# Patient Record
Sex: MALE | Race: WHITE | NOT HISPANIC OR LATINO | Employment: OTHER | URBAN - METROPOLITAN AREA
[De-identification: names, ages, dates, MRNs, and addresses within clinical notes are randomized per-mention and may not be internally consistent; named-entity substitution may affect disease eponyms.]

---

## 2017-02-23 DIAGNOSIS — E03.9 HYPOTHYROIDISM, UNSPECIFIED TYPE: Primary | ICD-10-CM

## 2017-02-23 RX ORDER — LEVOTHYROXINE SODIUM 125 UG/1
125 TABLET ORAL EVERY MORNING
Qty: 90 TABLET | Refills: 3 | Status: SHIPPED | OUTPATIENT
Start: 2017-02-23 | End: 2017-07-05

## 2017-03-31 ENCOUNTER — OFFICE VISIT (OUTPATIENT)
Dept: INTERNAL MEDICINE | Facility: CLINIC | Age: 77
End: 2017-03-31
Attending: INTERNAL MEDICINE
Payer: MEDICARE

## 2017-03-31 VITALS
OXYGEN SATURATION: 97 % | DIASTOLIC BLOOD PRESSURE: 80 MMHG | WEIGHT: 174 LBS | HEART RATE: 64 BPM | BODY MASS INDEX: 24.36 KG/M2 | SYSTOLIC BLOOD PRESSURE: 148 MMHG | HEIGHT: 71 IN

## 2017-03-31 DIAGNOSIS — H53.8 BLURRED VISION, LEFT EYE: ICD-10-CM

## 2017-03-31 DIAGNOSIS — K21.9 GASTROESOPHAGEAL REFLUX DISEASE, ESOPHAGITIS PRESENCE NOT SPECIFIED: ICD-10-CM

## 2017-03-31 DIAGNOSIS — K51.919 ULCERATIVE COLITIS WITH COMPLICATION, UNSPECIFIED LOCATION: ICD-10-CM

## 2017-03-31 DIAGNOSIS — Z00.00 ROUTINE HISTORY AND PHYSICAL EXAMINATION OF ADULT: ICD-10-CM

## 2017-03-31 DIAGNOSIS — M77.11 RIGHT TENNIS ELBOW: ICD-10-CM

## 2017-03-31 DIAGNOSIS — I10 ESSENTIAL HYPERTENSION: Primary | ICD-10-CM

## 2017-03-31 DIAGNOSIS — N40.0 BENIGN PROSTATIC HYPERPLASIA, PRESENCE OF LOWER URINARY TRACT SYMPTOMS UNSPECIFIED, UNSPECIFIED MORPHOLOGY: ICD-10-CM

## 2017-03-31 DIAGNOSIS — E03.9 HYPOTHYROIDISM, UNSPECIFIED TYPE: ICD-10-CM

## 2017-03-31 PROCEDURE — 3077F SYST BP >= 140 MM HG: CPT | Mod: S$GLB,,, | Performed by: INTERNAL MEDICINE

## 2017-03-31 PROCEDURE — 3079F DIAST BP 80-89 MM HG: CPT | Mod: S$GLB,,, | Performed by: INTERNAL MEDICINE

## 2017-03-31 PROCEDURE — G0444 DEPRESSION SCREEN ANNUAL: HCPCS | Mod: 59,S$GLB,, | Performed by: INTERNAL MEDICINE

## 2017-03-31 PROCEDURE — 1160F RVW MEDS BY RX/DR IN RCRD: CPT | Mod: S$GLB,,, | Performed by: INTERNAL MEDICINE

## 2017-03-31 PROCEDURE — 99214 OFFICE O/P EST MOD 30 MIN: CPT | Mod: S$GLB,,, | Performed by: INTERNAL MEDICINE

## 2017-03-31 PROCEDURE — 1157F ADVNC CARE PLAN IN RCRD: CPT | Mod: S$GLB,,, | Performed by: INTERNAL MEDICINE

## 2017-03-31 PROCEDURE — 1159F MED LIST DOCD IN RCRD: CPT | Mod: S$GLB,,, | Performed by: INTERNAL MEDICINE

## 2017-03-31 PROCEDURE — G0442 ANNUAL ALCOHOL SCREEN 15 MIN: HCPCS | Mod: 59,S$GLB,, | Performed by: INTERNAL MEDICINE

## 2017-03-31 RX ORDER — IRBESARTAN 150 MG/1
75 TABLET ORAL EVERY MORNING
Qty: 90 TABLET | Refills: 3
Start: 2017-03-31 | End: 2017-07-03

## 2017-03-31 NOTE — MR AVS SNAPSHOT
[facility]  [address] [facility], [address] [address]  Phone: [phone]  Fax: [phone]                  Ramesh Ricci   3/31/2017 9:45 AM   Office Visit    Description:  Male : 1940   Provider:  ANA CRISTINA Muniz MD   Department:  [facility]           Reason for Visit     Follow-up     Elbow Injury     Eye Problem           Diagnoses this Visit        Comments    Essential hypertension    -  Primary     Hypothyroidism, unspecified type         Routine history and physical examination of adult         Blurred vision, left eye                To Do List           Future Appointments        Provider Department Dept Phone    2017 9:00 AM MD [facility] Thomas [phone]      Goals (5 Years of Data)     None      Follow-Up and Disposition     Return in about 6 months (around 2017).       These Medications        Disp Refills Start End    irbesartan (AVAPRO) 150 MG tablet 90 tablet 3 3/31/2017     Take 0.5 tablets (75 mg total) by mouth every morning. - Oral    Pharmacy: [facility] [address] [place] [address] #: [phone]         [facility] On Call     [facility] On Call Nurse Care Line -  Assistance  Unless otherwise directed by your provider, please contact [facility], our nurse care line that is available for  assistance.     Registered nurses in the [facility] provide: appointment scheduling, clinical advisement, health education, and other advisory services.  Call: [phone] (toll free)               Medications           Message regarding Medications     Verify the changes and/or additions to your medication regime listed below are the same as discussed with your clinician today.  If any of these changes or additions are incorrect, please notify your healthcare provider.        CHANGE how you are taking these medications     Start Taking Instead of    irbesartan (AVAPRO) 150 MG tablet irbesartan (AVAPRO) 150 MG tablet  "   Dosage:  Take 0.5 tablets (75 mg total) by mouth every morning. Dosage:  TAKE ONE TABLET BY MOUTH ONCE DAILY IN THE MORNING    Reason for Change:  Reorder            Verify that the below list of medications is an accurate representation of the medications you are currently taking.  If none reported, the list may be blank. If incorrect, please contact your healthcare provider. Carry this list with you in case of emergency.           Current Medications     ascorbic acid (VITAMIN C) 500 MG tablet Take 500 mg by mouth once daily.    aspirin (ECOTRIN) 81 MG EC tablet Take 81 mg by mouth once daily.      balsalazide (COLAZAL) 750 mg capsule     calcium-vitamin D 500 mg(1,250mg) -200 unit per tablet Take 1 tablet by mouth once daily.      celecoxib (CELEBREX) 200 MG capsule Take 1 capsule (200 mg total) by mouth daily as needed for Pain.    ferrous sulfate 325 mg (65 mg iron) Tab tablet Take 325 mg by mouth once daily.    fluticasone (FLONASE) 50 mcg/actuation nasal spray 2 sprays by Each Nare route once daily.    irbesartan (AVAPRO) 150 MG tablet Take 0.5 tablets (75 mg total) by mouth every morning.    levothyroxine (SYNTHROID) 125 MCG tablet Take 1 tablet (125 mcg total) by mouth every morning.    lorazepam (ATIVAN) 1 MG tablet TAKE ONE TABLET BY MOUTH EVERY DAY AS NEEDED    PNV w/o calcium-iron fum-FA (M-VIT) 27-1 mg Tab Take by mouth.      predniSONE (DELTASONE) 10 MG tablet Take 0.5 tablets (5 mg total) by mouth once daily.    tamsulosin (FLOMAX) 0.4 mg Cp24 Take 0.4 mg by mouth once daily.     venlafaxine (EFFEXOR) 37.5 MG Tab TAKE ONE TABLET BY MOUTH TWICE DAILY           Clinical Reference Information           Your Vitals Were     BP Pulse Height Weight SpO2 BMI    148/80 64 5' 11" (1.803 m) 78.9 kg (174 lb) 97% 24.27 kg/m2      Blood Pressure          Most Recent Value    BP  (!)  148/80      Allergies as of 3/31/2017     Benazepril      Immunizations Administered on Date of Encounter - 3/31/2017     None "      Orders Placed During Today's Visit      Normal Orders This Visit    Ambulatory referral to Ophthalmology     Future Labs/Procedures Expected by Expires    25-Hydroxyvitamin D (D2, D3) LC/MS/MS  3/31/2017 5/30/2018    CBC auto differential  3/31/2017 (Approximate) 5/30/2018    Comprehensive metabolic panel  3/31/2017 (Approximate) 5/30/2018    Hemoglobin A1c  3/31/2017 (Approximate) 5/30/2018    Iron and TIBC  3/31/2017 3/31/2018    Lipid panel  3/31/2017 (Approximate) 5/30/2018    PSA, Screening  3/31/2017 (Approximate) 5/30/2018    T4, free  3/31/2017 (Approximate) 3/31/2018    TSH  3/31/2017 (Approximate) 5/30/2018    Vitamin B12  3/31/2017 (Approximate) 5/30/2018      [facility] Sign-Up     Activating your [facility] account is as easy as 1-2-3!     1) Visit [email or web address], select Sign Up Now, enter this activation code and your date of birth, then select Next.  Activation code not generated  Current Patient Portal Status: Account disabled      2) Create a username and password to use when you visit [facility] in the future and select a security question in case you lose your password and select Next.    3) Enter your e-mail address and click Sign Up!    Additional Information  If you have questions, please e-mail [email or web address] or call [phone] to talk to our [facility] staff. Remember, [facility] is NOT to be used for urgent needs. For medical emergencies, dial 911.         Instructions    Wear a Tennis elbow strap below right elbow during the day.       Language Assistance Services     ATTENTION: Language assistance services are available, free of charge. Please call [phone].      ATENCIÓN: Si abila flori, tiene a gamez disposición servicios gratuitos de asistencia lingüística. Llame al [phone].     CHÚ Ý: N?u b?n nói Ti?ng Vi?t, có các d?ch v? h? tr? ngôn ng? mi?n phí dành cho b?n. G?i s? [phone].         [facility] complies with applicable Federal civil rights laws and does not  discriminate on the basis of race, color, national origin, age, disability, or sex.

## 2017-03-31 NOTE — PROGRESS NOTES
Subjective:       Patient ID: Ramesh Ricci is a 77 y.o. male.    Chief Complaint: Follow-up (wants blood work done); Elbow Injury (picked up something heavy / pain feels like pinching); and Eye Problem (left eye pizarro alot and feels weak)    HPI Comments: Dizzyness gone since stopping Avapro.  Lifted something heavy and now left elbow hurts.    Elbow Injury   This is a new problem. The current episode started 1 to 4 weeks ago. The problem occurs constantly. Associated symptoms include numbness.   Eye Problem    The left eye is affected. The current episode started more than 1 month ago. The problem has been unchanged. The patient is experiencing no pain. Associated symptoms include blurred vision.     Review of Systems   Constitutional: Negative.    Eyes: Positive for blurred vision and visual disturbance.   Respiratory: Negative.    Cardiovascular: Negative.    Neurological: Positive for numbness.   Psychiatric/Behavioral: Negative for dysphoric mood.       Objective:      Physical Exam   Constitutional: He appears well-developed and well-nourished.   HENT:   Head: Normocephalic and atraumatic.       About 5x5 mm skin lesion below right eye.   Eyes: Pupils are equal, round, and reactive to light.   Cardiovascular: Normal rate and regular rhythm.    Murmur heard.   Systolic murmur is present with a grade of 3/6   @RUSB   Pulmonary/Chest: Effort normal.   Musculoskeletal: He exhibits no edema.        Right elbow: Tenderness found. Lateral epicondyle tenderness noted.   Neurological: He is alert.       Assessment:       1. Essential hypertension    2. Hypothyroidism, unspecified type    3. Routine history and physical examination of adult    4. Blurred vision, left eye    5. Benign prostatic hyperplasia, presence of lower urinary tract symptoms unspecified, unspecified morphology    6. Gastroesophageal reflux disease, esophagitis presence not specified    7. Ulcerative colitis with complication, unspecified  location    8. Right tennis elbow        Plan:       Per orders and D/C instructions.  Continue meds/diet for hypothyroid, BPH, GERD, and UC, which are stable.  Optho for blurred vision.  Elbow brace for Tennis elbow.  Avapro 75 mg for HTN.    Screening: The patient was screened for depression with the PHQ2 questionnaire and possible health consequences were discussed with the patient, who understands (15 minutes spent). The patient was screened for the misuse of alcohol, by asking the number of drinks per average week, and if pt has had more than 4 drinks (more than 3 for women and elderly) in 1 day within the past year. The health and legal consequences of misuse were discussed (15 minutes spent). The patient was screened for obesity (BMI>30), If the current BMI > 30, then the possible consequences of obesity, as well as the benefits of diet, exercise, and weight loss were discussed (15 minutes spent).

## 2017-04-04 LAB
25(OH)D3+25(OH)D2 SERPL-MCNC: 36 NG/ML (ref 30–100)
ALBUMIN SERPL-MCNC: 3.9 G/DL (ref 3.6–5.1)
ALBUMIN/GLOB SERPL: 1.5 (CALC) (ref 1–2.5)
ALP SERPL-CCNC: 56 U/L (ref 40–115)
ALT SERPL-CCNC: 11 U/L (ref 9–46)
AST SERPL-CCNC: 21 U/L (ref 10–35)
BASOPHILS # BLD AUTO: 22 CELLS/UL (ref 0–200)
BASOPHILS NFR BLD AUTO: 0.4 %
BILIRUB SERPL-MCNC: 0.5 MG/DL (ref 0.2–1.2)
BUN SERPL-MCNC: 24 MG/DL (ref 7–25)
BUN/CREAT SERPL: 18 (CALC) (ref 6–22)
CALCIUM SERPL-MCNC: 9.1 MG/DL (ref 8.6–10.3)
CHLORIDE SERPL-SCNC: 104 MMOL/L (ref 98–110)
CHOLEST SERPL-MCNC: 241 MG/DL (ref 125–200)
CHOLEST/HDLC SERPL: 3.6 (CALC)
CO2 SERPL-SCNC: 31 MMOL/L (ref 20–31)
CREAT SERPL-MCNC: 1.33 MG/DL (ref 0.7–1.18)
EOSINOPHIL # BLD AUTO: 66 CELLS/UL (ref 15–500)
EOSINOPHIL NFR BLD AUTO: 1.2 %
ERYTHROCYTE [DISTWIDTH] IN BLOOD BY AUTOMATED COUNT: 15.3 % (ref 11–15)
GFR SERPL CREATININE-BSD FRML MDRD: 50 ML/MIN/1.73M2
GLOBULIN SER CALC-MCNC: 2.6 G/DL (CALC) (ref 1.9–3.7)
GLUCOSE SERPL-MCNC: 100 MG/DL (ref 65–99)
HBA1C MFR BLD: 5.4 % OF TOTAL HGB
HCT VFR BLD AUTO: 38.6 % (ref 38.5–50)
HDLC SERPL-MCNC: 67 MG/DL
HGB BLD-MCNC: 12.9 G/DL (ref 13.2–17.1)
IRON SATN MFR SERPL: 21 % (CALC) (ref 15–60)
IRON SERPL-MCNC: 58 MCG/DL (ref 50–180)
LDLC SERPL CALC-MCNC: 116 MG/DL (CALC)
LYMPHOCYTES # BLD AUTO: 776 CELLS/UL (ref 850–3900)
LYMPHOCYTES NFR BLD AUTO: 14.1 %
MCH RBC QN AUTO: 30.2 PG (ref 27–33)
MCHC RBC AUTO-ENTMCNC: 33.3 G/DL (ref 32–36)
MCV RBC AUTO: 90.7 FL (ref 80–100)
MONOCYTES # BLD AUTO: 479 CELLS/UL (ref 200–950)
MONOCYTES NFR BLD AUTO: 8.7 %
NEUTROPHILS # BLD AUTO: 4158 CELLS/UL (ref 1500–7800)
NEUTROPHILS NFR BLD AUTO: 75.6 %
NONHDLC SERPL-MCNC: 174 MG/DL (CALC)
PLATELET # BLD AUTO: 196 THOUSAND/UL (ref 140–400)
PMV BLD REES-ECKER: 8.6 FL (ref 7.5–12.5)
POTASSIUM SERPL-SCNC: 4.5 MMOL/L (ref 3.5–5.3)
PROT SERPL-MCNC: 6.5 G/DL (ref 6.1–8.1)
PSA SERPL-MCNC: 0.6 NG/ML
RBC # BLD AUTO: 4.26 MILLION/UL (ref 4.2–5.8)
SODIUM SERPL-SCNC: 141 MMOL/L (ref 135–146)
T4 FREE SERPL-MCNC: 1 NG/DL (ref 0.8–1.8)
TIBC SERPL-MCNC: 277 MCG/DL (CALC) (ref 250–425)
TRIGL SERPL-MCNC: 292 MG/DL
TSH SERPL-ACNC: 2.11 MIU/L (ref 0.4–4.5)
VIT B12 SERPL-MCNC: 596 PG/ML (ref 200–1100)
VITAMIN D2 SERPL-MCNC: <4 NG/ML
VITAMIN D3 SERPL-MCNC: 36 NG/ML
WBC # BLD AUTO: 5.5 THOUSAND/UL (ref 3.8–10.8)

## 2017-04-28 DIAGNOSIS — R52 PAIN: ICD-10-CM

## 2017-04-28 RX ORDER — CELECOXIB 200 MG/1
200 CAPSULE ORAL DAILY PRN
Qty: 90 CAPSULE | Refills: 1 | Status: SHIPPED | OUTPATIENT
Start: 2017-04-28 | End: 2018-11-01 | Stop reason: SDUPTHER

## 2017-05-26 DIAGNOSIS — J30.9 ALLERGIC RHINITIS, UNSPECIFIED ALLERGIC RHINITIS TRIGGER, UNSPECIFIED RHINITIS SEASONALITY: Primary | ICD-10-CM

## 2017-05-26 DIAGNOSIS — R42 VERTIGO: ICD-10-CM

## 2017-05-26 RX ORDER — FLUTICASONE PROPIONATE 50 MCG
2 SPRAY, SUSPENSION (ML) NASAL DAILY
Qty: 16 G | Refills: 5 | Status: SHIPPED | OUTPATIENT
Start: 2017-05-26 | End: 2018-09-19

## 2017-05-26 RX ORDER — MECLIZINE HYDROCHLORIDE 25 MG/1
TABLET ORAL
Qty: 30 TABLET | Refills: 0 | Status: SHIPPED | OUTPATIENT
Start: 2017-05-26 | End: 2017-11-09

## 2017-06-11 ENCOUNTER — HOSPITAL ENCOUNTER (EMERGENCY)
Facility: OTHER | Age: 77
Discharge: HOME | End: 2017-06-12
Attending: EMERGENCY MEDICINE
Payer: MEDICARE

## 2017-06-11 DIAGNOSIS — K59.00 CONSTIPATION, UNSPECIFIED CONSTIPATION TYPE: ICD-10-CM

## 2017-06-11 DIAGNOSIS — R10.9 RIGHT SIDED ABDOMINAL PAIN: Primary | ICD-10-CM

## 2017-06-11 DIAGNOSIS — Z87.19 HISTORY OF ULCERATIVE COLITIS: ICD-10-CM

## 2017-06-11 LAB
ALBUMIN SERPL BCP-MCNC: 3.4 G/DL
ALP SERPL-CCNC: 70 U/L
ALT SERPL W/O P-5'-P-CCNC: 9 U/L
ANION GAP SERPL CALC-SCNC: 12 MMOL/L
AST SERPL-CCNC: 22 U/L
BASOPHILS # BLD AUTO: 0.02 K/UL
BASOPHILS NFR BLD: 0.3 %
BILIRUB SERPL-MCNC: 0.3 MG/DL
BILIRUB UR QL STRIP: NEGATIVE
BUN SERPL-MCNC: 19 MG/DL
CALCIUM SERPL-MCNC: 8.8 MG/DL
CHLORIDE SERPL-SCNC: 101 MMOL/L
CLARITY UR: CLEAR
CO2 SERPL-SCNC: 27 MMOL/L
COLOR UR: YELLOW
CREAT SERPL-MCNC: 1.4 MG/DL
DIFFERENTIAL METHOD: ABNORMAL
EOSINOPHIL # BLD AUTO: 0.2 K/UL
EOSINOPHIL NFR BLD: 2.6 %
ERYTHROCYTE [DISTWIDTH] IN BLOOD BY AUTOMATED COUNT: 15 %
EST. GFR  (AFRICAN AMERICAN): 55 ML/MIN/1.73 M^2
EST. GFR  (NON AFRICAN AMERICAN): 47 ML/MIN/1.73 M^2
GLUCOSE SERPL-MCNC: 122 MG/DL
GLUCOSE UR QL STRIP: NEGATIVE
HCT VFR BLD AUTO: 39.9 %
HGB BLD-MCNC: 12.7 G/DL
HGB UR QL STRIP: NEGATIVE
KETONES UR QL STRIP: NEGATIVE
LEUKOCYTE ESTERASE UR QL STRIP: NEGATIVE
LIPASE SERPL-CCNC: 18 U/L
LYMPHOCYTES # BLD AUTO: 0.9 K/UL
LYMPHOCYTES NFR BLD: 11.5 %
MCH RBC QN AUTO: 30 PG
MCHC RBC AUTO-ENTMCNC: 31.8 %
MCV RBC AUTO: 94 FL
MONOCYTES # BLD AUTO: 1 K/UL
MONOCYTES NFR BLD: 13.8 %
NEUTROPHILS # BLD AUTO: 5.3 K/UL
NEUTROPHILS NFR BLD: 71.5 %
NITRITE UR QL STRIP: NEGATIVE
PH UR STRIP: 6 [PH] (ref 5–8)
PLATELET # BLD AUTO: 229 K/UL
PMV BLD AUTO: 10.2 FL
POTASSIUM SERPL-SCNC: 3.7 MMOL/L
PROT SERPL-MCNC: 7.1 G/DL
PROT UR QL STRIP: NEGATIVE
RBC # BLD AUTO: 4.23 M/UL
SODIUM SERPL-SCNC: 140 MMOL/L
SP GR UR STRIP: 1.01 (ref 1–1.03)
URN SPEC COLLECT METH UR: NORMAL
UROBILINOGEN UR STRIP-ACNC: NEGATIVE EU/DL
WBC # BLD AUTO: 7.38 K/UL

## 2017-06-11 PROCEDURE — 96361 HYDRATE IV INFUSION ADD-ON: CPT

## 2017-06-11 PROCEDURE — 80053 COMPREHEN METABOLIC PANEL: CPT

## 2017-06-11 PROCEDURE — 83690 ASSAY OF LIPASE: CPT

## 2017-06-11 PROCEDURE — 85025 COMPLETE CBC W/AUTO DIFF WBC: CPT

## 2017-06-11 PROCEDURE — 25000003 PHARM REV CODE 250: Performed by: EMERGENCY MEDICINE

## 2017-06-11 PROCEDURE — 96360 HYDRATION IV INFUSION INIT: CPT

## 2017-06-11 PROCEDURE — 99284 EMERGENCY DEPT VISIT MOD MDM: CPT | Mod: 25

## 2017-06-11 PROCEDURE — 81003 URINALYSIS AUTO W/O SCOPE: CPT

## 2017-06-11 RX ADMIN — SODIUM CHLORIDE 1000 ML: 0.9 INJECTION, SOLUTION INTRAVENOUS at 10:06

## 2017-06-12 VITALS
SYSTOLIC BLOOD PRESSURE: 160 MMHG | HEIGHT: 71 IN | BODY MASS INDEX: 24.5 KG/M2 | RESPIRATION RATE: 18 BRPM | WEIGHT: 175 LBS | DIASTOLIC BLOOD PRESSURE: 76 MMHG | TEMPERATURE: 98 F | OXYGEN SATURATION: 97 % | HEART RATE: 70 BPM

## 2017-06-12 RX ORDER — POLYETHYLENE GLYCOL 3350 17 G/17G
17 POWDER, FOR SOLUTION ORAL DAILY PRN
Qty: 119 G | Refills: 0 | Status: SHIPPED | OUTPATIENT
Start: 2017-06-12 | End: 2017-06-26

## 2017-06-12 RX ORDER — DOCUSATE SODIUM 100 MG/1
100 CAPSULE, LIQUID FILLED ORAL 2 TIMES DAILY
Qty: 60 CAPSULE | Refills: 0 | Status: SHIPPED | OUTPATIENT
Start: 2017-06-12 | End: 2017-07-03

## 2017-06-12 NOTE — ED TRIAGE NOTES
"C/o abdominal pain in the RLQ starting tonight, rated 8/10, described as "cramping". Pt has hx of UC. Reports the presence of diarrhea, states his bowel movents prior have been in "balls", soft. Pt reports chills associated to the pain. Stomach is round, soft and non tender on palpation  "

## 2017-06-12 NOTE — ED PROVIDER NOTES
"Encounter Date: 6/11/2017    SCRIBE #1 NOTE: I, Lizette Kinsey, am scribing for, and in the presence of,  Dr. Fajardo. I have scribed the entire note.       History     Chief Complaint   Patient presents with    Abdominal Pain     pt reports he thinks he is having a bowl obstruction, pt reports hx of colitis, reports he sharp pain the RLQ tonight with nausea     Review of patient's allergies indicates:   Allergen Reactions    Benazepril Other (See Comments)     Cough     Time seen by provider: 10:24 PM    This is a 77 y.o. male with HTN who presents with complaint of LLQ abdominal pain near the groin. Pt has had accompanying diarrhea for the last couple of months. He describes the stool as chunky and almost black. He denies any blood in the stool. Pt also complains of severe stomach cramps starting tonight, nausea, and stomach feeling "raw." He denies vomiting, fever, chills, difficulty urinating, and dysuria. He is on medications for his hx of ulcerative colitis, he is also on Flomax, and other medications. He is not currently on abx. He was previously on prednisone but is unsure when he stopped, possibly a year or two ago. Pt has no history of abdominal surgery. Pt has been waiting to see his GI doctor. His PCP is Dr. Aneudy Muniz.      The history is provided by the patient and the spouse.     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    BPH (benign prostatic hyperplasia) 11/16/2012    GERD (gastroesophageal reflux disease) 11/16/2012    Glaucoma 11/16/2012    HTN (hypertension) 11/16/2012    Hypothyroid 11/16/2012    Internal carotid artery stenosis 11/16/2012    Left ventricular diastolic dysfunction, NYHA class 1 4/16/2015    4/15    Low serum testosterone level 11/16/2012    Normal cardiac stress test 11/16/2012    Osteopenia 11/16/2012    Shingles 11/16/2012    TIA (transient ischemic attack) 11/16/2012    UC (ulcerative " "colitis) 11/16/2012     Past Surgical History:   Procedure Laterality Date    ADENOIDECTOMY      EYE SURGERY      HERNIA REPAIR      TONSILLECTOMY       Family History   Problem Relation Age of Onset    Hypertension Mother     Diabetes Brother     Hypertension Brother     Cancer Brother     Heart disease Brother 56     MI     Social History   Substance Use Topics    Smoking status: Former Smoker     Quit date: 1/1/1972    Smokeless tobacco: Not on file    Alcohol use 0.6 oz/week     1 Cans of beer per week      Comment: occac     Review of Systems   Constitutional: Negative for chills and fever.   HENT: Negative for ear discharge, ear pain and sore throat.    Eyes: Negative for pain and redness.   Respiratory: Negative for shortness of breath and wheezing.    Cardiovascular: Negative for chest pain.   Gastrointestinal: Positive for abdominal pain (LLQ, cramps, "raw"), diarrhea (chunky, dark) and nausea. Negative for blood in stool and vomiting.   Genitourinary: Negative for difficulty urinating, dysuria and urgency.   Musculoskeletal: Negative for back pain and neck pain.   Skin: Negative for color change, pallor, rash and wound.   Neurological: Negative for syncope, weakness and headaches.   Hematological: Does not bruise/bleed easily.   Psychiatric/Behavioral: Negative for behavioral problems and confusion.       Physical Exam     Initial Vitals [06/11/17 2141]   BP Pulse Resp Temp SpO2   (!) 186/84 63 18 98.2 °F (36.8 °C) 98 %     Physical Exam    Nursing note and vitals reviewed.  Constitutional: He appears well-developed and well-nourished. He is not diaphoretic. No distress.   HENT:   Head: Normocephalic and atraumatic.   Right Ear: External ear normal.   Left Ear: External ear normal.   Moist mucous membranes.   Eyes: Right eye exhibits no discharge. Left eye exhibits no discharge.   No pallor or icterus.   Neck: Normal range of motion. Neck supple.   Cardiovascular: Normal rate, regular rhythm " and normal heart sounds. Exam reveals no gallop and no friction rub.    No murmur heard.  Pulmonary/Chest: Breath sounds normal. No respiratory distress. He has no wheezes. He has no rhonchi. He has no rales. He exhibits no tenderness.   Abdominal: Soft. Bowel sounds are normal. He exhibits no distension. There is tenderness (mild tenderness to LLQ.). There is no rebound and no guarding.   Musculoskeletal: Normal range of motion. He exhibits no edema or tenderness.   Lymphadenopathy:     He has no cervical adenopathy.   Neurological: He is alert and oriented to person, place, and time. He has normal strength. No sensory deficit.   Skin: Skin is warm and dry. No rash noted. No erythema.   Psychiatric: He has a normal mood and affect. His behavior is normal. Judgment and thought content normal.         ED Course   Procedures  Labs Reviewed   CBC W/ AUTO DIFFERENTIAL - Abnormal; Notable for the following:        Result Value    RBC 4.23 (*)     Hemoglobin 12.7 (*)     Hematocrit 39.9 (*)     MCHC 31.8 (*)     RDW 15.0 (*)     Lymph # 0.9 (*)     Lymph% 11.5 (*)     All other components within normal limits   COMPREHENSIVE METABOLIC PANEL - Abnormal; Notable for the following:     Glucose 122 (*)     Albumin 3.4 (*)     ALT 9 (*)     eGFR if  55 (*)     eGFR if non  47 (*)     All other components within normal limits   URINALYSIS   LIPASE          X-Rays:   Independently Interpreted Readings:   Abdomen:   Flat and Erect of Abdomen - Copious stool in the right colon. Few air fluid levels on the right.       Imaging Results          X-Ray Abdomen Flat And Erect (Final result)  Result time 06/11/17 23:01:00    Final result by Phillip Johnson MD (06/11/17 23:01:00)                 Impression:       Moderate to large colonic stool burden, suggestive of constipation.  No air-fluid levels to suggest high-grade bowel obstruction.              Electronically signed by: PHILLIP JOHNSON MD  Date:      06/11/17  Time:    23:01              Narrative:    Exam: [id number]  06/11/17  22:24:24 [id number] ([facility]) : XR ABDOMEN FLAT AND ERECT    Technique:    Supine and upright radiographs of the abdomen.    Comparison:    03/19/2013    Findings:      There is no evidence of free air beneath the hemidiaphragms.  Chronic interstitial changes are noted in the visualized lung bases.    There is moderate to large amount of stool throughout the large bowel.  No differential air-fluid levels are noted.  There is no evidence of bowel distention.  Curvilinear lucencies are noted in the region of the right lower abdominal quadrant, presumably stool within the cecum.  No definite evidence of pneumatosis.  No portal venous air is identified.    There are degenerative changes in the osseous structures.  There are atherosclerotic calcifications of the abdominal aorta and its branch vessels.                              Medical Decision Making:   Clinical Tests:   Lab Tests: Ordered and Reviewed  Radiological Study: Ordered and Reviewed    Additional MDM:   X-Rays: I have independently interpreted X-Ray(s) - see notes.          Scribe Attestation:   Scribe #1: I performed the above scribed service and the documentation accurately describes the services I performed. I attest to the accuracy of the note.    Attending Attestation:           Physician Attestation for Scribe:  Physician Attestation Statement for Scribe #1: I, Dr. Fajardo, reviewed documentation, as scribed by Lizette Kinsey in my presence, and it is both accurate and complete.                 ED Course     Patient with a history of ulcerative colitis, recently well controlled.  He presents with pain on the right side of the abdomen.  No fever.  No nausea or vomiting.  No difficulty urinating.  No blood or melena.  On exam he has mild tenderness in the right but no rebound or guarding.  He is well-appearing.  Laboratory studies show normal white count, no significant  electrolyte abnormality.  Abdominal x-ray shows a large stool burden of the ascending colon, consistent with the location of his pain.  No air-fluid levels.  Suspect his symptoms are related to this constipation rather than ulcerative colitis.  I do not think he needs to be restarted on high-dose steroids.  Rather we will place him on stool softeners and laxatives.  Encouraged follow-up with primary care and/or GI, especially if symptoms persist.  reTurn the emergency department in the interim for new/worsening symptoms.    Clinical Impression:     1. Right sided abdominal pain    2. Constipation, unspecified constipation type    3. History of ulcerative colitis                Miguel Fajardo II, MD  06/12/17 8820

## 2017-06-12 NOTE — ED NOTES
Pt is resting in stretcher, lights dimmed for comfort, in no acute distress, respirations even and unlabored. Will continue to monitor.

## 2017-06-12 NOTE — ED NOTES
Patient informed of need for urine sample for analysis. Specimen cup provided, patient will notify me when sample is obtained.

## 2017-06-12 NOTE — ED NOTES
Pt updated on POC. Comfort, position and restroom needs addressed. Bed remains in lowest position, side rails up x2, call light in reach. Pt instructed to call for assistance. No complaints of pain at present, VSS. Will continue to monitor.

## 2017-07-03 ENCOUNTER — OFFICE VISIT (OUTPATIENT)
Dept: INTERNAL MEDICINE | Facility: CLINIC | Age: 77
End: 2017-07-03
Attending: INTERNAL MEDICINE
Payer: MEDICARE

## 2017-07-03 VITALS
HEART RATE: 68 BPM | BODY MASS INDEX: 22.68 KG/M2 | SYSTOLIC BLOOD PRESSURE: 110 MMHG | HEIGHT: 71 IN | WEIGHT: 162 LBS | DIASTOLIC BLOOD PRESSURE: 60 MMHG | OXYGEN SATURATION: 98 %

## 2017-07-03 DIAGNOSIS — I10 ESSENTIAL HYPERTENSION: Primary | ICD-10-CM

## 2017-07-03 DIAGNOSIS — R19.7 DIARRHEA, UNSPECIFIED TYPE: ICD-10-CM

## 2017-07-03 DIAGNOSIS — C61 PROSTATE CA: ICD-10-CM

## 2017-07-03 DIAGNOSIS — N18.30 CKD (CHRONIC KIDNEY DISEASE) STAGE 3, GFR 30-59 ML/MIN: ICD-10-CM

## 2017-07-03 DIAGNOSIS — M19.90 ARTHRITIS: ICD-10-CM

## 2017-07-03 DIAGNOSIS — K51.919 ULCERATIVE COLITIS WITH COMPLICATION, UNSPECIFIED LOCATION: ICD-10-CM

## 2017-07-03 DIAGNOSIS — M54.2 NECK PAIN: ICD-10-CM

## 2017-07-03 DIAGNOSIS — G45.9 TRANSIENT CEREBRAL ISCHEMIA, UNSPECIFIED TYPE: ICD-10-CM

## 2017-07-03 DIAGNOSIS — Z00.00 ROUTINE HISTORY AND PHYSICAL EXAMINATION OF ADULT: ICD-10-CM

## 2017-07-03 DIAGNOSIS — R63.4 WEIGHT LOSS: ICD-10-CM

## 2017-07-03 DIAGNOSIS — E03.9 HYPOTHYROIDISM, UNSPECIFIED TYPE: ICD-10-CM

## 2017-07-03 PROCEDURE — 1159F MED LIST DOCD IN RCRD: CPT | Mod: S$GLB,,, | Performed by: INTERNAL MEDICINE

## 2017-07-03 PROCEDURE — 99215 OFFICE O/P EST HI 40 MIN: CPT | Mod: S$GLB,,, | Performed by: INTERNAL MEDICINE

## 2017-07-03 RX ORDER — BALSALAZIDE DISODIUM 750 MG/1
1500 CAPSULE ORAL 3 TIMES DAILY
Qty: 180 CAPSULE | Refills: 5 | Status: SHIPPED | OUTPATIENT
Start: 2017-07-03 | End: 2017-07-03 | Stop reason: SDUPTHER

## 2017-07-03 RX ORDER — PREDNISONE 10 MG/1
TABLET ORAL DAILY
Qty: 30 TABLET | Refills: 0 | Status: SHIPPED | OUTPATIENT
Start: 2017-07-03 | End: 2017-08-08 | Stop reason: SDUPTHER

## 2017-07-03 RX ORDER — BALSALAZIDE DISODIUM 750 MG/1
2250 CAPSULE ORAL 2 TIMES DAILY
Qty: 180 CAPSULE | Refills: 5 | Status: SHIPPED | OUTPATIENT
Start: 2017-07-03 | End: 2017-10-16 | Stop reason: SDUPTHER

## 2017-07-03 NOTE — PATIENT INSTRUCTIONS
Please call [facility] at [phone] to schedule your radiology test(s). - CT abdomen and x-ray of neck and knees.  Increase Balsalazide to 3 caps two times each day.  Take Prednisone for 10 days.  See your eye doctor.

## 2017-07-03 NOTE — PROGRESS NOTES
Subjective:       Patient ID: Ramesh Ricci is a 77 y.o. male.    Chief Complaint: Follow-up (6 month / need colonoscopy and EGD); Constipation (but with some watery stools ); Urinary Frequency; Neck Pain (wants xray); and Knee Pain ( both / weakness/ wants xray)    Went to ED on 6/11/17 for abd pain. Noted increase stool on X-ray. Started on Miralax.  Developed diarrhea. Saw Dr. Fraga who rec stopping Miralax.  Now having 4-5 watery BM each night.  Lost 12 lb in 3 mo due to anorexia.  Neck and joints hurts.      Neck Pain    This is a chronic problem. The current episode started more than 1 month ago. The problem occurs constantly. Pertinent negatives include no fever.   Knee Pain    The incident occurred more than 1 week ago.   Hypertension   This is a chronic problem. The current episode started more than 1 year ago. The problem is controlled. Associated symptoms include neck pain.     Review of Systems   Constitutional: Positive for appetite change, fatigue and unexpected weight change. Negative for fever.   Respiratory: Negative.    Cardiovascular: Negative.    Gastrointestinal: Positive for abdominal pain and diarrhea.   Musculoskeletal: Positive for neck pain.       Objective:      Physical Exam   Constitutional: He appears well-developed and well-nourished.   HENT:   Head: Normocephalic and atraumatic.   Eyes: Pupils are equal, round, and reactive to light.   Cardiovascular: Normal rate and regular rhythm.    Murmur heard.   Crescendo decrescendo diastolic murmur is present with a grade of 3/6   @RUSB   Pulmonary/Chest: Effort normal.   Abdominal: Soft. Bowel sounds are normal. There is no hepatosplenomegaly. There is tenderness in the right lower quadrant and left lower quadrant.   Musculoskeletal: He exhibits no edema.   Neurological: He is alert.       Assessment:       1. Essential hypertension    2. Ulcerative colitis with complication, unspecified location    3. Prostate CA    4. Hypothyroidism,  unspecified type    5. Routine history and physical examination of adult    6. Arthritis    7. Transient cerebral ischemia, unspecified type    8. CKD (chronic kidney disease) stage 3, GFR 30-59 ml/min    9. Weight loss    10. Neck pain    11. Diarrhea, unspecified type        Plan:       Per orders and D/C instructions.  Continue meds/diet for S/p TIA, HTN, CKD, and hypothyroid, which are stable.  CT abd/pelvis to evaluate Abd pain and diarrhea.  Inc Balsalazide and Prednisone for 10 days for possible UC flair.  F/u with Dr. Fraga.  Consider PT for neck pain.

## 2017-07-05 ENCOUNTER — HOSPITAL ENCOUNTER (OUTPATIENT)
Dept: RADIOLOGY | Facility: OTHER | Age: 77
Discharge: HOME | End: 2017-07-05
Attending: INTERNAL MEDICINE
Payer: MEDICARE

## 2017-07-05 DIAGNOSIS — M19.90 ARTHRITIS: ICD-10-CM

## 2017-07-05 DIAGNOSIS — K51.919 ULCERATIVE COLITIS WITH COMPLICATION, UNSPECIFIED LOCATION: ICD-10-CM

## 2017-07-05 DIAGNOSIS — M50.30 DDD (DEGENERATIVE DISC DISEASE), CERVICAL: Primary | ICD-10-CM

## 2017-07-05 DIAGNOSIS — E03.9 HYPOTHYROIDISM, UNSPECIFIED TYPE: Primary | ICD-10-CM

## 2017-07-05 PROBLEM — M17.0 PRIMARY OSTEOARTHRITIS OF BOTH KNEES: Status: ACTIVE | Noted: 2017-07-05

## 2017-07-05 PROBLEM — R91.8 LUNG NODULES: Status: ACTIVE | Noted: 2017-07-05

## 2017-07-05 PROBLEM — Q60.0 CONGENITAL ABSENCE OF RIGHT KIDNEY: Status: ACTIVE | Noted: 2017-07-05

## 2017-07-05 LAB
ALBUMIN SERPL-MCNC: 3.3 G/DL (ref 3.6–5.1)
ALBUMIN/GLOB SERPL: 1.1 (CALC) (ref 1–2.5)
ALP SERPL-CCNC: 57 U/L (ref 40–115)
ALT SERPL-CCNC: 6 U/L (ref 9–46)
AMYLASE SERPL-CCNC: 40 U/L (ref 21–101)
AST SERPL-CCNC: 15 U/L (ref 10–35)
BASOPHILS # BLD AUTO: 41 CELLS/UL (ref 0–200)
BASOPHILS NFR BLD AUTO: 0.7 %
BILIRUB SERPL-MCNC: 0.4 MG/DL (ref 0.2–1.2)
BUN SERPL-MCNC: 18 MG/DL (ref 7–25)
BUN/CREAT SERPL: 13 (CALC) (ref 6–22)
CALCIUM SERPL-MCNC: 8.9 MG/DL (ref 8.6–10.3)
CHLORIDE SERPL-SCNC: 98 MMOL/L (ref 98–110)
CK SERPL-CCNC: 69 U/L (ref 44–196)
CO2 SERPL-SCNC: 31 MMOL/L (ref 20–31)
CREAT SERPL-MCNC: 1.39 MG/DL (ref 0.7–1.18)
CRP SERPL-MCNC: 2.35 MG/DL
EOSINOPHIL # BLD AUTO: 183 CELLS/UL (ref 15–500)
EOSINOPHIL NFR BLD AUTO: 3.1 %
ERYTHROCYTE [DISTWIDTH] IN BLOOD BY AUTOMATED COUNT: 16.1 % (ref 11–15)
ERYTHROCYTE [SEDIMENTATION RATE] IN BLOOD BY WESTERGREN METHOD: 58 MM/H
GFR SERPL CREATININE-BSD FRML MDRD: 48 ML/MIN/1.73M2
GLOBULIN SER CALC-MCNC: 3.1 G/DL (CALC) (ref 1.9–3.7)
GLUCOSE SERPL-MCNC: 88 MG/DL (ref 65–99)
HBA1C MFR BLD: 5.6 % OF TOTAL HGB
HCT VFR BLD AUTO: 37.4 % (ref 38.5–50)
HGB BLD-MCNC: 12 G/DL (ref 13.2–17.1)
IRON SATN MFR SERPL: 10 % (CALC) (ref 15–60)
IRON SERPL-MCNC: 22 MCG/DL (ref 50–180)
LIPASE SERPL-CCNC: 15 U/L (ref 7–60)
LYMPHOCYTES # BLD AUTO: 1328 CELLS/UL (ref 850–3900)
LYMPHOCYTES NFR BLD AUTO: 22.5 %
MCH RBC QN AUTO: 30.1 PG (ref 27–33)
MCHC RBC AUTO-ENTMCNC: 32.2 G/DL (ref 32–36)
MCV RBC AUTO: 93.4 FL (ref 80–100)
MONOCYTES # BLD AUTO: 1145 CELLS/UL (ref 200–950)
MONOCYTES NFR BLD AUTO: 19.4 %
NEUTROPHILS # BLD AUTO: 3204 CELLS/UL (ref 1500–7800)
NEUTROPHILS NFR BLD AUTO: 54.3 %
PLATELET # BLD AUTO: 308 THOUSAND/UL (ref 140–400)
PMV BLD REES-ECKER: 8.4 FL (ref 7.5–12.5)
POTASSIUM SERPL-SCNC: 4.1 MMOL/L (ref 3.5–5.3)
PROT SERPL-MCNC: 6.4 G/DL (ref 6.1–8.1)
RBC # BLD AUTO: 4.01 MILLION/UL (ref 4.2–5.8)
SODIUM SERPL-SCNC: 138 MMOL/L (ref 135–146)
T4 FREE SERPL-MCNC: 0.7 NG/DL (ref 0.8–1.8)
TIBC SERPL-MCNC: 225 MCG/DL (CALC) (ref 250–425)
TSH SERPL-ACNC: 18 MIU/L (ref 0.4–4.5)
WBC # BLD AUTO: 5.9 THOUSAND/UL (ref 3.8–10.8)

## 2017-07-05 PROCEDURE — 73560 X-RAY EXAM OF KNEE 1 OR 2: CPT | Mod: 50,TC

## 2017-07-05 PROCEDURE — 74177 CT ABD & PELVIS W/CONTRAST: CPT | Mod: TC

## 2017-07-05 PROCEDURE — 73560 X-RAY EXAM OF KNEE 1 OR 2: CPT | Mod: 26,LT,, | Performed by: RADIOLOGY

## 2017-07-05 PROCEDURE — 73560 X-RAY EXAM OF KNEE 1 OR 2: CPT | Mod: 26,RT,, | Performed by: RADIOLOGY

## 2017-07-05 PROCEDURE — 74177 CT ABD & PELVIS W/CONTRAST: CPT | Mod: 26,,, | Performed by: INTERNAL MEDICINE

## 2017-07-05 PROCEDURE — 72050 X-RAY EXAM NECK SPINE 4/5VWS: CPT | Mod: TC

## 2017-07-05 PROCEDURE — 72050 X-RAY EXAM NECK SPINE 4/5VWS: CPT | Mod: 26,,, | Performed by: RADIOLOGY

## 2017-07-05 PROCEDURE — 25500020 PHARM REV CODE 255: Performed by: INTERNAL MEDICINE

## 2017-07-05 RX ORDER — LEVOTHYROXINE SODIUM 150 UG/1
150 TABLET ORAL DAILY
Qty: 30 TABLET | Refills: 3 | Status: SHIPPED | OUTPATIENT
Start: 2017-07-05 | End: 2017-07-06 | Stop reason: DRUGHIGH

## 2017-07-05 RX ADMIN — IOHEXOL 15 ML: 350 INJECTION, SOLUTION INTRAVENOUS at 01:07

## 2017-07-05 RX ADMIN — IOHEXOL 25 ML: 350 INJECTION, SOLUTION INTRAVENOUS at 12:07

## 2017-07-05 RX ADMIN — IOHEXOL 75 ML: 350 INJECTION, SOLUTION INTRAVENOUS at 12:07

## 2017-07-06 ENCOUNTER — TELEPHONE (OUTPATIENT)
Dept: INTERNAL MEDICINE | Facility: CLINIC | Age: 77
End: 2017-07-06

## 2017-07-06 DIAGNOSIS — E03.9 HYPOTHYROIDISM, UNSPECIFIED TYPE: Primary | ICD-10-CM

## 2017-07-06 RX ORDER — LEVOTHYROXINE SODIUM 125 UG/1
125 TABLET ORAL DAILY
Qty: 90 TABLET | Refills: 3
Start: 2017-07-06 | End: 2018-06-18

## 2017-07-06 NOTE — TELEPHONE ENCOUNTER
Pt states he thought he was taking levothyroxine daily; however, he had a change in routine and realized was not taking. Informed per Dr. Muniz's orders if was not taking, then start. Pt can disregard higher dose of 150mcg that was sent to pharmacy and restart previous dose of 125mcg. Pt verbalized understanding.

## 2017-08-08 RX ORDER — PREDNISONE 10 MG/1
TABLET ORAL
Qty: 30 TABLET | Refills: 0 | Status: SHIPPED | OUTPATIENT
Start: 2017-08-08 | End: 2017-08-13 | Stop reason: SDUPTHER

## 2017-08-13 RX ORDER — PREDNISONE 10 MG/1
TABLET ORAL
Qty: 30 TABLET | Refills: 0 | Status: SHIPPED | OUTPATIENT
Start: 2017-08-13 | End: 2017-09-09 | Stop reason: SDUPTHER

## 2017-08-15 DIAGNOSIS — K21.9 GASTROESOPHAGEAL REFLUX DISEASE WITHOUT ESOPHAGITIS: ICD-10-CM

## 2017-08-15 RX ORDER — PANTOPRAZOLE SODIUM 20 MG/1
20 TABLET, DELAYED RELEASE ORAL DAILY
Qty: 90 TABLET | Refills: 1 | Status: SHIPPED | OUTPATIENT
Start: 2017-08-15 | End: 2017-11-09

## 2017-08-17 DIAGNOSIS — I10 ESSENTIAL HYPERTENSION: Primary | ICD-10-CM

## 2017-08-17 RX ORDER — IRBESARTAN 150 MG/1
150 TABLET ORAL EVERY MORNING
Qty: 90 TABLET | Refills: 3 | Status: SHIPPED | OUTPATIENT
Start: 2017-08-17 | End: 2017-11-09 | Stop reason: SDUPTHER

## 2017-09-11 RX ORDER — PREDNISONE 10 MG/1
TABLET ORAL
Qty: 30 TABLET | Refills: 0 | Status: SHIPPED | OUTPATIENT
Start: 2017-09-11 | End: 2017-11-09 | Stop reason: SDUPTHER

## 2017-10-12 ENCOUNTER — OFFICE VISIT (OUTPATIENT)
Dept: INTERNAL MEDICINE | Facility: CLINIC | Age: 77
End: 2017-10-12
Attending: INTERNAL MEDICINE
Payer: MEDICARE

## 2017-10-12 ENCOUNTER — HOSPITAL ENCOUNTER (OUTPATIENT)
Dept: RADIOLOGY | Facility: OTHER | Age: 77
Discharge: HOME | End: 2017-10-12
Attending: INTERNAL MEDICINE
Payer: MEDICARE

## 2017-10-12 VITALS
HEIGHT: 71 IN | HEART RATE: 66 BPM | BODY MASS INDEX: 23.8 KG/M2 | WEIGHT: 170 LBS | SYSTOLIC BLOOD PRESSURE: 130 MMHG | OXYGEN SATURATION: 98 % | DIASTOLIC BLOOD PRESSURE: 68 MMHG

## 2017-10-12 DIAGNOSIS — R50.9 FEVER, UNSPECIFIED FEVER CAUSE: ICD-10-CM

## 2017-10-12 DIAGNOSIS — R09.82 PND (POST-NASAL DRIP): ICD-10-CM

## 2017-10-12 DIAGNOSIS — R05.9 COUGH: ICD-10-CM

## 2017-10-12 DIAGNOSIS — E03.9 HYPOTHYROIDISM, UNSPECIFIED TYPE: ICD-10-CM

## 2017-10-12 DIAGNOSIS — K21.9 GASTROESOPHAGEAL REFLUX DISEASE, ESOPHAGITIS PRESENCE NOT SPECIFIED: ICD-10-CM

## 2017-10-12 DIAGNOSIS — N40.0 BENIGN PROSTATIC HYPERPLASIA, UNSPECIFIED WHETHER LOWER URINARY TRACT SYMPTOMS PRESENT: Primary | ICD-10-CM

## 2017-10-12 DIAGNOSIS — K51.919 ULCERATIVE COLITIS WITH COMPLICATION, UNSPECIFIED LOCATION: ICD-10-CM

## 2017-10-12 DIAGNOSIS — I10 ESSENTIAL HYPERTENSION: ICD-10-CM

## 2017-10-12 PROCEDURE — 99214 OFFICE O/P EST MOD 30 MIN: CPT | Mod: S$GLB,,, | Performed by: INTERNAL MEDICINE

## 2017-10-12 PROCEDURE — 71020 XR CHEST PA AND LATERAL: CPT | Mod: TC

## 2017-10-12 PROCEDURE — 71020 XR CHEST PA AND LATERAL: CPT | Mod: 26,,, | Performed by: RADIOLOGY

## 2017-10-12 RX ORDER — DOXYCYCLINE HYCLATE 100 MG
100 TABLET ORAL 2 TIMES DAILY
Qty: 14 TABLET | Refills: 0 | Status: SHIPPED | OUTPATIENT
Start: 2017-10-12 | End: 2017-10-18 | Stop reason: SDUPTHER

## 2017-10-12 RX ORDER — FLUTICASONE FUROATE AND VILANTEROL 100; 25 UG/1; UG/1
1 POWDER RESPIRATORY (INHALATION) DAILY
COMMUNITY
Start: 2017-10-12 | End: 2017-11-09

## 2017-10-12 NOTE — PROGRESS NOTES
Subjective:       Patient ID: Ramesh Ricci is a 77 y.o. male.    Chief Complaint: Cough    Cough   This is a new problem. The current episode started in the past 7 days. The problem has been unchanged. The cough is productive of sputum. Associated symptoms include a fever, postnasal drip and sweats.   Hypertension   This is a chronic problem. The current episode started more than 1 year ago. The problem is controlled. Associated symptoms include sweats.     Review of Systems   Constitutional: Positive for fever.   HENT: Positive for postnasal drip.    Respiratory: Positive for cough.    Cardiovascular: Negative.        Objective:      Physical Exam   Constitutional: He appears well-developed and well-nourished.   HENT:   Head: Normocephalic and atraumatic.   Eyes: Pupils are equal, round, and reactive to light.   Cardiovascular: Normal rate and regular rhythm.    Murmur heard.   Crescendo decrescendo diastolic murmur is present with a grade of 3/6   @RUSB   Pulmonary/Chest: Effort normal. He has wheezes in the left lower field. He has rhonchi in the left lower field.   No egophany   Abdominal: Soft. Bowel sounds are normal. There is no hepatosplenomegaly.   Musculoskeletal: He exhibits no edema.   Neurological: He is alert.       Assessment:       1. Benign prostatic hyperplasia, unspecified whether lower urinary tract symptoms present    2. Essential hypertension    3. Gastroesophageal reflux disease, esophagitis presence not specified    4. Hypothyroidism, unspecified type    5. Ulcerative colitis with complication, unspecified location    6. Cough    7. Fever, unspecified fever cause    8. PND (post-nasal drip)        Plan:       Per orders and D/C instructions.  Continue meds/diet for BPH, GERD, HTN, and hypothyroid, which are stable.     CXR to evaluate for pneumonia.  Breo and Doxy for cough. Flonase for PND.

## 2017-10-13 ENCOUNTER — CLINICAL SUPPORT (OUTPATIENT)
Dept: INTERNAL MEDICINE | Facility: CLINIC | Age: 77
End: 2017-10-13
Payer: MEDICARE

## 2017-10-13 DIAGNOSIS — J18.9 PNEUMONIA OF LOWER LOBE DUE TO INFECTIOUS ORGANISM, UNSPECIFIED LATERALITY: Primary | ICD-10-CM

## 2017-10-13 PROCEDURE — 96372 THER/PROPH/DIAG INJ SC/IM: CPT | Mod: S$GLB,,, | Performed by: INTERNAL MEDICINE

## 2017-10-13 RX ORDER — CEFTRIAXONE 1 G/1
1 INJECTION, POWDER, FOR SOLUTION INTRAMUSCULAR; INTRAVENOUS
Status: COMPLETED | OUTPATIENT
Start: 2017-10-13 | End: 2017-10-13

## 2017-10-13 RX ADMIN — CEFTRIAXONE 1 G: 1 INJECTION, POWDER, FOR SOLUTION INTRAMUSCULAR; INTRAVENOUS at 11:10

## 2017-10-16 DIAGNOSIS — K51.919 ULCERATIVE COLITIS WITH COMPLICATION, UNSPECIFIED LOCATION: Primary | ICD-10-CM

## 2017-10-16 RX ORDER — BALSALAZIDE DISODIUM 750 MG/1
2250 CAPSULE ORAL 2 TIMES DAILY
Qty: 540 CAPSULE | Refills: 0 | Status: SHIPPED | OUTPATIENT
Start: 2017-10-16 | End: 2018-04-09 | Stop reason: SDUPTHER

## 2017-10-18 ENCOUNTER — OFFICE VISIT (OUTPATIENT)
Dept: INTERNAL MEDICINE | Facility: CLINIC | Age: 77
End: 2017-10-18
Payer: MEDICARE

## 2017-10-18 VITALS
BODY MASS INDEX: 23.66 KG/M2 | WEIGHT: 169 LBS | HEART RATE: 74 BPM | SYSTOLIC BLOOD PRESSURE: 138 MMHG | HEIGHT: 71 IN | OXYGEN SATURATION: 99 % | DIASTOLIC BLOOD PRESSURE: 72 MMHG

## 2017-10-18 DIAGNOSIS — J18.9 PNEUMONIA OF LEFT LOWER LOBE DUE TO INFECTIOUS ORGANISM: Primary | ICD-10-CM

## 2017-10-18 PROCEDURE — 99213 OFFICE O/P EST LOW 20 MIN: CPT | Mod: S$GLB,,, | Performed by: NURSE PRACTITIONER

## 2017-10-18 RX ORDER — DOXYCYCLINE HYCLATE 100 MG
100 TABLET ORAL 2 TIMES DAILY
Qty: 14 TABLET | Refills: 0 | Status: SHIPPED | OUTPATIENT
Start: 2017-10-18 | End: 2017-11-09

## 2017-10-18 NOTE — PROGRESS NOTES
HPI Comments: Cough x 3-7 days   HPI   +Dr. Muniz Dx Pneumonia (seen in clinic 10.12. 17 & CXR ordered)  +Currently Nonproductive cough  +chest is less tight, but not back to normal   - No paroxysms of coughing  - Scant, if any sputum.   - No S/T;   - No sneezing  - No sick contacts   - No fever   - No out of country travel     Review of Systems   Heent: No head pain, + ear pain, but no exudate, No eye pain or exudate, + PND, No O/P pain or exudate; denies tooth pain  Respiratory: No wheezing, + cough, No SOB   Cardiovascular: Negative for chest pain, palpitations or syncope.   All other systems reviewed and are negative.     Objective:    Physical Exam   Constitutional: Appears well-developed and well-nourished; non-toxic appearance, no acute distress.   Pulmonary/Chest: Effort non-labored;  No wheezes in any lobe; no rales, no crackles, no silent or diminished areas;   Cardiovascular: Regular rate, regular rhythm, no S3 or S4 with no rubs, harsh systolic murmur 3/6 appreciated.. Pedal pulses equal and strong; no pedal edema   Neurological: Alert, oriented x 3       Assessment:       See Visit Diagnosis    .       Plan:    7 more days of doxycycline  Sample Breo (pt left before sample picked up -- called will leave at desk)  OK to continue daily activies, but no extremes  Eat healthy foods, Get plenty of sleep. Drink plenty of fluids

## 2017-11-09 ENCOUNTER — OFFICE VISIT (OUTPATIENT)
Dept: INTERNAL MEDICINE | Facility: CLINIC | Age: 77
End: 2017-11-09
Attending: INTERNAL MEDICINE
Payer: MEDICARE

## 2017-11-09 VITALS
SYSTOLIC BLOOD PRESSURE: 130 MMHG | BODY MASS INDEX: 24.22 KG/M2 | OXYGEN SATURATION: 99 % | HEART RATE: 72 BPM | HEIGHT: 71 IN | DIASTOLIC BLOOD PRESSURE: 68 MMHG | WEIGHT: 173 LBS

## 2017-11-09 DIAGNOSIS — E03.9 HYPOTHYROIDISM, UNSPECIFIED TYPE: ICD-10-CM

## 2017-11-09 DIAGNOSIS — R05.9 COUGH: Primary | ICD-10-CM

## 2017-11-09 DIAGNOSIS — K21.9 GASTROESOPHAGEAL REFLUX DISEASE, ESOPHAGITIS PRESENCE NOT SPECIFIED: ICD-10-CM

## 2017-11-09 DIAGNOSIS — K51.919 ULCERATIVE COLITIS WITH COMPLICATION, UNSPECIFIED LOCATION: ICD-10-CM

## 2017-11-09 DIAGNOSIS — I10 ESSENTIAL HYPERTENSION: ICD-10-CM

## 2017-11-09 PROCEDURE — 99214 OFFICE O/P EST MOD 30 MIN: CPT | Mod: S$GLB,,, | Performed by: INTERNAL MEDICINE

## 2017-11-09 PROCEDURE — G0008 ADMIN INFLUENZA VIRUS VAC: HCPCS | Mod: S$GLB,,, | Performed by: INTERNAL MEDICINE

## 2017-11-09 PROCEDURE — 90662 IIV NO PRSV INCREASED AG IM: CPT | Mod: S$GLB,,, | Performed by: INTERNAL MEDICINE

## 2017-11-09 RX ORDER — PREDNISONE 10 MG/1
TABLET ORAL
Qty: 30 TABLET | Refills: 0
Start: 2017-11-09 | End: 2017-11-19

## 2017-11-09 RX ORDER — IRBESARTAN 150 MG/1
75 TABLET ORAL EVERY MORNING
Qty: 90 TABLET | Refills: 3
Start: 2017-11-09 | End: 2018-05-10 | Stop reason: SDUPTHER

## 2017-11-09 NOTE — PATIENT INSTRUCTIONS
OK to exercise.  Take 10 deep breaths 4 times every day.    Get a Chest x-ray at [facility] in 2 weeks.

## 2017-11-09 NOTE — PROGRESS NOTES
Subjective:       Patient ID: Ramesh Ricci is a 77 y.o. male.    Chief Complaint: Follow-up and Cough    Took Doxy for 12 days for LLL pneumonia. Feels better. Still has a cough. Tickle in throat. No nasal drip or GERD.      Cough   This is a new problem. The current episode started more than 1 year ago. The problem has been rapidly improving. The cough is non-productive. Pertinent negatives include no fever, postnasal drip or rhinorrhea.   Hypertension   This is a chronic problem. The current episode started more than 1 year ago. The problem is controlled.     Review of Systems   Constitutional: Negative.  Negative for fatigue and fever.   HENT: Negative for congestion, postnasal drip and rhinorrhea.    Respiratory: Positive for cough.    Cardiovascular: Negative.        Objective:      Physical Exam   Cardiovascular:   Murmur heard.   Crescendo decrescendo systolic murmur is present with a grade of 3/6   @RUSB   Pulmonary/Chest: He has rales in the right lower field and the left lower field.       Assessment:       1. Cough    2. Essential hypertension    3. Hypothyroidism, unspecified type    4. Ulcerative colitis with complication, unspecified location    5. Gastroesophageal reflux disease, esophagitis presence not specified        Plan:       Per orders and D/C instructions.  Continue meds/diet for HTN, hypothyroid, UC, and GERD which are stable.    Deep breathing for cough. Repeat CXR in 2 weeks.

## 2017-11-19 RX ORDER — PREDNISONE 10 MG/1
TABLET ORAL
Qty: 30 TABLET | Refills: 2 | Status: SHIPPED | OUTPATIENT
Start: 2017-11-19 | End: 2018-05-26 | Stop reason: SDUPTHER

## 2018-03-25 DIAGNOSIS — R42 VERTIGO: ICD-10-CM

## 2018-03-26 RX ORDER — MECLIZINE HYDROCHLORIDE 25 MG/1
TABLET ORAL
Qty: 30 TABLET | Refills: 0 | Status: SHIPPED | OUTPATIENT
Start: 2018-03-26 | End: 2018-04-08 | Stop reason: SDUPTHER

## 2018-04-08 DIAGNOSIS — R42 VERTIGO: ICD-10-CM

## 2018-04-08 RX ORDER — MECLIZINE HYDROCHLORIDE 25 MG/1
TABLET ORAL
Qty: 30 TABLET | Refills: 0 | Status: SHIPPED | OUTPATIENT
Start: 2018-04-08 | End: 2018-04-22 | Stop reason: SDUPTHER

## 2018-04-09 DIAGNOSIS — K51.919 ULCERATIVE COLITIS WITH COMPLICATION, UNSPECIFIED LOCATION: ICD-10-CM

## 2018-04-09 RX ORDER — BALSALAZIDE DISODIUM 750 MG/1
2250 CAPSULE ORAL 2 TIMES DAILY
Qty: 540 CAPSULE | Refills: 0 | Status: SHIPPED | OUTPATIENT
Start: 2018-04-09 | End: 2018-08-13

## 2018-04-22 DIAGNOSIS — R42 VERTIGO: ICD-10-CM

## 2018-04-23 RX ORDER — MECLIZINE HYDROCHLORIDE 25 MG/1
TABLET ORAL
Qty: 30 TABLET | Refills: 3 | Status: SHIPPED | OUTPATIENT
Start: 2018-04-23 | End: 2018-09-04 | Stop reason: SDUPTHER

## 2018-05-10 ENCOUNTER — OFFICE VISIT (OUTPATIENT)
Dept: INTERNAL MEDICINE | Facility: CLINIC | Age: 78
End: 2018-05-10
Attending: INTERNAL MEDICINE
Payer: MEDICARE

## 2018-05-10 ENCOUNTER — HOSPITAL ENCOUNTER (OUTPATIENT)
Dept: RADIOLOGY | Facility: OTHER | Age: 78
Discharge: HOME | End: 2018-05-10
Attending: INTERNAL MEDICINE
Payer: MEDICARE

## 2018-05-10 VITALS
WEIGHT: 173 LBS | BODY MASS INDEX: 24.22 KG/M2 | SYSTOLIC BLOOD PRESSURE: 120 MMHG | HEART RATE: 73 BPM | OXYGEN SATURATION: 98 % | HEIGHT: 71 IN | DIASTOLIC BLOOD PRESSURE: 64 MMHG

## 2018-05-10 DIAGNOSIS — R53.83 FATIGUE, UNSPECIFIED TYPE: ICD-10-CM

## 2018-05-10 DIAGNOSIS — E03.9 HYPOTHYROIDISM, UNSPECIFIED TYPE: ICD-10-CM

## 2018-05-10 DIAGNOSIS — K21.9 GASTROESOPHAGEAL REFLUX DISEASE, ESOPHAGITIS PRESENCE NOT SPECIFIED: ICD-10-CM

## 2018-05-10 DIAGNOSIS — Z00.00 ROUTINE ADULT HEALTH MAINTENANCE: ICD-10-CM

## 2018-05-10 DIAGNOSIS — C61 PROSTATE CA: ICD-10-CM

## 2018-05-10 DIAGNOSIS — M17.0 PRIMARY OSTEOARTHRITIS OF BOTH KNEES: Primary | ICD-10-CM

## 2018-05-10 DIAGNOSIS — R42 DIZZY SPELLS: ICD-10-CM

## 2018-05-10 DIAGNOSIS — D50.8 OTHER IRON DEFICIENCY ANEMIA: ICD-10-CM

## 2018-05-10 DIAGNOSIS — E55.9 VITAMIN D DEFICIENCY: ICD-10-CM

## 2018-05-10 DIAGNOSIS — Z00.00 ROUTINE ADULT HEALTH MAINTENANCE: Primary | ICD-10-CM

## 2018-05-10 DIAGNOSIS — R51.9 NONINTRACTABLE HEADACHE, UNSPECIFIED CHRONICITY PATTERN, UNSPECIFIED HEADACHE TYPE: ICD-10-CM

## 2018-05-10 DIAGNOSIS — M85.80 OSTEOPENIA, UNSPECIFIED LOCATION: ICD-10-CM

## 2018-05-10 DIAGNOSIS — E78.9 DISORDER OF LIPID METABOLISM: ICD-10-CM

## 2018-05-10 DIAGNOSIS — Z13.39 SCREENING FOR ALCOHOLISM: ICD-10-CM

## 2018-05-10 DIAGNOSIS — K51.919 ULCERATIVE COLITIS WITH COMPLICATION, UNSPECIFIED LOCATION: ICD-10-CM

## 2018-05-10 DIAGNOSIS — Z13.31 SCREENING FOR DEPRESSION: ICD-10-CM

## 2018-05-10 DIAGNOSIS — M81.0 AGE-RELATED OSTEOPOROSIS WITHOUT CURRENT PATHOLOGICAL FRACTURE: ICD-10-CM

## 2018-05-10 DIAGNOSIS — R05.9 COUGH: ICD-10-CM

## 2018-05-10 DIAGNOSIS — I10 ESSENTIAL HYPERTENSION: ICD-10-CM

## 2018-05-10 DIAGNOSIS — R79.89 OTHER SPECIFIED ABNORMAL FINDINGS OF BLOOD CHEMISTRY: ICD-10-CM

## 2018-05-10 DIAGNOSIS — Z12.5 SCREENING FOR PROSTATE CANCER: ICD-10-CM

## 2018-05-10 DIAGNOSIS — N18.30 CKD (CHRONIC KIDNEY DISEASE) STAGE 3, GFR 30-59 ML/MIN: ICD-10-CM

## 2018-05-10 DIAGNOSIS — D51.0 PERNICIOUS ANEMIA: ICD-10-CM

## 2018-05-10 PROCEDURE — 77080 DXA BONE DENSITY AXIAL: CPT | Mod: TC

## 2018-05-10 PROCEDURE — 1160F RVW MEDS BY RX/DR IN RCRD: CPT | Mod: S$GLB,,, | Performed by: INTERNAL MEDICINE

## 2018-05-10 PROCEDURE — 77080 DXA BONE DENSITY AXIAL: CPT | Mod: 26,,, | Performed by: RADIOLOGY

## 2018-05-10 PROCEDURE — 1159F MED LIST DOCD IN RCRD: CPT | Mod: S$GLB,,, | Performed by: INTERNAL MEDICINE

## 2018-05-10 PROCEDURE — 3017F COLORECTAL CA SCREEN DOC REV: CPT | Mod: S$GLB,,, | Performed by: INTERNAL MEDICINE

## 2018-05-10 PROCEDURE — 3078F DIAST BP <80 MM HG: CPT | Mod: CPTII,S$GLB,, | Performed by: INTERNAL MEDICINE

## 2018-05-10 PROCEDURE — G0442 ANNUAL ALCOHOL SCREEN 15 MIN: HCPCS | Mod: 59,S$GLB,, | Performed by: INTERNAL MEDICINE

## 2018-05-10 PROCEDURE — 99215 OFFICE O/P EST HI 40 MIN: CPT | Mod: 25,S$GLB,, | Performed by: INTERNAL MEDICINE

## 2018-05-10 PROCEDURE — G0444 DEPRESSION SCREEN ANNUAL: HCPCS | Mod: 59,S$GLB,, | Performed by: INTERNAL MEDICINE

## 2018-05-10 PROCEDURE — 3074F SYST BP LT 130 MM HG: CPT | Mod: CPTII,S$GLB,, | Performed by: INTERNAL MEDICINE

## 2018-05-10 PROCEDURE — 3066F NEPHROPATHY DOC TX: CPT | Mod: S$GLB,,, | Performed by: INTERNAL MEDICINE

## 2018-05-10 RX ORDER — IRBESARTAN 150 MG/1
150 TABLET ORAL EVERY MORNING
Qty: 90 TABLET | Refills: 3
Start: 2018-05-10 | End: 2018-08-13

## 2018-05-10 NOTE — PROGRESS NOTES
Subjective:       Patient ID: Ramesh Ricci is a 78 y.o. male.    Chief Complaint: Follow-up (6 month / discuss bone density scan because of steroid use and PSA testing); Leg Pain (both legs); Dizziness; Headache (dull headache); Cough (chest congestion / non productive); and Fatigue    HA got better with going back to 1 Avapro/day, instead of 1/2.  Dizzyness when getting out of bed is unchanged.      Dizziness:   Chronicity:  Chronic  Onset:  More than 1 year ago  Progression since onset:  Unchanged   Associated symptoms: headaches.  Aggravated by:  Getting up  Headache    This is a new problem. The current episode started more than 1 month ago. The problem has been gradually improving. Associated symptoms include coughing and dizziness.   Cough   This is a new problem. The current episode started more than 1 month ago. The problem has been unchanged. The cough is non-productive. Associated symptoms include headaches and myalgias.   Fatigue   This is a chronic problem. The current episode started more than 1 month ago. The problem has been unchanged. Associated symptoms include arthralgias, coughing, fatigue, headaches and myalgias.   Hypertension   This is a chronic problem. The current episode started more than 1 year ago. The problem is controlled. Associated symptoms include headaches.     Review of Systems   Constitutional: Positive for fatigue.   Respiratory: Positive for cough.    Cardiovascular: Negative.    Musculoskeletal: Positive for arthralgias and myalgias.   Neurological: Positive for dizziness and headaches.   Psychiatric/Behavioral: Negative for dysphoric mood.       Objective:      Physical Exam   Constitutional: He appears well-developed and well-nourished.   HENT:   Head: Normocephalic and atraumatic.   Eyes: Pupils are equal, round, and reactive to light.   Cardiovascular: Normal rate and regular rhythm.    Murmur heard.   Crescendo decrescendo systolic murmur is present with a grade of  3/6   @RUSB   Pulmonary/Chest: Effort normal.   Musculoskeletal: He exhibits no edema.   Neurological: He is alert.       Assessment:       1. Primary osteoarthritis of both knees    2. Osteopenia, unspecified location    3. Essential hypertension    4. Ulcerative colitis with complication, unspecified location    5. Prostate CA    6. Hypothyroidism, unspecified type    7. CKD (chronic kidney disease) stage 3, GFR 30-59 ml/min    8. Cough    9. Age-related osteoporosis without current pathological fracture     10. Gastroesophageal reflux disease, esophagitis presence not specified    11. Routine adult health maintenance    12. Screening for depression    13. Screening for alcoholism    14. Nonintractable headache, unspecified chronicity pattern, unspecified headache type    15. Dizzy spells    16. Fatigue, unspecified type        Plan:       Per orders and D/C instructions.  Continue meds/diet for UC, HTN, OA, CKD, GERD, dizziness, and hypothyroid, which are stable.  DEXA to evaluate osteopenia. CXR to evaluate cough.  Labs to evaluate fatigue with PSA for Hx prostate Ca.    Screening: The patient was screened for depression with the PHQ2 questionnaire and possible health consequences were discussed with the patient, who understands (15 minutes spent). The patient was screened for the misuse of alcohol, by asking the number of drinks per average week, and if pt has had more than 4 drinks (more than 3 for women and elderly) in 1 day within the past year. The health and legal consequences of misuse were discussed (15 minutes spent). The patient was screened for obesity (BMI>30), If the current BMI > 30, then the possible consequences of obesity, as well as the benefits of diet, exercise, and weight loss were discussed (15 minutes spent).

## 2018-05-13 LAB
1,25(OH)2D SERPL-MCNC: 28 PG/ML (ref 18–72)
1,25(OH)2D2 SERPL-MCNC: <8 PG/ML
1,25(OH)2D3 SERPL-MCNC: 28 PG/ML
ALBUMIN SERPL-MCNC: 4.1 G/DL (ref 3.6–5.1)
ALBUMIN/GLOB SERPL: 1.4 (CALC) (ref 1–2.5)
ALP SERPL-CCNC: 54 U/L (ref 40–115)
ALT SERPL-CCNC: 7 U/L (ref 9–46)
AST SERPL-CCNC: 17 U/L (ref 10–35)
BASOPHILS # BLD AUTO: 40 CELLS/UL (ref 0–200)
BASOPHILS NFR BLD AUTO: 0.5 %
BILIRUB SERPL-MCNC: 0.5 MG/DL (ref 0.2–1.2)
BUN SERPL-MCNC: 25 MG/DL (ref 7–25)
BUN/CREAT SERPL: 16 (CALC) (ref 6–22)
CALCIUM SERPL-MCNC: 9.5 MG/DL (ref 8.6–10.3)
CHLORIDE SERPL-SCNC: 100 MMOL/L (ref 98–110)
CHOLEST SERPL-MCNC: 237 MG/DL
CHOLEST/HDLC SERPL: 4.5 (CALC)
CO2 SERPL-SCNC: 28 MMOL/L (ref 20–31)
CREAT SERPL-MCNC: 1.54 MG/DL (ref 0.7–1.11)
EOSINOPHIL # BLD AUTO: 72 CELLS/UL (ref 15–500)
EOSINOPHIL NFR BLD AUTO: 0.9 %
ERYTHROCYTE [DISTWIDTH] IN BLOOD BY AUTOMATED COUNT: 13.9 % (ref 11–15)
ERYTHROCYTE [SEDIMENTATION RATE] IN BLOOD BY WESTERGREN METHOD: 31 MM/H
GFR SERPL CREATININE-BSD FRML MDRD: 42 ML/MIN/1.73M2
GLOBULIN SER CALC-MCNC: 3 G/DL (CALC) (ref 1.9–3.7)
GLUCOSE SERPL-MCNC: 126 MG/DL (ref 65–139)
HBA1C MFR BLD: 5.8 % OF TOTAL HGB
HCT VFR BLD AUTO: 39 % (ref 38.5–50)
HDLC SERPL-MCNC: 53 MG/DL
HGB BLD-MCNC: 13.1 G/DL (ref 13.2–17.1)
IRON SATN MFR SERPL: 29 % (CALC) (ref 15–60)
IRON SERPL-MCNC: 80 MCG/DL (ref 50–180)
LDLC SERPL CALC-MCNC: 129 MG/DL (CALC)
LYMPHOCYTES # BLD AUTO: 1496 CELLS/UL (ref 850–3900)
LYMPHOCYTES NFR BLD AUTO: 18.7 %
MAGNESIUM SERPL-MCNC: 2.2 MG/DL (ref 1.5–2.5)
MCH RBC QN AUTO: 29.9 PG (ref 27–33)
MCHC RBC AUTO-ENTMCNC: 33.6 G/DL (ref 32–36)
MCV RBC AUTO: 89 FL (ref 80–100)
MONOCYTES # BLD AUTO: 656 CELLS/UL (ref 200–950)
MONOCYTES NFR BLD AUTO: 8.2 %
NEUTROPHILS # BLD AUTO: 5736 CELLS/UL (ref 1500–7800)
NEUTROPHILS NFR BLD AUTO: 71.7 %
NONHDLC SERPL-MCNC: 184 MG/DL (CALC)
PLATELET # BLD AUTO: 273 THOUSAND/UL (ref 140–400)
PMV BLD REES-ECKER: 10.5 FL (ref 7.5–12.5)
POTASSIUM SERPL-SCNC: 4.7 MMOL/L (ref 3.5–5.3)
PROT SERPL-MCNC: 7.1 G/DL (ref 6.1–8.1)
PSA SERPL-MCNC: 1 NG/ML
RBC # BLD AUTO: 4.38 MILLION/UL (ref 4.2–5.8)
SODIUM SERPL-SCNC: 139 MMOL/L (ref 135–146)
T4 FREE SERPL-MCNC: 1.5 NG/DL (ref 0.8–1.8)
TIBC SERPL-MCNC: 272 MCG/DL (CALC) (ref 250–425)
TRIGL SERPL-MCNC: 382 MG/DL
TSH SERPL-ACNC: 2.25 MIU/L (ref 0.4–4.5)
VIT B12 SERPL-MCNC: 707 PG/ML (ref 200–1100)
WBC # BLD AUTO: 8 THOUSAND/UL (ref 3.8–10.8)

## 2018-05-27 RX ORDER — PREDNISONE 10 MG/1
TABLET ORAL
Qty: 30 TABLET | Refills: 2 | Status: SHIPPED | OUTPATIENT
Start: 2018-05-27 | End: 2018-07-19 | Stop reason: SDUPTHER

## 2018-06-18 DIAGNOSIS — E03.9 HYPOTHYROIDISM, UNSPECIFIED TYPE: ICD-10-CM

## 2018-06-18 RX ORDER — LEVOTHYROXINE SODIUM 125 UG/1
TABLET ORAL
Qty: 90 TABLET | Refills: 3 | Status: SHIPPED | OUTPATIENT
Start: 2018-06-18 | End: 2019-04-11 | Stop reason: SDUPTHER

## 2018-07-19 ENCOUNTER — OFFICE VISIT (OUTPATIENT)
Dept: INTERNAL MEDICINE | Facility: CLINIC | Age: 78
End: 2018-07-19
Attending: INTERNAL MEDICINE
Payer: MEDICARE

## 2018-07-19 VITALS
WEIGHT: 167 LBS | OXYGEN SATURATION: 97 % | DIASTOLIC BLOOD PRESSURE: 60 MMHG | BODY MASS INDEX: 23.38 KG/M2 | SYSTOLIC BLOOD PRESSURE: 110 MMHG | HEIGHT: 71 IN | HEART RATE: 75 BPM

## 2018-07-19 DIAGNOSIS — G44.89 CHRONIC MIXED HEADACHE SYNDROME: ICD-10-CM

## 2018-07-19 DIAGNOSIS — I10 ESSENTIAL HYPERTENSION: Primary | ICD-10-CM

## 2018-07-19 DIAGNOSIS — K51.919 ULCERATIVE COLITIS WITH COMPLICATION, UNSPECIFIED LOCATION: ICD-10-CM

## 2018-07-19 DIAGNOSIS — E03.9 HYPOTHYROIDISM, UNSPECIFIED TYPE: ICD-10-CM

## 2018-07-19 DIAGNOSIS — R26.89 POOR BALANCE: ICD-10-CM

## 2018-07-19 DIAGNOSIS — R42 DIZZY SPELLS: ICD-10-CM

## 2018-07-19 DIAGNOSIS — R51.9 NONINTRACTABLE HEADACHE, UNSPECIFIED CHRONICITY PATTERN, UNSPECIFIED HEADACHE TYPE: ICD-10-CM

## 2018-07-19 DIAGNOSIS — K21.9 GASTROESOPHAGEAL REFLUX DISEASE, ESOPHAGITIS PRESENCE NOT SPECIFIED: ICD-10-CM

## 2018-07-19 PROCEDURE — 99215 OFFICE O/P EST HI 40 MIN: CPT | Mod: S$GLB,,, | Performed by: INTERNAL MEDICINE

## 2018-07-19 PROCEDURE — 3074F SYST BP LT 130 MM HG: CPT | Mod: CPTII,S$GLB,, | Performed by: INTERNAL MEDICINE

## 2018-07-19 PROCEDURE — 3078F DIAST BP <80 MM HG: CPT | Mod: CPTII,S$GLB,, | Performed by: INTERNAL MEDICINE

## 2018-07-19 RX ORDER — PREDNISONE 10 MG/1
TABLET ORAL
Qty: 60 TABLET | Refills: 2 | Status: SHIPPED | OUTPATIENT
Start: 2018-07-19 | End: 2018-08-13

## 2018-07-19 RX ORDER — MESALAMINE 400 MG/1
800 CAPSULE, DELAYED RELEASE ORAL 3 TIMES DAILY
COMMUNITY
End: 2018-08-13

## 2018-07-19 RX ORDER — DIAZEPAM 5 MG/1
TABLET ORAL
Qty: 30 TABLET | Refills: 5 | Status: SHIPPED | OUTPATIENT
Start: 2018-07-19 | End: 2018-08-13

## 2018-07-19 NOTE — PROGRESS NOTES
Subjective:       Patient ID: Ramesh Ricci is a 78 y.o. male.    Chief Complaint: Follow-up; Fatigue (started 3 weeks ago / almost fainted while outside); Memory Loss; Fall (several falls over the past few weeks); and Headache (dull/ for a few weeks)    Stopped Prtednisone 2 weeks ago. He has been on it for several years.  C-scope yeast showed redding colitis. Dr. Fraga thinks he should go on a Biologic agent, but their 33% of cost would be about $600/month.  He is anxious.      Fatigue   This is a new problem. The current episode started 1 to 4 weeks ago. Associated symptoms include fatigue and headaches.   Headache    This is a chronic problem. The current episode started more than 1 month ago. The problem occurs daily. Associated symptoms include dizziness.   Hypertension   This is a chronic problem. The current episode started more than 1 year ago. The problem is controlled. Associated symptoms include headaches.     Review of Systems   Constitutional: Positive for fatigue.   Neurological: Positive for dizziness and headaches.   Psychiatric/Behavioral: The patient is nervous/anxious.        Objective:      Physical Exam   Constitutional: He appears well-developed and well-nourished.   HENT:   Head: Normocephalic and atraumatic.   Eyes: Pupils are equal, round, and reactive to light.   Cardiovascular: Normal rate and regular rhythm.    Murmur heard.   Crescendo decrescendo systolic murmur is present with a grade of 3/6   @RUSB   Pulmonary/Chest: Effort normal.   Musculoskeletal: He exhibits no edema.   Neurological: He is alert.   Romberg pos.       Assessment:       1. Essential hypertension    2. Ulcerative colitis with complication, unspecified location    3. Hypothyroidism, unspecified type    4. Chronic mixed headache syndrome    5. Gastroesophageal reflux disease, esophagitis presence not specified    6. Dizzy spells    7. Nonintractable headache, unspecified chronicity pattern, unspecified headache  type    8. Poor balance        Plan:       Per orders and D/C instructions.  Continue meds/diet for Hypothyroid, HTN, and GERD, which are stable.  Check MRI for HA, dizziness, and poor balance.  Valium for dizziness and anxiety.  F/u with GI for UC.

## 2018-07-23 ENCOUNTER — HOSPITAL ENCOUNTER (OUTPATIENT)
Dept: RADIOLOGY | Facility: HOSPITAL | Age: 78
Discharge: HOME | End: 2018-07-23
Attending: INTERNAL MEDICINE
Payer: MEDICARE

## 2018-07-23 PROCEDURE — 70551 MRI BRAIN STEM W/O DYE: CPT | Mod: 26,,, | Performed by: RADIOLOGY

## 2018-07-23 PROCEDURE — 70551 MRI BRAIN STEM W/O DYE: CPT | Mod: TC

## 2018-07-24 RX ORDER — PRAVASTATIN SODIUM 20 MG/1
20 TABLET ORAL NIGHTLY
Qty: 90 TABLET | Refills: 3 | Status: SHIPPED | OUTPATIENT
Start: 2018-07-24 | End: 2019-08-05 | Stop reason: SDUPTHER

## 2018-08-13 ENCOUNTER — OFFICE VISIT (OUTPATIENT)
Dept: INTERNAL MEDICINE | Facility: CLINIC | Age: 78
End: 2018-08-13
Attending: INTERNAL MEDICINE
Payer: MEDICARE

## 2018-08-13 VITALS
OXYGEN SATURATION: 98 % | BODY MASS INDEX: 23.38 KG/M2 | HEIGHT: 71 IN | DIASTOLIC BLOOD PRESSURE: 52 MMHG | WEIGHT: 167 LBS | HEART RATE: 65 BPM | SYSTOLIC BLOOD PRESSURE: 110 MMHG

## 2018-08-13 DIAGNOSIS — E03.9 HYPOTHYROIDISM, UNSPECIFIED TYPE: ICD-10-CM

## 2018-08-13 DIAGNOSIS — R53.83 FATIGUE, UNSPECIFIED TYPE: ICD-10-CM

## 2018-08-13 DIAGNOSIS — K51.919 ULCERATIVE COLITIS WITH COMPLICATION, UNSPECIFIED LOCATION: ICD-10-CM

## 2018-08-13 DIAGNOSIS — D50.8 OTHER IRON DEFICIENCY ANEMIA: ICD-10-CM

## 2018-08-13 DIAGNOSIS — I10 ESSENTIAL HYPERTENSION: Primary | ICD-10-CM

## 2018-08-13 DIAGNOSIS — E78.9 DISORDER OF LIPID METABOLISM: ICD-10-CM

## 2018-08-13 DIAGNOSIS — K21.9 GASTROESOPHAGEAL REFLUX DISEASE WITHOUT ESOPHAGITIS: ICD-10-CM

## 2018-08-13 PROCEDURE — 3078F DIAST BP <80 MM HG: CPT | Mod: CPTII,S$GLB,, | Performed by: INTERNAL MEDICINE

## 2018-08-13 PROCEDURE — 99214 OFFICE O/P EST MOD 30 MIN: CPT | Mod: S$GLB,,, | Performed by: INTERNAL MEDICINE

## 2018-08-13 PROCEDURE — 3074F SYST BP LT 130 MM HG: CPT | Mod: CPTII,S$GLB,, | Performed by: INTERNAL MEDICINE

## 2018-08-13 RX ORDER — DIAZEPAM 5 MG/1
TABLET ORAL
Qty: 30 TABLET | Refills: 5
Start: 2018-08-13 | End: 2018-10-08

## 2018-08-13 RX ORDER — AZATHIOPRINE 50 MG/1
100 TABLET ORAL DAILY
COMMUNITY
End: 2018-10-08

## 2018-08-13 RX ORDER — PREDNISONE 10 MG/1
10 TABLET ORAL DAILY
Qty: 60 TABLET | Refills: 2
Start: 2018-08-13 | End: 2018-10-08

## 2018-08-13 NOTE — PROGRESS NOTES
Subjective:       Patient ID: Ramesh Ricci is a 78 y.o. male.    Chief Complaint: Follow-up (4 week  / loss of motor skills); Memory Loss; Headache (constant head pain); GI Problem (burning in the center of the chest); Constipation; and Fatigue (weakness, always tired)    Quit Valium because it made him tired.  On Azathioprine and off Delzacol. Down to Pred 10 mg daily.  Burning in chest at night. Releif with Gaviscon.      Headache    This is a new problem. The current episode started more than 1 month ago. The problem occurs daily.   Constipation   This is a new problem. The current episode started in the past 7 days.   Fatigue   This is a chronic problem. The current episode started more than 1 month ago. Associated symptoms include fatigue and headaches.     Review of Systems   Constitutional: Positive for fatigue.   Respiratory: Negative.    Cardiovascular: Negative.    Gastrointestinal: Positive for constipation.   Neurological: Positive for headaches.       Objective:      Physical Exam   Constitutional: He appears well-developed and well-nourished.   HENT:   Head: Normocephalic and atraumatic.   Eyes: Pupils are equal, round, and reactive to light.   Cardiovascular: Normal rate and regular rhythm.   Murmur heard.   Crescendo decrescendo systolic murmur is present with a grade of 3/6.  @RUSB   Pulmonary/Chest: Effort normal.   Musculoskeletal: He exhibits no edema.   Neurological: He is alert.   Romberg pos.       Assessment:       1. Essential hypertension    2. Hypothyroidism, unspecified type    3. Ulcerative colitis with complication, unspecified location    4. Disorder of lipid metabolism    5. Other iron deficiency anemia    6. Gastroesophageal reflux disease without esophagitis    7. Fatigue, unspecified type        Plan:       Per orders and D/C instructions.  Continue meds/diet for hypothyroid and high cholest. which are stable.     F/u with Dr. Fraga for UC.  Dexilant for 2 weeks for  GERD.  Stop Avapro for now and monitor BP.

## 2018-08-21 DIAGNOSIS — R30.0 DYSURIA: Primary | ICD-10-CM

## 2018-08-23 DIAGNOSIS — K21.9 GASTROESOPHAGEAL REFLUX DISEASE, ESOPHAGITIS PRESENCE NOT SPECIFIED: Primary | ICD-10-CM

## 2018-08-23 LAB
ALBUMIN SERPL-MCNC: 3.3 G/DL (ref 3.6–5.1)
ALBUMIN/GLOB SERPL: 1.1 (CALC) (ref 1–2.5)
ALP SERPL-CCNC: 332 U/L (ref 40–115)
ALT SERPL-CCNC: 40 U/L (ref 9–46)
AST SERPL-CCNC: 44 U/L (ref 10–35)
BASOPHILS # BLD AUTO: 29 CELLS/UL (ref 0–200)
BASOPHILS NFR BLD AUTO: 0.3 %
BILIRUB SERPL-MCNC: 0.9 MG/DL (ref 0.2–1.2)
BUN SERPL-MCNC: 22 MG/DL (ref 7–25)
BUN/CREAT SERPL: 16 (CALC) (ref 6–22)
CALCIUM SERPL-MCNC: 8.6 MG/DL (ref 8.6–10.3)
CHLORIDE SERPL-SCNC: 99 MMOL/L (ref 98–110)
CO2 SERPL-SCNC: 29 MMOL/L (ref 20–32)
CREAT SERPL-MCNC: 1.36 MG/DL (ref 0.7–1.11)
EOSINOPHIL # BLD AUTO: 190 CELLS/UL (ref 15–500)
EOSINOPHIL NFR BLD AUTO: 2 %
ERYTHROCYTE [DISTWIDTH] IN BLOOD BY AUTOMATED COUNT: 14.4 % (ref 11–15)
GFR SERPL CREATININE-BSD FRML MDRD: 49 ML/MIN/1.73M2
GLOBULIN SER CALC-MCNC: 2.9 G/DL (CALC) (ref 1.9–3.7)
GLUCOSE SERPL-MCNC: 101 MG/DL (ref 65–139)
HCT VFR BLD AUTO: 29 % (ref 38.5–50)
HGB BLD-MCNC: 9.6 G/DL (ref 13.2–17.1)
LYMPHOCYTES # BLD AUTO: 808 CELLS/UL (ref 850–3900)
LYMPHOCYTES NFR BLD AUTO: 8.5 %
MCH RBC QN AUTO: 29.8 PG (ref 27–33)
MCHC RBC AUTO-ENTMCNC: 33.1 G/DL (ref 32–36)
MCV RBC AUTO: 90.1 FL (ref 80–100)
MONOCYTES # BLD AUTO: 798 CELLS/UL (ref 200–950)
MONOCYTES NFR BLD AUTO: 8.4 %
NEUTROPHILS # BLD AUTO: 7676 CELLS/UL (ref 1500–7800)
NEUTROPHILS NFR BLD AUTO: 80.8 %
PLATELET # BLD AUTO: 406 THOUSAND/UL (ref 140–400)
PMV BLD REES-ECKER: 9.6 FL (ref 7.5–12.5)
POTASSIUM SERPL-SCNC: 4.5 MMOL/L (ref 3.5–5.3)
PROT SERPL-MCNC: 6.2 G/DL (ref 6.1–8.1)
RBC # BLD AUTO: 3.22 MILLION/UL (ref 4.2–5.8)
SODIUM SERPL-SCNC: 135 MMOL/L (ref 135–146)
T4 FREE SERPL-MCNC: 1.2 NG/DL (ref 0.8–1.8)
TSH SERPL-ACNC: 11.12 MIU/L (ref 0.4–4.5)
WBC # BLD AUTO: 9.5 THOUSAND/UL (ref 3.8–10.8)

## 2018-08-23 RX ORDER — DEXLANSOPRAZOLE 60 MG/1
60 CAPSULE, DELAYED RELEASE ORAL DAILY
Qty: 30 CAPSULE | Refills: 11 | Status: SHIPPED | OUTPATIENT
Start: 2018-08-23 | End: 2018-10-08

## 2018-08-24 LAB
APPEARANCE UR: CLEAR
BACTERIA #/AREA URNS HPF: ABNORMAL /HPF
BACTERIA UR CULT: NORMAL
BACTERIA UR CULT: NORMAL
BILIRUB UR QL STRIP: NEGATIVE
COLOR UR: YELLOW
GLUCOSE UR QL STRIP: NEGATIVE
HGB UR QL STRIP: NEGATIVE
HYALINE CASTS #/AREA URNS LPF: ABNORMAL /LPF
KETONES UR QL STRIP: NEGATIVE
LEUKOCYTE ESTERASE UR QL STRIP: ABNORMAL
NITRITE UR QL STRIP: NEGATIVE
PH UR STRIP: 5.5 [PH] (ref 5–8)
PROT UR QL STRIP: NEGATIVE
RBC #/AREA URNS HPF: ABNORMAL /HPF
SP GR UR STRIP: 1.02 (ref 1–1.03)
SQUAMOUS #/AREA URNS HPF: ABNORMAL /HPF
WBC #/AREA URNS HPF: ABNORMAL /HPF

## 2018-09-04 DIAGNOSIS — R42 VERTIGO: ICD-10-CM

## 2018-09-04 RX ORDER — MECLIZINE HYDROCHLORIDE 25 MG/1
TABLET ORAL
Qty: 30 TABLET | Refills: 3 | Status: SHIPPED | OUTPATIENT
Start: 2018-09-04 | End: 2019-06-15 | Stop reason: ALTCHOICE

## 2018-09-07 ENCOUNTER — HOSPITAL ENCOUNTER (OUTPATIENT)
Dept: RADIOLOGY | Facility: OTHER | Age: 78
Discharge: HOME | End: 2018-09-07
Attending: INTERNAL MEDICINE
Payer: MEDICARE

## 2018-09-07 DIAGNOSIS — R94.5 ABNORMAL RESULTS OF LIVER FUNCTION STUDIES: ICD-10-CM

## 2018-09-07 PROCEDURE — 74181 MRI ABDOMEN W/O CONTRAST: CPT | Mod: 26,ICN,, | Performed by: RADIOLOGY

## 2018-09-07 PROCEDURE — 74181 MRI ABDOMEN W/O CONTRAST: CPT | Mod: TC

## 2018-09-07 PROCEDURE — 76376 3D RENDER W/INTRP POSTPROCES: CPT | Mod: 26,ICN,, | Performed by: RADIOLOGY

## 2018-09-07 PROCEDURE — 76376 3D RENDER W/INTRP POSTPROCES: CPT | Mod: TC

## 2018-09-07 RX ORDER — GADOBUTROL 604.72 MG/ML
7 INJECTION INTRAVENOUS
Status: DISCONTINUED | OUTPATIENT
Start: 2018-09-07 | End: 2018-09-07

## 2018-10-08 ENCOUNTER — OFFICE VISIT (OUTPATIENT)
Dept: INTERNAL MEDICINE | Facility: CLINIC | Age: 78
End: 2018-10-08
Attending: INTERNAL MEDICINE
Payer: MEDICARE

## 2018-10-08 VITALS
HEIGHT: 71 IN | OXYGEN SATURATION: 98 % | BODY MASS INDEX: 23.1 KG/M2 | SYSTOLIC BLOOD PRESSURE: 112 MMHG | WEIGHT: 165 LBS | HEART RATE: 81 BPM | DIASTOLIC BLOOD PRESSURE: 62 MMHG

## 2018-10-08 DIAGNOSIS — I10 ESSENTIAL HYPERTENSION: Primary | ICD-10-CM

## 2018-10-08 DIAGNOSIS — K59.00 CONSTIPATION, UNSPECIFIED CONSTIPATION TYPE: ICD-10-CM

## 2018-10-08 DIAGNOSIS — R55 SYNCOPE AND COLLAPSE: ICD-10-CM

## 2018-10-08 DIAGNOSIS — K51.919 ULCERATIVE COLITIS WITH COMPLICATION, UNSPECIFIED LOCATION: ICD-10-CM

## 2018-10-08 DIAGNOSIS — R53.83 FATIGUE, UNSPECIFIED TYPE: ICD-10-CM

## 2018-10-08 DIAGNOSIS — E03.9 HYPOTHYROIDISM, UNSPECIFIED TYPE: ICD-10-CM

## 2018-10-08 DIAGNOSIS — K21.9 GASTROESOPHAGEAL REFLUX DISEASE, ESOPHAGITIS PRESENCE NOT SPECIFIED: ICD-10-CM

## 2018-10-08 DIAGNOSIS — R06.09 DYSPNEA ON EXERTION: ICD-10-CM

## 2018-10-08 DIAGNOSIS — R11.0 NAUSEA: ICD-10-CM

## 2018-10-08 PROCEDURE — 3078F DIAST BP <80 MM HG: CPT | Mod: CPTII,S$GLB,, | Performed by: INTERNAL MEDICINE

## 2018-10-08 PROCEDURE — 3074F SYST BP LT 130 MM HG: CPT | Mod: CPTII,S$GLB,, | Performed by: INTERNAL MEDICINE

## 2018-10-08 PROCEDURE — 99215 OFFICE O/P EST HI 40 MIN: CPT | Mod: 25,S$GLB,, | Performed by: INTERNAL MEDICINE

## 2018-10-08 PROCEDURE — G0008 ADMIN INFLUENZA VIRUS VAC: HCPCS | Mod: S$GLB,,, | Performed by: INTERNAL MEDICINE

## 2018-10-08 PROCEDURE — 90662 IIV NO PRSV INCREASED AG IM: CPT | Mod: S$GLB,,, | Performed by: INTERNAL MEDICINE

## 2018-10-08 RX ORDER — POLYETHYLENE GLYCOL 3350 17 G/17G
17 POWDER, FOR SOLUTION ORAL NIGHTLY PRN
COMMUNITY
End: 2021-07-26

## 2018-10-08 RX ORDER — OMEPRAZOLE 20 MG/1
40 TABLET, ORALLY DISINTEGRATING, DELAYED RELEASE ORAL DAILY
Qty: 180 TABLET | Refills: 1 | Status: SHIPPED | OUTPATIENT
Start: 2018-10-08 | End: 2019-09-17

## 2018-10-08 RX ORDER — PREDNISONE 10 MG/1
5 TABLET ORAL DAILY
Qty: 60 TABLET | Refills: 2
Start: 2018-10-08 | End: 2018-11-13

## 2018-10-08 RX ORDER — AZATHIOPRINE 50 MG/1
100 TABLET ORAL DAILY
COMMUNITY
End: 2021-06-22

## 2018-10-08 NOTE — PROGRESS NOTES
Subjective:       Patient ID: Ramesh Ricci is a 78 y.o. male.    Chief Complaint: Nausea; Gastroesophageal Reflux (burning / severe); and Loss of Consciousness (several episodes over the past few weeks)    Went to a football game last week. Walked up the bleachers, then got dizzy, SOB, fatigue, and severe nausea. Had to leave game and go home.  Taking Miralax for constipation.  Severe GERD with CP recently.  Was down to Pred 2.5 mg daily, but went back up to 10 mg today. His joints have been hurting.      Nausea   This is a new problem. The current episode started in the past 7 days. The problem occurs every several days. Associated symptoms include arthralgias, fatigue and nausea.   Gastroesophageal Reflux   He complains of heartburn and nausea. This is a recurrent problem. The current episode started more than 1 month ago. The heartburn is of moderate intensity. Associated symptoms include fatigue.     Review of Systems   Constitutional: Positive for fatigue.   Respiratory: Positive for shortness of breath.    Cardiovascular: Negative.    Gastrointestinal: Positive for constipation, heartburn and nausea.   Musculoskeletal: Positive for arthralgias.       Objective:      Physical Exam   Constitutional: He appears well-developed and well-nourished.   HENT:   Head: Normocephalic and atraumatic.   Eyes: Pupils are equal, round, and reactive to light.   Cardiovascular: Normal rate and regular rhythm.   Murmur heard.   Crescendo decrescendo systolic murmur is present with a grade of 3/6.  @RUSB   Pulmonary/Chest: Effort normal.   Musculoskeletal: He exhibits no edema.   Neurological: He is alert.   Romberg pos.       Assessment:       1. Essential hypertension    2. Gastroesophageal reflux disease, esophagitis presence not specified    3. Ulcerative colitis with complication, unspecified location    4. Hypothyroidism, unspecified type    5. Dyspnea on exertion    6. Syncope and collapse     7. Fatigue,  unspecified type    8. Nausea        Plan:       Per orders and D/C instructions.  F/u with Dr. Fraga for UC, GERD, and nausea.  Omeprazole for GERD.  Miralax prn constipation.  SE to evaluate CP, SOB, fatigue, and near syncope.

## 2018-11-01 ENCOUNTER — HOSPITAL ENCOUNTER (OUTPATIENT)
Dept: RADIOLOGY | Facility: OTHER | Age: 78
Discharge: HOME | End: 2018-11-01
Attending: INTERNAL MEDICINE
Payer: MEDICARE

## 2018-11-01 ENCOUNTER — HOSPITAL ENCOUNTER (OUTPATIENT)
Dept: CARDIOLOGY | Facility: OTHER | Age: 78
Discharge: HOME | End: 2018-11-01
Attending: INTERNAL MEDICINE
Payer: MEDICARE

## 2018-11-01 DIAGNOSIS — R52 PAIN: ICD-10-CM

## 2018-11-01 DIAGNOSIS — R93.1 ABNORMAL ECHOCARDIOGRAM: ICD-10-CM

## 2018-11-01 DIAGNOSIS — I10 ESSENTIAL HYPERTENSION: Primary | ICD-10-CM

## 2018-11-01 PROBLEM — Z92.89 HISTORY OF NUCLEAR STRESS TEST: Status: ACTIVE | Noted: 2018-11-01

## 2018-11-01 LAB — DIASTOLIC DYSFUNCTION: NO

## 2018-11-01 PROCEDURE — 78452 HT MUSCLE IMAGE SPECT MULT: CPT | Mod: TC

## 2018-11-01 PROCEDURE — 78452 HT MUSCLE IMAGE SPECT MULT: CPT | Mod: 26,,, | Performed by: RADIOLOGY

## 2018-11-01 PROCEDURE — 93017 CV STRESS TEST TRACING ONLY: CPT

## 2018-11-01 RX ORDER — CELECOXIB 200 MG/1
CAPSULE ORAL
Qty: 90 CAPSULE | Refills: 1 | Status: SHIPPED | OUTPATIENT
Start: 2018-11-01 | End: 2019-04-30 | Stop reason: SDUPTHER

## 2018-11-12 ENCOUNTER — OFFICE VISIT (OUTPATIENT)
Dept: INTERNAL MEDICINE | Facility: CLINIC | Age: 78
End: 2018-11-12
Attending: INTERNAL MEDICINE
Payer: MEDICARE

## 2018-11-12 VITALS
OXYGEN SATURATION: 99 % | SYSTOLIC BLOOD PRESSURE: 138 MMHG | HEART RATE: 63 BPM | WEIGHT: 163 LBS | HEIGHT: 71 IN | DIASTOLIC BLOOD PRESSURE: 70 MMHG | BODY MASS INDEX: 22.82 KG/M2

## 2018-11-12 DIAGNOSIS — E03.9 HYPOTHYROIDISM, UNSPECIFIED TYPE: ICD-10-CM

## 2018-11-12 DIAGNOSIS — D51.0 PERNICIOUS ANEMIA: ICD-10-CM

## 2018-11-12 DIAGNOSIS — R79.89 OTHER SPECIFIED ABNORMAL FINDINGS OF BLOOD CHEMISTRY: ICD-10-CM

## 2018-11-12 DIAGNOSIS — Z12.5 SCREENING FOR PROSTATE CANCER: ICD-10-CM

## 2018-11-12 DIAGNOSIS — R51.9 NONINTRACTABLE HEADACHE, UNSPECIFIED CHRONICITY PATTERN, UNSPECIFIED HEADACHE TYPE: ICD-10-CM

## 2018-11-12 DIAGNOSIS — M79.675 PAIN OF TOE OF LEFT FOOT: ICD-10-CM

## 2018-11-12 DIAGNOSIS — K51.919 ULCERATIVE COLITIS WITH COMPLICATION, UNSPECIFIED LOCATION: ICD-10-CM

## 2018-11-12 DIAGNOSIS — I10 ESSENTIAL HYPERTENSION: Primary | ICD-10-CM

## 2018-11-12 DIAGNOSIS — E78.9 DISORDER OF LIPID METABOLISM: ICD-10-CM

## 2018-11-12 DIAGNOSIS — M50.30 DDD (DEGENERATIVE DISC DISEASE), CERVICAL: ICD-10-CM

## 2018-11-12 DIAGNOSIS — E55.9 VITAMIN D DEFICIENCY: ICD-10-CM

## 2018-11-12 PROCEDURE — 3075F SYST BP GE 130 - 139MM HG: CPT | Mod: CPTII,S$GLB,, | Performed by: INTERNAL MEDICINE

## 2018-11-12 PROCEDURE — 3078F DIAST BP <80 MM HG: CPT | Mod: CPTII,S$GLB,, | Performed by: INTERNAL MEDICINE

## 2018-11-12 PROCEDURE — 99214 OFFICE O/P EST MOD 30 MIN: CPT | Mod: S$GLB,,, | Performed by: INTERNAL MEDICINE

## 2018-11-12 RX ORDER — TIZANIDINE 2 MG/1
TABLET ORAL
Qty: 30 TABLET | Refills: 1 | Status: SHIPPED | OUTPATIENT
Start: 2018-11-12 | End: 2019-06-15 | Stop reason: CLARIF

## 2018-11-12 NOTE — PROGRESS NOTES
Subjective:       Patient ID: Ramesh Ricci is a 78 y.o. male.    Chief Complaint: Follow-up (3 month / discuss stress test results); Headache (continue); and Toe Pain (big toe, right foot)    Getting PT for neck pain.  HA every day for months.  Hard to sleep at night.      Headache    This is a chronic problem. The current episode started more than 1 month ago. The problem occurs daily. The problem has been unchanged. The pain is located in the vertex region. The quality of the pain is described as band-like. Associated symptoms include neck pain. He has tried NSAIDs for the symptoms. The treatment provided moderate relief.   Toe Pain    The incident occurred more than 1 week ago.   Hypertension   This is a chronic problem. The current episode started more than 1 year ago. The problem is controlled. Associated symptoms include headaches and neck pain.     Review of Systems   Constitutional: Negative.    Respiratory: Negative.    Cardiovascular: Negative.    Musculoskeletal: Positive for neck pain.   Neurological: Positive for headaches.       Objective:      Physical Exam   Constitutional: He appears well-developed and well-nourished.   HENT:   Head: Normocephalic and atraumatic.   Eyes: Pupils are equal, round, and reactive to light.   Cardiovascular: Normal rate and regular rhythm.   Murmur heard.   Crescendo decrescendo systolic murmur is present with a grade of 3/6.  @RUSB   Pulmonary/Chest: Effort normal.   Musculoskeletal: He exhibits no edema.   Neurological: He is alert.       Assessment:       1. Essential hypertension    2. Ulcerative colitis with complication, unspecified location    3. Hypothyroidism, unspecified type    4. Pain of toe of left foot    5. Nonintractable headache, unspecified chronicity pattern, unspecified headache type    6. DDD (degenerative disc disease), cervical        Plan:       Per orders and D/C instructions.  Continue meds/diet for HTN, and UC, which are stable.     Check  labs for hypothyroid.  Cont PT for neck pain.  Podietry for foot pain.  Try Zanaflex for HA.

## 2018-11-13 DIAGNOSIS — I48.0 PAROXYSMAL ATRIAL FIBRILLATION: Primary | ICD-10-CM

## 2018-11-14 LAB
ALBUMIN SERPL-MCNC: 3.7 G/DL (ref 3.6–5.1)
ALBUMIN/GLOB SERPL: 1 (CALC) (ref 1–2.5)
ALP SERPL-CCNC: 135 U/L (ref 40–115)
ALT SERPL-CCNC: 7 U/L (ref 9–46)
AST SERPL-CCNC: 18 U/L (ref 10–35)
BILIRUB SERPL-MCNC: 0.3 MG/DL (ref 0.2–1.2)
BUN SERPL-MCNC: 20 MG/DL (ref 7–25)
BUN/CREAT SERPL: 16 (CALC) (ref 6–22)
CALCIUM SERPL-MCNC: 9.4 MG/DL (ref 8.6–10.3)
CHLORIDE SERPL-SCNC: 101 MMOL/L (ref 98–110)
CK SERPL-CCNC: 52 U/L (ref 44–196)
CO2 SERPL-SCNC: 30 MMOL/L (ref 20–32)
CREAT SERPL-MCNC: 1.29 MG/DL (ref 0.7–1.11)
GFR SERPL CREATININE-BSD FRML MDRD: 52 ML/MIN/1.73M2
GLOBULIN SER CALC-MCNC: 3.7 G/DL (CALC) (ref 1.9–3.7)
GLUCOSE SERPL-MCNC: 95 MG/DL (ref 65–139)
POTASSIUM SERPL-SCNC: 4.2 MMOL/L (ref 3.5–5.3)
PROT SERPL-MCNC: 7.4 G/DL (ref 6.1–8.1)
SODIUM SERPL-SCNC: 139 MMOL/L (ref 135–146)
T4 FREE SERPL-MCNC: 1.5 NG/DL (ref 0.8–1.8)
TSH SERPL-ACNC: 0.62 MIU/L (ref 0.4–4.5)
URATE SERPL-MCNC: 7.5 MG/DL (ref 4–8)
VIT B12 SERPL-MCNC: 887 PG/ML (ref 200–1100)

## 2018-11-20 DIAGNOSIS — M79.675 TOE PAIN, CHRONIC, LEFT: Primary | ICD-10-CM

## 2018-11-20 DIAGNOSIS — G89.29 TOE PAIN, CHRONIC, LEFT: Primary | ICD-10-CM

## 2018-12-20 ENCOUNTER — OFFICE VISIT (OUTPATIENT)
Dept: CARDIOLOGY | Facility: CLINIC | Age: 78
End: 2018-12-20
Attending: INTERNAL MEDICINE
Payer: MEDICARE

## 2018-12-20 VITALS
WEIGHT: 166 LBS | BODY MASS INDEX: 23.24 KG/M2 | DIASTOLIC BLOOD PRESSURE: 80 MMHG | SYSTOLIC BLOOD PRESSURE: 147 MMHG | HEART RATE: 65 BPM | HEIGHT: 71 IN

## 2018-12-20 DIAGNOSIS — E03.9 ACQUIRED HYPOTHYROIDISM: ICD-10-CM

## 2018-12-20 DIAGNOSIS — Z87.891 EX-SMOKER: ICD-10-CM

## 2018-12-20 DIAGNOSIS — K51.919 ULCERATIVE COLITIS WITH COMPLICATION, UNSPECIFIED LOCATION: ICD-10-CM

## 2018-12-20 DIAGNOSIS — I10 ESSENTIAL HYPERTENSION: ICD-10-CM

## 2018-12-20 DIAGNOSIS — I35.0 AORTIC STENOSIS, MILD: ICD-10-CM

## 2018-12-20 DIAGNOSIS — C61 PROSTATE CA: ICD-10-CM

## 2018-12-20 DIAGNOSIS — I48.0 PAF (PAROXYSMAL ATRIAL FIBRILLATION): ICD-10-CM

## 2018-12-20 DIAGNOSIS — I48.0 PAROXYSMAL ATRIAL FIBRILLATION: Primary | ICD-10-CM

## 2018-12-20 PROCEDURE — 99204 OFFICE O/P NEW MOD 45 MIN: CPT | Mod: S$GLB,,, | Performed by: INTERNAL MEDICINE

## 2018-12-20 PROCEDURE — 3077F SYST BP >= 140 MM HG: CPT | Mod: CPTII,S$GLB,, | Performed by: INTERNAL MEDICINE

## 2018-12-20 PROCEDURE — 3079F DIAST BP 80-89 MM HG: CPT | Mod: CPTII,S$GLB,, | Performed by: INTERNAL MEDICINE

## 2018-12-20 NOTE — PROGRESS NOTES
Subjective:    Patient ID:  Ramesh Ricci is a 78 y.o. male     HPI   Here for cardiac evaluation.    I was sent for a Cardiolite stress test.  While walking on the treadmill I had an asymptomatic episode of atrial fibrillation.  The stress test was negative for ischemia by the radionucleotide study.  Earlier this summer while working on a house, it was hot, I nearly passed out, and was told I had a TIA.  I now sleep a lot, my memory is a problem, I occasionally get headaches, and I get tired easily.  I have no exertional shortness of breath.    Past history   high blood pressure.  Ulcerative colitis  Kidney stones  Single kidney  Hypothyroidism  Tonsillectomy  Hernia surgery  Prostate cancer underwent radiation treatment  Quit smoking in 1972  No alcohol    Current Outpatient Medications   Medication Sig    ascorbic acid (VITAMIN C) 500 MG tablet Take 500 mg by mouth once daily.    aspirin (ECOTRIN) 81 MG EC tablet Take 81 mg by mouth once daily.      azaTHIOprine (IMURAN) 50 mg Tab Take 100 mg by mouth once daily.    calcium-vitamin D 500 mg(1,250mg) -200 unit per tablet Take 1 tablet by mouth once daily.      celecoxib (CELEBREX) 200 MG capsule TAKE ONE CAPSULE BY MOUTH ONCE DAILY AS NEEDED FOR PAIN    fluticasone (FLONASE) 50 mcg/actuation nasal spray USE TWO SPRAY(S) IN EACH NOSTRIL ONCE DAILY    levothyroxine (SYNTHROID) 125 MCG tablet TAKE ONE TABLET BY MOUTH ONCE DAILY IN THE MORNING    meclizine (ANTIVERT) 25 mg tablet TAKE 1/2 (ONE-HALF) TABLET BY MOUTH THREE TIMES DAILY AS NEEDED FOR DIZZINESS    omeprazole 20 mg TbLD Take 40 mg by mouth once daily.    PNV w/o calcium-iron fum-FA (M-VIT) 27-1 mg Tab Take by mouth.      polyethylene glycol (GLYCOLAX) 17 gram PwPk Take 17 g by mouth nightly as needed.    pravastatin (PRAVACHOL) 20 MG tablet Take 1 tablet (20 mg total) by mouth every evening.    tamsulosin (FLOMAX) 0.4 mg Cp24 Take 0.4 mg by mouth once daily.     tiZANidine (ZANAFLEX) 2  "MG tablet 1.2-1 tab nightly as needed.    venlafaxine (EFFEXOR) 37.5 MG Tab TAKE ONE TABLET BY MOUTH TWICE DAILY     No current facility-administered medications for this visit.          Review of Systems   Constitution: Positive for malaise/fatigue. Negative for chills, decreased appetite, fever, weight gain and weight loss.   HENT: Negative for congestion, hearing loss and sore throat.    Eyes: Negative for blurred vision, double vision and visual disturbance.   Cardiovascular: Negative for chest pain, claudication, dyspnea on exertion, leg swelling, palpitations and syncope.   Respiratory: Negative for cough, hemoptysis, shortness of breath, sputum production and wheezing.    Endocrine: Negative for cold intolerance and heat intolerance.   Hematologic/Lymphatic: Negative for bleeding problem. Does not bruise/bleed easily.   Skin: Negative for color change, dry skin, flushing and itching.   Musculoskeletal: Negative for back pain, joint pain and myalgias.   Gastrointestinal: Negative for abdominal pain, anorexia, constipation, diarrhea, dysphagia, nausea and vomiting.        No bleeding per rectum   Genitourinary: Negative for dysuria, flank pain, frequency, hematuria and nocturia.   Neurological: Negative for dizziness, headaches, light-headedness, loss of balance, seizures and tremors.   Psychiatric/Behavioral: Negative for altered mental status and depression.         Vitals:    12/20/18 1232   BP: (!) 147/80   Pulse: 65   Weight: 75.3 kg (166 lb)   Height: 5' 11" (1.803 m)     Objective:    Physical Exam   Constitutional: He is oriented to person, place, and time. He appears well-developed and well-nourished.   HENT:   Head: Normocephalic and atraumatic.   Right Ear: External ear normal.   Left Ear: External ear normal.   Nose: Nose normal.   Eyes: Conjunctivae and EOM are normal. Pupils are equal, round, and reactive to light. No scleral icterus.   Neck: Normal range of motion. Neck supple. No JVD present. " No tracheal deviation present. No thyromegaly present.   Cardiovascular: Normal rate and regular rhythm. Exam reveals no gallop and no friction rub.   Murmur heard.  Basal ejection systolic murmur conducted to the carotids.  Apical pansystolic murmur.   Pulmonary/Chest: Effort normal and breath sounds normal. No respiratory distress. He has no rales. He exhibits no tenderness.   Abdominal: Soft. Bowel sounds are normal. He exhibits no distension and no mass. There is no tenderness.   Musculoskeletal: Normal range of motion. He exhibits no edema or tenderness.   Lymphadenopathy:     He has no cervical adenopathy.   Neurological: He is alert and oriented to person, place, and time. He has normal reflexes. No cranial nerve deficit. Coordination normal.   Skin: Skin is warm and dry. No rash noted.   Psychiatric: He has a normal mood and affect. His behavior is normal.         Assessment:           2. PAF (paroxysmal atrial fibrillation)    3. Essential hypertension    4. Aortic stenosis, mild    5. Prostate CA    6. Ulcerative colitis with complication, unspecified location    7. Acquired hypothyroidism    8. Ex-smoker         Plan:       Will review echocardiogram  Will consider starting Eliquis

## 2018-12-20 NOTE — LETTER
December 20, 2018      EFREM Muniz MD  [address]  [address]  [facility] [address]           [facility]  [address] [facility], [address]  [facility] [address]  Phone: [phone]  Fax: [phone]          Patient: Ramesh Ricci   MR Number: [id number]   YOB: 1940   Date of Visit: 12/20/2018       Dear Dr. EFREM Muniz:    Thank you for referring Ramesh Ricci to me for evaluation. Attached you will find relevant portions of my assessment and plan of care.    If you have questions, please do not hesitate to call me. I look forward to following Ramesh Ricci along with you.    Sincerely,    Beau Conklin MD    Enclosure  CC:  No Recipients    If you would like to receive this communication electronically, please contact [email or web address] or [phone] to request more information on [email or web address] Link access.    For providers and/or their staff who would like to refer a patient to [facility], please contact us through our one-stop-shop provider referral line, [facility], at [phone].    If you feel you have received this communication in error or would no longer like to receive these types of communications, please e-mail [email or web address]

## 2019-01-02 ENCOUNTER — CLINICAL SUPPORT (OUTPATIENT)
Dept: CARDIOLOGY | Facility: CLINIC | Age: 79
End: 2019-01-02
Payer: MEDICARE

## 2019-01-02 DIAGNOSIS — I48.0 PAF (PAROXYSMAL ATRIAL FIBRILLATION): Primary | ICD-10-CM

## 2019-01-02 PROCEDURE — 93306 TTE W/DOPPLER COMPLETE: CPT | Mod: S$GLB,,, | Performed by: INTERNAL MEDICINE

## 2019-01-02 PROCEDURE — 93306 PR ECHO HEART XTHORACIC,COMPLETE W DOPPLER: ICD-10-PCS | Mod: S$GLB,,, | Performed by: INTERNAL MEDICINE

## 2019-01-03 ENCOUNTER — OFFICE VISIT (OUTPATIENT)
Dept: CARDIOLOGY | Facility: CLINIC | Age: 79
End: 2019-01-03
Attending: INTERNAL MEDICINE
Payer: MEDICARE

## 2019-01-03 VITALS
WEIGHT: 168 LBS | DIASTOLIC BLOOD PRESSURE: 72 MMHG | SYSTOLIC BLOOD PRESSURE: 129 MMHG | HEIGHT: 71 IN | BODY MASS INDEX: 23.52 KG/M2 | HEART RATE: 61 BPM

## 2019-01-03 DIAGNOSIS — I48.0 PAF (PAROXYSMAL ATRIAL FIBRILLATION): Primary | ICD-10-CM

## 2019-01-03 DIAGNOSIS — Z87.891 EX-SMOKER: ICD-10-CM

## 2019-01-03 DIAGNOSIS — I10 ESSENTIAL HYPERTENSION: ICD-10-CM

## 2019-01-03 DIAGNOSIS — I51.89 LEFT VENTRICULAR DIASTOLIC DYSFUNCTION, NYHA CLASS 1: ICD-10-CM

## 2019-01-03 DIAGNOSIS — E03.9 ACQUIRED HYPOTHYROIDISM: ICD-10-CM

## 2019-01-03 DIAGNOSIS — I65.29 INTERNAL CAROTID ARTERY STENOSIS, UNSPECIFIED LATERALITY: ICD-10-CM

## 2019-01-03 DIAGNOSIS — I35.0 AORTIC STENOSIS, MILD: ICD-10-CM

## 2019-01-03 PROCEDURE — 99214 OFFICE O/P EST MOD 30 MIN: CPT | Mod: S$GLB,,, | Performed by: INTERNAL MEDICINE

## 2019-01-03 PROCEDURE — 3078F PR MOST RECENT DIASTOLIC BLOOD PRESSURE < 80 MM HG: ICD-10-PCS | Mod: CPTII,S$GLB,, | Performed by: INTERNAL MEDICINE

## 2019-01-03 PROCEDURE — 3074F PR MOST RECENT SYSTOLIC BLOOD PRESSURE < 130 MM HG: ICD-10-PCS | Mod: CPTII,S$GLB,, | Performed by: INTERNAL MEDICINE

## 2019-01-03 PROCEDURE — 3078F DIAST BP <80 MM HG: CPT | Mod: CPTII,S$GLB,, | Performed by: INTERNAL MEDICINE

## 2019-01-03 PROCEDURE — 99214 PR OFFICE/OUTPT VISIT, EST, LEVL IV, 30-39 MIN: ICD-10-PCS | Mod: S$GLB,,, | Performed by: INTERNAL MEDICINE

## 2019-01-03 PROCEDURE — 3074F SYST BP LT 130 MM HG: CPT | Mod: CPTII,S$GLB,, | Performed by: INTERNAL MEDICINE

## 2019-01-03 NOTE — PROGRESS NOTES
Subjective:    Patient ID:  Ramesh Ricci is a 78 y.o. male     HPI   Here for follow-up of atrial fibrillation during a nuclear stress test, essential hypertension, diastolic left ventricular dysfunction, mild aortic stenosis, carotid artery stenosis, hypothyroidism, ex cigarette smoker.    I feel well, I have no complaints.    Current Outpatient Medications   Medication Sig    apixaban (ELIQUIS) 2.5 mg Tab Take 1 tablet (2.5 mg total) by mouth 2 (two) times daily.    ascorbic acid (VITAMIN C) 500 MG tablet Take 500 mg by mouth once daily.    aspirin (ECOTRIN) 81 MG EC tablet Take 81 mg by mouth once daily.      azaTHIOprine (IMURAN) 50 mg Tab Take 100 mg by mouth once daily.    calcium-vitamin D 500 mg(1,250mg) -200 unit per tablet Take 1 tablet by mouth once daily.      celecoxib (CELEBREX) 200 MG capsule TAKE ONE CAPSULE BY MOUTH ONCE DAILY AS NEEDED FOR PAIN    fluticasone (FLONASE) 50 mcg/actuation nasal spray USE TWO SPRAY(S) IN EACH NOSTRIL ONCE DAILY    levothyroxine (SYNTHROID) 125 MCG tablet TAKE ONE TABLET BY MOUTH ONCE DAILY IN THE MORNING    meclizine (ANTIVERT) 25 mg tablet TAKE 1/2 (ONE-HALF) TABLET BY MOUTH THREE TIMES DAILY AS NEEDED FOR DIZZINESS    omeprazole 20 mg TbLD Take 40 mg by mouth once daily.    PNV w/o calcium-iron fum-FA (M-VIT) 27-1 mg Tab Take by mouth.      polyethylene glycol (GLYCOLAX) 17 gram PwPk Take 17 g by mouth nightly as needed.    pravastatin (PRAVACHOL) 20 MG tablet Take 1 tablet (20 mg total) by mouth every evening.    tamsulosin (FLOMAX) 0.4 mg Cp24 Take 0.4 mg by mouth once daily.     tiZANidine (ZANAFLEX) 2 MG tablet 1.2-1 tab nightly as needed.    venlafaxine (EFFEXOR) 37.5 MG Tab TAKE ONE TABLET BY MOUTH TWICE DAILY     No current facility-administered medications for this visit.          Review of Systems   Constitution: Negative for chills, decreased appetite, fever, weight gain and weight loss.   HENT: Negative for congestion, hearing loss  "and sore throat.    Eyes: Negative for blurred vision, double vision and visual disturbance.   Cardiovascular: Negative for chest pain, claudication, dyspnea on exertion, leg swelling, palpitations and syncope.   Respiratory: Negative for cough, hemoptysis, shortness of breath, sputum production and wheezing.    Endocrine: Negative for cold intolerance and heat intolerance.   Hematologic/Lymphatic: Negative for bleeding problem. Does not bruise/bleed easily.   Skin: Negative for color change, dry skin, flushing and itching.   Musculoskeletal: Negative for back pain, joint pain and myalgias.   Gastrointestinal: Negative for abdominal pain, anorexia, constipation, diarrhea, dysphagia, nausea and vomiting.        No bleeding per rectum   Genitourinary: Negative for dysuria, flank pain, frequency, hematuria and nocturia.   Neurological: Negative for dizziness, headaches, light-headedness, loss of balance, seizures and tremors.   Psychiatric/Behavioral: Negative for altered mental status and depression.         Vitals:    01/03/19 1454   BP: 129/72   Pulse: 61   Weight: 76.2 kg (168 lb)   Height: 5' 11" (1.803 m)     Objective:    Physical Exam   Constitutional: He is oriented to person, place, and time. He appears well-developed and well-nourished.   HENT:   Head: Normocephalic and atraumatic.   Right Ear: External ear normal.   Left Ear: External ear normal.   Nose: Nose normal.   Eyes: Conjunctivae and EOM are normal. Pupils are equal, round, and reactive to light. No scleral icterus.   Neck: Normal range of motion. Neck supple. No JVD present. No tracheal deviation present. No thyromegaly present.   Cardiovascular: Normal rate, regular rhythm and normal heart sounds. Exam reveals no gallop and no friction rub.   No murmur heard.  Pulmonary/Chest: Effort normal and breath sounds normal. No respiratory distress. He has no rales. He exhibits no tenderness.   Abdominal: Soft. Bowel sounds are normal. He exhibits no " distension and no mass. There is no tenderness.   Musculoskeletal: Normal range of motion. He exhibits no edema or tenderness.   Lymphadenopathy:     He has no cervical adenopathy.   Neurological: He is alert and oriented to person, place, and time. He has normal reflexes. No cranial nerve deficit. Coordination normal.   Skin: Skin is warm and dry. No rash noted.   Psychiatric: He has a normal mood and affect. His behavior is normal.       reviewed the echocardiogram.    Assessment:       1. PAF (paroxysmal atrial fibrillation)    2. Essential hypertension    3. Left ventricular diastolic dysfunction, NYHA class 1    4. Aortic stenosis, mild    5. Internal carotid artery stenosis, unspecified laterality    6. Acquired hypothyroidism    7. Ex-smoker    8.      Grade 3 chronic renal failure.     Plan:       Prescribed Eliquis 2.5 mg p.o. b.i.d..

## 2019-02-18 ENCOUNTER — OFFICE VISIT (OUTPATIENT)
Dept: INTERNAL MEDICINE | Facility: CLINIC | Age: 79
End: 2019-02-18
Attending: INTERNAL MEDICINE
Payer: MEDICARE

## 2019-02-18 DIAGNOSIS — I10 ESSENTIAL HYPERTENSION: Primary | ICD-10-CM

## 2019-02-18 DIAGNOSIS — Z13.39 SCREENING FOR ALCOHOLISM: ICD-10-CM

## 2019-02-18 DIAGNOSIS — R79.89 OTHER SPECIFIED ABNORMAL FINDINGS OF BLOOD CHEMISTRY: ICD-10-CM

## 2019-02-18 DIAGNOSIS — E55.9 VITAMIN D DEFICIENCY: ICD-10-CM

## 2019-02-18 DIAGNOSIS — E78.9 DISORDER OF LIPID METABOLISM: ICD-10-CM

## 2019-02-18 DIAGNOSIS — I48.0 PAF (PAROXYSMAL ATRIAL FIBRILLATION): ICD-10-CM

## 2019-02-18 DIAGNOSIS — D51.0 PERNICIOUS ANEMIA: ICD-10-CM

## 2019-02-18 DIAGNOSIS — E03.9 HYPOTHYROIDISM, UNSPECIFIED TYPE: ICD-10-CM

## 2019-02-18 DIAGNOSIS — N40.0 BENIGN PROSTATIC HYPERPLASIA, UNSPECIFIED WHETHER LOWER URINARY TRACT SYMPTOMS PRESENT: ICD-10-CM

## 2019-02-18 DIAGNOSIS — D50.8 OTHER IRON DEFICIENCY ANEMIA: ICD-10-CM

## 2019-02-18 DIAGNOSIS — K51.919 ULCERATIVE COLITIS WITH COMPLICATION, UNSPECIFIED LOCATION: ICD-10-CM

## 2019-02-18 DIAGNOSIS — Z00.00 ROUTINE ADULT HEALTH MAINTENANCE: ICD-10-CM

## 2019-02-18 DIAGNOSIS — Z13.31 SCREENING FOR DEPRESSION: ICD-10-CM

## 2019-02-18 DIAGNOSIS — Z12.5 SCREENING FOR PROSTATE CANCER: ICD-10-CM

## 2019-02-18 DIAGNOSIS — E03.9 ACQUIRED HYPOTHYROIDISM: ICD-10-CM

## 2019-02-18 DIAGNOSIS — K21.9 GASTROESOPHAGEAL REFLUX DISEASE, ESOPHAGITIS PRESENCE NOT SPECIFIED: ICD-10-CM

## 2019-02-18 PROCEDURE — 3078F DIAST BP <80 MM HG: CPT | Mod: CPTII,S$GLB,, | Performed by: INTERNAL MEDICINE

## 2019-02-18 PROCEDURE — G0442 ANNUAL ALCOHOL SCREEN 15 MIN: HCPCS | Mod: 59,S$GLB,, | Performed by: INTERNAL MEDICINE

## 2019-02-18 PROCEDURE — 99499 UNLISTED E&M SERVICE: CPT | Mod: S$GLB,,, | Performed by: INTERNAL MEDICINE

## 2019-02-18 PROCEDURE — G0444 DEPRESSION SCREEN ANNUAL: HCPCS | Mod: 59,S$GLB,, | Performed by: INTERNAL MEDICINE

## 2019-02-18 PROCEDURE — G0442 PR  ALCOHOL SCREENING: ICD-10-PCS | Mod: 59,S$GLB,, | Performed by: INTERNAL MEDICINE

## 2019-02-18 PROCEDURE — 3077F SYST BP >= 140 MM HG: CPT | Mod: CPTII,S$GLB,, | Performed by: INTERNAL MEDICINE

## 2019-02-18 PROCEDURE — 3077F PR MOST RECENT SYSTOLIC BLOOD PRESSURE >= 140 MM HG: ICD-10-PCS | Mod: CPTII,S$GLB,, | Performed by: INTERNAL MEDICINE

## 2019-02-18 PROCEDURE — 99499 RISK ADDL DX/OHS AUDIT: ICD-10-PCS | Mod: S$GLB,,, | Performed by: INTERNAL MEDICINE

## 2019-02-18 PROCEDURE — G0444 PR DEPRESSION SCREENING: ICD-10-PCS | Mod: 59,S$GLB,, | Performed by: INTERNAL MEDICINE

## 2019-02-18 PROCEDURE — 99214 PR OFFICE/OUTPT VISIT, EST, LEVL IV, 30-39 MIN: ICD-10-PCS | Mod: 25,S$GLB,, | Performed by: INTERNAL MEDICINE

## 2019-02-18 PROCEDURE — 99214 OFFICE O/P EST MOD 30 MIN: CPT | Mod: 25,S$GLB,, | Performed by: INTERNAL MEDICINE

## 2019-02-18 PROCEDURE — 3078F PR MOST RECENT DIASTOLIC BLOOD PRESSURE < 80 MM HG: ICD-10-PCS | Mod: CPTII,S$GLB,, | Performed by: INTERNAL MEDICINE

## 2019-02-18 RX ORDER — IRBESARTAN 150 MG/1
150 TABLET ORAL NIGHTLY
Qty: 90 TABLET | Refills: 3 | Status: SHIPPED | OUTPATIENT
Start: 2019-02-18 | End: 2019-06-17

## 2019-02-19 VITALS
WEIGHT: 167 LBS | OXYGEN SATURATION: 98 % | SYSTOLIC BLOOD PRESSURE: 170 MMHG | HEART RATE: 63 BPM | BODY MASS INDEX: 23.38 KG/M2 | HEIGHT: 71 IN | DIASTOLIC BLOOD PRESSURE: 90 MMHG

## 2019-02-19 NOTE — PROGRESS NOTES
Subjective:       Patient ID: Ramesh Ricci is a 79 y.o. male.    Chief Complaint: Follow-up (3 month / discuss cardio testing done by [facility]); Fall (injured right side of the head and right hip); and Shoulder Pain (right from shingles)    Slipped and hit head 3 weeks ago.  Feels good. IBD is controlled.      Fall   The accident occurred more than 1 week ago.   Hypertension   This is a chronic problem. The current episode started more than 1 year ago. The problem has been waxing and waning since onset.     Review of Systems   Constitutional: Negative.    Respiratory: Negative.    Cardiovascular: Negative.    Psychiatric/Behavioral: Negative for dysphoric mood.       Objective:      Physical Exam   Constitutional: He appears well-developed and well-nourished.   HENT:   Head: Normocephalic and atraumatic.       1x1 cm mobile soft tissue mass   Eyes: Pupils are equal, round, and reactive to light.   Cardiovascular: Normal rate and regular rhythm.   Murmur heard.   Crescendo decrescendo systolic murmur is present with a grade of 3/6.  @RUSB   Pulmonary/Chest: Effort normal.   Musculoskeletal: He exhibits no edema.   Neurological: He is alert.       Assessment:       1. Essential hypertension    2. PAF (paroxysmal atrial fibrillation)    3. Ulcerative colitis with complication, unspecified location    4. Acquired hypothyroidism    5. Benign prostatic hyperplasia, unspecified whether lower urinary tract symptoms present    6. Gastroesophageal reflux disease, esophagitis presence not specified    7. Routine adult health maintenance    8. Screening for alcoholism    9. Screening for depression        Plan:       Per orders and D/C instructions.  Continue meds/diet for A fib, hypothyroid, BPH, UC, and GERD, which are stable.     Restarrt Irbe for HTN.  Check labs.    Screening: The patient was screened for depression with the PHQ2 questionnaire and possible health consequences were discussed with the patient, who  understands (15 minutes spent). The patient was screened for the misuse of alcohol, by asking the number of drinks per average week, and if pt has had more than 4 drinks (more than 3 for women and elderly) in 1 day within the past year. The health and legal consequences of misuse were discussed (15 minutes spent). The patient was screened for obesity (BMI>30), If the current BMI > 30, then the possible consequences of obesity, as well as the benefits of diet, exercise, and weight loss were discussed. Any behavioral risks were identified, and methods to achieve appropriate treatment goals were discussed (15 minutes spent).

## 2019-02-22 LAB
ALBUMIN SERPL-MCNC: 4.1 G/DL (ref 3.6–5.1)
ALBUMIN/GLOB SERPL: 1.3 (CALC) (ref 1–2.5)
ALP SERPL-CCNC: 64 U/L (ref 40–115)
ALT SERPL-CCNC: 7 U/L (ref 9–46)
AST SERPL-CCNC: 15 U/L (ref 10–35)
BASOPHILS # BLD AUTO: 52 CELLS/UL (ref 0–200)
BASOPHILS NFR BLD AUTO: 1.1 %
BILIRUB SERPL-MCNC: 0.5 MG/DL (ref 0.2–1.2)
BUN SERPL-MCNC: 21 MG/DL (ref 7–25)
BUN/CREAT SERPL: 15 (CALC) (ref 6–22)
CALCIUM SERPL-MCNC: 9.4 MG/DL (ref 8.6–10.3)
CHLORIDE SERPL-SCNC: 103 MMOL/L (ref 98–110)
CHOLEST SERPL-MCNC: 174 MG/DL
CHOLEST/HDLC SERPL: 2.6 (CALC)
CO2 SERPL-SCNC: 29 MMOL/L (ref 20–32)
CREAT SERPL-MCNC: 1.41 MG/DL (ref 0.7–1.11)
EOSINOPHIL # BLD AUTO: 99 CELLS/UL (ref 15–500)
EOSINOPHIL NFR BLD AUTO: 2.1 %
ERYTHROCYTE [DISTWIDTH] IN BLOOD BY AUTOMATED COUNT: 16.4 % (ref 11–15)
GFRSERPLBLD MDRD-ARVRAT: 46 ML/MIN/1.73M2
GLOBULIN SER CALC-MCNC: 3.1 G/DL (CALC) (ref 1.9–3.7)
GLUCOSE SERPL-MCNC: 90 MG/DL (ref 65–99)
HBA1C MFR BLD: 5.3 % OF TOTAL HGB
HCT VFR BLD AUTO: 38.4 % (ref 38.5–50)
HDLC SERPL-MCNC: 68 MG/DL
HGB BLD-MCNC: 12.7 G/DL (ref 13.2–17.1)
IRON SATN MFR SERPL: 15 % (CALC) (ref 15–60)
IRON SERPL-MCNC: 43 MCG/DL (ref 50–180)
LDLC SERPL CALC-MCNC: 83 MG/DL (CALC)
LYMPHOCYTES # BLD AUTO: 1189 CELLS/UL (ref 850–3900)
LYMPHOCYTES NFR BLD AUTO: 25.3 %
MCH RBC QN AUTO: 28.9 PG (ref 27–33)
MCHC RBC AUTO-ENTMCNC: 33.1 G/DL (ref 32–36)
MCV RBC AUTO: 87.3 FL (ref 80–100)
MONOCYTES # BLD AUTO: 597 CELLS/UL (ref 200–950)
MONOCYTES NFR BLD AUTO: 12.7 %
NEUTROPHILS # BLD AUTO: 2764 CELLS/UL (ref 1500–7800)
NEUTROPHILS NFR BLD AUTO: 58.8 %
NONHDLC SERPL-MCNC: 106 MG/DL (CALC)
PLATELET # BLD AUTO: 214 THOUSAND/UL (ref 140–400)
PMV BLD REES-ECKER: 10.3 FL (ref 7.5–12.5)
POTASSIUM SERPL-SCNC: 4.5 MMOL/L (ref 3.5–5.3)
PROT SERPL-MCNC: 7.2 G/DL (ref 6.1–8.1)
PSA SERPL-MCNC: 0.5 NG/ML
RBC # BLD AUTO: 4.4 MILLION/UL (ref 4.2–5.8)
SODIUM SERPL-SCNC: 140 MMOL/L (ref 135–146)
T4 FREE SERPL-MCNC: 1.3 NG/DL (ref 0.8–1.8)
TIBC SERPL-MCNC: 296 MCG/DL (CALC) (ref 250–425)
TRIGL SERPL-MCNC: 136 MG/DL
TSH SERPL-ACNC: 1.11 MIU/L (ref 0.4–4.5)
VIT B12 SERPL-MCNC: 836 PG/ML (ref 200–1100)
WBC # BLD AUTO: 4.7 THOUSAND/UL (ref 3.8–10.8)

## 2019-02-26 ENCOUNTER — DOCUMENTATION ONLY (OUTPATIENT)
Dept: INTERNAL MEDICINE | Facility: CLINIC | Age: 79
End: 2019-02-26

## 2019-03-07 ENCOUNTER — OFFICE VISIT (OUTPATIENT)
Dept: CARDIOLOGY | Facility: CLINIC | Age: 79
End: 2019-03-07
Attending: INTERNAL MEDICINE
Payer: MEDICARE

## 2019-03-07 VITALS
BODY MASS INDEX: 22.54 KG/M2 | SYSTOLIC BLOOD PRESSURE: 117 MMHG | HEART RATE: 66 BPM | DIASTOLIC BLOOD PRESSURE: 62 MMHG | WEIGHT: 161 LBS | HEIGHT: 71 IN

## 2019-03-07 DIAGNOSIS — E03.9 ACQUIRED HYPOTHYROIDISM: ICD-10-CM

## 2019-03-07 DIAGNOSIS — I35.0 AORTIC STENOSIS, MILD: ICD-10-CM

## 2019-03-07 DIAGNOSIS — C61 PROSTATE CA: ICD-10-CM

## 2019-03-07 DIAGNOSIS — Z87.891 EX-SMOKER: ICD-10-CM

## 2019-03-07 DIAGNOSIS — N18.30 CKD (CHRONIC KIDNEY DISEASE) STAGE 3, GFR 30-59 ML/MIN: ICD-10-CM

## 2019-03-07 DIAGNOSIS — I10 ESSENTIAL HYPERTENSION: ICD-10-CM

## 2019-03-07 DIAGNOSIS — I65.29 INTERNAL CAROTID ARTERY STENOSIS, UNSPECIFIED LATERALITY: ICD-10-CM

## 2019-03-07 DIAGNOSIS — I48.0 PAF (PAROXYSMAL ATRIAL FIBRILLATION): Primary | ICD-10-CM

## 2019-03-07 PROCEDURE — 99213 OFFICE O/P EST LOW 20 MIN: CPT | Mod: S$GLB,,, | Performed by: INTERNAL MEDICINE

## 2019-03-07 PROCEDURE — 3074F PR MOST RECENT SYSTOLIC BLOOD PRESSURE < 130 MM HG: ICD-10-PCS | Mod: CPTII,S$GLB,, | Performed by: INTERNAL MEDICINE

## 2019-03-07 PROCEDURE — 99213 PR OFFICE/OUTPT VISIT, EST, LEVL III, 20-29 MIN: ICD-10-PCS | Mod: S$GLB,,, | Performed by: INTERNAL MEDICINE

## 2019-03-07 PROCEDURE — 3074F SYST BP LT 130 MM HG: CPT | Mod: CPTII,S$GLB,, | Performed by: INTERNAL MEDICINE

## 2019-03-07 PROCEDURE — 3078F PR MOST RECENT DIASTOLIC BLOOD PRESSURE < 80 MM HG: ICD-10-PCS | Mod: CPTII,S$GLB,, | Performed by: INTERNAL MEDICINE

## 2019-03-07 PROCEDURE — 3078F DIAST BP <80 MM HG: CPT | Mod: CPTII,S$GLB,, | Performed by: INTERNAL MEDICINE

## 2019-03-11 NOTE — PROGRESS NOTES
Subjective:    Patient ID:  Ramesh Ricci is a 79 y.o. male     HPI   Here for follow-up of paroxysmal atrial fibrillation, hypertension, mild aortic stenosis, mild carotid occlusive disease, hypothyroidism, ex cigarette smoker, stage III chronic renal failure, prostate cancer.    I am doing well, I have no complaints.    Current Outpatient Medications   Medication Sig    apixaban (ELIQUIS) 2.5 mg Tab Take 1 tablet (2.5 mg total) by mouth 2 (two) times daily.    ascorbic acid (VITAMIN C) 500 MG tablet Take 500 mg by mouth once daily.    aspirin (ECOTRIN) 81 MG EC tablet Take 81 mg by mouth once daily.      azaTHIOprine (IMURAN) 50 mg Tab Take 100 mg by mouth once daily.    calcium-vitamin D 500 mg(1,250mg) -200 unit per tablet Take 1 tablet by mouth once daily.      celecoxib (CELEBREX) 200 MG capsule TAKE ONE CAPSULE BY MOUTH ONCE DAILY AS NEEDED FOR PAIN    fluticasone (FLONASE) 50 mcg/actuation nasal spray USE TWO SPRAY(S) IN EACH NOSTRIL ONCE DAILY    irbesartan (AVAPRO) 150 MG tablet Take 1 tablet (150 mg total) by mouth every evening.    levothyroxine (SYNTHROID) 125 MCG tablet TAKE ONE TABLET BY MOUTH ONCE DAILY IN THE MORNING    meclizine (ANTIVERT) 25 mg tablet TAKE 1/2 (ONE-HALF) TABLET BY MOUTH THREE TIMES DAILY AS NEEDED FOR DIZZINESS    omeprazole 20 mg TbLD Take 40 mg by mouth once daily.    PNV w/o calcium-iron fum-FA (M-VIT) 27-1 mg Tab Take by mouth.      polyethylene glycol (GLYCOLAX) 17 gram PwPk Take 17 g by mouth nightly as needed.    pravastatin (PRAVACHOL) 20 MG tablet Take 1 tablet (20 mg total) by mouth every evening.    tamsulosin (FLOMAX) 0.4 mg Cp24 Take 0.4 mg by mouth once daily.     tiZANidine (ZANAFLEX) 2 MG tablet 1.2-1 tab nightly as needed.    venlafaxine (EFFEXOR) 37.5 MG Tab TAKE ONE TABLET BY MOUTH TWICE DAILY     No current facility-administered medications for this visit.          Review of Systems   Constitution: Negative for chills, decreased appetite,  "fever, weight gain and weight loss.   HENT: Negative for congestion, hearing loss and sore throat.    Eyes: Negative for blurred vision, double vision and visual disturbance.   Cardiovascular: Negative for chest pain, claudication, dyspnea on exertion, leg swelling, palpitations and syncope.   Respiratory: Negative for cough, hemoptysis, shortness of breath, sputum production and wheezing.    Endocrine: Negative for cold intolerance and heat intolerance.   Hematologic/Lymphatic: Negative for bleeding problem. Does not bruise/bleed easily.   Skin: Negative for color change, dry skin, flushing and itching.   Musculoskeletal: Negative for back pain, joint pain and myalgias.   Gastrointestinal: Negative for abdominal pain, anorexia, constipation, diarrhea, dysphagia, nausea and vomiting.        No bleeding per rectum   Genitourinary: Negative for dysuria, flank pain, frequency, hematuria and nocturia.   Neurological: Negative for dizziness, headaches, light-headedness, loss of balance, seizures and tremors.   Psychiatric/Behavioral: Negative for altered mental status and depression.         Vitals:    03/07/19 1243   BP: 117/62   Pulse: 66   Weight: 73 kg (161 lb)   Height: 5' 11" (1.803 m)     Objective:    Physical Exam   Constitutional: He is oriented to person, place, and time. He appears well-developed and well-nourished.   HENT:   Head: Normocephalic and atraumatic.   Right Ear: External ear normal.   Left Ear: External ear normal.   Nose: Nose normal.   Eyes: Conjunctivae and EOM are normal. Pupils are equal, round, and reactive to light. No scleral icterus.   Neck: Normal range of motion. Neck supple. No JVD present. No tracheal deviation present. No thyromegaly present.   Cardiovascular: Normal rate and regular rhythm. Exam reveals no gallop and no friction rub.   Murmur heard.  Basal ejection systolic murmur conducted to the carotids.   Pulmonary/Chest: Effort normal and breath sounds normal. No respiratory " distress. He has no rales. He exhibits no tenderness.   Abdominal: Soft. Bowel sounds are normal. He exhibits no distension and no mass. There is no tenderness.   Musculoskeletal: Normal range of motion. He exhibits no edema or tenderness.   Lymphadenopathy:     He has no cervical adenopathy.   Neurological: He is alert and oriented to person, place, and time. He has normal reflexes. No cranial nerve deficit. Coordination normal.   Skin: Skin is warm and dry. No rash noted.   Psychiatric: He has a normal mood and affect. His behavior is normal.         Assessment:       1. PAF (paroxysmal atrial fibrillation)    2. Aortic stenosis, mild    3. Essential hypertension    4. Internal carotid artery stenosis, unspecified laterality    5. Acquired hypothyroidism    6. Ex-smoker    7. CKD (chronic kidney disease) stage 3, GFR 30-59 ml/min    8. Prostate CA         Plan:       Stable.  Continue the present pharmacological regimen.

## 2019-04-11 DIAGNOSIS — E03.9 HYPOTHYROIDISM, UNSPECIFIED TYPE: ICD-10-CM

## 2019-04-11 RX ORDER — LEVOTHYROXINE SODIUM 125 UG/1
TABLET ORAL
Qty: 90 TABLET | Refills: 3 | Status: SHIPPED | OUTPATIENT
Start: 2019-04-11 | End: 2020-07-10

## 2019-04-30 DIAGNOSIS — R52 PAIN: ICD-10-CM

## 2019-04-30 RX ORDER — CELECOXIB 200 MG/1
CAPSULE ORAL
Qty: 90 CAPSULE | Refills: 1 | Status: SHIPPED | OUTPATIENT
Start: 2019-04-30 | End: 2019-06-15 | Stop reason: ALTCHOICE

## 2019-04-30 RX ORDER — VENLAFAXINE 37.5 MG/1
TABLET ORAL
Qty: 180 TABLET | Refills: 3 | Status: SHIPPED | OUTPATIENT
Start: 2019-04-30 | End: 2019-09-17

## 2019-06-06 ENCOUNTER — OFFICE VISIT (OUTPATIENT)
Dept: CARDIOLOGY | Facility: CLINIC | Age: 79
End: 2019-06-06
Attending: INTERNAL MEDICINE
Payer: MEDICARE

## 2019-06-06 VITALS
HEIGHT: 71 IN | DIASTOLIC BLOOD PRESSURE: 77 MMHG | SYSTOLIC BLOOD PRESSURE: 138 MMHG | WEIGHT: 154 LBS | HEART RATE: 69 BPM | BODY MASS INDEX: 21.56 KG/M2

## 2019-06-06 DIAGNOSIS — I48.0 PAF (PAROXYSMAL ATRIAL FIBRILLATION): Primary | ICD-10-CM

## 2019-06-06 DIAGNOSIS — I10 ESSENTIAL HYPERTENSION: ICD-10-CM

## 2019-06-06 DIAGNOSIS — E03.9 ACQUIRED HYPOTHYROIDISM: ICD-10-CM

## 2019-06-06 DIAGNOSIS — G45.9 TIA (TRANSIENT ISCHEMIC ATTACK): ICD-10-CM

## 2019-06-06 DIAGNOSIS — Q60.0 CONGENITAL ABSENCE OF RIGHT KIDNEY: ICD-10-CM

## 2019-06-06 DIAGNOSIS — C61 PROSTATE CA: ICD-10-CM

## 2019-06-06 DIAGNOSIS — K51.919 ULCERATIVE COLITIS WITH COMPLICATION, UNSPECIFIED LOCATION: ICD-10-CM

## 2019-06-06 DIAGNOSIS — I35.0 AORTIC STENOSIS, MILD: ICD-10-CM

## 2019-06-06 DIAGNOSIS — Z87.891 EX-SMOKER: ICD-10-CM

## 2019-06-06 DIAGNOSIS — N18.30 CKD (CHRONIC KIDNEY DISEASE) STAGE 3, GFR 30-59 ML/MIN: ICD-10-CM

## 2019-06-06 DIAGNOSIS — I65.29 INTERNAL CAROTID ARTERY STENOSIS, UNSPECIFIED LATERALITY: ICD-10-CM

## 2019-06-06 DIAGNOSIS — I51.89 LEFT VENTRICULAR DIASTOLIC DYSFUNCTION, NYHA CLASS 1: ICD-10-CM

## 2019-06-06 DIAGNOSIS — K21.9 GASTROESOPHAGEAL REFLUX DISEASE, ESOPHAGITIS PRESENCE NOT SPECIFIED: ICD-10-CM

## 2019-06-06 PROCEDURE — 99213 OFFICE O/P EST LOW 20 MIN: CPT | Mod: S$GLB,,, | Performed by: INTERNAL MEDICINE

## 2019-06-06 PROCEDURE — 99213 PR OFFICE/OUTPT VISIT, EST, LEVL III, 20-29 MIN: ICD-10-PCS | Mod: S$GLB,,, | Performed by: INTERNAL MEDICINE

## 2019-06-06 PROCEDURE — 3078F PR MOST RECENT DIASTOLIC BLOOD PRESSURE < 80 MM HG: ICD-10-PCS | Mod: CPTII,S$GLB,, | Performed by: INTERNAL MEDICINE

## 2019-06-06 PROCEDURE — 93000 ELECTROCARDIOGRAM COMPLETE: CPT | Mod: S$GLB,,, | Performed by: INTERNAL MEDICINE

## 2019-06-06 PROCEDURE — 3078F DIAST BP <80 MM HG: CPT | Mod: CPTII,S$GLB,, | Performed by: INTERNAL MEDICINE

## 2019-06-06 PROCEDURE — 3075F SYST BP GE 130 - 139MM HG: CPT | Mod: CPTII,S$GLB,, | Performed by: INTERNAL MEDICINE

## 2019-06-06 PROCEDURE — 3075F PR MOST RECENT SYSTOLIC BLOOD PRESS GE 130-139MM HG: ICD-10-PCS | Mod: CPTII,S$GLB,, | Performed by: INTERNAL MEDICINE

## 2019-06-06 PROCEDURE — 93000 PR ELECTROCARDIOGRAM, COMPLETE: ICD-10-PCS | Mod: S$GLB,,, | Performed by: INTERNAL MEDICINE

## 2019-06-06 NOTE — PROGRESS NOTES
Subjective:    Patient ID:  Ramesh Ricci is a 79 y.o. male     HPI   Here for follow-up of paroxysmal atrial fibrillation, essential hypertension, mild aortic stenosis, diastolic left ventricular dysfunction, carotid occlusive disease, history of transient ischemic attack, congenital absence of the right kidney, chronic kidney disease stage 3, prostate cancer, hypothyroidism, GERD, ulcerative colitis, ex cigarette smoker.    My colitis is still active, I have disrupted sleep from diarrhea.  I have no palpitations or shortness of breath.  My  short-term memory is poor.    Current Outpatient Medications   Medication Sig    apixaban (ELIQUIS) 2.5 mg Tab Take 1 tablet (2.5 mg total) by mouth 2 (two) times daily.    ascorbic acid (VITAMIN C) 500 MG tablet Take 500 mg by mouth once daily.    aspirin (ECOTRIN) 81 MG EC tablet Take 81 mg by mouth once daily.      azaTHIOprine (IMURAN) 50 mg Tab Take 100 mg by mouth once daily.    calcium-vitamin D 500 mg(1,250mg) -200 unit per tablet Take 1 tablet by mouth once daily.      celecoxib (CELEBREX) 200 MG capsule TAKE 1 CAPSULE BY MOUTH ONCE DAILY AS NEEDED    fluticasone (FLONASE) 50 mcg/actuation nasal spray USE TWO SPRAY(S) IN EACH NOSTRIL ONCE DAILY    irbesartan (AVAPRO) 150 MG tablet Take 1 tablet (150 mg total) by mouth every evening.    levothyroxine (SYNTHROID) 125 MCG tablet TAKE 1 TABLET BY MOUTH ONCE DAILY IN THE MORNING    meclizine (ANTIVERT) 25 mg tablet TAKE 1/2 (ONE-HALF) TABLET BY MOUTH THREE TIMES DAILY AS NEEDED FOR DIZZINESS    omeprazole 20 mg TbLD Take 40 mg by mouth once daily.    PNV w/o calcium-iron fum-FA (M-VIT) 27-1 mg Tab Take by mouth.      polyethylene glycol (GLYCOLAX) 17 gram PwPk Take 17 g by mouth nightly as needed.    pravastatin (PRAVACHOL) 20 MG tablet Take 1 tablet (20 mg total) by mouth every evening.    tamsulosin (FLOMAX) 0.4 mg Cp24 Take 0.4 mg by mouth once daily.     tiZANidine (ZANAFLEX) 2 MG tablet 1.2-1 tab  "nightly as needed.    venlafaxine (EFFEXOR) 37.5 MG Tab TAKE 1 TABLET BY MOUTH TWICE DAILY     No current facility-administered medications for this visit.          Review of Systems   Constitution: Negative for chills, decreased appetite, fever, weight gain and weight loss.   HENT: Negative for congestion, hearing loss and sore throat.    Eyes: Negative for blurred vision, double vision and visual disturbance.   Cardiovascular: Negative for chest pain, claudication, dyspnea on exertion, leg swelling, palpitations and syncope.   Respiratory: Negative for cough, hemoptysis, shortness of breath, sputum production and wheezing.    Endocrine: Negative for cold intolerance and heat intolerance.   Hematologic/Lymphatic: Negative for bleeding problem. Does not bruise/bleed easily.   Skin: Negative for color change, dry skin, flushing and itching.   Musculoskeletal: Negative for back pain, joint pain and myalgias.   Gastrointestinal: Negative for abdominal pain, anorexia, constipation, diarrhea, dysphagia, nausea and vomiting.        No bleeding per rectum   Genitourinary: Negative for dysuria, flank pain, frequency, hematuria and nocturia.   Neurological: Negative for dizziness, headaches, light-headedness, loss of balance, seizures and tremors.   Psychiatric/Behavioral: Negative for altered mental status and depression.     Vitals:    06/06/19 1118   BP: 138/77   Pulse: 69   Weight: 69.9 kg (154 lb)   Height: 5' 11" (1.803 m)        Objective:    Physical Exam   Constitutional: He is oriented to person, place, and time. He appears well-developed and well-nourished.   HENT:   Head: Normocephalic and atraumatic.   Right Ear: External ear normal.   Left Ear: External ear normal.   Nose: Nose normal.   Eyes: Pupils are equal, round, and reactive to light. Conjunctivae and EOM are normal. No scleral icterus.   Neck: Normal range of motion. Neck supple. No JVD present. No tracheal deviation present. No thyromegaly present. "   Cardiovascular: Normal rate and regular rhythm. Exam reveals no gallop and no friction rub.   Murmur heard.  Basal 3 of a 6 ejection systolic murmur conducted to the carotids.   Pulmonary/Chest: Effort normal and breath sounds normal. No respiratory distress. He has no rales. He exhibits no tenderness.   Abdominal: Soft. Bowel sounds are normal. He exhibits no distension and no mass. There is no tenderness.   Musculoskeletal: Normal range of motion. He exhibits no edema or tenderness.   Lymphadenopathy:     He has no cervical adenopathy.   Neurological: He is alert and oriented to person, place, and time. He has normal reflexes. No cranial nerve deficit. Coordination normal.   Skin: Skin is warm and dry. No rash noted.   Psychiatric: He has a normal mood and affect. His behavior is normal.         Assessment:       1. PAF (paroxysmal atrial fibrillation)    2. Essential hypertension    3. Aortic stenosis, mild    4. Left ventricular diastolic dysfunction, NYHA class 1    5. Internal carotid artery stenosis, unspecified laterality    6. TIA (transient ischemic attack)    7. Congenital absence of right kidney    8. CKD (chronic kidney disease) stage 3, GFR 30-59 ml/min    9. Prostate CA    10. Acquired hypothyroidism    11. Gastroesophageal reflux disease, esophagitis presence not specified    12. Ulcerative colitis with complication, unspecified location    13. Ex-smoker         Plan:       Cardiac-wise stable.  Continue present pharmacological regimen.

## 2019-06-15 ENCOUNTER — HOSPITAL ENCOUNTER (OUTPATIENT)
Facility: OTHER | Age: 79
Discharge: HOME | End: 2019-06-16
Attending: EMERGENCY MEDICINE | Admitting: INTERNAL MEDICINE
Payer: MEDICARE

## 2019-06-15 DIAGNOSIS — I48.0 PAF (PAROXYSMAL ATRIAL FIBRILLATION): ICD-10-CM

## 2019-06-15 DIAGNOSIS — K40.30 INCARCERATED RIGHT INGUINAL HERNIA: Primary | ICD-10-CM

## 2019-06-15 LAB
ALBUMIN SERPL BCP-MCNC: 3.1 G/DL (ref 3.5–5.2)
ALP SERPL-CCNC: 54 U/L (ref 55–135)
ALT SERPL W/O P-5'-P-CCNC: 7 U/L (ref 10–44)
ANION GAP SERPL CALC-SCNC: 10 MMOL/L (ref 8–16)
AST SERPL-CCNC: 15 U/L (ref 10–40)
BASOPHILS # BLD AUTO: 0.01 K/UL (ref 0–0.2)
BASOPHILS NFR BLD: 0.1 % (ref 0–1.9)
BILIRUB SERPL-MCNC: 0.4 MG/DL (ref 0.1–1)
BILIRUB UR QL STRIP: NEGATIVE
BUN SERPL-MCNC: 15 MG/DL (ref 8–23)
CALCIUM SERPL-MCNC: 8.5 MG/DL (ref 8.7–10.5)
CHLORIDE SERPL-SCNC: 102 MMOL/L (ref 95–110)
CLARITY UR: CLEAR
CO2 SERPL-SCNC: 27 MMOL/L (ref 23–29)
COLOR UR: YELLOW
CREAT SERPL-MCNC: 1.2 MG/DL (ref 0.5–1.4)
DIFFERENTIAL METHOD: ABNORMAL
EOSINOPHIL # BLD AUTO: 0 K/UL (ref 0–0.5)
EOSINOPHIL NFR BLD: 0.1 % (ref 0–8)
ERYTHROCYTE [DISTWIDTH] IN BLOOD BY AUTOMATED COUNT: 16.1 % (ref 11.5–14.5)
EST. GFR  (AFRICAN AMERICAN): >60 ML/MIN/1.73 M^2
EST. GFR  (NON AFRICAN AMERICAN): 56 ML/MIN/1.73 M^2
GLUCOSE SERPL-MCNC: 98 MG/DL (ref 70–110)
GLUCOSE UR QL STRIP: NEGATIVE
HCT VFR BLD AUTO: 35.6 % (ref 40–54)
HGB BLD-MCNC: 11.3 G/DL (ref 14–18)
HGB UR QL STRIP: NEGATIVE
INR PPP: 1.3 (ref 0.8–1.2)
KETONES UR QL STRIP: NEGATIVE
LEUKOCYTE ESTERASE UR QL STRIP: NEGATIVE
LYMPHOCYTES # BLD AUTO: 0.9 K/UL (ref 1–4.8)
LYMPHOCYTES NFR BLD: 12.6 % (ref 18–48)
MAGNESIUM SERPL-MCNC: 1.9 MG/DL (ref 1.6–2.6)
MCH RBC QN AUTO: 29.4 PG (ref 27–31)
MCHC RBC AUTO-ENTMCNC: 31.7 G/DL (ref 32–36)
MCV RBC AUTO: 93 FL (ref 82–98)
MONOCYTES # BLD AUTO: 0.7 K/UL (ref 0.3–1)
MONOCYTES NFR BLD: 10 % (ref 4–15)
NEUTROPHILS # BLD AUTO: 5.2 K/UL (ref 1.8–7.7)
NEUTROPHILS NFR BLD: 76.6 % (ref 38–73)
NITRITE UR QL STRIP: NEGATIVE
PH UR STRIP: 6 [PH] (ref 5–8)
PLATELET # BLD AUTO: 216 K/UL (ref 150–350)
PMV BLD AUTO: 9.8 FL (ref 9.2–12.9)
POTASSIUM SERPL-SCNC: 4 MMOL/L (ref 3.5–5.1)
PROT SERPL-MCNC: 6.5 G/DL (ref 6–8.4)
PROT UR QL STRIP: NEGATIVE
PROTHROMBIN TIME: 14.6 SEC (ref 9–12.5)
RBC # BLD AUTO: 3.84 M/UL (ref 4.6–6.2)
SODIUM SERPL-SCNC: 139 MMOL/L (ref 136–145)
SP GR UR STRIP: 1.01 (ref 1–1.03)
URN SPEC COLLECT METH UR: NORMAL
UROBILINOGEN UR STRIP-ACNC: NEGATIVE EU/DL
WBC # BLD AUTO: 6.73 K/UL (ref 3.9–12.7)

## 2019-06-15 PROCEDURE — 96376 TX/PRO/DX INJ SAME DRUG ADON: CPT

## 2019-06-15 PROCEDURE — 85025 COMPLETE CBC W/AUTO DIFF WBC: CPT

## 2019-06-15 PROCEDURE — 81003 URINALYSIS AUTO W/O SCOPE: CPT

## 2019-06-15 PROCEDURE — 63600175 PHARM REV CODE 636 W HCPCS: Performed by: EMERGENCY MEDICINE

## 2019-06-15 PROCEDURE — 99220 PR INITIAL OBSERVATION CARE,LEVL III: ICD-10-PCS | Mod: ,,, | Performed by: PHYSICIAN ASSISTANT

## 2019-06-15 PROCEDURE — 25500020 PHARM REV CODE 255: Performed by: EMERGENCY MEDICINE

## 2019-06-15 PROCEDURE — 25000003 PHARM REV CODE 250: Performed by: PHYSICIAN ASSISTANT

## 2019-06-15 PROCEDURE — 80053 COMPREHEN METABOLIC PANEL: CPT

## 2019-06-15 PROCEDURE — 99220 PR INITIAL OBSERVATION CARE,LEVL III: CPT | Mod: ,,, | Performed by: PHYSICIAN ASSISTANT

## 2019-06-15 PROCEDURE — G0378 HOSPITAL OBSERVATION PER HR: HCPCS

## 2019-06-15 PROCEDURE — 99285 EMERGENCY DEPT VISIT HI MDM: CPT | Mod: 25

## 2019-06-15 PROCEDURE — 83735 ASSAY OF MAGNESIUM: CPT

## 2019-06-15 PROCEDURE — 96374 THER/PROPH/DIAG INJ IV PUSH: CPT | Mod: 59

## 2019-06-15 PROCEDURE — 85610 PROTHROMBIN TIME: CPT

## 2019-06-15 PROCEDURE — 63600175 PHARM REV CODE 636 W HCPCS: Performed by: PHYSICIAN ASSISTANT

## 2019-06-15 PROCEDURE — 96375 TX/PRO/DX INJ NEW DRUG ADDON: CPT

## 2019-06-15 RX ORDER — FENTANYL CITRATE 50 UG/ML
50 INJECTION, SOLUTION INTRAMUSCULAR; INTRAVENOUS
Status: COMPLETED | OUTPATIENT
Start: 2019-06-15 | End: 2019-06-15

## 2019-06-15 RX ORDER — PREDNISONE 10 MG/1
10 TABLET ORAL DAILY
Status: DISCONTINUED | OUTPATIENT
Start: 2019-06-15 | End: 2019-06-16 | Stop reason: HOSPADM

## 2019-06-15 RX ORDER — ONDANSETRON 2 MG/ML
4 INJECTION INTRAMUSCULAR; INTRAVENOUS EVERY 8 HOURS PRN
Status: DISCONTINUED | OUTPATIENT
Start: 2019-06-15 | End: 2019-06-16 | Stop reason: HOSPADM

## 2019-06-15 RX ORDER — HYDROMORPHONE HYDROCHLORIDE 1 MG/ML
0.5 INJECTION, SOLUTION INTRAMUSCULAR; INTRAVENOUS; SUBCUTANEOUS
Status: COMPLETED | OUTPATIENT
Start: 2019-06-15 | End: 2019-06-15

## 2019-06-15 RX ORDER — ONDANSETRON 2 MG/ML
4 INJECTION INTRAMUSCULAR; INTRAVENOUS
Status: COMPLETED | OUTPATIENT
Start: 2019-06-15 | End: 2019-06-15

## 2019-06-15 RX ORDER — ACETAMINOPHEN 325 MG/1
650 TABLET ORAL EVERY 4 HOURS PRN
Status: DISCONTINUED | OUTPATIENT
Start: 2019-06-15 | End: 2019-06-16 | Stop reason: HOSPADM

## 2019-06-15 RX ORDER — PREDNISONE 10 MG/1
10 TABLET ORAL DAILY
COMMUNITY
End: 2019-09-17

## 2019-06-15 RX ORDER — SODIUM CHLORIDE 0.9 % (FLUSH) 0.9 %
10 SYRINGE (ML) INJECTION
Status: DISCONTINUED | OUTPATIENT
Start: 2019-06-15 | End: 2019-06-16 | Stop reason: HOSPADM

## 2019-06-15 RX ORDER — VENLAFAXINE 37.5 MG/1
37.5 TABLET ORAL 2 TIMES DAILY
Status: DISCONTINUED | OUTPATIENT
Start: 2019-06-15 | End: 2019-06-16 | Stop reason: HOSPADM

## 2019-06-15 RX ORDER — TAMSULOSIN HYDROCHLORIDE 0.4 MG/1
0.4 CAPSULE ORAL DAILY
Status: DISCONTINUED | OUTPATIENT
Start: 2019-06-15 | End: 2019-06-16 | Stop reason: HOSPADM

## 2019-06-15 RX ORDER — PRAVASTATIN SODIUM 20 MG/1
20 TABLET ORAL NIGHTLY
Status: DISCONTINUED | OUTPATIENT
Start: 2019-06-15 | End: 2019-06-16 | Stop reason: HOSPADM

## 2019-06-15 RX ORDER — AZATHIOPRINE 50 MG/1
100 TABLET ORAL DAILY
Status: DISCONTINUED | OUTPATIENT
Start: 2019-06-15 | End: 2019-06-16 | Stop reason: HOSPADM

## 2019-06-15 RX ORDER — PANTOPRAZOLE SODIUM 40 MG/1
40 FOR SUSPENSION ORAL DAILY
Status: DISCONTINUED | OUTPATIENT
Start: 2019-06-16 | End: 2019-06-16 | Stop reason: HOSPADM

## 2019-06-15 RX ORDER — IRBESARTAN 150 MG/1
150 TABLET ORAL NIGHTLY
Status: DISCONTINUED | OUTPATIENT
Start: 2019-06-15 | End: 2019-06-16 | Stop reason: HOSPADM

## 2019-06-15 RX ADMIN — AZATHIOPRINE 100 MG: 50 TABLET ORAL at 06:06

## 2019-06-15 RX ADMIN — IOHEXOL 75 ML: 350 INJECTION, SOLUTION INTRAVENOUS at 01:06

## 2019-06-15 RX ADMIN — PREDNISONE 10 MG: 10 TABLET ORAL at 06:06

## 2019-06-15 RX ADMIN — FENTANYL CITRATE 50 MCG: 50 INJECTION INTRAMUSCULAR; INTRAVENOUS at 12:06

## 2019-06-15 RX ADMIN — TAMSULOSIN HYDROCHLORIDE 0.4 MG: 0.4 CAPSULE ORAL at 06:06

## 2019-06-15 RX ADMIN — PRAVASTATIN SODIUM 20 MG: 20 TABLET ORAL at 09:06

## 2019-06-15 RX ADMIN — FENTANYL CITRATE 50 MCG: 50 INJECTION INTRAMUSCULAR; INTRAVENOUS at 11:06

## 2019-06-15 RX ADMIN — ONDANSETRON 4 MG: 2 INJECTION INTRAMUSCULAR; INTRAVENOUS at 02:06

## 2019-06-15 RX ADMIN — IRBESARTAN 150 MG: 150 TABLET ORAL at 09:06

## 2019-06-15 RX ADMIN — HYDROMORPHONE HYDROCHLORIDE 0.5 MG: 1 INJECTION, SOLUTION INTRAMUSCULAR; INTRAVENOUS; SUBCUTANEOUS at 02:06

## 2019-06-15 RX ADMIN — VENLAFAXINE 37.5 MG: 37.5 TABLET ORAL at 09:06

## 2019-06-15 NOTE — ED NOTES
Pt updated on POC. Sitting in bed with eyes open, respirations even and unlabored, appears in no acute distress. Denies any needs at this time. Wife remains at bedside.

## 2019-06-15 NOTE — PLAN OF CARE
06/15/19 1642   MELENDEZ Message   Medicare Outpatient and Observation Notification regarding financial responsibility Given to patient/caregiver;Explained to patient/caregiver;Signed/date by patient/caregiver   Date MELENDEZ was signed 06/15/19   Time MELENDEZ was signed [id number]

## 2019-06-15 NOTE — ASSESSMENT & PLAN NOTE
- on Imuran and prednisone 10 mg daily  - followed by Dr. Hatchr  - consider stress dose steroid if patient to OR

## 2019-06-15 NOTE — CONSULTS
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  Abd soft nontender. Visible RIH with tender mass.  Hernia reduced with mod difficulty.  Will need admit and observation tonight. Will need repair done this week.

## 2019-06-15 NOTE — ED PROVIDER NOTES
"Encounter Date: 6/15/2019    SCRIBE #1 NOTE: I, Timi Couchu, am scribing for, and in the presence of, Dr. Arzate .       History     Chief Complaint   Patient presents with    Inguinal Hernia     pt states " my hernia comes and goes but its staying right now and its very painful. i need to get operated on"      Time seen by provider: 10:36 AM    This is a 79 y.o. male, with history of A Fib, CKD, HTN, and ulcerative colitis, who presents with complaint of painful hernia near his right groin. Patient states his inguinal hernia has been present for a while and reports one surgery on it a long while ago. He states the hernia has been "popping in and out" for a year. He states the hernia popped out yesterday and he has been unable to push it back in due to the pain. He denies pain relief with OTC Tylenol. He admits that he has been constipated and feels bloated, which he attributes to the hernia. He reports history of ulcerative colitis and states he usually has four to five non bloody bowel movements a day. Patient states "it feels like I have a blockage". He is on Eliquis. He has taken all morning medications today except for Eliquis. He denies recent fevers, chills, headaches, dizziness, congestion, rhinorrhea, sore throat, cough, SOB, chest pain, abdominal pain, N/V/D, blood in stools, dysuria, hematuria, urinary frequency, or urinary urgency.     The history is provided by the patient (wife at bedside).     Review of patient's allergies indicates:   Allergen Reactions    Benazepril Other (See Comments)     Cough     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    BPH (benign prostatic hyperplasia) 11/16/2012    DDD (degenerative disc disease), cervical 7/5/2017    x-ray 7/17 - Severe    GERD (gastroesophageal reflux disease) 11/16/2012    Glaucoma 11/16/2012    HTN (hypertension) 11/16/2012    Hypothyroid 11/16/2012    Internal carotid " artery stenosis 2012    Left ventricular diastolic dysfunction, NYHA class 1 2015    4/15    Low serum testosterone level 2012    Normal cardiac stress test 2012    Osteopenia 2012    Shingles 2012    TIA (transient ischemic attack) 2012    UC (ulcerative colitis) 2012     Past Surgical History:   Procedure Laterality Date    ADENOIDECTOMY      EYE SURGERY      right eye    HERNIA REPAIR      TONSILLECTOMY       Family History   Problem Relation Age of Onset    Hypertension Mother     Alzheimer's disease Mother     Diabetes Brother     Hypertension Brother     Cancer Brother     Heart disease Brother 56        MI    Heart attack Father      Social History     Tobacco Use    Smoking status: Former Smoker     Last attempt to quit: 1972     Years since quittin.4    Smokeless tobacco: Never Used   Substance Use Topics    Alcohol use: Yes     Alcohol/week: 0.6 oz     Types: 1 Cans of beer per week     Frequency: Monthly or less     Drinks per session: 1 or 2     Binge frequency: Never     Comment: occac    Drug use: No     Review of Systems   Constitutional: Negative for chills and fever.   HENT: Negative for congestion, rhinorrhea and sore throat.    Respiratory: Negative for cough and shortness of breath.    Cardiovascular: Negative for chest pain.   Gastrointestinal: Positive for abdominal distention (bloated) and constipation. Negative for abdominal pain, blood in stool, diarrhea, nausea and vomiting.   Genitourinary: Negative for dysuria, frequency, hematuria and urgency.        Positive for inguinal hernia.    Musculoskeletal: Negative for back pain.   Skin: Negative for rash.   Neurological: Negative for dizziness and headaches.   Psychiatric/Behavioral: Negative for confusion.       Physical Exam     Initial Vitals [06/15/19 1003]   BP Pulse Resp Temp SpO2   (!) 173/128 65 18 97.5 °F (36.4 °C) 99 %      MAP       --         Physical  Exam    Nursing note and vitals reviewed.  Constitutional: He appears well-developed and well-nourished. No distress.   HENT:   Head: Normocephalic and atraumatic.   Mouth/Throat: Oropharynx is clear and moist.   Eyes: Conjunctivae and EOM are normal.   Neck: Normal range of motion. Neck supple.   Cardiovascular: Normal rate, regular rhythm and intact distal pulses.   Murmur heard.   Systolic murmur is present with a grade of 4/6.  Pulmonary/Chest: Breath sounds normal. No respiratory distress. He has no wheezes. He has no rhonchi. He has no rales.   Abdominal: Soft. He exhibits no distension. There is no tenderness.   Musculoskeletal: Normal range of motion. He exhibits tenderness.   Right inguinal tender mass with no overlying erythema or fluctuance.    Neurological: He is alert and oriented to person, place, and time. He has normal strength.   Skin: Skin is warm and dry.   Psychiatric: He has a normal mood and affect. His behavior is normal. Judgment and thought content normal.         ED Course   Procedures  Labs Reviewed   CBC W/ AUTO DIFFERENTIAL - Abnormal; Notable for the following components:       Result Value    RBC 3.84 (*)     Hemoglobin 11.3 (*)     Hematocrit 35.6 (*)     Mean Corpuscular Hemoglobin Conc 31.7 (*)     RDW 16.1 (*)     Lymph # 0.9 (*)     Gran% 76.6 (*)     Lymph% 12.6 (*)     All other components within normal limits   COMPREHENSIVE METABOLIC PANEL - Abnormal; Notable for the following components:    Calcium 8.5 (*)     Albumin 3.1 (*)     Alkaline Phosphatase 54 (*)     ALT 7 (*)     eGFR if non  56 (*)     All other components within normal limits   PROTIME-INR - Abnormal; Notable for the following components:    Prothrombin Time 14.6 (*)     INR 1.3 (*)     All other components within normal limits   URINALYSIS, REFLEX TO URINE CULTURE    Narrative:     Preferred Collection Type->Urine, Clean Catch          Imaging Results           CT Abdomen Pelvis With Contrast  (Final result)  Result time 06/15/19 14:12:42    Final result by Murali Poon Jr., MD (06/15/19 14:12:42)                 Impression:      There is a right inguinal hernia containing small bowel.  There is obstruction of the more proximal small bowel with decompression of the small bowel distal to the hernia sac.  There is some fluid in the hernia sac.  Surgical consultation suggested.  No definite intramural air to confirm the presence of bowel infarct.    There is some fluid about the liver as well as some fluid in the scrotum.    Multiple hypodensities in the left kidney as discussed above.  Nonemergent ultrasound suggested    Incompletely visualized ground-glass opacity right middle lobe.  There are stable left lung nodules.    This report was flagged in Epic as abnormal.      Electronically signed by: Murali Poon MD  Date:    06/15/2019  Time:    14:12             Narrative:    EXAMINATION:  CT ABDOMEN PELVIS WITH CONTRAST    CLINICAL HISTORY:  Hernia, complicated;R inguinal;    TECHNIQUE:  Low dose axial images, sagittal and coronal reformations were obtained from the lung bases to the pubic symphysis following the IV administration of 75 mL of Omnipaque 350 oral contrast was not administered.    COMPARISON:  July 2017.    FINDINGS:  In the chest significant coronary artery calcification as well as calcification about the mitral annulus.  Aortic valvular calcifications present as well.  No pleural or pericardial fluid.  Incompletely visualized ground-glass opacity right middle lobe series 2, image 12.  Some atelectasis at the bases.  Left lingular nodule series 2, image 6 similar to prior of doubtful significance.  Stable 6 mm left lower lobe nodule series 2, image 11.    In the abdomen there is some fluid about the liver.  Liver similar in the size and contour.  No focal mass lesions seen.  Gallbladder is not distended.  Pancreas and spleen are normal.  No adrenal masses.  Atrophic right kidney.   Nonobstructing 4 mm left lower pole calculus similar.  There are multiple hypodensities in the kidneys the largest of which are consistent with cysts.  Some hypodensities measure higher than simple fluid and may be hemorrhagic cyst as they are similar in size and configuration compared to the prior.  Nonemergent ultrasound could be obtained.    Calcified plaque in the aorta.  Significant calcification at the origin of the left renal artery.  No aneurysm.  No significant para-aortic or pelvic adenopathy.  Calcifications in the prostate.  Bladder appears similar.    Evaluation of the bowel demonstrates air and fluid distention of the proximal small bowel loops.  These loops extending into the right groin where there is an incarcerated right inguinal hernia.  There is small amount of fluid in the right inguinal hernia sac.  The distal limb of the small bowel is decompressed.  No definite intramural air or portal venous gas.  Remainder of the more distal bowel is decompressed.  There is stool and feces particular in the right colon.  More distal transverse and descending colon are relatively decompressed.    There is some fluid in the scrotum.  Small fat containing left inguinal hernia.    Bone windows demonstrate degenerative change.  No new lytic or blastic lesion.  Few sclerotic foci seen in the right ilium similar.                                 Medical Decision Making:   Initial Assessment:       79-year-old male with history of AFib on Eliquis, ulcerative colitis, HTN, CKD presents with incarcerated right inguinal hernia since yesterday.  Patient has distant history of hernia repair in same location, but in the past year hernia has been recurrent and pops in and out, he is usually able to reduce it but was unable since yesterday.  He he has significant ulcerative colitis and 4-5 loose BMs daily, but since yesterday has been constipated and nauseous, which is concerning for partial SBO or slow transit due to  hernia.  Patient with no other new complaints and has been compliant with meds except as not taken Eliquis today yet.  On exam patient does have right inguinal hernia that is tender and difficult to reduce, even after 2 doses of IV fentanyl.       Basic labs checked with CKD at baseline with CR 1.2, and no other acute findings.  Given difficulty reducing hernia, CT done that confirms small bowel and right inguinal hernia with signs of obstruction proximally.  Dr. Ozuna, surgery consult, evaluated patient at bedside and was able to reduce hernia.  He recommends admission to hospitalist for monitoring to ensure SBO symptoms improve after reduction and to ensure no recurrent incarcerated hernia.  He will discuss operative management with patient tomorrow and determine when it will be done, will hold Eliquis until then, but patient can eat.  Discussed with patient and hospitalist who agrees with admission plan.      Clinical Tests:   Lab Tests: Ordered and Reviewed  Radiological Study: Ordered and Reviewed            Scribe Attestation:   Scribe #1: I performed the above scribed service and the documentation accurately describes the services I performed. I attest to the accuracy of the note.    Attending Attestation:           Physician Attestation for Scribe:  Physician Attestation Statement for Scribe #1: I, Dr. Arzate, reviewed documentation, as scribed by Timi Horan  in my presence, and it is both accurate and complete.                    Clinical Impression:     1. Incarcerated right inguinal hernia                                   Bryon Arzate MD  06/15/19 3798

## 2019-06-15 NOTE — PLAN OF CARE
PCP: EFREM Muniz  ( confirmed )    Pharmacy: [id number]  [place], [place] - [address] [place] [address]    Transportation: family to provide transportation home ( Mone Ricci -spouse- [phone] )    DME: denies    Coumadin: denies    Dialysis: N/A    Met with pt and spouse at bedside. No needs voiced per pt and spouse. No needs currently identified per CM. MELENDEZ signed per pt.     06/15/19 1235   Discharge Assessment   Assessment Type Discharge Planning Assessment   Confirmed/corrected address and phone number on facesheet? Yes   Assessment information obtained from? Patient;Caregiver;Medical Record  (spouse at bedside)   Expected Length of Stay (days) 1   Communicated expected length of stay with patient/caregiver yes   Prior to hospitilization cognitive status: Alert/Oriented   Prior to hospitalization functional status: Independent   Current cognitive status: Alert/Oriented   Current Functional Status: Independent   Lives With spouse   Is patient able to care for self after discharge? Yes   Who are your caregiver(s) and their phone number(s)? Mone Ricci ( spouse )  [phone]   Patient's perception of discharge disposition home or selfcare   Readmission Within the Last 30 Days no previous admission in last 30 days   Patient currently being followed by outpatient case management? No   Patient currently receives any other outside agency services? No   Equipment Currently Used at Home none   Do you have any problems affording any of your prescribed medications? No   Is the patient taking medications as prescribed? yes   Does the patient have transportation home? Yes   Transportation Anticipated family or friend will provide   Does the patient receive services at the [facility]? No   Discharge Plan A Home with family   Discharge Plan B Home with family   DME Needed Upon Discharge  none   Patient/Family in Agreement with Plan yes

## 2019-06-15 NOTE — H&P
"[facility] Medicine  History & Physical    Patient Name: Ramesh Ricci  MRN: [id number]  Admission Date: 6/15/2019  Attending Physician: Bryon Arzate MD   Primary Care Provider: EFREM Muniz MD         Patient information was obtained from patient, spouse/SO, past medical records and ER records.     Subjective:     Principal Problem:Incarcerated right inguinal hernia    Chief Complaint:   Chief Complaint   Patient presents with    Inguinal Hernia     pt states " my hernia comes and goes but its staying right now and its very painful. i need to get operated on"         HPI: 80 y/o male with PMHx pAF (on apixaban), HTN, AS, TIA, Congenital absence of right kidney, CKDIII, Prostate CA, UC (on Imuran and prednisone), GERD presented to ED with c/o right sided inguinal hernia that "popped out" two days ago and he has been unable to push it back in due to pain. Reported he had a repair many years ago and the hernia has been popping in and out for a while now. Stated he feels "constipated and bloated" with the hernia as he usually has four to five non bloody bowel movements a day with his UC. Wife reported patient has not taken many of his medicines including his Eliquis since yesterday morning secondary to patient feeling "bad". Patient denied fevers, chills, headaches, dizziness, congestion, rhinorrhea, sore throat, cough, SOB, chest pain, abdominal pain, N/V/D, blood in stools, dysuria, hematuria, urinary frequency, or urinary urgency. In ED patient was seen seen by General surgery Hernia was reduced with moderate difficulty. Recommended admit and observation tonight.         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    BPH (benign prostatic hyperplasia) 11/16/2012    DDD (degenerative disc disease), cervical 7/5/2017    x-ray 7/17 - Severe    GERD (gastroesophageal reflux disease) 11/16/2012    Glaucoma " 11/16/2012    HTN (hypertension) 11/16/2012    Hypothyroid 11/16/2012    Internal carotid artery stenosis 11/16/2012    Left ventricular diastolic dysfunction, NYHA class 1 4/16/2015    4/15    Low serum testosterone level 11/16/2012    Normal cardiac stress test 11/16/2012    Osteopenia 11/16/2012    Shingles 11/16/2012    TIA (transient ischemic attack) 11/16/2012    UC (ulcerative colitis) 11/16/2012       Past Surgical History:   Procedure Laterality Date    ADENOIDECTOMY      EYE SURGERY      HERNIA REPAIR      TONSILLECTOMY         Review of patient's allergies indicates:   Allergen Reactions    Benazepril Other (See Comments)     Cough       No current facility-administered medications on file prior to encounter.      Current Outpatient Medications on File Prior to Encounter   Medication Sig    apixaban (ELIQUIS) 2.5 mg Tab Take 1 tablet (2.5 mg total) by mouth 2 (two) times daily.    aspirin (ECOTRIN) 81 MG EC tablet Take 81 mg by mouth once daily.      azaTHIOprine (IMURAN) 50 mg Tab Take 100 mg by mouth once daily.    calcium-vitamin D 500 mg(1,250mg) -200 unit per tablet Take 1 tablet by mouth once daily.      irbesartan (AVAPRO) 150 MG tablet Take 1 tablet (150 mg total) by mouth every evening.    levothyroxine (SYNTHROID) 125 MCG tablet TAKE 1 TABLET BY MOUTH ONCE DAILY IN THE MORNING    omeprazole 20 mg TbLD Take 40 mg by mouth once daily.    PNV w/o calcium-iron fum-FA (M-VIT) 27-1 mg Tab Take by mouth.      pravastatin (PRAVACHOL) 20 MG tablet Take 1 tablet (20 mg total) by mouth every evening.    predniSONE (DELTASONE) 10 MG tablet Take 10 mg by mouth once daily.    venlafaxine (EFFEXOR) 37.5 MG Tab TAKE 1 TABLET BY MOUTH TWICE DAILY    [DISCONTINUED] celecoxib (CELEBREX) 200 MG capsule TAKE 1 CAPSULE BY MOUTH ONCE DAILY AS NEEDED    ascorbic acid (VITAMIN C) 500 MG tablet Take 500 mg by mouth once daily.    polyethylene glycol (GLYCOLAX) 17 gram PwPk Take 17 g by mouth  nightly as needed.    tamsulosin (FLOMAX) 0.4 mg Cp24 Take 0.4 mg by mouth once daily.     [DISCONTINUED] fluticasone (FLONASE) 50 mcg/actuation nasal spray USE TWO SPRAY(S) IN EACH NOSTRIL ONCE DAILY    [DISCONTINUED] meclizine (ANTIVERT) 25 mg tablet TAKE 1/2 (ONE-HALF) TABLET BY MOUTH THREE TIMES DAILY AS NEEDED FOR DIZZINESS    [DISCONTINUED] tiZANidine (ZANAFLEX) 2 MG tablet 1.2-1 tab nightly as needed.     Family History     Problem Relation (Age of Onset)    Alzheimer's disease Mother    Cancer Brother    Diabetes Brother    Heart attack Father    Heart disease Brother (56)    Hypertension Mother, Brother        Tobacco Use    Smoking status: Former Smoker     Last attempt to quit: 1972     Years since quittin.4    Smokeless tobacco: Never Used   Substance and Sexual Activity    Alcohol use: Yes     Alcohol/week: 0.6 oz     Types: 1 Cans of beer per week     Frequency: Monthly or less     Drinks per session: 1 or 2     Binge frequency: Never     Comment: occac    Drug use: No    Sexual activity: Not Currently     Review of Systems   Constitutional: Positive for activity change and appetite change. Negative for fever.   HENT: Negative for congestion and rhinorrhea.    Eyes: Negative.    Respiratory: Negative for cough and shortness of breath.    Cardiovascular: Negative for chest pain and palpitations.   Gastrointestinal: Positive for constipation. Negative for abdominal distention, abdominal pain, blood in stool, nausea and vomiting.   Endocrine: Negative.    Genitourinary: Negative for difficulty urinating and dysuria.        [place]   Musculoskeletal: Negative for back pain and neck pain.   Skin: Negative.    Neurological: Negative for dizziness, light-headedness, numbness and headaches.   Psychiatric/Behavioral: Negative for agitation.     Objective:     Vital Signs (Most Recent):  Temp: 97.5 °F (36.4 °C) (06/15/19 1003)  Pulse: 66 (06/15/19 1633)  Resp: 18 (06/15/19 1003)  BP: (!) 150/77  (06/15/19 1614)  SpO2: 98 % (06/15/19 1633) Vital Signs (24h Range):  Temp:  [97.5 °F (36.4 °C)] 97.5 °F (36.4 °C)  Pulse:  [60-72] 66  Resp:  [18] 18  SpO2:  [95 %-100 %] 98 %  BP: (150-199)/() 150/77     Weight: 72.6 kg (160 lb)  Body mass index is 22.32 kg/m².    Physical Exam   Constitutional: He is oriented to person, place, and time. He appears well-developed and well-nourished. No distress.   HENT:   Head: Normocephalic and atraumatic.   Eyes: Conjunctivae are normal. Right eye exhibits no discharge. Left eye exhibits no discharge. No scleral icterus.   Neck: Normal range of motion. Neck supple.   Cardiovascular: Normal rate, regular rhythm and intact distal pulses.   Murmur heard.  Pulmonary/Chest: Effort normal and breath sounds normal. No respiratory distress. He has no wheezes.   Abdominal: Soft. Bowel sounds are normal. He exhibits no distension. There is no tenderness.   Musculoskeletal: Normal range of motion. He exhibits no edema or tenderness.   Neurological: He is alert and oriented to person, place, and time.   Skin: Skin is warm and dry. Capillary refill takes less than 2 seconds. He is not diaphoretic.   Psychiatric: He has a normal mood and affect.   Nursing note and vitals reviewed.          Significant Labs:   CBC:   Recent Labs   Lab 06/15/19  1103   WBC 6.73   HGB 11.3*   HCT 35.6*        CMP:   Recent Labs   Lab 06/15/19  1103      K 4.0      CO2 27   GLU 98   BUN 15   CREATININE 1.2   CALCIUM 8.5*   PROT 6.5   ALBUMIN 3.1*   BILITOT 0.4   ALKPHOS 54*   AST 15   ALT 7*   ANIONGAP 10   EGFRNONAA 56*     All pertinent labs within the past 24 hours have been reviewed.    Significant Imaging: I have reviewed all pertinent imaging results/findings within the past 24 hours.   Imaging Results           CT Abdomen Pelvis With Contrast (Final result)  Result time 06/15/19 14:12:42    Final result by Murali Poon Jr., MD (06/15/19 14:12:42)                 Impression:       There is a right inguinal hernia containing small bowel.  There is obstruction of the more proximal small bowel with decompression of the small bowel distal to the hernia sac.  There is some fluid in the hernia sac.  Surgical consultation suggested.  No definite intramural air to confirm the presence of bowel infarct.    There is some fluid about the liver as well as some fluid in the scrotum.    Multiple hypodensities in the left kidney as discussed above.  Nonemergent ultrasound suggested    Incompletely visualized ground-glass opacity right middle lobe.  There are stable left lung nodules.    This report was flagged in Epic as abnormal.      Electronically signed by: Murali Poon MD  Date:    06/15/2019  Time:    14:12             Narrative:    EXAMINATION:  CT ABDOMEN PELVIS WITH CONTRAST    CLINICAL HISTORY:  Hernia, complicated;R inguinal;    TECHNIQUE:  Low dose axial images, sagittal and coronal reformations were obtained from the lung bases to the pubic symphysis following the IV administration of 75 mL of Omnipaque 350 oral contrast was not administered.    COMPARISON:  July 2017.    FINDINGS:  In the chest significant coronary artery calcification as well as calcification about the mitral annulus.  Aortic valvular calcifications present as well.  No pleural or pericardial fluid.  Incompletely visualized ground-glass opacity right middle lobe series 2, image 12.  Some atelectasis at the bases.  Left lingular nodule series 2, image 6 similar to prior of doubtful significance.  Stable 6 mm left lower lobe nodule series 2, image 11.    In the abdomen there is some fluid about the liver.  Liver similar in the size and contour.  No focal mass lesions seen.  Gallbladder is not distended.  Pancreas and spleen are normal.  No adrenal masses.  Atrophic right kidney.  Nonobstructing 4 mm left lower pole calculus similar.  There are multiple hypodensities in the kidneys the largest of which are consistent with  cysts.  Some hypodensities measure higher than simple fluid and may be hemorrhagic cyst as they are similar in size and configuration compared to the prior.  Nonemergent ultrasound could be obtained.    Calcified plaque in the aorta.  Significant calcification at the origin of the left renal artery.  No aneurysm.  No significant para-aortic or pelvic adenopathy.  Calcifications in the prostate.  Bladder appears similar.    Evaluation of the bowel demonstrates air and fluid distention of the proximal small bowel loops.  These loops extending into the right groin where there is an incarcerated right inguinal hernia.  There is small amount of fluid in the right inguinal hernia sac.  The distal limb of the small bowel is decompressed.  No definite intramural air or portal venous gas.  Remainder of the more distal bowel is decompressed.  There is stool and feces particular in the right colon.  More distal transverse and descending colon are relatively decompressed.    There is some fluid in the scrotum.  Small fat containing left inguinal hernia.    Bone windows demonstrate degenerative change.  No new lytic or blastic lesion.  Few sclerotic foci seen in the right ilium similar.                                  Assessment/Plan:     * Incarcerated right inguinal hernia  - reduced in ED  - hold anticoagulation, NPO after midnight  - surgery follow up      PAF (paroxysmal atrial fibrillation)  - on apixaban at home, on hold for possible OR  - rate controlled  - tele    Lung nodules  - present on CT 2017  - followed by PCP      CKD (chronic kidney disease) stage 3, GFR 30-59 ml/min  - stable  - avoid nephrotoxins, NSAIDs  - renally dose medications       GERD (gastroesophageal reflux disease)  - PPI daily, home med      Essential hypertension  - mild elevated, has not taken BP meds  - resume home irbesartan 150 mg daily  - monitor      UC (ulcerative colitis)  - on Imuran and prednisone 10 mg daily  - followed by   [facility]  - consider stress dose steroid if patient to OR      VTE Risk Mitigation (From admission, onward)    None             Trinidad Patino PA-C  Department of Hospital Medicine   [facility]

## 2019-06-15 NOTE — HPI
"80 y/o male with PMHx pAF (on apixaban), HTN, AS, TIA, Congenital absence of right kidney, CKDIII, Prostate CA, UC (on Imuran and prednisone), GERD presented to ED with c/o right sided inguinal hernia that "popped out" two days ago and he has been unable to push it back in due to pain. Reported he had a repair many years ago and the hernia has been popping in and out for a while now. Stated he feels "constipated and bloated" with the hernia as he usually has four to five non bloody bowel movements a day with his UC. Wife reported patient has not taken many of his medicines including his Eliquis since yesterday morning secondary to patient feeling "bad". Patient denied fevers, chills, headaches, dizziness, congestion, rhinorrhea, sore throat, cough, SOB, chest pain, abdominal pain, N/V/D, blood in stools, dysuria, hematuria, urinary frequency, or urinary urgency. In ED patient was seen seen by General surgery Hernia was reduced with moderate difficulty. Recommended admit and observation tonight.       "

## 2019-06-15 NOTE — ED TRIAGE NOTES
Reports R inguinal hernia that he has not been able to reduce on his own since yesterday. Reports it is smaller today than yesterday but remains painful. Denies any other complaints. AAO X 3, answers questions appropriately, appears in no acute distress. Placed on continuous cardiac monitor, pulse ox, BP cycling q 30.

## 2019-06-15 NOTE — SUBJECTIVE & OBJECTIVE
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    BPH (benign prostatic hyperplasia) 11/16/2012    DDD (degenerative disc disease), cervical 7/5/2017    x-ray 7/17 - Severe    GERD (gastroesophageal reflux disease) 11/16/2012    Glaucoma 11/16/2012    HTN (hypertension) 11/16/2012    Hypothyroid 11/16/2012    Internal carotid artery stenosis 11/16/2012    Left ventricular diastolic dysfunction, NYHA class 1 4/16/2015    4/15    Low serum testosterone level 11/16/2012    Normal cardiac stress test 11/16/2012    Osteopenia 11/16/2012    Shingles 11/16/2012    TIA (transient ischemic attack) 11/16/2012    UC (ulcerative colitis) 11/16/2012       Past Surgical History:   Procedure Laterality Date    ADENOIDECTOMY      EYE SURGERY      HERNIA REPAIR      TONSILLECTOMY         Review of patient's allergies indicates:   Allergen Reactions    Benazepril Other (See Comments)     Cough       No current facility-administered medications on file prior to encounter.      Current Outpatient Medications on File Prior to Encounter   Medication Sig    apixaban (ELIQUIS) 2.5 mg Tab Take 1 tablet (2.5 mg total) by mouth 2 (two) times daily.    aspirin (ECOTRIN) 81 MG EC tablet Take 81 mg by mouth once daily.      azaTHIOprine (IMURAN) 50 mg Tab Take 100 mg by mouth once daily.    calcium-vitamin D 500 mg(1,250mg) -200 unit per tablet Take 1 tablet by mouth once daily.      irbesartan (AVAPRO) 150 MG tablet Take 1 tablet (150 mg total) by mouth every evening.    levothyroxine (SYNTHROID) 125 MCG tablet TAKE 1 TABLET BY MOUTH ONCE DAILY IN THE MORNING    omeprazole 20 mg TbLD Take 40 mg by mouth once daily.    PNV w/o calcium-iron fum-FA (M-VIT) 27-1 mg Tab Take by mouth.      pravastatin (PRAVACHOL) 20 MG tablet Take 1 tablet (20 mg total) by mouth every evening.    predniSONE (DELTASONE) 10 MG tablet Take 10 mg by mouth once daily.     venlafaxine (EFFEXOR) 37.5 MG Tab TAKE 1 TABLET BY MOUTH TWICE DAILY    [DISCONTINUED] celecoxib (CELEBREX) 200 MG capsule TAKE 1 CAPSULE BY MOUTH ONCE DAILY AS NEEDED    ascorbic acid (VITAMIN C) 500 MG tablet Take 500 mg by mouth once daily.    polyethylene glycol (GLYCOLAX) 17 gram PwPk Take 17 g by mouth nightly as needed.    tamsulosin (FLOMAX) 0.4 mg Cp24 Take 0.4 mg by mouth once daily.     [DISCONTINUED] fluticasone (FLONASE) 50 mcg/actuation nasal spray USE TWO SPRAY(S) IN EACH NOSTRIL ONCE DAILY    [DISCONTINUED] meclizine (ANTIVERT) 25 mg tablet TAKE 1/2 (ONE-HALF) TABLET BY MOUTH THREE TIMES DAILY AS NEEDED FOR DIZZINESS    [DISCONTINUED] tiZANidine (ZANAFLEX) 2 MG tablet 1.2-1 tab nightly as needed.     Family History     Problem Relation (Age of Onset)    Alzheimer's disease Mother    Cancer Brother    Diabetes Brother    Heart attack Father    Heart disease Brother (56)    Hypertension Mother, Brother        Tobacco Use    Smoking status: Former Smoker     Last attempt to quit: 1972     Years since quittin.4    Smokeless tobacco: Never Used   Substance and Sexual Activity    Alcohol use: Yes     Alcohol/week: 0.6 oz     Types: 1 Cans of beer per week     Frequency: Monthly or less     Drinks per session: 1 or 2     Binge frequency: Never     Comment: occac    Drug use: No    Sexual activity: Not Currently     Review of Systems   Constitutional: Positive for activity change and appetite change. Negative for fever.   HENT: Negative for congestion and rhinorrhea.    Eyes: Negative.    Respiratory: Negative for cough and shortness of breath.    Cardiovascular: Negative for chest pain and palpitations.   Gastrointestinal: Positive for constipation. Negative for abdominal distention, abdominal pain, blood in stool, nausea and vomiting.   Endocrine: Negative.    Genitourinary: Negative for difficulty urinating and dysuria.        [facility]   Musculoskeletal: Negative for back pain and neck  pain.   Skin: Negative.    Neurological: Negative for dizziness, light-headedness, numbness and headaches.   Psychiatric/Behavioral: Negative for agitation.     Objective:     Vital Signs (Most Recent):  Temp: 97.5 °F (36.4 °C) (06/15/19 1003)  Pulse: 66 (06/15/19 1633)  Resp: 18 (06/15/19 1003)  BP: (!) 150/77 (06/15/19 1614)  SpO2: 98 % (06/15/19 1633) Vital Signs (24h Range):  Temp:  [97.5 °F (36.4 °C)] 97.5 °F (36.4 °C)  Pulse:  [60-72] 66  Resp:  [18] 18  SpO2:  [95 %-100 %] 98 %  BP: (150-199)/() 150/77     Weight: 72.6 kg (160 lb)  Body mass index is 22.32 kg/m².    Physical Exam   Constitutional: He is oriented to person, place, and time. He appears well-developed and well-nourished. No distress.   HENT:   Head: Normocephalic and atraumatic.   Eyes: Conjunctivae are normal. Right eye exhibits no discharge. Left eye exhibits no discharge. No scleral icterus.   Neck: Normal range of motion. Neck supple.   Cardiovascular: Normal rate, regular rhythm and intact distal pulses.   Murmur heard.  Pulmonary/Chest: Effort normal and breath sounds normal. No respiratory distress. He has no wheezes.   Abdominal: Soft. Bowel sounds are normal. He exhibits no distension. There is no tenderness.   Musculoskeletal: Normal range of motion. He exhibits no edema or tenderness.   Neurological: He is alert and oriented to person, place, and time.   Skin: Skin is warm and dry. Capillary refill takes less than 2 seconds. He is not diaphoretic.   Psychiatric: He has a normal mood and affect.   Nursing note and vitals reviewed.          Significant Labs:   CBC:   Recent Labs   Lab 06/15/19  1103   WBC 6.73   HGB 11.3*   HCT 35.6*        CMP:   Recent Labs   Lab 06/15/19  1103      K 4.0      CO2 27   GLU 98   BUN 15   CREATININE 1.2   CALCIUM 8.5*   PROT 6.5   ALBUMIN 3.1*   BILITOT 0.4   ALKPHOS 54*   AST 15   ALT 7*   ANIONGAP 10   EGFRNONAA 56*     All pertinent labs within the past 24 hours have been  reviewed.    Significant Imaging: I have reviewed all pertinent imaging results/findings within the past 24 hours.   Imaging Results           CT Abdomen Pelvis With Contrast (Final result)  Result time 06/15/19 14:12:42    Final result by Murali Poon Jr., MD (06/15/19 14:12:42)                 Impression:      There is a right inguinal hernia containing small bowel.  There is obstruction of the more proximal small bowel with decompression of the small bowel distal to the hernia sac.  There is some fluid in the hernia sac.  Surgical consultation suggested.  No definite intramural air to confirm the presence of bowel infarct.    There is some fluid about the liver as well as some fluid in the scrotum.    Multiple hypodensities in the left kidney as discussed above.  Nonemergent ultrasound suggested    Incompletely visualized ground-glass opacity right middle lobe.  There are stable left lung nodules.    This report was flagged in Epic as abnormal.      Electronically signed by: Murali Poon MD  Date:    06/15/2019  Time:    14:12             Narrative:    EXAMINATION:  CT ABDOMEN PELVIS WITH CONTRAST    CLINICAL HISTORY:  Hernia, complicated;R inguinal;    TECHNIQUE:  Low dose axial images, sagittal and coronal reformations were obtained from the lung bases to the pubic symphysis following the IV administration of 75 mL of Omnipaque 350 oral contrast was not administered.    COMPARISON:  July 2017.    FINDINGS:  In the chest significant coronary artery calcification as well as calcification about the mitral annulus.  Aortic valvular calcifications present as well.  No pleural or pericardial fluid.  Incompletely visualized ground-glass opacity right middle lobe series 2, image 12.  Some atelectasis at the bases.  Left lingular nodule series 2, image 6 similar to prior of doubtful significance.  Stable 6 mm left lower lobe nodule series 2, image 11.    In the abdomen there is some fluid about the liver.  Liver  similar in the size and contour.  No focal mass lesions seen.  Gallbladder is not distended.  Pancreas and spleen are normal.  No adrenal masses.  Atrophic right kidney.  Nonobstructing 4 mm left lower pole calculus similar.  There are multiple hypodensities in the kidneys the largest of which are consistent with cysts.  Some hypodensities measure higher than simple fluid and may be hemorrhagic cyst as they are similar in size and configuration compared to the prior.  Nonemergent ultrasound could be obtained.    Calcified plaque in the aorta.  Significant calcification at the origin of the left renal artery.  No aneurysm.  No significant para-aortic or pelvic adenopathy.  Calcifications in the prostate.  Bladder appears similar.    Evaluation of the bowel demonstrates air and fluid distention of the proximal small bowel loops.  These loops extending into the right groin where there is an incarcerated right inguinal hernia.  There is small amount of fluid in the right inguinal hernia sac.  The distal limb of the small bowel is decompressed.  No definite intramural air or portal venous gas.  Remainder of the more distal bowel is decompressed.  There is stool and feces particular in the right colon.  More distal transverse and descending colon are relatively decompressed.    There is some fluid in the scrotum.  Small fat containing left inguinal hernia.    Bone windows demonstrate degenerative change.  No new lytic or blastic lesion.  Few sclerotic foci seen in the right ilium similar.

## 2019-06-15 NOTE — ED NOTES
Resting in bed with eyes open, respirations even and unlabored, in no acute distress. Reports 10/10 pain. Wife remains at bedside. On continuous pulse ox, BP cycling q 30.

## 2019-06-15 NOTE — ED NOTES
Pt rounding complete.  Pain 7/10, not requesting medication at this time.  Restroom and comfort needs addressed.  Pt updated on plan of care.  Call light within reach.  Will continue to monitor.  Visitor at bedside.

## 2019-06-15 NOTE — PLAN OF CARE
Problem: Adult Inpatient Plan of Care  Goal: Plan of Care Review  Outcome: Ongoing (interventions implemented as appropriate)  Pt A&Ox4 - resting comfortably - vital signs stable - eating and drinking adequately - evening medications administered - pt states he has minimal pain - remains free from falls - pt states he understands he will need to be NPO after midnight - spouse at bedside - no other complaints at this time - will continue to monitor

## 2019-06-16 VITALS
TEMPERATURE: 98 F | HEIGHT: 71 IN | RESPIRATION RATE: 18 BRPM | WEIGHT: 150.38 LBS | BODY MASS INDEX: 21.05 KG/M2 | DIASTOLIC BLOOD PRESSURE: 56 MMHG | HEART RATE: 68 BPM | SYSTOLIC BLOOD PRESSURE: 118 MMHG | OXYGEN SATURATION: 96 %

## 2019-06-16 PROCEDURE — 99217 PR OBSERVATION CARE DISCHARGE: CPT | Mod: ,,, | Performed by: PHYSICIAN ASSISTANT

## 2019-06-16 PROCEDURE — 99217 PR OBSERVATION CARE DISCHARGE: ICD-10-PCS | Mod: ,,, | Performed by: PHYSICIAN ASSISTANT

## 2019-06-16 PROCEDURE — 63600175 PHARM REV CODE 636 W HCPCS: Performed by: PHYSICIAN ASSISTANT

## 2019-06-16 PROCEDURE — 94761 N-INVAS EAR/PLS OXIMETRY MLT: CPT

## 2019-06-16 PROCEDURE — G0378 HOSPITAL OBSERVATION PER HR: HCPCS

## 2019-06-16 PROCEDURE — 25000003 PHARM REV CODE 250: Performed by: PHYSICIAN ASSISTANT

## 2019-06-16 RX ADMIN — AZATHIOPRINE 100 MG: 50 TABLET ORAL at 08:06

## 2019-06-16 RX ADMIN — TAMSULOSIN HYDROCHLORIDE 0.4 MG: 0.4 CAPSULE ORAL at 08:06

## 2019-06-16 RX ADMIN — PANTOPRAZOLE SODIUM 40 MG: 40 GRANULE, DELAYED RELEASE ORAL at 08:06

## 2019-06-16 RX ADMIN — LEVOTHYROXINE SODIUM 125 MCG: 25 TABLET ORAL at 07:06

## 2019-06-16 RX ADMIN — PREDNISONE 10 MG: 10 TABLET ORAL at 08:06

## 2019-06-16 RX ADMIN — VENLAFAXINE 37.5 MG: 37.5 TABLET ORAL at 08:06

## 2019-06-16 NOTE — HOSPITAL COURSE
80 y/o male admitted with right inguinal hernia successfully reduced by General Surgery. Patient was placed in Observation overnight, no changes. Surg recs for repair as outpatient on Tuesday. All questions answered, vitals stable, discharged home.

## 2019-06-16 NOTE — NURSING
Pt AOX4, NAD, VSS. Pt refused to wear SCDs while sleeping. Ambulated to bathroom independently. Denies pain of any type. Pt went to sleep around 2000 and slept between care. Bed low and locked. Call bell within reach. Will continue to monitor.

## 2019-06-16 NOTE — DISCHARGE SUMMARY
"[facility] Hospital Medicine  Discharge Summary      Patient Name: Ramesh Ricci  MRN: [id number]  Admission Date: 6/15/2019  Hospital Length of Stay: 0 days  Discharge Date and Time:  06/16/2019 10:10 AM  Attending Physician: Dayan att. providers found   Discharging Provider: Trinidad Patino PA-C  Primary Care Provider: EFREM Muinz MD      HPI:   78 y/o male with PMHx pAF (on apixaban), HTN, AS, TIA, Congenital absence of right kidney, CKDIII, Prostate CA, UC (on Imuran and prednisone), GERD presented to ED with c/o right sided inguinal hernia that "popped out" two days ago and he has been unable to push it back in due to pain. Reported he had a repair many years ago and the hernia has been popping in and out for a while now. Stated he feels "constipated and bloated" with the hernia as he usually has four to five non bloody bowel movements a day with his UC. Wife reported patient has not taken many of his medicines including his Eliquis since yesterday morning secondary to patient feeling "bad". Patient denied fevers, chills, headaches, dizziness, congestion, rhinorrhea, sore throat, cough, SOB, chest pain, abdominal pain, N/V/D, blood in stools, dysuria, hematuria, urinary frequency, or urinary urgency. In ED patient was seen seen by General surgery Hernia was reduced with moderate difficulty. Recommended admit and observation tonight.         * No surgery found *      Hospital Course:   78 y/o male admitted with right inguinal hernia successfully reduced by General Surgery. Patient was placed in Observation overnight, no changes. Surg recs for repair as outpatient on Tuesday. All questions answered, vitals stable, discharged home.            No new Assessment & Plan notes have been filed under this hospital service since the last note was generated.  Service: Hospital Medicine    Final Active Diagnoses:    Diagnosis Date Noted POA    PRINCIPAL PROBLEM:  Incarcerated right inguinal hernia " [K40.30] 06/15/2019 Yes    PAF (paroxysmal atrial fibrillation) [I48.0] 12/20/2018 Yes    Lung nodules [R91.8] 07/05/2017 Yes    CKD (chronic kidney disease) stage 3, GFR 30-59 ml/min [N18.3] 10/06/2014 Yes    UC (ulcerative colitis) [K51.90] 11/16/2012 Yes    Essential hypertension [I10] 11/16/2012 Yes    GERD (gastroesophageal reflux disease) [K21.9] 11/16/2012 Yes      Problems Resolved During this Admission:       Discharged Condition: stable    Disposition: Home or Self Care    Follow Up:  Follow-up Information     Tomer Ozuna MD.    Specialty:  General Surgery  Why:  surgery on Tuesday  Contact information:  [address]  [facility] [address] [phone]                 Patient Instructions:      Diet Cardiac     Notify your health care provider if you experience any of the following:  temperature >100.4     Notify your health care provider if you experience any of the following:  persistent nausea and vomiting or diarrhea     Notify your health care provider if you experience any of the following:  severe uncontrolled pain     Lifting restrictions   Order Comments: No heavy lifting >15 pounds     Activity as tolerated       Significant Diagnostic Studies: Labs:   CMP   Recent Labs   Lab 06/15/19  1103      K 4.0      CO2 27   GLU 98   BUN 15   CREATININE 1.2   CALCIUM 8.5*   PROT 6.5   ALBUMIN 3.1*   BILITOT 0.4   ALKPHOS 54*   AST 15   ALT 7*   ANIONGAP 10   ESTGFRAFRICA >60   EGFRNONAA 56*   , CBC   Recent Labs   Lab 06/15/19  1103   WBC 6.73   HGB 11.3*   HCT 35.6*       and All labs within the past 24 hours have been reviewed  Radiology:   Imaging Results           CT Abdomen Pelvis With Contrast (Final result)  Result time 06/15/19 14:12:42    Final result by Murali Poon Jr., MD (06/15/19 14:12:42)                 Impression:      There is a right inguinal hernia containing small bowel.  There is obstruction of the more proximal small bowel with decompression  of the small bowel distal to the hernia sac.  There is some fluid in the hernia sac.  Surgical consultation suggested.  No definite intramural air to confirm the presence of bowel infarct.    There is some fluid about the liver as well as some fluid in the scrotum.    Multiple hypodensities in the left kidney as discussed above.  Nonemergent ultrasound suggested    Incompletely visualized ground-glass opacity right middle lobe.  There are stable left lung nodules.    This report was flagged in Epic as abnormal.      Electronically signed by: Murali Poon MD  Date:    06/15/2019  Time:    14:12             Narrative:    EXAMINATION:  CT ABDOMEN PELVIS WITH CONTRAST    CLINICAL HISTORY:  Hernia, complicated;R inguinal;    TECHNIQUE:  Low dose axial images, sagittal and coronal reformations were obtained from the lung bases to the pubic symphysis following the IV administration of 75 mL of Omnipaque 350 oral contrast was not administered.    COMPARISON:  July 2017.    FINDINGS:  In the chest significant coronary artery calcification as well as calcification about the mitral annulus.  Aortic valvular calcifications present as well.  No pleural or pericardial fluid.  Incompletely visualized ground-glass opacity right middle lobe series 2, image 12.  Some atelectasis at the bases.  Left lingular nodule series 2, image 6 similar to prior of doubtful significance.  Stable 6 mm left lower lobe nodule series 2, image 11.    In the abdomen there is some fluid about the liver.  Liver similar in the size and contour.  No focal mass lesions seen.  Gallbladder is not distended.  Pancreas and spleen are normal.  No adrenal masses.  Atrophic right kidney.  Nonobstructing 4 mm left lower pole calculus similar.  There are multiple hypodensities in the kidneys the largest of which are consistent with cysts.  Some hypodensities measure higher than simple fluid and may be hemorrhagic cyst as they are similar in size and configuration  compared to the prior.  Nonemergent ultrasound could be obtained.    Calcified plaque in the aorta.  Significant calcification at the origin of the left renal artery.  No aneurysm.  No significant para-aortic or pelvic adenopathy.  Calcifications in the prostate.  Bladder appears similar.    Evaluation of the bowel demonstrates air and fluid distention of the proximal small bowel loops.  These loops extending into the right groin where there is an incarcerated right inguinal hernia.  There is small amount of fluid in the right inguinal hernia sac.  The distal limb of the small bowel is decompressed.  No definite intramural air or portal venous gas.  Remainder of the more distal bowel is decompressed.  There is stool and feces particular in the right colon.  More distal transverse and descending colon are relatively decompressed.    There is some fluid in the scrotum.  Small fat containing left inguinal hernia.    Bone windows demonstrate degenerative change.  No new lytic or blastic lesion.  Few sclerotic foci seen in the right ilium similar.                                  Pending Diagnostic Studies:     None         Medications:  Reconciled Home Medications:      Medication List      CHANGE how you take these medications    apixaban 2.5 mg Tab  Commonly known as:  ELIQUIS  Take 1 tablet (2.5 mg total) by mouth 2 (two) times daily. HOLD ELIQUIS  UNTIL AFTER SURGERY  What changed:  additional instructions        CONTINUE taking these medications    aspirin 81 MG EC tablet  Commonly known as:  ECOTRIN  Take 81 mg by mouth once daily.     azaTHIOprine 50 mg Tab  Commonly known as:  IMURAN  Take 100 mg by mouth once daily.     calcium-vitamin D3 500 mg(1,250mg) -200 unit per tablet  Commonly known as:  OS-RIGOBERTO 500 + D3  Take 1 tablet by mouth once daily.     irbesartan 150 MG tablet  Commonly known as:  AVAPRO  Take 1 tablet (150 mg total) by mouth every evening.     levothyroxine 125 MCG tablet  Commonly known as:   SYNTHROID  TAKE 1 TABLET BY MOUTH ONCE DAILY IN THE MORNING     M-VIT 27-1 mg Tab  Generic drug:  prenatal vits-iron fum-folic  Take by mouth.     omeprazole 20 mg Tbld  Take 40 mg by mouth once daily.     polyethylene glycol 17 gram Pwpk  Commonly known as:  GLYCOLAX  Take 17 g by mouth nightly as needed.     pravastatin 20 MG tablet  Commonly known as:  PRAVACHOL  Take 1 tablet (20 mg total) by mouth every evening.     predniSONE 10 MG tablet  Commonly known as:  DELTASONE  Take 10 mg by mouth once daily.     tamsulosin 0.4 mg Cap  Commonly known as:  FLOMAX  Take 0.4 mg by mouth once daily.     venlafaxine 37.5 MG Tab  Commonly known as:  EFFEXOR  TAKE 1 TABLET BY MOUTH TWICE DAILY     VITAMIN C 500 MG tablet  Generic drug:  ascorbic acid (vitamin C)  Take 500 mg by mouth once daily.            Indwelling Lines/Drains at time of discharge:   Lines/Drains/Airways          None          Time spent on the discharge of patient: <30 minutes  Patient was seen and examined on the date of discharge and determined to be suitable for discharge.         Trinidad Patino PA-C  Department of Hospital Medicine  [facility]

## 2019-06-16 NOTE — NURSING
Pt AAOx4, NAD noted. Pt complained of mild abdominal discomfort. Pt remained NPO. Vital signs monitored and stable. Pt remained on cardiac monitor. Discharge instructions reviewed with pt and pt's wife, pt verbalized understanding. Iv removed per order, cath intact. Pt walked off of unit without difficulty.

## 2019-06-17 ENCOUNTER — OFFICE VISIT (OUTPATIENT)
Dept: INTERNAL MEDICINE | Facility: CLINIC | Age: 79
End: 2019-06-17
Attending: INTERNAL MEDICINE
Payer: MEDICARE

## 2019-06-17 ENCOUNTER — HOSPITAL ENCOUNTER (OUTPATIENT)
Dept: PREADMISSION TESTING | Facility: OTHER | Age: 79
Discharge: HOME | End: 2019-06-17
Attending: SPECIALIST
Payer: MEDICARE

## 2019-06-17 ENCOUNTER — ANESTHESIA EVENT (OUTPATIENT)
Dept: SURGERY | Facility: OTHER | Age: 79
End: 2019-06-17
Payer: MEDICARE

## 2019-06-17 VITALS
DIASTOLIC BLOOD PRESSURE: 60 MMHG | HEIGHT: 71 IN | WEIGHT: 152 LBS | HEART RATE: 70 BPM | SYSTOLIC BLOOD PRESSURE: 100 MMHG | OXYGEN SATURATION: 99 % | BODY MASS INDEX: 21.28 KG/M2

## 2019-06-17 VITALS
BODY MASS INDEX: 21.28 KG/M2 | HEART RATE: 68 BPM | HEIGHT: 71 IN | OXYGEN SATURATION: 98 % | SYSTOLIC BLOOD PRESSURE: 106 MMHG | WEIGHT: 152 LBS | DIASTOLIC BLOOD PRESSURE: 52 MMHG | TEMPERATURE: 97 F

## 2019-06-17 DIAGNOSIS — E03.9 ACQUIRED HYPOTHYROIDISM: ICD-10-CM

## 2019-06-17 DIAGNOSIS — K40.90 RIGHT INGUINAL HERNIA: ICD-10-CM

## 2019-06-17 DIAGNOSIS — K51.919 ULCERATIVE COLITIS WITH COMPLICATION, UNSPECIFIED LOCATION: ICD-10-CM

## 2019-06-17 DIAGNOSIS — I10 ESSENTIAL HYPERTENSION: Primary | ICD-10-CM

## 2019-06-17 DIAGNOSIS — I48.0 PAF (PAROXYSMAL ATRIAL FIBRILLATION): ICD-10-CM

## 2019-06-17 PROCEDURE — 99214 PR OFFICE/OUTPT VISIT, EST, LEVL IV, 30-39 MIN: ICD-10-PCS | Mod: S$GLB,,, | Performed by: INTERNAL MEDICINE

## 2019-06-17 PROCEDURE — 3074F SYST BP LT 130 MM HG: CPT | Mod: CPTII,S$GLB,, | Performed by: INTERNAL MEDICINE

## 2019-06-17 PROCEDURE — 99214 OFFICE O/P EST MOD 30 MIN: CPT | Mod: S$GLB,,, | Performed by: INTERNAL MEDICINE

## 2019-06-17 PROCEDURE — 3074F PR MOST RECENT SYSTOLIC BLOOD PRESSURE < 130 MM HG: ICD-10-PCS | Mod: CPTII,S$GLB,, | Performed by: INTERNAL MEDICINE

## 2019-06-17 PROCEDURE — 3078F PR MOST RECENT DIASTOLIC BLOOD PRESSURE < 80 MM HG: ICD-10-PCS | Mod: CPTII,S$GLB,, | Performed by: INTERNAL MEDICINE

## 2019-06-17 PROCEDURE — 3078F DIAST BP <80 MM HG: CPT | Mod: CPTII,S$GLB,, | Performed by: INTERNAL MEDICINE

## 2019-06-17 RX ORDER — LORAZEPAM 0.5 MG/1
TABLET ORAL
Qty: 30 TABLET | Refills: 0 | Status: SHIPPED | OUTPATIENT
Start: 2019-06-17 | End: 2019-10-08 | Stop reason: SDUPTHER

## 2019-06-17 RX ORDER — CALCIUM CARBONATE 200(500)MG
2 TABLET,CHEWABLE ORAL NIGHTLY
Status: ON HOLD | COMMUNITY
End: 2021-10-29 | Stop reason: HOSPADM

## 2019-06-17 RX ORDER — SODIUM CHLORIDE, SODIUM LACTATE, POTASSIUM CHLORIDE, CALCIUM CHLORIDE 600; 310; 30; 20 MG/100ML; MG/100ML; MG/100ML; MG/100ML
INJECTION, SOLUTION INTRAVENOUS CONTINUOUS
Status: CANCELLED | OUTPATIENT
Start: 2019-06-17

## 2019-06-17 RX ORDER — FERROUS SULFATE 324(65)MG
325 TABLET, DELAYED RELEASE (ENTERIC COATED) ORAL 2 TIMES DAILY
COMMUNITY
End: 2022-08-11

## 2019-06-17 RX ORDER — MESALAMINE 400 MG/1
2 CAPSULE, DELAYED RELEASE ORAL 2 TIMES DAILY
Refills: 11 | COMMUNITY
Start: 2019-05-01 | End: 2020-01-15

## 2019-06-17 NOTE — PROGRESS NOTES
Subjective:       Patient ID: Ramesh Ricci is a 79 y.o. male.    Chief Complaint: Follow-up (hernia surg. 6/18/19); Weight Loss (not trying); Headache; Insomnia (requesting Ativan ); Leg Problem (bilat. cramping in both legs and ankles); and Hypertension     He has had a right inguinal hernia repair years ago.  He has noticed over the past year patient only hernia will come out and he is able to push it back in.  However 2 days ago cardiac came out and he was not able to reduce it.  He went to the emergency room where it was reduced with significant effort by Dr. Ozuna.  He will have hernia repair tomorrow.    He has noticed an increase in bowel movements lately to 3-4 times per day which are loose but with no blood.  He has recently seen Dr. Fraga for this.        Review of Systems   Constitutional: Negative.    Respiratory: Negative.    Cardiovascular: Negative.    Gastrointestinal: Positive for diarrhea.       Objective:      Physical Exam   Constitutional: He appears well-developed and well-nourished.   HENT:   Head: Normocephalic and atraumatic.   Eyes: Pupils are equal, round, and reactive to light.   Cardiovascular: Normal rate and regular rhythm.  Occasional extrasystoles are present.   Murmur heard.   Systolic murmur is present with a grade of 3/6.  @RUSB   Pulmonary/Chest: Effort normal.   Musculoskeletal: He exhibits no edema.   Neurological: He is alert.       Assessment:       1. Essential hypertension    2. Ulcerative colitis with complication, unspecified location    3. Acquired hypothyroidism    4. PAF (paroxysmal atrial fibrillation)    5. Right inguinal hernia        Plan:       Per orders and D/C instructions.  Continue meds/diet for P A fib, HTN, hypothyroid, and UC, which are stable.     Surgery tomorrow for right IH.

## 2019-06-17 NOTE — DISCHARGE INSTRUCTIONS
PRE-ADMIT TESTING -  [phone]    [address]  [place] [facility]          Your surgery has been scheduled at [facility]. We are pleased to have the opportunity to serve you. For Further Information please call [phone].    On the day of surgery please report to the Information Desk on the 1st floor.    · CONTACT YOUR PHYSICIAN'S OFFICE THE DAY PRIOR TO YOUR SURGERY TO OBTAIN YOUR ARRIVAL TIME.     · The evening before surgery do not eat anything after 9 p.m. ( this includes hard candy, chewing gum and mints).  You may only have GATORADE, POWERADE AND WATER  from 9 p.m. until you leave your home.   DO NOT DRINK ANY LIQUIDS ON THE WAY TO THE HOSPITAL.      SPECIAL MEDICATION INSTRUCTIONS: TAKE medications checked off by the Anesthesiologist on your Medication List.    Angiogram Patients: Take medications as instructed by your physician, including aspirin.     Surgery Patients:    If you take ASPIRIN - Your PHYSICIAN/SURGEON will need to inform you IF/OR when you need to stop taking aspirin prior to your surgery.     Do Not take any medications containing IBUPROFEN.  Do Not Wear any make-up or dark nail polish   (especially eye make-up) to surgery. If you come to surgery with makeup on you will be required to remove the makeup or nail polish.    Do not shave your surgical area at least 5 days prior to your surgery. The surgical prep will be performed at the hospital according to Infection Control regulations.    Leave all valuables at home.   Do Not wear any jewelry or watches, including any metal in body piercings. Jewelry must be removed prior to coming to the hospital.  There is a possibility that rings that are unable to be removed may be cut off if they are on the surgical extremity.    Contact Lens must be removed before surgery. Either do not wear the contact lens or bring a case and solution for storage.  Please bring a container for eyeglasses or dentures as required.  Bring  any paperwork your physician has provided, such as consent forms,  history and physicals, doctor's orders, etc.   Bring comfortable clothes that are loose fitting to wear upon discharge. Take into consideration the type of surgery being performed.  Maintain your diet as advised per your physician the day prior to surgery.      Adequate rest the night before surgery is advised.   Park in the Parking lot behind the hospital or in the [place] [facility] across the street from the parking lot. Parking is complimentary.  If you will be discharged the same day as your procedure, please arrange for a responsible adult to drive you home or to accompany you if traveling by taxi.   YOU WILL NOT BE PERMITTED TO DRIVE OR TO LEAVE THE HOSPITAL ALONE AFTER SURGERY.   It is strongly recommended that you arrange for someone to remain with you for the first 24 hrs following your surgery.    The Surgeon will speak to your family/visitor after your surgery regarding the outcome of your surgery and post op care.  The Surgeon may speak to you after your surgery, but there is a possibility you may not remember the details.  Please check with your family members regarding the conversation with the Surgeon.      Thank you for your cooperation.  The Staff of [facility].                Bathing Instructions with Hibiclens     Shower the evening before and morning of your procedure with Hibiclens:   Wash your face with water and your regular face wash/soap   Apply Hibiclens directly on your skin or on a wet washcloth and wash gently. When showering: Move away from the shower stream when applying Hibiclens to avoid rinsing off too soon.   Rinse thoroughly with warm water   Do not dilute Hibiclens         Dry off as usual, do not use any deodorant, powder, body lotions, perfume, after shave or cologne.

## 2019-06-17 NOTE — ANESTHESIA PREPROCEDURE EVALUATION
06/17/2019  Ramesh Ricci is a 79 y.o., male.    Anesthesia Evaluation    I have reviewed the Patient Summary Reports.    I have reviewed the Nursing Notes.   I have reviewed the Medications.     Review of Systems  Anesthesia Hx:  No problems with previous Anesthesia  Denies Family Hx of Anesthesia complications.   Denies Personal Hx of Anesthesia complications.   Social:  Non-Smoker    Hematology/Oncology:  Hematology Normal   Oncology Normal     EENT/Dental:EENT/Dental Normal   Cardiovascular:   Exercise tolerance: good Hypertension CAD  Dysrhythmias atrial fibrillation    Pulmonary:  Pulmonary Normal    Renal/:   Chronic Renal Disease, CRI    Hepatic/GI:   PUD, GERD, well controlled Ulcerative colitis   Musculoskeletal:  Musculoskeletal Normal    Neurological:   TIA, Denies CVA. Headaches TIA 1.5 years ago   Endocrine:   Hypothyroidism    Dermatological:  Skin Normal    Psych:  Psychiatric Normal           Physical Exam  General:  Well nourished    Airway/Jaw/Neck:  Airway Findings: Mouth Opening: Normal Tongue: Normal  General Airway Assessment: Adult  Mallampati: I  TM Distance: Normal, at least 6 cm  Jaw/Neck Findings:  Neck ROM: Normal ROM      Dental:  Dental Findings: In tact             Anesthesia Plan  Type of Anesthesia, risks & benefits discussed:  Anesthesia Type:  general  Patient's Preference:   Intra-op Monitoring Plan: standard ASA monitors  Intra-op Monitoring Plan Comments:   Post Op Pain Control Plan: multimodal analgesia  Post Op Pain Control Plan Comments:   Induction:   IV  Beta Blocker:         Informed Consent: Patient understands risks and agrees with Anesthesia plan.  Questions answered. Anesthesia consent signed with patient.  ASA Score: 3     Day of Surgery Review of History & Physical:    H&P update referred to the surgeon.     Anesthesia Plan Notes: Labs in Epic, EKG  "under "elsewhere"        Ready For Surgery From Anesthesia Perspective.       "

## 2019-06-18 ENCOUNTER — HOSPITAL ENCOUNTER (OUTPATIENT)
Facility: OTHER | Age: 79
Discharge: HOME | End: 2019-06-18
Attending: SPECIALIST | Admitting: SPECIALIST
Payer: MEDICARE

## 2019-06-18 ENCOUNTER — ANESTHESIA (OUTPATIENT)
Dept: SURGERY | Facility: OTHER | Age: 79
End: 2019-06-18
Payer: MEDICARE

## 2019-06-18 VITALS
HEART RATE: 69 BPM | HEIGHT: 71 IN | DIASTOLIC BLOOD PRESSURE: 71 MMHG | RESPIRATION RATE: 16 BRPM | TEMPERATURE: 98 F | BODY MASS INDEX: 21.28 KG/M2 | OXYGEN SATURATION: 99 % | SYSTOLIC BLOOD PRESSURE: 135 MMHG | WEIGHT: 152 LBS

## 2019-06-18 DIAGNOSIS — K40.40: ICD-10-CM

## 2019-06-18 DIAGNOSIS — K40.40 INGUINAL HERNIA WITH GANGRENE, RECURRENCE NOT SPECIFIED, UNSPECIFIED LATERALITY: Primary | ICD-10-CM

## 2019-06-18 PROCEDURE — 71000016 HC POSTOP RECOV ADDL HR: Performed by: SPECIALIST

## 2019-06-18 PROCEDURE — 63600175 PHARM REV CODE 636 W HCPCS: Performed by: NURSE ANESTHETIST, CERTIFIED REGISTERED

## 2019-06-18 PROCEDURE — 36000707: Performed by: SPECIALIST

## 2019-06-18 PROCEDURE — 71000033 HC RECOVERY, INTIAL HOUR: Performed by: SPECIALIST

## 2019-06-18 PROCEDURE — 37000009 HC ANESTHESIA EA ADD 15 MINS: Performed by: SPECIALIST

## 2019-06-18 PROCEDURE — 25000003 PHARM REV CODE 250: Performed by: ANESTHESIOLOGY

## 2019-06-18 PROCEDURE — 88302 TISSUE SPECIMEN TO PATHOLOGY - SURGERY: ICD-10-PCS | Mod: 26,,, | Performed by: PATHOLOGY

## 2019-06-18 PROCEDURE — 37000008 HC ANESTHESIA 1ST 15 MINUTES: Performed by: SPECIALIST

## 2019-06-18 PROCEDURE — 63600175 PHARM REV CODE 636 W HCPCS: Performed by: SPECIALIST

## 2019-06-18 PROCEDURE — C1781 MESH (IMPLANTABLE): HCPCS | Performed by: SPECIALIST

## 2019-06-18 PROCEDURE — 25000003 PHARM REV CODE 250: Performed by: NURSE ANESTHETIST, CERTIFIED REGISTERED

## 2019-06-18 PROCEDURE — 36000706: Performed by: SPECIALIST

## 2019-06-18 PROCEDURE — 88302 TISSUE EXAM BY PATHOLOGIST: CPT | Performed by: PATHOLOGY

## 2019-06-18 PROCEDURE — 71000015 HC POSTOP RECOV 1ST HR: Performed by: SPECIALIST

## 2019-06-18 PROCEDURE — 88302 TISSUE EXAM BY PATHOLOGIST: CPT | Mod: 26,,, | Performed by: PATHOLOGY

## 2019-06-18 DEVICE — MESH POLY KNIT PERFIX PLG MED: Type: IMPLANTABLE DEVICE | Site: INGUINAL | Status: FUNCTIONAL

## 2019-06-18 RX ORDER — ONDANSETRON 2 MG/ML
4 INJECTION INTRAMUSCULAR; INTRAVENOUS DAILY PRN
Status: DISCONTINUED | OUTPATIENT
Start: 2019-06-18 | End: 2019-06-18 | Stop reason: HOSPADM

## 2019-06-18 RX ORDER — SODIUM CHLORIDE, SODIUM LACTATE, POTASSIUM CHLORIDE, CALCIUM CHLORIDE 600; 310; 30; 20 MG/100ML; MG/100ML; MG/100ML; MG/100ML
INJECTION, SOLUTION INTRAVENOUS CONTINUOUS
Status: DISCONTINUED | OUTPATIENT
Start: 2019-06-18 | End: 2019-06-18 | Stop reason: HOSPADM

## 2019-06-18 RX ORDER — FENTANYL CITRATE 50 UG/ML
INJECTION, SOLUTION INTRAMUSCULAR; INTRAVENOUS
Status: DISCONTINUED | OUTPATIENT
Start: 2019-06-18 | End: 2019-06-18

## 2019-06-18 RX ORDER — MEPERIDINE HYDROCHLORIDE 25 MG/ML
12.5 INJECTION INTRAMUSCULAR; INTRAVENOUS; SUBCUTANEOUS ONCE AS NEEDED
Status: DISCONTINUED | OUTPATIENT
Start: 2019-06-18 | End: 2019-06-18 | Stop reason: HOSPADM

## 2019-06-18 RX ORDER — PROPOFOL 10 MG/ML
VIAL (ML) INTRAVENOUS
Status: DISCONTINUED | OUTPATIENT
Start: 2019-06-18 | End: 2019-06-18

## 2019-06-18 RX ORDER — LIDOCAINE HCL/PF 100 MG/5ML
SYRINGE (ML) INTRAVENOUS
Status: DISCONTINUED | OUTPATIENT
Start: 2019-06-18 | End: 2019-06-18

## 2019-06-18 RX ORDER — ROCURONIUM BROMIDE 10 MG/ML
INJECTION, SOLUTION INTRAVENOUS
Status: DISCONTINUED | OUTPATIENT
Start: 2019-06-18 | End: 2019-06-18

## 2019-06-18 RX ORDER — CEFAZOLIN SODIUM 1 G/3ML
1 INJECTION, POWDER, FOR SOLUTION INTRAMUSCULAR; INTRAVENOUS
Status: COMPLETED | OUTPATIENT
Start: 2019-06-18 | End: 2019-06-18

## 2019-06-18 RX ORDER — OXYCODONE HYDROCHLORIDE 5 MG/1
5 TABLET ORAL
Status: DISCONTINUED | OUTPATIENT
Start: 2019-06-18 | End: 2019-06-18 | Stop reason: HOSPADM

## 2019-06-18 RX ORDER — EPHEDRINE SULFATE 50 MG/ML
INJECTION, SOLUTION INTRAVENOUS
Status: DISCONTINUED | OUTPATIENT
Start: 2019-06-18 | End: 2019-06-18

## 2019-06-18 RX ORDER — SODIUM CHLORIDE 0.9 % (FLUSH) 0.9 %
3 SYRINGE (ML) INJECTION
Status: DISCONTINUED | OUTPATIENT
Start: 2019-06-18 | End: 2019-06-18 | Stop reason: HOSPADM

## 2019-06-18 RX ORDER — ONDANSETRON 2 MG/ML
INJECTION INTRAMUSCULAR; INTRAVENOUS
Status: DISCONTINUED | OUTPATIENT
Start: 2019-06-18 | End: 2019-06-18

## 2019-06-18 RX ORDER — HYDROCODONE BITARTRATE AND ACETAMINOPHEN 5; 325 MG/1; MG/1
TABLET ORAL
Qty: 20 TABLET | Refills: 0 | Status: SHIPPED | OUTPATIENT
Start: 2019-06-18 | End: 2019-09-17

## 2019-06-18 RX ORDER — HYDROMORPHONE HYDROCHLORIDE 2 MG/ML
0.4 INJECTION, SOLUTION INTRAMUSCULAR; INTRAVENOUS; SUBCUTANEOUS EVERY 5 MIN PRN
Status: DISCONTINUED | OUTPATIENT
Start: 2019-06-18 | End: 2019-06-18 | Stop reason: HOSPADM

## 2019-06-18 RX ORDER — IRBESARTAN 75 MG/1
75 TABLET ORAL DAILY
COMMUNITY
End: 2019-09-17

## 2019-06-18 RX ORDER — SODIUM CHLORIDE 9 MG/ML
INJECTION, SOLUTION INTRAVENOUS CONTINUOUS
Status: DISCONTINUED | OUTPATIENT
Start: 2019-06-18 | End: 2019-06-18 | Stop reason: HOSPADM

## 2019-06-18 RX ADMIN — ONDANSETRON 4 MG: 2 INJECTION INTRAMUSCULAR; INTRAVENOUS at 10:06

## 2019-06-18 RX ADMIN — PROPOFOL 200 MG: 10 INJECTION, EMULSION INTRAVENOUS at 10:06

## 2019-06-18 RX ADMIN — SODIUM CHLORIDE, SODIUM LACTATE, POTASSIUM CHLORIDE, AND CALCIUM CHLORIDE: 600; 310; 30; 20 INJECTION, SOLUTION INTRAVENOUS at 09:06

## 2019-06-18 RX ADMIN — CALCIUM CHLORIDE 0.25 G: 100 INJECTION, SOLUTION INTRAVENOUS at 10:06

## 2019-06-18 RX ADMIN — EPHEDRINE SULFATE 10 MG: 50 INJECTION INTRAMUSCULAR; INTRAVENOUS; SUBCUTANEOUS at 10:06

## 2019-06-18 RX ADMIN — OXYCODONE HYDROCHLORIDE 5 MG: 5 TABLET ORAL at 11:06

## 2019-06-18 RX ADMIN — CALCIUM CHLORIDE 0.5 G: 100 INJECTION, SOLUTION INTRAVENOUS at 10:06

## 2019-06-18 RX ADMIN — CEFAZOLIN 1 G: 330 INJECTION, POWDER, FOR SOLUTION INTRAMUSCULAR; INTRAVENOUS at 10:06

## 2019-06-18 RX ADMIN — FENTANYL CITRATE 50 MCG: 50 INJECTION, SOLUTION INTRAMUSCULAR; INTRAVENOUS at 11:06

## 2019-06-18 RX ADMIN — LIDOCAINE HYDROCHLORIDE 50 MG: 20 INJECTION, SOLUTION INTRAVENOUS at 10:06

## 2019-06-18 RX ADMIN — FENTANYL CITRATE 50 MCG: 50 INJECTION, SOLUTION INTRAMUSCULAR; INTRAVENOUS at 10:06

## 2019-06-18 RX ADMIN — ROCURONIUM BROMIDE 40 MG: 10 INJECTION, SOLUTION INTRAVENOUS at 10:06

## 2019-06-18 NOTE — PLAN OF CARE
Ramesh Ricci has met all discharge criteria from Phase II. Vital Signs are stable, ambulating  without difficulty. Discharge instructions given, patient verbalized understanding. Discharged from facility via wheelchair in stable condition.

## 2019-06-18 NOTE — OP NOTE
[facility]  Surgery Department  Operative Note    SUMMARY     Patient: Ramesh Ricci    Medical Record: [id number]    Date of Procedure: 6/18/2019     Surgeon: Surgeon(s) and Role:     * Tomer Ozuna MD - Primary    Assisting Surgeon: None    Pre-Operative Diagnosis: Inguinal hernia with gangrene, recurrence not specified, unspecified laterality [K40.40]    Post-Operative Diagnosis: Post-Op Diagnosis Codes:     * Inguinal hernia with gangrene, recurrence not specified, unspecified laterality [K40.40]    Procedure: Procedure(s) (LRB):  REPAIR, HERNIA, INGUINAL, WITHOUT HISTORY OF PRIOR REPAIR, AGE 3 YEARS OR OLDER (Right)    Procedure in Detail:  Incarcerated recurrent right inguinal hernia    The patient was taken to the operating room and placed on table supine position.  After smooth induction with general anesthesia the right groin was prepped and draped sterile fashion. An incision was made above and parallel to the inguinal ligament carried down to the aponeurosis of the external oblique.  The cord structures were freed up from the direct hernia. The hernia sac was incarcerated at the inguinal floor.  The defect was enlarged slightly and this allowed reduction of the direct hernia sac. A medium Marlex plug was then placed and sutured using 3 0 Vicryl. A Marlex patch was then placed and sutured to the inguinal ligament inferiorly and the aponeurosis of the internal oblique superiorly.  The cord structures were brought through a slit in the lateral aspect of the mesh.  The aponeurosis of the external oblique was then reapproximated using 3 0 Vicryl. The wound was closed using 3 0 Vicryl followed by 4 0 Vicryl. Steri-Strips and a sterile dressing were applied.  Blood loss was minimal.  Patient tolerated the procedure and left the operating room in stable condition.    Discharge Note    SUMMARY     Admit Date: 6/18/2019    Discharge Date and Time:  06/18/2019 11:22 AM    [facility]  Course (synopsis of major diagnoses, care, treatment, and services provided during the course of the hospital stay): treated     Final Diagnosis: Post-Op Diagnosis Codes:     * Inguinal hernia with gangrene, recurrence not specified, unspecified laterality [K40.40]    Disposition: Home or Self Care    Follow Up/Patient Instructions:     Medications:  Reconciled Home Medications:      Medication List      START taking these medications    HYDROcodone-acetaminophen 5-325 mg per tablet  Commonly known as:  NORCO  Take 1 or 2 tabs every 4 hours as needed.        CHANGE how you take these medications    apixaban 2.5 mg Tab  Commonly known as:  ELIQUIS  Take 1 tablet (2.5 mg total) by mouth 2 (two) times daily. HOLD ELIQUIS  UNTIL AFTER SURGERY  What changed:  additional instructions     omeprazole 20 mg Tbld  Take 40 mg by mouth once daily.  What changed:  how much to take        CONTINUE taking these medications    aspirin 81 MG EC tablet  Commonly known as:  ECOTRIN  Take 81 mg by mouth once daily.     azaTHIOprine 50 mg Tab  Commonly known as:  IMURAN  Take 100 mg by mouth once daily.     calcium carbonate 200 mg calcium (500 mg) chewable tablet  Commonly known as:  TUMS  Take 2 tablets by mouth every evening.     DELZICOL 400 mg Cdti cdti capsule  Generic drug:  mesalamine  Take 2 tablets by mouth 2 (two) times daily.     ferrous sulfate 324 mg (65 mg iron) Tbec  Take 325 mg by mouth once daily.     irbesartan 75 MG tablet  Commonly known as:  AVAPRO  Take 75 mg by mouth once daily.     levothyroxine 125 MCG tablet  Commonly known as:  SYNTHROID  TAKE 1 TABLET BY MOUTH ONCE DAILY IN THE MORNING     LORazepam 0.5 MG tablet  Commonly known as:  ATIVAN  1/2-1 tab nightly as needed.     polyethylene glycol 17 gram Pwpk  Commonly known as:  GLYCOLAX  Take 17 g by mouth nightly as needed.     pravastatin 20 MG tablet  Commonly known as:  PRAVACHOL  Take 1 tablet (20 mg total) by mouth every evening.     predniSONE 10 MG  tablet  Commonly known as:  DELTASONE  Take 10 mg by mouth once daily.     tamsulosin 0.4 mg Cap  Commonly known as:  FLOMAX  Take 0.4 mg by mouth once daily.     venlafaxine 37.5 MG Tab  Commonly known as:  EFFEXOR  TAKE 1 TABLET BY MOUTH TWICE DAILY     VITAMIN C 500 MG tablet  Generic drug:  ascorbic acid (vitamin C)  Take 500 mg by mouth once daily.          Discharge Procedure Orders   Diet general     Call MD for:  redness, tenderness, or signs of infection (pain, swelling, redness, odor or green/yellow discharge around incision site)     Remove dressing in 48 hours     Ice to affected area     Lifting restrictions     Shower on day dressing removed (No bath)   Order Comments: You may shower on the 2nd day following your surgery.     Activity as tolerated     Follow-up Information     Tomer Ozuna MD In 2 weeks.    Specialty:  General Surgery  Contact information:  [address]  [facility] [id number] [phone]

## 2019-06-18 NOTE — INTERVAL H&P NOTE
The patient has been examined and the H&P has been reviewed:    I concur with the findings and no changes have occurred since H&P was written.    Anesthesia/Surgery risks, benefits and alternative options discussed and understood by patient/family.          Active Hospital Problems    Diagnosis  POA    Inguinal hernia with gangrene [K40.40]  Yes      Resolved Hospital Problems   No resolved problems to display.

## 2019-06-18 NOTE — ANESTHESIA POSTPROCEDURE EVALUATION
Anesthesia Post Evaluation    Patient: Ramesh Ricci    Procedure(s) Performed: Procedure(s) (LRB):  REPAIR, HERNIA, INGUINAL, WITHOUT HISTORY OF PRIOR REPAIR, AGE 3 YEARS OR OLDER (Right)    Final Anesthesia Type: general  Patient location during evaluation: PACU  Patient participation: Yes- Able to Participate  Level of consciousness: awake and alert  Post-procedure vital signs: reviewed and stable  Pain management: adequate  Airway patency: patent  PONV status at discharge: No PONV  Anesthetic complications: no      Cardiovascular status: blood pressure returned to baseline  Respiratory status: unassisted, spontaneous ventilation and room air  Hydration status: euvolemic  Follow-up not needed.          Vitals Value Taken Time   /66 6/18/2019 12:00 PM   Temp 36.5 °C (97.7 °F) 6/18/2019 12:00 PM   Pulse 70 6/18/2019 12:00 PM   Resp 16 6/18/2019 12:00 PM   SpO2 93 % 6/18/2019 12:00 PM         Event Time     Out of Recovery 11:53:32          Pain/Ynr Score: Pain Rating Prior to Med Admin: 4 (6/18/2019 11:30 AM)  Yrn Score: 10 (6/18/2019 11:45 AM)

## 2019-06-18 NOTE — TRANSFER OF CARE
"Anesthesia Transfer of Care Note    Patient: Ramesh Ricci    Procedure(s) Performed: Procedure(s) (LRB):  REPAIR, HERNIA, INGUINAL, WITHOUT HISTORY OF PRIOR REPAIR, AGE 5 YEARS OR OLDER (Right)    Patient location: PACU    Anesthesia Type: general    Transport from OR: Transported from OR on 2-3 L/min O2 by NC with adequate spontaneous ventilation    Post pain: adequate analgesia    Post assessment: no apparent anesthetic complications    Post vital signs: stable    Level of consciousness: awake, alert and oriented    Nausea/Vomiting: no nausea/vomiting    Complications: none          Last vitals:   Visit Vitals  BP (!) 147/71 (BP Location: Right arm, Patient Position: Lying)   Pulse 63   Temp 36.5 °C (97.7 °F) (Oral)   Resp 16   Ht 5' 11" (1.803 m)   Wt 68.9 kg (152 lb)   SpO2 99%   BMI 21.20 kg/m²     "

## 2019-06-18 NOTE — DISCHARGE INSTRUCTIONS
After Your Inguinal Hernia Repair    Home care  · Keep in mind that some swelling in the area of treatment is normal during the first few days after surgery.  · Take your pain medicines as needed. After 2 days, you should be in little or no pain.  · Eat or drink as usual.  · Wear loose, comfortable clothing instead of tight clothing.   · Dont pull off the strips of tape that are used to close your chils wound. These should come off on their own in a week or so. If the strips are still in place after 10 days, you may remove them. If surgical glue was used, it will peel off on its own in 5 to 10 days.  · For the first 3 days after surgery, take sponge baths only. After this, you can bathe or shower as usual.    When to call the healthcare provider   Call the healthcare provider right away if you have any of the following:  · Signs of infection in the incision such as redness, fluid, warmth, pain  · Trouble urinating  · Fever of 100.4°F (38°C) or higher, or as directed by your healthcare provider  · Vomiting or nausea that doesnt stop  · Swelling of the scrotum that gets worse  · No bowel movement in 3 days  · Abdominal pain that doesnt get better, or gets worse     Anesthesia: After Your Surgery  Youve just had surgery. During surgery, you received medication called anesthesia to keep you comfortable and pain-free. After surgery, you may experience some pain or nausea. This is common. Here are some tips for feeling better and recovering after surgery.    Going home  Your doctor or nurse will show you how to take care of yourself when you go home. He or she will also answer your questions. Have an adult family member or friend drive you home. For the first 24 hours after your surgery:    · Do not drive or use heavy equipment.  · Do not make important decisions or sign legal documents.  · Avoid alcohol.  · Have someone stay with you, if needed. He or she can watch for problems and help keep you safe.    Be sure  to keep all follow-up appointments with your doctor. And rest after your procedure for as long as your doctor tells you to.    Coping with pain  If you have pain after surgery, pain medication will help you feel better. Take it as directed, before pain becomes severe. Also, ask your doctor or pharmacist about other ways to control pain, such as with heat, ice, and relaxation. And follow any other instructions your surgeon or nurse gives you.    URINARY RETENTION  Should you experience a decrease in your urine output or are unable to urinate following surgery, this can be due to the medications given during surgery.  We recommend you going to the nearest Emergency Department.    Tips for taking pain medication  To get the best relief possible, remember these points:    · Pain medications can upset your stomach. Taking them with a little food may help.  · Most pain relievers taken by mouth need at least 20 to 30 minutes to take effect.  · Taking medication on a schedule can help you remember to take it. Try to time your medication so that you can take it before beginning an activity, such as dressing, walking, or sitting down for dinner.  · Constipation is a common side effect of pain medications. Contact your doctor before taking any medications like laxatives or stool softeners to help relieve constipation. Also ask about any dietary restrictions, because drinking lots of fluids and eating foods like fruits and vegetables that are high in fiber can also help. Remember, dont take laxatives unless your surgeon has prescribed them.  · Mixing alcohol and pain medication can cause dizziness and slow your breathing. It can even be fatal. Dont drink alcohol while taking pain medication.  · Pain medication can slow your reflexes. Dont drive or operate machinery while taking pain medication.    If your health care provider tells you to take acetaminophen to help relieve your pain, ask him or her how much you are supposed  to take each day. (Acetaminophen is the generic name for Tylenol and other brand-name pain relievers.) Acetaminophen or other pain relievers may interact with your prescription medicines or other over-the-counter (OTC) drugs. Some prescription medications contain acetaminophen along with other active ingredients. Using both prescription and OTC acetaminophen for pain can cause you to overdose. The FDA recommends that you read the labels on your OTC medications carefully. This will help you to clearly understand the list of active ingredients, dosing instructions, and any warnings. It may also help you avoid taking too much acetaminophen. If you have questions or don't understand the information, ask your pharmacist or health care provider to explain it to you before you take the OTC medication.    Managing nausea  Some people have an upset stomach after surgery. This is often due to anesthesia, pain, pain medications, or the stress of surgery. The following tips will help you manage nausea and get good nutrition as you recover. If you were on a special diet before surgery, ask your doctor if you should follow it during recovery. These tips may help:    · Dont push yourself to eat. Your body will tell you when to eat and how much.  · Start off with clear liquids and soup. They are easier to digest.  · Progress to semi-solid foods (mashed potatoes, applesauce, and gelatin) as you feel ready.  · Slowly move to solid foods. Dont eat fatty, rich, or spicy foods at first.  · Dont force yourself to have three large meals a day. Instead, eat smaller amounts more often.  · Take pain medications with a small amount of solid food, such as crackers or toast to avoid nausea.      Call your surgeon if    · You feel too sleepy, dizzy, or groggy (medication may be too strong).  · You have side effects like nausea, vomiting, or skin changes (rash, itching, or hives).     © 1271-6671 [employer]. [address] [place] [address]  Road, [place] [address]. All rights reserved. This information is not intended as a substitute for professional medical care. Always follow your healthcare professional's instructions.    PLEASE FOLLOW ANY OTHER INSTRUCTIONS PROVIDED TO YOU BY DR. WILEY!

## 2019-07-14 RX ORDER — OMEPRAZOLE 20 MG/1
40 CAPSULE, DELAYED RELEASE ORAL DAILY PRN
Qty: 180 CAPSULE | Refills: 1 | Status: SHIPPED | OUTPATIENT
Start: 2019-07-14 | End: 2020-06-20

## 2019-08-05 RX ORDER — PRAVASTATIN SODIUM 20 MG/1
TABLET ORAL
Qty: 90 TABLET | Refills: 3 | Status: SHIPPED | OUTPATIENT
Start: 2019-08-05 | End: 2020-07-31

## 2019-09-17 ENCOUNTER — OFFICE VISIT (OUTPATIENT)
Dept: INTERNAL MEDICINE | Facility: CLINIC | Age: 79
End: 2019-09-17
Attending: INTERNAL MEDICINE
Payer: MEDICARE

## 2019-09-17 VITALS
DIASTOLIC BLOOD PRESSURE: 50 MMHG | HEIGHT: 71 IN | OXYGEN SATURATION: 95 % | BODY MASS INDEX: 22.82 KG/M2 | HEART RATE: 58 BPM | SYSTOLIC BLOOD PRESSURE: 93 MMHG | WEIGHT: 163 LBS

## 2019-09-17 DIAGNOSIS — G44.209 TENSION-TYPE HEADACHE, NOT INTRACTABLE, UNSPECIFIED CHRONICITY PATTERN: ICD-10-CM

## 2019-09-17 DIAGNOSIS — K51.919 ULCERATIVE COLITIS WITH COMPLICATION, UNSPECIFIED LOCATION: ICD-10-CM

## 2019-09-17 DIAGNOSIS — R42 DIZZY SPELLS: ICD-10-CM

## 2019-09-17 DIAGNOSIS — I10 ESSENTIAL HYPERTENSION: Primary | ICD-10-CM

## 2019-09-17 DIAGNOSIS — I48.0 PAF (PAROXYSMAL ATRIAL FIBRILLATION): ICD-10-CM

## 2019-09-17 PROBLEM — K40.30 INCARCERATED RIGHT INGUINAL HERNIA: Status: RESOLVED | Noted: 2019-06-15 | Resolved: 2019-09-17

## 2019-09-17 PROBLEM — K40.40: Status: RESOLVED | Noted: 2019-06-18 | Resolved: 2019-09-17

## 2019-09-17 PROCEDURE — 3074F SYST BP LT 130 MM HG: CPT | Mod: CPTII,S$GLB,, | Performed by: INTERNAL MEDICINE

## 2019-09-17 PROCEDURE — 3078F DIAST BP <80 MM HG: CPT | Mod: CPTII,S$GLB,, | Performed by: INTERNAL MEDICINE

## 2019-09-17 PROCEDURE — 90662 FLU VACCINE - HIGH DOSE (65+) PRESERVATIVE FREE IM: ICD-10-PCS | Mod: S$GLB,,, | Performed by: INTERNAL MEDICINE

## 2019-09-17 PROCEDURE — 3074F PR MOST RECENT SYSTOLIC BLOOD PRESSURE < 130 MM HG: ICD-10-PCS | Mod: CPTII,S$GLB,, | Performed by: INTERNAL MEDICINE

## 2019-09-17 PROCEDURE — G0008 ADMIN INFLUENZA VIRUS VAC: HCPCS | Mod: S$GLB,,, | Performed by: INTERNAL MEDICINE

## 2019-09-17 PROCEDURE — G0008 FLU VACCINE - HIGH DOSE (65+) PRESERVATIVE FREE IM: ICD-10-PCS | Mod: S$GLB,,, | Performed by: INTERNAL MEDICINE

## 2019-09-17 PROCEDURE — 99214 OFFICE O/P EST MOD 30 MIN: CPT | Mod: 25,S$GLB,, | Performed by: INTERNAL MEDICINE

## 2019-09-17 PROCEDURE — 3078F PR MOST RECENT DIASTOLIC BLOOD PRESSURE < 80 MM HG: ICD-10-PCS | Mod: CPTII,S$GLB,, | Performed by: INTERNAL MEDICINE

## 2019-09-17 PROCEDURE — 90662 IIV NO PRSV INCREASED AG IM: CPT | Mod: S$GLB,,, | Performed by: INTERNAL MEDICINE

## 2019-09-17 PROCEDURE — 99214 PR OFFICE/OUTPT VISIT, EST, LEVL IV, 30-39 MIN: ICD-10-PCS | Mod: 25,S$GLB,, | Performed by: INTERNAL MEDICINE

## 2019-09-17 RX ORDER — ACETAMINOPHEN 500 MG
100 TABLET ORAL EVERY 6 HOURS PRN
COMMUNITY
End: 2019-09-17

## 2019-09-17 RX ORDER — ACETAMINOPHEN 500 MG
1000 TABLET ORAL EVERY 6 HOURS PRN
COMMUNITY
Start: 2019-09-17

## 2019-09-17 NOTE — PROGRESS NOTES
Subjective:       Patient ID: Ramesh Ricci is a 79 y.o. male.    Chief Complaint: Follow-up (3 month  / discuss reducing the amount of pills he takes); Headache (daily); and Hypertension (has BP log from home)    He wakes up with a mild headache most days for the past 1 year.  He had an MRI on his brain in September of 2018 which showed a small old stroke.  If he takes a 1000 mg of Tylenol it relieves the headache, but it comes back later.  BP equal to 100-134 over 50-72.    He started Humira about 4 weeks ago and is helping his inflammatory bowel disease. He has much less diarrhea now.    Headache    This is a chronic problem. The current episode started more than 1 year ago. The problem occurs daily. The problem has been unchanged. He has tried acetaminophen for the symptoms. The treatment provided moderate relief.   Hypertension   This is a chronic problem. The current episode started more than 1 year ago. The problem is controlled. Associated symptoms include headaches.     Review of Systems   Constitutional: Negative.    Respiratory: Negative.    Cardiovascular: Negative.    Neurological: Positive for headaches.       Objective:      Physical Exam   Constitutional: He appears well-developed and well-nourished.   HENT:   Head: Normocephalic and atraumatic.   Eyes: Pupils are equal, round, and reactive to light.   Cardiovascular: Normal rate and regular rhythm.  Extrasystoles are present.   Murmur heard.   Systolic murmur is present with a grade of 3/6.  @RUSB   Pulmonary/Chest: Effort normal.   Musculoskeletal: He exhibits no edema.   Neurological: He is alert.       Assessment:       1. Essential hypertension    2. PAF (paroxysmal atrial fibrillation)    3. Ulcerative colitis with complication, unspecified location    4. Tension-type headache, not intractable, unspecified chronicity pattern        Plan:       Per orders and D/C instructions.  Continue meds/diet for P A fib, which is stable.     we will  stop the year but starting for now, which he believes is making him dizzy.  We will try Tylenol 1000 mg 4 times a day for the headaches.  He has an appointment with his ophthalmologist in 3 weeks.  He will follow up with Dr. Fraga for his inflammatory bowel disease.

## 2019-10-03 ENCOUNTER — OFFICE VISIT (OUTPATIENT)
Dept: CARDIOLOGY | Facility: CLINIC | Age: 79
End: 2019-10-03
Attending: INTERNAL MEDICINE
Payer: MEDICARE

## 2019-10-03 VITALS
BODY MASS INDEX: 23.24 KG/M2 | HEIGHT: 71 IN | WEIGHT: 166 LBS | HEART RATE: 55 BPM | DIASTOLIC BLOOD PRESSURE: 59 MMHG | SYSTOLIC BLOOD PRESSURE: 120 MMHG

## 2019-10-03 DIAGNOSIS — Z87.891 EX-SMOKER: ICD-10-CM

## 2019-10-03 DIAGNOSIS — I65.23 STENOSIS OF BOTH INTERNAL CAROTID ARTERIES: ICD-10-CM

## 2019-10-03 DIAGNOSIS — I48.0 PAF (PAROXYSMAL ATRIAL FIBRILLATION): ICD-10-CM

## 2019-10-03 DIAGNOSIS — I51.89 LEFT VENTRICULAR DIASTOLIC DYSFUNCTION, NYHA CLASS 1: ICD-10-CM

## 2019-10-03 DIAGNOSIS — I35.0 AORTIC STENOSIS, MILD: ICD-10-CM

## 2019-10-03 DIAGNOSIS — C61 PROSTATE CA: ICD-10-CM

## 2019-10-03 DIAGNOSIS — I10 ESSENTIAL HYPERTENSION: Primary | ICD-10-CM

## 2019-10-03 DIAGNOSIS — I35.0 AORTIC STENOSIS, MILD: Primary | ICD-10-CM

## 2019-10-03 DIAGNOSIS — R79.89 LOW SERUM TESTOSTERONE LEVEL: ICD-10-CM

## 2019-10-03 DIAGNOSIS — E03.9 ACQUIRED HYPOTHYROIDISM: ICD-10-CM

## 2019-10-03 PROCEDURE — 3078F PR MOST RECENT DIASTOLIC BLOOD PRESSURE < 80 MM HG: ICD-10-PCS | Mod: CPTII,S$GLB,, | Performed by: INTERNAL MEDICINE

## 2019-10-03 PROCEDURE — 99214 OFFICE O/P EST MOD 30 MIN: CPT | Mod: S$GLB,,, | Performed by: INTERNAL MEDICINE

## 2019-10-03 PROCEDURE — 3078F DIAST BP <80 MM HG: CPT | Mod: CPTII,S$GLB,, | Performed by: INTERNAL MEDICINE

## 2019-10-03 PROCEDURE — 3074F PR MOST RECENT SYSTOLIC BLOOD PRESSURE < 130 MM HG: ICD-10-PCS | Mod: CPTII,S$GLB,, | Performed by: INTERNAL MEDICINE

## 2019-10-03 PROCEDURE — 3074F SYST BP LT 130 MM HG: CPT | Mod: CPTII,S$GLB,, | Performed by: INTERNAL MEDICINE

## 2019-10-03 PROCEDURE — 99214 PR OFFICE/OUTPT VISIT, EST, LEVL IV, 30-39 MIN: ICD-10-PCS | Mod: S$GLB,,, | Performed by: INTERNAL MEDICINE

## 2019-10-03 NOTE — PROGRESS NOTES
Subjective:    Patient ID:  Ramesh Ricci is a 79 y.o. male     HPI  Here for follow-up of paroxysmal atrial fibrillation, essential hypertension, mild aortic stenosis, diastolic left ventricular dysfunction, carotid occlusive disease, history of transient ischemic attack, congenital absence of the right kidney, chronic kidney disease stage 3, prostate cancer, hypothyroidism, GERD, ulcerative colitis, ex cigarette smoker.     I have interrupted sleep, at night.  I sleep during the daytime.  I have no trouble breathing, have no palpitations.    Current Outpatient Medications   Medication Sig    acetaminophen (TYLENOL) 500 MG tablet Take 2 tablets (1,000 mg total) by mouth every 6 (six) hours as needed.    apixaban (ELIQUIS) 2.5 mg Tab Take 1 tablet (2.5 mg total) by mouth 2 (two) times daily. HOLD ELIQUIS  UNTIL AFTER SURGERY (Patient taking differently: Take 2.5 mg by mouth 2 (two) times daily. HOLD ELIQUIS  UNTIL AFTER SURGERY    6/18/19 - wife reports patient last dose of Eliquis was Friday, 6/14/19 morning)    ascorbic acid (VITAMIN C) 500 MG tablet Take 500 mg by mouth once daily.    aspirin (ECOTRIN) 81 MG EC tablet Take 81 mg by mouth once daily.      azaTHIOprine (IMURAN) 50 mg Tab Take 100 mg by mouth once daily.    calcium carbonate (TUMS) 200 mg calcium (500 mg) chewable tablet Take 2 tablets by mouth every evening.    DELZICOL 400 mg cdti cdti capsule Take 2 tablets by mouth 2 (two) times daily.    ferrous sulfate 324 mg (65 mg iron) TbEC Take 325 mg by mouth once daily.    levothyroxine (SYNTHROID) 125 MCG tablet TAKE 1 TABLET BY MOUTH ONCE DAILY IN THE MORNING    LORazepam (ATIVAN) 0.5 MG tablet 1/2-1 tab nightly as needed.    omeprazole (PRILOSEC) 20 MG capsule Take 2 capsules (40 mg total) by mouth daily as needed.    polyethylene glycol (GLYCOLAX) 17 gram PwPk Take 17 g by mouth nightly as needed.    pravastatin (PRAVACHOL) 20 MG tablet TAKE 1 TABLET BY MOUTH ONCE DAILY EVERY   "EVENING    tamsulosin (FLOMAX) 0.4 mg Cp24 Take 0.4 mg by mouth once daily.      No current facility-administered medications for this visit.        Review of Systems   Constitution: Negative for chills, decreased appetite, fever, weight gain and weight loss.   HENT: Negative for congestion, hearing loss and sore throat.    Eyes: Negative for blurred vision, double vision and visual disturbance.   Cardiovascular: Negative for chest pain, claudication, dyspnea on exertion, leg swelling, palpitations and syncope.   Respiratory: Negative for cough, hemoptysis, shortness of breath, sputum production and wheezing.    Endocrine: Negative for cold intolerance and heat intolerance.   Hematologic/Lymphatic: Negative for bleeding problem. Does not bruise/bleed easily.   Skin: Negative for color change, dry skin, flushing and itching.   Musculoskeletal: Negative for back pain, joint pain and myalgias.   Gastrointestinal: Negative for abdominal pain, anorexia, constipation, diarrhea, dysphagia, nausea and vomiting.        No bleeding per rectum   Genitourinary: Negative for dysuria, flank pain, frequency, hematuria and nocturia.   Neurological: Negative for dizziness, headaches, light-headedness, loss of balance, seizures and tremors.   Psychiatric/Behavioral: Negative for altered mental status and depression.     Vitals:    10/03/19 1110   BP: (!) 120/59   Pulse: (!) 55   Weight: 75.3 kg (166 lb)   Height: 5' 11" (1.803 m)        Objective:    Physical Exam   Constitutional: He is oriented to person, place, and time. He appears well-developed and well-nourished.   HENT:   Head: Normocephalic and atraumatic.   Right Ear: External ear normal.   Left Ear: External ear normal.   Nose: Nose normal.   Eyes: Pupils are equal, round, and reactive to light. Conjunctivae and EOM are normal. No scleral icterus.   Neck: Normal range of motion. Neck supple. No JVD present. No tracheal deviation present. No thyromegaly present. "   Cardiovascular: Normal rate and regular rhythm. Exam reveals no gallop and no friction rub.   Murmur heard.  Basal ejection systolic murmur conducted to the carotids.   Pulmonary/Chest: Effort normal and breath sounds normal. No respiratory distress. He has no rales. He exhibits no tenderness.   Abdominal: Soft. Bowel sounds are normal. He exhibits no distension and no mass. There is no tenderness.   Musculoskeletal: Normal range of motion. He exhibits edema. He exhibits no tenderness.   Lymphadenopathy:     He has no cervical adenopathy.   Neurological: He is alert and oriented to person, place, and time. He has normal reflexes. No cranial nerve deficit. Coordination normal.   Skin: Skin is warm and dry. No rash noted.   Psychiatric: He has a normal mood and affect. His behavior is normal.         Assessment:       1. Essential hypertension    2. PAF (paroxysmal atrial fibrillation)    3. Aortic stenosis, mild    4. Left ventricular diastolic dysfunction, NYHA class 1    5. Stenosis of both internal carotid arteries    6. Prostate CA    7. Acquired hypothyroidism    8. Low serum testosterone level    9. Ex-smoker         Plan:       Echocardiogram at next visit.  Continue present pharmacological regimen.

## 2019-10-08 RX ORDER — LORAZEPAM 0.5 MG/1
TABLET ORAL
Qty: 30 TABLET | Refills: 0 | Status: SHIPPED | OUTPATIENT
Start: 2019-10-08 | End: 2020-08-17

## 2019-12-23 RX ORDER — TELMISARTAN 40 MG/1
TABLET ORAL
Qty: 90 TABLET | Refills: 3 | Status: SHIPPED | OUTPATIENT
Start: 2019-12-23 | End: 2021-01-14

## 2020-01-15 ENCOUNTER — OFFICE VISIT (OUTPATIENT)
Dept: INTERNAL MEDICINE | Facility: CLINIC | Age: 80
End: 2020-01-15
Attending: INTERNAL MEDICINE
Payer: MEDICARE

## 2020-01-15 ENCOUNTER — LAB VISIT (OUTPATIENT)
Dept: LAB | Facility: OTHER | Age: 80
End: 2020-01-15
Attending: INTERNAL MEDICINE
Payer: MEDICARE

## 2020-01-15 VITALS
HEIGHT: 71 IN | WEIGHT: 166 LBS | HEART RATE: 68 BPM | DIASTOLIC BLOOD PRESSURE: 64 MMHG | SYSTOLIC BLOOD PRESSURE: 110 MMHG | OXYGEN SATURATION: 97 % | BODY MASS INDEX: 23.24 KG/M2

## 2020-01-15 DIAGNOSIS — E55.9 VITAMIN D DEFICIENCY: ICD-10-CM

## 2020-01-15 DIAGNOSIS — D51.0 PERNICIOUS ANEMIA: ICD-10-CM

## 2020-01-15 DIAGNOSIS — G44.209 TENSION-TYPE HEADACHE, NOT INTRACTABLE, UNSPECIFIED CHRONICITY PATTERN: ICD-10-CM

## 2020-01-15 DIAGNOSIS — I10 ESSENTIAL HYPERTENSION: Primary | ICD-10-CM

## 2020-01-15 DIAGNOSIS — E03.9 HYPOTHYROIDISM, UNSPECIFIED TYPE: ICD-10-CM

## 2020-01-15 DIAGNOSIS — Z12.5 SCREENING FOR PROSTATE CANCER: ICD-10-CM

## 2020-01-15 DIAGNOSIS — Z13.31 SCREENING FOR DEPRESSION: ICD-10-CM

## 2020-01-15 DIAGNOSIS — E03.9 ACQUIRED HYPOTHYROIDISM: ICD-10-CM

## 2020-01-15 DIAGNOSIS — I48.0 PAF (PAROXYSMAL ATRIAL FIBRILLATION): ICD-10-CM

## 2020-01-15 DIAGNOSIS — R79.89 OTHER SPECIFIED ABNORMAL FINDINGS OF BLOOD CHEMISTRY: ICD-10-CM

## 2020-01-15 DIAGNOSIS — D50.8 OTHER IRON DEFICIENCY ANEMIA: ICD-10-CM

## 2020-01-15 DIAGNOSIS — Z13.39 SCREENING FOR ALCOHOLISM: ICD-10-CM

## 2020-01-15 DIAGNOSIS — K51.919 ULCERATIVE COLITIS WITH COMPLICATION, UNSPECIFIED LOCATION: ICD-10-CM

## 2020-01-15 DIAGNOSIS — H26.9 CATARACT OF LEFT EYE, UNSPECIFIED CATARACT TYPE: ICD-10-CM

## 2020-01-15 DIAGNOSIS — E78.9 DISORDER OF LIPID METABOLISM: ICD-10-CM

## 2020-01-15 DIAGNOSIS — Z00.00 ROUTINE ADULT HEALTH MAINTENANCE: ICD-10-CM

## 2020-01-15 LAB
ALBUMIN SERPL BCP-MCNC: 4.2 G/DL (ref 3.5–5.2)
ALP SERPL-CCNC: 68 U/L (ref 55–135)
ALT SERPL W/O P-5'-P-CCNC: 7 U/L (ref 10–44)
ANION GAP SERPL CALC-SCNC: 10 MMOL/L (ref 8–16)
AST SERPL-CCNC: 16 U/L (ref 10–40)
BASOPHILS # BLD AUTO: 0.04 K/UL (ref 0–0.2)
BASOPHILS NFR BLD: 0.8 % (ref 0–1.9)
BILIRUB SERPL-MCNC: 0.4 MG/DL (ref 0.1–1)
BUN SERPL-MCNC: 25 MG/DL (ref 8–23)
CALCIUM SERPL-MCNC: 9.4 MG/DL (ref 8.7–10.5)
CHLORIDE SERPL-SCNC: 101 MMOL/L (ref 95–110)
CO2 SERPL-SCNC: 27 MMOL/L (ref 23–29)
CREAT SERPL-MCNC: 1.3 MG/DL (ref 0.5–1.4)
DIFFERENTIAL METHOD: ABNORMAL
EOSINOPHIL # BLD AUTO: 0.2 K/UL (ref 0–0.5)
EOSINOPHIL NFR BLD: 3.7 % (ref 0–8)
ERYTHROCYTE [DISTWIDTH] IN BLOOD BY AUTOMATED COUNT: 14.7 % (ref 11.5–14.5)
EST. GFR  (AFRICAN AMERICAN): 59 ML/MIN/1.73 M^2
EST. GFR  (NON AFRICAN AMERICAN): 51 ML/MIN/1.73 M^2
GLUCOSE SERPL-MCNC: 92 MG/DL (ref 70–110)
HCT VFR BLD AUTO: 41.5 % (ref 40–54)
HGB BLD-MCNC: 12.9 G/DL (ref 14–18)
IMM GRANULOCYTES # BLD AUTO: 0.01 K/UL (ref 0–0.04)
IMM GRANULOCYTES NFR BLD AUTO: 0.2 % (ref 0–0.5)
LYMPHOCYTES # BLD AUTO: 1.2 K/UL (ref 1–4.8)
LYMPHOCYTES NFR BLD: 22.8 % (ref 18–48)
MCH RBC QN AUTO: 29.7 PG (ref 27–31)
MCHC RBC AUTO-ENTMCNC: 31.1 G/DL (ref 32–36)
MCV RBC AUTO: 95 FL (ref 82–98)
MONOCYTES # BLD AUTO: 0.8 K/UL (ref 0.3–1)
MONOCYTES NFR BLD: 14.9 % (ref 4–15)
NEUTROPHILS # BLD AUTO: 2.9 K/UL (ref 1.8–7.7)
NEUTROPHILS NFR BLD: 57.6 % (ref 38–73)
NRBC BLD-RTO: 0 /100 WBC
PLATELET # BLD AUTO: 200 K/UL (ref 150–350)
PMV BLD AUTO: 10.9 FL (ref 9.2–12.9)
POTASSIUM SERPL-SCNC: 5.2 MMOL/L (ref 3.5–5.1)
PROT SERPL-MCNC: 7.5 G/DL (ref 6–8.4)
RBC # BLD AUTO: 4.35 M/UL (ref 4.6–6.2)
SODIUM SERPL-SCNC: 138 MMOL/L (ref 136–145)
T4 FREE SERPL-MCNC: 1.04 NG/DL (ref 0.71–1.51)
TSH SERPL DL<=0.005 MIU/L-ACNC: 1.07 UIU/ML (ref 0.4–4)
WBC # BLD AUTO: 5.09 K/UL (ref 3.9–12.7)

## 2020-01-15 PROCEDURE — 99214 OFFICE O/P EST MOD 30 MIN: CPT | Mod: S$GLB,,, | Performed by: INTERNAL MEDICINE

## 2020-01-15 PROCEDURE — 84153 ASSAY OF PSA TOTAL: CPT

## 2020-01-15 PROCEDURE — 1160F RVW MEDS BY RX/DR IN RCRD: CPT | Mod: S$GLB,,, | Performed by: INTERNAL MEDICINE

## 2020-01-15 PROCEDURE — 85025 COMPLETE CBC W/AUTO DIFF WBC: CPT

## 2020-01-15 PROCEDURE — 36415 COLL VENOUS BLD VENIPUNCTURE: CPT

## 2020-01-15 PROCEDURE — 83036 HEMOGLOBIN GLYCOSYLATED A1C: CPT

## 2020-01-15 PROCEDURE — 82607 VITAMIN B-12: CPT

## 2020-01-15 PROCEDURE — 3074F PR MOST RECENT SYSTOLIC BLOOD PRESSURE < 130 MM HG: ICD-10-PCS | Mod: CPTII,S$GLB,, | Performed by: INTERNAL MEDICINE

## 2020-01-15 PROCEDURE — 3078F PR MOST RECENT DIASTOLIC BLOOD PRESSURE < 80 MM HG: ICD-10-PCS | Mod: CPTII,S$GLB,, | Performed by: INTERNAL MEDICINE

## 2020-01-15 PROCEDURE — 1159F PR MEDICATION LIST DOCUMENTED IN MEDICAL RECORD: ICD-10-PCS | Mod: S$GLB,,, | Performed by: INTERNAL MEDICINE

## 2020-01-15 PROCEDURE — G0444 PR DEPRESSION SCREENING: ICD-10-PCS | Mod: 59,S$GLB,, | Performed by: INTERNAL MEDICINE

## 2020-01-15 PROCEDURE — 3074F SYST BP LT 130 MM HG: CPT | Mod: CPTII,S$GLB,, | Performed by: INTERNAL MEDICINE

## 2020-01-15 PROCEDURE — 1159F MED LIST DOCD IN RCRD: CPT | Mod: S$GLB,,, | Performed by: INTERNAL MEDICINE

## 2020-01-15 PROCEDURE — 84443 ASSAY THYROID STIM HORMONE: CPT

## 2020-01-15 PROCEDURE — 82306 VITAMIN D 25 HYDROXY: CPT

## 2020-01-15 PROCEDURE — 1160F PR REVIEW ALL MEDS BY PRESCRIBER/CLIN PHARMACIST DOCUMENTED: ICD-10-PCS | Mod: S$GLB,,, | Performed by: INTERNAL MEDICINE

## 2020-01-15 PROCEDURE — 3066F PR DOCUMENTATION OF TREATMENT FOR NEPHROPATHY: ICD-10-PCS | Mod: S$GLB,,, | Performed by: INTERNAL MEDICINE

## 2020-01-15 PROCEDURE — 80053 COMPREHEN METABOLIC PANEL: CPT

## 2020-01-15 PROCEDURE — 84439 ASSAY OF FREE THYROXINE: CPT

## 2020-01-15 PROCEDURE — G0444 DEPRESSION SCREEN ANNUAL: HCPCS | Mod: 59,S$GLB,, | Performed by: INTERNAL MEDICINE

## 2020-01-15 PROCEDURE — 80061 LIPID PANEL: CPT

## 2020-01-15 PROCEDURE — 99214 PR OFFICE/OUTPT VISIT, EST, LEVL IV, 30-39 MIN: ICD-10-PCS | Mod: S$GLB,,, | Performed by: INTERNAL MEDICINE

## 2020-01-15 PROCEDURE — 3066F NEPHROPATHY DOC TX: CPT | Mod: S$GLB,,, | Performed by: INTERNAL MEDICINE

## 2020-01-15 PROCEDURE — 83540 ASSAY OF IRON: CPT

## 2020-01-15 PROCEDURE — 3078F DIAST BP <80 MM HG: CPT | Mod: CPTII,S$GLB,, | Performed by: INTERNAL MEDICINE

## 2020-01-15 RX ORDER — ADALIMUMAB 40MG/0.8ML
40 KIT SUBCUTANEOUS
COMMUNITY
End: 2021-06-22

## 2020-01-15 NOTE — PROGRESS NOTES
Subjective:       Patient ID: Ramesh Ricci is a 79 y.o. male.    Chief Complaint: Follow-up (4 month)    He has been on Humira for 3 months and feels much better.  He may get left cataract surgery soon.    Hypertension   This is a chronic problem. The current episode started more than 1 year ago. The problem is controlled.     Review of Systems   Constitutional: Negative.    Respiratory: Negative.    Cardiovascular: Negative.    Psychiatric/Behavioral: Negative for dysphoric mood.       Objective:      Physical Exam   Constitutional: He appears well-developed and well-nourished.   HENT:   Head: Normocephalic and atraumatic.   Eyes: Pupils are equal, round, and reactive to light.   Cardiovascular: Normal rate and regular rhythm.   Murmur heard.   Systolic murmur is present with a grade of 3/6.  @RUSB   Pulmonary/Chest: Effort normal.   Musculoskeletal: He exhibits no edema.   Neurological: He is alert.       Assessment:       1. Essential hypertension    2. PAF (paroxysmal atrial fibrillation)    3. Tension-type headache, not intractable, unspecified chronicity pattern    4. Ulcerative colitis with complication, unspecified location    5. Acquired hypothyroidism    6. Routine adult health maintenance    7. Screening for alcoholism    8. Screening for depression    9. Cataract of left eye, unspecified cataract type        Plan:       Per orders and D/C instructions.  Continue meds/diet for A fib, HTN, and hypothyroid, which are stable.     follow-up with GI for UC.  Check labs today.

## 2020-01-16 LAB
25(OH)D3+25(OH)D2 SERPL-MCNC: 28 NG/ML (ref 30–96)
CHOLEST SERPL-MCNC: 198 MG/DL (ref 120–199)
CHOLEST/HDLC SERPL: 3 {RATIO} (ref 2–5)
COMPLEXED PSA SERPL-MCNC: 0.5 NG/ML (ref 0–4)
ESTIMATED AVG GLUCOSE: 111 MG/DL (ref 68–131)
HBA1C MFR BLD HPLC: 5.5 % (ref 4–5.6)
HDLC SERPL-MCNC: 65 MG/DL (ref 40–75)
HDLC SERPL: 32.8 % (ref 20–50)
IRON SERPL-MCNC: 79 UG/DL (ref 45–160)
LDLC SERPL CALC-MCNC: 71.6 MG/DL (ref 63–159)
NONHDLC SERPL-MCNC: 133 MG/DL
SATURATED IRON: 23 % (ref 20–50)
TOTAL IRON BINDING CAPACITY: 351 UG/DL (ref 250–450)
TRANSFERRIN SERPL-MCNC: 237 MG/DL (ref 200–375)
TRIGL SERPL-MCNC: 307 MG/DL (ref 30–150)
VIT B12 SERPL-MCNC: 345 PG/ML (ref 210–950)

## 2020-02-19 ENCOUNTER — OFFICE VISIT (OUTPATIENT)
Dept: CARDIOLOGY | Facility: CLINIC | Age: 80
End: 2020-02-19
Attending: INTERNAL MEDICINE
Payer: MEDICARE

## 2020-02-19 VITALS
BODY MASS INDEX: 23.38 KG/M2 | SYSTOLIC BLOOD PRESSURE: 125 MMHG | WEIGHT: 167 LBS | DIASTOLIC BLOOD PRESSURE: 73 MMHG | HEART RATE: 62 BPM | HEIGHT: 71 IN

## 2020-02-19 DIAGNOSIS — Z87.891 EX-SMOKER: ICD-10-CM

## 2020-02-19 DIAGNOSIS — C61 PROSTATE CA: ICD-10-CM

## 2020-02-19 DIAGNOSIS — K51.919 ULCERATIVE COLITIS WITH COMPLICATION, UNSPECIFIED LOCATION: ICD-10-CM

## 2020-02-19 DIAGNOSIS — R79.89 LOW SERUM TESTOSTERONE LEVEL: ICD-10-CM

## 2020-02-19 DIAGNOSIS — E03.9 ACQUIRED HYPOTHYROIDISM: ICD-10-CM

## 2020-02-19 DIAGNOSIS — I48.0 PAF (PAROXYSMAL ATRIAL FIBRILLATION): Primary | ICD-10-CM

## 2020-02-19 DIAGNOSIS — I35.0 AORTIC STENOSIS, MILD: ICD-10-CM

## 2020-02-19 DIAGNOSIS — I10 ESSENTIAL HYPERTENSION: ICD-10-CM

## 2020-02-19 DIAGNOSIS — N18.30 CKD (CHRONIC KIDNEY DISEASE) STAGE 3, GFR 30-59 ML/MIN: ICD-10-CM

## 2020-02-19 LAB
AORTIC ROOT ANNULUS: 3 CM
AORTIC VALVE CUSP SEPERATION: 1.11 CM
AV INDEX (PROSTH): 0.3
AV MEAN GRADIENT: 28 MMHG
AV PEAK GRADIENT: 50 MMHG
AV VALVE AREA: 0.97 CM2
AV VELOCITY RATIO: 0.26
CV ECHO LV RWT: 0.47 CM
DOP CALC AO PEAK VEL: 3.52 M/S
DOP CALC AO VTI: 86.5 CM
DOP CALC LVOT AREA: 3.2 CM2
DOP CALC LVOT DIAMETER: 2.02 CM
DOP CALC LVOT PEAK VEL: 0.92 M/S
DOP CALC LVOT STROKE VOLUME: 83.57 CM3
DOP CALCLVOT PEAK VEL VTI: 26.09 CM
E WAVE DECELERATION TIME: 384.15 MSEC
E/A RATIO: 0.76
ECHO EF ESTIMATED: 66 %
ECHO LV POSTERIOR WALL: 1.08 CM (ref 0.6–1.1)
FRACTIONAL SHORTENING: 36 % (ref 28–44)
INTERVENTRICULAR SEPTUM: 1.08 CM (ref 0.6–1.1)
IVC PROX: 1.1 CM
IVRT: 103 MSEC
LEFT ATRIUM SIZE: 4.62 CM
LEFT INTERNAL DIMENSION IN SYSTOLE: 2.94 CM (ref 2.1–4)
LEFT VENTRICLE DIASTOLIC VOLUME: 98.53 ML
LEFT VENTRICLE SYSTOLIC VOLUME: 33.32 ML
LEFT VENTRICULAR INTERNAL DIMENSION IN DIASTOLE: 4.62 CM (ref 3.5–6)
LEFT VENTRICULAR MASS: 177.86 G
LV LATERAL E/E' RATIO: 0.22 M/S
MV A" WAVE DURATION": 167 MSEC
MV PEAK A VEL: 1.45 M/S
MV PEAK E VEL: 1.1 M/S
PISA TR MAX VEL: 2.54 M/S
PULM VEIN A" WAVE DURATION": 103 MSEC
PULM VEIN S/D RATIO: 2.03
PV PEAK D VEL: 0.4 M/S
PV PEAK S VEL: 0.81 M/S
PV PEAK VELOCITY: 1.24 CM/S
RA PRESSURE: 3 MMHG
RIGHT VENTRICULAR END-DIASTOLIC DIMENSION: 2.39 CM
TDI LATERAL: 5 M/S
TR MAX PG: 26 MMHG
TRICUSPID ANNULAR PLANE SYSTOLIC EXCURSION: 1.93 CM
TRICUSPID VALVE PEAK A WAVE VELOCITY: 0.7 M/S
TV PEAK E VEL: 0.52 M/S
TV REST PULMONARY ARTERY PRESSURE: 29 MMHG

## 2020-02-19 PROCEDURE — 99214 PR OFFICE/OUTPT VISIT, EST, LEVL IV, 30-39 MIN: ICD-10-PCS | Mod: S$GLB,,, | Performed by: INTERNAL MEDICINE

## 2020-02-19 PROCEDURE — 1159F MED LIST DOCD IN RCRD: CPT | Mod: S$GLB,,, | Performed by: INTERNAL MEDICINE

## 2020-02-19 PROCEDURE — 3074F PR MOST RECENT SYSTOLIC BLOOD PRESSURE < 130 MM HG: ICD-10-PCS | Mod: CPTII,S$GLB,, | Performed by: INTERNAL MEDICINE

## 2020-02-19 PROCEDURE — 99214 OFFICE O/P EST MOD 30 MIN: CPT | Mod: S$GLB,,, | Performed by: INTERNAL MEDICINE

## 2020-02-19 PROCEDURE — 3074F SYST BP LT 130 MM HG: CPT | Mod: CPTII,S$GLB,, | Performed by: INTERNAL MEDICINE

## 2020-02-19 PROCEDURE — 1159F PR MEDICATION LIST DOCUMENTED IN MEDICAL RECORD: ICD-10-PCS | Mod: S$GLB,,, | Performed by: INTERNAL MEDICINE

## 2020-02-19 PROCEDURE — 3078F PR MOST RECENT DIASTOLIC BLOOD PRESSURE < 80 MM HG: ICD-10-PCS | Mod: CPTII,S$GLB,, | Performed by: INTERNAL MEDICINE

## 2020-02-19 PROCEDURE — 3078F DIAST BP <80 MM HG: CPT | Mod: CPTII,S$GLB,, | Performed by: INTERNAL MEDICINE

## 2020-02-21 NOTE — PROGRESS NOTES
Subjective:    Patient ID:  Ramesh Ricci is a 80 y.o. male     HPI  Here for follow-up of paroxysmal atrial fibrillation, essential hypertension, mild aortic stenosis, diastolic left ventricular dysfunction, carotid occlusive disease, history of transient ischemic attack, congenital absence of the right kidney, chronic kidney disease stage 3, prostate cancer, hypothyroidism, GERD, ulcerative colitis, ex cigarette smoker.     I am doing well, I have no complaints.    Current Outpatient Medications   Medication Sig    acetaminophen (TYLENOL) 500 MG tablet Take 2 tablets (1,000 mg total) by mouth every 6 (six) hours as needed.    adalimumab (HUMIRA) 40 mg/0.8 mL SyKt Inject 40 mg into the skin every 14 (fourteen) days.    apixaban (ELIQUIS) 2.5 mg Tab Take 1 tablet (2.5 mg total) by mouth 2 (two) times daily. HOLD ELIQUIS  UNTIL AFTER SURGERY (Patient taking differently: Take 2.5 mg by mouth 2 (two) times daily. HOLD ELIQUIS  UNTIL AFTER SURGERY    6/18/19 - wife reports patient last dose of Eliquis was Friday, 6/14/19 morning)    ascorbic acid (VITAMIN C) 500 MG tablet Take 500 mg by mouth once daily.    aspirin (ECOTRIN) 81 MG EC tablet Take 81 mg by mouth once daily.      azaTHIOprine (IMURAN) 50 mg Tab Take 100 mg by mouth once daily.    calcium carbonate (TUMS) 200 mg calcium (500 mg) chewable tablet Take 2 tablets by mouth every evening.    ferrous sulfate 324 mg (65 mg iron) TbEC Take 325 mg by mouth once daily.    levothyroxine (SYNTHROID) 125 MCG tablet TAKE 1 TABLET BY MOUTH ONCE DAILY IN THE MORNING    LORazepam (ATIVAN) 0.5 MG tablet TAKE 1/2 TO 1 (ONE-HALF TO ONE) TABLET BY MOUTH NIGHTLY AS NEEDED    omeprazole (PRILOSEC) 20 MG capsule Take 2 capsules (40 mg total) by mouth daily as needed.    polyethylene glycol (GLYCOLAX) 17 gram PwPk Take 17 g by mouth nightly as needed.    pravastatin (PRAVACHOL) 20 MG tablet TAKE 1 TABLET BY MOUTH ONCE DAILY EVERY  EVENING    tamsulosin (FLOMAX)  "0.4 mg Cp24 Take 0.4 mg by mouth once daily.     telmisartan (MICARDIS) 40 MG Tab 1/2 to  1 tablet daily    vitC/E/Zn/copper/lutein/zeaxan (ICAPS AREDS2 ORAL) Take 1 capsule by mouth once daily.     No current facility-administered medications for this visit.        Review of Systems   Constitution: Negative for chills, decreased appetite, fever, weight gain and weight loss.   HENT: Negative for congestion, hearing loss and sore throat.    Eyes: Negative for blurred vision, double vision and visual disturbance.   Cardiovascular: Negative for chest pain, claudication, dyspnea on exertion, leg swelling, palpitations and syncope.   Respiratory: Negative for cough, hemoptysis, shortness of breath, sputum production and wheezing.    Endocrine: Negative for cold intolerance and heat intolerance.   Hematologic/Lymphatic: Negative for bleeding problem. Does not bruise/bleed easily.   Skin: Negative for color change, dry skin, flushing and itching.   Musculoskeletal: Negative for back pain, joint pain and myalgias.   Gastrointestinal: Negative for abdominal pain, anorexia, constipation, diarrhea, dysphagia, nausea and vomiting.        No bleeding per rectum   Genitourinary: Negative for dysuria, flank pain, frequency, hematuria and nocturia.   Neurological: Negative for dizziness, headaches, light-headedness, loss of balance, seizures and tremors.   Psychiatric/Behavioral: Negative for altered mental status and depression.         Vitals:    02/19/20 1334   BP: 125/73   Pulse: 62   Weight: 75.8 kg (167 lb)   Height: 5' 11" (1.803 m)     Objective:    Physical Exam   Constitutional: He is oriented to person, place, and time. He appears well-developed and well-nourished.   HENT:   Head: Normocephalic and atraumatic.   Right Ear: External ear normal.   Left Ear: External ear normal.   Nose: Nose normal.   Eyes: Pupils are equal, round, and reactive to light. Conjunctivae and EOM are normal. No scleral icterus.   Neck: Normal " range of motion. Neck supple. No JVD present. No tracheal deviation present. No thyromegaly present.   Cardiovascular: Normal rate and regular rhythm. Exam reveals no gallop and no friction rub.   Murmur heard.  Basal ejection systolic murmur conducted to the carotids.   Pulmonary/Chest: Effort normal and breath sounds normal. No respiratory distress. He has no rales. He exhibits no tenderness.   Abdominal: Soft. Bowel sounds are normal. He exhibits no distension and no mass. There is no tenderness.   Musculoskeletal: Normal range of motion. He exhibits no edema or tenderness.   Lymphadenopathy:     He has no cervical adenopathy.   Neurological: He is alert and oriented to person, place, and time. He has normal reflexes. No cranial nerve deficit. Coordination normal.   Skin: Skin is warm and dry. No rash noted.   Psychiatric: He has a normal mood and affect. His behavior is normal.       echocardiogram reviewed.  Results discussed with the patient.  Assessment:       1. PAF (paroxysmal atrial fibrillation)    2. Aortic stenosis, mild    3. Essential hypertension    4. CKD (chronic kidney disease) stage 3, GFR 30-59 ml/min    5. Prostate CA    6. Acquired hypothyroidism    7. Low serum testosterone level    8. Ulcerative colitis with complication, unspecified location    9. Ex-smoker         Plan:       Stable.  Continue present pharmacological regimen.

## 2020-04-25 DIAGNOSIS — R52 PAIN: ICD-10-CM

## 2020-04-25 RX ORDER — CELECOXIB 200 MG/1
CAPSULE ORAL
Qty: 90 CAPSULE | Refills: 0 | Status: SHIPPED | OUTPATIENT
Start: 2020-04-25 | End: 2021-07-26

## 2020-05-08 PROBLEM — H35.033 HYPERTENSIVE RETINOPATHY OF BOTH EYES: Status: ACTIVE | Noted: 2020-05-08

## 2020-05-08 PROBLEM — H35.3132 INTERMEDIATE STAGE NONEXUDATIVE AGE-RELATED MACULAR DEGENERATION OF BOTH EYES: Status: ACTIVE | Noted: 2020-05-08

## 2020-06-04 ENCOUNTER — LAB VISIT (OUTPATIENT)
Dept: PRIMARY CARE CLINIC | Facility: OTHER | Age: 80
End: 2020-06-04
Payer: MEDICARE

## 2020-06-04 DIAGNOSIS — Z11.59 ENCOUNTER FOR SCREENING FOR OTHER VIRAL DISEASES: ICD-10-CM

## 2020-06-04 LAB — SARS-COV-2 RNA RESP QL NAA+PROBE: NOT DETECTED

## 2020-06-04 PROCEDURE — U0003 INFECTIOUS AGENT DETECTION BY NUCLEIC ACID (DNA OR RNA); SEVERE ACUTE RESPIRATORY SYNDROME CORONAVIRUS 2 (SARS-COV-2) (CORONAVIRUS DISEASE [COVID-19]), AMPLIFIED PROBE TECHNIQUE, MAKING USE OF HIGH THROUGHPUT TECHNOLOGIES AS DESCRIBED BY CMS-2020-01-R: HCPCS

## 2020-07-23 ENCOUNTER — LAB VISIT (OUTPATIENT)
Dept: LAB | Facility: OTHER | Age: 80
End: 2020-07-23
Attending: INTERNAL MEDICINE
Payer: MEDICARE

## 2020-07-23 ENCOUNTER — OFFICE VISIT (OUTPATIENT)
Dept: INTERNAL MEDICINE | Facility: CLINIC | Age: 80
End: 2020-07-23
Attending: INTERNAL MEDICINE
Payer: MEDICARE

## 2020-07-23 VITALS
HEART RATE: 60 BPM | HEIGHT: 71 IN | BODY MASS INDEX: 23.57 KG/M2 | WEIGHT: 168.38 LBS | OXYGEN SATURATION: 99 % | SYSTOLIC BLOOD PRESSURE: 130 MMHG | DIASTOLIC BLOOD PRESSURE: 71 MMHG

## 2020-07-23 DIAGNOSIS — E78.9 DISORDER OF LIPID METABOLISM: ICD-10-CM

## 2020-07-23 DIAGNOSIS — R42 DIZZY SPELLS: ICD-10-CM

## 2020-07-23 DIAGNOSIS — E55.9 VITAMIN D DEFICIENCY: ICD-10-CM

## 2020-07-23 DIAGNOSIS — N18.30 CKD (CHRONIC KIDNEY DISEASE) STAGE 3, GFR 30-59 ML/MIN: ICD-10-CM

## 2020-07-23 DIAGNOSIS — R51.9 NONINTRACTABLE HEADACHE, UNSPECIFIED CHRONICITY PATTERN, UNSPECIFIED HEADACHE TYPE: ICD-10-CM

## 2020-07-23 DIAGNOSIS — D51.0 PERNICIOUS ANEMIA: ICD-10-CM

## 2020-07-23 DIAGNOSIS — D50.8 OTHER IRON DEFICIENCY ANEMIA: ICD-10-CM

## 2020-07-23 DIAGNOSIS — I10 ESSENTIAL HYPERTENSION: Primary | ICD-10-CM

## 2020-07-23 DIAGNOSIS — R97.20 ELEVATED PSA: Primary | ICD-10-CM

## 2020-07-23 DIAGNOSIS — Z12.5 SCREENING FOR PROSTATE CANCER: ICD-10-CM

## 2020-07-23 DIAGNOSIS — I48.0 PAF (PAROXYSMAL ATRIAL FIBRILLATION): ICD-10-CM

## 2020-07-23 DIAGNOSIS — R97.20 ELEVATED PSA: ICD-10-CM

## 2020-07-23 DIAGNOSIS — I51.89 LEFT VENTRICULAR DIASTOLIC DYSFUNCTION, NYHA CLASS 1: ICD-10-CM

## 2020-07-23 DIAGNOSIS — E03.9 ACQUIRED HYPOTHYROIDISM: ICD-10-CM

## 2020-07-23 DIAGNOSIS — R79.89 OTHER SPECIFIED ABNORMAL FINDINGS OF BLOOD CHEMISTRY: ICD-10-CM

## 2020-07-23 DIAGNOSIS — I35.0 NONRHEUMATIC AORTIC VALVE STENOSIS: ICD-10-CM

## 2020-07-23 DIAGNOSIS — E03.9 HYPOTHYROIDISM, UNSPECIFIED TYPE: ICD-10-CM

## 2020-07-23 DIAGNOSIS — K51.919 ULCERATIVE COLITIS WITH COMPLICATION, UNSPECIFIED LOCATION: ICD-10-CM

## 2020-07-23 LAB
ALBUMIN SERPL BCP-MCNC: 4.2 G/DL (ref 3.5–5.2)
ALP SERPL-CCNC: 65 U/L (ref 55–135)
ALT SERPL W/O P-5'-P-CCNC: 9 U/L (ref 10–44)
ANION GAP SERPL CALC-SCNC: 10 MMOL/L (ref 8–16)
AST SERPL-CCNC: 18 U/L (ref 10–40)
BASOPHILS # BLD AUTO: 0.04 K/UL (ref 0–0.2)
BASOPHILS NFR BLD: 0.6 % (ref 0–1.9)
BILIRUB SERPL-MCNC: 0.3 MG/DL (ref 0.1–1)
BUN SERPL-MCNC: 27 MG/DL (ref 8–23)
CALCIUM SERPL-MCNC: 9.3 MG/DL (ref 8.7–10.5)
CHLORIDE SERPL-SCNC: 104 MMOL/L (ref 95–110)
CK SERPL-CCNC: 105 U/L (ref 20–200)
CO2 SERPL-SCNC: 25 MMOL/L (ref 23–29)
CREAT SERPL-MCNC: 1.4 MG/DL (ref 0.5–1.4)
DIFFERENTIAL METHOD: ABNORMAL
EOSINOPHIL # BLD AUTO: 0.2 K/UL (ref 0–0.5)
EOSINOPHIL NFR BLD: 2.6 % (ref 0–8)
ERYTHROCYTE [DISTWIDTH] IN BLOOD BY AUTOMATED COUNT: 13.5 % (ref 11.5–14.5)
EST. GFR  (AFRICAN AMERICAN): 54 ML/MIN/1.73 M^2
EST. GFR  (NON AFRICAN AMERICAN): 46 ML/MIN/1.73 M^2
GLUCOSE SERPL-MCNC: 88 MG/DL (ref 70–110)
HCT VFR BLD AUTO: 41 % (ref 40–54)
HGB BLD-MCNC: 12.9 G/DL (ref 14–18)
IMM GRANULOCYTES # BLD AUTO: 0.02 K/UL (ref 0–0.04)
IMM GRANULOCYTES NFR BLD AUTO: 0.3 % (ref 0–0.5)
LYMPHOCYTES # BLD AUTO: 1.6 K/UL (ref 1–4.8)
LYMPHOCYTES NFR BLD: 26.3 % (ref 18–48)
MCH RBC QN AUTO: 30.9 PG (ref 27–31)
MCHC RBC AUTO-ENTMCNC: 31.5 G/DL (ref 32–36)
MCV RBC AUTO: 98 FL (ref 82–98)
MONOCYTES # BLD AUTO: 0.7 K/UL (ref 0.3–1)
MONOCYTES NFR BLD: 11.7 % (ref 4–15)
NEUTROPHILS # BLD AUTO: 3.6 K/UL (ref 1.8–7.7)
NEUTROPHILS NFR BLD: 58.5 % (ref 38–73)
NRBC BLD-RTO: 0 /100 WBC
PLATELET # BLD AUTO: 176 K/UL (ref 150–350)
PMV BLD AUTO: 10.6 FL (ref 9.2–12.9)
POTASSIUM SERPL-SCNC: 5.2 MMOL/L (ref 3.5–5.1)
PROSTATE SPECIFIC ANTIGEN, TOTAL: 0.53 NG/ML (ref 0–4)
PROT SERPL-MCNC: 7.6 G/DL (ref 6–8.4)
PSA FREE MFR SERPL: 28.3 %
PSA FREE SERPL-MCNC: 0.15 NG/ML (ref 0.01–1.5)
RBC # BLD AUTO: 4.18 M/UL (ref 4.6–6.2)
SODIUM SERPL-SCNC: 139 MMOL/L (ref 136–145)
T4 FREE SERPL-MCNC: 1.09 NG/DL (ref 0.71–1.51)
TSH SERPL DL<=0.005 MIU/L-ACNC: 1.03 UIU/ML (ref 0.4–4)
VIT B12 SERPL-MCNC: 513 PG/ML (ref 210–950)
WBC # BLD AUTO: 6.17 K/UL (ref 3.9–12.7)

## 2020-07-23 PROCEDURE — 3078F DIAST BP <80 MM HG: CPT | Mod: CPTII,S$GLB,, | Performed by: INTERNAL MEDICINE

## 2020-07-23 PROCEDURE — 84443 ASSAY THYROID STIM HORMONE: CPT

## 2020-07-23 PROCEDURE — 80061 LIPID PANEL: CPT

## 2020-07-23 PROCEDURE — 84154 ASSAY OF PSA FREE: CPT

## 2020-07-23 PROCEDURE — 82550 ASSAY OF CK (CPK): CPT

## 2020-07-23 PROCEDURE — 3075F SYST BP GE 130 - 139MM HG: CPT | Mod: CPTII,S$GLB,, | Performed by: INTERNAL MEDICINE

## 2020-07-23 PROCEDURE — 99214 OFFICE O/P EST MOD 30 MIN: CPT | Mod: S$GLB,,, | Performed by: INTERNAL MEDICINE

## 2020-07-23 PROCEDURE — 36415 COLL VENOUS BLD VENIPUNCTURE: CPT

## 2020-07-23 PROCEDURE — 83540 ASSAY OF IRON: CPT

## 2020-07-23 PROCEDURE — 3075F PR MOST RECENT SYSTOLIC BLOOD PRESS GE 130-139MM HG: ICD-10-PCS | Mod: CPTII,S$GLB,, | Performed by: INTERNAL MEDICINE

## 2020-07-23 PROCEDURE — 1159F MED LIST DOCD IN RCRD: CPT | Mod: S$GLB,,, | Performed by: INTERNAL MEDICINE

## 2020-07-23 PROCEDURE — 3078F PR MOST RECENT DIASTOLIC BLOOD PRESSURE < 80 MM HG: ICD-10-PCS | Mod: CPTII,S$GLB,, | Performed by: INTERNAL MEDICINE

## 2020-07-23 PROCEDURE — 80053 COMPREHEN METABOLIC PANEL: CPT

## 2020-07-23 PROCEDURE — 1159F PR MEDICATION LIST DOCUMENTED IN MEDICAL RECORD: ICD-10-PCS | Mod: S$GLB,,, | Performed by: INTERNAL MEDICINE

## 2020-07-23 PROCEDURE — 82607 VITAMIN B-12: CPT

## 2020-07-23 PROCEDURE — 84439 ASSAY OF FREE THYROXINE: CPT

## 2020-07-23 PROCEDURE — 85025 COMPLETE CBC W/AUTO DIFF WBC: CPT

## 2020-07-23 PROCEDURE — 99214 PR OFFICE/OUTPT VISIT, EST, LEVL IV, 30-39 MIN: ICD-10-PCS | Mod: S$GLB,,, | Performed by: INTERNAL MEDICINE

## 2020-07-23 NOTE — PROGRESS NOTES
Subjective:       Patient ID: Ramesh Ricci is a 80 y.o. male.    Chief Complaint: Follow-up (6 mth ), Dizziness (coming up from sitting/laying), Fall (loss of balance/ twice), and Migraine (taking tylenol)    He has not had any problems with his ulcerative colitis in over 6 months.  Recent blood pressures have been from 101-145/45-68.  When he 1st gets up in the morning he is dizzy for a few seconds and he occasionally gets dizzy when he stands.  He has mild headaches which are relieved with Tylenol.    Hypertension  This is a chronic problem. The current episode started more than 1 year ago. The problem is controlled. Associated symptoms include headaches.     Review of Systems   Constitutional: Negative.    Respiratory: Negative.    Cardiovascular: Negative.    Neurological: Positive for dizziness and headaches.         Objective:      Physical Exam  Constitutional:       Appearance: He is well-developed.   HENT:      Head: Normocephalic and atraumatic.   Eyes:      Pupils: Pupils are equal, round, and reactive to light.   Cardiovascular:      Rate and Rhythm: Normal rate and regular rhythm.      Heart sounds: Murmur present. Systolic murmur present with a grade of 3/6.      Comments: @RUSB  Pulmonary:      Effort: Pulmonary effort is normal.   Neurological:      Mental Status: He is alert.         Assessment:       1. Essential hypertension    2. PAF (paroxysmal atrial fibrillation)    3. Ulcerative colitis with complication, unspecified location    4. Acquired hypothyroidism    5. CKD (chronic kidney disease) stage 3, GFR 30-59 ml/min    6. Dizzy spells    7. Nonintractable headache, unspecified chronicity pattern, unspecified headache type        Plan:       Per orders and D/C instructions.  Continue meds/diet for CKD, HTN, A fib, and hypothyroid, which are stable.     follow-up with Dr. Fraga for ulcerative colitis.  He will continue to rise slowly and wait for the dizziness to go away before starting  to walk.  Check labs.  I offered physical therapy for his balance but he defers for now.

## 2020-07-24 LAB
CHOLEST SERPL-MCNC: 182 MG/DL (ref 120–199)
CHOLEST/HDLC SERPL: 3.7 {RATIO} (ref 2–5)
HDLC SERPL-MCNC: 49 MG/DL (ref 40–75)
HDLC SERPL: 26.9 % (ref 20–50)
IRON SERPL-MCNC: 66 UG/DL (ref 45–160)
LDLC SERPL CALC-MCNC: 67 MG/DL (ref 63–159)
NONHDLC SERPL-MCNC: 133 MG/DL
SATURATED IRON: 20 % (ref 20–50)
TOTAL IRON BINDING CAPACITY: 330 UG/DL (ref 250–450)
TRANSFERRIN SERPL-MCNC: 223 MG/DL (ref 200–375)
TRIGL SERPL-MCNC: 330 MG/DL (ref 30–150)

## 2020-08-18 ENCOUNTER — LAB VISIT (OUTPATIENT)
Dept: LAB | Facility: HOSPITAL | Age: 80
End: 2020-08-18
Attending: INTERNAL MEDICINE
Payer: MEDICARE

## 2020-08-18 DIAGNOSIS — K51.90 ULCERATIVE COLITIS: Primary | ICD-10-CM

## 2020-08-18 PROCEDURE — 83993 ASSAY FOR CALPROTECTIN FECAL: CPT

## 2020-08-26 LAB — CALPROTECTIN STL-MCNT: 263.2 MCG/G

## 2020-09-03 ENCOUNTER — LAB VISIT (OUTPATIENT)
Dept: LAB | Facility: HOSPITAL | Age: 80
End: 2020-09-03
Attending: INTERNAL MEDICINE
Payer: MEDICARE

## 2020-09-03 DIAGNOSIS — K51.00 ULCERATIVE COLITIS, UNIVERSAL: Primary | ICD-10-CM

## 2020-09-03 PROCEDURE — 36415 COLL VENOUS BLD VENIPUNCTURE: CPT

## 2020-09-03 PROCEDURE — 86480 TB TEST CELL IMMUN MEASURE: CPT

## 2020-09-08 LAB
GAMMA INTERFERON BACKGROUND BLD IA-ACNC: 0.25 IU/ML
M TB IFN-G CD4+ BCKGRND COR BLD-ACNC: -0.06 IU/ML
MITOGEN IGNF BCKGRD COR BLD-ACNC: >10 IU/ML
TB GOLD PLUS: NEGATIVE
TB2 - NIL: -0.01 IU/ML

## 2020-10-09 ENCOUNTER — OFFICE VISIT (OUTPATIENT)
Dept: INTERNAL MEDICINE | Facility: CLINIC | Age: 80
End: 2020-10-09
Attending: INTERNAL MEDICINE
Payer: MEDICARE

## 2020-10-09 VITALS
OXYGEN SATURATION: 97 % | WEIGHT: 169 LBS | BODY MASS INDEX: 23.66 KG/M2 | SYSTOLIC BLOOD PRESSURE: 130 MMHG | HEART RATE: 64 BPM | HEIGHT: 71 IN | DIASTOLIC BLOOD PRESSURE: 72 MMHG

## 2020-10-09 DIAGNOSIS — M54.2 NECK PAIN ON RIGHT SIDE: ICD-10-CM

## 2020-10-09 DIAGNOSIS — H25.011 CORTICAL AGE-RELATED CATARACT OF RIGHT EYE: ICD-10-CM

## 2020-10-09 DIAGNOSIS — E03.9 ACQUIRED HYPOTHYROIDISM: ICD-10-CM

## 2020-10-09 DIAGNOSIS — H26.9 CATARACT, UNSPECIFIED CATARACT TYPE, UNSPECIFIED LATERALITY: ICD-10-CM

## 2020-10-09 DIAGNOSIS — K51.919 ULCERATIVE COLITIS WITH COMPLICATION, UNSPECIFIED LOCATION: ICD-10-CM

## 2020-10-09 DIAGNOSIS — I48.0 PAF (PAROXYSMAL ATRIAL FIBRILLATION): ICD-10-CM

## 2020-10-09 DIAGNOSIS — N40.0 BENIGN PROSTATIC HYPERPLASIA, UNSPECIFIED WHETHER LOWER URINARY TRACT SYMPTOMS PRESENT: Primary | ICD-10-CM

## 2020-10-09 DIAGNOSIS — I10 ESSENTIAL HYPERTENSION: ICD-10-CM

## 2020-10-09 PROCEDURE — G0008 FLU VACCINE - QUADRIVALENT - ADJUVANTED: ICD-10-PCS | Mod: S$GLB,,, | Performed by: INTERNAL MEDICINE

## 2020-10-09 PROCEDURE — 3075F SYST BP GE 130 - 139MM HG: CPT | Mod: CPTII,S$GLB,, | Performed by: INTERNAL MEDICINE

## 2020-10-09 PROCEDURE — 3078F PR MOST RECENT DIASTOLIC BLOOD PRESSURE < 80 MM HG: ICD-10-PCS | Mod: CPTII,S$GLB,, | Performed by: INTERNAL MEDICINE

## 2020-10-09 PROCEDURE — 1159F MED LIST DOCD IN RCRD: CPT | Mod: S$GLB,,, | Performed by: INTERNAL MEDICINE

## 2020-10-09 PROCEDURE — 99215 PR OFFICE/OUTPT VISIT, EST, LEVL V, 40-54 MIN: ICD-10-PCS | Mod: 25,S$GLB,, | Performed by: INTERNAL MEDICINE

## 2020-10-09 PROCEDURE — 3075F PR MOST RECENT SYSTOLIC BLOOD PRESS GE 130-139MM HG: ICD-10-PCS | Mod: CPTII,S$GLB,, | Performed by: INTERNAL MEDICINE

## 2020-10-09 PROCEDURE — 99215 OFFICE O/P EST HI 40 MIN: CPT | Mod: 25,S$GLB,, | Performed by: INTERNAL MEDICINE

## 2020-10-09 PROCEDURE — 90694 VACC AIIV4 NO PRSRV 0.5ML IM: CPT | Mod: S$GLB,,, | Performed by: INTERNAL MEDICINE

## 2020-10-09 PROCEDURE — 90694 FLU VACCINE - QUADRIVALENT - ADJUVANTED: ICD-10-PCS | Mod: S$GLB,,, | Performed by: INTERNAL MEDICINE

## 2020-10-09 PROCEDURE — 3078F DIAST BP <80 MM HG: CPT | Mod: CPTII,S$GLB,, | Performed by: INTERNAL MEDICINE

## 2020-10-09 PROCEDURE — G0008 ADMIN INFLUENZA VIRUS VAC: HCPCS | Mod: S$GLB,,, | Performed by: INTERNAL MEDICINE

## 2020-10-09 PROCEDURE — 1159F PR MEDICATION LIST DOCUMENTED IN MEDICAL RECORD: ICD-10-PCS | Mod: S$GLB,,, | Performed by: INTERNAL MEDICINE

## 2020-10-09 RX ORDER — TIZANIDINE 4 MG/1
TABLET ORAL
Qty: 30 TABLET | Refills: 0 | Status: SHIPPED | OUTPATIENT
Start: 2020-10-09 | End: 2021-07-26

## 2020-10-09 RX ORDER — ZOSTER VACCINE RECOMBINANT, ADJUVANTED 50 MCG/0.5
0.5 KIT INTRAMUSCULAR ONCE
Qty: 1 EACH | Refills: 0 | Status: SHIPPED | OUTPATIENT
Start: 2020-10-09 | End: 2020-10-09

## 2020-10-09 NOTE — PROGRESS NOTES
Subjective:       Patient ID: Ramesh Ricci is a 80 y.o. male.    Chief Complaint: Shoulder Pain (right side, ongoinf for over 1 week) and Pre-op Exam (11/11/20 rt. cataract)    His scheduled for right cataract surgery by Dr. Zuñiga on November 11, 2020.  He has been using an electric maru to shave some overhead bushes for the last several days.  He now has pain in his right shoulder that radiates down to his mid forearm.  It bothers him more if he raises his arms.  It does give him difficulty sleeping at night.    Hypertension  This is a chronic problem. The current episode started more than 1 year ago. The problem is controlled. Associated symptoms include neck pain.     Review of Systems   Constitutional: Negative.    Respiratory: Negative.    Cardiovascular: Negative.    Musculoskeletal: Positive for arthralgias and neck pain.         Objective:      Physical Exam  Constitutional:       Appearance: He is well-developed.   HENT:      Head: Normocephalic and atraumatic.   Eyes:      Pupils: Pupils are equal, round, and reactive to light.   Cardiovascular:      Rate and Rhythm: Normal rate and regular rhythm.      Heart sounds: Murmur present. Systolic murmur present with a grade of 3/6.      Comments: @RUSB  Pulmonary:      Effort: Pulmonary effort is normal.   Neurological:      Mental Status: He is alert.         Assessment:       1. Benign prostatic hyperplasia, unspecified whether lower urinary tract symptoms present    2. Essential hypertension    3. PAF (paroxysmal atrial fibrillation)    4. Ulcerative colitis with complication, unspecified location    5. Acquired hypothyroidism    6. Neck pain on right side    7. Cortical age-related cataract of right eye        Plan:       Per orders and D/C instructions.  Continue meds/diet for P. A. fib, BPH, hypothyroid, HTN, and high cholesterol, which are stable.     follow-up with GI for ulcerative colitis.  Rest, Celebrex, and Zanaflex for right-sided neck  pain.  We will start physical therapy if not better in 1-2 weeks.  He is medically optimized for cataract surgery.

## 2021-01-12 ENCOUNTER — IMMUNIZATION (OUTPATIENT)
Dept: OBSTETRICS AND GYNECOLOGY | Facility: CLINIC | Age: 81
End: 2021-01-12
Payer: MEDICARE

## 2021-01-12 DIAGNOSIS — Z23 NEED FOR VACCINATION: ICD-10-CM

## 2021-01-12 PROCEDURE — 91300 COVID-19, MRNA, LNP-S, PF, 30 MCG/0.3 ML DOSE VACCINE: CPT | Mod: PBBFAC | Performed by: FAMILY MEDICINE

## 2021-01-25 ENCOUNTER — OFFICE VISIT (OUTPATIENT)
Dept: INTERNAL MEDICINE | Facility: CLINIC | Age: 81
End: 2021-01-25
Attending: INTERNAL MEDICINE
Payer: MEDICARE

## 2021-01-25 VITALS
DIASTOLIC BLOOD PRESSURE: 64 MMHG | SYSTOLIC BLOOD PRESSURE: 118 MMHG | WEIGHT: 170 LBS | HEIGHT: 71 IN | BODY MASS INDEX: 23.8 KG/M2 | HEART RATE: 63 BPM | OXYGEN SATURATION: 97 %

## 2021-01-25 DIAGNOSIS — N18.31 STAGE 3A CHRONIC KIDNEY DISEASE: ICD-10-CM

## 2021-01-25 DIAGNOSIS — K51.919 ULCERATIVE COLITIS WITH COMPLICATION, UNSPECIFIED LOCATION: ICD-10-CM

## 2021-01-25 DIAGNOSIS — R35.1 NOCTURIA: ICD-10-CM

## 2021-01-25 DIAGNOSIS — I10 ESSENTIAL HYPERTENSION: Primary | ICD-10-CM

## 2021-01-25 DIAGNOSIS — Z00.00 ROUTINE ADULT HEALTH MAINTENANCE: ICD-10-CM

## 2021-01-25 DIAGNOSIS — Z13.39 SCREENING FOR ALCOHOLISM: ICD-10-CM

## 2021-01-25 DIAGNOSIS — Z13.31 SCREENING FOR DEPRESSION: ICD-10-CM

## 2021-01-25 DIAGNOSIS — R51.9 NONINTRACTABLE HEADACHE, UNSPECIFIED CHRONICITY PATTERN, UNSPECIFIED HEADACHE TYPE: ICD-10-CM

## 2021-01-25 DIAGNOSIS — C61 PROSTATE CA: ICD-10-CM

## 2021-01-25 DIAGNOSIS — E03.9 ACQUIRED HYPOTHYROIDISM: ICD-10-CM

## 2021-01-25 PROCEDURE — 3078F DIAST BP <80 MM HG: CPT | Mod: CPTII,S$GLB,, | Performed by: INTERNAL MEDICINE

## 2021-01-25 PROCEDURE — 99214 OFFICE O/P EST MOD 30 MIN: CPT | Mod: 25,S$GLB,, | Performed by: INTERNAL MEDICINE

## 2021-01-25 PROCEDURE — 3074F PR MOST RECENT SYSTOLIC BLOOD PRESSURE < 130 MM HG: ICD-10-PCS | Mod: CPTII,S$GLB,, | Performed by: INTERNAL MEDICINE

## 2021-01-25 PROCEDURE — 99214 PR OFFICE/OUTPT VISIT, EST, LEVL IV, 30-39 MIN: ICD-10-PCS | Mod: 25,S$GLB,, | Performed by: INTERNAL MEDICINE

## 2021-01-25 PROCEDURE — 3078F PR MOST RECENT DIASTOLIC BLOOD PRESSURE < 80 MM HG: ICD-10-PCS | Mod: CPTII,S$GLB,, | Performed by: INTERNAL MEDICINE

## 2021-01-25 PROCEDURE — 3074F SYST BP LT 130 MM HG: CPT | Mod: CPTII,S$GLB,, | Performed by: INTERNAL MEDICINE

## 2021-01-25 PROCEDURE — G0442 PR  ALCOHOL SCREENING: ICD-10-PCS | Mod: S$GLB,59,, | Performed by: INTERNAL MEDICINE

## 2021-01-25 PROCEDURE — 3066F NEPHROPATHY DOC TX: CPT | Mod: S$GLB,,, | Performed by: INTERNAL MEDICINE

## 2021-01-25 PROCEDURE — 1159F PR MEDICATION LIST DOCUMENTED IN MEDICAL RECORD: ICD-10-PCS | Mod: S$GLB,,, | Performed by: INTERNAL MEDICINE

## 2021-01-25 PROCEDURE — G0444 PR DEPRESSION SCREENING: ICD-10-PCS | Mod: 59,S$GLB,, | Performed by: INTERNAL MEDICINE

## 2021-01-25 PROCEDURE — 3066F PR DOCUMENTATION OF TREATMENT FOR NEPHROPATHY: ICD-10-PCS | Mod: S$GLB,,, | Performed by: INTERNAL MEDICINE

## 2021-01-25 PROCEDURE — G0444 DEPRESSION SCREEN ANNUAL: HCPCS | Mod: 59,S$GLB,, | Performed by: INTERNAL MEDICINE

## 2021-01-25 PROCEDURE — G0442 ANNUAL ALCOHOL SCREEN 15 MIN: HCPCS | Mod: S$GLB,59,, | Performed by: INTERNAL MEDICINE

## 2021-01-25 PROCEDURE — 1160F PR REVIEW ALL MEDS BY PRESCRIBER/CLIN PHARMACIST DOCUMENTED: ICD-10-PCS | Mod: S$GLB,,, | Performed by: INTERNAL MEDICINE

## 2021-01-25 PROCEDURE — 1160F RVW MEDS BY RX/DR IN RCRD: CPT | Mod: S$GLB,,, | Performed by: INTERNAL MEDICINE

## 2021-01-25 PROCEDURE — 1159F MED LIST DOCD IN RCRD: CPT | Mod: S$GLB,,, | Performed by: INTERNAL MEDICINE

## 2021-02-02 ENCOUNTER — IMMUNIZATION (OUTPATIENT)
Dept: OBSTETRICS AND GYNECOLOGY | Facility: CLINIC | Age: 81
End: 2021-02-02
Payer: MEDICARE

## 2021-02-02 DIAGNOSIS — Z23 NEED FOR VACCINATION: Primary | ICD-10-CM

## 2021-02-02 PROCEDURE — 0002A COVID-19, MRNA, LNP-S, PF, 30 MCG/0.3 ML DOSE VACCINE: CPT | Mod: PBBFAC | Performed by: FAMILY MEDICINE

## 2021-02-02 PROCEDURE — 91300 COVID-19, MRNA, LNP-S, PF, 30 MCG/0.3 ML DOSE VACCINE: CPT | Mod: PBBFAC | Performed by: FAMILY MEDICINE

## 2021-02-13 ENCOUNTER — OFFICE VISIT (OUTPATIENT)
Dept: URGENT CARE | Facility: CLINIC | Age: 81
End: 2021-02-13
Payer: MEDICARE

## 2021-02-13 VITALS
TEMPERATURE: 98 F | OXYGEN SATURATION: 97 % | BODY MASS INDEX: 23.8 KG/M2 | HEIGHT: 71 IN | WEIGHT: 170 LBS | RESPIRATION RATE: 18 BRPM | DIASTOLIC BLOOD PRESSURE: 74 MMHG | SYSTOLIC BLOOD PRESSURE: 155 MMHG | HEART RATE: 60 BPM

## 2021-02-13 DIAGNOSIS — L30.9 DERMATITIS: Primary | ICD-10-CM

## 2021-02-13 PROCEDURE — 99499 RISK ADDL DX/OHS AUDIT: ICD-10-PCS | Mod: S$GLB,,, | Performed by: INTERNAL MEDICINE

## 2021-02-13 PROCEDURE — 99214 OFFICE O/P EST MOD 30 MIN: CPT | Mod: S$GLB,,, | Performed by: INTERNAL MEDICINE

## 2021-02-13 PROCEDURE — 99214 PR OFFICE/OUTPT VISIT, EST, LEVL IV, 30-39 MIN: ICD-10-PCS | Mod: S$GLB,,, | Performed by: INTERNAL MEDICINE

## 2021-02-13 PROCEDURE — 99499 UNLISTED E&M SERVICE: CPT | Mod: S$GLB,,, | Performed by: INTERNAL MEDICINE

## 2021-02-13 RX ORDER — TRIAMCINOLONE ACETONIDE 5 MG/G
CREAM TOPICAL 2 TIMES DAILY
Qty: 15 G | Refills: 1 | Status: SHIPPED | OUTPATIENT
Start: 2021-02-13 | End: 2021-02-15

## 2021-02-13 RX ORDER — TRIAMCINOLONE ACETONIDE 5 MG/G
CREAM TOPICAL 2 TIMES DAILY
Qty: 15 G | Refills: 0 | Status: SHIPPED | OUTPATIENT
Start: 2021-02-13 | End: 2021-02-13

## 2021-02-15 ENCOUNTER — OFFICE VISIT (OUTPATIENT)
Dept: DERMATOLOGY | Facility: CLINIC | Age: 81
End: 2021-02-15
Payer: MEDICARE

## 2021-02-15 DIAGNOSIS — L30.9 ECZEMA, UNSPECIFIED TYPE: Primary | ICD-10-CM

## 2021-02-15 DIAGNOSIS — L29.9 PRURITUS: ICD-10-CM

## 2021-02-15 PROCEDURE — 99204 OFFICE O/P NEW MOD 45 MIN: CPT | Mod: S$GLB,,, | Performed by: DERMATOLOGY

## 2021-02-15 PROCEDURE — 99204 PR OFFICE/OUTPT VISIT, NEW, LEVL IV, 45-59 MIN: ICD-10-PCS | Mod: S$GLB,,, | Performed by: DERMATOLOGY

## 2021-02-15 PROCEDURE — 1159F MED LIST DOCD IN RCRD: CPT | Mod: S$GLB,,, | Performed by: DERMATOLOGY

## 2021-02-15 PROCEDURE — 99999 PR PBB SHADOW E&M-EST. PATIENT-LVL I: CPT | Mod: PBBFAC,,,

## 2021-02-15 PROCEDURE — 99999 PR PBB SHADOW E&M-EST. PATIENT-LVL I: ICD-10-PCS | Mod: PBBFAC,,,

## 2021-02-15 PROCEDURE — 1159F PR MEDICATION LIST DOCUMENTED IN MEDICAL RECORD: ICD-10-PCS | Mod: S$GLB,,, | Performed by: DERMATOLOGY

## 2021-02-15 RX ORDER — TRIAMCINOLONE ACETONIDE 1 MG/G
CREAM TOPICAL
Qty: 454 G | Refills: 1 | Status: SHIPPED | OUTPATIENT
Start: 2021-02-15 | End: 2021-05-31 | Stop reason: SDUPTHER

## 2021-05-31 DIAGNOSIS — L30.9 ECZEMA, UNSPECIFIED TYPE: ICD-10-CM

## 2021-05-31 DIAGNOSIS — L29.9 PRURITUS: ICD-10-CM

## 2021-05-31 RX ORDER — TRIAMCINOLONE ACETONIDE 1 MG/G
CREAM TOPICAL
Qty: 454 G | Refills: 1 | Status: SHIPPED | OUTPATIENT
Start: 2021-05-31 | End: 2021-10-26 | Stop reason: CLARIF

## 2021-06-03 ENCOUNTER — OFFICE VISIT (OUTPATIENT)
Dept: DERMATOLOGY | Facility: CLINIC | Age: 81
End: 2021-06-03
Payer: MEDICARE

## 2021-06-03 DIAGNOSIS — R21 RASH: ICD-10-CM

## 2021-06-03 DIAGNOSIS — Z76.89 ENCOUNTER FOR SKIN CARE: ICD-10-CM

## 2021-06-03 DIAGNOSIS — L29.9 ITCH: Primary | ICD-10-CM

## 2021-06-03 PROCEDURE — 3288F PR FALLS RISK ASSESSMENT DOCUMENTED: ICD-10-PCS | Mod: CPTII,S$GLB,, | Performed by: DERMATOLOGY

## 2021-06-03 PROCEDURE — 99999 PR PBB SHADOW E&M-EST. PATIENT-LVL III: CPT | Mod: PBBFAC,,, | Performed by: DERMATOLOGY

## 2021-06-03 PROCEDURE — 88305 TISSUE EXAM BY PATHOLOGIST: ICD-10-PCS | Mod: 26,,, | Performed by: PATHOLOGY

## 2021-06-03 PROCEDURE — 11102 PR TANGENTIAL BIOPSY, SKIN, SINGLE LESION: ICD-10-PCS | Mod: S$GLB,,, | Performed by: DERMATOLOGY

## 2021-06-03 PROCEDURE — 1159F MED LIST DOCD IN RCRD: CPT | Mod: S$GLB,,, | Performed by: DERMATOLOGY

## 2021-06-03 PROCEDURE — 1125F PR PAIN SEVERITY QUANTIFIED, PAIN PRESENT: ICD-10-PCS | Mod: S$GLB,,, | Performed by: DERMATOLOGY

## 2021-06-03 PROCEDURE — 11102 TANGNTL BX SKIN SINGLE LES: CPT | Mod: S$GLB,,, | Performed by: DERMATOLOGY

## 2021-06-03 PROCEDURE — 99999 PR PBB SHADOW E&M-EST. PATIENT-LVL III: ICD-10-PCS | Mod: PBBFAC,,, | Performed by: DERMATOLOGY

## 2021-06-03 PROCEDURE — 99214 OFFICE O/P EST MOD 30 MIN: CPT | Mod: 25,S$GLB,, | Performed by: DERMATOLOGY

## 2021-06-03 PROCEDURE — 1125F AMNT PAIN NOTED PAIN PRSNT: CPT | Mod: S$GLB,,, | Performed by: DERMATOLOGY

## 2021-06-03 PROCEDURE — 88305 TISSUE EXAM BY PATHOLOGIST: CPT | Performed by: PATHOLOGY

## 2021-06-03 PROCEDURE — 1159F PR MEDICATION LIST DOCUMENTED IN MEDICAL RECORD: ICD-10-PCS | Mod: S$GLB,,, | Performed by: DERMATOLOGY

## 2021-06-03 PROCEDURE — 88305 TISSUE EXAM BY PATHOLOGIST: CPT | Mod: 26,,, | Performed by: PATHOLOGY

## 2021-06-03 PROCEDURE — 1101F PR PT FALLS ASSESS DOC 0-1 FALLS W/OUT INJ PAST YR: ICD-10-PCS | Mod: CPTII,S$GLB,, | Performed by: DERMATOLOGY

## 2021-06-03 PROCEDURE — 3288F FALL RISK ASSESSMENT DOCD: CPT | Mod: CPTII,S$GLB,, | Performed by: DERMATOLOGY

## 2021-06-03 PROCEDURE — 99214 PR OFFICE/OUTPT VISIT, EST, LEVL IV, 30-39 MIN: ICD-10-PCS | Mod: 25,S$GLB,, | Performed by: DERMATOLOGY

## 2021-06-03 PROCEDURE — 1101F PT FALLS ASSESS-DOCD LE1/YR: CPT | Mod: CPTII,S$GLB,, | Performed by: DERMATOLOGY

## 2021-06-03 RX ORDER — PERMETHRIN 50 MG/G
CREAM TOPICAL
Qty: 60 G | Refills: 1 | Status: SHIPPED | OUTPATIENT
Start: 2021-06-03 | End: 2021-07-26

## 2021-06-03 RX ORDER — FLUOCINONIDE 0.5 MG/G
CREAM TOPICAL 2 TIMES DAILY
Qty: 60 G | Refills: 0 | Status: SHIPPED | OUTPATIENT
Start: 2021-06-03 | End: 2021-07-26

## 2021-06-03 RX ORDER — PREDNISONE 10 MG/1
TABLET ORAL
Qty: 24 TABLET | Refills: 0 | Status: SHIPPED | OUTPATIENT
Start: 2021-06-03 | End: 2021-07-26

## 2021-06-06 ENCOUNTER — PATIENT MESSAGE (OUTPATIENT)
Dept: DERMATOLOGY | Facility: CLINIC | Age: 81
End: 2021-06-06

## 2021-06-11 ENCOUNTER — PATIENT MESSAGE (OUTPATIENT)
Dept: DERMATOLOGY | Facility: CLINIC | Age: 81
End: 2021-06-11

## 2021-06-11 LAB
FINAL PATHOLOGIC DIAGNOSIS: NORMAL
GROSS: NORMAL
Lab: NORMAL
MICROSCOPIC EXAM: NORMAL

## 2021-06-15 ENCOUNTER — TELEPHONE (OUTPATIENT)
Dept: DERMATOLOGY | Facility: CLINIC | Age: 81
End: 2021-06-15

## 2021-06-16 ENCOUNTER — OFFICE VISIT (OUTPATIENT)
Dept: DERMATOLOGY | Facility: CLINIC | Age: 81
End: 2021-06-16
Payer: MEDICARE

## 2021-06-16 DIAGNOSIS — L29.9 ITCH: Primary | ICD-10-CM

## 2021-06-16 DIAGNOSIS — Z76.89 ENCOUNTER FOR SKIN CARE: ICD-10-CM

## 2021-06-16 DIAGNOSIS — R21 RASH: ICD-10-CM

## 2021-06-16 PROCEDURE — 99999 PR PBB SHADOW E&M-EST. PATIENT-LVL IV: CPT | Mod: PBBFAC,,, | Performed by: DERMATOLOGY

## 2021-06-16 PROCEDURE — 99999 PR PBB SHADOW E&M-EST. PATIENT-LVL IV: ICD-10-PCS | Mod: PBBFAC,,, | Performed by: DERMATOLOGY

## 2021-06-16 PROCEDURE — 1101F PR PT FALLS ASSESS DOC 0-1 FALLS W/OUT INJ PAST YR: ICD-10-PCS | Mod: CPTII,S$GLB,, | Performed by: DERMATOLOGY

## 2021-06-16 PROCEDURE — 99214 OFFICE O/P EST MOD 30 MIN: CPT | Mod: S$GLB,,, | Performed by: DERMATOLOGY

## 2021-06-16 PROCEDURE — 3288F PR FALLS RISK ASSESSMENT DOCUMENTED: ICD-10-PCS | Mod: CPTII,S$GLB,, | Performed by: DERMATOLOGY

## 2021-06-16 PROCEDURE — 1159F MED LIST DOCD IN RCRD: CPT | Mod: S$GLB,,, | Performed by: DERMATOLOGY

## 2021-06-16 PROCEDURE — 99214 PR OFFICE/OUTPT VISIT, EST, LEVL IV, 30-39 MIN: ICD-10-PCS | Mod: S$GLB,,, | Performed by: DERMATOLOGY

## 2021-06-16 PROCEDURE — 1159F PR MEDICATION LIST DOCUMENTED IN MEDICAL RECORD: ICD-10-PCS | Mod: S$GLB,,, | Performed by: DERMATOLOGY

## 2021-06-16 PROCEDURE — 1126F PR PAIN SEVERITY QUANTIFIED, NO PAIN PRESENT: ICD-10-PCS | Mod: S$GLB,,, | Performed by: DERMATOLOGY

## 2021-06-16 PROCEDURE — 1101F PT FALLS ASSESS-DOCD LE1/YR: CPT | Mod: CPTII,S$GLB,, | Performed by: DERMATOLOGY

## 2021-06-16 PROCEDURE — 3288F FALL RISK ASSESSMENT DOCD: CPT | Mod: CPTII,S$GLB,, | Performed by: DERMATOLOGY

## 2021-06-16 PROCEDURE — 1126F AMNT PAIN NOTED NONE PRSNT: CPT | Mod: S$GLB,,, | Performed by: DERMATOLOGY

## 2021-06-16 RX ORDER — CLOBETASOL PROPIONATE 0.5 MG/G
CREAM TOPICAL 2 TIMES DAILY
Qty: 60 G | Refills: 0 | Status: SHIPPED | OUTPATIENT
Start: 2021-06-16 | End: 2021-07-26

## 2021-06-22 ENCOUNTER — TELEPHONE (OUTPATIENT)
Dept: DERMATOLOGY | Facility: CLINIC | Age: 81
End: 2021-06-22

## 2021-06-22 ENCOUNTER — OFFICE VISIT (OUTPATIENT)
Dept: DERMATOLOGY | Facility: CLINIC | Age: 81
End: 2021-06-22
Payer: MEDICARE

## 2021-06-22 DIAGNOSIS — L40.9 PSORIASIS: ICD-10-CM

## 2021-06-22 DIAGNOSIS — L29.9 ITCH: Primary | ICD-10-CM

## 2021-06-22 DIAGNOSIS — Z76.89 ENCOUNTER FOR SKIN CARE: ICD-10-CM

## 2021-06-22 PROCEDURE — 99214 PR OFFICE/OUTPT VISIT, EST, LEVL IV, 30-39 MIN: ICD-10-PCS | Mod: S$GLB,,, | Performed by: DERMATOLOGY

## 2021-06-22 PROCEDURE — 1159F PR MEDICATION LIST DOCUMENTED IN MEDICAL RECORD: ICD-10-PCS | Mod: S$GLB,,, | Performed by: DERMATOLOGY

## 2021-06-22 PROCEDURE — 99999 PR PBB SHADOW E&M-EST. PATIENT-LVL IV: CPT | Mod: PBBFAC,,, | Performed by: DERMATOLOGY

## 2021-06-22 PROCEDURE — 1101F PT FALLS ASSESS-DOCD LE1/YR: CPT | Mod: CPTII,S$GLB,, | Performed by: DERMATOLOGY

## 2021-06-22 PROCEDURE — 1101F PR PT FALLS ASSESS DOC 0-1 FALLS W/OUT INJ PAST YR: ICD-10-PCS | Mod: CPTII,S$GLB,, | Performed by: DERMATOLOGY

## 2021-06-22 PROCEDURE — 99999 PR PBB SHADOW E&M-EST. PATIENT-LVL IV: ICD-10-PCS | Mod: PBBFAC,,, | Performed by: DERMATOLOGY

## 2021-06-22 PROCEDURE — 3288F FALL RISK ASSESSMENT DOCD: CPT | Mod: CPTII,S$GLB,, | Performed by: DERMATOLOGY

## 2021-06-22 PROCEDURE — 1126F PR PAIN SEVERITY QUANTIFIED, NO PAIN PRESENT: ICD-10-PCS | Mod: S$GLB,,, | Performed by: DERMATOLOGY

## 2021-06-22 PROCEDURE — 1159F MED LIST DOCD IN RCRD: CPT | Mod: S$GLB,,, | Performed by: DERMATOLOGY

## 2021-06-22 PROCEDURE — 3288F PR FALLS RISK ASSESSMENT DOCUMENTED: ICD-10-PCS | Mod: CPTII,S$GLB,, | Performed by: DERMATOLOGY

## 2021-06-22 PROCEDURE — 1126F AMNT PAIN NOTED NONE PRSNT: CPT | Mod: S$GLB,,, | Performed by: DERMATOLOGY

## 2021-06-22 PROCEDURE — 99214 OFFICE O/P EST MOD 30 MIN: CPT | Mod: S$GLB,,, | Performed by: DERMATOLOGY

## 2021-06-22 RX ORDER — CLOBETASOL PROPIONATE 0.5 MG/G
CREAM TOPICAL 2 TIMES DAILY
Qty: 60 G | Refills: 0 | Status: SHIPPED | OUTPATIENT
Start: 2021-06-22 | End: 2021-07-13 | Stop reason: SDUPTHER

## 2021-06-22 RX ORDER — CALCIPOTRIENE 50 UG/G
CREAM TOPICAL 2 TIMES DAILY
Qty: 60 G | Refills: 0 | Status: SHIPPED | OUTPATIENT
Start: 2021-06-22 | End: 2021-07-13 | Stop reason: SDUPTHER

## 2021-06-29 ENCOUNTER — PATIENT MESSAGE (OUTPATIENT)
Dept: DERMATOLOGY | Facility: CLINIC | Age: 81
End: 2021-06-29

## 2021-07-13 ENCOUNTER — OFFICE VISIT (OUTPATIENT)
Dept: DERMATOLOGY | Facility: CLINIC | Age: 81
End: 2021-07-13
Payer: MEDICARE

## 2021-07-13 DIAGNOSIS — L29.9 ITCH: ICD-10-CM

## 2021-07-13 DIAGNOSIS — L21.9 SEBORRHEIC DERMATITIS OF SCALP: ICD-10-CM

## 2021-07-13 DIAGNOSIS — L40.9 PSORIASIS: Primary | ICD-10-CM

## 2021-07-13 DIAGNOSIS — Z76.89 ENCOUNTER FOR SKIN CARE: ICD-10-CM

## 2021-07-13 PROCEDURE — 3288F FALL RISK ASSESSMENT DOCD: CPT | Mod: CPTII,S$GLB,, | Performed by: DERMATOLOGY

## 2021-07-13 PROCEDURE — 99214 PR OFFICE/OUTPT VISIT, EST, LEVL IV, 30-39 MIN: ICD-10-PCS | Mod: S$GLB,,, | Performed by: DERMATOLOGY

## 2021-07-13 PROCEDURE — 99499 UNLISTED E&M SERVICE: CPT | Mod: S$GLB,,, | Performed by: DERMATOLOGY

## 2021-07-13 PROCEDURE — 1101F PT FALLS ASSESS-DOCD LE1/YR: CPT | Mod: CPTII,S$GLB,, | Performed by: DERMATOLOGY

## 2021-07-13 PROCEDURE — 1126F PR PAIN SEVERITY QUANTIFIED, NO PAIN PRESENT: ICD-10-PCS | Mod: S$GLB,,, | Performed by: DERMATOLOGY

## 2021-07-13 PROCEDURE — 1159F PR MEDICATION LIST DOCUMENTED IN MEDICAL RECORD: ICD-10-PCS | Mod: S$GLB,,, | Performed by: DERMATOLOGY

## 2021-07-13 PROCEDURE — 1101F PR PT FALLS ASSESS DOC 0-1 FALLS W/OUT INJ PAST YR: ICD-10-PCS | Mod: CPTII,S$GLB,, | Performed by: DERMATOLOGY

## 2021-07-13 PROCEDURE — 3288F PR FALLS RISK ASSESSMENT DOCUMENTED: ICD-10-PCS | Mod: CPTII,S$GLB,, | Performed by: DERMATOLOGY

## 2021-07-13 PROCEDURE — 1126F AMNT PAIN NOTED NONE PRSNT: CPT | Mod: S$GLB,,, | Performed by: DERMATOLOGY

## 2021-07-13 PROCEDURE — 99499 RISK ADDL DX/OHS AUDIT: ICD-10-PCS | Mod: S$GLB,,, | Performed by: DERMATOLOGY

## 2021-07-13 PROCEDURE — 99214 OFFICE O/P EST MOD 30 MIN: CPT | Mod: S$GLB,,, | Performed by: DERMATOLOGY

## 2021-07-13 PROCEDURE — 99999 PR PBB SHADOW E&M-EST. PATIENT-LVL II: CPT | Mod: PBBFAC,,, | Performed by: DERMATOLOGY

## 2021-07-13 PROCEDURE — 1159F MED LIST DOCD IN RCRD: CPT | Mod: S$GLB,,, | Performed by: DERMATOLOGY

## 2021-07-13 PROCEDURE — 99999 PR PBB SHADOW E&M-EST. PATIENT-LVL II: ICD-10-PCS | Mod: PBBFAC,,, | Performed by: DERMATOLOGY

## 2021-07-13 RX ORDER — CLOBETASOL PROPIONATE 0.5 MG/G
CREAM TOPICAL 2 TIMES DAILY
Qty: 60 G | Refills: 0 | Status: SHIPPED | OUTPATIENT
Start: 2021-07-13 | End: 2021-10-26 | Stop reason: CLARIF

## 2021-07-13 RX ORDER — CALCIPOTRIENE 50 UG/G
CREAM TOPICAL 2 TIMES DAILY
Qty: 60 G | Refills: 0 | Status: SHIPPED | OUTPATIENT
Start: 2021-07-13 | End: 2021-10-26 | Stop reason: CLARIF

## 2021-07-14 ENCOUNTER — PATIENT MESSAGE (OUTPATIENT)
Dept: DERMATOLOGY | Facility: CLINIC | Age: 81
End: 2021-07-14

## 2021-07-26 ENCOUNTER — OFFICE VISIT (OUTPATIENT)
Dept: INTERNAL MEDICINE | Facility: CLINIC | Age: 81
End: 2021-07-26
Attending: INTERNAL MEDICINE
Payer: MEDICARE

## 2021-07-26 ENCOUNTER — LAB VISIT (OUTPATIENT)
Dept: LAB | Facility: OTHER | Age: 81
End: 2021-07-26
Attending: INTERNAL MEDICINE
Payer: MEDICARE

## 2021-07-26 VITALS
WEIGHT: 167 LBS | BODY MASS INDEX: 23.38 KG/M2 | DIASTOLIC BLOOD PRESSURE: 50 MMHG | HEIGHT: 71 IN | SYSTOLIC BLOOD PRESSURE: 92 MMHG | HEART RATE: 62 BPM | OXYGEN SATURATION: 97 %

## 2021-07-26 DIAGNOSIS — K51.919 ULCERATIVE COLITIS WITH COMPLICATION, UNSPECIFIED LOCATION: ICD-10-CM

## 2021-07-26 DIAGNOSIS — Z12.5 SCREENING FOR PROSTATE CANCER: ICD-10-CM

## 2021-07-26 DIAGNOSIS — E78.9 DISORDER OF LIPID METABOLISM: ICD-10-CM

## 2021-07-26 DIAGNOSIS — R42 DIZZY SPELLS: ICD-10-CM

## 2021-07-26 DIAGNOSIS — R79.89 OTHER SPECIFIED ABNORMAL FINDINGS OF BLOOD CHEMISTRY: ICD-10-CM

## 2021-07-26 DIAGNOSIS — C61 PROSTATE CA: ICD-10-CM

## 2021-07-26 DIAGNOSIS — E55.9 VITAMIN D DEFICIENCY: ICD-10-CM

## 2021-07-26 DIAGNOSIS — D50.8 OTHER IRON DEFICIENCY ANEMIA: ICD-10-CM

## 2021-07-26 DIAGNOSIS — E03.9 ACQUIRED HYPOTHYROIDISM: ICD-10-CM

## 2021-07-26 DIAGNOSIS — E03.9 HYPOTHYROIDISM, UNSPECIFIED TYPE: ICD-10-CM

## 2021-07-26 DIAGNOSIS — D51.0 PERNICIOUS ANEMIA: ICD-10-CM

## 2021-07-26 DIAGNOSIS — I10 ESSENTIAL HYPERTENSION: Primary | ICD-10-CM

## 2021-07-26 LAB
25(OH)D3+25(OH)D2 SERPL-MCNC: 30 NG/ML (ref 30–96)
ALBUMIN SERPL BCP-MCNC: 3.9 G/DL (ref 3.5–5.2)
ALP SERPL-CCNC: 62 U/L (ref 55–135)
ALT SERPL W/O P-5'-P-CCNC: 14 U/L (ref 10–44)
ANION GAP SERPL CALC-SCNC: 8 MMOL/L (ref 8–16)
AST SERPL-CCNC: 22 U/L (ref 10–40)
BASOPHILS # BLD AUTO: 0.05 K/UL (ref 0–0.2)
BASOPHILS NFR BLD: 0.7 % (ref 0–1.9)
BILIRUB SERPL-MCNC: 0.5 MG/DL (ref 0.1–1)
BUN SERPL-MCNC: 25 MG/DL (ref 8–23)
CALCIUM SERPL-MCNC: 9.6 MG/DL (ref 8.7–10.5)
CHLORIDE SERPL-SCNC: 103 MMOL/L (ref 95–110)
CHOLEST SERPL-MCNC: 191 MG/DL (ref 120–199)
CHOLEST/HDLC SERPL: 3.5 {RATIO} (ref 2–5)
CK SERPL-CCNC: 162 U/L (ref 20–200)
CO2 SERPL-SCNC: 27 MMOL/L (ref 23–29)
COMPLEXED PSA SERPL-MCNC: 0.82 NG/ML (ref 0–4)
CREAT SERPL-MCNC: 1.4 MG/DL (ref 0.5–1.4)
DIFFERENTIAL METHOD: ABNORMAL
EOSINOPHIL # BLD AUTO: 0.2 K/UL (ref 0–0.5)
EOSINOPHIL NFR BLD: 2.4 % (ref 0–8)
ERYTHROCYTE [DISTWIDTH] IN BLOOD BY AUTOMATED COUNT: 13.3 % (ref 11.5–14.5)
EST. GFR  (AFRICAN AMERICAN): 53 ML/MIN/1.73 M^2
EST. GFR  (NON AFRICAN AMERICAN): 46 ML/MIN/1.73 M^2
ESTIMATED AVG GLUCOSE: 111 MG/DL (ref 68–131)
GLUCOSE SERPL-MCNC: 91 MG/DL (ref 70–110)
HBA1C MFR BLD: 5.5 % (ref 4–5.6)
HCT VFR BLD AUTO: 38.5 % (ref 40–54)
HDLC SERPL-MCNC: 54 MG/DL (ref 40–75)
HDLC SERPL: 28.3 % (ref 20–50)
HGB BLD-MCNC: 12.4 G/DL (ref 14–18)
IMM GRANULOCYTES # BLD AUTO: 0.02 K/UL (ref 0–0.04)
IMM GRANULOCYTES NFR BLD AUTO: 0.3 % (ref 0–0.5)
IRON SERPL-MCNC: 51 UG/DL (ref 45–160)
LDLC SERPL CALC-MCNC: 85.8 MG/DL (ref 63–159)
LYMPHOCYTES # BLD AUTO: 1.7 K/UL (ref 1–4.8)
LYMPHOCYTES NFR BLD: 25.6 % (ref 18–48)
MCH RBC QN AUTO: 31 PG (ref 27–31)
MCHC RBC AUTO-ENTMCNC: 32.2 G/DL (ref 32–36)
MCV RBC AUTO: 96 FL (ref 82–98)
MONOCYTES # BLD AUTO: 0.8 K/UL (ref 0.3–1)
MONOCYTES NFR BLD: 11.3 % (ref 4–15)
NEUTROPHILS # BLD AUTO: 4 K/UL (ref 1.8–7.7)
NEUTROPHILS NFR BLD: 59.7 % (ref 38–73)
NONHDLC SERPL-MCNC: 137 MG/DL
NRBC BLD-RTO: 0 /100 WBC
PLATELET # BLD AUTO: 171 K/UL (ref 150–450)
PMV BLD AUTO: 10.6 FL (ref 9.2–12.9)
POTASSIUM SERPL-SCNC: 4.7 MMOL/L (ref 3.5–5.1)
PROT SERPL-MCNC: 7.6 G/DL (ref 6–8.4)
RBC # BLD AUTO: 4 M/UL (ref 4.6–6.2)
SATURATED IRON: 15 % (ref 20–50)
SODIUM SERPL-SCNC: 138 MMOL/L (ref 136–145)
T4 FREE SERPL-MCNC: 1.11 NG/DL (ref 0.71–1.51)
TOTAL IRON BINDING CAPACITY: 337 UG/DL (ref 250–450)
TRANSFERRIN SERPL-MCNC: 228 MG/DL (ref 200–375)
TRIGL SERPL-MCNC: 256 MG/DL (ref 30–150)
TSH SERPL DL<=0.005 MIU/L-ACNC: 1.05 UIU/ML (ref 0.4–4)
VIT B12 SERPL-MCNC: 538 PG/ML (ref 210–950)
WBC # BLD AUTO: 6.75 K/UL (ref 3.9–12.7)

## 2021-07-26 PROCEDURE — 82607 VITAMIN B-12: CPT | Performed by: INTERNAL MEDICINE

## 2021-07-26 PROCEDURE — 3078F DIAST BP <80 MM HG: CPT | Mod: CPTII,S$GLB,, | Performed by: INTERNAL MEDICINE

## 2021-07-26 PROCEDURE — 99214 PR OFFICE/OUTPT VISIT, EST, LEVL IV, 30-39 MIN: ICD-10-PCS | Mod: S$GLB,,, | Performed by: INTERNAL MEDICINE

## 2021-07-26 PROCEDURE — 84443 ASSAY THYROID STIM HORMONE: CPT | Performed by: INTERNAL MEDICINE

## 2021-07-26 PROCEDURE — 83036 HEMOGLOBIN GLYCOSYLATED A1C: CPT | Performed by: INTERNAL MEDICINE

## 2021-07-26 PROCEDURE — 83540 ASSAY OF IRON: CPT | Performed by: INTERNAL MEDICINE

## 2021-07-26 PROCEDURE — 1160F RVW MEDS BY RX/DR IN RCRD: CPT | Mod: CPTII,S$GLB,, | Performed by: INTERNAL MEDICINE

## 2021-07-26 PROCEDURE — 84153 ASSAY OF PSA TOTAL: CPT | Performed by: INTERNAL MEDICINE

## 2021-07-26 PROCEDURE — 1160F PR REVIEW ALL MEDS BY PRESCRIBER/CLIN PHARMACIST DOCUMENTED: ICD-10-PCS | Mod: CPTII,S$GLB,, | Performed by: INTERNAL MEDICINE

## 2021-07-26 PROCEDURE — 80061 LIPID PANEL: CPT | Performed by: INTERNAL MEDICINE

## 2021-07-26 PROCEDURE — 82306 VITAMIN D 25 HYDROXY: CPT | Performed by: INTERNAL MEDICINE

## 2021-07-26 PROCEDURE — 36415 COLL VENOUS BLD VENIPUNCTURE: CPT | Performed by: INTERNAL MEDICINE

## 2021-07-26 PROCEDURE — 3074F SYST BP LT 130 MM HG: CPT | Mod: CPTII,S$GLB,, | Performed by: INTERNAL MEDICINE

## 2021-07-26 PROCEDURE — 1159F MED LIST DOCD IN RCRD: CPT | Mod: CPTII,S$GLB,, | Performed by: INTERNAL MEDICINE

## 2021-07-26 PROCEDURE — 85025 COMPLETE CBC W/AUTO DIFF WBC: CPT | Performed by: INTERNAL MEDICINE

## 2021-07-26 PROCEDURE — 3074F PR MOST RECENT SYSTOLIC BLOOD PRESSURE < 130 MM HG: ICD-10-PCS | Mod: CPTII,S$GLB,, | Performed by: INTERNAL MEDICINE

## 2021-07-26 PROCEDURE — 1159F PR MEDICATION LIST DOCUMENTED IN MEDICAL RECORD: ICD-10-PCS | Mod: CPTII,S$GLB,, | Performed by: INTERNAL MEDICINE

## 2021-07-26 PROCEDURE — 99214 OFFICE O/P EST MOD 30 MIN: CPT | Mod: S$GLB,,, | Performed by: INTERNAL MEDICINE

## 2021-07-26 PROCEDURE — 82550 ASSAY OF CK (CPK): CPT | Performed by: INTERNAL MEDICINE

## 2021-07-26 PROCEDURE — 80053 COMPREHEN METABOLIC PANEL: CPT | Performed by: INTERNAL MEDICINE

## 2021-07-26 PROCEDURE — 84439 ASSAY OF FREE THYROXINE: CPT | Performed by: INTERNAL MEDICINE

## 2021-07-26 PROCEDURE — 3078F PR MOST RECENT DIASTOLIC BLOOD PRESSURE < 80 MM HG: ICD-10-PCS | Mod: CPTII,S$GLB,, | Performed by: INTERNAL MEDICINE

## 2021-07-26 RX ORDER — MESALAMINE 500 MG/1
2000 CAPSULE, EXTENDED RELEASE ORAL DAILY
COMMUNITY
End: 2022-06-02 | Stop reason: SDUPTHER

## 2021-07-26 RX ORDER — TELMISARTAN 40 MG/1
TABLET ORAL
Qty: 90 TABLET | Refills: 1 | Status: ON HOLD
Start: 2021-07-26 | End: 2021-10-29

## 2021-07-26 RX ORDER — TELMISARTAN 40 MG/1
TABLET ORAL
Qty: 90 TABLET | Refills: 1 | Status: SHIPPED | OUTPATIENT
Start: 2021-07-26 | End: 2021-07-26

## 2021-07-27 ENCOUNTER — PATIENT MESSAGE (OUTPATIENT)
Dept: INTERNAL MEDICINE | Facility: CLINIC | Age: 81
End: 2021-07-27

## 2021-08-13 RX ORDER — PRAVASTATIN SODIUM 20 MG/1
20 TABLET ORAL NIGHTLY
Qty: 90 TABLET | Refills: 3 | Status: SHIPPED | OUTPATIENT
Start: 2021-08-13 | End: 2023-09-07 | Stop reason: SDUPTHER

## 2021-10-26 ENCOUNTER — HOSPITAL ENCOUNTER (OUTPATIENT)
Dept: PREADMISSION TESTING | Facility: OTHER | Age: 81
Discharge: HOME | End: 2021-10-26
Attending: INTERNAL MEDICINE
Payer: MEDICARE

## 2021-10-26 VITALS
BODY MASS INDEX: 23.29 KG/M2 | SYSTOLIC BLOOD PRESSURE: 98 MMHG | DIASTOLIC BLOOD PRESSURE: 47 MMHG | WEIGHT: 167 LBS | HEART RATE: 68 BPM | TEMPERATURE: 98 F

## 2021-10-26 DIAGNOSIS — Z01.818 PREOP TESTING: Primary | ICD-10-CM

## 2021-10-26 LAB
ANION GAP SERPL CALC-SCNC: 10 MMOL/L (ref 8–16)
BASOPHILS # BLD AUTO: 0.03 K/UL (ref 0–0.2)
BASOPHILS NFR BLD: 0.4 % (ref 0–1.9)
BUN SERPL-MCNC: 21 MG/DL (ref 8–23)
CALCIUM SERPL-MCNC: 8.9 MG/DL (ref 8.7–10.5)
CHLORIDE SERPL-SCNC: 105 MMOL/L (ref 95–110)
CO2 SERPL-SCNC: 23 MMOL/L (ref 23–29)
CREAT SERPL-MCNC: 1.3 MG/DL (ref 0.5–1.4)
DIFFERENTIAL METHOD: ABNORMAL
EOSINOPHIL # BLD AUTO: 0.2 K/UL (ref 0–0.5)
EOSINOPHIL NFR BLD: 2.9 % (ref 0–8)
ERYTHROCYTE [DISTWIDTH] IN BLOOD BY AUTOMATED COUNT: 14.9 % (ref 11.5–14.5)
EST. GFR  (AFRICAN AMERICAN): 58 ML/MIN/1.73 M^2
EST. GFR  (NON AFRICAN AMERICAN): 50 ML/MIN/1.73 M^2
GLUCOSE SERPL-MCNC: 103 MG/DL (ref 70–110)
HCT VFR BLD AUTO: 25.9 % (ref 40–54)
HGB BLD-MCNC: 8.3 G/DL (ref 14–18)
IMM GRANULOCYTES # BLD AUTO: 0.02 K/UL (ref 0–0.04)
IMM GRANULOCYTES NFR BLD AUTO: 0.3 % (ref 0–0.5)
LYMPHOCYTES # BLD AUTO: 1.2 K/UL (ref 1–4.8)
LYMPHOCYTES NFR BLD: 17.4 % (ref 18–48)
MCH RBC QN AUTO: 31.4 PG (ref 27–31)
MCHC RBC AUTO-ENTMCNC: 32 G/DL (ref 32–36)
MCV RBC AUTO: 98 FL (ref 82–98)
MONOCYTES # BLD AUTO: 0.8 K/UL (ref 0.3–1)
MONOCYTES NFR BLD: 11 % (ref 4–15)
NEUTROPHILS # BLD AUTO: 4.6 K/UL (ref 1.8–7.7)
NEUTROPHILS NFR BLD: 68 % (ref 38–73)
NRBC BLD-RTO: 0 /100 WBC
PLATELET # BLD AUTO: 187 K/UL (ref 150–450)
PMV BLD AUTO: 10.5 FL (ref 9.2–12.9)
POTASSIUM SERPL-SCNC: 4.3 MMOL/L (ref 3.5–5.1)
RBC # BLD AUTO: 2.64 M/UL (ref 4.6–6.2)
SODIUM SERPL-SCNC: 138 MMOL/L (ref 136–145)
WBC # BLD AUTO: 6.82 K/UL (ref 3.9–12.7)

## 2021-10-26 PROCEDURE — 85025 COMPLETE CBC W/AUTO DIFF WBC: CPT | Performed by: INTERNAL MEDICINE

## 2021-10-26 PROCEDURE — 36415 COLL VENOUS BLD VENIPUNCTURE: CPT | Performed by: INTERNAL MEDICINE

## 2021-10-26 PROCEDURE — 80048 BASIC METABOLIC PNL TOTAL CA: CPT | Performed by: INTERNAL MEDICINE

## 2021-10-29 ENCOUNTER — HOSPITAL ENCOUNTER (OUTPATIENT)
Facility: OTHER | Age: 81
Discharge: HOME | End: 2021-10-29
Attending: INTERNAL MEDICINE | Admitting: INTERNAL MEDICINE
Payer: MEDICARE

## 2021-10-29 VITALS
DIASTOLIC BLOOD PRESSURE: 63 MMHG | TEMPERATURE: 98 F | WEIGHT: 167 LBS | RESPIRATION RATE: 18 BRPM | OXYGEN SATURATION: 97 % | SYSTOLIC BLOOD PRESSURE: 155 MMHG | HEART RATE: 59 BPM | BODY MASS INDEX: 23.29 KG/M2

## 2021-10-29 DIAGNOSIS — I35.0 NONRHEUMATIC AORTIC VALVE STENOSIS: ICD-10-CM

## 2021-10-29 DIAGNOSIS — Z01.818 PRE-OP TESTING: ICD-10-CM

## 2021-10-29 PROBLEM — D50.0 IRON DEFICIENCY ANEMIA DUE TO CHRONIC BLOOD LOSS: Status: ACTIVE | Noted: 2021-10-29

## 2021-10-29 PROCEDURE — C1751 CATH, INF, PER/CENT/MIDLINE: HCPCS | Performed by: INTERNAL MEDICINE

## 2021-10-29 PROCEDURE — 25500020 PHARM REV CODE 255: Performed by: INTERNAL MEDICINE

## 2021-10-29 PROCEDURE — 25000003 PHARM REV CODE 250: Performed by: INTERNAL MEDICINE

## 2021-10-29 PROCEDURE — 63600175 PHARM REV CODE 636 W HCPCS: Performed by: INTERNAL MEDICINE

## 2021-10-29 PROCEDURE — 99152 MOD SED SAME PHYS/QHP 5/>YRS: CPT | Performed by: INTERNAL MEDICINE

## 2021-10-29 PROCEDURE — 93460 R&L HRT ART/VENTRICLE ANGIO: CPT | Performed by: INTERNAL MEDICINE

## 2021-10-29 PROCEDURE — C1894 INTRO/SHEATH, NON-LASER: HCPCS | Performed by: INTERNAL MEDICINE

## 2021-10-29 PROCEDURE — C1769 GUIDE WIRE: HCPCS | Performed by: INTERNAL MEDICINE

## 2021-10-29 PROCEDURE — 99153 MOD SED SAME PHYS/QHP EA: CPT | Performed by: INTERNAL MEDICINE

## 2021-10-29 RX ORDER — DIAZEPAM 5 MG/1
5 TABLET ORAL
Status: COMPLETED | OUTPATIENT
Start: 2021-10-29 | End: 2021-10-29

## 2021-10-29 RX ORDER — HYDROCODONE BITARTRATE AND ACETAMINOPHEN 10; 325 MG/1; MG/1
1 TABLET ORAL EVERY 4 HOURS PRN
Status: DISCONTINUED | OUTPATIENT
Start: 2021-10-29 | End: 2021-10-29 | Stop reason: HOSPADM

## 2021-10-29 RX ORDER — HEPARIN SOD,PORCINE/0.9 % NACL 1000/500ML
INTRAVENOUS SOLUTION INTRAVENOUS
Status: DISCONTINUED | OUTPATIENT
Start: 2021-10-29 | End: 2021-10-29 | Stop reason: HOSPADM

## 2021-10-29 RX ORDER — LIDOCAINE HYDROCHLORIDE 10 MG/ML
INJECTION, SOLUTION EPIDURAL; INFILTRATION; INTRACAUDAL; PERINEURAL
Status: DISCONTINUED | OUTPATIENT
Start: 2021-10-29 | End: 2021-10-29 | Stop reason: HOSPADM

## 2021-10-29 RX ORDER — DIPHENHYDRAMINE HCL 25 MG
25 CAPSULE ORAL
Status: COMPLETED | OUTPATIENT
Start: 2021-10-29 | End: 2021-10-29

## 2021-10-29 RX ORDER — HYDROCODONE BITARTRATE AND ACETAMINOPHEN 5; 325 MG/1; MG/1
1 TABLET ORAL EVERY 4 HOURS PRN
Status: DISCONTINUED | OUTPATIENT
Start: 2021-10-29 | End: 2021-10-29 | Stop reason: HOSPADM

## 2021-10-29 RX ORDER — NITROGLYCERIN 0.4 MG/1
0.4 TABLET SUBLINGUAL EVERY 5 MIN PRN
Status: DISCONTINUED | OUTPATIENT
Start: 2021-10-29 | End: 2021-10-29 | Stop reason: HOSPADM

## 2021-10-29 RX ORDER — NAPROXEN SODIUM 220 MG/1
81 TABLET, FILM COATED ORAL
Status: COMPLETED | OUTPATIENT
Start: 2021-10-29 | End: 2021-10-29

## 2021-10-29 RX ORDER — ONDANSETRON 8 MG/1
8 TABLET, ORALLY DISINTEGRATING ORAL EVERY 8 HOURS PRN
Status: DISCONTINUED | OUTPATIENT
Start: 2021-10-29 | End: 2021-10-29 | Stop reason: HOSPADM

## 2021-10-29 RX ORDER — MIDAZOLAM HYDROCHLORIDE 1 MG/ML
INJECTION INTRAMUSCULAR; INTRAVENOUS
Status: DISCONTINUED | OUTPATIENT
Start: 2021-10-29 | End: 2021-10-29 | Stop reason: HOSPADM

## 2021-10-29 RX ORDER — MAG HYDROX/ALUMINUM HYD/SIMETH 200-200-20
30 SUSPENSION, ORAL (FINAL DOSE FORM) ORAL
Status: DISCONTINUED | OUTPATIENT
Start: 2021-10-29 | End: 2021-10-29 | Stop reason: HOSPADM

## 2021-10-29 RX ORDER — FENTANYL CITRATE 50 UG/ML
INJECTION, SOLUTION INTRAMUSCULAR; INTRAVENOUS
Status: DISCONTINUED | OUTPATIENT
Start: 2021-10-29 | End: 2021-10-29 | Stop reason: HOSPADM

## 2021-10-29 RX ORDER — PANTOPRAZOLE SODIUM 40 MG/1
40 TABLET, DELAYED RELEASE ORAL DAILY
Qty: 30 TABLET | Refills: 11 | Status: SHIPPED | OUTPATIENT
Start: 2021-10-29 | End: 2022-03-31

## 2021-10-29 RX ORDER — ACETAMINOPHEN 325 MG/1
650 TABLET ORAL EVERY 4 HOURS PRN
Status: DISCONTINUED | OUTPATIENT
Start: 2021-10-29 | End: 2021-10-29 | Stop reason: HOSPADM

## 2021-10-29 RX ORDER — NAPROXEN SODIUM 220 MG/1
162 TABLET, FILM COATED ORAL
Status: DISCONTINUED | OUTPATIENT
Start: 2021-10-29 | End: 2021-10-29 | Stop reason: HOSPADM

## 2021-10-29 RX ORDER — DIPHENHYDRAMINE HYDROCHLORIDE 50 MG/ML
25 INJECTION INTRAMUSCULAR; INTRAVENOUS EVERY 6 HOURS PRN
Status: DISCONTINUED | OUTPATIENT
Start: 2021-10-29 | End: 2021-10-29 | Stop reason: HOSPADM

## 2021-10-29 RX ORDER — SODIUM CHLORIDE 9 MG/ML
INJECTION, SOLUTION INTRAVENOUS CONTINUOUS
Status: ACTIVE | OUTPATIENT
Start: 2021-10-29 | End: 2021-10-29

## 2021-10-29 RX ADMIN — DIAZEPAM 5 MG: 5 TABLET ORAL at 07:10

## 2021-10-29 RX ADMIN — DIPHENHYDRAMINE HYDROCHLORIDE 25 MG: 25 CAPSULE ORAL at 07:10

## 2021-10-29 RX ADMIN — ASPIRIN 81 MG CHEWABLE TABLET 81 MG: 81 TABLET CHEWABLE at 07:10

## 2022-01-03 DIAGNOSIS — E03.9 HYPOTHYROIDISM, UNSPECIFIED TYPE: ICD-10-CM

## 2022-01-03 RX ORDER — VENLAFAXINE 37.5 MG/1
TABLET ORAL
Qty: 180 TABLET | Refills: 1 | Status: SHIPPED | OUTPATIENT
Start: 2022-01-03 | End: 2022-04-01 | Stop reason: SDUPTHER

## 2022-01-03 RX ORDER — LEVOTHYROXINE SODIUM 125 UG/1
TABLET ORAL
Qty: 90 TABLET | Refills: 1 | Status: SHIPPED | OUTPATIENT
Start: 2022-01-03 | End: 2022-07-07

## 2022-01-05 ENCOUNTER — PATIENT MESSAGE (OUTPATIENT)
Dept: INTERNAL MEDICINE | Facility: CLINIC | Age: 82
End: 2022-01-05
Payer: MEDICARE

## 2022-01-31 ENCOUNTER — OFFICE VISIT (OUTPATIENT)
Dept: INTERNAL MEDICINE | Facility: CLINIC | Age: 82
End: 2022-01-31
Attending: INTERNAL MEDICINE
Payer: MEDICARE

## 2022-01-31 VITALS
DIASTOLIC BLOOD PRESSURE: 62 MMHG | OXYGEN SATURATION: 98 % | HEIGHT: 71 IN | SYSTOLIC BLOOD PRESSURE: 118 MMHG | BODY MASS INDEX: 23.1 KG/M2 | HEART RATE: 66 BPM | WEIGHT: 165 LBS

## 2022-01-31 DIAGNOSIS — N18.31 STAGE 3A CHRONIC KIDNEY DISEASE: ICD-10-CM

## 2022-01-31 DIAGNOSIS — E03.9 ACQUIRED HYPOTHYROIDISM: ICD-10-CM

## 2022-01-31 DIAGNOSIS — Z13.39 SCREENING FOR ALCOHOLISM: ICD-10-CM

## 2022-01-31 DIAGNOSIS — D50.0 IRON DEFICIENCY ANEMIA DUE TO CHRONIC BLOOD LOSS: ICD-10-CM

## 2022-01-31 DIAGNOSIS — I10 ESSENTIAL HYPERTENSION: Primary | ICD-10-CM

## 2022-01-31 DIAGNOSIS — I25.10 CORONARY ARTERY DISEASE INVOLVING NATIVE CORONARY ARTERY OF NATIVE HEART WITHOUT ANGINA PECTORIS: ICD-10-CM

## 2022-01-31 DIAGNOSIS — Z13.31 SCREENING FOR DEPRESSION: ICD-10-CM

## 2022-01-31 DIAGNOSIS — C61 PROSTATE CA: ICD-10-CM

## 2022-01-31 DIAGNOSIS — R21 RASH: ICD-10-CM

## 2022-01-31 DIAGNOSIS — I48.0 PAF (PAROXYSMAL ATRIAL FIBRILLATION): ICD-10-CM

## 2022-01-31 DIAGNOSIS — K51.919 ULCERATIVE COLITIS WITH COMPLICATION, UNSPECIFIED LOCATION: ICD-10-CM

## 2022-01-31 PROCEDURE — 3074F SYST BP LT 130 MM HG: CPT | Mod: CPTII,S$GLB,, | Performed by: INTERNAL MEDICINE

## 2022-01-31 PROCEDURE — 1160F RVW MEDS BY RX/DR IN RCRD: CPT | Mod: CPTII,S$GLB,, | Performed by: INTERNAL MEDICINE

## 2022-01-31 PROCEDURE — G0442 PR  ALCOHOL SCREENING: ICD-10-PCS | Mod: 59,S$GLB,, | Performed by: INTERNAL MEDICINE

## 2022-01-31 PROCEDURE — 3074F PR MOST RECENT SYSTOLIC BLOOD PRESSURE < 130 MM HG: ICD-10-PCS | Mod: CPTII,S$GLB,, | Performed by: INTERNAL MEDICINE

## 2022-01-31 PROCEDURE — 3078F DIAST BP <80 MM HG: CPT | Mod: CPTII,S$GLB,, | Performed by: INTERNAL MEDICINE

## 2022-01-31 PROCEDURE — 99214 PR OFFICE/OUTPT VISIT, EST, LEVL IV, 30-39 MIN: ICD-10-PCS | Mod: 25,S$GLB,, | Performed by: INTERNAL MEDICINE

## 2022-01-31 PROCEDURE — G0444 DEPRESSION SCREEN ANNUAL: HCPCS | Mod: 59,S$GLB,, | Performed by: INTERNAL MEDICINE

## 2022-01-31 PROCEDURE — 1160F PR REVIEW ALL MEDS BY PRESCRIBER/CLIN PHARMACIST DOCUMENTED: ICD-10-PCS | Mod: CPTII,S$GLB,, | Performed by: INTERNAL MEDICINE

## 2022-01-31 PROCEDURE — G0444 PR DEPRESSION SCREENING: ICD-10-PCS | Mod: 59,S$GLB,, | Performed by: INTERNAL MEDICINE

## 2022-01-31 PROCEDURE — 1159F PR MEDICATION LIST DOCUMENTED IN MEDICAL RECORD: ICD-10-PCS | Mod: CPTII,S$GLB,, | Performed by: INTERNAL MEDICINE

## 2022-01-31 PROCEDURE — G0442 ANNUAL ALCOHOL SCREEN 15 MIN: HCPCS | Mod: 59,S$GLB,, | Performed by: INTERNAL MEDICINE

## 2022-01-31 PROCEDURE — 3078F PR MOST RECENT DIASTOLIC BLOOD PRESSURE < 80 MM HG: ICD-10-PCS | Mod: CPTII,S$GLB,, | Performed by: INTERNAL MEDICINE

## 2022-01-31 PROCEDURE — 1159F MED LIST DOCD IN RCRD: CPT | Mod: CPTII,S$GLB,, | Performed by: INTERNAL MEDICINE

## 2022-01-31 PROCEDURE — 99214 OFFICE O/P EST MOD 30 MIN: CPT | Mod: 25,S$GLB,, | Performed by: INTERNAL MEDICINE

## 2022-01-31 RX ORDER — TELMISARTAN 40 MG/1
TABLET ORAL
Qty: 90 TABLET | Refills: 1
Start: 2022-01-31 | End: 2022-03-26

## 2022-01-31 NOTE — PROGRESS NOTES
Subjective:       Patient ID: Rmaesh Ricci is a 82 y.o. male.    Chief Complaint: Follow-up (Discuss skin problems, has had 2 falls since his last visit, taking 1/2 BP meds)    Was hospitalized for chest pain and October of 2021. He had an angiogram which revealed mild coronary artery disease.  His hemoglobin had dropped from 12.4-8.3, and anemia was believed to be the cause of his chest pain.  He has followed up with gastro since that time and had follow-up blood work.        Hypertension  This is a chronic problem. The current episode started more than 1 year ago. The problem is controlled.     Review of Systems   Constitutional: Negative.    Respiratory: Negative.    Cardiovascular: Negative.    Musculoskeletal: Positive for arthralgias.   Integumentary:  Positive for rash.         Objective:      Physical Exam  Constitutional:       Appearance: He is well-developed.   HENT:      Head: Normocephalic and atraumatic.   Eyes:      Pupils: Pupils are equal, round, and reactive to light.   Cardiovascular:      Rate and Rhythm: Normal rate and regular rhythm.      Heart sounds: Murmur heard.    Systolic murmur is present with a grade of 3/6.       Comments: @RUSB  Pulmonary:      Effort: Pulmonary effort is normal.   Skin:     Findings: Rash present. Rash is macular.          Neurological:      Mental Status: He is alert.         Assessment:       Problem List Items Addressed This Visit        10     UC (ulcerative colitis)    Prostate CA    PAF (paroxysmal atrial fibrillation)    Essential hypertension - Primary    Relevant Medications    telmisartan (MICARDIS) 40 MG Tab    CKD (chronic kidney disease) stage 3, GFR 30-59 ml/min    Acquired hypothyroidism       Unprioritized    Iron deficiency anemia due to chronic blood loss    Coronary artery disease involving native coronary artery of native heart without angina pectoris      Other Visit Diagnoses     Screening for depression        Screening for alcoholism               Plan:       Per orders and D/C instructions.  Continue diet and/or meds for Hypothyroid, CKD, HTN, and high cholesterol, which are stable.     follow-up with cardiology for CAD and paroxysmal atrial fibrillation.  Follow-up with GI for ulcerative colitis and history of anemia.  Will get a copy of recent labs from [email or web address].    Screening: The patient was screened for depression with the PHQ2 questionnaire and possible health consequences were discussed with the patient, who understands (15 minutes spent).       The patient was screened for the misuse of alcohol, by asking the number of drinks per average week, and if pt has had more than 4 drinks (more than 3 for women and elderly) in 1 day within the past year. The health and legal consequences of misuse were discussed (15 minutes spent).

## 2022-01-31 NOTE — PATIENT INSTRUCTIONS
Use moisturizer cream to hands several times each day.  Wear cotton gloves when working with hands.

## 2022-02-15 ENCOUNTER — PATIENT MESSAGE (OUTPATIENT)
Dept: INTERNAL MEDICINE | Facility: CLINIC | Age: 82
End: 2022-02-15
Payer: MEDICARE

## 2022-02-16 ENCOUNTER — DOCUMENTATION ONLY (OUTPATIENT)
Dept: INTERNAL MEDICINE | Facility: CLINIC | Age: 82
End: 2022-02-16
Payer: MEDICARE

## 2022-02-16 DIAGNOSIS — E78.9 DISORDER OF LIPID METABOLISM: ICD-10-CM

## 2022-02-16 DIAGNOSIS — D50.8 OTHER IRON DEFICIENCY ANEMIA: ICD-10-CM

## 2022-02-16 DIAGNOSIS — E55.9 VITAMIN D DEFICIENCY: ICD-10-CM

## 2022-02-16 DIAGNOSIS — R79.89 OTHER SPECIFIED ABNORMAL FINDINGS OF BLOOD CHEMISTRY: ICD-10-CM

## 2022-02-16 DIAGNOSIS — Z12.5 SCREENING FOR PROSTATE CANCER: ICD-10-CM

## 2022-02-16 DIAGNOSIS — E03.9 HYPOTHYROIDISM, UNSPECIFIED TYPE: ICD-10-CM

## 2022-02-16 DIAGNOSIS — D51.0 PERNICIOUS ANEMIA: ICD-10-CM

## 2022-02-17 ENCOUNTER — OFFICE VISIT (OUTPATIENT)
Dept: INTERNAL MEDICINE | Facility: CLINIC | Age: 82
End: 2022-02-17
Attending: INTERNAL MEDICINE
Payer: MEDICARE

## 2022-02-17 VITALS
HEART RATE: 74 BPM | BODY MASS INDEX: 23.1 KG/M2 | OXYGEN SATURATION: 95 % | SYSTOLIC BLOOD PRESSURE: 110 MMHG | HEIGHT: 71 IN | WEIGHT: 165 LBS | DIASTOLIC BLOOD PRESSURE: 52 MMHG

## 2022-02-17 DIAGNOSIS — I48.0 PAF (PAROXYSMAL ATRIAL FIBRILLATION): ICD-10-CM

## 2022-02-17 DIAGNOSIS — I10 ESSENTIAL HYPERTENSION: ICD-10-CM

## 2022-02-17 DIAGNOSIS — R21 RASH: ICD-10-CM

## 2022-02-17 DIAGNOSIS — K51.919 ULCERATIVE COLITIS WITH COMPLICATION, UNSPECIFIED LOCATION: Primary | ICD-10-CM

## 2022-02-17 DIAGNOSIS — E03.9 ACQUIRED HYPOTHYROIDISM: ICD-10-CM

## 2022-02-17 DIAGNOSIS — R21 PITYRIASIS ROSEA-LIKE SKIN ERUPTION: ICD-10-CM

## 2022-02-17 PROCEDURE — 1159F PR MEDICATION LIST DOCUMENTED IN MEDICAL RECORD: ICD-10-PCS | Mod: CPTII,S$GLB,, | Performed by: INTERNAL MEDICINE

## 2022-02-17 PROCEDURE — 96372 PR INJECTION,THERAP/PROPH/DIAG2ST, IM OR SUBCUT: ICD-10-PCS | Mod: S$GLB,,, | Performed by: INTERNAL MEDICINE

## 2022-02-17 PROCEDURE — 96372 THER/PROPH/DIAG INJ SC/IM: CPT | Mod: S$GLB,,, | Performed by: INTERNAL MEDICINE

## 2022-02-17 PROCEDURE — 99214 OFFICE O/P EST MOD 30 MIN: CPT | Mod: 25,S$GLB,, | Performed by: INTERNAL MEDICINE

## 2022-02-17 PROCEDURE — 3074F SYST BP LT 130 MM HG: CPT | Mod: CPTII,S$GLB,, | Performed by: INTERNAL MEDICINE

## 2022-02-17 PROCEDURE — 1160F RVW MEDS BY RX/DR IN RCRD: CPT | Mod: CPTII,S$GLB,, | Performed by: INTERNAL MEDICINE

## 2022-02-17 PROCEDURE — 99214 PR OFFICE/OUTPT VISIT, EST, LEVL IV, 30-39 MIN: ICD-10-PCS | Mod: 25,S$GLB,, | Performed by: INTERNAL MEDICINE

## 2022-02-17 PROCEDURE — 1159F MED LIST DOCD IN RCRD: CPT | Mod: CPTII,S$GLB,, | Performed by: INTERNAL MEDICINE

## 2022-02-17 PROCEDURE — 3078F PR MOST RECENT DIASTOLIC BLOOD PRESSURE < 80 MM HG: ICD-10-PCS | Mod: CPTII,S$GLB,, | Performed by: INTERNAL MEDICINE

## 2022-02-17 PROCEDURE — 3078F DIAST BP <80 MM HG: CPT | Mod: CPTII,S$GLB,, | Performed by: INTERNAL MEDICINE

## 2022-02-17 PROCEDURE — 3074F PR MOST RECENT SYSTOLIC BLOOD PRESSURE < 130 MM HG: ICD-10-PCS | Mod: CPTII,S$GLB,, | Performed by: INTERNAL MEDICINE

## 2022-02-17 PROCEDURE — 1160F PR REVIEW ALL MEDS BY PRESCRIBER/CLIN PHARMACIST DOCUMENTED: ICD-10-PCS | Mod: CPTII,S$GLB,, | Performed by: INTERNAL MEDICINE

## 2022-02-17 RX ORDER — TRIAMCINOLONE ACETONIDE 40 MG/ML
40 INJECTION, SUSPENSION INTRA-ARTICULAR; INTRAMUSCULAR ONCE
Status: COMPLETED | OUTPATIENT
Start: 2022-02-17 | End: 2022-02-17

## 2022-02-17 RX ORDER — METHYLPREDNISOLONE ACETATE 80 MG/ML
80 INJECTION, SUSPENSION INTRA-ARTICULAR; INTRALESIONAL; INTRAMUSCULAR; SOFT TISSUE ONCE
Status: COMPLETED | OUTPATIENT
Start: 2022-02-17 | End: 2022-02-17

## 2022-02-17 RX ORDER — PREDNISONE 20 MG/1
TABLET ORAL
Qty: 14 TABLET | Refills: 0 | Status: SHIPPED | OUTPATIENT
Start: 2022-02-17 | End: 2022-03-21

## 2022-02-17 RX ADMIN — TRIAMCINOLONE ACETONIDE 40 MG: 40 INJECTION, SUSPENSION INTRA-ARTICULAR; INTRAMUSCULAR at 03:02

## 2022-02-17 RX ADMIN — METHYLPREDNISOLONE ACETATE 80 MG: 80 INJECTION, SUSPENSION INTRA-ARTICULAR; INTRALESIONAL; INTRAMUSCULAR; SOFT TISSUE at 03:02

## 2022-02-17 NOTE — PATIENT INSTRUCTIONS
Take Zyrtec 10 mg each morning.  Use triamcinolone cream 2-3 times every day.  Take Benadryl 50 mg or Atarax 50 mg every night until rash improves.

## 2022-02-17 NOTE — PROGRESS NOTES
Subjective:       Patient ID: Ramesh Ricci is a 82 y.o. male.    Chief Complaint: Rash (Ongoing, getting worse, has seen 3 dermatologist)    About 2-3 weeks ago he developed a rash which started over his right upper back.  He has seen several dermatologists.  He recently saw Dr. Merlos who did a skin biopsy which was negative for scabies.  He has had steroid shots on at least 2 separate occasions and has been taking Zyrtec, Claritin, and Atarax, as well as using triamcinolone cream with minimal relief.  It is extremely itchy and painful to touch.  It has been spreading across his trunk to his arms.  He got a shot of Dupixent yesterday and it seems to have gotten much worse.  He did have a Pfizer COVID booster vaccine 6-8 weeks prior to the rash.  His wife remembers he also got a rash across his back a few weeks after the primary series of COVID vaccine.    Rash  This is a recurrent problem. The current episode started 1 to 4 weeks ago. The problem has been rapidly worsening since onset. The rash is characterized by itchiness and pain. Past treatments include antihistamine, anti-itch cream and topical steroids. The treatment provided no relief.   Hypertension  This is a chronic problem. The current episode started more than 1 year ago.     Review of Systems   Constitutional: Negative.    Respiratory: Negative.    Cardiovascular: Negative.    Integumentary:  Positive for rash.         Objective:      Physical Exam  Constitutional:       Appearance: He is well-developed.   HENT:      Head: Normocephalic and atraumatic.   Eyes:      Pupils: Pupils are equal, round, and reactive to light.   Cardiovascular:      Rate and Rhythm: Normal rate and regular rhythm.      Heart sounds: Murmur heard.    Systolic murmur is present with a grade of 3/6.       Comments: @RUSB  Pulmonary:      Effort: Pulmonary effort is normal.   Skin:     Findings: Rash present. Rash is urticarial.      Comments: He has a rash across his  chest, abdomen, and   back.  It does go down his upper arms.  It is not present on his legs.    Neurological:      Mental Status: He is alert.         Assessment:       Problem List Items Addressed This Visit        10     UC (ulcerative colitis) - Primary    PAF (paroxysmal atrial fibrillation)    Essential hypertension    Acquired hypothyroidism       Unprioritized    Rash      Other Visit Diagnoses     Pityriasis rosea-like skin eruption              Plan:       Per orders and D/C instructions.  Continue diet and/or meds for hypothyroid, HTN, P. A. fib, and UC, which are stable.     his rash is consistent with pityriasis rosea, possibly induced by the COVID vaccine.  Steroid shot now per patient request.  Prednisone 40 mg daily for 5 days and 20 mg daily for 4 days.  He will continue Zyrtec in the morning and either Benadryl or Atarax at night.  He will continue triamcinolone cream.  Follow-up with Dermatology.    Between 30 and 39 min of total time for evaluation and management services were spent on the patient today.  The medical problems and treatment options were discussed, and all questions were answered.

## 2022-02-18 LAB
ALBUMIN SERPL-MCNC: 3.6 G/DL (ref 3.6–5.1)
ALBUMIN/GLOB SERPL: 1.4 (CALC) (ref 1–2.5)
ALP SERPL-CCNC: 65 U/L (ref 35–144)
ALT SERPL-CCNC: 8 U/L (ref 9–46)
ANA SER QL IF: NEGATIVE
AST SERPL-CCNC: 14 U/L (ref 10–35)
BASOPHILS # BLD AUTO: 32 CELLS/UL (ref 0–200)
BASOPHILS NFR BLD AUTO: 0.6 %
BILIRUB SERPL-MCNC: 0.3 MG/DL (ref 0.2–1.2)
BUN SERPL-MCNC: 22 MG/DL (ref 7–25)
BUN/CREAT SERPL: 16 (CALC) (ref 6–22)
CALCIUM SERPL-MCNC: 8.5 MG/DL (ref 8.6–10.3)
CHLORIDE SERPL-SCNC: 103 MMOL/L (ref 98–110)
CHOLEST SERPL-MCNC: 163 MG/DL
CHOLEST/HDLC SERPL: 2.9 (CALC)
CK SERPL-CCNC: 72 U/L (ref 44–196)
CO2 SERPL-SCNC: 25 MMOL/L (ref 20–32)
CREAT SERPL-MCNC: 1.36 MG/DL (ref 0.7–1.11)
EOSINOPHIL # BLD AUTO: 191 CELLS/UL (ref 15–500)
EOSINOPHIL NFR BLD AUTO: 3.6 %
ERYTHROCYTE [DISTWIDTH] IN BLOOD BY AUTOMATED COUNT: 13.9 % (ref 11–15)
GLOBULIN SER CALC-MCNC: 2.6 G/DL (CALC) (ref 1.9–3.7)
GLUCOSE SERPL-MCNC: 115 MG/DL (ref 65–99)
HBA1C MFR BLD: 5.8 % OF TOTAL HGB
HCT VFR BLD AUTO: 35.1 % (ref 38.5–50)
HDLC SERPL-MCNC: 56 MG/DL
HGB BLD-MCNC: 11.7 G/DL (ref 13.2–17.1)
IRON SATN MFR SERPL: 17 % (CALC) (ref 20–48)
IRON SERPL-MCNC: 44 MCG/DL (ref 50–180)
LDLC SERPL CALC-MCNC: 69 MG/DL (CALC)
LYMPHOCYTES # BLD AUTO: 1198 CELLS/UL (ref 850–3900)
LYMPHOCYTES NFR BLD AUTO: 22.6 %
MCH RBC QN AUTO: 29.1 PG (ref 27–33)
MCHC RBC AUTO-ENTMCNC: 33.3 G/DL (ref 32–36)
MCV RBC AUTO: 87.3 FL (ref 80–100)
MONOCYTES # BLD AUTO: 859 CELLS/UL (ref 200–950)
MONOCYTES NFR BLD AUTO: 16.2 %
NEUTROPHILS # BLD AUTO: 3021 CELLS/UL (ref 1500–7800)
NEUTROPHILS NFR BLD AUTO: 57 %
NONHDLC SERPL-MCNC: 107 MG/DL (CALC)
PLATELET # BLD AUTO: 254 THOUSAND/UL (ref 140–400)
PMV BLD REES-ECKER: 10 FL (ref 7.5–12.5)
POTASSIUM SERPL-SCNC: 4.5 MMOL/L (ref 3.5–5.3)
PROT SERPL-MCNC: 6.2 G/DL (ref 6.1–8.1)
RBC # BLD AUTO: 4.02 MILLION/UL (ref 4.2–5.8)
RETICS #: NORMAL CELLS/UL (ref 25000–90000)
RETICS/RBC NFR AUTO: 1 %
SODIUM SERPL-SCNC: 139 MMOL/L (ref 135–146)
T4 FREE SERPL-MCNC: 1.3 NG/DL (ref 0.8–1.8)
TIBC SERPL-MCNC: 259 MCG/DL (CALC) (ref 250–425)
TRIGL SERPL-MCNC: 286 MG/DL
TSH SERPL-ACNC: 4.92 MIU/L (ref 0.4–4.5)
VIT B12 SERPL-MCNC: 688 PG/ML (ref 200–1100)
WBC # BLD AUTO: 5.3 THOUSAND/UL (ref 3.8–10.8)

## 2022-02-21 ENCOUNTER — PATIENT MESSAGE (OUTPATIENT)
Dept: INTERNAL MEDICINE | Facility: CLINIC | Age: 82
End: 2022-02-21
Payer: MEDICARE

## 2022-03-21 PROBLEM — I48.91 ATRIAL FIBRILLATION WITH RVR: Status: ACTIVE | Noted: 2022-03-21

## 2022-03-21 PROBLEM — I25.10 CORONARY ARTERY DISEASE INVOLVING NATIVE CORONARY ARTERY OF NATIVE HEART WITHOUT ANGINA PECTORIS: Chronic | Status: ACTIVE | Noted: 2022-01-31

## 2022-03-21 PROBLEM — I48.0 PAF (PAROXYSMAL ATRIAL FIBRILLATION): Chronic | Status: ACTIVE | Noted: 2018-12-20

## 2022-03-21 PROBLEM — Q60.0 CONGENITAL ABSENCE OF RIGHT KIDNEY: Chronic | Status: ACTIVE | Noted: 2017-07-05

## 2022-03-23 ENCOUNTER — HOSPITAL ENCOUNTER (INPATIENT)
Facility: HOSPITAL | Age: 82
LOS: 3 days | Discharge: HOME | DRG: 392 | End: 2022-03-26
Attending: EMERGENCY MEDICINE | Admitting: HOSPITALIST
Payer: MEDICARE

## 2022-03-23 ENCOUNTER — TELEPHONE (OUTPATIENT)
Dept: ORTHOPEDICS | Facility: CLINIC | Age: 82
End: 2022-03-23
Payer: MEDICARE

## 2022-03-23 DIAGNOSIS — K51.919 ULCERATIVE COLITIS WITH COMPLICATION, UNSPECIFIED LOCATION: Chronic | ICD-10-CM

## 2022-03-23 DIAGNOSIS — K52.9 COLITIS: Primary | ICD-10-CM

## 2022-03-23 DIAGNOSIS — R53.83 FATIGUE: ICD-10-CM

## 2022-03-23 DIAGNOSIS — R79.89 ELEVATED TROPONIN: ICD-10-CM

## 2022-03-23 DIAGNOSIS — K51.00 ULCERATIVE PANCOLITIS WITHOUT COMPLICATION: ICD-10-CM

## 2022-03-23 DIAGNOSIS — R93.89 ABNORMAL CT OF THE CHEST: ICD-10-CM

## 2022-03-23 DIAGNOSIS — R50.9 FEVER, UNSPECIFIED FEVER CAUSE: ICD-10-CM

## 2022-03-23 DIAGNOSIS — R07.9 CHEST PAIN: ICD-10-CM

## 2022-03-23 PROBLEM — E87.6 HYPOKALEMIA: Status: ACTIVE | Noted: 2022-03-23

## 2022-03-23 PROBLEM — I48.91 ATRIAL FIBRILLATION WITH RVR: Status: RESOLVED | Noted: 2022-03-21 | Resolved: 2022-03-23

## 2022-03-23 LAB
ALBUMIN SERPL BCP-MCNC: 2.8 G/DL (ref 3.5–5.2)
ALP SERPL-CCNC: 51 U/L (ref 55–135)
ALT SERPL W/O P-5'-P-CCNC: 12 U/L (ref 10–44)
ANION GAP SERPL CALC-SCNC: 11 MMOL/L (ref 8–16)
AST SERPL-CCNC: 23 U/L (ref 10–40)
BASOPHILS # BLD AUTO: 0.02 K/UL (ref 0–0.2)
BASOPHILS NFR BLD: 0.2 % (ref 0–1.9)
BILIRUB SERPL-MCNC: 0.4 MG/DL (ref 0.1–1)
BILIRUB UR QL STRIP: NEGATIVE
BNP SERPL-MCNC: 1180 PG/ML (ref 0–99)
BUN SERPL-MCNC: 11 MG/DL (ref 8–23)
CALCIUM SERPL-MCNC: 8.1 MG/DL (ref 8.7–10.5)
CHLORIDE SERPL-SCNC: 102 MMOL/L (ref 95–110)
CLARITY UR: CLEAR
CO2 SERPL-SCNC: 25 MMOL/L (ref 23–29)
COLOR UR: YELLOW
CREAT SERPL-MCNC: 1.2 MG/DL (ref 0.5–1.4)
CRP SERPL-MCNC: 29.5 MG/L (ref 0–8.2)
CTP QC/QA: YES
CTP QC/QA: YES
DIFFERENTIAL METHOD: ABNORMAL
EOSINOPHIL # BLD AUTO: 0 K/UL (ref 0–0.5)
EOSINOPHIL NFR BLD: 0.1 % (ref 0–8)
ERYTHROCYTE [DISTWIDTH] IN BLOOD BY AUTOMATED COUNT: 16.5 % (ref 11.5–14.5)
ERYTHROCYTE [SEDIMENTATION RATE] IN BLOOD BY WESTERGREN METHOD: 77 MM/HR (ref 0–10)
EST. GFR  (AFRICAN AMERICAN): >60 ML/MIN/1.73 M^2
EST. GFR  (NON AFRICAN AMERICAN): 55 ML/MIN/1.73 M^2
GLUCOSE SERPL-MCNC: 108 MG/DL (ref 70–110)
GLUCOSE UR QL STRIP: NEGATIVE
HCT VFR BLD AUTO: 36.1 % (ref 40–54)
HGB BLD-MCNC: 11.6 G/DL (ref 14–18)
HGB UR QL STRIP: NEGATIVE
IMM GRANULOCYTES # BLD AUTO: 0.04 K/UL (ref 0–0.04)
IMM GRANULOCYTES NFR BLD AUTO: 0.5 % (ref 0–0.5)
KETONES UR QL STRIP: ABNORMAL
LACTATE SERPL-SCNC: 1 MMOL/L (ref 0.5–2.2)
LACTATE SERPL-SCNC: 1.3 MMOL/L (ref 0.5–2.2)
LEUKOCYTE ESTERASE UR QL STRIP: NEGATIVE
LYMPHOCYTES # BLD AUTO: 0.8 K/UL (ref 1–4.8)
LYMPHOCYTES NFR BLD: 10.5 % (ref 18–48)
MCH RBC QN AUTO: 29.4 PG (ref 27–31)
MCHC RBC AUTO-ENTMCNC: 32.1 G/DL (ref 32–36)
MCV RBC AUTO: 92 FL (ref 82–98)
MONOCYTES # BLD AUTO: 1.2 K/UL (ref 0.3–1)
MONOCYTES NFR BLD: 15.1 % (ref 4–15)
NEUTROPHILS # BLD AUTO: 5.9 K/UL (ref 1.8–7.7)
NEUTROPHILS NFR BLD: 73.6 % (ref 38–73)
NITRITE UR QL STRIP: NEGATIVE
NRBC BLD-RTO: 0 /100 WBC
PH UR STRIP: 6 [PH] (ref 5–8)
PLATELET # BLD AUTO: 283 K/UL (ref 150–450)
PMV BLD AUTO: 10.3 FL (ref 9.2–12.9)
POC MOLECULAR INFLUENZA A AGN: NEGATIVE
POC MOLECULAR INFLUENZA B AGN: NEGATIVE
POTASSIUM SERPL-SCNC: 3.3 MMOL/L (ref 3.5–5.1)
PROCALCITONIN SERPL IA-MCNC: 0.1 NG/ML
PROT SERPL-MCNC: 6.3 G/DL (ref 6–8.4)
PROT UR QL STRIP: ABNORMAL
RBC # BLD AUTO: 3.94 M/UL (ref 4.6–6.2)
SARS-COV-2 RDRP RESP QL NAA+PROBE: NEGATIVE
SODIUM SERPL-SCNC: 138 MMOL/L (ref 136–145)
SP GR UR STRIP: 1.01 (ref 1–1.03)
TROPONIN I SERPL DL<=0.01 NG/ML-MCNC: 0.83 NG/ML (ref 0–0.03)
URN SPEC COLLECT METH UR: ABNORMAL
UROBILINOGEN UR STRIP-ACNC: NEGATIVE EU/DL
WBC # BLD AUTO: 8.01 K/UL (ref 3.9–12.7)

## 2022-03-23 PROCEDURE — 80053 COMPREHEN METABOLIC PANEL: CPT | Performed by: EMERGENCY MEDICINE

## 2022-03-23 PROCEDURE — 87040 BLOOD CULTURE FOR BACTERIA: CPT | Mod: 59 | Performed by: EMERGENCY MEDICINE

## 2022-03-23 PROCEDURE — 84145 PROCALCITONIN (PCT): CPT | Performed by: EMERGENCY MEDICINE

## 2022-03-23 PROCEDURE — 81003 URINALYSIS AUTO W/O SCOPE: CPT | Performed by: EMERGENCY MEDICINE

## 2022-03-23 PROCEDURE — 86140 C-REACTIVE PROTEIN: CPT | Performed by: HOSPITALIST

## 2022-03-23 PROCEDURE — 63600175 PHARM REV CODE 636 W HCPCS: Performed by: HOSPITALIST

## 2022-03-23 PROCEDURE — 93005 ELECTROCARDIOGRAM TRACING: CPT

## 2022-03-23 PROCEDURE — 87449 NOS EACH ORGANISM AG IA: CPT | Performed by: HOSPITALIST

## 2022-03-23 PROCEDURE — 87177 OVA AND PARASITES SMEARS: CPT | Performed by: HOSPITALIST

## 2022-03-23 PROCEDURE — 25000003 PHARM REV CODE 250: Performed by: EMERGENCY MEDICINE

## 2022-03-23 PROCEDURE — 93010 ELECTROCARDIOGRAM REPORT: CPT | Mod: ,,, | Performed by: INTERNAL MEDICINE

## 2022-03-23 PROCEDURE — 99223 PR INITIAL HOSPITAL CARE,LEVL III: ICD-10-PCS | Mod: ,,, | Performed by: INTERNAL MEDICINE

## 2022-03-23 PROCEDURE — 87046 STOOL CULTR AEROBIC BACT EA: CPT | Mod: 59 | Performed by: HOSPITALIST

## 2022-03-23 PROCEDURE — 87209 SMEAR COMPLEX STAIN: CPT | Performed by: HOSPITALIST

## 2022-03-23 PROCEDURE — 25000003 PHARM REV CODE 250: Performed by: HOSPITALIST

## 2022-03-23 PROCEDURE — U0002 COVID-19 LAB TEST NON-CDC: HCPCS | Performed by: EMERGENCY MEDICINE

## 2022-03-23 PROCEDURE — 83605 ASSAY OF LACTIC ACID: CPT | Mod: 91 | Performed by: EMERGENCY MEDICINE

## 2022-03-23 PROCEDURE — 63600175 PHARM REV CODE 636 W HCPCS: Performed by: EMERGENCY MEDICINE

## 2022-03-23 PROCEDURE — 27000207 HC ISOLATION

## 2022-03-23 PROCEDURE — 85652 RBC SED RATE AUTOMATED: CPT | Performed by: HOSPITALIST

## 2022-03-23 PROCEDURE — 84484 ASSAY OF TROPONIN QUANT: CPT | Performed by: EMERGENCY MEDICINE

## 2022-03-23 PROCEDURE — 21400001 HC TELEMETRY ROOM

## 2022-03-23 PROCEDURE — 87427 SHIGA-LIKE TOXIN AG IA: CPT | Mod: 59 | Performed by: HOSPITALIST

## 2022-03-23 PROCEDURE — 85025 COMPLETE CBC W/AUTO DIFF WBC: CPT | Performed by: EMERGENCY MEDICINE

## 2022-03-23 PROCEDURE — 83880 ASSAY OF NATRIURETIC PEPTIDE: CPT | Performed by: EMERGENCY MEDICINE

## 2022-03-23 PROCEDURE — 99223 1ST HOSP IP/OBS HIGH 75: CPT | Mod: ,,, | Performed by: INTERNAL MEDICINE

## 2022-03-23 PROCEDURE — 93010 EKG 12-LEAD: ICD-10-PCS | Mod: ,,, | Performed by: INTERNAL MEDICINE

## 2022-03-23 PROCEDURE — 25500020 PHARM REV CODE 255: Performed by: EMERGENCY MEDICINE

## 2022-03-23 PROCEDURE — A4216 STERILE WATER/SALINE, 10 ML: HCPCS | Performed by: HOSPITALIST

## 2022-03-23 PROCEDURE — 87045 FECES CULTURE AEROBIC BACT: CPT | Performed by: HOSPITALIST

## 2022-03-23 RX ORDER — PANTOPRAZOLE SODIUM 40 MG/1
40 TABLET, DELAYED RELEASE ORAL DAILY
Status: DISCONTINUED | OUTPATIENT
Start: 2022-03-24 | End: 2022-03-26 | Stop reason: HOSPADM

## 2022-03-23 RX ORDER — MESALAMINE 0.38 G/1
1.5 CAPSULE, EXTENDED RELEASE ORAL EVERY MORNING
Status: ON HOLD | COMMUNITY
Start: 2022-03-05 | End: 2022-03-26 | Stop reason: HOSPADM

## 2022-03-23 RX ORDER — SODIUM CHLORIDE 0.9 % (FLUSH) 0.9 %
10 SYRINGE (ML) INJECTION EVERY 8 HOURS
Status: DISCONTINUED | OUTPATIENT
Start: 2022-03-23 | End: 2022-03-26 | Stop reason: HOSPADM

## 2022-03-23 RX ORDER — LANOLIN ALCOHOL/MO/W.PET/CERES
800 CREAM (GRAM) TOPICAL
Status: DISCONTINUED | OUTPATIENT
Start: 2022-03-23 | End: 2022-03-26 | Stop reason: HOSPADM

## 2022-03-23 RX ORDER — ASPIRIN 325 MG
325 TABLET ORAL
Status: COMPLETED | OUTPATIENT
Start: 2022-03-23 | End: 2022-03-23

## 2022-03-23 RX ORDER — AMOXICILLIN 250 MG
1 CAPSULE ORAL 2 TIMES DAILY
Status: DISCONTINUED | OUTPATIENT
Start: 2022-03-23 | End: 2022-03-26 | Stop reason: HOSPADM

## 2022-03-23 RX ORDER — VENLAFAXINE 37.5 MG/1
37.5 TABLET ORAL 2 TIMES DAILY
Status: DISCONTINUED | OUTPATIENT
Start: 2022-03-24 | End: 2022-03-26 | Stop reason: HOSPADM

## 2022-03-23 RX ORDER — SODIUM,POTASSIUM PHOSPHATES 280-250MG
2 POWDER IN PACKET (EA) ORAL
Status: DISCONTINUED | OUTPATIENT
Start: 2022-03-23 | End: 2022-03-26 | Stop reason: HOSPADM

## 2022-03-23 RX ORDER — PRAVASTATIN SODIUM 10 MG/1
20 TABLET ORAL NIGHTLY
Status: DISCONTINUED | OUTPATIENT
Start: 2022-03-23 | End: 2022-03-26 | Stop reason: HOSPADM

## 2022-03-23 RX ORDER — GLUCAGON 1 MG
1 KIT INJECTION
Status: DISCONTINUED | OUTPATIENT
Start: 2022-03-23 | End: 2022-03-26 | Stop reason: HOSPADM

## 2022-03-23 RX ORDER — ONDANSETRON 2 MG/ML
4 INJECTION INTRAMUSCULAR; INTRAVENOUS EVERY 8 HOURS PRN
Status: DISCONTINUED | OUTPATIENT
Start: 2022-03-23 | End: 2022-03-26 | Stop reason: HOSPADM

## 2022-03-23 RX ORDER — ACETAMINOPHEN 500 MG
1000 TABLET ORAL
Status: COMPLETED | OUTPATIENT
Start: 2022-03-23 | End: 2022-03-23

## 2022-03-23 RX ORDER — TALC
6 POWDER (GRAM) TOPICAL NIGHTLY PRN
Status: DISCONTINUED | OUTPATIENT
Start: 2022-03-23 | End: 2022-03-26 | Stop reason: HOSPADM

## 2022-03-23 RX ORDER — ACETAMINOPHEN 325 MG/1
650 TABLET ORAL EVERY 4 HOURS PRN
Status: DISCONTINUED | OUTPATIENT
Start: 2022-03-23 | End: 2022-03-26 | Stop reason: HOSPADM

## 2022-03-23 RX ORDER — NALOXONE HCL 0.4 MG/ML
0.02 VIAL (ML) INJECTION
Status: DISCONTINUED | OUTPATIENT
Start: 2022-03-23 | End: 2022-03-26 | Stop reason: HOSPADM

## 2022-03-23 RX ORDER — LANOLIN ALCOHOL/MO/W.PET/CERES
1 CREAM (GRAM) TOPICAL DAILY
Status: DISCONTINUED | OUTPATIENT
Start: 2022-03-24 | End: 2022-03-26 | Stop reason: HOSPADM

## 2022-03-23 RX ORDER — TAMSULOSIN HYDROCHLORIDE 0.4 MG/1
0.4 CAPSULE ORAL DAILY
Status: DISCONTINUED | OUTPATIENT
Start: 2022-03-24 | End: 2022-03-26 | Stop reason: HOSPADM

## 2022-03-23 RX ORDER — IBUPROFEN 200 MG
24 TABLET ORAL
Status: DISCONTINUED | OUTPATIENT
Start: 2022-03-23 | End: 2022-03-26 | Stop reason: HOSPADM

## 2022-03-23 RX ORDER — LEVOTHYROXINE SODIUM 125 UG/1
125 TABLET ORAL
Status: DISCONTINUED | OUTPATIENT
Start: 2022-03-24 | End: 2022-03-26 | Stop reason: HOSPADM

## 2022-03-23 RX ORDER — IBUPROFEN 200 MG
16 TABLET ORAL
Status: DISCONTINUED | OUTPATIENT
Start: 2022-03-23 | End: 2022-03-26 | Stop reason: HOSPADM

## 2022-03-23 RX ORDER — ASPIRIN 81 MG/1
81 TABLET ORAL DAILY
Status: DISCONTINUED | OUTPATIENT
Start: 2022-03-24 | End: 2022-03-26 | Stop reason: HOSPADM

## 2022-03-23 RX ADMIN — Medication 10 ML: at 09:03

## 2022-03-23 RX ADMIN — ACETAMINOPHEN 1000 MG: 500 TABLET ORAL at 11:03

## 2022-03-23 RX ADMIN — APIXABAN 2.5 MG: 2.5 TABLET, FILM COATED ORAL at 09:03

## 2022-03-23 RX ADMIN — PRAVASTATIN SODIUM 20 MG: 10 TABLET ORAL at 09:03

## 2022-03-23 RX ADMIN — POTASSIUM BICARBONATE 20 MEQ: 391 TABLET, EFFERVESCENT ORAL at 02:03

## 2022-03-23 RX ADMIN — IOHEXOL 75 ML: 350 INJECTION, SOLUTION INTRAVENOUS at 12:03

## 2022-03-23 RX ADMIN — SENNOSIDES AND DOCUSATE SODIUM 1 TABLET: 50; 8.6 TABLET ORAL at 09:03

## 2022-03-23 RX ADMIN — ASPIRIN 325 MG ORAL TABLET 325 MG: 325 PILL ORAL at 02:03

## 2022-03-23 RX ADMIN — PIPERACILLIN AND TAZOBACTAM 4.5 G: 4; .5 INJECTION, POWDER, LYOPHILIZED, FOR SOLUTION INTRAVENOUS; PARENTERAL at 11:03

## 2022-03-23 RX ADMIN — SODIUM CHLORIDE, SODIUM LACTATE, POTASSIUM CHLORIDE, AND CALCIUM CHLORIDE 1000 ML: .6; .31; .03; .02 INJECTION, SOLUTION INTRAVENOUS at 11:03

## 2022-03-23 RX ADMIN — VANCOMYCIN HYDROCHLORIDE 1750 MG: 10 INJECTION, POWDER, LYOPHILIZED, FOR SOLUTION INTRAVENOUS at 12:03

## 2022-03-23 RX ADMIN — PIPERACILLIN AND TAZOBACTAM 4.5 G: 4; .5 INJECTION, POWDER, LYOPHILIZED, FOR SOLUTION INTRAVENOUS; PARENTERAL at 07:03

## 2022-03-23 NOTE — ED TRIAGE NOTES
"Pt arrived via EMS with c/o weakness and confusion.  Wife states that pt has dark, fouling smelling urine for the past couple days and "hasn't been eating and drinking much recently".  Pt also has back pain that has been ongoing for 1 month or so.  Wife reports pt is more confused than normal, and has generalized weakness.  Wife also states that pt has ulcerative collitis and has frequent bowel movements and accidents.  Pt was seen in this ED 2 days ago for a-fib rvr.  "

## 2022-03-23 NOTE — HPI
Mr Ramesh Ricci is a 82 y.o. man with ulcerative colitis who presents with weakness.     UC was diagnosed in his 20s. He was previously on humira which worked well but this was discontinued when there were concerns it was causing a rash. His wife suspects the COVID vaccine caused the rash instead. Since then he has been taking mesalamine daily. He follows with GI- wife says Eli, but unable to see any GI notes in [facility] system. No prior surgeries for UC. No EGD/cscope to review but he has had them in the past.  He was previously on prednisone for a rash, that stopped about 2 weeks ago. He was started on flagyl for suspected infection about a week ago but has not seen any improvement in symptoms. He has developed weakness, needing to use his walker more frequently and this morning unable to get out of bed. Fevers, worsening heartburn, diarrhea 4-5x/day watery dark no hematochezia increased from his usual 1-2 BM/day. No abdominal pain currently. No nausea, vomiting. Able to eat normally until about 2 days ago.     Of note, he was also placed in observation on 3/21 for Afib RVR. Added metoprolol for HR control. Weakness has worsened since then. He was a little short of breath this morning but that is resolved. No chest pain. Does have lightheadedness. Wife reports he is not as sharp as he usually is.     In ED, noted to have fever to 102, BP 90s/50s. No leukocytosis. Given vanc, zosyn, IVF. Procal negative. Admitted for UC flare +/- infectious colitis.

## 2022-03-23 NOTE — ED PROVIDER NOTES
"Encounter Date: 3/23/2022    SCRIBE #1 NOTE: I, Crystal Obrien, am scribing for, and in the presence of,  Kandis Ridley MD. I have scribed the following portions of the note - Other sections scribed: HPI, ROS, PE.       History     Chief Complaint   Patient presents with    Fatigue     Presents to the ED via EMS with c/o weakness x 2 days and lower L back pain from a fall that occurred x 2 days ago.EMS reports dark, foul smelling urine.      CC: Generalized weakness    HPI: This is an 82 y.o. M who has a Hx of an Abnormal echocardiogram, Anticoagulant long-term use, Hx of Atrophic kidney, BPH, Cervical DDD, GERD, Glaucoma, HTN, Hypothyroidism, Internal carotid artery stenosis, Hx of Left ventricular diastolic dysfunction, NYHA class 1, Low serum testosterone level, Osteopenia, Hx of Shingles, Hx of Stroke, Hx of TIA, Ulcerative colitis who presents to the ED for emergent evaluation of generalized weakness with associated confusion that began today. The weakness has progressively worsened since onset. Per wife, the pt was fine before going to bed last night. The pt has been ambulating with a walker for less than a week due to increased fatigue. He usually does not ambulate with walker, and only uses a cane when walking outside. Per wife, the pt is usually able to complete normal activities around the house without a walker, but lately he has been using a walker due to the new onset of generalized weakness. Pt currently reports nausea without vomiting. The pt has a fever in the ED. However, the pt, nor his wife were aware that he has a fever. Pt is currently cold, but states that he is always cold. Per wife, the pt is short of breath and "appears to be puffing when breathing." He denies any chest pain. The pt and wife reports dark colored urine for the past few days. Per wife, the pt refuses to drink anything. She gave the pt Pedialyte today. He ate a small amount yesterday. The pt has also been experiencing back " "pain since pulling a muscle in the back 1 month ago. Pt's back pain is exacerbated with certain movement. Per wife, the pt falls frequently, in which his last fall was in the garage a few days ago. He fell on his buttocks at that time. No head trauma during the fall at that time. The pt wife states that the pt usually lands on his buttocks during most of the falls, and there is usually no head trauma. He did hit his ear during a fall last week. Additionally, the pt was seen last week for ulcerative colitis, which he was prescribed a 10 day course of Flagyl and instructed to take TID. He did not take Flagyl yesterday or today. The pt has frequent BM's with occasional accidents due to ulcerative colitis. His last BM was last night, which consisted of watery stool. The pt took Imodium yesterday. He has not had a BM today. The pt's wife reports a previous ED visit for atrial fibrillation 2 days ago, which the pt was discharged home after the atrial fibrillation resolved and addition of metropolol.. He received 3 COVID-19 vaccines. Per wife, the pt had an issue with rashes since receiving the first dose of COVID vaccine. He was followed-up by a Dermatologist, and informed that everything was fine. The pt does not have CHF, according to his wife. Pt advanced directive care is to take no extreme measures. He is allergic to Benazepril. He has a surgical history of adenoidectomy, cataract surgery, hernia repair, left heart catheterization, right heart catheterization, and tonsillectomy. Pt denies abdominal pain, cough, tobacco use, alcohol use, or recreational drug use.    Advance Care Planning  We discussed his advanced care planning with patient and wife at bedside. She states they want "no heroic measures." When questioned on what specifics that is, they do not want CPR or mechanical ventilation at this time. They are okay with other interventions (antibiotics, IVF, cardioversion) on a case-by-case decision.       The " history is provided by the patient and the spouse. No  was used.     Review of patient's allergies indicates:   Allergen Reactions    Benazepril Other (See Comments)     Cough     Past Medical History:   Diagnosis Date    Abnormal echocardiogram 2012    Anticoagulant long-term use     Atrophic kidney 2012    BPH (benign prostatic hyperplasia) 2012    DDD (degenerative disc disease), cervical 2017    x-ray  - Severe    GERD (gastroesophageal reflux disease) 2012    Glaucoma 2012    HTN (hypertension) 2012    Hypothyroid 2012    Internal carotid artery stenosis 2012    Left ventricular diastolic dysfunction, NYHA class 1 2015    4/15    Low serum testosterone level 2012    Normal cardiac stress test 2012    Osteopenia 2012    Shingles 2012    Stroke     tia   no residual    TIA (transient ischemic attack) 2012    UC (ulcerative colitis) 2012     Past Surgical History:   Procedure Laterality Date    ADENOIDECTOMY      EYE SURGERY Right 2020    cataract    HERNIA REPAIR      LEFT HEART CATHETERIZATION Right 10/29/2021    Procedure: CATHETERIZATION, HEART, LEFT AND RIGHT  - LV DARNELL POSSIBLE;  Surgeon: Beau Conklin MD;  Location: [facility] CATH LAB;  Service: Cardiology;  Laterality: Right;    RIGHT HEART CATHETERIZATION Right 10/29/2021    Procedure: INSERTION, CATHETER, RIGHT HEART;  Surgeon: Beau Conklin MD;  Location: [facility] CATH LAB;  Service: Cardiology;  Laterality: Right;    TONSILLECTOMY       Family History   Problem Relation Age of Onset    Hypertension Mother     Alzheimer's disease Mother     Diabetes Brother     Hypertension Brother     Cancer Brother     Heart disease Brother 56        MI    Heart attack Father      Social History     Tobacco Use    Smoking status: Former Smoker     Quit date: 1972     Years since quittin.2    Smokeless  tobacco: Never Used   Substance Use Topics    Alcohol use: Yes     Alcohol/week: 1.0 standard drink     Types: 1 Cans of beer per week     Comment: occasional    Drug use: No     Review of Systems   Constitutional: Positive for appetite change, chills, fatigue and fever. Negative for diaphoresis.   HENT: Negative for drooling, sore throat and voice change.    Eyes: Negative for photophobia and visual disturbance.   Respiratory: Positive for shortness of breath. Negative for cough and wheezing.    Cardiovascular: Negative for chest pain and leg swelling.   Gastrointestinal: Positive for diarrhea and nausea. Negative for abdominal pain, blood in stool, constipation and vomiting.        (+) Bowel incontinence   Genitourinary: Negative for dysuria, frequency, hematuria and urgency.        (+) Dark colored urine   Musculoskeletal: Positive for back pain. Negative for myalgias, neck pain and neck stiffness.   Skin: Negative for rash and wound.   Neurological: Positive for weakness (generalized). Negative for syncope, light-headedness, numbness and headaches.   Hematological: Does not bruise/bleed easily.   Psychiatric/Behavioral: Positive for confusion. Negative for agitation and suicidal ideas.   All other systems reviewed and are negative.      Physical Exam     Initial Vitals [03/23/22 1019]   BP Pulse Resp Temp SpO2   129/68 76 17 (!) 102.6 °F (39.2 °C) 95 %      MAP       --         Physical Exam    Nursing note and vitals reviewed.  Constitutional: He appears well-developed and well-nourished. He is not diaphoretic. No distress.   Elderly, [place], chronically ill   HENT:   Head: Normocephalic and atraumatic.   Right Ear: External ear normal.   Left Ear: External ear normal.   Mouth/Throat: Oropharynx is clear and moist. Mucous membranes are dry. No oropharyngeal exudate.   Eyes: Conjunctivae and EOM are normal. Pupils are equal, round, and reactive to light. Right eye exhibits no discharge. Left eye exhibits no  discharge.   Neck: Neck supple. No JVD present.   Normal range of motion.  Cardiovascular: Normal rate, regular rhythm and intact distal pulses. Exam reveals no gallop and no friction rub.    Murmur heard.  Pulmonary/Chest: Breath sounds normal. No respiratory distress. He has no wheezes. He has no rhonchi. He has no rales.   Abdominal: Abdomen is soft. Bowel sounds are normal. He exhibits no distension. There is no abdominal tenderness.   No significant tenderness in the abdomen. No CVA tenderness. Negative Cardenas's sign  There is no rebound and no guarding.   Genitourinary: Uncircumcised.    Genitourinary Comments: No obvious rashes or pain in testicles. No testicular pain. Uncircumsized.      Musculoskeletal:         General: No tenderness or edema. Normal range of motion.      Cervical back: Normal range of motion and neck supple.      Comments: Right flank/paraspinal back pain. No midline back pain. FROM of back.      Lymphadenopathy:     He has no cervical adenopathy.   Neurological: He is alert and oriented to person, place, and time. No cranial nerve deficit.   Slight confused. Slightly slowed mentation. Oriented x's 3. Confused to year. Moves all extremities and carries on conversation. CN- II: PERRL; III/IV/VI: EOMI w/out evidence of nystagmus; V: no deficits appreciated to light touch bilateral face; VII: no facial weakness, no facial asymmetry. Eyebrow raise symmetric. Smile symmetric; IX/X: palate midline, and raises symmetrically; XI: shoulder shrug 5/5 bilaterally; XII: tongue is midline w/out asymmetry. Strength 5/5 to bilateral upper and lower extremities, sensation intact to light touch   Skin: Skin is warm and dry. Capillary refill takes less than 2 seconds.   Warm to touch.   Psychiatric: He has a normal mood and affect. Thought content normal.         ED Course   Procedures  Labs Reviewed   CBC W/ AUTO DIFFERENTIAL - Abnormal; Notable for the following components:       Result Value    RBC 3.94  (*)     Hemoglobin 11.6 (*)     Hematocrit 36.1 (*)     RDW 16.5 (*)     Lymph # 0.8 (*)     Mono # 1.2 (*)     Gran % 73.6 (*)     Lymph % 10.5 (*)     Mono % 15.1 (*)     All other components within normal limits   COMPREHENSIVE METABOLIC PANEL - Abnormal; Notable for the following components:    Potassium 3.3 (*)     Calcium 8.1 (*)     Albumin 2.8 (*)     Alkaline Phosphatase 51 (*)     eGFR if non  55 (*)     All other components within normal limits   URINALYSIS, REFLEX TO URINE CULTURE - Abnormal; Notable for the following components:    Protein, UA Trace (*)     Ketones, UA 1+ (*)     All other components within normal limits    Narrative:     Specimen Source->Urine   B-TYPE NATRIURETIC PEPTIDE - Abnormal; Notable for the following components:    BNP 1,180 (*)     All other components within normal limits   TROPONIN I - Abnormal; Notable for the following components:    Troponin I 0.834 (*)     All other components within normal limits   C-REACTIVE PROTEIN - Abnormal; Notable for the following components:    CRP 29.5 (*)     All other components within normal limits   LACTIC ACID, PLASMA   LACTIC ACID, PLASMA   PROCALCITONIN   POCT INFLUENZA A/B MOLECULAR   SARS-COV-2 RDRP GENE     EKG Readings: (Independently Interpreted)   Normal sinus rhythm at 78. ST elevation of 1 mm in V2.  ST depression inferiorly and mildly low laterally.  Normal axis.  Left atrial enlargement.  Incomplete right bundle-branch block.  QTC is 449. This EKG is similar to his previous from March 21st however the ST depression is new.       ECG Results          EKG 12-lead (Final result)  Result time 03/23/22 19:05:25    Final result by Interface, Lab In [facility] (03/23/22 19:05:25)                 Narrative:    Test Reason : R53.83,    Vent. Rate : 078 BPM     Atrial Rate : 078 BPM     P-R Int : 118 ms          QRS Dur : 104 ms      QT Int : 394 ms       P-R-T Axes : 072 036 092 degrees     QTc Int : 449 ms    Normal sinus  rhythm  Possible Left atrial enlargement  Incomplete right bundle branch block  Septal infarct (cited on or before 21-MAR-2022)  Abnormal ECG  When compared with ECG of 21-MAR-2022 11:17,  No significant change was found  Confirmed by Nikolai Oscar MD ([id number]) on 3/23/2022 7:05:20 PM    Referred By: TOM   SELF           Confirmed By:Nikolai Oscar MD                            Imaging Results          CT Head Without Contrast (Final result)  Result time 03/23/22 13:05:50    Final result by Damián Omer MD (03/23/22 13:05:50)                 Impression:      1. Completed whole left occipital stroke stable.  2. New areas of remote gliosis right anterior caudate and anterior frontal deep subcortical white matter.  3. No posttraumatic change.      Electronically signed by: Damián Omer MD  Date:    03/23/2022  Time:    13:05             Narrative:    EXAMINATION:  CT HEAD WITHOUT CONTRAST    CLINICAL HISTORY:  Head trauma, minor (Age >= 65y);Mental status change, unknown cause;    TECHNIQUE:  Low dose axial images were obtained through the head.  Coronal and sagittal reformations were also performed. Contrast was not administered.    COMPARISON:  MRI brain 2018 and CT brain 2008    FINDINGS:  Minimal mild subcutaneous edema is supraorbital anterior frontal.  Partial opacification mucosal thickening ethmoid, frontal recesses, and lateral anterior left sphenoid sinus mucous retention cyst 1.1 cm.  Mastoid bones normal.  Nasal septal deviation left.  Distal vertebral and distal internal carotid artery calcified plaquing.    Chronic stroke left posterior occipital with encephalomalacia and associated dilation left posterior occipital horn lateral ventricle stable.  Additional areas of remote gliosis right anterior caudate nucleus, anterior frontal lobe deep subcortical white matter particularly on left.  Central and cortical atrophy appropriate for chronological age with dilation of temporal horns of  lateral ventricles.  Obscuring of left middle cerebellar peduncle by artifacts from motion and skull base.                               CT Abdomen Pelvis With Contrast (Final result)  Result time 03/23/22 13:22:31    Final result by Raymond Trevizo MD (03/23/22 13:22:31)                 Impression:      1. Acute appearing inflammatory change essentially involving the entirety of the colon and distal ileum, concerning for acute colitis and backwash ileitis, in a patient with reported history of ulcerative colitis.  No definite evidence of drainable fluid collection/abscess or fistula formation at the present time.  2. Evaluation of the lower lungs limited due to respiratory motion.  Relative to remote CT imaging of 06/15/2019, there appears to be new ill-defined ground-glass type attenuation in both lungs, some which may at least be partially on the basis of subsegmental atelectasis, however acute infectious or noninfectious inflammatory process or possibly edema could be considered.  Numerous nodules seen previously are not well characterized due to extensive motion.  As such, follow-up dedicated chest CT would be recommended to ensure resolution of these findings and to assure stability of the previously seen pulmonary nodules.  3. Moderate left-sided hydroureter, most prominent in the mid to distal segment, similar configuration to remote CT imaging of 07/05/2017, and of uncertain etiology or significance.  4. Additional details as provided in the body of report.      Electronically signed by: Raymond Trevizo  Date:    03/23/2022  Time:    13:22             Narrative:    EXAMINATION:  CT ABDOMEN PELVIS WITH CONTRAST    CLINICAL HISTORY:  Abdominal abscess/infection suspected;fever, colitis, UC;    TECHNIQUE:  Low dose axial images, sagittal and coronal reformations were obtained from the lung bases to the pubic symphysis following the IV administration of 75 mL of Omnipaque 350    COMPARISON:  CT of the abdomen  pelvis performed 06/15/2019    FINDINGS:  Lower chest: Evaluation of the lower chest is limited due to respiratory motion.  Background of atelectasis and scar suggested.  Superimposed upon this are more ill-defined areas of ground-glass type attenuation.  A 10 mm solid nodule suggested in the right middle lobe (series 2, image 7), likely present dating back to at least 06/15/2019, though incomplete characterized on that examination.  Additional smaller solid nodules in the lungs on the 06/15/2019 CT are not well seen due to extensive motion.  Coronary artery and valvular calcifications are noted.    Liver: Unremarkable.    Gallbladder and bile ducts: Unremarkable. No biliary ductal dilatation.    Pancreas: Unremarkable.    Spleen: Unremarkable.    Adrenals: Unremarkable.    Kidneys: Right kidney appears to be surgically absent.  Numerous left renal cysts are noted with additional technically indeterminate subcentimeter hypoattenuating lesions.  A nonobstructing 3 mm calculus is noted at the left lower pole.  There is moderate left-sided hydroureter, most prominent in the mid to distal segment, similar configuration to remote CT imaging of 07/05/2017, and of uncertain etiology or significance.  A right posterolateral bladder wall diverticulum is suggested.    Lymph nodes: No abdominal or pelvic lymphadenopathy.    Bowel and mesentery: No definite evidence of acute bowel obstruction.  Normal caliber appendix.There appears to be diffuse wall thickening with mucosal hyperemia and adjoining inflammatory change involving essentially the entire colon.  Additionally, similar findings are noted involving the distal ileum.  Fluid is noted diffusely throughout the involved bowel loops.  No definite drainable fluid collection or abscess or evidence of fistula formation is identified at the present time.    Abdominal aorta: Normal caliber.  Atherosclerotic calcification.    Inferior vena cava: Unremarkable.    Free fluid or free  air: None.    Pelvis: Unremarkable.    Body wall: Unremarkable.    Bones: No definite acute change.  Degenerative findings involving the spine and hips again noted.                               X-Ray Chest AP Portable (Final result)  Result time 03/23/22 12:02:35    Final result by Manolo Soliz MD (03/23/22 12:02:35)                 Impression:      See above      Electronically signed by: Manolo Soliz MD  Date:    03/23/2022  Time:    12:02             Narrative:    EXAMINATION:  XR CHEST AP PORTABLE    CLINICAL HISTORY:  Sepsis;    TECHNIQUE:  Single frontal view of the chest was performed.    COMPARISON:  Non 03/21/2022 e    FINDINGS:  Heart size normal.  Diffuse accentuation interstitial markings similar to the previous study.  The right hemidiaphragm is slightly elevated.  No significant airspace consolidation or pleural effusion identified                               X-Ray Lumbar Spine Ap And Lateral (Final result)  Result time 03/23/22 12:03:58    Final result by Raymond Trevizo MD (03/23/22 12:03:58)                 Impression:      No convincing evidence of acute fracture or traumatic subluxation.      Electronically signed by: Raymond Trevizo  Date:    03/23/2022  Time:    12:03             Narrative:    EXAMINATION:  XR LUMBAR SPINE AP AND LATERAL    CLINICAL HISTORY:  fall, back pain;    TECHNIQUE:  AP, lateral and spot images were performed of the lumbar spine.    COMPARISON:  Lumbar spine radiograph performed 05/23/2012    FINDINGS:  Five non-rib-bearing lumbar-type vertebral bodies are identified.  No definite evidence of acute fracture or traumatic subluxation.  Mild convex levocurvature in the mid to lower lumbar region may be positional.    Multilevel degenerative endplate sclerosis and loss of intervertebral disc space height.  Degenerate facet sclerosis most pronounced in the mid lower lumbar region.  Vascular calcifications are noted.  No definite acute findings are noted in the  visualized abdomen or pelvis.                                 Medications   piperacillin-tazobactam 4.5 g in dextrose 5 % 100 mL IVPB (ready to mix system) (0 g Intravenous Stopped 3/23/22 1154)   aspirin EC tablet 81 mg (has no administration in time range)   apixaban tablet 2.5 mg (2.5 mg Oral Given 3/23/22 2149)   ferrous sulfate tablet 1 each (has no administration in time range)   levothyroxine tablet 125 mcg (has no administration in time range)   pantoprazole EC tablet 40 mg (has no administration in time range)   pravastatin tablet 20 mg (20 mg Oral Given 3/23/22 2149)   tamsulosin 24 hr capsule 0.4 mg (has no administration in time range)   venlafaxine tablet 37.5 mg (has no administration in time range)   sodium chloride 0.9% flush 10 mL (10 mLs Intravenous Given 3/23/22 2149)   melatonin tablet 6 mg (has no administration in time range)   senna-docusate 8.6-50 mg per tablet 1 tablet (1 tablet Oral Given 3/23/22 2149)   acetaminophen tablet 650 mg (has no administration in time range)   naloxone 0.4 mg/mL injection 0.02 mg (has no administration in time range)   potassium bicarbonate disintegrating tablet 50 mEq (has no administration in time range)   potassium bicarbonate disintegrating tablet 35 mEq (has no administration in time range)   potassium bicarbonate disintegrating tablet 60 mEq (has no administration in time range)   magnesium oxide tablet 800 mg (has no administration in time range)   magnesium oxide tablet 800 mg (has no administration in time range)   potassium, sodium phosphates 280-160-250 mg packet 2 packet (has no administration in time range)   potassium, sodium phosphates 280-160-250 mg packet 2 packet (has no administration in time range)   potassium, sodium phosphates 280-160-250 mg packet 2 packet (has no administration in time range)   glucose chewable tablet 16 g (has no administration in time range)   glucose chewable tablet 24 g (has no administration in time range)   glucagon  (human recombinant) injection 1 mg (has no administration in time range)   ondansetron injection 4 mg (has no administration in time range)   dextrose 10% bolus 125 mL (has no administration in time range)   dextrose 10% bolus 250 mL (has no administration in time range)   vancomycin (VANCOCIN) 1,750 mg in dextrose 5 % 500 mL IVPB (0 mg Intravenous Stopped 3/23/22 1414)   lactated ringers bolus 1,000 mL (0 mLs Intravenous Stopped 3/23/22 1309)   acetaminophen tablet 1,000 mg (1,000 mg Oral Given 3/23/22 1104)   iohexoL (OMNIPAQUE 350) injection 75 mL (75 mLs Intravenous Given 3/23/22 1243)   potassium bicarbonate disintegrating tablet 20 mEq (20 mEq Oral Given 3/23/22 1430)   aspirin tablet 325 mg (325 mg Oral Given 3/23/22 1430)     Medical Decision Making:   Independently Interpreted Test(s):   I have ordered and independently interpreted EKG Reading(s) - see summary below  MDM  81 yo male presents with fever, fatigue and mild confusion. Dark urine and diarrhea. History of UC, no longer on Humira. +colitis flare currently, on Flagyl as outpatient.     Noted to be febrile, slightly slowed mentation. No significant abdominal pain. +mild left paraspinal/flank pain (patient states ongoing x 1 month after fall).   XR lumbar back with out acute fracture.   CT with colitis, ileitis without abscess noted. Lactic WNL.   CXR normal, SPO2 91-95%, covid negative. Patient denies SOB, wife noted looked SOB this morning, placed on oxygen for comfort.     Patient denies chest pain. Mild depressions inferiorolaterally. No significant ST elevation. Troponin elevated to 0.8, this is increased from 0.07 at last admission for afib 2 days ago. Possible type 2 NSTEMI 2/2 fever/strain? Discussed with Hospital medicine, ASA given, will closely monitor EKG and troponins inpatinet. Advised patient to inform us immediately of any CP, SOB or any other changes. BNP 1180, no recent baseline. IVF initially given for infection, will hold  additional.     CT head with no acute ICH. +left occipital stoke, new areas of remote gliosis. Discussed with patient and wife and states they were familiar with these findings. Will need neurology follow up for new areas of gliosis.     CT abd also with edema vs nodules in lower lungs. Movement artifact. Discussed this with wife and need for dedicated CT chest as outpatient for further evaluation.     Case discussed with Dr. Farah who agrees to admit the patient for further care and evaluation. All questions answered.           Scribe Attestation:   Scribe #1: I performed the above scribed service and the documentation accurately describes the services I performed. I attest to the accuracy of the note.                 I, Kandis Ridley, personally performed the services described in this documentation. All medical record entries made by the scribe were at my direction and in my presence. I have reviewed the chart and agree that the record reflects my personal performance and is accurate and complete.  Clinical Impression:   Final diagnoses:  [R53.83] Fatigue  [K52.9] Colitis (Primary)  [R77.8] Elevated troponin  [R50.9] Fever, unspecified fever cause  [R93.89] Abnormal CT of the chest          ED Disposition Condition    Admit               Kandis Ridley MD  03/23/22 8316

## 2022-03-23 NOTE — H&P
[facility]  [place] [facility]  History & Physical    Patient Name: Ramesh Ricci  MRN: [id number]  Patient Class: IP- Inpatient  Admission Date: 3/23/2022  Attending Physician: Kristen Farah MD   Primary Care Provider: EFREM Muniz MD         Patient information was obtained from patient, spouse/SO, past medical records and ER records.     Subjective:     Principal Problem:Colitis    Chief Complaint:   Chief Complaint   Patient presents with    Fatigue     Presents to the ED via EMS with c/o weakness x 2 days and lower L back pain from a fall that occurred x 2 days ago.EMS reports dark, foul smelling urine.         HPI: Mr Ramesh Ricci is a 82 y.o. man with ulcerative colitis who presents with weakness.     UC was diagnosed in his 20s. He was previously on humira which worked well but this was discontinued when there were concerns it was causing a rash. His wife suspects the COVID vaccine caused the rash instead. Since then he has been taking mesalamine daily. He follows with GI- wife says [facility], but unable to see any GI notes in [facility] system. No prior surgeries for UC. No EGD/cscope to review but he has had them in the past.  He was previously on prednisone for a rash, that stopped about 2 weeks ago. He was started on flagyl for suspected infection about a week ago but has not seen any improvement in symptoms. He has developed weakness, needing to use his walker more frequently and this morning unable to get out of bed. Fevers, worsening heartburn, diarrhea 4-5x/day watery dark no hematochezia increased from his usual 1-2 BM/day. No abdominal pain currently. No nausea, vomiting. Able to eat normally until about 2 days ago.     Of note, he was also placed in observation on 3/21 for Afib RVR. Added metoprolol for HR control. Weakness has worsened since then. He was a little short of breath this morning but that is resolved. No chest pain. Does have lightheadedness. Wife reports he  is not as sharp as he usually is.     In ED, noted to have fever to 102, BP 90s/50s. No leukocytosis. Given vanc, zosyn, IVF. Procal negative. Admitted for UC flare +/- infectious colitis.       Past Medical History:   Diagnosis Date    Abnormal echocardiogram 11/16/2012    Anticoagulant long-term use     Atrophic kidney 11/16/2012    BPH (benign prostatic hyperplasia) 11/16/2012    DDD (degenerative disc disease), cervical 7/5/2017    x-ray 7/17 - Severe    GERD (gastroesophageal reflux disease) 11/16/2012    Glaucoma 11/16/2012    HTN (hypertension) 11/16/2012    Hypothyroid 11/16/2012    Internal carotid artery stenosis 11/16/2012    Left ventricular diastolic dysfunction, NYHA class 1 4/16/2015    4/15    Low serum testosterone level 11/16/2012    Normal cardiac stress test 11/16/2012    Osteopenia 11/16/2012    Shingles 11/16/2012    Stroke 2017    tia   no residual    TIA (transient ischemic attack) 11/16/2012    UC (ulcerative colitis) 11/16/2012       Past Surgical History:   Procedure Laterality Date    ADENOIDECTOMY      EYE SURGERY Right 11/2020    cataract    HERNIA REPAIR      LEFT HEART CATHETERIZATION Right 10/29/2021    Procedure: CATHETERIZATION, HEART, LEFT AND RIGHT  - LV DARNELL POSSIBLE;  Surgeon: Beau Conklin MD;  Location: [facility] CATH LAB;  Service: Cardiology;  Laterality: Right;    RIGHT HEART CATHETERIZATION Right 10/29/2021    Procedure: INSERTION, CATHETER, RIGHT HEART;  Surgeon: Beau Conklin MD;  Location: [facility] CATH LAB;  Service: Cardiology;  Laterality: Right;    TONSILLECTOMY         Review of patient's allergies indicates:   Allergen Reactions    Benazepril Other (See Comments)     Cough       No current facility-administered medications on file prior to encounter.     Current Outpatient Medications on File Prior to Encounter   Medication Sig    acetaminophen (TYLENOL) 500 MG tablet Take 2 tablets (1,000 mg total) by mouth every 6 (six) hours as  needed.    ascorbic acid, vitamin C, (VITAMIN C) 500 MG tablet Take 500 mg by mouth once daily.    aspirin (ECOTRIN) 81 MG EC tablet Take 81 mg by mouth once daily.    calcium carbonate (TUMS) 200 mg calcium (500 mg) chewable tablet Take 1 tablet by mouth once daily.    ELIQUIS 2.5 mg Tab Take 2.5 mg by mouth 2 (two) times daily.    ferrous sulfate 324 mg (65 mg iron) TbEC Take 325 mg by mouth 2 (two) times daily.    levothyroxine (SYNTHROID) 125 MCG tablet TAKE 1 TABLET BY MOUTH EVERY MORNING    mesalamine (APRISO) 0.375 gram Cp24 Take 1.5 g by mouth every morning.    mesalamine (PENTASA) 500 MG CR capsule Take 2,000 mg by mouth once daily.    metoprolol succinate (TOPROL-XL) 25 MG 24 hr tablet Take 1 tablet (25 mg total) by mouth once daily.    pantoprazole (PROTONIX) 40 MG tablet Take 1 tablet (40 mg total) by mouth once daily.    pravastatin (PRAVACHOL) 20 MG tablet Take 1 tablet (20 mg total) by mouth every evening.    tamsulosin (FLOMAX) 0.4 mg Cp24 Take 0.4 mg by mouth once daily.     telmisartan (MICARDIS) 40 MG Tab TAKE 1/2 TABLET BY MOUTH DAILY    UNABLE TO FIND Take 2 tablets by mouth daily as needed. Charcoal tabs    venlafaxine (EFFEXOR) 37.5 MG Tab TAKE 1 TABLET BY MOUTH TWICE DAILY    vitC/E/Zn/copper/lutein/zeaxan (ICAPS AREDS2 ORAL) Take 1 capsule by mouth once daily.    [DISCONTINUED] metroNIDAZOLE (FLAGYL) 500 MG tablet Take 500 mg by mouth 3 (three) times daily.     Family History       Problem Relation (Age of Onset)    Alzheimer's disease Mother    Cancer Brother    Diabetes Brother    Heart attack Father    Heart disease Brother (56)    Hypertension Mother, Brother          Tobacco Use    Smoking status: Former Smoker     Quit date: 1972     Years since quittin.2    Smokeless tobacco: Never Used   Substance and Sexual Activity    Alcohol use: Yes     Alcohol/week: 1.0 standard drink     Types: 1 Cans of beer per week     Comment: occasional    Drug use: No     Sexual activity: Not Currently     Review of Systems   Constitutional:  Positive for activity change, appetite change, fatigue and fever. Negative for chills.   HENT:  Negative for congestion, sinus pressure, sinus pain and trouble swallowing.    Respiratory:  Negative for cough, chest tightness, shortness of breath and wheezing.    Cardiovascular:  Negative for chest pain, palpitations and leg swelling.   Gastrointestinal:  Positive for diarrhea. Negative for abdominal distention, abdominal pain, anal bleeding, blood in stool, constipation, nausea and vomiting.   Genitourinary:  Negative for difficulty urinating.   Musculoskeletal:  Positive for back pain. Negative for arthralgias and myalgias.   Skin:  Negative for rash and wound.   Neurological:  Positive for dizziness, weakness and light-headedness. Negative for syncope, numbness and headaches.   Hematological:  Negative for adenopathy.   Psychiatric/Behavioral:  Positive for confusion.    Objective:     Vital Signs (Most Recent):  Temp: 99.4 °F (37.4 °C) (03/23/22 1214)  Pulse: 65 (03/23/22 1432)  Resp: (!) 29 (03/23/22 1307)  BP: (!) 92/55 (03/23/22 1333)  SpO2: 96 % (03/23/22 1359)   Vital Signs (24h Range):  Temp:  [99.4 °F (37.4 °C)-102.6 °F (39.2 °C)] 99.4 °F (37.4 °C)  Pulse:  [65-80] 65  Resp:  [17-30] 29  SpO2:  [85 %-96 %] 96 %  BP: ()/(55-70) 92/55     Weight: 70.3 kg (155 lb)  Body mass index is 21.62 kg/m².    Physical Exam  Vitals and nursing note reviewed.   Constitutional:       General: He is not in acute distress.     Appearance: He is normal weight. He is not ill-appearing or toxic-appearing.   HENT:      Head: Normocephalic and atraumatic.      Nose: Nose normal.      Mouth/Throat:      Mouth: Mucous membranes are moist.   Eyes:      General: No scleral icterus.     Conjunctiva/sclera: Conjunctivae normal.   Cardiovascular:      Rate and Rhythm: Normal rate and regular rhythm.      Pulses: Normal pulses.      Heart sounds: Murmur  (systolic) heard.     No gallop.   Pulmonary:      Effort: Pulmonary effort is normal. No respiratory distress.      Breath sounds: Normal breath sounds. No wheezing or rales.      Comments: Room air  Abdominal:      General: Bowel sounds are normal. There is no distension.      Palpations: Abdomen is soft. There is no mass.      Tenderness: There is no abdominal tenderness. There is no guarding.   Musculoskeletal:      Right lower leg: No edema.      Left lower leg: No edema.   Skin:     General: Skin is warm and dry.   Neurological:      Mental Status: He is alert and oriented to person, place, and time.           Significant Labs: All pertinent labs within the past 24 hours have been reviewed.    Significant Imaging: I have reviewed all pertinent imaging results/findings within the past 24 hours.    Assessment/Plan:     * Colitis  Admitted with weakness, lethargy with cause worsening colitis despite outpatient flagyl. This may be flare of UC vs infectious colitis.   - given IVF in ED. Appears volume replete. Encourage PO diet  - continue zosyn  - check CDiff, stool culture, O&P  - GI consult to see if need steroids  - check CRP, ESR  - continue home mesalamine      Hypokalemia  Replace and monitor        Coronary artery disease involving native coronary artery of native heart without angina pectoris  10/21 nonobstructive CAD. Troponin 0.8 on admit with no chest pain- suspect due to colitis. Continue asa, statin.       Iron deficiency anemia due to chronic blood loss  Continue home iron      PAF (paroxysmal atrial fibrillation)  Currently sinus rhythm  Holding home metoprolol due to low BP  Continue eliquis      Nonrheumatic aortic valve stenosis  Moderate AS in 10/2021  Follows with Cardiology as outpatient     CKD (chronic kidney disease) stage 3, GFR 30-59 ml/min  Cr at baseline 1.2 on admit      Essential hypertension  BP borderline on admit. Hold home BP Rx      BPH (benign prostatic hyperplasia)  Continue  tamsulosin      Acquired hypothyroidism  Continue home levothyroxine      UC (ulcerative colitis)  On mesalamine at home- continue  GI consult as above        VTE Risk Mitigation (From admission, onward)         Ordered     apixaban tablet 2.5 mg  2 times daily         03/23/22 1421     Place HARIKA hose  Until discontinued         03/23/22 1421     Place sequential compression device  Until discontinued         03/23/22 1421     Reason for No Pharmacological VTE Prophylaxis  Once        Question:  Reasons:  Answer:  Already adequately anticoagulated on oral Anticoagulants    03/23/22 1421     IP VTE HIGH RISK PATIENT  Once         03/23/22 1421                   Kristen Farah MD  Department of Hospital Medicine   [facility] [place]

## 2022-03-23 NOTE — NURSING
Report received from Mamie in ED. Pt to come to 303. Mamie states she has stool collected and will send studies to lab.

## 2022-03-23 NOTE — CONSULTS
[facility]    CC: diarrhea    HPI 82 y.o. male with past medical history of pan ulcerative colitis, afib on Eliquis who presents with several days of acute onset, painful diarrhea without hematochezia.  He does report recent dark stool but does take oral iron.  He also reports recent onset back pain which began about 3 weeks ago after lifting a heavy bag of grass.    He was noted to be febrile in the ED.    The patient has been previous treated with mesalamine until about 2 years ago.  He was then started on Humira which he responded well to.  He then developed a significant rash following his first Covid vaccine and there was concern that it could be due to his Humira and so it was stopped.  He also developed a rash after his second Covid vaccine but Humira was not resumed.  He was restarted on Mesalamine and was doing well clinically until a few days ago.    He previously followed with Dr. Fraga until he retired.    His last colonoscopy was in July 2018 showed pan colitis.    Chart reviewed and summarized here.    Outside records reviewed and summarized in this note.    Collateral information obtained from his wife, present at the bedside.    Past Medical History  Past Medical History:   Diagnosis Date    Abnormal echocardiogram 11/16/2012    Anticoagulant long-term use     Atrophic kidney 11/16/2012    BPH (benign prostatic hyperplasia) 11/16/2012    DDD (degenerative disc disease), cervical 7/5/2017    x-ray 7/17 - Severe    GERD (gastroesophageal reflux disease) 11/16/2012    Glaucoma 11/16/2012    HTN (hypertension) 11/16/2012    Hypothyroid 11/16/2012    Internal carotid artery stenosis 11/16/2012    Left ventricular diastolic dysfunction, NYHA class 1 4/16/2015    4/15    Low serum testosterone level 11/16/2012    Normal cardiac stress test 11/16/2012    Osteopenia 11/16/2012    Shingles 11/16/2012    Stroke 2017    tia   no residual    TIA (transient ischemic attack)  2012    UC (ulcerative colitis) 2012       Past Surgical History  Past Surgical History:   Procedure Laterality Date    ADENOIDECTOMY      EYE SURGERY Right 2020    cataract    HERNIA REPAIR      LEFT HEART CATHETERIZATION Right 10/29/2021    Procedure: CATHETERIZATION, HEART, LEFT AND RIGHT  - LV DARNELL POSSIBLE;  Surgeon: Beau Conklin MD;  Location: [facility] CATH LAB;  Service: Cardiology;  Laterality: Right;    RIGHT HEART CATHETERIZATION Right 10/29/2021    Procedure: INSERTION, CATHETER, RIGHT HEART;  Surgeon: Beau Conklin MD;  Location: [facility] CATH LAB;  Service: Cardiology;  Laterality: Right;    TONSILLECTOMY         Social History  Social History     Tobacco Use    Smoking status: Former Smoker     Quit date: 1972     Years since quittin.2    Smokeless tobacco: Never Used   Substance Use Topics    Alcohol use: Yes     Alcohol/week: 1.0 standard drink     Types: 1 Cans of beer per week     Comment: occasional    Drug use: No       Family History  Family History   Problem Relation Age of Onset    Hypertension Mother     Alzheimer's disease Mother     Diabetes Brother     Hypertension Brother     Cancer Brother     Heart disease Brother 56        MI    Heart attack Father        Review of Systems  General ROS: negative for chills, fever or weight loss  Psychological ROS: negative for hallucination, depression or suicidal ideation  Ophthalmic ROS: negative for blurry vision, photophobia or eye pain  ENT ROS: negative for epistaxis, sore throat or rhinorrhea  Respiratory ROS: no cough, shortness of breath, or wheezing  Cardiovascular ROS: no chest pain or dyspnea on exertion  Gastrointestinal ROS: positive for abdominal pain, diarrhea, no hematochezia  Genito-Urinary ROS: no dysuria, trouble voiding, or hematuria  Musculoskeletal ROS: negative for gait disturbance or muscular weakness, positive for back pain  Neurological ROS: no syncope or seizures; no  "ataxia  Dermatological ROS: negative for pruritis, rash and jaundice    Physical Examination  BP (!) 92/55   Pulse 65   Temp 99.4 °F (37.4 °C) (Oral)   Resp (!) 29   Ht 5' 11" (1.803 m)   Wt 70.3 kg (155 lb)   SpO2 96%   BMI 21.62 kg/m²   General appearance: alert, cooperative, no distress  HENT: Normocephalic, atraumatic, neck symmetrical, no nasal discharge   Eyes: conjunctivae/corneas clear, PERRL, EOM's intact  Lungs: clear to auscultation bilaterally, no dullness to percussion bilaterally  Heart: regular rate and rhythm without rub; no displacement of the PMI   Abdomen: soft, non-tender; bowel sounds normoactive; no organomegaly  Extremities: extremities symmetric; no clubbing, cyanosis, or edema  Integument: Skin color, texture, turgor normal; no rashes; hair distrubution normal  Neurologic: Alert and oriented X 3, moving all four extremities, intact sensation to light touch  Psychiatric: no pressured speech; normal affect; no evidence of impaired cognition     Labs:  Lab Results   Component Value Date    WBC 8.01 03/23/2022    HGB 11.6 (L) 03/23/2022    HCT 36.1 (L) 03/23/2022    MCV 92 03/23/2022     03/23/2022         CMP  Sodium   Date Value Ref Range Status   03/23/2022 138 136 - 145 mmol/L Final     Potassium   Date Value Ref Range Status   03/23/2022 3.3 (L) 3.5 - 5.1 mmol/L Final     Chloride   Date Value Ref Range Status   03/23/2022 102 95 - 110 mmol/L Final     CO2   Date Value Ref Range Status   03/23/2022 25 23 - 29 mmol/L Final     Glucose   Date Value Ref Range Status   03/23/2022 108 70 - 110 mg/dL Final     BUN   Date Value Ref Range Status   03/23/2022 11 8 - 23 mg/dL Final     Creatinine   Date Value Ref Range Status   03/23/2022 1.2 0.5 - 1.4 mg/dL Final     Calcium   Date Value Ref Range Status   03/23/2022 8.1 (L) 8.7 - 10.5 mg/dL Final     Total Protein   Date Value Ref Range Status   03/23/2022 6.3 6.0 - 8.4 g/dL Final     Albumin   Date Value Ref Range Status   03/23/2022 " 2.8 (L) 3.5 - 5.2 g/dL Final     Total Bilirubin   Date Value Ref Range Status   03/23/2022 0.4 0.1 - 1.0 mg/dL Final     Comment:     For infants and newborns, interpretation of results should be based  on gestational age, weight and in agreement with clinical  observations.    Premature Infant recommended reference ranges:  Up to 24 hours.............<8.0 mg/dL  Up to 48 hours............<12.0 mg/dL  3-5 days..................<15.0 mg/dL  6-29 days.................<15.0 mg/dL       Alkaline Phosphatase   Date Value Ref Range Status   03/23/2022 51 (L) 55 - 135 U/L Final     AST   Date Value Ref Range Status   03/23/2022 23 10 - 40 U/L Final     ALT   Date Value Ref Range Status   03/23/2022 12 10 - 44 U/L Final     Anion Gap   Date Value Ref Range Status   03/23/2022 11 8 - 16 mmol/L Final     eGFR if    Date Value Ref Range Status   03/23/2022 >60 >60 mL/min/1.73 m^2 Final     eGFR if non    Date Value Ref Range Status   03/23/2022 55 (A) >60 mL/min/1.73 m^2 Final     Comment:     Calculation used to obtain the estimated glomerular filtration  rate (eGFR) is the CKD-EPI equation.          Imaging: CT abdomen and pelvis 3/23/22-wall thickening of the entire colon visualized.    I have personally reviewed and interpreted these images.    Assessment:   The patient is an 81 yo man with afib on Eliquis and known history of ulcerative pan colitis currently treated with Mesalamine who presents with new onset diarrhea and abdominal pain.  He was febrile on admission.    Plan:   -We will plan for EGD (due to his dark stool) and colonoscopy on Friday for further evaluation.  -Ok to continue a regular diet today and start clear liquids in the AM.  -Follow up blood cultures and C diff in process.  Continue antibiotics.  -I will check a CRP, fecal calprotectin and order a pre biologic therapy work up.  -Follow up to be arranged with an IBD specialist at discharge.  -Hold Mesalamine.    Ashley  MD Wendy

## 2022-03-23 NOTE — ASSESSMENT & PLAN NOTE
10/21 nonobstructive CAD. Troponin 0.8 on admit with no chest pain- suspect due to colitis. Continue asa, statin.

## 2022-03-23 NOTE — PHARMACY MED REC
"  Admission Medication History     The home medication history was taken by Yin Hahn CPhT.      You may go to "Admission" then "Reconcile Home Medications" tabs to review and/or act upon these items.      The home medication list has been updated by the Pharmacy department.    Please read ALL comments highlighted in yellow.    Please address this information as you see fit.     Feel free to contact us if you have any questions or require assistance.      The medications listed below were removed from the home medication list. Please reorder if appropriate:  Patient reports no longer taking the following medication(s):   Metronidazole (Flagyl) 500 mg (d/c 3/23/222      Medications listed below were obtained from: Patient/family and Analytic software- [email or web address]  (Not in a hospital admission)      Kristen Farah MD discontinued metronidazole (Flagyl) 500 mg tablet.    Yin Hahn CPhT.  [phone]                  .        "

## 2022-03-23 NOTE — SUBJECTIVE & OBJECTIVE
Past Medical History:   Diagnosis Date    Abnormal echocardiogram 11/16/2012    Anticoagulant long-term use     Atrophic kidney 11/16/2012    BPH (benign prostatic hyperplasia) 11/16/2012    DDD (degenerative disc disease), cervical 7/5/2017    x-ray 7/17 - Severe    GERD (gastroesophageal reflux disease) 11/16/2012    Glaucoma 11/16/2012    HTN (hypertension) 11/16/2012    Hypothyroid 11/16/2012    Internal carotid artery stenosis 11/16/2012    Left ventricular diastolic dysfunction, NYHA class 1 4/16/2015    4/15    Low serum testosterone level 11/16/2012    Normal cardiac stress test 11/16/2012    Osteopenia 11/16/2012    Shingles 11/16/2012    Stroke 2017    tia   no residual    TIA (transient ischemic attack) 11/16/2012    UC (ulcerative colitis) 11/16/2012       Past Surgical History:   Procedure Laterality Date    ADENOIDECTOMY      EYE SURGERY Right 11/2020    cataract    HERNIA REPAIR      LEFT HEART CATHETERIZATION Right 10/29/2021    Procedure: CATHETERIZATION, HEART, LEFT AND RIGHT  - LV DARNELL POSSIBLE;  Surgeon: Beau Conklin MD;  Location: [facility] CATH LAB;  Service: Cardiology;  Laterality: Right;    RIGHT HEART CATHETERIZATION Right 10/29/2021    Procedure: INSERTION, CATHETER, RIGHT HEART;  Surgeon: Beau Conklin MD;  Location: [facility] CATH LAB;  Service: Cardiology;  Laterality: Right;    TONSILLECTOMY         Review of patient's allergies indicates:   Allergen Reactions    Benazepril Other (See Comments)     Cough       No current facility-administered medications on file prior to encounter.     Current Outpatient Medications on File Prior to Encounter   Medication Sig    acetaminophen (TYLENOL) 500 MG tablet Take 2 tablets (1,000 mg total) by mouth every 6 (six) hours as needed.    ascorbic acid, vitamin C, (VITAMIN C) 500 MG tablet Take 500 mg by mouth once daily.    aspirin (ECOTRIN) 81 MG EC tablet Take 81 mg by mouth once daily.    calcium carbonate (TUMS) 200 mg calcium (500 mg)  chewable tablet Take 1 tablet by mouth once daily.    ELIQUIS 2.5 mg Tab Take 2.5 mg by mouth 2 (two) times daily.    ferrous sulfate 324 mg (65 mg iron) TbEC Take 325 mg by mouth 2 (two) times daily.    levothyroxine (SYNTHROID) 125 MCG tablet TAKE 1 TABLET BY MOUTH EVERY MORNING    mesalamine (APRISO) 0.375 gram Cp24 Take 1.5 g by mouth every morning.    mesalamine (PENTASA) 500 MG CR capsule Take 2,000 mg by mouth once daily.    metoprolol succinate (TOPROL-XL) 25 MG 24 hr tablet Take 1 tablet (25 mg total) by mouth once daily.    pantoprazole (PROTONIX) 40 MG tablet Take 1 tablet (40 mg total) by mouth once daily.    pravastatin (PRAVACHOL) 20 MG tablet Take 1 tablet (20 mg total) by mouth every evening.    tamsulosin (FLOMAX) 0.4 mg Cp24 Take 0.4 mg by mouth once daily.     telmisartan (MICARDIS) 40 MG Tab TAKE 1/2 TABLET BY MOUTH DAILY    UNABLE TO FIND Take 2 tablets by mouth daily as needed. Charcoal tabs    venlafaxine (EFFEXOR) 37.5 MG Tab TAKE 1 TABLET BY MOUTH TWICE DAILY    vitC/E/Zn/copper/lutein/zeaxan (ICAPS AREDS2 ORAL) Take 1 capsule by mouth once daily.    [DISCONTINUED] metroNIDAZOLE (FLAGYL) 500 MG tablet Take 500 mg by mouth 3 (three) times daily.     Family History       Problem Relation (Age of Onset)    Alzheimer's disease Mother    Cancer Brother    Diabetes Brother    Heart attack Father    Heart disease Brother (56)    Hypertension Mother, Brother          Tobacco Use    Smoking status: Former Smoker     Quit date: 1972     Years since quittin.2    Smokeless tobacco: Never Used   Substance and Sexual Activity    Alcohol use: Yes     Alcohol/week: 1.0 standard drink     Types: 1 Cans of beer per week     Comment: occasional    Drug use: No    Sexual activity: Not Currently     Review of Systems   Constitutional:  Positive for activity change, appetite change, fatigue and fever. Negative for chills.   HENT:  Negative for congestion, sinus pressure, sinus pain and trouble  swallowing.    Respiratory:  Negative for cough, chest tightness, shortness of breath and wheezing.    Cardiovascular:  Negative for chest pain, palpitations and leg swelling.   Gastrointestinal:  Positive for diarrhea. Negative for abdominal distention, abdominal pain, anal bleeding, blood in stool, constipation, nausea and vomiting.   Genitourinary:  Negative for difficulty urinating.   Musculoskeletal:  Positive for back pain. Negative for arthralgias and myalgias.   Skin:  Negative for rash and wound.   Neurological:  Positive for dizziness, weakness and light-headedness. Negative for syncope, numbness and headaches.   Hematological:  Negative for adenopathy.   Psychiatric/Behavioral:  Positive for confusion.    Objective:     Vital Signs (Most Recent):  Temp: 99.4 °F (37.4 °C) (03/23/22 1214)  Pulse: 65 (03/23/22 1432)  Resp: (!) 29 (03/23/22 1307)  BP: (!) 92/55 (03/23/22 1333)  SpO2: 96 % (03/23/22 1359)   Vital Signs (24h Range):  Temp:  [99.4 °F (37.4 °C)-102.6 °F (39.2 °C)] 99.4 °F (37.4 °C)  Pulse:  [65-80] 65  Resp:  [17-30] 29  SpO2:  [85 %-96 %] 96 %  BP: ()/(55-70) 92/55     Weight: 70.3 kg (155 lb)  Body mass index is 21.62 kg/m².    Physical Exam  Vitals and nursing note reviewed.   Constitutional:       General: He is not in acute distress.     Appearance: He is normal weight. He is not ill-appearing or toxic-appearing.   HENT:      Head: Normocephalic and atraumatic.      Nose: Nose normal.      Mouth/Throat:      Mouth: Mucous membranes are moist.   Eyes:      General: No scleral icterus.     Conjunctiva/sclera: Conjunctivae normal.   Cardiovascular:      Rate and Rhythm: Normal rate and regular rhythm.      Pulses: Normal pulses.      Heart sounds: Murmur (systolic) heard.     No gallop.   Pulmonary:      Effort: Pulmonary effort is normal. No respiratory distress.      Breath sounds: Normal breath sounds. No wheezing or rales.      Comments: Room air  Abdominal:      General: Bowel sounds  are normal. There is no distension.      Palpations: Abdomen is soft. There is no mass.      Tenderness: There is no abdominal tenderness. There is no guarding.   Musculoskeletal:      Right lower leg: No edema.      Left lower leg: No edema.   Skin:     General: Skin is warm and dry.   Neurological:      Mental Status: He is alert and oriented to person, place, and time.           Significant Labs: All pertinent labs within the past 24 hours have been reviewed.    Significant Imaging: I have reviewed all pertinent imaging results/findings within the past 24 hours.

## 2022-03-23 NOTE — TELEPHONE ENCOUNTER
----- Message from Violette Pardo sent at 3/22/2022  4:05 PM CDT -----  Type:  Sooner Apoointment Request    Caller is requesting a sooner appointment.  Caller declined first available appointment listed below.  Caller will not accept being placed on the waitlist and is requesting a message be sent to doctor.  Name of Caller: wife   When is the first available appointment? Not listed   Symptoms:severe back pain   Would the patient rather a call back or a response via [facility]? Call   Best Call Back Number: [phone] (home)

## 2022-03-23 NOTE — ASSESSMENT & PLAN NOTE
Admitted with weakness, lethargy with cause worsening colitis despite outpatient flagyl. This may be flare of UC vs infectious colitis.   - given IVF in ED. Appears volume replete. Encourage PO diet  - continue zosyn  - check CDiff, stool culture, O&P  - GI consult to see if need steroids  - check CRP, ESR  - continue home mesalamine

## 2022-03-24 LAB
ANION GAP SERPL CALC-SCNC: 9 MMOL/L (ref 8–16)
BASOPHILS # BLD AUTO: 0.04 K/UL (ref 0–0.2)
BASOPHILS NFR BLD: 0.9 % (ref 0–1.9)
BUN SERPL-MCNC: 11 MG/DL (ref 8–23)
C DIFF GDH STL QL: NEGATIVE
C DIFF TOX A+B STL QL IA: NEGATIVE
CALCIUM SERPL-MCNC: 7.6 MG/DL (ref 8.7–10.5)
CHLORIDE SERPL-SCNC: 105 MMOL/L (ref 95–110)
CO2 SERPL-SCNC: 25 MMOL/L (ref 23–29)
CREAT SERPL-MCNC: 1.1 MG/DL (ref 0.5–1.4)
DIFFERENTIAL METHOD: ABNORMAL
E COLI SXT1 STL QL IA: NEGATIVE
E COLI SXT2 STL QL IA: NEGATIVE
EOSINOPHIL # BLD AUTO: 0.2 K/UL (ref 0–0.5)
EOSINOPHIL NFR BLD: 3.3 % (ref 0–8)
ERYTHROCYTE [DISTWIDTH] IN BLOOD BY AUTOMATED COUNT: 16.8 % (ref 11.5–14.5)
EST. GFR  (AFRICAN AMERICAN): >60 ML/MIN/1.73 M^2
EST. GFR  (NON AFRICAN AMERICAN): >60 ML/MIN/1.73 M^2
GLUCOSE SERPL-MCNC: 85 MG/DL (ref 70–110)
HBV CORE AB SERPL QL IA: NEGATIVE
HBV SURFACE AB SER-ACNC: NEGATIVE M[IU]/ML
HBV SURFACE AG SERPL QL IA: NEGATIVE
HCT VFR BLD AUTO: 32.3 % (ref 40–54)
HGB BLD-MCNC: 10.1 G/DL (ref 14–18)
IMM GRANULOCYTES # BLD AUTO: 0.01 K/UL (ref 0–0.04)
IMM GRANULOCYTES NFR BLD AUTO: 0.2 % (ref 0–0.5)
LYMPHOCYTES # BLD AUTO: 0.8 K/UL (ref 1–4.8)
LYMPHOCYTES NFR BLD: 16.6 % (ref 18–48)
MAGNESIUM SERPL-MCNC: 1.7 MG/DL (ref 1.6–2.6)
MCH RBC QN AUTO: 28.9 PG (ref 27–31)
MCHC RBC AUTO-ENTMCNC: 31.3 G/DL (ref 32–36)
MCV RBC AUTO: 93 FL (ref 82–98)
MONOCYTES # BLD AUTO: 0.9 K/UL (ref 0.3–1)
MONOCYTES NFR BLD: 19.2 % (ref 4–15)
NEUTROPHILS # BLD AUTO: 2.7 K/UL (ref 1.8–7.7)
NEUTROPHILS NFR BLD: 59.8 % (ref 38–73)
NRBC BLD-RTO: 0 /100 WBC
PHOSPHATE SERPL-MCNC: 2.4 MG/DL (ref 2.7–4.5)
PLATELET # BLD AUTO: 262 K/UL (ref 150–450)
PMV BLD AUTO: 10.3 FL (ref 9.2–12.9)
POTASSIUM SERPL-SCNC: 3.2 MMOL/L (ref 3.5–5.1)
RBC # BLD AUTO: 3.49 M/UL (ref 4.6–6.2)
SODIUM SERPL-SCNC: 139 MMOL/L (ref 136–145)
WBC # BLD AUTO: 4.52 K/UL (ref 3.9–12.7)

## 2022-03-24 PROCEDURE — 82657 ENZYME CELL ACTIVITY: CPT | Performed by: INTERNAL MEDICINE

## 2022-03-24 PROCEDURE — 83735 ASSAY OF MAGNESIUM: CPT | Performed by: HOSPITALIST

## 2022-03-24 PROCEDURE — 63600175 PHARM REV CODE 636 W HCPCS: Performed by: HOSPITALIST

## 2022-03-24 PROCEDURE — 85025 COMPLETE CBC W/AUTO DIFF WBC: CPT | Performed by: HOSPITALIST

## 2022-03-24 PROCEDURE — 99231 PR SUBSEQUENT HOSPITAL CARE,LEVL I: ICD-10-PCS | Mod: ,,, | Performed by: INTERNAL MEDICINE

## 2022-03-24 PROCEDURE — 97165 OT EVAL LOW COMPLEX 30 MIN: CPT

## 2022-03-24 PROCEDURE — 83993 ASSAY FOR CALPROTECTIN FECAL: CPT | Performed by: INTERNAL MEDICINE

## 2022-03-24 PROCEDURE — 80048 BASIC METABOLIC PNL TOTAL CA: CPT | Performed by: HOSPITALIST

## 2022-03-24 PROCEDURE — 86480 TB TEST CELL IMMUN MEASURE: CPT | Performed by: INTERNAL MEDICINE

## 2022-03-24 PROCEDURE — 87340 HEPATITIS B SURFACE AG IA: CPT | Performed by: INTERNAL MEDICINE

## 2022-03-24 PROCEDURE — 86706 HEP B SURFACE ANTIBODY: CPT | Performed by: INTERNAL MEDICINE

## 2022-03-24 PROCEDURE — 86704 HEP B CORE ANTIBODY TOTAL: CPT | Performed by: INTERNAL MEDICINE

## 2022-03-24 PROCEDURE — 97116 GAIT TRAINING THERAPY: CPT

## 2022-03-24 PROCEDURE — 84100 ASSAY OF PHOSPHORUS: CPT | Performed by: HOSPITALIST

## 2022-03-24 PROCEDURE — 99231 SBSQ HOSP IP/OBS SF/LOW 25: CPT | Mod: ,,, | Performed by: INTERNAL MEDICINE

## 2022-03-24 PROCEDURE — A4216 STERILE WATER/SALINE, 10 ML: HCPCS | Performed by: HOSPITALIST

## 2022-03-24 PROCEDURE — 97535 SELF CARE MNGMENT TRAINING: CPT

## 2022-03-24 PROCEDURE — 25000003 PHARM REV CODE 250: Performed by: INTERNAL MEDICINE

## 2022-03-24 PROCEDURE — 25000003 PHARM REV CODE 250: Performed by: HOSPITALIST

## 2022-03-24 PROCEDURE — 97161 PT EVAL LOW COMPLEX 20 MIN: CPT

## 2022-03-24 PROCEDURE — 21400001 HC TELEMETRY ROOM

## 2022-03-24 RX ORDER — MAGNESIUM SULFATE HEPTAHYDRATE 40 MG/ML
2 INJECTION, SOLUTION INTRAVENOUS ONCE
Status: COMPLETED | OUTPATIENT
Start: 2022-03-24 | End: 2022-03-24

## 2022-03-24 RX ORDER — POLYETHYLENE GLYCOL 3350, SODIUM SULFATE ANHYDROUS, SODIUM BICARBONATE, SODIUM CHLORIDE, POTASSIUM CHLORIDE 236; 22.74; 6.74; 5.86; 2.97 G/4L; G/4L; G/4L; G/4L; G/4L
2000 POWDER, FOR SOLUTION ORAL
Status: COMPLETED | OUTPATIENT
Start: 2022-03-24 | End: 2022-03-25

## 2022-03-24 RX ORDER — POTASSIUM CHLORIDE 20 MEQ/1
40 TABLET, EXTENDED RELEASE ORAL ONCE
Status: COMPLETED | OUTPATIENT
Start: 2022-03-24 | End: 2022-03-24

## 2022-03-24 RX ADMIN — VENLAFAXINE 37.5 MG: 37.5 TABLET ORAL at 09:03

## 2022-03-24 RX ADMIN — SENNOSIDES AND DOCUSATE SODIUM 1 TABLET: 50; 8.6 TABLET ORAL at 09:03

## 2022-03-24 RX ADMIN — Medication 10 ML: at 02:03

## 2022-03-24 RX ADMIN — PIPERACILLIN AND TAZOBACTAM 4.5 G: 4; .5 INJECTION, POWDER, LYOPHILIZED, FOR SOLUTION INTRAVENOUS; PARENTERAL at 07:03

## 2022-03-24 RX ADMIN — APIXABAN 2.5 MG: 2.5 TABLET, FILM COATED ORAL at 09:03

## 2022-03-24 RX ADMIN — ASPIRIN 81 MG: 81 TABLET, COATED ORAL at 09:03

## 2022-03-24 RX ADMIN — LEVOTHYROXINE SODIUM 125 MCG: 125 TABLET ORAL at 05:03

## 2022-03-24 RX ADMIN — PANTOPRAZOLE SODIUM 40 MG: 40 TABLET, DELAYED RELEASE ORAL at 09:03

## 2022-03-24 RX ADMIN — Medication 10 ML: at 05:03

## 2022-03-24 RX ADMIN — TAMSULOSIN HYDROCHLORIDE 0.4 MG: 0.4 CAPSULE ORAL at 09:03

## 2022-03-24 RX ADMIN — Medication 10 ML: at 09:03

## 2022-03-24 RX ADMIN — POLYETHYLENE GLYCOL 3350, SODIUM SULFATE ANHYDROUS, SODIUM BICARBONATE, SODIUM CHLORIDE, POTASSIUM CHLORIDE 2000 ML: 236; 22.74; 6.74; 5.86; 2.97 POWDER, FOR SOLUTION ORAL at 08:03

## 2022-03-24 RX ADMIN — PIPERACILLIN AND TAZOBACTAM 4.5 G: 4; .5 INJECTION, POWDER, LYOPHILIZED, FOR SOLUTION INTRAVENOUS; PARENTERAL at 02:03

## 2022-03-24 RX ADMIN — FERROUS SULFATE TAB 325 MG (65 MG ELEMENTAL FE) 1 EACH: 325 (65 FE) TAB at 09:03

## 2022-03-24 RX ADMIN — MAGNESIUM SULFATE 2 G: 2 INJECTION INTRAVENOUS at 01:03

## 2022-03-24 RX ADMIN — POTASSIUM CHLORIDE 40 MEQ: 20 TABLET, EXTENDED RELEASE ORAL at 01:03

## 2022-03-24 RX ADMIN — PRAVASTATIN SODIUM 20 MG: 10 TABLET ORAL at 09:03

## 2022-03-24 RX ADMIN — PIPERACILLIN AND TAZOBACTAM 4.5 G: 4; .5 INJECTION, POWDER, LYOPHILIZED, FOR SOLUTION INTRAVENOUS; PARENTERAL at 12:03

## 2022-03-24 NOTE — NURSING
Report given to Stefania who will assume his care. Pt resting comfortably. Chart check completed.

## 2022-03-24 NOTE — SUBJECTIVE & OBJECTIVE
Interval History: Feeling well today. Still dizzy/lightheaded with movement. No abdominal pain. Heartburn resolved. No nausea, vomiting. Still having loose stools.     Review of Systems   Constitutional:  Positive for fatigue. Negative for chills and fever.   Respiratory:  Negative for shortness of breath.    Cardiovascular:  Negative for chest pain.   Gastrointestinal:  Positive for diarrhea. Negative for abdominal pain, constipation, nausea and vomiting.   Genitourinary:  Negative for difficulty urinating.   Musculoskeletal:  Positive for back pain.   Neurological:  Positive for dizziness, weakness and light-headedness. Negative for numbness and headaches.   Psychiatric/Behavioral:  Negative for confusion.    Objective:     Vital Signs (Most Recent):  Temp: 98.2 °F (36.8 °C) (03/24/22 1105)  Pulse: 66 (03/24/22 1105)  Resp: (!) 22 (03/24/22 1105)  BP: 129/66 (03/24/22 1105)  SpO2: 96 % (03/24/22 1105)   Vital Signs (24h Range):  Temp:  [97.6 °F (36.4 °C)-98.6 °F (37 °C)] 98.2 °F (36.8 °C)  Pulse:  [64-75] 66  Resp:  [18-22] 22  SpO2:  [96 %-99 %] 96 %  BP: (111-140)/(55-70) 129/66     Weight: 72 kg (158 lb 11.7 oz)  Body mass index is 22.14 kg/m².  No intake or output data in the 24 hours ending 03/24/22 1356   Physical Exam  Vitals and nursing note reviewed.   Constitutional:       General: He is not in acute distress.     Appearance: He is not toxic-appearing.   HENT:      Head: Normocephalic and atraumatic.      Nose: Nose normal.      Mouth/Throat:      Mouth: Mucous membranes are moist.   Cardiovascular:      Rate and Rhythm: Normal rate and regular rhythm.      Pulses: Normal pulses.      Heart sounds: Murmur heard.     No gallop.   Pulmonary:      Effort: Pulmonary effort is normal. No respiratory distress.      Breath sounds: Normal breath sounds. No wheezing or rales.      Comments: Room air  Abdominal:      General: Bowel sounds are normal. There is no distension.      Palpations: Abdomen is soft.       Tenderness: There is no abdominal tenderness. There is no guarding.   Musculoskeletal:      Right lower leg: No edema.      Left lower leg: No edema.   Skin:     General: Skin is warm and dry.   Neurological:      Mental Status: He is alert. Mental status is at baseline.       Significant Labs: All pertinent labs within the past 24 hours have been reviewed.    Significant Imaging: I have reviewed all pertinent imaging results/findings within the past 24 hours.

## 2022-03-24 NOTE — HOSPITAL COURSE
Mr Ramesh Ricci is a 82 y.o. man with ulcerative colitis who was admitted with colitis flare +/- infectious colitis. Started zosyn. Cdiff negative, stool culture negative. GI consulted, mesalamine held. EGD/cscope on 3/25 showed normal EGD and inactive colitis. Biopsies taken. OK to resume mesalamine and use imodium PRN. DC antibiotics. BP seems to run low- DC telmisartan but continue metoprolol for PAFib. PT, OT evaluated him for weakness and recommended outpatient PT, OT. Referrals placed. Follow up in IBD clinic.

## 2022-03-24 NOTE — PROGRESS NOTES
[facility] Medicine  Progress Note    Patient Name: Ramesh Ricci  MRN: [id number]  Patient Class: IP- Inpatient   Admission Date: 3/23/2022  Length of Stay: 1 days  Attending Physician: Kristen Farah MD  Primary Care Provider: EFREM Muniz MD        Subjective:     Principal Problem:Colitis        HPI:  Mr Ramesh Ricci is a 82 y.o. man with ulcerative colitis who presents with weakness.     UC was diagnosed in his 20s. He was previously on humira which worked well but this was discontinued when there were concerns it was causing a rash. His wife suspects the COVID vaccine caused the rash instead. Since then he has been taking mesalamine daily. He follows with GI- wife says Mandaen, but unable to see any GI notes in [facility] system. No prior surgeries for UC. No EGD/cscope to review but he has had them in the past.  He was previously on prednisone for a rash, that stopped about 2 weeks ago. He was started on flagyl for suspected infection about a week ago but has not seen any improvement in symptoms. He has developed weakness, needing to use his walker more frequently and this morning unable to get out of bed. Fevers, worsening heartburn, diarrhea 4-5x/day watery dark no hematochezia increased from his usual 1-2 BM/day. No abdominal pain currently. No nausea, vomiting. Able to eat normally until about 2 days ago.     Of note, he was also placed in observation on 3/21 for Afib RVR. Added metoprolol for HR control. Weakness has worsened since then. He was a little short of breath this morning but that is resolved. No chest pain. Does have lightheadedness. Wife reports he is not as sharp as he usually is.     In ED, noted to have fever to 102, BP 90s/50s. No leukocytosis. Given vanc, zosyn, IVF. Procal negative. Admitted for UC flare +/- infectious colitis.       Overview/Hospital Course:  Mr Ramesh Ricci is a 82 y.o. man with ulcerative colitis who was admitted with colitis  flare +/- infectious colitis. Started zosyn. Cdiff negative. GI consulted, mesalamine held, planning EGD/cscope on 3/25.       Interval History: Feeling well today. Still dizzy/lightheaded with movement. No abdominal pain. Heartburn resolved. No nausea, vomiting. Still having loose stools.     Review of Systems   Constitutional:  Positive for fatigue. Negative for chills and fever.   Respiratory:  Negative for shortness of breath.    Cardiovascular:  Negative for chest pain.   Gastrointestinal:  Positive for diarrhea. Negative for abdominal pain, constipation, nausea and vomiting.   Genitourinary:  Negative for difficulty urinating.   Musculoskeletal:  Positive for back pain.   Neurological:  Positive for dizziness, weakness and light-headedness. Negative for numbness and headaches.   Psychiatric/Behavioral:  Negative for confusion.    Objective:     Vital Signs (Most Recent):  Temp: 98.2 °F (36.8 °C) (03/24/22 1105)  Pulse: 66 (03/24/22 1105)  Resp: (!) 22 (03/24/22 1105)  BP: 129/66 (03/24/22 1105)  SpO2: 96 % (03/24/22 1105)   Vital Signs (24h Range):  Temp:  [97.6 °F (36.4 °C)-98.6 °F (37 °C)] 98.2 °F (36.8 °C)  Pulse:  [64-75] 66  Resp:  [18-22] 22  SpO2:  [96 %-99 %] 96 %  BP: (111-140)/(55-70) 129/66     Weight: 72 kg (158 lb 11.7 oz)  Body mass index is 22.14 kg/m².  No intake or output data in the 24 hours ending 03/24/22 1356   Physical Exam  Vitals and nursing note reviewed.   Constitutional:       General: He is not in acute distress.     Appearance: He is not toxic-appearing.   HENT:      Head: Normocephalic and atraumatic.      Nose: Nose normal.      Mouth/Throat:      Mouth: Mucous membranes are moist.   Cardiovascular:      Rate and Rhythm: Normal rate and regular rhythm.      Pulses: Normal pulses.      Heart sounds: Murmur heard.     No gallop.   Pulmonary:      Effort: Pulmonary effort is normal. No respiratory distress.      Breath sounds: Normal breath sounds. No wheezing or rales.       Comments: Room air  Abdominal:      General: Bowel sounds are normal. There is no distension.      Palpations: Abdomen is soft.      Tenderness: There is no abdominal tenderness. There is no guarding.   Musculoskeletal:      Right lower leg: No edema.      Left lower leg: No edema.   Skin:     General: Skin is warm and dry.   Neurological:      Mental Status: He is alert. Mental status is at baseline.       Significant Labs: All pertinent labs within the past 24 hours have been reviewed.    Significant Imaging: I have reviewed all pertinent imaging results/findings within the past 24 hours.      Assessment/Plan:      * Colitis  Admitted with weakness, lethargy with cause worsening colitis despite outpatient flagyl. This may be flare of UC vs infectious colitis.   - given IVF in ED. Appears volume replete. Encourage PO diet  - continue zosyn  - CDiff negative  - stool culture, O&P pending  - GI consult- no steroids for now, hold mesalamine, EGD/cscope tomorrow    Hypokalemia  Replace and monitor        Coronary artery disease involving native coronary artery of native heart without angina pectoris  10/21 nonobstructive CAD. Troponin 0.8 on admit with no chest pain- suspect due to colitis. Continue asa, statin.       Iron deficiency anemia due to chronic blood loss  Continue home iron      PAF (paroxysmal atrial fibrillation)  Currently sinus rhythm  Holding home metoprolol due to low BP  Continue eliquis      Nonrheumatic aortic valve stenosis  Moderate AS in 10/2021  Follows with Cardiology as outpatient     CKD (chronic kidney disease) stage 3, GFR 30-59 ml/min  Cr at baseline 1.2 on admit      Essential hypertension  BP borderline on admit, now normalized. Hold home BP Rx      BPH (benign prostatic hyperplasia)  Continue tamsulosin      Acquired hypothyroidism  Continue home levothyroxine      UC (ulcerative colitis)  On mesalamine at home- hold per GI  GI consulted      VTE Risk Mitigation (From admission,  onward)         Ordered     apixaban tablet 2.5 mg  2 times daily         03/23/22 1421     Place HARIKA hose  Until discontinued         03/23/22 1421     Place sequential compression device  Until discontinued         03/23/22 1421     Reason for No Pharmacological VTE Prophylaxis  Once        Question:  Reasons:  Answer:  Already adequately anticoagulated on oral Anticoagulants    03/23/22 1421     IP VTE HIGH RISK PATIENT  Once         03/23/22 1421                Discharge Planning   LIDIA:      Code Status: DNR   Is the patient medically ready for discharge?:     Reason for patient still in hospital (select all that apply): Patient trending condition  Discharge Plan A: Home with family                  Kristen Farah MD  Department of Hospital Medicine   [facility]

## 2022-03-24 NOTE — ASSESSMENT & PLAN NOTE
Admitted with weakness, lethargy with cause worsening colitis despite outpatient flagyl. This may be flare of UC vs infectious colitis.   - given IVF in ED. Appears volume replete. Encourage PO diet  - continue zosyn  - CDiff negative  - stool culture, O&P pending  - GI consult- no steroids for now, hold mesalamine, EGD/cscope tomorrow

## 2022-03-24 NOTE — PLAN OF CARE
problem: Physical Therapy  Goal: Physical Therapy Goal  Description: Goals to be met by: 3/11/22     Patient will increase functional independence with mobility by performin. Supine to sit with Modified Traverse  2. Rolling to Left and Right with Modified Traverse.  3. Sit to stand transfer with Modified Traverse  4. Gait  x 150 feet with Modified Traverse using .Rollator   5. Ascend/descend 1 stair Modified Traverse using .   6. Lower extremity exercise program x10 reps per handout, with independence    3/24/2022 1047 by Leidy Vila, PT  Outcome: Ongoing, Progressing    Initial PT evaluation performed today.  Pt could benefit from skilled PT services 3-5x/wk in order to maximize function prior to D/C.  Outpatient PT recommended for Vestibular  evaluation, LBP, HA, and frequent falls.  A Rollator is also recommended as patient is experiencing SOB and dizziness with ambulation.

## 2022-03-24 NOTE — PROGRESS NOTES
"[facility]    CC: diarrhea    HPI 82 y.o. male with past medical history of pan ulcerative colitis, afib on Eliquis who presents with several days of acute onset, painful diarrhea without hematochezia.  He has had recent dark stools in the setting of oral iron use.    Today he reports he is feeling better.  He is tolerating a liquid diet and having liquid stools.  No hematochezia.    Past Medical History  Past Medical History:   Diagnosis Date    Abnormal echocardiogram 11/16/2012    Anticoagulant long-term use     Atrophic kidney 11/16/2012    BPH (benign prostatic hyperplasia) 11/16/2012    DDD (degenerative disc disease), cervical 7/5/2017    x-ray 7/17 - Severe    GERD (gastroesophageal reflux disease) 11/16/2012    Glaucoma 11/16/2012    HTN (hypertension) 11/16/2012    Hypothyroid 11/16/2012    Internal carotid artery stenosis 11/16/2012    Left ventricular diastolic dysfunction, NYHA class 1 4/16/2015    4/15    Low serum testosterone level 11/16/2012    Normal cardiac stress test 11/16/2012    Osteopenia 11/16/2012    Shingles 11/16/2012    Stroke 2017    tia   no residual    TIA (transient ischemic attack) 11/16/2012    UC (ulcerative colitis) 11/16/2012         Review of Systems  General ROS: negative for chills, fever or weight loss  Cardiovascular ROS: no chest pain or dyspnea on exertion  Gastrointestinal ROS: positive for recent abdominal discomfort and diarrhea, no nausea    Physical Examination  /66   Pulse 66   Temp 98.2 °F (36.8 °C)   Resp (!) 22   Ht 5' 11" (1.803 m)   Wt 72 kg (158 lb 11.7 oz)   SpO2 96%   BMI 22.14 kg/m²   General appearance: alert, cooperative, no distress  HENT: Normocephalic, atraumatic, neck symmetrical, no nasal discharge   Lungs: clear to auscultation bilaterally, no dullness to percussion bilaterally  Heart: regular rate and rhythm without rub; no displacement of the PMI   Abdomen: soft, non-tender; bowel sounds " normoactive; no organomegaly  Extremities: extremities symmetric; no clubbing, cyanosis, or edema  Neurologic: Alert and oriented X 3, moving all four extremities, intact sensation to light touch    Labs:  Lab Results   Component Value Date    WBC 4.52 03/24/2022    HGB 10.1 (L) 03/24/2022    HCT 32.3 (L) 03/24/2022    MCV 93 03/24/2022     03/24/2022         CMP  Sodium   Date Value Ref Range Status   03/24/2022 139 136 - 145 mmol/L Final     Potassium   Date Value Ref Range Status   03/24/2022 3.2 (L) 3.5 - 5.1 mmol/L Final     Chloride   Date Value Ref Range Status   03/24/2022 105 95 - 110 mmol/L Final     CO2   Date Value Ref Range Status   03/24/2022 25 23 - 29 mmol/L Final     Glucose   Date Value Ref Range Status   03/24/2022 85 70 - 110 mg/dL Final     BUN   Date Value Ref Range Status   03/24/2022 11 8 - 23 mg/dL Final     Creatinine   Date Value Ref Range Status   03/24/2022 1.1 0.5 - 1.4 mg/dL Final     Calcium   Date Value Ref Range Status   03/24/2022 7.6 (L) 8.7 - 10.5 mg/dL Final     Total Protein   Date Value Ref Range Status   03/23/2022 6.3 6.0 - 8.4 g/dL Final     Albumin   Date Value Ref Range Status   03/23/2022 2.8 (L) 3.5 - 5.2 g/dL Final     Total Bilirubin   Date Value Ref Range Status   03/23/2022 0.4 0.1 - 1.0 mg/dL Final     Comment:     For infants and newborns, interpretation of results should be based  on gestational age, weight and in agreement with clinical  observations.    Premature Infant recommended reference ranges:  Up to 24 hours.............<8.0 mg/dL  Up to 48 hours............<12.0 mg/dL  3-5 days..................<15.0 mg/dL  6-29 days.................<15.0 mg/dL       Alkaline Phosphatase   Date Value Ref Range Status   03/23/2022 51 (L) 55 - 135 U/L Final     AST   Date Value Ref Range Status   03/23/2022 23 10 - 40 U/L Final     ALT   Date Value Ref Range Status   03/23/2022 12 10 - 44 U/L Final     Anion Gap   Date Value Ref Range Status   03/24/2022 9 8 - 16  mmol/L Final     eGFR if    Date Value Ref Range Status   03/24/2022 >60 >60 mL/min/1.73 m^2 Final     eGFR if non    Date Value Ref Range Status   03/24/2022 >60 >60 mL/min/1.73 m^2 Final     Comment:     Calculation used to obtain the estimated glomerular filtration  rate (eGFR) is the CKD-EPI equation.          Imaging:  No new imaging to review    Assessment: The patient is an 81 yo man with afib on Eliquis and known history of ulcerative pan colitis currently treated with Mesalamine who presents with new onset diarrhea and abdominal pain.  He was febrile on admission.  He is now afebrile with stable vitals.  His abdominal pain has improved.  C diff testing was negative.    Plan:  -Continue a clear liquid diet today.  -Bowel prep planned for this evening.  -NPO at midnight except for bowel prep.  -Plan for EGD and colonoscopy tomorrow.    Ashley Nguyen MD

## 2022-03-24 NOTE — PLAN OF CARE
[facility] [place]  Initial Discharge Assessment       Primary Care Provider: EFREM Muniz MD    Admission Diagnosis: Colitis [K52.9]  Fatigue [R53.83]  Abnormal CT of the chest [R93.89]  Elevated troponin [R77.8]  Chest pain [R07.9]  Fever, unspecified fever cause [R50.9]    Admission Date: 3/23/2022  Expected Discharge Date:     Discharge Barriers Identified: None    Payor: [employer]ARE / Plan: [employer] 65 / Product Type: Medicare Advantage /     Extended Emergency Contact Information  Primary Emergency Contact: Mone Ricci  Address: [address] [place] [address] [facility]  Home Phone: [phone]  Mobile Phone: [phone]  Relation: Spouse   needed? No    Discharge Plan A: Home with family  Discharge Plan B: Home with family      [facility] [place] - [address]  [address]  [place] [address]  Phone: [phone] Fax: [phone]      Initial Assessment (most recent)       Adult Discharge Assessment - 03/24/22 1229          Discharge Assessment    Assessment Type Discharge Planning Assessment     Confirmed/corrected address, phone number and insurance Yes     Confirmed Demographics Correct on Facesheet     Source of Information patient     If unable to respond/provide information was family/caregiver contacted? No Contact Information Available     When was your last doctors appointment? --   Patient stated a month or two ago.    Communicated LIDIA with patient/caregiver Date not available/Unable to determine     Reason For Admission Colitis     Lives With spouse     Facility Arrived From: Home     Do you expect to return to your current living situation? Yes     Do you have help at home or someone to help you manage your care at home? Yes     Who are your caregiver(s) and their phone number(s)? Mone Ricci (Spouse)   [phone]     Prior to hospitilization cognitive status: Alert/Oriented      Current cognitive status: Alert/Oriented     Equipment Currently Used at Home cane, straight;walker, rolling     Readmission within 30 days? No     Patient currently being followed by outpatient case management? No     Do you currently have service(s) that help you manage your care at home? No     Do you take prescription medications? Yes     Do you have prescription coverage? Yes     Coverage PHN     Do you have any problems affording any of your prescribed medications? No     Is the patient taking medications as prescribed? yes     Who is going to help you get home at discharge? Mone Ricci (Spouse)   [phone]     How do you get to doctors appointments? car, drives self;family or friend will provide     Are you on dialysis? No     Do you take coumadin? No   Eliquis    Discharge Plan A Home with family     Discharge Plan B Home with family     DME Needed Upon Discharge  other (see comments)   TBD    Discharge Plan discussed with: Patient     Discharge Barriers Identified None        Relationship/Environment    Name(s) of Who Lives With Patient Mone Ricci (Spouse)   [phone]                   Patient does not have a preference for follow up appts times, except for Monday's.

## 2022-03-24 NOTE — NURSING
Pt still currently needs stool specimen for Calprotectin. Pt had bowel movement during shift. However, the CNA that cleaned patient up was not assigned to him and was not aware that the patient needed stool specimen.

## 2022-03-24 NOTE — PLAN OF CARE
Problem: Occupational Therapy  Goal: Occupational Therapy Goal  Description: Goals to be met by: 04/07/22     Patient will increase functional independence with ADLs by performing:    LE Dressing with Modified [place].  Grooming while standing at sink with Modified [place].  Toileting from toilet with Modified [place] for hygiene and clothing management.   Supine to sit with Modified [place].  Step transfer with Modified [place]  Toilet transfer to toilet with Modified [place].  Upper extremity exercise program x15 reps per handout, with independence.    Outcome: Ongoing, Progressing     OT rec OP OT with family support at d/c in order to address safety and independence with ADLs, iADLs, and all aspects of functional mobility. DME rec: rollator.

## 2022-03-24 NOTE — PT/OT/SLP EVAL
Occupational Therapy   Evaluation    Name: Ramesh Ricci  MRN: [id number]  Admitting Diagnosis:  Colitis  Recent Surgery: * No surgery found *      Recommendations:     Discharge Recommendations: outpatient OT (with family support)  Discharge Equipment Recommendations:  rollator  Barriers to discharge:  None    Assessment:     Ramesh Ricci is a 82 y.o. male with a medical diagnosis of Colitis. Performance deficits affecting function: weakness, impaired endurance, pain, impaired balance, impaired self care skills, impaired functional mobilty, gait instability.      Pt's c/o was dizziness that he has been experiencing for a long time per pt/spouse, but this did not affect his balance with OOB activity with use of AD.     Rehab Prognosis: Good; patient would benefit from acute skilled OT services to address these deficits and reach maximum level of function.       Plan:     Patient to be seen  (3-5x/week) to address the above listed problems via self-care/home management, therapeutic activities, therapeutic exercises  · Plan of Care Expires: 04/07/22  · Plan of Care Reviewed with: patient, spouse    Subjective     Chief Complaint: felt SOB with ambulation, headache, dizziness   Patient/Family Comments/goals: agreeable to try OP therapy d/t frequent falls at home; reports that he was just in the ED for a-fib, but he is asymptomatic     Occupational Profile:  Living Environment: pt lives with his wife and dog in a [facility] with 1 MISSY at entry. Bathroom set-up: walk-in shower with built- in shower chair and raised toilet.   Previous level of function: MOD I with ADLs and functional mobility with use of str c vs RW prn. Pt reports 4-5 falls in the last 3 months, primarily when not using AD.   Roles and Routines: does laundry, goes on short walks outside in the neighborhood   Equipment Used at Home:  raised toilet, walker, rolling, cane, straight (built-in shower chair)  Assistance upon Discharge: wife; 1 adult child      Pain/Comfort:  · Pain Rating 1:  (c/o headache (pointing to R side forehead, but complains HA all over top of head))  · Pain Addressed 1: Reposition, Cessation of Activity    Patients cultural, spiritual, Taoist conflicts given the current situation: no    Objective:     Communicated with: nurseLeonardo, prior to session.  Patient found HOB elevated with peripheral IV, telemetry upon OT entry to room.    General Precautions: Standard, fall, special contact   Orthopedic Precautions:N/A   Braces: N/A  Respiratory Status: Room air - 98%, 89 bpm with ambulation     Occupational Performance:    Bed Mobility:    · Patient completed log roll technique with contact guard assistance (with cueing for body mechanics)   · Patient completed Scooting with stand by assistance  · Patient completed Supine to Sit with contact guard assistance (for implementing correct body mechanics)     Functional Mobility/Transfers:  · Patient completed Sit <> Stand Transfer with stand by assistance  with  rolling walker   · Patient completed Toilet Transfer Step Transfer technique with stand by assistance with  rolling walker and bedside commode  · Functional Mobility: pt completed in-room functional mobility using RW with SBA; verbal cueing for upright head. pt completed further distance ambulation with PT -  Please refer to PT note for gait assessment.     Activities of Daily Living:  · Grooming: modified independence for cleaning hands seated on BSC after toileting (pt declined standing at the sink for task)   · Upper Body Dressing: modified independence donning back gown while seated unsupported at EOB  · Lower Body Dressing: supervision seated EOB to doff/don L sock   · Toileting: supervision for pericare seated on BSC after BM (liquid- left BM in BSC bucket and placed in the bathroom with nurse notified d/t need for stool specimin)    Cognitive/Visual Perceptual:  Cognitive/Psychosocial Skills:     -       Follows  Commands/attention:Follows multistep  commands  -       Communication: clear/fluent  -       Memory: No Deficits noted  -       Safety awareness/insight to disability: intact   -       Mood/Affect/Coping skills/emotional control: Cooperative and Pleasant  Visual/Perceptual:      -Intact  R/L discrimination      Physical Exam:  Balance:    -       seated: SUP; standing: SBA with RW  Postural examination/scapula alignment:    -       Rounded shoulders  -       Forward head  Skin integrity: Visible skin intact  Edema:  None noted  Sensation:    -       Intact  light/touch BUE  Dominant hand:    -       Right  Upper Extremity Range of Motion:     -       Right Upper Extremity: WFL  -       Left Upper Extremity: WFL  Upper Extremity Strength:    -       Right Upper Extremity: WFL  -       Left Upper Extremity: WFL   Strength:    -       Right Upper Extremity: WFL  -       Left Upper Extremity: WFL  Fine Motor Coordination:    -       Intact  Left hand, manipulation of objects and Right hand, manipulation of objects  Gross motor coordination:   WFL    AMPAC 6 Click ADL:  AMPAC Total Score: 24    Treatment & Education:  · Pt and spouse educated on OT role/POC.   · Importance of OOB activity with staff assistance.  · Safety during functional t/f and mobility    · Pt practiced log roll technique for bed mobility to minimize twisting d/t chronic back pain   · Multiple self-care tasks/functional mobility completed- assistance level noted above   · All questions/concerns answered within OT scope of practice     Education:    Patient left reclined in the chair with bedside table in front of pt  with all lines intact, call button in reach, nurseLeonardo, notified, wife present and all needs met/within reach    GOALS:   Multidisciplinary Problems     Occupational Therapy Goals        Problem: Occupational Therapy    Goal Priority Disciplines Outcome Interventions   Occupational Therapy Goal     OT, PT/OT Ongoing, Progressing     Description: Goals to be met by: 04/07/22     Patient will increase functional independence with ADLs by performing:    LE Dressing with Modified [place].  Grooming while standing at sink with Modified [place].  Toileting from toilet with Modified [place] for hygiene and clothing management.   Supine to sit with Modified [place].  Step transfer with Modified [place]  Toilet transfer to toilet with Modified [place].  Upper extremity exercise program x15 reps per handout, with independence.                     History:     Past Medical History:   Diagnosis Date    Abnormal echocardiogram 11/16/2012    Anticoagulant long-term use     Atrophic kidney 11/16/2012    BPH (benign prostatic hyperplasia) 11/16/2012    DDD (degenerative disc disease), cervical 7/5/2017    x-ray 7/17 - Severe    GERD (gastroesophageal reflux disease) 11/16/2012    Glaucoma 11/16/2012    HTN (hypertension) 11/16/2012    Hypothyroid 11/16/2012    Internal carotid artery stenosis 11/16/2012    Left ventricular diastolic dysfunction, NYHA class 1 4/16/2015    4/15    Low serum testosterone level 11/16/2012    Normal cardiac stress test 11/16/2012    Osteopenia 11/16/2012    Shingles 11/16/2012    Stroke 2017    tia   no residual    TIA (transient ischemic attack) 11/16/2012    UC (ulcerative colitis) 11/16/2012       Past Surgical History:   Procedure Laterality Date    ADENOIDECTOMY      EYE SURGERY Right 11/2020    cataract    HERNIA REPAIR      LEFT HEART CATHETERIZATION Right 10/29/2021    Procedure: CATHETERIZATION, HEART, LEFT AND RIGHT  - LV DARNELL POSSIBLE;  Surgeon: Beau Conklin MD;  Location: [facility] CATH LAB;  Service: Cardiology;  Laterality: Right;    RIGHT HEART CATHETERIZATION Right 10/29/2021    Procedure: INSERTION, CATHETER, RIGHT HEART;  Surgeon: Beau Conklin MD;  Location: [facility] CATH LAB;  Service: Cardiology;  Laterality: Right;    TONSILLECTOMY         Time  Tracking:     OT Date of Treatment: 03/24/22  OT Start Time: 1014  OT Stop Time: 1053  OT Total Time (min): 39 min     (OT only from 1801-6904)    Billable Minutes:Evaluation 15 min  Self Care/Home Management 12 min  Total Time 39 min total (co-eval with PT)    3/24/2022

## 2022-03-24 NOTE — NURSING
Report to nurse Hall who will assume the patient's care. Complete chart check . Pt resting comfortably

## 2022-03-25 ENCOUNTER — ANESTHESIA EVENT (OUTPATIENT)
Dept: ENDOSCOPY | Facility: HOSPITAL | Age: 82
DRG: 392 | End: 2022-03-25
Payer: MEDICARE

## 2022-03-25 ENCOUNTER — ANESTHESIA (OUTPATIENT)
Dept: ENDOSCOPY | Facility: HOSPITAL | Age: 82
DRG: 392 | End: 2022-03-25
Payer: MEDICARE

## 2022-03-25 PROBLEM — Z71.89 GOALS OF CARE, COUNSELING/DISCUSSION: Status: ACTIVE | Noted: 2022-03-25

## 2022-03-25 LAB
ANION GAP SERPL CALC-SCNC: 9 MMOL/L (ref 8–16)
BUN SERPL-MCNC: 8 MG/DL (ref 8–23)
CALCIUM SERPL-MCNC: 7.8 MG/DL (ref 8.7–10.5)
CHLORIDE SERPL-SCNC: 105 MMOL/L (ref 95–110)
CO2 SERPL-SCNC: 26 MMOL/L (ref 23–29)
CREAT SERPL-MCNC: 1.3 MG/DL (ref 0.5–1.4)
EST. GFR  (AFRICAN AMERICAN): 58 ML/MIN/1.73 M^2
EST. GFR  (NON AFRICAN AMERICAN): 50 ML/MIN/1.73 M^2
GAMMA INTERFERON BACKGROUND BLD IA-ACNC: 0.31 IU/ML
GLUCOSE SERPL-MCNC: 105 MG/DL (ref 70–110)
M TB IFN-G CD4+ BCKGRND COR BLD-ACNC: -0.01 IU/ML
MAGNESIUM SERPL-MCNC: 2 MG/DL (ref 1.6–2.6)
MITOGEN IGNF BCKGRD COR BLD-ACNC: 3.62 IU/ML
POTASSIUM SERPL-SCNC: 3.2 MMOL/L (ref 3.5–5.1)
SODIUM SERPL-SCNC: 140 MMOL/L (ref 136–145)
TB GOLD PLUS: NEGATIVE
TB2 - NIL: -0.04 IU/ML

## 2022-03-25 PROCEDURE — 36415 COLL VENOUS BLD VENIPUNCTURE: CPT | Performed by: HOSPITALIST

## 2022-03-25 PROCEDURE — 25000003 PHARM REV CODE 250: Performed by: HOSPITALIST

## 2022-03-25 PROCEDURE — 94760 N-INVAS EAR/PLS OXIMETRY 1: CPT

## 2022-03-25 PROCEDURE — 45380 COLONOSCOPY AND BIOPSY: CPT | Mod: ,,, | Performed by: INTERNAL MEDICINE

## 2022-03-25 PROCEDURE — 97116 GAIT TRAINING THERAPY: CPT | Mod: CQ

## 2022-03-25 PROCEDURE — D9220A PRA ANESTHESIA: Mod: CRNA,,, | Performed by: NURSE ANESTHETIST, CERTIFIED REGISTERED

## 2022-03-25 PROCEDURE — 25000003 PHARM REV CODE 250: Performed by: NURSE ANESTHETIST, CERTIFIED REGISTERED

## 2022-03-25 PROCEDURE — 43235 PR EGD, FLEX, DIAGNOSTIC: ICD-10-PCS | Mod: 51,,, | Performed by: INTERNAL MEDICINE

## 2022-03-25 PROCEDURE — A4216 STERILE WATER/SALINE, 10 ML: HCPCS | Performed by: HOSPITALIST

## 2022-03-25 PROCEDURE — 88305 TISSUE EXAM BY PATHOLOGIST: CPT | Mod: 26,,, | Performed by: PATHOLOGY

## 2022-03-25 PROCEDURE — 80048 BASIC METABOLIC PNL TOTAL CA: CPT | Performed by: HOSPITALIST

## 2022-03-25 PROCEDURE — 97110 THERAPEUTIC EXERCISES: CPT | Mod: CQ

## 2022-03-25 PROCEDURE — 45380 COLONOSCOPY AND BIOPSY: CPT | Performed by: INTERNAL MEDICINE

## 2022-03-25 PROCEDURE — 88305 TISSUE EXAM BY PATHOLOGIST: ICD-10-PCS | Mod: 26,,, | Performed by: PATHOLOGY

## 2022-03-25 PROCEDURE — 83735 ASSAY OF MAGNESIUM: CPT | Performed by: HOSPITALIST

## 2022-03-25 PROCEDURE — D9220A PRA ANESTHESIA: Mod: ANES,,, | Performed by: ANESTHESIOLOGY

## 2022-03-25 PROCEDURE — D9220A PRA ANESTHESIA: ICD-10-PCS | Mod: CRNA,,, | Performed by: NURSE ANESTHETIST, CERTIFIED REGISTERED

## 2022-03-25 PROCEDURE — 88305 TISSUE EXAM BY PATHOLOGIST: CPT | Mod: 59 | Performed by: PATHOLOGY

## 2022-03-25 PROCEDURE — 25000003 PHARM REV CODE 250: Performed by: INTERNAL MEDICINE

## 2022-03-25 PROCEDURE — 37000008 HC ANESTHESIA 1ST 15 MINUTES: Performed by: INTERNAL MEDICINE

## 2022-03-25 PROCEDURE — 21400001 HC TELEMETRY ROOM

## 2022-03-25 PROCEDURE — D9220A PRA ANESTHESIA: ICD-10-PCS | Mod: ANES,,, | Performed by: ANESTHESIOLOGY

## 2022-03-25 PROCEDURE — 45380 PR COLONOSCOPY,BIOPSY: ICD-10-PCS | Mod: ,,, | Performed by: INTERNAL MEDICINE

## 2022-03-25 PROCEDURE — 37000009 HC ANESTHESIA EA ADD 15 MINS: Performed by: INTERNAL MEDICINE

## 2022-03-25 PROCEDURE — 63600175 PHARM REV CODE 636 W HCPCS: Performed by: NURSE ANESTHETIST, CERTIFIED REGISTERED

## 2022-03-25 PROCEDURE — 63600175 PHARM REV CODE 636 W HCPCS: Performed by: HOSPITALIST

## 2022-03-25 PROCEDURE — 43235 EGD DIAGNOSTIC BRUSH WASH: CPT | Mod: 51,,, | Performed by: INTERNAL MEDICINE

## 2022-03-25 PROCEDURE — 43235 EGD DIAGNOSTIC BRUSH WASH: CPT | Performed by: INTERNAL MEDICINE

## 2022-03-25 PROCEDURE — 27201012 HC FORCEPS, HOT/COLD, DISP: Performed by: INTERNAL MEDICINE

## 2022-03-25 RX ORDER — PHENYLEPHRINE HYDROCHLORIDE 10 MG/ML
INJECTION INTRAVENOUS
Status: DISCONTINUED | OUTPATIENT
Start: 2022-03-25 | End: 2022-03-25

## 2022-03-25 RX ORDER — PHENYLEPHRINE HCL IN 0.9% NACL 1 MG/10 ML
SYRINGE (ML) INTRAVENOUS
Status: DISPENSED
Start: 2022-03-25 | End: 2022-03-26

## 2022-03-25 RX ORDER — LIDOCAINE HYDROCHLORIDE 20 MG/ML
INJECTION, SOLUTION EPIDURAL; INFILTRATION; INTRACAUDAL; PERINEURAL
Status: DISPENSED
Start: 2022-03-25 | End: 2022-03-26

## 2022-03-25 RX ORDER — PROPOFOL 10 MG/ML
INJECTION, EMULSION INTRAVENOUS
Status: DISPENSED
Start: 2022-03-25 | End: 2022-03-26

## 2022-03-25 RX ORDER — LIDOCAINE HYDROCHLORIDE 20 MG/ML
INJECTION INTRAVENOUS
Status: DISCONTINUED | OUTPATIENT
Start: 2022-03-25 | End: 2022-03-25

## 2022-03-25 RX ORDER — PROPOFOL 10 MG/ML
VIAL (ML) INTRAVENOUS
Status: DISCONTINUED | OUTPATIENT
Start: 2022-03-25 | End: 2022-03-25

## 2022-03-25 RX ADMIN — ASPIRIN 81 MG: 81 TABLET, COATED ORAL at 06:03

## 2022-03-25 RX ADMIN — PROPOFOL 20 MG: 10 INJECTION, EMULSION INTRAVENOUS at 04:03

## 2022-03-25 RX ADMIN — PROPOFOL 40 MG: 10 INJECTION, EMULSION INTRAVENOUS at 04:03

## 2022-03-25 RX ADMIN — Medication 10 ML: at 09:03

## 2022-03-25 RX ADMIN — PIPERACILLIN AND TAZOBACTAM 4.5 G: 4; .5 INJECTION, POWDER, LYOPHILIZED, FOR SOLUTION INTRAVENOUS; PARENTERAL at 04:03

## 2022-03-25 RX ADMIN — PROPOFOL 30 MG: 10 INJECTION, EMULSION INTRAVENOUS at 04:03

## 2022-03-25 RX ADMIN — PHENYLEPHRINE HYDROCHLORIDE 50 MCG: 10 INJECTION INTRAVENOUS at 04:03

## 2022-03-25 RX ADMIN — PIPERACILLIN AND TAZOBACTAM 4.5 G: 4; .5 INJECTION, POWDER, LYOPHILIZED, FOR SOLUTION INTRAVENOUS; PARENTERAL at 06:03

## 2022-03-25 RX ADMIN — APIXABAN 2.5 MG: 2.5 TABLET, FILM COATED ORAL at 06:03

## 2022-03-25 RX ADMIN — POLYETHYLENE GLYCOL 3350, SODIUM SULFATE ANHYDROUS, SODIUM BICARBONATE, SODIUM CHLORIDE, POTASSIUM CHLORIDE 2000 ML: 236; 22.74; 6.74; 5.86; 2.97 POWDER, FOR SOLUTION ORAL at 02:03

## 2022-03-25 RX ADMIN — SENNOSIDES AND DOCUSATE SODIUM 1 TABLET: 50; 8.6 TABLET ORAL at 09:03

## 2022-03-25 RX ADMIN — GLYCOPYRROLATE 0.1 MG: 0.2 INJECTION, SOLUTION INTRAMUSCULAR; INTRAVITREAL at 04:03

## 2022-03-25 RX ADMIN — PRAVASTATIN SODIUM 20 MG: 10 TABLET ORAL at 09:03

## 2022-03-25 RX ADMIN — TAMSULOSIN HYDROCHLORIDE 0.4 MG: 0.4 CAPSULE ORAL at 06:03

## 2022-03-25 RX ADMIN — PANTOPRAZOLE SODIUM 40 MG: 40 TABLET, DELAYED RELEASE ORAL at 06:03

## 2022-03-25 RX ADMIN — SODIUM CHLORIDE: 0.9 INJECTION, SOLUTION INTRAVENOUS at 03:03

## 2022-03-25 RX ADMIN — Medication 10 ML: at 05:03

## 2022-03-25 RX ADMIN — FERROUS SULFATE TAB 325 MG (65 MG ELEMENTAL FE) 1 EACH: 325 (65 FE) TAB at 06:03

## 2022-03-25 RX ADMIN — LIDOCAINE HYDROCHLORIDE 100 MG: 20 INJECTION, SOLUTION INTRAVENOUS at 04:03

## 2022-03-25 RX ADMIN — VENLAFAXINE 37.5 MG: 37.5 TABLET ORAL at 06:03

## 2022-03-25 RX ADMIN — MELATONIN TAB 3 MG 6 MG: 3 TAB at 09:03

## 2022-03-25 NOTE — ANESTHESIA PREPROCEDURE EVALUATION
03/25/2022  Ramesh Ricci is a 82 y.o., male.      Pre-op Assessment    I have reviewed the Patient Summary Reports.     I have reviewed the Nursing Notes.       Review of Systems  Anesthesia Hx:  No problems with previous Anesthesia  Denies Family Hx of Anesthesia complications.   Denies Personal Hx of Anesthesia complications.   Social:  Former Smoker    Cardiovascular:   Hypertension Valvular problems/Murmurs, AS CAD  Dysrhythmias atrial fibrillation 2020 echo  · Mild concentric left ventricular hypertrophy.  · The estimated PA systolic pressure is 29 mmHg.  · Normal left ventricular systolic function. The estimated ejection fraction is 66%.  · Grade I (mild) left ventricular diastolic dysfunction consistent with impaired relaxation.  · No wall motion abnormalities.  · Moderate left atrial enlargement.  · Moderate aortic valve stenosis.  · Aortic valve area is 0.97 cm2; peak velocity is 3.52 m/s; mean gradient is 28 mmHg.  · Mild tricuspid regurgitation.  · Severe Mitral annular calcification.     Pulmonary:  Pulmonary Normal    Renal/:   Chronic Renal Disease, CRI    Hepatic/GI:   PUD, GERD Ulcerative colitis; no current GERD symptoms or N/V   Musculoskeletal:   Arthritis     Neurological:   CVA Headaches    Endocrine:   Hypothyroidism        Physical Exam  General: Well nourished, Cooperative, Alert and Oriented    Airway:  Mallampati: III   Mouth Opening: Normal  TM Distance: Normal  Tongue: Normal  Neck ROM: Normal ROM    Dental:  Intact        Anesthesia Plan  Type of Anesthesia, risks & benefits discussed:    Anesthesia Type: Gen Natural Airway, MAC  Intra-op Monitoring Plan: Standard ASA Monitors  Post Op Pain Control Plan: multimodal analgesia and IV/PO Opioids PRN  Induction:  IV  Informed Consent: Informed consent signed with the Patient and all parties understand the risks and agree with  anesthesia plan.  All questions answered.   ASA Score: 3  Day of Surgery Review of History & Physical: H&P Update referred to the surgeon/provider.  Anesthesia Plan Notes: Pt had moderate aortic stenosis in 2020 echo, assume moderate to severe aortic stenosis and avoid/treat hypotension with phenylephrine. Avoid/treat tachycardia with phenylephrine.    D/w pt code status. He agreed to suspend DNR order during perioperative period.  -Soni      Ready For Surgery From Anesthesia Perspective.     .

## 2022-03-25 NOTE — PT/OT/SLP PROGRESS
Physical Therapy Treatment    Patient Name:  Ramesh Ricci   MRN:  [id number]    Recommendations:     Discharge Recommendations:  outpatient PT   Discharge Equipment Recommendations: rollator   Barriers to discharge: None    Assessment:     Ramesh Ricci is a 82 y.o. male admitted with a medical diagnosis of Colitis.  He presents with the following impairments/functional limitations:  weakness, impaired endurance, decreased lower extremity function .    Rehab Prognosis: Good; patient would benefit from acute skilled PT services to address these deficits and reach maximum level of function.    Recent Surgery: Procedure(s) (LRB):  EGD (ESOPHAGOGASTRODUODENOSCOPY) (N/A)  COLONOSCOPY (N/A) Day of Surgery    Plan:     During this hospitalization, patient to be seen  (3-5x/wk) to address the identified rehab impairments via gait training, therapeutic activities, therapeutic exercises and progress toward the following goals:    · Plan of Care Expires:  03/31/22    Subjective     Chief Complaint: ready to go to test  Patient/Family Comments/goals: pt agreed to therapy  Pain/Comfort:  · Pain Rating 1: 0/10  · Pain Rating Post-Intervention 1: 0/10      Objective:     Communicated with nurse prior to session.  Patient found up in chair with telemetry, peripheral IV upon PT entry to room.     General Precautions: Standard, fall, special contact   Orthopedic Precautions:N/A   Braces:    Respiratory Status: Room air     Functional Mobility:  · Transfers:     · Sit to Stand:  supervision with rolling walker  · Gait: 250 ft with rw with S with riciprocal gt  · Balance: G dynamic standing      AM-PAC 6 CLICK MOBILITY          Therapeutic Activities and Exercises:   BLE TE seated x 10; LAQ, marches, HS, HR, TT    Patient left up in chair with all lines intact, call button in reach and spouse present..    GOALS:   Multidisciplinary Problems     Physical Therapy Goals        Problem: Physical Therapy    Goal Priority  Disciplines Outcome Goal Variances Interventions   Physical Therapy Goal     PT, PT/OT Ongoing, Progressing     Description: Goals to be met by: 3/11/22     Patient will increase functional independence with mobility by performin. Supine to sit with Modified [place]  2. Rolling to Left and Right with Modified [place].  3. Sit to stand transfer with Modified [place]  4. Gait  x 150 feet with Modified [place] using .Rollator   5. Ascend/descend 1 stair Modified [place] using .   6. Lower extremity exercise program x10 reps per handout, with independence                     Time Tracking:     PT Received On: 22  PT Start Time: 1230     PT Stop Time: 1253  PT Total Time (min): 23 min     Billable Minutes: Gait Training 8 and Therapeutic Exercise 15    Treatment Type: Treatment  PT/PTA: PTA     PTA Visit Number: 0     2022

## 2022-03-25 NOTE — PLAN OF CARE
Patient alert, oriented, back to baseline; tolerated apple juice. Discharge criteria met, patient denies any complaints. Dr Nguyen talked to the patient's wife.

## 2022-03-25 NOTE — PROVATION PATIENT INSTRUCTIONS
Discharge Summary/Instructions after an Endoscopic Procedure  Patient Name: Ramesh Ricci  Patient MRN: [id number]  Patient YOB: 1940 Friday, March 25, 2022  Ashley Nguyen MD  Dear patient,  As a result of recent federal legislation (The Federal Cures Act), you may   receive lab or pathology results from your procedure in your [facility]   account before your physician is able to contact you. Your physician or   their representative will relay the results to you with their   recommendations at their soonest availability.  Thank you,  RESTRICTIONS:  During your procedure today, you received medications for sedation.  These   medications may affect your judgment, balance and coordination.  Therefore,   for 24 hours, you have the following restrictions:   - DO NOT drive a car, operate machinery, make legal/financial decisions,   sign important papers or drink alcohol.    ACTIVITY:  Today: no heavy lifting, straining or running due to procedural   sedation/anesthesia.  The following day: return to full activity including work.  DIET:  Eat and drink normally unless instructed otherwise.     TREATMENT FOR COMMON SIDE EFFECTS:  - Mild abdominal pain, nausea, belching, bloating or excessive gas:  rest,   eat lightly and use a heating pad.  - Sore Throat: treat with throat lozenges and/or gargle with warm salt   water.  - Because air was used during the procedure, expelling large amounts of air   from your rectum or belching is normal.  - If a bowel prep was taken, you may not have a bowel movement for 1-3 days.    This is normal.  SYMPTOMS TO WATCH FOR AND REPORT TO YOUR PHYSICIAN:  1. Abdominal pain or bloating, other than gas cramps.  2. Chest pain.  3. Back pain.  4. Signs of infection such as: chills or fever occurring within 24 hours   after the procedure.  5. Rectal bleeding, which would show as bright red, maroon, or black stools.   (A tablespoon of blood from the rectum is not serious, especially  if   hemorrhoids are present.)  6. Vomiting.  7. Weakness or dizziness.  GO DIRECTLY TO THE NEAREST EMERGENCY ROOM IF YOU HAVE ANY OF THE FOLLOWING:      Difficulty breathing              Chills and/or fever over 101 F   Persistent vomiting and/or vomiting blood   Severe abdominal pain   Severe chest pain   Black, tarry stools   Bleeding- more than one tablespoon   Any other symptom or condition that you feel may need urgent attention  Your doctor recommends these additional instructions:  If any biopsies were taken, your doctors clinic will contact you in 1 to 2   weeks with any results.  - Proceed to colonoscopy.  For questions, problems or results please call your physician - Ashley Nguyen MD at Work:  [phone].  [facility] can be reached at [phone]     IF A COMPLICATION OR EMERGENCY SITUATION ARISES AND YOU ARE UNABLE TO REACH   YOUR PHYSICIAN - GO DIRECTLY TO THE EMERGENCY ROOM.  Ashley Nguyen MD  3/25/2022 4:51:37 PM  This report has been verified and signed electronically.  Dear patient,  As a result of recent federal legislation (The Federal Cures Act), you may   receive lab or pathology results from your procedure in your [facility]   account before your physician is able to contact you. Your physician or   their representative will relay the results to you with their   recommendations at their soonest availability.  Thank you,  PROVATION

## 2022-03-25 NOTE — PT/OT/SLP PROGRESS
Occupational Therapy      Patient Name:  Ramesh Ricci   MRN:  [id number]    Patient not seen today secondary to  (pt DAMIAN). Will follow-up later as able.    3/25/2022

## 2022-03-25 NOTE — NURSING
Pt is scheduled for Colonoscopy today. Pt has been prepped. Charge nurse, Sheri, notified there is no consent in the chart. She stated one will be signed in the Endo area.

## 2022-03-25 NOTE — ASSESSMENT & PLAN NOTE
Admitted with weakness, lethargy with cause worsening colitis despite outpatient flagyl. This may be flare of UC vs infectious colitis.   - given IVF in ED. Appears volume replete. Encourage PO diet  - continue zosyn until after scope  - CDiff, stool culture, O&P negative  - GI consult- no steroids for now, hold mesalamine, EGD/cscope today

## 2022-03-25 NOTE — ASSESSMENT & PLAN NOTE
On mesalamine at home- hold per GI  GI consulted  Plan EGD/cscope on 3/25- will follow up recs after

## 2022-03-25 NOTE — NURSING
Report accepted .Patient resting quietly. Remains NPO  For procedures.Plan of care reviewed bed low call light in reach.

## 2022-03-25 NOTE — PLAN OF CARE
Recommendations  1) When medically able, advance to cardiac diet or diet per MD   - Add Boost Breeze TID   2) Replete electroytes   3) Consider addition of MVI  4) Obtain new wt  5) RD to complete NFPE at follow up    Goals: Pt to recieve nutrition by RD follow up  Nutrition Goal Status: new  Communication of RD Recs: other (comment) (POC, sticky note)    Assessment and Plan  Nutrition Problem  Inadequate oral intake    Related to (etiology):   Poor appetite, diarrhea, NPO    Signs and Symptoms (as evidenced by):   Pt with poor intake/appetite x 4 days  NPO/CL diet x 2 days    Interventions(treatment strategy):  Collaboration with other providers    Nutrition Diagnosis Status:   New

## 2022-03-25 NOTE — NURSING
Report received from Jackie CUELLAR. Patient returned to room awake alert and oriented no pain. Meal given.

## 2022-03-25 NOTE — ASSESSMENT & PLAN NOTE
Advance Care Planning     Code Status  Patient and wife told ED that he is DNR. Will clarify with him later today and fill out LAPOST if needed.

## 2022-03-25 NOTE — ANESTHESIA POSTPROCEDURE EVALUATION
Anesthesia Post Evaluation    Patient: Ramesh Ricci    Procedure(s) Performed: Procedure(s) (LRB):  EGD (ESOPHAGOGASTRODUODENOSCOPY) (N/A)  COLONOSCOPY (N/A)    Final Anesthesia Type: general      Patient location during evaluation: GI PACU  Patient participation: Yes- Able to Participate  Level of consciousness: awake and alert  Post-procedure vital signs: reviewed and stable  Pain management: adequate  Airway patency: patent      Anesthetic complications: no      Cardiovascular status: hemodynamically stable  Respiratory status: unassisted and spontaneous ventilation  Hydration status: euvolemic  Follow-up not needed.          Vitals Value Taken Time   /61 03/25/22 1654   Temp 36.6 °C (97.8 °F) 03/25/22 1654   Pulse 75 03/25/22 1654   Resp 16 03/25/22 1654   SpO2 100 % 03/25/22 1654         No case tracking events are documented in the log.      Pain/Yrn Score: Yrn Score: 9 (3/25/2022  4:54 PM)

## 2022-03-25 NOTE — SUBJECTIVE & OBJECTIVE
Interval History: Feeling well today. Says his insides are less sore. No headache. Wife at bedside.     Review of Systems   Constitutional:  Negative for chills and fever.   Respiratory:  Negative for shortness of breath.    Cardiovascular:  Negative for chest pain.   Gastrointestinal:  Positive for diarrhea. Negative for abdominal pain, constipation, nausea and vomiting.   Genitourinary:  Negative for difficulty urinating.   Neurological:  Negative for headaches.   Objective:     Vital Signs (Most Recent):  Temp: 98 °F (36.7 °C) (03/25/22 0743)  Pulse: 66 (03/25/22 0743)  Resp: 18 (03/25/22 0743)  BP: (!) 148/75 (03/25/22 0743)  SpO2: 96 % (03/25/22 0743)   Vital Signs (24h Range):  Temp:  [97 °F (36.1 °C)-99.3 °F (37.4 °C)] 98 °F (36.7 °C)  Pulse:  [66-80] 66  Resp:  [18-27] 18  SpO2:  [92 %-98 %] 96 %  BP: (121-150)/(56-75) 148/75     Weight: 62.5 kg (137 lb 12.6 oz)  Body mass index is 19.22 kg/m².    Intake/Output Summary (Last 24 hours) at 3/25/2022 1008  Last data filed at 3/24/2022 1800  Gross per 24 hour   Intake 440 ml   Output --   Net 440 ml        Physical Exam  Vitals and nursing note reviewed.   Constitutional:       General: He is not in acute distress.     Appearance: He is not toxic-appearing.   HENT:      Head: Normocephalic and atraumatic.      Nose: Nose normal.      Mouth/Throat:      Mouth: Mucous membranes are moist.   Cardiovascular:      Rate and Rhythm: Normal rate and regular rhythm.      Pulses: Normal pulses.      Heart sounds: Murmur heard.     No gallop.   Pulmonary:      Effort: Pulmonary effort is normal. No respiratory distress.      Breath sounds: Normal breath sounds. No wheezing or rales.      Comments: Room air  Abdominal:      General: Bowel sounds are normal. There is no distension.      Palpations: Abdomen is soft.      Tenderness: There is no abdominal tenderness. There is no guarding.   Musculoskeletal:      Right lower leg: No edema.      Left lower leg: No edema.    Skin:     General: Skin is warm and dry.   Neurological:      Mental Status: He is alert. Mental status is at baseline.       Significant Labs: All pertinent labs within the past 24 hours have been reviewed.    Significant Imaging: I have reviewed all pertinent imaging results/findings within the past 24 hours.

## 2022-03-25 NOTE — PROGRESS NOTES
[facility] [place] - Telemetry  Adult Nutrition  Progress Note    SUMMARY   Recommendations  1) When medically able, advance to cardiac diet or diet per MD   - Add Boost Breeze TID   2) Replete electroytes   3) Consider addition of MVI  4) Obtain new wt  5) RD to complete NFPE at follow up    Goals: Pt to recieve nutrition by RD follow up  Nutrition Goal Status: new  Communication of RD Recs: other (comment) (POC, sticky note)    Assessment and Plan  Nutrition Problem  Inadequate oral intake    Related to (etiology):   Poor appetite, diarrhea, NPO    Signs and Symptoms (as evidenced by):   Pt with poor intake/appetite x 4 days  NPO/CL diet x 2 days    Interventions(treatment strategy):  Collaboration with other providers    Nutrition Diagnosis Status:   New      Malnutrition Assessment   poor PO intake x 4 days  SHELLY NFPE due to remote assessment  14% wt loss x 4 days (unsure of accuracy, needs reweigh)    Reason for Assessment  Reason For Assessment: identified at risk by screening criteria (MST)  Diagnosis: other (see comments) (colitis)  Relevant Medical History: UC, DDD< GERD, HTN, hypothyroid, osteopenia, stroke, TIA  Interdisciplinary Rounds: did not attend  General Information Comments: Remote assessment for coverage, unable to reach pt on room phone. Pt NPO for bowel prep. Admitted 3/23 with c/o weakness, diarrhea (4-5 dark, watery BMs.day), pt with UC - usually 1-2 BMs daily. Was able to eat normally until 2 days PTA. (Poor PO x 4 days). -170 lbs per chart, current wt showing 14% wt loss in 4 days, unsure of accuracy. New wt and NFPE needed to determine malnutrition status.  Nutrition Discharge Planning: Discharge on heart healthy diet    Nutrition Risk Screen  Nutrition Risk Screen: no indicators present    Nutrition/Diet History  Spiritual, Cultural Beliefs, Christianity Practices, Values that Affect Care: no  Food Allergies: NKFA  Factors Affecting Nutritional Intake: decreased appetite, diarrhea,  "NPO    Anthropometrics  Temp: 97.7 °F (36.5 °C)  Height Method: Stated  Height: 5' 11" (180.3 cm)  Height (inches): 71 in  Weight Method: Bed Scale  Weight: 62.5 kg (137 lb 12.6 oz)  Weight (lb): 137.79 lb  Ideal Body Weight (IBW), Male: 172 lb  % Ideal Body Weight, Male (lb): 90.12 %  BMI (Calculated): 19.2  Usual Body Weight (UBW), k kg  % Usual Body Weight: 83.51     Lab/Procedures/Meds  Pertinent Labs Reviewed: reviewed  Pertinent Labs Comments: K 3.2, GFR 50, Ca 7.8, Phos 2.4, Alb 2.8, CRP 29.5, Hgb A1c 5.8 on 22  Pertinent Medications Reviewed: reviewed  Pertinent Medications Comments: apixaban, ferrous sulfate, levothyroxine, pantoprazole, K bicarb, senna-docusate, NaCl, pravastatin    Estimated/Assessed Needs  Weight Used For Calorie Calculations: 62.1 kg (137 lb)  Energy Calorie Requirements (kcal): 1733 kcal/day based on MSJ x 1.3 SF  Energy Need Method: [place]-St Jeor  Protein Requirements: 75-93 g/day based on 1.2-1.5 g/kg  Weight Used For Protein Calculations: 62.1 kg (137 lb)  Fluid Requirements (mL): 1 mL/kcal or per MD  Estimated Fluid Requirement Method: RDA Method  RDA Method (mL): 1733  CHO Requirement: 216 g cho/day based on 50% kcal from CHO    Nutrition Prescription Ordered  Current Diet Order: NPO    Evaluation of Received Nutrient/Fluid Intake  Energy Calories Required: not meeting needs  Protein Required: not meeting needs  Fluid Required: not meeting needs  Comments: LBM 3/25  % Intake of Estimated Energy Needs: 0 - 25 %  % Meal Intake: NPO    Nutrition Risk  Level of Risk/Frequency of Follow-up: high (2x weekly)     Monitor and Evaluation  Food and Nutrient Intake: energy intake, food and beverage intake  Food and Nutrient Adminstration: diet order  Knowledge/Beliefs/Attitudes: beliefs and attitudes  Physical Activity and Function: nutrition-related ADLs and IADLs  Anthropometric Measurements: weight change  Biochemical Data, Medical Tests and Procedures: electrolyte and renal " panel, lipid profile, gastrointestinal profile, glucose/endocrine profile, inflammatory profile  Nutrition-Focused Physical Findings: skin, extremities, muscles and bones, overall appearance     Nutrition Follow-Up  RD Follow-up?: Yes

## 2022-03-25 NOTE — TRANSFER OF CARE
"Anesthesia Transfer of Care Note    Patient: Ramesh Ricci    Procedure(s) Performed: Procedure(s) (LRB):  EGD (ESOPHAGOGASTRODUODENOSCOPY) (N/A)  COLONOSCOPY (N/A)    Patient location: GI    Anesthesia Type: general    Transport from OR: Transported from OR on room air with adequate spontaneous ventilation    Post pain: adequate analgesia    Post assessment: no apparent anesthetic complications and tolerated procedure well    Post vital signs: stable    Level of consciousness: awake, alert and oriented    Nausea/Vomiting: no nausea/vomiting    Complications: none    Transfer of care protocol was followed      Last vitals:   Visit Vitals  /61 (BP Location: Left arm, Patient Position: Lying)   Pulse 75   Temp 36.6 °C (97.8 °F) (Oral)   Resp 16   Ht 5' 11" (1.803 m)   Wt 62.5 kg (137 lb 12.6 oz)   SpO2 100%   BMI 19.22 kg/m²     "

## 2022-03-25 NOTE — OR NURSING
Dr Nguyen came in and talked to the patient re:procedure findings/plan; she also talked to his wife over the phone.

## 2022-03-25 NOTE — PLAN OF CARE
Problem: Physical Therapy  Goal: Physical Therapy Goal  Description: Goals to be met by: 3/11/22     Patient will increase functional independence with mobility by performin. Supine to sit with Modified [place]  2. Rolling to Left and Right with Modified [place].  3. Sit to stand transfer with Modified [place]  4. Gait  x 150 feet with Modified [place] using .Rollator   5. Ascend/descend 1 stair Modified [place] using .   6. Lower extremity exercise program x10 reps per handout, with independence    Pt progressing well, sit to stand with S with rw, amb with rw x 250 ft with S. Ble te x 10 with demo/vcs. Pt demo understanding.

## 2022-03-25 NOTE — PROGRESS NOTES
[facility] Medicine  Progress Note    Patient Name: Ramesh Ricci  MRN: [id number]  Patient Class: IP- Inpatient   Admission Date: 3/23/2022  Length of Stay: 2 days  Attending Physician: Kristen Farah MD  Primary Care Provider: EFREM Muniz MD        Subjective:     Principal Problem:Colitis        HPI:  Mr Ramesh Ricci is a 82 y.o. man with ulcerative colitis who presents with weakness.     UC was diagnosed in his 20s. He was previously on humira which worked well but this was discontinued when there were concerns it was causing a rash. His wife suspects the COVID vaccine caused the rash instead. Since then he has been taking mesalamine daily. He follows with GI- wife says [facility], but unable to see any GI notes in [facility] system. No prior surgeries for UC. No EGD/cscope to review but he has had them in the past.  He was previously on prednisone for a rash, that stopped about 2 weeks ago. He was started on flagyl for suspected infection about a week ago but has not seen any improvement in symptoms. He has developed weakness, needing to use his walker more frequently and this morning unable to get out of bed. Fevers, worsening heartburn, diarrhea 4-5x/day watery dark no hematochezia increased from his usual 1-2 BM/day. No abdominal pain currently. No nausea, vomiting. Able to eat normally until about 2 days ago.     Of note, he was also placed in observation on 3/21 for Afib RVR. Added metoprolol for HR control. Weakness has worsened since then. He was a little short of breath this morning but that is resolved. No chest pain. Does have lightheadedness. Wife reports he is not as sharp as he usually is.     In ED, noted to have fever to 102, BP 90s/50s. No leukocytosis. Given vanc, zosyn, IVF. Procal negative. Admitted for UC flare +/- infectious colitis.       Overview/Hospital Course:  Mr Ramesh Ricci is a 82 y.o. man with ulcerative colitis who was admitted with colitis  flare +/- infectious colitis. Started zosyn. Cdiff negative, stool culture negative. GI consulted, mesalamine held. EGD/cscope planned on 3/25.       Interval History: Feeling well today. Says his insides are less sore. No headache. Wife at bedside.     Review of Systems   Constitutional:  Negative for chills and fever.   Respiratory:  Negative for shortness of breath.    Cardiovascular:  Negative for chest pain.   Gastrointestinal:  Positive for diarrhea. Negative for abdominal pain, constipation, nausea and vomiting.   Genitourinary:  Negative for difficulty urinating.   Neurological:  Negative for headaches.   Objective:     Vital Signs (Most Recent):  Temp: 98 °F (36.7 °C) (03/25/22 0743)  Pulse: 66 (03/25/22 0743)  Resp: 18 (03/25/22 0743)  BP: (!) 148/75 (03/25/22 0743)  SpO2: 96 % (03/25/22 0743)   Vital Signs (24h Range):  Temp:  [97 °F (36.1 °C)-99.3 °F (37.4 °C)] 98 °F (36.7 °C)  Pulse:  [66-80] 66  Resp:  [18-27] 18  SpO2:  [92 %-98 %] 96 %  BP: (121-150)/(56-75) 148/75     Weight: 62.5 kg (137 lb 12.6 oz)  Body mass index is 19.22 kg/m².    Intake/Output Summary (Last 24 hours) at 3/25/2022 1008  Last data filed at 3/24/2022 1800  Gross per 24 hour   Intake 440 ml   Output --   Net 440 ml        Physical Exam  Vitals and nursing note reviewed.   Constitutional:       General: He is not in acute distress.     Appearance: He is not toxic-appearing.   HENT:      Head: Normocephalic and atraumatic.      Nose: Nose normal.      Mouth/Throat:      Mouth: Mucous membranes are moist.   Cardiovascular:      Rate and Rhythm: Normal rate and regular rhythm.      Pulses: Normal pulses.      Heart sounds: Murmur heard.     No gallop.   Pulmonary:      Effort: Pulmonary effort is normal. No respiratory distress.      Breath sounds: Normal breath sounds. No wheezing or rales.      Comments: Room air  Abdominal:      General: Bowel sounds are normal. There is no distension.      Palpations: Abdomen is soft.       Tenderness: There is no abdominal tenderness. There is no guarding.   Musculoskeletal:      Right lower leg: No edema.      Left lower leg: No edema.   Skin:     General: Skin is warm and dry.   Neurological:      Mental Status: He is alert. Mental status is at baseline.       Significant Labs: All pertinent labs within the past 24 hours have been reviewed.    Significant Imaging: I have reviewed all pertinent imaging results/findings within the past 24 hours.      Assessment/Plan:      * Colitis  Admitted with weakness, lethargy with cause worsening colitis despite outpatient flagyl. This may be flare of UC vs infectious colitis.   - given IVF in ED. Appears volume replete. Encourage PO diet  - continue zosyn until after scope  - CDiff, stool culture, O&P negative  - GI consult- no steroids for now, hold mesalamine, EGD/cscope today    Goals of care, counseling/discussion  Advance Care Planning     Code Status  Patient and wife told ED that he is DNR. Will clarify with him later today and fill out LAPOST if needed.     Hypokalemia  Replace and monitor        Coronary artery disease involving native coronary artery of native heart without angina pectoris  10/21 nonobstructive CAD. Troponin 0.8 on admit with no chest pain- suspect due to colitis. Continue asa, statin.       Iron deficiency anemia due to chronic blood loss  Continue home iron      PAF (paroxysmal atrial fibrillation)  Currently sinus rhythm  Resume home metoprolol now that BP is improved  Continue eliquis      Nonrheumatic aortic valve stenosis  Moderate AS in 10/2021  Follows with Cardiology as outpatient     CKD (chronic kidney disease) stage 3, GFR 30-59 ml/min  Cr at baseline 1.2 on admit      Essential hypertension  BP borderline on admit, now normalized. Should be able to resume on discharge    BPH (benign prostatic hyperplasia)  Continue tamsulosin      Acquired hypothyroidism  Continue home levothyroxine      UC (ulcerative colitis)  On  mesalamine at home- hold per GI  GI consulted  Plan EGD/cscope on 3/25- will follow up recs after      VTE Risk Mitigation (From admission, onward)         Ordered     apixaban tablet 2.5 mg  2 times daily         03/23/22 1421     Place HARIKA hose  Until discontinued         03/23/22 1421     Place sequential compression device  Until discontinued         03/23/22 1421     Reason for No Pharmacological VTE Prophylaxis  Once        Question:  Reasons:  Answer:  Already adequately anticoagulated on oral Anticoagulants    03/23/22 1421     IP VTE HIGH RISK PATIENT  Once         03/23/22 1421                Discharge Planning   LIDIA:      Code Status: DNR   Is the patient medically ready for discharge?:     Reason for patient still in hospital (select all that apply): Consult recommendations  Discharge Plan A: Home with family                  Kristen Farah MD  Department of [facility] Medicine   [facility]

## 2022-03-25 NOTE — PROVATION PATIENT INSTRUCTIONS
Discharge Summary/Instructions after an Endoscopic Procedure  Patient Name: Ramesh Ricci  Patient MRN: [id number]  Patient YOB: 1940 Friday, March 25, 2022  Ashley Nguyen MD  Dear patient,  As a result of recent federal legislation (The Federal Cures Act), you may   receive lab or pathology results from your procedure in your [facility]   account before your physician is able to contact you. Your physician or   their representative will relay the results to you with their   recommendations at their soonest availability.  Thank you,  RESTRICTIONS:  During your procedure today, you received medications for sedation.  These   medications may affect your judgment, balance and coordination.  Therefore,   for 24 hours, you have the following restrictions:   - DO NOT drive a car, operate machinery, make legal/financial decisions,   sign important papers or drink alcohol.    ACTIVITY:  Today: no heavy lifting, straining or running due to procedural   sedation/anesthesia.  The following day: return to full activity including work.  DIET:  Eat and drink normally unless instructed otherwise.     TREATMENT FOR COMMON SIDE EFFECTS:  - Mild abdominal pain, nausea, belching, bloating or excessive gas:  rest,   eat lightly and use a heating pad.  - Sore Throat: treat with throat lozenges and/or gargle with warm salt   water.  - Because air was used during the procedure, expelling large amounts of air   from your rectum or belching is normal.  - If a bowel prep was taken, you may not have a bowel movement for 1-3 days.    This is normal.  SYMPTOMS TO WATCH FOR AND REPORT TO YOUR PHYSICIAN:  1. Abdominal pain or bloating, other than gas cramps.  2. Chest pain.  3. Back pain.  4. Signs of infection such as: chills or fever occurring within 24 hours   after the procedure.  5. Rectal bleeding, which would show as bright red, maroon, or black stools.   (A tablespoon of blood from the rectum is not serious, especially  if   hemorrhoids are present.)  6. Vomiting.  7. Weakness or dizziness.  GO DIRECTLY TO THE NEAREST EMERGENCY ROOM IF YOU HAVE ANY OF THE FOLLOWING:      Difficulty breathing              Chills and/or fever over 101 F   Persistent vomiting and/or vomiting blood   Severe abdominal pain   Severe chest pain   Black, tarry stools   Bleeding- more than one tablespoon   Any other symptom or condition that you feel may need urgent attention  Your doctor recommends these additional instructions:  If any biopsies were taken, your doctors clinic will contact you in 1 to 2   weeks with any results.  - [facility] patient to [facility] for [facility] care.   - Resume regular diet.   - Continue present medications. Ok to resume mesalamine.  - Await pathology results.   - IBD clinic follow up to be arranged.  - Repeat colonoscopy is not recommended due to current age (66 years or   older) for surveillance.  For questions, problems or results please call your physician - Ashley Nguyen MD at Work:  [phone].  [facility] can be reached at [phone]     IF A COMPLICATION OR EMERGENCY SITUATION ARISES AND YOU ARE UNABLE TO REACH   YOUR PHYSICIAN - GO DIRECTLY TO THE EMERGENCY ROOM.  Ashley Nguyen MD  3/25/2022 4:56:31 PM  This report has been verified and signed electronically.  Dear patient,  As a result of recent federal legislation (The Federal Cures Act), you may   receive lab or pathology results from your procedure in your [facility]   account before your physician is able to contact you. Your physician or   their representative will relay the results to you with their   recommendations at their soonest availability.  Thank you,  PROVATION

## 2022-03-26 VITALS
DIASTOLIC BLOOD PRESSURE: 63 MMHG | SYSTOLIC BLOOD PRESSURE: 133 MMHG | HEART RATE: 70 BPM | WEIGHT: 137.81 LBS | RESPIRATION RATE: 19 BRPM | OXYGEN SATURATION: 98 % | TEMPERATURE: 99 F | HEIGHT: 71 IN | BODY MASS INDEX: 19.29 KG/M2

## 2022-03-26 LAB
ANION GAP SERPL CALC-SCNC: 10 MMOL/L (ref 8–16)
BACTERIA STL CULT: NORMAL
BUN SERPL-MCNC: 9 MG/DL (ref 8–23)
CALCIUM SERPL-MCNC: 7.7 MG/DL (ref 8.7–10.5)
CHLORIDE SERPL-SCNC: 104 MMOL/L (ref 95–110)
CO2 SERPL-SCNC: 25 MMOL/L (ref 23–29)
CREAT SERPL-MCNC: 1.2 MG/DL (ref 0.5–1.4)
EST. GFR  (AFRICAN AMERICAN): >60 ML/MIN/1.73 M^2
EST. GFR  (NON AFRICAN AMERICAN): 55 ML/MIN/1.73 M^2
GLUCOSE SERPL-MCNC: 106 MG/DL (ref 70–110)
MAGNESIUM SERPL-MCNC: 1.7 MG/DL (ref 1.6–2.6)
O+P STL MICRO: NORMAL
POTASSIUM SERPL-SCNC: 3.7 MMOL/L (ref 3.5–5.1)
SODIUM SERPL-SCNC: 139 MMOL/L (ref 136–145)

## 2022-03-26 PROCEDURE — 63600175 PHARM REV CODE 636 W HCPCS: Performed by: HOSPITALIST

## 2022-03-26 PROCEDURE — 83735 ASSAY OF MAGNESIUM: CPT | Performed by: HOSPITALIST

## 2022-03-26 PROCEDURE — 25000003 PHARM REV CODE 250: Performed by: HOSPITALIST

## 2022-03-26 PROCEDURE — A4216 STERILE WATER/SALINE, 10 ML: HCPCS | Performed by: HOSPITALIST

## 2022-03-26 PROCEDURE — 80048 BASIC METABOLIC PNL TOTAL CA: CPT | Performed by: HOSPITALIST

## 2022-03-26 PROCEDURE — 36415 COLL VENOUS BLD VENIPUNCTURE: CPT | Performed by: HOSPITALIST

## 2022-03-26 RX ORDER — LOPERAMIDE HCL 2 MG
2 TABLET ORAL 4 TIMES DAILY PRN
Qty: 30 TABLET | Refills: 0 | Status: SHIPPED | OUTPATIENT
Start: 2022-03-26 | End: 2022-04-05

## 2022-03-26 RX ADMIN — LEVOTHYROXINE SODIUM 125 MCG: 125 TABLET ORAL at 06:03

## 2022-03-26 RX ADMIN — TAMSULOSIN HYDROCHLORIDE 0.4 MG: 0.4 CAPSULE ORAL at 09:03

## 2022-03-26 RX ADMIN — APIXABAN 2.5 MG: 2.5 TABLET, FILM COATED ORAL at 09:03

## 2022-03-26 RX ADMIN — FERROUS SULFATE TAB 325 MG (65 MG ELEMENTAL FE) 1 EACH: 325 (65 FE) TAB at 09:03

## 2022-03-26 RX ADMIN — Medication 10 ML: at 06:03

## 2022-03-26 RX ADMIN — PIPERACILLIN AND TAZOBACTAM 4.5 G: 4; .5 INJECTION, POWDER, LYOPHILIZED, FOR SOLUTION INTRAVENOUS; PARENTERAL at 03:03

## 2022-03-26 RX ADMIN — SENNOSIDES AND DOCUSATE SODIUM 1 TABLET: 50; 8.6 TABLET ORAL at 09:03

## 2022-03-26 RX ADMIN — VENLAFAXINE 37.5 MG: 37.5 TABLET ORAL at 09:03

## 2022-03-26 RX ADMIN — ASPIRIN 81 MG: 81 TABLET, COATED ORAL at 09:03

## 2022-03-26 RX ADMIN — PANTOPRAZOLE SODIUM 40 MG: 40 TABLET, DELAYED RELEASE ORAL at 09:03

## 2022-03-26 NOTE — PLAN OF CARE
Problem: Adult Inpatient Plan of Care  Goal: Plan of Care Review  Outcome: Ongoing, Progressing  Goal: Patient-Specific Goal (Individualized)  Outcome: Ongoing, Progressing  Goal: Optimal Comfort and Wellbeing  Outcome: Ongoing, Progressing     Problem: Fall Injury Risk  Goal: Absence of Fall and Fall-Related Injury  Outcome: Ongoing, Progressing     Problem: Infection  Goal: Absence of Infection Signs and Symptoms  Outcome: Ongoing, Progressing

## 2022-03-26 NOTE — PLAN OF CARE
[facility] - Telemetry  Discharge Final Note    Primary Care Provider: EFREM Muniz MD    Expected Discharge Date: 3/26/2022    Final Discharge Note (most recent)     Final Note - 03/26/22 0944        Final Note    Anticipated Discharge Disposition Home or Self Care   Out-Patient PT/OT referal and resources; follow-ups    What phone number can be called within the next 1-3 days to see how you are doing after discharge? --   [phone]    Hospital Resources/Appts/Education Provided Provided education on problems/symptoms using teachback;Post-Acute resouces added to AVS;Provided patient/caregiver with written discharge plan information;Appointments scheduled by Navigator/Coordinator;Appointments scheduled and added to AVS        Post-Acute Status    Post-Acute Authorization Other   Home; out-patient PT/OT referral and resources    Coverage PHN     Other Status No Post-Acute Service Needs     Discharge Delays None known at this time                 Important Message from Medicare  Important Message from Medicare regarding Discharge Appeal Rights: Given to patient/caregiver, Explained to patient/caregiver, Signed/date by patient/caregiver, Other (comments)     Date IMM was signed: 03/26/22  Time IMM was signed: 0943    Contact Info     EFREM Muniz MD   Specialty: Internal Medicine   Relationship: PCP - General    [facility] [address] [address]   Phone: [phone]       Next Steps: Schedule an appointment as soon as possible for a visit in 1 week(s)    Instructions: Call and schedule an Out-Patient Primary Care [facility] Follow-Up in 1 week.    [facility]: Flaquita Gomes    [facility] [address] [place] [address]  [phone]       Next Steps: Call    Instructions: Call the Out-Patient Physcial Therapy Clinic to schedule an appointment and to establish out-patient rehab care.

## 2022-03-26 NOTE — PLAN OF CARE
Problem: Adult Inpatient Plan of Care  Goal: Plan of Care Review  Outcome: Met  Goal: Patient-Specific Goal (Individualized)  Outcome: Met  Goal: Absence of Hospital-Acquired Illness or Injury  Outcome: Met  Goal: Optimal Comfort and Wellbeing  Outcome: Met  Goal: Readiness for Transition of Care  Outcome: Met     Problem: Fall Injury Risk  Goal: Absence of Fall and Fall-Related Injury  Outcome: Met   Pt reports readiness for d/c. NADN or reported. AVS and dc instructions given to wife and pt. All questions answered.

## 2022-03-26 NOTE — PROGRESS NOTES
[facility]: [facility]  Case Management Services    WRITTEN HEALTHCARE DISCHARGE INFORMATION      Things that YOU are RESPONSIBLE for to Manage Your Care At Home:    WRITTEN DISCHARGE INSTURCTIONS  These are your WRITTEN DISCHARGE INSTRUCTIONS from your hospital stay. Please be sure to know and understand that you are responsible for the following, so that you will be able to better manage your care at home:   1.  all medications that have been called in for you or those that were written on prescription paper and provided to you to get filled.  2. BE SURE TO TAKE YOUR MEDICATION AS PRESCRIBED.  3. Be sure to attend your follow-up appointments that were scheduled for you or to schedule any appointments that you will be scheduling.     If you are unable to make your follow up appointments, please call the number listed and reschedule this appointment.      ________[HELP AT HOME]________    Be sure that you will have someone to help you at home during your recovery.     Experiencing any SIGNS or SYMPTOMS: YOU CAN     Schedule a same day appointment with your Primary Care Doctor or  you can call [facility] On Call Nurse Care Line for 24/7 assistance at [phone]     If you are experience any signs or symptoms that have become severe, Call 911 and come to your nearest Emergency Room.     Thank you for choosing [facility] and allowing us to care for you.     From your care management team:   You should receive a call from [facility] Discharge [facility] within 48-72 hours to help manage your care after discharge. Please try to make sure that you answer your phone for this important phone call.    Your follow-up appointments have been made according to your preferences. The following are your scheduled appointments or those that you will need to schedule for yourself.    FOLLOW-UPS   Follow-up Information     EFREM Muniz MD. Schedule an appointment as soon as possible for a visit in 1 week(s).     Specialty: Internal Medicine  Why: Call and schedule an Out-Patient Primary Care Hospital Follow-Up in 1 week.  Contact information:  [address]  [address]  [facility] [address]  [phone]             [facility]: Flaquita Gomes. Call.    Why: Call the Out-Patient Physcial Therapy Clinic to schedule an appointment and to establish out-patient rehab care.  Contact information:  [address] [place] [address]  [phone]

## 2022-03-26 NOTE — PLAN OF CARE
03/26/22 0937   Post-Acute Status   Post-Acute Authorization Other  (Home with Out-Patient PT/OT)   Coverage PHN   Other Status No Post-Acute Service Needs   Hospital Resources/Appts/Education Provided Provided patient/caregiver with written discharge plan information;Appointments scheduled and added to AVS;Appointments scheduled by Navigator/Coordinator;Post-Acute resouces added to AVS;Provided education on problems/symptoms using teachback   Discharge Delays None known at this time   Discharge Plan   Discharge Plan A Home with family  (Out-Patient PT/OT)   Discharge Plan B Home with family  (Out-Patient PT/OT)

## 2022-03-26 NOTE — PROGRESS NOTES
Patient is ready and clear for discharge from Case Management perspective; informed patients nurseReymundo  and discussed discharge. Reviewed with and provided patient with Written Discharge Instructions.

## 2022-03-26 NOTE — DISCHARGE SUMMARY
[facility]  Discharge Summary      Patient Name: Ramesh Ricci  MRN: [id number]  Patient Class: IP- Inpatient  Admission Date: 3/23/2022  Hospital Length of Stay: 3 days  Discharge Date and Time:  03/26/2022 9:27 AM  Attending Physician: Kristen Farah MD   Discharging Provider: Kristen Farah MD  Primary Care Provider: EFREM Muniz MD      HPI:   Mr Ramesh Ricci is a 82 y.o. man with ulcerative colitis who presents with weakness.     UC was diagnosed in his 20s. He was previously on humira which worked well but this was discontinued when there were concerns it was causing a rash. His wife suspects the COVID vaccine caused the rash instead. Since then he has been taking mesalamine daily. He follows with GI- wife says [facility], but unable to see any GI notes in [facility] system. No prior surgeries for UC. No EGD/cscope to review but he has had them in the past.  He was previously on prednisone for a rash, that stopped about 2 weeks ago. He was started on flagyl for suspected infection about a week ago but has not seen any improvement in symptoms. He has developed weakness, needing to use his walker more frequently and this morning unable to get out of bed. Fevers, worsening heartburn, diarrhea 4-5x/day watery dark no hematochezia increased from his usual 1-2 BM/day. No abdominal pain currently. No nausea, vomiting. Able to eat normally until about 2 days ago.     Of note, he was also placed in observation on 3/21 for Afib RVR. Added metoprolol for HR control. Weakness has worsened since then. He was a little short of breath this morning but that is resolved. No chest pain. Does have lightheadedness. Wife reports he is not as sharp as he usually is.     In ED, noted to have fever to 102, BP 90s/50s. No leukocytosis. Given vanc, zosyn, IVF. Procal negative. Admitted for UC flare +/- infectious colitis.       Procedure(s) (LRB):  EGD (ESOPHAGOGASTRODUODENOSCOPY)  (N/A)  COLONOSCOPY (N/A)      Hospital Course:   Mr Ramesh Ricci is a 82 y.o. man with ulcerative colitis who was admitted with colitis flare +/- infectious colitis. Started zosyn. Cdiff negative, stool culture negative. GI consulted, mesalamine held. EGD/cscope on 3/25 showed normal EGD and inactive colitis. Biopsies taken. OK to resume mesalamine and use imodium PRN. DC antibiotics. BP seems to run low- DC telmisartan but continue metoprolol for PAFib. PT, OT evaluated him for weakness and recommended outpatient PT, OT. Referrals placed. Follow up in IBD clinic.        Goals of Care Treatment Preferences:  Code Status: DNR      Consults:   Consults (From admission, onward)        Status Ordering Provider     Inpatient consult to Gastroenterology  Once        Provider:  Ashley Nguyen MD    Completed RASHEED PARKER          No new Assessment & Plan notes have been filed under this hospital service since the last note was generated.  Service: Hospital Medicine    Final Active Diagnoses:    Diagnosis Date Noted POA    PRINCIPAL PROBLEM:  Colitis [K52.9] 03/23/2022 Yes    Goals of care, counseling/discussion [Z71.89] 03/25/2022 Not Applicable    Hypokalemia [E87.6] 03/23/2022 Yes    Coronary artery disease involving native coronary artery of native heart without angina pectoris [I25.10] 01/31/2022 Yes     Chronic    Iron deficiency anemia due to chronic blood loss [D50.0] 10/29/2021 Yes    PAF (paroxysmal atrial fibrillation) [I48.0] 12/20/2018 Yes     Chronic    Nonrheumatic aortic valve stenosis [I35.0] 04/16/2015 Yes     Chronic    CKD (chronic kidney disease) stage 3, GFR 30-59 ml/min [N18.30] 10/06/2014 Yes     Chronic    UC (ulcerative colitis) [K51.90] 11/16/2012 Yes     Chronic    Acquired hypothyroidism [E03.9] 11/16/2012 Yes     Chronic    BPH (benign prostatic hyperplasia) [N40.0] 11/16/2012 Yes     Chronic    Essential hypertension [I10] 11/16/2012 Yes     Chronic      Problems  Resolved During this Admission:       Discharged Condition: good    Disposition: Home or Self Care    Follow Up: with IBD clinic     Patient Instructions:      Ambulatory referral/consult to Physical/Occupational Therapy   Standing Status: Future   Referral Priority: Routine Referral Type: Physical Medicine   Referral Reason: Specialty Services Required   Requested Specialty: Physical Therapy   Number of Visits Requested: 1     Ambulatory referral/consult to Physical/Occupational Therapy   Standing Status: Future   Referral Priority: Routine Referral Type: Physical Medicine   Referral Reason: Specialty Services Required   Requested Specialty: Occupational Therapy   Number of Visits Requested: 1     Diet Cardiac     Notify your health care provider if you experience any of the following:  temperature >100.4     Notify your health care provider if you experience any of the following:  persistent nausea and vomiting or diarrhea     Notify your health care provider if you experience any of the following:  severe uncontrolled pain     Notify your health care provider if you experience any of the following:  redness, tenderness, or signs of infection (pain, swelling, redness, odor or green/yellow discharge around incision site)     Notify your health care provider if you experience any of the following:  difficulty breathing or increased cough     Notify your health care provider if you experience any of the following:  severe persistent headache     Notify your health care provider if you experience any of the following:  worsening rash     Notify your health care provider if you experience any of the following:  persistent dizziness, light-headedness, or visual disturbances     Notify your health care provider if you experience any of the following:  increased confusion or weakness     Activity as tolerated       Significant Diagnostic Studies: Labs: All labs within the past 24 hours have been reviewed    Pending  Diagnostic Studies:     Procedure Component Value Units Date/Time    Calprotectin, Stool [[id number]] Collected: 03/24/22 1403    Order Status: Sent Lab Status: In process Updated: 03/24/22 1540    Specimen: Stool     Specimen to Pathology, Surgery Gastrointestinal tract [[id number]] Collected: 03/25/22 1643    Order Status: Sent Lab Status: In process Updated: 03/25/22 1654    Stool Exam-Ova,Cysts,Parasites [[id number]] Collected: 03/23/22 1455    Order Status: Sent Lab Status: In process Updated: 03/23/22 1526    Specimen: Stool     Thiopurine Methyltrans, RBC [[id number]] Collected: 03/24/22 0527    Order Status: Sent Lab Status: In process Updated: 03/24/22 0623    Specimen: Blood          Medications:  Reconciled Home Medications:      Medication List      START taking these medications    loperamide 2 mg Tab  Commonly known as: IMODIUM A-D  Take 1 tablet (2 mg total) by mouth 4 (four) times daily as needed (diarrhea).        CHANGE how you take these medications    mesalamine 500 MG CR capsule  Commonly known as: PENTASA  Take 2,000 mg by mouth once daily.  What changed: Another medication with the same name was removed. Continue taking this medication, and follow the directions you see here.        CONTINUE taking these medications    acetaminophen 500 MG tablet  Commonly known as: TYLENOL  Take 2 tablets (1,000 mg total) by mouth every 6 (six) hours as needed.     ascorbic acid (vitamin C) 500 MG tablet  Commonly known as: VITAMIN C  Take 500 mg by mouth once daily.     aspirin 81 MG EC tablet  Commonly known as: ECOTRIN  Take 81 mg by mouth once daily.     calcium carbonate 200 mg calcium (500 mg) chewable tablet  Commonly known as: TUMS  Take 1 tablet by mouth once daily.     ELIQUIS 2.5 mg Tab  Generic drug: apixaban  Take 2.5 mg by mouth 2 (two) times daily.     ferrous sulfate 324 mg (65 mg iron) Tbec  Take 325 mg by mouth 2 (two) times daily.     ICAPS AREDS2 ORAL  Take 1 capsule by mouth once daily.      levothyroxine 125 MCG tablet  Commonly known as: SYNTHROID  TAKE 1 TABLET BY MOUTH EVERY MORNING     metoprolol succinate 25 MG 24 hr tablet  Commonly known as: TOPROL-XL  Take 1 tablet (25 mg total) by mouth once daily.     pantoprazole 40 MG tablet  Commonly known as: PROTONIX  Take 1 tablet (40 mg total) by mouth once daily.     pravastatin 20 MG tablet  Commonly known as: PRAVACHOL  Take 1 tablet (20 mg total) by mouth every evening.     tamsulosin 0.4 mg Cap  Commonly known as: FLOMAX  Take 0.4 mg by mouth once daily.     UNABLE TO FIND  Take 2 tablets by mouth daily as needed. Charcoal tabs     venlafaxine 37.5 MG Tab  Commonly known as: EFFEXOR  TAKE 1 TABLET BY MOUTH TWICE DAILY        STOP taking these medications    metroNIDAZOLE 500 MG tablet  Commonly known as: FLAGYL     telmisartan 40 MG Tab  Commonly known as: MICARDIS            Indwelling Lines/Drains at time of discharge:   Lines/Drains/Airways     None                 Time spent on the discharge of patient: 35 minutes         Kristen Farah MD  Department of Hospital Medicine  [facility]

## 2022-03-26 NOTE — PLAN OF CARE
03/26/22 0942   Medicare Message   Important Message from Medicare regarding Discharge Appeal Rights Given to patient/caregiver;Explained to patient/caregiver;Signed/date by patient/caregiver;Other (comments)   Date IMM was signed 03/26/22   Time IMM was signed 0943   Patient and spouse verbally expressed understanding of IMM notice and Medicare rights; signed; dated.

## 2022-03-27 LAB
BACTERIA BLD CULT: NORMAL
BACTERIA BLD CULT: NORMAL

## 2022-03-28 ENCOUNTER — PATIENT OUTREACH (OUTPATIENT)
Dept: ADMINISTRATIVE | Facility: CLINIC | Age: 82
End: 2022-03-28
Payer: MEDICARE

## 2022-03-29 LAB
6-METHYLMERCAPTOPURINE RIBOSIDE: 6.96 NMOL/ML/H (ref 5.04–9.57)
6-METHYLMERCAPTOPURINE: 3.7 NMOL/ML/H (ref 3–6.66)
6-METHYLTHIOGUANINE RIBOSIDE: 4.51 NMOL/ML/H (ref 2.7–5.84)
CALPROTECTIN STL-MCNT: ABNORMAL MCG/G
FINAL PATHOLOGIC DIAGNOSIS: NORMAL
GROSS: NORMAL
Lab: NORMAL
TPMT INTERPRETATION: NORMAL
TPMT REVIEWED BY: NORMAL

## 2022-03-29 RX ORDER — PREDNISONE 20 MG/1
TABLET ORAL
Qty: 14 TABLET | Refills: 0 | Status: SHIPPED | OUTPATIENT
Start: 2022-03-29 | End: 2022-04-07 | Stop reason: SDUPTHER

## 2022-03-30 ENCOUNTER — TELEPHONE (OUTPATIENT)
Dept: GASTROENTEROLOGY | Facility: CLINIC | Age: 82
End: 2022-03-30
Payer: MEDICARE

## 2022-03-30 ENCOUNTER — OFFICE VISIT (OUTPATIENT)
Dept: HOME HEALTH SERVICES | Facility: CLINIC | Age: 82
End: 2022-03-30
Payer: MEDICARE

## 2022-03-30 DIAGNOSIS — K21.9 GASTROESOPHAGEAL REFLUX DISEASE, UNSPECIFIED WHETHER ESOPHAGITIS PRESENT: ICD-10-CM

## 2022-03-30 DIAGNOSIS — K51.919 ULCERATIVE COLITIS WITH COMPLICATION, UNSPECIFIED LOCATION: Primary | ICD-10-CM

## 2022-03-30 PROCEDURE — 99496 TRANSITIONAL CARE MANAGE SERVICE 7 DAY DISCHARGE: ICD-10-PCS | Mod: S$GLB,,, | Performed by: NURSE PRACTITIONER

## 2022-03-30 PROCEDURE — 99496 TRANSJ CARE MGMT HIGH F2F 7D: CPT | Mod: S$GLB,,, | Performed by: NURSE PRACTITIONER

## 2022-03-30 PROCEDURE — 1160F PR REVIEW ALL MEDS BY PRESCRIBER/CLIN PHARMACIST DOCUMENTED: ICD-10-PCS | Mod: CPTII,S$GLB,, | Performed by: NURSE PRACTITIONER

## 2022-03-30 PROCEDURE — 99499 UNLISTED E&M SERVICE: CPT | Mod: S$GLB,,, | Performed by: NURSE PRACTITIONER

## 2022-03-30 PROCEDURE — 3078F DIAST BP <80 MM HG: CPT | Mod: CPTII,S$GLB,, | Performed by: NURSE PRACTITIONER

## 2022-03-30 PROCEDURE — 3288F FALL RISK ASSESSMENT DOCD: CPT | Mod: CPTII,S$GLB,, | Performed by: NURSE PRACTITIONER

## 2022-03-30 PROCEDURE — 1100F PTFALLS ASSESS-DOCD GE2>/YR: CPT | Mod: CPTII,S$GLB,, | Performed by: NURSE PRACTITIONER

## 2022-03-30 PROCEDURE — 1126F PR PAIN SEVERITY QUANTIFIED, NO PAIN PRESENT: ICD-10-PCS | Mod: CPTII,S$GLB,, | Performed by: NURSE PRACTITIONER

## 2022-03-30 PROCEDURE — 1159F PR MEDICATION LIST DOCUMENTED IN MEDICAL RECORD: ICD-10-PCS | Mod: CPTII,S$GLB,, | Performed by: NURSE PRACTITIONER

## 2022-03-30 PROCEDURE — 3077F SYST BP >= 140 MM HG: CPT | Mod: CPTII,S$GLB,, | Performed by: NURSE PRACTITIONER

## 2022-03-30 PROCEDURE — 1126F AMNT PAIN NOTED NONE PRSNT: CPT | Mod: CPTII,S$GLB,, | Performed by: NURSE PRACTITIONER

## 2022-03-30 PROCEDURE — 3078F PR MOST RECENT DIASTOLIC BLOOD PRESSURE < 80 MM HG: ICD-10-PCS | Mod: CPTII,S$GLB,, | Performed by: NURSE PRACTITIONER

## 2022-03-30 PROCEDURE — 1160F RVW MEDS BY RX/DR IN RCRD: CPT | Mod: CPTII,S$GLB,, | Performed by: NURSE PRACTITIONER

## 2022-03-30 PROCEDURE — 1100F PR PT FALLS ASSESS DOC 2+ FALLS/FALL W/INJURY/YR: ICD-10-PCS | Mod: CPTII,S$GLB,, | Performed by: NURSE PRACTITIONER

## 2022-03-30 PROCEDURE — 3288F PR FALLS RISK ASSESSMENT DOCUMENTED: ICD-10-PCS | Mod: CPTII,S$GLB,, | Performed by: NURSE PRACTITIONER

## 2022-03-30 PROCEDURE — 1159F MED LIST DOCD IN RCRD: CPT | Mod: CPTII,S$GLB,, | Performed by: NURSE PRACTITIONER

## 2022-03-30 PROCEDURE — 3077F PR MOST RECENT SYSTOLIC BLOOD PRESSURE >= 140 MM HG: ICD-10-PCS | Mod: CPTII,S$GLB,, | Performed by: NURSE PRACTITIONER

## 2022-03-30 NOTE — TELEPHONE ENCOUNTER
Spoke with patient and his wife and completed NP Questionnaire.  Explained how new patient process and scheduling appointment will work.  Pt verbalized understanding to all and has no further questions at this time.

## 2022-03-30 NOTE — PHYSICIAN QUERY
PT Name: Ramesh Ricci  MR #: [id number]     Documentation Clarification      CDS/: Jodie Patino RN, CDS               Contact information: [email or web address]      This form is a permanent document in the medical record.     Query Date: March 30, 2022    By submitting this query, we are merely seeking further clarification of documentation. Please utilize your independent clinical judgment when addressing the question(s) below.    The Medical Record reflects the following:    Supporting Clinical Findings Location in Medical Record    82 y.o. man with ulcerative colitis who presents with weakness.      UC was diagnosed in his 20s. He was previously on humira which worked well but this was discontinued when there were concerns it was causing a rash.... No prior surgeries for UC. No EGD/cscope to review but he has had them in the past.  He was previously on prednisone for a rash, that stopped about 2 weeks ago. He was started on flagyl for suspected infection about a week ago but has not seen any improvement in symptoms. He has developed weakness, needing to use his walker more frequently and this morning unable to get out of bed. Fevers, worsening heartburn, diarrhea 4-5x/day watery dark no hematochezia increased from his usual 1-2 BM/day. No abdominal pain currently. No nausea, vomiting. Able to eat normally until about 2 days ago    In ED, noted to have fever to 102, BP 90s/50s. No leukocytosis. Given vanc, zosyn, IVF. Procal negative. Admitted for UC flare +/- infectious colitis.     Colitis  Admitted with weakness, lethargy with cause worsening colitis despite outpatient flagyl. This may be flare of UC vs infectious colitis.   - given IVF in ED. Appears volume replete. Encourage PO diet  - continue zosyn  - check CDiff, stool culture, O&P  - GI consult to see if need steroids  - check CRP, ESR  - continue home mesalamine    He is now afebrile with stable vitals.  His abdominal pain has improved.  C  diff testing was negative.    stool culture negative    O&P negative    82 y.o. man with ulcerative colitis who was admitted with colitis flare +/- infectious colitis. Started zosyn. Cdiff negative, stool culture negative. GI consulted, mesalamine held. EGD/cscope on 3/25 showed normal EGD and inactive colitis. Biopsies taken. OK to resume mesalamine and use imodium PRN. DC antibiotics    Calprotectin: 1,200.5 (H)     Shiga Toxin 1 E.coli: Negative  Shiga Toxin 2 E.coli: Negative    CRP: 29.5 (H)     Fragments of colonic mucosa (2, submitted as ascending colon biopsy):   -Prominent diffuse chronic active inflammation with mild cryptitis, focal   crypt abscess formation, and reactive crypt changes.   -No ulceration/erosion, dysplasia, or exudates are identified.  These   findings are morphologically compatible with the clinical diagnosis   ulcerative colitis.   -See comment at end of report   Part 2   Fragments of colonic mucosa 2, submitted as transverse colon biopsy):   -prominent diffuse chronic active inflammation with cryptitis, minimal crypt   abscess formation, and reactive changes.  No ulceration/erosion, dysplasia,   or exudates are identified.   -These findings are essentially identical to those identified in Part 1,   above, from the ascending colon, and are also compatible with the clinical   diagnosis of ulcerative colitis.   -See comment at end of report   Part 3   Fragments colonic mucosa (3, submitted as sigmoid colon biopsy):   -Mild-to-moderate chronic inflammation with acute activity, minimal   cryptitis, and reactive crypt changes without crypt abscesses.  No   ulceration/erosion, dysplasia, or exudates are identified.   -These findings are similar to those identified in parts 1 and 2 above but   exhibit less intense acute activity and overall less intensity.  Findings are   also compatible with the clinical diagnosis of ulcerative colitis.   -See comment at end of report   Part 4   Fragments of  colonic mucosa (2, submitted as rectum biopsy):   -Mild to moderate chronic inflammation with focal acute activity and minimal cryptitis without crypt abscesses, ulceration/erosion, dysplasia, or exudates.   -These findings show the least inflammatory activity among all biopsies   examined.  Findings are morphologically compatible with the clinical   diagnosis of ulcerative colitis.   -See comment at end of report   Comment, Part 1-Part 4:  Findings in all biopsies are morphologically   compatible with the clinical diagnosis of ulcerative colitis.  However,   findings by themselves are not diagnostic of ulcerative colitis.  They can   also be seen in infectious and medication-associated processes and can be seen in other forms of inflammatory bowel disease.  Clinical and endoscopic correlation is required for full interpretation. H&P 3/23    H&P 3/23                        H&P 3/23        H&P 3/23                     GI PN 3/24       HM PN 3/25     HM PN 3/25     DC Summary 3/26            Labs Collected 3/24     Labs Collected 3/23       Labs Collected 3/23     Path Report      Collected: 3/25       Resulted: 3/29                                                                             Provider, please clarify the type(s) of colitis present/necessitating admission. Please select all that apply if applicable.     [   ] Ulcerative colitis flare   [   x] Infectious colitis, unknown organism    [   ] Noninfective colitis, unspecified    [   ] Other type of colitis or diagnosis (please specify): ____________   [  ] Clinically undetermined type of colitis

## 2022-03-31 VITALS
SYSTOLIC BLOOD PRESSURE: 140 MMHG | HEART RATE: 80 BPM | TEMPERATURE: 98 F | OXYGEN SATURATION: 96 % | DIASTOLIC BLOOD PRESSURE: 78 MMHG | RESPIRATION RATE: 16 BRPM

## 2022-03-31 RX ORDER — PANTOPRAZOLE SODIUM 40 MG/1
40 TABLET, DELAYED RELEASE ORAL 2 TIMES DAILY
Qty: 60 TABLET | Refills: 0 | OUTPATIENT
Start: 2022-03-31 | End: 2022-10-13 | Stop reason: SDUPTHER

## 2022-03-31 NOTE — PROGRESS NOTES
[facility]    Path results discussed with the patient's wife.  He was started on steroids by his PCP but I have recommended that he stop these for now as his colon appeared normal.  He can continue his mesalamine for now, imodium as needed and he will follow up with the IBD clinic soon.    Ashley Nguyen MD

## 2022-03-31 NOTE — PROGRESS NOTES
[facility]  Transition of Care Home Visit    Visit Date: 3/30/2022  Encounter Provider: Howard Josue NP  PCP:  EFREM Muniz MD    PRESENTING HISTORY      Patient ID: Ramesh Ricci is a 82 y.o. male.    Consult Requested By:  No ref. provider found  Reason for Consult:  BRADEN Chandler Sr is being seen at home due to  physical debility that presents a taxing effort to leave the home, to mitigate high risk of hospital readmission or due to the limited availability of reliable or safe options for transportation to the point of access to the provider. Prior to treatment on this visit the chart was reviewed and patient consent was obtained.     Chief Complaint: Transitional Care      History of Present Illness: Mr. Ramesh Ricci is a 82 y.o. male who was recently admitted to the hospital.      Patient Name: Ramesh Ricci  MRN: [id number]  Patient Class: IP- Inpatient  Admission Date: 3/23/2022  Hospital Length of Stay: 3 days  Discharge Date and Time:  03/26/2022 9:27 AM  Attending Physician: Kristen Farah MD   Discharging Provider: Kristen Farah MD  Primary Care Provider: EFREM Muniz MD        HPI:   Mr Ramesh Ricci is a 82 y.o. man with ulcerative colitis who presents with weakness.      UC was diagnosed in his 20s. He was previously on humira which worked well but this was discontinued when there were concerns it was causing a rash. His wife suspects the COVID vaccine caused the rash instead. Since then he has been taking mesalamine daily. He follows with GI- wife says [facility], but unable to see any GI notes in [facility] system. No prior surgeries for UC. No EGD/cscope to review but he has had them in the past.  He was previously on prednisone for a rash, that stopped about 2 weeks ago. He was started on flagyl for suspected infection about a week ago but has not seen any improvement in symptoms. He has developed weakness, needing to use his walker more frequently and this  "morning unable to get out of bed. Fevers, worsening heartburn, diarrhea 4-5x/day watery dark no hematochezia increased from his usual 1-2 BM/day. No abdominal pain currently. No nausea, vomiting. Able to eat normally until about 2 days ago.      Of note, he was also placed in observation on 3/21 for Afib RVR. Added metoprolol for HR control. Weakness has worsened since then. He was a little short of breath this morning but that is resolved. No chest pain. Does have lightheadedness. Wife reports he is not as sharp as he usually is.      In ED, noted to have fever to 102, BP 90s/50s. No leukocytosis. Given vanc, zosyn, IVF. Procal negative. Admitted for UC flare +/- infectious colitis.         Procedure(s) (LRB):  EGD (ESOPHAGOGASTRODUODENOSCOPY) (N/A)  COLONOSCOPY (N/A)       Hospital Course:   Mr Ramesh Ricci is a 82 y.o. man with ulcerative colitis who was admitted with colitis flare +/- infectious colitis. Started zosyn. Cdiff negative, stool culture negative. GI consulted, mesalamine held. EGD/cscope on 3/25 showed normal EGD and inactive colitis. Biopsies taken. OK to resume mesalamine and use imodium PRN. DC antibiotics. BP seems to run low- DC telmisartan but continue metoprolol for PAFib. PT, OT evaluated him for weakness and recommended outpatient PT, OT. Referrals placed. Follow up in IBD clinic.         Goals of Care Treatment Preferences:  Code Status: DNR  ___________________________________________________________________    Today: With this visit today patient is found sitting up in his recliner, wife present for visit. He is AAOx3. C/o severe reflux but endorses "doing better". Pantoprazole increased from daily to BID; confirmed changed with PCP Dr. Muniz. Has had one fall and is "generally weak" since hospital d/c; orders placed for PT/OT HH through [facility]. He has completed abx course. Reports diarrhea is ongoing but is "better". Appetite decreased d/t "nothing tasting good"; suggested " suppelmental meal replacement drinks. Has adequate transportation to [facility] appts. No additional needs at this time. All of my information was given to the patient and family if any questions or concerns arise.     VSS. Denies fever, chest pain, shortness of breath, nausea, vomiting, diarrhea. Risks of environmental exposure to coronavirus discussed including: social distancing, hand hygiene, and limiting departures from the home for necessities only.  Reports understanding and willingness to comply.  All hospital discharge orders reviewed and being followed, all medications reconciled and reviewed, patient and family verbalized understanding. No other needs identified at this time.      Attestation: Screening criteria to assess the level of the patient's risk for infection with COVID-19 as recommended by the CDC at the time of the above documented home visit concluded appropriateness to proceed. Universal precautions were maintained at all times, including provider use of 60% alcohol gel hand  immediately prior to entry and upon departing the patient's home.    Review of Systems   Constitutional: Negative for activity change and appetite change.   HENT: Negative for congestion and dental problem.    Eyes: Negative for discharge and itching.   Respiratory: Negative for choking and chest tightness.    Cardiovascular: Negative for chest pain and palpitations.   Gastrointestinal: Positive for abdominal pain (intermittent with colitis) and diarrhea. Negative for rectal pain and vomiting.   Endocrine: Negative for cold intolerance and heat intolerance.   Genitourinary: Negative for enuresis and flank pain.   Musculoskeletal: Negative for myalgias and neck pain.   Skin: Negative for color change and wound.   Allergic/Immunologic: Negative for environmental allergies and food allergies.   Neurological: Negative for tremors and syncope.   Hematological: Does not bruise/bleed easily.   Psychiatric/Behavioral: Negative  for decreased concentration and dysphoric mood.       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/16/2012    BPH (benign prostatic hyperplasia) 11/16/2012    DDD (degenerative disc disease), cervical 7/5/2017    x-ray 7/17 - Severe    GERD (gastroesophageal reflux disease) 11/16/2012    Glaucoma 11/16/2012    HTN (hypertension) 11/16/2012    Hypothyroid 11/16/2012    Internal carotid artery stenosis 11/16/2012    Left ventricular diastolic dysfunction, NYHA class 1 4/16/2015    4/15    Low serum testosterone level 11/16/2012    Normal cardiac stress test 11/16/2012    Osteopenia 11/16/2012    Shingles 11/16/2012    Stroke 2017    tia   no residual    TIA (transient ischemic attack) 11/16/2012    UC (ulcerative colitis) 11/16/2012       Past Surgical History:   Procedure Laterality Date    ADENOIDECTOMY      COLONOSCOPY N/A 3/25/2022    Procedure: COLONOSCOPY;  Surgeon: Ashley Nguyen MD;  Location: [facility];  Service: Endoscopy;  Laterality: N/A;    ESOPHAGOGASTRODUODENOSCOPY N/A 3/25/2022    Procedure: EGD (ESOPHAGOGASTRODUODENOSCOPY);  Surgeon: Ashley Nguyen MD;  Location: [facility];  Service: Endoscopy;  Laterality: N/A;  added on per Dr. Nguyen    EYE SURGERY Right 11/2020    cataract    HERNIA REPAIR      LEFT HEART CATHETERIZATION Right 10/29/2021    Procedure: CATHETERIZATION, HEART, LEFT AND RIGHT  - LV DARNELL POSSIBLE;  Surgeon: Beau Conklin MD;  Location: [facility] CATH LAB;  Service: Cardiology;  Laterality: Right;    RIGHT HEART CATHETERIZATION Right 10/29/2021    Procedure: INSERTION, CATHETER, RIGHT HEART;  Surgeon: Beau Conklin MD;  Location: [facility] CATH LAB;  Service: Cardiology;  Laterality: Right;    TONSILLECTOMY         Family History   Problem Relation Age of Onset    Hypertension Mother     Alzheimer's disease Mother     Diabetes Brother      Hypertension Brother     Cancer Brother     Heart disease Brother 56        MI    Heart attack Father        Social History     Socioeconomic History    Marital status:      Spouse name: Mone    Number of children: 1+1   Tobacco Use    Smoking status: Former Smoker     Quit date: 1972     Years since quittin.2    Smokeless tobacco: Never Used   Substance and Sexual Activity    Alcohol use: Yes     Alcohol/week: 1.0 standard drink     Types: 1 Cans of beer per week     Comment: occasional    Drug use: No    Sexual activity: Not Currently   Social History Narrative    Rides bike - Quit due to poor balance     - Walks the dog.       MEDICATIONS & ALLERGIES:     Current Outpatient Medications on File Prior to Visit   Medication Sig Dispense Refill    acetaminophen (TYLENOL) 500 MG tablet Take 2 tablets (1,000 mg total) by mouth every 6 (six) hours as needed.      ascorbic acid, vitamin C, (VITAMIN C) 500 MG tablet Take 500 mg by mouth once daily.      aspirin (ECOTRIN) 81 MG EC tablet Take 81 mg by mouth once daily.      calcium carbonate (TUMS) 200 mg calcium (500 mg) chewable tablet Take 1 tablet by mouth once daily.      ELIQUIS 2.5 mg Tab Take 2.5 mg by mouth 2 (two) times daily.      ferrous sulfate 324 mg (65 mg iron) TbEC Take 325 mg by mouth 2 (two) times daily.      levothyroxine (SYNTHROID) 125 MCG tablet TAKE 1 TABLET BY MOUTH EVERY MORNING 90 tablet 1    loperamide (IMODIUM A-D) 2 mg Tab Take 1 tablet (2 mg total) by mouth 4 (four) times daily as needed (diarrhea). 30 tablet 0    mesalamine (PENTASA) 500 MG CR capsule Take 2,000 mg by mouth once daily.      metoprolol succinate (TOPROL-XL) 25 MG 24 hr tablet Take 1 tablet (25 mg total) by mouth once daily. 30 tablet 0    pravastatin (PRAVACHOL) 20 MG tablet Take 1 tablet (20 mg total) by mouth every evening. 90 tablet 3    predniSONE (DELTASONE) 20 MG tablet Take 2 tablets each morning for 5 days, then 1 tablet each  morning for 4 days. 14 tablet 0    tamsulosin (FLOMAX) 0.4 mg Cp24 Take 0.4 mg by mouth once daily.       UNABLE TO FIND Take 2 tablets by mouth daily as needed. Charcoal tabs      venlafaxine (EFFEXOR) 37.5 MG Tab TAKE 1 TABLET BY MOUTH TWICE DAILY 180 tablet 1    vitC/E/Zn/copper/lutein/zeaxan (ICAPS AREDS2 ORAL) Take 1 capsule by mouth once daily.      [DISCONTINUED] pantoprazole (PROTONIX) 40 MG tablet Take 1 tablet (40 mg total) by mouth once daily. (Patient taking differently: Take 40 mg by mouth 2 (two) times daily.) 30 tablet 11    [DISCONTINUED] telmisartan (MICARDIS) 40 MG Tab TAKE 1/2 TABLET BY MOUTH DAILY 90 tablet 1     No current facility-administered medications on file prior to visit.        Review of patient's allergies indicates:   Allergen Reactions    Benazepril Other (See Comments)     Cough       OBJECTIVE:     Vital Signs:  Vitals:    03/31/22 1109   BP: (!) 140/78   Pulse: 80   Resp: 16   Temp: 97.6 °F (36.4 °C)     There is no height or weight on file to calculate BMI.     Physical Exam:  Physical Exam  Vitals reviewed.   Constitutional:       Appearance: He is well-developed.   HENT:      Head: Normocephalic and atraumatic.   Eyes:      Pupils: Pupils are equal, round, and reactive to light.   Cardiovascular:      Rate and Rhythm: Normal rate.   Pulmonary:      Effort: Pulmonary effort is normal.      Breath sounds: Normal breath sounds.   Abdominal:      General: Bowel sounds are increased.      Palpations: Abdomen is soft.   Musculoskeletal:         General: Normal range of motion.      Cervical back: Normal range of motion and neck supple.   Skin:     General: Skin is warm and dry.   Neurological:      Mental Status: He is alert and oriented to person, place, and time. Mental status is at baseline.      GCS: GCS eye subscore is 4. GCS verbal subscore is 5. GCS motor subscore is 6.   Psychiatric:         Attention and Perception: Attention normal.         Mood and Affect: Mood  normal.         Speech: Speech normal.         Laboratory  Lab Results   Component Value Date    WBC 4.52 03/24/2022    HGB 10.1 (L) 03/24/2022    HCT 32.3 (L) 03/24/2022    MCV 93 03/24/2022     03/24/2022     Lab Results   Component Value Date    INR 1.3 (H) 06/15/2019     Lab Results   Component Value Date    HGBA1C 5.8 (H) 02/16/2022     No results for input(s): POCTGLUCOSE in the last 72 hours.      TRANSITION OF CARE:     [facility] Contact Note: 3/28/22    Family and/or Caretaker present at visit?  Yes.  Diagnostic tests reviewed/disposition: No diagnosic tests pending after this hospitalization.  Disease/illness education: Importance of compliance with all prescribed medication and treatments, COVID precautions/Social Distancing/Mask Use    Home health/community services discussion/referrals: Patient does not have home health established from hospital visit.  They do need home health.  If needed, we will set up home health for the patient.   Establishment or re-establishment of referral orders for community resources: No other necessary community resources.   Discussion with other health care providers: No discussion with other health care providers necessary.     Transition of Care Visit:     I have reviewed and updated the history and problem list.  I have reconciled the medication list.  I have discussed the hospitalization and current medical issues, prognosis and plans with the patient/family.  I  spent more than 50% of time discussing the care with the patient/family.  Total Face-to-Face Encounter: 60 minutes.    Medications Reconciliation:   I have reconciled the patient's home medications and discharge medications with the patient/family. I have updated all changes.  Refer to After-Visit Medication List.    I have discussed discharge plans, follow-up instructions, future appointments, provider contact information, indicators to seek medical emergency treatment, and advisement to call with  additional questions or concerns. Patient and caregiver verbalize understanding.      ASSESSMENT & PLAN:       Ramesh Yo was seen today for transitional care.    Diagnoses and all orders for this visit:    Ulcerative colitis with complication, unspecified location  -     Ambulatory referral/consult to Home Health; Future  --compliant with medications  --f/up with GI  --ongoing diarrhea    Gastroesophageal reflux disease, unspecified whether esophagitis present  --increased PPI from daily to BID d/t reported severe reflux    Other orders  -     pantoprazole (PROTONIX) 40 MG tablet; Take 1 tablet (40 mg total) by mouth 2 (two) times daily.        Were controlled substances prescribed?  No    Instructions for the patient:    Scheduled Follow-up :  Future Appointments   Date Time Provider Department Center   8/1/2022 10:15 AM MD LUCIE [facility]       After Visit Medication List :     Medication List          Accurate as of March 30, 2022 11:59 PM. If you have any questions, ask your nurse or doctor.            CHANGE how you take these medications    pantoprazole 40 MG tablet  Commonly known as: PROTONIX  Take 1 tablet (40 mg total) by mouth 2 (two) times daily.  What changed: when to take this  Changed by: Howard Josue NP        CONTINUE taking these medications    acetaminophen 500 MG tablet  Commonly known as: TYLENOL  Take 2 tablets (1,000 mg total) by mouth every 6 (six) hours as needed.     ascorbic acid (vitamin C) 500 MG tablet  Commonly known as: VITAMIN C     aspirin 81 MG EC tablet  Commonly known as: ECOTRIN     calcium carbonate 200 mg calcium (500 mg) chewable tablet  Commonly known as: TUMS     ELIQUIS 2.5 mg Tab  Generic drug: apixaban     ferrous sulfate 324 mg (65 mg iron) [facility]     ICAPS AREDS2 ORAL     levothyroxine 125 MCG tablet  Commonly known as: SYNTHROID  TAKE 1 TABLET BY MOUTH EVERY MORNING     loperamide 2 mg Tab  Commonly known as: IMODIUM A-D  Take 1 tablet (2 mg  total) by mouth 4 (four) times daily as needed (diarrhea).     mesalamine 500 MG CR capsule  Commonly known as: PENTASA     metoprolol succinate 25 MG 24 hr tablet  Commonly known as: TOPROL-XL  Take 1 tablet (25 mg total) by mouth once daily.     pravastatin 20 MG tablet  Commonly known as: PRAVACHOL  Take 1 tablet (20 mg total) by mouth every evening.     predniSONE 20 MG tablet  Commonly known as: DELTASONE  Take 2 tablets each morning for 5 days, then 1 tablet each morning for 4 days.     tamsulosin 0.4 mg Cap  Commonly known as: FLOMAX     UNABLE TO FIND     venlafaxine 37.5 MG Tab  Commonly known as: EFFEXOR  TAKE 1 TABLET BY MOUTH TWICE DAILY           Where to Get Your Medications      You can get these medications from any pharmacy    Bring a paper prescription for each of these medications  · pantoprazole 40 MG tablet         Signature:  Rikki Judice, AG-ACNP [facility]

## 2022-04-01 ENCOUNTER — PATIENT MESSAGE (OUTPATIENT)
Dept: INTERNAL MEDICINE | Facility: CLINIC | Age: 82
End: 2022-04-01
Payer: MEDICARE

## 2022-04-01 RX ORDER — METOPROLOL SUCCINATE 25 MG/1
25 TABLET, EXTENDED RELEASE ORAL DAILY
Qty: 90 TABLET | Refills: 1 | Status: SHIPPED | OUTPATIENT
Start: 2022-04-01 | End: 2022-04-14 | Stop reason: SDUPTHER

## 2022-04-01 RX ORDER — VENLAFAXINE 37.5 MG/1
37.5 TABLET ORAL 2 TIMES DAILY
Qty: 180 TABLET | Refills: 1 | Status: SHIPPED | OUTPATIENT
Start: 2022-04-01 | End: 2022-10-10

## 2022-04-06 ENCOUNTER — PATIENT MESSAGE (OUTPATIENT)
Dept: INTERNAL MEDICINE | Facility: CLINIC | Age: 82
End: 2022-04-06
Payer: MEDICARE

## 2022-04-07 ENCOUNTER — OFFICE VISIT (OUTPATIENT)
Dept: INTERNAL MEDICINE | Facility: CLINIC | Age: 82
End: 2022-04-07
Attending: INTERNAL MEDICINE
Payer: MEDICARE

## 2022-04-07 VITALS
BODY MASS INDEX: 20.92 KG/M2 | WEIGHT: 150 LBS | SYSTOLIC BLOOD PRESSURE: 154 MMHG | HEART RATE: 55 BPM | DIASTOLIC BLOOD PRESSURE: 81 MMHG

## 2022-04-07 DIAGNOSIS — R39.15 URINARY URGENCY: ICD-10-CM

## 2022-04-07 DIAGNOSIS — I48.0 PAF (PAROXYSMAL ATRIAL FIBRILLATION): Chronic | ICD-10-CM

## 2022-04-07 DIAGNOSIS — I10 ESSENTIAL HYPERTENSION: Chronic | ICD-10-CM

## 2022-04-07 DIAGNOSIS — R51.9 NONINTRACTABLE HEADACHE, UNSPECIFIED CHRONICITY PATTERN, UNSPECIFIED HEADACHE TYPE: ICD-10-CM

## 2022-04-07 DIAGNOSIS — K51.919 ULCERATIVE COLITIS WITH COMPLICATION, UNSPECIFIED LOCATION: Primary | Chronic | ICD-10-CM

## 2022-04-07 PROCEDURE — 1160F PR REVIEW ALL MEDS BY PRESCRIBER/CLIN PHARMACIST DOCUMENTED: ICD-10-PCS | Mod: CPTII,S$GLB,, | Performed by: INTERNAL MEDICINE

## 2022-04-07 PROCEDURE — 1160F RVW MEDS BY RX/DR IN RCRD: CPT | Mod: CPTII,S$GLB,, | Performed by: INTERNAL MEDICINE

## 2022-04-07 PROCEDURE — 1111F PR DISCHARGE MEDS RECONCILED W/ CURRENT OUTPATIENT MED LIST: ICD-10-PCS | Mod: CPTII,S$GLB,, | Performed by: INTERNAL MEDICINE

## 2022-04-07 PROCEDURE — 3077F PR MOST RECENT SYSTOLIC BLOOD PRESSURE >= 140 MM HG: ICD-10-PCS | Mod: CPTII,S$GLB,, | Performed by: INTERNAL MEDICINE

## 2022-04-07 PROCEDURE — 3077F SYST BP >= 140 MM HG: CPT | Mod: CPTII,S$GLB,, | Performed by: INTERNAL MEDICINE

## 2022-04-07 PROCEDURE — 3079F PR MOST RECENT DIASTOLIC BLOOD PRESSURE 80-89 MM HG: ICD-10-PCS | Mod: CPTII,S$GLB,, | Performed by: INTERNAL MEDICINE

## 2022-04-07 PROCEDURE — 99214 PR OFFICE/OUTPT VISIT, EST, LEVL IV, 30-39 MIN: ICD-10-PCS | Mod: 95,S$GLB,, | Performed by: INTERNAL MEDICINE

## 2022-04-07 PROCEDURE — 1159F PR MEDICATION LIST DOCUMENTED IN MEDICAL RECORD: ICD-10-PCS | Mod: CPTII,S$GLB,, | Performed by: INTERNAL MEDICINE

## 2022-04-07 PROCEDURE — 99214 OFFICE O/P EST MOD 30 MIN: CPT | Mod: 95,S$GLB,, | Performed by: INTERNAL MEDICINE

## 2022-04-07 PROCEDURE — 3079F DIAST BP 80-89 MM HG: CPT | Mod: CPTII,S$GLB,, | Performed by: INTERNAL MEDICINE

## 2022-04-07 PROCEDURE — 1111F DSCHRG MED/CURRENT MED MERGE: CPT | Mod: CPTII,S$GLB,, | Performed by: INTERNAL MEDICINE

## 2022-04-07 PROCEDURE — 1159F MED LIST DOCD IN RCRD: CPT | Mod: CPTII,S$GLB,, | Performed by: INTERNAL MEDICINE

## 2022-04-07 RX ORDER — PREDNISONE 20 MG/1
TABLET ORAL
Qty: 30 TABLET | Refills: 0 | Status: SHIPPED | OUTPATIENT
Start: 2022-04-07 | End: 2022-06-02

## 2022-04-07 NOTE — PROGRESS NOTES
Telemedicine Visit    The patient verbally consented to proceed with a telemedicine visit, following discussion of the options of face-to-face or telemedicine visit. The telemedicine visit was completed with real-time interactive audio and video using [email or web address] without complication.    The visit was conducted with a limited physical exam (visual exam), and was medically appropriate to meet the patient's needs.    The patient is at their home.  The patient appeared to be in the usual physical state, with normal responses and affect.  I did not notice any abnormal skin tone or abnormal breathing patterns.    I requested that the patient contact my office at [phone] to make an appointment for routine follow-up care, unless more specific instructions were given under A/P.    Chief Complaint:  Weakness, urinary urgency, and chronic headache.    HPI:  He was discharged from the hospital on March 26th 2022. Colonoscopy revealed in active colitis.  He was extremely weak when arriving home and getting worse.  He had a fever and diarrhea.  He restarted prednisone 40 mg daily and immediately felt much better.  His appetite has returned.  His bowel movements are normal.  He has had no fever.  Colon biopsies ultimately came back with very active ulcerative colitis.  Fecal calprotectin level was extremely elevated at over 1000.    He has urinary urgency, but no dysuria.  He has daily headaches which are Daul and bilateral frontal headaches.  They usually only occur with reading.          A/P:  Cut back prednisone to 30 mg daily for 4 days.  If he still feels well he will cut back to 20 mg daily.  He has an appointment with Gastroenterology on April 19, 2022.  If his systolic blood pressure remains above 140 he will restart telmisartan 20 mg daily.   Home health will collect a urinalysis and culture.    Between 30 and 39 min of total time for evaluation and management services were spent on the patient today.  The medical  problems and treatment options were discussed, and all questions were answered.

## 2022-04-09 ENCOUNTER — PATIENT MESSAGE (OUTPATIENT)
Dept: INTERNAL MEDICINE | Facility: CLINIC | Age: 82
End: 2022-04-09
Payer: MEDICARE

## 2022-04-13 ENCOUNTER — PATIENT MESSAGE (OUTPATIENT)
Dept: INTERNAL MEDICINE | Facility: CLINIC | Age: 82
End: 2022-04-13
Payer: MEDICARE

## 2022-04-28 ENCOUNTER — TELEPHONE (OUTPATIENT)
Dept: GASTROENTEROLOGY | Facility: CLINIC | Age: 82
End: 2022-04-28
Payer: MEDICARE

## 2022-05-22 ENCOUNTER — HOSPITAL ENCOUNTER (EMERGENCY)
Facility: OTHER | Age: 82
Discharge: HOME | End: 2022-05-22
Attending: EMERGENCY MEDICINE
Payer: MEDICARE

## 2022-05-22 VITALS
BODY MASS INDEX: 23.1 KG/M2 | TEMPERATURE: 98 F | RESPIRATION RATE: 17 BRPM | WEIGHT: 165 LBS | DIASTOLIC BLOOD PRESSURE: 78 MMHG | HEART RATE: 66 BPM | SYSTOLIC BLOOD PRESSURE: 143 MMHG | HEIGHT: 71 IN | OXYGEN SATURATION: 100 %

## 2022-05-22 DIAGNOSIS — R21 RASH AND NONSPECIFIC SKIN ERUPTION: Primary | ICD-10-CM

## 2022-05-22 PROCEDURE — 99284 EMERGENCY DEPT VISIT MOD MDM: CPT | Mod: 25

## 2022-05-22 PROCEDURE — 63600175 PHARM REV CODE 636 W HCPCS: Performed by: PHYSICIAN ASSISTANT

## 2022-05-22 PROCEDURE — 96372 THER/PROPH/DIAG INJ SC/IM: CPT | Performed by: PHYSICIAN ASSISTANT

## 2022-05-22 PROCEDURE — 25000003 PHARM REV CODE 250: Performed by: PHYSICIAN ASSISTANT

## 2022-05-22 RX ORDER — METHYLPREDNISOLONE SOD SUCC 125 MG
125 VIAL (EA) INJECTION
Status: COMPLETED | OUTPATIENT
Start: 2022-05-22 | End: 2022-05-22

## 2022-05-22 RX ORDER — FAMOTIDINE 20 MG/1
40 TABLET, FILM COATED ORAL
Status: COMPLETED | OUTPATIENT
Start: 2022-05-22 | End: 2022-05-22

## 2022-05-22 RX ORDER — TRIAMCINOLONE ACETONIDE 0.25 MG/G
CREAM TOPICAL 2 TIMES DAILY
Qty: 80 G | Refills: 0 | Status: SHIPPED | OUTPATIENT
Start: 2022-05-22 | End: 2022-06-02

## 2022-05-22 RX ADMIN — METHYLPREDNISOLONE SODIUM SUCCINATE 125 MG: 125 INJECTION, POWDER, FOR SOLUTION INTRAMUSCULAR; INTRAVENOUS at 04:05

## 2022-05-22 RX ADMIN — FAMOTIDINE 40 MG: 20 TABLET ORAL at 04:05

## 2022-05-22 NOTE — ED PROVIDER NOTES
Encounter Date: 5/22/2022       History     Chief Complaint   Patient presents with    Rash     Pt c.o rash on back for 1 year. Pt states it is worse today.  AAO x 3 nadn skin w.d pt has red rash on top of back and lower back  worse on top.  Rash present on both sides.       82-year-old male with a history of CVA, ulcerative colitis hypertension, GERD presents ER for evaluation of a rash.  Patient reports that patient has had an ongoing intermittent rash for the last year and a half.  He states that symptoms started after receiving the 1st COVID vaccination.  He had another reaction and rash with the booster injection as well.  Patient states that the rash is primarily located to the back, upper arm and sometimes involves lower extremities.  Patient states that he has gone to dermatologist who are unable to determine what the rash is.  He states he has been placed on multiple medications and thinks that triamcinolone and steroids worked the best for him.  He denies any cough, congestion, URI symptoms.  No difficulty breathing or swelling.  Denies any changes in diet or medications.  Patient reports that the rash is very itchy and he has taken multiple antihistamine that do not help.  He states he is having a difficult time sleeping due to the itching.    The history is provided by the patient.     Review of patient's allergies indicates:   Allergen Reactions    Benazepril Other (See Comments)     Cough     Past Medical History:   Diagnosis Date    Abnormal echocardiogram 11/16/2012    Anticoagulant long-term use     Atrophic kidney 11/16/2012    BPH (benign prostatic hyperplasia) 11/16/2012    DDD (degenerative disc disease), cervical 7/5/2017    x-ray 7/17 - Severe    GERD (gastroesophageal reflux disease) 11/16/2012    Glaucoma 11/16/2012    HTN (hypertension) 11/16/2012    Hypothyroid 11/16/2012    Internal carotid artery stenosis 11/16/2012    Left ventricular diastolic dysfunction, NYHA class 1  2015    4/15    Low serum testosterone level 2012    Normal cardiac stress test 2012    Osteopenia 2012    Shingles 2012    Stroke 2017    tia   no residual    TIA (transient ischemic attack) 2012    UC (ulcerative colitis) 2012     Past Surgical History:   Procedure Laterality Date    ADENOIDECTOMY      COLONOSCOPY N/A 3/25/2022    Procedure: COLONOSCOPY;  Surgeon: Ashley Nguyen MD;  Location: [facility] ENDO;  Service: Endoscopy;  Laterality: N/A;    ESOPHAGOGASTRODUODENOSCOPY N/A 3/25/2022    Procedure: EGD (ESOPHAGOGASTRODUODENOSCOPY);  Surgeon: Ashley Nguyen MD;  Location: [facility] ENDO;  Service: Endoscopy;  Laterality: N/A;  added on per Dr. Nguyen    EYE SURGERY Right 2020    cataract    HERNIA REPAIR      LEFT HEART CATHETERIZATION Right 10/29/2021    Procedure: CATHETERIZATION, HEART, LEFT AND RIGHT  - LV DARNELL POSSIBLE;  Surgeon: Beau Conklin MD;  Location: [facility] CATH LAB;  Service: Cardiology;  Laterality: Right;    RIGHT HEART CATHETERIZATION Right 10/29/2021    Procedure: INSERTION, CATHETER, RIGHT HEART;  Surgeon: Beau Conklin MD;  Location: [facility] CATH LAB;  Service: Cardiology;  Laterality: Right;    TONSILLECTOMY       Family History   Problem Relation Age of Onset    Hypertension Mother     Alzheimer's disease Mother     Diabetes Brother     Hypertension Brother     Cancer Brother     Heart disease Brother 56        MI    Heart attack Father      Social History     Tobacco Use    Smoking status: Former Smoker     Quit date: 1972     Years since quittin.4    Smokeless tobacco: Never Used   Substance Use Topics    Alcohol use: Yes     Alcohol/week: 1.0 standard drink     Types: 1 Cans of beer per week     Comment: occasional    Drug use: No     Review of Systems   Constitutional: Negative for chills and fever.   Eyes: Negative for visual disturbance.   Respiratory: Negative for shortness of breath.     Cardiovascular: Negative for chest pain.   Gastrointestinal: Negative for nausea and vomiting.   Genitourinary: Negative for dysuria and flank pain.   Musculoskeletal: Negative for myalgias.   Skin: Positive for rash.        Itching     Allergic/Immunologic: Negative for immunocompromised state.   Neurological: Negative for weakness and numbness.   Hematological: Does not bruise/bleed easily.   Psychiatric/Behavioral: Negative for confusion.       Physical Exam     Initial Vitals [05/22/22 1343]   BP Pulse Resp Temp SpO2   139/66 69 18 98.1 °F (36.7 °C) 98 %      MAP       --         Physical Exam    Vitals reviewed.  Constitutional: He appears well-developed and well-nourished. He is not diaphoretic. No distress.   HENT:   Head: Normocephalic and atraumatic.   Eyes: Conjunctivae and EOM are normal.   Neck: Neck supple.   Pulmonary/Chest: No respiratory distress.   Musculoskeletal:         General: Normal range of motion.      Cervical back: Neck supple.     Neurological: He is alert and oriented to person, place, and time.   Skin: Rash (erythematouts papular rash to the back b/l. blanchable) noted.              ED Course   Procedures  Labs Reviewed - No data to display       Imaging Results    None          Medications   methylPREDNISolone sodium succinate injection 125 mg (has no administration in time range)   famotidine tablet 40 mg (has no administration in time range)           APC / Resident Notes:   Patient seen in the ER promptly upon arrival.  He is afebrile, no acute distress.  Physical examination reveals blanchable, erythematous papular rash to the upper back.  Some noted to the and upper aspect the calves.  The rash is nontender but patient reports significant pruritus.  No secondary signs of infection.  No evidence of cellulitis.  No skin sloughing or necrosis.  No evidence of anaphylaxis.    Unknown etiology.  Patient had extensive workup with Dermatology but does not have known diagnosis.  Wife  states that triamcinolone worked the best for the itching and requested for refill of the prescription.  Requesting for steroid shot.  Will place order for Solu-Medrol.  Will prescribed home on triamcinolone to use until they were able to see the dermatologist on an outpatient basis.  Will place ambulatory referral to Dermatology and allergist.  Will advise patient to use Atarax that he has at home in addition to the medications prescribed.  He was given strict return precautions ED which was agreeable to.  Stable for discharge.    Disclaimer: This note has been generated using voice-recognition software. There may be typographical errors that have been missed during proof-reading.                   Clinical Impression:   Final diagnoses:  [R21] Rash and nonspecific skin eruption (Primary)          ED Disposition Condition    Discharge Stable        ED Prescriptions     Medication Sig Dispense Start Date End Date Auth. Provider    triamcinolone acetonide 0.025% (KENALOG) 0.025 % cream Apply topically 2 (two) times daily. 80 g 5/22/2022  Alesia Toussaint PA-C        Follow-up Information     Follow up With Specialties Details Why Contact Info Additional Information    [facility] All[facility]Immunology Allergy   [address] [facility] [id number] [phone] [address] [place] - 9th Floor    [facility] - Dermatology [address] [place] [address] Dermatology   [address] [facility] [id number] [phone] Dermatology - Main Building, Clinic 11th Floor Please park in [facility]. Use Clinic elevators 12 & 13 to get to the 11th floor           Alesia Toussaint PA-C  05/22/22 1328

## 2022-05-22 NOTE — DISCHARGE INSTRUCTIONS
Take medication as prescribed.  You will need to follow up with dermatologist and allergist    Try CeraVe cream, Dove unscented soap & All Free & Clear detergent.  Continue the atarax you were prescribed.

## 2022-05-24 ENCOUNTER — OFFICE VISIT (OUTPATIENT)
Dept: DERMATOLOGY | Facility: CLINIC | Age: 82
End: 2022-05-24
Payer: MEDICARE

## 2022-05-24 DIAGNOSIS — L30.0 NUMMULAR DERMATITIS: Primary | ICD-10-CM

## 2022-05-24 DIAGNOSIS — R21 RASH AND NONSPECIFIC SKIN ERUPTION: ICD-10-CM

## 2022-05-24 PROCEDURE — 1159F MED LIST DOCD IN RCRD: CPT | Mod: CPTII,S$GLB,, | Performed by: STUDENT IN AN ORGANIZED HEALTH CARE EDUCATION/TRAINING PROGRAM

## 2022-05-24 PROCEDURE — 1160F PR REVIEW ALL MEDS BY PRESCRIBER/CLIN PHARMACIST DOCUMENTED: ICD-10-PCS | Mod: CPTII,S$GLB,, | Performed by: STUDENT IN AN ORGANIZED HEALTH CARE EDUCATION/TRAINING PROGRAM

## 2022-05-24 PROCEDURE — 1126F PR PAIN SEVERITY QUANTIFIED, NO PAIN PRESENT: ICD-10-PCS | Mod: CPTII,S$GLB,, | Performed by: STUDENT IN AN ORGANIZED HEALTH CARE EDUCATION/TRAINING PROGRAM

## 2022-05-24 PROCEDURE — 1160F RVW MEDS BY RX/DR IN RCRD: CPT | Mod: CPTII,S$GLB,, | Performed by: STUDENT IN AN ORGANIZED HEALTH CARE EDUCATION/TRAINING PROGRAM

## 2022-05-24 PROCEDURE — 99499 UNLISTED E&M SERVICE: CPT | Mod: S$GLB,,, | Performed by: STUDENT IN AN ORGANIZED HEALTH CARE EDUCATION/TRAINING PROGRAM

## 2022-05-24 PROCEDURE — 99214 PR OFFICE/OUTPT VISIT, EST, LEVL IV, 30-39 MIN: ICD-10-PCS | Mod: S$GLB,,, | Performed by: STUDENT IN AN ORGANIZED HEALTH CARE EDUCATION/TRAINING PROGRAM

## 2022-05-24 PROCEDURE — 1126F AMNT PAIN NOTED NONE PRSNT: CPT | Mod: CPTII,S$GLB,, | Performed by: STUDENT IN AN ORGANIZED HEALTH CARE EDUCATION/TRAINING PROGRAM

## 2022-05-24 PROCEDURE — 99214 OFFICE O/P EST MOD 30 MIN: CPT | Mod: S$GLB,,, | Performed by: STUDENT IN AN ORGANIZED HEALTH CARE EDUCATION/TRAINING PROGRAM

## 2022-05-24 PROCEDURE — 1159F PR MEDICATION LIST DOCUMENTED IN MEDICAL RECORD: ICD-10-PCS | Mod: CPTII,S$GLB,, | Performed by: STUDENT IN AN ORGANIZED HEALTH CARE EDUCATION/TRAINING PROGRAM

## 2022-05-24 PROCEDURE — 99999 PR PBB SHADOW E&M-EST. PATIENT-LVL III: CPT | Mod: PBBFAC,,, | Performed by: STUDENT IN AN ORGANIZED HEALTH CARE EDUCATION/TRAINING PROGRAM

## 2022-05-24 PROCEDURE — 99499 RISK ADDL DX/OHS AUDIT: ICD-10-PCS | Mod: S$GLB,,, | Performed by: STUDENT IN AN ORGANIZED HEALTH CARE EDUCATION/TRAINING PROGRAM

## 2022-05-24 PROCEDURE — 99999 PR PBB SHADOW E&M-EST. PATIENT-LVL III: ICD-10-PCS | Mod: PBBFAC,,, | Performed by: STUDENT IN AN ORGANIZED HEALTH CARE EDUCATION/TRAINING PROGRAM

## 2022-05-24 RX ORDER — CLOBETASOL PROPIONATE 0.5 MG/G
CREAM TOPICAL 2 TIMES DAILY
Qty: 60 G | Refills: 3 | Status: SHIPPED | OUTPATIENT
Start: 2022-05-24 | End: 2022-09-12

## 2022-05-24 NOTE — PROGRESS NOTES
Subjective:       Patient ID:  Ramesh Ricci is a 82 y.o. male who presents for   Chief Complaint   Patient presents with    Rash     Back, lower legs     Presents for rash x4-5 days. Pt has h/o this rash, last seen 7/2021 when it was thought to be psoriasis. Pt has h/o UC was on Humira, and pso was thought to be 2/2 the Humira. Currently on mesalamine for UC, controlled, but previously has been on several courses of PDN for flares of colitis.    Today, rash is only on the back and the legs. In past, it has flared everywhere. Currently using TAC cream without any relief.     Takes 10min showers, not crazy hot water, not using any soap, but used to use Dove sens skin. Doesn't use moisturizers consistently.    Rash - Initial  Affected locations: back, right lower leg and left lower leg  Duration: 5 days  Signs / symptoms: burning, itching and redness  Aggravated by: scratching  Relieving factors/Treatments tried: Rx topical steroids and OTC hydrocortisone  Improvement on treatment: moderate        Review of Systems   Skin: Positive for itching, rash and dry skin.        Objective:    Physical Exam   Constitutional: He appears well-developed and well-nourished. No distress.   Neurological: He is alert and oriented to person, place, and time. He is not disoriented.   Psychiatric: He has a normal mood and affect.   Skin:   Areas Examined (abnormalities noted in diagram):   Back Inspection Performed  RLE Inspected  LLE Inspection Performed              Diagram Legend     Erythematous scaling macule/papule c/w actinic keratosis       Vascular papule c/w angioma      Pigmented verrucoid papule/plaque c/w seborrheic keratosis      Yellow umbilicated papule c/w sebaceous hyperplasia      Irregularly shaped tan macule c/w lentigo     1-2 mm smooth white papules consistent with Milia      Movable subcutaneous cyst with punctum c/w epidermal inclusion cyst      Subcutaneous movable cyst c/w pilar cyst      Firm pink to  brown papule c/w dermatofibroma      Pedunculated fleshy papule(s) c/w skin tag(s)      Evenly pigmented macule c/w junctional nevus     Mildly variegated pigmented, slightly irregular-bordered macule c/w mildly atypical nevus      Flesh colored to evenly pigmented papule c/w intradermal nevus       Pink pearly papule/plaque c/w basal cell carcinoma      Erythematous hyperkeratotic cursted plaque c/w SCC      Surgical scar with no sign of skin cancer recurrence      Open and closed comedones      Inflammatory papules and pustules      Verrucoid papule consistent consistent with wart     Erythematous eczematous patches and plaques     Dystrophic onycholytic nail with subungual debris c/w onychomycosis     Umbilicated papule    Erythematous-base heme-crusted tan verrucoid plaque consistent with inflamed seborrheic keratosis     Erythematous Silvery Scaling Plaque c/w Psoriasis     See annotation      Assessment / Plan:        Nummular dermatitis, flaring, not at goal  Do not suspect psoriasis at this time. Pathology c/w nummular, clinical picture c/w nummular  - Pt not moisturizing  - Start vaincream cream daily  - Start BID: clobetasoL (TEMOVATE) 0.05 % cream; Apply topically 2 (two) times daily.  Dispense: 60 g; Refill: 3  - Will CTM for changes, ddx also much less likely drug vs CTCL. Pt denies starting any medicines during initiation of eruption    Rash and nonspecific skin eruption  -     Ambulatory referral/consult to Dermatology             No follow-ups on file.

## 2022-05-31 PROCEDURE — 99358 PROLONG SERVICE W/O CONTACT: CPT | Mod: S$GLB,,, | Performed by: INTERNAL MEDICINE

## 2022-05-31 PROCEDURE — 99358 PR PROLONGED SERV,NO CONTACT,1ST HR: ICD-10-PCS | Mod: S$GLB,,, | Performed by: INTERNAL MEDICINE

## 2022-06-02 ENCOUNTER — CLINICAL SUPPORT (OUTPATIENT)
Dept: GASTROENTEROLOGY | Facility: CLINIC | Age: 82
End: 2022-06-02
Payer: MEDICARE

## 2022-06-02 ENCOUNTER — OFFICE VISIT (OUTPATIENT)
Dept: GASTROENTEROLOGY | Facility: CLINIC | Age: 82
End: 2022-06-02
Payer: MEDICARE

## 2022-06-02 VITALS
DIASTOLIC BLOOD PRESSURE: 64 MMHG | SYSTOLIC BLOOD PRESSURE: 114 MMHG | WEIGHT: 153 LBS | TEMPERATURE: 98 F | BODY MASS INDEX: 21.34 KG/M2 | HEART RATE: 67 BPM | OXYGEN SATURATION: 98 %

## 2022-06-02 DIAGNOSIS — K51.019 ULCERATIVE PANCOLITIS WITH COMPLICATION: Primary | ICD-10-CM

## 2022-06-02 DIAGNOSIS — D50.0 IRON DEFICIENCY ANEMIA DUE TO CHRONIC BLOOD LOSS: ICD-10-CM

## 2022-06-02 DIAGNOSIS — K51.019 ULCERATIVE PANCOLITIS WITH COMPLICATION: Primary | Chronic | ICD-10-CM

## 2022-06-02 PROCEDURE — 99214 PR OFFICE/OUTPT VISIT, EST, LEVL IV, 30-39 MIN: ICD-10-PCS | Mod: S$GLB,,, | Performed by: INTERNAL MEDICINE

## 2022-06-02 PROCEDURE — 1160F RVW MEDS BY RX/DR IN RCRD: CPT | Mod: CPTII,S$GLB,, | Performed by: INTERNAL MEDICINE

## 2022-06-02 PROCEDURE — 1159F PR MEDICATION LIST DOCUMENTED IN MEDICAL RECORD: ICD-10-PCS | Mod: CPTII,S$GLB,, | Performed by: INTERNAL MEDICINE

## 2022-06-02 PROCEDURE — 3288F PR FALLS RISK ASSESSMENT DOCUMENTED: ICD-10-PCS | Mod: CPTII,S$GLB,, | Performed by: INTERNAL MEDICINE

## 2022-06-02 PROCEDURE — 3078F PR MOST RECENT DIASTOLIC BLOOD PRESSURE < 80 MM HG: ICD-10-PCS | Mod: CPTII,S$GLB,, | Performed by: INTERNAL MEDICINE

## 2022-06-02 PROCEDURE — 3288F FALL RISK ASSESSMENT DOCD: CPT | Mod: CPTII,S$GLB,, | Performed by: INTERNAL MEDICINE

## 2022-06-02 PROCEDURE — 3078F DIAST BP <80 MM HG: CPT | Mod: CPTII,S$GLB,, | Performed by: INTERNAL MEDICINE

## 2022-06-02 PROCEDURE — 3074F PR MOST RECENT SYSTOLIC BLOOD PRESSURE < 130 MM HG: ICD-10-PCS | Mod: CPTII,S$GLB,, | Performed by: INTERNAL MEDICINE

## 2022-06-02 PROCEDURE — 99214 OFFICE O/P EST MOD 30 MIN: CPT | Mod: S$GLB,,, | Performed by: INTERNAL MEDICINE

## 2022-06-02 PROCEDURE — 1101F PT FALLS ASSESS-DOCD LE1/YR: CPT | Mod: CPTII,S$GLB,, | Performed by: INTERNAL MEDICINE

## 2022-06-02 PROCEDURE — 1101F PR PT FALLS ASSESS DOC 0-1 FALLS W/OUT INJ PAST YR: ICD-10-PCS | Mod: CPTII,S$GLB,, | Performed by: INTERNAL MEDICINE

## 2022-06-02 PROCEDURE — 1160F PR REVIEW ALL MEDS BY PRESCRIBER/CLIN PHARMACIST DOCUMENTED: ICD-10-PCS | Mod: CPTII,S$GLB,, | Performed by: INTERNAL MEDICINE

## 2022-06-02 PROCEDURE — 1126F PR PAIN SEVERITY QUANTIFIED, NO PAIN PRESENT: ICD-10-PCS | Mod: CPTII,S$GLB,, | Performed by: INTERNAL MEDICINE

## 2022-06-02 PROCEDURE — 3074F SYST BP LT 130 MM HG: CPT | Mod: CPTII,S$GLB,, | Performed by: INTERNAL MEDICINE

## 2022-06-02 PROCEDURE — 1126F AMNT PAIN NOTED NONE PRSNT: CPT | Mod: CPTII,S$GLB,, | Performed by: INTERNAL MEDICINE

## 2022-06-02 PROCEDURE — 1159F MED LIST DOCD IN RCRD: CPT | Mod: CPTII,S$GLB,, | Performed by: INTERNAL MEDICINE

## 2022-06-02 RX ORDER — HEPARIN 100 UNIT/ML
500 SYRINGE INTRAVENOUS
Status: CANCELLED | OUTPATIENT
Start: 2022-06-02

## 2022-06-02 RX ORDER — METHYLPREDNISOLONE SOD SUCC 125 MG
40 VIAL (EA) INJECTION
Status: CANCELLED | OUTPATIENT
Start: 2022-06-02

## 2022-06-02 RX ORDER — IPRATROPIUM BROMIDE AND ALBUTEROL SULFATE 2.5; .5 MG/3ML; MG/3ML
3 SOLUTION RESPIRATORY (INHALATION)
Status: CANCELLED | OUTPATIENT
Start: 2022-06-02

## 2022-06-02 RX ORDER — BUDESONIDE 9 MG/1
9 TABLET, FILM COATED, EXTENDED RELEASE ORAL DAILY
Qty: 30 EACH | Refills: 2 | Status: SHIPPED | OUTPATIENT
Start: 2022-06-02 | End: 2022-08-11

## 2022-06-02 RX ORDER — DIPHENHYDRAMINE HYDROCHLORIDE 50 MG/ML
25 INJECTION INTRAMUSCULAR; INTRAVENOUS
Status: CANCELLED | OUTPATIENT
Start: 2022-06-02

## 2022-06-02 RX ORDER — SODIUM CHLORIDE 0.9 % (FLUSH) 0.9 %
10 SYRINGE (ML) INJECTION
Status: CANCELLED | OUTPATIENT
Start: 2022-06-02

## 2022-06-02 RX ORDER — ACETAMINOPHEN 325 MG/1
650 TABLET ORAL
Status: CANCELLED | OUTPATIENT
Start: 2022-06-02

## 2022-06-02 RX ORDER — USTEKINUMAB 90 MG/ML
90 INJECTION, SOLUTION SUBCUTANEOUS
Qty: 1 ML | Refills: 2 | Status: SHIPPED | OUTPATIENT
Start: 2022-06-02 | End: 2023-02-01

## 2022-06-02 RX ORDER — MESALAMINE 500 MG/1
2000 CAPSULE, EXTENDED RELEASE ORAL 2 TIMES DAILY
Qty: 240 CAPSULE | Refills: 5 | Status: SHIPPED | OUTPATIENT
Start: 2022-06-02 | End: 2022-09-12

## 2022-06-02 RX ORDER — EPINEPHRINE 1 MG/ML
0.3 INJECTION, SOLUTION, CONCENTRATE INTRAVENOUS
Status: CANCELLED | OUTPATIENT
Start: 2022-06-02

## 2022-06-02 NOTE — PROGRESS NOTES
[facility]  Inflammatory Bowel Disease  Pharmacy Note    TODAY'S VISIT DATE:  6/2/2022    Reason for visit: New start biologic     IBD treatment to be initiated/optimized: Smitha    IBD MD: Channing      Pertinent History:  - IBD phenotype: Ulcerative pancolitis   - Dispensing pharmacy/infusion center:  [facility]/ [facility]  - Current therapy: Pentasa 2000 mg daily     Therapeutic Drug Monitoring Labs:  N/A    Prior IBD Therapies:  Humira 40mg Q2W - clinical remission, stopped due to skin lesions that do not appear to be related to Humira  Colazal - Incomplete response   Pentasa - inadequate response to low-dose    Azathioprine - unclear response    Vaccinations:  Lab Results   Component Value Date    HEPBSAB Negative 03/24/2022     No results found for: HEPBSURFABQU  No results found for: HEPAIGG  No results found for: VARICELLAZOS, VARICELLAINT  Immunization History   Administered Date(s) Administered    COVID-19, MRNA, LN-S, PF (Pfizer) (Purple Cap) 01/12/2021, 02/02/2021, 12/02/2021    Influenza (FLUAD) - Quadrivalent - Adjuvanted - PF *Preferred* (65+) 10/09/2020, 10/20/2021    Influenza - High Dose - PF (65 years and older) 10/06/2014, 09/29/2016, 11/09/2017, 10/08/2018, 09/17/2019    Pneumococcal Conjugate - 13 Valent 09/29/2016    Pneumococcal Polysaccharide - 23 Valent 01/05/2011    Zoster 02/02/2012     Influenza: discussed and recommended annual flu shot   PCV 13: completed  PPSV 23: completed  Tetanus (TdaP): discussed and recommended booster every 10 years   HPV: N/A     Meningococcal: N/A  Hepatitis B:  Will check immunity   Hepatitis A:   Will check immunity   MMR (live vaccine):    Will check immunity      Chickenpox status/Varicella (live vaccine):  Will check immunity   Shingrix: discuss and recommended repeating Shingrix. Pt received the live vaccine in 2012 and reports also having shingles in the past   Covid:  Discussed and recommended second booster. Pt deferred as per his PCP he  was told not to take it as it might have caused the rash on his back.    All Medical History/Surgical History/Family History/Social History/Allergies have been reviewed and updated in EMR    Review of patient's allergies indicates:   Allergen Reactions    Benazepril Other (See Comments)     Cough         Current Medications:   No outpatient medications have been marked as taking for the 6/2/22 encounter (Appointment) with IBD PHARMACIST.       Reviewed all current medications including OTC, herbals and supplements.    Labs:   Lab Results   Component Value Date    HEPBSAG Negative 03/24/2022    HEPBCAB Negative 03/24/2022     Lab Results   Component Value Date    TBGOLDPLUS Negative 03/24/2022     Lab Results   Component Value Date    SDMHYZPD75AV 30 07/26/2021    YQNHDGMM79 688 02/16/2022     Lab Results   Component Value Date    WBC 4.52 03/24/2022    HGB 10.1 (L) 03/24/2022    HCT 32.3 (L) 03/24/2022    MCV 93 03/24/2022     03/24/2022     Lab Results   Component Value Date    CREATININE 1.2 03/26/2022    ALBUMIN 2.8 (L) 03/23/2022    BILITOT 0.4 03/23/2022    ALKPHOS 51 (L) 03/23/2022    AST 23 03/23/2022    ALT 12 03/23/2022         Assessment/Plan:  Ramesh Ricci is a 82 y.o. male that  has a past medical history of Abnormal echocardiogram, Anemia, Anticoagulant long-term use, Anxiety, Atrophic kidney, BPH (benign prostatic hyperplasia), Cancer, DDD (degenerative disc disease), cervical, GERD (gastroesophageal reflux disease), Glaucoma, HTN (hypertension), Hypothyroid, Internal carotid artery stenosis, Joint pain, Left ventricular diastolic dysfunction, NYHA class 1, Low serum testosterone level, Normal cardiac stress test, Osteopenia, Shingles, Skin disease, Stroke, TIA (transient ischemic attack), and UC (ulcerative colitis). Patient presents to clinic for an initial visit with Dr. Snell. Has previously been well controlled with Humira until mid 2021 when he developed a rash on his back and  Humira was stopped. At that time he was switched to Pentasa, which he was doing okay on until March 2022 when he was hospitalized for a flare up. He is referred to see IBD pharmacist today to discuss initiating a biologic considering he is not well controlled on 5-ASA only. Patient is accompanied by his wife today.    # Recommendations:  - Educated pt on mechanism of action, frequency and route of administration, onset of action and safety profile of Stelara.  - Stelara IV infusion sent to [facility], Stelara SC script sent to [facility]  - Discussed patient assistance programs with pt and wife in case he has a high co pay  - Encouraged pt to practice proper hand hygiene considering increased risk of infection with biologics. Pt to make us aware if he ever experiences s/sx of an infection including fever, chills, URI symptoms or UTI symptoms.   - Discussed importance of immunizations considering the increased risk of infections. Recommended repeating shingrix, tdap and covid 2nd booster.   - Pt deferred covid booster as per PCP likely the rash might have been triggered by covid vaccine.   - Pt to avoid live vaccines and uncooked/ raw seafood such as raw oysters or sushi.   - Advised pt to use sun protection and get annual skin checks considering possible increased risk of skin cancer   - Recommended against use of NSAIDs including motrin,ibuprofen, advil, aleve etc   - Patient verbalized understanding instructions     Cat Norman, PharmD  IBD Clinical Pharmacist

## 2022-06-02 NOTE — PROGRESS NOTES
[facility]          Inflammatory Bowel Disease New Patient Consultation Note         TODAY'S VISIT DATE:  6/2/2022    Reason for Consult:    Chief Complaint   Patient presents with    Ulcerative Colitis       PCP: EFREM Muniz      Referring MD:   Dr. Ashley Nguyen    History of Present Illness:  Ramesh Ricci who is a 82 y.o. male is being seen today at the [facility] on 06/02/2022 for inflammatory bowel disease- ulcerative colitis.  He is here today for his 1st visit in the IBD Clinic.  His history is noted below.  He reports that he continues to have significant diarrhea issues.  He is having about 6 bowel movements, mostly at night.  Several of these wake him up from sleep.  He denies any blood in the stools.  He does frequently have abdominal cramping.  He has been having significant weight loss over last 3 months.  He is taking Pentasa 2000 mg daily.    IBD History:  He was diagnosed with ulcerative colitis in the 1960s.  Per notes he has a history of pan colitis.  Multiple colonoscopy reports are available and these demonstrate patchy colitis throughout the colon.  On one exam biopsy showed some acute ileitis but no chronic changes in the ileum.  Biopsies have been consistent with chronic ulcerative colitis.  It is difficult to determine what he has been treated with in the past but it appears he was at least on Colazal at 1 point (dose was 2.25 g once daily).  He was also on azathioprine at 1 point and treated with Humira.  In early 2021 he developed a rash that was concerning for possible psoriasis.  Humira was stopped at that point.  Pentasa was started after the Humira was stopped.  After stopping the Humira his skin lesions did not resolved and it is currently not thought that his skin issues were related to psoriasis.  In March 2022 he was admitted to the hospital with acute onset diarrhea and abdominal pain.  He  underwent an upper endoscopy and colonoscopy.  The upper endoscopy was only significant for a 2 cm hiatal hernia.  The colonoscopy revealed no evidence of active ulcerative colitis.  The preparation of the colon was poor.  Biopsies demonstrated evidence of active colitis throughout the entire colon.  He had a markedly elevated stool calprotectin level and negative stool studies for infection.      IBD Details:  Dx Date:  1960s  Disease type/distribution:  Ulcerative colitis/pancolitis  Current Treatment:  Pentasa 2000 mg daily  Start Date:  Early 2021 Response:  Incomplete  Optimized:  No  Adverse reactions:  None  Prior surgeries:  None  CRP Elevation: Y  calprotectin:  Markedly elevated with active disease  Disease Complications:  None  Extraintestinal manifestations:  None  Prior treatments:   Steroids:  Good response in the past  5ASA:  Incomplete response to Colazal, inadequate response to low-dose Pentasa  IMM:  Previously on azathioprine, unclear response  TNF Inh:  Humira-clinical remission, stopped due to skin lesions that do not appear to be related to Humira   Anti-Integrin:  None   IL 12/23:  None  KARLO Inh:  None  S1P Modulator: None    Previous Clinical Trials:  None    Last Colonoscopy:  March 2022-poor prep, no significant endoscopic disease activity but significant active colitis on biopsies    Other Endoscopies:  March 2022-unrevealing    Imaging:   MRE:  None   CT:  March 2022-inflammatory changes around the colon, otherwise unrevealing   Other:  None    Pertinent Labs:  Lab Results   Component Value Date    SEDRATE 77 (H) 03/23/2022    CRP 29.5 (H) 03/23/2022     No results found for: TTGIGA, IGA  Lab Results   Component Value Date    TSH 4.418 (H) 03/21/2022    FREET4 1.15 03/21/2022     Lab Results   Component Value Date    DUJECLJW37EU 30 07/26/2021    MXNPSMVH08 688 02/16/2022     Lab Results   Component Value Date    HEPBSAG Negative 03/24/2022    HEPBCAB Negative 03/24/2022     No results  found for: WND58FORA  Lab Results   Component Value Date    NIL 0.25030 03/24/2022    MITOGENNIL 3.623 03/24/2022     Lab Results   Component Value Date    TPTMINTERP SEE BELOW 03/24/2022     Lab Results   Component Value Date    STOOLCULTURE  03/23/2022     No Salmonella,Shigella,Vibrio,Campylobacter,Yersinia isolated.    RRAKXAEDRY2D Negative 03/23/2022    XQTEEHZPVY9O Negative 03/23/2022    CDIFFICILEAN Negative 03/23/2022    CDIFFTOX Negative 03/23/2022     Lab Results   Component Value Date    CALPROTECTIN 1,200.5 (H) 03/24/2022       Therapeutic Drug Monitoring Labs:  No results found for: PROMETH  No results found for: ANSADAINIT, INFLIXIMAB, INFLIXINTERP    Vaccinations:  Lab Results   Component Value Date    HEPBSAB Negative 03/24/2022     No results found for: HEPAIGG  No results found for: VARICELLAZOS, VARICELLAINT  Immunization History   Administered Date(s) Administered    COVID-19, MRNA, LN-S, PF (Pfizer) (Purple Cap) 01/12/2021, 02/02/2021, 12/02/2021    Influenza (FLUAD) - Quadrivalent - Adjuvanted - PF *Preferred* (65+) 10/09/2020, 10/20/2021    Influenza - High Dose - PF (65 years and older) 10/06/2014, 09/29/2016, 11/09/2017, 10/08/2018, 09/17/2019    Pneumococcal Conjugate - 13 Valent 09/29/2016    Pneumococcal Polysaccharide - 23 Valent 01/05/2011    Zoster 02/02/2012         Review of Systems  Review of Systems   Constitutional: Negative for chills, fever and weight loss.   HENT: Negative for sore throat.    Eyes: Negative for pain, discharge and redness.   Respiratory: Positive for shortness of breath. Negative for cough and wheezing.    Cardiovascular: Negative for chest pain and leg swelling.   Gastrointestinal: Positive for abdominal pain and heartburn. Negative for blood in stool, constipation, diarrhea, melena, nausea and vomiting.   Genitourinary: Negative for dysuria and frequency.   Musculoskeletal: Positive for back pain. Negative for joint pain and myalgias.   Skin: Positive  for rash.   Neurological: Negative for focal weakness and seizures.   Endo/Heme/Allergies: Does not bruise/bleed easily.   Psychiatric/Behavioral: Negative for depression. The patient is nervous/anxious.        Medical History:   Past Medical History:   Diagnosis Date    Abnormal echocardiogram 11/16/2012    Anemia     Anticoagulant long-term use     Anxiety     Atrophic kidney 11/16/2012    BPH (benign prostatic hyperplasia) 11/16/2012    Cancer 2000 something    Had radiation    DDD (degenerative disc disease), cervical 07/05/2017    x-ray 7/17 - Severe    GERD (gastroesophageal reflux disease) 11/16/2012    Glaucoma 11/16/2012    HTN (hypertension) 11/16/2012    Hypothyroid 11/16/2012    Internal carotid artery stenosis 11/16/2012    Joint pain     Left ventricular diastolic dysfunction, NYHA class 1 04/16/2015    4/15    Low serum testosterone level 11/16/2012    Normal cardiac stress test 11/16/2012    Osteopenia 11/16/2012    Shingles 11/16/2012    Skin disease 2020    Seems to have started after Covid vaccine    Stroke 2017    tia   no residual    TIA (transient ischemic attack) 11/16/2012    UC (ulcerative colitis) 11/16/2012       Surgical History:  Past Surgical History:   Procedure Laterality Date    ADENOIDECTOMY      COLONOSCOPY N/A 03/25/2022    Procedure: COLONOSCOPY;  Surgeon: Ashley Nguyen MD;  Location: [facility];  Service: Endoscopy;  Laterality: N/A;    ESOPHAGOGASTRODUODENOSCOPY N/A 03/25/2022    Procedure: EGD (ESOPHAGOGASTRODUODENOSCOPY);  Surgeon: Ashley Nguyen MD;  Location: [facility];  Service: Endoscopy;  Laterality: N/A;  added on per Dr. Nguyen    EYE SURGERY Right 11/2020    cataract    HERNIA REPAIR      LEFT HEART CATHETERIZATION Right 10/29/2021    Procedure: CATHETERIZATION, HEART, LEFT AND RIGHT  - LV DARNELL POSSIBLE;  Surgeon: Beau Conklin MD;  Location: [facility] CATH LAB;  Service: Cardiology;  Laterality: Right;    RIGHT HEART  CATHETERIZATION Right 10/29/2021    Procedure: INSERTION, CATHETER, RIGHT HEART;  Surgeon: Beau Conklin MD;  Location: [facility] CATH LAB;  Service: Cardiology;  Laterality: Right;    SKIN BIOPSY  2020    TONSILLECTOMY         Family History:   Family History   Problem Relation Age of Onset    Hypertension Mother     Alzheimer's disease Mother     Diabetes Brother     Hypertension Brother     Cancer Brother     Heart disease Brother 56        MI    Heart attack Father        Social History:   Social History     Tobacco Use    Smoking status: Former Smoker     Packs/day: 1.00     Years: 25.00     Pack years: 25.00     Types: Cigarettes     Quit date: 1972     Years since quittin.4    Smokeless tobacco: Never Used   Substance Use Topics    Alcohol use: Yes     Alcohol/week: 3.0 standard drinks     Types: 2 Glasses of wine, 1 Cans of beer per week     Comment: Occasional    Drug use: Never       Allergies: Reviewed    Home Medications:   Medication List with Changes/Refills   New Medications    BUDESONIDE (UCERIS) 9 MG TADE    Take 9 mg by mouth once daily.   Current Medications    ACETAMINOPHEN (TYLENOL) 500 MG TABLET    Take 2 tablets (1,000 mg total) by mouth every 6 (six) hours as needed.    ASCORBIC ACID, VITAMIN C, (VITAMIN C) 500 MG TABLET    Take 500 mg by mouth once daily.    ASPIRIN (ECOTRIN) 81 MG EC TABLET    Take 81 mg by mouth once daily.    CALCIUM CARBONATE (TUMS) 200 MG CALCIUM (500 MG) CHEWABLE TABLET    Take 1 tablet by mouth once daily.    CLOBETASOL (TEMOVATE) 0.05 % CREAM    Apply topically 2 (two) times daily.    ELIQUIS 2.5 MG TAB    Take 2.5 mg by mouth 2 (two) times daily.    FERROUS SULFATE 324 MG (65 MG IRON) TBEC    Take 325 mg by mouth 2 (two) times daily.    LEVOTHYROXINE (SYNTHROID) 125 MCG TABLET    TAKE 1 TABLET BY MOUTH EVERY MORNING    METOPROLOL SUCCINATE (TOPROL-XL) 25 MG 24 HR TABLET    Take 1 tablet (25 mg total) by mouth once daily.    PANTOPRAZOLE  (PROTONIX) 40 MG TABLET    Take 1 tablet (40 mg total) by mouth 2 (two) times daily.    PRAVASTATIN (PRAVACHOL) 20 MG TABLET    Take 1 tablet (20 mg total) by mouth every evening.    TAMSULOSIN (FLOMAX) 0.4 MG CP24    Take 0.4 mg by mouth once daily.     UNABLE TO FIND    Take 2 tablets by mouth daily as needed. Charcoal tabs    VENLAFAXINE (EFFEXOR) 37.5 MG TAB    Take 1 tablet (37.5 mg total) by mouth 2 (two) times daily.    VITC/E/ZN/COPPER/LUTEIN/ZEAXAN (ICAPS AREDS2 ORAL)    Take 1 capsule by mouth once daily.   Changed and/or Refilled Medications    Modified Medication Previous Medication    MESALAMINE (PENTASA) 500 MG CR CAPSULE mesalamine (PENTASA) 500 MG CR capsule       Take 4 capsules (2,000 mg total) by mouth 2 (two) times daily.    Take 2,000 mg by mouth once daily.   Discontinued Medications    PREDNISONE (DELTASONE) 20 MG TABLET    Take 1.5 tablets each morning for 4 days, then 1 tablet each morning until seen by Gastroenterology    TRIAMCINOLONE ACETONIDE 0.025% (KENALOG) 0.025 % CREAM    Apply topically 2 (two) times daily.       Physical Exam:  Vital Signs:  /64   Pulse 67   Temp 98.2 °F (36.8 °C)   Wt 69.4 kg (153 lb)   SpO2 98%   BMI 21.34 kg/m²   Body mass index is 21.34 kg/m².    Physical Exam  Vitals and nursing note reviewed.   Constitutional:       General: He is not in acute distress.     Appearance: Normal appearance. He is well-developed. He is not ill-appearing or toxic-appearing.   HENT:      Head: Normocephalic and atraumatic.   Eyes:      General: No scleral icterus.     Pupils: Pupils are equal, round, and reactive to light.   Neck:      Thyroid: No thyromegaly.   Cardiovascular:      Rate and Rhythm: Normal rate and regular rhythm.      Heart sounds: No murmur heard.    No friction rub.   Pulmonary:      Effort: Pulmonary effort is normal.      Breath sounds: Normal breath sounds. No wheezing or rales.   Abdominal:      General: Bowel sounds are normal. There is no  distension.      Palpations: Abdomen is soft. There is no mass.      Tenderness: There is no abdominal tenderness. There is no guarding or rebound.   Lymphadenopathy:      Cervical: No cervical adenopathy.   Skin:     Findings: No rash.   Neurological:      General: No focal deficit present.      Mental Status: He is alert and oriented to person, place, and time.   Psychiatric:         Mood and Affect: Mood normal.         Behavior: Behavior normal.         Thought Content: Thought content normal.         Judgment: Judgment normal.         Labs: reviewed and pertinent noted above    Assessment/Plan:  Ramesh Ricci is a 82 y.o. male with pan ulcerative colitis. The following issues were addresssed:    1. Ulcerative pancolitis with complication    2. Iron deficiency anemia due to chronic blood loss        1. Ulcerative colitis:  He clearly still has evidence of active disease on biopsies and has significant symptoms.  Stool studies for infection were negative in the was no evidence of CMV on his biopsies.  Today we discussed a few strategies on how to move forward.  We discussed the fact that given his inadequate response to Colazal in the past it is highly likely he is going to require a more advanced therapy than Pentasa for his ulcerative colitis.  We discussed possibly starting Uceris and increasing the dose of Pentasa versus starting another biologic such as Entyvio or Stelara.  These would be preferred over a TNF inhibitor in his age group due to the lower risk of potential adverse effects such as infection and lymphoma.  Given his age and history of cardiac issues I do not think Zeposia is a great option for him right now, either.  After discussion of the options they would like to proceed with a trial of Stelara if covered by insurance.  Entyvio would be acceptable if this is preferred.  In the meantime we will start Uceris 9 mg daily for a month and then go to 1 every other day for 2 weeks and then stop  it.  We will also increase the dose of Pentasa to 2 g twice daily to see if this helps while we await insurance approval.  He will meet with our clinical pharmacist today to discuss details of Stelara.    2. Iron deficiency anemia:  He had minimal iron deficiency on his recent iron studies.  Given the fact that in the setting of active inflammation oral iron supplements are likely to not be adequate to replenish his iron stores and may actually exacerbate some of the GI symptoms he has been having I recommend that he stop his iron supplements at this time.  If necessary we will consider IV iron in the future.    Ex smoker:  - recommended to continue smoking cessation  - discussed in detail that Crohn's disease can worsen with smoking and may make patient more resistant to treatment    # IBD specific health maintenance:  Colon cancer surveillance:  Up-to-date    Annual:  - Eye exam:  Not applicable  - Skin exam (if on IMM/TNF):  Up-to-date  - reminded pt to use sunblock/hats/sunprotective clothing  - PAP (if immunosuppressed):  Not applicable    DEXA:  Osteopenia noted in 2018    Vitamin D:  Normal in 2021    Vaccines:    Influenza:  Up-to-date   Pneumovax:     PCV13:  Up-to-date    PSV23:  Review today   HAV:  Check serology with next labs   HBV:  Review in the future   Tdap:  Review in the future   MMR:  Check serologies with next labs   VZV:  Check serology with next labs   HZV:  Recommend shingles vaccinations   HPV:  Not applicable   Meningococcus:  Not applicable   COVID:  Completed 3 doses    Follow up: Follow up in about 3 months (around 9/2/2022).    Thank you again for sending Ramesh Ricci to see Dr. Marcial Snell today at the [facility]. Please don't hesitate to contact Dr. Snell if there are any questions regarding this evaluation, or if you have any other patients with inflammatory bowel disease for whom you would like a consultation. You can reach Dr. Snell at  [phone] or by email at [email or web address]    Audie Snell MD  Department of Gastroenterology  Inflammatory Bowel Disease

## 2022-06-02 NOTE — PROGRESS NOTES
I spent 30 minutes on 5/31/22 reviewing Ramesh Ricci medical records prior to clinic visit on 6/2/22.

## 2022-06-02 NOTE — PATIENT INSTRUCTIONS
Increase mesalamine to 2000 mg twice a day  Start Uceris 9mg daily for a month then one every other day for 2 weeks  Stop iron supplements  Speak with Cat today regarding Stelara  Follow up in 3 months

## 2022-06-02 NOTE — PATIENT INSTRUCTIONS
Vaccinations:  - Shingles vaccine 2 doses 2-6 months apart  - Eligible for 2nd covid booster  - Tetanus booster recommended every 10 years       Ustekinumab (Stelara)    Class: Interleukin 12 and Interleukin 23 Blocker - Biologic product    Mechanism of Action: blocks the p40 protein subunit used by both the interleukin (IL)-12 and IL-23 cytokines. IL-12 and IL-23 are naturally occurring cytokines that are involved in inflammatory and immune responses and play a role in the inflammatory process for Inflammatory Bowel Disease (IBD).    Dosage/ Route/ Frequency: One intravenous (IV) infusion and then inject under the skin in the subcutaneous tissue every 8 weeks.  - Loading Dose: Initial 1 hour IV infusion based on your weight at an infusion clinic   - Maintenance Dose: 90 mg SC every 8 weeks that can be done at home (first injection dose is 8 weeks after initial infusion)    Side effects  Please notify us if you are treated with antibiotics or experience signs of infection (ie. fevers, chills, sores, etc) given we may need to hold your medication during this time.     Common side effects (?3% or 3 in 100 people): vomiting (with the first infusion), nasopharyngitis (sore throat, runny nose), injection site redness, vaginal infection, urinary tract infection     Please call us immediately if you develop any of the below problems:    Serious side effects:     Serious infections: This medication may increase your risk of developing viral, bacterial and/or fungal infections.     Allergic or Hypersensitivity reaction- hives (rash), difficulty breathing, chest pain/tightness, high or low blood pressure, swelling of the face and hands, fever or chills    Malignancies: This medication may increase your risk of skin cancer, yearly skin checks are recommended. Make sure you are using sun block and protective wear.    Posterior reversible encephalopathy syndrome (PRES) has been reported. Symptoms of PRES include confusion,  headache, imaging changes, seizure, and visual disturbances; most cases occur within a few days to several months after initiation of therapy but may occur ?1 year. Monitor patients closely; discontinue therapy immediately if symptoms occur and administer appropriate therapy.    Lung inflammation: post marketing rare side effect reported. Happens within the first three doses of the medication and it can improve once the medication is stopped. Symptoms include shortness of breath or cough that do not go away.     Labs/Monitoring:  Prior to starting this new agent, we will be checking labs to determine the safety of taking this medication including baseline blood counts, liver and kidney function tests, TB test and viral hepatitis testing.   Once started on the therapy we will monitor your blood count and your liver and kidney function tests as needed following evidence-based guidelines. TB test and viral hepatitis tests will be repeated every year. If you have any risk of exposure or travel to high-risk areas please notify us so we can do this testing sooner. If indicated your provider may also choose to check drug levels to monitor or optimize your response to the medication.   We recommend you do not start the home injection on a Sunday for lab purposes.     Storage recommendations:  Keep refrigerated in temperature between 36°F to 46°F (2°C to 8°C).  If necessary, can be in room temperature (86°F or 30°C) for max of 30 days in the original carton protected from light  If taken out of the fridge and left in room temperature do not put back in the fridge   Do NOT shake and do NOT freeze     Vaccine counseling:   Avoid live vaccines which include:  Intranasal Influenza LAIV4 (FluMist Quadrivalent)  Measles, Mumps, Rubella (MMR)  Rotavirus (RotaTeq, Rotarix)  Typhoid oral capsule (Vivotif)  Varicella (Varivax)  Smallpox (Vaccinia)  Yellow Fever (YF-Vax)  Adenovirus  Cholera (Vaxchora)    Avoid consumption of raw  seafood such as oysters/sushi.

## 2022-06-07 ENCOUNTER — PATIENT MESSAGE (OUTPATIENT)
Dept: GASTROENTEROLOGY | Facility: CLINIC | Age: 82
End: 2022-06-07
Payer: MEDICARE

## 2022-06-08 ENCOUNTER — PATIENT MESSAGE (OUTPATIENT)
Dept: PHARMACY | Facility: CLINIC | Age: 82
End: 2022-06-08
Payer: MEDICARE

## 2022-06-08 ENCOUNTER — SPECIALTY PHARMACY (OUTPATIENT)
Dept: PHARMACY | Facility: CLINIC | Age: 82
End: 2022-06-08
Payer: MEDICARE

## 2022-06-09 ENCOUNTER — PATIENT MESSAGE (OUTPATIENT)
Dept: GASTROENTEROLOGY | Facility: CLINIC | Age: 82
End: 2022-06-09
Payer: MEDICARE

## 2022-06-09 NOTE — TELEPHONE ENCOUNTER
Outgoing call to patient, spoke to patient's wife Mone, offered to start PAP process for patient's Smitha. Ms. Shore was concerned that they do no meet income requirements for PAP since they have been previously denied for Humira.     Mailing PAP to address on patient's file, I will continue to follow up.

## 2022-06-10 ENCOUNTER — TELEPHONE (OUTPATIENT)
Dept: GASTROENTEROLOGY | Facility: CLINIC | Age: 82
End: 2022-06-10
Payer: MEDICARE

## 2022-06-10 NOTE — TELEPHONE ENCOUNTER
----- Message from Opal Love PharmD sent at 6/9/2022  4:24 PM CDT -----  Hi Dr. Snell and Staff,    Smitha is approved by the patient's insurance with a high copay. We will be assisting the patient in applying to the  patient assistance program. I am faxing the application prescription to [phone] for your review and signature. Please fax signed paperwork back to my attention @ [phone]. If you would like the paperwork faxed to an alternate number please let me know.      Thank you,  Opal Love  [facility]  Ph: [phone]

## 2022-06-13 ENCOUNTER — PATIENT MESSAGE (OUTPATIENT)
Dept: GASTROENTEROLOGY | Facility: CLINIC | Age: 82
End: 2022-06-13
Payer: MEDICARE

## 2022-06-13 NOTE — TELEPHONE ENCOUNTER
Spoke to Allison ([employer]). Budesonide 9mg PA submitted  [id number]. Pt informed and verbalizes understanding.

## 2022-06-15 ENCOUNTER — PATIENT MESSAGE (OUTPATIENT)
Dept: GASTROENTEROLOGY | Facility: CLINIC | Age: 82
End: 2022-06-15
Payer: MEDICARE

## 2022-06-16 ENCOUNTER — TELEPHONE (OUTPATIENT)
Dept: GASTROENTEROLOGY | Facility: CLINIC | Age: 82
End: 2022-06-16
Payer: MEDICARE

## 2022-06-16 DIAGNOSIS — K51.019 ULCERATIVE PANCOLITIS WITH COMPLICATION: Primary | ICD-10-CM

## 2022-06-16 RX ORDER — PREDNISONE 10 MG/1
TABLET ORAL
Qty: 120 TABLET | Refills: 0 | Status: SHIPPED | OUTPATIENT
Start: 2022-06-16 | End: 2022-08-11

## 2022-06-16 NOTE — TELEPHONE ENCOUNTER
Notified patient's wife, Mone, that we are starting the prednisone again at 40mg.  Reviewed taper instructions.  She verbalized understanding to all.

## 2022-06-16 NOTE — TELEPHONE ENCOUNTER
Spoke w/ patient's wife re: PAP program and [facility] as central dispatch for the process. She reports that she has not received the PAP paperwork in the mail. verified patient's address and it's the same that we have on file.  patient's wife has prednisone on hand but I advised her that we need Dr. Snell to make the call re: steroid usage right now.     [facility] FA paperwork can take up to a full week to arrive. Will likely arrive Friday given usual turnaround time.

## 2022-06-16 NOTE — TELEPHONE ENCOUNTER
Mrs. Ricci called back to report that she has received the PAP paperwork. Advised her to please include copies of her 's MedD and Med A and B coverage cards. She will fill out highlighted info and include copies and mail back to us. She will call us back with any further questions or concerns.

## 2022-06-16 NOTE — TELEPHONE ENCOUNTER
"PRIMARY COMPLAINT: Constant diarrhea  SYMPTOM ONSET:  2 weeks ago  MEDS: Prescribed but waiting on approvals for Stelara, Pentasa, and Uceris  BMs:  10-12, liquid; pink tinged on tissue  PAIN:  Some cramping, relieved some with BM  N/V:  None  FEVER:  denies  RECENT ABX:  denies  SICK CONTACTS OR TRAVEL:  denies  LAST APPT/NEXT APPT:  06/02/2022  LAST DOSE/NEXT DOSE:  N/A  OTHER INFORMATION:  Wants to start prednisone again until everything else can be worked out; has appetite, but everything "runs through him.    Discussed with Dr. Snell and we will put him on 40mg prednisone with 10mg q 7day taper until 20mg and will hold there.    "

## 2022-06-17 NOTE — TELEPHONE ENCOUNTER
Outgoing call to patient, spoke to Mrs. Valenciajuliette, she stated she mailed off the application back to us today. I will continue to follow up.

## 2022-06-22 NOTE — TELEPHONE ENCOUNTER
Outgoing call to [facility], spoke to Noelle MOON Missing [id number] code to provide benefits verification, I provided code, 24-48 hours before determination is made. I will continue to follow up.

## 2022-06-24 NOTE — TELEPHONE ENCOUNTER
Outgoing call to [facility], malick DEL VALLE  Application is still pending, but based on income patient provided he may not qualify. It will still be on a case by case basis, I will continue to follow up.

## 2022-06-27 NOTE — TELEPHONE ENCOUNTER
[facility] by June [facility]  I will continue to follow up.

## 2022-06-29 ENCOUNTER — PATIENT MESSAGE (OUTPATIENT)
Dept: GASTROENTEROLOGY | Facility: CLINIC | Age: 82
End: 2022-06-29
Payer: MEDICARE

## 2022-06-29 NOTE — TELEPHONE ENCOUNTER
Outgoing call to [facility] , per Wendie FLAHERTY Still pending determination.

## 2022-06-30 NOTE — TELEPHONE ENCOUNTER
Staff messaging Cat at Dr. Snell's office to provide patient appeal form, so that the patient may sign it and they can fax back to [facility]. Awaiting Cat's response.

## 2022-06-30 NOTE — TELEPHONE ENCOUNTER
Denied with an appeal per Isa MOON, patient must sign medicare attestation and return to [facility], I will continue to follow up. Emailed attestation to [email or web address]. Mrs. Keiry marcus understanding.

## 2022-07-01 ENCOUNTER — INFUSION (OUTPATIENT)
Dept: INFECTIOUS DISEASES | Facility: HOSPITAL | Age: 82
End: 2022-07-01
Attending: INTERNAL MEDICINE
Payer: MEDICARE

## 2022-07-01 VITALS — BODY MASS INDEX: 21.71 KG/M2 | WEIGHT: 155.63 LBS

## 2022-07-01 DIAGNOSIS — K51.019 ULCERATIVE PANCOLITIS WITH COMPLICATION: Primary | ICD-10-CM

## 2022-07-01 PROCEDURE — 96365 THER/PROPH/DIAG IV INF INIT: CPT

## 2022-07-01 PROCEDURE — 25000003 PHARM REV CODE 250: Performed by: INTERNAL MEDICINE

## 2022-07-01 PROCEDURE — 63600175 PHARM REV CODE 636 W HCPCS: Mod: JG | Performed by: INTERNAL MEDICINE

## 2022-07-01 RX ORDER — METHYLPREDNISOLONE SOD SUCC 125 MG
40 VIAL (EA) INJECTION
OUTPATIENT
Start: 2022-07-01

## 2022-07-01 RX ORDER — DIPHENHYDRAMINE HYDROCHLORIDE 50 MG/ML
25 INJECTION INTRAMUSCULAR; INTRAVENOUS
OUTPATIENT
Start: 2022-07-01

## 2022-07-01 RX ORDER — SODIUM CHLORIDE 0.9 % (FLUSH) 0.9 %
10 SYRINGE (ML) INJECTION
Status: DISCONTINUED | OUTPATIENT
Start: 2022-07-01 | End: 2022-07-01 | Stop reason: HOSPADM

## 2022-07-01 RX ORDER — ACETAMINOPHEN 325 MG/1
650 TABLET ORAL
OUTPATIENT
Start: 2022-07-01

## 2022-07-01 RX ORDER — DIPHENHYDRAMINE HYDROCHLORIDE 50 MG/ML
25 INJECTION INTRAMUSCULAR; INTRAVENOUS
Status: ACTIVE | OUTPATIENT
Start: 2022-07-01 | End: 2022-07-01

## 2022-07-01 RX ORDER — EPINEPHRINE 1 MG/ML
0.3 INJECTION, SOLUTION, CONCENTRATE INTRAVENOUS
OUTPATIENT
Start: 2022-07-01

## 2022-07-01 RX ORDER — ACETAMINOPHEN 325 MG/1
650 TABLET ORAL
Status: ACTIVE | OUTPATIENT
Start: 2022-07-01 | End: 2022-07-01

## 2022-07-01 RX ORDER — HEPARIN 100 UNIT/ML
500 SYRINGE INTRAVENOUS
OUTPATIENT
Start: 2022-07-01

## 2022-07-01 RX ORDER — IPRATROPIUM BROMIDE AND ALBUTEROL SULFATE 2.5; .5 MG/3ML; MG/3ML
3 SOLUTION RESPIRATORY (INHALATION)
Status: ACTIVE | OUTPATIENT
Start: 2022-07-01 | End: 2022-07-01

## 2022-07-01 RX ORDER — EPINEPHRINE 0.3 MG/.3ML
0.3 INJECTION SUBCUTANEOUS
Status: ACTIVE | OUTPATIENT
Start: 2022-07-01 | End: 2022-07-01

## 2022-07-01 RX ORDER — SODIUM CHLORIDE 0.9 % (FLUSH) 0.9 %
10 SYRINGE (ML) INJECTION
Status: CANCELLED | OUTPATIENT
Start: 2022-07-01

## 2022-07-01 RX ORDER — IPRATROPIUM BROMIDE AND ALBUTEROL SULFATE 2.5; .5 MG/3ML; MG/3ML
3 SOLUTION RESPIRATORY (INHALATION)
OUTPATIENT
Start: 2022-07-01

## 2022-07-01 RX ADMIN — USTEKINUMAB 390 MG: 130 SOLUTION INTRAVENOUS at 11:07

## 2022-07-01 RX ADMIN — SODIUM CHLORIDE: 0.9 INJECTION, SOLUTION INTRAVENOUS at 11:07

## 2022-07-01 NOTE — TELEPHONE ENCOUNTER
Outgoing call to [facility], appeal has been received, per Noelle ALCOCER I will continue to follow up.

## 2022-07-01 NOTE — PROGRESS NOTES
"  Infusion medication (name/dose/frequency):  Stelara 390mg given;no pre-meds;   Today's weight:    Wt Readings from Last 1 Encounters:   07/01/22 70.6 kg (155 lb 10.3 oz)       Checklist prior to infusion:    Recent labs within the last 3 - 6 months ? Yes    Appointment with MD in past 3-6 mos? Yes    Documentation of safety questions prior to infusion:   RN TO NOTIFY MD IF "YES" ANSWERED TO ANY OF BELOW QUESTIONS PRIOR TO GIVING INFUSION:    Symptoms of infection (current or past 1 week)? (fever >100.4 F, URI, flu-like symptoms, cough, painful urination, warm/red/painful skin or skin ulcers/wounds, tooth pain): No    Antibiotics in past 2 weeks? No    Recent surgery with any complications?  No   If yes then has surgeon cleared patient prior to getting this infusion? N/A    Pregnant? N/A  If yes, is prescribing provider aware of this pregnancy?  N/A    NEW or WORSENING abdominal pain, diarrhea, nausea/vomiting? No    SOB, ankle/feet swelling, or sudden weight gain? No    Special Notes to provider:  None  Patient tolerated infusion well today, vital signs stable:  Yes              "

## 2022-07-01 NOTE — TELEPHONE ENCOUNTER
Received appeal signed, faxed to [email or web address] via main fax @11:54 am. I will continue to follow up until final determination is made.
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
## 2022-07-06 ENCOUNTER — PATIENT MESSAGE (OUTPATIENT)
Dept: GASTROENTEROLOGY | Facility: CLINIC | Age: 82
End: 2022-07-06
Payer: MEDICARE

## 2022-07-06 ENCOUNTER — TELEPHONE (OUTPATIENT)
Dept: GASTROENTEROLOGY | Facility: CLINIC | Age: 82
End: 2022-07-06
Payer: MEDICARE

## 2022-07-06 ENCOUNTER — HOSPITAL ENCOUNTER (OUTPATIENT)
Dept: RADIOLOGY | Facility: HOSPITAL | Age: 82
Discharge: HOME | End: 2022-07-06
Attending: INTERNAL MEDICINE
Payer: MEDICARE
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
## 2022-07-06 PROCEDURE — 73502 XR HIP WITH PELVIS WHEN PERFORMED, 2 OR 3 VIEWS LEFT: ICD-10-PCS | Mod: 26,LT,, | Performed by: RADIOLOGY

## 2022-07-06 PROCEDURE — 73502 X-RAY EXAM HIP UNI 2-3 VIEWS: CPT | Mod: 26,LT,, | Performed by: RADIOLOGY

## 2022-07-06 PROCEDURE — 73502 X-RAY EXAM HIP UNI 2-3 VIEWS: CPT | Mod: TC,FY,LT

## 2022-07-06 NOTE — TELEPHONE ENCOUNTER
Patient is approved for Stelara Patient Assistance Program as of 07/06/2022 through 12/31/2022 and will receive stelara  free of charge by processing pharmacy card provided below.  Bin:[id number]  Id:[id number]  Grp: [id number]    Infusion date:07/01/2022    Outgoing call to inform patient of PAP approval. LVM.      FOR DOCUMENTATION ONLY:    Source: [facility] Patient Assistance Program  Pap id-[id number]  Phone: [phone]  Fax: [phone]

## 2022-07-07 ENCOUNTER — TELEPHONE (OUTPATIENT)
Dept: PHARMACY | Facility: CLINIC | Age: 82
End: 2022-07-07
Payer: MEDICARE

## 2022-07-07 NOTE — TELEPHONE ENCOUNTER
Outgoing phone call to patient and spoke with wife, Ms. Shore to confirm that IV dose was received on 7/1 with SQ Stelara dose due on 8/26. Informed that [facility] will be dispensing medication and a pharmacist will be calling to do initial consult a little over a week before dose is due. No questions or concerns at this time.

## 2022-07-20 ENCOUNTER — PATIENT MESSAGE (OUTPATIENT)
Dept: INTERNAL MEDICINE | Facility: CLINIC | Age: 82
End: 2022-07-20
Payer: MEDICARE

## 2022-08-04 ENCOUNTER — TELEPHONE (OUTPATIENT)
Dept: GASTROENTEROLOGY | Facility: CLINIC | Age: 82
End: 2022-08-04
Payer: MEDICARE

## 2022-08-04 NOTE — TELEPHONE ENCOUNTER
"Spoke to pt wife (Mone). Confirmed does not need Stelara injection training. 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 Contact number given. Wife verbalizes understanding. No other concerns at this time.  "

## 2022-08-04 NOTE — TELEPHONE ENCOUNTER
----- Message from Cat Norman PharmD sent at 8/1/2022  3:05 PM CDT -----  Smitha REILLY is approved.   Pt got IV infusion on 7/1/22. Due for first SC injection on 8/26/22. Please call pt and schedule injection training appointment on that day and for pt to bring prefilled syringe with him.    Thank you,  Cat

## 2022-08-11 ENCOUNTER — OFFICE VISIT (OUTPATIENT)
Dept: INTERNAL MEDICINE | Facility: CLINIC | Age: 82
End: 2022-08-11
Attending: INTERNAL MEDICINE
Payer: MEDICARE

## 2022-08-11 ENCOUNTER — LAB VISIT (OUTPATIENT)
Dept: LAB | Facility: OTHER | Age: 82
End: 2022-08-11
Attending: INTERNAL MEDICINE
Payer: MEDICARE

## 2022-08-11 VITALS
DIASTOLIC BLOOD PRESSURE: 54 MMHG | HEART RATE: 59 BPM | BODY MASS INDEX: 21.56 KG/M2 | WEIGHT: 154 LBS | SYSTOLIC BLOOD PRESSURE: 112 MMHG | OXYGEN SATURATION: 98 % | HEIGHT: 71 IN

## 2022-08-11 DIAGNOSIS — I48.0 PAF (PAROXYSMAL ATRIAL FIBRILLATION): Chronic | ICD-10-CM

## 2022-08-11 DIAGNOSIS — Z12.5 SCREENING FOR PROSTATE CANCER: ICD-10-CM

## 2022-08-11 DIAGNOSIS — R51.9 NONINTRACTABLE HEADACHE, UNSPECIFIED CHRONICITY PATTERN, UNSPECIFIED HEADACHE TYPE: ICD-10-CM

## 2022-08-11 DIAGNOSIS — E03.9 HYPOTHYROIDISM, UNSPECIFIED TYPE: ICD-10-CM

## 2022-08-11 DIAGNOSIS — E78.9 DISORDER OF LIPID METABOLISM: ICD-10-CM

## 2022-08-11 DIAGNOSIS — D51.0 PERNICIOUS ANEMIA: ICD-10-CM

## 2022-08-11 DIAGNOSIS — R21 RASH: Primary | ICD-10-CM

## 2022-08-11 DIAGNOSIS — R51.9 INTRACTABLE HEADACHE, UNSPECIFIED CHRONICITY PATTERN, UNSPECIFIED HEADACHE TYPE: ICD-10-CM

## 2022-08-11 DIAGNOSIS — E55.9 VITAMIN D DEFICIENCY: ICD-10-CM

## 2022-08-11 DIAGNOSIS — U07.1 COVID-19: ICD-10-CM

## 2022-08-11 DIAGNOSIS — Z78.9 KNOWN MEDICAL PROBLEMS: ICD-10-CM

## 2022-08-11 DIAGNOSIS — R79.89 OTHER SPECIFIED ABNORMAL FINDINGS OF BLOOD CHEMISTRY: ICD-10-CM

## 2022-08-11 DIAGNOSIS — R53.83 FATIGUE, UNSPECIFIED TYPE: ICD-10-CM

## 2022-08-11 DIAGNOSIS — D50.8 OTHER IRON DEFICIENCY ANEMIA: ICD-10-CM

## 2022-08-11 DIAGNOSIS — K51.019 ULCERATIVE PANCOLITIS WITH COMPLICATION: Chronic | ICD-10-CM

## 2022-08-11 DIAGNOSIS — C61 PROSTATE CA: ICD-10-CM

## 2022-08-11 DIAGNOSIS — E03.9 ACQUIRED HYPOTHYROIDISM: Chronic | ICD-10-CM

## 2022-08-11 DIAGNOSIS — I10 ESSENTIAL HYPERTENSION: Chronic | ICD-10-CM

## 2022-08-11 LAB
25(OH)D3+25(OH)D2 SERPL-MCNC: 36 NG/ML (ref 30–96)
ALBUMIN SERPL BCP-MCNC: 3.5 G/DL (ref 3.5–5.2)
ALP SERPL-CCNC: 63 U/L (ref 55–135)
ALT SERPL W/O P-5'-P-CCNC: 7 U/L (ref 10–44)
ANION GAP SERPL CALC-SCNC: 11 MMOL/L (ref 8–16)
AST SERPL-CCNC: 12 U/L (ref 10–40)
BASOPHILS # BLD AUTO: 0.04 K/UL (ref 0–0.2)
BASOPHILS NFR BLD: 0.6 % (ref 0–1.9)
BILIRUB SERPL-MCNC: 0.3 MG/DL (ref 0.1–1)
BUN SERPL-MCNC: 18 MG/DL (ref 8–23)
CALCIUM SERPL-MCNC: 9.1 MG/DL (ref 8.7–10.5)
CHLORIDE SERPL-SCNC: 104 MMOL/L (ref 95–110)
CHOLEST SERPL-MCNC: 158 MG/DL (ref 120–199)
CHOLEST/HDLC SERPL: 2.8 {RATIO} (ref 2–5)
CK SERPL-CCNC: 50 U/L (ref 20–200)
CO2 SERPL-SCNC: 27 MMOL/L (ref 23–29)
COMPLEXED PSA SERPL-MCNC: 0.95 NG/ML (ref 0–4)
CREAT SERPL-MCNC: 1.3 MG/DL (ref 0.5–1.4)
CRP SERPL-MCNC: 1.7 MG/L (ref 0–8.2)
DIFFERENTIAL METHOD: ABNORMAL
EOSINOPHIL # BLD AUTO: 0.1 K/UL (ref 0–0.5)
EOSINOPHIL NFR BLD: 0.9 % (ref 0–8)
ERYTHROCYTE [DISTWIDTH] IN BLOOD BY AUTOMATED COUNT: 15.5 % (ref 11.5–14.5)
EST. GFR  (NO RACE VARIABLE): 54 ML/MIN/1.73 M^2
ESTIMATED AVG GLUCOSE: 111 MG/DL (ref 68–131)
GLUCOSE SERPL-MCNC: 87 MG/DL (ref 70–110)
HBA1C MFR BLD: 5.5 % (ref 4–5.6)
HCT VFR BLD AUTO: 37.7 % (ref 40–54)
HDLC SERPL-MCNC: 57 MG/DL (ref 40–75)
HDLC SERPL: 36.1 % (ref 20–50)
HGB BLD-MCNC: 11.2 G/DL (ref 14–18)
IMM GRANULOCYTES # BLD AUTO: 0.02 K/UL (ref 0–0.04)
IMM GRANULOCYTES NFR BLD AUTO: 0.3 % (ref 0–0.5)
IRON SERPL-MCNC: 29 UG/DL (ref 45–160)
LDLC SERPL CALC-MCNC: 54.6 MG/DL (ref 63–159)
LYMPHOCYTES # BLD AUTO: 1.4 K/UL (ref 1–4.8)
LYMPHOCYTES NFR BLD: 20.6 % (ref 18–48)
MCH RBC QN AUTO: 26.9 PG (ref 27–31)
MCHC RBC AUTO-ENTMCNC: 29.7 G/DL (ref 32–36)
MCV RBC AUTO: 90 FL (ref 82–98)
MONOCYTES # BLD AUTO: 0.9 K/UL (ref 0.3–1)
MONOCYTES NFR BLD: 14 % (ref 4–15)
NEUTROPHILS # BLD AUTO: 4.2 K/UL (ref 1.8–7.7)
NEUTROPHILS NFR BLD: 63.6 % (ref 38–73)
NONHDLC SERPL-MCNC: 101 MG/DL
NRBC BLD-RTO: 0 /100 WBC
PLATELET # BLD AUTO: 246 K/UL (ref 150–450)
PMV BLD AUTO: 10.2 FL (ref 9.2–12.9)
POTASSIUM SERPL-SCNC: 4.7 MMOL/L (ref 3.5–5.1)
PROT SERPL-MCNC: 6.7 G/DL (ref 6–8.4)
RBC # BLD AUTO: 4.17 M/UL (ref 4.6–6.2)
SATURATED IRON: 8 % (ref 20–50)
SODIUM SERPL-SCNC: 142 MMOL/L (ref 136–145)
T4 FREE SERPL-MCNC: 1.25 NG/DL (ref 0.71–1.51)
TOTAL IRON BINDING CAPACITY: 369 UG/DL (ref 250–450)
TRANSFERRIN SERPL-MCNC: 249 MG/DL (ref 200–375)
TRIGL SERPL-MCNC: 232 MG/DL (ref 30–150)
TSH SERPL DL<=0.005 MIU/L-ACNC: 1.45 UIU/ML (ref 0.4–4)
VIT B12 SERPL-MCNC: 582 PG/ML (ref 210–950)
WBC # BLD AUTO: 6.59 K/UL (ref 3.9–12.7)

## 2022-08-11 PROCEDURE — 82607 VITAMIN B-12: CPT | Performed by: INTERNAL MEDICINE

## 2022-08-11 PROCEDURE — 83036 HEMOGLOBIN GLYCOSYLATED A1C: CPT | Performed by: INTERNAL MEDICINE

## 2022-08-11 PROCEDURE — 3078F PR MOST RECENT DIASTOLIC BLOOD PRESSURE < 80 MM HG: ICD-10-PCS | Mod: CPTII,S$GLB,, | Performed by: INTERNAL MEDICINE

## 2022-08-11 PROCEDURE — 82306 VITAMIN D 25 HYDROXY: CPT | Performed by: INTERNAL MEDICINE

## 2022-08-11 PROCEDURE — 1159F PR MEDICATION LIST DOCUMENTED IN MEDICAL RECORD: ICD-10-PCS | Mod: CPTII,S$GLB,, | Performed by: INTERNAL MEDICINE

## 2022-08-11 PROCEDURE — 86140 C-REACTIVE PROTEIN: CPT | Performed by: INTERNAL MEDICINE

## 2022-08-11 PROCEDURE — 82550 ASSAY OF CK (CPK): CPT | Performed by: INTERNAL MEDICINE

## 2022-08-11 PROCEDURE — 1160F PR REVIEW ALL MEDS BY PRESCRIBER/CLIN PHARMACIST DOCUMENTED: ICD-10-PCS | Mod: CPTII,S$GLB,, | Performed by: INTERNAL MEDICINE

## 2022-08-11 PROCEDURE — 84466 ASSAY OF TRANSFERRIN: CPT | Performed by: INTERNAL MEDICINE

## 2022-08-11 PROCEDURE — 99215 OFFICE O/P EST HI 40 MIN: CPT | Mod: S$GLB,,, | Performed by: INTERNAL MEDICINE

## 2022-08-11 PROCEDURE — 84443 ASSAY THYROID STIM HORMONE: CPT | Performed by: INTERNAL MEDICINE

## 2022-08-11 PROCEDURE — 3074F PR MOST RECENT SYSTOLIC BLOOD PRESSURE < 130 MM HG: ICD-10-PCS | Mod: CPTII,S$GLB,, | Performed by: INTERNAL MEDICINE

## 2022-08-11 PROCEDURE — 99215 PR OFFICE/OUTPT VISIT, EST, LEVL V, 40-54 MIN: ICD-10-PCS | Mod: S$GLB,,, | Performed by: INTERNAL MEDICINE

## 2022-08-11 PROCEDURE — 3078F DIAST BP <80 MM HG: CPT | Mod: CPTII,S$GLB,, | Performed by: INTERNAL MEDICINE

## 2022-08-11 PROCEDURE — 84439 ASSAY OF FREE THYROXINE: CPT | Performed by: INTERNAL MEDICINE

## 2022-08-11 PROCEDURE — 36415 COLL VENOUS BLD VENIPUNCTURE: CPT | Performed by: INTERNAL MEDICINE

## 2022-08-11 PROCEDURE — 1159F MED LIST DOCD IN RCRD: CPT | Mod: CPTII,S$GLB,, | Performed by: INTERNAL MEDICINE

## 2022-08-11 PROCEDURE — 80061 LIPID PANEL: CPT | Performed by: INTERNAL MEDICINE

## 2022-08-11 PROCEDURE — 80053 COMPREHEN METABOLIC PANEL: CPT | Performed by: INTERNAL MEDICINE

## 2022-08-11 PROCEDURE — 3074F SYST BP LT 130 MM HG: CPT | Mod: CPTII,S$GLB,, | Performed by: INTERNAL MEDICINE

## 2022-08-11 PROCEDURE — 1160F RVW MEDS BY RX/DR IN RCRD: CPT | Mod: CPTII,S$GLB,, | Performed by: INTERNAL MEDICINE

## 2022-08-11 PROCEDURE — 84153 ASSAY OF PSA TOTAL: CPT | Performed by: INTERNAL MEDICINE

## 2022-08-11 PROCEDURE — 85025 COMPLETE CBC W/AUTO DIFF WBC: CPT | Performed by: INTERNAL MEDICINE

## 2022-08-11 RX ORDER — TRIAMCINOLONE ACETONIDE 1 MG/G
CREAM TOPICAL 2 TIMES DAILY
Qty: 453.6 G | Refills: 1 | Status: SHIPPED | OUTPATIENT
Start: 2022-08-11 | End: 2023-01-03

## 2022-08-11 RX ORDER — TRIAMCINOLONE ACETONIDE 1 MG/G
CREAM TOPICAL 2 TIMES DAILY
COMMUNITY
Start: 2022-02-17 | End: 2022-08-11 | Stop reason: SDUPTHER

## 2022-08-11 NOTE — PROGRESS NOTES
Subjective:       Patient ID: Ramesh Ricci is a 82 y.o. male.    Chief Complaint: Follow-up, Headache, and Fatigue (Feels drowsy all the time)      He tested positive for COVID on July 20, 2022.  He took packs lobe id and feels much better now.    He does have significant fatigue.  He goes to bed at 5:00 p.m. and gets out of bed at 7:30 a.m..  He is tired during the day and will fall asleep if he sits down.      He has had chronic bilateral frontal headaches.  He takes Tylenol several times during the day without much relief.  He denies any nausea.      He is going to start Stelara for his Ulcerative colitis at the end of this month    Follow-up  Associated symptoms include fatigue and headaches.   Headache   This is a chronic problem. The current episode started more than 1 year ago. The problem occurs daily. The problem has been unchanged.   Fatigue  This is a new problem. The current episode started 1 to 4 weeks ago. Associated symptoms include fatigue and headaches.   Hypertension  This is a chronic problem. The current episode started more than 1 year ago. The problem is controlled. Associated symptoms include headaches.     Review of Systems   Constitutional: Positive for fatigue.   Respiratory: Negative.    Cardiovascular: Negative.    Neurological: Positive for headaches.         Objective:      Physical Exam  Constitutional:       Appearance: He is well-developed.   HENT:      Head: Normocephalic and atraumatic.   Eyes:      Pupils: Pupils are equal, round, and reactive to light.   Cardiovascular:      Rate and Rhythm: Normal rate and regular rhythm.      Heart sounds: Murmur heard.    Systolic murmur is present with a grade of 3/6.     Comments: @RUSB  Pulmonary:      Effort: Pulmonary effort is normal.   Neurological:      Mental Status: He is alert.         Assessment:       Problem List Items Addressed This Visit        10     UC (ulcerative colitis) (Chronic)    Acquired hypothyroidism (Chronic)     Essential hypertension (Chronic)    PAF (paroxysmal atrial fibrillation) (Chronic)    Prostate CA       Unprioritized    Rash - Primary    Relevant Medications    triamcinolone acetonide 0.1% (KENALOG) 0.1 % cream    Known medical problems      Other Visit Diagnoses     Intractable headache, unspecified chronicity pattern, unspecified headache type        Relevant Orders    MRI Brain Without Contrast    Fatigue, unspecified type              Plan:       Per orders and D/C instructions.  Continue diet and/or meds for  paroxysmal atrial fibrillation, history of prostate cancer, hypothyroidism, and HTN, which are stable.      follow-up with UC clinic for UC.    Check routine labs to evaluate fatigue.    MRI of the brain to evaluate chronic headaches.  He will try taking a half of a tramadol in the morning with breakfast.    Between 40-54 minutes of total time for evaluation and management services were spent on the patient today.  The medical problems and treatment options were discussed, and all questions were answered.

## 2022-08-11 NOTE — PATIENT INSTRUCTIONS
Try taking a half tramadol with breakfast to see if your headache goes away.    If they do not call you and schedule within 48 hours, then please call [facility] radiology at [phone] to schedule your radiology test(s).  MRI of brain

## 2022-08-18 ENCOUNTER — SPECIALTY PHARMACY (OUTPATIENT)
Dept: PHARMACY | Facility: CLINIC | Age: 82
End: 2022-08-18
Payer: MEDICARE

## 2022-08-18 DIAGNOSIS — K51.019 ULCERATIVE PANCOLITIS WITH COMPLICATION: Primary | ICD-10-CM

## 2022-08-18 NOTE — TELEPHONE ENCOUNTER
Specialty Pharmacy - Initial Clinical Assessment    Specialty Medication Orders Linked to Encounter    Flowsheet Row Most Recent Value   Medication #1 ustekinumab (STELARA) 90 mg/mL Syrg syringe (Order#[id number], Rx#[id number])        Patient Diagnosis   K51.90 - UC (ulcerative colitis)    Lb Ricci is a 82 y.o. male, who is followed by the specialty pharmacy service for management and education.    Recent Encounters     Date Type Provider Description    08/18/2022 Specialty Pharmacy Ant Angela, Fredo Initial Clinical Assessment    06/08/2022 Specialty Pharmacy Radha Cota, Fredo Referral Authorization        Clinical call attempts since last clinical assessment   No call attempts found.     Current Outpatient Medications   Medication Sig    acetaminophen (TYLENOL) 500 MG tablet Take 2 tablets (1,000 mg total) by mouth every 6 (six) hours as needed.    ascorbic acid, vitamin C, (VITAMIN C) 500 MG tablet Take 500 mg by mouth once daily.    aspirin (ECOTRIN) 81 MG EC tablet Take 81 mg by mouth once daily.    calcium carbonate (TUMS) 200 mg calcium (500 mg) chewable tablet Take 1 tablet by mouth once daily.    clobetasoL (TEMOVATE) 0.05 % cream Apply topically 2 (two) times daily.    ELIQUIS 2.5 mg Tab Take 2.5 mg by mouth 2 (two) times daily.    EUTHYROX 125 mcg tablet TAKE 1 TABLET BY MOUTH ONCE DAILY IN THE MORNING    mesalamine (PENTASA) 500 MG CR capsule Take 4 capsules (2,000 mg total) by mouth 2 (two) times daily.    metoprolol succinate (TOPROL-XL) 25 MG 24 hr tablet Take 1 tablet (25 mg total) by mouth once daily.    pantoprazole (PROTONIX) 40 MG tablet Take 1 tablet (40 mg total) by mouth 2 (two) times daily.    pravastatin (PRAVACHOL) 20 MG tablet Take 1 tablet (20 mg total) by mouth every evening.    tamsulosin (FLOMAX) 0.4 mg Cp24 Take 0.4 mg by mouth once daily.     triamcinolone acetonide 0.1% (KENALOG) 0.1 % cream Apply topically 2 (two) times a day.     UNABLE TO FIND Take 2 tablets by mouth daily as needed. Charcoal tabs    ustekinumab (STELARA) 90 mg/mL Syrg syringe Inject 1 mL (90 mg total) into the skin every 8 weeks.    venlafaxine (EFFEXOR) 37.5 MG Tab Take 1 tablet (37.5 mg total) by mouth 2 (two) times daily.    vitC/E/Zn/copper/lutein/zeaxan (ICAPS AREDS2 ORAL) Take 1 capsule by mouth once daily.   Last reviewed on 8/18/2022 11:28 AM by Ant Angela, PharmD    Review of patient's allergies indicates:   Allergen Reactions    Benazepril Other (See Comments)     Cough   Last reviewed on  8/18/2022 11:27 AM by Ant Angela    Drug Interactions    Drug interactions evaluated: yes  Clinically relevant drug interactions identified: no  Provided the patient with educational material regarding drug interactions: not applicable         Adverse Effects    *All other systems reviewed and are negative       Assessment Questions - Documented Responses    Flowsheet Row Most Recent Value   Assessment    Medication Reconciliation completed for patient Yes   During the past 4 weeks, has patient missed any activities due to condition or medication? No   During the past 4 weeks, did patient have any of the following urgent care visits? None   Goals of Therapy Status Discussed (new start)   Status of the patients ability to self-administer: Caregiver to administer   All education points have been covered with patient? Yes, supplemental printed education provided   Welcome packet contents reviewed and discussed with patient? Yes   Assesment completed? Yes   Plan Therapy being initiated   Do you need to open a clinical intervention (i-vent)? No   Do you want to schedule first shipment? Yes   Medication #1 Assessment Info    Patient status New medication, New to [facility]   Is this medication appropriate for the patient? Yes   Is this medication effective? Not yet started        Refill Questions - Documented Responses    Flowsheet Row Most Recent Value   Patient Availability  "and HIPAA Verification    Does patient want to proceed with activity? Yes   HIPAA/medical authority confirmed? Yes   Relationship to patient of person spoken to? Spouse/Significant Other   Refill Screening Questions    When does the patient need to receive the medication? 08/26/22   Refill Delivery Questions    How will the patient receive the medication? Delivery Priscilla   When does the patient need to receive the medication? 08/26/22   Shipping Address Home   Address in [facility] confirmed and updated if neccessary? Yes   Expected Copay ($) 0   Is the patient able to afford the medication copay? Yes   Payment Method zero copay   Days supply of Refill 56   Supplies needed? Alcohol Swabs   Refill activity completed? 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), BPH (benign prostatic hyperplasia) (11/16/2012), Cancer (2000 something), DDD (degenerative disc disease), cervical (07/05/2017), GERD (gastroesophageal reflux disease) (11/16/2012), Glaucoma (11/16/2012), HTN (hypertension) (11/16/2012), Hypothyroid (11/16/2012), Internal carotid artery stenosis (11/16/2012), Joint pain, Left ventricular diastolic dysfunction, NYHA class 1 (04/16/2015), Low serum testosterone level (11/16/2012), Normal cardiac stress test (11/16/2012), Osteopenia (11/16/2012), Shingles (11/16/2012), Skin disease (2020), Stroke (2017), TIA (transient ischemic attack) (11/16/2012), and UC (ulcerative colitis) (11/16/2012).    Tried/failed medications: Humira, Colazal, Pentasa, azathioprine    BP Readings from Last 4 Encounters:   08/11/22 (!) 112/54   06/02/22 114/64   05/22/22 (!) 143/78   04/07/22 (!) 154/81     Ht Readings from Last 4 Encounters:   08/11/22 5' 11" (1.803 m)   05/22/22 5' 11" (1.803 m)   03/23/22 5' 11" (1.803 m)   03/21/22 " "5' 11" (1.803 m)     Wt Readings from Last 4 Encounters:   08/11/22 69.9 kg (154 lb)   07/01/22 70.6 kg (155 lb 10.3 oz)   06/02/22 69.4 kg (153 lb)   05/22/22 74.8 kg (165 lb)     Recent Labs   Lab Result Units 08/11/22  1142   Creatinine mg/dL 1.3   ALT U/L 7 L   AST U/L 12     The goals of prescribed drug therapy management include:  · Supporting patient to meet the prescriber's medical treatment objectives  · Improving or maintaining quality of life  · Maintaining optimal therapy adherence  · Minimizing and managing side effects      Goals of Therapy Status: Discussed (new start)    Assessment/Plan  Patient plans to start therapy on 08/26/22      Indication, dosage, appropriateness, effectiveness, safety and convenience of his specialty medication(s) were reviewed today.     Patient Education   Patient received education on the following:    Expectations and possible outcomes of therapy   Proper use, timely administration, and missed dose management   Duration of therapy   Side effects, including prevention, minimization, and management   Contraindications and safety precautions   New or changed medications, including prescribe and over the counter medications and supplements   Reviews recommended vaccinations, as appropriate   Storage, safe handling, and disposal    Initial consult for Stelara was completed on the phone with patient's spouse. Reviewed proper injection technique, storage and disposal. Reviewed common and severe side effects.  Emphasized upon infection precautions and counseled to hold Stelara in the setting of an active infection. Also advised to avoid live vaccines. Reviewed patient's allergies and medications. No clinically significant DDIs were identified. Reviewed patient's labs : TB and Hep B negative. Therapy appropriate to initiate. Reviewed [facility]'s services and refill process. Patient's wife verbalized understanding, had no further questions, and was encouraged to reach out to [facility] " should any questions arise.       Tasks added this encounter   10/14/2022 - Refill Call (Auto Added)  5/18/2023 - Clinical - Follow Up Assesement (Annual)   Tasks due within next 3 months   No tasks due.     Ant Angela, PharmD  [facility]  [address]  [facility] [address]  Phone: [phone]  Fax: [phone]

## 2022-08-23 ENCOUNTER — PATIENT MESSAGE (OUTPATIENT)
Dept: INTERNAL MEDICINE | Facility: CLINIC | Age: 82
End: 2022-08-23
Payer: MEDICARE

## 2022-08-26 ENCOUNTER — HOSPITAL ENCOUNTER (OUTPATIENT)
Dept: RADIOLOGY | Facility: HOSPITAL | Age: 82
Discharge: HOME | End: 2022-08-26
Attending: INTERNAL MEDICINE
Payer: MEDICARE

## 2022-08-26 PROCEDURE — 70551 MRI BRAIN WITHOUT CONTRAST: ICD-10-PCS | Mod: 26,,, | Performed by: RADIOLOGY

## 2022-08-26 PROCEDURE — 70551 MRI BRAIN STEM W/O DYE: CPT | Mod: TC,PO

## 2022-08-26 PROCEDURE — 70551 MRI BRAIN STEM W/O DYE: CPT | Mod: 26,,, | Performed by: RADIOLOGY

## 2022-09-12 ENCOUNTER — OFFICE VISIT (OUTPATIENT)
Dept: GASTROENTEROLOGY | Facility: CLINIC | Age: 82
End: 2022-09-12
Payer: MEDICARE

## 2022-09-12 VITALS
TEMPERATURE: 98 F | BODY MASS INDEX: 21.83 KG/M2 | WEIGHT: 156.5 LBS | OXYGEN SATURATION: 99 % | DIASTOLIC BLOOD PRESSURE: 64 MMHG | SYSTOLIC BLOOD PRESSURE: 123 MMHG | HEART RATE: 62 BPM

## 2022-09-12 DIAGNOSIS — K51.019 ULCERATIVE PANCOLITIS WITH COMPLICATION: Primary | ICD-10-CM

## 2022-09-12 DIAGNOSIS — D50.0 IRON DEFICIENCY ANEMIA DUE TO CHRONIC BLOOD LOSS: ICD-10-CM

## 2022-09-12 DIAGNOSIS — R21 RASH: ICD-10-CM

## 2022-09-12 PROCEDURE — 3074F SYST BP LT 130 MM HG: CPT | Mod: CPTII,S$GLB,, | Performed by: INTERNAL MEDICINE

## 2022-09-12 PROCEDURE — 1159F PR MEDICATION LIST DOCUMENTED IN MEDICAL RECORD: ICD-10-PCS | Mod: CPTII,S$GLB,, | Performed by: INTERNAL MEDICINE

## 2022-09-12 PROCEDURE — 99214 OFFICE O/P EST MOD 30 MIN: CPT | Mod: S$GLB,,, | Performed by: INTERNAL MEDICINE

## 2022-09-12 PROCEDURE — 3078F PR MOST RECENT DIASTOLIC BLOOD PRESSURE < 80 MM HG: ICD-10-PCS | Mod: CPTII,S$GLB,, | Performed by: INTERNAL MEDICINE

## 2022-09-12 PROCEDURE — 1126F AMNT PAIN NOTED NONE PRSNT: CPT | Mod: CPTII,S$GLB,, | Performed by: INTERNAL MEDICINE

## 2022-09-12 PROCEDURE — 1160F PR REVIEW ALL MEDS BY PRESCRIBER/CLIN PHARMACIST DOCUMENTED: ICD-10-PCS | Mod: CPTII,S$GLB,, | Performed by: INTERNAL MEDICINE

## 2022-09-12 PROCEDURE — 1159F MED LIST DOCD IN RCRD: CPT | Mod: CPTII,S$GLB,, | Performed by: INTERNAL MEDICINE

## 2022-09-12 PROCEDURE — 3074F PR MOST RECENT SYSTOLIC BLOOD PRESSURE < 130 MM HG: ICD-10-PCS | Mod: CPTII,S$GLB,, | Performed by: INTERNAL MEDICINE

## 2022-09-12 PROCEDURE — 1101F PT FALLS ASSESS-DOCD LE1/YR: CPT | Mod: CPTII,S$GLB,, | Performed by: INTERNAL MEDICINE

## 2022-09-12 PROCEDURE — 3078F DIAST BP <80 MM HG: CPT | Mod: CPTII,S$GLB,, | Performed by: INTERNAL MEDICINE

## 2022-09-12 PROCEDURE — 3288F PR FALLS RISK ASSESSMENT DOCUMENTED: ICD-10-PCS | Mod: CPTII,S$GLB,, | Performed by: INTERNAL MEDICINE

## 2022-09-12 PROCEDURE — 3288F FALL RISK ASSESSMENT DOCD: CPT | Mod: CPTII,S$GLB,, | Performed by: INTERNAL MEDICINE

## 2022-09-12 PROCEDURE — 1101F PR PT FALLS ASSESS DOC 0-1 FALLS W/OUT INJ PAST YR: ICD-10-PCS | Mod: CPTII,S$GLB,, | Performed by: INTERNAL MEDICINE

## 2022-09-12 PROCEDURE — 99214 PR OFFICE/OUTPT VISIT, EST, LEVL IV, 30-39 MIN: ICD-10-PCS | Mod: S$GLB,,, | Performed by: INTERNAL MEDICINE

## 2022-09-12 PROCEDURE — 1126F PR PAIN SEVERITY QUANTIFIED, NO PAIN PRESENT: ICD-10-PCS | Mod: CPTII,S$GLB,, | Performed by: INTERNAL MEDICINE

## 2022-09-12 PROCEDURE — 1160F RVW MEDS BY RX/DR IN RCRD: CPT | Mod: CPTII,S$GLB,, | Performed by: INTERNAL MEDICINE

## 2022-09-12 RX ORDER — HYDROXYZINE HYDROCHLORIDE 25 MG/1
TABLET, FILM COATED ORAL
COMMUNITY
Start: 2022-08-24 | End: 2023-08-15

## 2022-09-12 NOTE — PROGRESS NOTES
[facility]          Inflammatory Bowel Disease Follow Up Note         TODAY'S VISIT DATE:  9/12/2022    Reason for Consult:    Chief Complaint   Patient presents with    Ulcerative Colitis       PCP: EFREM Muniz      Referring MD:   No ref. provider found    History of Present Illness:  Ramesh Ricci who is a 82 y.o. male is being seen today at the [facility] on 09/12/2022 for inflammatory bowel disease- ulcerative colitis.  From a gastrointestinal standpoint he is doing quite well.  He is having 1 formed bowel movement daily with no blood in the stools and no abdominal cramping.  He started Stelara on July 1 and received his 1st injection on August 26.  He was actually doing better even before starting the Stelara with a short course of prednisone and with increasing the dose of Pentasa.  His primary complaint today is that a rash that he has been having for several years has been getting worse over the last few weeks.  This was slightly better with the prednisone.  He reports that this rash started when he was on Humira.  The Humira was stopped because of the rash and the rash still never resolved.  It had fluctuates in intensity over the years and has not really changed with other medications.    IBD History:  He was diagnosed with ulcerative colitis in the 1960s.  Per notes he has a history of pan colitis.  Multiple colonoscopy reports are available and these demonstrate patchy colitis throughout the colon.  On one exam biopsy showed some acute ileitis but no chronic changes in the ileum.  Biopsies have been consistent with chronic ulcerative colitis.  It is difficult to determine what he has been treated with in the past but it appears he was at least on Colazal at 1 point (dose was 2.25 g once daily).  He was also on azathioprine at 1 point and treated with Humira.  In early 2021 he developed a rash that was concerning for possible  psoriasis.  Humira was stopped at that point.  Pentasa was started after the Humira was stopped.  After stopping the Humira his skin lesions did not resolved and it is currently not thought that his skin issues were related to psoriasis.  In March 2022 he was admitted to the hospital with acute onset diarrhea and abdominal pain.  He underwent an upper endoscopy and colonoscopy.  The upper endoscopy was only significant for a 2 cm hiatal hernia.  The colonoscopy revealed no evidence of active ulcerative colitis.  The preparation of the colon was poor.  Biopsies demonstrated evidence of active colitis throughout the entire colon.  He had a markedly elevated stool calprotectin level and negative stool studies for infection.      IBD Details:  Dx Date:  1960s  Disease type/distribution:  Ulcerative colitis/pancolitis  Current Treatment:  Stelara 90 mg every 8 weeks/ Pentasa 2000 mg b.i.d.  Start Date:  July 1, 2022/Early 2021 Response:  Symptom remission  Optimized:  No  Adverse reactions:  None  Prior surgeries:  None  CRP Elevation: Y  calprotectin:  Markedly elevated with active disease  Disease Complications:  None  Extraintestinal manifestations:  None  Prior treatments:   Steroids:  Good response in the past  5ASA:  Incomplete response to Colazal, inadequate response to low-dose Pentasa  IMM:  Previously on azathioprine, unclear response  TNF Inh:  Humira-clinical remission, stopped due to skin lesions that do not appear to be related to Humira   Anti-Integrin:  None   IL 12/23:  Currently on Stelara  KARLO Inh:  None  S1P Modulator: None    Previous Clinical Trials:  None    Last Colonoscopy:  March 2022-poor prep, no significant endoscopic disease activity but significant active colitis on biopsies    Other Endoscopies:  March 2022-unrevealing    Imaging:   MRE:  None   CT:  March 2022-inflammatory changes around the colon, otherwise unrevealing   Other:  None    Pertinent Labs:  Lab Results   Component Value Date     SEDRATE 77 (H) 03/23/2022    CRP 1.7 08/11/2022     No results found for: TTGIGA, IGA  Lab Results   Component Value Date    TSH 1.447 08/11/2022    FREET4 1.25 08/11/2022     Lab Results   Component Value Date    YUYAASRE52IU 36 08/11/2022    YTUNZXRV89 582 08/11/2022     Lab Results   Component Value Date    HEPBSAG Negative 03/24/2022    HEPBCAB Negative 03/24/2022     No results found for: VAP23XZGY  Lab Results   Component Value Date    NIL 0.55944 03/24/2022    MITOGENNIL 3.623 03/24/2022     Lab Results   Component Value Date    TPTMINTERP SEE BELOW 03/24/2022     Lab Results   Component Value Date    STOOLCULTURE  03/23/2022     No Salmonella,Shigella,Vibrio,Campylobacter,Yersinia isolated.    QCXHFPIJKY4F Negative 03/23/2022    OZHEHIILYX8J Negative 03/23/2022    CDIFFICILEAN Negative 03/23/2022    CDIFFTOX Negative 03/23/2022     Lab Results   Component Value Date    CALPROTECTIN 1,200.5 (H) 03/24/2022       Therapeutic Drug Monitoring Labs:  No results found for: PROMETH  No results found for: ANSADAINIT, INFLIXIMAB, INFLIXINTERP    Vaccinations:  Lab Results   Component Value Date    HEPBSAB Negative 03/24/2022     No results found for: HEPAIGG  No results found for: VARICELLAZOS, VARICELLAINT  Immunization History   Administered Date(s) Administered    COVID-19, MRNA, LN-S, PF (Pfizer) (Purple Cap) 01/12/2021, 02/02/2021, 12/02/2021    Influenza (FLUAD) - Quadrivalent - Adjuvanted - PF *Preferred* (65+) 10/09/2020, 10/20/2021    Influenza - High Dose - PF (65 years and older) 10/06/2014, 09/29/2016, 11/09/2017, 10/08/2018, 09/17/2019    Pneumococcal Conjugate - 13 Valent 09/29/2016    Pneumococcal Polysaccharide - 23 Valent 01/05/2011    Zoster 02/02/2012         Review of Systems  Review of Systems   Constitutional:  Negative for chills, fever and weight loss.   HENT:  Negative for sore throat.    Eyes:  Negative for pain, discharge and redness.   Respiratory:  Negative for cough, shortness of  breath and wheezing.    Cardiovascular:  Negative for chest pain and leg swelling.   Gastrointestinal:  Negative for abdominal pain, blood in stool, constipation, diarrhea, heartburn, melena, nausea and vomiting.   Genitourinary:  Negative for dysuria and frequency.   Musculoskeletal:  Negative for back pain, joint pain and myalgias.   Skin:  Positive for rash.   Neurological:  Negative for focal weakness and seizures.   Endo/Heme/Allergies:  Does not bruise/bleed easily.   Psychiatric/Behavioral:  Negative for depression. The patient is nervous/anxious.      Medical History:   Past Medical History:   Diagnosis Date    Abnormal echocardiogram 11/16/2012    Anemia     Anticoagulant long-term use     Anxiety     Atrophic kidney 11/16/2012    BPH (benign prostatic hyperplasia) 11/16/2012    Cancer 2000 something    Had radiation    DDD (degenerative disc disease), cervical 07/05/2017    x-ray 7/17 - Severe    GERD (gastroesophageal reflux disease) 11/16/2012    Glaucoma 11/16/2012    HTN (hypertension) 11/16/2012    Hypothyroid 11/16/2012    Internal carotid artery stenosis 11/16/2012    Joint pain     Left ventricular diastolic dysfunction, NYHA class 1 04/16/2015    4/15    Low serum testosterone level 11/16/2012    Normal cardiac stress test 11/16/2012    Osteopenia 11/16/2012    Shingles 11/16/2012    Skin disease 2020    Seems to have started after Covid vaccine    Stroke 2017    tia   no residual    TIA (transient ischemic attack) 11/16/2012    UC (ulcerative colitis) 11/16/2012       Surgical History:  Past Surgical History:   Procedure Laterality Date    ADENOIDECTOMY      COLONOSCOPY N/A 03/25/2022    Procedure: COLONOSCOPY;  Surgeon: Ashley Nguyen MD;  Location: [facility];  Service: Endoscopy;  Laterality: N/A;    ESOPHAGOGASTRODUODENOSCOPY N/A 03/25/2022    Procedure: EGD (ESOPHAGOGASTRODUODENOSCOPY);  Surgeon: Ashley Nguyen MD;  Location: [facility];  Service: Endoscopy;  Laterality: N/A;  added on  per Dr. Nguyen    EYE SURGERY Right 2020    cataract    HERNIA REPAIR      LEFT HEART CATHETERIZATION Right 10/29/2021    Procedure: CATHETERIZATION, HEART, LEFT AND RIGHT  - LV DARNELL POSSIBLE;  Surgeon: Beau Conklin MD;  Location: [facility] CATH LAB;  Service: Cardiology;  Laterality: Right;    RIGHT HEART CATHETERIZATION Right 10/29/2021    Procedure: INSERTION, CATHETER, RIGHT HEART;  Surgeon: Beau Conklin MD;  Location: [facility] CATH LAB;  Service: Cardiology;  Laterality: Right;    SKIN BIOPSY  2020    TONSILLECTOMY         Family History:   Family History   Problem Relation Age of Onset    Hypertension Mother     Alzheimer's disease Mother     Diabetes Brother     Hypertension Brother     Cancer Brother     Heart disease Brother 56        MI    Heart attack Father        Social History:   Social History     Tobacco Use    Smoking status: Former     Packs/day: 1.00     Years: 25.00     Pack years: 25.00     Types: Cigarettes     Quit date: 1972     Years since quittin.7    Smokeless tobacco: Never   Substance Use Topics    Alcohol use: Yes     Alcohol/week: 3.0 standard drinks     Types: 2 Glasses of wine, 1 Cans of beer per week     Comment: Occasional    Drug use: Never       Allergies: Reviewed    Home Medications:   Medication List with Changes/Refills   Current Medications    ACETAMINOPHEN (TYLENOL) 500 MG TABLET    Take 2 tablets (1,000 mg total) by mouth every 6 (six) hours as needed.    ASCORBIC ACID, VITAMIN C, (VITAMIN C) 500 MG TABLET    Take 500 mg by mouth once daily.    ASPIRIN (ECOTRIN) 81 MG EC TABLET    Take 81 mg by mouth once daily.    CALCIUM CARBONATE (TUMS) 200 MG CALCIUM (500 MG) CHEWABLE TABLET    Take 1 tablet by mouth once daily.    ELIQUIS 2.5 MG TAB    Take 2.5 mg by mouth 2 (two) times daily.    EUTHYROX 125 MCG TABLET    TAKE 1 TABLET BY MOUTH ONCE DAILY IN THE MORNING    HYDROXYZINE HCL (ATARAX) 25 MG TABLET    TAKE 1 TABLET BY MOUTH EVERY 6 HOURS AS NEEDED FOR  ITCHING    METOPROLOL SUCCINATE (TOPROL-XL) 25 MG 24 HR TABLET    Take 1 tablet (25 mg total) by mouth once daily.    PANTOPRAZOLE (PROTONIX) 40 MG TABLET    Take 1 tablet (40 mg total) by mouth 2 (two) times daily.    PRAVASTATIN (PRAVACHOL) 20 MG TABLET    Take 1 tablet (20 mg total) by mouth every evening.    TAMSULOSIN (FLOMAX) 0.4 MG CP24    Take 0.4 mg by mouth once daily.     TRIAMCINOLONE ACETONIDE 0.1% (KENALOG) 0.1 % CREAM    Apply topically 2 (two) times a day.    UNABLE TO FIND    Take 2 tablets by mouth daily as needed. Charcoal tabs    USTEKINUMAB (STELARA) 90 MG/ML SYRG SYRINGE    Inject 1 mL (90 mg total) into the skin every 8 weeks.    VENLAFAXINE (EFFEXOR) 37.5 MG TAB    Take 1 tablet (37.5 mg total) by mouth 2 (two) times daily.    VITC/E/ZN/COPPER/LUTEIN/ZEAXAN (ICAPS AREDS2 ORAL)    Take 1 capsule by mouth once daily.   Discontinued Medications    CLOBETASOL (TEMOVATE) 0.05 % CREAM    Apply topically 2 (two) times daily.    MESALAMINE (PENTASA) 500 MG CR CAPSULE    Take 4 capsules (2,000 mg total) by mouth 2 (two) times daily.       Physical Exam:  Vital Signs:  /64 (BP Location: Right arm, Patient Position: Sitting)   Pulse 62   Temp 98.2 °F (36.8 °C)   Wt 71 kg (156 lb 8.4 oz)   SpO2 99%   BMI 21.83 kg/m²   Body mass index is 21.83 kg/m².    Physical Exam  Vitals and nursing note reviewed.   Constitutional:       General: He is not in acute distress.     Appearance: Normal appearance. He is well-developed. He is not ill-appearing or toxic-appearing.   HENT:      Head: Normocephalic and atraumatic.   Eyes:      General: No scleral icterus.     Pupils: Pupils are equal, round, and reactive to light.   Neck:      Thyroid: No thyromegaly.   Cardiovascular:      Rate and Rhythm: Normal rate and regular rhythm.      Heart sounds: No murmur heard.    No friction rub.   Pulmonary:      Effort: Pulmonary effort is normal.      Breath sounds: Normal breath sounds. No wheezing or rales.    Abdominal:      General: Bowel sounds are normal. There is no distension.      Palpations: Abdomen is soft. There is no mass.      Tenderness: There is no abdominal tenderness. There is no guarding or rebound.   Lymphadenopathy:      Cervical: No cervical adenopathy.   Skin:     Findings: Rash present.      Comments: Diffuse slightly raised erythematous rash across the patient's entire back   Neurological:      General: No focal deficit present.      Mental Status: He is alert and oriented to person, place, and time.   Psychiatric:         Mood and Affect: Mood normal.         Behavior: Behavior normal.         Thought Content: Thought content normal.         Judgment: Judgment normal.       Labs: reviewed and pertinent noted above    Assessment/Plan:  Ramesh Ricci is a 82 y.o. male with pan ulcerative colitis. The following issues were addresssed:    1. Ulcerative pancolitis with complication    2. Iron deficiency anemia due to chronic blood loss    3. Rash        1. Ulcerative colitis:  He is doing very well at this time.  He has been off of prednisone for over 2 months.  Today I recommend that he continue Stelara and that we stop the Pentasa.  We will plan to check labs and a stool calprotectin level in October to reassess his inflammation.  If there has been a significant improvement we will continue his Stelara and plan for colonoscopy after he has been on therapy for at least 6 months.    2. Iron deficiency anemia:  History of mild iron deficiency.  Now that his symptoms are under control I recommend starting ferrous sulfate 325 mg every other day.      3. Rash:  He has had a persistent rash for several years.  This does not seem to correlate with any of his medications.  I recommend that he follow-up with his dermatologist for further advice regarding this issue.    Ex smoker:  - recommended to continue smoking cessation  - discussed in detail that Crohn's disease can worsen with smoking and may make  patient more resistant to treatment    # IBD specific health maintenance:  Colon cancer surveillance:  Up-to-date    Annual:  - Eye exam:  Not applicable  - Skin exam (if on IMM/TNF):  Up-to-date  - reminded pt to use sunblock/hats/sunprotective clothing  - PAP (if immunosuppressed):  Not applicable    DEXA:  Osteopenia noted in 2018    Vitamin D:  Normal in 2021    Vaccines:    Influenza:  Up-to-date   Pneumovax:     PCV13:  Up-to-date    PSV23:  Review today   HAV:  Check serology with next labs   HBV:  Review in the future   Tdap:  Review in the future   MMR:  Check serologies with next labs   VZV:  Check serology with next labs   HZV:  Recommend shingles vaccinations   HPV:  Not applicable   Meningococcus:  Not applicable   COVID:  Completed 3 doses    Follow up: Follow up in about 4 months (around 1/12/2023).    Thank you again for sending Ramesh Ricci to see Dr. Marcial Snell today at the [facility]. Please don't hesitate to contact Dr. Snell if there are any questions regarding this evaluation, or if you have any other patients with inflammatory bowel disease for whom you would like a consultation. You can reach Dr. Snell at [phone] or by email at [email or web address]    Audie Snell MD  Department of Gastroenterology  Inflammatory Bowel Disease

## 2022-09-12 NOTE — PROGRESS NOTES
IBD PATIENT INTAKE:    COVID symptoms in the last 7 days (runny nose, sore throat, congestion, cough, fever): No  PCP: EFREM Muniz  If not PCP-  number given to establish [phone]: Yes    ALLERGIES REVIEWED:  Yes    CHIEF COMPLAINT:    Chief Complaint   Patient presents with    Ulcerative Colitis       VITAL SIGNS:  /64 (BP Location: Right arm, Patient Position: Sitting)   Pulse 62   Temp 98.2 °F (36.8 °C)   Wt 71 kg (156 lb 8.4 oz)   SpO2 99%   BMI 21.83 kg/m²      Change in medical, surgical, family or social history: No    IBD THERAPY (name, dose/frequency):  Stelara q8w Pentasa 2000mg x2d  Last dose:  8/26    Next dose:  10/21  Infusion/Pharmacy: [facility] ([facility])    NSAIDs (aspirin, ibuprofen-advil or motrin, naproxen-aleve, diclofenac-voltaren, BC powder, excedrin, goodies): Yes ASA 81    Alternative/Complementary Medications (i.e. probiotics, turmeric, fish oil, aloe vera):      no  Name/dose:  n/a    Vitamins:   Vit D:  no     Vit B-12:  no   Folic Acid: no       Calcium: no     Iron:  no      MVI: no    Antibiotics (past 30 Days):  no  If yes   Indication:  Name of antibiotic:  Completion date:     REVIEWED MEDICATION LIST RECONCILED INCLUDING ABOVE MEDS:  yes                    Answers submitted by the patient for this visit:  Established Patient Questionnaire  (Submitted on 9/7/2022)  rash: Yes  nervous/ anxious: Yes  Joint pain? : Yes

## 2022-09-12 NOTE — PATIENT INSTRUCTIONS
Continue Stelara  Stop Pentasa  See dermatologist for rash  Check labs/stool calprotectin in early October  Follow up in 4 months

## 2022-10-13 RX ORDER — PANTOPRAZOLE SODIUM 40 MG/1
40 TABLET, DELAYED RELEASE ORAL 2 TIMES DAILY
Qty: 60 TABLET | Refills: 0 | Status: SHIPPED | OUTPATIENT
Start: 2022-10-13 | End: 2022-11-21

## 2022-10-14 ENCOUNTER — SPECIALTY PHARMACY (OUTPATIENT)
Dept: PHARMACY | Facility: CLINIC | Age: 82
End: 2022-10-14
Payer: MEDICARE

## 2022-10-14 NOTE — TELEPHONE ENCOUNTER
Specialty Pharmacy - Refill Coordination    Specialty Medication Orders Linked to Encounter      Flowsheet Row Most Recent Value   Medication #1 ustekinumab (STELARA) 90 mg/mL Syrg syringe (Order#[id number], Rx#[id number])            Refill Questions - Documented Responses      Flowsheet Row Most Recent Value   Patient Availability and HIPAA Verification    Does patient want to proceed with activity? Yes   HIPAA/medical authority confirmed? Yes   Relationship to patient of person spoken to? Spouse/Significant Other   Refill Screening Questions    Changes to allergies? No   Changes to medications? No   New conditions since last clinic visit? No   Unplanned office visit, urgent care, ED, or hospital admission in the last 4 weeks? No   How does patient/caregiver feel medication is working? Good   Financial problems or insurance changes? No   How many doses of your specialty medications were missed in the last 4 weeks? 0   Would patient like to speak to a pharmacist? No   When does the patient need to receive the medication? 10/21/22   Refill Delivery Questions    How will the patient receive the medication? [facility]   When does the patient need to receive the medication? 10/21/22   Shipping Address Home   Address in [facility] confirmed and updated if neccessary? Yes   Expected Copay ($) 0   Is the patient able to afford the medication copay? Yes   Payment Method zero copay   Days supply of Refill 56   Supplies needed? No supplies needed   Refill activity completed? Yes   Refill activity plan Refill scheduled   Shipment/Pickup Date: 10/18/22            Current Outpatient Medications   Medication Sig    acetaminophen (TYLENOL) 500 MG tablet Take 2 tablets (1,000 mg total) by mouth every 6 (six) hours as needed.    ascorbic acid, vitamin C, (VITAMIN C) 500 MG tablet Take 500 mg by mouth once daily.    aspirin (ECOTRIN) 81 MG EC tablet Take 81 mg by mouth once daily.    calcium carbonate (TUMS) 200 mg calcium (500 mg)  chewable tablet Take 1 tablet by mouth once daily.    ELIQUIS 2.5 mg Tab Take 2.5 mg by mouth 2 (two) times daily.    hydrOXYzine HCL (ATARAX) 25 MG tablet TAKE 1 TABLET BY MOUTH EVERY 6 HOURS AS NEEDED FOR ITCHING    levothyroxine (EUTHYROX) 125 MCG tablet TAKE 1 TABLET BY MOUTH ONCE DAILY IN THE MORNING    metoprolol succinate (TOPROL-XL) 25 MG 24 hr tablet Take 1 tablet (25 mg total) by mouth once daily.    pantoprazole (PROTONIX) 40 MG tablet Take 1 tablet (40 mg total) by mouth 2 (two) times daily.    pravastatin (PRAVACHOL) 20 MG tablet Take 1 tablet (20 mg total) by mouth every evening.    tamsulosin (FLOMAX) 0.4 mg Cp24 Take 0.4 mg by mouth once daily.     triamcinolone acetonide 0.1% (KENALOG) 0.1 % cream Apply topically 2 (two) times a day. (Patient not taking: Reported on 9/12/2022)    UNABLE TO FIND Take 2 tablets by mouth daily as needed. Charcoal tabs    ustekinumab (STELARA) 90 mg/mL Syrg syringe Inject 1 mL (90 mg total) into the skin every 8 weeks.    venlafaxine (EFFEXOR) 37.5 MG Tab Take 1 tablet by mouth twice daily    vitC/E/Zn/copper/lutein/zeaxan (ICAPS AREDS2 ORAL) Take 1 capsule by mouth once daily.   Last reviewed on 10/13/2022  4:27 PM by Kwame Holbrook MA    Review of patient's allergies indicates:   Allergen Reactions    Benazepril Other (See Comments)     Cough    Last reviewed on  10/13/2022 4:27 PM by Kwame Holbrook      Tasks added this encounter   12/6/2022 - Refill Call (Auto Added)   Tasks due within next 3 months   No tasks due.     [facility]  [address] [place] [id number]  Phone: [phone]  Fax: [phone]

## 2022-10-24 ENCOUNTER — TELEPHONE (OUTPATIENT)
Dept: GASTROENTEROLOGY | Facility: CLINIC | Age: 82
End: 2022-10-24
Payer: MEDICARE

## 2022-10-28 ENCOUNTER — LAB VISIT (OUTPATIENT)
Dept: LAB | Facility: HOSPITAL | Age: 82
End: 2022-10-28
Attending: INTERNAL MEDICINE
Payer: MEDICARE

## 2022-10-28 DIAGNOSIS — K51.019 ULCERATIVE PANCOLITIS WITH COMPLICATION: ICD-10-CM

## 2022-10-28 PROCEDURE — 86735 MUMPS ANTIBODY: CPT | Performed by: INTERNAL MEDICINE

## 2022-10-28 PROCEDURE — 80053 COMPREHEN METABOLIC PANEL: CPT | Performed by: INTERNAL MEDICINE

## 2022-10-28 PROCEDURE — 86765 RUBEOLA ANTIBODY: CPT | Performed by: INTERNAL MEDICINE

## 2022-10-28 PROCEDURE — 86762 RUBELLA ANTIBODY: CPT | Performed by: INTERNAL MEDICINE

## 2022-10-28 PROCEDURE — 36415 COLL VENOUS BLD VENIPUNCTURE: CPT | Mod: PO | Performed by: INTERNAL MEDICINE

## 2022-10-28 PROCEDURE — 86787 VARICELLA-ZOSTER ANTIBODY: CPT | Performed by: INTERNAL MEDICINE

## 2022-10-28 PROCEDURE — 86790 VIRUS ANTIBODY NOS: CPT | Performed by: INTERNAL MEDICINE

## 2022-10-28 PROCEDURE — 85025 COMPLETE CBC W/AUTO DIFF WBC: CPT | Performed by: INTERNAL MEDICINE

## 2022-10-28 PROCEDURE — 86140 C-REACTIVE PROTEIN: CPT | Performed by: INTERNAL MEDICINE

## 2022-10-29 ENCOUNTER — LAB VISIT (OUTPATIENT)
Dept: LAB | Facility: HOSPITAL | Age: 82
End: 2022-10-29
Attending: INTERNAL MEDICINE
Payer: MEDICARE

## 2022-10-29 DIAGNOSIS — K51.019 ULCERATIVE PANCOLITIS WITH COMPLICATION: ICD-10-CM

## 2022-10-29 LAB
ALBUMIN SERPL BCP-MCNC: 3.9 G/DL (ref 3.5–5.2)
ALP SERPL-CCNC: 61 U/L (ref 55–135)
ALT SERPL W/O P-5'-P-CCNC: 8 U/L (ref 10–44)
ANION GAP SERPL CALC-SCNC: 14 MMOL/L (ref 8–16)
AST SERPL-CCNC: 15 U/L (ref 10–40)
BASOPHILS # BLD AUTO: 0.06 K/UL (ref 0–0.2)
BASOPHILS NFR BLD: 0.9 % (ref 0–1.9)
BILIRUB SERPL-MCNC: 0.3 MG/DL (ref 0.1–1)
BUN SERPL-MCNC: 23 MG/DL (ref 8–23)
CALCIUM SERPL-MCNC: 9.2 MG/DL (ref 8.7–10.5)
CHLORIDE SERPL-SCNC: 101 MMOL/L (ref 95–110)
CO2 SERPL-SCNC: 23 MMOL/L (ref 23–29)
CREAT SERPL-MCNC: 1.3 MG/DL (ref 0.5–1.4)
CRP SERPL-MCNC: 0.7 MG/L (ref 0–8.2)
DIFFERENTIAL METHOD: ABNORMAL
EOSINOPHIL # BLD AUTO: 0.2 K/UL (ref 0–0.5)
EOSINOPHIL NFR BLD: 2.3 % (ref 0–8)
ERYTHROCYTE [DISTWIDTH] IN BLOOD BY AUTOMATED COUNT: 17.4 % (ref 11.5–14.5)
EST. GFR  (NO RACE VARIABLE): 54.2 ML/MIN/1.73 M^2
GLUCOSE SERPL-MCNC: 81 MG/DL (ref 70–110)
HAV IGG SER QL IA: NORMAL
HCT VFR BLD AUTO: 40.7 % (ref 40–54)
HGB BLD-MCNC: 11.7 G/DL (ref 14–18)
IMM GRANULOCYTES # BLD AUTO: 0.01 K/UL (ref 0–0.04)
IMM GRANULOCYTES NFR BLD AUTO: 0.2 % (ref 0–0.5)
LYMPHOCYTES # BLD AUTO: 1.4 K/UL (ref 1–4.8)
LYMPHOCYTES NFR BLD: 21.7 % (ref 18–48)
MCH RBC QN AUTO: 26.6 PG (ref 27–31)
MCHC RBC AUTO-ENTMCNC: 28.7 G/DL (ref 32–36)
MCV RBC AUTO: 93 FL (ref 82–98)
MONOCYTES # BLD AUTO: 0.7 K/UL (ref 0.3–1)
MONOCYTES NFR BLD: 10.9 % (ref 4–15)
NEUTROPHILS # BLD AUTO: 4.1 K/UL (ref 1.8–7.7)
NEUTROPHILS NFR BLD: 64 % (ref 38–73)
NRBC BLD-RTO: 0 /100 WBC
PLATELET # BLD AUTO: 200 K/UL (ref 150–450)
PMV BLD AUTO: 12 FL (ref 9.2–12.9)
POTASSIUM SERPL-SCNC: 4.4 MMOL/L (ref 3.5–5.1)
PROT SERPL-MCNC: 7.1 G/DL (ref 6–8.4)
RBC # BLD AUTO: 4.4 M/UL (ref 4.6–6.2)
SODIUM SERPL-SCNC: 138 MMOL/L (ref 136–145)
WBC # BLD AUTO: 6.44 K/UL (ref 3.9–12.7)

## 2022-10-29 PROCEDURE — 83993 ASSAY FOR CALPROTECTIN FECAL: CPT | Performed by: INTERNAL MEDICINE

## 2022-10-31 LAB
MUMPS IGG INTERPRETATION: POSITIVE
MUMPS IGG SCREEN: 1.12 ISR (ref 0–0.9)
RUBV IGG SER-ACNC: >400 IU/ML
RUBV IGG SER-IMP: REACTIVE
VARICELLA INTERPRETATION: POSITIVE
VARICELLA ZOSTER IGG: 3.65 ISR (ref 0–0.9)

## 2022-11-02 LAB — MAYO MISCELLANEOUS RESULT (REF): NORMAL

## 2022-11-04 ENCOUNTER — OFFICE VISIT (OUTPATIENT)
Dept: URGENT CARE | Facility: CLINIC | Age: 82
End: 2022-11-04
Payer: MEDICARE

## 2022-11-04 VITALS
TEMPERATURE: 98 F | HEART RATE: 56 BPM | BODY MASS INDEX: 21.84 KG/M2 | DIASTOLIC BLOOD PRESSURE: 83 MMHG | OXYGEN SATURATION: 98 % | SYSTOLIC BLOOD PRESSURE: 168 MMHG | RESPIRATION RATE: 16 BRPM | HEIGHT: 71 IN | WEIGHT: 156 LBS

## 2022-11-04 DIAGNOSIS — L03.113 RIGHT ARM CELLULITIS: Primary | ICD-10-CM

## 2022-11-04 PROCEDURE — 1126F AMNT PAIN NOTED NONE PRSNT: CPT | Mod: CPTII,S$GLB,, | Performed by: FAMILY MEDICINE

## 2022-11-04 PROCEDURE — 3077F SYST BP >= 140 MM HG: CPT | Mod: CPTII,S$GLB,, | Performed by: FAMILY MEDICINE

## 2022-11-04 PROCEDURE — 1126F PR PAIN SEVERITY QUANTIFIED, NO PAIN PRESENT: ICD-10-PCS | Mod: CPTII,S$GLB,, | Performed by: FAMILY MEDICINE

## 2022-11-04 PROCEDURE — 1159F PR MEDICATION LIST DOCUMENTED IN MEDICAL RECORD: ICD-10-PCS | Mod: CPTII,S$GLB,, | Performed by: FAMILY MEDICINE

## 2022-11-04 PROCEDURE — 3077F PR MOST RECENT SYSTOLIC BLOOD PRESSURE >= 140 MM HG: ICD-10-PCS | Mod: CPTII,S$GLB,, | Performed by: FAMILY MEDICINE

## 2022-11-04 PROCEDURE — 1159F MED LIST DOCD IN RCRD: CPT | Mod: CPTII,S$GLB,, | Performed by: FAMILY MEDICINE

## 2022-11-04 PROCEDURE — 3079F PR MOST RECENT DIASTOLIC BLOOD PRESSURE 80-89 MM HG: ICD-10-PCS | Mod: CPTII,S$GLB,, | Performed by: FAMILY MEDICINE

## 2022-11-04 PROCEDURE — 99213 OFFICE O/P EST LOW 20 MIN: CPT | Mod: S$GLB,,, | Performed by: FAMILY MEDICINE

## 2022-11-04 PROCEDURE — 99213 PR OFFICE/OUTPT VISIT, EST, LEVL III, 20-29 MIN: ICD-10-PCS | Mod: S$GLB,,, | Performed by: FAMILY MEDICINE

## 2022-11-04 PROCEDURE — 1160F RVW MEDS BY RX/DR IN RCRD: CPT | Mod: CPTII,S$GLB,, | Performed by: FAMILY MEDICINE

## 2022-11-04 PROCEDURE — 1160F PR REVIEW ALL MEDS BY PRESCRIBER/CLIN PHARMACIST DOCUMENTED: ICD-10-PCS | Mod: CPTII,S$GLB,, | Performed by: FAMILY MEDICINE

## 2022-11-04 PROCEDURE — 3079F DIAST BP 80-89 MM HG: CPT | Mod: CPTII,S$GLB,, | Performed by: FAMILY MEDICINE

## 2022-11-04 RX ORDER — MUPIROCIN 20 MG/G
OINTMENT TOPICAL 3 TIMES DAILY
Qty: 2 G | Refills: 0 | Status: SHIPPED | OUTPATIENT
Start: 2022-11-04 | End: 2022-11-11

## 2022-11-04 RX ORDER — LEVOCETIRIZINE DIHYDROCHLORIDE 5 MG/1
5 TABLET, FILM COATED ORAL EVERY MORNING
COMMUNITY
Start: 2022-10-13

## 2022-11-04 RX ORDER — CEPHALEXIN 500 MG/1
500 CAPSULE ORAL 4 TIMES DAILY
Qty: 20 CAPSULE | Refills: 0 | Status: SHIPPED | OUTPATIENT
Start: 2022-11-04 | End: 2022-11-09

## 2022-11-04 NOTE — PATIENT INSTRUCTIONS
General Discharge Instructions   PLEASE READ YOUR DISCHARGE INSTRUCTIONS ENTIRELY AS IT CONTAINS IMPORTANT INFORMATION.  If you were prescribed a narcotic or controlled medication, do not drive or operate heavy equipment or machinery while taking these medications.  If you were prescribed antibiotics, please take them to completion.  You must understand that you've received an Urgent Care treatment only and that you may be released before all your medical problems are known or treated. You, the patient, will arrange for follow up care as instructed.    OVER THE COUNTER RECOMMENDATIONS/SUGGESTIONS.    Make sure to stay well hydrated.    Use Nasal Saline to mechanically move any post nasal drip from your eustachian tube or from the back of your throat.    Use warm salt water gargles to ease your throat pain. Warm salt water gargles as needed for sore throat- 1/2 tsp salt to 1 cup warm water, gargle as desired.    Use an antihistamine such as Claritin, Zyrtec or Allegra to dry you out.    Use pseudoephedrine (behind the counter) to decongest. Pseudoephedrine 30 mg up to 240 mg /day. It can raise your blood pressure and give you palpitations.    Use mucinex (guaifenesin) to break up mucous up to 2400mg/day to loosen any mucous.    The mucinex DM pill has a cough suppressant that can be sedating. It can be used at night to stop the tickle at the back of your throat.    You can use Mucinex D (it has guaifenesin and a high dose of pseudoephedrine) in the mornings to help decongest.    Use Afrin in each nare for no longer than 3 days, as it is addictive. It can also dry out your mucous membranes and cause elevated blood pressure. This is especially useful if you are flying.    Use Flonase 1-2 sprays/nostril per day. It is a local acting steroid nasal spray, if you develop a bloody nose, stop using the medication immediately.    Sometimes Nyquil at night is beneficial to help you get some rest, however it is sedating and it  does have an antihistamine, and tylenol.    Honey is a natural cough suppressant that can be used.    Tylenol up to 4,000 mg a day is safe for short periods and can be used for body aches, pain, and fever. However in high doses and prolonged use it can cause liver irritation.    Ibuprofen is a non-steroidal anti-inflammatory that can be used for body aches, pain, and fever.However it can also cause stomach irritation if over used.     Follow up with your PCP or specialty clinic as instructed in the next 2-3 days if not improved or as needed. You can call [phone] to schedule an appointment with appropriate provider.      If you condition worsens, we recommend that you receive another evaluation at the emergency room immediately or contact your primary medical clinic's after hours call service to discuss your concerns.      Please return here or go to the Emergency Department for any concerns or worsening condition.   You can also call [phone] to schedule an appointment with the appropriate provider.    Please return here or go to the Emergency Department for any concerns or worsening of condition.    Thank you for choosing [facility]!    Our goal in the Urgent Care is to always provide outstanding medical care. You may receive a survey by mail or e-mail in the next week regarding your experience today. We would greatly appreciate you completing and returning the survey. Your feedback provides us with a way to recognize our staff who provide very good care, and it helps us learn how to improve when your experience was below our aspiration of excellence.      We appreciate you trusting us with your medical care. We hope you feel better soon. We will be happy to take care of you for all of your future medical needs.    Sincerely,    SHERI Vincent

## 2022-11-04 NOTE — PROGRESS NOTES
"Subjective:       Patient ID: Ramesh Ricci is a 82 y.o. male.    Vitals:  height is 5' 11" (1.803 m) and weight is 70.8 kg (156 lb). His tympanic temperature is 98.4 °F (36.9 °C). His blood pressure is 168/83 (abnormal) and his pulse is 56 (abnormal). His respiration is 16 and oxygen saturation is 98%.     Chief Complaint: Other    Patient stated he had a blood draw last week and he noticed a infection on his right lower arm.  He denies any pain.  He stated he has itching and redness to the area where his blood was drawn.   Provider note begins below:  Past Medical History:  11/16/2012: Abnormal echocardiogram  No date: Anemia  No date: Anticoagulant long-term use  No date: Anxiety  11/16/2012: Atrophic kidney  11/16/2012: BPH (benign prostatic hyperplasia)  2000 something: Cancer      Comment:  Had radiation  07/05/2017: DDD (degenerative disc disease), cervical      Comment:  x-ray 7/17 - Severe  11/16/2012: GERD (gastroesophageal reflux disease)  11/16/2012: Glaucoma  11/16/2012: HTN (hypertension)  11/16/2012: Hypothyroid  11/16/2012: Internal carotid artery stenosis  No date: Joint pain  04/16/2015: Left ventricular diastolic dysfunction, NYHA class 1      Comment:  4/15  11/16/2012: Low serum testosterone level  11/16/2012: Normal cardiac stress test  11/16/2012: Osteopenia  11/16/2012: Shingles  2020: Skin disease      Comment:  Seems to have started after Covid vaccine  2017: Stroke      Comment:  tia   no residual  11/16/2012: TIA (transient ischemic attack)  11/16/2012: UC (ulcerative colitis)   Patient presents with family member with complaint of possible cellulitis to the right forearm.  He had blood work earlier this week to his right forearm and since then has had a red streak.  Denies any fever or chills or history of skin infections in the past.  Denies any pain to the site.  Family member looked up pictures of lymphangitis and was concerned for this.      Constitution: Negative for activity " change, appetite change, chills and sweating.   HENT:  Negative for ear pain, congestion, sinus pressure and sore throat.    Neck: Negative for neck pain.   Respiratory:  Negative for cough.    Musculoskeletal:  Negative for pain and trauma.   Skin:  Positive for erythema.   Allergic/Immunologic: Negative for sneezing.   Neurological:  Negative for headaches.     Objective:      Physical Exam   Constitutional: He is oriented to person, place, and time. He appears well-developed.  Non-toxic appearance. He does not appear ill. No distress.      Comments:Elderly, family present, uses a cane for gait assist.      HENT:   Head: Normocephalic and atraumatic.   Ears:   Right Ear: External ear normal.   Left Ear: External ear normal.   Nose: Nose normal.   Mouth/Throat: Oropharynx is clear and moist.   Eyes: Conjunctivae, EOM and lids are normal. Pupils are equal, round, and reactive to light.   Neck: Trachea normal and phonation normal. Neck supple.   Cardiovascular:   Pulses:       Radial pulses are 2+ on the right side and 2+ on the left side.   Musculoskeletal: Normal range of motion.         General: Normal range of motion.   Neurological: He is alert and oriented to person, place, and time.   Skin: Skin is warm, dry, intact and not diaphoretic. erythema        Psychiatric: His speech is normal and behavior is normal. Judgment and thought content normal.   Nursing note and vitals reviewed.          Assessment:       1. Right arm cellulitis          Plan:       Fu for wound check in 3-4 days  Wound care discussed in detail, ctm    There is no pain, fever, swelling, lymphadenopathy, tenderness to suggest lymphadenitis.  But have stressed to continue to monitor.    Discussed results/diagnosis/plan with patient in clinic. Strict precautions given to patient to monitor for worsening signs and symptoms. Advised to follow up with PCP or specialist.    Explained side effects of medications prescribed with patient and informed  him/her to discontinue use if he/she has any side effects and to inform UC or PCP if this occurs. All questions answered. Strict ED verses clinic return precautions stressed and given in depth. Advised if symptoms worsens of fail to improve he/she should go to the Emergency Room. Discharge and follow-up instructions given verbally/printed with the patient who expressed understanding and willingness to comply with my recommendations. Patient voiced understanding and in agreement with current treatment plan. Patient exits the exam room in no acute distress. Conversant and engaged during discharge discussion, verbalized understanding.      Right arm cellulitis  -     cephALEXin (KEFLEX) 500 MG capsule; Take 1 capsule (500 mg total) by mouth 4 (four) times daily. for 5 days  Dispense: 20 capsule; Refill: 0  -     mupirocin (BACTROBAN) 2 % ointment; Apply topically 3 (three) times daily. for 7 days  Dispense: 2 g; Refill: 0               Patient Instructions   General Discharge Instructions   PLEASE READ YOUR DISCHARGE INSTRUCTIONS ENTIRELY AS IT CONTAINS IMPORTANT INFORMATION.  If you were prescribed a narcotic or controlled medication, do not drive or operate heavy equipment or machinery while taking these medications.  If you were prescribed antibiotics, please take them to completion.  You must understand that you've received an Urgent Care treatment only and that you may be released before all your medical problems are known or treated. You, the patient, will arrange for follow up care as instructed.    OVER THE COUNTER RECOMMENDATIONS/SUGGESTIONS.    Make sure to stay well hydrated.    Use Nasal Saline to mechanically move any post nasal drip from your eustachian tube or from the back of your throat.    Use warm salt water gargles to ease your throat pain. Warm salt water gargles as needed for sore throat- 1/2 tsp salt to 1 cup warm water, gargle as desired.    Use an antihistamine such as Claritin, Zyrtec or  Allegra to dry you out.    Use pseudoephedrine (behind the counter) to decongest. Pseudoephedrine 30 mg up to 240 mg /day. It can raise your blood pressure and give you palpitations.    Use mucinex (guaifenesin) to break up mucous up to 2400mg/day to loosen any mucous.    The mucinex DM pill has a cough suppressant that can be sedating. It can be used at night to stop the tickle at the back of your throat.    You can use Mucinex D (it has guaifenesin and a high dose of pseudoephedrine) in the mornings to help decongest.    Use Afrin in each nare for no longer than 3 days, as it is addictive. It can also dry out your mucous membranes and cause elevated blood pressure. This is especially useful if you are flying.    Use Flonase 1-2 sprays/nostril per day. It is a local acting steroid nasal spray, if you develop a bloody nose, stop using the medication immediately.    Sometimes Nyquil at night is beneficial to help you get some rest, however it is sedating and it does have an antihistamine, and tylenol.    Honey is a natural cough suppressant that can be used.    Tylenol up to 4,000 mg a day is safe for short periods and can be used for body aches, pain, and fever. However in high doses and prolonged use it can cause liver irritation.    Ibuprofen is a non-steroidal anti-inflammatory that can be used for body aches, pain, and fever.However it can also cause stomach irritation if over used.     Follow up with your PCP or specialty clinic as instructed in the next 2-3 days if not improved or as needed. You can call [phone] to schedule an appointment with appropriate provider.      If you condition worsens, we recommend that you receive another evaluation at the emergency room immediately or contact your primary medical clinic's after hours call service to discuss your concerns.      Please return here or go to the Emergency Department for any concerns or worsening condition.   You can also call [phone] to  schedule an appointment with the appropriate provider.    Please return here or go to the Emergency Department for any concerns or worsening of condition.    Thank you for choosing [facility]!    Our goal in the Urgent Care is to always provide outstanding medical care. You may receive a survey by mail or e-mail in the next week regarding your experience today. We would greatly appreciate you completing and returning the survey. Your feedback provides us with a way to recognize our staff who provide very good care, and it helps us learn how to improve when your experience was below our aspiration of excellence.      We appreciate you trusting us with your medical care. We hope you feel better soon. We will be happy to take care of you for all of your future medical needs.    Sincerely,    SHERI Vincent

## 2022-11-07 LAB — CALPROTECTIN STL-MCNT: 137 MCG/G

## 2022-12-06 ENCOUNTER — SPECIALTY PHARMACY (OUTPATIENT)
Dept: PHARMACY | Facility: CLINIC | Age: 82
End: 2022-12-06
Payer: MEDICARE

## 2022-12-06 NOTE — TELEPHONE ENCOUNTER
Outgoing call to pt's wife regarding Stelara refill. Pt due to inject 12/16; wife agreed to call back 12/9 to schedule delivery. Pending accordingly.

## 2022-12-09 ENCOUNTER — PATIENT MESSAGE (OUTPATIENT)
Dept: PHARMACY | Facility: CLINIC | Age: 82
End: 2022-12-09
Payer: MEDICARE

## 2022-12-09 NOTE — TELEPHONE ENCOUNTER
Specialty Pharmacy - Refill Coordination    Specialty Medication Orders Linked to Encounter      Flowsheet Row Most Recent Value   Medication #1 ustekinumab (STELARA) 90 mg/mL Syrg syringe (Order#[id number], Rx#[id number])            Refill Questions - Documented Responses      Flowsheet Row Most Recent Value   Patient Availability and HIPAA Verification    Does patient want to proceed with activity? Yes   HIPAA/medical authority confirmed? Yes   Relationship to patient of person spoken to? Spouse/Significant Other   Refill Screening Questions    Changes to allergies? No   Changes to medications? No   New conditions since last clinic visit? No   Unplanned office visit, urgent care, ED, or hospital admission in the last 4 weeks? No   How does patient/caregiver feel medication is working? Excellent   Financial problems or insurance changes? No   How many doses of your specialty medications were missed in the last 4 weeks? 0   Would patient like to speak to a pharmacist? No   When does the patient need to receive the medication? 12/16/22   Refill Delivery Questions    How will the patient receive the medication? [facility]   When does the patient need to receive the medication? 12/16/22   Shipping Address Home   Address in [facility] confirmed and updated if neccessary? Yes   Expected Copay ($) 0   Is the patient able to afford the medication copay? Yes   Payment Method zero copay   Days supply of Refill 56   Supplies needed? No supplies needed   Refill activity completed? Yes   Refill activity plan Refill scheduled   Shipment/Pickup Date: 12/13/22            Current Outpatient Medications   Medication Sig    acetaminophen (TYLENOL) 500 MG tablet Take 2 tablets (1,000 mg total) by mouth every 6 (six) hours as needed.    ascorbic acid, vitamin C, (VITAMIN C) 500 MG tablet Take 500 mg by mouth once daily.    aspirin (ECOTRIN) 81 MG EC tablet Take 81 mg by mouth once daily.    calcium carbonate (TUMS) 200 mg calcium  (500 mg) chewable tablet Take 1 tablet by mouth once daily.    ELIQUIS 2.5 mg Tab Take 2.5 mg by mouth 2 (two) times daily.    hydrOXYzine HCL (ATARAX) 25 MG tablet TAKE 1 TABLET BY MOUTH EVERY 6 HOURS AS NEEDED FOR ITCHING    levocetirizine (XYZAL) 5 MG tablet Take 5 mg by mouth every morning.    levothyroxine (EUTHYROX) 125 MCG tablet TAKE 1 TABLET BY MOUTH ONCE DAILY IN THE MORNING    metoprolol succinate (TOPROL-XL) 25 MG 24 hr tablet Take 1 tablet (25 mg total) by mouth once daily.    pantoprazole (PROTONIX) 40 MG tablet Take 1 tablet by mouth twice daily    pravastatin (PRAVACHOL) 20 MG tablet Take 1 tablet (20 mg total) by mouth every evening.    tamsulosin (FLOMAX) 0.4 mg Cp24 Take 0.4 mg by mouth once daily.     triamcinolone acetonide 0.1% (KENALOG) 0.1 % cream Apply topically 2 (two) times a day.    UNABLE TO FIND Take 2 tablets by mouth daily as needed. Charcoal tabs    ustekinumab (STELARA) 90 mg/mL Syrg syringe Inject 1 mL (90 mg total) into the skin every 8 weeks.    venlafaxine (EFFEXOR) 37.5 MG Tab Take 1 tablet by mouth twice daily    vitC/E/Zn/copper/lutein/zeaxan (ICAPS AREDS2 ORAL) Take 1 capsule by mouth once daily.   Last reviewed on 11/4/2022  5:52 PM by Pema Browning NP    Review of patient's allergies indicates:   Allergen Reactions    Benazepril Other (See Comments)     Cough    Last reviewed on  11/4/2022 5:52 PM by Pema Browning      Tasks added this encounter   1/31/2023 - Refill Call (Auto Added)   Tasks due within next 3 months   No tasks due.     Darion Hutson, PharmD  [facility]  [address] [place] [id number]  Phone: [phone]  Fax: [phone]

## 2022-12-14 NOTE — TELEPHONE ENCOUNTER
Incoming call from wife, asking when Mr. Chandler is due, based on last refill call , patient should inject 12/16/22. Wife expressed understanding.

## 2022-12-16 ENCOUNTER — SPECIALTY PHARMACY (OUTPATIENT)
Dept: PHARMACY | Facility: CLINIC | Age: 82
End: 2022-12-16

## 2022-12-16 NOTE — TELEPHONE ENCOUNTER
Outgoing call to patient, spoke to Mone, patient would like to start PAP enrollment for 2023. Mailing to address on file and will follow up with patient.

## 2022-12-22 NOTE — TELEPHONE ENCOUNTER
Outgoing call to patient to see if they have received [facility] enrollment for Stelara, will continue to follow up, unable to reach patient LVM.

## 2022-12-22 NOTE — TELEPHONE ENCOUNTER
Patient's wife returned call and confirmed they did receive the form. She is working on completing and will send back to [facility]. Routing [facility] team,

## 2022-12-27 NOTE — TELEPHONE ENCOUNTER
Stelara PA submitted via [facility] (Key: [id number]) in case it is needed for PAP.    Sepsis Criteria were met:

## 2022-12-28 NOTE — TELEPHONE ENCOUNTER
Stelara PA approved through 12/31/2023. Ref #: [id number].    Co-pay $1,886.99 with insurance. Forwarding to FA for [employer].

## 2023-01-05 NOTE — TELEPHONE ENCOUNTER
Outgoing call to patient, ELAINE, received patient portion of [employer] application for [employer], need patient's medicare [employer] ID number prior to submitting application.     Patient may be asked to provide an EOB from insurance for 2022, or pharmacy expense report. If patient could also collect the information and have on hand in case program request that information. Will continue to follow up.

## 2023-01-06 NOTE — TELEPHONE ENCOUNTER
Faxed patient application to [facility], informed Mrs. Ricci that an EOB of expenses for 2022 may be requested from the patient, will continue to follow up until determination is made.

## 2023-01-09 NOTE — TELEPHONE ENCOUNTER
Outgoing call to patient [facility], requesting copies of Insurance cards and EOB. Will continue to follow up.

## 2023-01-10 NOTE — TELEPHONE ENCOUNTER
Incoming call from patient's wife verifying [facility]'s address. She stated she will mail [facility] information regarding Stelara. Routing assigned pharmacist.

## 2023-01-10 NOTE — TELEPHONE ENCOUNTER
Going call to patient, to inform of information below. Per wife Mone Marroquin called three times and informed patient they will follow up with her with a final determination. Will follow up with patient again on 1/12.

## 2023-01-13 NOTE — TELEPHONE ENCOUNTER
Received patient's expenses from pharmacy and [employer], refaxed info to [employer] will continue to follow up.

## 2023-01-23 ENCOUNTER — HOSPITAL ENCOUNTER (OUTPATIENT)
Facility: HOSPITAL | Age: 83
Discharge: HOME | End: 2023-01-24
Attending: EMERGENCY MEDICINE | Admitting: HOSPITALIST
Payer: MEDICARE

## 2023-01-23 DIAGNOSIS — I48.0 PAF (PAROXYSMAL ATRIAL FIBRILLATION): ICD-10-CM

## 2023-01-23 DIAGNOSIS — R00.2 PALPITATIONS: ICD-10-CM

## 2023-01-23 DIAGNOSIS — R06.02 SOB (SHORTNESS OF BREATH): Primary | ICD-10-CM

## 2023-01-23 DIAGNOSIS — I35.0 AORTIC STENOSIS: ICD-10-CM

## 2023-01-23 DIAGNOSIS — R07.9 CHEST PAIN: ICD-10-CM

## 2023-01-23 DIAGNOSIS — I35.0 NONRHEUMATIC AORTIC VALVE STENOSIS: Chronic | ICD-10-CM

## 2023-01-23 LAB
ALBUMIN SERPL BCP-MCNC: 4.1 G/DL (ref 3.5–5.2)
ALP SERPL-CCNC: 64 U/L (ref 55–135)
ALT SERPL W/O P-5'-P-CCNC: 6 U/L (ref 10–44)
ANION GAP SERPL CALC-SCNC: 10 MMOL/L (ref 8–16)
APTT BLDCRRT: 31.6 SEC (ref 21–32)
AST SERPL-CCNC: 15 U/L (ref 10–40)
BASOPHILS # BLD AUTO: 0.04 K/UL (ref 0–0.2)
BASOPHILS NFR BLD: 0.5 % (ref 0–1.9)
BILIRUB SERPL-MCNC: 0.3 MG/DL (ref 0.1–1)
BILIRUB UR QL STRIP: NEGATIVE
BNP SERPL-MCNC: 1012 PG/ML (ref 0–99)
BUN SERPL-MCNC: 26 MG/DL (ref 8–23)
CALCIUM SERPL-MCNC: 9.3 MG/DL (ref 8.7–10.5)
CHLORIDE SERPL-SCNC: 104 MMOL/L (ref 95–110)
CLARITY UR: CLEAR
CO2 SERPL-SCNC: 26 MMOL/L (ref 23–29)
COLOR UR: YELLOW
CREAT SERPL-MCNC: 1.5 MG/DL (ref 0.5–1.4)
CTP QC/QA: YES
D DIMER PPP IA.FEU-MCNC: 0.48 MG/L FEU
DIFFERENTIAL METHOD: ABNORMAL
EOSINOPHIL # BLD AUTO: 0.2 K/UL (ref 0–0.5)
EOSINOPHIL NFR BLD: 2.4 % (ref 0–8)
ERYTHROCYTE [DISTWIDTH] IN BLOOD BY AUTOMATED COUNT: 15.2 % (ref 11.5–14.5)
EST. GFR  (NO RACE VARIABLE): 45 ML/MIN/1.73 M^2
GLUCOSE SERPL-MCNC: 95 MG/DL (ref 70–110)
GLUCOSE UR QL STRIP: NEGATIVE
HCT VFR BLD AUTO: 41.8 % (ref 40–54)
HGB BLD-MCNC: 13.4 G/DL (ref 14–18)
HGB UR QL STRIP: NEGATIVE
IMM GRANULOCYTES # BLD AUTO: 0.02 K/UL (ref 0–0.04)
IMM GRANULOCYTES NFR BLD AUTO: 0.3 % (ref 0–0.5)
INR PPP: 1 (ref 0.8–1.2)
KETONES UR QL STRIP: NEGATIVE
LACTATE SERPL-SCNC: 1.6 MMOL/L (ref 0.5–2.2)
LEUKOCYTE ESTERASE UR QL STRIP: NEGATIVE
LYMPHOCYTES # BLD AUTO: 1.5 K/UL (ref 1–4.8)
LYMPHOCYTES NFR BLD: 18.5 % (ref 18–48)
MAGNESIUM SERPL-MCNC: 2.1 MG/DL (ref 1.6–2.6)
MCH RBC QN AUTO: 29.1 PG (ref 27–31)
MCHC RBC AUTO-ENTMCNC: 32.1 G/DL (ref 32–36)
MCV RBC AUTO: 91 FL (ref 82–98)
MONOCYTES # BLD AUTO: 0.7 K/UL (ref 0.3–1)
MONOCYTES NFR BLD: 9.4 % (ref 4–15)
NEUTROPHILS # BLD AUTO: 5.4 K/UL (ref 1.8–7.7)
NEUTROPHILS NFR BLD: 68.9 % (ref 38–73)
NITRITE UR QL STRIP: NEGATIVE
NRBC BLD-RTO: 0 /100 WBC
PH UR STRIP: 7 [PH] (ref 5–8)
PLATELET # BLD AUTO: 203 K/UL (ref 150–450)
PMV BLD AUTO: 10.9 FL (ref 9.2–12.9)
POTASSIUM SERPL-SCNC: 4.6 MMOL/L (ref 3.5–5.1)
PROCALCITONIN SERPL IA-MCNC: 0.03 NG/ML
PROT SERPL-MCNC: 7.9 G/DL (ref 6–8.4)
PROT UR QL STRIP: ABNORMAL
PROTHROMBIN TIME: 11.1 SEC (ref 9–12.5)
RBC # BLD AUTO: 4.61 M/UL (ref 4.6–6.2)
SARS-COV-2 RDRP RESP QL NAA+PROBE: NEGATIVE
SODIUM SERPL-SCNC: 140 MMOL/L (ref 136–145)
SP GR UR STRIP: 1.01 (ref 1–1.03)
TROPONIN I SERPL DL<=0.01 NG/ML-MCNC: 0.01 NG/ML (ref 0–0.03)
TROPONIN I SERPL DL<=0.01 NG/ML-MCNC: 0.01 NG/ML (ref 0–0.03)
TROPONIN I SERPL DL<=0.01 NG/ML-MCNC: 0.02 NG/ML (ref 0–0.03)
TSH SERPL DL<=0.005 MIU/L-ACNC: 2.07 UIU/ML (ref 0.4–4)
URN SPEC COLLECT METH UR: ABNORMAL
UROBILINOGEN UR STRIP-ACNC: NEGATIVE EU/DL
WBC # BLD AUTO: 7.84 K/UL (ref 3.9–12.7)

## 2023-01-23 PROCEDURE — 85379 FIBRIN DEGRADATION QUANT: CPT | Performed by: EMERGENCY MEDICINE

## 2023-01-23 PROCEDURE — 81003 URINALYSIS AUTO W/O SCOPE: CPT | Performed by: EMERGENCY MEDICINE

## 2023-01-23 PROCEDURE — 84484 ASSAY OF TROPONIN QUANT: CPT | Mod: 91 | Performed by: PHYSICIAN ASSISTANT

## 2023-01-23 PROCEDURE — 63600175 PHARM REV CODE 636 W HCPCS: Performed by: EMERGENCY MEDICINE

## 2023-01-23 PROCEDURE — 83605 ASSAY OF LACTIC ACID: CPT | Performed by: EMERGENCY MEDICINE

## 2023-01-23 PROCEDURE — 85730 THROMBOPLASTIN TIME PARTIAL: CPT | Performed by: EMERGENCY MEDICINE

## 2023-01-23 PROCEDURE — 80053 COMPREHEN METABOLIC PANEL: CPT | Performed by: EMERGENCY MEDICINE

## 2023-01-23 PROCEDURE — 99285 EMERGENCY DEPT VISIT HI MDM: CPT | Mod: 25

## 2023-01-23 PROCEDURE — 87635 SARS-COV-2 COVID-19 AMP PRB: CPT | Performed by: EMERGENCY MEDICINE

## 2023-01-23 PROCEDURE — 93010 EKG 12-LEAD: ICD-10-PCS | Mod: ,,, | Performed by: INTERNAL MEDICINE

## 2023-01-23 PROCEDURE — 93005 ELECTROCARDIOGRAM TRACING: CPT

## 2023-01-23 PROCEDURE — 83735 ASSAY OF MAGNESIUM: CPT | Performed by: EMERGENCY MEDICINE

## 2023-01-23 PROCEDURE — 83880 ASSAY OF NATRIURETIC PEPTIDE: CPT | Performed by: EMERGENCY MEDICINE

## 2023-01-23 PROCEDURE — 96374 THER/PROPH/DIAG INJ IV PUSH: CPT

## 2023-01-23 PROCEDURE — 85610 PROTHROMBIN TIME: CPT | Performed by: EMERGENCY MEDICINE

## 2023-01-23 PROCEDURE — 84145 PROCALCITONIN (PCT): CPT | Performed by: EMERGENCY MEDICINE

## 2023-01-23 PROCEDURE — G0378 HOSPITAL OBSERVATION PER HR: HCPCS

## 2023-01-23 PROCEDURE — 84484 ASSAY OF TROPONIN QUANT: CPT | Performed by: EMERGENCY MEDICINE

## 2023-01-23 PROCEDURE — 85025 COMPLETE CBC W/AUTO DIFF WBC: CPT | Performed by: EMERGENCY MEDICINE

## 2023-01-23 PROCEDURE — 93010 ELECTROCARDIOGRAM REPORT: CPT | Mod: ,,, | Performed by: INTERNAL MEDICINE

## 2023-01-23 PROCEDURE — 25000003 PHARM REV CODE 250: Performed by: PHYSICIAN ASSISTANT

## 2023-01-23 PROCEDURE — 84443 ASSAY THYROID STIM HORMONE: CPT | Performed by: EMERGENCY MEDICINE

## 2023-01-23 RX ORDER — FUROSEMIDE 10 MG/ML
20 INJECTION INTRAMUSCULAR; INTRAVENOUS
Status: COMPLETED | OUTPATIENT
Start: 2023-01-23 | End: 2023-01-23

## 2023-01-23 RX ORDER — ASPIRIN 81 MG/1
81 TABLET ORAL DAILY
Status: DISCONTINUED | OUTPATIENT
Start: 2023-01-24 | End: 2023-01-24 | Stop reason: HOSPADM

## 2023-01-23 RX ORDER — LANOLIN ALCOHOL/MO/W.PET/CERES
800 CREAM (GRAM) TOPICAL
Status: DISCONTINUED | OUTPATIENT
Start: 2023-01-23 | End: 2023-01-24 | Stop reason: HOSPADM

## 2023-01-23 RX ORDER — IBUPROFEN 200 MG
24 TABLET ORAL
Status: DISCONTINUED | OUTPATIENT
Start: 2023-01-23 | End: 2023-01-24 | Stop reason: HOSPADM

## 2023-01-23 RX ORDER — GLUCAGON 1 MG
1 KIT INJECTION
Status: DISCONTINUED | OUTPATIENT
Start: 2023-01-23 | End: 2023-01-24 | Stop reason: HOSPADM

## 2023-01-23 RX ORDER — METOPROLOL SUCCINATE 25 MG/1
25 TABLET, EXTENDED RELEASE ORAL DAILY
Status: DISCONTINUED | OUTPATIENT
Start: 2023-01-24 | End: 2023-01-23

## 2023-01-23 RX ORDER — IBUPROFEN 200 MG
16 TABLET ORAL
Status: DISCONTINUED | OUTPATIENT
Start: 2023-01-23 | End: 2023-01-24 | Stop reason: HOSPADM

## 2023-01-23 RX ORDER — TALC
6 POWDER (GRAM) TOPICAL NIGHTLY PRN
Status: DISCONTINUED | OUTPATIENT
Start: 2023-01-23 | End: 2023-01-24 | Stop reason: HOSPADM

## 2023-01-23 RX ORDER — TAMSULOSIN HYDROCHLORIDE 0.4 MG/1
0.4 CAPSULE ORAL DAILY
Status: DISCONTINUED | OUTPATIENT
Start: 2023-01-24 | End: 2023-01-24 | Stop reason: HOSPADM

## 2023-01-23 RX ORDER — PRAVASTATIN SODIUM 10 MG/1
20 TABLET ORAL NIGHTLY
Status: DISCONTINUED | OUTPATIENT
Start: 2023-01-23 | End: 2023-01-24 | Stop reason: HOSPADM

## 2023-01-23 RX ORDER — NALOXONE HCL 0.4 MG/ML
0.02 VIAL (ML) INJECTION
Status: DISCONTINUED | OUTPATIENT
Start: 2023-01-23 | End: 2023-01-24 | Stop reason: HOSPADM

## 2023-01-23 RX ORDER — AMOXICILLIN 250 MG
1 CAPSULE ORAL DAILY PRN
Status: DISCONTINUED | OUTPATIENT
Start: 2023-01-23 | End: 2023-01-24 | Stop reason: HOSPADM

## 2023-01-23 RX ORDER — ACETAMINOPHEN 325 MG/1
650 TABLET ORAL EVERY 4 HOURS PRN
Status: DISCONTINUED | OUTPATIENT
Start: 2023-01-23 | End: 2023-01-24 | Stop reason: HOSPADM

## 2023-01-23 RX ORDER — SODIUM CHLORIDE 0.9 % (FLUSH) 0.9 %
10 SYRINGE (ML) INJECTION
Status: DISCONTINUED | OUTPATIENT
Start: 2023-01-23 | End: 2023-01-24 | Stop reason: HOSPADM

## 2023-01-23 RX ORDER — LEVOTHYROXINE SODIUM 125 UG/1
125 TABLET ORAL EVERY MORNING
Status: DISCONTINUED | OUTPATIENT
Start: 2023-01-24 | End: 2023-01-24 | Stop reason: HOSPADM

## 2023-01-23 RX ORDER — SODIUM CHLORIDE 0.9 % (FLUSH) 0.9 %
10 SYRINGE (ML) INJECTION EVERY 8 HOURS
Status: DISCONTINUED | OUTPATIENT
Start: 2023-01-23 | End: 2023-01-23

## 2023-01-23 RX ADMIN — FUROSEMIDE 20 MG: 10 INJECTION, SOLUTION INTRAMUSCULAR; INTRAVENOUS at 04:01

## 2023-01-23 RX ADMIN — PRAVASTATIN SODIUM 20 MG: 10 TABLET ORAL at 10:01

## 2023-01-23 RX ADMIN — APIXABAN 2.5 MG: 2.5 TABLET, FILM COATED ORAL at 10:01

## 2023-01-23 NOTE — H&P
[facility]  [facility]  History & Physical    Patient Name: Ramesh Ricci  MRN: [id number]  Patient Class: OP- Observation  Admission Date: 1/23/2023  Attending Physician: Kristen Farah MD   Primary Care Provider: EFREM Muniz MD         Patient information was obtained from patient, past medical records and ER records.     Subjective:     Principal Problem:SOB (shortness of breath)    Chief Complaint:   Chief Complaint   Patient presents with    Shortness of Breath     Pt c/o sob and generalized weakness. Hr at home was 39.        HPI: Ramesh Ricci 83 y.o. male with Aortic stenosis, afib on anticoagulation, HTN, BPH, presents to the hospital with a chief complaint of lightheadedness.  He reports today when he awoke he found he was experiencing intermittent lightheadedness that worsened with exertion and improved with rest.  This was associated with shortness of breath is only present on exertion.  He denies any attempted treatment home.  He reports his wife was a nurse checked his pulse at home and found it to be low causing presentation in the emergency room.  He is compliant with his home anticoagulation.  He denies fever nausea vomiting abdominal pain leg swelling melena hematuria hematemesis numbness weakness of extremity and slurred speech and syncope.    In the ED, EKG with PVCs, did D-dimer negative INR 1.0 creatinine 1.5  2 troponin negative lactic acid negative TSH within normal limits BRCA negative COVID negative chest x-ray with coarsened bibasilar interstitial lung markings similar to worse compared to March of 2022.      Past Medical History:   Diagnosis Date    Abnormal echocardiogram 11/16/2012    Anemia     Anticoagulant long-term use     Anxiety     Atrophic kidney 11/16/2012    BPH (benign prostatic hyperplasia) 11/16/2012    Cancer 2000 something    Had radiation    DDD (degenerative disc disease), cervical 07/05/2017    x-ray 7/17 - Severe    GERD  (gastroesophageal reflux disease) 11/16/2012    Glaucoma 11/16/2012    HTN (hypertension) 11/16/2012    Hypothyroid 11/16/2012    Internal carotid artery stenosis 11/16/2012    Joint pain     Left ventricular diastolic dysfunction, NYHA class 1 04/16/2015    4/15    Low serum testosterone level 11/16/2012    Normal cardiac stress test 11/16/2012    Osteopenia 11/16/2012    Shingles 11/16/2012    Skin disease 2020    Seems to have started after Covid vaccine    Stroke 2017    tia   no residual    TIA (transient ischemic attack) 11/16/2012    UC (ulcerative colitis) 11/16/2012       Past Surgical History:   Procedure Laterality Date    ADENOIDECTOMY      COLONOSCOPY N/A 03/25/2022    Procedure: COLONOSCOPY;  Surgeon: Ashley Nguyen MD;  Location: [facility];  Service: Endoscopy;  Laterality: N/A;    ESOPHAGOGASTRODUODENOSCOPY N/A 03/25/2022    Procedure: EGD (ESOPHAGOGASTRODUODENOSCOPY);  Surgeon: Ashley Nguyen MD;  Location: [facility];  Service: Endoscopy;  Laterality: N/A;  added on per Dr. Nguyen    EYE SURGERY Right 11/2020    cataract    HERNIA REPAIR      LEFT HEART CATHETERIZATION Right 10/29/2021    Procedure: CATHETERIZATION, HEART, LEFT AND RIGHT  - LV DARNELL POSSIBLE;  Surgeon: Beau Conklin MD;  Location: [facility] CATH LAB;  Service: Cardiology;  Laterality: Right;    RIGHT HEART CATHETERIZATION Right 10/29/2021    Procedure: INSERTION, CATHETER, RIGHT HEART;  Surgeon: Beau Conklin MD;  Location: [facility] CATH LAB;  Service: Cardiology;  Laterality: Right;    SKIN BIOPSY  2020    TONSILLECTOMY         Review of patient's allergies indicates:   Allergen Reactions    Benazepril Other (See Comments)     Cough       No current facility-administered medications on file prior to encounter.     Current Outpatient Medications on File Prior to Encounter   Medication Sig    ascorbic acid, vitamin C, (VITAMIN C) 500 MG tablet Take 500 mg by mouth once daily.    aspirin (ECOTRIN) 81 MG EC tablet Take 81 mg by  mouth once daily.    ELIQUIS 2.5 mg Tab Take 2.5 mg by mouth 2 (two) times daily.    levothyroxine (EUTHYROX) 125 MCG tablet TAKE 1 TABLET BY MOUTH ONCE DAILY IN THE MORNING    pantoprazole (PROTONIX) 40 MG tablet Take 1 tablet by mouth twice daily    pravastatin (PRAVACHOL) 20 MG tablet Take 1 tablet (20 mg total) by mouth every evening.    ustekinumab (STELARA) 90 mg/mL Syrg syringe Inject 1 mL (90 mg total) into the skin every 8 weeks.    venlafaxine (EFFEXOR) 37.5 MG Tab Take 1 tablet by mouth twice daily    vitC/E/Zn/copper/lutein/zeaxan (ICAPS AREDS2 ORAL) Take 1 capsule by mouth once daily.    acetaminophen (TYLENOL) 500 MG tablet Take 2 tablets (1,000 mg total) by mouth every 6 (six) hours as needed.    calcium carbonate (TUMS) 200 mg calcium (500 mg) chewable tablet Take 1 tablet by mouth once daily.    hydrOXYzine HCL (ATARAX) 25 MG tablet TAKE 1 TABLET BY MOUTH EVERY 6 HOURS AS NEEDED FOR ITCHING    levocetirizine (XYZAL) 5 MG tablet Take 5 mg by mouth every morning.    metoprolol succinate (TOPROL-XL) 25 MG 24 hr tablet Take 1 tablet (25 mg total) by mouth once daily.    tamsulosin (FLOMAX) 0.4 mg Cp24 Take 0.4 mg by mouth once daily.     triamcinolone acetonide 0.1% (KENALOG) 0.1 % cream APPLY  CREAM EXTERNALLY TWICE DAILY    UNABLE TO FIND Take 2 tablets by mouth daily as needed. Charcoal tabs    [DISCONTINUED] telmisartan (MICARDIS) 40 MG Tab TAKE 1/2 TABLET BY MOUTH DAILY     Family History       Problem Relation (Age of Onset)    Alzheimer's disease Mother    Cancer Brother    Diabetes Brother    Heart attack Father    Heart disease Brother (56)    Hypertension Mother, Brother          Tobacco Use    Smoking status: Former     Packs/day: 1.00     Years: 25.00     Pack years: 25.00     Types: Cigarettes     Quit date: 1972     Years since quittin.0    Smokeless tobacco: Never   Substance and Sexual Activity    Alcohol use: Yes     Alcohol/week: 3.0 standard drinks     Types: 2 Glasses of  wine, 1 Cans of beer per week     Comment: Occasional    Drug use: Never    Sexual activity: Not Currently     Partners: Female     Birth control/protection: None     Review of Systems   Constitutional:  Negative for chills and fever.   HENT:  Negative for nosebleeds and tinnitus.    Eyes:  Negative for photophobia and visual disturbance.   Respiratory:  Positive for shortness of breath. Negative for wheezing.    Cardiovascular:  Negative for chest pain, palpitations and leg swelling.   Gastrointestinal:  Negative for abdominal distention, nausea and vomiting.   Genitourinary:  Negative for dysuria, flank pain and hematuria.   Musculoskeletal:  Negative for gait problem and joint swelling.   Skin:  Negative for rash and wound.   Neurological:  Positive for light-headedness. Negative for seizures and syncope.   Objective:     Vital Signs (Most Recent):  Temp: 97.7 °F (36.5 °C) (01/23/23 1228)  Pulse: (!) 51 (01/23/23 1506)  Resp: 20 (01/23/23 1506)  BP: (!) 144/69 (01/23/23 1502)  SpO2: 97 % (01/23/23 1506)   Vital Signs (24h Range):  Temp:  [97.7 °F (36.5 °C)] 97.7 °F (36.5 °C)  Pulse:  [47-51] 51  Resp:  [18-28] 20  SpO2:  [97 %-99 %] 97 %  BP: (113-149)/(59-70) 144/69     Weight: 72.6 kg (160 lb)  Body mass index is 22.32 kg/m².    Physical Exam  Vitals and nursing note reviewed.   Constitutional:       General: He is not in acute distress.     Appearance: He is well-developed. He is not diaphoretic.   HENT:      Head: Normocephalic and atraumatic.      Right Ear: External ear normal.      Left Ear: External ear normal.   Eyes:      General:         Right eye: No discharge.         Left eye: No discharge.      Conjunctiva/sclera: Conjunctivae normal.   Neck:      Thyroid: No thyromegaly.   Cardiovascular:      Rate and Rhythm: Normal rate and regular rhythm.      Heart sounds: Murmur heard.   Pulmonary:      Effort: Pulmonary effort is normal. No respiratory distress.      Breath sounds: Normal breath sounds.    Abdominal:      General: Bowel sounds are normal. There is no distension.      Palpations: Abdomen is soft. There is no mass.      Tenderness: There is no abdominal tenderness.   Musculoskeletal:         General: No deformity.      Cervical back: Normal range of motion and neck supple.      Right lower leg: No edema.      Left lower leg: No edema.   Skin:     General: Skin is warm and dry.   Neurological:      Mental Status: He is alert and oriented to person, place, and time.      Sensory: No sensory deficit.   Psychiatric:         Mood and Affect: Mood normal.         Behavior: Behavior normal.           Significant Labs: CBC:   Recent Labs   Lab 01/23/23  1309   WBC 7.84   HGB 13.4*   HCT 41.8        CMP:   Recent Labs   Lab 01/23/23  1309      K 4.6      CO2 26   GLU 95   BUN 26*   CREATININE 1.5*   CALCIUM 9.3   PROT 7.9   ALBUMIN 4.1   BILITOT 0.3   ALKPHOS 64   AST 15   ALT 6*   ANIONGAP 10     Cardiac Markers:   Recent Labs   Lab 01/23/23  1309   BNP 1,012*     Coagulation:   Recent Labs   Lab 01/23/23  1309   INR 1.0   APTT 31.6     Lactic Acid:   Recent Labs   Lab 01/23/23  1309   LACTATE 1.6     Troponin:   Recent Labs   Lab 01/23/23  1309   TROPONINI 0.010     TSH:   Recent Labs   Lab 01/23/23  1309   TSH 2.071     Urine Studies:   Recent Labs   Lab 01/23/23  1604   COLORU Yellow   APPEARANCEUA Clear   PHUR 7.0   SPECGRAV 1.015   PROTEINUA Trace*   GLUCUA Negative   KETONESU Negative   BILIRUBINUA Negative   OCCULTUA Negative   NITRITE Negative   UROBILINOGEN Negative   LEUKOCYTESUR Negative       Significant Imaging:   Imaging Results              X-Ray Chest 1 View (Final result)  Result time 01/23/23 14:43:54   Procedure changed from X-Ray Chest PA And Lateral     Final result by Jonah Villeda MD (01/23/23 14:43:54)                   Impression:      Coarsened bibasilar interstitial lung markings with bibasilar ground-glass densities, similar to mildly worse aeration when  "compared with 03/23/2022.  Suggest correlation for any clinical signs of superimposed infectious/inflammatory process such as pneumonia or sequela of aspiration.      Electronically signed by: Jonah Villeda MD  Date:    01/23/2023  Time:    14:43               Narrative:    EXAMINATION:  XR CHEST 1 VIEW    CLINICAL HISTORY:  Provided history is "SOB;  Shortness of breath".    TECHNIQUE:  One view of the chest.    COMPARISON:  03/23/2022 and 03/21/2022.    FINDINGS:  Cardiac wires overlie the chest.  Cardiac silhouette is stable.  Atherosclerotic calcifications overlie the aortic arch.  Lung volumes are relatively low.  There are coarsened bibasilar interstitial markings with left greater than right ground-glass opacities.  Aeration is similar to slightly worse when compared with prior studies.  No confluent area of consolidation.  No large pleural effusion.  No pneumothorax.                                        Assessment/Plan:     * SOB (shortness of breath)  Presents with SOB on exertion, lightheaded, and fatigue. Possibly multifactorial from history of Aortic stenosis and PVCs seen on initial EKG. Chest x-ray with bibasilar interstitial markings similar to mildly worse than 3/2022. Suspect this is related to pulmonary edema. He denies infectious symptoms.   Multiple PVCs seen in ED, but otherwise sinus bradycardia. Will hold home metoprolol  Given lasix, will consider further given suspected Aortic stenosis  Repeat echo for AS  Cardiology consulted  Telemetry  Troponin trend    Advanced care planning/counseling discussion  Advance Care Planning     Date: 01/23/2023    Code Status  In light of the patients advanced and life limiting illness,I engaged the the patient in a conversation about the patient's preferences for care  at the very end of life. The patient wishes to have a natural, peaceful death.  Along those lines, the patient does not wish to have CPR or other invasive treatments performed when his " heart and/or breathing stops. I communicated to the patient that a DNR order would be placed in his medical record to reflect this preference.  I spent a total of 16 minutes engaging the patient in this advance care planning discussion.             Coronary artery disease involving native coronary artery of native heart without angina pectoris  Denies chest pain. Angio 10/21 40% LAD stenosis and aortic stenosis. Denies chest pain  Troponin trend  Telemetry  Echo  continue home aspirin/statin    PAF (paroxysmal atrial fibrillation)  Patient with Paroxysmal (<7 days) atrial fibrillation which is controlled currently with Beta Blocker. Patient is currently in sinus rhythm.[id number] Score: 3. Anticoagulation indicated. Anticoagulation done with eliquis.    Nonrheumatic aortic valve stenosis  Moderate by previous echo. Will repeat    CKD (chronic kidney disease) stage 3, GFR 30-59 ml/min  Cr today of 1.5. baseline of 1.3-1.5.  Renal dose medications, avoid nephrotoxic drugs, monitor intake and output    Internal carotid artery stenosis  Continue home aspirin/statin    Essential hypertension  Fair control. Home metoprolol held for bradycardia    BPH (benign prostatic hyperplasia)  Continue home flomax    Acquired hypothyroidism  Lab Results   Component Value Date    TSH 2.071 01/23/2023     Continue home synthroid    UC (ulcerative colitis)  Denies abdominal pain. Previously on humira then entyvio.   Continue outpatient follow up      VTE Risk Mitigation (From admission, onward)           Ordered     apixaban tablet 2.5 mg  2 times daily         01/23/23 1710     Place HARIKA hose  Until discontinued         01/23/23 1613     IP VTE HIGH RISK PATIENT  Once         01/23/23 1613     Place sequential compression device  Until discontinued         01/23/23 1613                  Discussed with ED physician.    VTE: eliquis  Code: full  Diet: cardiac  Dispo: pending repeat echo, and cardiology recommendations  As clarification,  on 1/23/2023, patient should be admitted for hospital observation services under my care in collaboration with Kristen Farah MD. Murali Vega PA-C  Department of Hospital Medicine   [facility] [place] [facility] Emergency Dept

## 2023-01-23 NOTE — SUBJECTIVE & OBJECTIVE
Past Medical History:   Diagnosis Date    Abnormal echocardiogram 11/16/2012    Anemia     Anticoagulant long-term use     Anxiety     Atrophic kidney 11/16/2012    BPH (benign prostatic hyperplasia) 11/16/2012    Cancer 2000 something    Had radiation    DDD (degenerative disc disease), cervical 07/05/2017    x-ray 7/17 - Severe    GERD (gastroesophageal reflux disease) 11/16/2012    Glaucoma 11/16/2012    HTN (hypertension) 11/16/2012    Hypothyroid 11/16/2012    Internal carotid artery stenosis 11/16/2012    Joint pain     Left ventricular diastolic dysfunction, NYHA class 1 04/16/2015    4/15    Low serum testosterone level 11/16/2012    Normal cardiac stress test 11/16/2012    Osteopenia 11/16/2012    Shingles 11/16/2012    Skin disease 2020    Seems to have started after Covid vaccine    Stroke 2017    tia   no residual    TIA (transient ischemic attack) 11/16/2012    UC (ulcerative colitis) 11/16/2012       Past Surgical History:   Procedure Laterality Date    ADENOIDECTOMY      COLONOSCOPY N/A 03/25/2022    Procedure: COLONOSCOPY;  Surgeon: Ashley Nguyen MD;  Location: [facility];  Service: Endoscopy;  Laterality: N/A;    ESOPHAGOGASTRODUODENOSCOPY N/A 03/25/2022    Procedure: EGD (ESOPHAGOGASTRODUODENOSCOPY);  Surgeon: Ashley Nguyen MD;  Location: [facility];  Service: Endoscopy;  Laterality: N/A;  added on per Dr. Nguyen    EYE SURGERY Right 11/2020    cataract    HERNIA REPAIR      LEFT HEART CATHETERIZATION Right 10/29/2021    Procedure: CATHETERIZATION, HEART, LEFT AND RIGHT  - LV DARNELL POSSIBLE;  Surgeon: Beau Conklin MD;  Location: [facility] CATH LAB;  Service: Cardiology;  Laterality: Right;    RIGHT HEART CATHETERIZATION Right 10/29/2021    Procedure: INSERTION, CATHETER, RIGHT HEART;  Surgeon: Beau Conklin MD;  Location: [facility] CATH LAB;  Service: Cardiology;  Laterality: Right;    SKIN BIOPSY  2020    TONSILLECTOMY         Review of patient's allergies indicates:   Allergen Reactions     Benazepril Other (See Comments)     Cough       No current facility-administered medications on file prior to encounter.     Current Outpatient Medications on File Prior to Encounter   Medication Sig    ascorbic acid, vitamin C, (VITAMIN C) 500 MG tablet Take 500 mg by mouth once daily.    aspirin (ECOTRIN) 81 MG EC tablet Take 81 mg by mouth once daily.    ELIQUIS 2.5 mg Tab Take 2.5 mg by mouth 2 (two) times daily.    levothyroxine (EUTHYROX) 125 MCG tablet TAKE 1 TABLET BY MOUTH ONCE DAILY IN THE MORNING    pantoprazole (PROTONIX) 40 MG tablet Take 1 tablet by mouth twice daily    pravastatin (PRAVACHOL) 20 MG tablet Take 1 tablet (20 mg total) by mouth every evening.    ustekinumab (STELARA) 90 mg/mL Syrg syringe Inject 1 mL (90 mg total) into the skin every 8 weeks.    venlafaxine (EFFEXOR) 37.5 MG Tab Take 1 tablet by mouth twice daily    vitC/E/Zn/copper/lutein/zeaxan (ICAPS AREDS2 ORAL) Take 1 capsule by mouth once daily.    acetaminophen (TYLENOL) 500 MG tablet Take 2 tablets (1,000 mg total) by mouth every 6 (six) hours as needed.    calcium carbonate (TUMS) 200 mg calcium (500 mg) chewable tablet Take 1 tablet by mouth once daily.    hydrOXYzine HCL (ATARAX) 25 MG tablet TAKE 1 TABLET BY MOUTH EVERY 6 HOURS AS NEEDED FOR ITCHING    levocetirizine (XYZAL) 5 MG tablet Take 5 mg by mouth every morning.    metoprolol succinate (TOPROL-XL) 25 MG 24 hr tablet Take 1 tablet (25 mg total) by mouth once daily.    tamsulosin (FLOMAX) 0.4 mg Cp24 Take 0.4 mg by mouth once daily.     triamcinolone acetonide 0.1% (KENALOG) 0.1 % cream APPLY  CREAM EXTERNALLY TWICE DAILY    UNABLE TO FIND Take 2 tablets by mouth daily as needed. Charcoal tabs    [DISCONTINUED] telmisartan (MICARDIS) 40 MG Tab TAKE 1/2 TABLET BY MOUTH DAILY     Family History       Problem Relation (Age of Onset)    Alzheimer's disease Mother    Cancer Brother    Diabetes Brother    Heart attack Father    Heart disease Brother (56)    Hypertension  Mother, Brother          Tobacco Use    Smoking status: Former     Packs/day: 1.00     Years: 25.00     Pack years: 25.00     Types: Cigarettes     Quit date: 1972     Years since quittin.0    Smokeless tobacco: Never   Substance and Sexual Activity    Alcohol use: Yes     Alcohol/week: 3.0 standard drinks     Types: 2 Glasses of wine, 1 Cans of beer per week     Comment: Occasional    Drug use: Never    Sexual activity: Not Currently     Partners: Female     Birth control/protection: None     Review of Systems   Constitutional:  Negative for chills and fever.   HENT:  Negative for nosebleeds and tinnitus.    Eyes:  Negative for photophobia and visual disturbance.   Respiratory:  Positive for shortness of breath. Negative for wheezing.    Cardiovascular:  Negative for chest pain, palpitations and leg swelling.   Gastrointestinal:  Negative for abdominal distention, nausea and vomiting.   Genitourinary:  Negative for dysuria, flank pain and hematuria.   Musculoskeletal:  Negative for gait problem and joint swelling.   Skin:  Negative for rash and wound.   Neurological:  Positive for light-headedness. Negative for seizures and syncope.   Objective:     Vital Signs (Most Recent):  Temp: 97.7 °F (36.5 °C) (23 1228)  Pulse: (!) 51 (23 1506)  Resp: 20 (23 1506)  BP: (!) 144/69 (23 1502)  SpO2: 97 % (23 1506)   Vital Signs (24h Range):  Temp:  [97.7 °F (36.5 °C)] 97.7 °F (36.5 °C)  Pulse:  [47-51] 51  Resp:  [18-28] 20  SpO2:  [97 %-99 %] 97 %  BP: (113-149)/(59-70) 144/69     Weight: 72.6 kg (160 lb)  Body mass index is 22.32 kg/m².    Physical Exam  Vitals and nursing note reviewed.   Constitutional:       General: He is not in acute distress.     Appearance: He is well-developed. He is not diaphoretic.   HENT:      Head: Normocephalic and atraumatic.      Right Ear: External ear normal.      Left Ear: External ear normal.   Eyes:      General:         Right eye: No discharge.          Left eye: No discharge.      Conjunctiva/sclera: Conjunctivae normal.   Neck:      Thyroid: No thyromegaly.   Cardiovascular:      Rate and Rhythm: Normal rate and regular rhythm.      Heart sounds: Murmur heard.   Pulmonary:      Effort: Pulmonary effort is normal. No respiratory distress.      Breath sounds: Normal breath sounds.   Abdominal:      General: Bowel sounds are normal. There is no distension.      Palpations: Abdomen is soft. There is no mass.      Tenderness: There is no abdominal tenderness.   Musculoskeletal:         General: No deformity.      Cervical back: Normal range of motion and neck supple.      Right lower leg: No edema.      Left lower leg: No edema.   Skin:     General: Skin is warm and dry.   Neurological:      Mental Status: He is alert and oriented to person, place, and time.      Sensory: No sensory deficit.   Psychiatric:         Mood and Affect: Mood normal.         Behavior: Behavior normal.           Significant Labs: CBC:   Recent Labs   Lab 01/23/23  1309   WBC 7.84   HGB 13.4*   HCT 41.8        CMP:   Recent Labs   Lab 01/23/23  1309      K 4.6      CO2 26   GLU 95   BUN 26*   CREATININE 1.5*   CALCIUM 9.3   PROT 7.9   ALBUMIN 4.1   BILITOT 0.3   ALKPHOS 64   AST 15   ALT 6*   ANIONGAP 10     Cardiac Markers:   Recent Labs   Lab 01/23/23  1309   BNP 1,012*     Coagulation:   Recent Labs   Lab 01/23/23  1309   INR 1.0   APTT 31.6     Lactic Acid:   Recent Labs   Lab 01/23/23  1309   LACTATE 1.6     Troponin:   Recent Labs   Lab 01/23/23  1309   TROPONINI 0.010     TSH:   Recent Labs   Lab 01/23/23  1309   TSH 2.071     Urine Studies:   Recent Labs   Lab 01/23/23  1604   COLORU Yellow   APPEARANCEUA Clear   PHUR 7.0   SPECGRAV 1.015   PROTEINUA Trace*   GLUCUA Negative   KETONESU Negative   BILIRUBINUA Negative   OCCULTUA Negative   NITRITE Negative   UROBILINOGEN Negative   LEUKOCYTESUR Negative       Significant Imaging:   Imaging Results              X-Ray  "Chest 1 View (Final result)  Result time 01/23/23 14:43:54   Procedure changed from X-Ray Chest PA And Lateral     Final result by Jonah Villeda MD (01/23/23 14:43:54)                   Impression:      Coarsened bibasilar interstitial lung markings with bibasilar ground-glass densities, similar to mildly worse aeration when compared with 03/23/2022.  Suggest correlation for any clinical signs of superimposed infectious/inflammatory process such as pneumonia or sequela of aspiration.      Electronically signed by: Jonah Villeda MD  Date:    01/23/2023  Time:    14:43               Narrative:    EXAMINATION:  XR CHEST 1 VIEW    CLINICAL HISTORY:  Provided history is "SOB;  Shortness of breath".    TECHNIQUE:  One view of the chest.    COMPARISON:  03/23/2022 and 03/21/2022.    FINDINGS:  Cardiac wires overlie the chest.  Cardiac silhouette is stable.  Atherosclerotic calcifications overlie the aortic arch.  Lung volumes are relatively low.  There are coarsened bibasilar interstitial markings with left greater than right ground-glass opacities.  Aeration is similar to slightly worse when compared with prior studies.  No confluent area of consolidation.  No large pleural effusion.  No pneumothorax.                                      "

## 2023-01-23 NOTE — ASSESSMENT & PLAN NOTE
Cr today of 1.5. baseline of 1.3-1.5.  Renal dose medications, avoid nephrotoxic drugs, monitor intake and output

## 2023-01-23 NOTE — ED PROVIDER NOTES
"Encounter Date: 1/23/2023    SCRIBE #1 NOTE: I, Janine Jewell, am scribing for, and in the presence of,  Kandis Ridley MD. I have scribed the following portions of the note - Other sections scribed: HPI, ROS, PE.     History     Chief Complaint   Patient presents with    Shortness of Breath     Pt c/o sob and generalized weakness. Hr at home was 39.     Ramesh Ricci is a 83 y.o. male, with a PMHx of TIA, ICA stenosis, HTN, Aortic stenosis, afib on eliquis, and Hypothyroidism, who presents to the ED with weakness and a low pulse rate of 39 today. Additionally history obtained from independent historian-patient's wife- who states her and the patient were going shopping today and when they reached the door to the shopping center the patient reported he felt weak, like couldn't walk. The wife reports they returned home and found the patient's pulse was 39 and his blood pressure was . The wife notes she called the patient's cardiologist office and his cardiologist is unfortunately in [place] currently and the office advised them to come to the ED. The wife reports ever since the patient was diagnosed with aortic stenosis, he's increasingly gotten worse. The patient reports this morning he woke up feeling poorly, even though he felt fine the night before. Patient is sob (worse with walking, progressive), and feels his heart is beating "funny". Patient notes he was feeling lightheaded, but that symptom has since subsided. Patient's wife reports when the patient walks he has to stop and catch his breath, and this has been off and on for a while.The wife notes the patient has rash that appears on and off on the right arm for the past 2 years. The wife reports the patient's cardiologist is currently out of the country, but he's been monitoring the patient's stenosis as the patient is scheduled to see him in March. No pacemaker or AICD. No other exacerbating or alleviating factors. Patient denies nausea, " "vomiting, chest pain, or other associated symptoms. Eliquis, Metoprolol, Aspirin, Stelara, Pantoprazole, Pravastatin : daily medications. Patient denies tobacco illicit drug use. Patient drinks wine occasionally. Patient reports a PSHx of Esophagogastroduodenoscopy (N/A), Colonoscopy (N/A), Left heart catheterization (Right), Right heart catheterization (Right), Eye surgery (Right), Adenoidectomy, Hernia repair, Skin biopsy, Tonsillectomy. Benazepril: drug allergies. Patient has advanced directives per him and wife as he previously wishes to not to have CPR or put on a breathing machine, if the situation arises. However the wife at bedside states she would like them to make the decision "should the situation arise."     The history is provided by the patient and the spouse. No  was used.   Review of patient's allergies indicates:   Allergen Reactions    Benazepril Other (See Comments)     Cough     Past Medical History:   Diagnosis Date    Abnormal echocardiogram 11/16/2012    Anemia     Anticoagulant long-term use     Anxiety     Atrophic kidney 11/16/2012    BPH (benign prostatic hyperplasia) 11/16/2012    Cancer 2000 something    Had radiation    DDD (degenerative disc disease), cervical 07/05/2017    x-ray 7/17 - Severe    GERD (gastroesophageal reflux disease) 11/16/2012    Glaucoma 11/16/2012    HTN (hypertension) 11/16/2012    Hypothyroid 11/16/2012    Internal carotid artery stenosis 11/16/2012    Joint pain     Left ventricular diastolic dysfunction, NYHA class 1 04/16/2015    4/15    Low serum testosterone level 11/16/2012    Normal cardiac stress test 11/16/2012    Osteopenia 11/16/2012    Shingles 11/16/2012    Skin disease 2020    Seems to have started after Covid vaccine    Stroke 2017    tia   no residual    TIA (transient ischemic attack) 11/16/2012    UC (ulcerative colitis) 11/16/2012     Past Surgical History:   Procedure Laterality Date    ADENOIDECTOMY      COLONOSCOPY N/A " "2022    Procedure: COLONOSCOPY;  Surgeon: Ashley Nguyen MD;  Location: [facility] ENDO;  Service: Endoscopy;  Laterality: N/A;    ESOPHAGOGASTRODUODENOSCOPY N/A 2022    Procedure: EGD (ESOPHAGOGASTRODUODENOSCOPY);  Surgeon: Ashley Nguyen MD;  Location: [facility] ENDO;  Service: Endoscopy;  Laterality: N/A;  added on per Dr. Nguyen    EYE SURGERY Right 2020    cataract    HERNIA REPAIR      LEFT HEART CATHETERIZATION Right 10/29/2021    Procedure: CATHETERIZATION, HEART, LEFT AND RIGHT  - LV DARNELL POSSIBLE;  Surgeon: Beau Conklin MD;  Location: [facility] CATH LAB;  Service: Cardiology;  Laterality: Right;    RIGHT HEART CATHETERIZATION Right 10/29/2021    Procedure: INSERTION, CATHETER, RIGHT HEART;  Surgeon: Beau Coknlin MD;  Location: [facility] CATH LAB;  Service: Cardiology;  Laterality: Right;    SKIN BIOPSY      TONSILLECTOMY       Family History   Problem Relation Age of Onset    Hypertension Mother     Alzheimer's disease Mother     Diabetes Brother     Hypertension Brother     Cancer Brother     Heart disease Brother 56        MI    Heart attack Father      Social History     Tobacco Use    Smoking status: Former     Packs/day: 1.00     Years: 25.00     Pack years: 25.00     Types: Cigarettes     Quit date: 1972     Years since quittin.0    Smokeless tobacco: Never   Substance Use Topics    Alcohol use: Yes     Alcohol/week: 3.0 standard drinks     Types: 2 Glasses of wine, 1 Cans of beer per week     Comment: Occasional    Drug use: Never     Review of Systems   Constitutional:  Negative for chills and fever.   HENT:  Negative for drooling and voice change.    Eyes:  Negative for photophobia and visual disturbance.   Respiratory:  Positive for shortness of breath. Negative for wheezing.    Cardiovascular:  Positive for palpitations. Negative for chest pain and leg swelling.        (+) heart is beating "funny"  (+) low pulse rate   Gastrointestinal:  Negative for abdominal pain, " diarrhea, nausea and vomiting.   Genitourinary:  Negative for dysuria, frequency, hematuria and urgency.   Musculoskeletal:  Negative for myalgias and neck pain.   Skin:  Positive for rash (right arm, chronic). Negative for wound.   Neurological:  Positive for weakness (in the legs like he couldn't walk). Negative for syncope, light-headedness and numbness.   Psychiatric/Behavioral:  Negative for agitation and confusion.    All other systems reviewed and are negative.    Physical Exam     Initial Vitals [01/23/23 1228]   BP Pulse Resp Temp SpO2   117/69 (!) 47 18 97.7 °F (36.5 °C) 99 %      MAP       --         Physical Exam    Nursing note and vitals reviewed.  Constitutional: He appears well-developed and well-nourished. He is not diaphoretic. No distress.   Patient is elderly.Able to lie flat in bed.    HENT:   Head: Normocephalic and atraumatic.   Right Ear: External ear normal.   Left Ear: External ear normal.   Nose: Nose normal.   Eyes: Conjunctivae and EOM are normal. Pupils are equal, round, and reactive to light. Right eye exhibits no discharge. Left eye exhibits no discharge. No scleral icterus.   Neck: Neck supple. JVD present.   Normal range of motion.  Cardiovascular:  Normal rate and intact distal pulses. An irregular rhythm present.     Exam reveals no gallop and no friction rub.       Murmur (Loud, consistent with aortic stenosis) heard.  Frequent PVCs noted on monitor    Pulmonary/Chest: No respiratory distress. He has no wheezes. He has rhonchi (mild, scattered). He has no rales. He exhibits no tenderness.   Abdominal: Abdomen is soft. Bowel sounds are normal. He exhibits no distension and no mass. There is no abdominal tenderness. There is no rebound and no guarding.   Musculoskeletal:         General: No tenderness or edema. Normal range of motion.      Cervical back: Normal range of motion and neck supple.     Lymphadenopathy:     He has no cervical adenopathy.   Neurological: He is alert and  oriented to person, place, and time. He has normal strength and normal reflexes. No cranial nerve deficit or sensory deficit. GCS score is 15. GCS eye subscore is 4. GCS verbal subscore is 5. GCS motor subscore is 6.   Moves all extremities and carries on conversation. CN- II: PERRL; III/IV/VI: EOMI w/out evidence of nystagmus; V: no deficits appreciated to light touch bilateral face; VII: no facial weakness, no facial asymmetry. Eyebrow raise symmetric. Smile symmetric; IX/X: palate midline, and raises symmetrically; XI: shoulder shrug 5/5 bilaterally; XII: tongue is midline w/out asymmetry. Strength 5/5 to bilateral upper and lower extremities, sensation intact to light touch,    Skin: Skin is warm and dry. Capillary refill takes less than 2 seconds. Rash (to right back and arm, which is chronic per wife) noted. No abscess noted. No erythema. No pallor.   Psychiatric: He has a normal mood and affect. His behavior is normal. Thought content normal.       ED Course   Procedures  Labs Reviewed   CBC W/ AUTO DIFFERENTIAL - Abnormal; Notable for the following components:       Result Value    Hemoglobin 13.4 (*)     RDW 15.2 (*)     All other components within normal limits   COMPREHENSIVE METABOLIC PANEL - Abnormal; Notable for the following components:    BUN 26 (*)     Creatinine 1.5 (*)     ALT 6 (*)     eGFR 45 (*)     All other components within normal limits   B-TYPE NATRIURETIC PEPTIDE - Abnormal; Notable for the following components:    BNP 1,012 (*)     All other components within normal limits   URINALYSIS, REFLEX TO URINE CULTURE - Abnormal; Notable for the following components:    Protein, UA Trace (*)     All other components within normal limits    Narrative:     Specimen Source->Urine   TROPONIN I   TSH   MAGNESIUM   LACTIC ACID, PLASMA   D DIMER, QUANTITATIVE   APTT   PROTIME-INR   PROCALCITONIN   PROCALCITONIN   TROPONIN I   SARS-COV-2 RDRP GENE        ECG Results              EKG 12-lead (Final  "result)  Result time 01/23/23 18:18:20      Final result by Interface, Lab In [facility] (01/23/23 18:18:20)                   Narrative:    Test Reason : R00.2,    Vent. Rate : 064 BPM     Atrial Rate : 064 BPM     P-R Int : 130 ms          QRS Dur : 096 ms      QT Int : 468 ms       P-R-T Axes : 036 034 078 degrees     QTc Int : 482 ms    Sinus rhythm with frequent Premature ventricular complexes  Septal infarct (cited on or before 21-MAR-2022)  Abnormal ECG  When compared with ECG of 23-MAR-2022 10:30,  Significant changes have occurred  Confirmed by Nikolai Oscar MD ([id number]) on 1/23/2023 6:18:12 PM    Referred By: [email or web address]   SELF           Confirmed By:Nikolai Oscar MD                                  Imaging Results              X-Ray Chest 1 View (Final result)  Result time 01/23/23 14:43:54   Procedure changed from X-Ray Chest PA And Lateral     Final result by Jonah Villeda MD (01/23/23 14:43:54)                   Impression:      Coarsened bibasilar interstitial lung markings with bibasilar ground-glass densities, similar to mildly worse aeration when compared with 03/23/2022.  Suggest correlation for any clinical signs of superimposed infectious/inflammatory process such as pneumonia or sequela of aspiration.      Electronically signed by: Jonah Villeda MD  Date:    01/23/2023  Time:    14:43               Narrative:    EXAMINATION:  XR CHEST 1 VIEW    CLINICAL HISTORY:  Provided history is "SOB;  Shortness of breath".    TECHNIQUE:  One view of the chest.    COMPARISON:  03/23/2022 and 03/21/2022.    FINDINGS:  Cardiac wires overlie the chest.  Cardiac silhouette is stable.  Atherosclerotic calcifications overlie the aortic arch.  Lung volumes are relatively low.  There are coarsened bibasilar interstitial markings with left greater than right ground-glass opacities.  Aeration is similar to slightly worse when compared with prior studies.  No confluent area of consolidation.  No large pleural effusion.  " No pneumothorax.                                       Medications   melatonin tablet 6 mg (has no administration in time range)   senna-docusate 8.6-50 mg per tablet 1 tablet (has no administration in time range)   acetaminophen tablet 650 mg (has no administration in time range)   naloxone 0.4 mg/mL injection 0.02 mg (has no administration in time range)   magnesium oxide tablet 800 mg (has no administration in time range)   magnesium oxide tablet 800 mg (has no administration in time range)   glucose chewable tablet 16 g (has no administration in time range)   glucose chewable tablet 24 g (has no administration in time range)   glucagon (human recombinant) injection 1 mg (has no administration in time range)   sodium chloride 0.9% flush 10 mL (has no administration in time range)   aspirin EC tablet 81 mg (has no administration in time range)   apixaban tablet 2.5 mg (has no administration in time range)   levothyroxine tablet 125 mcg (has no administration in time range)   pravastatin tablet 20 mg (has no administration in time range)   tamsulosin 24 hr capsule 0.4 mg (has no administration in time range)   metoprolol succinate (TOPROL-XL) 24 hr tablet 25 mg (has no administration in time range)   furosemide injection 20 mg (20 mg Intravenous Given 1/23/23 1606)     Medical Decision Making:   History:   Old Medical Records: I decided to obtain old medical records.  Old Records Summarized: records from previous admission(s) and other records.       <> Summary of Records: Patient Name: Ramesh Ricci  MRN: [id number]  Patient Class: IP- Inpatient  Admission Date: 3/23/2022  Hospital Length of Stay: 3 days  Discharge Date and Time:  03/26/2022 9:27 AM  Attending Physician: Kristen Farah MD   Discharging Provider: Kristen Farah MD  Primary Care Provider: EFREM Muniz MD        HPI:   Mr Ramesh Ricci is a 82 y.o. man with ulcerative colitis who presents with weakness.      UC was diagnosed in his  20s. He was previously on humira which worked well but this was discontinued when there were concerns it was causing a rash. His wife suspects the COVID vaccine caused the rash instead. Since then he has been taking mesalamine daily. He follows with GI- wife says Eli, but unable to see any GI notes in [facility] system. No prior surgeries for UC. No EGD/cscope to review but he has had them in the past.  He was previously on prednisone for a rash, that stopped about 2 weeks ago. He was started on flagyl for suspected infection about a week ago but has not seen any improvement in symptoms. He has developed weakness, needing to use his walker more frequently and this morning unable to get out of bed. Fevers, worsening heartburn, diarrhea 4-5x/day watery dark no hematochezia increased from his usual 1-2 BM/day. No abdominal pain currently. No nausea, vomiting. Able to eat normally until about 2 days ago.      Of note, he was also placed in observation on 3/21 for Afib RVR. Added metoprolol for HR control. Weakness has worsened since then. He was a little short of breath this morning but that is resolved. No chest pain. Does have lightheadedness. Wife reports he is not as sharp as he usually is.      In ED, noted to have fever to 102, BP 90s/50s. No leukocytosis. Given vanc, zosyn, IVF. Procal negative. Admitted for UC flare +/- infectious colitis.         Procedure(s) (LRB):  EGD (ESOPHAGOGASTRODUODENOSCOPY) (N/A)  COLONOSCOPY (N/A)       Hospital Course:   Mr Ramesh Ricci is a 82 y.o. man with ulcerative colitis who was admitted with colitis flare +/- infectious colitis. Started zosyn. Cdiff negative, stool culture negative. GI consulted, mesalamine held. EGD/cscope on 3/25 showed normal EGD and inactive colitis. Biopsies taken. OK to resume mesalamine and use imodium PRN. DC antibiotics. BP seems to run low- DC telmisartan but continue metoprolol for PAFib. PT, OT evaluated him for weakness and recommended  outpatient PT, OT. Referrals placed. Follow up in IBD clinic.        Independently Interpreted Test(s):   I have ordered and independently interpreted EKG Reading(s) - see summary below     MDM  83-year-old male with past medical history of ulcerative colitis, hypothyroidism, BPH, hypertension, ICA stenosis, TIA, AFib on Eliquis, severe aortic stenosis presents with lightheadedness, weakness, shortness of breath and palpitations that started this morning.  Patient was unable to get the cars he felt too weak.  They drove home and obtained blood pressure which was systolic of 157.  His heart rate was 39 at home.  This is not normal for patient.  On tele monitoring he is having frequent PVCs.  He states he feels improved with sitting still but still feels slightly short of breath and generalized weakness.  He denies any chest pain, nausea or vomiting.  Denies any lightheadedness currently.  He follows with a cardiologist SHIV Seth however he is currently in [facility] and is unable to be reached.  He was advised by their office to present to the ED. they report that his aortic stenosis has been worsening and they have been monitoring it but are concerned by its progression.  He does not have a pacemaker or AICD.  Patient's exam with irregular heart rhythm, bradycardia although rate on monitor with PVCs in the 50s to 60s, loud murmur consistent with aortic stenosis, lungs clear to auscultation she would bilaterally, normal neuro exam. DDX includes but is not limited to Arrhythmia, ACS, metabolic derangement, thyroid dysfunction, electrolyte abnormality, less likely infection, PE.  Will obtain labs, x-ray and anticipate admission for tele monitoring, echo, Cardiology evaluation.  Patient wife updated in agreement with plan.  Will hold off on any atropine at this time as patient states his symptoms have improved.    Troponin WNL. PVCs improved during patient ED stay as well as patient's symptoms. Still not at  baseline per patient and wife. CXR with interstitial marking per my interpretation, Doubt PNa given no cough, fever, WBC, and procal WNL. Doubt aspiration as patient without event or history of this. BNP elevated to 1000, last echo in our system 2019. Patient not on diuretic at home. Question worsening aortic stenosis causing fluid back up and irritable heart conduction system and increased PVC. Will give small dose of lasix 20 mg to see if improvement in symptoms with this. Discussed patient with jillian and will place in observation for arrhythmia evaluation, echo and possible new onset CHF. Patient and wife in agreement with plan and all questions answered.      Scribe Attestation:   Scribe #1: I performed the above scribed service and the documentation accurately describes the services I performed. I attest to the accuracy of the note.      ED Course as of 01/23/23 1855   Mon Jan 23, 2023   1328 EKG 12-lead  Sinus rhythm with frequent PVCs at 64.  No significant ST elevation given limitations of EKG.  T-wave inversions in aVL.  Normal axis.  QTC is 482.  PVCs were not present on his previous EKGs. [JT]      ED Course User Index  [JT] Kandis Ridley MD                 Clinical Impression:   Final diagnoses:  [R00.2] Palpitations  [R06.02] SOB (shortness of breath)  [I35.0] Aortic stenosis  [R07.9] Chest pain        ED Disposition Condition    Observation         I, Kandis Ridley, personally performed the services described in this documentation. All medical record entries made by the scribe were at my direction and in my presence. I have reviewed the chart and agree that the record reflects my personal performance and is accurate and complete.         Kandis Ridley MD  01/24/23 0020       Kandis Rdiley MD  01/24/23 0021

## 2023-01-23 NOTE — ED TRIAGE NOTES
Patient complaining of weakness and a low pulse rate of 39 that started this morning when they went to shop. Also has SOB. Patient has a hx of afib and aortic stenosis. Denies LOC and chest pain. Denies HA/N/V/D. Placed on cardiac monitor.

## 2023-01-23 NOTE — ASSESSMENT & PLAN NOTE
Denies chest pain. Angio 10/21 40% LAD stenosis and aortic stenosis. Denies chest pain  Troponin trend  Telemetry  Echo  continue home aspirin/statin

## 2023-01-23 NOTE — ASSESSMENT & PLAN NOTE
Patient with Paroxysmal (<7 days) atrial fibrillation which is controlled currently with Beta Blocker. Patient is currently in sinus rhythm.[id number] Score: 3. Anticoagulation indicated. Anticoagulation done with eliquis.

## 2023-01-23 NOTE — ASSESSMENT & PLAN NOTE
Presents with SOB on exertion, lightheaded, and fatigue. Possibly multifactorial from history of Aortic stenosis and PVCs seen on initial EKG. Chest x-ray with bibasilar interstitial markings similar to mildly worse than 3/2022. Suspect this is related to pulmonary edema. He denies infectious symptoms.   Continue home metoprolol for PVCs  Given lasix  Repeat echo for AS  Cardiology consulted  Telemetry  Troponin trend

## 2023-01-23 NOTE — HPI
Ramesh Ricci 83 y.o. male with Aortic stenosis, afib on anticoagulation, HTN, BPH, presents to the hospital with a chief complaint of lightheadedness.  He reports today when he awoke he found he was experiencing intermittent lightheadedness that worsened with exertion and improved with rest.  This was associated with shortness of breath is only present on exertion.  He denies any attempted treatment home.  He reports his wife was a nurse checked his pulse at home and found it to be low causing presentation in the emergency room.  He is compliant with his home anticoagulation.  He denies fever nausea vomiting abdominal pain leg swelling melena hematuria hematemesis numbness weakness of extremity and slurred speech and syncope.    In the ED, EKG with PVCs, did D-dimer negative INR 1.0 creatinine 1.5  2 troponin negative lactic acid negative TSH within normal limits BRCA negative COVID negative chest x-ray with coarsened bibasilar interstitial lung markings similar to worse compared to March of 2022.

## 2023-01-24 VITALS
WEIGHT: 154.5 LBS | OXYGEN SATURATION: 100 % | TEMPERATURE: 98 F | RESPIRATION RATE: 17 BRPM | HEIGHT: 71 IN | SYSTOLIC BLOOD PRESSURE: 151 MMHG | BODY MASS INDEX: 21.63 KG/M2 | HEART RATE: 57 BPM | DIASTOLIC BLOOD PRESSURE: 68 MMHG

## 2023-01-24 PROBLEM — R00.2 PALPITATIONS: Status: ACTIVE | Noted: 2023-01-24

## 2023-01-24 LAB
ALBUMIN SERPL BCP-MCNC: 3.9 G/DL (ref 3.5–5.2)
ALP SERPL-CCNC: 60 U/L (ref 55–135)
ALT SERPL W/O P-5'-P-CCNC: 7 U/L (ref 10–44)
ANION GAP SERPL CALC-SCNC: 11 MMOL/L (ref 8–16)
AST SERPL-CCNC: 15 U/L (ref 10–40)
AV INDEX (PROSTH): 0.27
AV MEAN GRADIENT: 34 MMHG
AV PEAK GRADIENT: 62 MMHG
AV VALVE AREA: 0.94 CM2
AV VELOCITY RATIO: 0.24
BASOPHILS # BLD AUTO: 0.05 K/UL (ref 0–0.2)
BASOPHILS NFR BLD: 0.6 % (ref 0–1.9)
BILIRUB SERPL-MCNC: 0.5 MG/DL (ref 0.1–1)
BSA FOR ECHO PROCEDURE: 1.87 M2
BUN SERPL-MCNC: 22 MG/DL (ref 8–23)
CALCIUM SERPL-MCNC: 9.3 MG/DL (ref 8.7–10.5)
CHLORIDE SERPL-SCNC: 101 MMOL/L (ref 95–110)
CO2 SERPL-SCNC: 27 MMOL/L (ref 23–29)
CREAT SERPL-MCNC: 1.3 MG/DL (ref 0.5–1.4)
CV ECHO LV RWT: 0.48 CM
DIFFERENTIAL METHOD: ABNORMAL
DOP CALC AO PEAK VEL: 3.94 M/S
DOP CALC AO VTI: 73.2 CM
DOP CALC LVOT AREA: 3.4 CM2
DOP CALC LVOT DIAMETER: 2.09 CM
DOP CALC LVOT PEAK VEL: 0.96 M/S
DOP CALC LVOT STROKE VOLUME: 68.92 CM3
DOP CALCLVOT PEAK VEL VTI: 20.1 CM
E WAVE DECELERATION TIME: 415.94 MSEC
E/A RATIO: 0.89
E/E' RATIO: 28 M/S
ECHO LV POSTERIOR WALL: 1.24 CM (ref 0.6–1.1)
EJECTION FRACTION: 70 %
EOSINOPHIL # BLD AUTO: 0.2 K/UL (ref 0–0.5)
EOSINOPHIL NFR BLD: 2.8 % (ref 0–8)
ERYTHROCYTE [DISTWIDTH] IN BLOOD BY AUTOMATED COUNT: 14.9 % (ref 11.5–14.5)
EST. GFR  (NO RACE VARIABLE): 54 ML/MIN/1.73 M^2
FRACTIONAL SHORTENING: 24 % (ref 28–44)
GLUCOSE SERPL-MCNC: 89 MG/DL (ref 70–110)
HCT VFR BLD AUTO: 40.2 % (ref 40–54)
HGB BLD-MCNC: 12.8 G/DL (ref 14–18)
IMM GRANULOCYTES # BLD AUTO: 0.02 K/UL (ref 0–0.04)
IMM GRANULOCYTES NFR BLD AUTO: 0.3 % (ref 0–0.5)
INTERVENTRICULAR SEPTUM: 1.21 CM (ref 0.6–1.1)
IVC DIAMETER: 1.53 CM
IVRT: 114.18 MSEC
LA MAJOR: 7.39 CM
LA MINOR: 7.5 CM
LA WIDTH: 6.6 CM
LEFT ATRIUM SIZE: 4.33 CM
LEFT ATRIUM VOLUME INDEX: 95.7 ML/M2
LEFT ATRIUM VOLUME: 180.84 CM3
LEFT INTERNAL DIMENSION IN SYSTOLE: 3.93 CM (ref 2.1–4)
LEFT VENTRICLE DIASTOLIC VOLUME INDEX: 68.23 ML/M2
LEFT VENTRICLE DIASTOLIC VOLUME: 128.95 ML
LEFT VENTRICLE MASS INDEX: 135 G/M2
LEFT VENTRICLE SYSTOLIC VOLUME INDEX: 35.6 ML/M2
LEFT VENTRICLE SYSTOLIC VOLUME: 67.33 ML
LEFT VENTRICULAR INTERNAL DIMENSION IN DIASTOLE: 5.19 CM (ref 3.5–6)
LEFT VENTRICULAR MASS: 255.31 G
LV LATERAL E/E' RATIO: 25.2 M/S
LV SEPTAL E/E' RATIO: 31.5 M/S
LVOT MG: 2.05 MMHG
LVOT MV: 0.67 CM/S
LYMPHOCYTES # BLD AUTO: 1.4 K/UL (ref 1–4.8)
LYMPHOCYTES NFR BLD: 17.4 % (ref 18–48)
MCH RBC QN AUTO: 29 PG (ref 27–31)
MCHC RBC AUTO-ENTMCNC: 31.8 G/DL (ref 32–36)
MCV RBC AUTO: 91 FL (ref 82–98)
MONOCYTES # BLD AUTO: 1 K/UL (ref 0.3–1)
MONOCYTES NFR BLD: 12.2 % (ref 4–15)
MV PEAK A VEL: 1.42 M/S
MV PEAK E VEL: 1.26 M/S
MV STENOSIS PRESSURE HALF TIME: 120.62 MS
MV VALVE AREA P 1/2 METHOD: 1.82 CM2
NEUTROPHILS # BLD AUTO: 5.2 K/UL (ref 1.8–7.7)
NEUTROPHILS NFR BLD: 66.7 % (ref 38–73)
NRBC BLD-RTO: 0 /100 WBC
PISA TR MAX VEL: 2.55 M/S
PLATELET # BLD AUTO: 208 K/UL (ref 150–450)
PMV BLD AUTO: 10.8 FL (ref 9.2–12.9)
POTASSIUM SERPL-SCNC: 3.8 MMOL/L (ref 3.5–5.1)
PROT SERPL-MCNC: 7.4 G/DL (ref 6–8.4)
PV PEAK VELOCITY: 1.16 CM/S
RA MAJOR: 4.81 CM
RA PRESSURE: 3 MMHG
RA WIDTH: 4.1 CM
RBC # BLD AUTO: 4.41 M/UL (ref 4.6–6.2)
RIGHT VENTRICULAR END-DIASTOLIC DIMENSION: 3.9 CM
RV TISSUE DOPPLER FREE WALL SYSTOLIC VELOCITY 1 (APICAL 4 CHAMBER VIEW): 0.01 CM/S
SINUS: 3.62 CM
SODIUM SERPL-SCNC: 139 MMOL/L (ref 136–145)
STJ: 2.55 CM
TDI LATERAL: 0.05 M/S
TDI SEPTAL: 0.04 M/S
TDI: 0.05 M/S
TR MAX PG: 26 MMHG
TRICUSPID ANNULAR PLANE SYSTOLIC EXCURSION: 2.09 CM
TV REST PULMONARY ARTERY PRESSURE: 29 MMHG
WBC # BLD AUTO: 7.77 K/UL (ref 3.9–12.7)

## 2023-01-24 PROCEDURE — 99223 PR INITIAL HOSPITAL CARE,LEVL III: ICD-10-PCS | Mod: 25,,, | Performed by: INTERNAL MEDICINE

## 2023-01-24 PROCEDURE — 85025 COMPLETE CBC W/AUTO DIFF WBC: CPT | Performed by: PHYSICIAN ASSISTANT

## 2023-01-24 PROCEDURE — 99223 1ST HOSP IP/OBS HIGH 75: CPT | Mod: 25,,, | Performed by: INTERNAL MEDICINE

## 2023-01-24 PROCEDURE — 25000003 PHARM REV CODE 250: Performed by: PHYSICIAN ASSISTANT

## 2023-01-24 PROCEDURE — 80053 COMPREHEN METABOLIC PANEL: CPT | Performed by: PHYSICIAN ASSISTANT

## 2023-01-24 PROCEDURE — G0378 HOSPITAL OBSERVATION PER HR: HCPCS

## 2023-01-24 RX ADMIN — ASPIRIN 81 MG: 81 TABLET, COATED ORAL at 09:01

## 2023-01-24 RX ADMIN — APIXABAN 2.5 MG: 2.5 TABLET, FILM COATED ORAL at 09:01

## 2023-01-24 RX ADMIN — TAMSULOSIN HYDROCHLORIDE 0.4 MG: 0.4 CAPSULE ORAL at 09:01

## 2023-01-24 RX ADMIN — LEVOTHYROXINE SODIUM 125 MCG: 0.12 TABLET ORAL at 08:01

## 2023-01-24 NOTE — ASSESSMENT & PLAN NOTE
Moderate in the past - suspect this has progressed. Repeat echo. Ok to follow up with regular cardiologist

## 2023-01-24 NOTE — NURSING
[facility]  Nurses Note -- 4 Eyes      1/24/2023       Skin assessed on: Admit      [x] No Pressure Injuries Present    []Prevention Measures Documented    [] Yes LDA  for Pressure Injury Previously documented     [] Yes New Pressure Injury Discovered   [] LDA for New Pressure Injury Added      Attending RN:  Dedra Gordon, POLO     Second: PETE Edge

## 2023-01-24 NOTE — NURSING
Upon arrival to floor from ED: patient oriented to room, call bell in reach and bed in lowest position. Denies pain or discomfort at this time. No apparent distress noted.

## 2023-01-24 NOTE — ED NOTES
Pt presented today w/ c/o generalized weakness, palpitations, and sob w/ exertion.  Pt found to have a low HR in the 30s.  ED work up done, Pt admitted, diagnose Palpitations.  Pt presently is AAOx3, resp even and unlabored, skin warm and dry. CM shows sinus bradycardia in the 50's.  Pt denies chest pain or sob.  HOB elevated, SR up x 2, call bell within reach. NAD noted.

## 2023-01-24 NOTE — TELEPHONE ENCOUNTER
Incoming call from wife, Mone, patient has not heard back from [facility], based on last refill , injection next due on 2/10/23. Will continue to follow up.

## 2023-01-24 NOTE — PLAN OF CARE
01/24/23 1433   Final Note   Assessment Type Final Discharge Note   Anticipated Discharge Disposition Home   Hospital Resources/Appts/Education Provided Appointments scheduled and added to AVS   Post-Acute Status   Post-Acute Authorization Other   Other Status No Post-Acute Service Needs     Pts nurse Dedra notified that the pt can d/c from CM standpoint

## 2023-01-24 NOTE — ASSESSMENT & PLAN NOTE
Presents with SOB on exertion, lightheaded, and fatigue. Possibly multifactorial from history of Aortic stenosis and PVCs seen on initial EKG. Chest x-ray with bibasilar interstitial markings similar to mildly worse than 3/2022. Suspect this is related to pulmonary edema. He denies infectious symptoms.   Multiple PVCs seen in ED, but otherwise sinus bradycardia. Will hold home metoprolol  Given lasix, will consider further given suspected Aortic stenosis  Repeat echo for AS  Cardiology consulted  Telemetry  Troponin trend

## 2023-01-24 NOTE — CONSULTS
[facility] [place] [facility]  Cardiology  Consult Note    Patient Name: Ramesh Ricci  MRN: [id number]  Admission Date: 1/23/2023  Hospital Length of Stay: 0 days  Code Status: DNR   Attending Provider: Chris Smith MD   Consulting Provider: Nikolai Oscar MD  Primary Care Physician: EFREM Muniz MD  Principal Problem:SOB (shortness of breath)    Patient information was obtained from patient and ER records.     Consults  Subjective:     Chief Complaint:  AS, PVCs       HPI: Ramesh Ricci 83 y.o. male with Aortic stenosis, afib on anticoagulation, HTN, BPH, presents to the hospital with a chief complaint of lightheadedness.  He reports today when he awoke he found he was experiencing intermittent lightheadedness that worsened with exertion and improved with rest.  This was associated with shortness of breath is only present on exertion.  He denies any attempted treatment home.  He reports his wife was a nurse checked his pulse at home and found it to be low causing presentation in the emergency room.  He is compliant with his home anticoagulation.  He denies fever nausea vomiting abdominal pain leg swelling melena hematuria hematemesis numbness weakness of extremity and slurred speech and syncope.     In the ED, EKG with PVCs, did D-dimer negative INR 1.0 creatinine 1.5  2 troponin negative lactic acid negative TSH within normal limits BRCA negative COVID negative chest x-ray with coarsened bibasilar interstitial lung markings similar to worse compared to March of 2022.     Denies CP or SOB - eager to go home  EKG NSR frequent PVCs  HR 50-60s on telemetry  Troponin negative x 3  BNP 1012    Echo 2/19/20   Mild concentric left ventricular hypertrophy.   The estimated PA systolic pressure is 29 mmHg.   Normal left ventricular systolic function. The estimated ejection fraction is 66%.   Grade I (mild) left ventricular diastolic dysfunction consistent with impaired relaxation.   No wall motion  abnormalities.   Moderate left atrial enlargement.   Moderate aortic valve stenosis.   Aortic valve area is 0.97 cm2; peak velocity is 3.52 m/s; mean gradient is 28 mmHg.   Mild tricuspid regurgitation.   Severe Mitral annular calcification.    [facility] 10/29/21   PA pressure 47/20 mm hg   Good LV systolic function   Moderate aortic stenosis with mean gradient of 19 mm hg and area = 1.18 sq cm.   No aortic insuffiency   Mild non obstructive calcific coronary artery disease    Followed by Dr Conklin  1. PAF (paroxysmal atrial fibrillation)    2. Aortic stenosis, mild    3. Essential hypertension    4. CKD (chronic kidney disease) stage 3, GFR 30-59 ml/min    5. Prostate CA    6. Acquired hypothyroidism    7. Low serum testosterone level    8. Ulcerative colitis with complication, unspecified location    9. Ex-smoker        Past Medical History:   Diagnosis Date    Abnormal echocardiogram 11/16/2012    Anemia     Anticoagulant long-term use     Anxiety     Atrophic kidney 11/16/2012    BPH (benign prostatic hyperplasia) 11/16/2012    Cancer 2000 something    Had radiation    DDD (degenerative disc disease), cervical 07/05/2017    x-ray 7/17 - Severe    GERD (gastroesophageal reflux disease) 11/16/2012    Glaucoma 11/16/2012    HTN (hypertension) 11/16/2012    Hypothyroid 11/16/2012    Internal carotid artery stenosis 11/16/2012    Joint pain     Left ventricular diastolic dysfunction, NYHA class 1 04/16/2015    4/15    Low serum testosterone level 11/16/2012    Normal cardiac stress test 11/16/2012    Osteopenia 11/16/2012    Shingles 11/16/2012    Skin disease 2020    Seems to have started after Covid vaccine    Stroke 2017    tia   no residual    TIA (transient ischemic attack) 11/16/2012    UC (ulcerative colitis) 11/16/2012       Past Surgical History:   Procedure Laterality Date    ADENOIDECTOMY      COLONOSCOPY N/A 03/25/2022    Procedure: COLONOSCOPY;  Surgeon: Ashley Nguyen,  MD;  Location: [facility] ENDO;  Service: Endoscopy;  Laterality: N/A;    ESOPHAGOGASTRODUODENOSCOPY N/A 03/25/2022    Procedure: EGD (ESOPHAGOGASTRODUODENOSCOPY);  Surgeon: Ashley Nguyen MD;  Location: [facility];  Service: Endoscopy;  Laterality: N/A;  added on per Dr. Nguyen    EYE SURGERY Right 11/2020    cataract    HERNIA REPAIR      LEFT HEART CATHETERIZATION Right 10/29/2021    Procedure: CATHETERIZATION, HEART, LEFT AND RIGHT  - LV DARNELL POSSIBLE;  Surgeon: Beau Conklin MD;  Location: [facility] CATH LAB;  Service: Cardiology;  Laterality: Right;    RIGHT HEART CATHETERIZATION Right 10/29/2021    Procedure: INSERTION, CATHETER, RIGHT HEART;  Surgeon: Beau Conklin MD;  Location: [facility] CATH LAB;  Service: Cardiology;  Laterality: Right;    SKIN BIOPSY  2020    TONSILLECTOMY         Review of patient's allergies indicates:   Allergen Reactions    Benazepril Other (See Comments)     Cough       No current facility-administered medications on file prior to encounter.     Current Outpatient Medications on File Prior to Encounter   Medication Sig    ascorbic acid, vitamin C, (VITAMIN C) 500 MG tablet Take 500 mg by mouth once daily.    aspirin (ECOTRIN) 81 MG EC tablet Take 81 mg by mouth once daily.    ELIQUIS 2.5 mg Tab Take 2.5 mg by mouth 2 (two) times daily.    levothyroxine (EUTHYROX) 125 MCG tablet TAKE 1 TABLET BY MOUTH ONCE DAILY IN THE MORNING    pantoprazole (PROTONIX) 40 MG tablet Take 1 tablet by mouth twice daily    pravastatin (PRAVACHOL) 20 MG tablet Take 1 tablet (20 mg total) by mouth every evening.    ustekinumab (STELARA) 90 mg/mL Syrg syringe Inject 1 mL (90 mg total) into the skin every 8 weeks.    venlafaxine (EFFEXOR) 37.5 MG Tab Take 1 tablet by mouth twice daily    vitC/E/Zn/copper/lutein/zeaxan (ICAPS AREDS2 ORAL) Take 1 capsule by mouth once daily.    acetaminophen (TYLENOL) 500 MG tablet Take 2 tablets (1,000 mg total) by mouth every 6 (six) hours as needed.     calcium carbonate (TUMS) 200 mg calcium (500 mg) chewable tablet Take 1 tablet by mouth once daily.    hydrOXYzine HCL (ATARAX) 25 MG tablet TAKE 1 TABLET BY MOUTH EVERY 6 HOURS AS NEEDED FOR ITCHING    levocetirizine (XYZAL) 5 MG tablet Take 5 mg by mouth every morning.    metoprolol succinate (TOPROL-XL) 25 MG 24 hr tablet Take 1 tablet (25 mg total) by mouth once daily.    tamsulosin (FLOMAX) 0.4 mg Cp24 Take 0.4 mg by mouth once daily.     triamcinolone acetonide 0.1% (KENALOG) 0.1 % cream APPLY  CREAM EXTERNALLY TWICE DAILY    UNABLE TO FIND Take 2 tablets by mouth daily as needed. Charcoal tabs    [DISCONTINUED] telmisartan (MICARDIS) 40 MG Tab TAKE 1/2 TABLET BY MOUTH DAILY     Family History       Problem Relation (Age of Onset)    Alzheimer's disease Mother    Cancer Brother    Diabetes Brother    Heart attack Father    Heart disease Brother (56)    Hypertension Mother, Brother          Tobacco Use    Smoking status: Former     Packs/day: 1.00     Years: 25.00     Pack years: 25.00     Types: Cigarettes     Quit date: 1972     Years since quittin.0    Smokeless tobacco: Never   Substance and Sexual Activity    Alcohol use: Yes     Alcohol/week: 3.0 standard drinks     Types: 2 Glasses of wine, 1 Cans of beer per week     Comment: Occasional    Drug use: Never    Sexual activity: Not Currently     Partners: Female     Birth control/protection: None     Review of Systems   Constitutional: Negative for decreased appetite.   HENT:  Negative for ear discharge.    Eyes:  Negative for blurred vision.   Endocrine: Negative for polyphagia.   Skin:  Negative for nail changes.   Genitourinary:  Negative for bladder incontinence.   Neurological:  Negative for aphonia.   Psychiatric/Behavioral:  Negative for hallucinations.    Allergic/Immunologic: Negative for hives.   Objective:     Vital Signs (Most Recent):  Temp: 97.6 °F (36.4 °C) (23 1116)  Pulse: (!) 57 (23 111)  Resp: 17  (01/24/23 1116)  BP: (!) 151/68 (01/24/23 1116)  SpO2: 100 % (01/24/23 1116)   Vital Signs (24h Range):  Temp:  [97.4 °F (36.3 °C)-98.4 °F (36.9 °C)] 97.6 °F (36.4 °C)  Pulse:  [47-68] 57  Resp:  [16-28] 17  SpO2:  [94 %-100 %] 100 %  BP: (113-165)/(59-78) 151/68     Weight: 70.1 kg (154 lb 8 oz)  Body mass index is 21.55 kg/m².    SpO2: 100 %       No intake or output data in the 24 hours ending 01/24/23 1118    Lines/Drains/Airways       Peripheral Intravenous Line  Duration                  Peripheral IV - Single Lumen 01/23/23 2000 20 G Right Antecubital <1 day                    Physical Exam  Constitutional:       Appearance: He is well-developed.   HENT:      Head: Normocephalic and atraumatic.   Eyes:      Conjunctiva/sclera: Conjunctivae normal.      Pupils: Pupils are equal, round, and reactive to light.   Cardiovascular:      Rate and Rhythm: Normal rate.      Pulses: Intact distal pulses.      Heart sounds: Murmur heard.   Harsh midsystolic murmur is present with a grade of 3/6 at the upper right sternal border radiating to the neck.   Pulmonary:      Effort: Pulmonary effort is normal.      Breath sounds: Normal breath sounds.   Abdominal:      General: Bowel sounds are normal.      Palpations: Abdomen is soft.   Musculoskeletal:         General: Normal range of motion.      Cervical back: Normal range of motion and neck supple.   Skin:     General: Skin is warm and dry.   Neurological:      Mental Status: He is alert and oriented to person, place, and time.       Significant Labs: All pertinent lab results from the last 24 hours have been reviewed.    Significant Imaging: Echocardiogram: 2D echo with color flow doppler:   Results for orders placed or performed in visit on 03/10/16   2D echo with color flow doppler   Result Value Ref Range    Right Vent (Diastole)  0.8 - 2.6    Septum (Diastole)  0.6 - 1.2    Left Ventricle (Diastole)  3.5 - 5.5    Posterior Wall (Diastole)  0.6 - 1.2    Aortic Valve  (Diastole)  1.5 - 2.6    Aortic Root (Diastole)  1.3 - 3.7    Left Atrium (Diastole)  2.5 - 4    Septum (Systole)  0.5 - 1.1    Left Ventricle (Systole)  2.2 - 4    Posteriol Wall (Systole)  0.5 - 1.2    EF + QEF  %    Aortic Valve Area  2.5 - 3.5 cm2    Aortic Peak Gradient  0 - 9.9 mm Hg    Aortic Mean Gradient  0 - 9.9 mm Hg    Aortic Peak Velocity  0 - 1.9 m/s    Aortic Pres. Half Time  599 - 999 m/sec    Mitral Valve Area  4 - 6 cm2    Mitral Pres. Half Time  30 - 60 m/sec    Mitral Valve E-A Ratio  0.75 - 999    Mitral Valve E-E prime  0 - 7    Pulmonary Artery Pressure  0 - 29 mm Hg     Assessment and Plan:     Palpitations  Frequent PVC's. Difficult to manage with low resting HR. Suspect home HR 30s was from uncounted PVCs    PAF (paroxysmal atrial fibrillation)  Currently NSR - on eliquis    Nonrheumatic aortic valve stenosis  Moderate in the past - suspect this has progressed. Repeat echo. Ok to follow up with regular cardiologist        VTE Risk Mitigation (From admission, onward)         Ordered     apixaban tablet 2.5 mg  2 times daily         01/23/23 1710     Place HARIKA hose  Until discontinued         01/23/23 1613     IP VTE HIGH RISK PATIENT  Once         01/23/23 1613     Place sequential compression device  Until discontinued         01/23/23 1613                Thank you for your consult. I will sign off. Please contact us if you have any additional questions.    Nikolai Oscar MD  Cardiology   [facility]

## 2023-01-24 NOTE — ASSESSMENT & PLAN NOTE
Frequent PVC's. Difficult to manage with low resting HR. Suspect home HR 30s was from uncounted PVCs

## 2023-01-24 NOTE — HPI
HPI: Ramesh Ricci 83 y.o. male with Aortic stenosis, afib on anticoagulation, HTN, BPH, presents to the hospital with a chief complaint of lightheadedness.  He reports today when he awoke he found he was experiencing intermittent lightheadedness that worsened with exertion and improved with rest.  This was associated with shortness of breath is only present on exertion.  He denies any attempted treatment home.  He reports his wife was a nurse checked his pulse at home and found it to be low causing presentation in the emergency room.  He is compliant with his home anticoagulation.  He denies fever nausea vomiting abdominal pain leg swelling melena hematuria hematemesis numbness weakness of extremity and slurred speech and syncope.     In the ED, EKG with PVCs, did D-dimer negative INR 1.0 creatinine 1.5  2 troponin negative lactic acid negative TSH within normal limits BRCA negative COVID negative chest x-ray with coarsened bibasilar interstitial lung markings similar to worse compared to March of 2022.     Denies CP or SOB - eager to go home  EKG NSR frequent PVCs  HR 50-60s on telemetry  Troponin negative x 3  BNP 1012    Echo 2/19/20  Mild concentric left ventricular hypertrophy.  The estimated PA systolic pressure is 29 mmHg.  Normal left ventricular systolic function. The estimated ejection fraction is 66%.  Grade I (mild) left ventricular diastolic dysfunction consistent with impaired relaxation.  No wall motion abnormalities.  Moderate left atrial enlargement.  Moderate aortic valve stenosis.  Aortic valve area is 0.97 cm2; peak velocity is 3.52 m/s; mean gradient is 28 mmHg.  Mild tricuspid regurgitation.  Severe Mitral annular calcification.    [facility] 10/29/21  PA pressure 47/20 mm hg  Good LV systolic function  Moderate aortic stenosis with mean gradient of 19 mm hg and area = 1.18 sq cm.  No aortic insuffiency  Mild non obstructive calcific coronary artery disease    Followed by Dr Conklin  1.  PAF (paroxysmal atrial fibrillation)    2. Aortic stenosis, mild    3. Essential hypertension    4. CKD (chronic kidney disease) stage 3, GFR 30-59 ml/min    5. Prostate CA    6. Acquired hypothyroidism    7. Low serum testosterone level    8. Ulcerative colitis with complication, unspecified location    9. Ex-smoker

## 2023-01-24 NOTE — PLAN OF CARE
01/24/23 0941   Discharge Planning   Assessment Type Discharge Planning Brief Assessment   Resource/Environmental Concerns none   Support Systems Spouse/significant other;Family members   Equipment Currently Used at Home walker, rolling;cane, straight;other (see comments)  (transport chair)   Current Living Arrangements home   Patient/Family Anticipates Transition to home with family   Patient/Family Anticipated Services at Transition none   DME Needed Upon Discharge  none   Discharge Plan A Home with family  (with instructions to followup)       [id number] - [place] [address] [place] [address]  Phone: [phone] Fax: [phone]    [facility]/pharmacy #[id number] - [place] - [address]  [address] [place] [address]  Phone: [phone] Fax: [phone]

## 2023-01-24 NOTE — ASSESSMENT & PLAN NOTE
Advance Care Planning     Date: 01/23/2023    Code Status  In light of the patients advanced and life limiting illness,I engaged the the patient in a conversation about the patient's preferences for care  at the very end of life. The patient wishes to have a natural, peaceful death.  Along those lines, the patient does not wish to have CPR or other invasive treatments performed when his heart and/or breathing stops. I communicated to the patient that a DNR order would be placed in his medical record to reflect this preference.  I spent a total of 16 minutes engaging the patient in this advance care planning discussion.

## 2023-01-24 NOTE — SUBJECTIVE & OBJECTIVE
Past Medical History:   Diagnosis Date    Abnormal echocardiogram 11/16/2012    Anemia     Anticoagulant long-term use     Anxiety     Atrophic kidney 11/16/2012    BPH (benign prostatic hyperplasia) 11/16/2012    Cancer 2000 something    Had radiation    DDD (degenerative disc disease), cervical 07/05/2017    x-ray 7/17 - Severe    GERD (gastroesophageal reflux disease) 11/16/2012    Glaucoma 11/16/2012    HTN (hypertension) 11/16/2012    Hypothyroid 11/16/2012    Internal carotid artery stenosis 11/16/2012    Joint pain     Left ventricular diastolic dysfunction, NYHA class 1 04/16/2015    4/15    Low serum testosterone level 11/16/2012    Normal cardiac stress test 11/16/2012    Osteopenia 11/16/2012    Shingles 11/16/2012    Skin disease 2020    Seems to have started after Covid vaccine    Stroke 2017    tia   no residual    TIA (transient ischemic attack) 11/16/2012    UC (ulcerative colitis) 11/16/2012       Past Surgical History:   Procedure Laterality Date    ADENOIDECTOMY      COLONOSCOPY N/A 03/25/2022    Procedure: COLONOSCOPY;  Surgeon: Ashley Nguyen MD;  Location: [facility];  Service: Endoscopy;  Laterality: N/A;    ESOPHAGOGASTRODUODENOSCOPY N/A 03/25/2022    Procedure: EGD (ESOPHAGOGASTRODUODENOSCOPY);  Surgeon: Ashley Nguyen MD;  Location: [facility];  Service: Endoscopy;  Laterality: N/A;  added on per Dr. Nguyen    EYE SURGERY Right 11/2020    cataract    HERNIA REPAIR      LEFT HEART CATHETERIZATION Right 10/29/2021    Procedure: CATHETERIZATION, HEART, LEFT AND RIGHT  - LV DARNELL POSSIBLE;  Surgeon: Beau Conklin MD;  Location: [facility] CATH LAB;  Service: Cardiology;  Laterality: Right;    RIGHT HEART CATHETERIZATION Right 10/29/2021    Procedure: INSERTION, CATHETER, RIGHT HEART;  Surgeon: Beau Conklin MD;  Location: [facility] CATH LAB;  Service: Cardiology;  Laterality: Right;    SKIN BIOPSY  2020    TONSILLECTOMY         Review of patient's allergies indicates:   Allergen Reactions     Benazepril Other (See Comments)     Cough       No current facility-administered medications on file prior to encounter.     Current Outpatient Medications on File Prior to Encounter   Medication Sig    ascorbic acid, vitamin C, (VITAMIN C) 500 MG tablet Take 500 mg by mouth once daily.    aspirin (ECOTRIN) 81 MG EC tablet Take 81 mg by mouth once daily.    ELIQUIS 2.5 mg Tab Take 2.5 mg by mouth 2 (two) times daily.    levothyroxine (EUTHYROX) 125 MCG tablet TAKE 1 TABLET BY MOUTH ONCE DAILY IN THE MORNING    pantoprazole (PROTONIX) 40 MG tablet Take 1 tablet by mouth twice daily    pravastatin (PRAVACHOL) 20 MG tablet Take 1 tablet (20 mg total) by mouth every evening.    ustekinumab (STELARA) 90 mg/mL Syrg syringe Inject 1 mL (90 mg total) into the skin every 8 weeks.    venlafaxine (EFFEXOR) 37.5 MG Tab Take 1 tablet by mouth twice daily    vitC/E/Zn/copper/lutein/zeaxan (ICAPS AREDS2 ORAL) Take 1 capsule by mouth once daily.    acetaminophen (TYLENOL) 500 MG tablet Take 2 tablets (1,000 mg total) by mouth every 6 (six) hours as needed.    calcium carbonate (TUMS) 200 mg calcium (500 mg) chewable tablet Take 1 tablet by mouth once daily.    hydrOXYzine HCL (ATARAX) 25 MG tablet TAKE 1 TABLET BY MOUTH EVERY 6 HOURS AS NEEDED FOR ITCHING    levocetirizine (XYZAL) 5 MG tablet Take 5 mg by mouth every morning.    metoprolol succinate (TOPROL-XL) 25 MG 24 hr tablet Take 1 tablet (25 mg total) by mouth once daily.    tamsulosin (FLOMAX) 0.4 mg Cp24 Take 0.4 mg by mouth once daily.     triamcinolone acetonide 0.1% (KENALOG) 0.1 % cream APPLY  CREAM EXTERNALLY TWICE DAILY    UNABLE TO FIND Take 2 tablets by mouth daily as needed. Charcoal tabs    [DISCONTINUED] telmisartan (MICARDIS) 40 MG Tab TAKE 1/2 TABLET BY MOUTH DAILY     Family History       Problem Relation (Age of Onset)    Alzheimer's disease Mother    Cancer Brother    Diabetes Brother    Heart attack Father    Heart disease Brother (56)    Hypertension  Mother, Brother          Tobacco Use    Smoking status: Former     Packs/day: 1.00     Years: 25.00     Pack years: 25.00     Types: Cigarettes     Quit date: 1972     Years since quittin.0    Smokeless tobacco: Never   Substance and Sexual Activity    Alcohol use: Yes     Alcohol/week: 3.0 standard drinks     Types: 2 Glasses of wine, 1 Cans of beer per week     Comment: Occasional    Drug use: Never    Sexual activity: Not Currently     Partners: Female     Birth control/protection: None     Review of Systems   Constitutional: Negative for decreased appetite.   HENT:  Negative for ear discharge.    Eyes:  Negative for blurred vision.   Endocrine: Negative for polyphagia.   Skin:  Negative for nail changes.   Genitourinary:  Negative for bladder incontinence.   Neurological:  Negative for aphonia.   Psychiatric/Behavioral:  Negative for hallucinations.    Allergic/Immunologic: Negative for hives.   Objective:     Vital Signs (Most Recent):  Temp: 97.6 °F (36.4 °C) (23 1116)  Pulse: (!) 57 (23 1116)  Resp: 17 (23 1116)  BP: (!) 151/68 (23 1116)  SpO2: 100 % (23 1116)   Vital Signs (24h Range):  Temp:  [97.4 °F (36.3 °C)-98.4 °F (36.9 °C)] 97.6 °F (36.4 °C)  Pulse:  [47-68] 57  Resp:  [16-28] 17  SpO2:  [94 %-100 %] 100 %  BP: (113-165)/(59-78) 151/68     Weight: 70.1 kg (154 lb 8 oz)  Body mass index is 21.55 kg/m².    SpO2: 100 %       No intake or output data in the 24 hours ending 23 1118    Lines/Drains/Airways       Peripheral Intravenous Line  Duration                  Peripheral IV - Single Lumen 23 2000 20 G Right Antecubital <1 day                    Physical Exam  Constitutional:       Appearance: He is well-developed.   HENT:      Head: Normocephalic and atraumatic.   Eyes:      Conjunctiva/sclera: Conjunctivae normal.      Pupils: Pupils are equal, round, and reactive to light.   Cardiovascular:      Rate and Rhythm: Normal rate.      Pulses: Intact  distal pulses.      Heart sounds: Murmur heard.   Harsh midsystolic murmur is present with a grade of 3/6 at the upper right sternal border radiating to the neck.   Pulmonary:      Effort: Pulmonary effort is normal.      Breath sounds: Normal breath sounds.   Abdominal:      General: Bowel sounds are normal.      Palpations: Abdomen is soft.   Musculoskeletal:         General: Normal range of motion.      Cervical back: Normal range of motion and neck supple.   Skin:     General: Skin is warm and dry.   Neurological:      Mental Status: He is alert and oriented to person, place, and time.       Significant Labs: All pertinent lab results from the last 24 hours have been reviewed.    Significant Imaging: Echocardiogram: 2D echo with color flow doppler:   Results for orders placed or performed in visit on 03/10/16   2D echo with color flow doppler   Result Value Ref Range    Right Vent (Diastole)  0.8 - 2.6    Septum (Diastole)  0.6 - 1.2    Left Ventricle (Diastole)  3.5 - 5.5    Posterior Wall (Diastole)  0.6 - 1.2    Aortic Valve (Diastole)  1.5 - 2.6    Aortic Root (Diastole)  1.3 - 3.7    Left Atrium (Diastole)  2.5 - 4    Septum (Systole)  0.5 - 1.1    Left Ventricle (Systole)  2.2 - 4    Posteriol Wall (Systole)  0.5 - 1.2    EF + QEF  %    Aortic Valve Area  2.5 - 3.5 cm2    Aortic Peak Gradient  0 - 9.9 mm Hg    Aortic Mean Gradient  0 - 9.9 mm Hg    Aortic Peak Velocity  0 - 1.9 m/s    Aortic Pres. Half Time  599 - 999 m/sec    Mitral Valve Area  4 - 6 cm2    Mitral Pres. Half Time  30 - 60 m/sec    Mitral Valve E-A Ratio  0.75 - 999    Mitral Valve E-E prime  0 - 7    Pulmonary Artery Pressure  0 - 29 mm Hg

## 2023-01-24 NOTE — PLAN OF CARE
Problem: Adult Inpatient Plan of Care  Goal: Plan of Care Review  Outcome: Ongoing, Progressing  Flowsheets (Taken 1/24/2023 0850)  Plan of Care Reviewed With: patient  Goal: Patient-Specific Goal (Individualized)  Outcome: Ongoing, Progressing     Problem: Fall Injury Risk  Goal: Absence of Fall and Fall-Related Injury  Outcome: Ongoing, Progressing  Intervention: Identify and Manage Contributors  Flowsheets (Taken 1/24/2023 0850)  Self-Care Promotion:   independence encouraged   BADL personal objects within reach   BADL personal routines maintained  Medication Review/Management: medications reviewed  Intervention: Promote Injury-Free Environment  Flowsheets (Taken 1/24/2023 0850)  Safety Promotion/Fall Prevention:   assistive device/personal item within reach   Fall Risk reviewed with patient/family   lighting adjusted   medications reviewed   side rails raised x 2     Problem: Adult Inpatient Plan of Care  Goal: Plan of Care Review  Outcome: Ongoing, Progressing  Flowsheets (Taken 1/24/2023 0850)  Plan of Care Reviewed With: patient     Problem: Adult Inpatient Plan of Care  Goal: Plan of Care Review  Outcome: Ongoing, Progressing  Flowsheets (Taken 1/24/2023 0850)  Plan of Care Reviewed With: patient     Problem: Adult Inpatient Plan of Care  Goal: Patient-Specific Goal (Individualized)  Outcome: Ongoing, Progressing     Problem: Adult Inpatient Plan of Care  Goal: Patient-Specific Goal (Individualized)  Outcome: Ongoing, Progressing     Problem: Fall Injury Risk  Goal: Absence of Fall and Fall-Related Injury  Outcome: Ongoing, Progressing  Intervention: Identify and Manage Contributors  Flowsheets (Taken 1/24/2023 0850)  Self-Care Promotion:   independence encouraged   BADL personal objects within reach   BADL personal routines maintained  Medication Review/Management: medications reviewed  Intervention: Promote Injury-Free Environment  Flowsheets (Taken 1/24/2023 0850)  Safety Promotion/Fall Prevention:    assistive device/personal item within reach   Fall Risk reviewed with patient/family   lighting adjusted   medications reviewed   side rails raised x 2     Problem: Fall Injury Risk  Goal: Absence of Fall and Fall-Related Injury  Outcome: Ongoing, Progressing     Problem: Fall Injury Risk  Goal: Absence of Fall and Fall-Related Injury  Intervention: Identify and Manage Contributors  Flowsheets (Taken 1/24/2023 0850)  Self-Care Promotion:   independence encouraged   BADL personal objects within reach   BADL personal routines maintained  Medication Review/Management: medications reviewed     Problem: Fall Injury Risk  Goal: Absence of Fall and Fall-Related Injury  Intervention: Identify and Manage Contributors  Flowsheets (Taken 1/24/2023 0850)  Self-Care Promotion:   independence encouraged   BADL personal objects within reach   BADL personal routines maintained  Medication Review/Management: medications reviewed     Problem: Fall Injury Risk  Goal: Absence of Fall and Fall-Related Injury  Intervention: Promote Injury-Free Environment  Flowsheets (Taken 1/24/2023 0850)  Safety Promotion/Fall Prevention:   assistive device/personal item within reach   Fall Risk reviewed with patient/family   lighting adjusted   medications reviewed   side rails raised x 2     Problem: Fall Injury Risk  Goal: Absence of Fall and Fall-Related Injury  Intervention: Promote Injury-Free Environment  Flowsheets (Taken 1/24/2023 0850)  Safety Promotion/Fall Prevention:   assistive device/personal item within reach   Fall Risk reviewed with patient/family   lighting adjusted   medications reviewed   side rails raised x 2

## 2023-01-25 NOTE — DISCHARGE SUMMARY
[facility] Medicine  Discharge Summary      Patient Name: Ramesh Ricci  MRN: [id number]  ROMELIA: [id number]  Patient Class: OP- Observation  Admission Date: 1/23/2023  Hospital Length of Stay: 0 days  Discharge Date and Time:  01/24/2023 6:04 PM  Attending Physician: No att. providers found   Discharging Provider: Josiah Isbell PA-C  Primary Care Provider: EFREM Muniz MD    Primary Care Team: Networked reference to record PCT     HPI:   Ramesh Ricci 83 y.o. male with Aortic stenosis, afib on anticoagulation, HTN, BPH, presents to the hospital with a chief complaint of lightheadedness.  He reports today when he awoke he found he was experiencing intermittent lightheadedness that worsened with exertion and improved with rest.  This was associated with shortness of breath is only present on exertion.  He denies any attempted treatment home.  He reports his wife was a nurse checked his pulse at home and found it to be low causing presentation in the emergency room.  He is compliant with his home anticoagulation.  He denies fever nausea vomiting abdominal pain leg swelling melena hematuria hematemesis numbness weakness of extremity and slurred speech and syncope.    In the ED, EKG with PVCs, did D-dimer negative INR 1.0 creatinine 1.5  2 troponin negative lactic acid negative TSH within normal limits BRCA negative COVID negative chest x-ray with coarsened bibasilar interstitial lung markings similar to worse compared to March of 2022.      * No surgery found *      Hospital Course:   Ramesh Ricci was placed under observation for further management of his shortness of breath.    Throughout his observation stay, Mr. Ricci's shortness of breath continued to resolve, and did not require oxygen supplementation. Troponin trended negative as follows: 0.010, 0.019 and 0.009. He was administered Lasix for diuresis. Cardiology was consulted, and recommended repeating an  echocardiogram, as previously noted aortic stenosis is likely to have progressed and presenting with the shortness of breath, and recommended that the patient follow up with his regular cardiologist. Echocardiogram shows:   The left ventricle is normal in size with concentric hypertrophy and normal systolic function.   The estimated ejection fraction is 70%.   Grade II left ventricular diastolic dysfunction.   Normal right ventricular size with normal right ventricular systolic function.   Severe left atrial enlargement.   There is moderate-to-severe aortic valve stenosis.   Aortic valve area is 0.94 cm2; peak velocity is 3.94 m/s; mean gradient is 34 mmHg.   There is mild mitral stenosis.   Mild mitral regurgitation.   Mild tricuspid regurgitation.   Normal central venous pressure (3 mmHg).   The estimated PA systolic pressure is 29 mmHg.     All findings and plan were explained to the patient and wife Mone who was at bedside. All questions and concerns were answered. Patient and wife verbalized understanding. Patient is in stable condition to d/c home and has been informed to follow up with his PCP within the next 7-10 days to discuss his observation stay and to follow-up with his Cardiologist. Patient has been educated to return to the ED if he experiences any further chest pain, shortness of breath, syncopal episodes, lightheadness, weakness, or discomfort.       Goals of Care Treatment Preferences:  Code Status: DNR      Consults:   Consults (From admission, onward)        Status Ordering Provider     Case Management/  Once        Provider:  (Not yet assigned)    NELDA Nix          * SOB (shortness of breath)  Presents with SOB on exertion, lightheaded, and fatigue. Possibly multifactorial from history of Aortic stenosis and PVCs seen on initial EKG. Chest x-ray with bibasilar interstitial markings similar to mildly worse than 3/2022. Suspect this is related to  pulmonary edema. He denies infectious symptoms.   Multiple PVCs seen in ED, but otherwise sinus bradycardia. Will hold home metoprolol  Given lasix, will consider further given suspected Aortic stenosis  Repeat echo for AS  Cardiology consulted  Telemetry  Troponin trend    Advanced care planning/counseling discussion  Advance Care Planning     Date: 01/23/2023    Code Status  In light of the patients advanced and life limiting illness,I engaged the the patient in a conversation about the patient's preferences for care  at the very end of life. The patient wishes to have a natural, peaceful death.  Along those lines, the patient does not wish to have CPR or other invasive treatments performed when his heart and/or breathing stops. I communicated to the patient that a DNR order would be placed in his medical record to reflect this preference.  I spent a total of 16 minutes engaging the patient in this advance care planning discussion.             Essential hypertension  Fair control. Home metoprolol held for bradycardia      Final Active Diagnoses:    Diagnosis Date Noted POA    PRINCIPAL PROBLEM:  SOB (shortness of breath) [R06.02] 01/23/2023 Unknown    Palpitations [R00.2] 01/24/2023 Yes    Advanced care planning/counseling discussion [Z71.89] 03/25/2022 Not Applicable    Coronary artery disease involving native coronary artery of native heart without angina pectoris [I25.10] 01/31/2022 Yes     Chronic    PAF (paroxysmal atrial fibrillation) [I48.0] 12/20/2018 Yes     Chronic    Nonrheumatic aortic valve stenosis [I35.0] 04/16/2015 Yes     Chronic    CKD (chronic kidney disease) stage 3, GFR 30-59 ml/min [N18.30] 10/06/2014 Yes     Chronic    UC (ulcerative colitis) [K51.90] 11/16/2012 Yes     Chronic    Internal carotid artery stenosis [I65.29] 11/16/2012 Yes    Essential hypertension [I10] 11/16/2012 Yes     Chronic    Acquired hypothyroidism [E03.9] 11/16/2012 Yes     Chronic    BPH (benign  prostatic hyperplasia) [N40.0] 11/16/2012 Yes     Chronic      Problems Resolved During this Admission:       Discharged Condition: stable    Disposition: Home or Self Care    Follow Up:    Patient Instructions:      Diet Cardiac     Notify your health care provider if you experience any of the following:  severe uncontrolled pain     Notify your health care provider if you experience any of the following:  persistent dizziness, light-headedness, or visual disturbances     Notify your health care provider if you experience any of the following:  increased confusion or weakness     Activity as tolerated       Significant Diagnostic Studies: Labs:   BMP:   Recent Labs   Lab 01/23/23  1309 01/24/23  0433   GLU 95 89    139   K 4.6 3.8    101   CO2 26 27   BUN 26* 22   CREATININE 1.5* 1.3   CALCIUM 9.3 9.3   MG 2.1  --      CMP   Recent Labs   Lab 01/23/23  1309 01/24/23  0433    139   K 4.6 3.8    101   CO2 26 27   GLU 95 89   BUN 26* 22   CREATININE 1.5* 1.3   CALCIUM 9.3 9.3   PROT 7.9 7.4   ALBUMIN 4.1 3.9   BILITOT 0.3 0.5   ALKPHOS 64 60   AST 15 15   ALT 6* 7*   ANIONGAP 10 11     CBC   Recent Labs   Lab 01/23/23  1309 01/24/23  0433   WBC 7.84 7.77   HGB 13.4* 12.8*   HCT 41.8 40.2    208     INR   Lab Results   Component Value Date    INR 1.0 01/23/2023    INR 1.3 (H) 06/15/2019     Lipid Panel   Lab Results   Component Value Date    CHOL 158 08/11/2022    HDL 57 08/11/2022    LDLCALC 54.6 (L) 08/11/2022    TRIG 232 (H) 08/11/2022    CHOLHDL 36.1 08/11/2022     Troponin   Recent Labs   Lab 01/23/23  1309 01/23/23  1808 01/23/23  2218   TROPONINI 0.010 0.019 0.009     A1C:   Recent Labs   Lab 08/11/22  1142   HGBA1C 5.5     Cardiac Graphics: Echocardiogram:   2D echo with color flow doppler:   Transthoracic echo (TTE) complete (Cupid Only):   Results for orders placed or performed during the hospital encounter of 01/23/23   Echo   Result Value Ref Range    BSA 1.87 m2    TDI SEPTAL  0.04 m/s    LV LATERAL E/E' RATIO 25.20 m/s    LV SEPTAL E/E' RATIO 31.50 m/s    IVC diameter 1.53 cm    Left Ventricular Outflow Tract Mean Velocity 0.67 cm/s    Left Ventricular Outflow Tract Mean Gradient 2.05 mmHg    TDI LATERAL 0.05 m/s    PV PEAK VELOCITY 1.16 cm/s    LVIDd 5.19 3.5 - 6.0 cm    IVS 1.21 (A) 0.6 - 1.1 cm    Posterior Wall 1.24 (A) 0.6 - 1.1 cm    LVIDs 3.93 2.1 - 4.0 cm    FS 24 28 - 44 %    Sinus 3.62 cm    STJ 2.55 cm    LV mass 255.31 g    LA size 4.33 cm    RVDD 3.90 cm    TAPSE 2.09 cm    RV S' 0.01 cm/s    Left Ventricle Relative Wall Thickness 0.48 cm    AV mean gradient 34 mmHg    AV valve area 0.94 cm2    AV Velocity Ratio 0.24     AV index (prosthetic) 0.27     MV valve area p 1/2 method 1.82 cm2    E/A ratio 0.89     Mean e' 0.05 m/s    E wave deceleration time 415.94 msec    IVRT 114.18 msec    LVOT diameter 2.09 cm    LVOT area 3.4 cm2    LVOT peak angel luis 0.96 m/s    LVOT peak VTI 20.10 cm    Ao peak angel luis 3.94 m/s    Ao VTI 73.2 cm    LVOT stroke volume 68.92 cm3    AV peak gradient 62 mmHg    E/E' ratio 28.00 m/s    MV Peak E Angel Luis 1.26 m/s    TR Max Angel Luis 2.55 m/s    MV stenosis pressure 1/2 time 120.62 ms    MV Peak A Angel Luis 1.42 m/s    LV Systolic Volume 67.33 mL    LV Systolic Volume Index 35.6 mL/m2    LV Diastolic Volume 128.95 mL    LV Diastolic Volume Index 68.23 mL/m2    LV Mass Index 135 g/m2    RA Major Axis 4.81 cm    Left Atrium Minor Axis 7.50 cm    Left Atrium Major Axis 7.39 cm    Triscuspid Valve Regurgitation Peak Gradient 26 mmHg    LA WIDTH 6.60 cm    LA volume 180.84 cm3    LA Volume Index 95.7 mL/m2    RA Width 4.10 cm    Right Atrial Pressure (from IVC) 3 mmHg    EF 70 %    TV rest pulmonary artery pressure 29 mmHg    Narrative    · The left ventricle is normal in size with concentric hypertrophy and   normal systolic function.  · The estimated ejection fraction is 70%.  · Grade II left ventricular diastolic dysfunction.  · Normal right ventricular size with normal  right ventricular systolic   function.  · Severe left atrial enlargement.  · There is moderate-to-severe aortic valve stenosis.  · Aortic valve area is 0.94 cm2; peak velocity is 3.94 m/s; mean gradient   is 34 mmHg.  · There is mild mitral stenosis.  · Mild mitral regurgitation.  · Mild tricuspid regurgitation.  · Normal central venous pressure (3 mmHg).  · The estimated PA systolic pressure is 29 mmHg.          Pending Diagnostic Studies:     None         Medications:  Reconciled Home Medications:      Medication List      CONTINUE taking these medications    acetaminophen 500 MG tablet  Commonly known as: TYLENOL  Take 2 tablets (1,000 mg total) by mouth every 6 (six) hours as needed.     ascorbic acid (vitamin C) 500 MG tablet  Commonly known as: VITAMIN C  Take 500 mg by mouth once daily.     aspirin 81 MG EC tablet  Commonly known as: ECOTRIN  Take 81 mg by mouth once daily.     calcium carbonate 200 mg calcium (500 mg) chewable tablet  Commonly known as: TUMS  Take 1 tablet by mouth once daily.     ELIQUIS 2.5 mg Tab  Generic drug: apixaban  Take 2.5 mg by mouth 2 (two) times daily.     EUTHYROX 125 MCG tablet  Generic drug: levothyroxine  TAKE 1 TABLET BY MOUTH ONCE DAILY IN THE MORNING     hydrOXYzine HCL 25 MG tablet  Commonly known as: ATARAX  TAKE 1 TABLET BY MOUTH EVERY 6 HOURS AS NEEDED FOR ITCHING     ICAPS AREDS2 ORAL  Take 1 capsule by mouth once daily.     levocetirizine 5 MG tablet  Commonly known as: XYZAL  Take 5 mg by mouth every morning.     metoprolol succinate 25 MG 24 hr tablet  Commonly known as: TOPROL-XL  Take 1 tablet (25 mg total) by mouth once daily.     pantoprazole 40 MG tablet  Commonly known as: PROTONIX  Take 1 tablet by mouth twice daily     pravastatin 20 MG tablet  Commonly known as: PRAVACHOL  Take 1 tablet (20 mg total) by mouth every evening.     STELARA 90 mg/mL Syrg syringe  Generic drug: ustekinumab  Inject 1 mL (90 mg total) into the skin every 8 weeks.     tamsulosin  0.4 mg Cap  Commonly known as: FLOMAX  Take 0.4 mg by mouth once daily.     triamcinolone acetonide 0.1% 0.1 % cream  Commonly known as: KENALOG  APPLY  CREAM EXTERNALLY TWICE DAILY     venlafaxine 37.5 MG Tab  Commonly known as: EFFEXOR  Take 1 tablet by mouth twice daily        STOP taking these medications    UNABLE TO FIND            Indwelling Lines/Drains at time of discharge:   Lines/Drains/Airways     None                 Time spent on the discharge of patient: 52 minutes    Josiah Isbell PA-C  Department of Hospital Medicine  [facility]

## 2023-01-25 NOTE — HOSPITAL COURSE
Ramesh Ricci was placed under observation for further management of his shortness of breath.    Throughout his observation stay, Mr. Ricci's shortness of breath continued to resolve, and did not require oxygen supplementation. Troponin trended negative as follows: 0.010, 0.019 and 0.009. He was administered Lasix for diuresis. Cardiology was consulted, and recommended repeating an echocardiogram, as previously noted aortic stenosis is likely to have progressed and presenting with the shortness of breath, and recommended that the patient follow up with his regular cardiologist. Echocardiogram shows:  The left ventricle is normal in size with concentric hypertrophy and normal systolic function.  The estimated ejection fraction is 70%.  Grade II left ventricular diastolic dysfunction.  Normal right ventricular size with normal right ventricular systolic function.  Severe left atrial enlargement.  There is moderate-to-severe aortic valve stenosis.  Aortic valve area is 0.94 cm2; peak velocity is 3.94 m/s; mean gradient is 34 mmHg.  There is mild mitral stenosis.  Mild mitral regurgitation.  Mild tricuspid regurgitation.  Normal central venous pressure (3 mmHg).  The estimated PA systolic pressure is 29 mmHg.     All findings and plan were explained to the patient and wife Mone who was at bedside. All questions and concerns were answered. Patient and wife verbalized understanding. Patient is in stable condition to d/c home and has been informed to follow up with his PCP within the next 7-10 days to discuss his observation stay and to follow-up with his Cardiologist. Patient has been educated to return to the ED if he experiences any further chest pain, shortness of breath, syncopal episodes, lightheadness, weakness, or discomfort.

## 2023-01-30 NOTE — TELEPHONE ENCOUNTER
Outgoing call to patient, to see if patient has heard back from [place] program, if not patient will need to be escalated , dose should be due 2/10/23.

## 2023-01-31 DIAGNOSIS — K51.019 ULCERATIVE PANCOLITIS WITH COMPLICATION: ICD-10-CM

## 2023-01-31 RX ORDER — USTEKINUMAB 90 MG/ML
90 INJECTION, SOLUTION SUBCUTANEOUS
Qty: 1 ML | Refills: 2 | Status: CANCELLED | OUTPATIENT
Start: 2023-01-31 | End: 2023-03-28

## 2023-01-31 NOTE — TELEPHONE ENCOUNTER
Outgoing call regarding Stelera refill. Pts wife asked if we heard back from [facility] about status of application, messaged [facility] working on case, she will escalate his case. Pt was in refill que to follow up with refill call.

## 2023-02-07 NOTE — TELEPHONE ENCOUNTER
Per [facility] , Eduarda, patient is currently not eligible for PAP. However, status can change pending submission of additional information.    Alley opened, and communicated to clinic staff,

## 2023-02-08 NOTE — TELEPHONE ENCOUNTER
Outgoing call to patient, informed wife Ms. Shore, informed wife of [facility] denial for [facility] due to not meeting 4% spending. Patient must submit 4% to program which Mrs. Ricci states they do not meet.      MDO was notified, wife states patient has f/u in March and will address then. Closing referral at [facility].

## 2023-02-09 NOTE — PT/OT/SLP EVAL
Physical Therapy Evaluation    Patient Name:  Ramesh Ricci   MRN:  [id number]    Recommendations:     Discharge Recommendations:  outpatient PT   Discharge Equipment Recommendations: rollator   Barriers to discharge: Pt is at increased risk for falls and readmission at present time.    Assessment:     Ramesh Ricci is a 82 y.o. male admitted with a medical diagnosis of Colitis.  He presents with the following impairments/functional limitations:  impaired balance, decreased safety awareness, impaired endurance, pain, impaired functional mobilty .    Rehab Prognosis: Good; patient would benefit from acute skilled PT services to address these deficits and reach maximum level of function.    Recent Surgery: * No surgery found *      Plan:     During this hospitalization, patient to be seen  (3-5x/wk) to address the identified rehab impairments via gait training, therapeutic activities, therapeutic exercises and progress toward the following goals:    · Plan of Care Expires:  03/31/22    Subjective     Chief Complaint: HA, dizziness, SOB  Patient/Family Comments/goals: Pt states that he likes to walk and that normally he can walk on the sidewalk with his SPC.  Pt/CG state 3-5 falls within 3 mos    Pain/Comfort:  · Pain Rating 1:  (Not rated, HA)  · Pain Addressed 1: Reposition, Distraction, Cessation of Activity, Nurse notified    Patients cultural, spiritual, Zoroastrian conflicts given the current situation: no    Living Environment:  Pt lives with spouse in a [facility] with TH entry  Prior to admission, patients level of function was Mod I with SPC or RW for ambulation an ADL's.  Equipment used at home: cane, straight, walker, rolling.  DME owned (not currently used): none.  Upon discharge, patient will have assistance from Spouse.    Objective:     Communicated with nsg prior to session.  Patient found HOB elevated with telemetry, peripheral IV  upon PT entry to room.    General Precautions: Standard, fall,  special contact   Orthopedic Precautions:N/A   Braces: N/A  Respiratory Status: Room air    Exams:  · Cognitive Exam:  Patient is oriented to Person, Place, Time and Situation  · Gross Motor Coordination:  Mildly impaired 2/2 gen weakness and deconditioning  · Postural Exam:  Patient presented with the following abnormalities:    · -       Rounded shoulders  · -       Forward head  · Sensation:    · -       Intact  light/touch B LE's  · Skin Integrity/Edema:      · -       Skin integrity: Visible skin intact  · RLE ROM: WFL  · RLE Strength: WFL  · LLE ROM: WFL  · LLE Strength: WFL   · No nystagmus    Functional Mobility:  · Bed Mobility:     · Rolling Left:  contact guard assistance and with VC/TC for logrolling and reaching for BR  · Scooting: stand by assistance with VC/TC for hand placement  · Supine to Sit: minimum assistance and with VC/TC for body mechanics  · Transfers:     · Sit to Stand:  stand by assistance with rolling walker  · Gait: Gait training with RW and CGA/SBA and VC/TC for increased trunk/cervical extension and scanning environment approx 2x 100' with standing rest break in between 2/2 SOB   · Balance: FAir+ sit, Fair+/Fair stand    Therapeutic Activities and Exercises:   Educated pt to perform Pursed lip breathing during standing rest break for SOB.  Pt able to return demonstration.  SPO2 WFL's.    AM-PAC 6 CLICK MOBILITY  Total Score:18     Patient left up on BSC with all lines intact and Spouse and OT present.    GOALS:   Multidisciplinary Problems     Physical Therapy Goals        Problem: Physical Therapy    Goal Priority Disciplines Outcome Goal Variances Interventions   Physical Therapy Goal     PT, PT/OT Ongoing, Progressing     Description: Goals to be met by: 3/11/22     Patient will increase functional independence with mobility by performin. Supine to sit with Modified [place]  2. Rolling to Left and Right with Modified [place].  3. Sit to stand transfer with Modified  [place]  4. Gait  x 150 feet with Modified [place] using .Rollator   5. Ascend/descend 1 stair Modified [place] using .   6. Lower extremity exercise program x10 reps per handout, with independence                     History:     Past Medical History:   Diagnosis Date    Abnormal echocardiogram 11/16/2012    Anticoagulant long-term use     Atrophic kidney 11/16/2012    BPH (benign prostatic hyperplasia) 11/16/2012    DDD (degenerative disc disease), cervical 7/5/2017    x-ray 7/17 - Severe    GERD (gastroesophageal reflux disease) 11/16/2012    Glaucoma 11/16/2012    HTN (hypertension) 11/16/2012    Hypothyroid 11/16/2012    Internal carotid artery stenosis 11/16/2012    Left ventricular diastolic dysfunction, NYHA class 1 4/16/2015    4/15    Low serum testosterone level 11/16/2012    Normal cardiac stress test 11/16/2012    Osteopenia 11/16/2012    Shingles 11/16/2012    Stroke 2017    tia   no residual    TIA (transient ischemic attack) 11/16/2012    UC (ulcerative colitis) 11/16/2012       Past Surgical History:   Procedure Laterality Date    ADENOIDECTOMY      EYE SURGERY Right 11/2020    cataract    HERNIA REPAIR      LEFT HEART CATHETERIZATION Right 10/29/2021    Procedure: CATHETERIZATION, HEART, LEFT AND RIGHT  - LV DARNELL POSSIBLE;  Surgeon: Beau Conklin MD;  Location: [facility] CATH LAB;  Service: Cardiology;  Laterality: Right;    RIGHT HEART CATHETERIZATION Right 10/29/2021    Procedure: INSERTION, CATHETER, RIGHT HEART;  Surgeon: Beau Conklin MD;  Location: [facility] CATH LAB;  Service: Cardiology;  Laterality: Right;    TONSILLECTOMY         Time Tracking:     PT Received On: 03/24/22  PT Start Time: 1013     PT Stop Time: 1038  PT Total Time (min): 25 min     Billable Minutes: Evaluation 15 , Gt 8, co-evaluation/treat with OT      03/24/2022   Prenatal Vitamins BPP 8/8

## 2023-02-10 ENCOUNTER — PATIENT MESSAGE (OUTPATIENT)
Dept: GASTROENTEROLOGY | Facility: CLINIC | Age: 83
End: 2023-02-10
Payer: MEDICARE

## 2023-02-10 NOTE — TELEPHONE ENCOUNTER
Spoke to pt and wife. Per Cat, will order Stelara sample. Will contact pt once rec'd. Pt verbalizes understanding.

## 2023-02-13 ENCOUNTER — OFFICE VISIT (OUTPATIENT)
Dept: INTERNAL MEDICINE | Facility: CLINIC | Age: 83
End: 2023-02-13
Attending: INTERNAL MEDICINE
Payer: MEDICARE

## 2023-02-13 VITALS
DIASTOLIC BLOOD PRESSURE: 68 MMHG | OXYGEN SATURATION: 98 % | HEART RATE: 57 BPM | HEIGHT: 71 IN | WEIGHT: 166 LBS | SYSTOLIC BLOOD PRESSURE: 122 MMHG | BODY MASS INDEX: 23.24 KG/M2

## 2023-02-13 DIAGNOSIS — I48.0 PAF (PAROXYSMAL ATRIAL FIBRILLATION): Chronic | ICD-10-CM

## 2023-02-13 DIAGNOSIS — I10 ESSENTIAL HYPERTENSION: Chronic | ICD-10-CM

## 2023-02-13 DIAGNOSIS — E03.9 ACQUIRED HYPOTHYROIDISM: Chronic | ICD-10-CM

## 2023-02-13 DIAGNOSIS — I35.0 NONRHEUMATIC AORTIC VALVE STENOSIS: Chronic | ICD-10-CM

## 2023-02-13 DIAGNOSIS — Z13.39 SCREENING FOR ALCOHOLISM: ICD-10-CM

## 2023-02-13 DIAGNOSIS — N18.31 STAGE 3A CHRONIC KIDNEY DISEASE: Chronic | ICD-10-CM

## 2023-02-13 DIAGNOSIS — Z13.31 SCREENING FOR DEPRESSION: ICD-10-CM

## 2023-02-13 DIAGNOSIS — K51.019 ULCERATIVE PANCOLITIS WITH COMPLICATION: Primary | Chronic | ICD-10-CM

## 2023-02-13 PROCEDURE — G0442 ANNUAL ALCOHOL SCREEN 15 MIN: HCPCS | Mod: 59,S$GLB,, | Performed by: INTERNAL MEDICINE

## 2023-02-13 PROCEDURE — 1159F PR MEDICATION LIST DOCUMENTED IN MEDICAL RECORD: ICD-10-PCS | Mod: CPTII,S$GLB,, | Performed by: INTERNAL MEDICINE

## 2023-02-13 PROCEDURE — 1159F MED LIST DOCD IN RCRD: CPT | Mod: CPTII,S$GLB,, | Performed by: INTERNAL MEDICINE

## 2023-02-13 PROCEDURE — 3078F PR MOST RECENT DIASTOLIC BLOOD PRESSURE < 80 MM HG: ICD-10-PCS | Mod: CPTII,S$GLB,, | Performed by: INTERNAL MEDICINE

## 2023-02-13 PROCEDURE — G0444 DEPRESSION SCREEN ANNUAL: HCPCS | Mod: 59,S$GLB,, | Performed by: INTERNAL MEDICINE

## 2023-02-13 PROCEDURE — 3074F PR MOST RECENT SYSTOLIC BLOOD PRESSURE < 130 MM HG: ICD-10-PCS | Mod: CPTII,S$GLB,, | Performed by: INTERNAL MEDICINE

## 2023-02-13 PROCEDURE — 3074F SYST BP LT 130 MM HG: CPT | Mod: CPTII,S$GLB,, | Performed by: INTERNAL MEDICINE

## 2023-02-13 PROCEDURE — 1160F PR REVIEW ALL MEDS BY PRESCRIBER/CLIN PHARMACIST DOCUMENTED: ICD-10-PCS | Mod: CPTII,S$GLB,, | Performed by: INTERNAL MEDICINE

## 2023-02-13 PROCEDURE — 1160F RVW MEDS BY RX/DR IN RCRD: CPT | Mod: CPTII,S$GLB,, | Performed by: INTERNAL MEDICINE

## 2023-02-13 PROCEDURE — 3078F DIAST BP <80 MM HG: CPT | Mod: CPTII,S$GLB,, | Performed by: INTERNAL MEDICINE

## 2023-02-13 PROCEDURE — 99214 OFFICE O/P EST MOD 30 MIN: CPT | Mod: 25,S$GLB,, | Performed by: INTERNAL MEDICINE

## 2023-02-13 PROCEDURE — G0442 PR  ALCOHOL SCREENING: ICD-10-PCS | Mod: 59,S$GLB,, | Performed by: INTERNAL MEDICINE

## 2023-02-13 PROCEDURE — G0444 PR DEPRESSION SCREENING: ICD-10-PCS | Mod: 59,S$GLB,, | Performed by: INTERNAL MEDICINE

## 2023-02-13 PROCEDURE — 99214 PR OFFICE/OUTPT VISIT, EST, LEVL IV, 30-39 MIN: ICD-10-PCS | Mod: 25,S$GLB,, | Performed by: INTERNAL MEDICINE

## 2023-02-13 NOTE — PROGRESS NOTES
Subjective:       Patient ID: Ramesh Ricci is a 83 y.o. male.    Chief Complaint: Follow-up (Hosp f/u (weak/SOB))    He was admitted to the hospital on 2023 with CHF.  His BNP was about 1000.  He improved with IV Lasix.  Echocardiogram revealed moderate to severe aortic stenosis.  He currently denies any shortness of breath.    Follow-up    Hypertension  This is a chronic problem. The current episode started more than 1 year ago. The problem is controlled.   Review of Systems   Constitutional: Negative.    Respiratory: Negative.     Cardiovascular: Negative.        Objective:      Physical Exam  Constitutional:       Appearance: He is well-developed.   HENT:      Head: Normocephalic and atraumatic.   Eyes:      Pupils: Pupils are equal, round, and reactive to light.   Neck:      Vascular: Hepatojugular reflux present.   Cardiovascular:      Rate and Rhythm: Normal rate and regular rhythm.      Heart sounds: Murmur heard.   Systolic murmur is present with a grade of 3/6.      Comments: @RUSB  Pulmonary:      Effort: Pulmonary effort is normal.   Musculoskeletal:      Right lower le+ Pitting Edema present.      Left lower le+ Pitting Edema present.   Neurological:      Mental Status: He is alert.       Assessment:       Problem List Items Addressed This Visit          10     UC (ulcerative colitis) - Primary (Chronic)    Acquired hypothyroidism (Chronic)    Essential hypertension (Chronic)    CKD (chronic kidney disease) stage 3, GFR 30-59 ml/min (Chronic)    Nonrheumatic aortic valve stenosis (Chronic)    PAF (paroxysmal atrial fibrillation) (Chronic)     Other Visit Diagnoses       Screening for depression        Screening for alcoholism                  Plan:       Per orders and D/C instructions.  Continue diet and/or meds for CKD, HTN, and hypothyroidism, which are stable.    Follow-up with GI for your ulcerative colitis.  Follow-up with cardiology for paroxysmal atrial fibrillation and  moderate to severe aortic valve stenosis.    Screening: The patient was screened for depression with the PHQ2 questionnaire and possible health consequences were discussed with the patient, who understands (15 minutes spent).       The patient was screened for the misuse of alcohol, by asking the number of drinks per average week, and if pt has had more than 4 drinks (more than 3 for women and elderly) in 1 day within the past year. The health and legal consequences of misuse were discussed (15 minutes spent).

## 2023-02-14 ENCOUNTER — PATIENT MESSAGE (OUTPATIENT)
Dept: INTERNAL MEDICINE | Facility: CLINIC | Age: 83
End: 2023-02-14
Payer: MEDICARE

## 2023-02-22 ENCOUNTER — TELEPHONE (OUTPATIENT)
Dept: GASTROENTEROLOGY | Facility: CLINIC | Age: 83
End: 2023-02-22
Payer: MEDICARE

## 2023-02-22 NOTE — TELEPHONE ENCOUNTER
Spoke to pt wife (Mone). Will  Stelara tomorrow 2/23/2023. Pt informed must be picked up tomorrow before 430p. Wife verbalizes understanding and no other concerns at this time.

## 2023-02-24 NOTE — NURSING
Dr. Brand approved Wound Care as well as Bactrim DS for patient and to be seen in office on 02/28/2023.    Pt arrived to the floor from the ED. Placed in contact isolation to r/o C-Diff. Pt in no apparent acute distress.

## 2023-03-06 ENCOUNTER — OFFICE VISIT (OUTPATIENT)
Dept: GASTROENTEROLOGY | Facility: CLINIC | Age: 83
End: 2023-03-06
Payer: MEDICARE

## 2023-03-06 ENCOUNTER — CLINICAL SUPPORT (OUTPATIENT)
Dept: GASTROENTEROLOGY | Facility: CLINIC | Age: 83
End: 2023-03-06
Payer: MEDICARE

## 2023-03-06 ENCOUNTER — CLINICAL SUPPORT (OUTPATIENT)
Dept: INFECTIOUS DISEASES | Facility: CLINIC | Age: 83
End: 2023-03-06
Payer: MEDICARE

## 2023-03-06 VITALS
HEART RATE: 53 BPM | BODY MASS INDEX: 22.51 KG/M2 | SYSTOLIC BLOOD PRESSURE: 106 MMHG | TEMPERATURE: 98 F | OXYGEN SATURATION: 99 % | WEIGHT: 161.38 LBS | DIASTOLIC BLOOD PRESSURE: 55 MMHG

## 2023-03-06 DIAGNOSIS — D50.0 IRON DEFICIENCY ANEMIA DUE TO CHRONIC BLOOD LOSS: ICD-10-CM

## 2023-03-06 DIAGNOSIS — K51.019 ULCERATIVE PANCOLITIS WITH COMPLICATION: Primary | ICD-10-CM

## 2023-03-06 DIAGNOSIS — I35.0 AORTIC VALVE STENOSIS, ETIOLOGY OF CARDIAC VALVE DISEASE UNSPECIFIED: ICD-10-CM

## 2023-03-06 DIAGNOSIS — K51.019 ULCERATIVE PANCOLITIS WITH COMPLICATION: ICD-10-CM

## 2023-03-06 DIAGNOSIS — R21 RASH: ICD-10-CM

## 2023-03-06 PROCEDURE — 3288F PR FALLS RISK ASSESSMENT DOCUMENTED: ICD-10-PCS | Mod: CPTII,S$GLB,, | Performed by: INTERNAL MEDICINE

## 2023-03-06 PROCEDURE — 1126F AMNT PAIN NOTED NONE PRSNT: CPT | Mod: CPTII,S$GLB,, | Performed by: INTERNAL MEDICINE

## 2023-03-06 PROCEDURE — 90632 HEPATITIS A VACCINE ADULT IM: ICD-10-PCS | Mod: S$GLB,,, | Performed by: INTERNAL MEDICINE

## 2023-03-06 PROCEDURE — G0010 ADMIN HEPATITIS B VACCINE: HCPCS | Mod: 59,S$GLB,, | Performed by: INTERNAL MEDICINE

## 2023-03-06 PROCEDURE — G0010 HEPATITIS B (RECOMBINANT) ADJUVANTED, 2 DOSE: ICD-10-PCS | Mod: 59,S$GLB,, | Performed by: INTERNAL MEDICINE

## 2023-03-06 PROCEDURE — 3074F SYST BP LT 130 MM HG: CPT | Mod: CPTII,S$GLB,, | Performed by: INTERNAL MEDICINE

## 2023-03-06 PROCEDURE — 1101F PT FALLS ASSESS-DOCD LE1/YR: CPT | Mod: CPTII,S$GLB,, | Performed by: INTERNAL MEDICINE

## 2023-03-06 PROCEDURE — 1159F PR MEDICATION LIST DOCUMENTED IN MEDICAL RECORD: ICD-10-PCS | Mod: CPTII,S$GLB,, | Performed by: INTERNAL MEDICINE

## 2023-03-06 PROCEDURE — 1126F PR PAIN SEVERITY QUANTIFIED, NO PAIN PRESENT: ICD-10-PCS | Mod: CPTII,S$GLB,, | Performed by: INTERNAL MEDICINE

## 2023-03-06 PROCEDURE — 90632 HEPA VACCINE ADULT IM: CPT | Mod: S$GLB,,, | Performed by: INTERNAL MEDICINE

## 2023-03-06 PROCEDURE — 3078F DIAST BP <80 MM HG: CPT | Mod: CPTII,S$GLB,, | Performed by: INTERNAL MEDICINE

## 2023-03-06 PROCEDURE — 1101F PR PT FALLS ASSESS DOC 0-1 FALLS W/OUT INJ PAST YR: ICD-10-PCS | Mod: CPTII,S$GLB,, | Performed by: INTERNAL MEDICINE

## 2023-03-06 PROCEDURE — 1159F MED LIST DOCD IN RCRD: CPT | Mod: CPTII,S$GLB,, | Performed by: INTERNAL MEDICINE

## 2023-03-06 PROCEDURE — 99214 OFFICE O/P EST MOD 30 MIN: CPT | Mod: S$GLB,,, | Performed by: INTERNAL MEDICINE

## 2023-03-06 PROCEDURE — 90471 HEPATITIS A VACCINE ADULT IM: ICD-10-PCS | Mod: S$GLB,,, | Performed by: INTERNAL MEDICINE

## 2023-03-06 PROCEDURE — 3078F PR MOST RECENT DIASTOLIC BLOOD PRESSURE < 80 MM HG: ICD-10-PCS | Mod: CPTII,S$GLB,, | Performed by: INTERNAL MEDICINE

## 2023-03-06 PROCEDURE — 1160F RVW MEDS BY RX/DR IN RCRD: CPT | Mod: CPTII,S$GLB,, | Performed by: INTERNAL MEDICINE

## 2023-03-06 PROCEDURE — 90739 HEPATITIS B (RECOMBINANT) ADJUVANTED, 2 DOSE: ICD-10-PCS | Mod: S$GLB,,, | Performed by: INTERNAL MEDICINE

## 2023-03-06 PROCEDURE — 3074F PR MOST RECENT SYSTOLIC BLOOD PRESSURE < 130 MM HG: ICD-10-PCS | Mod: CPTII,S$GLB,, | Performed by: INTERNAL MEDICINE

## 2023-03-06 PROCEDURE — 1160F PR REVIEW ALL MEDS BY PRESCRIBER/CLIN PHARMACIST DOCUMENTED: ICD-10-PCS | Mod: CPTII,S$GLB,, | Performed by: INTERNAL MEDICINE

## 2023-03-06 PROCEDURE — 3288F FALL RISK ASSESSMENT DOCD: CPT | Mod: CPTII,S$GLB,, | Performed by: INTERNAL MEDICINE

## 2023-03-06 PROCEDURE — 90739 HEPB VACC 2/4 DOSE ADULT IM: CPT | Mod: S$GLB,,, | Performed by: INTERNAL MEDICINE

## 2023-03-06 PROCEDURE — 99214 PR OFFICE/OUTPT VISIT, EST, LEVL IV, 30-39 MIN: ICD-10-PCS | Mod: S$GLB,,, | Performed by: INTERNAL MEDICINE

## 2023-03-06 PROCEDURE — 90471 IMMUNIZATION ADMIN: CPT | Mod: S$GLB,,, | Performed by: INTERNAL MEDICINE

## 2023-03-06 RX ORDER — USTEKINUMAB 90 MG/ML
INJECTION, SOLUTION SUBCUTANEOUS
COMMUNITY
End: 2023-03-06 | Stop reason: SDUPTHER

## 2023-03-06 NOTE — PROGRESS NOTES
IBD PATIENT INTAKE:    COVID symptoms in the last 7 days (runny nose, sore throat, congestion, cough, fever): No  PCP: EFREM Muniz  If not PCP-  number given to establish [phone]: N/A    ALLERGIES REVIEWED:  Yes    CHIEF COMPLAINT:    Chief Complaint   Patient presents with    Ulcerative Colitis       VITAL SIGNS:  BP (!) 106/55 (BP Location: Left arm, Patient Position: Sitting)   Pulse (!) 53   Temp 97.9 °F (36.6 °C)   Wt 73.2 kg (161 lb 6 oz)   SpO2 99%   BMI 22.51 kg/m²      Change in medical, surgical, family or social history: No    IBD THERAPY (name, dose/frequency):  Stelara q8w   Last dose:  2/16    Next dose:  4/13  Infusion/Pharmacy: [facility] ([facility])    NSAIDs (aspirin, ibuprofen-advil or motrin, naproxen-aleve, diclofenac-voltaren, BC powder, excedrin, goodies): Yes ASA 81    Alternative/Complementary Medications (i.e. probiotics, turmeric, fish oil, aloe vera):      no  Name/dose:  n/a    Vitamins:   Vit D:  no     Vit B-12:  no   Folic Acid: no       Calcium: 500mg     Iron:  no      MVI: yes    Antibiotics (past 30 Days):  no  If yes   Indication:  Name of antibiotic:  Completion date:     REVIEWED MEDICATION LIST RECONCILED INCLUDING ABOVE MEDS:  yes                  Answers submitted by the patient for this visit:  Established Patient Questionnaire  (Submitted on 3/2/2023)  shortness of breath: Yes  rash: Yes  abdominal pain: Yes  nervous/ anxious: Yes  heartburn: Yes  Joint pain? : Yes  back pain: Yes

## 2023-03-06 NOTE — PROGRESS NOTES
[facility]  Inflammatory Bowel Disease  Pharmacy Note    TODAY'S VISIT DATE:  3/6/2023    Reason for visit: F/U    IBD treatment to be initiated/optimized: Stelara     Consent form: No    IBD MD: Channing       Pertinent History:  IBD phenotype: Ulcerative pan colitis   Dispensing pharmacy/infusion center:  [facility], will switch to [facility]   Current therapy: Stelara 90mg every 8 weeks (started 7/1/2022, dose 12/16, LD 2/10 but delayed to 2/23, ND 4/7)   Patient no longer qualifies for the [employer] PAP due to income  Okay switching to [facility]      Therapeutic Drug Monitoring Labs:  N/A     Prior IBD Therapies:  Humira 40mg Q2W - clinical remission, stopped due to skin lesions that do not appear to be related to Humira  Colazal - Incomplete response   Pentasa - inadequate response to low-dose    Azathioprine - unclear response      Vaccinations:  Lab Results   Component Value Date    HEPBSAB Negative 03/24/2022     No results found for: HEPBSURFABQU  Lab Results   Component Value Date    HEPAIGG Non-reactive 10/28/2022     Lab Results   Component Value Date    VARICELLAZOS 3.65 (H) 10/28/2022    VARICELLAINT Positive (A) 10/28/2022     Immunization History   Administered Date(s) Administered    COVID-19, MRNA, LN-S, PF (Pfizer) (Purple Cap) 01/12/2021, 02/02/2021, 12/02/2021    Influenza (FLUAD) - Quadrivalent - Adjuvanted - PF *Preferred* (65+) 10/09/2020, 10/20/2021    Influenza - High Dose - PF (65 years and older) 10/06/2014, 09/29/2016, 11/09/2017, 10/08/2018, 09/17/2019    Influenza - Quadrivalent - High Dose - PF (65 years and older) 09/10/2022    Pneumococcal Conjugate - 13 Valent 09/29/2016    Pneumococcal Polysaccharide - 23 Valent 01/05/2011    Zoster 02/02/2012     Influenza: UTD   PCV 13: UTD  PPSV 23: UTD  Tetanus (TdaP): discuss and recommend booster every 10 years.   Hepatitis B:  not immune. Recommended Heplisav B 2 doses 1 month apart   Hepatitis A:  not immune. Recommended  Havrix 2 doses 6-12 months apart.   MMR (live vaccine): immune       Chickenpox status/Varicella (live vaccine): immune  Shingrix: UTD  Covid:  eligible for bivalent booster       All Medical History/Surgical History/Family History/Social History/Allergies have been reviewed and updated in EMR    Review of patient's allergies indicates:   Allergen Reactions    Benazepril Other (See Comments)     Cough         Current Medications:   No outpatient medications have been marked as taking for the 3/6/23 encounter (Appointment) with IBD PHARMACIST.       Reviewed all current medications including OTC, herbals and supplements.     Labs:   Lab Results   Component Value Date    HEPBSAG Negative 03/24/2022    HEPBCAB Negative 03/24/2022     Lab Results   Component Value Date    TBGOLDPLUS Negative 03/24/2022     Lab Results   Component Value Date    TZPUKUTX69PR 36 08/11/2022    UIFVMQHY70 582 08/11/2022     Lab Results   Component Value Date    WBC 7.77 01/24/2023    HGB 12.8 (L) 01/24/2023    HCT 40.2 01/24/2023    MCV 91 01/24/2023     01/24/2023     Lab Results   Component Value Date    CREATININE 1.3 01/24/2023    ALBUMIN 3.9 01/24/2023    BILITOT 0.5 01/24/2023    ALKPHOS 60 01/24/2023    AST 15 01/24/2023    ALT 7 (L) 01/24/2023         Assessment/Plan:  Ramesh Ricci is a 83 y.o. male that  has a past medical history of Abnormal echocardiogram, Anemia, Anticoagulant long-term use, Anxiety, Atrophic kidney, BPH (benign prostatic hyperplasia), Cancer, DDD (degenerative disc disease), cervical, GERD (gastroesophageal reflux disease), Glaucoma, HTN (hypertension), Hypothyroid, Internal carotid artery stenosis, Joint pain, Left ventricular diastolic dysfunction, NYHA class 1, Low serum testosterone level, Normal cardiac stress test, Osteopenia, Shingles, Skin disease, Stroke, TIA (transient ischemic attack), and UC (ulcerative colitis). Patient presents to clinic for a f/u visit with Dr. Snell. He has been  on Stelara 90mg every 8 weeks since 7/1/2022 and doing well. Due to changes of the [employer] patient assistance program, patient no longer qualifies and his current co pay is $3600 per shipment. Referred to see IBD pharmacist to discuss next steps. Patient is accompanied by his wife today.    # Recommendations:  - Switch Stelara 90mg SC every 8 weeks to [facility].   - New prescriber order form signed and sent to [facility] on [address]  - Patient and wife agree with plan  - Discussed vaccinations and recommended pt get Heplisav and Havrix today. To complete series by the time patient comes to see Dr. Snell again   - Updating labs today.  - Patient verbalized understanding instructions.        Cat Norman, PharmD  IBD Clinical Pharmacist

## 2023-03-06 NOTE — PROGRESS NOTES
[facility]          Inflammatory Bowel Disease Follow Up Note         TODAY'S VISIT DATE:  3/6/2023    Reason for Consult:    Chief Complaint   Patient presents with    Ulcerative Colitis       PCP: EFREM Muniz      Referring MD:   No ref. provider found    History of Present Illness:  Ramesh Ricci who is a 83 y.o. male is being seen today at the [facility] on 03/06/2023 for inflammatory bowel disease- ulcerative colitis.  He continues to do quite well from a gastrointestinal standpoint.  He is having 1 formed bowel movement daily with no blood in the stools and no abdominal pain.  He denies any GI symptoms currently.  He was hospitalized in late January with heart failure symptoms.  He was found to have a normal ejection fraction but moderate to severe aortic stenosis.  He is seeing a cardiologist at [facility] tomorrow for further evaluation of these issues.  He does report some mild shortness of breath but no orthopnea or PND.  He denies any chest pain.  Of note he had a slight delay in his last dose of Stelara because he was waiting on approval through the [employer] patient assistance program.  Unfortunately he did not qualify for assistance due to changes in the program.  We are in the process of making arrangements to continue his Stelara elsewhere.    IBD History:  He was diagnosed with ulcerative colitis in the 1960s.  Per notes he has a history of pan colitis.  Multiple colonoscopy reports are available and these demonstrate patchy colitis throughout the colon.  On one exam biopsy showed some acute ileitis but no chronic changes in the ileum.  Biopsies have been consistent with chronic ulcerative colitis.  It is difficult to determine what he has been treated with in the past but it appears he was at least on Colazal at 1 point (dose was 2.25 g once daily).  He was also on azathioprine at 1 point and treated with Humira.   In early 2021 he developed a rash that was concerning for possible psoriasis.  Humira was stopped at that point.  Pentasa was started after the Humira was stopped.  After stopping the Humira his skin lesions did not resolved and it is currently not thought that his skin issues were related to psoriasis.  In March 2022 he was admitted to the hospital with acute onset diarrhea and abdominal pain.  He underwent an upper endoscopy and colonoscopy.  The upper endoscopy was only significant for a 2 cm hiatal hernia.  The colonoscopy revealed no evidence of active ulcerative colitis.  The preparation of the colon was poor.  Biopsies demonstrated evidence of active colitis throughout the entire colon.  He had a markedly elevated stool calprotectin level and negative stool studies for infection.  After starting Stelara in July of 2022 he did quite well and had a significant decline in his stool calprotectin level.  We were able to stop the Pentasa without any issues.      IBD Details:  Dx Date:  1960s  Disease type/distribution:  Ulcerative colitis/pancolitis  Current Treatment:  Stelara 90 mg every 8 weeks Start Date:  July 1, 2022 Response:  Symptom remission  Optimized:  No  Adverse reactions:  None  Prior surgeries:  None  CRP Elevation: Y  calprotectin:  Markedly elevated with active disease  Disease Complications:  None  Extraintestinal manifestations:  None  Prior treatments:   Steroids:  Good response in the past  5ASA:  Incomplete response to Colazal, inadequate response to low-dose Pentasa  IMM:  Previously on azathioprine, unclear response  TNF Inh:  Humira-clinical remission, stopped due to skin lesions that do not appear to be related to Humira   Anti-Integrin:  None   IL 12/23:  Currently on Stelara  KARLO Inh:  None  S1P Modulator: None    Previous Clinical Trials:  None    Last Colonoscopy:  March 2022-poor prep, no significant endoscopic disease activity but significant active colitis on biopsies    Other  Endoscopies:  March 2022-unrevealing    Imaging:   MRE:  None   CT:  March 2022-inflammatory changes around the colon, otherwise unrevealing   Other:  None    Pertinent Labs:  Lab Results   Component Value Date    SEDRATE 77 (H) 03/23/2022    CRP 0.7 10/28/2022     No results found for: TTGIGA, IGA  Lab Results   Component Value Date    TSH 2.071 01/23/2023    FREET4 1.25 08/11/2022     Lab Results   Component Value Date    BYYDUAXN53YW 36 08/11/2022    FKFUBHJP61 582 08/11/2022     Lab Results   Component Value Date    HEPBSAG Negative 03/24/2022    HEPBCAB Negative 03/24/2022     No results found for: XMD07OBHH  Lab Results   Component Value Date    NIL 0.11177 03/24/2022    MITOGENNIL 3.623 03/24/2022     Lab Results   Component Value Date    TPTMINTERP SEE BELOW 03/24/2022     Lab Results   Component Value Date    STOOLCULTURE  03/23/2022     No Salmonella,Shigella,Vibrio,Campylobacter,Yersinia isolated.    BUHALFGECF5Q Negative 03/23/2022    JTOLMSLMRY8D Negative 03/23/2022    CDIFFICILEAN Negative 03/23/2022    CDIFFTOX Negative 03/23/2022     Lab Results   Component Value Date    CALPROTECTIN 137.0 (H) 10/29/2022       Therapeutic Drug Monitoring Labs:  No results found for: PROMETH  No results found for: ANSADAINIT, INFLIXIMAB, INFLIXINTERP    Vaccinations:  Lab Results   Component Value Date    HEPBSAB Negative 03/24/2022     Lab Results   Component Value Date    HEPAIGG Non-reactive 10/28/2022     Lab Results   Component Value Date    VARICELLAZOS 3.65 (H) 10/28/2022    VARICELLAINT Positive (A) 10/28/2022     Immunization History   Administered Date(s) Administered    COVID-19, MRNA, LN-S, PF (Pfizer) (Purple Cap) 01/12/2021, 02/02/2021, 12/02/2021    Influenza (FLUAD) - Quadrivalent - Adjuvanted - PF *Preferred* (65+) 10/09/2020, 10/20/2021    Influenza - High Dose - PF (65 years and older) 10/06/2014, 09/29/2016, 11/09/2017, 10/08/2018, 09/17/2019    Influenza - Quadrivalent - High Dose - PF (65 years  and older) 09/10/2022    Pneumococcal Conjugate - 13 Valent 09/29/2016    Pneumococcal Polysaccharide - 23 Valent 01/05/2011    Zoster 02/02/2012         Review of Systems  Review of Systems   Constitutional:  Negative for chills, fever and weight loss.   HENT:  Negative for sore throat.    Eyes:  Negative for pain, discharge and redness.   Respiratory:  Positive for shortness of breath. Negative for cough and wheezing.    Cardiovascular:  Negative for chest pain and leg swelling.   Gastrointestinal:  Negative for abdominal pain, blood in stool, constipation, diarrhea, heartburn, melena, nausea and vomiting.   Genitourinary:  Negative for dysuria and frequency.   Musculoskeletal:  Positive for back pain. Negative for joint pain and myalgias.   Skin:  Positive for rash.   Neurological:  Negative for focal weakness and seizures.   Endo/Heme/Allergies:  Does not bruise/bleed easily.   Psychiatric/Behavioral:  Negative for depression. The patient is nervous/anxious.      Medical History:   Past Medical History:   Diagnosis Date    Abnormal echocardiogram 11/16/2012    Anemia     Anticoagulant long-term use     Anxiety     Atrophic kidney 11/16/2012    BPH (benign prostatic hyperplasia) 11/16/2012    Cancer 2000 something    Had radiation    DDD (degenerative disc disease), cervical 07/05/2017    x-ray 7/17 - Severe    GERD (gastroesophageal reflux disease) 11/16/2012    Glaucoma 11/16/2012    HTN (hypertension) 11/16/2012    Hypothyroid 11/16/2012    Internal carotid artery stenosis 11/16/2012    Joint pain     Left ventricular diastolic dysfunction, NYHA class 1 04/16/2015    4/15    Low serum testosterone level 11/16/2012    Normal cardiac stress test 11/16/2012    Osteopenia 11/16/2012    Shingles 11/16/2012    Skin disease 2020    Seems to have started after Covid vaccine    Stroke 2017    tia   no residual    TIA (transient ischemic attack) 11/16/2012    UC (ulcerative colitis) 11/16/2012       Surgical  History:  Past Surgical History:   Procedure Laterality Date    ADENOIDECTOMY      COLONOSCOPY N/A 2022    Procedure: COLONOSCOPY;  Surgeon: Ashley Nguyen MD;  Location: [facility] ENDO;  Service: Endoscopy;  Laterality: N/A;    ESOPHAGOGASTRODUODENOSCOPY N/A 2022    Procedure: EGD (ESOPHAGOGASTRODUODENOSCOPY);  Surgeon: Ashley Nguyen MD;  Location: [facility] ENDO;  Service: Endoscopy;  Laterality: N/A;  added on per Dr. Nguyen    EYE SURGERY Right 2020    cataract    HERNIA REPAIR      LEFT HEART CATHETERIZATION Right 10/29/2021    Procedure: CATHETERIZATION, HEART, LEFT AND RIGHT  - LV DARNELL POSSIBLE;  Surgeon: Beau Conklin MD;  Location: [facility] CATH LAB;  Service: Cardiology;  Laterality: Right;    RIGHT HEART CATHETERIZATION Right 10/29/2021    Procedure: INSERTION, CATHETER, RIGHT HEART;  Surgeon: Beau Conklin MD;  Location: [facility] CATH LAB;  Service: Cardiology;  Laterality: Right;    SKIN BIOPSY      TONSILLECTOMY         Family History:   Family History   Problem Relation Age of Onset    Hypertension Mother     Alzheimer's disease Mother     Diabetes Brother     Hypertension Brother     Cancer Brother     Heart disease Brother 56        MI    Heart attack Father        Social History:   Social History     Tobacco Use    Smoking status: Former     Packs/day: 1.00     Years: 25.00     Pack years: 25.00     Types: Cigarettes     Quit date: 1972     Years since quittin.2    Smokeless tobacco: Never   Substance Use Topics    Alcohol use: Yes     Alcohol/week: 3.0 standard drinks     Types: 2 Glasses of wine, 1 Cans of beer per week     Comment: Occasional    Drug use: Never       Allergies: Reviewed    Home Medications:   Medication List with Changes/Refills   Current Medications    ACETAMINOPHEN (TYLENOL) 500 MG TABLET    Take 2 tablets (1,000 mg total) by mouth every 6 (six) hours as needed.    ASCORBIC ACID, VITAMIN C, (VITAMIN C) 500 MG TABLET    Take 500 mg by mouth once  daily.    ASPIRIN (ECOTRIN) 81 MG EC TABLET    Take 81 mg by mouth once daily.    CALCIUM CARBONATE (TUMS) 200 MG CALCIUM (500 MG) CHEWABLE TABLET    Take 1 tablet by mouth once daily.    ELIQUIS 2.5 MG TAB    Take 2.5 mg by mouth 2 (two) times daily.    HYDROXYZINE HCL (ATARAX) 25 MG TABLET    TAKE 1 TABLET BY MOUTH EVERY 6 HOURS AS NEEDED FOR ITCHING    LEVOCETIRIZINE (XYZAL) 5 MG TABLET    Take 5 mg by mouth every morning.    LEVOTHYROXINE (EUTHYROX) 125 MCG TABLET    TAKE 1 TABLET BY MOUTH ONCE DAILY IN THE MORNING    METOPROLOL SUCCINATE (TOPROL-XL) 25 MG 24 HR TABLET    Take 1 tablet (25 mg total) by mouth once daily.    PANTOPRAZOLE (PROTONIX) 40 MG TABLET    Take 1 tablet by mouth twice daily    PRAVASTATIN (PRAVACHOL) 20 MG TABLET    Take 1 tablet (20 mg total) by mouth every evening.    TAMSULOSIN (FLOMAX) 0.4 MG CP24    Take 0.4 mg by mouth once daily.     TRIAMCINOLONE ACETONIDE 0.1% (KENALOG) 0.1 % CREAM    APPLY  CREAM EXTERNALLY TWICE DAILY    USTEKINUMAB (STELARA) 90 MG/ML SYRG SYRINGE    Inject 90 mg into the skin. q8w    VENLAFAXINE (EFFEXOR) 37.5 MG TAB    Take 1 tablet by mouth twice daily    VITC/E/ZN/COPPER/LUTEIN/ZEAXAN (ICAPS AREDS2 ORAL)    Take 1 capsule by mouth once daily.   Discontinued Medications    USTEKINUMAB (STELARA) 90 MG/ML SYRG SYRINGE           Physical Exam:  Vital Signs:  BP (!) 106/55 (BP Location: Left arm, Patient Position: Sitting)   Pulse (!) 53   Temp 97.9 °F (36.6 °C)   Wt 73.2 kg (161 lb 6 oz)   SpO2 99%   BMI 22.51 kg/m²   Body mass index is 22.51 kg/m².    Physical Exam  Vitals and nursing note reviewed.   Constitutional:       General: He is not in acute distress.     Appearance: Normal appearance. He is well-developed. He is not ill-appearing or toxic-appearing.   HENT:      Head: Normocephalic and atraumatic.   Eyes:      General: No scleral icterus.     Pupils: Pupils are equal, round, and reactive to light.   Neck:      Thyroid: No thyromegaly.    Cardiovascular:      Rate and Rhythm: Normal rate and regular rhythm.      Heart sounds: No murmur heard.    No friction rub.   Pulmonary:      Effort: Pulmonary effort is normal.      Breath sounds: Normal breath sounds. No wheezing or rales.   Abdominal:      General: Bowel sounds are normal. There is no distension.      Palpations: Abdomen is soft. There is no mass.      Tenderness: There is no abdominal tenderness. There is no guarding or rebound.   Lymphadenopathy:      Cervical: No cervical adenopathy.   Skin:     Findings: Rash present.   Neurological:      General: No focal deficit present.      Mental Status: He is alert and oriented to person, place, and time.   Psychiatric:         Mood and Affect: Mood normal.         Behavior: Behavior normal.         Thought Content: Thought content normal.         Judgment: Judgment normal.       Labs: reviewed and pertinent noted above    Assessment/Plan:  Ramesh Ricci is a 83 y.o. male with pan ulcerative colitis. The following issues were addresssed:    1. Ulcerative pancolitis with complication    2. Iron deficiency anemia due to chronic blood loss    3. Rash    4. Aortic valve stenosis, etiology of cardiac valve disease unspecified        1. Ulcerative colitis:  He continues to do well.  I recommend that we continue Stelara.  We are making plans to get his injections done at [facility] as Stelara not covered by his insurance and he no longer qualifies for the patient assistance program.  He has had a significant improvement with Stelara.  Today we discussed whether or not to proceed with colonoscopies.  We discussed the fact that he is 83 years old and has other significant comorbidities.  At this time I would not recommend a colonoscopy until his cardiac issues have been evaluated completely.  We discussed possibly stopping colonoscopies altogether given his age and comorbidities.  He is okay with that at the current time.  We discussed the potential  risk of developing colon cancer which he understands.    2. Iron deficiency anemia:  Recheck iron studies with next labs.    3. Rash:  He has had a persistent rash for several years.  This does not seem to correlate with any of his medications.  I recommend that he follow-up with his dermatologist for further advice regarding this issue.    4. Aortic stenosis:  Patient has appointment with Cardiology tomorrow.  I asked his wife to let us know what the plans are after that visit.       Ex smoker:  - recommended to continue smoking cessation  - discussed in detail that Crohn's disease can worsen with smoking and may make patient more resistant to treatment    # IBD specific health maintenance:  Colon cancer surveillance:  Up-to-date    Annual:  - Eye exam:  Not applicable  - Skin exam (if on IMM/TNF):  Up-to-date  - reminded pt to use sunblock/hats/sunprotective clothing  - PAP (if immunosuppressed):  Not applicable    DEXA:  Osteopenia noted in 2018    Vitamin D:  Normal in 2021    Vaccines:    Influenza:  Up-to-date   Pneumovax:     PCV13:  Up-to-date    PSV23:  Review today   HAV:  Check serology with next labs   HBV:  Review in the future   Tdap:  Review in the future   MMR:  Check serologies with next labs   VZV:  Check serology with next labs   HZV:  Recommend shingles vaccinations   HPV:  Not applicable   Meningococcus:  Not applicable   COVID:  Completed 3 doses    Follow up: Follow up in about 6 months (around 9/6/2023).    Thank you again for sending Ramesh Ricci to see Dr. Marcial Snell today at the [facility]. Please don't hesitate to contact Dr. Snlel if there are any questions regarding this evaluation, or if you have any other patients with inflammatory bowel disease for whom you would like a consultation. You can reach Dr. Snell at [phone] or by email at [email or web address]    Audie Snell MD  Department of Gastroenterology  Inflammatory Bowel  Disease

## 2023-03-06 NOTE — PATIENT INSTRUCTIONS
Continue Stelara  Meet with Cat this morning  Let me know what the cardiologist says  Get labs today  Follow up in 6 months

## 2023-03-06 NOTE — PROGRESS NOTES
Patient received Hep A #1, and Hep B#1 vaccines in the left deltoid. Pt tolerated well. Pt asked to wait in the clinic 15 minutes after injection in the event of an allergic reaction. Pt verbalized understanding. Pt left in NAD.

## 2023-03-07 ENCOUNTER — TELEPHONE (OUTPATIENT)
Dept: GASTROENTEROLOGY | Facility: CLINIC | Age: 83
End: 2023-03-07
Payer: MEDICARE

## 2023-03-07 NOTE — TELEPHONE ENCOUNTER
----- Message from Yoselin Lubin sent at 3/7/2023  8:28 AM CST -----  Sara Zavala  calling regarding the diagnoses codes for the (STELARA) 90 mg/mL Syrg syringedoes not match, stating you sent K57.9 but it needed to be changed to K51.9, call back [phone] , fax no. [phone]

## 2023-03-07 NOTE — TELEPHONE ENCOUNTER
Spoke to Pema ([facility]). Will fax Stelara order form w/ correct dx code for signature. No other concerns at this time.

## 2023-03-08 NOTE — TELEPHONE ENCOUNTER
Faxed Stelara prescriber order form to [facility] w/ update ICD10 code. Confirmation rec'd sent to be scan

## 2023-03-14 ENCOUNTER — PATIENT MESSAGE (OUTPATIENT)
Dept: INTERNAL MEDICINE | Facility: CLINIC | Age: 83
End: 2023-03-14
Payer: MEDICARE

## 2023-03-17 ENCOUNTER — TELEPHONE (OUTPATIENT)
Dept: GASTROENTEROLOGY | Facility: CLINIC | Age: 83
End: 2023-03-17
Payer: MEDICARE

## 2023-03-17 NOTE — TELEPHONE ENCOUNTER
"- Status update on Smitha Musa with [facility] " Pending authorization and financial assistance. Spoke with patient on 3/15"  "

## 2023-03-24 NOTE — TELEPHONE ENCOUNTER
Status update from [facility]:   - Patient is approved and scheduled for 4/7 at 9:30am.  - Current therapy: Stelara 90mg every 8 weeks (started 7/1/2022, dose 12/16, LD 2/10 but delayed to 2/23, ND 4/7)

## 2023-03-31 ENCOUNTER — PATIENT MESSAGE (OUTPATIENT)
Dept: INTERNAL MEDICINE | Facility: CLINIC | Age: 83
End: 2023-03-31

## 2023-03-31 ENCOUNTER — DOCUMENTATION ONLY (OUTPATIENT)
Dept: INTERNAL MEDICINE | Facility: CLINIC | Age: 83
End: 2023-03-31
Payer: MEDICARE

## 2023-03-31 DIAGNOSIS — K51.019 ULCERATIVE PANCOLITIS WITH COMPLICATION: Primary | Chronic | ICD-10-CM

## 2023-03-31 DIAGNOSIS — I48.0 PAF (PAROXYSMAL ATRIAL FIBRILLATION): Chronic | ICD-10-CM

## 2023-03-31 DIAGNOSIS — Z78.9 KNOWN MEDICAL PROBLEMS: ICD-10-CM

## 2023-03-31 PROCEDURE — 99491 PR CHRONIC CARE MGMT SVCS, 1ST 30 MIN, PER MONTH: ICD-10-PCS | Mod: S$GLB,,, | Performed by: INTERNAL MEDICINE

## 2023-03-31 PROCEDURE — 99491 CHRNC CARE MGMT PHYS 1ST 30: CPT | Mod: S$GLB,,, | Performed by: INTERNAL MEDICINE

## 2023-03-31 RX ORDER — GABAPENTIN 100 MG/1
CAPSULE ORAL
Qty: 90 CAPSULE | Refills: 3 | Status: SHIPPED | OUTPATIENT
Start: 2023-03-31 | End: 2023-08-15

## 2023-03-31 NOTE — PROGRESS NOTES
The patient's electronic chart was reviewed during this month. The patient's medical, functional, and psychosocial needs were assessed. Need for Home health care, PT, OT, , psychiatric care, and hospice was assessed. All preventative care measures were reviewed and updated. All medications were reviewed and reconciled. Potential drug interactions and medication adherence was reviewed. Prescriptions were renewed as appropriate. Education was provided to the patient and/or caregiver as needed, and all questions were answered. Over 30 minutes were spent providing these non-face-to-face services during this calendar month.    These services were provided directly by myself, the physician.    Patient sent a message through the portal stating that Dr. Chadwick had suggested he try gabapentin for a rash to help with the pruritus.    Gabapentin was sent to the pharmacy and the patient was notified via the patient portal.

## 2023-04-06 ENCOUNTER — CLINICAL SUPPORT (OUTPATIENT)
Dept: INFECTIOUS DISEASES | Facility: CLINIC | Age: 83
End: 2023-04-06
Payer: MEDICARE

## 2023-04-06 DIAGNOSIS — K51.019 ULCERATIVE PANCOLITIS WITH COMPLICATION: ICD-10-CM

## 2023-04-06 PROCEDURE — 90739 HEPB VACC 2/4 DOSE ADULT IM: CPT | Mod: S$GLB,,, | Performed by: INTERNAL MEDICINE

## 2023-04-06 PROCEDURE — 99999 PR PBB SHADOW E&M-EST. PATIENT-LVL II: CPT | Mod: PBBFAC,,,

## 2023-04-06 PROCEDURE — G0010 HEPATITIS B (RECOMBINANT) ADJUVANTED, 2 DOSE: ICD-10-PCS | Mod: S$GLB,,, | Performed by: INTERNAL MEDICINE

## 2023-04-06 PROCEDURE — 99999 PR PBB SHADOW E&M-EST. PATIENT-LVL II: ICD-10-PCS | Mod: PBBFAC,,,

## 2023-04-06 PROCEDURE — 90739 HEPATITIS B (RECOMBINANT) ADJUVANTED, 2 DOSE: ICD-10-PCS | Mod: S$GLB,,, | Performed by: INTERNAL MEDICINE

## 2023-04-06 PROCEDURE — G0010 ADMIN HEPATITIS B VACCINE: HCPCS | Mod: S$GLB,,, | Performed by: INTERNAL MEDICINE

## 2023-04-06 NOTE — PROGRESS NOTES
Patient received 2nd Heplisav B vaccine IM in left arm.  Patient tolerated well and left clinic NAD after observation.

## 2023-04-30 DIAGNOSIS — R21 RASH: ICD-10-CM

## 2023-04-30 RX ORDER — TRIAMCINOLONE ACETONIDE 1 MG/G
CREAM TOPICAL
Qty: 454 G | Refills: 0 | Status: SHIPPED | OUTPATIENT
Start: 2023-04-30 | End: 2023-07-21

## 2023-06-15 DIAGNOSIS — R42 DIZZINESS AND GIDDINESS: Primary | ICD-10-CM

## 2023-07-05 ENCOUNTER — HOSPITAL ENCOUNTER (OUTPATIENT)
Dept: RADIOLOGY | Facility: HOSPITAL | Age: 83
Discharge: HOME | End: 2023-07-05
Attending: OTOLARYNGOLOGY
Payer: MEDICARE

## 2023-07-05 DIAGNOSIS — R42 DIZZINESS AND GIDDINESS: ICD-10-CM

## 2023-07-05 PROCEDURE — A9585 GADOBUTROL INJECTION: HCPCS | Performed by: OTOLARYNGOLOGY

## 2023-07-05 PROCEDURE — 70553 MRI BRAIN W WO CONTRAST: ICD-10-PCS | Mod: 26,,, | Performed by: RADIOLOGY

## 2023-07-05 PROCEDURE — 70553 MRI BRAIN STEM W/O & W/DYE: CPT | Mod: 26,,, | Performed by: RADIOLOGY

## 2023-07-05 PROCEDURE — 25500020 PHARM REV CODE 255: Performed by: OTOLARYNGOLOGY

## 2023-07-05 PROCEDURE — 70544 MR ANGIOGRAPHY HEAD W/O DYE: CPT | Mod: 26,59,, | Performed by: RADIOLOGY

## 2023-07-05 PROCEDURE — 70553 MRI BRAIN STEM W/O & W/DYE: CPT | Mod: TC

## 2023-07-05 PROCEDURE — 70544 MR ANGIOGRAPHY HEAD W/O DYE: CPT | Mod: 59,TC

## 2023-07-05 PROCEDURE — 70544 MRA BRAIN WITHOUT CONTRAST: ICD-10-PCS | Mod: 26,59,, | Performed by: RADIOLOGY

## 2023-07-05 RX ORDER — GADOBUTROL 604.72 MG/ML
7 INJECTION INTRAVENOUS
Status: COMPLETED | OUTPATIENT
Start: 2023-07-05 | End: 2023-07-05

## 2023-07-05 RX ADMIN — GADOBUTROL 7 ML: 604.72 INJECTION INTRAVENOUS at 11:07

## 2023-07-21 ENCOUNTER — DOCUMENTATION ONLY (OUTPATIENT)
Dept: INTERNAL MEDICINE | Facility: CLINIC | Age: 83
End: 2023-07-21
Payer: MEDICARE

## 2023-07-21 DIAGNOSIS — C61 PROSTATE CA: ICD-10-CM

## 2023-07-21 DIAGNOSIS — N18.31 STAGE 3A CHRONIC KIDNEY DISEASE: ICD-10-CM

## 2023-07-21 DIAGNOSIS — R21 RASH: ICD-10-CM

## 2023-07-21 DIAGNOSIS — I10 ESSENTIAL HYPERTENSION: Primary | ICD-10-CM

## 2023-07-21 DIAGNOSIS — Z78.9 KNOWN MEDICAL PROBLEMS: ICD-10-CM

## 2023-07-21 PROCEDURE — 99490 CHRNC CARE MGMT STAFF 1ST 20: CPT | Mod: S$GLB,,, | Performed by: INTERNAL MEDICINE

## 2023-07-21 PROCEDURE — 99490 PR CHRONIC CARE MGMT, 1ST 20 MIN: ICD-10-PCS | Mod: S$GLB,,, | Performed by: INTERNAL MEDICINE

## 2023-07-21 RX ORDER — TRIAMCINOLONE ACETONIDE 1 MG/G
CREAM TOPICAL
Qty: 454 G | Refills: 0 | Status: SHIPPED | OUTPATIENT
Start: 2023-07-21 | End: 2024-01-02

## 2023-07-28 NOTE — PROGRESS NOTES
The patient's electronic chart was reviewed during this month. The patient's medical, functional, and psychosocial needs were assessed. Need for Home health care, PT, OT, , psychiatric care, and hospice was assessed. All preventative care measures were reviewed and updated. All medications were reviewed and reconciled. Potential drug interactions and medication adherence was reviewed. Prescriptions were renewed as appropriate. Education was provided to the patient and/or caregiver as needed, and all questions were answered. Over 20 minutes were spent providing these non-face-to-face services during this calendar month.     Refilled    triamcinolone acetonide 0.1% (KENALOG) 0.1 % cream 454 g 0 7/21/2023  No   Sig: APPLY  CREAM EXTERNALLY TO AFFECTED AREA TWICE DAILY   Sent to pharmacy as: triamcinolone acetonide 0.1% (KENALOG) 0.1 % cream   Class: Normal   Order: [id number]   Date/Time Signed: 7/21/2023 09:35       E-Prescribing Status: Receipt confirmed by pharmacy (7/21/2023  9:35 AM CDT)

## 2023-08-09 NOTE — ASSESSMENT & PLAN NOTE
- present on CT 2017  - followed by PCP     [FreeTextEntry3] : subcutaneous nodule with central pore on left upper lip firm brown papule on left arm

## 2023-08-10 ENCOUNTER — PATIENT MESSAGE (OUTPATIENT)
Dept: INTERNAL MEDICINE | Facility: CLINIC | Age: 83
End: 2023-08-10
Payer: MEDICARE

## 2023-08-10 ENCOUNTER — HOSPITAL ENCOUNTER (OUTPATIENT)
Dept: RADIOLOGY | Facility: HOSPITAL | Age: 83
Discharge: HOME | End: 2023-08-10
Attending: INTERNAL MEDICINE
Payer: MEDICARE

## 2023-08-10 DIAGNOSIS — R07.81 RIB PAIN ON LEFT SIDE: Primary | ICD-10-CM

## 2023-08-10 DIAGNOSIS — R07.81 RIB PAIN ON LEFT SIDE: ICD-10-CM

## 2023-08-10 PROCEDURE — 71100 X-RAY EXAM RIBS UNI 2 VIEWS: CPT | Mod: TC,FY,LT

## 2023-08-10 PROCEDURE — 71100 XR RIBS 2 VIEW LEFT: ICD-10-PCS | Mod: 26,LT,, | Performed by: INTERNAL MEDICINE

## 2023-08-10 PROCEDURE — 71046 XR CHEST PA AND LATERAL: ICD-10-PCS | Mod: 26,,, | Performed by: INTERNAL MEDICINE

## 2023-08-10 PROCEDURE — 71100 X-RAY EXAM RIBS UNI 2 VIEWS: CPT | Mod: 26,LT,, | Performed by: INTERNAL MEDICINE

## 2023-08-10 PROCEDURE — 71046 X-RAY EXAM CHEST 2 VIEWS: CPT | Mod: TC,FY

## 2023-08-10 PROCEDURE — 71046 X-RAY EXAM CHEST 2 VIEWS: CPT | Mod: 26,,, | Performed by: INTERNAL MEDICINE

## 2023-08-15 ENCOUNTER — LAB VISIT (OUTPATIENT)
Dept: LAB | Facility: OTHER | Age: 83
End: 2023-08-15
Attending: INTERNAL MEDICINE
Payer: MEDICARE

## 2023-08-15 ENCOUNTER — OFFICE VISIT (OUTPATIENT)
Dept: INTERNAL MEDICINE | Facility: CLINIC | Age: 83
End: 2023-08-15
Attending: INTERNAL MEDICINE
Payer: MEDICARE

## 2023-08-15 VITALS
HEIGHT: 71 IN | DIASTOLIC BLOOD PRESSURE: 62 MMHG | WEIGHT: 161 LBS | HEART RATE: 58 BPM | BODY MASS INDEX: 22.54 KG/M2 | OXYGEN SATURATION: 96 % | SYSTOLIC BLOOD PRESSURE: 122 MMHG

## 2023-08-15 DIAGNOSIS — S22.42XA CLOSED FRACTURE OF MULTIPLE RIBS OF LEFT SIDE, INITIAL ENCOUNTER: ICD-10-CM

## 2023-08-15 DIAGNOSIS — G44.209 TENSION-TYPE HEADACHE, NOT INTRACTABLE, UNSPECIFIED CHRONICITY PATTERN: ICD-10-CM

## 2023-08-15 DIAGNOSIS — D50.8 OTHER IRON DEFICIENCY ANEMIA: ICD-10-CM

## 2023-08-15 DIAGNOSIS — Z12.5 SCREENING FOR PROSTATE CANCER: ICD-10-CM

## 2023-08-15 DIAGNOSIS — I48.0 PAF (PAROXYSMAL ATRIAL FIBRILLATION): Chronic | ICD-10-CM

## 2023-08-15 DIAGNOSIS — D51.0 PERNICIOUS ANEMIA: ICD-10-CM

## 2023-08-15 DIAGNOSIS — R79.89 OTHER SPECIFIED ABNORMAL FINDINGS OF BLOOD CHEMISTRY: ICD-10-CM

## 2023-08-15 DIAGNOSIS — C61 PROSTATE CA: ICD-10-CM

## 2023-08-15 DIAGNOSIS — E03.9 HYPOTHYROIDISM, UNSPECIFIED TYPE: ICD-10-CM

## 2023-08-15 DIAGNOSIS — E55.9 VITAMIN D DEFICIENCY: ICD-10-CM

## 2023-08-15 DIAGNOSIS — E03.9 ACQUIRED HYPOTHYROIDISM: Chronic | ICD-10-CM

## 2023-08-15 DIAGNOSIS — I10 ESSENTIAL HYPERTENSION: Chronic | ICD-10-CM

## 2023-08-15 DIAGNOSIS — E78.9 DISORDER OF LIPID METABOLISM: Primary | ICD-10-CM

## 2023-08-15 DIAGNOSIS — E78.9 DISORDER OF LIPID METABOLISM: ICD-10-CM

## 2023-08-15 DIAGNOSIS — K51.019 ULCERATIVE PANCOLITIS WITH COMPLICATION: Primary | Chronic | ICD-10-CM

## 2023-08-15 LAB
ALBUMIN SERPL BCP-MCNC: 3.7 G/DL (ref 3.5–5.2)
ALP SERPL-CCNC: 68 U/L (ref 55–135)
ALT SERPL W/O P-5'-P-CCNC: 10 U/L (ref 10–44)
ANION GAP SERPL CALC-SCNC: 14 MMOL/L (ref 8–16)
AST SERPL-CCNC: 20 U/L (ref 10–40)
BASOPHILS # BLD AUTO: 0.04 K/UL (ref 0–0.2)
BASOPHILS NFR BLD: 0.5 % (ref 0–1.9)
BILIRUB SERPL-MCNC: 0.3 MG/DL (ref 0.1–1)
BUN SERPL-MCNC: 21 MG/DL (ref 8–23)
CALCIUM SERPL-MCNC: 8.8 MG/DL (ref 8.7–10.5)
CHLORIDE SERPL-SCNC: 101 MMOL/L (ref 95–110)
CHOLEST SERPL-MCNC: 173 MG/DL (ref 120–199)
CHOLEST/HDLC SERPL: 3.4 {RATIO} (ref 2–5)
CK SERPL-CCNC: 119 U/L (ref 20–200)
CO2 SERPL-SCNC: 25 MMOL/L (ref 23–29)
COMPLEXED PSA SERPL-MCNC: 0.92 NG/ML (ref 0–4)
CREAT SERPL-MCNC: 1.4 MG/DL (ref 0.5–1.4)
DIFFERENTIAL METHOD: ABNORMAL
EOSINOPHIL # BLD AUTO: 0.2 K/UL (ref 0–0.5)
EOSINOPHIL NFR BLD: 2.2 % (ref 0–8)
ERYTHROCYTE [DISTWIDTH] IN BLOOD BY AUTOMATED COUNT: 14.4 % (ref 11.5–14.5)
EST. GFR  (NO RACE VARIABLE): 49 ML/MIN/1.73 M^2
ESTIMATED AVG GLUCOSE: 114 MG/DL (ref 68–131)
GLUCOSE SERPL-MCNC: 85 MG/DL (ref 70–110)
HBA1C MFR BLD: 5.6 % (ref 4–5.6)
HCT VFR BLD AUTO: 40.1 % (ref 40–54)
HDLC SERPL-MCNC: 51 MG/DL (ref 40–75)
HDLC SERPL: 29.5 % (ref 20–50)
HGB BLD-MCNC: 12.4 G/DL (ref 14–18)
IMM GRANULOCYTES # BLD AUTO: 0.02 K/UL (ref 0–0.04)
IMM GRANULOCYTES NFR BLD AUTO: 0.3 % (ref 0–0.5)
LDLC SERPL CALC-MCNC: 79.4 MG/DL (ref 63–159)
LYMPHOCYTES # BLD AUTO: 1.7 K/UL (ref 1–4.8)
LYMPHOCYTES NFR BLD: 23.3 % (ref 18–48)
MCH RBC QN AUTO: 28.6 PG (ref 27–31)
MCHC RBC AUTO-ENTMCNC: 30.9 G/DL (ref 32–36)
MCV RBC AUTO: 92 FL (ref 82–98)
MONOCYTES # BLD AUTO: 0.7 K/UL (ref 0.3–1)
MONOCYTES NFR BLD: 10.1 % (ref 4–15)
NEUTROPHILS # BLD AUTO: 4.7 K/UL (ref 1.8–7.7)
NEUTROPHILS NFR BLD: 63.6 % (ref 38–73)
NONHDLC SERPL-MCNC: 122 MG/DL
NRBC BLD-RTO: 0 /100 WBC
PLATELET # BLD AUTO: 185 K/UL (ref 150–450)
PMV BLD AUTO: 10.9 FL (ref 9.2–12.9)
POTASSIUM SERPL-SCNC: 4.2 MMOL/L (ref 3.5–5.1)
PROT SERPL-MCNC: 7.1 G/DL (ref 6–8.4)
RBC # BLD AUTO: 4.34 M/UL (ref 4.6–6.2)
SODIUM SERPL-SCNC: 140 MMOL/L (ref 136–145)
T4 FREE SERPL-MCNC: 1.16 NG/DL (ref 0.71–1.51)
TRIGL SERPL-MCNC: 213 MG/DL (ref 30–150)
TSH SERPL DL<=0.005 MIU/L-ACNC: 3.21 UIU/ML (ref 0.4–4)
VIT B12 SERPL-MCNC: 484 PG/ML (ref 210–950)
WBC # BLD AUTO: 7.31 K/UL (ref 3.9–12.7)

## 2023-08-15 PROCEDURE — 1159F PR MEDICATION LIST DOCUMENTED IN MEDICAL RECORD: ICD-10-PCS | Mod: CPTII,S$GLB,, | Performed by: INTERNAL MEDICINE

## 2023-08-15 PROCEDURE — 3078F DIAST BP <80 MM HG: CPT | Mod: CPTII,S$GLB,, | Performed by: INTERNAL MEDICINE

## 2023-08-15 PROCEDURE — 1160F PR REVIEW ALL MEDS BY PRESCRIBER/CLIN PHARMACIST DOCUMENTED: ICD-10-PCS | Mod: CPTII,S$GLB,, | Performed by: INTERNAL MEDICINE

## 2023-08-15 PROCEDURE — 1159F MED LIST DOCD IN RCRD: CPT | Mod: CPTII,S$GLB,, | Performed by: INTERNAL MEDICINE

## 2023-08-15 PROCEDURE — 84153 ASSAY OF PSA TOTAL: CPT | Performed by: INTERNAL MEDICINE

## 2023-08-15 PROCEDURE — 80061 LIPID PANEL: CPT | Performed by: INTERNAL MEDICINE

## 2023-08-15 PROCEDURE — 83036 HEMOGLOBIN GLYCOSYLATED A1C: CPT | Performed by: INTERNAL MEDICINE

## 2023-08-15 PROCEDURE — 85025 COMPLETE CBC W/AUTO DIFF WBC: CPT | Performed by: INTERNAL MEDICINE

## 2023-08-15 PROCEDURE — 99214 OFFICE O/P EST MOD 30 MIN: CPT | Mod: S$GLB,,, | Performed by: INTERNAL MEDICINE

## 2023-08-15 PROCEDURE — 84439 ASSAY OF FREE THYROXINE: CPT | Performed by: INTERNAL MEDICINE

## 2023-08-15 PROCEDURE — 80053 COMPREHEN METABOLIC PANEL: CPT | Performed by: INTERNAL MEDICINE

## 2023-08-15 PROCEDURE — 84443 ASSAY THYROID STIM HORMONE: CPT | Performed by: INTERNAL MEDICINE

## 2023-08-15 PROCEDURE — 82607 VITAMIN B-12: CPT | Performed by: INTERNAL MEDICINE

## 2023-08-15 PROCEDURE — 3074F SYST BP LT 130 MM HG: CPT | Mod: CPTII,S$GLB,, | Performed by: INTERNAL MEDICINE

## 2023-08-15 PROCEDURE — 3074F PR MOST RECENT SYSTOLIC BLOOD PRESSURE < 130 MM HG: ICD-10-PCS | Mod: CPTII,S$GLB,, | Performed by: INTERNAL MEDICINE

## 2023-08-15 PROCEDURE — 36415 COLL VENOUS BLD VENIPUNCTURE: CPT | Performed by: INTERNAL MEDICINE

## 2023-08-15 PROCEDURE — 82550 ASSAY OF CK (CPK): CPT | Performed by: INTERNAL MEDICINE

## 2023-08-15 PROCEDURE — 1160F RVW MEDS BY RX/DR IN RCRD: CPT | Mod: CPTII,S$GLB,, | Performed by: INTERNAL MEDICINE

## 2023-08-15 PROCEDURE — 99214 PR OFFICE/OUTPT VISIT, EST, LEVL IV, 30-39 MIN: ICD-10-PCS | Mod: S$GLB,,, | Performed by: INTERNAL MEDICINE

## 2023-08-15 PROCEDURE — 3078F PR MOST RECENT DIASTOLIC BLOOD PRESSURE < 80 MM HG: ICD-10-PCS | Mod: CPTII,S$GLB,, | Performed by: INTERNAL MEDICINE

## 2023-08-15 NOTE — PROGRESS NOTES
Subjective     Patient ID: Ramesh Ricci is a 83 y.o. male.    Chief Complaint: Annual Exam (Fell last week hitting arm of recliner breaking ribs, still in pain especially at night, Still having persistent headaches going to see Neuro on Friday, Taking (spouses) Tramadol and an occasional Oxycodone for the pain from the broken ribs )    He fell and hit his left side hit on the arm of his chair causing rib pain.  He had an x-ray on August 10, 2023 which revealed fractures of the left 5th 6th and 7th ribs.  He is currently taking Tylenol and tramadol with moderate pain relief.  Occasionally he takes and oxycodone with good relief.      Review of Systems   Constitutional: Negative.    Respiratory: Negative.     Cardiovascular: Negative.    Musculoskeletal:  Positive for arthralgias.   Neurological:  Positive for headaches.          Objective     Physical Exam  Vitals and nursing note reviewed.   Constitutional:       Appearance: He is well-developed.   HENT:      Head: Normocephalic and atraumatic.   Eyes:      Pupils: Pupils are equal, round, and reactive to light.   Cardiovascular:      Rate and Rhythm: Normal rate and regular rhythm.      Heart sounds: Normal heart sounds.   Pulmonary:      Effort: Pulmonary effort is normal.   Chest:      Comments: Minimal tenderness over the left posterior 5th through 7th ribs  Neurological:      Mental Status: He is alert.            Assessment and Plan     1. Ulcerative pancolitis with complication  Overview:  1961  Dr. Marina  C-scope 7/15  started Lialda 2.4 g - 6/18  10/19 - Start Humira, D/c Delzicol  4/22 - Started Entyvio      2. Acquired hypothyroidism    3. Essential hypertension  Overview:  X 5 years      4. PAF (paroxysmal atrial fibrillation)    5. Prostate CA  Overview:  XRT 12/12  Dr. Bailey      6. Tension-type headache, not intractable, unspecified chronicity pattern  Overview:  MRI - 9/18      7. Closed fracture of multiple ribs of left side, initial  encounter        Per orders and D/C instructions.  Continue diet and/or meds for hypothyroidism, HTN, and UC, which are stable.  Follow-up with cardiology for paroxysmal atrial fibrillation  He has an appointment with Dr. Narvaez (Neuro) this week for his chronic headaches.  Check routine labs to include PSA for history of prostate cancer.  He will continue Tylenol and add Aspercreme with lidocaine patch for his rib fractures.  He can take low-dose Oxycodone at night as needed for pain.    Between 30 and 39 min of total time for evaluation and management services were spent on the patient today.  The medical problems and treatment options were discussed, and all questions were answered.             Follow up in about 6 months (around 2/15/2024).

## 2023-08-15 NOTE — PATIENT INSTRUCTIONS
Apply in Aspercreme patch with lidocaine to your left ribs daily for 1-2 weeks.  Take Tylenol 1000 mg every 8 hours during the day as needed for pain.  You can take oxycodone 2.5-5 mg nightly as needed for pain.

## 2023-08-16 ENCOUNTER — PATIENT MESSAGE (OUTPATIENT)
Dept: INTERNAL MEDICINE | Facility: CLINIC | Age: 83
End: 2023-08-16
Payer: MEDICARE

## 2023-08-23 RX ORDER — PANTOPRAZOLE SODIUM 40 MG/1
40 TABLET, DELAYED RELEASE ORAL DAILY PRN
Qty: 90 TABLET | Refills: 1 | Status: SHIPPED | OUTPATIENT
Start: 2023-08-23 | End: 2024-02-19 | Stop reason: SDUPTHER

## 2023-09-06 ENCOUNTER — CLINICAL SUPPORT (OUTPATIENT)
Dept: INFECTIOUS DISEASES | Facility: CLINIC | Age: 83
End: 2023-09-06
Payer: MEDICARE

## 2023-09-06 DIAGNOSIS — Z00.00 PREVENTATIVE HEALTH CARE: Primary | ICD-10-CM

## 2023-09-06 PROCEDURE — 90471 IMMUNIZATION ADMIN: CPT | Mod: S$GLB,,, | Performed by: INTERNAL MEDICINE

## 2023-09-06 PROCEDURE — 90471 HEPATITIS A VACCINE ADULT IM: ICD-10-PCS | Mod: S$GLB,,, | Performed by: INTERNAL MEDICINE

## 2023-09-06 PROCEDURE — 90632 HEPA VACCINE ADULT IM: CPT | Mod: S$GLB,,, | Performed by: INTERNAL MEDICINE

## 2023-09-06 PROCEDURE — 90632 HEPATITIS A VACCINE ADULT IM: ICD-10-PCS | Mod: S$GLB,,, | Performed by: INTERNAL MEDICINE

## 2023-09-06 NOTE — PROGRESS NOTES
Patient received zoster #1, Hep A #2 vaccine in the left deltoid. Pt tolerated well. Pt asked to wait in the clinic 15 minutes after injection in the event of an allergic reaction. Pt verbalized understanding. Pt left in NAD.

## 2023-09-07 ENCOUNTER — DOCUMENTATION ONLY (OUTPATIENT)
Dept: INTERNAL MEDICINE | Facility: CLINIC | Age: 83
End: 2023-09-07
Payer: MEDICARE

## 2023-09-07 DIAGNOSIS — I10 ESSENTIAL HYPERTENSION: Primary | ICD-10-CM

## 2023-09-07 DIAGNOSIS — I25.10 CORONARY ARTERY DISEASE INVOLVING NATIVE CORONARY ARTERY OF NATIVE HEART WITHOUT ANGINA PECTORIS: Primary | Chronic | ICD-10-CM

## 2023-09-07 DIAGNOSIS — N18.31 STAGE 3A CHRONIC KIDNEY DISEASE: ICD-10-CM

## 2023-09-07 DIAGNOSIS — Z78.9 KNOWN MEDICAL PROBLEMS: ICD-10-CM

## 2023-09-07 DIAGNOSIS — C61 PROSTATE CA: ICD-10-CM

## 2023-09-07 DIAGNOSIS — E03.9 HYPOTHYROIDISM, UNSPECIFIED TYPE: ICD-10-CM

## 2023-09-07 PROCEDURE — 99490 PR CHRONIC CARE MGMT, 1ST 20 MIN: ICD-10-PCS | Mod: S$GLB,,, | Performed by: INTERNAL MEDICINE

## 2023-09-07 PROCEDURE — 99490 CHRNC CARE MGMT STAFF 1ST 20: CPT | Mod: S$GLB,,, | Performed by: INTERNAL MEDICINE

## 2023-09-07 RX ORDER — PRAVASTATIN SODIUM 20 MG/1
20 TABLET ORAL NIGHTLY
Qty: 90 TABLET | Refills: 3 | Status: SHIPPED | OUTPATIENT
Start: 2023-09-07 | End: 2024-02-19 | Stop reason: SDUPTHER

## 2023-09-07 RX ORDER — LEVOTHYROXINE SODIUM 125 UG/1
125 TABLET ORAL EVERY MORNING
Qty: 90 TABLET | Refills: 3 | Status: SHIPPED | OUTPATIENT
Start: 2023-09-07 | End: 2024-02-19 | Stop reason: SDUPTHER

## 2023-09-11 ENCOUNTER — OFFICE VISIT (OUTPATIENT)
Dept: GASTROENTEROLOGY | Facility: CLINIC | Age: 83
End: 2023-09-11
Payer: MEDICARE

## 2023-09-11 VITALS
SYSTOLIC BLOOD PRESSURE: 127 MMHG | OXYGEN SATURATION: 98 % | HEART RATE: 57 BPM | BODY MASS INDEX: 22.2 KG/M2 | TEMPERATURE: 98 F | WEIGHT: 159.19 LBS | DIASTOLIC BLOOD PRESSURE: 61 MMHG

## 2023-09-11 DIAGNOSIS — K51.019 ULCERATIVE PANCOLITIS WITH COMPLICATION: Primary | ICD-10-CM

## 2023-09-11 DIAGNOSIS — D50.0 IRON DEFICIENCY ANEMIA DUE TO CHRONIC BLOOD LOSS: ICD-10-CM

## 2023-09-11 PROCEDURE — 1159F MED LIST DOCD IN RCRD: CPT | Mod: CPTII,S$GLB,, | Performed by: INTERNAL MEDICINE

## 2023-09-11 PROCEDURE — 1160F PR REVIEW ALL MEDS BY PRESCRIBER/CLIN PHARMACIST DOCUMENTED: ICD-10-PCS | Mod: CPTII,S$GLB,, | Performed by: INTERNAL MEDICINE

## 2023-09-11 PROCEDURE — 99214 OFFICE O/P EST MOD 30 MIN: CPT | Mod: S$GLB,,, | Performed by: INTERNAL MEDICINE

## 2023-09-11 PROCEDURE — 1125F PR PAIN SEVERITY QUANTIFIED, PAIN PRESENT: ICD-10-PCS | Mod: CPTII,S$GLB,, | Performed by: INTERNAL MEDICINE

## 2023-09-11 PROCEDURE — 3074F PR MOST RECENT SYSTOLIC BLOOD PRESSURE < 130 MM HG: ICD-10-PCS | Mod: CPTII,S$GLB,, | Performed by: INTERNAL MEDICINE

## 2023-09-11 PROCEDURE — 1101F PR PT FALLS ASSESS DOC 0-1 FALLS W/OUT INJ PAST YR: ICD-10-PCS | Mod: CPTII,S$GLB,, | Performed by: INTERNAL MEDICINE

## 2023-09-11 PROCEDURE — 1160F RVW MEDS BY RX/DR IN RCRD: CPT | Mod: CPTII,S$GLB,, | Performed by: INTERNAL MEDICINE

## 2023-09-11 PROCEDURE — 3288F FALL RISK ASSESSMENT DOCD: CPT | Mod: CPTII,S$GLB,, | Performed by: INTERNAL MEDICINE

## 2023-09-11 PROCEDURE — 99214 PR OFFICE/OUTPT VISIT, EST, LEVL IV, 30-39 MIN: ICD-10-PCS | Mod: S$GLB,,, | Performed by: INTERNAL MEDICINE

## 2023-09-11 PROCEDURE — 3078F DIAST BP <80 MM HG: CPT | Mod: CPTII,S$GLB,, | Performed by: INTERNAL MEDICINE

## 2023-09-11 PROCEDURE — 1159F PR MEDICATION LIST DOCUMENTED IN MEDICAL RECORD: ICD-10-PCS | Mod: CPTII,S$GLB,, | Performed by: INTERNAL MEDICINE

## 2023-09-11 PROCEDURE — 1101F PT FALLS ASSESS-DOCD LE1/YR: CPT | Mod: CPTII,S$GLB,, | Performed by: INTERNAL MEDICINE

## 2023-09-11 PROCEDURE — 1125F AMNT PAIN NOTED PAIN PRSNT: CPT | Mod: CPTII,S$GLB,, | Performed by: INTERNAL MEDICINE

## 2023-09-11 PROCEDURE — 3288F PR FALLS RISK ASSESSMENT DOCUMENTED: ICD-10-PCS | Mod: CPTII,S$GLB,, | Performed by: INTERNAL MEDICINE

## 2023-09-11 PROCEDURE — 3074F SYST BP LT 130 MM HG: CPT | Mod: CPTII,S$GLB,, | Performed by: INTERNAL MEDICINE

## 2023-09-11 PROCEDURE — 3078F PR MOST RECENT DIASTOLIC BLOOD PRESSURE < 80 MM HG: ICD-10-PCS | Mod: CPTII,S$GLB,, | Performed by: INTERNAL MEDICINE

## 2023-09-11 RX ORDER — SERTRALINE HYDROCHLORIDE 50 MG/1
50 TABLET, FILM COATED ORAL
COMMUNITY
Start: 2023-08-18 | End: 2024-02-15

## 2023-09-11 RX ORDER — FLUOCINOLONE ACETONIDE 0.11 MG/ML
OIL AURICULAR (OTIC)
COMMUNITY
Start: 2023-09-08

## 2023-09-11 NOTE — PROGRESS NOTES
IBD PATIENT INTAKE:    COVID symptoms in the last 7 days (runny nose, sore throat, congestion, cough, fever): No  PCP: EFREM Muniz  If not PCP-  number given to establish [phone]: N/A    ALLERGIES REVIEWED:  Yes    CHIEF COMPLAINT:    Chief Complaint   Patient presents with    Ulcerative Colitis       VITAL SIGNS:  /61 (BP Location: Left arm, Patient Position: Sitting)   Pulse (!) 57   Temp 98.1 °F (36.7 °C)   Wt 72.2 kg (159 lb 2.8 oz)   SpO2 98%   BMI 22.20 kg/m²      Change in medical, surgical, family or social history: No    IBD THERAPY (name, dose/frequency):  Stelara q8w   Last dose:  8/3    Next dose:  9/28  Infusion/Pharmacy: [facility]    Vitamins (be sure this has been put in medication list):   Vit D:  no     Vit B-12:  no   Folic Acid: no        Calcium:   500mg  Iron:  no     MVI:  yes     Antibiotics (past 30 Days):  No  If yes   Indication:  Name of antibiotic:  Completion date:     REVIEWED MEDICATION LIST RECONCILED INCLUDING ABOVE MEDS:  Yes                  Answers submitted by the patient for this visit:  Established Patient Questionnaire  (Submitted on 9/4/2023)  eye pain: Yes  shortness of breath: Yes  heartburn: Yes  Joint pain? : Yes

## 2023-09-11 NOTE — PROGRESS NOTES
[facility]          Inflammatory Bowel Disease Follow Up Note         TODAY'S VISIT DATE:  9/11/2023    Reason for Consult:    Chief Complaint   Patient presents with    Ulcerative Colitis       PCP: EFREM Muniz      Referring MD:   No ref. provider found    History of Present Illness:  Ramesh Ricci who is a 83 y.o. male is being seen today at the [facility] on 09/11/2023 for inflammatory bowel disease- ulcerative colitis.  He is doing very well with regards to his ulcerative colitis.  He denies any significant issues currently.  His only complaint is the cost of the Stelara injections.  He is on the patient assistance program.  His wife has been giving him the injections at home.  He asked about possibly going back to using mesalamine.  Since our last visit he underwent aortic valve replacement in April.  He is currently taking Eliquis.  He reports he has 1-2 formed bowel movements daily with no blood in the stools.  He also has been having issues with headaches and is seeing a neurologist in the near future.    IBD History:  He was diagnosed with ulcerative colitis in the 1960s.  Per notes he has a history of pan colitis.  Multiple colonoscopy reports are available and these demonstrate patchy colitis throughout the colon.  On one exam biopsy showed some acute ileitis but no chronic changes in the ileum.  Biopsies have been consistent with chronic ulcerative colitis.  It is difficult to determine what he has been treated with in the past but it appears he was at least on Colazal at 1 point (dose was 2.25 g once daily).  He was also on azathioprine at 1 point and treated with Humira.  In early 2021 he developed a rash that was concerning for possible psoriasis.  Humira was stopped at that point.  Pentasa was started after the Humira was stopped.  After stopping the Humira his skin lesions did not resolved and it is currently not  thought that his skin issues were related to psoriasis.  In March 2022 he was admitted to the hospital with acute onset diarrhea and abdominal pain.  He underwent an upper endoscopy and colonoscopy.  The upper endoscopy was only significant for a 2 cm hiatal hernia.  The colonoscopy revealed no evidence of active ulcerative colitis.  The preparation of the colon was poor.  Biopsies demonstrated evidence of active colitis throughout the entire colon.  He had a markedly elevated stool calprotectin level and negative stool studies for infection.  After starting Stelara in July of 2022 he did quite well and had a significant decline in his stool calprotectin level.  We were able to stop the Pentasa without any issues.  In April of 2023 he underwent aortic valve replacement.  In March of 2023 we had discussed possible colonoscopy to reassess his disease activity and for cancer surveillance but he declines further colonoscopy at this time.      IBD Details:  Dx Date:  1960s  Disease type/distribution:  Ulcerative colitis/pancolitis  Current Treatment:  Stelara 90 mg every 8 weeks Start Date:  July 1, 2022 Response:  Symptom remission  Optimized:  No  Adverse reactions:  None  Prior surgeries:  None  CRP Elevation: Y  calprotectin:  Markedly elevated with active disease  Disease Complications:  None  Extraintestinal manifestations:  None  Prior treatments:   Steroids:  Good response in the past  5ASA:  Incomplete response to Colazal, inadequate response to low-dose Pentasa  IMM:  Previously on azathioprine, unclear response  TNF Inh:  Humira-clinical remission, stopped due to skin lesions that do not appear to be related to Humira   Anti-Integrin:  None   IL 12/23:  Currently on Stelara  KARLO Inh:  None  S1P Modulator: None    Previous Clinical Trials:  None    Last Colonoscopy:  March 2022-poor prep, no significant endoscopic disease activity but significant active colitis on biopsies    Other Endoscopies:  March  "2022-unrevealing    Imaging:   MRE:  None   CT:  March 2022-inflammatory changes around the colon, otherwise unrevealing   Other:  None    Pertinent Labs:  Lab Results   Component Value Date    SEDRATE 77 (H) 03/23/2022    CRP 0.6 03/06/2023     No results found for: "TTGIGA", "IGA"  Lab Results   Component Value Date    TSH 3.209 08/15/2023    FREET4 1.16 08/15/2023     Lab Results   Component Value Date    BPWRTLPI44IA 32 03/06/2023    IGYMXONF39 484 08/15/2023     Lab Results   Component Value Date    HEPBSAG Non-reactive 03/06/2023    HEPBCAB Negative 03/24/2022     No results found for: "ZUX64DXTA"  Lab Results   Component Value Date    NIL 0.51242 03/06/2023    MITOGENNIL 9.948 03/06/2023     Lab Results   Component Value Date    TPTMINTERP SEE BELOW 03/24/2022     Lab Results   Component Value Date    STOOLCULTURE  03/23/2022     No Salmonella,Shigella,Vibrio,Campylobacter,Yersinia isolated.    YLVWPGFLLI7B Negative 03/23/2022    GQTFPOWWXZ7W Negative 03/23/2022    CDIFFICILEAN Negative 03/23/2022    CDIFFTOX Negative 03/23/2022     Lab Results   Component Value Date    CALPROTECTIN 137.0 (H) 10/29/2022       Therapeutic Drug Monitoring Labs:  No results found for: "PROMETH"  No results found for: "ANSADAINIT", "INFLIXIMAB", "INFLIXINTERP"    Vaccinations:  Lab Results   Component Value Date    HEPBSAB Negative 03/24/2022     Lab Results   Component Value Date    HEPAIGG Non-reactive 10/28/2022     Lab Results   Component Value Date    VARICELLAZOS 3.65 (H) 10/28/2022    VARICELLAINT Positive (A) 10/28/2022     Immunization History   Administered Date(s) Administered    COVID-19, MRNA, LN-S, PF (Pfizer) (Purple Cap) 01/12/2021, 02/02/2021, 12/02/2021    Hepatitis A, Adult 03/06/2023, 09/06/2023    Hepatitis B (recombinant) Adjuvanted, 2 dose 03/06/2023, 04/06/2023    Influenza (FLUAD) - Quadrivalent - Adjuvanted - PF *Preferred* (65+) 10/09/2020, 10/20/2021    Influenza - High Dose - PF (65 years and older) " 10/06/2014, 09/29/2016, 11/09/2017, 10/08/2018, 09/17/2019    Influenza - Quadrivalent - High Dose - PF (65 years and older) 09/10/2022    Pneumococcal Conjugate - 13 Valent 09/29/2016    Pneumococcal Polysaccharide - 23 Valent 01/05/2011    Zoster 02/02/2012         Review of Systems  Review of Systems   Constitutional:  Negative for chills, fever and weight loss.   HENT:  Negative for sore throat.    Eyes:  Positive for pain. Negative for discharge and redness.   Respiratory:  Positive for shortness of breath. Negative for cough and wheezing.    Cardiovascular:  Negative for chest pain and leg swelling.   Gastrointestinal:  Positive for heartburn. Negative for abdominal pain, blood in stool, constipation, diarrhea, melena, nausea and vomiting.   Genitourinary:  Negative for dysuria and frequency.   Musculoskeletal:  Negative for back pain, joint pain and myalgias.   Skin:  Negative for rash.   Neurological:  Negative for focal weakness and seizures.   Endo/Heme/Allergies:  Does not bruise/bleed easily.   Psychiatric/Behavioral:  Negative for depression. The patient is not nervous/anxious.        Medical History:   Past Medical History:   Diagnosis Date    Abnormal echocardiogram 11/16/2012    Anemia     Anticoagulant long-term use     Anxiety     Atrophic kidney 11/16/2012    BPH (benign prostatic hyperplasia) 11/16/2012    Cancer 2000 something    Had radiation    DDD (degenerative disc disease), cervical 07/05/2017    x-ray 7/17 - Severe    GERD (gastroesophageal reflux disease) 11/16/2012    Glaucoma 11/16/2012    HTN (hypertension) 11/16/2012    Hypothyroid 11/16/2012    Internal carotid artery stenosis 11/16/2012    Joint pain     Left ventricular diastolic dysfunction, NYHA class 1 04/16/2015    4/15    Low serum testosterone level 11/16/2012    Normal cardiac stress test 11/16/2012    Osteopenia 11/16/2012    Shingles 11/16/2012    Skin disease 2020    Seems to have started after Covid vaccine    Stroke      tia   no residual    TIA (transient ischemic attack) 2012    UC (ulcerative colitis) 2012       Surgical History:  Past Surgical History:   Procedure Laterality Date    ADENOIDECTOMY      COLONOSCOPY N/A 2022    Procedure: COLONOSCOPY;  Surgeon: Ashley Nguyen MD;  Location: [facility] END[facility];  Service: Endoscopy;  Laterality: N/A;    ESOPHAGOGASTRODUODENOSCOPY N/A 2022    Procedure: EGD (ESOPHAGOGASTRODUODENOSCOPY);  Surgeon: Ashley Nguyen MD;  Location: [facility] ENDO;  Service: Endoscopy;  Laterality: N/A;  added on per Dr. Nguyen    EYE SURGERY Right 2020    cataract    HERNIA REPAIR      LEFT HEART CATHETERIZATION Right 10/29/2021    Procedure: CATHETERIZATION, HEART, LEFT AND RIGHT  - LV DARNELL POSSIBLE;  Surgeon: Beau Conklin MD;  Location: [facility] CATH LAB;  Service: Cardiology;  Laterality: Right;    RIGHT HEART CATHETERIZATION Right 10/29/2021    Procedure: INSERTION, CATHETER, RIGHT HEART;  Surgeon: Beau Conklin MD;  Location: [facility] CATH LAB;  Service: Cardiology;  Laterality: Right;    SKIN BIOPSY      TONSILLECTOMY         Family History:   Family History   Problem Relation Age of Onset    Hypertension Mother     Alzheimer's disease Mother     Diabetes Brother     Hypertension Brother     Cancer Brother     Heart disease Brother 56        MI    Heart attack Father        Social History:   Social History     Tobacco Use    Smoking status: Former     Current packs/day: 0.00     Average packs/day: 1 pack/day for 25.0 years (25.0 ttl pk-yrs)     Types: Cigarettes     Start date: 1947     Quit date: 1972     Years since quittin.7    Smokeless tobacco: Never   Substance Use Topics    Alcohol use: Yes     Alcohol/week: 3.0 standard drinks of alcohol     Types: 2 Glasses of wine, 1 Cans of beer per week     Comment: Occasional    Drug use: Never       Allergies: Reviewed    Home Medications:   Medication List with Changes/Refills   Current Medications     ACETAMINOPHEN (TYLENOL) 500 MG TABLET    Take 2 tablets (1,000 mg total) by mouth every 6 (six) hours as needed.    ASCORBIC ACID, VITAMIN C, (VITAMIN C) 500 MG TABLET    Take 500 mg by mouth once daily.    ASPIRIN (ECOTRIN) 81 MG EC TABLET    Take 81 mg by mouth once daily.    CALCIUM CARBONATE (TUMS) 200 MG CALCIUM (500 MG) CHEWABLE TABLET    Take 1 tablet by mouth once daily.    DOCUSATE SODIUM (STOOL SOFTENER ORAL)    Take by mouth.    ELIQUIS 2.5 MG TAB    Take 2.5 mg by mouth 2 (two) times daily.    FLUOCINOLONE ACETONIDE OIL 0.01 % DROP    Place into both ears.    LEVOCETIRIZINE (XYZAL) 5 MG TABLET    Take 5 mg by mouth every morning.    LEVOTHYROXINE (SYNTHROID) 125 MCG TABLET    Take 1 tablet (125 mcg total) by mouth every morning.    METOPROLOL SUCCINATE (TOPROL-XL) 25 MG 24 HR TABLET    Take 1 tablet by mouth once daily    PANTOPRAZOLE (PROTONIX) 40 MG TABLET    Take 1 tablet (40 mg total) by mouth daily as needed.    PRAVASTATIN (PRAVACHOL) 20 MG TABLET    Take 1 tablet (20 mg total) by mouth every evening.    SERTRALINE (ZOLOFT) 50 MG TABLET    Take 50 mg by mouth.    TAMSULOSIN (FLOMAX) 0.4 MG CP24    Take 0.4 mg by mouth once daily.     TRIAMCINOLONE ACETONIDE 0.1% (KENALOG) 0.1 % CREAM    APPLY  CREAM EXTERNALLY TO AFFECTED AREA TWICE DAILY    USTEKINUMAB (STELARA) 90 MG/ML SYRG SYRINGE    Inject 90 mg into the skin. q8w    VENLAFAXINE (EFFEXOR) 37.5 MG TAB    Take 1 tablet by mouth twice daily    VITC/E/ZN/COPPER/LUTEIN/ZEAXAN (ICAPS AREDS2 ORAL)    Take 1 capsule by mouth once daily.       Physical Exam:  Vital Signs:  /61 (BP Location: Left arm, Patient Position: Sitting)   Pulse (!) 57   Temp 98.1 °F (36.7 °C)   Wt 72.2 kg (159 lb 2.8 oz)   SpO2 98%   BMI 22.20 kg/m²   Body mass index is 22.2 kg/m².    Physical Exam  Vitals and nursing note reviewed.   Constitutional:       General: He is not in acute distress.     Appearance: Normal appearance. He is well-developed. He is not  ill-appearing or toxic-appearing.   HENT:      Head: Normocephalic and atraumatic.   Eyes:      General: No scleral icterus.     Pupils: Pupils are equal, round, and reactive to light.   Neck:      Thyroid: No thyromegaly.   Cardiovascular:      Rate and Rhythm: Normal rate and regular rhythm.      Heart sounds: No murmur heard.     No friction rub.   Pulmonary:      Effort: Pulmonary effort is normal.      Breath sounds: Normal breath sounds. No wheezing or rales.   Abdominal:      General: Bowel sounds are normal. There is no distension.      Palpations: Abdomen is soft. There is no mass.      Tenderness: There is no abdominal tenderness. There is no guarding or rebound.   Lymphadenopathy:      Cervical: No cervical adenopathy.   Skin:     Findings: Rash present.   Neurological:      General: No focal deficit present.      Mental Status: He is alert and oriented to person, place, and time.   Psychiatric:         Mood and Affect: Mood normal.         Behavior: Behavior normal.         Thought Content: Thought content normal.         Judgment: Judgment normal.         Labs: reviewed and pertinent noted above    Assessment/Plan:  Ramesh Ricci is a 83 y.o. male with pan ulcerative colitis. The following issues were addresssed:    1. Ulcerative pancolitis with complication    2. Iron deficiency anemia due to chronic blood loss        1. Ulcerative colitis:  He continues to do well.  I recommend that we continue Stelara.  We had a discussion today about different treatment options.  I do not think stopping the Stelara and trying to go back to mesalamine is a good idea because he was taking mesalamine when he was hospitalized because of active colitis last year.  That was the reason we started Stelara.  We could consider other treatment options such as Entyvio but I suspect we would run into similar issues from an insurance standpoint.  We will look into the patient assistance program to see if it is being  maximized.  We also discussed the use of TNF inhibitors but there would be an increased risk of infections as well as an increased risk of lymphoma.  We discussed immunomodulators and increased risk of lymphoma with these.  We discussed side effects associated with Zeposia and other treatment options such as KARLO inhibitors.  I still think Stelara is the best option for him and he agrees after our discussion.  We will will try to make sure that he is paying as little as possible for the medication.  He continues to prefer to avoid colonoscopies in the future.    2. Iron deficiency anemia:  Recent iron studies were normal.  Hemoglobin has improved.  Recheck iron studies in the future.    3. Rash:  He has had a persistent rash for several years.  This does not seem to correlate with any of his medications.  I recommend that he follow-up with his dermatologist for further advice regarding this issue.    4. Aortic stenosis:  Continue to follow up with Cardiology.    Ex smoker:  - recommended to continue smoking cessation  - discussed in detail that Crohn's disease can worsen with smoking and may make patient more resistant to treatment    # IBD specific health maintenance:  Colon cancer surveillance:  Up-to-date    Annual:  - Eye exam:  Not applicable  - Skin exam (if on IMM/TNF):  Up-to-date  - reminded pt to use sunblock/hats/sunprotective clothing  - PAP (if immunosuppressed):  Not applicable    DEXA:  Osteopenia noted in 2018    Vitamin D:  Normal in 2021    Vaccines:    Influenza:  Up-to-date   Pneumovax:     PCV13:  Up-to-date    PSV23:  Review today   HAV:  Vaccinated   HBV:  Vaccinated   Tdap:  Review in the future   MMR:  Immune   VZV:  Immune   HZV:  Started series   HPV:  Not applicable   Meningococcus:  Not applicable   COVID:  Completed 3 doses    Follow up: Follow up in about 6 months (around 3/11/2024).    Thank you again for sending Ramesh Ricci to see Dr. Marcial Snell today at the [facility]. Please don't hesitate to contact Dr. Snell if there are any questions regarding this evaluation, or if you have any other patients with inflammatory bowel disease for whom you would like a consultation. You can reach Dr. Snell at [phone] or by email at [email or web address]    Audie Snell MD  Department of Gastroenterology  Inflammatory Bowel Disease

## 2023-09-22 NOTE — PROGRESS NOTES
The patient's electronic chart was reviewed during this month. The patient's medical, functional, and psychosocial needs were assessed. Need for Home health care, PT, OT, , psychiatric care, and hospice was assessed. All preventative care measures were reviewed and updated. All medications were reviewed and reconciled. Potential drug interactions and medication adherence was reviewed. Prescriptions were renewed as appropriate. Education was provided to the patient and/or caregiver as needed, and all questions were answered. Over 20 minutes were spent providing these non-face-to-face services during this calendar month.     Refilled     Disp Refills Start End MICHELLE   levothyroxine (SYNTHROID) 125 MCG tablet 90 tablet 3 9/7/2023 -- No   Sig - Route: Take 1 tablet (125 mcg total) by mouth every morning. - Oral   Sent to pharmacy as: levothyroxine (SYNTHROID) 125 MCG tablet   Class: Normal   Order: [id number]   Date/Time Signed: 9/7/2023 08:35       E-Prescribing Status: Receipt confirmed by pharmacy (9/7/2023  8:35 AM CDT)     pravastatin (PRAVACHOL) 20 MG tablet 90 tablet 3 9/7/2023 -- No   Sig - Route: Take 1 tablet (20 mg total) by mouth every evening. - Oral   Sent to pharmacy as: pravastatin (PRAVACHOL) 20 MG tablet   Class: Normal   Order: [id number]   Date/Time Signed: 9/7/2023 08:35       E-Prescribing Status: Receipt confirmed by pharmacy (9/7/2023  8:35 AM CDT)

## 2023-12-01 ENCOUNTER — PATIENT MESSAGE (OUTPATIENT)
Dept: GASTROENTEROLOGY | Facility: CLINIC | Age: 83
End: 2023-12-01
Payer: MEDICARE

## 2023-12-14 ENCOUNTER — PATIENT OUTREACH (OUTPATIENT)
Dept: ADMINISTRATIVE | Facility: HOSPITAL | Age: 83
End: 2023-12-14
Payer: MEDICARE

## 2024-01-02 ENCOUNTER — DOCUMENTATION ONLY (OUTPATIENT)
Dept: INTERNAL MEDICINE | Facility: CLINIC | Age: 84
End: 2024-01-02
Payer: MEDICARE

## 2024-01-02 DIAGNOSIS — I10 ESSENTIAL HYPERTENSION: Primary | ICD-10-CM

## 2024-01-02 DIAGNOSIS — C61 PROSTATE CA: ICD-10-CM

## 2024-01-02 DIAGNOSIS — Z78.9 KNOWN MEDICAL PROBLEMS: ICD-10-CM

## 2024-01-02 DIAGNOSIS — R21 RASH: ICD-10-CM

## 2024-01-02 DIAGNOSIS — N18.31 STAGE 3A CHRONIC KIDNEY DISEASE: ICD-10-CM

## 2024-01-02 PROCEDURE — 99490 CHRNC CARE MGMT STAFF 1ST 20: CPT | Mod: S$GLB,,, | Performed by: INTERNAL MEDICINE

## 2024-01-02 RX ORDER — TRIAMCINOLONE ACETONIDE 1 MG/G
CREAM TOPICAL 2 TIMES DAILY
Qty: 454 G | Refills: 0 | Status: SHIPPED | OUTPATIENT
Start: 2024-01-02

## 2024-01-31 NOTE — PROGRESS NOTES
The patient's electronic chart was reviewed during this month. The patient's medical, functional, and psychosocial needs were assessed. Need for Home health care, PT, OT, , psychiatric care, and hospice was assessed. All preventative care measures were reviewed and updated. All medications were reviewed and reconciled. Potential drug interactions and medication adherence was reviewed. Prescriptions were renewed as appropriate. Education was provided to the patient and/or caregiver as needed, and all questions were answered. Over 20 minutes were spent providing these non-face-to-face services during this calendar month.     Refilled     Disp Refills Start End MICHELLE   triamcinolone acetonide 0.1% (KENALOG) 0.1 % cream 454 g 0 1/2/2024 -- No   Sig: APPLY  CREAM TOPICALLY TO AFFECTED AREA TWICE DAILY   Sent to pharmacy as: triamcinolone acetonide 0.1% (KENALOG) 0.1 % cream   Class: Normal   Order: [id number]   Date/Time Signed: 1/2/2024 09:11       E-Prescribing Status: Receipt confirmed by pharmacy (1/2/2024  9:11 AM CST)

## 2024-02-15 ENCOUNTER — PATIENT MESSAGE (OUTPATIENT)
Dept: INTERNAL MEDICINE | Facility: CLINIC | Age: 84
End: 2024-02-15

## 2024-02-15 ENCOUNTER — OFFICE VISIT (OUTPATIENT)
Dept: INTERNAL MEDICINE | Facility: CLINIC | Age: 84
End: 2024-02-15
Attending: INTERNAL MEDICINE
Payer: MEDICARE

## 2024-02-15 VITALS
BODY MASS INDEX: 22.26 KG/M2 | HEIGHT: 71 IN | SYSTOLIC BLOOD PRESSURE: 112 MMHG | DIASTOLIC BLOOD PRESSURE: 48 MMHG | HEART RATE: 58 BPM | OXYGEN SATURATION: 97 % | WEIGHT: 159 LBS

## 2024-02-15 DIAGNOSIS — G44.209 TENSION-TYPE HEADACHE, NOT INTRACTABLE, UNSPECIFIED CHRONICITY PATTERN: ICD-10-CM

## 2024-02-15 DIAGNOSIS — N40.0 BENIGN PROSTATIC HYPERPLASIA, UNSPECIFIED WHETHER LOWER URINARY TRACT SYMPTOMS PRESENT: Chronic | ICD-10-CM

## 2024-02-15 DIAGNOSIS — D84.821 DRUG-INDUCED IMMUNODEFICIENCY: ICD-10-CM

## 2024-02-15 DIAGNOSIS — I25.10 CORONARY ARTERY DISEASE INVOLVING NATIVE CORONARY ARTERY OF NATIVE HEART WITHOUT ANGINA PECTORIS: Chronic | ICD-10-CM

## 2024-02-15 DIAGNOSIS — I48.0 PAF (PAROXYSMAL ATRIAL FIBRILLATION): Chronic | ICD-10-CM

## 2024-02-15 DIAGNOSIS — I35.0 NONRHEUMATIC AORTIC VALVE STENOSIS: Chronic | ICD-10-CM

## 2024-02-15 DIAGNOSIS — C61 PROSTATE CA: ICD-10-CM

## 2024-02-15 DIAGNOSIS — I70.0 ATHEROSCLEROSIS OF AORTA: ICD-10-CM

## 2024-02-15 DIAGNOSIS — K21.9 GASTROESOPHAGEAL REFLUX DISEASE, UNSPECIFIED WHETHER ESOPHAGITIS PRESENT: ICD-10-CM

## 2024-02-15 DIAGNOSIS — R42 DIZZY SPELLS: ICD-10-CM

## 2024-02-15 DIAGNOSIS — N18.31 STAGE 3A CHRONIC KIDNEY DISEASE: ICD-10-CM

## 2024-02-15 DIAGNOSIS — I10 ESSENTIAL HYPERTENSION: Chronic | ICD-10-CM

## 2024-02-15 DIAGNOSIS — H35.3211 EXUDATIVE AGE-RELATED MACULAR DEGENERATION, RIGHT EYE, WITH ACTIVE CHOROIDAL NEOVASCULARIZATION: ICD-10-CM

## 2024-02-15 DIAGNOSIS — Z13.39 SCREENING FOR ALCOHOLISM: ICD-10-CM

## 2024-02-15 DIAGNOSIS — E03.9 ACQUIRED HYPOTHYROIDISM: Chronic | ICD-10-CM

## 2024-02-15 DIAGNOSIS — M85.80 OSTEOPENIA, UNSPECIFIED LOCATION: ICD-10-CM

## 2024-02-15 DIAGNOSIS — I10 ESSENTIAL HYPERTENSION: ICD-10-CM

## 2024-02-15 DIAGNOSIS — Z13.31 SCREENING FOR DEPRESSION: ICD-10-CM

## 2024-02-15 DIAGNOSIS — K21.9 GASTROESOPHAGEAL REFLUX DISEASE, UNSPECIFIED WHETHER ESOPHAGITIS PRESENT: Primary | ICD-10-CM

## 2024-02-15 DIAGNOSIS — Z79.899 DRUG-INDUCED IMMUNODEFICIENCY: ICD-10-CM

## 2024-02-15 DIAGNOSIS — K51.019 ULCERATIVE PANCOLITIS WITH COMPLICATION: Primary | Chronic | ICD-10-CM

## 2024-02-15 DIAGNOSIS — E03.9 HYPOTHYROIDISM, UNSPECIFIED TYPE: ICD-10-CM

## 2024-02-15 PROBLEM — E87.6 HYPOKALEMIA: Status: RESOLVED | Noted: 2022-03-23 | Resolved: 2024-02-15

## 2024-02-15 PROCEDURE — G0442 ANNUAL ALCOHOL SCREEN 15 MIN: HCPCS | Mod: 59,S$GLB,, | Performed by: INTERNAL MEDICINE

## 2024-02-15 PROCEDURE — 99215 OFFICE O/P EST HI 40 MIN: CPT | Mod: 25,S$GLB,, | Performed by: INTERNAL MEDICINE

## 2024-02-15 PROCEDURE — G0444 DEPRESSION SCREEN ANNUAL: HCPCS | Mod: 59,S$GLB,, | Performed by: INTERNAL MEDICINE

## 2024-02-15 RX ORDER — METOPROLOL SUCCINATE 25 MG/1
25 TABLET, EXTENDED RELEASE ORAL DAILY
Qty: 90 TABLET | Refills: 3 | OUTPATIENT
Start: 2024-02-15

## 2024-02-15 RX ORDER — TIZANIDINE 2 MG/1
TABLET ORAL
Qty: 90 TABLET | Refills: 0 | Status: SHIPPED | OUTPATIENT
Start: 2024-02-15

## 2024-02-15 RX ORDER — PANTOPRAZOLE SODIUM 40 MG/1
40 TABLET, DELAYED RELEASE ORAL DAILY PRN
Qty: 90 TABLET | Refills: 1 | OUTPATIENT
Start: 2024-02-15

## 2024-02-15 RX ORDER — SERTRALINE HYDROCHLORIDE 100 MG/1
100 TABLET, FILM COATED ORAL DAILY
COMMUNITY

## 2024-02-15 RX ORDER — PRAVASTATIN SODIUM 20 MG/1
20 TABLET ORAL NIGHTLY
Qty: 90 TABLET | Refills: 3 | OUTPATIENT
Start: 2024-02-15

## 2024-02-15 RX ORDER — LEVOTHYROXINE SODIUM 125 UG/1
125 TABLET ORAL EVERY MORNING
Qty: 90 TABLET | Refills: 3 | OUTPATIENT
Start: 2024-02-15

## 2024-02-15 NOTE — PROGRESS NOTES
Subjective     Patient ID: Ramesh Ricci is a 84 y.o. male.    Chief Complaint: Follow-up (Refill Tizanidine, calf pain, dizzy spells yesterday when brushing teeth, memory is not good, always cold, R thumb nail turned black all of a sudden (was not hit or smashed) went to [facility] this week lost his fingernail )    Yesterday when he was brushing his teeth he had an episode of dizziness.  This does this does not happen daily, but has happened several times.  Several days a week he has a dull frontal headache relieved with Tylenol.  This has been persistent and unchanged over the past 5 years.  Does see a neurologist Dr. Narvaez, who believes they are tension headaches.  He gets heartburn about 3 times per week in the evenings.  Is relieved with Maalox.      Hypertension  This is a chronic problem. The current episode started more than 1 year ago. The problem is controlled.           Follow-up  This is a chronic problem. The current episode started more than 1 year ago. The problem has been unchanged. Associated symptoms include headaches.     Review of Systems   Constitutional: Negative.    Respiratory: Negative.     Cardiovascular: Negative.    Neurological:  Positive for dizziness and headaches.          Objective     Physical Exam  Vitals and nursing note reviewed.   Constitutional:       Appearance: He is well-developed.   HENT:      Head: Normocephalic and atraumatic.   Eyes:      Pupils: Pupils are equal, round, and reactive to light.   Cardiovascular:      Rate and Rhythm: Normal rate and regular rhythm.      Heart sounds: Normal heart sounds.   Pulmonary:      Effort: Pulmonary effort is normal.   Neurological:      Mental Status: He is alert.            Assessment and Plan     1. Ulcerative pancolitis with complication  Overview:  1963  Dr. Marina  C-scope 7/15  started Lialda 2.4 g - 6/18  10/19 - Start Humira, D/c Delzicol  4/22 - Started Entyvio      2. Acquired hypothyroidism    3. Essential  hypertension  Overview:  X 5 years      4. Prostate CA  Overview:  XRT 12/12  Dr. Bailey      5. PAF (paroxysmal atrial fibrillation)    6. Tension-type headache, not intractable, unspecified chronicity pattern  Overview:  MRI - 9/18, 6/23 (and MRA)  Dr. ELIGIO Narvaez      7. Exudative age-related macular degeneration, right eye, with active choroidal neovascularization    8. Drug-induced immunodeficiency    9. Atherosclerosis of aorta    10. Stage 3a chronic kidney disease    11. Screening for depression    12. Screening for alcoholism    13. Gastroesophageal reflux disease, unspecified whether esophagitis present  Overview:  EGD 7/05, 7/15 - normal      14. Nonrheumatic aortic valve stenosis  Overview:  4/15 - Mild  2/20 - Moderate  10/21 - Moderate by Right heart cath  1/23 - Mod/Severe - referred to Dr. Nayak ([facility])      15. Benign prostatic hyperplasia, unspecified whether lower urinary tract symptoms present  Overview:  S/P Biopsy 8/08, 2/09  Dr. Nassar      16. Osteopenia, unspecified location  Overview:  DEXA 8/09, 5/12, 10/14, 3/16, 5/18  Stop Fosamax on 10/14      17. Dizzy spells    Other orders  -     tiZANidine (ZANAFLEX) 2 MG tablet; Take 1/2-1 tablet nightly as needed for muscle spasms  Dispense: 90 tablet; Refill: 0        PLAN:  Per orders and D/C instructions.  Continue diet and/or meds for BPH, hypothyroidism, HTN, osteopenia, arthrosclerosis of aorta, CKD, GERD, and high cholesterol, which are stable.  Follow-up with cardiology for aortic valve stenosis and paroxysmal atrial fibrillation.  Follow-up with GI for ulcerative colitis and drug induced immunodeficiency.  Follow-up with neurology for chronic headaches.  Follow-up with ophthalmology for macular degeneration.  We discussed doing physical therapy to help improve his balance and possibly his dizziness.   He will continue to walk daily.  His symptoms have not worsened significantly recently.  They will let me know if they want to start  physical therapy.    Screening: The patient was screened for depression with the PHQ2 questionnaire and possible health consequences were discussed with the patient, who understands (15 minutes spent).       The patient was screened for the misuse of alcohol, by asking the number of drinks per average week, and if pt has had more than 4 drinks (more than 3 for women and elderly) in 1 day within the past year. The health and legal consequences of misuse were discussed (15 minutes spent).        Follow up in about 6 months (around 8/15/2024).

## 2024-02-19 DIAGNOSIS — I25.10 CORONARY ARTERY DISEASE INVOLVING NATIVE CORONARY ARTERY OF NATIVE HEART WITHOUT ANGINA PECTORIS: Chronic | ICD-10-CM

## 2024-02-19 DIAGNOSIS — E03.9 HYPOTHYROIDISM, UNSPECIFIED TYPE: ICD-10-CM

## 2024-02-19 DIAGNOSIS — I10 ESSENTIAL HYPERTENSION: ICD-10-CM

## 2024-02-19 DIAGNOSIS — K21.9 GASTROESOPHAGEAL REFLUX DISEASE, UNSPECIFIED WHETHER ESOPHAGITIS PRESENT: Primary | ICD-10-CM

## 2024-02-19 RX ORDER — LEVOTHYROXINE SODIUM 125 UG/1
125 TABLET ORAL EVERY MORNING
Qty: 90 TABLET | Refills: 3 | Status: SHIPPED | OUTPATIENT
Start: 2024-02-19

## 2024-02-19 RX ORDER — PRAVASTATIN SODIUM 20 MG/1
20 TABLET ORAL NIGHTLY
Qty: 90 TABLET | Refills: 3 | Status: SHIPPED | OUTPATIENT
Start: 2024-02-19

## 2024-02-19 RX ORDER — PANTOPRAZOLE SODIUM 40 MG/1
40 TABLET, DELAYED RELEASE ORAL DAILY PRN
Qty: 90 TABLET | Refills: 1 | Status: SHIPPED | OUTPATIENT
Start: 2024-02-19 | End: 2024-05-30

## 2024-02-19 RX ORDER — METOPROLOL SUCCINATE 25 MG/1
25 TABLET, EXTENDED RELEASE ORAL DAILY
Qty: 90 TABLET | Refills: 3 | Status: SHIPPED | OUTPATIENT
Start: 2024-02-19

## 2024-03-06 ENCOUNTER — TELEPHONE (OUTPATIENT)
Dept: GASTROENTEROLOGY | Facility: CLINIC | Age: 84
End: 2024-03-06
Payer: MEDICARE

## 2024-03-06 NOTE — TELEPHONE ENCOUNTER
Refill request for Stelara 90mg every 8 weeks from [facility]   CBC CMP 8/2023 - repeat now  TB and hep B 3/2023 - repeat now  Next OV: 3/19/24 - labs to be updated at that visit  Refill form faxed to [facility]. Successful fax transmittal e-mail received.

## 2024-03-19 ENCOUNTER — OFFICE VISIT (OUTPATIENT)
Dept: GASTROENTEROLOGY | Facility: CLINIC | Age: 84
End: 2024-03-19
Payer: MEDICARE

## 2024-03-19 ENCOUNTER — LAB VISIT (OUTPATIENT)
Dept: LAB | Facility: HOSPITAL | Age: 84
End: 2024-03-19
Attending: INTERNAL MEDICINE
Payer: MEDICARE

## 2024-03-19 VITALS
SYSTOLIC BLOOD PRESSURE: 121 MMHG | HEART RATE: 51 BPM | DIASTOLIC BLOOD PRESSURE: 65 MMHG | BODY MASS INDEX: 22.04 KG/M2 | TEMPERATURE: 98 F | HEIGHT: 71 IN | OXYGEN SATURATION: 98 % | WEIGHT: 157.44 LBS

## 2024-03-19 DIAGNOSIS — K51.019 ULCERATIVE PANCOLITIS WITH COMPLICATION: Primary | ICD-10-CM

## 2024-03-19 DIAGNOSIS — Z79.899 IMMUNOSUPPRESSION DUE TO DRUG THERAPY: ICD-10-CM

## 2024-03-19 DIAGNOSIS — K51.019 ULCERATIVE PANCOLITIS WITH COMPLICATION: ICD-10-CM

## 2024-03-19 DIAGNOSIS — D84.821 IMMUNOSUPPRESSION DUE TO DRUG THERAPY: ICD-10-CM

## 2024-03-19 LAB
25(OH)D3+25(OH)D2 SERPL-MCNC: 33 NG/ML (ref 30–96)
ALBUMIN SERPL BCP-MCNC: 4 G/DL (ref 3.5–5.2)
ALP SERPL-CCNC: 68 U/L (ref 55–135)
ALT SERPL W/O P-5'-P-CCNC: 12 U/L (ref 10–44)
ANION GAP SERPL CALC-SCNC: 9 MMOL/L (ref 8–16)
AST SERPL-CCNC: 19 U/L (ref 10–40)
BASOPHILS # BLD AUTO: 0.03 K/UL (ref 0–0.2)
BASOPHILS NFR BLD: 0.4 % (ref 0–1.9)
BILIRUB SERPL-MCNC: 0.4 MG/DL (ref 0.1–1)
BUN SERPL-MCNC: 23 MG/DL (ref 8–23)
CALCIUM SERPL-MCNC: 9.7 MG/DL (ref 8.7–10.5)
CHLORIDE SERPL-SCNC: 103 MMOL/L (ref 95–110)
CO2 SERPL-SCNC: 27 MMOL/L (ref 23–29)
CREAT SERPL-MCNC: 1.4 MG/DL (ref 0.5–1.4)
CRP SERPL-MCNC: 0.9 MG/L (ref 0–8.2)
DIFFERENTIAL METHOD BLD: ABNORMAL
EOSINOPHIL # BLD AUTO: 0.1 K/UL (ref 0–0.5)
EOSINOPHIL NFR BLD: 1.2 % (ref 0–8)
ERYTHROCYTE [DISTWIDTH] IN BLOOD BY AUTOMATED COUNT: 14.2 % (ref 11.5–14.5)
EST. GFR  (NO RACE VARIABLE): 48.9 ML/MIN/1.73 M^2
GLUCOSE SERPL-MCNC: 93 MG/DL (ref 70–110)
HBV CORE AB SERPL QL IA: NORMAL
HBV SURFACE AG SERPL QL IA: NORMAL
HCT VFR BLD AUTO: 40.9 % (ref 40–54)
HGB BLD-MCNC: 12.7 G/DL (ref 14–18)
IMM GRANULOCYTES # BLD AUTO: 0.03 K/UL (ref 0–0.04)
IMM GRANULOCYTES NFR BLD AUTO: 0.4 % (ref 0–0.5)
LYMPHOCYTES # BLD AUTO: 1.4 K/UL (ref 1–4.8)
LYMPHOCYTES NFR BLD: 19.4 % (ref 18–48)
MCH RBC QN AUTO: 29.4 PG (ref 27–31)
MCHC RBC AUTO-ENTMCNC: 31.1 G/DL (ref 32–36)
MCV RBC AUTO: 95 FL (ref 82–98)
MONOCYTES # BLD AUTO: 0.7 K/UL (ref 0.3–1)
MONOCYTES NFR BLD: 9.8 % (ref 4–15)
NEUTROPHILS # BLD AUTO: 5 K/UL (ref 1.8–7.7)
NEUTROPHILS NFR BLD: 68.8 % (ref 38–73)
NRBC BLD-RTO: 0 /100 WBC
PLATELET # BLD AUTO: 153 K/UL (ref 150–450)
PMV BLD AUTO: 11.1 FL (ref 9.2–12.9)
POTASSIUM SERPL-SCNC: 4.7 MMOL/L (ref 3.5–5.1)
PROT SERPL-MCNC: 7.6 G/DL (ref 6–8.4)
RBC # BLD AUTO: 4.32 M/UL (ref 4.6–6.2)
SODIUM SERPL-SCNC: 139 MMOL/L (ref 136–145)
WBC # BLD AUTO: 7.25 K/UL (ref 3.9–12.7)

## 2024-03-19 PROCEDURE — 80053 COMPREHEN METABOLIC PANEL: CPT | Performed by: INTERNAL MEDICINE

## 2024-03-19 PROCEDURE — 87340 HEPATITIS B SURFACE AG IA: CPT | Performed by: INTERNAL MEDICINE

## 2024-03-19 PROCEDURE — 86140 C-REACTIVE PROTEIN: CPT | Performed by: INTERNAL MEDICINE

## 2024-03-19 PROCEDURE — 86706 HEP B SURFACE ANTIBODY: CPT | Performed by: INTERNAL MEDICINE

## 2024-03-19 PROCEDURE — 85025 COMPLETE CBC W/AUTO DIFF WBC: CPT | Performed by: INTERNAL MEDICINE

## 2024-03-19 PROCEDURE — 99214 OFFICE O/P EST MOD 30 MIN: CPT | Mod: GC,S$GLB,, | Performed by: INTERNAL MEDICINE

## 2024-03-19 PROCEDURE — 86704 HEP B CORE ANTIBODY TOTAL: CPT | Performed by: INTERNAL MEDICINE

## 2024-03-19 PROCEDURE — 82306 VITAMIN D 25 HYDROXY: CPT | Performed by: INTERNAL MEDICINE

## 2024-03-19 PROCEDURE — G2211 COMPLEX E/M VISIT ADD ON: HCPCS | Mod: S$GLB,,, | Performed by: INTERNAL MEDICINE

## 2024-03-19 PROCEDURE — 86480 TB TEST CELL IMMUN MEASURE: CPT | Performed by: INTERNAL MEDICINE

## 2024-03-19 NOTE — PATIENT INSTRUCTIONS
Continue Stelara  Start taking omeprazole before breakfast  Get shingle vaccines and pneumonia vaccine at your pharmacy  Labs today  Follow up in 6 months

## 2024-03-19 NOTE — PROGRESS NOTES
[facility]          Inflammatory Bowel Disease Follow Up Note         TODAY'S VISIT DATE:  3/19/2024    Reason for Consult:    No chief complaint on file.      PCP: EFREM Muniz      Referring MD:   No ref. provider found    History of Present Illness:  Ramesh Ricci who is a 84 y.o. male is being seen today at the [facility] on 03/19/2024 for inflammatory bowel disease- ulcerative colitis.      Last seen in clinic 09/2023 at which time he was doing well with stelara injections. Wife is providing injections at home as she is an LPN. Reports paying $1800 for whole year of injections. Last dose was 03/01. Reports reflux and heartburn since his aortic valve replacement 04/2023. Denies dysphagia. Prior EGD 03/2022 just notable for 2cm HH. Taking omeprazole 40mg daily, however, taking it after breakfast. Reports some issues with straining which have improved with docusate. Lost about 4lbs since 08/2023. No diarrhea, rectal bleeding, melena, abdominal pain.     IBD History:  He was diagnosed with ulcerative colitis in the 1960s.  Per notes he has a history of pan colitis.  Multiple colonoscopy reports are available and these demonstrate patchy colitis throughout the colon.  On one exam biopsy showed some acute ileitis but no chronic changes in the ileum.  Biopsies have been consistent with chronic ulcerative colitis.  It is difficult to determine what he has been treated with in the past but it appears he was at least on Colazal at 1 point (dose was 2.25 g once daily).  He was also on azathioprine at 1 point and treated with Humira.  In early 2021 he developed a rash that was concerning for possible psoriasis.  Humira was stopped at that point.  Pentasa was started after the Humira was stopped.  After stopping the Humira his skin lesions did not resolved and it is currently not thought that his skin issues were related to psoriasis.  In March  2022 he was admitted to the hospital with acute onset diarrhea and abdominal pain.  He underwent an upper endoscopy and colonoscopy.  The upper endoscopy was only significant for a 2 cm hiatal hernia.  The colonoscopy revealed no evidence of active ulcerative colitis.  The preparation of the colon was poor.  Biopsies demonstrated evidence of active colitis throughout the entire colon.  He had a markedly elevated stool calprotectin level and negative stool studies for infection.  After starting Stelara in July of 2022 he did quite well and had a significant decline in his stool calprotectin level.  We were able to stop the Pentasa without any issues.  In April of 2023 he underwent aortic valve replacement.  In March of 2023 we had discussed possible colonoscopy to reassess his disease activity and for cancer surveillance but he declines further colonoscopy at this time.      IBD Details:  Dx Date:  1960s  Disease type/distribution:  Ulcerative colitis/pancolitis  Current Treatment:  Stelara 90 mg every 8 weeks Start Date:  July 1, 2022 Response:  Symptom remission  Optimized:  No  Adverse reactions:  None  Prior surgeries:  None  CRP Elevation: Y  calprotectin:  Markedly elevated with active disease  Disease Complications:  None  Extraintestinal manifestations:  None  Prior treatments:   Steroids:  Good response in the past  5ASA:  Incomplete response to Colazal, inadequate response to low-dose Pentasa  IMM:  Previously on azathioprine, unclear response  TNF Inh:  Humira-clinical remission, stopped due to skin lesions that do not appear to be related to Humira   Anti-Integrin:  None   IL 12/23:  Currently on Stelara  KARLO Inh:  None  S1P Modulator: None    Previous Clinical Trials:  None    Last Colonoscopy:  March 2022-poor prep, no significant endoscopic disease activity but significant active colitis on biopsies    Other Endoscopies:  March 2022-unrevealing    Imaging:   MRE:  None   CT:  March 2022-inflammatory  "changes around the colon, otherwise unrevealing   Other:  None    Pertinent Labs:  Lab Results   Component Value Date    SEDRATE 77 (H) 03/23/2022    CRP 0.6 03/06/2023     No results found for: "TTGIGA", "IGA"  Lab Results   Component Value Date    TSH 3.209 08/15/2023    FREET4 1.16 08/15/2023     Lab Results   Component Value Date    QNVLIFAX97TM 32 03/06/2023    DRRSEKFK85 484 08/15/2023     Lab Results   Component Value Date    HEPBSAG Non-reactive 03/06/2023    HEPBCAB Negative 03/24/2022     No results found for: "OPV54CESM"  Lab Results   Component Value Date    NIL 0.37676 03/06/2023    MITOGENNIL 9.948 03/06/2023     Lab Results   Component Value Date    TPTMINTERP SEE BELOW 03/24/2022     Lab Results   Component Value Date    STOOLCULTURE  03/23/2022     No Salmonella,Shigella,Vibrio,Campylobacter,Yersinia isolated.    LDHDAAPOBW6Q Negative 03/23/2022    HIRVXTOAFO2X Negative 03/23/2022    CDIFFICILEAN Negative 03/23/2022    CDIFFTOX Negative 03/23/2022     Lab Results   Component Value Date    CALPROTECTIN 137.0 (H) 10/29/2022       Therapeutic Drug Monitoring Labs:  No results found for: "PROMETH"  No results found for: "ANSADAINIT", "INFLIXIMAB", "INFLIXINTERP"    Vaccinations:  Lab Results   Component Value Date    HEPBSAB Negative 03/24/2022     Lab Results   Component Value Date    HEPAIGG Non-reactive 10/28/2022     Lab Results   Component Value Date    VARICELLAZOS 3.65 (H) 10/28/2022    VARICELLAINT Positive (A) 10/28/2022     Immunization History   Administered Date(s) Administered    COVID-19, MRNA, LN-S, PF (Pfizer) (Purple Cap) 01/12/2021, 02/02/2021, 12/02/2021    Hepatitis A, Adult 03/06/2023, 09/06/2023    Hepatitis B (recombinant) Adjuvanted, 2 dose 03/06/2023, 04/06/2023    Influenza (FLUAD) - Quadrivalent - Adjuvanted - PF *Preferred* (65+) 10/09/2020, 10/20/2021, 09/13/2023    Influenza - High Dose - PF (65 years and older) 10/06/2014, 09/29/2016, 11/09/2017, 10/08/2018, 09/17/2019    " Influenza - Quadrivalent - High Dose - PF (65 years and older) 09/10/2022    Pneumococcal Conjugate - 13 Valent 09/29/2016    Pneumococcal Polysaccharide - 23 Valent 01/05/2011    Zoster 02/02/2012         Review of Systems  Review of Systems   Constitutional:  Negative for chills, fever and weight loss.   HENT:  Negative for sore throat.    Eyes:  Negative for pain, discharge and redness.   Respiratory:  Negative for cough, shortness of breath and wheezing.    Cardiovascular:  Negative for chest pain and leg swelling.   Gastrointestinal:  Positive for constipation and heartburn. Negative for abdominal pain, blood in stool, diarrhea, melena, nausea and vomiting.   Genitourinary:  Negative for dysuria and frequency.   Musculoskeletal:  Negative for back pain, joint pain and myalgias.   Skin:  Negative for rash.   Neurological:  Negative for focal weakness and seizures.   Endo/Heme/Allergies:  Does not bruise/bleed easily.   Psychiatric/Behavioral:  Negative for depression. The patient is not nervous/anxious.        Medical History:   Past Medical History:   Diagnosis Date    Abnormal echocardiogram 11/16/2012    Anemia     Anticoagulant long-term use     Anxiety     Atrophic kidney 11/16/2012    BPH (benign prostatic hyperplasia) 11/16/2012    Cancer 2000 something    Had radiation    DDD (degenerative disc disease), cervical 07/05/2017    x-ray 7/17 - Severe    GERD (gastroesophageal reflux disease) 11/16/2012    Glaucoma 11/16/2012    HTN (hypertension) 11/16/2012    Hypothyroid 11/16/2012    Internal carotid artery stenosis 11/16/2012    Joint pain     Left ventricular diastolic dysfunction, NYHA class 1 04/16/2015    4/15    Low serum testosterone level 11/16/2012    Normal cardiac stress test 11/16/2012    Osteopenia 11/16/2012    Shingles 11/16/2012    Skin disease 2020    Seems to have started after Covid vaccine    Stroke 2017    tia   no residual    TIA (transient ischemic attack) 11/16/2012    UC  (ulcerative colitis) 2012       Surgical History:  Past Surgical History:   Procedure Laterality Date    ADENOIDECTOMY      COLONOSCOPY N/A 2022    Procedure: COLONOSCOPY;  Surgeon: Ashley Nguyen MD;  Location: [facility] ENDO;  Service: Endoscopy;  Laterality: N/A;    ESOPHAGOGASTRODUODENOSCOPY N/A 2022    Procedure: EGD (ESOPHAGOGASTRODUODENOSCOPY);  Surgeon: Ashley Nguyen MD;  Location: [facility] ENDO;  Service: Endoscopy;  Laterality: N/A;  added on per Dr. Nguyen    EYE SURGERY Right 2020    cataract    HERNIA REPAIR      LEFT HEART CATHETERIZATION Right 10/29/2021    Procedure: CATHETERIZATION, HEART, LEFT AND RIGHT  - LV DARNELL POSSIBLE;  Surgeon: Beau Conklin MD;  Location: [facility] CATH LAB;  Service: Cardiology;  Laterality: Right;    RIGHT HEART CATHETERIZATION Right 10/29/2021    Procedure: INSERTION, CATHETER, RIGHT HEART;  Surgeon: Beau Conklin MD;  Location: [facility] CATH LAB;  Service: Cardiology;  Laterality: Right;    SKIN BIOPSY      TONSILLECTOMY         Family History:   Family History   Problem Relation Age of Onset    Hypertension Mother     Alzheimer's disease Mother     Diabetes Brother     Hypertension Brother     Cancer Brother     Heart disease Brother 56        MI    Heart attack Father        Social History:   Social History     Tobacco Use    Smoking status: Former     Current packs/day: 0.00     Average packs/day: 1 pack/day for 25.0 years (25.0 ttl pk-yrs)     Types: Cigarettes     Start date: 1947     Quit date: 1972     Years since quittin.2    Smokeless tobacco: Never   Substance Use Topics    Alcohol use: Yes     Alcohol/week: 3.0 standard drinks of alcohol     Types: 2 Glasses of wine, 1 Cans of beer per week     Comment: Occasional    Drug use: Never       Allergies: Reviewed    Home Medications:   Medication List with Changes/Refills   Current Medications    ACETAMINOPHEN (TYLENOL) 500 MG TABLET    Take 2 tablets (1,000 mg total) by mouth  "every 6 (six) hours as needed.    ASCORBIC ACID, VITAMIN C, (VITAMIN C) 500 MG TABLET    Take 500 mg by mouth once daily.    ASPIRIN (ECOTRIN) 81 MG EC TABLET    Take 81 mg by mouth once daily.    CALCIUM CARBONATE (TUMS) 200 MG CALCIUM (500 MG) CHEWABLE TABLET    Take 1 tablet by mouth once daily.    DOCUSATE SODIUM (STOOL SOFTENER ORAL)    Take by mouth.    ELIQUIS 2.5 MG TAB    Take 2.5 mg by mouth 2 (two) times daily.    FLUOCINOLONE ACETONIDE OIL 0.01 % DROP    Place into both ears.    LEVOCETIRIZINE (XYZAL) 5 MG TABLET    Take 5 mg by mouth every morning.    LEVOTHYROXINE (SYNTHROID) 125 MCG TABLET    Take 1 tablet (125 mcg total) by mouth every morning.    METOPROLOL SUCCINATE (TOPROL-XL) 25 MG 24 HR TABLET    Take 1 tablet (25 mg total) by mouth once daily.    PANTOPRAZOLE (PROTONIX) 40 MG TABLET    Take 1 tablet (40 mg total) by mouth daily as needed.    PRAVASTATIN (PRAVACHOL) 20 MG TABLET    Take 1 tablet (20 mg total) by mouth every evening.    SERTRALINE (ZOLOFT) 100 MG TABLET    Take 100 mg by mouth once daily.    TAMSULOSIN (FLOMAX) 0.4 MG CP24    Take 0.4 mg by mouth once daily.     TIZANIDINE (ZANAFLEX) 2 MG TABLET    Take 1/2-1 tablet nightly as needed for muscle spasms    TRIAMCINOLONE ACETONIDE 0.1% (KENALOG) 0.1 % CREAM    APPLY  CREAM TOPICALLY TO AFFECTED AREA TWICE DAILY    USTEKINUMAB (STELARA) 90 MG/ML SYRG SYRINGE    Inject 90 mg into the skin. q8w    VITC/E/ZN/COPPER/LUTEIN/ZEAXAN (ICAPS AREDS2 ORAL)    Take 1 capsule by mouth once daily.       Physical Exam:  Vital Signs:  /65   Pulse (!) 51   Temp 98.1 °F (36.7 °C) (Skin)   Ht 5' 11" (1.803 m)   Wt 71.4 kg (157 lb 6.5 oz)   SpO2 98%   BMI 21.95 kg/m²   There is no height or weight on file to calculate BMI.    Physical Exam  Vitals and nursing note reviewed.   Constitutional:       General: He is not in acute distress.     Appearance: Normal appearance. He is well-developed. He is not ill-appearing or toxic-appearing.   HENT: "      Head: Normocephalic and atraumatic.   Eyes:      General: No scleral icterus.     Pupils: Pupils are equal, round, and reactive to light.   Neck:      Thyroid: No thyromegaly.   Cardiovascular:      Rate and Rhythm: Normal rate and regular rhythm.      Heart sounds: No murmur heard.     No friction rub.   Pulmonary:      Effort: Pulmonary effort is normal.      Breath sounds: Normal breath sounds. No wheezing or rales.   Abdominal:      General: Bowel sounds are normal. There is no distension.      Palpations: Abdomen is soft. There is no mass.      Tenderness: There is no abdominal tenderness. There is no guarding or rebound.   Lymphadenopathy:      Cervical: No cervical adenopathy.   Skin:     Findings: Rash present.   Neurological:      General: No focal deficit present.      Mental Status: He is alert and oriented to person, place, and time.   Psychiatric:         Mood and Affect: Mood normal.         Behavior: Behavior normal.         Thought Content: Thought content normal.         Judgment: Judgment normal.         Labs: reviewed and pertinent noted above    Assessment/Plan:  Ramesh Ricci is a 84 y.o. male with pan ulcerative colitis. The following issues were addresssed:    1. 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 Ulcerative colitis:  Doing well on stelara. Discussed possibility of transitioning to entyvio as this would be done at an infusion center and would likely be cheaper than stelara for this reason. However, they have already paid for this year and would like to continue stelara. Recommended obtaining shingles and pneumonia vaccine as well. He continues to prefer to avoid colonoscopies in the future.    2. Iron deficiency anemia:  Recent iron studies were normal.  Hemoglobin has improved.  Recheck iron studies in the future.    3. Rash:  He has had a persistent rash for several years.  This does not seem to correlate with any of his  medications.  I recommend that he follow-up with his dermatologist for further advice regarding this issue.    4. Aortic stenosis:  Continue to follow up with Cardiology.    5. GERD: Take omeprazole 45min before first protein meal of day    Ex smoker:  - recommended to continue smoking cessation  - discussed in detail that Crohn's disease can worsen with smoking and may make patient more resistant to treatment    # IBD specific health maintenance:  Colon cancer surveillance:  Up-to-date    Annual:  - Eye exam:  Not applicable  - Skin exam (if on IMM/TNF):  Up-to-date  - reminded pt to use sunblock/hats/sunprotective clothing  - PAP (if immunosuppressed):  Not applicable    DEXA:  Osteopenia noted in 2018    Vitamin D:  Normal in 2021, repeat today    Vaccines:    Influenza:  Up-to-date   Pneumovax:     PCV13:  Up-to-date    PSV23:  Review today   HAV:  Vaccinated   HBV:  Vaccinated   Tdap:  Review in the future   MMR:  Immune   VZV:  Immune   HZV:  Started series   HPV:  Not applicable   Meningococcus:  Not applicable   COVID:  Completed 3 doses    Follow up: Follow up in about 6 months (around 9/19/2024).    Thank you again for sending Ramesh Ricci to see Dr. Marcial Snell today at the [facility]. Please don't hesitate to contact Dr. Snell if there are any questions regarding this evaluation, or if you have any other patients with inflammatory bowel disease for whom you would like a consultation. You can reach Dr. Snell at [phone] or by email at [email or web address]    Lazaro Donnelly MD  Gastroenterology Fellow PGY VI      Answers submitted by the patient for this visit:  Established Patient Questionnaire  (Submitted on 3/12/2024)

## 2024-03-20 LAB
GAMMA INTERFERON BACKGROUND BLD IA-ACNC: 0.04 IU/ML
M TB IFN-G CD4+ BCKGRND COR BLD-ACNC: 0.01 IU/ML
M TB IFN-G CD4+ BCKGRND COR BLD-ACNC: 0.02 IU/ML
MITOGEN IGNF BCKGRD COR BLD-ACNC: 9.96 IU/ML
TB GOLD PLUS: NEGATIVE

## 2024-03-22 LAB
HBV SURFACE AB SER QL IA: POSITIVE
HBV SURFACE AB SERPL IA-ACNC: NORMAL MIU/ML

## 2024-05-01 ENCOUNTER — PATIENT MESSAGE (OUTPATIENT)
Dept: GASTROENTEROLOGY | Facility: CLINIC | Age: 84
End: 2024-05-01
Payer: MEDICARE

## 2024-05-02 ENCOUNTER — TELEPHONE (OUTPATIENT)
Dept: ENDOSCOPY | Facility: HOSPITAL | Age: 84
End: 2024-05-02
Payer: MEDICARE

## 2024-05-02 DIAGNOSIS — K21.9 GASTROESOPHAGEAL REFLUX DISEASE, UNSPECIFIED WHETHER ESOPHAGITIS PRESENT: Primary | ICD-10-CM

## 2024-05-02 NOTE — TELEPHONE ENCOUNTER
Spoke to pt to schedule procedure(s) Upper Endoscopy (EGD)       Physician to perform procedure(s) Dr. LEODAN Snell  Date of Procedure (s) 5/23/24  Arrival Time 9:50 AM  Time of Procedure(s) 10:50 AM   Location of Procedure(s) [address]  Type of Rx Prep sent to patient: N/A  Instructions provided to patient via [facility]    Patient was informed on the following information and verbalized understanding. Screening questionnaire reviewed with patient and complete. If procedure requires anesthesia, a responsible adult needs to be present to accompany the patient home, patient cannot drive after receiving anesthesia. Appointment details are tentative, especially check-in time. Patient will receive a prep-op call 7 days prior to confirm check-in time for procedure. If applicable the patient should contact their pharmacy to verify Rx for procedure prep is ready for pick-up. Patient was advised to call the scheduling department at [phone] if pharmacy states no Rx is available. Patient was advised to call the endoscopy scheduling department if any questions or concerns arise.      [facility] Scheduling Department

## 2024-05-07 ENCOUNTER — TELEPHONE (OUTPATIENT)
Dept: ENDOSCOPY | Facility: HOSPITAL | Age: 84
End: 2024-05-07
Payer: MEDICARE

## 2024-05-07 NOTE — TELEPHONE ENCOUNTER
Department of Endoscopy      Dear Dr. Conklin,    Your patient, Ramesh Ricci 1940 is being scheduled for (EGD) Endoscopy and our records indicate they are taking Eliquis (apixaban).    Please indicate if and when the patient can safely stop their medication prior to the procedure by completing one of the following:    Do not discontinue medication: _____    Medication can be discontinued _____ days prior to the procedure.    Please take into consideration that therapeutic maneuvers may be performed during the procedure that requires delay in restarting anticoagulation therapy.      Signature: ______________________________________________ Date:__________________    Please sign and date this letter and return fax to : [phone] If you have any questions or concerns, please call [phone] Monday-Friday, 8:00 am-3:00 pm.     Thank you for your prompt response,    [facility]       Medication                                                                Hold Time                                                                                                                                                        ANTIPLATELETS   Persantine (dipyridamole) 2 days   Aggrenox (ASA/dipyridamole) 2 days     Pletal (cilostazol) 2 days     Plavix (clopidogrel) 5 days     Effient (prasugrel) 5 days     Ticlid (ticlidopine) 10 days     Brilinta (ticagrelor) 3 days     Zontivity (vorapaxar) 5 days   ANTICOAGULANTS   Coumadin (warfarin) 5 days   Lovenox (enoxaparin)  -last dose administered to be 50% of total daily dose 24 hours   Fragmin (dalteparin)   -last dose administered to be 50% of total daily dose 24 hours   Arixtra (fondaparinux) 2 days     Xarelto (rivaroxaban)   2 days     Eliquis (apixaban)   2 days     Savaysa (edoxaban)   2 days     Pradaxa (dabigatran)   2 days     Iprivask (desirudin)   10 hrs

## 2024-05-07 NOTE — TELEPHONE ENCOUNTER
----- Message from Shari Reyes MA sent at 2024  3:49 PM CDT -----  Regardin/23-BT  The patient is currently under an external cardiologist Dr. Beau Conklin MD care and requires a blood thinner Eliquis (apixaban) for their upcoming scheduled Upper Endoscopy (EGD) on 24.           External provider information:    Physician name: Dr. Beau Conklin MD  Facility/Location: [address] [place] [address]  Phone number: [phone]

## 2024-05-09 NOTE — TELEPHONE ENCOUNTER
Patient has been approved to hold Eliquis (apixaban) for 2 days per Dr. Conklin-approval and cardiology visit scanned into chart.

## 2024-05-21 ENCOUNTER — TELEPHONE (OUTPATIENT)
Dept: ENDOSCOPY | Facility: HOSPITAL | Age: 84
End: 2024-05-21
Payer: MEDICARE

## 2024-05-21 NOTE — TELEPHONE ENCOUNTER
Dr. Snell,     The patient is scheduled for Thursday for an EGD. Patient was due to stop Eliquis on yesterday. He took the dose this morning. Spouse is requesting to reach out to see if you would be okay to proceed with the procedure.   Please advise.     Thanks,  Adriana

## 2024-05-21 NOTE — TELEPHONE ENCOUNTER
Spoke to pt to reschedule procedure(s) Upper Endoscopy (EGD)       Physician to perform procedure(s) Dr. LEODAN Snell  Date of Procedure (s) 6/7/24  Arrival Time 9:45 AM  Time of Procedure(s) 10:45 AM   Location of Procedure(s) [place] [address] Floor  Type of Rx Prep sent to patient: Other  Instructions provided to patient via [facility]    Patient was informed on the following information and verbalized understanding. Screening questionnaire reviewed with patient and complete. If procedure requires anesthesia, a responsible adult needs to be present to accompany the patient home, patient cannot drive after receiving anesthesia. Appointment details are tentative, especially check-in time. Patient will receive a prep-op call 7 days prior to confirm check-in time for procedure. If applicable the patient should contact their pharmacy to verify Rx for procedure prep is ready for pick-up. Patient was advised to call the scheduling department at [phone] if pharmacy states no Rx is available. Patient was advised to call the endoscopy scheduling department if any questions or concerns arise.      [facility] Scheduling Department

## 2024-05-27 ENCOUNTER — HOSPITAL ENCOUNTER (EMERGENCY)
Facility: HOSPITAL | Age: 84
Discharge: LEFT BEFORE DISCHARGE | End: 2024-05-27
Payer: MEDICARE

## 2024-05-27 VITALS
HEART RATE: 55 BPM | BODY MASS INDEX: 22.26 KG/M2 | WEIGHT: 159 LBS | DIASTOLIC BLOOD PRESSURE: 57 MMHG | OXYGEN SATURATION: 96 % | SYSTOLIC BLOOD PRESSURE: 126 MMHG | HEIGHT: 71 IN | TEMPERATURE: 98 F | RESPIRATION RATE: 18 BRPM

## 2024-05-27 DIAGNOSIS — R07.9 CHEST PAIN: ICD-10-CM

## 2024-05-27 LAB
OHS QRS DURATION: 88 MS
OHS QTC CALCULATION: 465 MS

## 2024-05-27 PROCEDURE — 99900041 HC LEFT WITHOUT BEING SEEN- EMERGENCY

## 2024-05-27 PROCEDURE — 93005 ELECTROCARDIOGRAM TRACING: CPT

## 2024-05-27 PROCEDURE — 93010 ELECTROCARDIOGRAM REPORT: CPT | Mod: ,,, | Performed by: INTERNAL MEDICINE

## 2024-05-27 NOTE — ED NOTES
Pt eloped. Pt reports not wanting to wait any longer for a room. Pt educated on process and importance of staying. Pt alert and oriented upon elopement.

## 2024-05-30 DIAGNOSIS — K21.9 GASTROESOPHAGEAL REFLUX DISEASE, UNSPECIFIED WHETHER ESOPHAGITIS PRESENT: ICD-10-CM

## 2024-05-30 RX ORDER — PANTOPRAZOLE SODIUM 40 MG/1
40 TABLET, DELAYED RELEASE ORAL DAILY PRN
Qty: 90 TABLET | Refills: 3 | Status: SHIPPED | OUTPATIENT
Start: 2024-05-30

## 2024-05-31 ENCOUNTER — TELEPHONE (OUTPATIENT)
Dept: ENDOSCOPY | Facility: HOSPITAL | Age: 84
End: 2024-05-31
Payer: MEDICARE

## 2024-05-31 NOTE — TELEPHONE ENCOUNTER
Confirmed egd appt for 6/7/24 with pt. wife Confirmed receipt of prep  instructions. Reviewed instructions pt wife denies taking glp1 medications.confirmed will stop eliquis on 6/5/24 Confirmed ride home after procedure.Pt verbalized understanding

## 2024-06-07 ENCOUNTER — HOSPITAL ENCOUNTER (OUTPATIENT)
Facility: HOSPITAL | Age: 84
Discharge: HOME | End: 2024-06-07
Attending: INTERNAL MEDICINE | Admitting: INTERNAL MEDICINE
Payer: MEDICARE

## 2024-06-07 ENCOUNTER — ANESTHESIA EVENT (OUTPATIENT)
Dept: ENDOSCOPY | Facility: HOSPITAL | Age: 84
End: 2024-06-07
Payer: MEDICARE

## 2024-06-07 ENCOUNTER — ANESTHESIA (OUTPATIENT)
Dept: ENDOSCOPY | Facility: HOSPITAL | Age: 84
End: 2024-06-07
Payer: MEDICARE

## 2024-06-07 VITALS
SYSTOLIC BLOOD PRESSURE: 123 MMHG | OXYGEN SATURATION: 94 % | HEIGHT: 71 IN | DIASTOLIC BLOOD PRESSURE: 61 MMHG | TEMPERATURE: 98 F | WEIGHT: 159 LBS | BODY MASS INDEX: 22.26 KG/M2 | RESPIRATION RATE: 18 BRPM | HEART RATE: 43 BPM

## 2024-06-07 DIAGNOSIS — R13.10 DYSPHAGIA: ICD-10-CM

## 2024-06-07 PROCEDURE — 37000009 HC ANESTHESIA EA ADD 15 MINS: Performed by: INTERNAL MEDICINE

## 2024-06-07 PROCEDURE — 43239 EGD BIOPSY SINGLE/MULTIPLE: CPT | Performed by: INTERNAL MEDICINE

## 2024-06-07 PROCEDURE — 88305 TISSUE EXAM BY PATHOLOGIST: CPT | Mod: 26,,, | Performed by: PATHOLOGY

## 2024-06-07 PROCEDURE — 25000003 PHARM REV CODE 250: Performed by: NURSE ANESTHETIST, CERTIFIED REGISTERED

## 2024-06-07 PROCEDURE — 43239 EGD BIOPSY SINGLE/MULTIPLE: CPT | Mod: ,,, | Performed by: INTERNAL MEDICINE

## 2024-06-07 PROCEDURE — 37000008 HC ANESTHESIA 1ST 15 MINUTES: Performed by: INTERNAL MEDICINE

## 2024-06-07 PROCEDURE — 88305 TISSUE EXAM BY PATHOLOGIST: CPT | Mod: 59 | Performed by: PATHOLOGY

## 2024-06-07 PROCEDURE — 63600175 PHARM REV CODE 636 W HCPCS: Performed by: NURSE ANESTHETIST, CERTIFIED REGISTERED

## 2024-06-07 PROCEDURE — 27201012 HC FORCEPS, HOT/COLD, DISP: Performed by: INTERNAL MEDICINE

## 2024-06-07 PROCEDURE — E9220 PRA ENDO ANESTHESIA: HCPCS | Mod: ,,, | Performed by: NURSE ANESTHETIST, CERTIFIED REGISTERED

## 2024-06-07 RX ORDER — GLYCOPYRROLATE 0.2 MG/ML
INJECTION INTRAMUSCULAR; INTRAVENOUS
Status: DISCONTINUED | OUTPATIENT
Start: 2024-06-07 | End: 2024-06-07

## 2024-06-07 RX ORDER — SODIUM CHLORIDE 9 MG/ML
INJECTION, SOLUTION INTRAVENOUS CONTINUOUS
Status: DISCONTINUED | OUTPATIENT
Start: 2024-06-07 | End: 2024-06-07 | Stop reason: HOSPADM

## 2024-06-07 RX ORDER — LIDOCAINE HYDROCHLORIDE 20 MG/ML
INJECTION INTRAVENOUS
Status: DISCONTINUED | OUTPATIENT
Start: 2024-06-07 | End: 2024-06-07

## 2024-06-07 RX ORDER — PHENYLEPHRINE HYDROCHLORIDE 10 MG/ML
INJECTION INTRAVENOUS
Status: DISCONTINUED | OUTPATIENT
Start: 2024-06-07 | End: 2024-06-07

## 2024-06-07 RX ORDER — PROPOFOL 10 MG/ML
VIAL (ML) INTRAVENOUS CONTINUOUS PRN
Status: DISCONTINUED | OUTPATIENT
Start: 2024-06-07 | End: 2024-06-07

## 2024-06-07 RX ADMIN — GLYCOPYRROLATE 0.2 MG: 0.2 INJECTION INTRAMUSCULAR; INTRAVENOUS at 11:06

## 2024-06-07 RX ADMIN — SODIUM CHLORIDE: 0.9 INJECTION, SOLUTION INTRAVENOUS at 11:06

## 2024-06-07 RX ADMIN — PHENYLEPHRINE HYDROCHLORIDE 100 MCG: 10 INJECTION INTRAVENOUS at 11:06

## 2024-06-07 RX ADMIN — PROPOFOL 125 MCG/KG/MIN: 10 INJECTION, EMULSION INTRAVENOUS at 11:06

## 2024-06-07 RX ADMIN — LIDOCAINE HYDROCHLORIDE 100 MG: 20 INJECTION INTRAVENOUS at 11:06

## 2024-06-07 NOTE — ANESTHESIA POSTPROCEDURE EVALUATION
Anesthesia Post Evaluation    Patient: Ramesh Ricci    Procedure(s) Performed: Procedure(s) (LRB):  EGD (ESOPHAGOGASTRODUODENOSCOPY) (N/A)    Final Anesthesia Type: general      Patient location during evaluation: PACU  Patient participation: Yes- Able to Participate  Level of consciousness: awake and alert  Post-procedure vital signs: reviewed and stable  Pain management: adequate  Airway patency: patent    PONV status at discharge: No PONV  Anesthetic complications: no      Cardiovascular status: hemodynamically stable  Respiratory status: spontaneous ventilation  Follow-up not needed.              Vitals Value Taken Time   /61 06/07/24 1152   Temp 36.4 °C (97.6 °F) 06/07/24 1123   Pulse 43 06/07/24 1152   Resp 18 06/07/24 1152   SpO2 94 % 06/07/24 1152         Event Time   Out of Recovery 12:04:16         Pain/Yrn Score: Yrn Score: 10 (6/7/2024 11:37 AM)

## 2024-06-07 NOTE — PROVATION PATIENT INSTRUCTIONS
Discharge Summary/Instructions after an Endoscopic Procedure  Patient Name: Ramesh Ricci  Patient MRN: [id number]  Patient YOB: 1940 Friday, June 7, 2024  Audie Snell MD  Dear patient,  As a result of recent federal legislation (The Federal Cures Act), you may   receive lab or pathology results from your procedure in your [facility]   account before your physician is able to contact you. Your physician or   their representative will relay the results to you with their   recommendations at their soonest availability.  Thank you,  RESTRICTIONS:  During your procedure today, you received medications for sedation.  These   medications may affect your judgment, balance and coordination.  Therefore,   for 24 hours, you have the following restrictions:   - DO NOT drive a car, operate machinery, make legal/financial decisions,   sign important papers or drink alcohol.    ACTIVITY:  Today: no heavy lifting, straining or running due to procedural   sedation/anesthesia.  The following day: return to full activity including work.  DIET:  Eat and drink normally unless instructed otherwise.     TREATMENT FOR COMMON SIDE EFFECTS:  - Mild abdominal pain, nausea, belching, bloating or excessive gas:  rest,   eat lightly and use a heating pad.  - Sore Throat: treat with throat lozenges and/or gargle with warm salt   water.  - Because air was used during the procedure, expelling large amounts of air   from your rectum or belching is normal.  - If a bowel prep was taken, you may not have a bowel movement for 1-3 days.    This is normal.  SYMPTOMS TO WATCH FOR AND REPORT TO YOUR PHYSICIAN:  1. Abdominal pain or bloating, other than gas cramps.  2. Chest pain.  3. Back pain.  4. Signs of infection such as: chills or fever occurring within 24 hours   after the procedure.  5. Rectal bleeding, which would show as bright red, maroon, or black stools.   (A tablespoon of blood from the rectum is not serious, especially  if   hemorrhoids are present.)  6. Vomiting.  7. Weakness or dizziness.  GO DIRECTLY TO THE NEAREST EMERGENCY ROOM IF YOU HAVE ANY OF THE FOLLOWING:      Difficulty breathing              Chills and/or fever over 101 F   Persistent vomiting and/or vomiting blood   Severe abdominal pain   Severe chest pain   Black, tarry stools   Bleeding- more than one tablespoon   Any other symptom or condition that you feel may need urgent attention  Your doctor recommends these additional instructions:  If any biopsies were taken, your doctors clinic will contact you in 1 to 2   weeks with any results.  - Discharge patient to home.   - Resume previous diet today.   - Continue present medications.   - Resume Eliquis (apixaban) at prior dose tomorrow.   - Await pathology results.   - Return to my office as previously scheduled.   - No cause of dysphagia was identified on today's exam. If biopsies are   unrevealing will arrange for barium esophagram to further assess symptoms.     For questions, problems or results please call your physician - Audie Snell MD at Work:  [phone].  [facility], EMERGENCY ROOM PHONE NUMBER: [phone]  IF A COMPLICATION OR EMERGENCY SITUATION ARISES AND YOU ARE UNABLE TO REACH   YOUR PHYSICIAN - GO DIRECTLY TO THE EMERGENCY ROOM.  Audie Snell MD  6/7/2024 11:18:41 AM  This report has been verified and signed electronically.  Dear patient,  As a result of recent federal legislation (The Federal Cures Act), you may   receive lab or pathology results from your procedure in your [facility]   account before your physician is able to contact you. Your physician or   their representative will relay the results to you with their   recommendations at their soonest availability.  Thank you,  PROVATION

## 2024-06-07 NOTE — H&P
Short Stay Endoscopy History and Physical    PCP - EFREM Muniz MD    Procedure - EGD  ASA - 3  Mallampati - per anesthesia  History of Anesthesia problems - no  Family history Anesthesia problems -  no     HPI:  This is a 84 y.o. male here for evaluation of :     Reflux - no  Dysphagia - yes  Abdominal pain - no  Diarrhea - no  Anemia - no  GI bleeding - no    ROS:  CONSTITUTIONAL: Denies weight change,  fatigue, fevers, chills, night sweats.  CARDIOVASCULAR: Denies chest pain, shortness of breath, orthopnea and edema.  RESPIRATORY: Denies cough, hemoptysis, dyspnea, and wheezing.  GI: See HPI.    Medical History:   Past Medical History:   Diagnosis Date    Abnormal echocardiogram 11/16/2012    Anemia     Anticoagulant long-term use     Anxiety     Atrophic kidney 11/16/2012    BPH (benign prostatic hyperplasia) 11/16/2012    Cancer 2000 something    Had radiation    DDD (degenerative disc disease), cervical 07/05/2017    x-ray 7/17 - Severe    GERD (gastroesophageal reflux disease) 11/16/2012    Glaucoma 11/16/2012    HTN (hypertension) 11/16/2012    Hypothyroid 11/16/2012    Internal carotid artery stenosis 11/16/2012    Joint pain     Left ventricular diastolic dysfunction, NYHA class 1 04/16/2015    4/15    Low serum testosterone level 11/16/2012    Normal cardiac stress test 11/16/2012    Osteopenia 11/16/2012    Shingles 11/16/2012    Skin disease 2020    Seems to have started after Covid vaccine    Stroke 2017    tia   no residual    TIA (transient ischemic attack) 11/16/2012    UC (ulcerative colitis) 11/16/2012       Surgical History:   Past Surgical History:   Procedure Laterality Date    ADENOIDECTOMY      COLONOSCOPY N/A 03/25/2022    Procedure: COLONOSCOPY;  Surgeon: Ashley Nguyen MD;  Location: [facility];  Service: Endoscopy;  Laterality: N/A;    ESOPHAGOGASTRODUODENOSCOPY N/A 03/25/2022    Procedure: EGD (ESOPHAGOGASTRODUODENOSCOPY);  Surgeon: Ashley Nguyen MD;  Location: [facility];   Service: Endoscopy;  Laterality: N/A;  added on per Dr. Nguyen    EYE SURGERY Right 2020    cataract    HERNIA REPAIR      LEFT HEART CATHETERIZATION Right 10/29/2021    Procedure: CATHETERIZATION, HEART, LEFT AND RIGHT  - LV DARNELL POSSIBLE;  Surgeon: Beau Conklin MD;  Location: [facility], [place] CATH LAB;  Service: Cardiology;  Laterality: Right;    RIGHT HEART CATHETERIZATION Right 10/29/2021    Procedure: INSERTION, CATHETER, RIGHT HEART;  Surgeon: Beau Conklin MD;  Location: [facility] [place] CATH LAB;  Service: Cardiology;  Laterality: Right;    SKIN BIOPSY  2020    TONSILLECTOMY         Family History:  Family History   Problem Relation Name Age of Onset    Hypertension Mother      Alzheimer's disease Mother      Heart attack Father      Diabetes Brother      Hypertension Brother      Cancer Brother      Heart disease Brother  56        MI    Colon cancer Neg Hx      Esophageal cancer Neg Hx         Social History:   Social History     Tobacco Use    Smoking status: Former     Current packs/day: 0.00     Average packs/day: 1 pack/day for 25.0 years (25.0 ttl pk-yrs)     Types: Cigarettes     Start date: 1947     Quit date: 1972     Years since quittin.4    Smokeless tobacco: Never   Substance Use Topics    Alcohol use: Yes     Alcohol/week: 3.0 standard drinks of alcohol     Types: 2 Glasses of wine, 1 Cans of beer per week     Comment: Occasional    Drug use: Never       Allergies: Reviewed    Medications:   No current facility-administered medications on file prior to encounter.     Current Outpatient Medications on File Prior to Encounter   Medication Sig Dispense Refill    aspirin (ECOTRIN) 81 MG EC tablet Take 81 mg by mouth once daily.      levothyroxine (SYNTHROID) 125 MCG tablet Take 1 tablet (125 mcg total) by mouth every morning. 90 tablet 3    metoprolol succinate (TOPROL-XL) 25 MG 24 hr tablet Take 1 tablet (25 mg total) by mouth once daily. 90 tablet 3    pravastatin (PRAVACHOL) 20 MG tablet  Take 1 tablet (20 mg total) by mouth every evening. 90 tablet 3    sertraline (ZOLOFT) 100 MG tablet Take 100 mg by mouth once daily.      tamsulosin (FLOMAX) 0.4 mg Cp24 Take 0.4 mg by mouth once daily.       acetaminophen (TYLENOL) 500 MG tablet Take 2 tablets (1,000 mg total) by mouth every 6 (six) hours as needed.      ascorbic acid, vitamin C, (VITAMIN C) 500 MG tablet Take 500 mg by mouth once daily.      calcium carbonate (TUMS) 200 mg calcium (500 mg) chewable tablet Take 1 tablet by mouth once daily.      docusate sodium (STOOL SOFTENER ORAL) Take by mouth.      ELIQUIS 2.5 mg Tab Take 2.5 mg by mouth 2 (two) times daily.      fluocinolone acetonide oiL 0.01 % Drop Place into both ears.      levocetirizine (XYZAL) 5 MG tablet Take 5 mg by mouth every morning.      tiZANidine (ZANAFLEX) 2 MG tablet Take 1/2-1 tablet nightly as needed for muscle spasms 90 tablet 0    triamcinolone acetonide 0.1% (KENALOG) 0.1 % cream APPLY  CREAM TOPICALLY TO AFFECTED AREA TWICE DAILY 454 g 0    ustekinumab (STELARA) 90 mg/mL Syrg syringe Inject 90 mg into the skin. q8w      vitC/E/Zn/copper/lutein/zeaxan (ICAPS AREDS2 ORAL) Take 1 capsule by mouth once daily.      [DISCONTINUED] telmisartan (MICARDIS) 40 MG Tab TAKE 1/2 TABLET BY MOUTH DAILY 90 tablet 1       Physical Exam:  Vital Signs:   Vitals:    06/07/24 1039   BP: 125/67   Pulse: (!) 48   Resp: 18   Temp: 97.7 °F (36.5 °C)     General Appearance: Well appearing in no acute distress  ENT: OP clear  Chest: CTA B  CV: RRR, no m/r/g  Abd: s/nt/nd/nabs  Ext: no edema    Labs:  Reviewed    Plan:  I have explained the risks and benefits of endoscopy procedures to the patient including but not limited to bleeding, perforation, infection, and death. The patient wishes to proceed.

## 2024-06-07 NOTE — TRANSFER OF CARE
"Anesthesia Transfer of Care Note    Patient: Ramesh Ricci    Procedure(s) Performed: Procedure(s) (LRB):  EGD (ESOPHAGOGASTRODUODENOSCOPY) (N/A)    Patient location: PACU    Anesthesia Type: general    Transport from OR: Transported from OR on 2-3 L/min O2 by NC with adequate spontaneous ventilation    Post pain: adequate analgesia    Post assessment: no apparent anesthetic complications    Post vital signs: stable    Level of consciousness: sedated    Nausea/Vomiting: no nausea/vomiting    Complications: none    Transfer of care protocol was followed      Last vitals: Visit Vitals  /67 (BP Location: Left arm, Patient Position: Sitting)   Pulse (!) 48   Temp 36.5 °C (97.7 °F) (Temporal)   Resp 18   Ht 5' 11" (1.803 m)   Wt 72.1 kg (159 lb)   SpO2 98%   BMI 22.18 kg/m²     "

## 2024-06-07 NOTE — ANESTHESIA PREPROCEDURE EVALUATION
06/07/2024  Ramesh Ricci is a 84 y.o., male.  Pre-operative evaluation for Procedure(s) (LRB):  EGD (ESOPHAGOGASTRODUODENOSCOPY) (N/A)    Ramesh Ricci is a 84 y.o. male       Patient Active Problem List   Diagnosis    UC (ulcerative colitis)    Acquired hypothyroidism    BPH (benign prostatic hyperplasia)    Essential hypertension    Glaucoma    Atrophic kidney    Osteopenia    GERD (gastroesophageal reflux disease)    Shingles    TIA (transient ischemic attack)    Internal carotid artery stenosis    Normal cardiac stress test    Abnormal echocardiogram    Prostate CA    Echocardiogram abnormal    Stage 3a chronic kidney disease    Left ventricular diastolic dysfunction, NYHA class 1    Nonrheumatic aortic valve stenosis    Mitral valve stenosis, mild    Primary osteoarthritis of both knees    DDD (degenerative disc disease), cervical    Lung nodules    Congenital absence of right kidney    History of nuclear stress test    PAF (paroxysmal atrial fibrillation)    Ex-smoker    Tension type headache    Intermediate stage nonexudative age-related macular degeneration of both eyes    Hypertensive retinopathy of both eyes    Iron deficiency anemia due to chronic blood loss    Coronary artery disease involving native coronary artery of native heart without angina pectoris    Rash    Colitis    Advanced care planning/counseling discussion    Known medical problems    SOB (shortness of breath)    Palpitations    Exudative age-related macular degeneration, right eye, with active choroidal neovascularization    Drug-induced immunodeficiency    Atherosclerosis of aorta       Past Surgical History:   Procedure Laterality Date    ADENOIDECTOMY      COLONOSCOPY N/A 03/25/2022    Procedure: COLONOSCOPY;  Surgeon: Ashley Nguyen MD;  Location: [facility];  Service: Endoscopy;  Laterality: N/A;     ESOPHAGOGASTRODUODENOSCOPY N/A 2022    Procedure: EGD (ESOPHAGOGASTRODUODENOSCOPY);  Surgeon: Ashley Nguyen MD;  Location: [facility];  Service: Endoscopy;  Laterality: N/A;  added on per Dr. Nguyen    EYE SURGERY Right 2020    cataract    HERNIA REPAIR      LEFT HEART CATHETERIZATION Right 10/29/2021    Procedure: CATHETERIZATION, HEART, LEFT AND RIGHT  - LV DARNELL POSSIBLE;  Surgeon: Beau Conklin MD;  Location: [facility] CATH LAB;  Service: Cardiology;  Laterality: Right;    RIGHT HEART CATHETERIZATION Right 10/29/2021    Procedure: INSERTION, CATHETER, RIGHT HEART;  Surgeon: Beau Conklin MD;  Location: [facility] CATH LAB;  Service: Cardiology;  Laterality: Right;    SKIN BIOPSY      TONSILLECTOMY         Social History     Socioeconomic History    Marital status:      Spouse name: Mone    Number of children: 1+1   Tobacco Use    Smoking status: Former     Current packs/day: 0.00     Average packs/day: 1 pack/day for 25.0 years (25.0 ttl pk-yrs)     Types: Cigarettes     Start date: 1947     Quit date: 1972     Years since quittin.4    Smokeless tobacco: Never   Substance and Sexual Activity    Alcohol use: Yes     Alcohol/week: 3.0 standard drinks of alcohol     Types: 2 Glasses of wine, 1 Cans of beer per week     Comment: Occasional    Drug use: Never    Sexual activity: Not Currently     Partners: Female     Birth control/protection: None   Social History Narrative    Rides bike - Quit due to poor balance     - Walks the dog.     - he uses a cane or a walker.  He walks between 1.5 and 2 miles daily.       No current facility-administered medications on file prior to encounter.     Current Outpatient Medications on File Prior to Encounter   Medication Sig Dispense Refill    acetaminophen (TYLENOL) 500 MG tablet Take 2 tablets (1,000 mg total) by mouth every 6 (six) hours as needed.      ascorbic acid, vitamin C, (VITAMIN C) 500 MG tablet Take 500 mg by mouth once  "daily.      aspirin (ECOTRIN) 81 MG EC tablet Take 81 mg by mouth once daily.      calcium carbonate (TUMS) 200 mg calcium (500 mg) chewable tablet Take 1 tablet by mouth once daily.      docusate sodium (STOOL SOFTENER ORAL) Take by mouth.      ELIQUIS 2.5 mg Tab Take 2.5 mg by mouth 2 (two) times daily.      fluocinolone acetonide oiL 0.01 % Drop Place into both ears.      levocetirizine (XYZAL) 5 MG tablet Take 5 mg by mouth every morning.      levothyroxine (SYNTHROID) 125 MCG tablet Take 1 tablet (125 mcg total) by mouth every morning. 90 tablet 3    metoprolol succinate (TOPROL-XL) 25 MG 24 hr tablet Take 1 tablet (25 mg total) by mouth once daily. 90 tablet 3    pravastatin (PRAVACHOL) 20 MG tablet Take 1 tablet (20 mg total) by mouth every evening. 90 tablet 3    sertraline (ZOLOFT) 100 MG tablet Take 100 mg by mouth once daily.      tamsulosin (FLOMAX) 0.4 mg Cp24 Take 0.4 mg by mouth once daily.       tiZANidine (ZANAFLEX) 2 MG tablet Take 1/2-1 tablet nightly as needed for muscle spasms 90 tablet 0    triamcinolone acetonide 0.1% (KENALOG) 0.1 % cream APPLY  CREAM TOPICALLY TO AFFECTED AREA TWICE DAILY 454 g 0    ustekinumab (STELARA) 90 mg/mL Syrg syringe Inject 90 mg into the skin. q8w      vitC/E/Zn/copper/lutein/zeaxan (ICAPS AREDS2 ORAL) Take 1 capsule by mouth once daily.      [DISCONTINUED] telmisartan (MICARDIS) 40 MG Tab TAKE 1/2 TABLET BY MOUTH DAILY 90 tablet 1       Review of patient's allergies indicates:   Allergen Reactions    Benazepril Other (See Comments)     Cough         CBC: No results for input(s): "WBC", "RBC", "HGB", "HCT", "PLT", "MCV", "MCH", "MCHC" in the last 72 hours.    CMP: No results for input(s): "NA", "K", "CL", "CO2", "BUN", "CREATININE", "GLU", "MG", "PHOS", "CALCIUM", "ALBUMIN", "PROT", "ALKPHOS", "ALT", "AST", "BILITOT" in the last 72 hours.    INR  No results for input(s): "PT", "INR", "PROTIME", "APTT" in the last 72 hours.      Diagnostic Studies:    EKG:   Results " for orders placed or performed during the hospital encounter of 01/23/23   EKG 12-lead    Collection Time: 01/23/23 12:23 PM    Narrative    Test Reason : R00.2,    Vent. Rate : 064 BPM     Atrial Rate : 064 BPM     P-R Int : 130 ms          QRS Dur : 096 ms      QT Int : 468 ms       P-R-T Axes : 036 034 078 degrees     QTc Int : 482 ms    Sinus rhythm with frequent Premature ventricular complexes  Septal infarct (cited on or before 21-MAR-2022)  Abnormal ECG  When compared with ECG of 23-MAR-2022 10:30,  Significant changes have occurred  Confirmed by Nikolai Oscar MD ([id number]) on 1/23/2023 6:18:12 PM    Referred By: [email or web address]   SELF           Confirmed By:Nikolai Oscar MD       TTE:  Results for orders placed or performed during the hospital encounter of 01/23/23   Echo   Result Value Ref Range    BSA 1.87 m2    TDI SEPTAL 0.04 m/s    LV LATERAL E/E' RATIO 25.20 m/s    LV SEPTAL E/E' RATIO 31.50 m/s    IVC diameter 1.53 cm    Left Ventricular Outflow Tract Mean Velocity 0.67 cm/s    Left Ventricular Outflow Tract Mean Gradient 2.05 mmHg    TDI LATERAL 0.05 m/s    PV PEAK VELOCITY 1.16 cm/s    LVIDd 5.19 3.5 - 6.0 cm    IVS 1.21 (A) 0.6 - 1.1 cm    Posterior Wall 1.24 (A) 0.6 - 1.1 cm    LVIDs 3.93 2.1 - 4.0 cm    FS 24 28 - 44 %    Sinus 3.62 cm    STJ 2.55 cm    LV mass 255.31 g    LA size 4.33 cm    RVDD 3.90 cm    TAPSE 2.09 cm    RV S' 0.01 cm/s    Left Ventricle Relative Wall Thickness 0.48 cm    AV mean gradient 34 mmHg    AV valve area 0.94 cm2    AV Velocity Ratio 0.24     AV index (prosthetic) 0.27     MV valve area p 1/2 method 1.82 cm2    E/A ratio 0.89     Mean e' 0.05 m/s    E wave deceleration time 415.94 msec    IVRT 114.18 msec    LVOT diameter 2.09 cm    LVOT area 3.4 cm2    LVOT peak angel luis 0.96 m/s    LVOT peak VTI 20.10 cm    Ao peak angel luis 3.94 m/s    Ao VTI 73.2 cm    LVOT stroke volume 68.92 cm3    AV peak gradient 62 mmHg    E/E' ratio 28.00 m/s    MV Peak E Angel Luis 1.26 m/s    TR Max Angel Luis 2.55 m/s    MV  stenosis pressure 1/2 time 120.62 ms    MV Peak A Angel Luis 1.42 m/s    LV Systolic Volume 67.33 mL    LV Systolic Volume Index 35.6 mL/m2    LV Diastolic Volume 128.95 mL    LV Diastolic Volume Index 68.23 mL/m2    LV Mass Index 135 g/m2    RA Major Axis 4.81 cm    Left Atrium Minor Axis 7.50 cm    Left Atrium Major Axis 7.39 cm    Triscuspid Valve Regurgitation Peak Gradient 26 mmHg    LA WIDTH 6.60 cm    LA volume 180.84 cm3    LA Volume Index 95.7 mL/m2    RA Width 4.10 cm    Right Atrial Pressure (from IVC) 3 mmHg    EF 70 %    TV resting pulmonary artery pressure 29 mmHg    Narrative    · The left ventricle is normal in size with concentric hypertrophy and   normal systolic function.  · The estimated ejection fraction is 70%.  · Grade II left ventricular diastolic dysfunction.  · Normal right ventricular size with normal right ventricular systolic   function.  · Severe left atrial enlargement.  · There is moderate-to-severe aortic valve stenosis.  · Aortic valve area is 0.94 cm2; peak velocity is 3.94 m/s; mean gradient   is 34 mmHg.  · There is mild mitral stenosis.  · Mild mitral regurgitation.  · Mild tricuspid regurgitation.  · Normal central venous pressure (3 mmHg).  · The estimated PA systolic pressure is 29 mmHg.        EF   Date Value Ref Range Status   01/24/2023 70 % Final      Results for orders placed or performed in visit on 03/10/16   2D echo with color flow doppler   Result Value Ref Range    Right Vent (Diastole)  0.8 - 2.6    Septum (Diastole)  0.6 - 1.2    Left Ventricle (Diastole)  3.5 - 5.5    Posterior Wall (Diastole)  0.6 - 1.2    Aortic Valve (Diastole)  1.5 - 2.6    Aortic Root (Diastole)  1.3 - 3.7    Left Atrium (Diastole)  2.5 - 4    Septum (Systole)  0.5 - 1.1    Left Ventricle (Systole)  2.2 - 4    Posteriol Wall (Systole)  0.5 - 1.2    EF + QEF  %    Aortic Valve Area  2.5 - 3.5 cm2    Aortic Peak Gradient  0 - 9.9 mm Hg    Aortic Mean Gradient  0 - 9.9 mm Hg    Aortic Peak Velocity  0  "- 1.9 m/s    Aortic Pres. Half Time  599 - 999 m/sec    Mitral Valve Area  4 - 6 cm2    Mitral Pres. Half Time  30 - 60 m/sec    Mitral Valve E-A Ratio  0.75 - 999    Mitral Valve E-E prime  0 - 7    Pulmonary Artery Pressure  0 - 29 mm Hg       CHRIST:  No results found for this or any previous visit.    Stress Test:  No results found for this or any previous visit.       LHC:  Results for orders placed during the hospital encounter of 10/29/21    Cardiac catheterization    Conclusion  · PA pressure 47/20 mm hg  · Good LV systolic function  · Moderate aortic stenosis with mean gradient of 19 mm hg and area = 1.18 sq cm.  · No aortic insuffiency  · Mild non obstructive calcific coronary artery disease    The procedure log was documented by No documenter listed and verified by Beau Conklin MD.    Date: 10/29/2021  Time: 9:36 AM       PFT:  No results found for: "FEV1", "FVC", "LMO8RYO", "TLC", "DLCO"     ALLERGIES:     Review of patient's allergies indicates:   Allergen Reactions    Benazepril Other (See Comments)     Cough     LDA:          Lines/Drains/Airways       None                  Anesthesia Evaluation      Airway   Mallampati: II  TM distance: > 6 cm  Neck ROM: Normal ROM  Dental    (+) Intact    Pulmonary    Cardiovascular   (+) hypertension, valvular problems/murmurs AS    Rate: Normal    Neuro/Psych    (+) CVA    GI/Hepatic/Renal    (+) GERD, PUD, chronic renal disease CKD    Endo/Other    Abdominal                           Pre-op Assessment    I have reviewed the Patient Summary Reports.     I have reviewed the Nursing Notes. I have reviewed the NPO Status.   I have reviewed the Medications.     Review of Systems  Anesthesia Hx:  No problems with previous Anesthesia             Denies Family Hx of Anesthesia complications.    Denies Personal Hx of Anesthesia complications.                    Cardiovascular:     Hypertension Valvular problems/Murmurs, AS                                     "   Renal/:  Chronic Renal Disease, CKD                Hepatic/GI:   PUD,  GERD             Neurological:   CVA                                    Endocrine:  Endocrine Normal                Physical Exam  General: Well nourished    Airway:  Mallampati: II   Mouth Opening: Normal  TM Distance: > 6 cm  Tongue: Normal  Neck ROM: Normal ROM    Dental:  Intact    Chest/Lungs:  Normal Respiratory Rate    Heart:  Rate: Normal        Anesthesia Plan  Type of Anesthesia, risks & benefits discussed:    Anesthesia Type: Gen Natural Airway, MAC  Intra-op Monitoring Plan: Standard ASA Monitors  Post Op Pain Control Plan: multimodal analgesia and IV/PO Opioids PRN  Induction:  IV  Airway Plan: Direct, Post-Induction  Informed Consent: Informed consent signed with the Patient and all parties understand the risks and agree with anesthesia plan.  All questions answered. Patient consented to blood products? Yes  ASA Score: 4  Day of Surgery Review of History & Physical: H&P Update referred to the surgeon/provider.    Ready For Surgery From Anesthesia Perspective.     .

## 2024-06-11 LAB
FINAL PATHOLOGIC DIAGNOSIS: NORMAL
GROSS: NORMAL
Lab: NORMAL

## 2024-06-12 ENCOUNTER — PATIENT MESSAGE (OUTPATIENT)
Dept: GASTROENTEROLOGY | Facility: CLINIC | Age: 84
End: 2024-06-12
Payer: MEDICARE

## 2024-06-12 DIAGNOSIS — R13.10 DYSPHAGIA, UNSPECIFIED TYPE: Primary | ICD-10-CM

## 2024-06-12 NOTE — TELEPHONE ENCOUNTER
Called fluoroscopy to get pt scheduled, they stated that they will reach out to the patient to schedule. The order was sent to them via inbasket.   Called patient to let him know that their department will be calling them from a similar number, pt understood.

## 2024-06-19 ENCOUNTER — PATIENT MESSAGE (OUTPATIENT)
Dept: INTERNAL MEDICINE | Facility: CLINIC | Age: 84
End: 2024-06-19
Payer: MEDICARE

## 2024-06-19 ENCOUNTER — DOCUMENTATION ONLY (OUTPATIENT)
Dept: INTERNAL MEDICINE | Facility: CLINIC | Age: 84
End: 2024-06-19
Payer: MEDICARE

## 2024-06-19 DIAGNOSIS — I10 ESSENTIAL HYPERTENSION: Primary | ICD-10-CM

## 2024-06-19 DIAGNOSIS — R21 RASH: ICD-10-CM

## 2024-06-19 DIAGNOSIS — N40.0 BENIGN PROSTATIC HYPERPLASIA, UNSPECIFIED WHETHER LOWER URINARY TRACT SYMPTOMS PRESENT: ICD-10-CM

## 2024-06-19 DIAGNOSIS — N18.31 STAGE 3A CHRONIC KIDNEY DISEASE: ICD-10-CM

## 2024-06-19 DIAGNOSIS — Z78.9 KNOWN MEDICAL PROBLEMS: ICD-10-CM

## 2024-06-19 PROCEDURE — 99490 CHRNC CARE MGMT STAFF 1ST 20: CPT | Mod: S$GLB,,, | Performed by: INTERNAL MEDICINE

## 2024-06-19 RX ORDER — TRIAMCINOLONE ACETONIDE 1 MG/G
CREAM TOPICAL 2 TIMES DAILY
Qty: 454 G | Refills: 0 | Status: SHIPPED | OUTPATIENT
Start: 2024-06-19

## 2024-06-26 NOTE — PROGRESS NOTES
The patient's electronic chart was reviewed during this month. The patient's medical, functional, and psychosocial needs were assessed. Need for Home health care, PT, OT, , psychiatric care, and hospice was assessed. All preventative care measures were reviewed and updated. All medications were reviewed and reconciled. Potential drug interactions and medication adherence was reviewed. Prescriptions were renewed as appropriate. Education was provided to the patient and/or caregiver as needed, and all questions were answered. Over 20 minutes were spent providing these non-face-to-face services during this calendar month.       Refilled    triamcinolone acetonide 0.1% (KENALOG) 0.1 % cream 454 g 0 6/19/2024 -- No   Sig - Route: Apply topically 2 (two) times daily. APPLY  CREAM TOPICALLY TO AFFECTED AREA TWICE DAILY as needed - Topical (Top)   Sent to pharmacy as: triamcinolone acetonide 0.1% (KENALOG) 0.1 % cream   Class: Normal   Order: [id number]   Date/Time Signed: 6/19/2024 09:17       E-Prescribing Status: Receipt confirmed by pharmacy (6/19/2024  9:17 AM CDT)

## 2024-07-03 ENCOUNTER — HOSPITAL ENCOUNTER (OUTPATIENT)
Dept: RADIOLOGY | Facility: HOSPITAL | Age: 84
Discharge: HOME | End: 2024-07-03
Attending: INTERNAL MEDICINE
Payer: MEDICARE

## 2024-07-03 DIAGNOSIS — R13.10 DYSPHAGIA, UNSPECIFIED TYPE: ICD-10-CM

## 2024-07-03 PROCEDURE — 74220 X-RAY XM ESOPHAGUS 1CNTRST: CPT | Mod: 26,,, | Performed by: RADIOLOGY

## 2024-07-03 PROCEDURE — A9698 NON-RAD CONTRAST MATERIALNOC: HCPCS | Performed by: INTERNAL MEDICINE

## 2024-07-03 PROCEDURE — 25500020 PHARM REV CODE 255: Performed by: INTERNAL MEDICINE

## 2024-07-03 PROCEDURE — 74220 X-RAY XM ESOPHAGUS 1CNTRST: CPT | Mod: TC

## 2024-07-03 RX ADMIN — BARIUM SULFATE 295 ML: 0.6 SUSPENSION ORAL at 10:07

## 2024-08-13 ENCOUNTER — OFFICE VISIT (OUTPATIENT)
Dept: INTERNAL MEDICINE | Facility: CLINIC | Age: 84
End: 2024-08-13
Attending: INTERNAL MEDICINE
Payer: MEDICARE

## 2024-08-13 ENCOUNTER — PATIENT MESSAGE (OUTPATIENT)
Dept: INTERNAL MEDICINE | Facility: CLINIC | Age: 84
End: 2024-08-13

## 2024-08-13 ENCOUNTER — LAB VISIT (OUTPATIENT)
Dept: LAB | Facility: OTHER | Age: 84
End: 2024-08-13
Attending: INTERNAL MEDICINE
Payer: MEDICARE

## 2024-08-13 ENCOUNTER — PATIENT MESSAGE (OUTPATIENT)
Dept: GASTROENTEROLOGY | Facility: CLINIC | Age: 84
End: 2024-08-13
Payer: MEDICARE

## 2024-08-13 VITALS
HEART RATE: 61 BPM | HEIGHT: 71 IN | DIASTOLIC BLOOD PRESSURE: 66 MMHG | SYSTOLIC BLOOD PRESSURE: 104 MMHG | OXYGEN SATURATION: 99 % | WEIGHT: 148 LBS | BODY MASS INDEX: 20.72 KG/M2

## 2024-08-13 DIAGNOSIS — I10 ESSENTIAL HYPERTENSION: Chronic | ICD-10-CM

## 2024-08-13 DIAGNOSIS — G89.29 CHRONIC NONINTRACTABLE HEADACHE, UNSPECIFIED HEADACHE TYPE: Primary | ICD-10-CM

## 2024-08-13 DIAGNOSIS — E03.9 ACQUIRED HYPOTHYROIDISM: Chronic | ICD-10-CM

## 2024-08-13 DIAGNOSIS — E55.9 VITAMIN D DEFICIENCY: ICD-10-CM

## 2024-08-13 DIAGNOSIS — I48.0 PAF (PAROXYSMAL ATRIAL FIBRILLATION): Chronic | ICD-10-CM

## 2024-08-13 DIAGNOSIS — R51.9 CHRONIC NONINTRACTABLE HEADACHE, UNSPECIFIED HEADACHE TYPE: ICD-10-CM

## 2024-08-13 DIAGNOSIS — Z12.5 SCREENING FOR PROSTATE CANCER: ICD-10-CM

## 2024-08-13 DIAGNOSIS — R11.0 NAUSEA: ICD-10-CM

## 2024-08-13 DIAGNOSIS — C61 PROSTATE CA: ICD-10-CM

## 2024-08-13 DIAGNOSIS — E03.9 HYPOTHYROIDISM, UNSPECIFIED TYPE: ICD-10-CM

## 2024-08-13 DIAGNOSIS — Z95.2 S/P TAVR (TRANSCATHETER AORTIC VALVE REPLACEMENT): ICD-10-CM

## 2024-08-13 DIAGNOSIS — R51.9 CHRONIC NONINTRACTABLE HEADACHE, UNSPECIFIED HEADACHE TYPE: Primary | ICD-10-CM

## 2024-08-13 DIAGNOSIS — G44.209 TENSION-TYPE HEADACHE, NOT INTRACTABLE, UNSPECIFIED CHRONICITY PATTERN: ICD-10-CM

## 2024-08-13 DIAGNOSIS — Z78.9 COMPLEX MEDICAL CONDITION: ICD-10-CM

## 2024-08-13 DIAGNOSIS — G89.29 CHRONIC NONINTRACTABLE HEADACHE, UNSPECIFIED HEADACHE TYPE: ICD-10-CM

## 2024-08-13 DIAGNOSIS — R79.89 OTHER SPECIFIED ABNORMAL FINDINGS OF BLOOD CHEMISTRY: ICD-10-CM

## 2024-08-13 DIAGNOSIS — I35.0 NONRHEUMATIC AORTIC VALVE STENOSIS: Chronic | ICD-10-CM

## 2024-08-13 DIAGNOSIS — E78.9 DISORDER OF LIPID METABOLISM: ICD-10-CM

## 2024-08-13 DIAGNOSIS — K21.9 GASTROESOPHAGEAL REFLUX DISEASE WITHOUT ESOPHAGITIS: ICD-10-CM

## 2024-08-13 DIAGNOSIS — N18.31 STAGE 3A CHRONIC KIDNEY DISEASE: ICD-10-CM

## 2024-08-13 DIAGNOSIS — K51.019 ULCERATIVE PANCOLITIS WITH COMPLICATION: Primary | Chronic | ICD-10-CM

## 2024-08-13 DIAGNOSIS — D50.8 OTHER IRON DEFICIENCY ANEMIA: ICD-10-CM

## 2024-08-13 DIAGNOSIS — R63.4 WEIGHT LOSS, NON-INTENTIONAL: ICD-10-CM

## 2024-08-13 DIAGNOSIS — D51.0 PERNICIOUS ANEMIA: ICD-10-CM

## 2024-08-13 PROBLEM — Z95.3 S/P TAVR (TRANSCATHETER AORTIC VALVE REPLACEMENT): Status: ACTIVE | Noted: 2024-08-13

## 2024-08-13 LAB
ALBUMIN SERPL BCP-MCNC: 4.3 G/DL (ref 3.5–5.2)
ALP SERPL-CCNC: 56 U/L (ref 55–135)
ALT SERPL W/O P-5'-P-CCNC: 10 U/L (ref 10–44)
ANION GAP SERPL CALC-SCNC: 13 MMOL/L (ref 8–16)
AST SERPL-CCNC: 18 U/L (ref 10–40)
BASOPHILS # BLD AUTO: 0.03 K/UL (ref 0–0.2)
BASOPHILS NFR BLD: 0.4 % (ref 0–1.9)
BILIRUB SERPL-MCNC: 0.4 MG/DL (ref 0.1–1)
BUN SERPL-MCNC: 23 MG/DL (ref 8–23)
CALCIUM SERPL-MCNC: 9.6 MG/DL (ref 8.7–10.5)
CHLORIDE SERPL-SCNC: 101 MMOL/L (ref 95–110)
CHOLEST SERPL-MCNC: 200 MG/DL (ref 120–199)
CHOLEST/HDLC SERPL: 3.2 {RATIO} (ref 2–5)
CO2 SERPL-SCNC: 25 MMOL/L (ref 23–29)
COMPLEXED PSA SERPL-MCNC: 1.1 NG/ML (ref 0–4)
CREAT SERPL-MCNC: 1.5 MG/DL (ref 0.5–1.4)
DIFFERENTIAL METHOD BLD: ABNORMAL
EOSINOPHIL # BLD AUTO: 0.1 K/UL (ref 0–0.5)
EOSINOPHIL NFR BLD: 0.8 % (ref 0–8)
ERYTHROCYTE [DISTWIDTH] IN BLOOD BY AUTOMATED COUNT: 13.5 % (ref 11.5–14.5)
ERYTHROCYTE [SEDIMENTATION RATE] IN BLOOD BY PHOTOMETRIC METHOD: 25 MM/HR (ref 0–23)
EST. GFR  (NO RACE VARIABLE): 45 ML/MIN/1.73 M^2
ESTIMATED AVG GLUCOSE: 108 MG/DL (ref 68–131)
GLUCOSE SERPL-MCNC: 109 MG/DL (ref 70–110)
HBA1C MFR BLD: 5.4 % (ref 4–5.6)
HCT VFR BLD AUTO: 40.3 % (ref 40–54)
HDLC SERPL-MCNC: 62 MG/DL (ref 40–75)
HDLC SERPL: 31 % (ref 20–50)
HGB BLD-MCNC: 13 G/DL (ref 14–18)
IMM GRANULOCYTES # BLD AUTO: 0.02 K/UL (ref 0–0.04)
IMM GRANULOCYTES NFR BLD AUTO: 0.3 % (ref 0–0.5)
LDLC SERPL CALC-MCNC: 109.6 MG/DL (ref 63–159)
LYMPHOCYTES # BLD AUTO: 1.4 K/UL (ref 1–4.8)
LYMPHOCYTES NFR BLD: 19.6 % (ref 18–48)
MCH RBC QN AUTO: 30 PG (ref 27–31)
MCHC RBC AUTO-ENTMCNC: 32.3 G/DL (ref 32–36)
MCV RBC AUTO: 93 FL (ref 82–98)
MONOCYTES # BLD AUTO: 0.7 K/UL (ref 0.3–1)
MONOCYTES NFR BLD: 9.9 % (ref 4–15)
NEUTROPHILS # BLD AUTO: 5 K/UL (ref 1.8–7.7)
NEUTROPHILS NFR BLD: 69 % (ref 38–73)
NONHDLC SERPL-MCNC: 138 MG/DL
NRBC BLD-RTO: 0 /100 WBC
PLATELET # BLD AUTO: 168 K/UL (ref 150–450)
PMV BLD AUTO: 10 FL (ref 9.2–12.9)
POTASSIUM SERPL-SCNC: 4.6 MMOL/L (ref 3.5–5.1)
PROT SERPL-MCNC: 7.7 G/DL (ref 6–8.4)
RBC # BLD AUTO: 4.34 M/UL (ref 4.6–6.2)
SODIUM SERPL-SCNC: 139 MMOL/L (ref 136–145)
TRIGL SERPL-MCNC: 142 MG/DL (ref 30–150)
TSH SERPL DL<=0.005 MIU/L-ACNC: 3.95 UIU/ML (ref 0.4–4)
VIT B12 SERPL-MCNC: 732 PG/ML (ref 210–950)
WBC # BLD AUTO: 7.2 K/UL (ref 3.9–12.7)

## 2024-08-13 PROCEDURE — G2211 COMPLEX E/M VISIT ADD ON: HCPCS | Mod: S$GLB,,, | Performed by: INTERNAL MEDICINE

## 2024-08-13 PROCEDURE — 84443 ASSAY THYROID STIM HORMONE: CPT | Performed by: INTERNAL MEDICINE

## 2024-08-13 PROCEDURE — 82607 VITAMIN B-12: CPT | Performed by: INTERNAL MEDICINE

## 2024-08-13 PROCEDURE — 80061 LIPID PANEL: CPT | Performed by: INTERNAL MEDICINE

## 2024-08-13 PROCEDURE — 99215 OFFICE O/P EST HI 40 MIN: CPT | Mod: S$GLB,,, | Performed by: INTERNAL MEDICINE

## 2024-08-13 PROCEDURE — 80053 COMPREHEN METABOLIC PANEL: CPT | Performed by: INTERNAL MEDICINE

## 2024-08-13 PROCEDURE — 83036 HEMOGLOBIN GLYCOSYLATED A1C: CPT | Performed by: INTERNAL MEDICINE

## 2024-08-13 PROCEDURE — 85652 RBC SED RATE AUTOMATED: CPT | Performed by: INTERNAL MEDICINE

## 2024-08-13 PROCEDURE — 36415 COLL VENOUS BLD VENIPUNCTURE: CPT | Performed by: INTERNAL MEDICINE

## 2024-08-13 PROCEDURE — 1159F MED LIST DOCD IN RCRD: CPT | Mod: CPTII,S$GLB,, | Performed by: INTERNAL MEDICINE

## 2024-08-13 PROCEDURE — 85025 COMPLETE CBC W/AUTO DIFF WBC: CPT | Performed by: INTERNAL MEDICINE

## 2024-08-13 PROCEDURE — 84153 ASSAY OF PSA TOTAL: CPT | Performed by: INTERNAL MEDICINE

## 2024-08-13 RX ORDER — LANOLIN ALCOHOL/MO/W.PET/CERES
400 CREAM (GRAM) TOPICAL NIGHTLY
COMMUNITY

## 2024-08-13 RX ORDER — ONDANSETRON 4 MG/1
4 TABLET, ORALLY DISINTEGRATING ORAL EVERY 12 HOURS PRN
COMMUNITY

## 2024-08-13 NOTE — PROGRESS NOTES
Subjective     Patient ID: Ramesh Ricci is a 84 y.o. male.    Chief Complaint: Headache (Has been going on for years and are getting worse ) and Follow-up (Acid reflux, has had test run at [facility], concerned with weight loss )    He has significant GERD and nausea despite taking pantoprazole twice each day.  He has lost 11 pounds over the past 6 months.  He has had daily headaches for over 5 years, but they have gotten worse recently.  He does not get relief with Tylenol.  He wakes up in the morning with a headache and it lasts all day.  It goes away in the evening after he takes a warm shower.  They have elected to stop the Stelara to see if it is causing his headaches.    Headache   This is a chronic problem. The current episode started more than 1 year ago. The problem has been gradually worsening. Associated symptoms include nausea.   Follow-up  Associated symptoms include headaches and nausea.     Review of Systems   Constitutional: Negative.    Respiratory: Negative.     Cardiovascular: Negative.    Gastrointestinal:  Positive for nausea and reflux.   Neurological:  Positive for headaches.          Objective     Physical Exam  Vitals and nursing note reviewed.   Constitutional:       Appearance: He is well-developed.   HENT:      Head: Normocephalic and atraumatic.   Eyes:      Pupils: Pupils are equal, round, and reactive to light.   Cardiovascular:      Rate and Rhythm: Normal rate and regular rhythm.      Heart sounds: Murmur heard.      Crescendo decrescendo systolic murmur is present with a grade of 2/6.      Comments: @RUSB  Pulmonary:      Effort: Pulmonary effort is normal.   Neurological:      Mental Status: He is alert.          Assessment and Plan     1. Ulcerative pancolitis with complication  Overview:  1961  Dr. Marina  C-scope 7/15  started Lialda 2.4 g - 6/18  10/19 - Start Humira, D/c Delzicol  4/22 - Started Entyvio      2. Acquired hypothyroidism    3. Essential  hypertension  Overview:  X 5 years      4. Stage 3a chronic kidney disease    5. PAF (paroxysmal atrial fibrillation)    6. Tension-type headache, not intractable, unspecified chronicity pattern  Overview:  MRI - 9/18, 6/23 (and MRA)  Dr. ELIGIO Narvaez      7. Complex medical condition    8. Weight loss, non-intentional    9. Gastroesophageal reflux disease without esophagitis  Overview:  EGD 7/05, 7/15 - normal      10. Nausea    11. Prostate CA  Overview:  XRT 12/12  Dr. Bailey      12. Nonrheumatic aortic valve stenosis  Overview:  4/15 - Mild  2/20 - Moderate  10/21 - Moderate by Right heart cath  1/23 - Mod/Severe - referred to Dr. Nayak ([facility])  4/24 - TAVR      13. S/P TAVR (transcatheter aortic valve replacement)  Overview:  4/23          PLAN:  Per orders and D/C instructions.  Continue diet and/or meds for hypothyroidism, HTN, and CKD, which are stable.  Follow-up with cardiology for status post TAVR and paroxysmal atrial fibrillation.  Follow-up with GI for ulcerative colitis, GERD, nausea, and unintentional weight loss.  He wants to try taking prednisone 40 mg daily for 5 days to see if it breaks the headache cycle.  If there is no improvement we will consider ordering another MRI of the brain.  Zofran as needed for nausea.   Check routine labs include PSA for history of prostate cancer.    This visit is complex due to the longitudinal nature of the patient doctor relationship related to the patient's series or complex medical condition.  Extra time was required and spent on this patient's visit due to this high level of complexity.     Over 54 minutes of total time for evaluation and management services were spent on the patient today.  The medical problems and treatment options were discussed, and all questions were answered.        Follow up in about 6 months (around 2/13/2025).

## 2024-08-13 NOTE — PATIENT INSTRUCTIONS
You can try taking prednisone 40 mg daily for 5 days to see if it breaks the headache cycle.    If there is no improvement notify my office and I will schedule an MRI of the brain.

## 2024-08-15 ENCOUNTER — TELEPHONE (OUTPATIENT)
Dept: GASTROENTEROLOGY | Facility: CLINIC | Age: 84
End: 2024-08-15
Payer: MEDICARE

## 2024-08-19 DIAGNOSIS — R41.3 OTHER AMNESIA: Primary | ICD-10-CM

## 2024-08-30 ENCOUNTER — HOSPITAL ENCOUNTER (OUTPATIENT)
Dept: RADIOLOGY | Facility: HOSPITAL | Age: 84
Discharge: HOME | End: 2024-08-30
Attending: INTERNAL MEDICINE
Payer: MEDICARE

## 2024-08-30 ENCOUNTER — PATIENT MESSAGE (OUTPATIENT)
Dept: INTERNAL MEDICINE | Facility: CLINIC | Age: 84
End: 2024-08-30
Payer: MEDICARE

## 2024-08-30 PROCEDURE — 70551 MRI BRAIN STEM W/O DYE: CPT | Mod: 26,,, | Performed by: RADIOLOGY

## 2024-08-30 PROCEDURE — 70551 MRI BRAIN STEM W/O DYE: CPT | Mod: TC

## 2024-08-30 RX ORDER — TRAMADOL HYDROCHLORIDE 50 MG/1
TABLET ORAL
Qty: 30 TABLET | Refills: 0 | Status: SHIPPED | OUTPATIENT
Start: 2024-08-30

## 2024-09-02 ENCOUNTER — PATIENT MESSAGE (OUTPATIENT)
Dept: GASTROENTEROLOGY | Facility: CLINIC | Age: 84
End: 2024-09-02
Payer: MEDICARE

## 2024-09-09 ENCOUNTER — DOCUMENTATION ONLY (OUTPATIENT)
Dept: INTERNAL MEDICINE | Facility: CLINIC | Age: 84
End: 2024-09-09
Payer: MEDICARE

## 2024-09-09 ENCOUNTER — PATIENT MESSAGE (OUTPATIENT)
Dept: INTERNAL MEDICINE | Facility: CLINIC | Age: 84
End: 2024-09-09
Payer: MEDICARE

## 2024-09-09 DIAGNOSIS — M17.0 PRIMARY OSTEOARTHRITIS OF BOTH KNEES: ICD-10-CM

## 2024-09-09 DIAGNOSIS — I10 ESSENTIAL HYPERTENSION: Primary | ICD-10-CM

## 2024-09-09 DIAGNOSIS — I48.0 PAF (PAROXYSMAL ATRIAL FIBRILLATION): ICD-10-CM

## 2024-09-09 DIAGNOSIS — Z78.9 KNOWN MEDICAL PROBLEMS: ICD-10-CM

## 2024-09-09 DIAGNOSIS — N18.31 STAGE 3A CHRONIC KIDNEY DISEASE: ICD-10-CM

## 2024-09-09 PROCEDURE — 99490 CHRNC CARE MGMT STAFF 1ST 20: CPT | Mod: S$GLB,,, | Performed by: INTERNAL MEDICINE

## 2024-09-09 RX ORDER — TIZANIDINE 2 MG/1
TABLET ORAL
Qty: 90 TABLET | Refills: 0 | Status: SHIPPED | OUTPATIENT
Start: 2024-09-09

## 2024-09-19 ENCOUNTER — OFFICE VISIT (OUTPATIENT)
Dept: GASTROENTEROLOGY | Facility: CLINIC | Age: 84
End: 2024-09-19
Payer: MEDICARE

## 2024-09-19 VITALS
HEIGHT: 71 IN | OXYGEN SATURATION: 99 % | SYSTOLIC BLOOD PRESSURE: 112 MMHG | WEIGHT: 149.25 LBS | DIASTOLIC BLOOD PRESSURE: 63 MMHG | HEART RATE: 48 BPM | TEMPERATURE: 97 F | BODY MASS INDEX: 20.9 KG/M2

## 2024-09-19 DIAGNOSIS — Z79.899 IMMUNOSUPPRESSION DUE TO DRUG THERAPY: ICD-10-CM

## 2024-09-19 DIAGNOSIS — R12 HEARTBURN: ICD-10-CM

## 2024-09-19 DIAGNOSIS — D84.821 IMMUNOSUPPRESSION DUE TO DRUG THERAPY: ICD-10-CM

## 2024-09-19 DIAGNOSIS — R07.9 CHEST PAIN, UNSPECIFIED TYPE: ICD-10-CM

## 2024-09-19 DIAGNOSIS — K51.019 ULCERATIVE PANCOLITIS WITH COMPLICATION: Primary | ICD-10-CM

## 2024-09-19 RX ORDER — MESALAMINE 500 MG/1
2000 CAPSULE, EXTENDED RELEASE ORAL 2 TIMES DAILY
Qty: 240 CAPSULE | Refills: 11 | Status: SHIPPED | OUTPATIENT
Start: 2024-09-19

## 2024-09-19 RX ORDER — AMITRIPTYLINE HYDROCHLORIDE 25 MG/1
25 TABLET, FILM COATED ORAL NIGHTLY
Qty: 30 TABLET | Refills: 11 | Status: SHIPPED | OUTPATIENT
Start: 2024-09-19 | End: 2025-09-19

## 2024-09-19 RX ORDER — FAMOTIDINE 20 MG/1
20 TABLET, FILM COATED ORAL 2 TIMES DAILY
COMMUNITY

## 2024-09-19 NOTE — PROGRESS NOTES
[facility]          Inflammatory Bowel Disease Follow Up Note         TODAY'S VISIT DATE:  9/19/2024    Reason for Consult:    Chief Complaint   Patient presents with    ulcerative pancolitis       PCP: EFREM Muniz      Referring MD:   No ref. provider found    History of Present Illness:  Ramesh Ricci who is a 84 y.o. male is being seen today at the [facility] on 09/19/2024 for inflammatory bowel disease- ulcerative colitis.  Following our last visit he underwent an upper endoscopy in June of 2024 for ongoing heartburn/chest pain symptoms that he thought might be related to reflux.  His symptoms have not responded to pantoprazole 40 mg twice daily before meals.  His upper endoscopy was essentially unrevealing.  He also was reporting some dysphagia symptoms at that time as well.  A barium esophagram showed some nonspecific motility abnormalities but no strictures or other significant issues.  In spite of adding famotidine 20 mg twice daily to the pantoprazole and using Gaviscon he has had almost no improvement.  He does feel like the Gaviscon helps a little bit.  He has an uncomfortable sensation in his upper chest and his lower chest.  He has had these symptoms since the time of his TAVR in April of 2023.  He also reports some nausea issues.  From a colitis standpoint he is doing quite well.  He has 1-2 formed bowel movements daily with no blood in his stools.  His last dose of Stelara was in June of 2024.  He decided to stop taking the medication after that because he was concerned about frequent headaches he has been having for several years.  Unfortunately his headaches have not improve with holding the Stelara but thankfully his symptoms have remained under good control.      IBD History:  He was diagnosed with ulcerative colitis in the 1960s.  Per notes he has a history of pan colitis.  Multiple colonoscopy reports are available  and these demonstrate patchy colitis throughout the colon.  On one exam biopsy showed some acute ileitis but no chronic changes in the ileum.  Biopsies have been consistent with chronic ulcerative colitis.  It is difficult to determine what he has been treated with in the past but it appears he was at least on Colazal at 1 point (dose was 2.25 g once daily).  He was also on azathioprine at 1 point and treated with Humira.  In early 2021 he developed a rash that was concerning for possible psoriasis.  Humira was stopped at that point.  Pentasa was started after the Humira was stopped.  After stopping the Humira his skin lesions did not resolved and it is currently not thought that his skin issues were related to psoriasis.  In March 2022 he was admitted to the hospital with acute onset diarrhea and abdominal pain.  He underwent an upper endoscopy and colonoscopy.  The upper endoscopy was only significant for a 2 cm hiatal hernia.  The colonoscopy revealed no evidence of active ulcerative colitis.  The preparation of the colon was poor.  Biopsies demonstrated evidence of active colitis throughout the entire colon.  He had a markedly elevated stool calprotectin level and negative stool studies for infection.  After starting Stelara in July of 2022 he did quite well and had a significant decline in his stool calprotectin level.  We were able to stop the Pentasa without any issues.  In April of 2023 he underwent aortic valve replacement.  In March of 2023 we had discussed possible colonoscopy to reassess his disease activity and for cancer surveillance but he declines further colonoscopy at this time. His last dose of Stelara was in June of 2024.  He decided to stop taking the medication after that because he was concerned about frequent headaches he has been having for several years.  Unfortunately his headaches have not improve with holding the Stelara but thankfully his symptoms have remained under good  "control.      IBD Details:  Dx Date:  1960s  Disease type/distribution:  Ulcerative colitis/pancolitis  Current Treatment:  None Start Date:   Response:    Optimized:    Adverse reactions:    Prior surgeries:  None  CRP Elevation: Y  calprotectin:  Markedly elevated with active disease  Disease Complications:  None  Extraintestinal manifestations:  None  Prior treatments:   Steroids:  Good response in the past  5ASA:  Incomplete response to Colazal, inadequate response to low-dose Pentasa  IMM:  Previously on azathioprine, unclear response  TNF Inh:  Humira-clinical remission, stopped due to skin lesions that do not appear to be related to Humira   Anti-Integrin:  None   IL 12/23:  Stelara-biochemical remission, declined colonoscopy-discontinued because of headaches (likely not related to Stelara)  KARLO Inh:  None  S1P Modulator: None    Previous Clinical Trials:  None    Last Colonoscopy:  March 2022-poor prep, no significant endoscopic disease activity but significant active colitis on biopsies    Other Endoscopies:   June 2024-EGD-normal  March 2022-unrevealing    Imaging:   MRE:  None   CT:  March 2022-inflammatory changes around the colon, otherwise unrevealing   Other:  July 2024-barium esophagram with nonspecific motility abnormality    Pertinent Labs:  Lab Results   Component Value Date    SEDRATE 25 (H) 08/13/2024    CRP 0.9 03/19/2024     No results found for: "TTGIGA", "IGA"  Lab Results   Component Value Date    TSH 3.950 08/13/2024    FREET4 1.16 08/15/2023     Lab Results   Component Value Date    OIXCFSTA34XS 33 03/19/2024    FCRTLBBF90 732 08/13/2024     Lab Results   Component Value Date    HEPBSAG Non-reactive 03/19/2024    HEPBCAB Non-reactive 03/19/2024     No results found for: "SRT31CAOE"  Lab Results   Component Value Date    NIL 0.29783 03/19/2024    MITOGENNIL 9.964 03/19/2024     Lab Results   Component Value Date    TPTMINTERP SEE BELOW 03/24/2022     Lab Results   Component Value Date    " "STOOLCULTURE  03/23/2022     No Salmonella,Shigella,Vibrio,Campylobacter,Yersinia isolated.    WDCCYRMKSS3Z Negative 03/23/2022    XNBAVTMLXC6I Negative 03/23/2022    CDIFFICILEAN Negative 03/23/2022    CDIFFTOX Negative 03/23/2022     Lab Results   Component Value Date    CALPROTECTIN 137.0 (H) 10/29/2022       Therapeutic Drug Monitoring Labs:  No results found for: "PROMETH"  No results found for: "ANSADAINIT", "INFLIXIMAB", "INFLIXINTERP"    Vaccinations:  Lab Results   Component Value Date    HEPBSAB Negative 03/24/2022     Lab Results   Component Value Date    HEPAIGG Non-reactive 10/28/2022     Lab Results   Component Value Date    VARICELLAZOS 3.65 (H) 10/28/2022    VARICELLAINT Positive (A) 10/28/2022     Immunization History   Administered Date(s) Administered    COVID-19, MRNA, LN-S, PF (Pfizer) (Purple Cap) 01/12/2021, 02/02/2021, 12/02/2021    Hepatitis A, Adult 03/06/2023, 09/06/2023    Hepatitis B (recombinant) Adjuvanted, 2 dose 03/06/2023, 04/06/2023    Influenza (FLUAD) - Quadrivalent - Adjuvanted - PF *Preferred* (65+) 10/09/2020, 10/20/2021, 09/13/2023    Influenza (FLUAD) - Trivalent - Adjuvanted - PF (65+) 09/19/2024    Influenza - High Dose - PF (65 years and older) 10/06/2014, 09/29/2016, 11/09/2017, 10/08/2018, 09/17/2019    Influenza - Quadrivalent - High Dose - PF (65 years and older) 09/10/2022    Pneumococcal Conjugate - 13 Valent 09/29/2016    Pneumococcal Conjugate - 20 Valent 03/20/2024    Pneumococcal Polysaccharide - 23 Valent 01/05/2011    RSVpreF (Arexvy) 05/24/2024    Zoster 02/02/2012    Zoster Recombinant 03/20/2024, 05/24/2024         Review of Systems  Review of Systems   Constitutional:  Negative for chills, fever and weight loss.   HENT:  Negative for sore throat.    Eyes:  Negative for pain, discharge and redness.   Respiratory:  Negative for cough, shortness of breath and wheezing.    Cardiovascular:  Negative for chest pain and leg swelling.   Gastrointestinal:  Positive " for heartburn. Negative for abdominal pain, blood in stool, constipation, diarrhea, melena, nausea and vomiting.   Genitourinary:  Negative for dysuria and frequency.   Musculoskeletal:  Negative for back pain, joint pain and myalgias.   Skin:  Negative for rash.   Neurological:  Negative for focal weakness and seizures.   Endo/Heme/Allergies:  Does not bruise/bleed easily.   Psychiatric/Behavioral:  Negative for depression. The patient is not nervous/anxious.        Medical History:   Past Medical History:   Diagnosis Date    Abnormal echocardiogram 11/16/2012    Anemia     Anticoagulant long-term use     Anxiety     Atrophic kidney 11/16/2012    BPH (benign prostatic hyperplasia) 11/16/2012    Cancer 2000 something    Had radiation    DDD (degenerative disc disease), cervical 07/05/2017    x-ray 7/17 - Severe    GERD (gastroesophageal reflux disease) 11/16/2012    Glaucoma 11/16/2012    HTN (hypertension) 11/16/2012    Hypothyroid 11/16/2012    Internal carotid artery stenosis 11/16/2012    Joint pain     Left ventricular diastolic dysfunction, NYHA class 1 04/16/2015    4/15    Low serum testosterone level 11/16/2012    Normal cardiac stress test 11/16/2012    Osteopenia 11/16/2012    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    Shingles 11/16/2012    Skin disease 2020    Seems to have started after Covid vaccine    Stroke 2017    tia   no residual    TIA (transient ischemic attack) 11/16/2012    UC (ulcerative colitis) 11/16/2012       Surgical History:  Past Surgical History:   Procedure Laterality Date    ADENOIDECTOMY      COLONOSCOPY N/A 03/25/2022    Procedure: COLONOSCOPY;  Surgeon: Ashley Nguyen MD;  Location: [facility];  Service: Endoscopy;  Laterality: N/A;    ESOPHAGOGASTRODUODENOSCOPY N/A 03/25/2022    Procedure: EGD (ESOPHAGOGASTRODUODENOSCOPY);  Surgeon: Ashley Nguyen MD;  Location: [facility];  Service: Endoscopy;  Laterality: N/A;  added on per Dr. Nguyen     ESOPHAGOGASTRODUODENOSCOPY N/A 2024    Procedure: EGD (ESOPHAGOGASTRODUODENOSCOPY);  Surgeon: Audie Snell MD;  Location: [address]);  Service: Endoscopy;  Laterality: N/A;   R/s,sent updated instr via portal.pt informed to hold eliquis for 2 days.AC  Ref by: ,  approved to hold Eliquis (apixaban) for 2 days per Dr. Conklin-see media file  24-GT  -pre call complete-tb    EYE SURGERY Right 2020    cataract    HERNIA REPAIR      LEFT HEART CATHETERIZATION Right 10/29/2021    Procedure: CATHETERIZATION, HEART, LEFT AND RIGHT  - LV DARNELL POSSIBLE;  Surgeon: Beau Conklin MD;  Location: [facility] CATH LAB;  Service: Cardiology;  Laterality: Right;    RIGHT HEART CATHETERIZATION Right 10/29/2021    Procedure: INSERTION, CATHETER, RIGHT HEART;  Surgeon: Beau Conklin MD;  Location: [facility] CATH LAB;  Service: Cardiology;  Laterality: Right;    SKIN BIOPSY      TONSILLECTOMY         Family History:   Family History   Problem Relation Name Age of Onset    Hypertension Mother      Alzheimer's disease Mother      Heart attack Father      Diabetes Brother      Hypertension Brother      Cancer Brother      Heart disease Brother  56        MI    Colon cancer Neg Hx      Esophageal cancer Neg Hx         Social History:   Social History     Tobacco Use    Smoking status: Former     Current packs/day: 0.00     Average packs/day: 1 pack/day for 25.0 years (25.0 ttl pk-yrs)     Types: Cigarettes     Start date: 1947     Quit date: 1972     Years since quittin.7    Smokeless tobacco: Never   Substance Use Topics    Alcohol use: Not Currently     Alcohol/week: 3.0 standard drinks of alcohol     Types: 2 Glasses of wine, 1 Cans of beer per week     Comment: Occasional    Drug use: Never       Allergies: Reviewed    Home Medications:   Medication List with Changes/Refills   New Medications    AMITRIPTYLINE (ELAVIL) 25 MG TABLET    Take 1 tablet (25 mg total) by mouth every  evening.    MESALAMINE (PENTASA) 500 MG CR CAPSULE    Take 4 capsules (2,000 mg total) by mouth 2 (two) times a day.   Current Medications    ACETAMINOPHEN (TYLENOL) 500 MG TABLET    Take 2 tablets (1,000 mg total) by mouth every 6 (six) hours as needed.    ASCORBIC ACID, VITAMIN C, (VITAMIN C) 500 MG TABLET    Take 500 mg by mouth once daily.    ASPIRIN (ECOTRIN) 81 MG EC TABLET    Take 81 mg by mouth once daily.    CALCIUM CARBONATE (TUMS) 200 MG CALCIUM (500 MG) CHEWABLE TABLET    Take 1 tablet by mouth once daily.    DOCUSATE SODIUM (STOOL SOFTENER ORAL)    Take by mouth.    ELIQUIS 2.5 MG TAB    Take 2.5 mg by mouth 2 (two) times daily.    FAMOTIDINE (PEPCID) 20 MG TABLET    Take 20 mg by mouth 2 (two) times daily.    FLUOCINOLONE ACETONIDE OIL 0.01 % DROP    Place into both ears.    LEVOCETIRIZINE (XYZAL) 5 MG TABLET    Take 5 mg by mouth every morning.    LEVOTHYROXINE (SYNTHROID) 125 MCG TABLET    Take 1 tablet (125 mcg total) by mouth every morning.    MAGNESIUM OXIDE (MAG-OX) 400 MG (241.3 MG MAGNESIUM) TABLET    Take 400 mg by mouth nightly.    METOPROLOL SUCCINATE (TOPROL-XL) 25 MG 24 HR TABLET    Take 1 tablet (25 mg total) by mouth once daily.    ONDANSETRON (ZOFRAN-ODT) 4 MG TBDL    Take 4 mg by mouth every 12 (twelve) hours as needed (nausea).    PANTOPRAZOLE (PROTONIX) 40 MG TABLET    TAKE 1 TABLET BY MOUTH DAILY AS  NEEDED    PRAVASTATIN (PRAVACHOL) 20 MG TABLET    Take 1 tablet (20 mg total) by mouth every evening.    SERTRALINE (ZOLOFT) 100 MG TABLET    Take 100 mg by mouth once daily.    TAMSULOSIN (FLOMAX) 0.4 MG CP24    Take 0.4 mg by mouth once daily.     TIZANIDINE (ZANAFLEX) 2 MG TABLET    Take 1/2-1 tablet nightly as needed for muscle spasms    TRAMADOL (ULTRAM) 50 MG TABLET    Take 1/2-1 tab each morning with Tylenol and breakfast as needed for headache.    TRIAMCINOLONE ACETONIDE 0.1% (KENALOG) 0.1 % CREAM    Apply topically 2 (two) times daily. APPLY  CREAM TOPICALLY TO AFFECTED AREA  "TWICE DAILY as needed    USTEKINUMAB (STELARA) 90 MG/ML SYRG SYRINGE    Inject 90 mg into the skin. q8w    VITC/E/ZN/COPPER/LUTEIN/ZEAXAN (ICAPS AREDS2 ORAL)    Take 1 capsule by mouth once daily.       Physical Exam:  Vital Signs:  /63 (BP Location: Left arm, Patient Position: Sitting, BP Method: Small (Automatic))   Pulse (!) 48   Temp 97 °F (36.1 °C) (Temporal)   Ht 5' 11" (1.803 m)   Wt 67.7 kg (149 lb 4 oz)   SpO2 99%   BMI 20.82 kg/m²   Body mass index is 20.82 kg/m².    Physical Exam  Vitals and nursing note reviewed.   Constitutional:       General: He is not in acute distress.     Appearance: Normal appearance. He is well-developed. He is not ill-appearing or toxic-appearing.   HENT:      Head: Normocephalic and atraumatic.   Eyes:      General: No scleral icterus.     Pupils: Pupils are equal, round, and reactive to light.   Neck:      Thyroid: No thyromegaly.   Cardiovascular:      Rate and Rhythm: Normal rate and regular rhythm.      Heart sounds: Murmur heard.      No friction rub.   Pulmonary:      Effort: Pulmonary effort is normal.      Breath sounds: Normal breath sounds. No wheezing or rales.   Abdominal:      General: Bowel sounds are normal. There is no distension.      Palpations: Abdomen is soft. There is no mass.      Tenderness: There is no abdominal tenderness. There is no guarding or rebound.   Musculoskeletal:      Right lower leg: No edema.      Left lower leg: No edema.   Lymphadenopathy:      Cervical: No cervical adenopathy.   Neurological:      General: No focal deficit present.      Mental Status: He is alert and oriented to person, place, and time.   Psychiatric:         Mood and Affect: Mood normal.         Behavior: Behavior normal.         Thought Content: Thought content normal.         Judgment: Judgment normal.         Labs: reviewed and pertinent noted above    Assessment/Plan:  Ramesh Ricci is a 84 y.o. male with pan ulcerative colitis. The following " issues were addresssed:    1. Ulcerative pancolitis with complication    2. Immunosuppression due to drug therapy    3. Chest pain, unspecified type    4. Heartburn          1. Ulcerative colitis:  He continues to do very well.  Stelara is currently on hold.  If needed we can always restart this in the future.  Today I recommend that he restart Pentasa 2000 mg twice daily.  If this is not covered by his insurance we can restart balsalazide instead.  We will plan to check a stool calprotectin level once he has been on consistent therapy for about 6 months.    2. Chest pain:  He has had a cardiac evaluation that was unrevealing.  It was interesting that these symptoms started after his TAVR.  I wonder if some of these symptoms may be neuropathic in nature.  He is EGD was unremarkable and his lack of response to aggressive therapy of his reflux has not provided any significant improvement.  Today we discussed possibly going through further testing including Bravo pH study with 48 hours off meds and 48 hours on meds versus considering a trial of medications tarry that on functional/neuropathic issues.  They would like to try something for more functional/neuropathic issues.  We discussed possibly using gabapentin but he has been on this previously for his headache issues and did not have any good response.  We will plan to try amitriptyline 25 mg at night.  We did discuss risk of side effects including somnolence and dry mouth.  These may be exacerbated because of his age and he also uses tizanidine and Zoloft.  We will need to be careful about side effects.  Patient and his wife were warned about this.  If there is no improvement with a trial of this we can consider a gastric emptying study given the nausea to make sure there isn't an issue possibly related to vagal nerve issues.    3. Reflux:  Unlikely that reflux is actually the cause of his current symptoms.  We will plan to stop the famotidine and continue  pantoprazole twice daily for a few weeks.  After a few weeks if there is no worsening of his symptoms can go to once daily on pantoprazole and stay there.    4. Aortic stenosis:  Continue to follow up with Cardiology.    5. Immunosuppression:  High-dose flu shot today.    Ex smoker:  - recommended to continue smoking cessation  - discussed in detail that Crohn's disease can worsen with smoking and may make patient more resistant to treatment    # IBD specific health maintenance:  Colon cancer surveillance:  Up-to-date    Annual:  - Eye exam:  Not applicable  - Skin exam (if on IMM/TNF):  Up-to-date  - reminded pt to use sunblock/hats/sunprotective clothing  - PAP (if immunosuppressed):  Not applicable    DEXA:  Osteopenia noted in 2018    Vitamin D:  Normal    Vaccines:    Influenza:  Receiving today   Pneumovax:  Up-to-date   HAV:  Vaccinated   HBV:  Vaccinated   Tdap:  Review in the future   MMR:  Immune   VZV:  Immune   HZV:  Completed series   HPV:  Not applicable   Meningococcus:  Not applicable   COVID:  Completed 3 doses    Follow up: Follow up in about 4 months (around 1/19/2025).    Visit today is associated with current or anticipated ongoing medical care related to this patient's single serious condition/complex condition (ulcerative colitis).      Thank you again for sending Ramesh Ricci to see Dr. Marcial Snell today at the [facility]. Please don't hesitate to contact Dr. Snell if there are any questions regarding this evaluation, or if you have any other patients with inflammatory bowel disease for whom you would like a consultation. You can reach Dr. Snell at [phone] or by email at [email or web address]    Audie Snell MD  Gastroenterology

## 2024-09-29 ENCOUNTER — PATIENT MESSAGE (OUTPATIENT)
Dept: GASTROENTEROLOGY | Facility: CLINIC | Age: 84
End: 2024-09-29
Payer: MEDICARE

## 2024-09-30 RX ORDER — BALSALAZIDE DISODIUM 750 MG/1
CAPSULE ORAL
Qty: 270 CAPSULE | Refills: 11 | Status: SHIPPED | OUTPATIENT
Start: 2024-09-30

## 2024-10-02 NOTE — PROGRESS NOTES
The patient's electronic chart was reviewed during this month. The patient's medical, functional, and psychosocial needs were assessed. Need for Home health care, PT, OT, , psychiatric care, and hospice was assessed. All preventative care measures were reviewed and updated. All medications were reviewed and reconciled. Potential drug interactions and medication adherence was reviewed. Prescriptions were renewed as appropriate. Education was provided to the patient and/or caregiver as needed, and all questions were answered. Over 20 minutes were spent providing these non-face-to-face services during this calendar month.     Refilled     Disp Refills Start End MICHELLE   tiZANidine (ZANAFLEX) 2 MG tablet 90 tablet 0 9/9/2024 -- No   Sig: Take 1/2-1 tablet nightly as needed for muscle spasms   Sent to pharmacy as: tiZANidine (ZANAFLEX) 2 MG tablet   Class: Normal   Order: [id number]   Date/Time Signed: 9/9/2024 11:04       E-Prescribing Status: Receipt confirmed by pharmacy (9/9/2024 11:05 AM CDT)

## 2024-10-08 DIAGNOSIS — G44.89 OTHER HEADACHE SYNDROME: Primary | ICD-10-CM

## 2024-10-18 ENCOUNTER — HOSPITAL ENCOUNTER (EMERGENCY)
Facility: HOSPITAL | Age: 84
Discharge: HOME | End: 2024-10-18
Attending: STUDENT IN AN ORGANIZED HEALTH CARE EDUCATION/TRAINING PROGRAM
Payer: MEDICARE

## 2024-10-18 VITALS
RESPIRATION RATE: 18 BRPM | HEART RATE: 51 BPM | TEMPERATURE: 98 F | WEIGHT: 159 LBS | OXYGEN SATURATION: 100 % | DIASTOLIC BLOOD PRESSURE: 74 MMHG | SYSTOLIC BLOOD PRESSURE: 159 MMHG | BODY MASS INDEX: 22.18 KG/M2

## 2024-10-18 DIAGNOSIS — R07.9 CHEST PAIN: ICD-10-CM

## 2024-10-18 DIAGNOSIS — G89.29 CHRONIC NONINTRACTABLE HEADACHE, UNSPECIFIED HEADACHE TYPE: Primary | ICD-10-CM

## 2024-10-18 DIAGNOSIS — R51.9 CHRONIC NONINTRACTABLE HEADACHE, UNSPECIFIED HEADACHE TYPE: Primary | ICD-10-CM

## 2024-10-18 DIAGNOSIS — R53.83 FATIGUE: ICD-10-CM

## 2024-10-18 LAB
ALBUMIN SERPL BCP-MCNC: 3.7 G/DL (ref 3.5–5.2)
ALP SERPL-CCNC: 74 U/L (ref 40–150)
ALT SERPL W/O P-5'-P-CCNC: 6 U/L (ref 10–44)
ANION GAP SERPL CALC-SCNC: 12 MMOL/L (ref 8–16)
AST SERPL-CCNC: 19 U/L (ref 10–40)
BASOPHILS # BLD AUTO: 0.02 K/UL (ref 0–0.2)
BASOPHILS NFR BLD: 0.3 % (ref 0–1.9)
BILIRUB SERPL-MCNC: 0.3 MG/DL (ref 0.1–1)
BILIRUB UR QL STRIP: NEGATIVE
BNP SERPL-MCNC: 391 PG/ML (ref 0–99)
BUN SERPL-MCNC: 24 MG/DL (ref 8–23)
CALCIUM SERPL-MCNC: 8.9 MG/DL (ref 8.7–10.5)
CHLORIDE SERPL-SCNC: 102 MMOL/L (ref 95–110)
CLARITY UR: CLEAR
CO2 SERPL-SCNC: 25 MMOL/L (ref 23–29)
COLOR UR: YELLOW
CREAT SERPL-MCNC: 1.5 MG/DL (ref 0.5–1.4)
DIFFERENTIAL METHOD BLD: ABNORMAL
EOSINOPHIL # BLD AUTO: 0.2 K/UL (ref 0–0.5)
EOSINOPHIL NFR BLD: 2.8 % (ref 0–8)
ERYTHROCYTE [DISTWIDTH] IN BLOOD BY AUTOMATED COUNT: 13.2 % (ref 11.5–14.5)
EST. GFR  (NO RACE VARIABLE): 45 ML/MIN/1.73 M^2
GLUCOSE SERPL-MCNC: 134 MG/DL (ref 70–110)
GLUCOSE UR QL STRIP: NEGATIVE
HCT VFR BLD AUTO: 40.8 % (ref 40–54)
HGB BLD-MCNC: 12.8 G/DL (ref 14–18)
HGB UR QL STRIP: NEGATIVE
IMM GRANULOCYTES # BLD AUTO: 0.02 K/UL (ref 0–0.04)
IMM GRANULOCYTES NFR BLD AUTO: 0.3 % (ref 0–0.5)
KETONES UR QL STRIP: NEGATIVE
LEUKOCYTE ESTERASE UR QL STRIP: NEGATIVE
LIPASE SERPL-CCNC: 28 U/L (ref 4–60)
LYMPHOCYTES # BLD AUTO: 1.1 K/UL (ref 1–4.8)
LYMPHOCYTES NFR BLD: 16.3 % (ref 18–48)
MAGNESIUM SERPL-MCNC: 2.1 MG/DL (ref 1.6–2.6)
MCH RBC QN AUTO: 29.7 PG (ref 27–31)
MCHC RBC AUTO-ENTMCNC: 31.4 G/DL (ref 32–36)
MCV RBC AUTO: 95 FL (ref 82–98)
MONOCYTES # BLD AUTO: 0.7 K/UL (ref 0.3–1)
MONOCYTES NFR BLD: 10.5 % (ref 4–15)
NEUTROPHILS # BLD AUTO: 4.5 K/UL (ref 1.8–7.7)
NEUTROPHILS NFR BLD: 69.8 % (ref 38–73)
NITRITE UR QL STRIP: NEGATIVE
NRBC BLD-RTO: 0 /100 WBC
PH UR STRIP: 7 [PH] (ref 5–8)
PLATELET # BLD AUTO: 158 K/UL (ref 150–450)
PMV BLD AUTO: 10.3 FL (ref 9.2–12.9)
POTASSIUM SERPL-SCNC: 4.7 MMOL/L (ref 3.5–5.1)
PROT SERPL-MCNC: 7.1 G/DL (ref 6–8.4)
PROT UR QL STRIP: NEGATIVE
RBC # BLD AUTO: 4.31 M/UL (ref 4.6–6.2)
SODIUM SERPL-SCNC: 139 MMOL/L (ref 136–145)
SP GR UR STRIP: 1.01 (ref 1–1.03)
TROPONIN I SERPL DL<=0.01 NG/ML-MCNC: 0.01 NG/ML (ref 0–0.03)
TROPONIN I SERPL DL<=0.01 NG/ML-MCNC: <0.006 NG/ML (ref 0–0.03)
URN SPEC COLLECT METH UR: NORMAL
UROBILINOGEN UR STRIP-ACNC: NEGATIVE EU/DL
WBC # BLD AUTO: 6.46 K/UL (ref 3.9–12.7)

## 2024-10-18 PROCEDURE — 99285 EMERGENCY DEPT VISIT HI MDM: CPT | Mod: 25

## 2024-10-18 PROCEDURE — 81003 URINALYSIS AUTO W/O SCOPE: CPT | Performed by: STUDENT IN AN ORGANIZED HEALTH CARE EDUCATION/TRAINING PROGRAM

## 2024-10-18 PROCEDURE — 93005 ELECTROCARDIOGRAM TRACING: CPT

## 2024-10-18 PROCEDURE — 96375 TX/PRO/DX INJ NEW DRUG ADDON: CPT

## 2024-10-18 PROCEDURE — 85025 COMPLETE CBC W/AUTO DIFF WBC: CPT | Performed by: PHYSICIAN ASSISTANT

## 2024-10-18 PROCEDURE — 84484 ASSAY OF TROPONIN QUANT: CPT | Mod: 91 | Performed by: PHYSICIAN ASSISTANT

## 2024-10-18 PROCEDURE — 93010 ELECTROCARDIOGRAM REPORT: CPT | Mod: ,,, | Performed by: INTERNAL MEDICINE

## 2024-10-18 PROCEDURE — 80053 COMPREHEN METABOLIC PANEL: CPT | Performed by: PHYSICIAN ASSISTANT

## 2024-10-18 PROCEDURE — 96374 THER/PROPH/DIAG INJ IV PUSH: CPT

## 2024-10-18 PROCEDURE — 84484 ASSAY OF TROPONIN QUANT: CPT | Performed by: STUDENT IN AN ORGANIZED HEALTH CARE EDUCATION/TRAINING PROGRAM

## 2024-10-18 PROCEDURE — 83880 ASSAY OF NATRIURETIC PEPTIDE: CPT | Performed by: PHYSICIAN ASSISTANT

## 2024-10-18 PROCEDURE — 63600175 PHARM REV CODE 636 W HCPCS: Performed by: PHYSICIAN ASSISTANT

## 2024-10-18 PROCEDURE — 83735 ASSAY OF MAGNESIUM: CPT | Performed by: PHYSICIAN ASSISTANT

## 2024-10-18 PROCEDURE — 83690 ASSAY OF LIPASE: CPT | Performed by: PHYSICIAN ASSISTANT

## 2024-10-18 RX ORDER — PANTOPRAZOLE SODIUM 40 MG/10ML
40 INJECTION, POWDER, LYOPHILIZED, FOR SOLUTION INTRAVENOUS
Status: COMPLETED | OUTPATIENT
Start: 2024-10-18 | End: 2024-10-18

## 2024-10-18 RX ORDER — ONDANSETRON HYDROCHLORIDE 2 MG/ML
4 INJECTION, SOLUTION INTRAVENOUS
Status: COMPLETED | OUTPATIENT
Start: 2024-10-18 | End: 2024-10-18

## 2024-10-18 RX ADMIN — ONDANSETRON 4 MG: 2 INJECTION INTRAMUSCULAR; INTRAVENOUS at 07:10

## 2024-10-18 RX ADMIN — PANTOPRAZOLE SODIUM 40 MG: 40 INJECTION, POWDER, LYOPHILIZED, FOR SOLUTION INTRAVENOUS at 07:10

## 2024-10-19 NOTE — ED NOTES
Pt arrive to the ED c/o intermitternt non radiating mid sternal chest pain, nausea, and 7/10 frontal headache. Reports fall on last week, hitting head but denies LOC. Pt denies sob, vomiting, and dirrhea. Pts wife reports he has been having these headaches for years and have tried numerous medications with no relief.

## 2024-10-19 NOTE — ED PROVIDER NOTES
Encounter Date: 10/18/2024       History     Chief Complaint   Patient presents with    Chest Pain     With nausea and epigastric pain     HPI     This is an 84-year-old male with a history of chronic headaches, AFib as well as an aortic valve replacement on Eliquis,  stroke, ulcerative colitis presents to the ED for evaluation after significant other became concerned because the patient was weaker than usual.  Patient reports he feels well at this time but notes that he did briefly have an episode of chest pain and nausea.  Denies any shortness of breath, vomiting, dizziness or lightheadedness.  He has been eating and drinking okay.   He notes that he has a 6/10 headache that is consistent with his chronic headaches.  He is currently on Depakote and amitriptyline and follows closely with Neurology.  He is being worked up outpatient with the upcoming MRIs.  He denies any change in the character of these headaches.   He notes they have been very difficult to control for years. They do report of fall a week ago when patient hit his head.    Review of patient's allergies indicates:   Allergen Reactions    Benazepril Other (See Comments)     Cough     Past Medical History:   Diagnosis Date    Abnormal echocardiogram 11/16/2012    Anemia     Anticoagulant long-term use     Anxiety     Atrophic kidney 11/16/2012    BPH (benign prostatic hyperplasia) 11/16/2012    Cancer 2000 something    Had radiation    DDD (degenerative disc disease), cervical 07/05/2017    x-ray 7/17 - Severe    GERD (gastroesophageal reflux disease) 11/16/2012    Glaucoma 11/16/2012    HTN (hypertension) 11/16/2012    Hypothyroid 11/16/2012    Internal carotid artery stenosis 11/16/2012    Joint pain     Left ventricular diastolic dysfunction, NYHA class 1 04/16/2015    4/15    Low serum testosterone level 11/16/2012    Normal cardiac stress test 11/16/2012    Osteopenia 11/16/2012    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     Shingles 11/16/2012    Skin disease 2020    Seems to have started after Covid vaccine    Stroke 2017    tia   no residual    TIA (transient ischemic attack) 11/16/2012    UC (ulcerative colitis) 11/16/2012     Past Surgical History:   Procedure Laterality Date    ADENOIDECTOMY      COLONOSCOPY N/A 03/25/2022    Procedure: COLONOSCOPY;  Surgeon: Ashley Nguyen MD;  Location: [facility];  Service: Endoscopy;  Laterality: N/A;    ESOPHAGOGASTRODUODENOSCOPY N/A 03/25/2022    Procedure: EGD (ESOPHAGOGASTRODUODENOSCOPY);  Surgeon: Ashley Nguyen MD;  Location: [facility];  Service: Endoscopy;  Laterality: N/A;  added on per Dr. Nguyen    ESOPHAGOGASTRODUODENOSCOPY N/A 6/7/2024    Procedure: EGD (ESOPHAGOGASTRODUODENOSCOPY);  Surgeon: Audie Snell MD;  Location: [facility] ([address] [place] [address]);  Service: Endoscopy;  Laterality: N/A;  5/21 R/s,sent updated instr via portal.pt informed to hold eliquis for 2 days.AC  Ref by: ,  approved to hold Eliquis (apixaban) for 2 days per Dr. Conklin-see media file  5/9/24-GT  5/31-pre call complete-tb    EYE SURGERY Right 11/2020    cataract    HERNIA REPAIR      LEFT HEART CATHETERIZATION Right 10/29/2021    Procedure: CATHETERIZATION, HEART, LEFT AND RIGHT  - LV DARNELL POSSIBLE;  Surgeon: Beau Conklin MD;  Location: [facility] CATH LAB;  Service: Cardiology;  Laterality: Right;    RIGHT HEART CATHETERIZATION Right 10/29/2021    Procedure: INSERTION, CATHETER, RIGHT HEART;  Surgeon: Beau Conklin MD;  Location: [facility] CATH LAB;  Service: Cardiology;  Laterality: Right;    SKIN BIOPSY  2020    TONSILLECTOMY       Family History   Problem Relation Name Age of Onset    Hypertension Mother      Alzheimer's disease Mother      Heart attack Father      Diabetes Brother      Hypertension Brother      Cancer Brother      Heart disease Brother  56        MI    Colon cancer Neg Hx      Esophageal cancer Neg Hx       Social History     Tobacco Use    Smoking status: Former      Current packs/day: 0.00     Average packs/day: 1 pack/day for 25.0 years (25.0 ttl pk-yrs)     Types: Cigarettes     Start date: 1947     Quit date: 1972     Years since quittin.8    Smokeless tobacco: Never   Substance Use Topics    Alcohol use: Not Currently     Alcohol/week: 3.0 standard drinks of alcohol     Types: 2 Glasses of wine, 1 Cans of beer per week     Comment: Occasional    Drug use: Never     Review of Systems   Constitutional:  Positive for fatigue. Negative for fever.   HENT:  Negative for sore throat.    Respiratory:  Negative for shortness of breath.    Cardiovascular:  Positive for chest pain.   Gastrointestinal:  Positive for nausea.   Genitourinary:  Negative for dysuria.   Musculoskeletal:  Negative for back pain.   Skin:  Negative for rash.   Neurological:  Positive for headaches. Negative for weakness.   Hematological:  Does not bruise/bleed easily.       Physical Exam     Initial Vitals [10/18/24 1914]   BP Pulse Resp Temp SpO2   (!) 181/94 (!) 58 18 97.8 °F (36.6 °C) 100 %      MAP       --         Physical Exam    Constitutional: Vital signs are normal. He appears well-developed and well-nourished.  Non-toxic appearance. He does not have a sickly appearance. He does not appear ill.   HENT:   Head: Normocephalic and atraumatic. Mouth/Throat: Mucous membranes are normal.   Eyes: EOM are normal.   Neck: Neck supple.   Cardiovascular:  Normal rate and regular rhythm.           Pulmonary/Chest: He has no wheezes. He has no rhonchi. He has no rales.   Abdominal: Abdomen is soft. Bowel sounds are normal. There is no abdominal tenderness. There is no rebound and no guarding.   Musculoskeletal:      Cervical back: Neck supple.     Neurological: He is alert.   Skin: Skin is warm and dry. No rash noted.   Psychiatric: He has a normal mood and affect.         ED Course   Procedures  Labs Reviewed   CBC W/ AUTO DIFFERENTIAL - Abnormal       Result Value    WBC 6.46      RBC 4.31 (*)      Hemoglobin 12.8 (*)     Hematocrit 40.8      MCV 95      MCH 29.7      MCHC 31.4 (*)     RDW 13.2      Platelets 158      MPV 10.3      Immature Granulocytes 0.3      Gran # (ANC) 4.5      Immature Grans (Abs) 0.02      Lymph # 1.1      Mono # 0.7      Eos # 0.2      Baso # 0.02      nRBC 0      Gran % 69.8      Lymph % 16.3 (*)     Mono % 10.5      Eosinophil % 2.8      Basophil % 0.3      Differential Method Automated     COMPREHENSIVE METABOLIC PANEL - Abnormal    Sodium 139      Potassium 4.7      Chloride 102      CO2 25      Glucose 134 (*)     BUN 24 (*)     Creatinine 1.5 (*)     Calcium 8.9      Total Protein 7.1      Albumin 3.7      Total Bilirubin 0.3      Alkaline Phosphatase 74      AST 19      ALT 6 (*)     eGFR 45 (*)     Anion Gap 12     B-TYPE NATRIURETIC PEPTIDE - Abnormal     (*)    MAGNESIUM    Magnesium 2.1     LIPASE    Lipase 28     TROPONIN I    Troponin I 0.007     URINALYSIS, REFLEX TO URINE CULTURE    Specimen UA Urine, Clean Catch      Color, UA Yellow      Appearance, UA Clear      pH, UA 7.0      Specific Gravity, UA 1.015      Protein, UA Negative      Glucose, UA Negative      Ketones, UA Negative      Bilirubin (UA) Negative      Occult Blood UA Negative      Nitrite, UA Negative      Urobilinogen, UA Negative      Leukocytes, UA Negative      Narrative:     Specimen Source->Urine   TROPONIN I    Troponin I <0.006            Imaging Results              CT Head Without Contrast (Final result)  Result time 10/18/24 20:35:45      Final result by Taj Davisno MD (10/18/24 20:35:45)                   Impression:      No acute intracranial abnormalities identified.  No significant detrimental change compared to previous MRI brain from 08/30/2024.      Electronically signed by: Taj Davison MD  Date:    10/18/2024  Time:    20:35               Narrative:    EXAMINATION:  CT HEAD WITHOUT CONTRAST    CLINICAL HISTORY:  fall on eliquis;    TECHNIQUE:  Low dose axial images  were obtained through the head.  Coronal and sagittal reformations were also performed. Contrast was not administered.    COMPARISON:  MRI brain from 08/30/2024.    FINDINGS:  There is generalized cerebral volume loss and chronic microvascular ischemic disease.  Small region of encephalomalacia is seen in the left occipital lobe.  No evidence of acute/recent major vascular distribution cerebral infarction, intraparenchymal hemorrhage, or intra-axial space occupying lesion.  There is stable ventricular enlargement.  No effacement of the skull-base cisterns. No abnormal extra-axial fluid collections or blood products. Visualized paranasal sinuses and mastoid air cells are clear. The calvarium shows no significant abnormality.                                       X-Ray Chest 1 View (Final result)  Result time 10/18/24 19:58:13      Final result by Taj Davison MD (10/18/24 19:58:13)                   Impression:      No acute cardiopulmonary process identified.      Electronically signed by: Taj Davison MD  Date:    10/18/2024  Time:    19:58               Narrative:    EXAMINATION:  XR CHEST 1 VIEW    CLINICAL HISTORY:  Chest pain, unspecified    TECHNIQUE:  Single frontal view of the chest was performed.    COMPARISON:  August 2023.    FINDINGS:  Cardiac silhouette is normal in size.  Cardiac valve stent is seen.  Lungs are hypoinflated which accentuates pulmonary vascular markings.  No evidence of focal consolidative process, pneumothorax, or significant pleural effusion.  No acute osseous abnormality identified.                                       Medications   pantoprazole injection 40 mg (40 mg Intravenous Given 10/18/24 1944)   ondansetron injection 4 mg (4 mg Intravenous Given 10/18/24 1944)     Medical Decision Making                   Pulse of 49, /63;  vitals unremarkable otherwise  Patient is well-appearing and in no acute distress   Daughter notes the patient he is at baseline   Patient  was able to ambulate to the bathroom without difficulty  CT head unremarkable   EKG appears consistent with the prior EKGs;  nonspecific T-wave inversions but no significant ST segment abnormalities;  QRSd 108,    Creatinine mildly elevated at 1.5;  recommended drinking fluids   Electrolytes within normal limits  UA unremarkable   The patient continues to feel well and at baseline  Troponin negative   Delta troponin negative   He took 81 mg aspirin today   I have a low suspicion for ACS;  patient was follow up with his cardiologist in 5 days  Patient only had  and brief episode of pain that resolved on its own he has not had reoccurrence; I have a low suspicion for ACS;  he has follow up with his cardiologist in 5 days and they indicated they would be keeping that appointment   He is amenable to discharge to follow up with his regular physician and neurologist as scheduled   Advised to return for any concerning symptoms   Patient and family member verbalized understanding and agreement with the plan  Patient is stable for discharge    I discussed with the patient/family the diagnosis, treatment plan, indications for return to the emergency department, and for expected follow-up. The patient/family verbalized an understanding. The patient/family is asked if there are any questions or concerns. We discuss the case, until all issues are addressed to the patient/familys satisfaction. Patient/family understands and is agreeable to the plan.   Johnnie Jean-Baptiste    DISCLAIMER: This note was prepared with [employer]igg[employer] voice recognition transcription software. Garbled syntax, mangled pronouns, and other bizarre constructions may be attributed to that software system.                     Clinical Impression:  Final diagnoses:  [R07.9] Chest pain  [R53.83] Fatigue  [R51.9, G89.29] Chronic nonintractable headache, unspecified headache type (Primary)          ED Disposition Condition    Discharge Stable          ED  Prescriptions    None       Follow-up Information    None          Johnnie Jean-Baptiste MD  10/18/24 2241       Johnnie Jean-Baptiste MD  10/18/24 2248

## 2024-10-19 NOTE — DISCHARGE INSTRUCTIONS
Keep your scheduled follow up with Cardiology.  Keep your outpatient follow up with regular doctor and neurologist.  You can return for any new or worsening chest pain or other concerning symptoms.  Thank you.

## 2024-10-20 LAB
OHS QRS DURATION: 108 MS
OHS QTC CALCULATION: 474 MS

## 2024-10-21 ENCOUNTER — DOCUMENTATION ONLY (OUTPATIENT)
Dept: INTERNAL MEDICINE | Facility: CLINIC | Age: 84
End: 2024-10-21
Payer: MEDICARE

## 2024-10-21 DIAGNOSIS — I25.10 CORONARY ARTERY DISEASE INVOLVING NATIVE CORONARY ARTERY OF NATIVE HEART WITHOUT ANGINA PECTORIS: ICD-10-CM

## 2024-10-21 DIAGNOSIS — Z78.9 KNOWN MEDICAL PROBLEMS: ICD-10-CM

## 2024-10-21 DIAGNOSIS — I25.10 CORONARY ARTERY DISEASE INVOLVING NATIVE CORONARY ARTERY OF NATIVE HEART WITHOUT ANGINA PECTORIS: Chronic | ICD-10-CM

## 2024-10-21 DIAGNOSIS — N18.31 STAGE 3A CHRONIC KIDNEY DISEASE: ICD-10-CM

## 2024-10-21 DIAGNOSIS — I10 ESSENTIAL HYPERTENSION: Primary | ICD-10-CM

## 2024-10-21 PROCEDURE — 99490 CHRNC CARE MGMT STAFF 1ST 20: CPT | Mod: S$GLB,,, | Performed by: INTERNAL MEDICINE

## 2024-10-21 RX ORDER — PRAVASTATIN SODIUM 20 MG/1
20 TABLET ORAL NIGHTLY
Qty: 90 TABLET | Refills: 3 | Status: SHIPPED | OUTPATIENT
Start: 2024-10-21

## 2024-10-22 ENCOUNTER — PATIENT OUTREACH (OUTPATIENT)
Dept: EMERGENCY MEDICINE | Facility: HOSPITAL | Age: 84
End: 2024-10-22
Payer: MEDICARE

## 2024-10-22 NOTE — PROGRESS NOTES
Patient was seen in the ED on 10/18/24. Phoned patient to assist with Post ED Discharge Navigation.  Patient declined assistance scheduling an ED follow-up appointment.  Rudy Shepard

## 2024-10-31 ENCOUNTER — HOSPITAL ENCOUNTER (OUTPATIENT)
Dept: RADIOLOGY | Facility: HOSPITAL | Age: 84
Discharge: HOME | End: 2024-10-31
Attending: PSYCHIATRY & NEUROLOGY
Payer: MEDICARE

## 2024-10-31 DIAGNOSIS — R90.89 ABNORMAL COMPUTERIZED TOMOGRAPHY OF BRAIN: ICD-10-CM

## 2024-10-31 DIAGNOSIS — R27.8 MUSCULAR INCOORDINATION: ICD-10-CM

## 2024-10-31 DIAGNOSIS — R27.0 ATAXIA, UNSPECIFIED: ICD-10-CM

## 2024-10-31 DIAGNOSIS — R29.6 FALLS FREQUENTLY: ICD-10-CM

## 2024-10-31 DIAGNOSIS — Z91.81 PERSONAL HISTORY OF FALL: ICD-10-CM

## 2024-10-31 DIAGNOSIS — G44.52 NEW DAILY PERSISTENT HEADACHE (NDPH): ICD-10-CM

## 2024-10-31 DIAGNOSIS — R42 DIZZINESS AND GIDDINESS: ICD-10-CM

## 2024-10-31 DIAGNOSIS — R26.9 ABNORMALITY OF GAIT: ICD-10-CM

## 2024-10-31 DIAGNOSIS — R41.3 MEMORY LOSS: ICD-10-CM

## 2024-10-31 PROCEDURE — A9585 GADOBUTROL INJECTION: HCPCS | Performed by: PSYCHIATRY & NEUROLOGY

## 2024-10-31 PROCEDURE — 25500020 PHARM REV CODE 255: Performed by: PSYCHIATRY & NEUROLOGY

## 2024-10-31 PROCEDURE — 70544 MR ANGIOGRAPHY HEAD W/O DYE: CPT | Mod: TC

## 2024-10-31 PROCEDURE — 70553 MRI BRAIN STEM W/O & W/DYE: CPT | Mod: TC

## 2024-10-31 PROCEDURE — 72141 MRI NECK SPINE W/O DYE: CPT | Mod: TC

## 2024-10-31 RX ORDER — GADOBUTROL 604.72 MG/ML
7 INJECTION INTRAVENOUS
Status: COMPLETED | OUTPATIENT
Start: 2024-10-31 | End: 2024-10-31

## 2024-10-31 RX ADMIN — GADOBUTROL 7 ML: 604.72 INJECTION INTRAVENOUS at 11:10

## 2024-11-07 ENCOUNTER — LAB VISIT (OUTPATIENT)
Dept: LAB | Facility: HOSPITAL | Age: 84
End: 2024-11-07
Attending: PSYCHIATRY & NEUROLOGY
Payer: MEDICARE

## 2024-11-07 ENCOUNTER — TELEPHONE (OUTPATIENT)
Dept: NEUROSURGERY | Facility: CLINIC | Age: 84
End: 2024-11-07
Payer: MEDICARE

## 2024-11-07 DIAGNOSIS — R41.3 MEMORY LOSS: Primary | ICD-10-CM

## 2024-11-07 DIAGNOSIS — R27.0 ATAXIA: ICD-10-CM

## 2024-11-07 DIAGNOSIS — G93.89 PNEUMOCEPHALUS: ICD-10-CM

## 2024-11-07 DIAGNOSIS — R26.9 ABNORMALITY OF GAIT: ICD-10-CM

## 2024-11-07 DIAGNOSIS — G44.52 NEW DAILY PERSISTENT HEADACHE: ICD-10-CM

## 2024-11-07 DIAGNOSIS — R42 DIZZINESS AND GIDDINESS: ICD-10-CM

## 2024-11-07 DIAGNOSIS — R53.83 FATIGUE: ICD-10-CM

## 2024-11-07 DIAGNOSIS — R90.89 ABNORMAL COMPUTERIZED TOMOGRAPHY OF BRAIN: ICD-10-CM

## 2024-11-07 DIAGNOSIS — R27.8 MUSCULAR INCOORDINATION: ICD-10-CM

## 2024-11-07 LAB
T4 FREE SERPL-MCNC: 1.4 NG/DL (ref 0.71–1.51)
TSH SERPL DL<=0.005 MIU/L-ACNC: 1.54 UIU/ML (ref 0.4–4)

## 2024-11-07 PROCEDURE — 36415 COLL VENOUS BLD VENIPUNCTURE: CPT | Performed by: PSYCHIATRY & NEUROLOGY

## 2024-11-07 PROCEDURE — 84439 ASSAY OF FREE THYROXINE: CPT | Performed by: PSYCHIATRY & NEUROLOGY

## 2024-11-07 PROCEDURE — 84443 ASSAY THYROID STIM HORMONE: CPT | Performed by: PSYCHIATRY & NEUROLOGY

## 2024-11-07 PROCEDURE — 83520 IMMUNOASSAY QUANT NOS NONAB: CPT | Performed by: PSYCHIATRY & NEUROLOGY

## 2024-11-08 ENCOUNTER — TELEPHONE (OUTPATIENT)
Dept: NEUROSURGERY | Facility: CLINIC | Age: 84
End: 2024-11-08
Payer: MEDICARE

## 2024-11-11 LAB
IMMUNOLOGIST REVIEW: ABNORMAL
M PHOSPHO-TAU 217: 0.2 PG/ML

## 2024-11-13 ENCOUNTER — OFFICE VISIT (OUTPATIENT)
Dept: NEUROSURGERY | Facility: CLINIC | Age: 84
End: 2024-11-13
Payer: MEDICARE

## 2024-11-13 DIAGNOSIS — G91.2 NORMAL PRESSURE HYDROCEPHALUS: Primary | ICD-10-CM

## 2024-11-13 PROCEDURE — 99204 OFFICE O/P NEW MOD 45 MIN: CPT | Mod: S$GLB,,, | Performed by: NEUROLOGICAL SURGERY

## 2024-11-13 NOTE — PATIENT INSTRUCTIONS
I have personally reviewed the following images with the pt which shows:    MRI brain: Generalized cerebral volume loss with patchy and confluent T2 FLAIR signal abnormality supratentorial white matter while nonspecific concerning for underlying chronic ischemic change similar to prior. Stable prominence of the lateral and 3rd ventricles which may be compensatory to volume loss, normal pressure hydrocephalus to be considered in the appropriate clinical setting. Superimposed stable left occipital encephalomalacia which may be sequela of prior infarct unchanged. Punctate probable remote right thalamic and basal gangliar lacunar type infarcts. No evidence for acute infarction or intracranial enhancing lesion     MRA head: Unremarkable MRA head without evidence for proximal large vessel occlusion or aneurysm.     MRV head: Unremarkable MRV head specifically out evidence for dural venous sinus thrombosis.     I will refer the patient to physical therapy for a pre and post therapeutic lumbar puncture to evaluate the efficacy of the treatment.     I recommend performing a tap test (lumbar puncture) at this time. I have discussed the risks/benefits, indications, and alternatives for the proposed procedure in detail. I have answered all of their questions and patient wishes to proceed with surgery. We will schedule patient.

## 2024-11-13 NOTE — PROGRESS NOTES
Subjective:   I, Rogerio Coreas, attest that this documentation has been prepared under the direction and in the presence of Jairo Blankenship MD.     Patient ID: Ramesh Ricci is a 84 y.o. male     Chief Complaint: No chief complaint on file.      HPI  Mr. Ramesh Ricci is a 84 y.o. gentleman with A-Fib, bilateral hearing loss, HLD, CKD, essential HTN and NPH who presents today to establish care. The patient states his most bothersome symptom was his chronic headaches for which he is treated with Depakote. In addition to his headaches he notes problems with his ambulation for which he uses a cane. He endorses falls in which he loses his balance. He is accompanied with his wife who notes that his legs give out while walking. The wife also endorses worsening short term memory loss in the patient. The patient states he frequently forgets the year, daily tasks, as well as significant people in his life. The wife notes that the patient's memory loss became noticeable starting approximately 1 year ago. In addition to short term memory loss, the patient's wife also endorses loss of long term memory loss but he is eventually able to recall events with some prompting. The patient also reports issues with urinary urgency where he is unable to control it. An MRI Brain W WO contrast obtained on 10/31/24 demonstrated a stable prominence of the lateral and 3rd ventricles which may be compensatory to volume loss, normal pressure hydrocephalus to be considered in the appropriate clinical setting. He is here to discuss possible neurosurgical intervention to treat his symptoms.      Review of Systems   Constitutional:  Negative for activity change, appetite change, fatigue, fever and unexpected weight change.   HENT:  Positive for hearing loss. Negative for facial swelling.    Eyes: Negative.    Respiratory: Negative.     Cardiovascular: Negative.    Gastrointestinal:  Negative for diarrhea, nausea and vomiting.    Endocrine: Negative.    Genitourinary:  Positive for frequency and urgency.   Musculoskeletal:  Positive for gait problem. Negative for back pain, joint swelling, myalgias and neck pain.   Neurological:  Negative for dizziness, seizures, weakness, numbness and headaches.   Psychiatric/Behavioral:  Positive for confusion and decreased concentration.         Past Medical History:   Diagnosis Date    Abnormal echocardiogram 11/16/2012    Anemia     Anticoagulant long-term use     Anxiety     Atrophic kidney 11/16/2012    BPH (benign prostatic hyperplasia) 11/16/2012    Cancer 2000 something    Had radiation    DDD (degenerative disc disease), cervical 07/05/2017    x-ray 7/17 - Severe    GERD (gastroesophageal reflux disease) 11/16/2012    Glaucoma 11/16/2012    HTN (hypertension) 11/16/2012    Hypothyroid 11/16/2012    Internal carotid artery stenosis 11/16/2012    Joint pain     Left ventricular diastolic dysfunction, NYHA class 1 04/16/2015    4/15    Low serum testosterone level 11/16/2012    Normal cardiac stress test 11/16/2012    Osteopenia 11/16/2012    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   Physical Exam  Constitutional:       General: He is not in acute distress.     Appearance: Normal appearance.   HENT:      Head: Normocephalic and atraumatic.   Pulmonary:      Effort: Pulmonary effort is normal.   Musculoskeletal:      Cervical back: Neck supple.   Neurological:      Mental Status: He is alert and oriented to person, place, and time.      GCS: GCS eye subscore is 4. GCS verbal subscore is 5. GCS motor subscore is 6.      Cranial Nerves: No cranial nerve deficit.      Gait: Gait abnormal (magnetic and wide based  gait).      Comments: Patient recalls 0/3 items in memory test.        IMAGING:  MRI Brain W WO Contrast 10/31/2024:  MRI brain: Generalized cerebral volume loss with patchy and confluent T2 FLAIR signal abnormality supratentorial white matter while nonspecific concerning for underlying chronic ischemic change similar to prior. Stable prominence of the lateral and 3rd ventricles which may be compensatory to volume loss, normal pressure hydrocephalus to be considered in the appropriate clinical setting. Superimposed stable left occipital encephalomalacia which may be sequela of prior infarct unchanged. Punctate probable remote right thalamic and basal gangliar lacunar type infarcts. No evidence for acute infarction or intracranial enhancing lesion     MRA head: Unremarkable MRA head without evidence for proximal large vessel occlusion or aneurysm.     MRV head: Unremarkable MRV head specifically out evidence for dural venous sinus thrombosis.       I have personally reviewed the images with the pt.      I, Dr. Jairo Blankenship, personally performed the services described in this documentation. All medical record entries made by the scribe, Rogerio Coreas, were at my direction and in my presence.  I have reviewed the chart and agree that the record reflects my personal performance and is accurate and complete. Jairo Blankenship MD. 11/13/2024    Assessment:       Normal pressure hydrocephalus     Plan:   I have personally reviewed the following images with the pt which shows:    MRI brain: Generalized cerebral volume loss with patchy and confluent T2 FLAIR signal abnormality supratentorial white matter while nonspecific concerning for underlying chronic ischemic change similar to prior. Stable prominence of the lateral and 3rd ventricles which may be compensatory to volume loss, normal pressure hydrocephalus to be considered in the appropriate clinical setting. Superimposed stable left occipital encephalomalacia which may be  sequela of prior infarct unchanged. Punctate probable remote right thalamic and basal gangliar lacunar type infarcts. No evidence for acute infarction or intracranial enhancing lesion     MRA head: Unremarkable MRA head without evidence for proximal large vessel occlusion or aneurysm.     MRV head: Unremarkable MRV head specifically out evidence for dural venous sinus thrombosis.     I will refer the patient to physical therapy for a pre and post therapeutic lumbar puncture to evaluate the efficacy of the treatment.     I recommend performing a tap test (lumbar puncture) at this time. I have discussed the risks/benefits, indications, and alternatives for the proposed procedure in detail. I have answered all of their questions and patient wishes to proceed with surgery. We will schedule patient.

## 2024-12-03 ENCOUNTER — DOCUMENTATION ONLY (OUTPATIENT)
Dept: INTERNAL MEDICINE | Facility: CLINIC | Age: 84
End: 2024-12-03
Payer: MEDICARE

## 2024-12-03 DIAGNOSIS — I25.10 CORONARY ARTERY DISEASE INVOLVING NATIVE CORONARY ARTERY OF NATIVE HEART WITHOUT ANGINA PECTORIS: ICD-10-CM

## 2024-12-03 DIAGNOSIS — I10 ESSENTIAL HYPERTENSION: Primary | ICD-10-CM

## 2024-12-03 DIAGNOSIS — Z78.9 KNOWN MEDICAL PROBLEMS: ICD-10-CM

## 2024-12-03 DIAGNOSIS — R21 RASH: ICD-10-CM

## 2024-12-03 PROCEDURE — 99490 CHRNC CARE MGMT STAFF 1ST 20: CPT | Mod: S$GLB,,, | Performed by: INTERNAL MEDICINE

## 2024-12-03 RX ORDER — TRIAMCINOLONE ACETONIDE 1 MG/G
CREAM TOPICAL 2 TIMES DAILY
Qty: 454 G | Refills: 0 | Status: SHIPPED | OUTPATIENT
Start: 2024-12-03

## 2024-12-07 ENCOUNTER — PATIENT MESSAGE (OUTPATIENT)
Dept: GASTROENTEROLOGY | Facility: CLINIC | Age: 84
End: 2024-12-07
Payer: MEDICARE

## 2024-12-09 NOTE — TELEPHONE ENCOUNTER
- CC: Loose stool  - UC pancolitis  - Current IBD meds: balsalazide 5 pills qam, 4 pills qpm (started ~9/30/24)  - 10/29/22 calprotectin 137.0  - New diagnosis of Cerebral Ventriculomegaly    - Reports 2 loose BM/ QD & 1 nocturnal trip (Baseline 1-2 formed BM/ QD)  - Mid-abdominal cramping 3/10  - Next OV: 1/28/25  - Will review w/ Dr. Snell

## 2024-12-11 ENCOUNTER — PATIENT MESSAGE (OUTPATIENT)
Dept: NEUROSURGERY | Facility: CLINIC | Age: 84
End: 2024-12-11
Payer: MEDICARE

## 2024-12-18 ENCOUNTER — TELEPHONE (OUTPATIENT)
Dept: FAMILY MEDICINE | Facility: CLINIC | Age: 84
End: 2024-12-18
Payer: MEDICARE

## 2024-12-18 NOTE — TELEPHONE ENCOUNTER
Pt's wife spoke to you this morning about [facility].        ----- Message from [place] sent at 12/18/2024 11:20 AM CST -----  Regarding: wife  Type: Patient Call Back    Who called:wife    What is the request in detail:was told to call to get an appt with this office as a NP when she was at her appt this morning, tried to schedule [facility] appt, no times showed up    Can the clinic reply by [facility]?callback    Would the patient rather a call back or a response via [facility]?callback     Best call back number:[phone]    Additional Information:

## 2024-12-23 ENCOUNTER — TELEPHONE (OUTPATIENT)
Dept: NEUROSURGERY | Facility: CLINIC | Age: 84
End: 2024-12-23
Payer: MEDICARE

## 2024-12-23 DIAGNOSIS — G91.2 NORMAL PRESSURE HYDROCEPHALUS: Primary | ICD-10-CM

## 2024-12-23 NOTE — TELEPHONE ENCOUNTER
Explained the plan for physical therapy evaluation and lumbar puncture scheduled 1/6/25. Answered all questions and concerns. Pts spouse vu of the plan.

## 2024-12-30 ENCOUNTER — TELEPHONE (OUTPATIENT)
Dept: NEUROSURGERY | Facility: CLINIC | Age: 84
End: 2024-12-30
Payer: MEDICARE

## 2024-12-31 NOTE — PROGRESS NOTES
The patient's electronic chart was reviewed during this month. The patient's medical, functional, and psychosocial needs were assessed. Need for Home health care, PT, OT, , psychiatric care, and hospice was assessed. All preventative care measures were reviewed and updated. All medications were reviewed and reconciled. Potential drug interactions and medication adherence was reviewed. Prescriptions were renewed as appropriate. Education was provided to the patient and/or caregiver as needed, and all questions were answered. Over 20 minutes were spent providing these non-face-to-face services during this calendar month.      Refilled:      Approved Prescriptions     triamcinolone acetonide 0.1% (KENALOG) 0.1 % cream         Sig: Apply topically 2 (two) times daily. APPLY  CREAM TOPICALLY TO AFFECTED AREA TWICE DAILY as needed    Disp: 454 g    Refills: 0    Start: 12/3/2024    Class: Normal    Authorized by: EFREM Muniz MD    For: Rash        To be filled at: [facility]/pharmacy #[id number] - [place] - [address]

## 2025-01-01 ENCOUNTER — RESULTS FOLLOW-UP (OUTPATIENT)
Dept: GASTROENTEROLOGY | Facility: HOSPITAL | Age: 85
End: 2025-01-01

## 2025-01-01 ENCOUNTER — HOSPITAL ENCOUNTER (INPATIENT)
Facility: HOSPITAL | Age: 85
LOS: 23 days | DRG: 025 | End: 2025-05-16
Attending: EMERGENCY MEDICINE | Admitting: PSYCHIATRY & NEUROLOGY
Payer: MEDICARE

## 2025-01-01 VITALS
HEIGHT: 71 IN | DIASTOLIC BLOOD PRESSURE: 59 MMHG | RESPIRATION RATE: 24 BRPM | OXYGEN SATURATION: 94 % | WEIGHT: 149.94 LBS | HEART RATE: 136 BPM | TEMPERATURE: 98 F | SYSTOLIC BLOOD PRESSURE: 98 MMHG | BODY MASS INDEX: 20.99 KG/M2

## 2025-01-01 DIAGNOSIS — Z51.5 PALLIATIVE CARE ENCOUNTER: ICD-10-CM

## 2025-01-01 DIAGNOSIS — I50.9 HEART FAILURE: ICD-10-CM

## 2025-01-01 DIAGNOSIS — I62.00 SUBDURAL HEMORRHAGE: Primary | ICD-10-CM

## 2025-01-01 DIAGNOSIS — E44.1 MALNUTRITION OF MILD DEGREE: ICD-10-CM

## 2025-01-01 DIAGNOSIS — R00.0 TACHYCARDIA: ICD-10-CM

## 2025-01-01 DIAGNOSIS — Z71.89 ACP (ADVANCE CARE PLANNING): ICD-10-CM

## 2025-01-01 DIAGNOSIS — R53.1 WEAKNESS: ICD-10-CM

## 2025-01-01 DIAGNOSIS — R53.81 DEBILITY: ICD-10-CM

## 2025-01-01 DIAGNOSIS — S06.5XAA SUBDURAL HEMATOMA: ICD-10-CM

## 2025-01-01 LAB
ABO + RH BLD: NORMAL
ABO + RH BLD: NORMAL
ABSOLUTE EOSINOPHIL (OHS): 0 K/UL
ABSOLUTE EOSINOPHIL (OHS): 0 K/UL
ABSOLUTE EOSINOPHIL (OHS): 0.02 K/UL
ABSOLUTE EOSINOPHIL (OHS): 0.03 K/UL
ABSOLUTE EOSINOPHIL (OHS): 0.03 K/UL
ABSOLUTE EOSINOPHIL (OHS): 0.05 K/UL
ABSOLUTE EOSINOPHIL (OHS): 0.05 K/UL
ABSOLUTE EOSINOPHIL (OHS): 0.06 K/UL
ABSOLUTE EOSINOPHIL (OHS): 0.07 K/UL
ABSOLUTE EOSINOPHIL (OHS): 0.09 K/UL
ABSOLUTE EOSINOPHIL (OHS): 0.09 K/UL
ABSOLUTE EOSINOPHIL (OHS): 0.15 K/UL
ABSOLUTE EOSINOPHIL (OHS): 0.16 K/UL
ABSOLUTE EOSINOPHIL (OHS): 0.18 K/UL
ABSOLUTE EOSINOPHIL (OHS): 0.21 K/UL
ABSOLUTE EOSINOPHIL (OHS): 0.21 K/UL
ABSOLUTE EOSINOPHIL (OHS): 0.25 K/UL
ABSOLUTE EOSINOPHIL (OHS): 0.26 K/UL
ABSOLUTE EOSINOPHIL (OHS): 0.27 K/UL
ABSOLUTE EOSINOPHIL (OHS): 0.3 K/UL
ABSOLUTE MONOCYTE (OHS): 0.85 K/UL (ref 0.3–1)
ABSOLUTE MONOCYTE (OHS): 0.93 K/UL (ref 0.3–1)
ABSOLUTE MONOCYTE (OHS): 1.08 K/UL (ref 0.3–1)
ABSOLUTE MONOCYTE (OHS): 1.12 K/UL (ref 0.3–1)
ABSOLUTE MONOCYTE (OHS): 1.23 K/UL (ref 0.3–1)
ABSOLUTE MONOCYTE (OHS): 1.28 K/UL (ref 0.3–1)
ABSOLUTE MONOCYTE (OHS): 1.32 K/UL (ref 0.3–1)
ABSOLUTE MONOCYTE (OHS): 1.36 K/UL (ref 0.3–1)
ABSOLUTE MONOCYTE (OHS): 1.39 K/UL (ref 0.3–1)
ABSOLUTE MONOCYTE (OHS): 1.42 K/UL (ref 0.3–1)
ABSOLUTE MONOCYTE (OHS): 1.47 K/UL (ref 0.3–1)
ABSOLUTE MONOCYTE (OHS): 1.51 K/UL (ref 0.3–1)
ABSOLUTE MONOCYTE (OHS): 1.55 K/UL (ref 0.3–1)
ABSOLUTE MONOCYTE (OHS): 1.56 K/UL (ref 0.3–1)
ABSOLUTE MONOCYTE (OHS): 1.59 K/UL (ref 0.3–1)
ABSOLUTE MONOCYTE (OHS): 1.61 K/UL (ref 0.3–1)
ABSOLUTE MONOCYTE (OHS): 1.72 K/UL (ref 0.3–1)
ABSOLUTE MONOCYTE (OHS): 1.74 K/UL (ref 0.3–1)
ABSOLUTE MONOCYTE (OHS): 1.77 K/UL (ref 0.3–1)
ABSOLUTE MONOCYTE (OHS): 1.83 K/UL (ref 0.3–1)
ABSOLUTE MONOCYTE (OHS): 2.13 K/UL (ref 0.3–1)
ABSOLUTE MONOCYTE (OHS): 2.39 K/UL (ref 0.3–1)
ABSOLUTE NEUTROPHIL COUNT (OHS): 10.65 K/UL (ref 1.8–7.7)
ABSOLUTE NEUTROPHIL COUNT (OHS): 10.69 K/UL (ref 1.8–7.7)
ABSOLUTE NEUTROPHIL COUNT (OHS): 10.8 K/UL (ref 1.8–7.7)
ABSOLUTE NEUTROPHIL COUNT (OHS): 10.95 K/UL (ref 1.8–7.7)
ABSOLUTE NEUTROPHIL COUNT (OHS): 11.04 K/UL (ref 1.8–7.7)
ABSOLUTE NEUTROPHIL COUNT (OHS): 11.47 K/UL (ref 1.8–7.7)
ABSOLUTE NEUTROPHIL COUNT (OHS): 12.91 K/UL (ref 1.8–7.7)
ABSOLUTE NEUTROPHIL COUNT (OHS): 13.12 K/UL (ref 1.8–7.7)
ABSOLUTE NEUTROPHIL COUNT (OHS): 13.28 K/UL (ref 1.8–7.7)
ABSOLUTE NEUTROPHIL COUNT (OHS): 13.52 K/UL (ref 1.8–7.7)
ABSOLUTE NEUTROPHIL COUNT (OHS): 20.4 K/UL (ref 1.8–7.7)
ABSOLUTE NEUTROPHIL COUNT (OHS): 5.75 K/UL (ref 1.8–7.7)
ABSOLUTE NEUTROPHIL COUNT (OHS): 6.84 K/UL (ref 1.8–7.7)
ABSOLUTE NEUTROPHIL COUNT (OHS): 8 K/UL (ref 1.8–7.7)
ABSOLUTE NEUTROPHIL COUNT (OHS): 8.75 K/UL (ref 1.8–7.7)
ABSOLUTE NEUTROPHIL COUNT (OHS): 9.1 K/UL (ref 1.8–7.7)
ABSOLUTE NEUTROPHIL COUNT (OHS): 9.14 K/UL (ref 1.8–7.7)
ABSOLUTE NEUTROPHIL COUNT (OHS): 9.33 K/UL (ref 1.8–7.7)
ABSOLUTE NEUTROPHIL COUNT (OHS): 9.87 K/UL (ref 1.8–7.7)
ABSOLUTE NEUTROPHIL COUNT (OHS): 9.91 K/UL (ref 1.8–7.7)
ABSOLUTE NEUTROPHIL COUNT (OHS): 9.92 K/UL (ref 1.8–7.7)
ABSOLUTE NEUTROPHIL COUNT (OHS): 9.97 K/UL (ref 1.8–7.7)
ALBUMIN SERPL BCP-MCNC: 1.7 G/DL (ref 3.5–5.2)
ALBUMIN SERPL BCP-MCNC: 1.8 G/DL (ref 3.5–5.2)
ALBUMIN SERPL BCP-MCNC: 1.8 G/DL (ref 3.5–5.2)
ALBUMIN SERPL BCP-MCNC: 1.9 G/DL (ref 3.5–5.2)
ALBUMIN SERPL BCP-MCNC: 2 G/DL (ref 3.5–5.2)
ALBUMIN SERPL BCP-MCNC: 2.2 G/DL (ref 3.5–5.2)
ALBUMIN SERPL BCP-MCNC: 2.2 G/DL (ref 3.5–5.2)
ALBUMIN SERPL BCP-MCNC: 2.7 G/DL (ref 3.5–5.2)
ALBUMIN SERPL BCP-MCNC: 2.8 G/DL (ref 3.5–5.2)
ALBUMIN SERPL BCP-MCNC: 3.1 G/DL (ref 3.5–5.2)
ALBUMIN SERPL BCP-MCNC: 3.1 G/DL (ref 3.5–5.2)
ALBUMIN SERPL BCP-MCNC: 3.5 G/DL (ref 3.5–5.2)
ALP SERPL-CCNC: 100 UNIT/L (ref 40–150)
ALP SERPL-CCNC: 117 UNIT/L (ref 40–150)
ALP SERPL-CCNC: 56 UNIT/L (ref 40–150)
ALP SERPL-CCNC: 59 UNIT/L (ref 40–150)
ALP SERPL-CCNC: 61 UNIT/L (ref 40–150)
ALP SERPL-CCNC: 62 UNIT/L (ref 40–150)
ALP SERPL-CCNC: 62 UNIT/L (ref 40–150)
ALP SERPL-CCNC: 63 UNIT/L (ref 40–150)
ALP SERPL-CCNC: 64 UNIT/L (ref 40–150)
ALP SERPL-CCNC: 65 UNIT/L (ref 40–150)
ALP SERPL-CCNC: 66 UNIT/L (ref 40–150)
ALP SERPL-CCNC: 67 UNIT/L (ref 40–150)
ALP SERPL-CCNC: 70 UNIT/L (ref 40–150)
ALP SERPL-CCNC: 70 UNIT/L (ref 40–150)
ALP SERPL-CCNC: 72 UNIT/L (ref 40–150)
ALP SERPL-CCNC: 75 UNIT/L (ref 40–150)
ALP SERPL-CCNC: 78 UNIT/L (ref 40–150)
ALP SERPL-CCNC: 82 UNIT/L (ref 40–150)
ALP SERPL-CCNC: 84 UNIT/L (ref 40–150)
ALP SERPL-CCNC: 84 UNIT/L (ref 40–150)
ALP SERPL-CCNC: 93 UNIT/L (ref 40–150)
ALP SERPL-CCNC: 94 UNIT/L (ref 40–150)
ALT SERPL W/O P-5'-P-CCNC: 11 UNIT/L (ref 10–44)
ALT SERPL W/O P-5'-P-CCNC: 15 UNIT/L (ref 10–44)
ALT SERPL W/O P-5'-P-CCNC: 15 UNIT/L (ref 10–44)
ALT SERPL W/O P-5'-P-CCNC: 17 UNIT/L (ref 10–44)
ALT SERPL W/O P-5'-P-CCNC: 17 UNIT/L (ref 10–44)
ALT SERPL W/O P-5'-P-CCNC: 19 UNIT/L (ref 10–44)
ALT SERPL W/O P-5'-P-CCNC: 19 UNIT/L (ref 10–44)
ALT SERPL W/O P-5'-P-CCNC: 20 UNIT/L (ref 10–44)
ALT SERPL W/O P-5'-P-CCNC: 22 UNIT/L (ref 10–44)
ALT SERPL W/O P-5'-P-CCNC: 23 UNIT/L (ref 10–44)
ALT SERPL W/O P-5'-P-CCNC: 23 UNIT/L (ref 10–44)
ALT SERPL W/O P-5'-P-CCNC: 24 UNIT/L (ref 10–44)
ALT SERPL W/O P-5'-P-CCNC: 7 UNIT/L (ref 10–44)
ALT SERPL W/O P-5'-P-CCNC: <5 UNIT/L (ref 10–44)
ANION GAP (OHS): 10 MMOL/L (ref 8–16)
ANION GAP (OHS): 11 MMOL/L (ref 8–16)
ANION GAP (OHS): 12 MMOL/L (ref 8–16)
ANION GAP (OHS): 13 MMOL/L (ref 8–16)
ANION GAP (OHS): 14 MMOL/L (ref 8–16)
ANION GAP (OHS): 14 MMOL/L (ref 8–16)
ANION GAP (OHS): 16 MMOL/L (ref 8–16)
ANION GAP (OHS): 9 MMOL/L (ref 8–16)
ANISOCYTOSIS BLD QL SMEAR: SLIGHT
APTT PPP: 28 SECONDS (ref 21–32)
APTT PPP: 28.5 SECONDS (ref 21–32)
AST SERPL-CCNC: 13 UNIT/L (ref 11–45)
AST SERPL-CCNC: 14 UNIT/L (ref 11–45)
AST SERPL-CCNC: 27 UNIT/L (ref 11–45)
AST SERPL-CCNC: 27 UNIT/L (ref 11–45)
AST SERPL-CCNC: 29 UNIT/L (ref 11–45)
AST SERPL-CCNC: 30 UNIT/L (ref 11–45)
AST SERPL-CCNC: 32 UNIT/L (ref 11–45)
AST SERPL-CCNC: 32 UNIT/L (ref 11–45)
AST SERPL-CCNC: 34 UNIT/L (ref 11–45)
AST SERPL-CCNC: 35 UNIT/L (ref 11–45)
AST SERPL-CCNC: 36 UNIT/L (ref 11–45)
AST SERPL-CCNC: 38 UNIT/L (ref 11–45)
AST SERPL-CCNC: 39 UNIT/L (ref 11–45)
AST SERPL-CCNC: 40 UNIT/L (ref 11–45)
AST SERPL-CCNC: 41 UNIT/L (ref 11–45)
AST SERPL-CCNC: 41 UNIT/L (ref 11–45)
AST SERPL-CCNC: 43 UNIT/L (ref 11–45)
AST SERPL-CCNC: 49 UNIT/L (ref 11–45)
AV AREA BY CONTINUOUS VTI: 2.3 CM2
AV INDEX (PROSTH): 0.73
AV LVOT MEAN GRADIENT: 3 MMHG
AV LVOT PEAK GRADIENT: 4 MMHG
AV MEAN GRADIENT: 7 MMHG
AV PEAK GRADIENT: 10 MMHG
AV VALVE AREA BY VELOCITY RATIO: 2.2 CM²
AV VALVE AREA: 2.3 CM2
AV VELOCITY RATIO: 0.69
B PERT DNA NPH QL NAA+PROBE: NOT DETECTED
BACTERIA #/AREA URNS AUTO: ABNORMAL /HPF
BACTERIA #/AREA URNS AUTO: ABNORMAL /HPF
BACTERIA #/AREA URNS AUTO: NORMAL /HPF
BACTERIA BLD CULT: NORMAL
BACTERIA SPEC CULT: NORMAL
BASO STIPL BLD QL SMEAR: NORMAL
BASOPHILS # BLD AUTO: 0.02 K/UL
BASOPHILS # BLD AUTO: 0.02 K/UL
BASOPHILS # BLD AUTO: 0.03 K/UL
BASOPHILS # BLD AUTO: 0.04 K/UL
BASOPHILS # BLD AUTO: 0.05 K/UL
BASOPHILS # BLD AUTO: 0.05 K/UL
BASOPHILS # BLD AUTO: 0.06 K/UL
BASOPHILS # BLD AUTO: 0.08 K/UL
BASOPHILS # BLD AUTO: 0.09 K/UL
BASOPHILS # BLD AUTO: 0.11 K/UL
BASOPHILS NFR BLD AUTO: 0.1 %
BASOPHILS NFR BLD AUTO: 0.1 %
BASOPHILS NFR BLD AUTO: 0.2 %
BASOPHILS NFR BLD AUTO: 0.3 %
BASOPHILS NFR BLD AUTO: 0.4 %
BASOPHILS NFR BLD AUTO: 0.5 %
BASOPHILS NFR BLD AUTO: 0.5 %
BASOPHILS NFR BLD AUTO: 0.6 %
BASOPHILS NFR BLD AUTO: 0.7 %
BASOPHILS NFR BLD AUTO: 0.9 %
BASOPHILS NFR BLD AUTO: 0.9 %
BILIRUB SERPL-MCNC: 0.2 MG/DL (ref 0.1–1)
BILIRUB SERPL-MCNC: 0.3 MG/DL (ref 0.1–1)
BILIRUB SERPL-MCNC: 0.4 MG/DL (ref 0.1–1)
BILIRUB SERPL-MCNC: 0.4 MG/DL (ref 0.1–1)
BILIRUB SERPL-MCNC: 0.5 MG/DL (ref 0.1–1)
BILIRUB UR QL STRIP.AUTO: NEGATIVE
BLD PROD TYP BPU: NORMAL
BLD PROD TYP BPU: NORMAL
BLOOD UNIT EXPIRATION DATE: NORMAL
BLOOD UNIT EXPIRATION DATE: NORMAL
BLOOD UNIT TYPE CODE: 5100
BLOOD UNIT TYPE CODE: 5100
BSA FOR ECHO PROCEDURE: 1.85 M2
BUN SERPL-MCNC: 19 MG/DL (ref 8–23)
BUN SERPL-MCNC: 20 MG/DL (ref 8–23)
BUN SERPL-MCNC: 23 MG/DL (ref 8–23)
BUN SERPL-MCNC: 28 MG/DL (ref 8–23)
BUN SERPL-MCNC: 29 MG/DL (ref 8–23)
BUN SERPL-MCNC: 42 MG/DL (ref 8–23)
BUN SERPL-MCNC: 43 MG/DL (ref 8–23)
BUN SERPL-MCNC: 45 MG/DL (ref 8–23)
BUN SERPL-MCNC: 45 MG/DL (ref 8–23)
BUN SERPL-MCNC: 46 MG/DL (ref 8–23)
BUN SERPL-MCNC: 47 MG/DL (ref 8–23)
BUN SERPL-MCNC: 49 MG/DL (ref 8–23)
BUN SERPL-MCNC: 51 MG/DL (ref 8–23)
BUN SERPL-MCNC: 53 MG/DL (ref 8–23)
BUN SERPL-MCNC: 56 MG/DL (ref 8–23)
BUN SERPL-MCNC: 57 MG/DL (ref 8–23)
BUN SERPL-MCNC: 57 MG/DL (ref 8–23)
BUN SERPL-MCNC: 58 MG/DL (ref 8–23)
BUN SERPL-MCNC: 60 MG/DL (ref 8–23)
BUN SERPL-MCNC: 63 MG/DL (ref 8–23)
BUN SERPL-MCNC: 66 MG/DL (ref 8–23)
BUN SERPL-MCNC: 67 MG/DL (ref 8–23)
BUN SERPL-MCNC: 72 MG/DL (ref 8–23)
C PNEUM DNA LOWER RESP QL NAA+NON-PROBE: NOT DETECTED
CALCIUM SERPL-MCNC: 8.2 MG/DL (ref 8.7–10.5)
CALCIUM SERPL-MCNC: 8.5 MG/DL (ref 8.7–10.5)
CALCIUM SERPL-MCNC: 8.5 MG/DL (ref 8.7–10.5)
CALCIUM SERPL-MCNC: 8.7 MG/DL (ref 8.7–10.5)
CALCIUM SERPL-MCNC: 8.7 MG/DL (ref 8.7–10.5)
CALCIUM SERPL-MCNC: 8.8 MG/DL (ref 8.7–10.5)
CALCIUM SERPL-MCNC: 8.9 MG/DL (ref 8.7–10.5)
CALCIUM SERPL-MCNC: 8.9 MG/DL (ref 8.7–10.5)
CALCIUM SERPL-MCNC: 9 MG/DL (ref 8.7–10.5)
CALCIUM SERPL-MCNC: 9.1 MG/DL (ref 8.7–10.5)
CALCIUM SERPL-MCNC: 9.1 MG/DL (ref 8.7–10.5)
CALCIUM SERPL-MCNC: 9.2 MG/DL (ref 8.7–10.5)
CALCIUM SERPL-MCNC: 9.5 MG/DL (ref 8.7–10.5)
CHLORIDE SERPL-SCNC: 100 MMOL/L (ref 95–110)
CHLORIDE SERPL-SCNC: 101 MMOL/L (ref 95–110)
CHLORIDE SERPL-SCNC: 102 MMOL/L (ref 95–110)
CHLORIDE SERPL-SCNC: 103 MMOL/L (ref 95–110)
CHLORIDE SERPL-SCNC: 104 MMOL/L (ref 95–110)
CHLORIDE SERPL-SCNC: 105 MMOL/L (ref 95–110)
CHLORIDE SERPL-SCNC: 106 MMOL/L (ref 95–110)
CHLORIDE SERPL-SCNC: 106 MMOL/L (ref 95–110)
CHLORIDE SERPL-SCNC: 107 MMOL/L (ref 95–110)
CHLORIDE SERPL-SCNC: 109 MMOL/L (ref 95–110)
CHLORIDE SERPL-SCNC: 109 MMOL/L (ref 95–110)
CHLORIDE SERPL-SCNC: 110 MMOL/L (ref 95–110)
CLARITY UR: CLEAR
CO2 SERPL-SCNC: 18 MMOL/L (ref 23–29)
CO2 SERPL-SCNC: 21 MMOL/L (ref 23–29)
CO2 SERPL-SCNC: 22 MMOL/L (ref 23–29)
CO2 SERPL-SCNC: 23 MMOL/L (ref 23–29)
CO2 SERPL-SCNC: 24 MMOL/L (ref 23–29)
CO2 SERPL-SCNC: 25 MMOL/L (ref 23–29)
CO2 SERPL-SCNC: 27 MMOL/L (ref 23–29)
COLOR UR AUTO: COLORLESS
COLOR UR AUTO: YELLOW
CREAT SERPL-MCNC: 0.8 MG/DL (ref 0.5–1.4)
CREAT SERPL-MCNC: 0.9 MG/DL (ref 0.5–1.4)
CREAT SERPL-MCNC: 1 MG/DL (ref 0.5–1.4)
CREAT SERPL-MCNC: 1.1 MG/DL (ref 0.5–1.4)
CREAT SERPL-MCNC: 1.2 MG/DL (ref 0.5–1.4)
CREAT SERPL-MCNC: 1.3 MG/DL (ref 0.5–1.4)
CREAT SERPL-MCNC: 1.4 MG/DL (ref 0.5–1.4)
CREAT SERPL-MCNC: 1.5 MG/DL (ref 0.5–1.4)
CROSSMATCH INTERPRETATION: NORMAL
CROSSMATCH INTERPRETATION: NORMAL
CV ECHO LV RWT: 0.32 CM
DISPENSE STATUS: NORMAL
DISPENSE STATUS: NORMAL
DOP CALC AO PEAK VEL: 1.6 M/S
DOP CALC AO VTI: 29.8 CM
DOP CALC LVOT AREA: 3.1 CM2
DOP CALC LVOT DIAMETER: 2 CM
DOP CALC LVOT PEAK VEL: 1.1 M/S
DOP CALC LVOT STROKE VOLUME: 68.5 CM3
DOP CALCLVOT PEAK VEL VTI: 21.8 CM
ECHO EF ESTIMATED: 65 %
ECHO LV POSTERIOR WALL: 0.7 CM (ref 0.6–1.1)
ERYTHROCYTE [DISTWIDTH] IN BLOOD BY AUTOMATED COUNT: 12.8 % (ref 11.5–14.5)
ERYTHROCYTE [DISTWIDTH] IN BLOOD BY AUTOMATED COUNT: 12.9 % (ref 11.5–14.5)
ERYTHROCYTE [DISTWIDTH] IN BLOOD BY AUTOMATED COUNT: 13 % (ref 11.5–14.5)
ERYTHROCYTE [DISTWIDTH] IN BLOOD BY AUTOMATED COUNT: 13.1 % (ref 11.5–14.5)
ERYTHROCYTE [DISTWIDTH] IN BLOOD BY AUTOMATED COUNT: 13.3 % (ref 11.5–14.5)
ERYTHROCYTE [DISTWIDTH] IN BLOOD BY AUTOMATED COUNT: 13.5 % (ref 11.5–14.5)
ERYTHROCYTE [DISTWIDTH] IN BLOOD BY AUTOMATED COUNT: 13.7 % (ref 11.5–14.5)
ERYTHROCYTE [DISTWIDTH] IN BLOOD BY AUTOMATED COUNT: 13.8 % (ref 11.5–14.5)
ERYTHROCYTE [DISTWIDTH] IN BLOOD BY AUTOMATED COUNT: 13.8 % (ref 11.5–14.5)
ERYTHROCYTE [DISTWIDTH] IN BLOOD BY AUTOMATED COUNT: 14 % (ref 11.5–14.5)
ERYTHROCYTE [DISTWIDTH] IN BLOOD BY AUTOMATED COUNT: 14 % (ref 11.5–14.5)
ERYTHROCYTE [DISTWIDTH] IN BLOOD BY AUTOMATED COUNT: 14.3 % (ref 11.5–14.5)
ERYTHROCYTE [DISTWIDTH] IN BLOOD BY AUTOMATED COUNT: 14.3 % (ref 11.5–14.5)
ERYTHROCYTE [DISTWIDTH] IN BLOOD BY AUTOMATED COUNT: 14.6 % (ref 11.5–14.5)
ERYTHROCYTE [DISTWIDTH] IN BLOOD BY AUTOMATED COUNT: 14.9 % (ref 11.5–14.5)
ERYTHROCYTE [DISTWIDTH] IN BLOOD BY AUTOMATED COUNT: 15.1 % (ref 11.5–14.5)
ERYTHROCYTE [DISTWIDTH] IN BLOOD BY AUTOMATED COUNT: 15.2 % (ref 11.5–14.5)
ERYTHROCYTE [DISTWIDTH] IN BLOOD BY AUTOMATED COUNT: 15.2 % (ref 11.5–14.5)
FLUAV AG UPPER RESP QL IA.RAPID: NEGATIVE
FLUAV RNA NPH QL NAA+NON-PROBE: NOT DETECTED
FLUBV AG UPPER RESP QL IA.RAPID: NEGATIVE
FLUBV RNA NPH QL NAA+NON-PROBE: NOT DETECTED
FRACTIONAL SHORTENING: 34.1 % (ref 28–44)
GFR SERPLBLD CREATININE-BSD FMLA CKD-EPI: 45 ML/MIN/1.73/M2
GFR SERPLBLD CREATININE-BSD FMLA CKD-EPI: 49 ML/MIN/1.73/M2
GFR SERPLBLD CREATININE-BSD FMLA CKD-EPI: 53 ML/MIN/1.73/M2
GFR SERPLBLD CREATININE-BSD FMLA CKD-EPI: 59 ML/MIN/1.73/M2
GFR SERPLBLD CREATININE-BSD FMLA CKD-EPI: >60 ML/MIN/1.73/M2
GLUCOSE SERPL-MCNC: 100 MG/DL (ref 70–110)
GLUCOSE SERPL-MCNC: 107 MG/DL (ref 70–110)
GLUCOSE SERPL-MCNC: 113 MG/DL (ref 70–110)
GLUCOSE SERPL-MCNC: 114 MG/DL (ref 70–110)
GLUCOSE SERPL-MCNC: 120 MG/DL (ref 70–110)
GLUCOSE SERPL-MCNC: 122 MG/DL (ref 70–110)
GLUCOSE SERPL-MCNC: 122 MG/DL (ref 70–110)
GLUCOSE SERPL-MCNC: 123 MG/DL (ref 70–110)
GLUCOSE SERPL-MCNC: 126 MG/DL (ref 70–110)
GLUCOSE SERPL-MCNC: 127 MG/DL (ref 70–110)
GLUCOSE SERPL-MCNC: 129 MG/DL (ref 70–110)
GLUCOSE SERPL-MCNC: 129 MG/DL (ref 70–110)
GLUCOSE SERPL-MCNC: 130 MG/DL (ref 70–110)
GLUCOSE SERPL-MCNC: 130 MG/DL (ref 70–110)
GLUCOSE SERPL-MCNC: 137 MG/DL (ref 70–110)
GLUCOSE SERPL-MCNC: 143 MG/DL (ref 70–110)
GLUCOSE SERPL-MCNC: 146 MG/DL (ref 70–110)
GLUCOSE SERPL-MCNC: 149 MG/DL (ref 70–110)
GLUCOSE SERPL-MCNC: 151 MG/DL (ref 70–110)
GLUCOSE SERPL-MCNC: 153 MG/DL (ref 70–110)
GLUCOSE SERPL-MCNC: 161 MG/DL (ref 70–110)
GLUCOSE SERPL-MCNC: 161 MG/DL (ref 70–110)
GLUCOSE SERPL-MCNC: 87 MG/DL (ref 70–110)
GLUCOSE UR QL STRIP: NEGATIVE
GRAM STN SPEC: NORMAL
HADV DNA NPH QL NAA+NON-PROBE: NOT DETECTED
HCOV 229E RNA NPH QL NAA+NON-PROBE: NOT DETECTED
HCOV HKU1 RNA NPH QL NAA+NON-PROBE: NOT DETECTED
HCOV NL63 RNA NPH QL NAA+NON-PROBE: NOT DETECTED
HCOV OC43 RNA NPH QL NAA+NON-PROBE: NOT DETECTED
HCT VFR BLD AUTO: 24.8 % (ref 40–54)
HCT VFR BLD AUTO: 26.2 % (ref 40–54)
HCT VFR BLD AUTO: 26.6 % (ref 40–54)
HCT VFR BLD AUTO: 27.1 % (ref 40–54)
HCT VFR BLD AUTO: 27.3 % (ref 40–54)
HCT VFR BLD AUTO: 27.3 % (ref 40–54)
HCT VFR BLD AUTO: 27.4 % (ref 40–54)
HCT VFR BLD AUTO: 27.5 % (ref 40–54)
HCT VFR BLD AUTO: 28.1 % (ref 40–54)
HCT VFR BLD AUTO: 28.2 % (ref 40–54)
HCT VFR BLD AUTO: 28.3 % (ref 40–54)
HCT VFR BLD AUTO: 29 % (ref 40–54)
HCT VFR BLD AUTO: 29 % (ref 40–54)
HCT VFR BLD AUTO: 29.5 % (ref 40–54)
HCT VFR BLD AUTO: 30.2 % (ref 40–54)
HCT VFR BLD AUTO: 32 % (ref 40–54)
HCT VFR BLD AUTO: 32.8 % (ref 40–54)
HCT VFR BLD AUTO: 35.9 % (ref 40–54)
HCT VFR BLD AUTO: 36.1 % (ref 40–54)
HCT VFR BLD AUTO: 36.6 % (ref 40–54)
HCT VFR BLD AUTO: 37.5 % (ref 40–54)
HCT VFR BLD AUTO: 39.8 % (ref 40–54)
HGB BLD-MCNC: 10.3 GM/DL (ref 14–18)
HGB BLD-MCNC: 10.4 GM/DL (ref 14–18)
HGB BLD-MCNC: 11.4 GM/DL (ref 14–18)
HGB BLD-MCNC: 11.5 GM/DL (ref 14–18)
HGB BLD-MCNC: 11.9 GM/DL (ref 14–18)
HGB BLD-MCNC: 11.9 GM/DL (ref 14–18)
HGB BLD-MCNC: 12.5 GM/DL (ref 14–18)
HGB BLD-MCNC: 7.9 GM/DL (ref 14–18)
HGB BLD-MCNC: 8.2 GM/DL (ref 14–18)
HGB BLD-MCNC: 8.5 GM/DL (ref 14–18)
HGB BLD-MCNC: 8.5 GM/DL (ref 14–18)
HGB BLD-MCNC: 8.6 GM/DL (ref 14–18)
HGB BLD-MCNC: 8.7 GM/DL (ref 14–18)
HGB BLD-MCNC: 8.7 GM/DL (ref 14–18)
HGB BLD-MCNC: 8.8 GM/DL (ref 14–18)
HGB BLD-MCNC: 8.9 GM/DL (ref 14–18)
HGB BLD-MCNC: 9.1 GM/DL (ref 14–18)
HGB BLD-MCNC: 9.1 GM/DL (ref 14–18)
HGB BLD-MCNC: 9.2 GM/DL (ref 14–18)
HGB BLD-MCNC: 9.6 GM/DL (ref 14–18)
HGB UR QL STRIP: ABNORMAL
HGB UR QL STRIP: NEGATIVE
HGB UR QL STRIP: NEGATIVE
HMPV RNA LOWER RESP QL NAA+NON-PROBE: NOT DETECTED
HMPV RNA NPH QL NAA+NON-PROBE: NOT DETECTED
HOLD SPECIMEN: NORMAL
HPIV1 RNA NPH QL NAA+NON-PROBE: NOT DETECTED
HPIV2 RNA NPH QL NAA+NON-PROBE: NOT DETECTED
HPIV3 RNA NPH QL NAA+NON-PROBE: NOT DETECTED
HPIV4 RNA NPH QL NAA+NON-PROBE: NOT DETECTED
IMM GRANULOCYTES # BLD AUTO: 0.03 K/UL (ref 0–0.04)
IMM GRANULOCYTES # BLD AUTO: 0.03 K/UL (ref 0–0.04)
IMM GRANULOCYTES # BLD AUTO: 0.04 K/UL (ref 0–0.04)
IMM GRANULOCYTES # BLD AUTO: 0.06 K/UL (ref 0–0.04)
IMM GRANULOCYTES # BLD AUTO: 0.08 K/UL (ref 0–0.04)
IMM GRANULOCYTES # BLD AUTO: 0.09 K/UL (ref 0–0.04)
IMM GRANULOCYTES # BLD AUTO: 0.1 K/UL (ref 0–0.04)
IMM GRANULOCYTES # BLD AUTO: 0.11 K/UL (ref 0–0.04)
IMM GRANULOCYTES # BLD AUTO: 0.11 K/UL (ref 0–0.04)
IMM GRANULOCYTES # BLD AUTO: 0.12 K/UL (ref 0–0.04)
IMM GRANULOCYTES # BLD AUTO: 0.13 K/UL (ref 0–0.04)
IMM GRANULOCYTES # BLD AUTO: 0.15 K/UL (ref 0–0.04)
IMM GRANULOCYTES # BLD AUTO: 0.18 K/UL (ref 0–0.04)
IMM GRANULOCYTES # BLD AUTO: 0.21 K/UL (ref 0–0.04)
IMM GRANULOCYTES # BLD AUTO: 0.26 K/UL (ref 0–0.04)
IMM GRANULOCYTES # BLD AUTO: 0.31 K/UL (ref 0–0.04)
IMM GRANULOCYTES # BLD AUTO: 0.39 K/UL (ref 0–0.04)
IMM GRANULOCYTES # BLD AUTO: 0.48 K/UL (ref 0–0.04)
IMM GRANULOCYTES # BLD AUTO: 0.52 K/UL (ref 0–0.04)
IMM GRANULOCYTES NFR BLD AUTO: 0.3 % (ref 0–0.5)
IMM GRANULOCYTES NFR BLD AUTO: 0.3 % (ref 0–0.5)
IMM GRANULOCYTES NFR BLD AUTO: 0.4 % (ref 0–0.5)
IMM GRANULOCYTES NFR BLD AUTO: 0.5 % (ref 0–0.5)
IMM GRANULOCYTES NFR BLD AUTO: 0.5 % (ref 0–0.5)
IMM GRANULOCYTES NFR BLD AUTO: 0.6 % (ref 0–0.5)
IMM GRANULOCYTES NFR BLD AUTO: 0.6 % (ref 0–0.5)
IMM GRANULOCYTES NFR BLD AUTO: 0.7 % (ref 0–0.5)
IMM GRANULOCYTES NFR BLD AUTO: 0.8 % (ref 0–0.5)
IMM GRANULOCYTES NFR BLD AUTO: 0.9 % (ref 0–0.5)
IMM GRANULOCYTES NFR BLD AUTO: 1 % (ref 0–0.5)
IMM GRANULOCYTES NFR BLD AUTO: 1 % (ref 0–0.5)
IMM GRANULOCYTES NFR BLD AUTO: 1.1 % (ref 0–0.5)
IMM GRANULOCYTES NFR BLD AUTO: 1.4 % (ref 0–0.5)
IMM GRANULOCYTES NFR BLD AUTO: 2 % (ref 0–0.5)
IMM GRANULOCYTES NFR BLD AUTO: 2.7 % (ref 0–0.5)
IMM GRANULOCYTES NFR BLD AUTO: 2.8 % (ref 0–0.5)
IMM GRANULOCYTES NFR BLD AUTO: 3.4 % (ref 0–0.5)
IMM GRANULOCYTES NFR BLD AUTO: 4.1 % (ref 0–0.5)
INDIRECT COOMBS: NORMAL
INR PPP: 1 (ref 0.8–1.2)
INR PPP: 1.1 (ref 0.8–1.2)
INTERVENTRICULAR SEPTUM: 1 CM (ref 0.6–1.1)
KETONES UR QL STRIP: NEGATIVE
LA MAJOR: 5.6 CM
LA MINOR: 6.7 CM
LA WIDTH: 5 CM
LACTATE SERPL-SCNC: 0.8 MMOL/L (ref 0.5–2.2)
LACTATE SERPL-SCNC: 1.2 MMOL/L (ref 0.5–2.2)
LEFT ATRIUM SIZE: 4.1 CM
LEFT ATRIUM VOLUME INDEX: 57 ML/M2
LEFT ATRIUM VOLUME: 106 CM3
LEFT INTERNAL DIMENSION IN SYSTOLE: 2.9 CM (ref 2.1–4)
LEFT VENTRICLE DIASTOLIC VOLUME INDEX: 48.13 ML/M2
LEFT VENTRICLE DIASTOLIC VOLUME: 90 ML
LEFT VENTRICLE MASS INDEX: 63.4 G/M2
LEFT VENTRICLE SYSTOLIC VOLUME INDEX: 17.1 ML/M2
LEFT VENTRICLE SYSTOLIC VOLUME: 32 ML
LEFT VENTRICULAR INTERNAL DIMENSION IN DIASTOLE: 4.4 CM (ref 3.5–6)
LEFT VENTRICULAR MASS: 118.6 G
LEUKOCYTE ESTERASE UR QL STRIP: ABNORMAL
LEUKOCYTE ESTERASE UR QL STRIP: ABNORMAL
LEUKOCYTE ESTERASE UR QL STRIP: NEGATIVE
LYMPHOCYTES # BLD AUTO: 0.69 K/UL (ref 1–4.8)
LYMPHOCYTES # BLD AUTO: 0.81 K/UL (ref 1–4.8)
LYMPHOCYTES # BLD AUTO: 0.85 K/UL (ref 1–4.8)
LYMPHOCYTES # BLD AUTO: 0.95 K/UL (ref 1–4.8)
LYMPHOCYTES # BLD AUTO: 1.03 K/UL (ref 1–4.8)
LYMPHOCYTES # BLD AUTO: 1.07 K/UL (ref 1–4.8)
LYMPHOCYTES # BLD AUTO: 1.11 K/UL (ref 1–4.8)
LYMPHOCYTES # BLD AUTO: 1.14 K/UL (ref 1–4.8)
LYMPHOCYTES # BLD AUTO: 1.18 K/UL (ref 1–4.8)
LYMPHOCYTES # BLD AUTO: 1.19 K/UL (ref 1–4.8)
LYMPHOCYTES # BLD AUTO: 1.19 K/UL (ref 1–4.8)
LYMPHOCYTES # BLD AUTO: 1.2 K/UL (ref 1–4.8)
LYMPHOCYTES # BLD AUTO: 1.21 K/UL (ref 1–4.8)
LYMPHOCYTES # BLD AUTO: 1.27 K/UL (ref 1–4.8)
LYMPHOCYTES # BLD AUTO: 1.33 K/UL (ref 1–4.8)
LYMPHOCYTES # BLD AUTO: 1.45 K/UL (ref 1–4.8)
LYMPHOCYTES # BLD AUTO: 1.47 K/UL (ref 1–4.8)
LYMPHOCYTES # BLD AUTO: 1.48 K/UL (ref 1–4.8)
LYMPHOCYTES # BLD AUTO: 1.51 K/UL (ref 1–4.8)
LYMPHOCYTES # BLD AUTO: 1.59 K/UL (ref 1–4.8)
LYMPHOCYTES # BLD AUTO: 1.72 K/UL (ref 1–4.8)
LYMPHOCYTES # BLD AUTO: 1.79 K/UL (ref 1–4.8)
MAGNESIUM SERPL-MCNC: 1.9 MG/DL (ref 1.6–2.6)
MAGNESIUM SERPL-MCNC: 2 MG/DL (ref 1.6–2.6)
MAGNESIUM SERPL-MCNC: 2.1 MG/DL (ref 1.6–2.6)
MAGNESIUM SERPL-MCNC: 2.2 MG/DL (ref 1.6–2.6)
MAGNESIUM SERPL-MCNC: 2.3 MG/DL (ref 1.6–2.6)
MAGNESIUM SERPL-MCNC: 2.3 MG/DL (ref 1.6–2.6)
MAGNESIUM SERPL-MCNC: 2.4 MG/DL (ref 1.6–2.6)
MAGNESIUM SERPL-MCNC: 2.5 MG/DL (ref 1.6–2.6)
MAGNESIUM SERPL-MCNC: 2.5 MG/DL (ref 1.6–2.6)
MCH RBC QN AUTO: 27.9 PG (ref 27–31)
MCH RBC QN AUTO: 28.3 PG (ref 27–31)
MCH RBC QN AUTO: 28.5 PG (ref 27–31)
MCH RBC QN AUTO: 28.6 PG (ref 27–31)
MCH RBC QN AUTO: 28.7 PG (ref 27–31)
MCH RBC QN AUTO: 28.8 PG (ref 27–31)
MCH RBC QN AUTO: 28.9 PG (ref 27–31)
MCH RBC QN AUTO: 29.1 PG (ref 27–31)
MCH RBC QN AUTO: 29.3 PG (ref 27–31)
MCH RBC QN AUTO: 29.3 PG (ref 27–31)
MCH RBC QN AUTO: 29.4 PG (ref 27–31)
MCH RBC QN AUTO: 29.5 PG (ref 27–31)
MCH RBC QN AUTO: 29.5 PG (ref 27–31)
MCH RBC QN AUTO: 29.6 PG (ref 27–31)
MCH RBC QN AUTO: 29.7 PG (ref 27–31)
MCH RBC QN AUTO: 29.8 PG (ref 27–31)
MCH RBC QN AUTO: 29.8 PG (ref 27–31)
MCH RBC QN AUTO: 29.9 PG (ref 27–31)
MCHC RBC AUTO-ENTMCNC: 30.2 G/DL (ref 32–36)
MCHC RBC AUTO-ENTMCNC: 30.8 G/DL (ref 32–36)
MCHC RBC AUTO-ENTMCNC: 31.2 G/DL (ref 32–36)
MCHC RBC AUTO-ENTMCNC: 31.3 G/DL (ref 32–36)
MCHC RBC AUTO-ENTMCNC: 31.3 G/DL (ref 32–36)
MCHC RBC AUTO-ENTMCNC: 31.4 G/DL (ref 32–36)
MCHC RBC AUTO-ENTMCNC: 31.6 G/DL (ref 32–36)
MCHC RBC AUTO-ENTMCNC: 31.7 G/DL (ref 32–36)
MCHC RBC AUTO-ENTMCNC: 31.8 G/DL (ref 32–36)
MCHC RBC AUTO-ENTMCNC: 31.8 G/DL (ref 32–36)
MCHC RBC AUTO-ENTMCNC: 31.9 G/DL (ref 32–36)
MCHC RBC AUTO-ENTMCNC: 31.9 G/DL (ref 32–36)
MCHC RBC AUTO-ENTMCNC: 32 G/DL (ref 32–36)
MCHC RBC AUTO-ENTMCNC: 32 G/DL (ref 32–36)
MCHC RBC AUTO-ENTMCNC: 32.2 G/DL (ref 32–36)
MCHC RBC AUTO-ENTMCNC: 32.2 G/DL (ref 32–36)
MCHC RBC AUTO-ENTMCNC: 32.5 G/DL (ref 32–36)
MCHC RBC AUTO-ENTMCNC: 32.5 G/DL (ref 32–36)
MCV RBC AUTO: 90 FL (ref 82–98)
MCV RBC AUTO: 91 FL (ref 82–98)
MCV RBC AUTO: 92 FL (ref 82–98)
MCV RBC AUTO: 93 FL (ref 82–98)
MCV RBC AUTO: 94 FL (ref 82–98)
MCV RBC AUTO: 95 FL (ref 82–98)
MICROSCOPIC COMMENT: ABNORMAL
MICROSCOPIC COMMENT: NORMAL
MRSA PCR SCRN (OHS): NOT DETECTED
MRSA PCR SCRN (OHS): NOT DETECTED
MV MEAN GRADIENT: 4 MMHG
MV PEAK GRADIENT: 11 MMHG
MV STENOSIS PRESSURE HALF TIME: 155 MS
MV VALVE AREA BY PLANIMETRY: 1.14 CM2
MV VALVE AREA P 1/2 METHOD: 1.42 CM2
NITRITE UR QL STRIP: NEGATIVE
NUCLEATED RBC (/100WBC) (OHS): 0 /100 WBC
OHS CV RV/LV RATIO: 0.59 CM
OHS QRS DURATION: 102 MS
OHS QRS DURATION: 94 MS
OHS QTC CALCULATION: 464 MS
OHS QTC CALCULATION: 490 MS
PH UR STRIP: 6 [PH]
PH UR STRIP: 7 [PH]
PH UR STRIP: 7 [PH]
PHOSPHATE SERPL-MCNC: 2.4 MG/DL (ref 2.7–4.5)
PHOSPHATE SERPL-MCNC: 3.2 MG/DL (ref 2.7–4.5)
PHOSPHATE SERPL-MCNC: 3.5 MG/DL (ref 2.7–4.5)
PHOSPHATE SERPL-MCNC: 3.5 MG/DL (ref 2.7–4.5)
PHOSPHATE SERPL-MCNC: 3.6 MG/DL (ref 2.7–4.5)
PHOSPHATE SERPL-MCNC: 3.8 MG/DL (ref 2.7–4.5)
PHOSPHATE SERPL-MCNC: 3.9 MG/DL (ref 2.7–4.5)
PHOSPHATE SERPL-MCNC: 4 MG/DL (ref 2.7–4.5)
PHOSPHATE SERPL-MCNC: 4 MG/DL (ref 2.7–4.5)
PHOSPHATE SERPL-MCNC: 4.1 MG/DL (ref 2.7–4.5)
PHOSPHATE SERPL-MCNC: 4.1 MG/DL (ref 2.7–4.5)
PHOSPHATE SERPL-MCNC: 4.2 MG/DL (ref 2.7–4.5)
PHOSPHATE SERPL-MCNC: 4.4 MG/DL (ref 2.7–4.5)
PHOSPHATE SERPL-MCNC: 4.5 MG/DL (ref 2.7–4.5)
PHOSPHATE SERPL-MCNC: 4.7 MG/DL (ref 2.7–4.5)
PHOSPHATE SERPL-MCNC: 4.8 MG/DL (ref 2.7–4.5)
PLATELET # BLD AUTO: 205 K/UL (ref 150–450)
PLATELET # BLD AUTO: 208 K/UL (ref 150–450)
PLATELET # BLD AUTO: 226 K/UL (ref 150–450)
PLATELET # BLD AUTO: 237 K/UL (ref 150–450)
PLATELET # BLD AUTO: 247 K/UL (ref 150–450)
PLATELET # BLD AUTO: 254 K/UL (ref 150–450)
PLATELET # BLD AUTO: 307 K/UL (ref 150–450)
PLATELET # BLD AUTO: 323 K/UL (ref 150–450)
PLATELET # BLD AUTO: 349 K/UL (ref 150–450)
PLATELET # BLD AUTO: 371 K/UL (ref 150–450)
PLATELET # BLD AUTO: 374 K/UL (ref 150–450)
PLATELET # BLD AUTO: 397 K/UL (ref 150–450)
PLATELET # BLD AUTO: 419 K/UL (ref 150–450)
PLATELET # BLD AUTO: 432 K/UL (ref 150–450)
PLATELET # BLD AUTO: 448 K/UL (ref 150–450)
PLATELET # BLD AUTO: 455 K/UL (ref 150–450)
PLATELET # BLD AUTO: 460 K/UL (ref 150–450)
PLATELET # BLD AUTO: 474 K/UL (ref 150–450)
PLATELET # BLD AUTO: 550 K/UL (ref 150–450)
PLATELET # BLD AUTO: 560 K/UL (ref 150–450)
PLATELET # BLD AUTO: 566 K/UL (ref 150–450)
PLATELET # BLD AUTO: 616 K/UL (ref 150–450)
PLATELET BLD QL SMEAR: NORMAL
PMV BLD AUTO: 10 FL (ref 9.2–12.9)
PMV BLD AUTO: 10 FL (ref 9.2–12.9)
PMV BLD AUTO: 10.1 FL (ref 9.2–12.9)
PMV BLD AUTO: 10.1 FL (ref 9.2–12.9)
PMV BLD AUTO: 10.2 FL (ref 9.2–12.9)
PMV BLD AUTO: 10.2 FL (ref 9.2–12.9)
PMV BLD AUTO: 10.3 FL (ref 9.2–12.9)
PMV BLD AUTO: 10.4 FL (ref 9.2–12.9)
PMV BLD AUTO: 10.4 FL (ref 9.2–12.9)
PMV BLD AUTO: 10.5 FL (ref 9.2–12.9)
PMV BLD AUTO: 10.6 FL (ref 9.2–12.9)
PMV BLD AUTO: 10.6 FL (ref 9.2–12.9)
PMV BLD AUTO: 10.7 FL (ref 9.2–12.9)
PMV BLD AUTO: 10.8 FL (ref 9.2–12.9)
PMV BLD AUTO: 10.8 FL (ref 9.2–12.9)
PMV BLD AUTO: 10.9 FL (ref 9.2–12.9)
PMV BLD AUTO: 11 FL (ref 9.2–12.9)
PMV BLD AUTO: 9.8 FL (ref 9.2–12.9)
PMV BLD AUTO: 9.9 FL (ref 9.2–12.9)
PMV BLD AUTO: 9.9 FL (ref 9.2–12.9)
POCT GLUCOSE: 127 MG/DL (ref 70–110)
POLYCHROMASIA BLD QL SMEAR: NORMAL
POTASSIUM SERPL-SCNC: 3.7 MMOL/L (ref 3.5–5.1)
POTASSIUM SERPL-SCNC: 3.8 MMOL/L (ref 3.5–5.1)
POTASSIUM SERPL-SCNC: 3.9 MMOL/L (ref 3.5–5.1)
POTASSIUM SERPL-SCNC: 4 MMOL/L (ref 3.5–5.1)
POTASSIUM SERPL-SCNC: 4.1 MMOL/L (ref 3.5–5.1)
POTASSIUM SERPL-SCNC: 4.2 MMOL/L (ref 3.5–5.1)
POTASSIUM SERPL-SCNC: 4.3 MMOL/L (ref 3.5–5.1)
POTASSIUM SERPL-SCNC: 4.4 MMOL/L (ref 3.5–5.1)
POTASSIUM SERPL-SCNC: 4.6 MMOL/L (ref 3.5–5.1)
POTASSIUM SERPL-SCNC: 4.8 MMOL/L (ref 3.5–5.1)
PROCALCITONIN SERPL-MCNC: 0.4 NG/ML
PROCALCITONIN SERPL-MCNC: 0.66 NG/ML
PROCALCITONIN SERPL-MCNC: 1.57 NG/ML
PROT SERPL-MCNC: 5.9 GM/DL (ref 6–8.4)
PROT SERPL-MCNC: 6.1 GM/DL (ref 6–8.4)
PROT SERPL-MCNC: 6.2 GM/DL (ref 6–8.4)
PROT SERPL-MCNC: 6.3 GM/DL (ref 6–8.4)
PROT SERPL-MCNC: 6.4 GM/DL (ref 6–8.4)
PROT SERPL-MCNC: 6.5 GM/DL (ref 6–8.4)
PROT SERPL-MCNC: 6.5 GM/DL (ref 6–8.4)
PROT SERPL-MCNC: 6.6 GM/DL (ref 6–8.4)
PROT SERPL-MCNC: 6.6 GM/DL (ref 6–8.4)
PROT SERPL-MCNC: 6.7 GM/DL (ref 6–8.4)
PROT SERPL-MCNC: 6.7 GM/DL (ref 6–8.4)
PROT SERPL-MCNC: 6.8 GM/DL (ref 6–8.4)
PROT SERPL-MCNC: 6.9 GM/DL (ref 6–8.4)
PROT SERPL-MCNC: 7.1 GM/DL (ref 6–8.4)
PROT SERPL-MCNC: 7.6 GM/DL (ref 6–8.4)
PROT SERPL-MCNC: 7.9 GM/DL (ref 6–8.4)
PROT UR QL STRIP: ABNORMAL
PROT UR QL STRIP: ABNORMAL
PROT UR QL STRIP: NEGATIVE
PROTHROMBIN TIME: 11.2 SECONDS (ref 9–12.5)
PROTHROMBIN TIME: 11.7 SECONDS (ref 9–12.5)
RA MAJOR: 2.81 CM
RA PRESSURE ESTIMATED: 3 MMHG
RA WIDTH: 3.12 CM
RBC # BLD AUTO: 2.74 M/UL (ref 4.6–6.2)
RBC # BLD AUTO: 2.86 M/UL (ref 4.6–6.2)
RBC # BLD AUTO: 2.94 M/UL (ref 4.6–6.2)
RBC # BLD AUTO: 2.99 M/UL (ref 4.6–6.2)
RBC # BLD AUTO: 2.99 M/UL (ref 4.6–6.2)
RBC # BLD AUTO: 3 M/UL (ref 4.6–6.2)
RBC # BLD AUTO: 3.02 M/UL (ref 4.6–6.2)
RBC # BLD AUTO: 3.04 M/UL (ref 4.6–6.2)
RBC # BLD AUTO: 3.05 M/UL (ref 4.6–6.2)
RBC # BLD AUTO: 3.11 M/UL (ref 4.6–6.2)
RBC # BLD AUTO: 3.11 M/UL (ref 4.6–6.2)
RBC # BLD AUTO: 3.12 M/UL (ref 4.6–6.2)
RBC # BLD AUTO: 3.18 M/UL (ref 4.6–6.2)
RBC # BLD AUTO: 3.18 M/UL (ref 4.6–6.2)
RBC # BLD AUTO: 3.26 M/UL (ref 4.6–6.2)
RBC # BLD AUTO: 3.46 M/UL (ref 4.6–6.2)
RBC # BLD AUTO: 3.51 M/UL (ref 4.6–6.2)
RBC # BLD AUTO: 3.84 M/UL (ref 4.6–6.2)
RBC # BLD AUTO: 3.86 M/UL (ref 4.6–6.2)
RBC # BLD AUTO: 4 M/UL (ref 4.6–6.2)
RBC # BLD AUTO: 4.03 M/UL (ref 4.6–6.2)
RBC # BLD AUTO: 4.21 M/UL (ref 4.6–6.2)
RBC #/AREA URNS AUTO: 1 /HPF (ref 0–4)
RBC #/AREA URNS AUTO: 16 /HPF (ref 0–4)
RBC #/AREA URNS AUTO: 2 /HPF (ref 0–4)
RBC #/AREA URNS AUTO: 45 /HPF (ref 0–4)
RELATIVE EOSINOPHIL (OHS): 0 %
RELATIVE EOSINOPHIL (OHS): 0 %
RELATIVE EOSINOPHIL (OHS): 0.1 %
RELATIVE EOSINOPHIL (OHS): 0.1 %
RELATIVE EOSINOPHIL (OHS): 0.2 %
RELATIVE EOSINOPHIL (OHS): 0.4 %
RELATIVE EOSINOPHIL (OHS): 0.5 %
RELATIVE EOSINOPHIL (OHS): 0.5 %
RELATIVE EOSINOPHIL (OHS): 0.6 %
RELATIVE EOSINOPHIL (OHS): 0.7 %
RELATIVE EOSINOPHIL (OHS): 0.8 %
RELATIVE EOSINOPHIL (OHS): 0.9 %
RELATIVE EOSINOPHIL (OHS): 1.1 %
RELATIVE EOSINOPHIL (OHS): 1.2 %
RELATIVE EOSINOPHIL (OHS): 1.3 %
RELATIVE EOSINOPHIL (OHS): 1.3 %
RELATIVE EOSINOPHIL (OHS): 1.4 %
RELATIVE EOSINOPHIL (OHS): 1.6 %
RELATIVE EOSINOPHIL (OHS): 1.8 %
RELATIVE EOSINOPHIL (OHS): 1.8 %
RELATIVE EOSINOPHIL (OHS): 2.1 %
RELATIVE EOSINOPHIL (OHS): 2.1 %
RELATIVE LYMPHOCYTE (OHS): 10.4 % (ref 18–48)
RELATIVE LYMPHOCYTE (OHS): 11.7 % (ref 18–48)
RELATIVE LYMPHOCYTE (OHS): 11.8 % (ref 18–48)
RELATIVE LYMPHOCYTE (OHS): 11.8 % (ref 18–48)
RELATIVE LYMPHOCYTE (OHS): 13.4 % (ref 18–48)
RELATIVE LYMPHOCYTE (OHS): 14.4 % (ref 18–48)
RELATIVE LYMPHOCYTE (OHS): 21 % (ref 18–48)
RELATIVE LYMPHOCYTE (OHS): 4.9 % (ref 18–48)
RELATIVE LYMPHOCYTE (OHS): 5.3 % (ref 18–48)
RELATIVE LYMPHOCYTE (OHS): 5.8 % (ref 18–48)
RELATIVE LYMPHOCYTE (OHS): 6.1 % (ref 18–48)
RELATIVE LYMPHOCYTE (OHS): 7.1 % (ref 18–48)
RELATIVE LYMPHOCYTE (OHS): 7.2 % (ref 18–48)
RELATIVE LYMPHOCYTE (OHS): 7.6 % (ref 18–48)
RELATIVE LYMPHOCYTE (OHS): 8 % (ref 18–48)
RELATIVE LYMPHOCYTE (OHS): 8.2 % (ref 18–48)
RELATIVE LYMPHOCYTE (OHS): 8.5 % (ref 18–48)
RELATIVE LYMPHOCYTE (OHS): 8.6 % (ref 18–48)
RELATIVE LYMPHOCYTE (OHS): 8.7 % (ref 18–48)
RELATIVE LYMPHOCYTE (OHS): 9.1 % (ref 18–48)
RELATIVE LYMPHOCYTE (OHS): 9.5 % (ref 18–48)
RELATIVE LYMPHOCYTE (OHS): 9.6 % (ref 18–48)
RELATIVE MONOCYTE (OHS): 10 % (ref 4–15)
RELATIVE MONOCYTE (OHS): 10.5 % (ref 4–15)
RELATIVE MONOCYTE (OHS): 10.5 % (ref 4–15)
RELATIVE MONOCYTE (OHS): 10.6 % (ref 4–15)
RELATIVE MONOCYTE (OHS): 10.8 % (ref 4–15)
RELATIVE MONOCYTE (OHS): 11 % (ref 4–15)
RELATIVE MONOCYTE (OHS): 11.1 % (ref 4–15)
RELATIVE MONOCYTE (OHS): 11.3 % (ref 4–15)
RELATIVE MONOCYTE (OHS): 11.9 % (ref 4–15)
RELATIVE MONOCYTE (OHS): 12.3 % (ref 4–15)
RELATIVE MONOCYTE (OHS): 12.9 % (ref 4–15)
RELATIVE MONOCYTE (OHS): 13 % (ref 4–15)
RELATIVE MONOCYTE (OHS): 13.1 % (ref 4–15)
RELATIVE MONOCYTE (OHS): 14.3 % (ref 4–15)
RELATIVE MONOCYTE (OHS): 15 % (ref 4–15)
RELATIVE MONOCYTE (OHS): 8.3 % (ref 4–15)
RELATIVE MONOCYTE (OHS): 8.3 % (ref 4–15)
RELATIVE MONOCYTE (OHS): 8.4 % (ref 4–15)
RELATIVE MONOCYTE (OHS): 8.6 % (ref 4–15)
RELATIVE MONOCYTE (OHS): 9 % (ref 4–15)
RELATIVE MONOCYTE (OHS): 9.4 % (ref 4–15)
RELATIVE MONOCYTE (OHS): 9.8 % (ref 4–15)
RELATIVE NEUTROPHIL (OHS): 67.2 % (ref 38–73)
RELATIVE NEUTROPHIL (OHS): 68.2 % (ref 38–73)
RELATIVE NEUTROPHIL (OHS): 70.2 % (ref 38–73)
RELATIVE NEUTROPHIL (OHS): 72.4 % (ref 38–73)
RELATIVE NEUTROPHIL (OHS): 72.5 % (ref 38–73)
RELATIVE NEUTROPHIL (OHS): 73.1 % (ref 38–73)
RELATIVE NEUTROPHIL (OHS): 74.1 % (ref 38–73)
RELATIVE NEUTROPHIL (OHS): 74.6 % (ref 38–73)
RELATIVE NEUTROPHIL (OHS): 74.9 % (ref 38–73)
RELATIVE NEUTROPHIL (OHS): 75.8 % (ref 38–73)
RELATIVE NEUTROPHIL (OHS): 76.5 % (ref 38–73)
RELATIVE NEUTROPHIL (OHS): 76.9 % (ref 38–73)
RELATIVE NEUTROPHIL (OHS): 77.5 % (ref 38–73)
RELATIVE NEUTROPHIL (OHS): 79.8 % (ref 38–73)
RELATIVE NEUTROPHIL (OHS): 80 % (ref 38–73)
RELATIVE NEUTROPHIL (OHS): 81 % (ref 38–73)
RELATIVE NEUTROPHIL (OHS): 81.3 % (ref 38–73)
RELATIVE NEUTROPHIL (OHS): 82.4 % (ref 38–73)
RELATIVE NEUTROPHIL (OHS): 82.5 % (ref 38–73)
RELATIVE NEUTROPHIL (OHS): 83 % (ref 38–73)
RELATIVE NEUTROPHIL (OHS): 83.9 % (ref 38–73)
RELATIVE NEUTROPHIL (OHS): 85.6 % (ref 38–73)
RH BLD: NORMAL
RIGHT VENTRICLE DIASTOLIC BASEL DIMENSION: 2.6 CM
RSV A 5' UTR RNA NPH QL NAA+PROBE: NEGATIVE
RSV RNA NPH QL NAA+NON-PROBE: NOT DETECTED
RSV RNA NPH QL NAA+NON-PROBE: NOT DETECTED
RV+EV RNA NPH QL NAA+NON-PROBE: NOT DETECTED
SARS-COV-2 RNA RESP QL NAA+PROBE: NEGATIVE
SARS-COV-2 RNA RESP QL NAA+PROBE: NOT DETECTED
SINUS: 3.18 CM
SODIUM SERPL-SCNC: 135 MMOL/L (ref 136–145)
SODIUM SERPL-SCNC: 136 MMOL/L (ref 136–145)
SODIUM SERPL-SCNC: 137 MMOL/L (ref 136–145)
SODIUM SERPL-SCNC: 137 MMOL/L (ref 136–145)
SODIUM SERPL-SCNC: 138 MMOL/L (ref 136–145)
SODIUM SERPL-SCNC: 139 MMOL/L (ref 136–145)
SODIUM SERPL-SCNC: 139 MMOL/L (ref 136–145)
SODIUM SERPL-SCNC: 140 MMOL/L (ref 136–145)
SODIUM SERPL-SCNC: 141 MMOL/L (ref 136–145)
SODIUM SERPL-SCNC: 141 MMOL/L (ref 136–145)
SODIUM SERPL-SCNC: 142 MMOL/L (ref 136–145)
SODIUM SERPL-SCNC: 143 MMOL/L (ref 136–145)
SODIUM SERPL-SCNC: 143 MMOL/L (ref 136–145)
SP GR UR STRIP: 1
SP GR UR STRIP: 1.01
SP GR UR STRIP: 1.02
SPECIMEN OUTDATE: NORMAL
SPECIMEN SOURCE: NORMAL
STJ: 2.6 CM
TDI LATERAL: 0.03 M/S
TDI SEPTAL: 0.03 M/S
TDI: 0.03 M/S
TRICUSPID ANNULAR PLANE SYSTOLIC EXCURSION: 2 CM
TROPONIN I SERPL HS-MCNC: 82 NG/L
TROPONIN I SERPL HS-MCNC: 90 NG/L
TSH SERPL-ACNC: 1.18 UIU/ML (ref 0.4–4)
UNIT NUMBER: NORMAL
UNIT NUMBER: NORMAL
UROBILINOGEN UR STRIP-ACNC: NEGATIVE EU/DL
VANCOMYCIN SERPL-MCNC: 11.8 UG/ML (ref ?–80)
VANCOMYCIN SERPL-MCNC: 8.8 UG/ML (ref ?–80)
VANCOMYCIN SERPL-MCNC: 9.6 UG/ML (ref ?–80)
WBC # BLD AUTO: 11.06 K/UL (ref 3.9–12.7)
WBC # BLD AUTO: 11.23 K/UL (ref 3.9–12.7)
WBC # BLD AUTO: 12.02 K/UL (ref 3.9–12.7)
WBC # BLD AUTO: 12.46 K/UL (ref 3.9–12.7)
WBC # BLD AUTO: 12.5 K/UL (ref 3.9–12.7)
WBC # BLD AUTO: 12.81 K/UL (ref 3.9–12.7)
WBC # BLD AUTO: 12.9 K/UL (ref 3.9–12.7)
WBC # BLD AUTO: 13.31 K/UL (ref 3.9–12.7)
WBC # BLD AUTO: 13.39 K/UL (ref 3.9–12.7)
WBC # BLD AUTO: 13.93 K/UL (ref 3.9–12.7)
WBC # BLD AUTO: 13.99 K/UL (ref 3.9–12.7)
WBC # BLD AUTO: 14.06 K/UL (ref 3.9–12.7)
WBC # BLD AUTO: 14.2 K/UL (ref 3.9–12.7)
WBC # BLD AUTO: 14.34 K/UL (ref 3.9–12.7)
WBC # BLD AUTO: 14.7 K/UL (ref 3.9–12.7)
WBC # BLD AUTO: 15.34 K/UL (ref 3.9–12.7)
WBC # BLD AUTO: 15.86 K/UL (ref 3.9–12.7)
WBC # BLD AUTO: 16.29 K/UL (ref 3.9–12.7)
WBC # BLD AUTO: 16.63 K/UL (ref 3.9–12.7)
WBC # BLD AUTO: 24.3 K/UL (ref 3.9–12.7)
WBC # BLD AUTO: 8.54 K/UL (ref 3.9–12.7)
WBC # BLD AUTO: 9.45 K/UL (ref 3.9–12.7)
WBC #/AREA URNS AUTO: 1 /HPF (ref 0–5)
WBC #/AREA URNS AUTO: 1 /HPF (ref 0–5)
WBC #/AREA URNS AUTO: 10 /HPF (ref 0–5)
WBC #/AREA URNS AUTO: 6 /HPF (ref 0–5)
Z-SCORE OF LEFT VENTRICULAR DIMENSION IN END DIASTOLE: -1.69
Z-SCORE OF LEFT VENTRICULAR DIMENSION IN END SYSTOLE: -0.81

## 2025-01-01 PROCEDURE — 80202 ASSAY OF VANCOMYCIN: CPT | Performed by: PSYCHIATRY & NEUROLOGY

## 2025-01-01 PROCEDURE — 63600175 PHARM REV CODE 636 W HCPCS: Performed by: PSYCHIATRY & NEUROLOGY

## 2025-01-01 PROCEDURE — 94640 AIRWAY INHALATION TREATMENT: CPT

## 2025-01-01 PROCEDURE — 63600175 PHARM REV CODE 636 W HCPCS: Performed by: STUDENT IN AN ORGANIZED HEALTH CARE EDUCATION/TRAINING PROGRAM

## 2025-01-01 PROCEDURE — 25000003 PHARM REV CODE 250

## 2025-01-01 PROCEDURE — 25000003 PHARM REV CODE 250: Performed by: PSYCHIATRY & NEUROLOGY

## 2025-01-01 PROCEDURE — 99233 SBSQ HOSP IP/OBS HIGH 50: CPT | Mod: ,,, | Performed by: STUDENT IN AN ORGANIZED HEALTH CARE EDUCATION/TRAINING PROGRAM

## 2025-01-01 PROCEDURE — 94668 MNPJ CHEST WALL SBSQ: CPT

## 2025-01-01 PROCEDURE — 36415 COLL VENOUS BLD VENIPUNCTURE: CPT | Performed by: HOSPITALIST

## 2025-01-01 PROCEDURE — 25000242 PHARM REV CODE 250 ALT 637 W/ HCPCS

## 2025-01-01 PROCEDURE — 97530 THERAPEUTIC ACTIVITIES: CPT

## 2025-01-01 PROCEDURE — 97164 PT RE-EVAL EST PLAN CARE: CPT

## 2025-01-01 PROCEDURE — 99291 CRITICAL CARE FIRST HOUR: CPT | Mod: ,,,

## 2025-01-01 PROCEDURE — 82040 ASSAY OF SERUM ALBUMIN: CPT

## 2025-01-01 PROCEDURE — 84100 ASSAY OF PHOSPHORUS: CPT

## 2025-01-01 PROCEDURE — 25000003 PHARM REV CODE 250: Performed by: HOSPITALIST

## 2025-01-01 PROCEDURE — 86901 BLOOD TYPING SEROLOGIC RH(D): CPT

## 2025-01-01 PROCEDURE — C9254 INJECTION, LACOSAMIDE: HCPCS | Performed by: PHYSICIAN ASSISTANT

## 2025-01-01 PROCEDURE — 99291 CRITICAL CARE FIRST HOUR: CPT | Mod: ICN,,,

## 2025-01-01 PROCEDURE — 80053 COMPREHEN METABOLIC PANEL: CPT

## 2025-01-01 PROCEDURE — 36000710: Performed by: NEUROLOGICAL SURGERY

## 2025-01-01 PROCEDURE — 94761 N-INVAS EAR/PLS OXIMETRY MLT: CPT

## 2025-01-01 PROCEDURE — 92526 ORAL FUNCTION THERAPY: CPT

## 2025-01-01 PROCEDURE — 63600175 PHARM REV CODE 636 W HCPCS

## 2025-01-01 PROCEDURE — 99499 UNLISTED E&M SERVICE: CPT | Mod: ,,, | Performed by: STUDENT IN AN ORGANIZED HEALTH CARE EDUCATION/TRAINING PROGRAM

## 2025-01-01 PROCEDURE — 99499 UNLISTED E&M SERVICE: CPT | Mod: ,,, | Performed by: PHYSICIAN ASSISTANT

## 2025-01-01 PROCEDURE — 95720 EEG PHY/QHP EA INCR W/VEEG: CPT | Mod: ,,, | Performed by: PSYCHIATRY & NEUROLOGY

## 2025-01-01 PROCEDURE — 25000242 PHARM REV CODE 250 ALT 637 W/ HCPCS: Performed by: PSYCHIATRY & NEUROLOGY

## 2025-01-01 PROCEDURE — 99900035 HC TECH TIME PER 15 MIN (STAT)

## 2025-01-01 PROCEDURE — 92610 EVALUATE SWALLOWING FUNCTION: CPT

## 2025-01-01 PROCEDURE — 20000000 HC ICU ROOM

## 2025-01-01 PROCEDURE — 63600531 PHARM REV CODE 636 NO ALT 250 W HCPCS: Mod: JZ,TB | Performed by: STUDENT IN AN ORGANIZED HEALTH CARE EDUCATION/TRAINING PROGRAM

## 2025-01-01 PROCEDURE — 87641 MR-STAPH DNA AMP PROBE: CPT

## 2025-01-01 PROCEDURE — 99499 UNLISTED E&M SERVICE: CPT | Mod: ,,,

## 2025-01-01 PROCEDURE — 63600175 PHARM REV CODE 636 W HCPCS: Performed by: PHYSICIAN ASSISTANT

## 2025-01-01 PROCEDURE — 25000003 PHARM REV CODE 250: Performed by: STUDENT IN AN ORGANIZED HEALTH CARE EDUCATION/TRAINING PROGRAM

## 2025-01-01 PROCEDURE — 85025 COMPLETE CBC W/AUTO DIFF WBC: CPT

## 2025-01-01 PROCEDURE — 83735 ASSAY OF MAGNESIUM: CPT

## 2025-01-01 PROCEDURE — 00P60JZ REMOVAL OF SYNTHETIC SUBSTITUTE FROM CEREBRAL VENTRICLE, OPEN APPROACH: ICD-10-PCS | Performed by: NEUROLOGICAL SURGERY

## 2025-01-01 PROCEDURE — 95718 EEG PHYS/QHP 2-12 HR W/VEEG: CPT | Mod: ,,, | Performed by: PSYCHIATRY & NEUROLOGY

## 2025-01-01 PROCEDURE — 99285 EMERGENCY DEPT VISIT HI MDM: CPT | Mod: 25

## 2025-01-01 PROCEDURE — 95700 EEG CONT REC W/VID EEG TECH: CPT

## 2025-01-01 PROCEDURE — 97166 OT EVAL MOD COMPLEX 45 MIN: CPT

## 2025-01-01 PROCEDURE — 62256 REMOVE BRAIN CAVITY SHUNT: CPT | Mod: 58,51,, | Performed by: NEUROLOGICAL SURGERY

## 2025-01-01 PROCEDURE — 97535 SELF CARE MNGMENT TRAINING: CPT

## 2025-01-01 PROCEDURE — 27000221 HC OXYGEN, UP TO 24 HOURS

## 2025-01-01 PROCEDURE — 95714 VEEG EA 12-26 HR UNMNTR: CPT

## 2025-01-01 PROCEDURE — 51798 US URINE CAPACITY MEASURE: CPT

## 2025-01-01 PROCEDURE — 99900026 HC AIRWAY MAINTENANCE (STAT)

## 2025-01-01 PROCEDURE — 99233 SBSQ HOSP IP/OBS HIGH 50: CPT | Mod: GC,,, | Performed by: PSYCHIATRY & NEUROLOGY

## 2025-01-01 PROCEDURE — 99291 CRITICAL CARE FIRST HOUR: CPT | Mod: GC,,, | Performed by: PSYCHIATRY & NEUROLOGY

## 2025-01-01 PROCEDURE — C9254 INJECTION, LACOSAMIDE: HCPCS

## 2025-01-01 PROCEDURE — 86850 RBC ANTIBODY SCREEN: CPT | Performed by: HOSPITALIST

## 2025-01-01 PROCEDURE — 97530 THERAPEUTIC ACTIVITIES: CPT | Mod: CQ

## 2025-01-01 PROCEDURE — 83605 ASSAY OF LACTIC ACID: CPT

## 2025-01-01 PROCEDURE — 99291 CRITICAL CARE FIRST HOUR: CPT | Mod: FS,,, | Performed by: STUDENT IN AN ORGANIZED HEALTH CARE EDUCATION/TRAINING PROGRAM

## 2025-01-01 PROCEDURE — 37000009 HC ANESTHESIA EA ADD 15 MINS: Performed by: NEUROLOGICAL SURGERY

## 2025-01-01 PROCEDURE — 85025 COMPLETE CBC W/AUTO DIFF WBC: CPT | Performed by: STUDENT IN AN ORGANIZED HEALTH CARE EDUCATION/TRAINING PROGRAM

## 2025-01-01 PROCEDURE — 86900 BLOOD TYPING SEROLOGIC ABO: CPT

## 2025-01-01 PROCEDURE — 51702 INSERT TEMP BLADDER CATH: CPT

## 2025-01-01 PROCEDURE — 99233 SBSQ HOSP IP/OBS HIGH 50: CPT | Mod: FS,,, | Performed by: PSYCHIATRY & NEUROLOGY

## 2025-01-01 PROCEDURE — 94667 MNPJ CHEST WALL 1ST: CPT

## 2025-01-01 PROCEDURE — 97110 THERAPEUTIC EXERCISES: CPT

## 2025-01-01 PROCEDURE — 0241U SARS-COV2 (COVID) WITH FLU/RSV BY PCR: CPT

## 2025-01-01 PROCEDURE — 20600001 HC STEP DOWN PRIVATE ROOM

## 2025-01-01 PROCEDURE — 37000008 HC ANESTHESIA 1ST 15 MINUTES: Performed by: NEUROLOGICAL SURGERY

## 2025-01-01 PROCEDURE — 27200966 HC CLOSED SUCTION SYSTEM

## 2025-01-01 PROCEDURE — 96365 THER/PROPH/DIAG IV INF INIT: CPT

## 2025-01-01 PROCEDURE — 85730 THROMBOPLASTIN TIME PARTIAL: CPT

## 2025-01-01 PROCEDURE — 97168 OT RE-EVAL EST PLAN CARE: CPT

## 2025-01-01 PROCEDURE — 36415 COLL VENOUS BLD VENIPUNCTURE: CPT | Performed by: STUDENT IN AN ORGANIZED HEALTH CARE EDUCATION/TRAINING PROGRAM

## 2025-01-01 PROCEDURE — 93005 ELECTROCARDIOGRAM TRACING: CPT

## 2025-01-01 PROCEDURE — 92523 SPEECH SOUND LANG COMPREHEN: CPT

## 2025-01-01 PROCEDURE — 25000003 PHARM REV CODE 250: Performed by: INTERNAL MEDICINE

## 2025-01-01 PROCEDURE — 84145 PROCALCITONIN (PCT): CPT | Performed by: NURSE PRACTITIONER

## 2025-01-01 PROCEDURE — 31720 CLEARANCE OF AIRWAYS: CPT

## 2025-01-01 PROCEDURE — 81001 URINALYSIS AUTO W/SCOPE: CPT | Performed by: STUDENT IN AN ORGANIZED HEALTH CARE EDUCATION/TRAINING PROGRAM

## 2025-01-01 PROCEDURE — 87040 BLOOD CULTURE FOR BACTERIA: CPT

## 2025-01-01 PROCEDURE — 85610 PROTHROMBIN TIME: CPT

## 2025-01-01 PROCEDURE — C1713 ANCHOR/SCREW BN/BN,TIS/BN: HCPCS | Performed by: NEUROLOGICAL SURGERY

## 2025-01-01 PROCEDURE — 97162 PT EVAL MOD COMPLEX 30 MIN: CPT

## 2025-01-01 PROCEDURE — 87070 CULTURE OTHR SPECIMN AEROBIC: CPT

## 2025-01-01 PROCEDURE — 92507 TX SP LANG VOICE COMM INDIV: CPT

## 2025-01-01 PROCEDURE — 8E09XBZ COMPUTER ASSISTED PROCEDURE OF HEAD AND NECK REGION: ICD-10-PCS | Performed by: NEUROLOGICAL SURGERY

## 2025-01-01 PROCEDURE — 84145 PROCALCITONIN (PCT): CPT

## 2025-01-01 PROCEDURE — 99223 1ST HOSP IP/OBS HIGH 75: CPT | Mod: 25,,,

## 2025-01-01 PROCEDURE — 82040 ASSAY OF SERUM ALBUMIN: CPT | Performed by: STUDENT IN AN ORGANIZED HEALTH CARE EDUCATION/TRAINING PROGRAM

## 2025-01-01 PROCEDURE — 99497 ADVNCD CARE PLAN 30 MIN: CPT | Mod: 25,,,

## 2025-01-01 PROCEDURE — 99233 SBSQ HOSP IP/OBS HIGH 50: CPT | Mod: ,,, | Performed by: PHYSICIAN ASSISTANT

## 2025-01-01 PROCEDURE — 51701 INSERT BLADDER CATHETER: CPT

## 2025-01-01 PROCEDURE — C1729 CATH, DRAINAGE: HCPCS | Performed by: NEUROLOGICAL SURGERY

## 2025-01-01 PROCEDURE — 61312 CRNEC/CRNOT STTL XDRL/SDRL: CPT | Mod: 58,,, | Performed by: NEUROLOGICAL SURGERY

## 2025-01-01 PROCEDURE — 87641 MR-STAPH DNA AMP PROBE: CPT | Performed by: PHYSICIAN ASSISTANT

## 2025-01-01 PROCEDURE — 97112 NEUROMUSCULAR REEDUCATION: CPT

## 2025-01-01 PROCEDURE — 97110 THERAPEUTIC EXERCISES: CPT | Mod: CQ

## 2025-01-01 PROCEDURE — 99291 CRITICAL CARE FIRST HOUR: CPT | Mod: ,,, | Performed by: STUDENT IN AN ORGANIZED HEALTH CARE EDUCATION/TRAINING PROGRAM

## 2025-01-01 PROCEDURE — 84132 ASSAY OF SERUM POTASSIUM: CPT | Performed by: PSYCHIATRY & NEUROLOGY

## 2025-01-01 PROCEDURE — 99291 CRITICAL CARE FIRST HOUR: CPT | Mod: FS,,, | Performed by: PSYCHIATRY & NEUROLOGY

## 2025-01-01 PROCEDURE — 99232 SBSQ HOSP IP/OBS MODERATE 35: CPT | Mod: FS,,, | Performed by: PSYCHIATRY & NEUROLOGY

## 2025-01-01 PROCEDURE — 86850 RBC ANTIBODY SCREEN: CPT

## 2025-01-01 PROCEDURE — 99291 CRITICAL CARE FIRST HOUR: CPT | Mod: GC,,, | Performed by: STUDENT IN AN ORGANIZED HEALTH CARE EDUCATION/TRAINING PROGRAM

## 2025-01-01 PROCEDURE — 11000001 HC ACUTE MED/SURG PRIVATE ROOM

## 2025-01-01 PROCEDURE — 009400Z DRAINAGE OF INTRACRANIAL SUBDURAL SPACE WITH DRAINAGE DEVICE, OPEN APPROACH: ICD-10-PCS | Performed by: NEUROLOGICAL SURGERY

## 2025-01-01 PROCEDURE — 99291 CRITICAL CARE FIRST HOUR: CPT | Mod: FS,,,

## 2025-01-01 PROCEDURE — 27100171 HC OXYGEN HIGH FLOW UP TO 24 HOURS

## 2025-01-01 PROCEDURE — 86920 COMPATIBILITY TEST SPIN: CPT | Performed by: NEUROLOGICAL SURGERY

## 2025-01-01 PROCEDURE — 0202U NFCT DS 22 TRGT SARS-COV-2: CPT

## 2025-01-01 PROCEDURE — 84443 ASSAY THYROID STIM HORMONE: CPT

## 2025-01-01 PROCEDURE — 81001 URINALYSIS AUTO W/SCOPE: CPT

## 2025-01-01 PROCEDURE — 25000003 PHARM REV CODE 250: Performed by: NEUROLOGICAL SURGERY

## 2025-01-01 PROCEDURE — 36000711: Performed by: NEUROLOGICAL SURGERY

## 2025-01-01 PROCEDURE — 86850 RBC ANTIBODY SCREEN: CPT | Performed by: PSYCHIATRY & NEUROLOGY

## 2025-01-01 PROCEDURE — 36415 COLL VENOUS BLD VENIPUNCTURE: CPT

## 2025-01-01 PROCEDURE — 83735 ASSAY OF MAGNESIUM: CPT | Performed by: HOSPITALIST

## 2025-01-01 PROCEDURE — 80202 ASSAY OF VANCOMYCIN: CPT | Performed by: STUDENT IN AN ORGANIZED HEALTH CARE EDUCATION/TRAINING PROGRAM

## 2025-01-01 PROCEDURE — 63600175 PHARM REV CODE 636 W HCPCS: Performed by: NEUROLOGICAL SURGERY

## 2025-01-01 PROCEDURE — 93010 ELECTROCARDIOGRAM REPORT: CPT | Mod: ,,, | Performed by: INTERNAL MEDICINE

## 2025-01-01 PROCEDURE — 27201423 OPTIME MED/SURG SUP & DEVICES STERILE SUPPLY: Performed by: NEUROLOGICAL SURGERY

## 2025-01-01 PROCEDURE — 61781 SCAN PROC CRANIAL INTRA: CPT | Mod: ,,, | Performed by: NEUROLOGICAL SURGERY

## 2025-01-01 PROCEDURE — 99223 1ST HOSP IP/OBS HIGH 75: CPT | Mod: ,,, | Performed by: PSYCHIATRY & NEUROLOGY

## 2025-01-01 PROCEDURE — 84484 ASSAY OF TROPONIN QUANT: CPT | Performed by: STUDENT IN AN ORGANIZED HEALTH CARE EDUCATION/TRAINING PROGRAM

## 2025-01-01 PROCEDURE — 81003 URINALYSIS AUTO W/O SCOPE: CPT

## 2025-01-01 DEVICE — PLATE BONE BUR HOLE COVER 10MM: Type: IMPLANTABLE DEVICE | Site: CRANIAL | Status: FUNCTIONAL

## 2025-01-01 DEVICE — PLATE BONE 2X2 HOLE SM BOX: Type: IMPLANTABLE DEVICE | Site: CRANIAL | Status: FUNCTIONAL

## 2025-01-01 DEVICE — SCREW UN3 AXS SD 1.5X4MM: Type: IMPLANTABLE DEVICE | Site: CRANIAL | Status: FUNCTIONAL

## 2025-01-01 RX ORDER — LANOLIN ALCOHOL/MO/W.PET/CERES
800 CREAM (GRAM) TOPICAL
Status: DISCONTINUED | OUTPATIENT
Start: 2025-01-01 | End: 2025-01-01

## 2025-01-01 RX ORDER — SODIUM,POTASSIUM PHOSPHATES 280-250MG
2 POWDER IN PACKET (EA) ORAL
Status: DISCONTINUED | OUTPATIENT
Start: 2025-01-01 | End: 2025-01-01

## 2025-01-01 RX ORDER — LEVOTHYROXINE SODIUM 125 UG/1
125 TABLET ORAL
Status: DISCONTINUED | OUTPATIENT
Start: 2025-01-01 | End: 2025-01-01

## 2025-01-01 RX ORDER — SILODOSIN 4 MG/1
4 CAPSULE ORAL DAILY
Status: DISCONTINUED | OUTPATIENT
Start: 2025-01-01 | End: 2025-01-01

## 2025-01-01 RX ORDER — METOPROLOL TARTRATE 25 MG/1
25 TABLET, FILM COATED ORAL EVERY 8 HOURS
Status: DISCONTINUED | OUTPATIENT
Start: 2025-01-01 | End: 2025-01-01

## 2025-01-01 RX ORDER — NOREPINEPHRINE BITARTRATE/D5W 4MG/250ML
0-.2 PLASTIC BAG, INJECTION (ML) INTRAVENOUS CONTINUOUS
Status: DISCONTINUED | OUTPATIENT
Start: 2025-01-01 | End: 2025-01-01

## 2025-01-01 RX ORDER — MODAFINIL 100 MG/1
100 TABLET ORAL ONCE
Status: COMPLETED | OUTPATIENT
Start: 2025-01-01 | End: 2025-01-01

## 2025-01-01 RX ORDER — MODAFINIL 100 MG/1
100 TABLET ORAL DAILY
Status: DISCONTINUED | OUTPATIENT
Start: 2025-01-01 | End: 2025-01-01

## 2025-01-01 RX ORDER — LEVETIRACETAM 500 MG/5ML
1000 INJECTION, SOLUTION, CONCENTRATE INTRAVENOUS EVERY 12 HOURS
Status: DISCONTINUED | OUTPATIENT
Start: 2025-01-01 | End: 2025-01-01

## 2025-01-01 RX ORDER — SODIUM CHLORIDE 0.9 % (FLUSH) 0.9 %
10 SYRINGE (ML) INJECTION
Status: DISCONTINUED | OUTPATIENT
Start: 2025-01-01 | End: 2025-01-01 | Stop reason: HOSPADM

## 2025-01-01 RX ORDER — MODAFINIL 100 MG/1
200 TABLET ORAL DAILY
Status: DISCONTINUED | OUTPATIENT
Start: 2025-01-01 | End: 2025-01-01

## 2025-01-01 RX ORDER — GLYCOPYRROLATE 0.2 MG/ML
0.4 INJECTION INTRAMUSCULAR; INTRAVENOUS EVERY 4 HOURS PRN
Status: DISCONTINUED | OUTPATIENT
Start: 2025-01-01 | End: 2025-01-01 | Stop reason: HOSPADM

## 2025-01-01 RX ORDER — METOPROLOL TARTRATE 1 MG/ML
5 INJECTION, SOLUTION INTRAVENOUS EVERY 5 MIN PRN
Status: DISCONTINUED | OUTPATIENT
Start: 2025-01-01 | End: 2025-01-01

## 2025-01-01 RX ORDER — LACOSAMIDE 10 MG/ML
150 INJECTION, SOLUTION INTRAVENOUS EVERY 12 HOURS
Status: DISCONTINUED | OUTPATIENT
Start: 2025-01-01 | End: 2025-01-01

## 2025-01-01 RX ORDER — MORPHINE SULFATE 2 MG/ML
2 INJECTION, SOLUTION INTRAMUSCULAR; INTRAVENOUS EVERY 6 HOURS PRN
Status: DISCONTINUED | OUTPATIENT
Start: 2025-01-01 | End: 2025-01-01

## 2025-01-01 RX ORDER — BUPIVACAINE HYDROCHLORIDE AND EPINEPHRINE 5; 5 MG/ML; UG/ML
INJECTION, SOLUTION EPIDURAL; INTRACAUDAL; PERINEURAL
Status: DISCONTINUED | OUTPATIENT
Start: 2025-01-01 | End: 2025-01-01 | Stop reason: HOSPADM

## 2025-01-01 RX ORDER — ACETYLCYSTEINE 100 MG/ML
4 SOLUTION ORAL; RESPIRATORY (INHALATION) EVERY 4 HOURS
Status: DISCONTINUED | OUTPATIENT
Start: 2025-01-01 | End: 2025-01-01

## 2025-01-01 RX ORDER — LEVETIRACETAM 500 MG/1
500 TABLET ORAL 2 TIMES DAILY
Status: DISCONTINUED | OUTPATIENT
Start: 2025-01-01 | End: 2025-01-01

## 2025-01-01 RX ORDER — LEVETIRACETAM 500 MG/5ML
500 INJECTION, SOLUTION, CONCENTRATE INTRAVENOUS EVERY 12 HOURS
Status: DISCONTINUED | OUTPATIENT
Start: 2025-01-01 | End: 2025-01-01

## 2025-01-01 RX ORDER — METOPROLOL TARTRATE 1 MG/ML
5 INJECTION, SOLUTION INTRAVENOUS ONCE
Status: DISCONTINUED | OUTPATIENT
Start: 2025-01-01 | End: 2025-01-01

## 2025-01-01 RX ORDER — LIDOCAINE HYDROCHLORIDE AND EPINEPHRINE 10; 10 UG/ML; MG/ML
INJECTION, SOLUTION INFILTRATION; PERINEURAL
Status: DISCONTINUED | OUTPATIENT
Start: 2025-01-01 | End: 2025-01-01 | Stop reason: HOSPADM

## 2025-01-01 RX ORDER — ENOXAPARIN SODIUM 100 MG/ML
40 INJECTION SUBCUTANEOUS EVERY 24 HOURS
Status: DISCONTINUED | OUTPATIENT
Start: 2025-01-01 | End: 2025-01-01

## 2025-01-01 RX ORDER — ONDANSETRON HYDROCHLORIDE 2 MG/ML
4 INJECTION, SOLUTION INTRAVENOUS EVERY 6 HOURS PRN
Status: DISCONTINUED | OUTPATIENT
Start: 2025-01-01 | End: 2025-01-01 | Stop reason: HOSPADM

## 2025-01-01 RX ORDER — METOPROLOL TARTRATE 25 MG/1
12.5 TABLET ORAL EVERY 8 HOURS
Status: DISCONTINUED | OUTPATIENT
Start: 2025-01-01 | End: 2025-01-01

## 2025-01-01 RX ORDER — LACOSAMIDE 10 MG/ML
200 INJECTION, SOLUTION INTRAVENOUS EVERY 12 HOURS
Status: DISCONTINUED | OUTPATIENT
Start: 2025-01-01 | End: 2025-01-01

## 2025-01-01 RX ORDER — HYDRALAZINE HYDROCHLORIDE 20 MG/ML
10 INJECTION INTRAMUSCULAR; INTRAVENOUS EVERY 4 HOURS PRN
Status: DISCONTINUED | OUTPATIENT
Start: 2025-01-01 | End: 2025-01-01 | Stop reason: HOSPADM

## 2025-01-01 RX ORDER — PRAVASTATIN SODIUM 20 MG/1
20 TABLET ORAL NIGHTLY
Status: DISCONTINUED | OUTPATIENT
Start: 2025-01-01 | End: 2025-01-01

## 2025-01-01 RX ORDER — AMOXICILLIN 250 MG
1 CAPSULE ORAL DAILY
Status: DISCONTINUED | OUTPATIENT
Start: 2025-01-01 | End: 2025-01-01

## 2025-01-01 RX ORDER — PHENYLEPHRINE HCL IN 0.9% NACL 1 MG/10 ML
SYRINGE (ML) INTRAVENOUS
Status: COMPLETED
Start: 2025-01-01 | End: 2025-01-01

## 2025-01-01 RX ORDER — HYDROCODONE BITARTRATE AND ACETAMINOPHEN 5; 325 MG/1; MG/1
1 TABLET ORAL EVERY 6 HOURS PRN
Refills: 0 | Status: DISCONTINUED | OUTPATIENT
Start: 2025-01-01 | End: 2025-01-01

## 2025-01-01 RX ORDER — IPRATROPIUM BROMIDE AND ALBUTEROL SULFATE 2.5; .5 MG/3ML; MG/3ML
3 SOLUTION RESPIRATORY (INHALATION) EVERY 4 HOURS
Status: DISCONTINUED | OUTPATIENT
Start: 2025-01-01 | End: 2025-01-01

## 2025-01-01 RX ORDER — MORPHINE SULFATE 4 MG/ML
4 INJECTION, SOLUTION INTRAMUSCULAR; INTRAVENOUS EVERY 30 MIN PRN
Status: DISCONTINUED | OUTPATIENT
Start: 2025-01-01 | End: 2025-01-01 | Stop reason: HOSPADM

## 2025-01-01 RX ORDER — VASOPRESSIN 20 [USP'U]/ML
INJECTION, SOLUTION INTRAMUSCULAR; SUBCUTANEOUS
Status: DISPENSED
Start: 2025-01-01 | End: 2025-01-01

## 2025-01-01 RX ORDER — LORAZEPAM 2 MG/ML
2 INJECTION INTRAMUSCULAR
Status: DISCONTINUED | OUTPATIENT
Start: 2025-01-01 | End: 2025-01-01 | Stop reason: HOSPADM

## 2025-01-01 RX ORDER — OXYCODONE HYDROCHLORIDE 5 MG/1
5 TABLET ORAL EVERY 6 HOURS PRN
Refills: 0 | Status: DISCONTINUED | OUTPATIENT
Start: 2025-01-01 | End: 2025-01-01

## 2025-01-01 RX ORDER — BACITRACIN ZINC 500 UNIT/G
OINTMENT (GRAM) TOPICAL
Status: DISCONTINUED | OUTPATIENT
Start: 2025-01-01 | End: 2025-01-01 | Stop reason: HOSPADM

## 2025-01-01 RX ORDER — MUPIROCIN 20 MG/G
OINTMENT TOPICAL 2 TIMES DAILY
Status: COMPLETED | OUTPATIENT
Start: 2025-01-01 | End: 2025-01-01

## 2025-01-01 RX ORDER — LEVETIRACETAM 500 MG/5ML
750 INJECTION, SOLUTION, CONCENTRATE INTRAVENOUS EVERY 12 HOURS
Status: DISCONTINUED | OUTPATIENT
Start: 2025-01-01 | End: 2025-01-01

## 2025-01-01 RX ORDER — METOPROLOL TARTRATE 25 MG/1
12.5 TABLET ORAL 3 TIMES DAILY
Status: DISCONTINUED | OUTPATIENT
Start: 2025-01-01 | End: 2025-01-01

## 2025-01-01 RX ORDER — LABETALOL HCL 20 MG/4 ML
5 SYRINGE (ML) INTRAVENOUS EVERY 4 HOURS PRN
Status: DISCONTINUED | OUTPATIENT
Start: 2025-01-01 | End: 2025-01-01 | Stop reason: HOSPADM

## 2025-01-01 RX ORDER — LORAZEPAM 2 MG/ML
1 INJECTION INTRAMUSCULAR
Status: DISCONTINUED | OUTPATIENT
Start: 2025-01-01 | End: 2025-01-01

## 2025-01-01 RX ORDER — MODAFINIL 200 MG/1
200 TABLET ORAL DAILY
Status: DISCONTINUED | OUTPATIENT
Start: 2025-01-01 | End: 2025-01-01

## 2025-01-01 RX ORDER — LABETALOL HCL 20 MG/4 ML
10 SYRINGE (ML) INTRAVENOUS EVERY 4 HOURS PRN
Status: DISCONTINUED | OUTPATIENT
Start: 2025-01-01 | End: 2025-01-01

## 2025-01-01 RX ORDER — NOREPINEPHRINE BITARTRATE/D5W 4MG/250ML
PLASTIC BAG, INJECTION (ML) INTRAVENOUS
Status: DISPENSED
Start: 2025-01-01 | End: 2025-01-01

## 2025-01-01 RX ORDER — BISACODYL 10 MG/1
10 SUPPOSITORY RECTAL DAILY PRN
Status: DISCONTINUED | OUTPATIENT
Start: 2025-01-01 | End: 2025-01-01 | Stop reason: HOSPADM

## 2025-01-01 RX ORDER — MODAFINIL 100 MG/1
100 TABLET ORAL DAILY
Status: CANCELLED | OUTPATIENT
Start: 2025-01-01

## 2025-01-01 RX ORDER — LACOSAMIDE 10 MG/ML
200 SOLUTION ORAL EVERY 12 HOURS
Status: DISCONTINUED | OUTPATIENT
Start: 2025-01-01 | End: 2025-01-01

## 2025-01-01 RX ORDER — ACETYLCYSTEINE 100 MG/ML
4 SOLUTION ORAL; RESPIRATORY (INHALATION) EVERY 6 HOURS
Status: DISCONTINUED | OUTPATIENT
Start: 2025-01-01 | End: 2025-01-01

## 2025-01-01 RX ORDER — MORPHINE SULFATE 2 MG/ML
2 INJECTION, SOLUTION INTRAMUSCULAR; INTRAVENOUS EVERY 30 MIN PRN
Status: DISCONTINUED | OUTPATIENT
Start: 2025-01-01 | End: 2025-01-01

## 2025-01-01 RX ORDER — ACETAMINOPHEN 325 MG/1
650 TABLET ORAL EVERY 6 HOURS PRN
Status: DISCONTINUED | OUTPATIENT
Start: 2025-01-01 | End: 2025-01-01

## 2025-01-01 RX ORDER — LACOSAMIDE 10 MG/ML
100 SOLUTION ORAL EVERY 12 HOURS
Status: DISCONTINUED | OUTPATIENT
Start: 2025-01-01 | End: 2025-01-01

## 2025-01-01 RX ORDER — IPRATROPIUM BROMIDE AND ALBUTEROL SULFATE 2.5; .5 MG/3ML; MG/3ML
3 SOLUTION RESPIRATORY (INHALATION) EVERY 6 HOURS
Status: DISCONTINUED | OUTPATIENT
Start: 2025-01-01 | End: 2025-01-01

## 2025-01-01 RX ORDER — HALOPERIDOL LACTATE 5 MG/ML
2 INJECTION, SOLUTION INTRAMUSCULAR EVERY 6 HOURS PRN
Status: DISCONTINUED | OUTPATIENT
Start: 2025-01-01 | End: 2025-01-01 | Stop reason: HOSPADM

## 2025-01-01 RX ORDER — HEPARIN SODIUM,PORCINE/D5W 25000/250
0-40 INTRAVENOUS SOLUTION INTRAVENOUS CONTINUOUS
Status: DISCONTINUED | OUTPATIENT
Start: 2025-01-01 | End: 2025-01-01

## 2025-01-01 RX ADMIN — APIXABAN 5 MG: 5 TABLET, FILM COATED ORAL at 09:05

## 2025-01-01 RX ADMIN — LACOSAMIDE 100 MG: 100 SOLUTION ORAL at 09:05

## 2025-01-01 RX ADMIN — ACETYLCYSTEINE 4 ML: 100 INHALANT RESPIRATORY (INHALATION) at 12:04

## 2025-01-01 RX ADMIN — LACOSAMIDE 150 MG: 10 INJECTION INTRAVENOUS at 09:04

## 2025-01-01 RX ADMIN — IPRATROPIUM BROMIDE AND ALBUTEROL SULFATE 3 ML: 2.5; .5 SOLUTION RESPIRATORY (INHALATION) at 12:05

## 2025-01-01 RX ADMIN — IPRATROPIUM BROMIDE AND ALBUTEROL SULFATE 3 ML: 2.5; .5 SOLUTION RESPIRATORY (INHALATION) at 08:04

## 2025-01-01 RX ADMIN — MUPIROCIN: 20 OINTMENT TOPICAL at 09:04

## 2025-01-01 RX ADMIN — PRAVASTATIN SODIUM 20 MG: 20 TABLET ORAL at 08:05

## 2025-01-01 RX ADMIN — IPRATROPIUM BROMIDE AND ALBUTEROL SULFATE 3 ML: 2.5; .5 SOLUTION RESPIRATORY (INHALATION) at 08:05

## 2025-01-01 RX ADMIN — METOPROLOL TARTRATE 25 MG: 25 TABLET, FILM COATED ORAL at 05:05

## 2025-01-01 RX ADMIN — HYDRALAZINE HYDROCHLORIDE 10 MG: 20 INJECTION, SOLUTION INTRAMUSCULAR; INTRAVENOUS at 06:04

## 2025-01-01 RX ADMIN — LEVOTHYROXINE SODIUM 125 MCG: 0.12 TABLET ORAL at 06:05

## 2025-01-01 RX ADMIN — METOPROLOL TARTRATE 12.5 MG: 25 TABLET, FILM COATED ORAL at 09:05

## 2025-01-01 RX ADMIN — PIPERACILLIN AND TAZOBACTAM 4.5 G: 4; .5 INJECTION, POWDER, LYOPHILIZED, FOR SOLUTION INTRAVENOUS; PARENTERAL at 05:04

## 2025-01-01 RX ADMIN — METOPROLOL TARTRATE 12.5 MG: 25 TABLET, FILM COATED ORAL at 02:05

## 2025-01-01 RX ADMIN — ACETAMINOPHEN 650 MG: 325 TABLET ORAL at 09:05

## 2025-01-01 RX ADMIN — HYDRALAZINE HYDROCHLORIDE 10 MG: 20 INJECTION, SOLUTION INTRAMUSCULAR; INTRAVENOUS at 04:04

## 2025-01-01 RX ADMIN — PSYLLIUM HUSK 1 PACKET: 3.4 POWDER ORAL at 12:05

## 2025-01-01 RX ADMIN — LEVOTHYROXINE SODIUM 125 MCG: 0.12 TABLET ORAL at 05:05

## 2025-01-01 RX ADMIN — SILODOSIN 4 MG: 4 CAPSULE ORAL at 09:05

## 2025-01-01 RX ADMIN — MODAFINIL 200 MG: 100 TABLET ORAL at 11:05

## 2025-01-01 RX ADMIN — HYDRALAZINE HYDROCHLORIDE 10 MG: 20 INJECTION, SOLUTION INTRAMUSCULAR; INTRAVENOUS at 09:05

## 2025-01-01 RX ADMIN — POTASSIUM BICARBONATE 50 MEQ: 978 TABLET, EFFERVESCENT ORAL at 08:05

## 2025-01-01 RX ADMIN — PIPERACILLIN AND TAZOBACTAM 4.5 G: 4; .5 INJECTION, POWDER, LYOPHILIZED, FOR SOLUTION INTRAVENOUS; PARENTERAL at 09:05

## 2025-01-01 RX ADMIN — LACOSAMIDE 100 MG: 100 SOLUTION ORAL at 08:05

## 2025-01-01 RX ADMIN — LACOSAMIDE 150 MG: 10 INJECTION INTRAVENOUS at 08:04

## 2025-01-01 RX ADMIN — SENNOSIDES AND DOCUSATE SODIUM 1 TABLET: 50; 8.6 TABLET ORAL at 09:04

## 2025-01-01 RX ADMIN — ACETYLCYSTEINE 4 ML: 100 INHALANT RESPIRATORY (INHALATION) at 08:04

## 2025-01-01 RX ADMIN — IPRATROPIUM BROMIDE AND ALBUTEROL SULFATE 3 ML: 2.5; .5 SOLUTION RESPIRATORY (INHALATION) at 07:05

## 2025-01-01 RX ADMIN — PIPERACILLIN SODIUM AND TAZOBACTAM SODIUM 4.5 G: 4; .5 INJECTION, POWDER, FOR SOLUTION INTRAVENOUS at 12:05

## 2025-01-01 RX ADMIN — PIPERACILLIN SODIUM AND TAZOBACTAM SODIUM 4.5 G: 4; .5 INJECTION, POWDER, FOR SOLUTION INTRAVENOUS at 04:05

## 2025-01-01 RX ADMIN — SILODOSIN 4 MG: 4 CAPSULE ORAL at 08:05

## 2025-01-01 RX ADMIN — IPRATROPIUM BROMIDE AND ALBUTEROL SULFATE 3 ML: 2.5; .5 SOLUTION RESPIRATORY (INHALATION) at 11:04

## 2025-01-01 RX ADMIN — ACETAMINOPHEN 650 MG: 325 TABLET ORAL at 06:04

## 2025-01-01 RX ADMIN — PIPERACILLIN AND TAZOBACTAM 4.5 G: 4; .5 INJECTION, POWDER, LYOPHILIZED, FOR SOLUTION INTRAVENOUS; PARENTERAL at 08:04

## 2025-01-01 RX ADMIN — PIPERACILLIN SODIUM AND TAZOBACTAM SODIUM 4.5 G: 4; .5 INJECTION, POWDER, FOR SOLUTION INTRAVENOUS at 03:05

## 2025-01-01 RX ADMIN — ENOXAPARIN SODIUM 40 MG: 40 INJECTION SUBCUTANEOUS at 05:05

## 2025-01-01 RX ADMIN — POTASSIUM BICARBONATE 50 MEQ: 978 TABLET, EFFERVESCENT ORAL at 05:04

## 2025-01-01 RX ADMIN — IPRATROPIUM BROMIDE AND ALBUTEROL SULFATE 3 ML: 2.5; .5 SOLUTION RESPIRATORY (INHALATION) at 12:04

## 2025-01-01 RX ADMIN — LEVOTHYROXINE SODIUM 125 MCG: 0.12 TABLET ORAL at 05:04

## 2025-01-01 RX ADMIN — METOPROLOL TARTRATE 12.5 MG: 25 TABLET, FILM COATED ORAL at 06:05

## 2025-01-01 RX ADMIN — MORPHINE SULFATE 2 MG: 2 INJECTION, SOLUTION INTRAMUSCULAR; INTRAVENOUS at 05:05

## 2025-01-01 RX ADMIN — HYDRALAZINE HYDROCHLORIDE 10 MG: 20 INJECTION, SOLUTION INTRAMUSCULAR; INTRAVENOUS at 08:05

## 2025-01-01 RX ADMIN — ACETYLCYSTEINE 4 ML: 100 INHALANT RESPIRATORY (INHALATION) at 07:04

## 2025-01-01 RX ADMIN — IPRATROPIUM BROMIDE AND ALBUTEROL SULFATE 3 ML: 2.5; .5 SOLUTION RESPIRATORY (INHALATION) at 07:04

## 2025-01-01 RX ADMIN — ENOXAPARIN SODIUM 40 MG: 40 INJECTION SUBCUTANEOUS at 04:05

## 2025-01-01 RX ADMIN — MUPIROCIN: 20 OINTMENT TOPICAL at 11:04

## 2025-01-01 RX ADMIN — PIPERACILLIN SODIUM AND TAZOBACTAM SODIUM 4.5 G: 4; .5 INJECTION, POWDER, FOR SOLUTION INTRAVENOUS at 09:05

## 2025-01-01 RX ADMIN — PIPERACILLIN SODIUM AND TAZOBACTAM SODIUM 4.5 G: 4; .5 INJECTION, POWDER, FOR SOLUTION INTRAVENOUS at 11:05

## 2025-01-01 RX ADMIN — METOPROLOL TARTRATE 12.5 MG: 25 TABLET, FILM COATED ORAL at 02:04

## 2025-01-01 RX ADMIN — SODIUM CHLORIDE 1000 ML: 9 INJECTION, SOLUTION INTRAVENOUS at 11:04

## 2025-01-01 RX ADMIN — PIPERACILLIN SODIUM AND TAZOBACTAM SODIUM 4.5 G: 4; .5 INJECTION, POWDER, FOR SOLUTION INTRAVENOUS at 08:05

## 2025-01-01 RX ADMIN — LACOSAMIDE 200 MG: 100 SOLUTION ORAL at 08:05

## 2025-01-01 RX ADMIN — HYDRALAZINE HYDROCHLORIDE 10 MG: 20 INJECTION, SOLUTION INTRAMUSCULAR; INTRAVENOUS at 11:04

## 2025-01-01 RX ADMIN — PRAVASTATIN SODIUM 20 MG: 20 TABLET ORAL at 08:04

## 2025-01-01 RX ADMIN — IPRATROPIUM BROMIDE AND ALBUTEROL SULFATE 3 ML: 2.5; .5 SOLUTION RESPIRATORY (INHALATION) at 03:04

## 2025-01-01 RX ADMIN — METOPROLOL SUCCINATE 12.5 MG: 25 TABLET, EXTENDED RELEASE ORAL at 09:04

## 2025-01-01 RX ADMIN — PIPERACILLIN AND TAZOBACTAM 4.5 G: 4; .5 INJECTION, POWDER, LYOPHILIZED, FOR SOLUTION INTRAVENOUS; PARENTERAL at 04:04

## 2025-01-01 RX ADMIN — METOPROLOL TARTRATE 12.5 MG: 25 TABLET, FILM COATED ORAL at 01:04

## 2025-01-01 RX ADMIN — PSYLLIUM HUSK 1 PACKET: 3.4 POWDER ORAL at 10:05

## 2025-01-01 RX ADMIN — METOPROLOL TARTRATE 12.5 MG: 25 TABLET, FILM COATED ORAL at 01:05

## 2025-01-01 RX ADMIN — LABETALOL HYDROCHLORIDE 10 MG: 5 INJECTION, SOLUTION INTRAVENOUS at 05:05

## 2025-01-01 RX ADMIN — LEVOTHYROXINE SODIUM 125 MCG: 0.12 TABLET ORAL at 06:04

## 2025-01-01 RX ADMIN — SODIUM CHLORIDE, POTASSIUM CHLORIDE, SODIUM LACTATE AND CALCIUM CHLORIDE 500 ML: 600; 310; 30; 20 INJECTION, SOLUTION INTRAVENOUS at 02:05

## 2025-01-01 RX ADMIN — HYDRALAZINE HYDROCHLORIDE 10 MG: 20 INJECTION, SOLUTION INTRAMUSCULAR; INTRAVENOUS at 06:05

## 2025-01-01 RX ADMIN — MORPHINE SULFATE 2 MG: 2 INJECTION, SOLUTION INTRAMUSCULAR; INTRAVENOUS at 01:05

## 2025-01-01 RX ADMIN — MUPIROCIN: 20 OINTMENT TOPICAL at 08:04

## 2025-01-01 RX ADMIN — METOPROLOL TARTRATE 12.5 MG: 25 TABLET, FILM COATED ORAL at 06:04

## 2025-01-01 RX ADMIN — PSYLLIUM HUSK 1 PACKET: 3.4 POWDER ORAL at 09:05

## 2025-01-01 RX ADMIN — SILODOSIN 4 MG: 4 CAPSULE ORAL at 08:04

## 2025-01-01 RX ADMIN — VANCOMYCIN HYDROCHLORIDE 1000 MG: 1 INJECTION, POWDER, LYOPHILIZED, FOR SOLUTION INTRAVENOUS at 05:04

## 2025-01-01 RX ADMIN — VANCOMYCIN HYDROCHLORIDE 750 MG: 750 INJECTION, POWDER, LYOPHILIZED, FOR SOLUTION INTRAVENOUS at 09:04

## 2025-01-01 RX ADMIN — MODAFINIL 200 MG: 100 TABLET ORAL at 09:05

## 2025-01-01 RX ADMIN — POTASSIUM & SODIUM PHOSPHATES POWDER PACK 280-160-250 MG 2 PACKET: 280-160-250 PACK at 08:04

## 2025-01-01 RX ADMIN — METOPROLOL TARTRATE 12.5 MG: 25 TABLET, FILM COATED ORAL at 09:04

## 2025-01-01 RX ADMIN — MORPHINE SULFATE 2 MG: 2 INJECTION, SOLUTION INTRAMUSCULAR; INTRAVENOUS at 09:05

## 2025-01-01 RX ADMIN — IPRATROPIUM BROMIDE AND ALBUTEROL SULFATE 3 ML: 2.5; .5 SOLUTION RESPIRATORY (INHALATION) at 02:05

## 2025-01-01 RX ADMIN — PIPERACILLIN AND TAZOBACTAM 4.5 G: 4; .5 INJECTION, POWDER, LYOPHILIZED, FOR SOLUTION INTRAVENOUS; PARENTERAL at 12:04

## 2025-01-01 RX ADMIN — METOPROLOL TARTRATE 12.5 MG: 25 TABLET, FILM COATED ORAL at 05:05

## 2025-01-01 RX ADMIN — IPRATROPIUM BROMIDE AND ALBUTEROL SULFATE 3 ML: 2.5; .5 SOLUTION RESPIRATORY (INHALATION) at 01:04

## 2025-01-01 RX ADMIN — VANCOMYCIN HYDROCHLORIDE 500 MG: 500 INJECTION, POWDER, LYOPHILIZED, FOR SOLUTION INTRAVENOUS at 09:04

## 2025-01-01 RX ADMIN — METOPROLOL TARTRATE 12.5 MG: 25 TABLET, FILM COATED ORAL at 03:05

## 2025-01-01 RX ADMIN — ACETYLCYSTEINE 4 ML: 100 INHALANT RESPIRATORY (INHALATION) at 03:04

## 2025-01-01 RX ADMIN — APIXABAN 5 MG: 5 TABLET, FILM COATED ORAL at 08:05

## 2025-01-01 RX ADMIN — PRAVASTATIN SODIUM 20 MG: 20 TABLET ORAL at 09:05

## 2025-01-01 RX ADMIN — PRAVASTATIN SODIUM 20 MG: 20 TABLET ORAL at 09:04

## 2025-01-01 RX ADMIN — IPRATROPIUM BROMIDE AND ALBUTEROL SULFATE 3 ML: 2.5; .5 SOLUTION RESPIRATORY (INHALATION) at 01:05

## 2025-01-01 RX ADMIN — MODAFINIL 200 MG: 100 TABLET ORAL at 08:05

## 2025-01-01 RX ADMIN — LABETALOL HYDROCHLORIDE 5 MG: 5 INJECTION, SOLUTION INTRAVENOUS at 03:05

## 2025-01-01 RX ADMIN — LACOSAMIDE 200 MG: 100 SOLUTION ORAL at 09:05

## 2025-01-01 RX ADMIN — LACOSAMIDE 200 MG: 10 INJECTION INTRAVENOUS at 08:04

## 2025-01-01 RX ADMIN — IPRATROPIUM BROMIDE AND ALBUTEROL SULFATE 3 ML: 2.5; .5 SOLUTION RESPIRATORY (INHALATION) at 04:04

## 2025-01-01 RX ADMIN — ACETYLCYSTEINE 4 ML: 100 INHALANT RESPIRATORY (INHALATION) at 01:05

## 2025-01-01 RX ADMIN — ACETYLCYSTEINE 4 ML: 100 INHALANT RESPIRATORY (INHALATION) at 11:04

## 2025-01-01 RX ADMIN — SENNOSIDES AND DOCUSATE SODIUM 1 TABLET: 50; 8.6 TABLET ORAL at 08:04

## 2025-01-01 RX ADMIN — MODAFINIL 100 MG: 100 TABLET ORAL at 08:05

## 2025-01-01 RX ADMIN — ACETAMINOPHEN 650 MG: 325 TABLET ORAL at 03:04

## 2025-01-01 RX ADMIN — LACOSAMIDE 100 MG: 100 SOLUTION ORAL at 11:05

## 2025-01-01 RX ADMIN — ACETYLCYSTEINE 4 ML: 100 INHALANT RESPIRATORY (INHALATION) at 12:05

## 2025-01-01 RX ADMIN — PIPERACILLIN AND TAZOBACTAM 4.5 G: 4; .5 INJECTION, POWDER, LYOPHILIZED, FOR SOLUTION INTRAVENOUS; PARENTERAL at 09:04

## 2025-01-01 RX ADMIN — ACETYLCYSTEINE 4 ML: 100 INHALANT RESPIRATORY (INHALATION) at 08:05

## 2025-01-01 RX ADMIN — ENOXAPARIN SODIUM 40 MG: 40 INJECTION SUBCUTANEOUS at 04:04

## 2025-01-01 RX ADMIN — METOPROLOL TARTRATE 25 MG: 25 TABLET, FILM COATED ORAL at 02:05

## 2025-01-01 RX ADMIN — POTASSIUM & SODIUM PHOSPHATES POWDER PACK 280-160-250 MG 2 PACKET: 280-160-250 PACK at 05:04

## 2025-01-01 RX ADMIN — SILODOSIN 4 MG: 4 CAPSULE ORAL at 09:04

## 2025-01-01 RX ADMIN — ACETYLCYSTEINE 4 ML: 100 INHALANT RESPIRATORY (INHALATION) at 04:04

## 2025-01-01 RX ADMIN — PSYLLIUM HUSK 1 PACKET: 3.4 POWDER ORAL at 08:05

## 2025-01-01 RX ADMIN — SODIUM CHLORIDE 1000 ML: 9 INJECTION, SOLUTION INTRAVENOUS at 09:05

## 2025-01-01 RX ADMIN — LACOSAMIDE 200 MG: 100 SOLUTION ORAL at 08:04

## 2025-01-01 RX ADMIN — ENOXAPARIN SODIUM 40 MG: 40 INJECTION SUBCUTANEOUS at 05:04

## 2025-01-01 RX ADMIN — LACOSAMIDE 150 MG: 10 INJECTION INTRAVENOUS at 11:04

## 2025-01-01 RX ADMIN — MORPHINE SULFATE 2 MG: 2 INJECTION, SOLUTION INTRAMUSCULAR; INTRAVENOUS at 06:05

## 2025-01-01 RX ADMIN — METOPROLOL TARTRATE 12.5 MG: 25 TABLET, FILM COATED ORAL at 10:05

## 2025-01-01 RX ADMIN — METOPROLOL TARTRATE 12.5 MG: 25 TABLET, FILM COATED ORAL at 05:04

## 2025-01-01 RX ADMIN — ACETAMINOPHEN 650 MG: 325 TABLET ORAL at 07:05

## 2025-01-01 RX ADMIN — ACETAMINOPHEN 650 MG: 325 TABLET ORAL at 01:04

## 2025-01-01 RX ADMIN — PIPERACILLIN SODIUM AND TAZOBACTAM SODIUM 4.5 G: 4; .5 INJECTION, POWDER, FOR SOLUTION INTRAVENOUS at 05:05

## 2025-01-01 RX ADMIN — NOREPINEPHRINE BITARTRATE 0.04 MCG/KG/MIN: 4 INJECTION, SOLUTION INTRAVENOUS at 09:05

## 2025-01-01 RX ADMIN — PIPERACILLIN SODIUM AND TAZOBACTAM SODIUM 4.5 G: 4; .5 INJECTION, POWDER, FOR SOLUTION INTRAVENOUS at 01:05

## 2025-01-01 RX ADMIN — ACETYLCYSTEINE 4 ML: 100 INHALANT RESPIRATORY (INHALATION) at 05:04

## 2025-01-01 RX ADMIN — PIPERACILLIN AND TAZOBACTAM 4.5 G: 4; .5 INJECTION, POWDER, LYOPHILIZED, FOR SOLUTION INTRAVENOUS; PARENTERAL at 01:04

## 2025-01-01 RX ADMIN — LORAZEPAM 2 MG: 2 INJECTION INTRAMUSCULAR; INTRAVENOUS at 02:05

## 2025-01-01 RX ADMIN — MODAFINIL 100 MG: 100 TABLET ORAL at 10:05

## 2025-01-01 RX ADMIN — LEVETIRACETAM 500 MG: 500 INJECTION, SOLUTION, CONCENTRATE INTRAVENOUS at 08:04

## 2025-01-01 RX ADMIN — IPRATROPIUM BROMIDE AND ALBUTEROL SULFATE 3 ML: 2.5; .5 SOLUTION RESPIRATORY (INHALATION) at 03:05

## 2025-01-01 RX ADMIN — METOPROLOL TARTRATE 25 MG: 25 TABLET, FILM COATED ORAL at 09:05

## 2025-01-01 RX ADMIN — LABETALOL HYDROCHLORIDE 10 MG: 5 INJECTION, SOLUTION INTRAVENOUS at 10:05

## 2025-01-01 RX ADMIN — LACOSAMIDE 200 MG: 100 SOLUTION ORAL at 09:04

## 2025-01-01 RX ADMIN — PSYLLIUM HUSK 1 PACKET: 3.4 POWDER ORAL at 03:05

## 2025-01-01 RX ADMIN — MORPHINE SULFATE 4 MG: 4 INJECTION INTRAVENOUS at 04:05

## 2025-01-01 RX ADMIN — ACETYLCYSTEINE 4 ML: 100 INHALANT RESPIRATORY (INHALATION) at 01:04

## 2025-01-01 RX ADMIN — ACETYLCYSTEINE 4 ML: 100 INHALANT RESPIRATORY (INHALATION) at 03:05

## 2025-01-01 RX ADMIN — MODAFINIL 100 MG: 100 TABLET ORAL at 11:05

## 2025-01-01 RX ADMIN — HYDRALAZINE HYDROCHLORIDE 10 MG: 20 INJECTION, SOLUTION INTRAMUSCULAR; INTRAVENOUS at 08:04

## 2025-01-01 RX ADMIN — LORAZEPAM 1 MG: 2 INJECTION INTRAMUSCULAR; INTRAVENOUS at 10:05

## 2025-01-01 RX ADMIN — MUPIROCIN: 20 OINTMENT TOPICAL at 01:04

## 2025-01-01 RX ADMIN — LACOSAMIDE 200 MG: 10 INJECTION INTRAVENOUS at 09:04

## 2025-01-01 RX ADMIN — LABETALOL HYDROCHLORIDE 5 MG: 5 INJECTION, SOLUTION INTRAVENOUS at 12:05

## 2025-01-01 RX ADMIN — SILODOSIN 4 MG: 4 CAPSULE ORAL at 11:05

## 2025-01-01 RX ADMIN — LABETALOL HYDROCHLORIDE 10 MG: 5 INJECTION, SOLUTION INTRAVENOUS at 02:04

## 2025-01-01 RX ADMIN — HYDRALAZINE HYDROCHLORIDE 10 MG: 20 INJECTION, SOLUTION INTRAMUSCULAR; INTRAVENOUS at 01:04

## 2025-01-01 RX ADMIN — PIPERACILLIN AND TAZOBACTAM 4.5 G: 4; .5 INJECTION, POWDER, LYOPHILIZED, FOR SOLUTION INTRAVENOUS; PARENTERAL at 05:05

## 2025-01-01 RX ADMIN — ACETAMINOPHEN 650 MG: 325 TABLET ORAL at 02:05

## 2025-01-01 RX ADMIN — PIPERACILLIN AND TAZOBACTAM 4.5 G: 4; .5 INJECTION, POWDER, LYOPHILIZED, FOR SOLUTION INTRAVENOUS; PARENTERAL at 02:05

## 2025-01-01 RX ADMIN — APIXABAN 5 MG: 5 TABLET, FILM COATED ORAL at 11:05

## 2025-01-01 RX ADMIN — Medication 3276 UNITS: at 08:04

## 2025-01-01 RX ADMIN — POTASSIUM BICARBONATE 50 MEQ: 978 TABLET, EFFERVESCENT ORAL at 06:05

## 2025-01-01 RX ADMIN — ACETAMINOPHEN 650 MG: 325 TABLET ORAL at 04:05

## 2025-01-01 RX ADMIN — IPRATROPIUM BROMIDE AND ALBUTEROL SULFATE 3 ML: 2.5; .5 SOLUTION RESPIRATORY (INHALATION) at 11:05

## 2025-01-01 RX ADMIN — PIPERACILLIN AND TAZOBACTAM 4.5 G: 4; .5 INJECTION, POWDER, LYOPHILIZED, FOR SOLUTION INTRAVENOUS; PARENTERAL at 01:05

## 2025-01-01 RX ADMIN — HYDRALAZINE HYDROCHLORIDE 10 MG: 20 INJECTION, SOLUTION INTRAMUSCULAR; INTRAVENOUS at 02:04

## 2025-01-01 RX ADMIN — PSYLLIUM HUSK 1 PACKET: 3.4 POWDER ORAL at 11:05

## 2025-01-01 RX ADMIN — LABETALOL HYDROCHLORIDE 10 MG: 5 INJECTION, SOLUTION INTRAVENOUS at 04:04

## 2025-01-01 RX ADMIN — MORPHINE SULFATE 2 MG: 2 INJECTION, SOLUTION INTRAMUSCULAR; INTRAVENOUS at 03:05

## 2025-01-01 RX ADMIN — IPRATROPIUM BROMIDE AND ALBUTEROL SULFATE 3 ML: 2.5; .5 SOLUTION RESPIRATORY (INHALATION) at 05:04

## 2025-01-01 RX ADMIN — IPRATROPIUM BROMIDE AND ALBUTEROL SULFATE 3 ML: 2.5; .5 SOLUTION RESPIRATORY (INHALATION) at 06:05

## 2025-01-02 RX ORDER — DIVALPROEX SODIUM 500 MG/1
500 TABLET, DELAYED RELEASE ORAL 2 TIMES DAILY
COMMUNITY
End: 2025-01-28 | Stop reason: CLARIF

## 2025-01-02 RX ORDER — MEMANTINE HYDROCHLORIDE 10 MG/1
10 TABLET ORAL 2 TIMES DAILY
COMMUNITY

## 2025-01-02 RX ORDER — AMLODIPINE BESYLATE 2.5 MG/1
2.5 TABLET ORAL NIGHTLY
COMMUNITY

## 2025-01-02 NOTE — PRE-PROCEDURE INSTRUCTIONS
PreOp Instructions given:   - Verbal medication information (what to hold and what to take)   - NPO guidelines 8 hours prior to Sx start time/YOu may have sugar-free clear liquids up to 2 hours prior to arrival time.  - Arrival place directions given; time to be given the day before procedure by the   Surgeon's Office [facility]   - Bathing with antibacterial soap   - Don't wear any jewelry or bring any valuables AM of surgery   - No makeup or moisturizer to face   - No perfume/cologne, powder, lotions or aftershave   Pt. verbalized understanding.   Pt denies any h/o Anesthesia/Sedation complications or side effects.  Patient does not know arrival time.  Explained that this information comes from the surgeon's office and if they haven't heard from them by 2 or 3 pm to call the office.  Patient stated an understanding.

## 2025-01-03 ENCOUNTER — TELEPHONE (OUTPATIENT)
Dept: NEUROSURGERY | Facility: CLINIC | Age: 85
End: 2025-01-03
Payer: MEDICARE

## 2025-01-03 NOTE — TELEPHONE ENCOUNTER
"Called the OR Desk Sunday. Had case put in depot.      Called pt. S/W wife. Explained Dr Blankenship needed to postpone LP from 1/6 to 1/13.    [place] [facility] [place]    Reaching out to PT - Jyothi Alvarado - to make arrangements. Pt currently scheduled for baseline 1/6 @ 7:15. That can remain. Then post LP PT can be done 1/13 & 1/14.    Worked w/ Jyothi's supr, Spencer Fuentes. Advised of date change. "Okay, I will get those on the schedule."    Called wife back. Verified PT would still be Monday 1/6 at 7:15, then post LP PT 1/13 and 1/14. She v/u.      "

## 2025-01-05 ENCOUNTER — PATIENT MESSAGE (OUTPATIENT)
Dept: NEUROSURGERY | Facility: CLINIC | Age: 85
End: 2025-01-05
Payer: MEDICARE

## 2025-01-06 ENCOUNTER — CLINICAL SUPPORT (OUTPATIENT)
Dept: REHABILITATION | Facility: HOSPITAL | Age: 85
End: 2025-01-06
Payer: MEDICARE

## 2025-01-06 DIAGNOSIS — Z74.09 IMPAIRED FUNCTIONAL MOBILITY, BALANCE, GAIT, AND ENDURANCE: Primary | ICD-10-CM

## 2025-01-06 DIAGNOSIS — G91.2 NORMAL PRESSURE HYDROCEPHALUS: ICD-10-CM

## 2025-01-06 PROCEDURE — 97162 PT EVAL MOD COMPLEX 30 MIN: CPT | Mod: PN

## 2025-01-10 ENCOUNTER — TELEPHONE (OUTPATIENT)
Dept: NEUROSURGERY | Facility: CLINIC | Age: 85
End: 2025-01-10
Payer: MEDICARE

## 2025-01-10 ENCOUNTER — ANESTHESIA EVENT (OUTPATIENT)
Dept: SURGERY | Facility: HOSPITAL | Age: 85
End: 2025-01-10
Payer: MEDICARE

## 2025-01-10 NOTE — ANESTHESIA PREPROCEDURE EVALUATION
01/10/2025  Ramesh Ricci is a 84 y.o., male.    Pre-operative evaluation for Procedure(s) (LRB):  Lumbar Puncture (N/A)    Ramesh Ricci is a 84 y.o. male     Patient Active Problem List   Diagnosis    UC (ulcerative colitis)    Acquired hypothyroidism    BPH (benign prostatic hyperplasia)    Essential hypertension    Glaucoma    Atrophic kidney    Osteopenia    GERD (gastroesophageal reflux disease)    Shingles    TIA (transient ischemic attack)    Internal carotid artery stenosis    Normal cardiac stress test    Abnormal echocardiogram    Prostate CA    Echocardiogram abnormal    Stage 3a chronic kidney disease    Left ventricular diastolic dysfunction, NYHA class 1    Nonrheumatic aortic valve stenosis    Mitral valve stenosis, mild    Primary osteoarthritis of both knees    DDD (degenerative disc disease), cervical    Lung nodules    Congenital absence of right kidney    History of nuclear stress test    PAF (paroxysmal atrial fibrillation)    Ex-smoker    Other headache syndrome    Intermediate stage nonexudative age-related macular degeneration of both eyes    Hypertensive retinopathy of both eyes    Iron deficiency anemia due to chronic blood loss    Coronary artery disease involving native coronary artery of native heart without angina pectoris    Rash    Colitis    Advanced care planning/counseling discussion    Known medical problems    SOB (shortness of breath)    Palpitations    Exudative age-related macular degeneration, right eye, with active choroidal neovascularization    Drug-induced immunodeficiency    Atherosclerosis of aorta    Complex medical condition    S/P TAVR (transcatheter aortic valve replacement)       Review of patient's allergies indicates:   Allergen Reactions    Benazepril Other (See Comments)     Cough       No current facility-administered medications on file  prior to encounter.     Current Outpatient Medications on File Prior to Encounter   Medication Sig Dispense Refill    balsalazide (COLAZAL) 750 mg capsule Take 5 tablets in the morning and 4 tablets in the evening. (Patient taking differently: Take by mouth 2 (two) times a day. Take 5 tablets in the morning and 4 tablets in the evening.) 270 capsule 11    ELIQUIS 2.5 mg Tab Take 2.5 mg by mouth 2 (two) times daily.      acetaminophen (TYLENOL) 500 MG tablet Take 2 tablets (1,000 mg total) by mouth every 6 (six) hours as needed.      amitriptyline (ELAVIL) 25 MG tablet Take 1 tablet (25 mg total) by mouth every evening. (Patient not taking: Reported on 1/2/2025) 30 tablet 11    amLODIPine (NORVASC) 2.5 MG tablet Take 2.5 mg by mouth nightly.      ascorbic acid, vitamin C, (VITAMIN C) 500 MG tablet Take 500 mg by mouth once daily.      aspirin (ECOTRIN) 81 MG EC tablet Take 81 mg by mouth once daily.      calcium carbonate (TUMS) 200 mg calcium (500 mg) chewable tablet Take 1 tablet by mouth once daily.      divalproex (DEPAKOTE) 500 MG TbEC Take 500 mg by mouth 2 (two) times daily.      docusate sodium (STOOL SOFTENER ORAL) Take by mouth.      famotidine (PEPCID) 20 MG tablet Take 20 mg by mouth Daily.      fluocinolone acetonide oiL 0.01 % Drop Place into both ears.      levocetirizine (XYZAL) 5 MG tablet Take 5 mg by mouth every morning. (Patient not taking: Reported on 1/2/2025)      levothyroxine (SYNTHROID) 125 MCG tablet Take 1 tablet (125 mcg total) by mouth every morning. 90 tablet 3    magnesium oxide (MAG-OX) 400 mg (241.3 mg magnesium) tablet Take 400 mg by mouth nightly. (Patient not taking: Reported on 1/2/2025)      memantine (NAMENDA) 10 MG Tab Take 10 mg by mouth 2 (two) times daily.      mesalamine (PENTASA) 500 MG CR capsule Take 4 capsules (2,000 mg total) by mouth 2 (two) times a day. 240 capsule 11    metoprolol succinate (TOPROL-XL) 25 MG 24 hr tablet Take 1 tablet (25 mg total) by mouth once  daily. (Patient taking differently: Take 25 mg by mouth nightly.) 90 tablet 3    ondansetron (ZOFRAN-ODT) 4 MG TbDL Take 4 mg by mouth every 12 (twelve) hours as needed (nausea).      pantoprazole (PROTONIX) 40 MG tablet TAKE 1 TABLET BY MOUTH DAILY AS  NEEDED (Patient taking differently: Take 40 mg by mouth 2 (two) times daily.) 90 tablet 3    pravastatin (PRAVACHOL) 20 MG tablet Take 1 tablet (20 mg total) by mouth every evening. 90 tablet 3    sertraline (ZOLOFT) 100 MG tablet Take 100 mg by mouth once daily. (Patient not taking: Reported on 1/2/2025)      tamsulosin (FLOMAX) 0.4 mg Cp24 Take 0.4 mg by mouth once daily.       tiZANidine (ZANAFLEX) 2 MG tablet Take 1/2-1 tablet nightly as needed for muscle spasms 90 tablet 0    traMADoL (ULTRAM) 50 mg tablet Take 1/2-1 tab each morning with Tylenol and breakfast as needed for headache. (Patient not taking: Reported on 1/2/2025) 30 tablet 0    triamcinolone acetonide 0.1% (KENALOG) 0.1 % cream Apply topically 2 (two) times daily. APPLY  CREAM TOPICALLY TO AFFECTED AREA TWICE DAILY as needed 454 g 0    vitC/E/Zn/copper/lutein/zeaxan (ICAPS AREDS2 ORAL) Take 1 capsule by mouth once daily.      [DISCONTINUED] telmisartan (MICARDIS) 40 MG Tab TAKE 1/2 TABLET BY MOUTH DAILY 90 tablet 1       Past Surgical History:   Procedure Laterality Date    ADENOIDECTOMY      COLONOSCOPY N/A 03/25/2022    Procedure: COLONOSCOPY;  Surgeon: Ashley Nguyen MD;  Location: [facility];  Service: Endoscopy;  Laterality: N/A;    ESOPHAGOGASTRODUODENOSCOPY N/A 03/25/2022    Procedure: EGD (ESOPHAGOGASTRODUODENOSCOPY);  Surgeon: Ashley Nguyen MD;  Location: [facility];  Service: Endoscopy;  Laterality: N/A;  added on per Dr. Nguyen    ESOPHAGOGASTRODUODENOSCOPY N/A 6/7/2024    Procedure: EGD (ESOPHAGOGASTRODUODENOSCOPY);  Surgeon: Audie Snell MD;  Location: [facility] (4TH FLR);  Service: Endoscopy;  Laterality: N/A;  5/21 R/s,sent updated instr via portal.pt informed to hold  eliquis for 2 days.  Ref by: ,  approved to hold Eliquis (apixaban) for 2 days per Dr. Conklin-see media file  24-GT  -pre call complete-tb    EYE SURGERY Right 2020    cataract    HERNIA REPAIR      LEFT HEART CATHETERIZATION Right 10/29/2021    Procedure: CATHETERIZATION, HEART, LEFT AND RIGHT  - LV DARNELL POSSIBLE;  Surgeon: Beau Conklin MD;  Location: [facility] CATH LAB;  Service: Cardiology;  Laterality: Right;    RIGHT HEART CATHETERIZATION Right 10/29/2021    Procedure: INSERTION, CATHETER, RIGHT HEART;  Surgeon: Beau Conklin MD;  Location: [facility] CATH LAB;  Service: Cardiology;  Laterality: Right;    SKIN BIOPSY      TONSILLECTOMY         Social History     Socioeconomic History    Marital status:      Spouse name: Mone    Number of children: 1+1   Tobacco Use    Smoking status: Former     Current packs/day: 0.00     Average packs/day: 1 pack/day for 25.0 years (25.0 ttl pk-yrs)     Types: Cigarettes     Start date: 1947     Quit date: 1972     Years since quittin.0    Smokeless tobacco: Never   Substance and Sexual Activity    Alcohol use: Not Currently     Alcohol/week: 3.0 standard drinks of alcohol     Types: 2 Glasses of wine, 1 Cans of beer per week     Comment: Occasional    Drug use: Never    Sexual activity: Not Currently     Partners: Female     Birth control/protection: None   Social History Narrative    Rides bike - Quit due to poor balance     - Walks the dog.     - he uses a cane or a walker.  He walks between 1.5 and 2 miles daily.     Social Drivers of Health     Financial Resource Strain: Low Risk  (10/8/2024)    Overall Financial Resource Strain (CARDIA)     Difficulty of Paying Living Expenses: Not hard at all   Food Insecurity: No Food Insecurity (10/8/2024)    Hunger Vital Sign     Worried About Running Out of Food in the Last Year: Never true     Ran Out of Food in the Last Year: Never true   Transportation Needs: No  "Transportation Needs (2024)    Received from [facility] - Transportation     Lack of Transportation (Medical): No     Lack of Transportation (Non-Medical): No   Physical Activity: Insufficiently Active (10/8/2024)    Exercise Vital Sign     Days of Exercise per Week: 3 days     Minutes of Exercise per Session: 20 min   Stress: Stress Concern Present (10/8/2024)    [employer] - Occupational Stress Questionnaire     Feeling of Stress : To some extent   Housing Stability: Unknown (10/8/2024)    Housing Stability Vital Sign     Unable to Pay for Housing in the Last Year: No         CBC: No results for input(s): "WBC", "RBC", "HGB", "HCT", "PLT", "MCV", "MCH", "MCHC" in the last 72 hours.    CMP: No results for input(s): "NA", "K", "CL", "CO2", "BUN", "CREATININE", "GLU", "MG", "PHOS", "CALCIUM", "ALBUMIN", "PROT", "ALKPHOS", "ALT", "AST", "BILITOT" in the last 72 hours.    INR  No results for input(s): "PT", "INR", "PROTIME", "APTT" in the last 72 hours.        Diagnostic Studies:      EKD Echo:  Results for orders placed or performed in visit on 03/10/16   2D echo with color flow doppler    Collection Time: 03/10/16  3:32 PM   Result Value Ref Range    Right Vent (Diastole)  0.8 - 2.6    Septum (Diastole)  0.6 - 1.2    Left Ventricle (Diastole)  3.5 - 5.5    Posterior Wall (Diastole)  0.6 - 1.2    Aortic Valve (Diastole)  1.5 - 2.6    Aortic Root (Diastole)  1.3 - 3.7    Left Atrium (Diastole)  2.5 - 4    Septum (Systole)  0.5 - 1.1    Left Ventricle (Systole)  2.2 - 4    Posteriol Wall (Systole)  0.5 - 1.2    EF + QEF  %    Aortic Valve Area  2.5 - 3.5 cm2    Aortic Peak Gradient  0 - 9.9 mm Hg    Aortic Mean Gradient  0 - 9.9 mm Hg    Aortic Peak Velocity  0 - 1.9 m/s    Aortic Pres. Half Time  599 - 999 m/sec    Mitral Valve Area  4 - 6 cm2    Mitral Pres. Half Time  30 - 60 m/sec    Mitral Valve E-A Ratio  0.75 - 999    Mitral Valve E-E prime  0 - 7    " Pulmonary Artery Pressure  0 - 29 mm Hg           Pre-op Assessment    I have reviewed the Patient Summary Reports.     I have reviewed the Nursing Notes. I have reviewed the NPO Status.   I have reviewed the Medications.     Review of Systems  Anesthesia Hx:  No problems with previous Anesthesia             Denies Family Hx of Anesthesia complications.    Denies Personal Hx of Anesthesia complications.                    Social:  Former Smoker, Social Alcohol Use       Cardiovascular:  Exercise tolerance: poor   Hypertension Valvular problems/Murmurs, AS  CAD  asymptomatic  Dysrhythmias atrial fibrillation         ECG has been reviewed. October 2021 C notable for mild non obstructive CAD, moderate AS                           Pulmonary:  Pulmonary Normal                       Renal/:  Chronic Renal Disease, CKD  BPH              Hepatic/GI:   PUD,  GERD, poorly controlled   UC             Musculoskeletal:  Arthritis          Spine Disorders: cervical            Neurological:  TIA, (5-10 years ago)  Denies CVA.                                    Endocrine:   Hypothyroidism              Physical Exam  General: Well nourished, Cooperative and Alert    Airway:  Mallampati: II   Mouth Opening: Normal  TM Distance: Normal  Tongue: Normal  Neck ROM: Extension Decreased    Dental:  Intact    Chest/Lungs:  Normal Respiratory Rate    Heart:  Rate: Normal        Anesthesia Plan  Type of Anesthesia, risks & benefits discussed:    Anesthesia Type: Gen ETT, Gen Natural Airway  Intra-op Monitoring Plan: Standard ASA Monitors  Post Op Pain Control Plan: multimodal analgesia and IV/PO Opioids PRN  Induction:  IV and rapid sequence  Airway Plan: Video, Post-Induction  Informed Consent: Informed consent signed with the Patient and all parties understand the risks and agree with anesthesia plan.  All questions answered.   ASA Score: 3  Day of Surgery Review of History & Physical: H&P Update referred to the  surgeon/provider.    Ready For Surgery From Anesthesia Perspective.     .

## 2025-01-10 NOTE — TELEPHONE ENCOUNTER
Patient's wife contacted. Advised to arrive for surgery at 0500, DOSC, NPO after MN, shower x 2 with Hibiclens or Dial antibacterial soap. Understanding verbalized by wife.    Teams w/ [facility] pt is being seen at. Advised:    Spencer David. Pt Katalina is having his LP Monday at 7am. He will be available for his Post LP eval around 11. Standard procedure is to have a final eval the next AM before the CSF total replenishes. Can you assist?    His response:   yes   ill have someone reach out and get him scheduled

## 2025-01-12 PROBLEM — Z74.09 IMPAIRED FUNCTIONAL MOBILITY, BALANCE, GAIT, AND ENDURANCE: Status: ACTIVE | Noted: 2025-01-12

## 2025-01-13 ENCOUNTER — HOSPITAL ENCOUNTER (OUTPATIENT)
Facility: HOSPITAL | Age: 85
Discharge: HOME | End: 2025-01-13
Attending: NEUROLOGICAL SURGERY | Admitting: NEUROLOGICAL SURGERY
Payer: MEDICARE

## 2025-01-13 ENCOUNTER — CLINICAL SUPPORT (OUTPATIENT)
Dept: REHABILITATION | Facility: HOSPITAL | Age: 85
End: 2025-01-13
Payer: MEDICARE

## 2025-01-13 ENCOUNTER — ANESTHESIA (OUTPATIENT)
Dept: SURGERY | Facility: HOSPITAL | Age: 85
End: 2025-01-13
Payer: MEDICARE

## 2025-01-13 VITALS
HEART RATE: 56 BPM | HEIGHT: 71 IN | SYSTOLIC BLOOD PRESSURE: 142 MMHG | DIASTOLIC BLOOD PRESSURE: 60 MMHG | RESPIRATION RATE: 12 BRPM | BODY MASS INDEX: 21 KG/M2 | TEMPERATURE: 98 F | WEIGHT: 150 LBS | OXYGEN SATURATION: 97 %

## 2025-01-13 DIAGNOSIS — Z74.09 IMPAIRED FUNCTIONAL MOBILITY, BALANCE, GAIT, AND ENDURANCE: Primary | ICD-10-CM

## 2025-01-13 DIAGNOSIS — G91.2 NPH (NORMAL PRESSURE HYDROCEPHALUS): ICD-10-CM

## 2025-01-13 LAB
ABO + RH BLD: NORMAL
ANION GAP SERPL CALC-SCNC: 9 MMOL/L (ref 8–16)
APTT PPP: 28.5 SEC (ref 21–32)
BASOPHILS # BLD AUTO: 0.03 K/UL (ref 0–0.2)
BASOPHILS NFR BLD: 0.5 % (ref 0–1.9)
BLD GP AB SCN CELLS X3 SERPL QL: NORMAL
BUN SERPL-MCNC: 21 MG/DL (ref 8–23)
CALCIUM SERPL-MCNC: 9.2 MG/DL (ref 8.7–10.5)
CHLORIDE SERPL-SCNC: 102 MMOL/L (ref 95–110)
CLARITY CSF: ABNORMAL
CO2 SERPL-SCNC: 26 MMOL/L (ref 23–29)
COLOR CSF: ABNORMAL
CREAT SERPL-MCNC: 1.5 MG/DL (ref 0.5–1.4)
CSF TUBE NUMBER: 1
CSF TUBE NUMBER: 1
CSF, COMMENT: ABNORMAL
DIFFERENTIAL METHOD BLD: ABNORMAL
EOSINOPHIL # BLD AUTO: 0.1 K/UL (ref 0–0.5)
EOSINOPHIL NFR BLD: 1.3 % (ref 0–8)
EOSINOPHIL NFR CSF MANUAL: 2 %
ERYTHROCYTE [DISTWIDTH] IN BLOOD BY AUTOMATED COUNT: 14.4 % (ref 11.5–14.5)
EST. GFR  (NO RACE VARIABLE): 45.1 ML/MIN/1.73 M^2
GLUCOSE CSF-MCNC: 57 MG/DL (ref 40–70)
GLUCOSE SERPL-MCNC: 81 MG/DL (ref 70–110)
HCT VFR BLD AUTO: 41.3 % (ref 40–54)
HGB BLD-MCNC: 13 G/DL (ref 14–18)
IMM GRANULOCYTES # BLD AUTO: 0.02 K/UL (ref 0–0.04)
IMM GRANULOCYTES NFR BLD AUTO: 0.3 % (ref 0–0.5)
INR PPP: 1.1 (ref 0.8–1.2)
LYMPHOCYTES # BLD AUTO: 1.3 K/UL (ref 1–4.8)
LYMPHOCYTES NFR BLD: 20.6 % (ref 18–48)
LYMPHOCYTES NFR CSF MANUAL: 22 % (ref 40–80)
MCH RBC QN AUTO: 30.4 PG (ref 27–31)
MCHC RBC AUTO-ENTMCNC: 31.5 G/DL (ref 32–36)
MCV RBC AUTO: 97 FL (ref 82–98)
MONOCYTES # BLD AUTO: 0.8 K/UL (ref 0.3–1)
MONOCYTES NFR BLD: 12.4 % (ref 4–15)
MONOS+MACROS NFR CSF MANUAL: 16 % (ref 15–45)
NEUTROPHILS # BLD AUTO: 4.1 K/UL (ref 1.8–7.7)
NEUTROPHILS NFR BLD: 64.9 % (ref 38–73)
NEUTROPHILS NFR CSF MANUAL: 60 % (ref 0–6)
NRBC BLD-RTO: 0 /100 WBC
PLATELET # BLD AUTO: 137 K/UL (ref 150–450)
PMV BLD AUTO: 11.5 FL (ref 9.2–12.9)
POTASSIUM SERPL-SCNC: 4 MMOL/L (ref 3.5–5.1)
PROT CSF-MCNC: 85 MG/DL (ref 15–40)
PROTHROMBIN TIME: 12 SEC (ref 9–12.5)
RBC # BLD AUTO: 4.27 M/UL (ref 4.6–6.2)
RBC # CSF: 2500 /CU MM
SODIUM SERPL-SCNC: 137 MMOL/L (ref 136–145)
SPECIMEN OUTDATE: NORMAL
SPECIMEN VOL CSF: 7 ML
WBC # BLD AUTO: 6.31 K/UL (ref 3.9–12.7)
WBC # CSF: 4 /CU MM (ref 0–5)

## 2025-01-13 PROCEDURE — 80048 BASIC METABOLIC PNL TOTAL CA: CPT

## 2025-01-13 PROCEDURE — 25000003 PHARM REV CODE 250: Performed by: NURSE ANESTHETIST, CERTIFIED REGISTERED

## 2025-01-13 PROCEDURE — 71000016 HC POSTOP RECOV ADDL HR: Performed by: NEUROLOGICAL SURGERY

## 2025-01-13 PROCEDURE — 37000009 HC ANESTHESIA EA ADD 15 MINS: Performed by: NEUROLOGICAL SURGERY

## 2025-01-13 PROCEDURE — 36000704 HC OR TIME LEV I 1ST 15 MIN: Performed by: NEUROLOGICAL SURGERY

## 2025-01-13 PROCEDURE — 25000003 PHARM REV CODE 250

## 2025-01-13 PROCEDURE — 71000015 HC POSTOP RECOV 1ST HR: Performed by: NEUROLOGICAL SURGERY

## 2025-01-13 PROCEDURE — D9220A PRA ANESTHESIA: Mod: CRNA,,, | Performed by: NURSE ANESTHETIST, CERTIFIED REGISTERED

## 2025-01-13 PROCEDURE — D9220A PRA ANESTHESIA: Mod: ANES,,, | Performed by: ANESTHESIOLOGY

## 2025-01-13 PROCEDURE — 82945 GLUCOSE OTHER FLUID: CPT | Performed by: NEUROLOGICAL SURGERY

## 2025-01-13 PROCEDURE — 85610 PROTHROMBIN TIME: CPT

## 2025-01-13 PROCEDURE — 36000705 HC OR TIME LEV I EA ADD 15 MIN: Performed by: NEUROLOGICAL SURGERY

## 2025-01-13 PROCEDURE — 97112 NEUROMUSCULAR REEDUCATION: CPT | Mod: PN

## 2025-01-13 PROCEDURE — 84157 ASSAY OF PROTEIN OTHER: CPT | Performed by: NEUROLOGICAL SURGERY

## 2025-01-13 PROCEDURE — 63600175 PHARM REV CODE 636 W HCPCS: Performed by: NURSE ANESTHETIST, CERTIFIED REGISTERED

## 2025-01-13 PROCEDURE — 89051 BODY FLUID CELL COUNT: CPT | Performed by: NEUROLOGICAL SURGERY

## 2025-01-13 PROCEDURE — 36415 COLL VENOUS BLD VENIPUNCTURE: CPT | Performed by: NEUROLOGICAL SURGERY

## 2025-01-13 PROCEDURE — 86901 BLOOD TYPING SEROLOGIC RH(D): CPT

## 2025-01-13 PROCEDURE — 71000044 HC DOSC ROUTINE RECOVERY FIRST HOUR: Performed by: NEUROLOGICAL SURGERY

## 2025-01-13 PROCEDURE — 85025 COMPLETE CBC W/AUTO DIFF WBC: CPT

## 2025-01-13 PROCEDURE — 85730 THROMBOPLASTIN TIME PARTIAL: CPT

## 2025-01-13 PROCEDURE — 37000008 HC ANESTHESIA 1ST 15 MINUTES: Performed by: NEUROLOGICAL SURGERY

## 2025-01-13 PROCEDURE — 62270 DX LMBR SPI PNXR: CPT | Mod: ,,, | Performed by: NEUROLOGICAL SURGERY

## 2025-01-13 RX ORDER — MUPIROCIN 20 MG/G
OINTMENT TOPICAL
Status: DISCONTINUED | OUTPATIENT
Start: 2025-01-13 | End: 2025-01-13 | Stop reason: HOSPADM

## 2025-01-13 RX ORDER — LIDOCAINE HYDROCHLORIDE 20 MG/ML
INJECTION INTRAVENOUS
Status: DISCONTINUED | OUTPATIENT
Start: 2025-01-13 | End: 2025-01-13

## 2025-01-13 RX ORDER — DEXAMETHASONE SODIUM PHOSPHATE 4 MG/ML
INJECTION, SOLUTION INTRA-ARTICULAR; INTRALESIONAL; INTRAMUSCULAR; INTRAVENOUS; SOFT TISSUE
Status: DISCONTINUED | OUTPATIENT
Start: 2025-01-13 | End: 2025-01-13

## 2025-01-13 RX ORDER — SUCCINYLCHOLINE CHLORIDE 20 MG/ML
INJECTION INTRAMUSCULAR; INTRAVENOUS
Status: DISCONTINUED | OUTPATIENT
Start: 2025-01-13 | End: 2025-01-13

## 2025-01-13 RX ORDER — CEFAZOLIN 2 G/1
2 INJECTION, POWDER, FOR SOLUTION INTRAMUSCULAR; INTRAVENOUS
Status: DISCONTINUED | OUTPATIENT
Start: 2025-01-13 | End: 2025-01-13 | Stop reason: HOSPADM

## 2025-01-13 RX ORDER — EPHEDRINE SULFATE 50 MG/ML
INJECTION, SOLUTION INTRAVENOUS
Status: DISCONTINUED | OUTPATIENT
Start: 2025-01-13 | End: 2025-01-13

## 2025-01-13 RX ORDER — PROCHLORPERAZINE EDISYLATE 5 MG/ML
5 INJECTION INTRAMUSCULAR; INTRAVENOUS EVERY 30 MIN PRN
Status: DISCONTINUED | OUTPATIENT
Start: 2025-01-13 | End: 2025-01-13 | Stop reason: HOSPADM

## 2025-01-13 RX ORDER — GLUCAGON 1 MG
1 KIT INJECTION
Status: DISCONTINUED | OUTPATIENT
Start: 2025-01-13 | End: 2025-01-13 | Stop reason: HOSPADM

## 2025-01-13 RX ORDER — ONDANSETRON HYDROCHLORIDE 2 MG/ML
4 INJECTION, SOLUTION INTRAVENOUS DAILY PRN
Status: DISCONTINUED | OUTPATIENT
Start: 2025-01-13 | End: 2025-01-13 | Stop reason: HOSPADM

## 2025-01-13 RX ORDER — MUPIROCIN 20 MG/G
1 OINTMENT TOPICAL 2 TIMES DAILY
Status: DISCONTINUED | OUTPATIENT
Start: 2025-01-13 | End: 2025-01-13 | Stop reason: HOSPADM

## 2025-01-13 RX ORDER — FENTANYL CITRATE 50 UG/ML
INJECTION, SOLUTION INTRAMUSCULAR; INTRAVENOUS
Status: DISCONTINUED | OUTPATIENT
Start: 2025-01-13 | End: 2025-01-13

## 2025-01-13 RX ORDER — ONDANSETRON HYDROCHLORIDE 2 MG/ML
INJECTION, SOLUTION INTRAVENOUS
Status: DISCONTINUED | OUTPATIENT
Start: 2025-01-13 | End: 2025-01-13

## 2025-01-13 RX ORDER — PROPOFOL 10 MG/ML
VIAL (ML) INTRAVENOUS
Status: DISCONTINUED | OUTPATIENT
Start: 2025-01-13 | End: 2025-01-13

## 2025-01-13 RX ADMIN — ONDANSETRON 4 MG: 2 INJECTION INTRAMUSCULAR; INTRAVENOUS at 07:01

## 2025-01-13 RX ADMIN — LIDOCAINE HYDROCHLORIDE 100 MG: 20 INJECTION INTRAVENOUS at 07:01

## 2025-01-13 RX ADMIN — PROPOFOL 50 MG: 10 INJECTION, EMULSION INTRAVENOUS at 07:01

## 2025-01-13 RX ADMIN — SODIUM CHLORIDE: 0.9 INJECTION, SOLUTION INTRAVENOUS at 07:01

## 2025-01-13 RX ADMIN — EPHEDRINE SULFATE 10 MG: 50 INJECTION INTRAVENOUS at 07:01

## 2025-01-13 RX ADMIN — SUCCINYLCHOLINE 140 MG: 20 INJECTION, SOLUTION INTRAMUSCULAR; INTRAVENOUS at 07:01

## 2025-01-13 RX ADMIN — MUPIROCIN: 20 OINTMENT TOPICAL at 05:01

## 2025-01-13 RX ADMIN — PROPOFOL 75 MCG/KG/MIN: 10 INJECTION, EMULSION INTRAVENOUS at 07:01

## 2025-01-13 RX ADMIN — DEXAMETHASONE SODIUM PHOSPHATE 8 MG: 4 INJECTION, SOLUTION INTRAMUSCULAR; INTRAVENOUS at 07:01

## 2025-01-13 RX ADMIN — PROPOFOL 100 MG: 10 INJECTION, EMULSION INTRAVENOUS at 07:01

## 2025-01-13 RX ADMIN — FENTANYL CITRATE 50 MCG: 50 INJECTION, SOLUTION INTRAMUSCULAR; INTRAVENOUS at 07:01

## 2025-01-13 NOTE — PROGRESS NOTES
Discharge instructions reviewed w/pt and family, verbalized understanding. Pt in NADN.No complaints at this time. Tolerated liquids w/ no issues. To be d/c'd once bedrest complete.

## 2025-01-13 NOTE — H&P
"HPI  The following HPI is per Dr. Blankenship's note on 11/13/24:    "Mr. Ramesh Ricci is a 84 y.o. gentleman with A-Fib, bilateral hearing loss, HLD, CKD, essential HTN and NPH who presents today to establish care. The patient states his most bothersome symptom was his chronic headaches for which he is treated with Depakote. In addition to his headaches he notes problems with his ambulation for which he uses a cane. He endorses falls in which he loses his balance. He is accompanied with his wife who notes that his legs give out while walking. The wife also endorses worsening short term memory loss in the patient. The patient states he frequently forgets the year, daily tasks, as well as significant people in his life. The wife notes that the patient's memory loss became noticeable starting approximately 1 year ago. In addition to short term memory loss, the patient's wife also endorses loss of long term memory loss but he is eventually able to recall events with some prompting. The patient also reports issues with urinary urgency where he is unable to control it. An MRI Brain W WO contrast obtained on 10/31/24 demonstrated a stable prominence of the lateral and 3rd ventricles which may be compensatory to volume loss, normal pressure hydrocephalus to be considered in the appropriate clinical setting. He is here to discuss possible neurosurgical intervention to treat his symptoms. "    In addendum to Dr. Blankenship's clinic note, the patient describes stable symptomology, has been NPO since midnight, and has not taken any anti-plt/coag medications in the last 72 hours.    A&P  84 y.o. male who presents today for elective lumbar puncture:    Patient evaluated prior to procedure  All diagnostics and imaging reviewed  Patient NPO since MN  No anti-coag/plt medication in the last 72h  Risks & benefits of surgery explained in detail; patient consented and all questions answered  Further recs to follow procedure    Dispo: " Ongoing    Preston Boyd MD, PhD  Neurosurgery Resident, PGY-1

## 2025-01-13 NOTE — PLAN OF CARE
"[facility]  Physical Therapy Neurological Rehabilitation Initial Evaluation     Name: Ramesh Ricci  Clinic Number: [id number]    Therapy Diagnosis:   Encounter Diagnoses   Name Primary?    Normal pressure hydrocephalus     Impaired functional mobility, balance, gait, and endurance Yes     Physician: Jairo Blankenship MD    Physician Orders: PT Eval and Treat   Medical Diagnosis from Referral: G91.2 (ICD-10-CM) - Normal pressure hydrocephalus  Evaluation Date: 1/6/2025  Authorization Period Expiration: 12/23/2025  Plan of Care Expiration: 3/28/2025  Progress Note Due: 2/6/2025  Visit # / Visits authorized: 1/ 1  FOTO: 1/ 3    Precautions: Fall; Hard of Hearing     Time In: 7:22  Time Out: 8:25  Total Billable Time: 63 minutes    Subjective      History of current condition - Ramesh and his family report that they began to notice changes in his walking about 6 months ago. He had a history of headaches for years before that, but undiagnosed. He used hurry-cane for a couple months, but began using rolling walker ~3 months ago when walking worsened. Neurology consult included extensive testing, with recent diagnosis of Normal Pressure Hydrocephalus. Upcoming lumbar puncture is scheduled. Upon inquiry, patient reports current difficulties to include walking, walking long distances, and tolerance of arms with walking (walker management).     Prior Therapy: no prior therapy for this condition   Social History: lives with his wife  Falls: ~4 falls in past 6 months; near-falls occur often  DME: rolling walker; hurry-cane  Home Environment: [address] [facility] [address]-story home    Family Present at time of Eval: son, Ramesh Earl & wife, Mone   Occupation: Retired  Prior Level of Function: Independent   Current Level of Function: SBA to CGA with rolling walker needed for ambulation     Pain:  Current 2/10 to head     Patient's goals: "to walk better"    Medical History:   Past Medical History:   Diagnosis " Date    Abnormal echocardiogram 11/16/2012    Anemia     Anticoagulant long-term use     Anxiety     Atrophic kidney 11/16/2012    BPH (benign prostatic hyperplasia) 11/16/2012    Cancer 2000 something    Had radiation    DDD (degenerative disc disease), cervical 07/05/2017    x-ray 7/17 - Severe    GERD (gastroesophageal reflux disease) 11/16/2012    Glaucoma 11/16/2012    HTN (hypertension) 11/16/2012    Hypothyroid 11/16/2012    Internal carotid artery stenosis 11/16/2012    Joint pain     Left ventricular diastolic dysfunction, NYHA class 1 04/16/2015    4/15    Low serum testosterone level 11/16/2012    Normal cardiac stress test 11/16/2012    Osteopenia 11/16/2012    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    Shingles 11/16/2012    Skin disease 2020    Seems to have started after Covid vaccine    Stroke 2017    tia   no residual    TIA (transient ischemic attack) 11/16/2012    UC (ulcerative colitis) 11/16/2012       Surgical History:   Ramesh Ricci  has a past surgical history that includes Adenoidectomy; Tonsillectomy; Hernia repair; Eye surgery (Right, 11/2020); Left heart catheterization (Right, 10/29/2021); Right heart catheterization (Right, 10/29/2021); Esophagogastroduodenoscopy (N/A, 03/25/2022); Colonoscopy (N/A, 03/25/2022); Skin biopsy (2020); and Esophagogastroduodenoscopy (N/A, 6/7/2024).    Medications:   Ramesh Yo has a current medication list which includes the following prescription(s): acetaminophen, amitriptyline, amlodipine, ascorbic acid (vitamin c), aspirin, balsalazide, calcium carbonate, divalproex, docusate sodium, eliquis, famotidine, fluocinolone acetonide oil, levocetirizine, levothyroxine, magnesium oxide, memantine, mesalamine, metoprolol succinate, ondansetron, pantoprazole, pravastatin, sertraline, tamsulosin, tizanidine, tramadol, triamcinolone acetonide 0.1%, vit c/e/zinc ox/harish/lut/zeax, and [DISCONTINUED] telmisartan.    Allergies:   Review of  patient's allergies indicates:   Allergen Reactions    Benazepril Other (See Comments)     Cough        Objective      Mental status: AAO x 4, with some memory deficits notable     Tone: 0 - No increase in muscle tone through LE joints, with exception of hypomobility and moderate restriction of passive R knee-extension at end-range      (AROM of R knee did reach WNL)    Strength: manual muscle test grades below         MMT     Left        Right   Hip flexion 4+/5 4+/5   Knee extension  4+/5 4+/5   Knee flexion  NT NT   Ankle dorsiflexion 4+/5 4+/5   Ankle plantarflexion NT NT   Hip abduction  2+/5 gravity-eliminated  2+/5 gravity-eliminated         Evaluation   Timed Up and Go 54 seconds with rolling walker   < 20 sec safe for independent transfers,     < 30 sec assist required for transfers   Chen Balance Scale 20/56         Gait Assessment:   - AD used: rolling walker   - Assistance: SBA to CGA with turning   - Distance: difficulty with report of fatigue after ~50 ft; able to make it 130 ft during evaluation     GAIT DEVIATIONS: reduced step-length; no push-off/heel-strike; slow pace; poor walker management        -retropulsion and LOS with turning    Functional Mobility (Bed mobility, transfers)  Sit to stand: uses 1 hand to walker and 1 to armrest before cueing   Stand to sit: slow descent with poor control    CHEN Balance Assessment    1. Sitting to Standing   3 - able to stand independely using hands  2. Standing Unsupported   2 - able to stand 30 seconds unsupported  3. Sitting Unsupported   4 - able to sit safely and securely 2 minutes  4. Standing to Sitting   1 - sit independently but has uncontrolled descent  5. Pivot Transfer   2 - able to transfer with verbal cuing and/or supervision  6. Standing with Eyes Closed   3 - able to stand 10 seconds with supervision  7. Standing with Feet Together   0 - needs help to attain position and unable to hold for 15 seconds  8. Reaching Forward with Outstretched  Arm   0 - loses balance while trying, requires external support  9. Retrieving Object from Floor   3 - able to pick slipper but needs supervision  10. Turning to Look Behind   1 - needs supervision when turning  11. Turning 360 Degrees   0 - needs assistance while turning  12. Placing Alternate Foot on Step   0 - needs assist to keep from falling/unable to try  13. Standing with One Foot in Front   0 - Looses balance while stepping or standing  14. Standing on One Foot   1 - tries to lift leg and unable to hold 3 seconds but remains standing independently  Total: 20  Maximum: 56    Score:   0-20= high fall risk   21-40 moderate fall risk   41-56 low fall risk     Fall risk cut-off scores:   Elderly and History of falls: <51/56   Elderly and No history of falls: <42/56   CVA: <45/56        Treatment     Evaluation today     Patient Education and Home Exercises     Education provided:   -walker management   -gait posture   -gait turning and [id number]     Written Home Exercises Provided: to be provided within treatment  Exercises were reviewed and Ramesh was able to demonstrate them prior to the end of the session.  Ramesh demonstrated fair  understanding of the education provided.     Assessment     Mr. Ricci is an 84 y.o. male referred to outpatient Physical Therapy with a medical diagnosis of G91.2 (ICD-10-CM) - Normal pressure hydrocephalus. Patient and family present with report of patient's walking beginning to decline 6 months ago, with gradual worsening since onset. He was recently diagnosed with NPH and has upcoming lumbar puncture. With examination today, patient demonstrates impaired sit<>stands, impaired gait mechanics, deficient assistive-device management, impaired gait stability with Timed Up and Go, impaired gait endurance, and impaired balance with Mon Balance Scale. He will benefit from skilled physical therapy to address the stated deficits and improve safe daily mobility.     Patient  prognosis is Good.   Patient will benefit from skilled outpatient Physical Therapy to address the deficits stated above and in the chart below, provide patient /family education, and to maximize patient's level of independence.     Plan of care discussed with patient: Yes  Patient's spiritual, cultural and educational needs considered and patient is agreeable to the plan of care and goals as stated below:     Anticipated Barriers for therapy:NPH/ inconclusive Dx    Medical Necessity is demonstrated by the following  History  Co-morbidities and personal factors that may impact the plan of care [] LOW: no personal factors / co-morbidities  [x] MODERATE: 1-2 personal factors / co-morbidities  [] HIGH: 3+ personal factors / co-morbidities    Moderate / High Support Documentation:   Co-morbidities affecting plan of care: hearing impaired     Personal Factors:   age     Examination  Body Structures and Functions, activity limitations and participation restrictions that may impact the plan of care [] LOW: addressing 1-2 elements  [x] MODERATE: 3+ elements  [] HIGH: 4+ elements (please support below)    Moderate / High Support Documentation: See Objective section above including exam results outside of normal limits.      Clinical Presentation [] LOW: stable  [x] MODERATE: Evolving  [] HIGH: Unstable     Decision Making/ Complexity Score: moderate       Goals:  Short Term Goals: 5 weeks   -Patient will complete sit-to-stands with bilateral upper extremities pushing from chair, Mod I, to demonstrate improved ability and independence-level with ADL's.   -Patient will complete itynluvh-di-fencq and controlled stand-to-sit to demonstrate safe transitional movements and lower extremity eccentric control & stability.   -Patient will ambulate with full-stride step-length and consistent push-off/ heel-strike x 200 feet to demonstrate improved gait mechanics and confidence with gait.     Long Term Goals: 10 weeks   -Patient will  achieve Timed Up and Go of </= 22 seconds with least-restrictive assistive device to demonstrate improved gait stability and reduced fall-risk.  -Patient will increase Mon Balance score to 41/56 to demonstrate improved safe functional ability and reduced fall risk to low-risk category.   -Patient will ambulate 400 feet with rolling walker, including safe turning management, Spv, to demonstrate improved gait quality and endurance.    Plan     Plan of care Certification: 1/6/2025 to 3/28/2025    Outpatient Physical Therapy 2 times weekly for 10 weeks to include the following interventions: Gait Training, Manual Therapy, Neuromuscular Re-ed, Self Care, Therapeutic Activities, and Therapeutic Exercise.       Jyothi Alvarado, PT, DPT         Physician's Signature: _________________________________________ Date: ________________

## 2025-01-13 NOTE — OP NOTE
DATE OF PROCEDURE:  1/13/2025     SURGEON:  Jairo Blankenship M.D., Ph.D.     ASSISTANT:  Preston Boyd M.D. (the assistant is a [facility] resident).     PREOPERATIVE DIAGNOSES:  Normal pressure hydrocephalus    POSTOPERATIVE DIAGNOSES: Normal pressure hydrocephalus     PROCEDURE:  Diagnostic lumbar puncture.     INDICATIONS IN DETAIL:   Mr. Ramesh Ricci is a 84 y.o. gentleman with A-Fib, bilateral hearing loss, HLD, CKD, essential HTN and NPH who presents today for LP.  The patient states his most bothersome symptom was his chronic headaches for which he is treated with Depakote. In addition to his headaches he notes problems with his ambulation for which he uses a cane. He endorses falls in which he loses his balance. He is accompanied with his wife who notes that his legs give out while walking. The wife also endorses worsening short term memory loss in the patient. The patient states he frequently forgets the year, daily tasks, as well as significant people in his life. The wife notes that the patient's memory loss became noticeable starting approximately 1 year ago. In addition to short term memory loss, the patient's wife also endorses loss of long term memory loss but he is eventually able to recall events with some prompting. The patient also reports issues with urinary urgency where he is unable to control it. An MRI Brain W WO contrast obtained on 10/31/24 demonstrated a stable prominence of the lateral and 3rd ventricles which may be compensatory to volume loss, normal pressure hydrocephalus to be considered in the appropriate clinical setting. He is here to discuss possible neurosurgical intervention to treat his symptoms.    I have recommended performing a tap test (lumbar puncture) at this time. I have discussed the risks/benefits, indications, and alternatives for the proposed procedure in detail. I have answered all of their questions and patient wishes to proceed.     PROCEDURE  IN DETAIL:    The patient was seen in the pretreatment area and the risks, benefits, and alternatives were discussed.  The patient was brought to the Operating Room and a general monitored awake anesthetic was administered.  The patient was placed in the lateral decubitus position with the right side up.  The patient's back was cleaned, prepped, and draped in the usual fashion.  Lidocaine was infiltrated in the midline at approximately the L3-4 level.  We then pierced the skin going in between the spinous processes using a spinal needle.  No CSF was obtained after three tries. Clear CSF was obtained.      The opening pressure for this patient was 2 cm of water.  We drained approximately 24 mL of CSF, at which time her closing pressure was -5 cm of water.      The trocar was placed back into the spinal needle and the spinal needle was removed.  Pressure was held for a few minutes.  A bandage was placed over the puncture marks.  The patient was then allowed to recover from this anesthetic and was placed on a gurney.      The patient was transferred to the Postanesthesia Care Unit in excellent condition.  EBL was less minimal.     Sample included CSF, which was sent for cell count, protein, and glucose.     All counts were correct at the end of surgery.     Dr. Jairo Blankenship was present during the entire procedure.

## 2025-01-13 NOTE — DISCHARGE SUMMARY
[facility] - [facility] (2nd Fl)  Discharge Note  Short Stay    Procedure(s) (LRB):  Lumbar Puncture (N/A)      OUTCOME: Patient tolerated treatment/procedure well without complication and is now ready for discharge.    DISPOSITION: Home or Self Care    FINAL DIAGNOSIS:  <principal problem not specified>    FOLLOWUP: In clinic    DISCHARGE INSTRUCTIONS:    Discharge Procedure Orders   Notify your health care provider if you experience any of the following:  temperature >100.4     Notify your health care provider if you experience any of the following:  persistent nausea and vomiting or diarrhea     Notify your health care provider if you experience any of the following:  severe uncontrolled pain     Notify your health care provider if you experience any of the following:  redness, tenderness, or signs of infection (pain, swelling, redness, odor or green/yellow discharge around incision site)     Notify your health care provider if you experience any of the following:  difficulty breathing or increased cough     Notify your health care provider if you experience any of the following:  severe persistent headache     Notify your health care provider if you experience any of the following:  worsening rash     Notify your health care provider if you experience any of the following:  persistent dizziness, light-headedness, or visual disturbances     Notify your health care provider if you experience any of the following:  increased confusion or weakness     Activity as tolerated        TIME SPENT ON DISCHARGE: 15 minutes

## 2025-01-13 NOTE — ANESTHESIA POSTPROCEDURE EVALUATION
Anesthesia Post Evaluation    Patient: Ramesh Ricci    Procedure(s) Performed: Procedure(s) (LRB):  Lumbar Puncture (N/A)    Final Anesthesia Type: general      Patient location during evaluation: [facility]  Patient participation: Yes- Able to Participate  Level of consciousness: awake and alert  Post-procedure vital signs: reviewed and stable  Pain management: adequate  Airway patency: patent  KRISTEN mitigation strategies: Extubation while patient is awake and Multimodal analgesia  PONV status at discharge: No PONV  Anesthetic complications: no      Cardiovascular status: stable  Respiratory status: unassisted and spontaneous ventilation  Hydration status: euvolemic  Follow-up not needed.              Vitals Value Taken Time   /66 01/13/25 0832   Temp 36.4 °C (97.5 °F) 01/13/25 0745   Pulse 50 01/13/25 0845   Resp 16 01/13/25 0845   SpO2 95 % 01/13/25 0845   Vitals shown include unfiled device data.      No case tracking events are documented in the log.      Pain/Yrn Score: Yrn Score: 9 (1/13/2025  8:35 AM)

## 2025-01-13 NOTE — BRIEF OP NOTE
[facility] - Surgery ([facility])  Brief Operative Note    Surgery Date: 1/13/2025     Surgeons and Role:     * Jairo Blankenship MD - Primary     * Preston Boyd MD - Resident - Assisting        Pre-op Diagnosis:  Normal pressure hydrocephalus [G91.2]    Post-op Diagnosis:  Post-Op Diagnosis Codes:     * Normal pressure hydrocephalus [G91.2]    Procedure(s) (LRB):  Lumbar Puncture (N/A)    Anesthesia: General/MAC    Operative Findings: Lumbar puncture performed as planned without intraoperative complications. The opening pressure for this patient was 0 cm of water.  We drained approximately 24 mL of CSF, at which time closing pressure was -5 cm of water.       Estimated Blood Loss: * No values recorded between 1/13/2025  7:19 AM and 1/13/2025  7:44 AM *         Specimens:   Specimen (24h ago, onward)      None              Discharge Note    OUTCOME: Patient tolerated treatment/procedure well without complication and will be ready for discharge following recovery.    DISPOSITION: Home or Self Care    FINAL DIAGNOSIS:  <principal problem not specified>    FOLLOWUP: In clinic    DISCHARGE INSTRUCTIONS:  No discharge procedures on file.

## 2025-01-13 NOTE — TRANSFER OF CARE
"Anesthesia Transfer of Care Note    Patient: Ramesh Ricci    Procedure(s) Performed: Procedure(s) (LRB):  Lumbar Puncture (N/A)    Patient location: PACU    Anesthesia Type: general    Transport from OR: Transported from OR on 6-10 L/min O2 by face mask with adequate spontaneous ventilation    Post pain: adequate analgesia    Post assessment: no apparent anesthetic complications and tolerated procedure well    Post vital signs: stable    Level of consciousness: awake and alert    Nausea/Vomiting: no nausea/vomiting    Complications: none    Transfer of care protocol was followed      Last vitals: Visit Vitals  BP (!) 153/67 (BP Location: Left arm, Patient Position: Lying)   Pulse (!) 45   Temp 36.4 °C (97.5 °F) (Temporal)   Resp 16   Ht 5' 11" (1.803 m)   Wt 68 kg (150 lb)   SpO2 99%   BMI 20.92 kg/m²     "

## 2025-01-13 NOTE — HPI
"HPI  The following HPI is per Dr. Blankenship's note on 11/13/24:    "Mr. Ramesh Ricci is a 84 y.o. gentleman with A-Fib, bilateral hearing loss, HLD, CKD, essential HTN and NPH who presents today to establish care. The patient states his most bothersome symptom was his chronic headaches for which he is treated with Depakote. In addition to his headaches he notes problems with his ambulation for which he uses a cane. He endorses falls in which he loses his balance. He is accompanied with his wife who notes that his legs give out while walking. The wife also endorses worsening short term memory loss in the patient. The patient states he frequently forgets the year, daily tasks, as well as significant people in his life. The wife notes that the patient's memory loss became noticeable starting approximately 1 year ago. In addition to short term memory loss, the patient's wife also endorses loss of long term memory loss but he is eventually able to recall events with some prompting. The patient also reports issues with urinary urgency where he is unable to control it. An MRI Brain W WO contrast obtained on 10/31/24 demonstrated a stable prominence of the lateral and 3rd ventricles which may be compensatory to volume loss, normal pressure hydrocephalus to be considered in the appropriate clinical setting. He is here to discuss possible neurosurgical intervention to treat his symptoms. "    In addendum to Dr. Blankenship's clinic note, the patient describes stable symptomology, has been NPO since midnight, and has not taken any anti-plt/coag medications in the last 72 hours.    A&P  84 y.o. male who presents today for elective lumbar puncture:    Patient evaluated prior to procedure  All diagnostics and imaging reviewed  Patient NPO since MN  No anti-coag/plt medication in the last 72h  Risks & benefits of surgery explained in detail; patient consented and all questions answered  Further recs to follow procedure    Dispo: " Ongoing

## 2025-01-13 NOTE — DISCHARGE INSTRUCTIONS
Neurosurgery Patient Information  -Do not perform any excessive bending over or leaning forward as this is a fall hazard.  -Do not perform any heavy lifting or lifting more than 5-10 lbs from the ground level as this is a fall hazard.      Contact the Neurosurgery Office immediately if:  If you begin to notice any neurologic changes such as:           -Sudden onset of lethargy or sleepiness           -Sudden confusion, trouble speaking, or understanding            -Sudden trouble seeing in one or both eyes            -Sudden trouble walking, dizziness, loss of coordination            -Sudden severe headache with no known cause            -Sudden onset of numbness or weakness     Wound Care:  The incision does not need to be cleaned with any water, soap, alcohol, peroxide, or other substance.    Call your doctor or go to the Emergency Room for any signs of infection including: increased redness, drainage, pain or fever (temperature greater than or equal to 101.4).       Miscellaneous:  -Follow up with Dr. Blankenship in 2 weeks for a wound check and pathology results. Appointment will be mailed to you.        [facility] Neurosurgery Office: [phone]              [facility] Neurosurgery Office: [phone]   [facility] Neurosurgery Office: [phone]

## 2025-01-13 NOTE — ANESTHESIA PROCEDURE NOTES
Intubation    Date/Time: 1/13/2025 7:12 AM    Performed by: Allen Goyal CRNA  Authorized by: Eder Maloney MD    Intubation:     Induction:  Rapid sequence induction    Intubated:  Postinduction    Mask Ventilation:  Not attempted    Attempts:  1    Attempted By:  CRNA    Method of Intubation:  Video laryngoscopy    Blade:  Aj 3    Laryngeal View Grade: Grade I - full view of cords      Difficult Airway Encountered?: No      Complications:  None    Airway Device:  Oral endotracheal tube    Airway Device Size:  7.5    Style/Cuff Inflation:  Cuffed    Inflation Amount (mL):  6    Tube secured:  23    Secured at:  The lips    Placement Verified By:  Capnometry    Complicating Factors:  None    Findings Post-Intubation:  BS equal bilateral and atraumatic/condition of teeth unchanged

## 2025-01-13 NOTE — CARE UPDATE
Post-op Update    The patient was seen at bedside following the procedure. The patient was still waking up from anesthesia but grossly at his neurological baseline, pain was reasonably controlled in the post-operative period, and all questions and/or complaints were directly addressed at bedside by the NSGY resident. All post-operative management orders have been placed and the patient will be going home after laying flat for two hours, and the patient was signed out to bedside RN.    Pt has appt with PT scheduled for noon today.    Preston Boyd MD, PhD  Neurosurgery Resident, PGY-1    
Awake/Alert

## 2025-01-14 ENCOUNTER — CLINICAL SUPPORT (OUTPATIENT)
Dept: REHABILITATION | Facility: HOSPITAL | Age: 85
End: 2025-01-14
Payer: MEDICARE

## 2025-01-14 DIAGNOSIS — Z74.09 IMPAIRED FUNCTIONAL MOBILITY, BALANCE, GAIT, AND ENDURANCE: Primary | ICD-10-CM

## 2025-01-14 PROCEDURE — 97112 NEUROMUSCULAR REEDUCATION: CPT | Mod: PN

## 2025-01-14 NOTE — PROGRESS NOTES
[facility]   Physical Therapy Treatment Note     Name: Ramesh Ricci  Clinic Number: [id number]    Therapy Diagnosis:   Encounter Diagnosis   Name Primary?    Impaired functional mobility, balance, gait, and endurance Yes     Physician: Jairo Blankenship MD    Visit Date: 1/13/2025    Physician Orders: PT Eval and Treat   Medical Diagnosis from Referral: G91.2 (ICD-10-CM) - Normal pressure hydrocephalus  Evaluation Date: 1/6/2025  Authorization Period Expiration: 12/23/2025  Plan of Care Expiration: 3/28/2025  Progress Note Due: 2/6/2025  Visit # / Visits authorized: 1/ 1  FOTO: 1/ 3     Precautions: Fall; Hard of Hearing     FOTO: 1/ 1  PTA Visit #: 0/5     Time In: 1200  Time Out: 1220  Total Billable Time: 20 minutes    SUBJECTIVE     Pt reports no new issues since LP.    Pain: 0/10  Location: -    OBJECTIVE     Objective Measures updated at progress report unless specified.     TREATMENT     Ramesh received the treatments listed below:      CHEN BALANCE SCALE     1.SITTING TO STANDING:  (3) Pt able to stand independently using hands     2. STANDING UNSUPPORTED: (2) Pt able to stand 30 seconds unsupported     3. SITTING WITH BACK UNSUPPORTED BUT FEET SUPPORTED ON FLOOR: (4) Pt able to sit safely and securely 2 minutes     4. STANDING TO SITTING:(3) Pt controls descent by using hands     5. TRANSFERS:(3) Pt  able to transfer safely definite need of hands    6. STANDING UNSUPPORTED WITH EYES CLOSED:(3) Pt able to stand 10 seconds with supervision    7. STANDING UNSUPPORTED WITH FEET TOGETHER:(3) Pt able to place feet together independently and stand for 1 minute with supervision      8. REACHING FORWARD WITH OUTSTRETCHED ARM WHILE STANDING:(2) Pt can reach forward >5 cm safely (2 inches)    9.  OBJECT FROM THE FLOOR FROM A STANDING POSITION:(3) Pt able to  slipper but needs supervision        10. TURNING TO LOOK BEHIND OVER LEFT AND RIGHT SHOULDERS WHILE STANDING:(1)  Pt needs supervision when turning      11. TURN 360 DEGREES:(1) Pt needs close supervision or verbal cueing    12. PLACING ALTERNATE FOOT ON STEP OR STOOL WHILE STANDING UNSUPPORTED:(0) Pt needs assistance to keep from falling/unable to try    13. STANDING UNSUPPORTED ONE FOOT IN FRONT: (0) Pt loses balance while stepping or standing     14. STANDING ON ONE LEG:(0) Pt unable to try or needs assist to prevent fall    Total Score 28/56    41-56 = low fall risk  21-40 = medium fall risk  0 -20 = high fall risk      Evaluation 1/14/25    Timed Up and Go 54 seconds with rolling walker  46 seconds    Mon Balance Scale 20/56 28/56        PATIENT EDUCATION AND HOME EXERCISES     Home Exercises Provided and Patient Education Provided     Education provided:   - PT role and POC  - HEP    ASSESSMENT     Ramesh tolerated treatment well today. Presents to clinic reporting no new issues. Tolerated LP well. Improved Mon and TUG today. Will continue to progress treatment per patient tolerance.      Ramesh Is progressing well towards his goals.   Pt prognosis is Good.     Pt will continue to benefit from skilled outpatient physical therapy to address the deficits listed in the problem list box on initial evaluation, provide pt/family education and to maximize pt's level of independence in the home and community environment.     Pt's spiritual, cultural and educational needs considered and pt agreeable to plan of care and goals.     Anticipated barriers to physical therapy: none     Goals:  Short Term Goals: 5 weeks   -Patient will complete sit-to-stands with bilateral upper extremities pushing from chair, Mod I, to demonstrate improved ability and independence-level with ADL's.   -Patient will complete [id number] and controlled stand-to-sit to demonstrate safe transitional movements and lower extremity eccentric control & stability.   -Patient will ambulate with full-stride step-length and consistent push-off/  heel-strike x 200 feet to demonstrate improved gait mechanics and confidence with gait.      Long Term Goals: 10 weeks   -Patient will achieve Timed Up and Go of </= 22 seconds with least-restrictive assistive device to demonstrate improved gait stability and reduced fall-risk.  -Patient will increase Mon Balance score to 41/56 to demonstrate improved safe functional ability and reduced fall risk to low-risk category.   -Patient will ambulate 400 feet with rolling walker, including safe turning management, Spv, to demonstrate improved gait quality and endurance.    PLAN     Plan of care Certification: 1/6/2025 to 3/28/2025     Outpatient Physical Therapy 2 times weekly for 10 weeks to include the following interventions: Gait Training, Manual Therapy, Neuromuscular Re-ed, Self Care, Therapeutic Activities, and Therapeutic Exercise.     Spencer Fuentes, PT

## 2025-01-14 NOTE — PROGRESS NOTES
[facility]   Physical Therapy Treatment Note     Name: Ramesh Ricci  Clinic Number: [id number]    Therapy Diagnosis:   Encounter Diagnosis   Name Primary?    Impaired functional mobility, balance, gait, and endurance Yes     Physician: Jairo Blankenship MD    Visit Date: 1/14/2025    Physician Orders: PT Eval and Treat   Medical Diagnosis from Referral: G91.2 (ICD-10-CM) - Normal pressure hydrocephalus  Evaluation Date: 1/6/2025  Authorization Period Expiration: 12/23/2025  Plan of Care Expiration: 3/28/2025  Progress Note Due: 2/6/2025  Visit # / Visits authorized: 2/10  FOTO: 1/ 3     Precautions: Fall; Hard of Hearing     FOTO: 1/ 1  PTA Visit #: 0/5     Time In: 900  Time Out: 920  Total Billable Time: 20 minutes    SUBJECTIVE     Pt reports no new issues today.     Pain: 0/10  Location: -    OBJECTIVE     Objective Measures updated at progress report unless specified.     TREATMENT     Ramesh received the treatments listed below:      CHEN BALANCE SCALE     1.SITTING TO STANDING:  (3) Pt able to stand independently using hands     2. STANDING UNSUPPORTED: (3) Pt able to stand 2 minutes with supervision     3. SITTING WITH BACK UNSUPPORTED BUT FEET SUPPORTED ON FLOOR: (4) Pt able to sit safely and securely 2 minutes     4. STANDING TO SITTING:(3) Pt controls descent by using hands     5. TRANSFERS:(3) Pt  able to transfer safely definite need of hands    6. STANDING UNSUPPORTED WITH EYES CLOSED:(3) Pt able to stand 10 seconds with supervision    7. STANDING UNSUPPORTED WITH FEET TOGETHER:(3) Pt able to place feet together independently and stand for 1 minute with supervision      8. REACHING FORWARD WITH OUTSTRETCHED ARM WHILE STANDING:(2) Pt can reach forward >5 cm safely (2 inches)    9.  OBJECT FROM THE FLOOR FROM A STANDING POSITION:(3) Pt able to  slipper but needs supervision        10. TURNING TO LOOK BEHIND OVER LEFT AND RIGHT SHOULDERS WHILE STANDING:(1)  Pt needs supervision when turning      11. TURN 360 DEGREES:(1) Pt needs close supervision or verbal cueing    12. PLACING ALTERNATE FOOT ON STEP OR STOOL WHILE STANDING UNSUPPORTED:(0) Pt needs assistance to keep from falling/unable to try    13. STANDING UNSUPPORTED ONE FOOT IN FRONT: (0) Pt loses balance while stepping or standing     14. STANDING ON ONE LEG:(0) Pt unable to try or needs assist to prevent fall    Total Score 29/56    41-56 = low fall risk  21-40 = medium fall risk  0 -20 = high fall risk      Evaluation 1/14/25 1/15/25   Timed Up and Go 54 seconds with rolling walker  46 seconds RW 36 seconds RW   Mon Balance Scale 20/56 28/56 29/56       PATIENT EDUCATION AND HOME EXERCISES     Home Exercises Provided and Patient Education Provided     Education provided:   - PT role and POC  - HEP    ASSESSMENT     Ramesh tolerated treatment well today. Presents to clinic reporting no new issues. Tolerated LP well. Improved Mon and TUG today. Will continue to progress treatment per patient tolerance.      Ramesh Is progressing well towards his goals.   Pt prognosis is Good.     Pt will continue to benefit from skilled outpatient physical therapy to address the deficits listed in the problem list box on initial evaluation, provide pt/family education and to maximize pt's level of independence in the home and community environment.     Pt's spiritual, cultural and educational needs considered and pt agreeable to plan of care and goals.     Anticipated barriers to physical therapy: none     Goals:  Short Term Goals: 5 weeks   -Patient will complete sit-to-stands with bilateral upper extremities pushing from chair, Mod I, to demonstrate improved ability and independence-level with ADL's.   -Patient will complete [id number] and controlled stand-to-sit to demonstrate safe transitional movements and lower extremity eccentric control & stability.   -Patient will ambulate with full-stride step-length and  consistent push-off/ heel-strike x 200 feet to demonstrate improved gait mechanics and confidence with gait.      Long Term Goals: 10 weeks   -Patient will achieve Timed Up and Go of </= 22 seconds with least-restrictive assistive device to demonstrate improved gait stability and reduced fall-risk.  -Patient will increase Mon Balance score to 41/56 to demonstrate improved safe functional ability and reduced fall risk to low-risk category.   -Patient will ambulate 400 feet with rolling walker, including safe turning management, Spv, to demonstrate improved gait quality and endurance.    PLAN     Plan of care Certification: 1/6/2025 to 3/28/2025     Outpatient Physical Therapy 2 times weekly for 10 weeks to include the following interventions: Gait Training, Manual Therapy, Neuromuscular Re-ed, Self Care, Therapeutic Activities, and Therapeutic Exercise.     Spencer Fuentes, PT

## 2025-01-21 NOTE — H&P (VIEW-ONLY)
The patient location is: [place]  The chief complaint leading to consultation is: Postoperative LP for NPH evaluation.    Visit type: audiovisual    Face to Face time with patient: 15  35 minutes of total time spent on the encounter, which includes face to face time and non-face to face time preparing to see the patient (eg, review of tests), Obtaining and/or reviewing separately obtained history, Documenting clinical information in the electronic or other health record, Independently interpreting results (not separately reported) and communicating results to the patient/family/caregiver, or Care coordination (not separately reported).     Each patient to whom he or she provides medical services by telemedicine is:  (1) informed of the relationship between the physician and patient and the respective role of any other health care provider with respect to management of the patient; and (2) notified that he or she may decline to receive medical services by telemedicine and may withdraw from such care at any time.    Notes:     Subjective:   HEBER, Rogerio Coreas, attest that this documentation has been prepared under the direction and in the presence of Jairo Blankenship MD.     Patient ID: Ramesh Ricci is a 85 y.o. male     Chief Complaint: No chief complaint on file.    HPI  Mr. Ramesh Ricci is a 85 y.o. gentleman with A-Fib, bilateral hearing loss, HLD, CKD, essential HTN and NPH who presents today for a post operative f/u visit s/p LP for NPH evaluation done on 1/13/25.  The patient states his most bothersome symptom was his chronic headaches for which he is treated with Depakote. In addition to his headaches he notes problems with his ambulation for which he uses a cane. He endorses falls in which he loses his balance. He is accompanied with his wife who notes that his legs give out while walking. The wife also endorses worsening short term memory loss in the patient. The patient states he frequently forgets  the year, daily tasks, as well as significant people in his life. The wife notes that the patient's memory loss became noticeable starting approximately 1 year ago. In addition to short term memory loss, the patient's wife also endorses loss of long term memory loss but he is eventually able to recall events with some prompting. The patient also reports issues with urinary urgency where he is unable to control it. An MRI Brain W WO contrast obtained on 10/31/24 demonstrated a stable prominence of the lateral and 3rd ventricles which may be compensatory to volume loss, normal pressure hydrocephalus to be considered in the appropriate clinical setting. He is here to discuss possible neurosurgical intervention to treat his symptoms.    I have recommended performing a tap test (lumbar puncture) at this time. I had discussed the risks/benefits, indications, and alternatives for the proposed procedure in detail. I had answered all of their questions and patient wished to proceed.    Today the patient reports improvement in his walking from his lumbar puncture.  He reports that that has gotten slightly worse since that time.  Unfortunately, the patient has a persistent headache that began prior to his lumbar puncture and started this entire workup.    Unfortunately, the patient has not had any improvement in his memory.  I have counseled him that that happened with a good percentage of the patient's.    Review of Systems   Constitutional:  Negative for activity change, appetite change, fatigue, fever and unexpected weight change.   HENT:  Negative for facial swelling.    Eyes: Negative.    Respiratory: Negative.     Cardiovascular: Negative.    Gastrointestinal:  Negative for diarrhea, nausea and vomiting.   Endocrine: Negative.    Genitourinary: Negative.    Musculoskeletal:  Negative for back pain, joint swelling, myalgias and neck pain.   Neurological:  Negative for dizziness, seizures, weakness, numbness and headaches.    Psychiatric/Behavioral: Negative.          Past Medical History:   Diagnosis Date    Abnormal echocardiogram 11/16/2012    Anemia     Anticoagulant long-term use     Anxiety     Atrophic kidney 11/16/2012    BPH (benign prostatic hyperplasia) 11/16/2012    Cancer 2000 something    Had radiation    DDD (degenerative disc disease), cervical 07/05/2017    x-ray 7/17 - Severe    GERD (gastroesophageal reflux disease) 11/16/2012    Glaucoma 11/16/2012    HTN (hypertension) 11/16/2012    Hypothyroid 11/16/2012    Internal carotid artery stenosis 11/16/2012    Joint pain     Left ventricular diastolic dysfunction, NYHA class 1 04/16/2015    4/15    Low serum testosterone level 11/16/2012    Normal cardiac stress test 11/16/2012    Osteopenia 11/16/2012    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   Physical Exam  Constitutional:       General: He is not in acute distress.     Appearance: Normal appearance.   HENT:      Head: Normocephalic and atraumatic.   Neurological:      Mental Status: He is alert and oriented to person, place, and time.       I, Dr. Jairo Blankenship, personally performed the services described in this documentation. All medical record entries made by the scribe, Rogerio Coreas, were at my direction and in my presence.  I have reviewed the chart and agree that the record reflects my personal performance and is accurate and complete. Jairo Blankenship MD. 01/20/2025    Assessment:       1. Normal pressure hydrocephalus.  2. Headache     Plan:     This patient and his family are very excited about the prospect of getting a  shunt.  I have discussed the risks, benefits, and alternatives to them.  They are excited and wished  to proceed.  We will be looking for a date for these procedures.    At this point, would like for the patient to have an Omniscient MRI prior to his shunt placement.  This would be used to help demarcate causes of his memory deficits.  The family understands this and wishes to proceed with this also.

## 2025-01-22 ENCOUNTER — OFFICE VISIT (OUTPATIENT)
Dept: NEUROSURGERY | Facility: CLINIC | Age: 85
End: 2025-01-22
Payer: MEDICARE

## 2025-01-22 DIAGNOSIS — G91.2 NORMAL PRESSURE HYDROCEPHALUS: Primary | ICD-10-CM

## 2025-01-22 PROCEDURE — 98006 SYNCH AUDIO-VIDEO EST MOD 30: CPT | Mod: 95,,, | Performed by: NEUROLOGICAL SURGERY

## 2025-01-22 PROCEDURE — 1160F RVW MEDS BY RX/DR IN RCRD: CPT | Mod: CPTII,95,, | Performed by: NEUROLOGICAL SURGERY

## 2025-01-22 PROCEDURE — 1159F MED LIST DOCD IN RCRD: CPT | Mod: CPTII,95,, | Performed by: NEUROLOGICAL SURGERY

## 2025-01-24 DIAGNOSIS — G91.2 NORMAL PRESSURE HYDROCEPHALUS: Primary | ICD-10-CM

## 2025-01-27 DIAGNOSIS — G91.2 NORMAL PRESSURE HYDROCEPHALUS: Primary | ICD-10-CM

## 2025-01-28 ENCOUNTER — OFFICE VISIT (OUTPATIENT)
Dept: GASTROENTEROLOGY | Facility: CLINIC | Age: 85
End: 2025-01-28
Payer: MEDICARE

## 2025-01-28 ENCOUNTER — TELEPHONE (OUTPATIENT)
Dept: PREADMISSION TESTING | Facility: HOSPITAL | Age: 85
End: 2025-01-28
Payer: MEDICARE

## 2025-01-28 VITALS
WEIGHT: 143.75 LBS | OXYGEN SATURATION: 100 % | HEIGHT: 71 IN | HEART RATE: 51 BPM | BODY MASS INDEX: 20.12 KG/M2 | TEMPERATURE: 98 F

## 2025-01-28 DIAGNOSIS — Z01.818 PREOPERATIVE TESTING: Primary | ICD-10-CM

## 2025-01-28 DIAGNOSIS — K51.019 ULCERATIVE PANCOLITIS WITH COMPLICATION: Primary | Chronic | ICD-10-CM

## 2025-01-28 PROCEDURE — 1159F MED LIST DOCD IN RCRD: CPT | Mod: CPTII,S$GLB,, | Performed by: INTERNAL MEDICINE

## 2025-01-28 PROCEDURE — 1126F AMNT PAIN NOTED NONE PRSNT: CPT | Mod: CPTII,S$GLB,, | Performed by: INTERNAL MEDICINE

## 2025-01-28 PROCEDURE — 99214 OFFICE O/P EST MOD 30 MIN: CPT | Mod: GC,S$GLB,, | Performed by: INTERNAL MEDICINE

## 2025-01-28 PROCEDURE — G2211 COMPLEX E/M VISIT ADD ON: HCPCS | Mod: S$GLB,,, | Performed by: INTERNAL MEDICINE

## 2025-01-28 RX ORDER — FERROUS SULFATE 325(65) MG
TABLET ORAL
COMMUNITY

## 2025-01-28 NOTE — PROGRESS NOTES
[facility]          Inflammatory Bowel Disease Follow Up Note         TODAY'S VISIT DATE:  1/28/2025    Reason for Consult:    Chief Complaint   Patient presents with    Ulcerative Colitis       PCP: Nakul Mandujano      Referring MD:   No ref. provider found    History of Present Illness:  Ramesh Ricci who is a 85 y.o. male is being seen today at the [facility] on 01/28/2025 for inflammatory bowel disease- ulcerative colitis.  Prior to our last visit, he stopped taking Stelara before last visit due to headaches. He was started on balsalazide around 9/30 after having difficulty with Pentasa getting covered by insurance. Last month, had mild worsening in his stool frequency. Plan was to check calprotectin and cdiff and decide on possible course of steroids, but he improved spontaneously and wanted to hold off on any changes and did not turn in stool sample. Since then, symptoms are well-controlled, having 1-2 formed BMs per day, nonbloody, denies urgency. Denies abdominal pain. At last visit, he was started on amitriptyline for possible neuropathic chest pain/functional heartburn, this seems to have helped but it was stopped at a cardiology visit last month - unclear exactly why but his wife reports there was not a specific cardiology contraindication. The chest pain has returned now that he has been off of the amitriptyline for a few weeks and he has restarted daily Gaviscon. 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      IBD History:  He was diagnosed with ulcerative colitis in the 1960s.  Per notes he has a history of pan colitis.  Multiple colonoscopy reports are available and these demonstrate patchy colitis throughout the colon.  On one exam biopsy showed some acute ileitis but no chronic changes in the ileum.   Biopsies have been consistent with chronic ulcerative colitis.  It is difficult to determine what he has been treated with in the past but it appears he was at least on Colazal at 1 point (dose was 2.25 g once daily).  He was also on azathioprine at 1 point and treated with Humira.  In early 2021 he developed a rash that was concerning for possible psoriasis.  Humira was stopped at that point.  Pentasa was started after the Humira was stopped.  After stopping the Humira his skin lesions did not resolved and it is currently not thought that his skin issues were related to psoriasis.  In March 2022 he was admitted to the hospital with acute onset diarrhea and abdominal pain.  He underwent an upper endoscopy and colonoscopy.  The upper endoscopy was only significant for a 2 cm hiatal hernia.  The colonoscopy revealed no evidence of active ulcerative colitis.  The preparation of the colon was poor.  Biopsies demonstrated evidence of active colitis throughout the entire colon.  He had a markedly elevated stool calprotectin level and negative stool studies for infection.  After starting Stelara in July of 2022 he did quite well and had a significant decline in his stool calprotectin level.  We were able to stop the Pentasa without any issues.  In April of 2023 he underwent aortic valve replacement.  In March of 2023 we had discussed possible colonoscopy to reassess his disease activity and for cancer surveillance but he declines further colonoscopy at this time. His last dose of Stelara was in June of 2024.  He decided to stop taking the medication after that because he was concerned about frequent headaches he has been having for several years.  Unfortunately his headaches have not improve with holding the Stelara but thankfully his symptoms have remained under good control. Started on balsalazide in September 2024 and overall is responding well symptomatically. Plan to recheck calprotectin after 6 months of  "therapy.      IBD Details:  Dx Date:  1960s  Disease type/distribution:  Ulcerative colitis/pancolitis  Current Treatment:  Balsalazide Start Date:  September 2024 Response:  excellent  Optimized:    Adverse reactions:    Prior surgeries:  None  CRP Elevation: Y  calprotectin:  Markedly elevated with active disease  Disease Complications:  None  Extraintestinal manifestations:  None  Prior treatments:   Steroids:  Good response in the past  5ASA:  Incomplete response to Colazal, inadequate response to low-dose Pentasa  IMM:  Previously on azathioprine, unclear response  TNF Inh:  Humira-clinical remission, stopped due to skin lesions that do not appear to be related to Humira   Anti-Integrin:  None   IL 12/23:  Stelara-biochemical remission, declined colonoscopy-discontinued because of headaches (likely not related to Stelara)  KARLO Inh:  None  S1P Modulator: None    Previous Clinical Trials:  None    Last Colonoscopy:  March 2022-poor prep, no significant endoscopic disease activity but significant active colitis on biopsies    Other Endoscopies:   June 2024-EGD-normal  March 2022-unrevealing    Imaging:   MRE:  None   CT:  March 2022-inflammatory changes around the colon, otherwise unrevealing   Other:  July 2024-barium esophagram with nonspecific motility abnormality    Pertinent Labs:  Lab Results   Component Value Date    SEDRATE 25 (H) 08/13/2024    CRP 0.9 03/19/2024     No results found for: "TTGIGA", "IGA"  Lab Results   Component Value Date    TSH 1.541 11/07/2024    FREET4 1.40 11/07/2024     Lab Results   Component Value Date    ZTAKOYGO91WT 33 03/19/2024    MRMBWSOF73 732 08/13/2024     Lab Results   Component Value Date    HEPBSAG Non-reactive 03/19/2024    HEPBCAB Non-reactive 03/19/2024     No results found for: "DJV04GJAR"  Lab Results   Component Value Date    NIL 0.35198 03/19/2024    MITOGENNIL 9.964 03/19/2024     Lab Results   Component Value Date    TPTMINTERP SEE BELOW 03/24/2022     Lab " "Results   Component Value Date    STOOLCULTURE  03/23/2022     No Salmonella,Shigella,Vibrio,Campylobacter,Yersinia isolated.    MVFTXHJDWZ1Z Negative 03/23/2022    YTKYGWPFVX1O Negative 03/23/2022    CDIFFICILEAN Negative 03/23/2022    CDIFFTOX Negative 03/23/2022     Lab Results   Component Value Date    CALPROTECTIN 137.0 (H) 10/29/2022       Therapeutic Drug Monitoring Labs:  No results found for: "PROMETH"  No results found for: "ANSADAINIT", "INFLIXIMAB", "INFLIXINTERP"    Vaccinations:  Lab Results   Component Value Date    HEPBSAB Negative 03/24/2022     Lab Results   Component Value Date    HEPAIGG Non-reactive 10/28/2022     Lab Results   Component Value Date    VARICELLAZOS 3.65 (H) 10/28/2022    VARICELLAINT Positive (A) 10/28/2022     Immunization History   Administered Date(s) Administered    COVID-19, MRNA, LN-S, PF (Pfizer) (Purple Cap) 01/12/2021, 02/02/2021, 12/02/2021    COVID-19, mRNA, LNP-S, PF, jessica-sucrose, 30 mcg/0.3 mL (Pfizer Ages 12+) 09/30/2024    Hepatitis A, Adult 03/06/2023, 09/06/2023    Hepatitis B (recombinant) Adjuvanted, 2 dose 03/06/2023, 04/06/2023    Influenza (FLUAD) - Quadrivalent - Adjuvanted - PF *Preferred* (65+) 10/09/2020, 10/20/2021, 09/13/2023    Influenza - Quadrivalent - High Dose - PF (65 years and older) 09/10/2022    Influenza - Trivalent - Fluad - Adjuvanted - PF (65 years and older 09/19/2024    Influenza - Trivalent - Fluzone High Dose - PF (65 years and older) 10/06/2014, 09/29/2016, 11/09/2017, 10/08/2018, 09/17/2019    Pneumococcal Conjugate - 13 Valent 09/29/2016    Pneumococcal Conjugate - 20 Valent 03/20/2024    Pneumococcal Polysaccharide - 23 Valent 01/05/2011    RSVpreF (Arexvy) 05/24/2024    Zoster 02/02/2012    Zoster Recombinant 03/20/2024, 05/24/2024         Review of Systems  Review of Systems   Constitutional:  Negative for chills, fever and weight loss.   HENT:  Negative for sore throat.    Eyes:  Negative for pain, discharge and redness. "   Respiratory:  Negative for cough, shortness of breath and wheezing.    Cardiovascular:  Negative for chest pain and leg swelling.   Gastrointestinal:  Positive for heartburn. Negative for abdominal pain, blood in stool, constipation, diarrhea, melena, nausea and vomiting.   Genitourinary:  Negative for dysuria and frequency.   Musculoskeletal:  Negative for back pain, joint pain and myalgias.   Skin:  Negative for rash.   Neurological:  Negative for focal weakness and seizures.   Endo/Heme/Allergies:  Does not bruise/bleed easily.   Psychiatric/Behavioral:  Negative for depression. The patient is not nervous/anxious.        Medical History:   Past Medical History:   Diagnosis Date    Abnormal echocardiogram 11/16/2012    Anemia     Anticoagulant long-term use     Anxiety     Atrophic kidney 11/16/2012    BPH (benign prostatic hyperplasia) 11/16/2012    Cancer 2000 something    Had radiation    DDD (degenerative disc disease), cervical 07/05/2017    x-ray 7/17 - Severe    GERD (gastroesophageal reflux disease) 11/16/2012    Glaucoma 11/16/2012    HTN (hypertension) 11/16/2012    Hypothyroid 11/16/2012    Internal carotid artery stenosis 11/16/2012    Joint pain     Left ventricular diastolic dysfunction, NYHA class 1 04/16/2015    4/15    Low serum testosterone level 11/16/2012    Normal cardiac stress test 11/16/2012    Osteopenia 11/16/2012    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    Shingles 11/16/2012    Skin disease 2020    Seems to have started after Covid vaccine    Stroke 2017    tia   no residual    TIA (transient ischemic attack) 11/16/2012    UC (ulcerative colitis) 11/16/2012       Surgical History:  Past Surgical History:   Procedure Laterality Date    ADENOIDECTOMY      COLONOSCOPY N/A 03/25/2022    Procedure: COLONOSCOPY;  Surgeon: Ashley Nguyen MD;  Location: [facility];  Service: Endoscopy;  Laterality: N/A;    ESOPHAGOGASTRODUODENOSCOPY N/A 03/25/2022    Procedure: EGD  (ESOPHAGOGASTRODUODENOSCOPY);  Surgeon: Ashley Nguyen MD;  Location: [facility] ENDO;  Service: Endoscopy;  Laterality: N/A;  added on per Dr. Nguyen    ESOPHAGOGASTRODUODENOSCOPY N/A 2024    Procedure: EGD (ESOPHAGOGASTRODUODENOSCOPY);  Surgeon: Audie Snell MD;  Location: [facility] (4TH FLR);  Service: Endoscopy;  Laterality: N/A;   R/s,sent updated instr via portal.pt informed to hold eliquis for 2 days.AC  Ref by: ,  approved to hold Eliquis (apixaban) for 2 days per Dr. Conklin-see media file  24-GT  -pre call complete-tb    EYE SURGERY Right 2020    cataract    HERNIA REPAIR      LEFT HEART CATHETERIZATION Right 10/29/2021    Procedure: CATHETERIZATION, HEART, LEFT AND RIGHT  - LV DARNELL POSSIBLE;  Surgeon: Beau Conklin MD;  Location: [facility] CATH LAB;  Service: Cardiology;  Laterality: Right;    LUMBAR PUNCTURE N/A 2025    Procedure: Lumbar Puncture;  Surgeon: Jairo Blankenship MD;  Location: [facility] OR 2ND FLR;  Service: Neurosurgery;  Laterality: N/A;    RIGHT HEART CATHETERIZATION Right 10/29/2021    Procedure: INSERTION, CATHETER, RIGHT HEART;  Surgeon: Beau Conklin MD;  Location: [facility] CATH LAB;  Service: Cardiology;  Laterality: Right;    SKIN BIOPSY      TONSILLECTOMY         Family History:   Family History   Problem Relation Name Age of Onset    Hypertension Mother      Alzheimer's disease Mother      Heart attack Father      Diabetes Brother      Hypertension Brother      Cancer Brother      Heart disease Brother  56        MI    Colon cancer Neg Hx      Esophageal cancer Neg Hx         Social History:   Social History     Tobacco Use    Smoking status: Former     Current packs/day: 0.00     Average packs/day: 1 pack/day for 25.0 years (25.0 ttl pk-yrs)     Types: Cigarettes     Start date: 1947     Quit date: 1972     Years since quittin.1    Smokeless tobacco: Never   Substance Use Topics    Alcohol use: Not Currently     Alcohol/week:  3.0 standard drinks of alcohol     Types: 2 Glasses of wine, 1 Cans of beer per week     Comment: Occasional    Drug use: Never       Allergies: Reviewed    Home Medications:   Medication List with Changes/Refills   Current Medications    ACETAMINOPHEN (TYLENOL) 500 MG TABLET    Take 2 tablets (1,000 mg total) by mouth every 6 (six) hours as needed.    AMLODIPINE (NORVASC) 2.5 MG TABLET    Take 2.5 mg by mouth nightly.    ASCORBIC ACID, VITAMIN C, (VITAMIN C) 500 MG TABLET    Take 500 mg by mouth once daily.    ASPIRIN (ECOTRIN) 81 MG EC TABLET    Take 81 mg by mouth once daily.    BALSALAZIDE (COLAZAL) 750 MG CAPSULE    Take 5 tablets in the morning and 4 tablets in the evening.    CALCIUM CARBONATE (TUMS) 200 MG CALCIUM (500 MG) CHEWABLE TABLET    Take 1 tablet by mouth once daily.    DOCUSATE SODIUM (STOOL SOFTENER ORAL)    Take by mouth.    ELIQUIS 2.5 MG TAB    Take 2.5 mg by mouth 2 (two) times daily.    FERROUS SULFATE (FEOSOL) 325 MG (65 MG IRON) TAB TABLET    1 tablet Orally Once a day    FLUOCINOLONE ACETONIDE OIL 0.01 % DROP    Place into both ears.    LEVOCETIRIZINE (XYZAL) 5 MG TABLET    Take 5 mg by mouth every morning.    LEVOTHYROXINE (SYNTHROID) 125 MCG TABLET    Take 1 tablet (125 mcg total) by mouth every morning.    MEMANTINE (NAMENDA) 10 MG TAB    Take 10 mg by mouth 2 (two) times daily.    METOPROLOL SUCCINATE (TOPROL-XL) 25 MG 24 HR TABLET    Take 1 tablet (25 mg total) by mouth once daily.    PANTOPRAZOLE (PROTONIX) 40 MG TABLET    TAKE 1 TABLET BY MOUTH DAILY AS  NEEDED    PRAVASTATIN (PRAVACHOL) 20 MG TABLET    Take 1 tablet (20 mg total) by mouth every evening.    TAMSULOSIN (FLOMAX) 0.4 MG CP24    Take 0.4 mg by mouth once daily.     TRAMADOL (ULTRAM) 50 MG TABLET    Take 1/2-1 tab each morning with Tylenol and breakfast as needed for headache.    TRIAMCINOLONE ACETONIDE 0.1% (KENALOG) 0.1 % CREAM    Apply topically 2 (two) times daily. APPLY  CREAM TOPICALLY TO AFFECTED AREA TWICE DAILY  "as needed    VITC/E/ZN/COPPER/LUTEIN/ZEAXAN (ICAPS AREDS2 ORAL)    Take 1 capsule by mouth once daily.       Physical Exam:  Vital Signs:  Pulse (!) 51   Temp 98.2 °F (36.8 °C) (Oral)   Ht 5' 11" (1.803 m)   Wt 65.2 kg (143 lb 11.8 oz)   SpO2 100%   BMI 20.05 kg/m²   Body mass index is 20.05 kg/m².    Physical Exam  Vitals and nursing note reviewed.   Constitutional:       General: He is not in acute distress.     Appearance: Normal appearance. He is well-developed. He is not ill-appearing or toxic-appearing.   HENT:      Head: Normocephalic and atraumatic.   Eyes:      General: No scleral icterus.     Pupils: Pupils are equal, round, and reactive to light.   Pulmonary:      Effort: Pulmonary effort is normal. No respiratory distress.   Abdominal:      General: Bowel sounds are normal. There is no distension.      Palpations: Abdomen is soft. There is no mass.      Tenderness: There is no abdominal tenderness. There is no guarding or rebound.   Musculoskeletal:      Right lower leg: No edema.      Left lower leg: No edema.   Neurological:      General: No focal deficit present.      Mental Status: He is alert and oriented to person, place, and time.   Psychiatric:         Mood and Affect: Mood normal.         Behavior: Behavior normal.         Thought Content: Thought content normal.         Judgment: Judgment normal.         Labs: reviewed and pertinent noted above    Assessment/Plan:  Ramesh Ricci is a 85 y.o. male with pan ulcerative colitis. The following issues were addresssed:    1. Ulcerative pancolitis with complication            1. Ulcerative colitis: Doing very well since starting balsalazide in September 2024. There were problems with insurance covering Pentasa. Plan to recheck stool calprotectin after 6 months of therapy - will check in March. They report a request from insurance for cheaper alternative to balsalazide. Recommended they check with insurance on what preferred cheaper agent " would be, but preference would be to stay on the balsalazide at this time if possible as it has been effective.    2. Chest pain:  He has had a cardiac evaluation that was unrevealing.  It was interesting that these symptoms started after his TAVR.  I wonder if some of these symptoms may be neuropathic in nature.  He is EGD was unremarkable and his lack of response to aggressive therapy of his reflux has not provided any significant improvement.  Amitriptyline seemed to be effective after his last visit but it was stopped last month after a cardiology visit and symptoms have since returned. It seems it was not necessarily contraindicated. Recommended they restart amitriptyline if there is not a contraindication from cardiology standpoint.    3. Reflux:  Unlikely that reflux is actually the cause of his current symptoms    4. Aortic stenosis:  Continue to follow up with Cardiology.    5. Immunosuppression:  received flu shot in September    Ex smoker:  - recommended to continue smoking cessation  - discussed in detail that Crohn's disease can worsen with smoking and may make patient more resistant to treatment    # IBD specific health maintenance:  Colon cancer surveillance:  Up-to-date    Annual:  - Eye exam:  Not applicable  - Skin exam (if on IMM/TNF):  Up-to-date  - reminded pt to use sunblock/hats/sunprotective clothing  - PAP (if immunosuppressed):  Not applicable    DEXA:  Osteopenia noted in 2018    Vitamin D:  Normal    Vaccines:    Influenza:  Received Sep 2024   Pneumovax:  Up-to-date   HAV:  Vaccinated   HBV:  Vaccinated   Tdap:  Review in the future   MMR:  Immune   VZV:  Immune   HZV:  Completed series   HPV:  Not applicable   Meningococcus:  Not applicable   COVID:  Completed 3 doses    Follow up: Follow up in about 6 months (around 7/28/2025).    Visit today is associated with current or anticipated ongoing medical care related to this patient's single serious condition/complex condition (ulcerative  colitis).      Thank you again for sending Ramesh Ricci to see Dr. Marcial Snell today at the [facility]. Please don't hesitate to contact Dr. Snell if there are any questions regarding this evaluation, or if you have any other patients with inflammatory bowel disease for whom you would like a consultation. You can reach Dr. Snell at [phone] or by email at [email or web address]    Lisandro Camilo  Gastroenterology and Hepatology Fellow, PGY-VI            Answers submitted by the patient for this visit:  Established Patient Questionnaire  (Submitted on 1/24/2025)  Joint pain? : Yes

## 2025-01-28 NOTE — PRE-PROCEDURE INSTRUCTIONS
Patient gave me premission to speak with his wife, Mone, about his medical.I instructed her to get an  involvement in care form filled out with his next hospital or clinic visit.   Patient's wife stated  he has not had any problem with anesthesia in the past. Will need medical optimization by Dr. Christian and poc appt or NP, labs, & ua. Our  will call to set up these appts.  She said he has not taken ASA 81 for over 2-3 months. He will need Cardiac clearance  from Dr. Beau Conklin. She will make an appt. I will ask Dr. Conklin for Eliquis instructions.      Preop instructions given. Hold aspirin, aspirin containing products, nsaids( Aleve, Advil, Motrin, Ibuprofen, Naprosyn, Naproxen, Voltaren, Diclofenac, Mobic, Meloxicam, Celebrex, Celecoxib), vitamins ( C, ICAPS AREDS) and supplements ( Calcium Carbonate) one week prior to surgery.     May take Tylenol.( Also sent to [facility] portal) Verbalizes understanding.

## 2025-01-28 NOTE — ANESTHESIA PAT ROS NOTE
01/28/2025  Ramesh Ricci is a 85 y.o., male.      Pre-op Assessment          Review of Systems           Anesthesia Assessment: Preoperative EQUATION    Planned Procedure: Procedure(s) (LRB):  INSERTION, SHUNT, VENTRICULOPERITONEAL (N/A)  Requested Anesthesia Type:General  Surgeon: Jairo Blankenship MD  Service: Neurosurgery  Known or anticipated Date of Surgery:2/6/2025    Surgeon notes: reviewed    Electronic QUestionnaire Assessment completed via nurse interview with patient.        Triage considerations:       Previous anesthesia records:GETA and No problems  1/13/2025 Lumbar Puncture (Spine Lumbar)   Airway:  Mallampati: II   Mouth Opening: Normal  TM Distance: Normal  Tongue: Normal  Neck ROM: Extension Decreased  irway Placement Date: 01/13/25 Placement Time: 0712 , created via procedure documentation Method of Intubation: Video Laryngoscopy Intubated: Postinduction Blade: Aj #3 Airway Device Size: 7.5 Placement Verified By: Capnometry Complicating Factors: None Findings Post-Intubation: Bilateral breath sounds;Atraumatic/Condition of teeth unchanged Secured at: Lips Complications: None Removal Date: 01/13/25 Removal Time: 0736     Last PCP note: 3-6 months ago , within [facility]   Subspecialty notes: Gastroenterology, Neurology, Neurosurgery    Other important co-morbidities: PER EPIC: A FIB, GERD, HLD, HTN, Hypothyroid, and NPH, CAD, AS,  WITH H/O TAVR, BPH, CKD, ANEMIA, ULCERATIVE COLITIS, OSTEOARTHRITIS, H/O STROKE       Tests already available:  Available tests,  within 1 month , 3-6 months ago , within [facility] . 1/13/2025 PT/INR, PTT, CBC, BMP, 11/7/2024 TSH, 10/31/2024MRI BRAIN W W/O CONTRAST, 10/18/2024 CT HEAD W/O CONTRAST, EKG             Instructions given. (See in Nurse's note)    Optimization:  Anesthesia Preop Clinic Assessment  Indicated    Medical Opinion Indicated        Sub-specialist consult indicated:   CARDIOLOGY       Plan:    Testing:  T&S and UA   Pre-anesthesia  visit       Visit focus: concerns in complex and/or prolonged anesthesia, COMORBIDITIES     Consultation:IM Perioperative HospitalistOR NP     Patient  has previously scheduled Medical Appointment:1/28 GI, 2/3 DR MILES, 2/4 MRI BRAIN    Navigation: Tests Scheduled. TBD             Consults scheduled.TBD             Results will be tracked by Preop Clinic.  2/3 Medical optimization by Dr. Christian on 1/31.T&S and UA resulted and noted by Dr.Stuart Fritz.  2/5 Received cardiac note from Dr. Beau Conklin with classification of physical status of patient for anesthesia and Eliquis instructions.( Scanned to media)  Yris Mendoza RN BSN

## 2025-01-31 ENCOUNTER — ANESTHESIA EVENT (OUTPATIENT)
Dept: SURGERY | Facility: HOSPITAL | Age: 85
DRG: 032 | End: 2025-01-31
Payer: MEDICARE

## 2025-01-31 ENCOUNTER — OFFICE VISIT (OUTPATIENT)
Dept: INTERNAL MEDICINE | Facility: CLINIC | Age: 85
End: 2025-01-31
Payer: MEDICARE

## 2025-01-31 ENCOUNTER — TELEPHONE (OUTPATIENT)
Dept: INTERNAL MEDICINE | Facility: CLINIC | Age: 85
End: 2025-01-31

## 2025-01-31 ENCOUNTER — HOSPITAL ENCOUNTER (OUTPATIENT)
Dept: PREADMISSION TESTING | Facility: HOSPITAL | Age: 85
Discharge: HOME | End: 2025-01-31
Attending: NEUROLOGICAL SURGERY
Payer: MEDICARE

## 2025-01-31 VITALS
DIASTOLIC BLOOD PRESSURE: 70 MMHG | RESPIRATION RATE: 16 BRPM | TEMPERATURE: 98 F | WEIGHT: 139 LBS | BODY MASS INDEX: 19.46 KG/M2 | HEIGHT: 71 IN | HEART RATE: 50 BPM | SYSTOLIC BLOOD PRESSURE: 132 MMHG | OXYGEN SATURATION: 98 %

## 2025-01-31 DIAGNOSIS — T78.40XD ALLERGY, SUBSEQUENT ENCOUNTER: ICD-10-CM

## 2025-01-31 DIAGNOSIS — K21.9 GASTROESOPHAGEAL REFLUX DISEASE, UNSPECIFIED WHETHER ESOPHAGITIS PRESENT: ICD-10-CM

## 2025-01-31 DIAGNOSIS — M50.30 DDD (DEGENERATIVE DISC DISEASE), CERVICAL: ICD-10-CM

## 2025-01-31 DIAGNOSIS — R63.4 WEIGHT LOSS: ICD-10-CM

## 2025-01-31 DIAGNOSIS — E03.9 ACQUIRED HYPOTHYROIDISM: Chronic | ICD-10-CM

## 2025-01-31 DIAGNOSIS — Z86.16 HISTORY OF COVID-19: ICD-10-CM

## 2025-01-31 DIAGNOSIS — I48.0 PAF (PAROXYSMAL ATRIAL FIBRILLATION): Chronic | ICD-10-CM

## 2025-01-31 DIAGNOSIS — H40.9 GLAUCOMA, UNSPECIFIED GLAUCOMA TYPE, UNSPECIFIED LATERALITY: ICD-10-CM

## 2025-01-31 DIAGNOSIS — I10 ESSENTIAL HYPERTENSION: Chronic | ICD-10-CM

## 2025-01-31 DIAGNOSIS — H35.3211 EXUDATIVE AGE-RELATED MACULAR DEGENERATION, RIGHT EYE, WITH ACTIVE CHOROIDAL NEOVASCULARIZATION: ICD-10-CM

## 2025-01-31 DIAGNOSIS — G45.9 TIA (TRANSIENT ISCHEMIC ATTACK): ICD-10-CM

## 2025-01-31 DIAGNOSIS — I25.10 CORONARY ARTERY DISEASE INVOLVING NATIVE CORONARY ARTERY OF NATIVE HEART WITHOUT ANGINA PECTORIS: Chronic | ICD-10-CM

## 2025-01-31 DIAGNOSIS — Z95.3 S/P TAVR (TRANSCATHETER AORTIC VALVE REPLACEMENT): ICD-10-CM

## 2025-01-31 DIAGNOSIS — Z95.2 S/P TAVR (TRANSCATHETER AORTIC VALVE REPLACEMENT): ICD-10-CM

## 2025-01-31 DIAGNOSIS — D69.6 THROMBOCYTOPENIA: ICD-10-CM

## 2025-01-31 DIAGNOSIS — G44.89 OTHER HEADACHE SYNDROME: ICD-10-CM

## 2025-01-31 DIAGNOSIS — D64.9 ANEMIA, UNSPECIFIED TYPE: ICD-10-CM

## 2025-01-31 DIAGNOSIS — M17.0 PRIMARY OSTEOARTHRITIS OF BOTH KNEES: ICD-10-CM

## 2025-01-31 DIAGNOSIS — Z01.818 PREOP EXAMINATION: Primary | ICD-10-CM

## 2025-01-31 DIAGNOSIS — M85.80 OSTEOPENIA, UNSPECIFIED LOCATION: ICD-10-CM

## 2025-01-31 DIAGNOSIS — K52.9 COLITIS: ICD-10-CM

## 2025-01-31 DIAGNOSIS — K59.00 CONSTIPATION, UNSPECIFIED CONSTIPATION TYPE: ICD-10-CM

## 2025-01-31 DIAGNOSIS — C61 PROSTATE CA: ICD-10-CM

## 2025-01-31 DIAGNOSIS — Q60.0 CONGENITAL ABSENCE OF RIGHT KIDNEY: Chronic | ICD-10-CM

## 2025-01-31 PROBLEM — R21 RASH: Status: RESOLVED | Noted: 2022-01-31 | Resolved: 2025-01-31

## 2025-01-31 PROBLEM — T78.40XA ALLERGIES: Status: ACTIVE | Noted: 2025-01-31

## 2025-01-31 PROBLEM — R06.02 SOB (SHORTNESS OF BREATH): Status: RESOLVED | Noted: 2023-01-23 | Resolved: 2025-01-31

## 2025-01-31 PROBLEM — R00.2 PALPITATIONS: Status: RESOLVED | Noted: 2023-01-24 | Resolved: 2025-01-31

## 2025-01-31 PROCEDURE — 99999 PR PBB SHADOW E&M-EST. PATIENT-LVL V: CPT | Mod: PBBFAC,,, | Performed by: HOSPITALIST

## 2025-01-31 PROCEDURE — 1101F PT FALLS ASSESS-DOCD LE1/YR: CPT | Mod: CPTII,S$GLB,, | Performed by: HOSPITALIST

## 2025-01-31 PROCEDURE — 1159F MED LIST DOCD IN RCRD: CPT | Mod: CPTII,S$GLB,, | Performed by: HOSPITALIST

## 2025-01-31 PROCEDURE — 99215 OFFICE O/P EST HI 40 MIN: CPT | Mod: S$GLB,,, | Performed by: HOSPITALIST

## 2025-01-31 PROCEDURE — 3288F FALL RISK ASSESSMENT DOCD: CPT | Mod: CPTII,S$GLB,, | Performed by: HOSPITALIST

## 2025-01-31 PROCEDURE — 1160F RVW MEDS BY RX/DR IN RCRD: CPT | Mod: CPTII,S$GLB,, | Performed by: HOSPITALIST

## 2025-01-31 RX ORDER — FREMANEZUMAB-VFRM 225 MG/1.5ML
225 INJECTION SUBCUTANEOUS
COMMUNITY

## 2025-01-31 RX ORDER — AMITRIPTYLINE HYDROCHLORIDE 10 MG/1
10 TABLET, FILM COATED ORAL NIGHTLY
COMMUNITY
Start: 2025-01-29

## 2025-01-31 RX ORDER — GABAPENTIN 100 MG/1
100 CAPSULE ORAL 3 TIMES DAILY
COMMUNITY
Start: 2025-01-29

## 2025-01-31 RX ORDER — FAMOTIDINE 40 MG/1
40 TABLET, FILM COATED ORAL NIGHTLY PRN
COMMUNITY
Start: 2024-04-22 | End: 2025-02-03

## 2025-01-31 NOTE — ASSESSMENT & PLAN NOTE
He has headache and had it for a long time  Currently taking Tylenol up to 2 times a day and had a Ajovy 1 dose-  He last received that 29th of January 2025

## 2025-01-31 NOTE — ASSESSMENT & PLAN NOTE
He has ulcerative colitis and is on balsalazide that he is holding starting January 30th  He is doing good  Lately not having any diarrhea

## 2025-01-31 NOTE — ASSESSMENT & PLAN NOTE
Doing good from an allergy standpoint    Doing good from an allergy standpoint and does not have any asthma or eczema

## 2025-01-31 NOTE — OUTPATIENT SUBJECTIVE & OBJECTIVE
"Outpatient Subjective & Objective     Chief complaint-Preoperative evaluation, Perioperative Medical management, complication reduction plan     Active cardiac conditions- none    Revised cardiac risk index predictors- history of cerebrovascular disease    Functional capacity -Examples of physical activity , less mobile lately can take a flight of stairs holding on to the railing----- He can undertake all the above activities without  chest pain,chest tightness, Shortness of breath ,dizziness,lightheadedness making his exercise tolerance more,  than 4 Mets.       Review of Systems   Constitutional:  Negative for chills and fever.   HENT:          STOPBANG score  / 8      Elevated BP  Age over 50 years    Male gender   Eyes:         No unusual vision changes   Respiratory:          No cough , phlegm    No Hemoptysis   Cardiovascular:         As noted   Gastrointestinal:         Bowels- Regular  No overt GI/ blood losses   Endocrine:        Prednisone use > 20 mg daily for 3 weeks   Genitourinary:  Negative for dysuria.        No urinary hesitancy    Musculoskeletal:         As above   No unusual muscle/ joint pains   Skin:  Negative for rash.   Neurological:  Negative for syncope.        No unilateral weakness   Hematological:         Current use of Anticoagulants  Yes   Psychiatric/Behavioral:          No Depression,Anxiety       No pituitary problems    No dysphagia      No anesthesia, bleeding, cardiac problems, PONV with previous surgeries/procedures.  Medications and Allergies reviewed in epic.   FH- No anesthesia,bleeding / venous thrombosis ,   in family      Physical Exam  Blood pressure 132/70, pulse (!) 50, temperature 98.3 °F (36.8 °C), temperature source Oral, resp. rate 16, height 5' 11" (1.803 m), weight 63 kg (139 lb), SpO2 98%.      Physical Exam  Constitutional- Vitals - Body mass index is 19.39 kg/m².,   Vitals:    01/31/25 1333   BP: 132/70   Pulse: (!) 50   Resp: 16   Temp: 98.3 °F (36.8 °C) "     General appearance-Conscious,Coherent  Eyes- No conjunctival icterus,pupils  round , reactive to light , and  bilateral intra ocular lenses  ENT-Oral cavity- moist    , Hearing grossly normal   Neck- No thyromegaly ,Trachea -central, No jugular venous distension,   No Carotid Bruit   Cardiovascular -Heart Sounds- Normal  and  no murmur   , No gallop rhythm   Respiratory - Normal Respiratory Effort, Normal breath sounds,  Crepitationsrt base,  no wheeze , and  no forced expiratory wheeze    Peripheral pitting pedal edema-- none , no calf pain   Gastrointestinal -Soft abdomen, No palpable masses, Non Tender,Liver,Spleen not palpable. No-- free fluid and shifting dullness  Musculoskeletal- No finger Clubbing. Strength grossly normal   Lymphatic-No Palpable cervical, axillary,Inguinal lymphadenopathy   Psychiatric - normal effect,Orientation  Rt Dorsalis pedis pulses-palpable    Lt Dorsalis pedis pulses- palpable   Rt Posterior tibial pulses -palpable   Left posterior tibial pulses -palpable   Miscellaneous -  no renal bruit       Investigations  Lab and Imaging have been reviewed in epic.    Review of Medicine tests    EKG- I had independently reviewed the EKG from--Oct 2024  It was reported to be showing     Sinus bradycardia     Anteroseptal infarct (cited on or before 21-MAR-2022)   Abnormal ECG   When compared with ECG of 18-OCT-2024 19:19,   No significant change was found     Asymptomatic bradycardia  No overt hypo thyroid symptoms, obstructive jaundice    Review of clinical lab tests:  Lab Results   Component Value Date    CREATININE 1.5 (H) 01/13/2025    HGB 13.0 (L) 01/13/2025     (L) 01/13/2025         Review of old records- Was done and information gathered regards to events leading to surgery and health conditions of significance in the perioperative period         Review of old records- Was done and information gathered regards to events leading to surgery and health conditions of significance  in the perioperative period.    Outpatient Subjective & Objective

## 2025-01-31 NOTE — ASSESSMENT & PLAN NOTE
Suggested spacing Thyroxine replacement with food, other Medication    Hypothyroidism- I suggest continuation of synthroid replacement in the perioperative period    TSH from November was normal

## 2025-01-31 NOTE — PROGRESS NOTES
[facility]  Progress Note    Patient Name: Ramesh Ricci  MRN: [id number]  Date of Evaluation- 01/31/2025  PCP- Nakul Mandujano MD    Future cases for Ramesh Ricci [[id number]]       Case ID Status Date Time [place] Procedure Provider Location    [id number] [facility] 2/6/2025  9:45  INSERTION, SHUNT, VENTRICULOPERITONEAL Jairo Blankenship MD [[id number]] NOM OR 2ND FLR            HPI:  History of present illness- I had the pleasure of meeting this pleasant 85 y.o. gentleman in the pre op clinic prior to his elective  Neuro  surgery. The patient is new to me . .  He was accompanied by his wife Ms. Shore  I have obtained the history by speaking to the patient and by reviewing the electronic health records.    Events leading up to surgery / History of presenting illness -    I have obtained the information by speaking to his wife who is very caring  He has had longstanding headaches that has been increasing.  They are located on the front in the forehead area and also all around    He is not known to have migraine headaches, subarachnoid hemorrhage    He has been having balance problems for about 12-18 months he has been using a walker her at home for the last 3-4 months and prior to that was using a cane for a few months prior    He has occasional urinary incontinence for about 3 months    He also has been forgetful for 3 months    He tried multiple medication for headaches  Resting by laying down helps the headache    Relevant health conditions of significance for the perioperative period/ History of presenting illness -    Ulcerative colitis  Atrial fibrillation  Hypertension  Diastolic dysfunction  TIA  Congenital absence of the right kidney  Prostate cancer  Kidney disease stage 3  Underactive thyroid  Acid reflux  TAVR 2023    Lives with wife in a single-level house  He is a retired draftsman  Pets-1 pet dog  Children grown daughter and son  Has help post op-wife and son    Not known to have  Stroke  ",Prediabetes , Diabetes Mellitus, Lung problem,  deep vein thrombosis, pulmonary embolism, sleep apnea,  fatty liver ,, edema, mental health problems , gouts            Subjective/ Objective:     Chief complaint-Preoperative evaluation, Perioperative Medical management, complication reduction plan     Active cardiac conditions- none    Revised cardiac risk index predictors- history of cerebrovascular disease    Functional capacity -Examples of physical activity , less mobile lately can take a flight of stairs holding on to the railing----- He can undertake all the above activities without  chest pain,chest tightness, Shortness of breath ,dizziness,lightheadedness making his exercise tolerance more,  than 4 Mets.       Review of Systems   Constitutional:  Negative for chills and fever.   HENT:          STOPBANG score  / 8      Elevated BP  Age over 50 years    Male gender   Eyes:         No unusual vision changes   Respiratory:          No cough , phlegm    No Hemoptysis   Cardiovascular:         As noted   Gastrointestinal:         Bowels- Regular  No overt GI/ blood losses   Endocrine:        Prednisone use > 20 mg daily for 3 weeks   Genitourinary:  Negative for dysuria.        No urinary hesitancy    Musculoskeletal:         As above   No unusual muscle/ joint pains   Skin:  Negative for rash.   Neurological:  Negative for syncope.        No unilateral weakness   Hematological:         Current use of Anticoagulants  Yes   Psychiatric/Behavioral:          No Depression,Anxiety       No pituitary problems    No dysphagia      No anesthesia, bleeding, cardiac problems, PONV with previous surgeries/procedures.  Medications and Allergies reviewed in epic.   FH- No anesthesia,bleeding / venous thrombosis ,   in family      Physical Exam  Blood pressure 132/70, pulse (!) 50, temperature 98.3 °F (36.8 °C), temperature source Oral, resp. rate 16, height 5' 11" (1.803 m), weight 63 kg (139 lb), SpO2 98%.      Physical " Exam  Constitutional- Vitals - Body mass index is 19.39 kg/m².,   Vitals:    01/31/25 1333   BP: 132/70   Pulse: (!) 50   Resp: 16   Temp: 98.3 °F (36.8 °C)     General appearance-Conscious,Coherent  Eyes- No conjunctival icterus,pupils  round , reactive to light , and  bilateral intra ocular lenses  ENT-Oral cavity- moist    , Hearing grossly normal   Neck- No thyromegaly ,Trachea -central, No jugular venous distension,   No Carotid Bruit   Cardiovascular -Heart Sounds- Normal  and  no murmur   , No gallop rhythm   Respiratory - Normal Respiratory Effort, Normal breath sounds,  Crepitationsrt base,  no wheeze , and  no forced expiratory wheeze    Peripheral pitting pedal edema-- none , no calf pain   Gastrointestinal -Soft abdomen, No palpable masses, Non Tender,Liver,Spleen not palpable. No-- free fluid and shifting dullness  Musculoskeletal- No finger Clubbing. Strength grossly normal   Lymphatic-No Palpable cervical, axillary,Inguinal lymphadenopathy   Psychiatric - normal effect,Orientation  Rt Dorsalis pedis pulses-palpable    Lt Dorsalis pedis pulses- palpable   Rt Posterior tibial pulses -palpable   Left posterior tibial pulses -palpable   Miscellaneous -  no renal bruit       Investigations  Lab and Imaging have been reviewed in epic.    Review of Medicine tests    EKG- I had independently reviewed the EKG from--Oct 2024  It was reported to be showing     Sinus bradycardia     Anteroseptal infarct (cited on or before 21-MAR-2022)   Abnormal ECG   When compared with ECG of 18-OCT-2024 19:19,   No significant change was found     Asymptomatic bradycardia  No overt hypo thyroid symptoms, obstructive jaundice    Review of clinical lab tests:  Lab Results   Component Value Date    CREATININE 1.5 (H) 01/13/2025    HGB 13.0 (L) 01/13/2025     (L) 01/13/2025         Review of old records- Was done and information gathered regards to events leading to surgery and health conditions of significance in the  perioperative period         Review of old records- Was done and information gathered regards to events leading to surgery and health conditions of significance in the perioperative period.        Preoperative cardiac risk assessment-  The patient does not have any active cardiac conditions . Revised cardiac risk index predictors-1 ---.Functional capacity is more than 4 Mets. He will be undergoing a Neuro procedure that carries a Moderate Risk risk     Risk of a major Cardiac event ( Defined as death, myocardial infarction, or cardiac arrest at 30 days after noncardiac surgery), based on RCRI score     -6.0%          No further cardiac work up is indicated prior to proceeding with the surgery     Orders Placed This Encounter    CBC Auto Differential       American Society of Anesthesiologists Physical status classification ( ASA ) class: 3     Postoperative pulmonary complication risk assessment:      ARISCAT ( Canet) risk index- risk class -  Low, if duration of surgery is under 2 hours, intermediate, if duration of surgery is over 2 hours          Assessment/Plan:     DDD (degenerative disc disease), cervical  He is not troubled with neck pain  From what I gather, he does not have cervical myelopathy    Other headache syndrome  He has headache and had it for a long time  Currently taking Tylenol up to 2 times a day and had a Ajovy 1 dose-  He last received that 29th of January 2025    TIA (transient ischemic attack)  It is unsure if he had a stroke or TIA   He had a TIA 2016  He was diagnosed with this retrospectively and not had any leading symptoms leading to the diagnosis  No classical TIA symptoms of one-sided symptoms slurred speech  vision problems or drooping of the face    Secondary prevention   Statin    His AFib was diagnosed later after the event    MRA head: Unremarkable MRA head without evidence for proximal large vessel occlusion or aneurysm.   Wife reports that he has had a carotid ultrasound scan  which showed no blockages    No recent stroke    Exudative age-related macular degeneration, right eye, with active choroidal neovascularization  He is on supplements    Glaucoma  Currently not using any eyedrops  No sudden vision changes    Coronary artery disease involving native coronary artery of native heart without angina pectoris  Angiogram from 2021 shows mild CAD per the chart and he does not have any chest pain or chest tightness    He is on a statin that helps coronary artery disease    He is not taking aspirin due to him taking Eliquis    Essential hypertension  He is currently taking amlodipine, Toprol-XL      Hypertension-  Blood pressure is acceptable . I suggest continuation of --amlodipine, Toprol-XL------ during the entire perioperative period. I suggest   blood pressure, monitoring  .I suggest addressing pain control as uncontrolled pain can increased blood pressure      PAF (paroxysmal atrial fibrillation)  He is anticoagulated with apixaban  Asymptomatic from an AFib standpoint and is doing good      Thrombo-embolic event within the last 3 months- none     - Restoration of normal sinus rhythm from atrial fibrillation or atrial flutter, either by cardioversion or ablation, within the last month- none     - Pulmonary vein isolation within the last 2 months- none     - Left atrial appendage thrombus within the last 3 months- none .    No contraindications to holding anticoagulation for surgery     Date of surgery - 2/6   Type of Anesthesia- General  Last day of  use pre op- February 2nd    I suggested contacting his primary cardiologist about the duration of apixaban hold    S/P TAVR (transcatheter aortic valve replacement)  2023  He is doing good from a heart standpoint and is under cardiology care  His breathing has improved after the valve replacement    No history of endocarditis    I suggest surgical site infection prophylaxis use    Congenital absence of right kidney  He was born with a  single kidney  His baseline creatinine has been around 1-1/2    Creatinine stable  Stages of CKD discussed  Deleterious effects NSAID's , Beneficial effects of Hydration discussed   Acetaminophen ( If not intolerant ) as needed for pain     I  suggest monitoring renal function, in put and out put status chun-operatively. I  suggest avoiding nephrotoxic medication including NSAIDs, COX2 inhibitors, intravenous contrast agent,avoiding hypotension to prevent further renal impairment.   Care with intravenous contrast discussed     Shingles  In 2004\  Had shingles vaccination  He no longer has post herpetic neuralgia which was on his shoulder area in the past    Anemia  No overt GI/ blood losses  No stomach upset/ ulcer   Gets colonoscopy  No long standing NSAID use  Discussed follow up     I suggest monitoring the hemoglobin in the perioperative period   Hemoglobin although low wore is stable likely from anemia of chronic disease from a inflammatory bowel disease    Prostate CA  Had radiation treatment in 2012 and is doing good  He has no urinary difficulty    Acquired hypothyroidism  Suggested spacing Thyroxine replacement with food, other Medication    Hypothyroidism- I suggest continuation of synthroid replacement in the perioperative period    TSH from November was normal     Colitis  He has ulcerative colitis and is on balsalazide that he is holding starting January 30th  He is doing good  Lately not having any diarrhea    GERD (gastroesophageal reflux disease)  Doing good  Taking pantoprazole and Gaviscon        GERD-  I suggest continuation of the Proton pump inhibitor in the perioperative period . I suggest aspiration precautions    Osteopenia  No fracture history    Primary osteoarthritis of both knees  Not troubled likely from him losing weight    Constipation  Suggested working on bowel movements in preparation for surgery   Constipation- I suggest giving bowel movement regimen as opioid use,reduced  ambulation  can increase the constipation      Allergies  Doing good from an allergy standpoint    Doing good from an allergy standpoint and does not have any asthma or eczema     History of COVID-19  2023     Did not require hospitalization, intubation or supplemental oxygen use   Had been vaccinated   Recovered from COVID    COVID screening     No fever   No cough   No SOB  No sore throat   No loss of taste or smell   No muscle aches   No nausea, vomiting , diarrhea     Weight loss    Lost 20 lb in the past 1 year and is not eating well due to headaches    Have discussed the importance of nutrition to help with the healing process    His wife who is a nurse feels that he is getting adequate nutrition    Thrombocytopenia  Mildly low  Not known to have low B12  No easy bleeding or bruising   I suggested the perioperative care team be aware of the mildly low platelet count  It is over 100        Preventive perioperative care    Thromboembolic prophylaxis:  His risk factors for thrombosis include surgical procedure and age.I suggest  thromboembolic prophylaxis ( mechanical/pharmacological, weighing the risk benefits of pharmacological agent use considering chun procedural bleeding )  during the perioperative period.I suggested being active in the post operative period.      Postoperative pulmonary complication prophylaxis-Risk factors for post operative pulmonary complications include age over 65 years and ASA class >2- I suggest incentive spirometry use, early ambulation, and end tidal carbon dioxide monitoring  , oral care , head end of bed elevation      Renal complication prophylaxis-Risk factors for renal complications include pre-existing renal disease and hypertension . I suggest keeping him well hydrated and avoidance/ minimizing the use of  NSAID's,HESTER 2 Inhibitors ,IV contrast if possible in the perioperative period.       Surgical site Infection Prophylaxis-I  suggest appropriate antibiotic for Prophylaxis  against Surgical site infections  No reported Staph infection  Skin antibacterial discussed       Delirium prophylaxis-Risk factors - Advanced Age - I suggest avoidance / minimizing the use of  Benzodiazepines ( unless the patient has been taking it on a regular basis ),Anticholinergic medication,Antihistamines ( like  Benadryl).I suggest minimizing the use of opioid medication and use of IV tylenol,if it is appropriate. I suggest using the lowest possible dose of opioids for the shortest duration possible in the perioperative period. I suggest to Keep shades/blinds open during the day, lights off and shades closed at night to encourage normal sleep/wake cycle.I encourage the presence of the family member with the patient at all times, if at all possible as mental status changes can be picked up early by the family members and they help with reorientation. I encouraged the presence of family to help with orientation in the perioperative period. Benadryl avoidance suggested      In view of LUTS the patient  is at risk of postoperative urinary retention.  I suggest avoidance / minimizing the of  Benzodiazepines,Anticholinergic medication,antihistamines ( Benadryl) , if possible in the perioperative period. I suggest using the minimum possible use of opioids for the minimum period of time in the perioperative period. Benadryl avoidance suggested      This visit was focused on Preoperative evaluation, Perioperative Medical management, complication reduction plans. I suggest that the patient follows up with primary care or relevant sub specialists for ongoing health care.    I appreciate the opportunity to be involved in this patients care. Please feel free to contact me if there were any questions about this consultation.    Patient is optimized    Patient/ care giver/ Family member was instructed to call and update me about any changes to health,  medication, office visits ,testing out side of the [facility] operative care  center , hospitalizations between now and surgery      Andreia Christian MD  Internal Medicine  [facility]   Cell Phone- [phone]      I have spent ------ minutes of time which includes, time spent to prepare to see the patient , obtaining history ,performing examination, counseling/Educating the patient , Documenting clinical information in the record    --    1/31/2025- 17 28     CBC you are normal hemoglobin, hematocrit and platelet count  Urinalysis showed no suggestion of urinary tract infection  He no longer has low platelet count  Corresponded with the gastroenterologist-with regards to balsalazide and it was felt that-He does not need to hold balsalazide .     Message surgeon about apixaban hold 3 days

## 2025-01-31 NOTE — ASSESSMENT & PLAN NOTE
No overt GI/ blood losses  No stomach upset/ ulcer   Gets colonoscopy  No long standing NSAID use  Discussed follow up     I suggest monitoring the hemoglobin in the perioperative period   Hemoglobin although low wore is stable likely from anemia of chronic disease from a inflammatory bowel disease   Subsequent Stages Histo Method Verbiage: Using a similar technique to that described above, a thin layer of tissue was removed from all areas where tumor was visible on the previous stage.  The tissue was again oriented, mapped, dyed, and processed as above.

## 2025-01-31 NOTE — ASSESSMENT & PLAN NOTE
It is unsure if he had a stroke or TIA   He had a TIA 2016  He was diagnosed with this retrospectively and not had any leading symptoms leading to the diagnosis  No classical TIA symptoms of one-sided symptoms slurred speech  vision problems or drooping of the face    Secondary prevention   Statin    His AFib was diagnosed later after the event    MRA head: Unremarkable MRA head without evidence for proximal large vessel occlusion or aneurysm.   Wife reports that he has had a carotid ultrasound scan which showed no blockages    No recent stroke

## 2025-01-31 NOTE — HPI
History of present illness- I had the pleasure of meeting this pleasant 85 y.o. gentleman in the pre op clinic prior to his elective  Neuro  surgery. The patient is new to me . .  He was accompanied by his wife Ms. Shore  I have obtained the history by speaking to the patient and by reviewing the electronic health records.    Events leading up to surgery / History of presenting illness -    I have obtained the information by speaking to his wife who is very caring  He has had longstanding headaches that has been increasing.  They are located on the front in the forehead area and also all around    He is not known to have migraine headaches, subarachnoid hemorrhage    He has been having balance problems for about 12-18 months he has been using a walker her at home for the last 3-4 months and prior to that was using a cane for a few months prior    He has occasional urinary incontinence for about 3 months    He also has been forgetful for 3 months    He tried multiple medication for headaches  Resting by laying down helps the headache    Relevant health conditions of significance for the perioperative period/ History of presenting illness -    Ulcerative colitis  Atrial fibrillation  Hypertension  Diastolic dysfunction  TIA  Congenital absence of the right kidney  Prostate cancer  Kidney disease stage 3  Underactive thyroid  Acid reflux  TAVR 2023    Lives with wife in a single-level house  He is a retired draftsman  Pets-1 pet dog  Children grown daughter and son  Has help post op-wife and son    Not known to have  Stroke ,Prediabetes , Diabetes Mellitus, Lung problem,  deep vein thrombosis, pulmonary embolism, sleep apnea,  fatty liver ,, edema, mental health problems , gouts

## 2025-01-31 NOTE — ASSESSMENT & PLAN NOTE
Angiogram from 2021 shows mild CAD per the chart and he does not have any chest pain or chest tightness    He is on a statin that helps coronary artery disease    He is not taking aspirin due to him taking Eliquis

## 2025-01-31 NOTE — ASSESSMENT & PLAN NOTE
Lost 20 lb in the past 1 year and is not eating well due to headaches    Have discussed the importance of nutrition to help with the healing process    His wife who is a nurse feels that he is getting adequate nutrition

## 2025-01-31 NOTE — ANESTHESIA PREPROCEDURE EVALUATION
[facility]  Anesthesia Pre-Operative Evaluation         Patient Name: Ramesh Ricci  YOB: 1940  MRN: [id number]    SUBJECTIVE:     Pre-operative evaluation for Procedure(s) (LRB):  INSERTION, SHUNT, VENTRICULOPERITONEAL (N/A)     01/31/2025    Ramesh Ricci is a 85 y.o. male w/ a significant PMHx of ulcerative colitis, Afib on eliquis, bilateral hearing loss, HLD, CKD, essential HTN, aortic stenosis s/p TAVR, prostate cancer s/p radiation, hypothyroidism, TIA, GERD, diastolic dysfunction, DDD, and NPH who presents for preop evaluation prior to undergoing  shunt placement on 02/06.    Patient now presents for the above procedure(s).    Echo Summary  Results for orders placed during the hospital encounter of 01/23/23    Echo    Interpretation Summary  · The left ventricle is normal in size with concentric hypertrophy and normal systolic function.  · The estimated ejection fraction is 70%.  · Grade II left ventricular diastolic dysfunction.  · Normal right ventricular size with normal right ventricular systolic function.  · Severe left atrial enlargement.  · There is moderate-to-severe aortic valve stenosis.  · Aortic valve area is 0.94 cm2; peak velocity is 3.94 m/s; mean gradient is 34 mmHg.  · There is mild mitral stenosis.  · Mild mitral regurgitation.  · Mild tricuspid regurgitation.  · Normal central venous pressure (3 mmHg).  · The estimated PA systolic pressure is 29 mmHg.       Prev airway:   Intubation     Date/Time: 1/13/2025 7:12 AM     Performed by: Allen Goyal CRNA  Authorized by: Eder Maloney MD    Intubation:     Induction:  Rapid sequence induction    Intubated:  Postinduction    Mask Ventilation:  Not attempted    Attempts:  1    Attempted By:  CRNA    Method of Intubation:  Video laryngoscopy    Blade:  Aj 3    Laryngeal View Grade: Grade I - full view of cords      Difficult Airway Encountered?: No      Complications:  None    Airway Device:   Oral endotracheal tube    Airway Device Size:  7.5    Style/Cuff Inflation:  Cuffed    Inflation Amount (mL):  6    Tube secured:  23    Secured at:  The lips    Placement Verified By:  Capnometry    Complicating Factors:  None    Findings Post-Intubation:  BS equal bilateral and atraumatic/condition of teeth unchanged    LDA: None documented.       Drips: None documented.      Patient Active Problem List   Diagnosis    UC (ulcerative colitis)    Acquired hypothyroidism    BPH (benign prostatic hyperplasia)    Essential hypertension    Glaucoma    Atrophic kidney    Osteopenia    GERD (gastroesophageal reflux disease)    Shingles    TIA (transient ischemic attack)    Internal carotid artery stenosis    Normal cardiac stress test    Abnormal echocardiogram    Prostate CA    Echocardiogram abnormal    Stage 3a chronic kidney disease    Left ventricular diastolic dysfunction, NYHA class 1    Nonrheumatic aortic valve stenosis    Mitral valve stenosis, mild    Primary osteoarthritis of both knees    DDD (degenerative disc disease), cervical    Lung nodules    Congenital absence of right kidney    History of nuclear stress test    PAF (paroxysmal atrial fibrillation)    Ex-smoker    Other headache syndrome    Intermediate stage nonexudative age-related macular degeneration of both eyes    Hypertensive retinopathy of both eyes    Iron deficiency anemia due to chronic blood loss    Coronary artery disease involving native coronary artery of native heart without angina pectoris    Rash    Colitis    Advanced care planning/counseling discussion    Known medical problems    SOB (shortness of breath)    Palpitations    Exudative age-related macular degeneration, right eye, with active choroidal neovascularization    Drug-induced immunodeficiency    Atherosclerosis of aorta    Complex medical condition    S/P TAVR (transcatheter aortic valve replacement)    Impaired functional mobility, balance, gait, and endurance        Review of patient's allergies indicates:   Allergen Reactions    Benazepril Other (See Comments)     Cough       Current Inpatient Medications:      Current Outpatient Medications on File Prior to Encounter   Medication Sig Dispense Refill    acetaminophen (TYLENOL) 500 MG tablet Take 2 tablets (1,000 mg total) by mouth every 6 (six) hours as needed.      amLODIPine (NORVASC) 2.5 MG tablet Take 2.5 mg by mouth nightly.      ascorbic acid, vitamin C, (VITAMIN C) 500 MG tablet Take 500 mg by mouth once daily.      aspirin (ECOTRIN) 81 MG EC tablet Take 81 mg by mouth once daily. (Patient not taking: Reported on 1/28/2025)      balsalazide (COLAZAL) 750 mg capsule Take 5 tablets in the morning and 4 tablets in the evening. 270 capsule 11    calcium carbonate (TUMS) 200 mg calcium (500 mg) chewable tablet Take 1 tablet by mouth once daily.      docusate sodium (STOOL SOFTENER ORAL) Take by mouth.      ELIQUIS 2.5 mg Tab Take 2.5 mg by mouth 2 (two) times daily.      ferrous sulfate (FEOSOL) 325 mg (65 mg iron) Tab tablet 1 tablet Orally Once a day      fluocinolone acetonide oiL 0.01 % Drop Place into both ears.      levocetirizine (XYZAL) 5 MG tablet Take 5 mg by mouth every morning. (Patient not taking: Reported on 1/28/2025)      levothyroxine (SYNTHROID) 125 MCG tablet Take 1 tablet (125 mcg total) by mouth every morning. 90 tablet 3    memantine (NAMENDA) 10 MG Tab Take 10 mg by mouth 2 (two) times daily.      metoprolol succinate (TOPROL-XL) 25 MG 24 hr tablet Take 1 tablet (25 mg total) by mouth once daily. 90 tablet 3    pantoprazole (PROTONIX) 40 MG tablet TAKE 1 TABLET BY MOUTH DAILY AS  NEEDED 90 tablet 3    pravastatin (PRAVACHOL) 20 MG tablet Take 1 tablet (20 mg total) by mouth every evening. 90 tablet 3    tamsulosin (FLOMAX) 0.4 mg Cp24 Take 0.4 mg by mouth once daily.       traMADoL (ULTRAM) 50 mg tablet Take 1/2-1 tab each morning with Tylenol and breakfast as needed for headache. 30 tablet 0     triamcinolone acetonide 0.1% (KENALOG) 0.1 % cream Apply topically 2 (two) times daily. APPLY  CREAM TOPICALLY TO AFFECTED AREA TWICE DAILY as needed 454 g 0    vitC/E/Zn/copper/lutein/zeaxan (ICAPS AREDS2 ORAL) Take 1 capsule by mouth once daily.      [DISCONTINUED] telmisartan (MICARDIS) 40 MG Tab TAKE 1/2 TABLET BY MOUTH DAILY 90 tablet 1     No current facility-administered medications on file prior to encounter.       Past Surgical History:   Procedure Laterality Date    ADENOIDECTOMY      COLONOSCOPY N/A 03/25/2022    Procedure: COLONOSCOPY;  Surgeon: Ashley Nguyen MD;  Location: [facility];  Service: Endoscopy;  Laterality: N/A;    ESOPHAGOGASTRODUODENOSCOPY N/A 03/25/2022    Procedure: EGD (ESOPHAGOGASTRODUODENOSCOPY);  Surgeon: Ashley Nguyen MD;  Location: [facility];  Service: Endoscopy;  Laterality: N/A;  added on per Dr. Nguyen    ESOPHAGOGASTRODUODENOSCOPY N/A 6/7/2024    Procedure: EGD (ESOPHAGOGASTRODUODENOSCOPY);  Surgeon: Audie Snell MD;  Location: [facility] (4TH FLR);  Service: Endoscopy;  Laterality: N/A;  5/21 R/s,sent updated instr via portal.pt informed to hold eliquis for 2 days.AC  Ref by: ,  approved to hold Eliquis (apixaban) for 2 days per Dr. Conklin-see media file  5/9/24-GT  5/31-pre call complete-tb    EYE SURGERY Right 11/2020    cataract    HERNIA REPAIR      LEFT HEART CATHETERIZATION Right 10/29/2021    Procedure: CATHETERIZATION, HEART, LEFT AND RIGHT  - LV DARNELL POSSIBLE;  Surgeon: Beau Conklin MD;  Location: [facility] CATH LAB;  Service: Cardiology;  Laterality: Right;    LUMBAR PUNCTURE N/A 1/13/2025    Procedure: Lumbar Puncture;  Surgeon: Jairo Blankenship MD;  Location: [facility] OR 2ND FLR;  Service: Neurosurgery;  Laterality: N/A;    RIGHT HEART CATHETERIZATION Right 10/29/2021    Procedure: INSERTION, CATHETER, RIGHT HEART;  Surgeon: Beau Conklin MD;  Location: [facility] CATH LAB;  Service: Cardiology;  Laterality: Right;    SKIN BIOPSY  2020     TONSILLECTOMY         Social History:  Tobacco Use: Medium Risk (1/28/2025)    Patient History     Smoking Tobacco Use: Former     Smokeless Tobacco Use: Never     Passive Exposure: Not on file      Alcohol Use: Not At Risk (10/8/2024)    AUDIT-C     Frequency of Alcohol Consumption: Never     Average Number of Drinks: Patient does not drink     Frequency of Binge Drinking: Never        OBJECTIVE:     Vital Signs Range (Last 24H):         Significant Labs:  Lab Results   Component Value Date    WBC 6.31 01/13/2025    HGB 13.0 (L) 01/13/2025    HCT 41.3 01/13/2025     (L) 01/13/2025    CHOL 200 (H) 08/13/2024    TRIG 142 08/13/2024    HDL 62 08/13/2024    ALT 6 (L) 10/18/2024    AST 19 10/18/2024     01/13/2025    K 4.0 01/13/2025     01/13/2025    CREATININE 1.5 (H) 01/13/2025    BUN 21 01/13/2025    CO2 26 01/13/2025    TSH 1.541 11/07/2024    PSA 1.1 08/13/2024    INR 1.1 01/13/2025    HGBA1C 5.4 08/13/2024       Diagnostic Studies: No relevant studies.    EKG:   Results for orders placed or performed during the hospital encounter of 10/18/24   EKG 12-lead    Collection Time: 10/18/24  7:20 PM   Result Value Ref Range    QRS Duration 108 ms    [facility] QTC Calculation 474 ms    Narrative    Test Reason : R07.9,    Vent. Rate : 054 BPM     Atrial Rate : 054 BPM     P-R Int : 148 ms          QRS Dur : 108 ms      QT Int : 500 ms       P-R-T Axes : 036 -06 083 degrees     QTc Int : 474 ms    Sinus bradycardia    Anteroseptal infarct (cited on or before 21-MAR-2022)  Abnormal ECG  When compared with ECG of 18-OCT-2024 19:19,  No significant change was found  Confirmed by Johnnie Merrill MD ([id number]) on 10/20/2024 10:38:26 AM    Referred By: [email or web address]   SELF           Confirmed By:Johnnie Merrill MD       2D ECHO:  TTE:  Results for orders placed or performed during the hospital encounter of 01/23/23   Echo   Result Value Ref Range    BSA 1.87 m2    TDI SEPTAL 0.04 m/s    LV LATERAL E/E' RATIO 25.20 m/s     LV SEPTAL E/E' RATIO 31.50 m/s    IVC diameter 1.53 cm    Left Ventricular Outflow Tract Mean Velocity 0.67 cm/s    Left Ventricular Outflow Tract Mean Gradient 2.05 mmHg    TDI LATERAL 0.05 m/s    PV PEAK VELOCITY 1.16 cm/s    LVIDd 5.19 3.5 - 6.0 cm    IVS 1.21 (A) 0.6 - 1.1 cm    PW 1.24 (A) 0.6 - 1.1 cm    LVIDs 3.93 2.1 - 4.0 cm    FS 24 28 - 44 %    Sinus 3.62 cm    STJ 2.55 cm    LV mass 255.31 g    LA size 4.33 cm    RVDD 3.90 cm    TAPSE 2.09 cm    RV S' 0.01 cm/s    Left Ventricle Relative Wall Thickness 0.48 cm    AV mean gradient 34 mmHg    AV valve area 0.94 cm2    AV Velocity Ratio 0.24     AV index (prosthetic) 0.27     MV valve area p 1/2 method 1.82 cm2    E/A ratio 0.89     Mean e' 0.05 m/s    E wave deceleration time 415.94 msec    IVRT 114.18 msec    LVOT diameter 2.09 cm    LVOT area 3.4 cm2    LVOT peak angel luis 0.96 m/s    LVOT peak VTI 20.10 cm    Ao peak angel luis 3.94 m/s    Ao VTI 73.2 cm    LVOT stroke volume 68.92 cm3    AV peak gradient 62 mmHg    E/E' ratio 28.00 m/s    MV Peak E Angel Luis 1.26 m/s    TR Max Angel Luis 2.55 m/s    MV stenosis pressure 1/2 time 120.62 ms    MV Peak A Angel Luis 1.42 m/s    LV Systolic Volume 67.33 mL    LV Systolic Volume Index 35.6 mL/m2    LV Diastolic Volume 128.95 mL    LV Diastolic Volume Index 68.23 mL/m2    LV Mass Index 135 g/m2    RA Major Axis 4.81 cm    Left Atrium Minor Axis 7.50 cm    Left Atrium Major Axis 7.39 cm    Triscuspid Valve Regurgitation Peak Gradient 26 mmHg    LA WIDTH 6.60 cm    LA Vol 180.84 cm3    ROSALES 95.7 mL/m2    RA Width 4.10 cm    Right Atrial Pressure (from IVC) 3 mmHg    EF 70 %    TV resting pulmonary artery pressure 29 mmHg    Narrative    · The left ventricle is normal in size with concentric hypertrophy and   normal systolic function.  · The estimated ejection fraction is 70%.  · Grade II left ventricular diastolic dysfunction.  · Normal right ventricular size with normal right ventricular systolic   function.  · Severe left atrial  enlargement.  · There is moderate-to-severe aortic valve stenosis.  · Aortic valve area is 0.94 cm2; peak velocity is 3.94 m/s; mean gradient   is 34 mmHg.  · There is mild mitral stenosis.  · Mild mitral regurgitation.  · Mild tricuspid regurgitation.  · Normal central venous pressure (3 mmHg).  · The estimated PA systolic pressure is 29 mmHg.          CHRIST:  No results found for this or any previous visit.    ASSESSMENT/PLAN:       Pre-op Assessment    I have reviewed the Patient Summary Reports.     I have reviewed the Nursing Notes. I have reviewed the NPO Status.   I have reviewed the Medications.     Review of Systems  Anesthesia Hx:  No problems with previous Anesthesia   History of prior surgery of interest to airway management or planning: heart surgery. Previous anesthesia: General        Denies Family Hx of Anesthesia complications.    Denies Personal Hx of Anesthesia complications.                    Social:  Former Smoker, Social Alcohol Use       Hematology/Oncology:       -- Anemia:                    --  Cancer in past history (Prostate cancer):              radiation       Cardiovascular:  Exercise tolerance: poor   Denies Pacemaker. Hypertension Valvular problems/Murmurs, AS  CAD  asymptomatic  Dysrhythmias atrial fibrillation       ARGUETA  ECG has been reviewed. October 2021 [facility] notable for mild non obstructive CAD, moderate AS    TAVR in 2024 due to moderate-severe AS                           Pulmonary:  Pulmonary Normal   Denies COPD.                     Renal/:  Chronic Renal Disease, CKD  BPH Solitary kidney since birth    Urinary incontinence             Hepatic/GI:   PUD,  GERD, poorly controlled   UC Not Taking GLP-1 Agonists            Musculoskeletal:  Arthritis          Spine Disorders: cervical Degenerative disease           Neurological:  TIA, (5-10 years ago)  Denies CVA.   Headaches Denies Seizures.    Normal pressure hydrocephalus with ataxia, incontinence, short term memory loss,  and headaches                            Endocrine:  Denies Diabetes. Hypothyroidism        Denies Obesity / BMI > 30      Physical Exam  General: Well nourished, Cooperative, Alert and Oriented    Airway:  Mallampati: II   Mouth Opening: Normal  TM Distance: Normal  Tongue: Normal  Neck ROM: Extension Decreased    Dental:  Intact    Chest/Lungs:  Normal Respiratory Rate    Heart:  Rate: Normal        Anesthesia Plan  Type of Anesthesia, risks & benefits discussed:    Anesthesia Type: Gen ETT  Intra-op Monitoring Plan: Standard ASA Monitors and Art Line  Post Op Pain Control Plan: multimodal analgesia and IV/PO Opioids PRN  Induction:  IV  Airway Plan: Video and Direct, Post-Induction  Informed Consent: Informed consent signed with the Patient and all parties understand the risks and agree with anesthesia plan.  All questions answered.   ASA Score: 3  Day of Surgery Review of History & Physical: H&P Update referred to the surgeon/provider.    Ready For Surgery From Anesthesia Perspective.     .

## 2025-01-31 NOTE — ASSESSMENT & PLAN NOTE
Mildly low  Not known to have low B12  No easy bleeding or bruising   I suggested the perioperative care team be aware of the mildly low platelet count  It is over 100

## 2025-01-31 NOTE — ASSESSMENT & PLAN NOTE
He is currently taking amlodipine, Toprol-XL      Hypertension-  Blood pressure is acceptable . I suggest continuation of --amlodipine, Toprol-XL------ during the entire perioperative period. I suggest   blood pressure, monitoring  .I suggest addressing pain control as uncontrolled pain can increased blood pressure

## 2025-01-31 NOTE — ASSESSMENT & PLAN NOTE
In 2004\  Had shingles vaccination  He no longer has post herpetic neuralgia which was on his shoulder area in the past

## 2025-01-31 NOTE — ASSESSMENT & PLAN NOTE
He was born with a single kidney  His baseline creatinine has been around 1-1/2    Creatinine stable  Stages of CKD discussed  Deleterious effects NSAID's , Beneficial effects of Hydration discussed   Acetaminophen ( If not intolerant ) as needed for pain     I  suggest monitoring renal function, in put and out put status chun-operatively. I  suggest avoiding nephrotoxic medication including NSAIDs, COX2 inhibitors, intravenous contrast agent,avoiding hypotension to prevent further renal impairment.   Care with intravenous contrast discussed

## 2025-01-31 NOTE — ASSESSMENT & PLAN NOTE
He is anticoagulated with apixaban  Asymptomatic from an AFib standpoint and is doing good      Thrombo-embolic event within the last 3 months- none     - Restoration of normal sinus rhythm from atrial fibrillation or atrial flutter, either by cardioversion or ablation, within the last month- none     - Pulmonary vein isolation within the last 2 months- none     - Left atrial appendage thrombus within the last 3 months- none .    No contraindications to holding anticoagulation for surgery     Date of surgery - 2/6   Type of Anesthesia- General  Last day of  use pre op- February 2nd    I suggested contacting his primary cardiologist about the duration of apixaban hold

## 2025-01-31 NOTE — ASSESSMENT & PLAN NOTE
2023  He is doing good from a heart standpoint and is under cardiology care  His breathing has improved after the valve replacement    No history of endocarditis    I suggest surgical site infection prophylaxis use

## 2025-02-03 ENCOUNTER — OFFICE VISIT (OUTPATIENT)
Dept: FAMILY MEDICINE | Facility: CLINIC | Age: 85
End: 2025-02-03
Payer: MEDICARE

## 2025-02-03 VITALS
BODY MASS INDEX: 20 KG/M2 | HEART RATE: 50 BPM | HEIGHT: 71 IN | SYSTOLIC BLOOD PRESSURE: 136 MMHG | OXYGEN SATURATION: 99 % | TEMPERATURE: 98 F | WEIGHT: 142.88 LBS | DIASTOLIC BLOOD PRESSURE: 62 MMHG

## 2025-02-03 DIAGNOSIS — I25.10 CORONARY ARTERY DISEASE INVOLVING NATIVE CORONARY ARTERY OF NATIVE HEART WITHOUT ANGINA PECTORIS: Chronic | ICD-10-CM

## 2025-02-03 DIAGNOSIS — I10 ESSENTIAL HYPERTENSION: Chronic | ICD-10-CM

## 2025-02-03 DIAGNOSIS — Z79.899 DRUG-INDUCED IMMUNODEFICIENCY: ICD-10-CM

## 2025-02-03 DIAGNOSIS — D84.821 DRUG-INDUCED IMMUNODEFICIENCY: ICD-10-CM

## 2025-02-03 DIAGNOSIS — I48.0 PAF (PAROXYSMAL ATRIAL FIBRILLATION): Chronic | ICD-10-CM

## 2025-02-03 DIAGNOSIS — K51.019 ULCERATIVE PANCOLITIS WITH COMPLICATION: Chronic | ICD-10-CM

## 2025-02-03 DIAGNOSIS — G91.2 NPH (NORMAL PRESSURE HYDROCEPHALUS): ICD-10-CM

## 2025-02-03 DIAGNOSIS — R79.89 LOW TESTOSTERONE: ICD-10-CM

## 2025-02-03 DIAGNOSIS — Z00.00 ROUTINE MEDICAL EXAM: Primary | ICD-10-CM

## 2025-02-03 DIAGNOSIS — Z85.46 HISTORY OF PROSTATE CANCER: ICD-10-CM

## 2025-02-03 DIAGNOSIS — E03.9 ACQUIRED HYPOTHYROIDISM: Chronic | ICD-10-CM

## 2025-02-03 DIAGNOSIS — Q60.0 CONGENITAL ABSENCE OF RIGHT KIDNEY: Chronic | ICD-10-CM

## 2025-02-03 DIAGNOSIS — Z12.12 SCREENING FOR COLORECTAL CANCER: ICD-10-CM

## 2025-02-03 DIAGNOSIS — N18.31 STAGE 3A CHRONIC KIDNEY DISEASE: ICD-10-CM

## 2025-02-03 DIAGNOSIS — Z95.3 S/P TAVR (TRANSCATHETER AORTIC VALVE REPLACEMENT): ICD-10-CM

## 2025-02-03 DIAGNOSIS — Z12.11 SCREENING FOR COLORECTAL CANCER: ICD-10-CM

## 2025-02-03 PROCEDURE — 99214 OFFICE O/P EST MOD 30 MIN: CPT | Mod: 25,S$GLB,, | Performed by: INTERNAL MEDICINE

## 2025-02-03 PROCEDURE — 3288F FALL RISK ASSESSMENT DOCD: CPT | Mod: CPTII,S$GLB,, | Performed by: INTERNAL MEDICINE

## 2025-02-03 PROCEDURE — 1100F PTFALLS ASSESS-DOCD GE2>/YR: CPT | Mod: CPTII,S$GLB,, | Performed by: INTERNAL MEDICINE

## 2025-02-03 PROCEDURE — 99397 PER PM REEVAL EST PAT 65+ YR: CPT | Mod: S$GLB,,, | Performed by: INTERNAL MEDICINE

## 2025-02-03 PROCEDURE — 1125F AMNT PAIN NOTED PAIN PRSNT: CPT | Mod: CPTII,S$GLB,, | Performed by: INTERNAL MEDICINE

## 2025-02-03 PROCEDURE — 1159F MED LIST DOCD IN RCRD: CPT | Mod: CPTII,S$GLB,, | Performed by: INTERNAL MEDICINE

## 2025-02-03 PROCEDURE — 99999 PR PBB SHADOW E&M-EST. PATIENT-LVL V: CPT | Mod: PBBFAC,,, | Performed by: INTERNAL MEDICINE

## 2025-02-03 RX ORDER — TAMSULOSIN HYDROCHLORIDE 0.4 MG/1
0.4 CAPSULE ORAL DAILY
Qty: 90 CAPSULE | Refills: 12 | Status: SHIPPED | OUTPATIENT
Start: 2025-02-03

## 2025-02-03 NOTE — PROGRESS NOTES
Chief complaint: Physical and establish care     Patient is an 85-year-old white male new to be who is here with his wife who is a patient of mine.  He would like to transfer most if not all of his care to the [facility] to be closer to his home.  Regarding health maintenance and cancer screening he apparently has a history of prostate cancer and radiation treatment in his still having his PSA followed and that is reasonable.  Regarding colon cancer screening apparently had a poor prep in 2022 and no further was recommended.        PSA sl rise 8/24  Colon poor prep 2022 but no further rec        ROS:   CONST: weight stable. EYES: no vision change. ENT: no sore throat. CV: no chest pain w/ exertion. RESP: no shortness of breath. GI: no nausea, vomiting, diarrhea. No dysphagia. : no urinary issues. MUSCULOSKELETAL: no new myalgias or arthralgias. SKIN: no new changes. NEURO: no focal deficits. PSYCH: no new issues. ENDOCRINE: no polyuria. HEME: no lymph nodes. ALLERGY: no general pruritis.      Past Medical History:   Diagnosis Date    Allergies 01/31/2025    Anemia     Anticoagulant long-term use     Anxiety     Atrophic kidney 11/16/2012    BPH (benign prostatic hyperplasia) 11/16/2012    Congenital absence of right kidney 07/05/2017    DDD (degenerative disc disease), cervical 07/05/2017    x-ray 7/17 - Severe    Ex-smoker 12/20/2018    Exudative age-related macular degeneration, right eye, with active choroidal neovascularization 02/15/2024    GERD (gastroesophageal reflux disease) 11/16/2012    Glaucoma 11/16/2012    HTN (hypertension) 11/16/2012    Hypothyroid 11/16/2012    Internal carotid artery stenosis 11/16/2012    Iron deficiency anemia due to chronic blood loss 10/29/2021    Left ventricular diastolic dysfunction, NYHA class 1 04/16/2015    4/15    Low serum testosterone level 11/16/2012    Normal cardiac stress test 11/16/2012    NPH (normal pressure hydrocephalus) 02/03/2025    Osteopenia 11/16/2012     PAF (paroxysmal atrial fibrillation) 12/20/2018    Prostate CA 01/15/2013    XRT 12/12  Dr. 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    Shingles 11/16/2012    Skin disease 2020    Seems to have started after Covid vaccine    Stage 3a chronic kidney disease 10/06/2014    Stroke 2017    tia   no residual    Thrombocytopenia 01/31/2025    TIA (transient ischemic attack) 11/16/2012    UC (ulcerative colitis) 11/16/2012               Past Surgical History:   Procedure Laterality Date    ADENOIDECTOMY      COLONOSCOPY N/A 03/25/2022    Procedure: COLONOSCOPY;  Surgeon: Ashley Nguyen MD;  Location: [facility];  Service: Endoscopy;  Laterality: N/A;    ESOPHAGOGASTRODUODENOSCOPY N/A 03/25/2022    Procedure: EGD (ESOPHAGOGASTRODUODENOSCOPY);  Surgeon: Ashley Nguyen MD;  Location: [facility] ENDO;  Service: Endoscopy;  Laterality: N/A;  added on per Dr. Nguyen    ESOPHAGOGASTRODUODENOSCOPY N/A 6/7/2024    Procedure: EGD (ESOPHAGOGASTRODUODENOSCOPY);  Surgeon: Audie Snell MD;  Location: [facility] (4TH FLR);  Service: Endoscopy;  Laterality: N/A;  5/21 R/s,sent updated instr via portal.pt informed to hold eliquis for 2 days.AC  Ref by: ,  approved to hold Eliquis (apixaban) for 2 days per Dr. Conklin-see media file  5/9/24-GT  5/31-pre call complete-tb    EYE SURGERY Right 11/2020    cataract    HERNIA REPAIR      LEFT HEART CATHETERIZATION Right 10/29/2021    Procedure: CATHETERIZATION, HEART, LEFT AND RIGHT  - LV DARNELL POSSIBLE;  Surgeon: Beau Conklin MD;  Location: [facility] CATH LAB;  Service: Cardiology;  Laterality: Right;    LUMBAR PUNCTURE N/A 1/13/2025    Procedure: Lumbar Puncture;  Surgeon: Jairo Blankenship MD;  Location: [facility] OR 2ND FLR;  Service: Neurosurgery;  Laterality: N/A;    RIGHT HEART CATHETERIZATION Right 10/29/2021    Procedure: INSERTION, CATHETER, RIGHT HEART;  Surgeon: Beau Conklin MD;  Location: [facility] CATH LAB;  Service: Cardiology;   Laterality: Right;    SKIN BIOPSY  2020    TONSILLECTOMY       Social History     Socioeconomic History    Marital status:      Spouse name: Mone    Number of children: 1+1   Tobacco Use    Smoking status: Former     Current packs/day: 0.00     Average packs/day: 1 pack/day for 25.0 years (25.0 ttl pk-yrs)     Types: Cigarettes     Start date: 1947     Quit date: 1972     Years since quittin.1    Smokeless tobacco: Never    Tobacco comments:     Patient Quit Smoking on 1972.   Substance and Sexual Activity    Alcohol use: Not Currently     Alcohol/week: 3.0 standard drinks of alcohol     Types: 2 Glasses of wine, 1 Cans of beer per week     Comment: Occasional    Drug use: Never    Sexual activity: Not Currently     Partners: Female     Birth control/protection: None   Social History Narrative    Rides bike - Quit due to poor balance     - he uses a cane or a walker.       Social Drivers of Health     Financial Resource Strain: Low Risk  (10/8/2024)    Overall Financial Resource Strain (CARDIA)     Difficulty of Paying Living Expenses: Not hard at all   Food Insecurity: No Food Insecurity (10/8/2024)    Hunger Vital Sign     Worried About Running Out of Food in the Last Year: Never true     Ran Out of Food in the Last Year: Never true   Transportation Needs: No Transportation Needs (2024)    Received from [facility]    PRAPARE - Transportation     Lack of Transportation (Medical): No     Lack of Transportation (Non-Medical): No   Physical Activity: Insufficiently Active (10/8/2024)    Exercise Vital Sign     Days of Exercise per Week: 3 days     Minutes of Exercise per Session: 20 min   Stress: Stress Concern Present (10/8/2024)    [employer] [place] [employer] - Occupational Stress Questionnaire     Feeling of Stress : To some extent   Housing Stability: Unknown (10/8/2024)    Housing Stability Vital Sign     Unable to Pay for Housing in the Last Year: No     family  history includes Alzheimer's disease in his mother; Cancer in his brother; Diabetes in his brother; Heart attack in his father; Heart disease (age of onset: 56) in his brother; Hypertension in his brother and mother.      Gen: no distress  EYES: conjunctiva clear, non-icteric, PERRL  ENT: nose clear, nasal mucosa normal, oropharynx clear and moist, teeth good  NECK:supple, thyroid non-palpable  RESP: effort is good, lungs clear  CV: heart RRR w/slight systolic murmur, gallops or rubs; no carotid bruits, no edema  GI: abdomen soft, non-distended, non-tender, no hepatosplenomegaly  MS: gait normal but requiring a walker, no clubbing or cyanosis of the digits  SKIN: no rashes, warm to touch        Diagnoses and all orders for this visit:    Routine medical exam, new to [facility] care, appears to be up-to-date on lab work, chart reviewed and summarized as above    History of prostate cancer, apparently will continue yearly PSA    Screening for colorectal cancer, up-to-date and no further recommended                                                Additional significant and separate evaluation and management issues:      Additionally patient has numerous other medical issues to address completely separate from those of a preventative visit and medically necessary that we address them today.    Patient also as numerous issues additional to his physical.  Most recently he is dealing with normal pressure hydrocephalus.  Wife says is confusion did increase and mental capacity was reduced and may well hopefully be secondary to the NPH.  He apparently has been seen by Neurology in his also on [place] and so forth and then presumably the normal pressure hydrocephalus was discovered.  He has required a walker due to difficulty walking but has not had urinary incontinence.  He improved with spinal tap and so hopefully is encouraged that he will improve significantly with the shunt which will be later this week.  He has had  his preop labs and been cleared     Regarding hypertension blood pressure appears to be running under good control and labs are stable     Apparently with a history of ulcerative colitis and follows with GI.  Prior flares were symptoms of diarrhea not really bleeding or bloody diarrhea or abdominal pain.  Symptoms appear to be stable .  I was able to find his particular medication on the list and probably has some drug-induced immunodeficiency related to that but stable    He has hypothyroidism and has been on the same dose of medication for many years and most recent thyroid function was normal    History of coronary disease and aortic valve replacement and has been doing well.  It appears he was probably just getting a yearly echo and probably will establish with Cardiology on the [facility]    Apparently has one kidney but kidney function appears to be stable with the an age expected GFR having a solitary kidney in the 40s..      Apparently with history of low testosterone but given history of prostate cancer not on any particular supplement and not clinically requiring it.  He apparently does have a diagnosis of BPH and his Flomax prescription was previously done by Urology but they were having trouble with the refills since he had not made follow-up there and I will be happy to refill that medication long term.    Patient does have atrial fibrillation and is on anticoagulation with Eliquis and appears to be rate controlled with no anemia.      Evaluation and management of all the separate issues will be based on medical decision making.  Labs and x-ray reports reviewed.  Numerous clinic notes reviewed                 Assessment and plan:           NPH (normal pressure hydrocephalus) symptomatic, set to have shunt placement    Essential hypertension, chronic and stable    Ulcerative pancolitis with complication, chronic and stable and will follow with GI    Acquired hypothyroidism, chronic and stable    Coronary  artery disease involving native coronary artery of native heart without angina pectoris, chronic and stable    Congenital absence of right kidney, chronic and stable    Drug-induced immunodeficiency, stable, currently tolerating current med    Stage 3a chronic kidney disease, chronic and stable    PAF (paroxysmal atrial fibrillation), chronic and stable    S/P TAVR (transcatheter aortic valve replacement) yearly echo at least    Low testosterone, chronic and stable, maybe not having any direct symptoms and testosterone supplement probably contraindicated with prostate cancer, refill Flomax for BPH symptoms    Other orders  -     tamsulosin (FLOMAX) 0.4 mg Cap; Take 1 capsule (0.4 mg total) by mouth once daily.       This visit is complex due to the longitudinal nature of the patient/doctor relationship being establish today related to the patient's series of complex medical conditions.  I am assuming responsibility for all the above chronic medical conditions addressed today

## 2025-02-04 ENCOUNTER — HOSPITAL ENCOUNTER (OUTPATIENT)
Dept: RADIOLOGY | Facility: HOSPITAL | Age: 85
Discharge: HOME | DRG: 032 | End: 2025-02-04
Attending: NEUROLOGICAL SURGERY
Payer: MEDICARE

## 2025-02-04 DIAGNOSIS — E03.9 HYPOTHYROIDISM, UNSPECIFIED TYPE: ICD-10-CM

## 2025-02-04 DIAGNOSIS — G91.2 NORMAL PRESSURE HYDROCEPHALUS: ICD-10-CM

## 2025-02-04 DIAGNOSIS — I10 ESSENTIAL HYPERTENSION: ICD-10-CM

## 2025-02-04 PROCEDURE — A9585 GADOBUTROL INJECTION: HCPCS | Performed by: NEUROLOGICAL SURGERY

## 2025-02-04 PROCEDURE — 25500020 PHARM REV CODE 255: Performed by: NEUROLOGICAL SURGERY

## 2025-02-04 PROCEDURE — 70554 FMRI BRAIN BY TECH: CPT | Mod: 26,LT,, | Performed by: RADIOLOGY

## 2025-02-04 PROCEDURE — 70554 FMRI BRAIN BY TECH: CPT | Mod: TC

## 2025-02-04 RX ORDER — GADOBUTROL 604.72 MG/ML
7 INJECTION INTRAVENOUS
Status: COMPLETED | OUTPATIENT
Start: 2025-02-04 | End: 2025-02-04

## 2025-02-04 RX ADMIN — GADOBUTROL 7 ML: 604.72 INJECTION INTRAVENOUS at 01:02

## 2025-02-04 NOTE — PRE-PROCEDURE INSTRUCTIONS
Left voice message for patient's wife, Mone, to call me back for Eliquis instructions from Dr. Conklin.Also pit message in the [facility] portal.

## 2025-02-04 NOTE — PRE-PROCEDURE INSTRUCTIONS
Spoke with patient's wife, Mone.  Instructed to hold Eliquis 5 days prior to surgery per Dr. Conklin.( Last dose 1/31)  His last dose was 1/31 per his wife, Mone.   Verbalizes understanding.

## 2025-02-05 ENCOUNTER — TELEPHONE (OUTPATIENT)
Dept: NEUROSURGERY | Facility: CLINIC | Age: 85
End: 2025-02-05
Payer: MEDICARE

## 2025-02-05 DIAGNOSIS — Z98.2 S/P VP SHUNT: ICD-10-CM

## 2025-02-05 DIAGNOSIS — G91.2 NORMAL PRESSURE HYDROCEPHALUS: Primary | ICD-10-CM

## 2025-02-05 RX ORDER — LEVOTHYROXINE SODIUM 125 UG/1
125 TABLET ORAL
Qty: 90 TABLET | Refills: 3 | Status: SHIPPED | OUTPATIENT
Start: 2025-02-05

## 2025-02-05 RX ORDER — METOPROLOL SUCCINATE 25 MG/1
25 TABLET, EXTENDED RELEASE ORAL
Qty: 90 TABLET | Refills: 3 | Status: SHIPPED | OUTPATIENT
Start: 2025-02-05

## 2025-02-05 NOTE — TELEPHONE ENCOUNTER
Mr & MsSarita Ricci notified of arrival time for procedure.  Pt instructed to be at the [facility] desk at 6 am. NPO at midnight.  Shower with dial antibacterial soap tonight + tomorrow morning.  Shampoo hair with regular shampoo. Questions and concerns addressed.

## 2025-02-06 ENCOUNTER — HOSPITAL ENCOUNTER (INPATIENT)
Facility: HOSPITAL | Age: 85
LOS: 1 days | Discharge: HOME | DRG: 032 | End: 2025-02-07
Attending: NEUROLOGICAL SURGERY | Admitting: NEUROLOGICAL SURGERY
Payer: MEDICARE

## 2025-02-06 ENCOUNTER — ANESTHESIA (OUTPATIENT)
Dept: SURGERY | Facility: HOSPITAL | Age: 85
DRG: 032 | End: 2025-02-06
Payer: MEDICARE

## 2025-02-06 DIAGNOSIS — G91.2 NPH (NORMAL PRESSURE HYDROCEPHALUS): ICD-10-CM

## 2025-02-06 DIAGNOSIS — Z98.2 S/P VENTRICULOPERITONEAL SHUNT: Primary | ICD-10-CM

## 2025-02-06 PROCEDURE — 63600175 PHARM REV CODE 636 W HCPCS

## 2025-02-06 PROCEDURE — 63600175 PHARM REV CODE 636 W HCPCS: Performed by: NEUROLOGICAL SURGERY

## 2025-02-06 PROCEDURE — 11000001 HC ACUTE MED/SURG PRIVATE ROOM

## 2025-02-06 PROCEDURE — 0WJG4ZZ INSPECTION OF PERITONEAL CAVITY, PERCUTANEOUS ENDOSCOPIC APPROACH: ICD-10-PCS | Performed by: SURGERY

## 2025-02-06 PROCEDURE — 94799 UNLISTED PULMONARY SVC/PX: CPT

## 2025-02-06 PROCEDURE — 25000003 PHARM REV CODE 250: Performed by: NEUROLOGICAL SURGERY

## 2025-02-06 PROCEDURE — 25000003 PHARM REV CODE 250

## 2025-02-06 PROCEDURE — 99900035 HC TECH TIME PER 15 MIN (STAT)

## 2025-02-06 PROCEDURE — 94761 N-INVAS EAR/PLS OXIMETRY MLT: CPT

## 2025-02-06 PROCEDURE — C1729 CATH, DRAINAGE: HCPCS | Performed by: NEUROLOGICAL SURGERY

## 2025-02-06 PROCEDURE — 37000009 HC ANESTHESIA EA ADD 15 MINS: Performed by: NEUROLOGICAL SURGERY

## 2025-02-06 PROCEDURE — 63600175 PHARM REV CODE 636 W HCPCS: Performed by: SURGERY

## 2025-02-06 PROCEDURE — 37000008 HC ANESTHESIA 1ST 15 MINUTES: Performed by: NEUROLOGICAL SURGERY

## 2025-02-06 PROCEDURE — 71000033 HC RECOVERY, INTIAL HOUR: Performed by: NEUROLOGICAL SURGERY

## 2025-02-06 PROCEDURE — 36000710: Performed by: NEUROLOGICAL SURGERY

## 2025-02-06 PROCEDURE — 27201423 OPTIME MED/SURG SUP & DEVICES STERILE SUPPLY: Performed by: NEUROLOGICAL SURGERY

## 2025-02-06 PROCEDURE — 61781 SCAN PROC CRANIAL INTRA: CPT | Mod: ,,, | Performed by: NEUROLOGICAL SURGERY

## 2025-02-06 PROCEDURE — 36000711: Performed by: NEUROLOGICAL SURGERY

## 2025-02-06 PROCEDURE — 62223 ESTABLISH BRAIN CAVITY SHUNT: CPT | Mod: 62,,, | Performed by: NEUROLOGICAL SURGERY

## 2025-02-06 PROCEDURE — 71000016 HC POSTOP RECOV ADDL HR: Performed by: NEUROLOGICAL SURGERY

## 2025-02-06 PROCEDURE — 71000015 HC POSTOP RECOV 1ST HR: Performed by: NEUROLOGICAL SURGERY

## 2025-02-06 PROCEDURE — 27800903 OPTIME MED/SURG SUP & DEVICES OTHER IMPLANTS: Performed by: NEUROLOGICAL SURGERY

## 2025-02-06 PROCEDURE — 00163J6 BYPASS CEREBRAL VENTRICLE TO PERITONEAL CAVITY WITH SYNTHETIC SUBSTITUTE, PERCUTANEOUS APPROACH: ICD-10-PCS | Performed by: NEUROLOGICAL SURGERY

## 2025-02-06 PROCEDURE — 8E09XBZ COMPUTER ASSISTED PROCEDURE OF HEAD AND NECK REGION: ICD-10-PCS | Performed by: NEUROLOGICAL SURGERY

## 2025-02-06 PROCEDURE — 62223 ESTABLISH BRAIN CAVITY SHUNT: CPT | Mod: 62,,, | Performed by: SURGERY

## 2025-02-06 PROCEDURE — 25000003 PHARM REV CODE 250: Performed by: STUDENT IN AN ORGANIZED HEALTH CARE EDUCATION/TRAINING PROGRAM

## 2025-02-06 PROCEDURE — D9220A PRA ANESTHESIA: Mod: ,,, | Performed by: ANESTHESIOLOGY

## 2025-02-06 DEVICE — KIT CATH WITH BACTISEAL: Type: IMPLANTABLE DEVICE | Site: CRANIAL | Status: FUNCTIONAL

## 2025-02-06 DEVICE — VALVE CHPU PROGRAMMABLE: Type: IMPLANTABLE DEVICE | Site: CRANIAL | Status: FUNCTIONAL

## 2025-02-06 RX ORDER — SODIUM CHLORIDE 0.9 % (FLUSH) 0.9 %
10 SYRINGE (ML) INJECTION
Status: DISCONTINUED | OUTPATIENT
Start: 2025-02-06 | End: 2025-02-06 | Stop reason: HOSPADM

## 2025-02-06 RX ORDER — AMITRIPTYLINE HYDROCHLORIDE 10 MG/1
10 TABLET, FILM COATED ORAL NIGHTLY
Status: DISCONTINUED | OUTPATIENT
Start: 2025-02-06 | End: 2025-02-07 | Stop reason: HOSPADM

## 2025-02-06 RX ORDER — LIDOCAINE HYDROCHLORIDE 20 MG/ML
INJECTION INTRAVENOUS
Status: DISCONTINUED | OUTPATIENT
Start: 2025-02-06 | End: 2025-02-06

## 2025-02-06 RX ORDER — PRAVASTATIN SODIUM 10 MG/1
20 TABLET ORAL NIGHTLY
Status: DISCONTINUED | OUTPATIENT
Start: 2025-02-06 | End: 2025-02-07 | Stop reason: HOSPADM

## 2025-02-06 RX ORDER — TALC
3 POWDER (GRAM) TOPICAL NIGHTLY PRN
Status: DISCONTINUED | OUTPATIENT
Start: 2025-02-06 | End: 2025-02-07 | Stop reason: HOSPADM

## 2025-02-06 RX ORDER — DEXAMETHASONE SODIUM PHOSPHATE 4 MG/ML
INJECTION, SOLUTION INTRA-ARTICULAR; INTRALESIONAL; INTRAMUSCULAR; INTRAVENOUS; SOFT TISSUE
Status: DISCONTINUED | OUTPATIENT
Start: 2025-02-06 | End: 2025-02-06

## 2025-02-06 RX ORDER — EPHEDRINE SULFATE 50 MG/ML
INJECTION, SOLUTION INTRAVENOUS
Status: DISCONTINUED | OUTPATIENT
Start: 2025-02-06 | End: 2025-02-06

## 2025-02-06 RX ORDER — VASOPRESSIN 20 [USP'U]/ML
INJECTION, SOLUTION INTRAMUSCULAR; SUBCUTANEOUS
Status: DISCONTINUED | OUTPATIENT
Start: 2025-02-06 | End: 2025-02-06

## 2025-02-06 RX ORDER — MEMANTINE HYDROCHLORIDE 10 MG/1
10 TABLET ORAL 2 TIMES DAILY
Status: DISCONTINUED | OUTPATIENT
Start: 2025-02-06 | End: 2025-02-07 | Stop reason: HOSPADM

## 2025-02-06 RX ORDER — METOPROLOL SUCCINATE 25 MG/1
25 TABLET, EXTENDED RELEASE ORAL DAILY
Status: DISCONTINUED | OUTPATIENT
Start: 2025-02-06 | End: 2025-02-07 | Stop reason: HOSPADM

## 2025-02-06 RX ORDER — ONDANSETRON HYDROCHLORIDE 2 MG/ML
4 INJECTION, SOLUTION INTRAVENOUS EVERY 12 HOURS PRN
Status: DISCONTINUED | OUTPATIENT
Start: 2025-02-06 | End: 2025-02-07 | Stop reason: HOSPADM

## 2025-02-06 RX ORDER — FENTANYL CITRATE 50 UG/ML
25 INJECTION, SOLUTION INTRAMUSCULAR; INTRAVENOUS EVERY 5 MIN PRN
Status: DISCONTINUED | OUTPATIENT
Start: 2025-02-06 | End: 2025-02-06 | Stop reason: HOSPADM

## 2025-02-06 RX ORDER — AMLODIPINE BESYLATE 2.5 MG/1
2.5 TABLET ORAL NIGHTLY
Status: DISCONTINUED | OUTPATIENT
Start: 2025-02-06 | End: 2025-02-07 | Stop reason: HOSPADM

## 2025-02-06 RX ORDER — BACITRACIN ZINC 500 UNIT/G
OINTMENT (GRAM) TOPICAL
Status: DISCONTINUED | OUTPATIENT
Start: 2025-02-06 | End: 2025-02-06 | Stop reason: HOSPADM

## 2025-02-06 RX ORDER — ACETAMINOPHEN 500 MG
1000 TABLET ORAL
Status: COMPLETED | OUTPATIENT
Start: 2025-02-06 | End: 2025-02-06

## 2025-02-06 RX ORDER — MUPIROCIN 20 MG/G
1 OINTMENT TOPICAL 2 TIMES DAILY
Status: DISCONTINUED | OUTPATIENT
Start: 2025-02-06 | End: 2025-02-06 | Stop reason: HOSPADM

## 2025-02-06 RX ORDER — ACETAMINOPHEN 325 MG/1
650 TABLET ORAL 4 TIMES DAILY
Status: DISCONTINUED | OUTPATIENT
Start: 2025-02-06 | End: 2025-02-07 | Stop reason: HOSPADM

## 2025-02-06 RX ORDER — BUPIVACAINE HYDROCHLORIDE 2.5 MG/ML
INJECTION, SOLUTION EPIDURAL; INFILTRATION; INTRACAUDAL
Status: DISCONTINUED | OUTPATIENT
Start: 2025-02-06 | End: 2025-02-06 | Stop reason: HOSPADM

## 2025-02-06 RX ORDER — LANOLIN ALCOHOL/MO/W.PET/CERES
1 CREAM (GRAM) TOPICAL DAILY
Status: DISCONTINUED | OUTPATIENT
Start: 2025-02-07 | End: 2025-02-07 | Stop reason: HOSPADM

## 2025-02-06 RX ORDER — GLUCAGON 1 MG
1 KIT INJECTION
Status: DISCONTINUED | OUTPATIENT
Start: 2025-02-06 | End: 2025-02-06 | Stop reason: HOSPADM

## 2025-02-06 RX ORDER — PROPOFOL 10 MG/ML
VIAL (ML) INTRAVENOUS
Status: DISCONTINUED | OUTPATIENT
Start: 2025-02-06 | End: 2025-02-06

## 2025-02-06 RX ORDER — LIDOCAINE HYDROCHLORIDE AND EPINEPHRINE 10; 10 UG/ML; MG/ML
INJECTION, SOLUTION INFILTRATION; PERINEURAL
Status: DISCONTINUED | OUTPATIENT
Start: 2025-02-06 | End: 2025-02-06 | Stop reason: HOSPADM

## 2025-02-06 RX ORDER — BALSALAZIDE DISODIUM 750 MG/1
2250 CAPSULE ORAL
Status: DISCONTINUED | OUTPATIENT
Start: 2025-02-06 | End: 2025-02-06

## 2025-02-06 RX ORDER — BALSALAZIDE DISODIUM 750 MG/1
3750 CAPSULE ORAL DAILY
Status: DISCONTINUED | OUTPATIENT
Start: 2025-02-06 | End: 2025-02-07 | Stop reason: HOSPADM

## 2025-02-06 RX ORDER — PHENYLEPHRINE HYDROCHLORIDE 10 MG/ML
INJECTION INTRAVENOUS
Status: DISCONTINUED | OUTPATIENT
Start: 2025-02-06 | End: 2025-02-06

## 2025-02-06 RX ORDER — TRIAMCINOLONE ACETONIDE 1 MG/G
CREAM TOPICAL 2 TIMES DAILY
Status: DISCONTINUED | OUTPATIENT
Start: 2025-02-06 | End: 2025-02-07 | Stop reason: HOSPADM

## 2025-02-06 RX ORDER — TAMSULOSIN HYDROCHLORIDE 0.4 MG/1
0.4 CAPSULE ORAL DAILY
Status: DISCONTINUED | OUTPATIENT
Start: 2025-02-07 | End: 2025-02-07 | Stop reason: HOSPADM

## 2025-02-06 RX ORDER — MUPIROCIN 20 MG/G
OINTMENT TOPICAL
Status: DISCONTINUED | OUTPATIENT
Start: 2025-02-06 | End: 2025-02-06 | Stop reason: HOSPADM

## 2025-02-06 RX ORDER — LEVOTHYROXINE SODIUM 125 UG/1
125 TABLET ORAL
Status: DISCONTINUED | OUTPATIENT
Start: 2025-02-07 | End: 2025-02-07 | Stop reason: HOSPADM

## 2025-02-06 RX ORDER — FENTANYL CITRATE 50 UG/ML
INJECTION, SOLUTION INTRAMUSCULAR; INTRAVENOUS
Status: DISCONTINUED | OUTPATIENT
Start: 2025-02-06 | End: 2025-02-06

## 2025-02-06 RX ORDER — HYDROMORPHONE HYDROCHLORIDE 1 MG/ML
1 INJECTION, SOLUTION INTRAMUSCULAR; INTRAVENOUS; SUBCUTANEOUS EVERY 4 HOURS PRN
Status: DISCONTINUED | OUTPATIENT
Start: 2025-02-06 | End: 2025-02-07 | Stop reason: HOSPADM

## 2025-02-06 RX ORDER — ROCURONIUM BROMIDE 10 MG/ML
INJECTION, SOLUTION INTRAVENOUS
Status: DISCONTINUED | OUTPATIENT
Start: 2025-02-06 | End: 2025-02-06

## 2025-02-06 RX ORDER — BALSALAZIDE DISODIUM 750 MG/1
3000 CAPSULE ORAL NIGHTLY
Status: DISCONTINUED | OUTPATIENT
Start: 2025-02-06 | End: 2025-02-07 | Stop reason: HOSPADM

## 2025-02-06 RX ORDER — OXYCODONE HYDROCHLORIDE 5 MG/1
5 TABLET ORAL EVERY 4 HOURS PRN
Status: DISCONTINUED | OUTPATIENT
Start: 2025-02-06 | End: 2025-02-07 | Stop reason: HOSPADM

## 2025-02-06 RX ORDER — HALOPERIDOL 5 MG/ML
0.5 INJECTION INTRAMUSCULAR EVERY 10 MIN PRN
Status: DISCONTINUED | OUTPATIENT
Start: 2025-02-06 | End: 2025-02-06 | Stop reason: HOSPADM

## 2025-02-06 RX ORDER — SODIUM CHLORIDE 9 MG/ML
INJECTION, SOLUTION INTRAVENOUS CONTINUOUS
Status: DISCONTINUED | OUTPATIENT
Start: 2025-02-06 | End: 2025-02-07

## 2025-02-06 RX ORDER — VANCOMYCIN HYDROCHLORIDE 500 MG/10ML
INJECTION, POWDER, LYOPHILIZED, FOR SOLUTION INTRAVENOUS
Status: DISCONTINUED | OUTPATIENT
Start: 2025-02-06 | End: 2025-02-06 | Stop reason: HOSPADM

## 2025-02-06 RX ORDER — CEFTRIAXONE 2 G/1
2 INJECTION, POWDER, FOR SOLUTION INTRAMUSCULAR; INTRAVENOUS
Status: COMPLETED | OUTPATIENT
Start: 2025-02-06 | End: 2025-02-06

## 2025-02-06 RX ADMIN — SUGAMMADEX 400 MG: 100 INJECTION, SOLUTION INTRAVENOUS at 09:02

## 2025-02-06 RX ADMIN — AMITRIPTYLINE HYDROCHLORIDE 10 MG: 10 TABLET, FILM COATED ORAL at 08:02

## 2025-02-06 RX ADMIN — PROPOFOL 150 MG: 10 INJECTION, EMULSION INTRAVENOUS at 08:02

## 2025-02-06 RX ADMIN — OXYCODONE 5 MG: 5 TABLET ORAL at 02:02

## 2025-02-06 RX ADMIN — EPHEDRINE SULFATE 5 MG: 50 INJECTION INTRAVENOUS at 08:02

## 2025-02-06 RX ADMIN — ACETAMINOPHEN 1000 MG: 500 TABLET ORAL at 06:02

## 2025-02-06 RX ADMIN — ACETAMINOPHEN 650 MG: 325 TABLET ORAL at 08:02

## 2025-02-06 RX ADMIN — VASOPRESSIN 1 UNITS: 20 INJECTION INTRAVENOUS at 08:02

## 2025-02-06 RX ADMIN — DEXAMETHASONE SODIUM PHOSPHATE 4 MG: 4 INJECTION, SOLUTION INTRAMUSCULAR; INTRAVENOUS at 09:02

## 2025-02-06 RX ADMIN — ROCURONIUM BROMIDE 50 MG: 10 INJECTION, SOLUTION INTRAVENOUS at 08:02

## 2025-02-06 RX ADMIN — MEMANTINE 10 MG: 10 TABLET ORAL at 08:02

## 2025-02-06 RX ADMIN — PHENYLEPHRINE HYDROCHLORIDE 200 MCG: 10 INJECTION INTRAVENOUS at 08:02

## 2025-02-06 RX ADMIN — FENTANYL CITRATE 50 MCG: 50 INJECTION, SOLUTION INTRAMUSCULAR; INTRAVENOUS at 08:02

## 2025-02-06 RX ADMIN — BALSALAZIDE DISODIUM 3000 MG: 750 CAPSULE ORAL at 08:02

## 2025-02-06 RX ADMIN — TRIAMCINOLONE ACETONIDE: 1 CREAM TOPICAL at 08:02

## 2025-02-06 RX ADMIN — MUPIROCIN: 20 OINTMENT TOPICAL at 06:02

## 2025-02-06 RX ADMIN — AMLODIPINE BESYLATE 2.5 MG: 2.5 TABLET ORAL at 08:02

## 2025-02-06 RX ADMIN — FENTANYL CITRATE 25 MCG: 50 INJECTION, SOLUTION INTRAMUSCULAR; INTRAVENOUS at 09:02

## 2025-02-06 RX ADMIN — LIDOCAINE HYDROCHLORIDE 40 MG: 20 INJECTION INTRAVENOUS at 08:02

## 2025-02-06 RX ADMIN — OXYCODONE 5 MG: 5 TABLET ORAL at 06:02

## 2025-02-06 RX ADMIN — CEFTRIAXONE SODIUM 2 G: 2 INJECTION, POWDER, FOR SOLUTION INTRAMUSCULAR; INTRAVENOUS at 08:02

## 2025-02-06 RX ADMIN — VASOPRESSIN 2 UNITS: 20 INJECTION INTRAVENOUS at 09:02

## 2025-02-06 RX ADMIN — ACETAMINOPHEN 650 MG: 325 TABLET ORAL at 02:02

## 2025-02-06 RX ADMIN — METOPROLOL SUCCINATE 25 MG: 25 TABLET, EXTENDED RELEASE ORAL at 02:02

## 2025-02-06 RX ADMIN — ROCURONIUM BROMIDE 20 MG: 10 INJECTION, SOLUTION INTRAVENOUS at 09:02

## 2025-02-06 RX ADMIN — Medication 3 MG: at 08:02

## 2025-02-06 RX ADMIN — FENTANYL CITRATE 25 MCG: 50 INJECTION INTRAMUSCULAR; INTRAVENOUS at 04:02

## 2025-02-06 RX ADMIN — PRAVASTATIN SODIUM 20 MG: 10 TABLET ORAL at 08:02

## 2025-02-06 RX ADMIN — MEMANTINE 10 MG: 10 TABLET ORAL at 11:02

## 2025-02-06 RX ADMIN — OXYCODONE 5 MG: 5 TABLET ORAL at 10:02

## 2025-02-06 RX ADMIN — SODIUM CHLORIDE: 0.9 INJECTION, SOLUTION INTRAVENOUS at 08:02

## 2025-02-06 RX ADMIN — VASOPRESSIN 2 UNITS: 20 INJECTION INTRAVENOUS at 08:02

## 2025-02-06 RX ADMIN — MUPIROCIN 1 G: 20 OINTMENT TOPICAL at 10:02

## 2025-02-06 NOTE — ANESTHESIA PROCEDURE NOTES
Intubation    Date/Time: 2/6/2025 8:32 AM    Performed by: Medical Student, Emergency  Authorized by: Sheldon Acuna MD    Intubation:     Induction:  Intravenous    Intubated:  Postinduction    Mask Ventilation:  Easy mask    Attempts:  1    Attempted By:  Resident anesthesiologist and student    Method of Intubation:  Video laryngoscopy    Blade:  Aj 3    Laryngeal View Grade: Grade I - full view of cords      Difficult Airway Encountered?: No      Complications:  None    Airway Device:  Oral endotracheal tube    Airway Device Size:  7.5    Style/Cuff Inflation:  Cuffed (inflated to minimal occlusive pressure)    Tube secured:  23    Secured at:  The lips    Placement Verified By:  Capnometry    Complicating Factors:  None    Findings Post-Intubation:  BS equal bilateral

## 2025-02-06 NOTE — ANESTHESIA POSTPROCEDURE EVALUATION
Anesthesia Post Evaluation    Patient: Ramesh Ricci    Procedure(s) Performed: Procedure(s) (LRB):  INSERTION, SHUNT, VENTRICULOPERITONEAL (Right)  INSERTION, SHUNT, VENTRICULOPERITONEAL    Final Anesthesia Type: general      Patient location during evaluation: PACU  Patient participation: Yes- Able to Participate  Level of consciousness: awake and alert  Post-procedure vital signs: reviewed and stable  Pain management: adequate  Airway patency: patent    PONV status at discharge: No PONV  Anesthetic complications: no      Cardiovascular status: blood pressure returned to baseline  Respiratory status: unassisted  Hydration status: euvolemic  Follow-up not needed.              Vitals Value Taken Time   /65 02/06/25 1331   Temp 36.7 °C (98 °F) 02/06/25 1145   Pulse 56 02/06/25 1332   Resp 26 02/06/25 1332   SpO2 100 % 02/06/25 1332   Vitals shown include unfiled device data.      Event Time   Out of Recovery 10:00:00         Pain/Yrn Score: Pain Rating Prior to Med Admin: 4 (2/6/2025 10:10 AM)  Yrn Score: 9 (2/6/2025 10:00 AM)

## 2025-02-06 NOTE — OP NOTE
DATE OF PROCEDURE:  2/6/2025     SURGEON:  Jairo Blankenship M.D., Ph.D.     ASSISTANT:  Preston Boyd MD (the assistant [facility]).    CO-SURGEON:  Shar Elizabeth M.D.     PREOPERATIVE DIAGNOSES:  Normal pressure hydrocephalus.     POSTOPERATIVE DIAGNOSES:  Pressure hydrocephalus     PROCEDURES:  1.  Placement of ventriculoperitoneal shunt.  2.  Stealth navigation.  3.  Laparoscopic placement of peritoneal catheter.     INDICATIONS IN DETAIL:   Mr. Ramesh Ricci is a 84 y.o. gentleman with A-Fib, bilateral hearing loss, HLD, CKD, essential HTN and NPH who presents today for ventriculoperitoneal shunt.  The patient states his most bothersome symptom was his chronic headaches for which he is treated with Depakote. In addition to his headaches he notes problems with his ambulation for which he uses a cane. He endorses falls in which he loses his balance. He is accompanied with his wife who notes that his legs give out while walking. The wife also endorses worsening short term memory loss in the patient. The patient states he frequently forgets the year, daily tasks, as well as significant people in his life. The wife notes that the patient's memory loss became noticeable starting approximately 1 year ago. In addition to short term memory loss, the patient's wife also endorses loss of long term memory loss but he is eventually able to recall events with some prompting. The patient also reports issues with urinary urgency where he is unable to control it. An MRI Brain W WO contrast obtained on 10/31/24 demonstrated a stable prominence of the lateral and 3rd ventricles which may be compensatory to volume loss, normal pressure hydrocephalus to be considered in the appropriate clinical setting. He is here to discuss possible neurosurgical intervention to treat his symptoms.  I recommended performing a tap test (lumbar puncture).  The opening pressure for this patient was 2 cm of  water.  We drained approximately 24 mL of CSF, at which time her closing pressure was -5 cm of water.  Operatively, the patient had we his headache but better in his improved greatly.  Given these positive findings they wished to proceed with surgery.    PROCEDURE IN DETAIL:    The patient was seen in the pretreatment area and the risks, benefits and alternatives were described.  The patient and the patient's family wished to proceed.  The patient was brought to the operating room and a general anesthetic was administered.  All proper lines were placed.      The patient was placed in the supine position with a bump underneath the right shoulder and the arms were tucked.  The patient's head was turned to the left to allow access to the right parietal region.  The Stealth navigation system was brought to the field and accurate registration was achieved using the tracer function and an MRI that the patient had previously.  The starting point in the right parietal region had been chosen.  This was marked on the patient's head.  The target was the area above the foramen of Monro.  The patient's hair was clipped in the area of the starting region and the parietal and posterior auricular region.  The patient's head, neck, chest and abdomen were cleaned, prepped and draped in the usual fashion.  A lidocaine-bupivacaine mix was injected around a curvilinear line made near the starting site.  An incision was made along that aforementioned line and carried down to the galea using Bovie cautery.  The flap was dissected and a pocket was made in the posterior auricular region.  A Codman Hakim valve was obtained and set at 90.  This was attached to a peritoneal catheter.  Using a shunt passer, a path between the posterior auricular region and the abdomen was made.  Using a #2 tie, we attached this to the peritoneal catheter and pulled the peritoneal catheter and the shunt valve in the subcutaneous region down to the abdomen.  Once  this was performed, the pericranium was removed from the bone using Bovie cautery. Using a high-speed Datical drill with an acorn bobby, a bobby hole was made.  The dura was cut and coagulated as well as the tami.  The ventricular catheter was placed over a shunt probe and this was passed through the substance of the brain into the ventricle and towards the midline using the Stealth navigation system.  Once this was performed, the catheter was cut to the appropriate length and attached to the valve.      At this point, Dr. Elizabeth entered the room for placement of the peritoneal catheter.  Please see his notes for the details of this.  Once this was complete, the wounds were irrigated copiously.  All bleeding points were coagulated.  The incisions were closed in layers with a 4-0 Monocryl in the skin.  Clean dressings were placed and the patient was then awakened by   Anesthesia staff.  At this point, the patient was awake and following commands and he was extubated.     Estimated blood loss was less than 5 mL.     There were no intraprocedural complications.     All counts were correct at the end of surgery.     Dr. Jairo Blankenship was present during the entire procedure.

## 2025-02-06 NOTE — PLAN OF CARE
Chart reviewed. Preop nursing care completed per orders. Safe surgery checklist complete aside from H&P update, surgery consent, blood consent, and anesthesia consent. Type and screen extension form completed and sent to blood bank. Family at bedside and to take belongings. Call bell within reach. Instructed pt to call for assistance.

## 2025-02-06 NOTE — OP NOTE
Surgery Date: 2/6/2025     Surgeons and Role:  Panel 1:     * Jairo Blankenship MD - Primary     * Preston Boyd MD - Resident - Assisting  Panel 2:     * Shar Elizabeth MD - Primary    Pre-op Diagnosis:  Normal pressure hydrocephalus [G91.2]    Post-op Diagnosis:  Normal pressure hydrocephalus [G91.2]    Procedure(s) (LRB):  INSERTION, SHUNT, VENTRICULOPERITONEAL (Right)  INSERTION, SHUNT, VENTRICULOPERITONEAL    Anesthesia: General    PROCEDURE: The patient was placed under general anesthesia. The patient was   prepped and draped in the usual manner. The access to the peritoneum was gained  through ruq abdomen using Optiview trocar under direct vision.   Pneumoperitoneum to 15 mmHg with CO2 gas was attained. The shunt was in the   right upper quadrant. It was brought into the abdominal cavity on a mosquito. The suture passer was placed through the right upper quadrant under direct vision and pulled the shunt into the abdominal cavity to its full extent. The abdomen was inspected for hemostasis. Pneumoperitoneum was released. The trocar was removed. The skin incisions were closed with 4-0 plain catgut, and reinforced   with Dermabond. The patient tolerated the procedure  well, was brought to Recovery Room in stable condition. Sponge and needle   counts were correct at the end of the case.    PATHOLOGY:  for my part of the procedure is none.  COMPLICATIONS: None.  BLOOD LOSS: Minimal.

## 2025-02-06 NOTE — NURSING TRANSFER
Nursing Transfer Note      Reason patient is being transferred: post op    Transfer To: CT scan, then room 945    Transfer via bed    Transfer with eyeglasses (in place on patient's face at time of transfer)    Transported by PCT    Medicines sent: none    Any special needs or follow-up needed: routine    Chart send with patient: Yes    Notified: family via surgical texting system     Patient reassessed at: 2/6/2025 5:15 PM

## 2025-02-06 NOTE — BRIEF OP NOTE
[facility] - Surgery ([facility])  Brief Operative Note    SUMMARY     Surgery Date: 2/6/2025     Surgeons and Role:  Panel 1:     * Jairo Blankenship MD - Primary     * Preston Boyd MD - Resident - Assisting  Panel 2:     * Shar Elizabeth MD - Primary        Pre-op Diagnosis:  Normal pressure hydrocephalus [G91.2]    Post-op Diagnosis:  Post-Op Diagnosis Codes:     * Normal pressure hydrocephalus [G91.2]    Procedure(s) (LRB):  INSERTION, SHUNT, VENTRICULOPERITONEAL (N/A)  INSERTION, SHUNT, VENTRICULOPERITONEAL    Anesthesia: General    Implants:  Implant Name Type Inv. Item Serial No.  Lot No. LRB No. Used Action   KIT CATH WITH BACTISEAL - [id number]  KIT CATH WITH BACTISEAL  [employer] [id number] Right 1 Implanted   VALVE CHPU PROGRAMMABLE - [id number]  VALVE CHPU PROGRAMMABLE  [employer] [id number] N/A 1 Implanted       Operative Findings: right sided VPS shunt as planned. No intra-op complications    Estimated Blood Loss: * No values recorded between 2/6/2025  8:59 AM and 2/6/2025  9:28 AM *    Estimated Blood Loss has been documented.         Specimens:   Specimen (24h ago, onward)      None            [id number]

## 2025-02-06 NOTE — BRIEF OP NOTE
Operative Note       Surgery Date: 2/6/2025     Surgeons and Role:  Panel 1:     * Jairo Blankenship MD - Primary     * Preston Boyd MD - Resident - Assisting  Panel 2:     * Shar Elizabeth MD - Primary    Pre-op Diagnosis:  Normal pressure hydrocephalus [G91.2]    Post-op Diagnosis:  Normal pressure hydrocephalus [G91.2]    Procedure(s) (LRB):  INSERTION, SHUNT, VENTRICULOPERITONEAL (Right)  INSERTION, SHUNT, VENTRICULOPERITONEAL    Anesthesia: General    Procedure in Detail/Findings:  No adhesions noted.  without apparent complication.    Estimated Blood Loss: Minimal           Specimens (From admission, onward)      None          Implants:   Implant Name Type Inv. Item Serial No.  Lot No. LRB No. Used Action   KIT CATH WITH BACTISEAL - [id number]  KIT CATH WITH BACTISEAL  [employer] [id number] Right 1 Implanted   VALVE CHPU PROGRAMMABLE - [id number]  VALVE CHPU PROGRAMMABLE  [employer] [id number] N/A 1 Implanted              Disposition: PACU - hemodynamically stable.           Condition: Good    Attestation:  I was present and scrubbed for the entire procedure.

## 2025-02-06 NOTE — TRANSFER OF CARE
Anesthesia Transfer of Care Note    Patient: Ramesh Ricci    Procedure(s) Performed: Procedure(s) (LRB):  INSERTION, SHUNT, VENTRICULOPERITONEAL (Right)  INSERTION, SHUNT, VENTRICULOPERITONEAL    Patient location: [facility]    Anesthesia Type: general    Transport from OR: Transported from OR on 6-10 L/min O2 by face mask with adequate spontaneous ventilation    Post pain: adequate analgesia    Post assessment: no apparent anesthetic complications and tolerated procedure well    Post vital signs: stable    Level of consciousness: sedated    Nausea/Vomiting: no nausea/vomiting    Complications: none    Transfer of care protocol was followed      Last vitals: Visit Vitals  BP (!) 166/71   Pulse (!) 51   Temp 36.6 °C (97.8 °F) (Temporal)   Resp 18   Wt 65.8 kg (145 lb)   SpO2 (!) 94%   BMI 20.22 kg/m²

## 2025-02-07 ENCOUNTER — PATIENT MESSAGE (OUTPATIENT)
Dept: NEUROSURGERY | Facility: HOSPITAL | Age: 85
End: 2025-02-07
Payer: MEDICARE

## 2025-02-07 VITALS
SYSTOLIC BLOOD PRESSURE: 157 MMHG | BODY MASS INDEX: 20.22 KG/M2 | DIASTOLIC BLOOD PRESSURE: 74 MMHG | RESPIRATION RATE: 20 BRPM | WEIGHT: 145 LBS | TEMPERATURE: 98 F | HEART RATE: 47 BPM | OXYGEN SATURATION: 100 %

## 2025-02-07 PROBLEM — D68.69 HYPERCOAGULABILITY DUE TO ATRIAL FIBRILLATION: Status: ACTIVE | Noted: 2025-02-07

## 2025-02-07 PROBLEM — I48.91 HYPERCOAGULABILITY DUE TO ATRIAL FIBRILLATION: Status: ACTIVE | Noted: 2025-02-07

## 2025-02-07 PROBLEM — I50.32 CHRONIC DIASTOLIC HEART FAILURE: Status: ACTIVE | Noted: 2025-02-07

## 2025-02-07 LAB
ANION GAP SERPL CALC-SCNC: 9 MMOL/L (ref 8–16)
BASOPHILS # BLD AUTO: 0.02 K/UL (ref 0–0.2)
BASOPHILS NFR BLD: 0.2 % (ref 0–1.9)
BUN SERPL-MCNC: 21 MG/DL (ref 8–23)
CALCIUM SERPL-MCNC: 9.3 MG/DL (ref 8.7–10.5)
CHLORIDE SERPL-SCNC: 103 MMOL/L (ref 95–110)
CO2 SERPL-SCNC: 27 MMOL/L (ref 23–29)
CREAT SERPL-MCNC: 1.3 MG/DL (ref 0.5–1.4)
DIFFERENTIAL METHOD BLD: ABNORMAL
EOSINOPHIL # BLD AUTO: 0 K/UL (ref 0–0.5)
EOSINOPHIL NFR BLD: 0.1 % (ref 0–8)
ERYTHROCYTE [DISTWIDTH] IN BLOOD BY AUTOMATED COUNT: 13.8 % (ref 11.5–14.5)
EST. GFR  (NO RACE VARIABLE): 53.2 ML/MIN/1.73 M^2
GLUCOSE SERPL-MCNC: 99 MG/DL (ref 70–110)
HCT VFR BLD AUTO: 41.2 % (ref 40–54)
HGB BLD-MCNC: 12.9 G/DL (ref 14–18)
IMM GRANULOCYTES # BLD AUTO: 0.04 K/UL (ref 0–0.04)
IMM GRANULOCYTES NFR BLD AUTO: 0.4 % (ref 0–0.5)
LYMPHOCYTES # BLD AUTO: 1.3 K/UL (ref 1–4.8)
LYMPHOCYTES NFR BLD: 11.5 % (ref 18–48)
MCH RBC QN AUTO: 29.9 PG (ref 27–31)
MCHC RBC AUTO-ENTMCNC: 31.3 G/DL (ref 32–36)
MCV RBC AUTO: 96 FL (ref 82–98)
MONOCYTES # BLD AUTO: 1 K/UL (ref 0.3–1)
MONOCYTES NFR BLD: 8.6 % (ref 4–15)
NEUTROPHILS # BLD AUTO: 9.1 K/UL (ref 1.8–7.7)
NEUTROPHILS NFR BLD: 79.2 % (ref 38–73)
NRBC BLD-RTO: 0 /100 WBC
PHOSPHATE SERPL-MCNC: 3.6 MG/DL (ref 2.7–4.5)
PLATELET # BLD AUTO: 170 K/UL (ref 150–450)
PMV BLD AUTO: 11.4 FL (ref 9.2–12.9)
POTASSIUM SERPL-SCNC: 4.9 MMOL/L (ref 3.5–5.1)
RBC # BLD AUTO: 4.31 M/UL (ref 4.6–6.2)
SODIUM SERPL-SCNC: 139 MMOL/L (ref 136–145)
WBC # BLD AUTO: 11.42 K/UL (ref 3.9–12.7)

## 2025-02-07 PROCEDURE — 25000003 PHARM REV CODE 250

## 2025-02-07 PROCEDURE — 84100 ASSAY OF PHOSPHORUS: CPT

## 2025-02-07 PROCEDURE — 85025 COMPLETE CBC W/AUTO DIFF WBC: CPT

## 2025-02-07 PROCEDURE — 99024 POSTOP FOLLOW-UP VISIT: CPT | Mod: ,,, | Performed by: PHYSICIAN ASSISTANT

## 2025-02-07 PROCEDURE — 36415 COLL VENOUS BLD VENIPUNCTURE: CPT

## 2025-02-07 PROCEDURE — 80048 BASIC METABOLIC PNL TOTAL CA: CPT

## 2025-02-07 RX ORDER — HYDROCODONE BITARTRATE AND ACETAMINOPHEN 5; 325 MG/1; MG/1
1 TABLET ORAL EVERY 6 HOURS PRN
Qty: 28 TABLET | Refills: 0 | Status: SHIPPED | OUTPATIENT
Start: 2025-02-07

## 2025-02-07 RX ORDER — BACITRACIN 500 [USP'U]/G
OINTMENT TOPICAL 2 TIMES DAILY
Qty: 14 G | Refills: 0 | Status: SHIPPED | OUTPATIENT
Start: 2025-02-07 | End: 2025-02-21

## 2025-02-07 RX ADMIN — BALSALAZIDE DISODIUM 3750 MG: 750 CAPSULE ORAL at 08:02

## 2025-02-07 RX ADMIN — TAMSULOSIN HYDROCHLORIDE 0.4 MG: 0.4 CAPSULE ORAL at 08:02

## 2025-02-07 RX ADMIN — ACETAMINOPHEN 650 MG: 325 TABLET ORAL at 08:02

## 2025-02-07 RX ADMIN — LEVOTHYROXINE SODIUM 125 MCG: 125 TABLET ORAL at 05:02

## 2025-02-07 RX ADMIN — METOPROLOL SUCCINATE 25 MG: 25 TABLET, EXTENDED RELEASE ORAL at 08:02

## 2025-02-07 RX ADMIN — FERROUS SULFATE TAB EC 325 MG (65 MG FE EQUIVALENT) 1 EACH: 325 (65 FE) TABLET DELAYED RESPONSE at 08:02

## 2025-02-07 RX ADMIN — MEMANTINE 10 MG: 10 TABLET ORAL at 08:02

## 2025-02-07 RX ADMIN — TRIAMCINOLONE ACETONIDE: 1 CREAM TOPICAL at 08:02

## 2025-02-07 NOTE — DISCHARGE INSTRUCTIONS
Neurosurgery Patient Information  -Return to work will be determined on an individual basis.  -No while taking narcotic pain medication   -Do not take any OTC products containing acetaminophen at the same time as you take your narcotic pain medication. Medications that may contain acetaminophen include but are not limited to: Excedrin and other headache medications, arthritis medications, cold and sinus medications, etc. Please review the list of active ingredients in any OTC medication prior to taking it.  -Do not take any Aspirin or Aspirin-containing products for 2 weeks after surgery.  -Do not take any Aleve, Naprosyn, Naproxen, Ibuprofen, Advil or any other nonsteroidal anti-inflammatory drug (NSAID) for 2 weeks after surgery.  -Do not take any herbal supplements for 2 weeks after surgery.   -Do not consume any alcoholic beverages until released by your neurosurgeon  -Do not perform any excessive bending over or leaning forward as this is a fall hazard.  -Do not perform any heavy lifting or lifting more than 5-10 lbs from the ground level as this is a fall hazard.  -Slowly increase your ambulation [walking] over the next 2 weeks as tolerated. The goal is to be walking 1-2 miles by the time of your 2 week post op appointment.   -Walk on paved surfaces only. It is okay to walk up and down stairs while holding onto a side rail.      Contact the Neurosurgery Office immediately if:  If you begin to notice any neurologic changes such as:           -Sudden onset of lethargy or sleepiness           -Sudden confusion, trouble speaking, or understanding            -Sudden trouble seeing in one or both eyes            -Sudden trouble walking, dizziness, loss of coordination            -Sudden severe headache with no known cause            -Sudden onset of numbness or weakness     Wound Care:  Cranial incision: Keep your incision open to air. Keep incision clean and dry at all times. You may shower on the 2nd day after  your surgery. You may wash your hair with baby shampoo. Do not rub or scrub the incision. Do not allow the force of water to hit the incision. If the incision gets damp, gently pat it dry.  Apply a thin layer of bacitracin ointment to incision twice daily with a sterile q-tip for 14 days after surgery. No hair products, hats, scarfs, wigs. The incision does not need to be cleaned with any water, soap, alcohol, peroxide, or other substance.    Abdominal incision: Keep your incision open to air. You have skin glue over your incision. Please do not pick at it or try to remove it. It will dissolve on its own. You cannot take a bath/swim/submerge the incision until 8 weeks after surgery. The incision does not need to be cleaned with any water, soap, alcohol, peroxide, or other substance. No ointments or creams.     Call your doctor or go to the Emergency Room for any signs of infection including: increased redness, drainage, pain or fever (temperature greater than or equal to 101.4).     Magnetic resonance imaging (MRI) and your shunt:  -If youre having an MRI scan, tell the MRI technologist you have a programmable  shunt before you have the scan. They will need to know your shunts model and setting. You have a Hakim valve set at 90. The magnet in the MRI scanner may change your shunts pressure setting. After your MRI, your shunts pressure setting will need to be checked, reprogrammed, or both. You may need to have x-rays to help see if the pressure setting has changed.  -Before your MRI scan, make arrangements to have your shunt reprogrammed after your MRI scan. Your shunt should be reprogrammed within 4 hours after your MRI.    -You dont need to take any precautions if youre having a computed tomography (CT) scan or x-ray.      Follow up:  -Follow up with Neurosurgery in 2 weeks for a wound check and CT head scan. Appointment will be mailed to you.  Future Appointments   Date Time Provider Department Center    2/18/2025 12:45 PM [facility] OI-CT1 500 LB LIMIT [facility] IC Imaging Ctr   2/18/2025  1:30 PM Emeli Rivero PA-C [facility] NEUROS [facility]   8/4/2025 10:30 AM Audie Snell MD [facility] FLOWER [facility] Neurosurgery Office: [phone]

## 2025-02-07 NOTE — PLAN OF CARE
[facility] - Neurosurgery (Hospital)  Discharge Final Note    Primary Care Provider: Nakul Mandujano MD    Expected Discharge Date: 2/7/2025    Patient discharged home.  Patient had orders for hh, however, spouse refused when CM communicated with her.  Family provided transportation home.  Neurosurgery clinic to schedule follow up appointment.    Future Appointments   Date Time Provider Department Center   2/18/2025 12:45 PM [facility] OIC-CT1 500 LB LIMIT [facility] IC Imaging Ctr   2/18/2025  1:30 PM Emeli Rivero PA-C [facility] NEUROS [facility]   8/4/2025 10:30 AM Audie Snell MD [facility] FLOWER [facility]        Final Discharge Note (most recent)       Final Note - 02/07/25 1135          Final Note    Assessment Type Final Discharge Note     Anticipated Discharge Disposition Home or Self Care        Post-Acute Status    Post-Acute Authorization Other     Other Status No Post-Acute Service Needs     Discharge Delays None known at this time                     Important Message from Medicare

## 2025-02-07 NOTE — DISCHARGE SUMMARY
[facility] - Neurosurgery ([facility])  Neurosurgery  Discharge Summary      Patient Name: Ramesh Ricci  MRN: [id number]  Admission Date: 2/6/2025  Hospital Length of Stay: 1 days  Discharge Date and Time: 2/7/2025 11:20 AM  Attending Physician: Dayan att. providers found   Discharging Provider: Emeli Rivero PA-C  Primary Care Provider: Nakul Mandujano MD    HPI:   Ramesh Ricci is a 85 y.o. male A-Fib, bilateral hearing loss, HLD, CKD, essential HTN and NPH who presents today for ventriculoperitoneal shunt.  The patient states his most bothersome symptom was his chronic headaches for which he is treated with Depakote. In addition to his headaches he notes problems with his ambulation for which he uses a cane. He endorses falls in which he loses his balance. He is accompanied with his wife who notes that his legs give out while walking. The wife also endorses worsening short term memory loss in the patient. The patient states he frequently forgets the year, daily tasks, as well as significant people in his life. The wife notes that the patient's memory loss became noticeable starting approximately 1 year ago. In addition to short term memory loss, the patient's wife also endorses loss of long term memory loss but he is eventually able to recall events with some prompting. The patient also reports issues with urinary urgency where he is unable to control it. An MRI Brain W WO contrast obtained on 10/31/24 demonstrated a stable prominence of the lateral and 3rd ventricles which may be compensatory to volume loss, normal pressure hydrocephalus to be considered in the appropriate clinical setting. He is here to discuss possible neurosurgical intervention to treat his symptoms.  I recommended performing a tap test (lumbar puncture).  The opening pressure for this patient was 2 cm of water.  We drained approximately 24 mL of CSF, at which time her closing pressure was -5 cm of water.  Operatively, the patient  had we his headache but better in his improved greatly.  Given these positive findings they wished to proceed with surgery.     Procedure(s) (LRB):  INSERTION, SHUNT, VENTRICULOPERITONEAL (Right)  INSERTION, SHUNT, VENTRICULOPERITONEAL     Hospital Course: The patient presented to [facility] on 2/7/25 for the VPS placement for NPH. The patient tolerated the procedure well and there were no intra-operative complications. After surgery, the patient was extubated and taken to recovery in stable condition.  After observation in recovery, the patient was transferred to the neurosurgical floor.  Post-op shunt series XR and CTH demonstrated shunt in position without disconnection and that Hakim was set at intended setting of 90 . On 2/7/2025, he was discharged home with pain medication and follow up appointments. The patient was tolerating a regular diet and voiding without difficulty. Activity as tolerated. At the time of discharge, the patient was neurologically stable, was afebrile, and vital signs were stable. Discharge instructions were given verbally/written to the patient and their family, including wound care and follow-up appointments, and all of their questions were answered. The patient was also given a discharge instruction sheet explaining the aforementioned discussion.  The patient verbalized understanding of instructions and agreed to the plan. They were encouraged to call the clinic with any questions they might have prior to the follow up appointments.     Physical exam 2/7/25:  General: well developed, well nourished, no distress.   Head: normocephalic, atraumatic  Neurologic: Alert and oriented. Thought content appropriate.  GCS: Motor: 6/Verbal: 5/Eyes: 4 GCS Total: 15  Mental Status: Awake, Alert, Oriented x 4  Language: No aphasia  Speech: No dysarthria  Cranial nerves: face symmetric, tongue midline, CN II-XII grossly intact.   Eyes: pupils equal, round, reactive to light with accommodation, EOMI.    Pulmonary: normal respirations, no signs of respiratory distress  Abdomen: soft, non-distended, not tender to palpation, trochar incisions with dermabond intact  Skin: Skin is warm, dry and intact.  Sensory: intact to light touch throughout  Motor Strength:Moves all extremities spontaneously with good tone.  Full strength upper and lower extremities. No abnormal movements seen.   Pronator drift: absent bilaterally  Cranial incision: Clean, dry, monocryl intact. Skin edges well approximated. No surrounding erythema or edema. No drainage or TTP.                 Goals of Care Treatment Preferences:  Code Status: DNR      Consults:     Significant Diagnostic Studies: Labs: BMP:   Recent Labs   Lab 02/07/25  0432   GLU 99      K 4.9      CO2 27   BUN 21   CREATININE 1.3   CALCIUM 9.3   , CBC   Recent Labs   Lab 02/07/25  0432   WBC 11.42   HGB 12.9*   HCT 41.2      , and All labs within the past 24 hours have been reviewed  Radiology:   CT Head Without Contrast  Narrative: EXAMINATION:  CT HEAD WITHOUT CONTRAST    CLINICAL HISTORY:  s/p VPS placement;    TECHNIQUE:  Low dose axial images were obtained through the head.  Coronal and sagittal reformations were also performed. Contrast was not administered.    COMPARISON:  10/31/2024, 10/18/2024    FINDINGS:  No midline shift or acute hydrocephalus.  Stable mild prominence of the ventricular system and overlying sulci.    Shunt catheter entering from a right parietal approach with tip anteriorly positioned near the septum pellucidum at the midline.    No evidence of any focal mass or hemorrhage.  No edema or major vascular infarction.    Involutional changes with chronic microvascular ischemic changes.  Minimal pneumocephalus anteriorly on the right.    Remote left occipital cortical infarct with mild compensatory enlargement of the left occipital horn.    Paranasal sinuses are clear.  Impression: 1. No acute intracranial process.  2. Status post shunt  catheter based on the right.  3. Involutional changes with chronic microvascular ischemic changes and remote left occipital cortical infarct.    Electronically signed by: Zeyad Galindo  Date:    02/06/2025  Time:    19:18  X-Ray Shunt Series  Narrative: EXAMINATION:  XR SHUNT SERIES    CLINICAL HISTORY:  s/p VPS placement;    TECHNIQUE:  Two views    COMPARISON:  None    FINDINGS:  Right  shunt catheter placement noted.  Tubing intact.  No marrow replacement process.  Impression: Postsurgical change.    Electronically signed by: Rodríguez Fernando MD  Date:    02/06/2025  Time:    11:04      Pending Diagnostic Studies:       None          Final Active Diagnoses:    Diagnosis Date Noted POA    PRINCIPAL PROBLEM:  S/P ventriculoperitoneal shunt [Z98.2] 02/06/2025 Not Applicable    Chronic diastolic heart failure [I50.32] 02/07/2025 Yes    Hypercoagulability due to atrial fibrillation [D68.69, I48.91] 02/07/2025 Yes    NPH (normal pressure hydrocephalus) [G91.2] 02/03/2025 Yes    PAF (paroxysmal atrial fibrillation) [I48.0] 12/20/2018 Yes     Chronic    Prostate CA [C61] 01/15/2013 Yes    Essential hypertension [I10] 11/16/2012 Yes     Chronic    Acquired hypothyroidism [E03.9] 11/16/2012 Yes     Chronic      Problems Resolved During this Admission:      Discharged Condition: stable     Disposition: Home-Health Care [facility]    Follow Up:  Future Appointments   Date Time Provider Department Center   2/18/2025 12:45 PM [facility] OIC-CT1 500 LB LIMIT [facility] CTS IC Imaging Ctr   2/18/2025  1:30 PM Emeli Rivero PA-C [facility] NEUROSC [facility]   8/4/2025 10:30 AM Audie Snell MD [facility] FLOWER [facility]         Patient Instructions:      Notify your health care provider if you experience any of the following:  increased confusion or weakness     Notify your health care provider if you experience any of the following:  persistent dizziness, light-headedness, or visual disturbances     Notify your health care provider if you  experience any of the following:  worsening rash     Notify your health care provider if you experience any of the following:  severe persistent headache     Notify your health care provider if you experience any of the following:  difficulty breathing or increased cough     Notify your health care provider if you experience any of the following:  redness, tenderness, or signs of infection (pain, swelling, redness, odor or green/yellow discharge around incision site)     Notify your health care provider if you experience any of the following:  severe uncontrolled pain     Notify your health care provider if you experience any of the following:  persistent nausea and vomiting or diarrhea     Activity as tolerated     Medications:  Reconciled Home Medications: (Hold eliquis until 2/11/25)     Medication List        START taking these medications      bacitracin 500 unit/gram ointment  Apply topically 2 (two) times daily. for 14 days     HYDROcodone-acetaminophen 5-325 mg per tablet  Commonly known as: NORCO  Take 1 tablet by mouth every 6 (six) hours as needed for Pain.            CONTINUE taking these medications      acetaminophen 500 MG tablet  Commonly known as: TYLENOL  Take 2 tablets (1,000 mg total) by mouth every 6 (six) hours as needed.     AJOVY AUTOINJECTOR 225 mg/1.5 mL autoinjector  Generic drug: fremanezumab-vfrm  Inject 225 mg into the skin every 30 days.     amitriptyline 10 MG tablet  Commonly known as: ELAVIL  Take 10 mg by mouth every evening.     amLODIPine 2.5 MG tablet  Commonly known as: NORVASC  Take 2.5 mg by mouth nightly.     ascorbic acid (vitamin C) 500 MG tablet  Commonly known as: VITAMIN C  Take 500 mg by mouth once daily.     balsalazide 750 mg capsule  Commonly known as: COLAZAL  Take 5 tablets in the morning and 4 tablets in the evening.     ELIQUIS 2.5 mg Tab  Generic drug: apixaban  Take 2.5 mg by mouth 2 (two) times daily.     ferrous sulfate 325 mg (65 mg iron) Tab  tablet  Commonly known as: FEOSOL  1 tablet Orally Once a day     fluocinolone acetonide oiL 0.01 % Drop  Place into both ears.     gabapentin 100 MG capsule  Commonly known as: NEURONTIN  Take 100 mg by mouth 3 (three) times daily.     GAVISCON ORAL  Take by mouth as needed. Acid reflux     ICAPS AREDS2 ORAL  Take 1 capsule by mouth once daily.     levocetirizine 5 MG tablet  Commonly known as: XYZAL  Take 5 mg by mouth every morning.     levothyroxine 125 MCG tablet  Commonly known as: SYNTHROID  TAKE 1 TABLET BY MOUTH EVERY DAY IN THE MORNING     memantine 10 MG Tab  Commonly known as: NAMENDA  Take 10 mg by mouth 2 (two) times daily.     metoprolol succinate 25 MG 24 hr tablet  Commonly known as: TOPROL-XL  TAKE 1 TABLET BY MOUTH EVERY DAY     pantoprazole 40 MG tablet  Commonly known as: PROTONIX  TAKE 1 TABLET BY MOUTH DAILY AS  NEEDED     pravastatin 20 MG tablet  Commonly known as: PRAVACHOL  Take 1 tablet (20 mg total) by mouth every evening.     STOOL SOFTENER ORAL  Take by mouth.     tamsulosin 0.4 mg Cap  Commonly known as: FLOMAX  Take 1 capsule (0.4 mg total) by mouth once daily.     triamcinolone acetonide 0.1% 0.1 % cream  Commonly known as: KENALOG  Apply topically 2 (two) times daily. APPLY  CREAM TOPICALLY TO AFFECTED AREA TWICE DAILY as needed              Emeli Rivero PA-C  Neurosurgery  [facility])

## 2025-02-07 NOTE — CARE UPDATE
I have reviewed the chart of Ramesh Yo Carojuliette who is hospitalized for the following:    Active Hospital Problems    Diagnosis    *S/P ventriculoperitoneal shunt    Chronic diastolic heart failure     Grade II left ventricular diastolic dysfunction.   Follow up with cardiology      Hypercoagulability due to atrial fibrillation    NPH (normal pressure hydrocephalus)    PAF (paroxysmal atrial fibrillation)    Prostate CA     XRT 12/12  Dr. Bailey      Essential hypertension     X 5 years      Acquired hypothyroidism        Glenny Garcia NP  [facility]

## 2025-02-07 NOTE — PLAN OF CARE
[facility] - Neurosurgery ([facility])      HOME HEALTH ORDERS  FACE TO FACE ENCOUNTER    Patient Name: Ramesh Ricci  YOB: 1940    PCP: Nakul Mandujano MD   PCP Address: [address] / [place]  PCP Phone Number: [phone]  PCP Fax: [phone]    Encounter Date: 1/27/25    Admit to Home Health    Diagnoses:  Active Hospital Problems    Diagnosis  POA    *S/P ventriculoperitoneal shunt [Z98.2]  Not Applicable      Resolved Hospital Problems   No resolved problems to display.       Follow Up Appointments:  Future Appointments   Date Time Provider Department Center   2/18/2025 12:45 PM [facility] OIC-CT1 500 LB LIMIT [facility] CTS [facility]   2/18/2025  1:30 PM Emeli Rivero PA-C [facility] NEUROS [facility]   8/4/2025 10:30 AM Audie Snell MD [facility] GANDIBD [facility]       Allergies:  Review of patient's allergies indicates:   Allergen Reactions    Benazepril Other (See Comments)     Cough       Medications: Review discharge medications with patient and family and provide education.    Current Facility-Administered Medications   Medication Dose Route Frequency Provider Last Rate Last Admin    acetaminophen tablet 650 mg  650 mg Oral QID Preston Boyd MD   650 mg at 02/06/25 2034    amitriptyline tablet 10 mg  10 mg Oral QHS Preston Boyd MD   10 mg at 02/06/25 2034    amLODIPine tablet 2.5 mg  2.5 mg Oral Nightly Preston Boyd MD   2.5 mg at 02/06/25 2034    balsalazide capsule 3,750 mg  3,750 mg Oral Daily Preston Boyd MD        And    balsalazide capsule 3,000 mg  3,000 mg Oral QHS Preston Boyd MD   3,000 mg at 02/06/25 2035    ferrous sulfate tablet 1 each  1 tablet Oral Daily Preston oByd MD        HYDROmorphone injection 1 mg  1 mg Intravenous Q4H PRN Preston Boyd MD        levothyroxine tablet 125 mcg  125 mcg Oral Before breakfast Preston Boyd MD   125 mcg at 02/07/25 0530    melatonin tablet 3 mg  3 mg Oral Nightly PRN Gladys Jon MD   3 mg  at 02/06/25 2057    memantine tablet 10 mg  10 mg Oral BID Preston Boyd MD   10 mg at 02/06/25 2034    metoprolol succinate (TOPROL-XL) 24 hr tablet 25 mg  25 mg Oral Daily Preston Boyd MD   25 mg at 02/06/25 1407    ondansetron injection 4 mg  4 mg Intravenous Q12H PRN Preston Boyd MD        oxyCODONE immediate release tablet 5 mg  5 mg Oral Q4H PRN Preston Boyd MD   5 mg at 02/06/25 1821    pravastatin tablet 20 mg  20 mg Oral QHS Preston Boyd MD   20 mg at 02/06/25 2034    tamsulosin 24 hr capsule 0.4 mg  0.4 mg Oral Daily Preston Boyd MD        triamcinolone acetonide 0.1% cream   Topical (Top) BID Preston Boyd MD   Given at 02/06/25 2035        Medication List        START taking these medications      HYDROcodone-acetaminophen 5-325 mg per tablet  Commonly known as: NORCO  Take 1 tablet by mouth every 6 (six) hours as needed for Pain.            CONTINUE taking these medications      acetaminophen 500 MG tablet  Commonly known as: TYLENOL  Take 2 tablets (1,000 mg total) by mouth every 6 (six) hours as needed.     AJOVY AUTOINJECTOR 225 mg/1.5 mL autoinjector  Generic drug: fremanezumab-vfrm  Inject 225 mg into the skin every 30 days.     amitriptyline 10 MG tablet  Commonly known as: ELAVIL  Take 10 mg by mouth every evening.     amLODIPine 2.5 MG tablet  Commonly known as: NORVASC  Take 2.5 mg by mouth nightly.     ascorbic acid (vitamin C) 500 MG tablet  Commonly known as: VITAMIN C  Take 500 mg by mouth once daily.     balsalazide 750 mg capsule  Commonly known as: COLAZAL  Take 5 tablets in the morning and 4 tablets in the evening.     ELIQUIS 2.5 mg Tab  Generic drug: apixaban  Take 2.5 mg by mouth 2 (two) times daily.     ferrous sulfate 325 mg (65 mg iron) Tab tablet  Commonly known as: FEOSOL  1 tablet Orally Once a day     fluocinolone acetonide oiL 0.01 % Drop  Place into both ears.     gabapentin 100 MG capsule  Commonly known as: NEURONTIN  Take 100 mg by mouth 3 (three) times  daily.     GAVISCON ORAL  Take by mouth as needed. Acid reflux     ICAPS AREDS2 ORAL  Take 1 capsule by mouth once daily.     levocetirizine 5 MG tablet  Commonly known as: XYZAL  Take 5 mg by mouth every morning.     levothyroxine 125 MCG tablet  Commonly known as: SYNTHROID  TAKE 1 TABLET BY MOUTH EVERY DAY IN THE MORNING     memantine 10 MG Tab  Commonly known as: NAMENDA  Take 10 mg by mouth 2 (two) times daily.     metoprolol succinate 25 MG 24 hr tablet  Commonly known as: TOPROL-XL  TAKE 1 TABLET BY MOUTH EVERY DAY     pantoprazole 40 MG tablet  Commonly known as: PROTONIX  TAKE 1 TABLET BY MOUTH DAILY AS  NEEDED     pravastatin 20 MG tablet  Commonly known as: PRAVACHOL  Take 1 tablet (20 mg total) by mouth every evening.     STOOL SOFTENER ORAL  Take by mouth.     tamsulosin 0.4 mg Cap  Commonly known as: FLOMAX  Take 1 capsule (0.4 mg total) by mouth once daily.     triamcinolone acetonide 0.1% 0.1 % cream  Commonly known as: KENALOG  Apply topically 2 (two) times daily. APPLY  CREAM TOPICALLY TO AFFECTED AREA TWICE DAILY as needed                I have seen and examined this patient within the last 30 days. My clinical findings that support the need for the home health skilled services and home bound status are the following:no   Requiring assistive device to leave home due to unsteady gait caused by  Surgery.     Diet:   regular diet    Labs:  N/a    Referrals/ Consults  Physical Therapy to evaluate and treat. Evaluate for home safety and equipment needs; Establish/upgrade home exercise program. Perform / instruct on therapeutic exercises, gait training, transfer training, and Range of Motion.  Occupational Therapy to evaluate and treat. Evaluate home environment for safety and equipment needs. Perform/Instruct on transfers, ADL training, ROM, and therapeutic exercises.    Activities:   activity as tolerated    Nursing:   Agency to admit patient within 24 hours of hospital discharge unless specified on  physician order or at patient request    SN to complete comprehensive assessment including routine vital signs. Instruct on disease process and s/s of complications to report to MD. Review/verify medication list sent home with the patient at time of discharge  and instruct patient/caregiver as needed. Frequency may be adjusted depending on start of care date.     Skilled nurse to perform up to 3 visits PRN for symptoms related to diagnosis    Notify MD if SBP > 160 or < 90; DBP > 90 or < 50; HR > 120 or < 50; Temp > 101; O2 < 88%; Other:       Ok to schedule additional visits based on staff availability and patient request on consecutive days within the home health episode.    When multiple disciplines ordered:    Start of Care occurs on Sunday - Wednesday schedule remaining discipline evaluations as ordered on separate consecutive days following the start of care.    Thursday SOC -schedule subsequent evaluations Friday and Monday the following week.     Friday - Saturday SOC - schedule subsequent discipline evaluations on consecutive days starting Monday of the following week.    For all post-discharge communication and subsequent orders please contact patient's primary care physician. If unable to reach primary care physician or do not receive response within 30 minutes, please contact [phone] for clinical staff order clarification    Miscellaneous   N/a    Home Health Aide:  Physical Therapy Three times weekly and Occupational Therapy Three times weekly    Wound Care Orders  no    I certify that this patient is confined to his home and needs physical therapy and occupational therapy.

## 2025-02-07 NOTE — HOSPITAL COURSE
The patient presented to [facility] on 2/7/25 for the VPS placement for NPH. The patient tolerated the procedure well and there were no intra-operative complications. After surgery, the patient was extubated and taken to recovery in stable condition.  After observation in recovery, the patient was transferred to the neurosurgical floor.  Post-op shunt series XR and CTH demonstrated shunt in position without disconnection and that Hakim was set at intended setting of 90 . On 2/7/2025, he was discharged home with pain medication and follow up appointments. The patient was tolerating a regular diet and voiding without difficulty. Activity as tolerated. At the time of discharge, the patient was neurologically stable, was afebrile, and vital signs were stable. Discharge instructions were given verbally/written to the patient and their family, including wound care and follow-up appointments, and all of their questions were answered. The patient was also given a discharge instruction sheet explaining the aforementioned discussion.  The patient verbalized understanding of instructions and agreed to the plan. They were encouraged to call the clinic with any questions they might have prior to the follow up appointments.     Physical exam 2/7/25:  General: well developed, well nourished, no distress.   Head: normocephalic, atraumatic  Neurologic: Alert and oriented. Thought content appropriate.  GCS: Motor: 6/Verbal: 5/Eyes: 4 GCS Total: 15  Mental Status: Awake, Alert, Oriented x 4  Language: No aphasia  Speech: No dysarthria  Cranial nerves: face symmetric, tongue midline, CN II-XII grossly intact.   Eyes: pupils equal, round, reactive to light with accommodation, EOMI.   Pulmonary: normal respirations, no signs of respiratory distress  Abdomen: soft, non-distended, not tender to palpation, trochar incisions with dermabond intact  Skin: Skin is warm, dry and intact.  Sensory: intact to light touch throughout  Motor Strength:Moves  all extremities spontaneously with good tone.  Full strength upper and lower extremities. No abnormal movements seen.   Pronator drift: absent bilaterally  Cranial incision: Clean, dry, monocryl intact. Skin edges well approximated. No surrounding erythema or edema. No drainage or TTP.

## 2025-02-07 NOTE — HPI
Ramesh Ricci is a 85 y.o. male A-Fib, bilateral hearing loss, HLD, CKD, essential HTN and NPH who presents today for ventriculoperitoneal shunt.  The patient states his most bothersome symptom was his chronic headaches for which he is treated with Depakote. In addition to his headaches he notes problems with his ambulation for which he uses a cane. He endorses falls in which he loses his balance. He is accompanied with his wife who notes that his legs give out while walking. The wife also endorses worsening short term memory loss in the patient. The patient states he frequently forgets the year, daily tasks, as well as significant people in his life. The wife notes that the patient's memory loss became noticeable starting approximately 1 year ago. In addition to short term memory loss, the patient's wife also endorses loss of long term memory loss but he is eventually able to recall events with some prompting. The patient also reports issues with urinary urgency where he is unable to control it. An MRI Brain W WO contrast obtained on 10/31/24 demonstrated a stable prominence of the lateral and 3rd ventricles which may be compensatory to volume loss, normal pressure hydrocephalus to be considered in the appropriate clinical setting. He is here to discuss possible neurosurgical intervention to treat his symptoms.  I recommended performing a tap test (lumbar puncture).  The opening pressure for this patient was 2 cm of water.  We drained approximately 24 mL of CSF, at which time her closing pressure was -5 cm of water.  Operatively, the patient had we his headache but better in his improved greatly.  Given these positive findings they wished to proceed with surgery.

## 2025-02-07 NOTE — PLAN OF CARE
[facility] - Neurosurgery (Hospital)  Discharge Assessment    Primary Care Provider: Nakul Mandujano MD     Discharge Assessment (most recent)       BRIEF DISCHARGE ASSESSMENT - 02/07/25 1129          Discharge Planning    Assessment Type Discharge Planning Brief Assessment     Resource/Environmental Concerns none     Support Systems Spouse/significant other     Equipment Currently Used at Home cane, straight     Current Living Arrangements home     Patient/Family Anticipates Transition to home with family     Patient/Family Anticipated Services at Transition none     DME Needed Upon Discharge  none     Discharge Plan A Home Health;Home with family     Discharge Plan B Home with family

## 2025-02-09 ENCOUNTER — HOSPITAL ENCOUNTER (EMERGENCY)
Facility: HOSPITAL | Age: 85
Discharge: HOME | End: 2025-02-09
Attending: STUDENT IN AN ORGANIZED HEALTH CARE EDUCATION/TRAINING PROGRAM | Admitting: STUDENT IN AN ORGANIZED HEALTH CARE EDUCATION/TRAINING PROGRAM
Payer: MEDICARE

## 2025-02-09 VITALS
SYSTOLIC BLOOD PRESSURE: 111 MMHG | DIASTOLIC BLOOD PRESSURE: 82 MMHG | HEART RATE: 65 BPM | OXYGEN SATURATION: 100 % | HEIGHT: 71 IN | TEMPERATURE: 98 F | BODY MASS INDEX: 20.02 KG/M2 | RESPIRATION RATE: 19 BRPM | WEIGHT: 143 LBS

## 2025-02-09 DIAGNOSIS — R51.9 INTRACTABLE HEADACHE, UNSPECIFIED CHRONICITY PATTERN, UNSPECIFIED HEADACHE TYPE: ICD-10-CM

## 2025-02-09 DIAGNOSIS — Z00.8 MEDICAL CLEARANCE FOR PSYCHIATRIC ADMISSION: ICD-10-CM

## 2025-02-09 DIAGNOSIS — Z98.2 S/P VP SHUNT: Primary | ICD-10-CM

## 2025-02-09 LAB
ALBUMIN SERPL BCP-MCNC: 3.7 G/DL (ref 3.5–5.2)
ALP SERPL-CCNC: 42 U/L (ref 40–150)
ALT SERPL W/O P-5'-P-CCNC: <5 U/L (ref 10–44)
AMPHET+METHAMPHET UR QL: NEGATIVE
ANION GAP SERPL CALC-SCNC: 10 MMOL/L (ref 8–16)
APAP SERPL-MCNC: <3 UG/ML (ref 10–20)
APTT PPP: 27.9 SEC (ref 21–32)
AST SERPL-CCNC: 14 U/L (ref 10–40)
BARBITURATES UR QL SCN>200 NG/ML: NEGATIVE
BASOPHILS # BLD AUTO: 0.04 K/UL (ref 0–0.2)
BASOPHILS NFR BLD: 0.5 % (ref 0–1.9)
BENZODIAZ UR QL SCN>200 NG/ML: NEGATIVE
BILIRUB SERPL-MCNC: 0.4 MG/DL (ref 0.1–1)
BILIRUB UR QL STRIP: NEGATIVE
BUN SERPL-MCNC: 32 MG/DL (ref 8–23)
BZE UR QL SCN: NEGATIVE
CALCIUM SERPL-MCNC: 9.2 MG/DL (ref 8.7–10.5)
CANNABINOIDS UR QL SCN: NEGATIVE
CHLORIDE SERPL-SCNC: 104 MMOL/L (ref 95–110)
CLARITY UR: CLEAR
CO2 SERPL-SCNC: 27 MMOL/L (ref 23–29)
COLOR UR: YELLOW
CREAT SERPL-MCNC: 1.6 MG/DL (ref 0.5–1.4)
CREAT UR-MCNC: 89.5 MG/DL (ref 23–375)
DIFFERENTIAL METHOD BLD: ABNORMAL
EOSINOPHIL # BLD AUTO: 0.1 K/UL (ref 0–0.5)
EOSINOPHIL NFR BLD: 1 % (ref 0–8)
ERYTHROCYTE [DISTWIDTH] IN BLOOD BY AUTOMATED COUNT: 13.8 % (ref 11.5–14.5)
EST. GFR  (NO RACE VARIABLE): 41 ML/MIN/1.73 M^2
ETHANOL SERPL-MCNC: <10 MG/DL
GLUCOSE SERPL-MCNC: 105 MG/DL (ref 70–110)
GLUCOSE UR QL STRIP: NEGATIVE
HCT VFR BLD AUTO: 42.6 % (ref 40–54)
HCV AB SERPL QL IA: NORMAL
HGB BLD-MCNC: 13.5 G/DL (ref 14–18)
HGB UR QL STRIP: NEGATIVE
HIV 1+2 AB+HIV1 P24 AG SERPL QL IA: NORMAL
IMM GRANULOCYTES # BLD AUTO: 0.02 K/UL (ref 0–0.04)
IMM GRANULOCYTES NFR BLD AUTO: 0.3 % (ref 0–0.5)
INR PPP: 1.1 (ref 0.8–1.2)
KETONES UR QL STRIP: NEGATIVE
LEUKOCYTE ESTERASE UR QL STRIP: NEGATIVE
LIPASE SERPL-CCNC: 15 U/L (ref 4–60)
LYMPHOCYTES # BLD AUTO: 1.4 K/UL (ref 1–4.8)
LYMPHOCYTES NFR BLD: 18.1 % (ref 18–48)
MCH RBC QN AUTO: 30.8 PG (ref 27–31)
MCHC RBC AUTO-ENTMCNC: 31.7 G/DL (ref 32–36)
MCV RBC AUTO: 97 FL (ref 82–98)
METHADONE UR QL SCN>300 NG/ML: NEGATIVE
MONOCYTES # BLD AUTO: 0.9 K/UL (ref 0.3–1)
MONOCYTES NFR BLD: 10.9 % (ref 4–15)
NEUTROPHILS # BLD AUTO: 5.4 K/UL (ref 1.8–7.7)
NEUTROPHILS NFR BLD: 69.2 % (ref 38–73)
NITRITE UR QL STRIP: NEGATIVE
NRBC BLD-RTO: 0 /100 WBC
OPIATES UR QL SCN: ABNORMAL
PCP UR QL SCN>25 NG/ML: NEGATIVE
PH UR STRIP: 7 [PH] (ref 5–8)
PLATELET # BLD AUTO: 175 K/UL (ref 150–450)
PMV BLD AUTO: 10.9 FL (ref 9.2–12.9)
POTASSIUM SERPL-SCNC: 4 MMOL/L (ref 3.5–5.1)
PROT SERPL-MCNC: 7.1 G/DL (ref 6–8.4)
PROT UR QL STRIP: NEGATIVE
PROTHROMBIN TIME: 11.5 SEC (ref 9–12.5)
RBC # BLD AUTO: 4.38 M/UL (ref 4.6–6.2)
SODIUM SERPL-SCNC: 141 MMOL/L (ref 136–145)
SP GR UR STRIP: 1.02 (ref 1–1.03)
TOXICOLOGY INFORMATION: ABNORMAL
TSH SERPL DL<=0.005 MIU/L-ACNC: 0.63 UIU/ML (ref 0.4–4)
URN SPEC COLLECT METH UR: NORMAL
UROBILINOGEN UR STRIP-ACNC: NEGATIVE EU/DL
WBC # BLD AUTO: 7.77 K/UL (ref 3.9–12.7)

## 2025-02-09 PROCEDURE — 93010 ELECTROCARDIOGRAM REPORT: CPT | Mod: ,,, | Performed by: INTERNAL MEDICINE

## 2025-02-09 PROCEDURE — 80307 DRUG TEST PRSMV CHEM ANLYZR: CPT | Performed by: STUDENT IN AN ORGANIZED HEALTH CARE EDUCATION/TRAINING PROGRAM

## 2025-02-09 PROCEDURE — 81003 URINALYSIS AUTO W/O SCOPE: CPT | Performed by: STUDENT IN AN ORGANIZED HEALTH CARE EDUCATION/TRAINING PROGRAM

## 2025-02-09 PROCEDURE — G0425 INPT/ED TELECONSULT30: HCPCS | Mod: GT,,, | Performed by: PSYCHIATRY & NEUROLOGY

## 2025-02-09 PROCEDURE — 96374 THER/PROPH/DIAG INJ IV PUSH: CPT

## 2025-02-09 PROCEDURE — 80143 DRUG ASSAY ACETAMINOPHEN: CPT | Performed by: STUDENT IN AN ORGANIZED HEALTH CARE EDUCATION/TRAINING PROGRAM

## 2025-02-09 PROCEDURE — 85610 PROTHROMBIN TIME: CPT

## 2025-02-09 PROCEDURE — 63600175 PHARM REV CODE 636 W HCPCS: Performed by: STUDENT IN AN ORGANIZED HEALTH CARE EDUCATION/TRAINING PROGRAM

## 2025-02-09 PROCEDURE — 25000003 PHARM REV CODE 250: Performed by: STUDENT IN AN ORGANIZED HEALTH CARE EDUCATION/TRAINING PROGRAM

## 2025-02-09 PROCEDURE — 96361 HYDRATE IV INFUSION ADD-ON: CPT

## 2025-02-09 PROCEDURE — 85025 COMPLETE CBC W/AUTO DIFF WBC: CPT | Performed by: STUDENT IN AN ORGANIZED HEALTH CARE EDUCATION/TRAINING PROGRAM

## 2025-02-09 PROCEDURE — 83690 ASSAY OF LIPASE: CPT | Performed by: STUDENT IN AN ORGANIZED HEALTH CARE EDUCATION/TRAINING PROGRAM

## 2025-02-09 PROCEDURE — 99285 EMERGENCY DEPT VISIT HI MDM: CPT | Mod: 25

## 2025-02-09 PROCEDURE — 96375 TX/PRO/DX INJ NEW DRUG ADDON: CPT

## 2025-02-09 PROCEDURE — 93005 ELECTROCARDIOGRAM TRACING: CPT

## 2025-02-09 PROCEDURE — 82077 ASSAY SPEC XCP UR&BREATH IA: CPT | Performed by: STUDENT IN AN ORGANIZED HEALTH CARE EDUCATION/TRAINING PROGRAM

## 2025-02-09 PROCEDURE — 87389 HIV-1 AG W/HIV-1&-2 AB AG IA: CPT | Performed by: PHYSICIAN ASSISTANT

## 2025-02-09 PROCEDURE — 25500020 PHARM REV CODE 255: Performed by: STUDENT IN AN ORGANIZED HEALTH CARE EDUCATION/TRAINING PROGRAM

## 2025-02-09 PROCEDURE — 85730 THROMBOPLASTIN TIME PARTIAL: CPT

## 2025-02-09 PROCEDURE — 86803 HEPATITIS C AB TEST: CPT | Performed by: PHYSICIAN ASSISTANT

## 2025-02-09 PROCEDURE — 84443 ASSAY THYROID STIM HORMONE: CPT | Performed by: STUDENT IN AN ORGANIZED HEALTH CARE EDUCATION/TRAINING PROGRAM

## 2025-02-09 PROCEDURE — 80053 COMPREHEN METABOLIC PANEL: CPT | Performed by: STUDENT IN AN ORGANIZED HEALTH CARE EDUCATION/TRAINING PROGRAM

## 2025-02-09 RX ORDER — ACETAMINOPHEN 500 MG
1000 TABLET ORAL
Status: COMPLETED | OUTPATIENT
Start: 2025-02-09 | End: 2025-02-09

## 2025-02-09 RX ORDER — METOCLOPRAMIDE HYDROCHLORIDE 5 MG/ML
10 INJECTION INTRAMUSCULAR; INTRAVENOUS
Status: COMPLETED | OUTPATIENT
Start: 2025-02-09 | End: 2025-02-09

## 2025-02-09 RX ORDER — SODIUM CHLORIDE 9 MG/ML
500 INJECTION, SOLUTION INTRAVENOUS
Status: COMPLETED | OUTPATIENT
Start: 2025-02-09 | End: 2025-02-09

## 2025-02-09 RX ORDER — DIPHENHYDRAMINE HYDROCHLORIDE 50 MG/ML
12.5 INJECTION INTRAMUSCULAR; INTRAVENOUS
Status: COMPLETED | OUTPATIENT
Start: 2025-02-09 | End: 2025-02-09

## 2025-02-09 RX ADMIN — IOHEXOL 75 ML: 350 INJECTION, SOLUTION INTRAVENOUS at 11:02

## 2025-02-09 RX ADMIN — ACETAMINOPHEN 1000 MG: 500 TABLET ORAL at 12:02

## 2025-02-09 RX ADMIN — METOCLOPRAMIDE 10 MG: 5 INJECTION, SOLUTION INTRAMUSCULAR; INTRAVENOUS at 12:02

## 2025-02-09 RX ADMIN — SODIUM CHLORIDE 500 ML: 9 INJECTION, SOLUTION INTRAVENOUS at 10:02

## 2025-02-09 RX ADMIN — DIPHENHYDRAMINE HYDROCHLORIDE 12.5 MG: 50 INJECTION INTRAMUSCULAR; INTRAVENOUS at 12:02

## 2025-02-09 NOTE — ED PROVIDER NOTES
"Encounter Date: 2/9/2025    SCRIBE #1 NOTE: I, Laura Nunez, am scribing for, and in the presence of,  Diandra Meza MD. I have scribed the following portions of the note - Other sections scribed: HPI, ROS, PE.       History     Chief Complaint   Patient presents with    Headache     Pt to ED with wife reporting that he had a shunt placed to R side of his on Thursday at [facility] for NPH. Pt woke up this morning and stated that he is ready to die. After asking pt what made him make that statement this morning and if it was the pain in his head and he states "I guess just everything overall." Reports pain of 8/10 to his head where the shunt was placed. When asked if he wants to hurt himself, he states "No.".      CC: headache    HPI:    84 yo M old male presenting POD3 s/p  shunt w/MD Ware 2/2 NPH, w/ HFpEF (w/ EF 70% per last TTE 01/23/2023) a-fib, HTN, hypothyroidism, prostate cancer, w/a headache that worsened over the past night. Patient's spouse reports last night patient complained of 7/10 pain that goes down into his right abdomen at incision sites and a headache to his right temporal scalp pain where the tube was placed. Denies vision changes. Wife notes improvement in gait since  shunt placed, uses a walker. Denies recent falls. Denies increased pain with positional changes.  Reports prior to tube placement, had severe headaches to his forehead, now is more focal to R scalp, with slight pain to R abdomen, gait has improved significantly since  shunt placed.. Reports always cold. Denies fever or chills, denies neck pain.Denies nausea, vomiting, chest pain, dyspnea, or other associated symptoms. Notes post-op appointment 02/18.       Wife reports this AM, he said he "wanted to die," said he "could go out in the street," said he "wanted to jump off of a bridge." Patient reports he "doesn't care about living" when asked about it, reports he believes he is having these thoughts due to pain continuing after "  shunt placement, denies feeling this way since prior to  shunt being placed. Wife reports he has never said those sorts of things in the 61 years they have been together. Patient reports he had difficulty sleeping at night, prior to, but worse since  shunt placement. Reports his dietary intake hasn't been good, has been relying on Miralax, hasn't made a bowel movement since prior to surgery. Denies past suicidal ideation or psychiatric treatment.        The history is provided by the patient and a relative. No  was used.     Review of patient's allergies indicates:   Allergen Reactions    Benazepril Other (See Comments)     Cough     Past Medical History:   Diagnosis Date    Allergies 01/31/2025    Anemia     Anticoagulant long-term use     Anxiety     Atrophic kidney 11/16/2012    BPH (benign prostatic hyperplasia) 11/16/2012    Congenital absence of right kidney 07/05/2017    DDD (degenerative disc disease), cervical 07/05/2017    x-ray 7/17 - Severe    Ex-smoker 12/20/2018    Exudative age-related macular degeneration, right eye, with active choroidal neovascularization 02/15/2024    GERD (gastroesophageal reflux disease) 11/16/2012    Glaucoma 11/16/2012    HTN (hypertension) 11/16/2012    Hypothyroid 11/16/2012    Internal carotid artery stenosis 11/16/2012    Iron deficiency anemia due to chronic blood loss 10/29/2021    Left ventricular diastolic dysfunction, NYHA class 1 04/16/2015    4/15    Low serum testosterone level 11/16/2012    Normal cardiac stress test 11/16/2012    NPH (normal pressure hydrocephalus) 02/03/2025    Osteopenia 11/16/2012    PAF (paroxysmal atrial fibrillation) 12/20/2018    Prostate CA 01/15/2013    XRT 12/12  Dr. 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    Shingles 11/16/2012    Skin disease 2020    Seems to have started after Covid vaccine    Stage 3a chronic kidney disease 10/06/2014    Stroke 2017    tia   no residual     Thrombocytopenia 01/31/2025    TIA (transient ischemic attack) 11/16/2012    UC (ulcerative colitis) 11/16/2012     Past Surgical History:   Procedure Laterality Date    ADENOIDECTOMY      COLONOSCOPY N/A 03/25/2022    Procedure: COLONOSCOPY;  Surgeon: Ashley Nguyen MD;  Location: [facility];  Service: Endoscopy;  Laterality: N/A;    ESOPHAGOGASTRODUODENOSCOPY N/A 03/25/2022    Procedure: EGD (ESOPHAGOGASTRODUODENOSCOPY);  Surgeon: Ashley Nguyen MD;  Location: [facility];  Service: Endoscopy;  Laterality: N/A;  added on per Dr. Nguyen    ESOPHAGOGASTRODUODENOSCOPY N/A 6/7/2024    Procedure: EGD (ESOPHAGOGASTRODUODENOSCOPY);  Surgeon: Audie Snell MD;  Location: [facility] (4TH FLR);  Service: Endoscopy;  Laterality: N/A;  5/21 R/s,sent updated instr via portal.pt informed to hold eliquis for 2 days.AC  Ref by: ,  approved to hold Eliquis (apixaban) for 2 days per Dr. Conklin-see media file  5/9/24-GT  5/31-pre call complete-tb    EYE SURGERY Right 11/2020    cataract    HERNIA REPAIR      LEFT HEART CATHETERIZATION Right 10/29/2021    Procedure: CATHETERIZATION, HEART, LEFT AND RIGHT  - LV DARNELL POSSIBLE;  Surgeon: Beau Conklin MD;  Location: [facility] CATH LAB;  Service: Cardiology;  Laterality: Right;    LUMBAR PUNCTURE N/A 1/13/2025    Procedure: Lumbar Puncture;  Surgeon: Jairo Blankenship MD;  Location: [facility] OR 2ND FLR;  Service: Neurosurgery;  Laterality: N/A;    RIGHT HEART CATHETERIZATION Right 10/29/2021    Procedure: INSERTION, CATHETER, RIGHT HEART;  Surgeon: Beau Conklin MD;  Location: [facility] CATH LAB;  Service: Cardiology;  Laterality: Right;    SKIN BIOPSY  2020    TONSILLECTOMY      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    Procedure: INSERTION, SHUNT, VENTRICULOPERITONEAL;  Surgeon: Jairo Blankenship MD;  Location: [facility] OR 2ND FLR;  Service: Neurosurgery;  Laterality: Right;    VENTRICULOPERITONEAL SHUNT  2/6/2025    Procedure: INSERTION, SHUNT, VENTRICULOPERITONEAL;   Surgeon: Shar Elizabeth MD;  Location: [facility] OR [address] [place] [address];  Service: General;;     Family History   Problem Relation Name Age of Onset    Hypertension Mother      Alzheimer's disease Mother      Heart attack Father      Diabetes Brother      Hypertension Brother      Cancer Brother      Heart disease Brother  56        MI    Colon cancer Neg Hx      Esophageal cancer Neg Hx       Social History     Tobacco Use    Smoking status: Former     Current packs/day: 0.00     Average packs/day: 1 pack/day for 25.0 years (25.0 ttl pk-yrs)     Types: Cigarettes     Start date: 1947     Quit date: 1972     Years since quittin.1    Smokeless tobacco: Never    Tobacco comments:     Patient Quit Smoking on 1972.   Substance Use Topics    Alcohol use: Not Currently     Alcohol/week: 3.0 standard drinks of alcohol     Types: 2 Glasses of wine, 1 Cans of beer per week     Comment: Occasional    Drug use: Never     Review of Systems   Constitutional:  Negative for chills and fever.   HENT:  Negative for sore throat.    Eyes:  Negative for photophobia and visual disturbance.   Respiratory:  Negative for shortness of breath.    Gastrointestinal:  Positive for abdominal pain (right, at site of surgical incisions) and constipation. Negative for diarrhea, nausea and vomiting.   Endocrine: Positive for cold intolerance (chronic).   Genitourinary:  Negative for dysuria and hematuria.   Musculoskeletal:  Negative for back pain, gait problem and neck pain.   Skin:  Negative for rash.   Neurological:  Positive for headaches (surgery site, right temporal scalp). Negative for syncope.   Psychiatric/Behavioral:  Positive for sleep disturbance (associated with headache) and suicidal ideas.        Physical Exam     Initial Vitals [25 0842]   BP Pulse Resp Temp SpO2   (!) 147/68 60 20 97.6 °F (36.4 °C) 100 %      MAP       --         Physical Exam    Nursing note and vitals reviewed.  Constitutional: He appears  well-developed and well-nourished.  Non-toxic appearance. No distress.   HENT:   Head: Normocephalic and atraumatic.   Eyes: Conjunctivae and EOM are normal.   Neck: Neck supple.   No meningismus   Cardiovascular:  Normal rate, regular rhythm, normal heart sounds and intact distal pulses.           No murmur heard.  Pulmonary/Chest: Effort normal and breath sounds normal. No respiratory distress. He has no wheezes. He has no rhonchi.   Abdominal: Abdomen is soft. He exhibits no distension. There is abdominal tenderness.   Slight tenderness in RUQ, no rebound, no diffuse abdominal tenderness There is no guarding.   Musculoskeletal:         General: No tenderness or edema.      Cervical back: Neck supple.     Neurological: He is alert and oriented to person, place, and time.   No nystagmus, shoulder shrug intact, face symmetric, tongue midline, finger-nose-finger nl, sensation intact and equal in face/arms/legs, upper extremity and lower extremity flexor and extensor strength equal and intact.   Skin: Skin is warm and dry.   Surgical incisions to right temporal scalp with sutures intact, no drainage, mild erythema noted.  Laparoscopic surgical incisions in right abdomen with derma bond, no drainage, healing well         ED Course   Procedures  Labs Reviewed   CBC W/ AUTO DIFFERENTIAL - Abnormal       Result Value    WBC 7.77      RBC 4.38 (*)     Hemoglobin 13.5 (*)     Hematocrit 42.6      MCV 97      MCH 30.8      MCHC 31.7 (*)     RDW 13.8      Platelets 175      MPV 10.9      Immature Granulocytes 0.3      Gran # (ANC) 5.4      Immature Grans (Abs) 0.02      Lymph # 1.4      Mono # 0.9      Eos # 0.1      Baso # 0.04      nRBC 0      Gran % 69.2      Lymph % 18.1      Mono % 10.9      Eosinophil % 1.0      Basophil % 0.5      Differential Method Automated     COMPREHENSIVE METABOLIC PANEL - Abnormal    Sodium 141      Potassium 4.0      Chloride 104      CO2 27      Glucose 105      BUN 32 (*)     Creatinine 1.6  (*)     Calcium 9.2      Total Protein 7.1      Albumin 3.7      Total Bilirubin 0.4      Alkaline Phosphatase 42      AST 14      ALT <5 (*)     eGFR 41 (*)     Anion Gap 10     DRUG SCREEN PANEL, URINE EMERGENCY - Abnormal    Benzodiazepines Negative      Methadone metabolites Negative      Cocaine (Metab.) Negative      Opiate Scrn, Ur Presumptive Positive (*)     Barbiturate Screen, Ur Negative      Amphetamine Screen, Ur Negative      THC Negative      Phencyclidine Negative      Creatinine, Urine 89.5      Toxicology Information SEE COMMENT      Narrative:     Specimen Source->Urine   ACETAMINOPHEN LEVEL - Abnormal    Acetaminophen (Tylenol), Serum <3.0 (*)    URINALYSIS, REFLEX TO URINE CULTURE    Specimen UA Urine, Clean Catch      Color, UA Yellow      Appearance, UA Clear      pH, UA 7.0      Specific Gravity, UA 1.025      Protein, UA Negative      Glucose, UA Negative      Ketones, UA Negative      Bilirubin (UA) Negative      Occult Blood UA Negative      Nitrite, UA Negative      Urobilinogen, UA Negative      Leukocytes, UA Negative      Narrative:     Specimen Source->Urine   ALCOHOL,MEDICAL (ETHANOL)    Alcohol, Serum <10     TSH    TSH 0.633     LIPASE   LIPASE    Lipase 15     HEPATITIS C ANTIBODY    Hepatitis C Ab Non-reactive      Narrative:     Release to patient->Immediate   HIV 1 / 2 ANTIBODY    HIV 1/2 Ag/Ab Non-reactive      Narrative:     Release to patient->Immediate   PROTIME-INR    Prothrombin Time 11.5      INR 1.1     APTT    aPTT 27.9            Imaging Results              CT Abdomen Pelvis With IV Contrast NO Oral Contrast (Final result)  Result time 02/09/25 12:15:20      Final result by Sebastien Tian MD (02/09/25 12:15:20)                   Impression:      Postsurgical change recent  shunt tubing placement.  There is only a small volume air in fluid in the peritoneal cavity, an expected postprocedural finding.  No evidence abscess or pseudocyst.    Other unchanged  incidental findings as above.      Electronically signed by: Sebastien Tian MD  Date:    02/09/2025  Time:    12:15               Narrative:    EXAMINATION:  CT ABDOMEN PELVIS WITH IV CONTRAST    CLINICAL HISTORY:  Abdominal pain, post-op;    TECHNIQUE:  Low dose axial CT images obtained throughout the region of the abdomen and pelvis following the administration 75 mL Omnipaque 350 intravenous contrast.  Axial, sagittal and coronal reconstructions were performed.    COMPARISON:  03/23/2022    FINDINGS:  Mild atelectatic changes in lung bases.  Mitral annulus calcification postsurgical change of the aortic valve.  Heart is not enlarged.    The liver, gallbladder and bile ducts are unremarkable.    Spleen, pancreas, and adrenal glands appear unchanged.    Right kidney is absent.  Some renal cortical thinning several cysts on the left kidney.  Left ureter decompressed.  Urinary bladder unremarkable.    Limited assessment of the gastrointestinal tract without the use of oral contrast. The stomach, small bowel and colon demonstrate no evidence of obstruction or focal inflammation.    Postsurgical change compatible with recent peritoneal shunt tubing placement.  Some scattered air in the anterior abdominal wall.  Trace residual intraperitoneal air.  Small volume fluid in the pelvis.  No discrete rim enhancing fluid collections to suggest abscess or pseudocyst.    Aorta tapers normally throughout its visualized course.  Extensive scattered vascular calcification.    Moderate degenerative change in lumbar spine.  No fracture or focal osseous destructive lesion.                                        CT Head Without Contrast (Final result)  Result time 02/09/25 11:44:40      Final result by Sebastien Tian MD (02/09/25 11:44:40)                   Impression:      New small volume intraventricular hemorrhage with additional trace parenchymal hemorrhage along the course of the catheter.    Unchanged size and configuration  of the ventricles as above.    This report was flagged in Epic as abnormal.      Electronically signed by: Sebastien Tian MD  Date:    02/09/2025  Time:    11:44               Narrative:    EXAMINATION:  CT HEAD WITHOUT CONTRAST    CLINICAL HISTORY:  Mental status change, unknown cause;    TECHNIQUE:  Low dose axial CT images obtained throughout the head without the use of intravenous contrast.  Axial, sagittal and coronal reconstructions were performed.    COMPARISON:  02/06/2025    FINDINGS:  Intracranial compartment:    Right-sided ventricular shunt in stable position.  Tip of the catheter in stable positionin the midline about the frontal horns..  Ventricles are stable in size, with 3rd ventricle diameter again measuring on the order of 1.1 cm.  New small volume intraventricular hemorrhage.  This is centered about the right foramen of Monro and in the dependent aspect both lateral ventricles.    Trace parenchymal hemorrhage along the course of the ventricular catheter (i.e.  Series 602, image 22). Otherwise the brain parenchyma appears unchanged. Patchy chronic small vessel ischemic change in supratentorial white matter.  Small remote left occipital infarct.  No new major vascular distribution infarct.    No new extra-axial blood or fluid collections.  Trace residual pneumocephalus.    Skull/extracranial contents (limited evaluation):    No fracture. Mastoid air cells and paranasal sinuses are essentially clear.    Findings were relayed to the ordering provider ([facility]) via the epic secure chat system at approximately 11:40.                                       X-Ray Shunt Series (Final result)  Result time 02/09/25 10:50:31      Final result by Sebastien Tian MD (02/09/25 10:50:31)                   Impression:      As above      Electronically signed by: Sebastien Tian MD  Date:    02/09/2025  Time:    10:50               Narrative:    EXAMINATION:  XR SHUNT SERIES    CLINICAL HISTORY:   shunt  headache;    TECHNIQUE:  Radiographs of the skull, cervical spine, chest and abdomen were performed (shunt series).    COMPARISON:  02/06/2025    FINDINGS:  Radiopaque portion of the shunt tubing appears intact without focal discontinuity or kinking.    Probable small volume free air in the abdomen.  This is nonspecific in the recent postoperative setting, requires clinical correlation.  Visualized bowel gas pattern nonobstructive.    Degenerative change of the cervical and lumbar spine.    Atherosclerosis postsurgical change of aortic valve.  No acute cardiopulmonary process is evident.                                       Medications   0.9% NaCl infusion (0 mLs Intravenous Stopped 2/9/25 1137)   iohexoL (OMNIPAQUE 350) injection 75 mL (75 mLs Intravenous Given 2/9/25 1132)   acetaminophen tablet 1,000 mg (1,000 mg Oral Given 2/9/25 1227)   metoclopramide injection 10 mg (10 mg Intravenous Given 2/9/25 1228)   diphenhydrAMINE injection 12.5 mg (12.5 mg Intravenous Given 2/9/25 1228)     Medical Decision Making  86 yo M old male presenting POD3 s/p  shunt w/MD Ware 2/2 NPH, w/ HFpEF (w/ EF 70% per last TTE 01/23/2023) a-fib, HTN, hypothyroidism, prostate cancer, w/a headache that worsened over the past night, active SI this AM.    Differential diagnosis include but are not limited to:  shunt malfunction, behavioral change s/p  shunt placement, adjustment disorder, post-op pain    Active SI is largely due to his postop pain and continuing headache after  shunt was placed, was evaluated by Psychiatry, who agreed that patient is not requiring psychiatric hospitalization at this time due to his suicidal ideation.   shunt series with no significant abnormalities, CT head with small amount of intraparenchymal bleed abutting the  shunt, small intraventricular bleed, patient has not restarted his Eliquis yet, discussed with MD Blankenship, who reports patient may need a shunt revision, would like patient to be  [place] [facility].      [place] [facility] [place] by neurosx      Please put in 20 minutes of critical care due to patient having intraparenchymal hemorrhage. Separate from teaching and exclusive of procedure and ekg time  Includes:  Time at bedside  Time reviewing test results  Time discussing case with staff  Time documenting the medical record  Time spent with family members  Time spent with consults  Management      Amount and/or Complexity of Data Reviewed  Independent Historian: spouse  External Data Reviewed: notes.  Labs: ordered. Decision-making details documented in ED Course.  Radiology: ordered. Decision-making details documented in ED Course.  ECG/medicine tests: ordered and independent interpretation performed. Decision-making details documented in ED Course.    Risk  OTC drugs.  Prescription drug management.            Scribe Attestation:   Scribe #1: I performed the above scribed service and the documentation accurately describes the services I performed. I attest to the accuracy of the note.        ED Course as of 02/09/25 2114   Sun Feb 09, 2025   1024 Lipase: 15 [LF]   1024 BUN(!): 32 [LF]   1024 Creatinine(!): 1.6 [LF]   1052 EKG independently interpreted by me with sinus bradycardia rate of 56, no high-degree heart block, similar morphology to previous EKG performed 10/2024, no STEMI,  [LF]   1129 Free air in abd on XR shunt series, likely appropriate post op given  shunt placement, however given mild abd RUQ pain, added on CTAP to visualized for intraperitoneal injury, no elevation in LFTs, no leukocytosis, lipase normal [LF]   1144 Small amount of R intraparenchymal bleed (abutting  shunt) and intraventricular bleed on CTH, new from CTH imaging of  shunt on 2/6, will discuss with neurosurgery [LF]   1145 Pt continues to be off eliquis, set to restart on 2/11/25 [LF]   1207 Pending discussion with neurosurgery regarding new small IPH, intraventricular bleed, ADT32 placed  [LF]   1220 Blankenship would like patient to be transferred to [facility] for  shunt evaluation, possible revision, discussed transfer with patient and wife and bedside [LF]      ED Course User Index  [LF] Diandra Meza MD                             Clinical Impression:  Final diagnoses:  [Z00.8] Medical clearance for psychiatric admission  [Z98.2] S/P  shunt (Primary)  [R51.9] Intractable headache, unspecified chronicity pattern, unspecified headache type          ED Disposition Condition    Transfer to Another Facility Stable             I, Diandra Meza, personally performed the services described in this documentation. All medical record entries made by the scribe were at my direction and in my presence. I have reviewed the chart and agree that the record reflects my personal performance and is accurate and complete.         Diandra Meza MD  02/09/25 1702

## 2025-02-09 NOTE — ED TRIAGE NOTES
Pt reports constant pain from a stent placement to right side of head surgery that was preformed on Thursday 2/6/2025.  Pt states he has been unable to sleep and has had thoughts of wanting to walk into traffic or jump off of a bridge. Pt states he feels that way right now.  Pt is calm and cooperative.

## 2025-02-09 NOTE — CONSULTS
"[facility]  Psychiatry  Telepsychiatry Consult Note    Please see previous notes:    Patient agreeable to consultation via telepsychiatry.    Tele-Consultation from Psychiatry started: 2/9/2025 at 1039 am  The chief complaint leading to psychiatric consultation is: suicidal ideation  This consultation was requested by Diandra Meza MD , the Emergency Department attending physician.  The location of the consulting psychiatrist is  [facility] .  The patient location is  [facility] EMERGENCY DEPARTMENT   The patient arrived at the ED at: 841 am    Also present with the patient at the time of the consultation: wife    Patient Identification:   Ramesh Ricci is a 85 y.o. male.    Patient information was obtained from patient, spouse/SO, past medical records, and primary team.  Patient presented voluntarily to the Emergency Department ambulatory.    Inpatient consult to Telemedicine - Psychiatry  Consult performed by: Ilana Barillas MD  Consult ordered by: Diandra Meza MD  Reason for consult: suicidal ideation        Teleconsult Time Documentation      Pt to ED with wife reporting that he had a shunt placed to R side of his on Thursday at [facility] for NPH. Pt woke up this morning and stated that he is ready to die. After asking pt what made him make that statement this morning and if it was the pain in his head and he states "I guess just everything overall." Reports pain of 8/10 to his head where the shunt was placed. When asked if he wants to hurt himself, he states "No.".    Chief Complaint   Patient presents with    Headache       Pt to ED with wife reporting that he had a shunt placed to R side of his on Thursday at [facility] for NPH. Pt woke up this morning and stated that he is ready to die. After asking pt what made him make that statement this morning and if it was the pain in his head and he states "I guess just everything overall." Reports pain of 8/10 to his head where the shunt was placed. When " "asked if he wants to hurt himself, he states "No.".       CC: headache     HPI:     84 yo M old male presenting POD3 s/p  shunt w/[facility] 2/2 NPH, w/ HFpEF (w/ EF 70% per last TTE 01/23/2023) a-fib, HTN, hypothyroidism, prostate cancer, w/a headache that worsened over the past night. Patient's spouse reports last night patient complained of 7/10 pain that goes down into his right abdomen at incision sites and a headache to his right temporal scalp pain where the tube was placed. Denies vision changes. Wife notes improvement in gait since  shunt placed, uses a walker. Denies recent falls. Denies increased pain with positional changes.  Reports prior to tube placement, had severe headaches to his forehead, now is more focal to R scalp, with slight pain to R abdomen, gait has improved significantly since  shunt placed.. Reports always cold. Denies fever or chills, denies neck pain.Denies nausea, vomiting, chest pain, dyspnea, or other associated symptoms. Notes post-op appointment 02/18.      Wife reports this AM, he said he "wanted to die," said he "could go out in the street," said he "wanted to jump off of a bridge." Patient reports he "doesn't care about living" when asked about it, denies feeling this way since prior to  shunt being placed. Wife reports he has never said those sorts of things in the 61 years they have been together. Patient reports he had difficulty sleeping at night, prior to, but worse since  shunt placement. Reports his dietary intake hasn't been good, has been relying on Miralax, hasn't made a bowel movement since prior to surgery. Denies past suicidal ideation or psychiatric treatment.     Subjective:     History of Present Illness:  The patient is a 85-year-old man who had  shunt placed earlier this week.  He has been home doing pain management with Norco 5/325 mg q.6 hours p.r.n..  Amitriptyline 10 mg q.h.s. and gabapentin 100 mg t.i.d. as additional relevant occasions.  He has " "operation site pain and discomfort and some headache pain still as well.  Level discomfort is 6/10 currently.  He only took 1 Norco yesterday but spent the day in bed in trying to sleep because he felt uncomfortable.  Overnight he is unable to sleep and felt hopeless about his situation.  In the hopeless helpless day admits to making statement to his wife about wanting to die.  He admits that he did feel like he wanted to give up at that time and wanted to come to hospital die.  He will longer feels that way.  He has a hope.  He feels like his pain was managed he had feel better.  His wife states that he was not depressed before yesterday.  Patient endorses the same.  No psychotic symptoms.  No psych history.  He had been on Depakote briefly, of months ago for only about 2 weeks to manage the normal pressure hydrocephalus headache pain.    Psychiatric History:   Previous Psychiatric Hospitalizations: No    Previous Medication Trials:  See above  Previous Suicide Attempts: no    History of Violence: no  History of Depression: no  History of Natasha: no  History of Auditory/Visual Hallucination no  History of Delusions: no  Outpatient psychiatrist (current & past): No    Substance Abuse History: negative    Social History:   retired lives with spouse    Psychiatric Mental Status Exam:  Arousal: alert  Sensorium/Orientation: oriented to person, place, situation  Behavior/Cooperation: normal, cooperative   Speech: normal tone, normal rate, normal pitch, normal volume  Language: grossly intact  Mood: " I am okay now "   Affect:  Appropriate to content fairly [id number] mildly blunted  Thought Process: normal and logical  Thought Content:   Auditory hallucinations: NO  Visual hallucinations: NO  Paranoia: NO  Delusions:  NO  Suicidal ideation: NO  Homicidal ideation: NO  Attention/Concentration:  intact  Memory:    Recent:  Decreased   Remote: Decreased   3/3 immediate, deferred/3 at 5 min  Fund of Knowledge: " Aware of current events   Abstract reasoning:  Abstract  Insight: intact  Judgment: behavior is adequate to circumstances      Past Medical History:   Past Medical History:   Diagnosis Date    Allergies 01/31/2025    Anemia     Anticoagulant long-term use     Anxiety     Atrophic kidney 11/16/2012    BPH (benign prostatic hyperplasia) 11/16/2012    Congenital absence of right kidney 07/05/2017    DDD (degenerative disc disease), cervical 07/05/2017    x-ray 7/17 - Severe    Ex-smoker 12/20/2018    Exudative age-related macular degeneration, right eye, with active choroidal neovascularization 02/15/2024    GERD (gastroesophageal reflux disease) 11/16/2012    Glaucoma 11/16/2012    HTN (hypertension) 11/16/2012    Hypothyroid 11/16/2012    Internal carotid artery stenosis 11/16/2012    Iron deficiency anemia due to chronic blood loss 10/29/2021    Left ventricular diastolic dysfunction, NYHA class 1 04/16/2015    4/15    Low serum testosterone level 11/16/2012    Normal cardiac stress test 11/16/2012    NPH (normal pressure hydrocephalus) 02/03/2025    Osteopenia 11/16/2012    PAF (paroxysmal atrial fibrillation) 12/20/2018    Prostate CA 01/15/2013    XRT 12/12  Dr. 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    Shingles 11/16/2012    Skin disease 2020    Seems to have started after Covid vaccine    Stage 3a chronic kidney disease 10/06/2014    Stroke 2017    tia   no residual    Thrombocytopenia 01/31/2025    TIA (transient ischemic attack) 11/16/2012    UC (ulcerative colitis) 11/16/2012      Laboratory Data:   Labs Reviewed   CBC W/ AUTO DIFFERENTIAL - Abnormal       Result Value    WBC 7.77      RBC 4.38 (*)     Hemoglobin 13.5 (*)     Hematocrit 42.6      MCV 97      MCH 30.8      MCHC 31.7 (*)     RDW 13.8      Platelets 175      MPV 10.9      Immature Granulocytes 0.3      Gran # (ANC) 5.4      Immature Grans (Abs) 0.02      Lymph # 1.4      Mono # 0.9      Eos # 0.1      Baso #  0.04      nRBC 0      Gran % 69.2      Lymph % 18.1      Mono % 10.9      Eosinophil % 1.0      Basophil % 0.5      Differential Method Automated     COMPREHENSIVE METABOLIC PANEL - Abnormal    Sodium 141      Potassium 4.0      Chloride 104      CO2 27      Glucose 105      BUN 32 (*)     Creatinine 1.6 (*)     Calcium 9.2      Total Protein 7.1      Albumin 3.7      Total Bilirubin 0.4      Alkaline Phosphatase 42      AST 14      ALT <5 (*)     eGFR 41 (*)     Anion Gap 10     ACETAMINOPHEN LEVEL - Abnormal    Acetaminophen (Tylenol), Serum <3.0 (*)    ALCOHOL,MEDICAL (ETHANOL)    Alcohol, Serum <10     TSH    TSH 0.633     LIPASE   LIPASE    Lipase 15     URINALYSIS, REFLEX TO URINE CULTURE   DRUG SCREEN PANEL, URINE EMERGENCY       Neurological History:  Seizures: No  Head trauma: Yes    Allergies:    Review of patient's allergies indicates:   Allergen Reactions    Benazepril Other (See Comments)     Cough       Medications in ER:   Medications   0.9% NaCl infusion (has no administration in time range)       Medications at home:  See above    No new subjective & objective note has been filed under this hospital service since the last note was generated.      Assessment - Diagnosis - Goals:     Diagnosis/Impression:  Statement of wished to die in the postop in the context of pain.  No intent or plan now and feeling more hopeful now that is supports and medical system have rallied.  Given lack background depression I do not think there is an indication for medication at this time.  Given that he does not have intent or plan I do not think there is an indication for  psychiatric hospitalization right now.  His wife feel safe taking him home and will be careful to monitor him and bring him back if need be    Rec: as above     Plan of Care communicated to: Dr. Meza    Time with patient: 20 min      More than 50% of the time was spent counseling/coordinating care    Consulting clinician was informed of the encounter  and consult note.    Consultation ended: 2/9/2025 at 1116 am    Ilana Barillas MD   Psychiatry  [facility]

## 2025-02-09 NOTE — ED NOTES
Bed: [facility]  Expected date:   Expected time:   Means of arrival:   Comments:

## 2025-02-09 NOTE — ED PROVIDER NOTES
"Encounter Date: 2/9/2025       History     Chief Complaint   Patient presents with    Headache     Pt to ED with wife reporting that he had a shunt placed to R side of his on Thursday at [facility] for NPH. Pt woke up this morning and stated that he is ready to die. After asking pt what made him make that statement this morning and if it was the pain in his head and he states "I guess just everything overall." Reports pain of 8/10 to his head where the shunt was placed. When asked if he wants to hurt himself, he states "No.".      85-year-old male with past medical history of male A-Fib, bilateral hearing loss, HLD, CKD, essential HTN, HFpEF (w/ EF 70% per last TTE 01/23/2023), and normal pressure hydrocephalus POD3 s/p  shunt w/[facility] 2/2 NPH, w/ HFpEF (w/ EF 70% per last TTE 01/23/2023) a-fib, HTN, hypothyroidism, prostate cancer, now transferred from outside hospital for neurosurgery evaluation following initial presentation this morning for headache that worsened over the past night, active SI this AM.    On review of medical records from outside hospital patient was stable and was evaluated by Psychiatry who did not believe that patient requires psychiatric hospitalization and was not PEC'd.  shunt series with no significant abnormalities, CT head with small amount of intraparenchymal bleed abutting the  shunt, small intraventricular bleed, patient has not restarted his Eliquis yet.  Patient was discussed with Dr. Blankenship from Neurosurgery, who requested the patient be transferred for shunt revision to [facility].  Patient noted to have KRISTAN at outside hospital.    On arrival in the emergency department patient reports feeling well and EMS tells me that patient was stable during transfer.    The history is provided by the patient, medical records and the EMS personnel.     Review of patient's allergies indicates:   Allergen Reactions    Benazepril Other (See Comments)     Cough     Past Medical History:   Diagnosis Date "    Allergies 01/31/2025    Anemia     Anticoagulant long-term use     Anxiety     Atrophic kidney 11/16/2012    BPH (benign prostatic hyperplasia) 11/16/2012    Congenital absence of right kidney 07/05/2017    DDD (degenerative disc disease), cervical 07/05/2017    x-ray 7/17 - Severe    Ex-smoker 12/20/2018    Exudative age-related macular degeneration, right eye, with active choroidal neovascularization 02/15/2024    GERD (gastroesophageal reflux disease) 11/16/2012    Glaucoma 11/16/2012    HTN (hypertension) 11/16/2012    Hypothyroid 11/16/2012    Internal carotid artery stenosis 11/16/2012    Iron deficiency anemia due to chronic blood loss 10/29/2021    Left ventricular diastolic dysfunction, NYHA class 1 04/16/2015    4/15    Low serum testosterone level 11/16/2012    Normal cardiac stress test 11/16/2012    NPH (normal pressure hydrocephalus) 02/03/2025    Osteopenia 11/16/2012    PAF (paroxysmal atrial fibrillation) 12/20/2018    Prostate CA 01/15/2013    XRT 12/12  Dr. 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    Shingles 11/16/2012    Skin disease 2020    Seems to have started after Covid vaccine    Stage 3a chronic kidney disease 10/06/2014    Stroke 2017    tia   no residual    Thrombocytopenia 01/31/2025    TIA (transient ischemic attack) 11/16/2012    UC (ulcerative colitis) 11/16/2012     Past Surgical History:   Procedure Laterality Date    ADENOIDECTOMY      COLONOSCOPY N/A 03/25/2022    Procedure: COLONOSCOPY;  Surgeon: Ashley Nguyen MD;  Location: [facility];  Service: Endoscopy;  Laterality: N/A;    ESOPHAGOGASTRODUODENOSCOPY N/A 03/25/2022    Procedure: EGD (ESOPHAGOGASTRODUODENOSCOPY);  Surgeon: Ashley Nguyen MD;  Location: [facility] ENDO;  Service: Endoscopy;  Laterality: N/A;  added on per Dr. Nguyen    ESOPHAGOGASTRODUODENOSCOPY N/A 6/7/2024    Procedure: EGD (ESOPHAGOGASTRODUODENOSCOPY);  Surgeon: Audie Snell MD;  Location: [facility] (4TH FLR);   Service: Endoscopy;  Laterality: N/A;   R/s,sent updated instr via portal.pt informed to hold eliquis for 2 days.AC  Ref by: ,  approved to hold Eliquis (apixaban) for 2 days per Dr. Conklin-see media file  24-GT  -pre call complete-tb    EYE SURGERY Right 2020    cataract    HERNIA REPAIR      LEFT HEART CATHETERIZATION Right 10/29/2021    Procedure: CATHETERIZATION, HEART, LEFT AND RIGHT  - LV DARNELL POSSIBLE;  Surgeon: Beau Conklin MD;  [place] [facility] [place] [facility] C[place] LAB;  Service: Cardiology;  Laterality: Right;    LUMBAR PUNCTURE N/A 2025    Procedure: Lumbar Puncture;  Surgeon: Jairo Blankenship MD;  Location: [facility] OR [facility] FLR;  Service: Neurosurgery;  Laterality: N/A;    RIGHT HEART CATHETERIZATION Right 10/29/2021    Procedure: INSERTION, CATHETER, RIGHT HEART;  Surgeon: Beau Conklin MD;  [place] [facility] [place] [facility] C[place] LAB;  Service: Cardiology;  Laterality: Right;    SKIN BIOPSY  2020    TONSILLECTOMY      VENTRICULOPERITONEAL SHUNT Right 2025    Procedure: INSERTION, SHUNT, VENTRICULOPERITONEAL;  Surgeon: Jairo Blankenship MD;  Location: [facility] OR [facility] FLR;  Service: Neurosurgery;  Laterality: Right;    VENTRICULOPERITONEAL SHUNT  2025    Procedure: INSERTION, SHUNT, VENTRICULOPERITONEAL;  Surgeon: Shar Elizabeth MD;  [facility];  Service: General;;     Family History   Problem Relation Name Age of Onset    Hypertension Mother      Alzheimer's disease Mother      Heart attack Father      Diabetes Brother      Hypertension Brother      Cancer Brother      Heart disease Brother  56        MI    Colon cancer Neg Hx      Esophageal cancer Neg Hx       Social History     Tobacco Use    Smoking status: Former     Current packs/day: 0.00     Average packs/day: 1 pack/day for 25.0 years (25.0 ttl pk-yrs)     Types: Cigarettes     Start date: 1947     Quit date: 1972     Years since quittin.1    Smokeless tobacco: Never    Tobacco comments:     Patient  Quit Smoking on 01/01/1972.   Substance Use Topics    Alcohol use: Not Currently     Alcohol/week: 3.0 standard drinks of alcohol     Types: 2 Glasses of wine, 1 Cans of beer per week     Comment: Occasional    Drug use: Never     Review of Systems  See HPI     Physical Exam     Initial Vitals [02/09/25 0842]   BP Pulse Resp Temp SpO2   (!) 147/68 60 20 97.6 °F (36.4 °C) 100 %      MAP       --         Physical Exam    Nursing note and vitals reviewed.      Gen: AxOx3, well nourished, appears stated age, no pallor, no jaundice, appears well hydrated  Eye: EOMI, no scleral icterus, no periorbital edema or ecchymosis  Head: Normocephalic, atraumatic, well healing surgical scar over right mastoid without any underlying fluctuance and no surrounding erythema, scalp appears normal  ENT: Neck supple, no stridor, no masses, no drooling or voice changes  CVS: All distal pulses intact with normal rate and rhythm, no JVD, normal S1/S2, no murmur  Pulm: Normal breath sounds, no wheezes, rales or rhonchi, no increased work of breathing  Abd:  Nondistended, soft, nontender, no organomegaly, no CVAT  Ext: No edema, no lesions, rashes, or deformity  Neuro:   GCS15  Cranial nerves intact  Pupils equal and reactive to light  RUE  - power/tone/sensation intact   RLE  - power/tone/sensation intact   LUE  - power/tone/sensation intact   LLE  - power/tone/sensation intact   Psych: normal affect, cooperative, well groomed, makes good eye contact      ED Course   Procedures  Labs Reviewed   CBC W/ AUTO DIFFERENTIAL - Abnormal       Result Value    WBC 7.77      RBC 4.38 (*)     Hemoglobin 13.5 (*)     Hematocrit 42.6      MCV 97      MCH 30.8      MCHC 31.7 (*)     RDW 13.8      Platelets 175      MPV 10.9      Immature Granulocytes 0.3      Gran # (ANC) 5.4      Immature Grans (Abs) 0.02      Lymph # 1.4      Mono # 0.9      Eos # 0.1      Baso # 0.04      nRBC 0      Gran % 69.2      Lymph % 18.1      Mono % 10.9      Eosinophil % 1.0       Basophil % 0.5      Differential Method Automated     COMPREHENSIVE METABOLIC PANEL - Abnormal    Sodium 141      Potassium 4.0      Chloride 104      CO2 27      Glucose 105      BUN 32 (*)     Creatinine 1.6 (*)     Calcium 9.2      Total Protein 7.1      Albumin 3.7      Total Bilirubin 0.4      Alkaline Phosphatase 42      AST 14      ALT <5 (*)     eGFR 41 (*)     Anion Gap 10     DRUG SCREEN PANEL, URINE EMERGENCY - Abnormal    Benzodiazepines Negative      Methadone metabolites Negative      Cocaine (Metab.) Negative      Opiate Scrn, Ur Presumptive Positive (*)     Barbiturate Screen, Ur Negative      Amphetamine Screen, Ur Negative      THC Negative      Phencyclidine Negative      Creatinine, Urine 89.5      Toxicology Information SEE COMMENT      Narrative:     Specimen Source->Urine   ACETAMINOPHEN LEVEL - Abnormal    Acetaminophen (Tylenol), Serum <3.0 (*)    URINALYSIS, REFLEX TO URINE CULTURE    Specimen UA Urine, Clean Catch      Color, UA Yellow      Appearance, UA Clear      pH, UA 7.0      Specific Gravity, UA 1.025      Protein, UA Negative      Glucose, UA Negative      Ketones, UA Negative      Bilirubin (UA) Negative      Occult Blood UA Negative      Nitrite, UA Negative      Urobilinogen, UA Negative      Leukocytes, UA Negative      Narrative:     Specimen Source->Urine   ALCOHOL,MEDICAL (ETHANOL)    Alcohol, Serum <10     TSH    TSH 0.633     LIPASE   LIPASE    Lipase 15     PROTIME-INR    Prothrombin Time 11.5      INR 1.1     APTT    aPTT 27.9     HEPATITIS C ANTIBODY   HIV 1 / 2 ANTIBODY          Imaging Results              CT Abdomen Pelvis With IV Contrast NO Oral Contrast (Final result)  Result time 02/09/25 12:15:20      Final result by Sebastien Tian MD (02/09/25 12:15:20)                   Impression:      Postsurgical change recent  shunt tubing placement.  There is only a small volume air in fluid in the peritoneal cavity, an expected postprocedural finding.  No  evidence abscess or pseudocyst.    Other unchanged incidental findings as above.      Electronically signed by: Sebastien Tian MD  Date:    02/09/2025  Time:    12:15               Narrative:    EXAMINATION:  CT ABDOMEN PELVIS WITH IV CONTRAST    CLINICAL HISTORY:  Abdominal pain, post-op;    TECHNIQUE:  Low dose axial CT images obtained throughout the region of the abdomen and pelvis following the administration 75 mL Omnipaque 350 intravenous contrast.  Axial, sagittal and coronal reconstructions were performed.    COMPARISON:  03/23/2022    FINDINGS:  Mild atelectatic changes in lung bases.  Mitral annulus calcification postsurgical change of the aortic valve.  Heart is not enlarged.    The liver, gallbladder and bile ducts are unremarkable.    Spleen, pancreas, and adrenal glands appear unchanged.    Right kidney is absent.  Some renal cortical thinning several cysts on the left kidney.  Left ureter decompressed.  Urinary bladder unremarkable.    Limited assessment of the gastrointestinal tract without the use of oral contrast. The stomach, small bowel and colon demonstrate no evidence of obstruction or focal inflammation.    Postsurgical change compatible with recent peritoneal shunt tubing placement.  Some scattered air in the anterior abdominal wall.  Trace residual intraperitoneal air.  Small volume fluid in the pelvis.  No discrete rim enhancing fluid collections to suggest abscess or pseudocyst.    Aorta tapers normally throughout its visualized course.  Extensive scattered vascular calcification.    Moderate degenerative change in lumbar spine.  No fracture or focal osseous destructive lesion.                                        CT Head Without Contrast (Final result)  Result time 02/09/25 11:44:40      Final result by Sebastien Tian MD (02/09/25 11:44:40)                   Impression:      New small volume intraventricular hemorrhage with additional trace parenchymal hemorrhage along the course of  the catheter.    Unchanged size and configuration of the ventricles as above.    This report was flagged in Epic as abnormal.      Electronically signed by: Sebastien Tian MD  Date:    02/09/2025  Time:    11:44               Narrative:    EXAMINATION:  CT HEAD WITHOUT CONTRAST    CLINICAL HISTORY:  Mental status change, unknown cause;    TECHNIQUE:  Low dose axial CT images obtained throughout the head without the use of intravenous contrast.  Axial, sagittal and coronal reconstructions were performed.    COMPARISON:  02/06/2025    FINDINGS:  Intracranial compartment:    Right-sided ventricular shunt in stable position.  Tip of the catheter in stable positionin the midline about the frontal horns..  Ventricles are stable in size, with 3rd ventricle diameter again measuring on the order of 1.1 cm.  New small volume intraventricular hemorrhage.  This is centered about the right foramen of Monro and in the dependent aspect both lateral ventricles.    Trace parenchymal hemorrhage along the course of the ventricular catheter (i.e.  Series 602, image 22). Otherwise the brain parenchyma appears unchanged. Patchy chronic small vessel ischemic change in supratentorial white matter.  Small remote left occipital infarct.  No new major vascular distribution infarct.    No new extra-axial blood or fluid collections.  Trace residual pneumocephalus.    Skull/extracranial contents (limited evaluation):    No fracture. Mastoid air cells and paranasal sinuses are essentially clear.    Findings were relayed to the ordering provider ([facility]) via the epic secure chat system at approximately 11:40.                                       X-Ray Shunt Series (Final result)  Result time 02/09/25 10:50:31      Final result by Sebastien Tian MD (02/09/25 10:50:31)                   Impression:      As above      Electronically signed by: Sebastien Tian MD  Date:    02/09/2025  Time:    10:50               Narrative:    EXAMINATION:  XR SHUNT  SERIES    CLINICAL HISTORY:   shunt headache;    TECHNIQUE:  Radiographs of the skull, cervical spine, chest and abdomen were performed (shunt series).    COMPARISON:  02/06/2025    FINDINGS:  Radiopaque portion of the shunt tubing appears intact without focal discontinuity or kinking.    Probable small volume free air in the abdomen.  This is nonspecific in the recent postoperative setting, requires clinical correlation.  Visualized bowel gas pattern nonobstructive.    Degenerative change of the cervical and lumbar spine.    Atherosclerosis postsurgical change of aortic valve.  No acute cardiopulmonary process is evident.                                       Medications   0.9% NaCl infusion (0 mLs Intravenous Stopped 2/9/25 1137)   iohexoL (OMNIPAQUE 350) injection 75 mL (75 mLs Intravenous Given 2/9/25 1132)   acetaminophen tablet 1,000 mg (1,000 mg Oral Given 2/9/25 1227)   metoclopramide injection 10 mg (10 mg Intravenous Given 2/9/25 1228)   diphenhydrAMINE injection 12.5 mg (12.5 mg Intravenous Given 2/9/25 1228)     Medical Decision Making  Initial assessment  85-year-old male with past medical history of male A-Fib, bilateral hearing loss, HLD, CKD, essential HTN, HFpEF (w/ EF 70% per last TTE 01/23/2023), and normal pressure hydrocephalus POD3 s/p  shunt w/[facility] 2/2 NPH, w/ HFpEF (w/ EF 70% per last TTE 01/23/2023) a-fib, HTN, hypothyroidism, prostate cancer, now transferred from outside hospital for neurosurgery evaluation following initial presentation this morning for headache that worsened over the past night, active SI this AM.. Patient is able to vocalise, breathing spontaneously, hemodynamically stable, oriented, moving all 4 limbs spontaneously.  Examination consistent with reassuring neurological examination.      Differential diagnosis   shunt malfunction  Headache/migraine  New intracranial bleeding but lower suspicion given recent imaging  Electrolyte/metabolic derangements but lower  suspicion given reassuring bloods from outside hospital      ED management  On arrival patient was stable and did not have significant ongoing headache.  Patient was neurologically intact.  Neurosurgery was notified.  Neurosurgery evaluated patient and believe that patient's bleeding around  shunt is likely secondary to expected surgical site bleeding.  Patient encouraged to hold Eliquis until neurosurgery clinic appointment.  Neurosurgery offered to Obs patient however he elected to go home.  Neurosurgery move patient's appointment for clinic earlier.  Patient is stable and discharged.      Amount and/or Complexity of Data Reviewed  Labs: ordered. Decision-making details documented in ED Course.  Radiology: ordered.    Risk  OTC drugs.  Prescription drug management.               ED Course as of 02/09/25 1703   Sun Feb 09, 2025   1024 Lipase: 15 [LF]   1024 BUN(!): 32 [LF]   1024 Creatinine(!): 1.6 [LF]   1052 EKG independently interpreted by me with sinus bradycardia rate of 56, no high-degree heart block, similar morphology to previous EKG performed 10/2024, no STEMI,  [LF]   1129 Free air in abd on XR shunt series, likely appropriate post op given  shunt placement, however given mild abd RUQ pain, added on CTAP to visualized for intraperitoneal injury, no elevation in LFTs, no leukocytosis, lipase normal [LF]   1144 Small amount of R intraparenchymal bleed (abutting  shunt) and intraventricular bleed on CTH, new from CTH imaging of  shunt on 2/6, will discuss with neurosurgery [LF]   1145 Pt continues to be off eliquis, set to restart on 2/11/25 [LF]   1207 Pending discussion with neurosurgery regarding new small IPH, intraventricular bleed, ADT32 placed [LF]   1220 Blankensihp would like patient to be transferred to [facility] for  shunt evaluation, possible revision, discussed transfer with patient and wife and bedside [LF]      ED Course User Index  [LF] Diandra Meza MD                           Clinical  Impression:  Final diagnoses:  [Z00.8] Medical clearance for psychiatric admission  [Z98.2] S/P  shunt (Primary)  [R51.9] Intractable headache, unspecified chronicity pattern, unspecified headache type          ED Disposition Condition    Transfer to Another Facility Stable                Juan José Taylor MD  Resident  02/09/25 2638

## 2025-02-10 ENCOUNTER — PATIENT OUTREACH (OUTPATIENT)
Dept: ADMINISTRATIVE | Facility: CLINIC | Age: 85
End: 2025-02-10
Payer: MEDICARE

## 2025-02-10 NOTE — PROGRESS NOTES
C3 nurse spoke with Ramesh Yo Keiry's spouse, Mone for a TCC post hospital discharge follow up call. Nurse offered to scheduled TCC hospital follow-up appointment. The patient declined appointment at this time.      Pt plans to self schedule hospital follow up appointments.

## 2025-02-10 NOTE — HPI
Mr Keiry is an 85M w/ hx a fib (on eliquis but still held perioperatively), bilateral hearing loss, HLD, CKD, HTN, HFpEF, prostate cancer, NPH s/p VPS 2/6 with Dr. Blankenship who presents as transfer to ED for NSGY evaluation. He presented to OSH ED with suicidal ideation, was evaluated by psych there who deemed patient did not meet criteria for hold. Patient at this time states he would just like to go home, no further SI. He has 7/10 incisional pain, which he states is discretely different from his typical frontal headaches. Has not had his frontal HA since shunt placement. He notes no changes in vision, gait, or urination, no n/v. He does note some abdominal pain below abdominal incisions.    CTH showed trace IVH, stable ventricles. XRSS stable with tubing in continuity into abdomen and Hakim at 90.  CT Abdomen shows no pseudocyst.

## 2025-02-10 NOTE — CONSULTS
[facility] - Emergency Dept  Neurosurgery  Consult Note    Consults  Subjective:     Chief Complaint/Reason for Admission: SI, incisional pain s/p VPS    History of Present Illness: Mr Ricci is an 85M w/ hx a fib (on eliquis but still held perioperatively), bilateral hearing loss, HLD, CKD, HTN, HFpEF, prostate cancer, NPH s/p VPS 2/6 with Dr. Blankenship who presents as transfer to ED for NSGY evaluation. He presented to OSH ED with suicidal ideation, was evaluated by psych there who deemed patient did not meet criteria for hold. Patient at this time states he would just like to go home, no further SI. He has 7/10 incisional pain, which he states is discretely different from his typical frontal headaches. Has not had his frontal HA since shunt placement. He notes no changes in vision, gait, or urination, no n/v. He does note some abdominal pain below abdominal incisions.    CTH showed trace IVH, stable ventricles. XRSS stable with tubing in continuity into abdomen and Hakim at 90.  CT Abdomen shows no pseudocyst.    (Not in a hospital admission)      Review of patient's allergies indicates:   Allergen Reactions    Benazepril Other (See Comments)     Cough       Past Medical History:   Diagnosis Date    Allergies 01/31/2025    Anemia     Anticoagulant long-term use     Anxiety     Atrophic kidney 11/16/2012    BPH (benign prostatic hyperplasia) 11/16/2012    Congenital absence of right kidney 07/05/2017    DDD (degenerative disc disease), cervical 07/05/2017    x-ray 7/17 - Severe    Ex-smoker 12/20/2018    Exudative age-related macular degeneration, right eye, with active choroidal neovascularization 02/15/2024    GERD (gastroesophageal reflux disease) 11/16/2012    Glaucoma 11/16/2012    HTN (hypertension) 11/16/2012    Hypothyroid 11/16/2012    Internal carotid artery stenosis 11/16/2012    Iron deficiency anemia due to chronic blood loss 10/29/2021    Left ventricular diastolic dysfunction, NYHA class 1 04/16/2015     4/15    Low serum testosterone level 11/16/2012    Normal cardiac stress test 11/16/2012    NPH (normal pressure hydrocephalus) 02/03/2025    Osteopenia 11/16/2012    PAF (paroxysmal atrial fibrillation) 12/20/2018    Prostate CA 01/15/2013    XRT 12/12  Dr. 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;  Service: Endoscopy;  Laterality: N/A;    ESOPHAGOGASTRODUODENOSCOPY N/A 03/25/2022    Procedure: EGD (ESOPHAGOGASTRODUODENOSCOPY);  Surgeon: Ashley Nguyen MD;  Location: [facility];  Service: Endoscopy;  Laterality: N/A;  added on per Dr. Nguyen    ESOPHAGOGASTRODUODENOSCOPY N/A 6/7/2024    Procedure: EGD (ESOPHAGOGASTRODUODENOSCOPY);  Surgeon: Audie Snell MD;  Location: [facility] (4TH FLR);  Service: Endoscopy;  Laterality: N/A;  5/21 R/s,sent updated instr via portal.pt informed to hold eliquis for 2 days.AC  Ref by: ,  approved to hold Eliquis (apixaban) for 2 days per Dr. Conklin-see media file  5/9/24-GT  5/31-pre call complete-tb    EYE SURGERY Right 11/2020    cataract    HERNIA REPAIR      LEFT HEART CATHETERIZATION Right 10/29/2021    Procedure: CATHETERIZATION, HEART, LEFT AND RIGHT  - LV DARNELL POSSIBLE;  Surgeon: Beau Conklin MD;  Location: [facility] CATH LAB;  Service: Cardiology;  Laterality: Right;    LUMBAR PUNCTURE N/A 1/13/2025    Procedure: Lumbar Puncture;  Surgeon: Jairo Blankenship MD;  Location: [facility] OR 2ND FLR;  Service: Neurosurgery;  Laterality: N/A;    RIGHT HEART CATHETERIZATION  Right 10/29/2021    Procedure: INSERTION, CATHETER, RIGHT HEART;  Surgeon: Beua Conklin MD;  Location: [facility] CATH LAB;  Service: Cardiology;  Laterality: Right;    SKIN BIOPSY  2020    TONSILLECTOMY      VENTRICULOPERITONEAL SHUNT Right 2025    Procedure: INSERTION, SHUNT, VENTRICULOPERITONEAL;  Surgeon: Jairo Blankenship MD;  Location: [facility] OR [address] [place] [address];  Service: Neurosurgery;  Laterality: Right;    VENTRICULOPERITONEAL SHUNT  2025    Procedure: INSERTION, SHUNT, VENTRICULOPERITONEAL;  Surgeon: Shar Elizabeth MD;  Location: [facility] OR [address] [place] [address];  Service: General;;     Family History       Problem Relation (Age of Onset)    Alzheimer's disease Mother    Cancer Brother    Diabetes Brother    Heart attack Father    Heart disease Brother (56)    Hypertension Mother, Brother          Tobacco Use    Smoking status: Former     Current packs/day: 0.00     Average packs/day: 1 pack/day for 25.0 years (25.0 ttl pk-yrs)     Types: Cigarettes     Start date: 1947     Quit date: 1972     Years since quittin.1    Smokeless tobacco: Never    Tobacco comments:     Patient Quit Smoking on 1972.   Substance and Sexual Activity    Alcohol use: Not Currently     Alcohol/week: 3.0 standard drinks of alcohol     Types: 2 Glasses of wine, 1 Cans of beer per week     Comment: Occasional    Drug use: Never    Sexual activity: Not Currently     Partners: Female     Birth control/protection: None     Review of Systems   Constitutional:  Negative for chills and fever.   HENT:  Negative for rhinorrhea and sore throat.    Eyes:  Negative for pain.   Respiratory:  Negative for cough and shortness of breath.    Cardiovascular:  Negative for chest pain and palpitations.   Gastrointestinal:  Negative for abdominal pain, constipation, nausea and vomiting.   Genitourinary:  Negative for dysuria, frequency and hematuria.   Musculoskeletal:  Negative for back pain and neck pain.   Skin:  Negative for pallor and rash.    Neurological:  Positive for headaches. Negative for seizures, facial asymmetry, weakness and numbness.   Psychiatric/Behavioral:  Positive for suicidal ideas.      Objective:     Weight: 64.9 kg (143 lb)  Body mass index is 19.94 kg/m².  Vital Signs (Most Recent):  Temp: 98.2 °F (36.8 °C) (02/09/25 1710)  Pulse: 65 (02/09/25 1710)  Resp: 19 (02/09/25 1710)  BP: 111/82 (02/09/25 1710)  SpO2: 100 % (02/09/25 1710) Vital Signs (24h Range):  Temp:  [98.2 °F (36.8 °C)] 98.2 °F (36.8 °C)  Pulse:  [54-68] 65  Resp:  [16-29] 19  SpO2:  [96 %-100 %] 100 %  BP: (111-164)/(78-82) 111/82                                 Physical Exam         Neurosurgery Physical Exam    Neurosurgery Physical Exam    General: well developed, well nourished, no distress.   HEENT: normocephalic, atraumatic  CV: regular rate   Pulmonary: normal respirations, no signs of respiratory distress  Abdomen: soft, non-distended, not tender to palpation  Skin: Skin is warm, dry and intact  Heme: no bruising    Neuro:   Mental Status: AO x3, no aphasia, no dysarthria   CN: PERRL, EOMI, sensation intact bilaterally, eyebrow raise and grimace symmetric, tongue midline. Hard of hearing  Motor: moves all extremities spontaneously, full strength throughout, no pronator drift   Sensory: intact to light touch throughout  Gait: deferred     Incision: Clean, dry, intact. Skin edges well approximated. No surrounding erythema or edema. No drainage or TTP.    Shunt depresses and refills      Significant Labs:  Recent Labs   Lab 02/09/25  0932         K 4.0      CO2 27   BUN 32*   CREATININE 1.6*   CALCIUM 9.2     Recent Labs   Lab 02/09/25  0932   WBC 7.77   HGB 13.5*   HCT 42.6        Recent Labs   Lab 02/09/25  1624   INR 1.1   APTT 27.9     Microbiology Results (last 7 days)       ** No results found for the last 168 hours. **          All pertinent labs from the last 24 hours have been reviewed.    Significant Diagnostics:  CT: CT Abdomen  Pelvis With IV Contrast NO Oral Contrast    Result Date: 2/9/2025  Postsurgical change recent  shunt tubing placement.  There is only a small volume air in fluid in the peritoneal cavity, an expected postprocedural finding.  No evidence abscess or pseudocyst. Other unchanged incidental findings as above. Electronically signed by: Sebastien Tian MD Date:    02/09/2025 Time:    12:15   MRI: No results found in the last 24 hours.  Assessment/Plan:     NPH (normal pressure hydrocephalus)  Mr Ricci is an 85M w/ hx a fib (on eliquis but still held perioperatively), bilateral hearing loss, HLD, CKD, HTN, HFpEF, prostate cancer, NPH s/p VPS 2/6 with Dr. Blankenship who presents as transfer to ED for NSGY evaluation. He presented to OSH ED with suicidal ideation, was evaluated by psych there who deemed patient did not meet criteria for hold. Patient at this time states he would just like to go home, no further SI. He has 7/10 incisional pain, which he states is discretely different from his typical frontal headaches. Has not had his frontal HA since shunt placement. He notes no changes in vision, gait, or urination, no n/v. He does note some abdominal pain below abdominal incisions.    CTH showed trace IVH, stable ventricles. XRSS stable with tubing in continuity into abdomen and Hakim at 90.  CT Abdomen shows no pseudocyst.    - offered for patient to stay for pain control, but he opted to go home  - messaged Dr. Blankenship's clinic staff to move up post op follow up to this week instead of next week  - given small IVH counseled to continue to hold Eliquis until follow up this week. Small IVH almost certainly from time of surgery, no need for interval CT scan  - no current indication for surgical intervention, will consider shunt adjustments on outpatient basis    Discussed with Dr. Krzysztof Camilo MD  Neurosurgery  [facility] - Emergency Dept

## 2025-02-10 NOTE — SUBJECTIVE & OBJECTIVE
(Not in a hospital admission)      Review of patient's allergies indicates:   Allergen Reactions    Benazepril Other (See Comments)     Cough       Past Medical History:   Diagnosis Date    Allergies 01/31/2025    Anemia     Anticoagulant long-term use     Anxiety     Atrophic kidney 11/16/2012    BPH (benign prostatic hyperplasia) 11/16/2012    Congenital absence of right kidney 07/05/2017    DDD (degenerative disc disease), cervical 07/05/2017    x-ray 7/17 - Severe    Ex-smoker 12/20/2018    Exudative age-related macular degeneration, right eye, with active choroidal neovascularization 02/15/2024    GERD (gastroesophageal reflux disease) 11/16/2012    Glaucoma 11/16/2012    HTN (hypertension) 11/16/2012    Hypothyroid 11/16/2012    Internal carotid artery stenosis 11/16/2012    Iron deficiency anemia due to chronic blood loss 10/29/2021    Left ventricular diastolic dysfunction, NYHA class 1 04/16/2015    4/15    Low serum testosterone level 11/16/2012    Normal cardiac stress test 11/16/2012    NPH (normal pressure hydrocephalus) 02/03/2025    Osteopenia 11/16/2012    PAF (paroxysmal atrial fibrillation) 12/20/2018    Prostate CA 01/15/2013    XRT 12/12  Dr. 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    Shingles 11/16/2012    Skin disease 2020    Seems to have started after Covid vaccine    Stage 3a chronic kidney disease 10/06/2014    Stroke 2017    tia   no residual    Thrombocytopenia 01/31/2025    TIA (transient ischemic attack) 11/16/2012    UC (ulcerative colitis) 11/16/2012     Past Surgical History:   Procedure Laterality Date    ADENOIDECTOMY      COLONOSCOPY N/A 03/25/2022    Procedure: COLONOSCOPY;  Surgeon: Ashley Nguyen MD;  Location: [facility] ENDO;  Service: Endoscopy;  Laterality: N/A;    ESOPHAGOGASTRODUODENOSCOPY N/A 03/25/2022    Procedure: EGD (ESOPHAGOGASTRODUODENOSCOPY);  Surgeon: Ashley Nguyen MD;  Location: [facility] ENDO;  Service: Endoscopy;  Laterality: N/A;   added on per Dr. Nguyen    ESOPHAGOGASTRODUODENOSCOPY N/A 6/7/2024    Procedure: EGD (ESOPHAGOGASTRODUODENOSCOPY);  Surgeon: Audie Snell MD;  Location: [facility] ENDO (4TH FLR);  Service: Endoscopy;  Laterality: N/A;  5/21 R/s,sent updated instr via portal.pt informed to hold eliquis for 2 days.AC  Ref by: ,  approved to hold Eliquis (apixaban) for 2 days per Dr. Conklin-see media file  5/9/24-GT  5/31-pre call complete-tb    EYE SURGERY Right 11/2020    cataract    HERNIA REPAIR      LEFT HEART CATHETERIZATION Right 10/29/2021    Procedure: CATHETERIZATION, HEART, LEFT AND RIGHT  - LV DARNELL POSSIBLE;  Surgeon: Beau Conklin MD;  Location: [facility] CATH LAB;  Service: Cardiology;  Laterality: Right;    LUMBAR PUNCTURE N/A 1/13/2025    Procedure: Lumbar Puncture;  Surgeon: Jairo Blankenship MD;  Location: [facility] OR 2ND FLR;  Service: Neurosurgery;  Laterality: N/A;    RIGHT HEART CATHETERIZATION Right 10/29/2021    Procedure: INSERTION, CATHETER, RIGHT HEART;  Surgeon: Beau Conklin MD;  Location: [facility] CATH LAB;  Service: Cardiology;  Laterality: Right;    SKIN BIOPSY  2020    TONSILLECTOMY      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    Procedure: INSERTION, SHUNT, VENTRICULOPERITONEAL;  Surgeon: Jairo Blankenship MD;  Location: [facility] OR 2ND FLR;  Service: Neurosurgery;  Laterality: Right;    VENTRICULOPERITONEAL SHUNT  2/6/2025    Procedure: INSERTION, SHUNT, VENTRICULOPERITONEAL;  Surgeon: Shar Elizabeth MD;  Location: [facility] OR 2ND FLR;  Service: General;;     Family History       Problem Relation (Age of Onset)    Alzheimer's disease Mother    Cancer Brother    Diabetes Brother    Heart attack Father    Heart disease Brother (56)    Hypertension Mother, Brother          Tobacco Use    Smoking status: Former     Current packs/day: 0.00     Average packs/day: 1 pack/day for 25.0 years (25.0 ttl pk-yrs)     Types: Cigarettes     Start date: 1[birth year]1/1947     Quit date: 1/1/1972     Years since  quittin.1    Smokeless tobacco: Never    Tobacco comments:     Patient Quit Smoking on 1972.   Substance and Sexual Activity    Alcohol use: Not Currently     Alcohol/week: 3.0 standard drinks of alcohol     Types: 2 Glasses of wine, 1 Cans of beer per week     Comment: Occasional    Drug use: Never    Sexual activity: Not Currently     Partners: Female     Birth control/protection: None     Review of Systems   Constitutional:  Negative for chills and fever.   HENT:  Negative for rhinorrhea and sore throat.    Eyes:  Negative for pain.   Respiratory:  Negative for cough and shortness of breath.    Cardiovascular:  Negative for chest pain and palpitations.   Gastrointestinal:  Negative for abdominal pain, constipation, nausea and vomiting.   Genitourinary:  Negative for dysuria, frequency and hematuria.   Musculoskeletal:  Negative for back pain and neck pain.   Skin:  Negative for pallor and rash.   Neurological:  Positive for headaches. Negative for seizures, facial asymmetry, weakness and numbness.   Psychiatric/Behavioral:  Positive for suicidal ideas.      Objective:     Weight: 64.9 kg (143 lb)  Body mass index is 19.94 kg/m².  Vital Signs (Most Recent):  Temp: 98.2 °F (36.8 °C) (25 1710)  Pulse: 65 (25 1710)  Resp: 19 (25 1710)  BP: 111/82 (25)  SpO2: 100 % (25 171) Vital Signs (24h Range):  Temp:  [98.2 °F (36.8 °C)] 98.2 °F (36.8 °C)  Pulse:  [54-68] 65  Resp:  [16-29] 19  SpO2:  [96 %-100 %] 100 %  BP: (111-164)/(78-82) 111/82                                 Physical Exam         Neurosurgery Physical Exam    Neurosurgery Physical Exam    General: well developed, well nourished, no distress.   HEENT: normocephalic, atraumatic  CV: regular rate   Pulmonary: normal respirations, no signs of respiratory distress  Abdomen: soft, non-distended, not tender to palpation  Skin: Skin is warm, dry and intact  Heme: no bruising    Neuro:   Mental Status: AO x3, no  aphasia, no dysarthria   CN: PERRL, EOMI, sensation intact bilaterally, eyebrow raise and grimace symmetric, tongue midline. Hard of hearing  Motor: moves all extremities spontaneously, full strength throughout, no pronator drift   Sensory: intact to light touch throughout  Gait: deferred     Incision: Clean, dry, intact. Skin edges well approximated. No surrounding erythema or edema. No drainage or TTP.    Shunt depresses and refills      Significant Labs:  Recent Labs   Lab 02/09/25  0932         K 4.0      CO2 27   BUN 32*   CREATININE 1.6*   CALCIUM 9.2     Recent Labs   Lab 02/09/25  0932   WBC 7.77   HGB 13.5*   HCT 42.6        Recent Labs   Lab 02/09/25  1624   INR 1.1   APTT 27.9     Microbiology Results (last 7 days)       ** No results found for the last 168 hours. **          All pertinent labs from the last 24 hours have been reviewed.    Significant Diagnostics:  CT: CT Abdomen Pelvis With IV Contrast NO Oral Contrast    Result Date: 2/9/2025  Postsurgical change recent  shunt tubing placement.  There is only a small volume air in fluid in the peritoneal cavity, an expected postprocedural finding.  No evidence abscess or pseudocyst. Other unchanged incidental findings as above. Electronically signed by: Sebastien Tian MD Date:    02/09/2025 Time:    12:15   MRI: No results found in the last 24 hours.

## 2025-02-12 ENCOUNTER — OFFICE VISIT (OUTPATIENT)
Dept: FAMILY MEDICINE | Facility: CLINIC | Age: 85
End: 2025-02-12
Payer: MEDICARE

## 2025-02-12 VITALS
WEIGHT: 141.88 LBS | BODY MASS INDEX: 19.79 KG/M2 | SYSTOLIC BLOOD PRESSURE: 120 MMHG | TEMPERATURE: 98 F | DIASTOLIC BLOOD PRESSURE: 64 MMHG | OXYGEN SATURATION: 96 % | HEART RATE: 66 BPM

## 2025-02-12 DIAGNOSIS — N18.32 STAGE 3B CHRONIC KIDNEY DISEASE: ICD-10-CM

## 2025-02-12 DIAGNOSIS — G91.2 NPH (NORMAL PRESSURE HYDROCEPHALUS): ICD-10-CM

## 2025-02-12 DIAGNOSIS — H61.22 EXCESSIVE CERUMEN IN EAR CANAL, LEFT: ICD-10-CM

## 2025-02-12 DIAGNOSIS — N18.31 STAGE 3A CHRONIC KIDNEY DISEASE: ICD-10-CM

## 2025-02-12 DIAGNOSIS — Z09 HOSPITAL DISCHARGE FOLLOW-UP: Primary | ICD-10-CM

## 2025-02-12 DIAGNOSIS — I70.0 ATHEROSCLEROSIS OF AORTA: ICD-10-CM

## 2025-02-12 DIAGNOSIS — I10 ESSENTIAL HYPERTENSION: Chronic | ICD-10-CM

## 2025-02-12 LAB
OHS QRS DURATION: 100 MS
OHS QTC CALCULATION: 445 MS

## 2025-02-12 PROCEDURE — 1101F PT FALLS ASSESS-DOCD LE1/YR: CPT | Mod: CPTII,S$GLB,, | Performed by: NURSE PRACTITIONER

## 2025-02-12 PROCEDURE — 99214 OFFICE O/P EST MOD 30 MIN: CPT | Mod: 25,S$GLB,, | Performed by: NURSE PRACTITIONER

## 2025-02-12 PROCEDURE — 3288F FALL RISK ASSESSMENT DOCD: CPT | Mod: CPTII,S$GLB,, | Performed by: NURSE PRACTITIONER

## 2025-02-12 PROCEDURE — 1159F MED LIST DOCD IN RCRD: CPT | Mod: CPTII,S$GLB,, | Performed by: NURSE PRACTITIONER

## 2025-02-12 PROCEDURE — 1125F AMNT PAIN NOTED PAIN PRSNT: CPT | Mod: CPTII,S$GLB,, | Performed by: NURSE PRACTITIONER

## 2025-02-12 PROCEDURE — 99999 PR PBB SHADOW E&M-EST. PATIENT-LVL V: CPT | Mod: PBBFAC,,, | Performed by: NURSE PRACTITIONER

## 2025-02-12 PROCEDURE — 69210 REMOVE IMPACTED EAR WAX UNI: CPT | Mod: S$GLB,,, | Performed by: NURSE PRACTITIONER

## 2025-02-12 PROCEDURE — 1111F DSCHRG MED/CURRENT MED MERGE: CPT | Mod: CPTII,S$GLB,, | Performed by: NURSE PRACTITIONER

## 2025-02-12 NOTE — PATIENT INSTRUCTIONS
Medical Fitness--[phone]  Imaging, Xray, CT, MRI, Ultrasound---[phone]  Bariatrics---[phone]  Breast Surgery---[phone]  Case Management---[phone]  Colonoscopy---[phone]  DME---[phone]  Infectious Disease---[phone]  Interventional Radiology---[phone]  Medical Records---[phone]  [facility] On [facility]---[phone]  Optometry/Ophthalmology---[phone]  [facility]---[phone]  Physical Therapy---[phone]  Psychiatry---[phone] or [phone]  Plastic Surgery---[phone]  Recovery--[phone] option 2, or [phone].  Sleep Study---[phone]  Smoking Cessation---[phone]  Wound Care---[phone]  Referral Desk---[phone]

## 2025-02-12 NOTE — PROCEDURES
"Ear Cerumen Removal    Date/Time: 2/12/2025 3:00 PM    Performed by: Nikolai Patino NP  Authorized by: Nikolai Patino NP    Time out: Immediately prior to procedure a "time out" was called to verify the correct patient, procedure, equipment, support staff and site/side marked as required.    Consent Done?:  Yes (Verbal)    Local anesthetic:  None  Medication Used:  Other  Location details:  Left ear  Procedure type: curette and irrigation    Cerumen  Removal Results:  Cerumen partially removed  Patient tolerance:  Patient tolerated the procedure well with no immediate complications    "

## 2025-02-12 NOTE — PROGRESS NOTES
"  HPI     Chief Complaint:  Hospital follow up    Ramesh Ricci is a 85 y.o. male with multiple medical diagnoses as listed in the medical history and problem list that presents for hospital follow up.      HPI      Recent hospital encounter. See below encounter note from 2/9/2025.           Chief Complaint   Patient presents with    Headache       Pt to ED with wife reporting that he had a shunt placed to R side of his on Thursday at [facility] [place] for NPH. Pt woke up this morning and stated that he is ready to die. After asking pt what made him make that statement this morning and if it was the pain in his head and he states "I guess just everything overall." Reports pain of 8/10 to his head where the shunt was placed. When asked if he wants to hurt himself, he states "No.".       85-year-old male with past medical history of male A-Fib, bilateral hearing loss, HLD, CKD, essential HTN, HFpEF (w/ EF 70% per last TTE 01/23/2023), and normal pressure hydrocephalus POD3 s/p  shunt w/MD Ware 2/2 NPH, w/ HFpEF (w/ EF 70% per last TTE 01/23/2023) a-fib, HTN, hypothyroidism, prostate cancer, now transferred from outside hospital for neurosurgery evaluation following initial presentation this morning for headache that worsened over the past night, active SI this AM.     On review of medical records from outside hospital patient was stable and was evaluated by Psychiatry who did not believe that patient requires psychiatric hospitalization and was not PEC'd.  shunt series with no significant abnormalities, CT head with small amount of intraparenchymal bleed abutting the  shunt, small intraventricular bleed, patient has not restarted his Eliquis yet.  Patient was discussed with Dr. Blankenship from Neurosurgery, who requested the patient be transferred for shunt revision to [facility] [place].  Patient noted to have KRISTAN at outside hospital.     On arrival in the emergency department patient reports feeling well and EMS tells me that patient was " stable during transfer.     The history is provided by the patient, medical records and the EMS personnel.     Medical Decision Making  Initial assessment  85-year-old male with past medical history of male A-Fib, bilateral hearing loss, HLD, CKD, essential HTN, HFpEF (w/ EF 70% per last TTE 01/23/2023), and normal pressure hydrocephalus POD3 s/p  shunt w/[facility] 2/2 NPH, w/ HFpEF (w/ EF 70% per last TTE 01/23/2023) a-fib, HTN, hypothyroidism, prostate cancer, now transferred from outside hospital for neurosurgery evaluation following initial presentation this morning for headache that worsened over the past night, active SI this AM.. Patient is able to vocalise, breathing spontaneously, hemodynamically stable, oriented, moving all 4 limbs spontaneously.  Examination consistent with reassuring neurological examination.        Differential diagnosis   shunt malfunction  Headache/migraine  New intracranial bleeding but lower suspicion given recent imaging  Electrolyte/metabolic derangements but lower suspicion given reassuring bloods from outside hospital        ED management  On arrival patient was stable and did not have significant ongoing headache.  Patient was neurologically intact.  Neurosurgery was notified.  Neurosurgery evaluated patient and believe that patient's bleeding around  shunt is likely secondary to expected surgical site bleeding.  Patient encouraged to hold Eliquis until neurosurgery clinic appointment.  Neurosurgery offered to Obs patient however he elected to go home.  Neurosurgery move patient's appointment for clinic earlier.  Patient is stable and discharged.          Chief Complaint/Reason for Admission: SI, incisional pain s/p VPS     History of Present Illness: Mr Ricci is an 85M w/ hx a fib (on eliquis but still held perioperatively), bilateral hearing loss, HLD, CKD, HTN, HFpEF, prostate cancer, NPH s/p VPS 2/6 with Dr. Blankenship who presents as transfer to ED for NSGY evaluation. He  presented to OSH ED with suicidal ideation, was evaluated by psych there who deemed patient did not meet criteria for hold. Patient at this time states he would just like to go home, no further SI. He has 7/10 incisional pain, which he states is discretely different from his typical frontal headaches. Has not had his frontal HA since shunt placement. He notes no changes in vision, gait, or urination, no n/v. He does note some abdominal pain below abdominal incisions.     CTH showed trace IVH, stable ventricles. XRSS stable with tubing in continuity into abdomen and Hakim at 90.  CT Abdomen shows no pseudocyst.      Assessment/Plan:      NPH (normal pressure hydrocephalus)  Mr Ricci is an 85M w/ hx a fib (on eliquis but still held perioperatively), bilateral hearing loss, HLD, CKD, HTN, HFpEF, prostate cancer, NPH s/p VPS 2/6 with Dr. Blankenship who presents as transfer to ED for NSGY evaluation. He presented to OSH ED with suicidal ideation, was evaluated by psych there who deemed patient did not meet criteria for hold. Patient at this time states he would just like to go home, no further SI. He has 7/10 incisional pain, which he states is discretely different from his typical frontal headaches. Has not had his frontal HA since shunt placement. He notes no changes in vision, gait, or urination, no n/v. He does note some abdominal pain below abdominal incisions.     CTH showed trace IVH, stable ventricles. XRSS stable with tubing in continuity into abdomen and Hakim at 90.  CT Abdomen shows no pseudocyst.     - offered for patient to stay for pain control, but he opted to go home  - messaged Dr. Blankenship's clinic staff to move up post op follow up to this week instead of next week  - given small IVH counseled to continue to hold Eliquis until follow up this week. Small IVH almost certainly from time of surgery, no need for interval CT scan  - no current indication for surgical intervention, will consider shunt adjustments on  "outpatient basis    Assessment & Plan     1. Hospital discharge follow-up  Family and/or Caretaker present at visit? Yes, spouse  Medication Reconciliation:  Completed  New Prescriptions filled after discharge: Yes  Hospital encounter notes, objective/subjective data, diagnostics, and plan of care from recent hospital encounter reviewed: Yes  Discharge summary reviewed:  Yes  Disease/illness education: completed  Follow up appointments scheduled:  yes neurosurgery on February 18th  Follow up labs/tests ordered: No  Home Health ordered on discharge: Patient does not have home health established from hospital visit.  They do not need home health.  If needed, we will set up home health for the patient  Establishment or re-establishment of referral orders for community resources: No  DME ordered at discharge: No  How patient is feeling since discharge from the hospital?  Reports symptoms have improved    Discussion with other health care providers: No    Reports he continues to experience headaches. Mild relief with analgesics.   Denies other neuro complaints or new symptoms.   Follow up with neurosurgery    Discussed condition, and treatment.   Education sent to patient portal/included in after visit summary.  ED precautions given.   Notify provider if symptoms do not resolve or increase in severity.   Patient verbalizes understanding and agrees with plan of care.      2. NPH (normal pressure hydrocephalus)  As above  Follow up with neurosurgery later this month  Monitor for changes in neuro status or new symptoms    3. Excessive cerumen in ear canal, left  Excess cerumen noted in left ear. Associated with decreased hearing acuity  Avoid qtips  Start debrox  Ear Cerumen Removal    Date/Time: 2/12/2025 3:00 PM    Performed by: Nikolai Patino NP  Authorized by: Nikolai Patino NP    Time out: Immediately prior to procedure a "time out" was called to verify the correct patient, procedure, equipment, support " staff and site/side marked as required.    Consent Done?:  Yes (Verbal)    Local anesthetic:  None  Medication Used:  Other  Location details:  Left ear  Procedure type: curette and irrigation    Cerumen  Removal Results:  Cerumen partially removed  Patient tolerance:  Patient tolerated the procedure well with no immediate complications    - carbamide peroxide (DEBROX) 6.5 % otic solution; Place 5 drops into the left ear 2 (two) times daily.  Dispense: 15 mL; Refill: 0    4. Essential hypertension  BP Readings from Last 3 Encounters:   02/12/25 120/64   02/09/25 111/82   02/07/25 (!) 157/74     -continue current medication regimen  -DASH diet, regular cardiovascular exercises, portion control  -weight loss  -f/u with BP logs in 2 weeks      5. Stage 3a chronic kidney disease  Continue sodium restriction, and avoidance of nephrotoxic agents.     6. Atherosclerosis of aorta  -assessed and addressed all modifiable risk factors.  Continue with appropriate management to prevent complications with regards to lipid and BP monitoring.     7. Stage 3b chronic kidney disease  Continue sodium restriction, and avoidance of nephrotoxic agents.                 --------------------------------------------      Health Maintenance:  Health Maintenance         Date Due Completion Date    TETANUS VACCINE Never done ---    COVID-19 Vaccine (5 - 2024-25 season) 11/25/2024 9/30/2024    PROSTATE-SPECIFIC ANTIGEN 08/13/2025 8/13/2024            Advised patient on the importance of completing overdue health maintenance items    Follow Up:  Follow up in about 2 weeks (around 2/26/2025), or if symptoms worsen or fail to improve.    Exam     Review of Systems:  (as noted above)  Review of Systems   Constitutional:  Negative for fever.   HENT:  Negative for trouble swallowing.         Decreased hearing     Eyes:  Negative for visual disturbance.   Respiratory:  Negative for chest tightness and shortness of breath.    Cardiovascular:  Negative  for chest pain.   Gastrointestinal:  Negative for blood in stool.   Neurological:  Positive for headaches.       Physical Exam:   Physical Exam  Constitutional:       General: He is not in acute distress.     Appearance: He is not ill-appearing or diaphoretic.   HENT:      Head: Normocephalic and atraumatic.        Comments: Surgical incision. No signs of infection noted     Right Ear: Decreased hearing noted.      Left Ear: Decreased hearing noted. There is impacted cerumen.   Eyes:      General: No scleral icterus.  Cardiovascular:      Rate and Rhythm: Normal rate and regular rhythm.   Pulmonary:      Effort: No respiratory distress.   Chest:      Chest wall: No tenderness.   Neurological:      General: No focal deficit present.      Mental Status: He is alert and oriented to person, place, and time.       Vitals:    02/12/25 1508   BP: 120/64   BP Location: Right arm   Patient Position: Sitting   Pulse: 66   Temp: 97.9 °F (36.6 °C)   TempSrc: Oral   SpO2: 96%   Weight: 64.4 kg (141 lb 13.9 oz)      Body mass index is 19.79 kg/m².        History     Past Medical History:  Past Medical History:   Diagnosis Date    Allergies 01/31/2025    Anemia     Anticoagulant long-term use     Anxiety     Atrophic kidney 11/16/2012    BPH (benign prostatic hyperplasia) 11/16/2012    Congenital absence of right kidney 07/05/2017    DDD (degenerative disc disease), cervical 07/05/2017    x-ray 7/17 - Severe    Ex-smoker 12/20/2018    Exudative age-related macular degeneration, right eye, with active choroidal neovascularization 02/15/2024    GERD (gastroesophageal reflux disease) 11/16/2012    Glaucoma 11/16/2012    HTN (hypertension) 11/16/2012    Hypothyroid 11/16/2012    Internal carotid artery stenosis 11/16/2012    Iron deficiency anemia due to chronic blood loss 10/29/2021    Left ventricular diastolic dysfunction, NYHA class 1 04/16/2015    4/15    Low serum testosterone level 11/16/2012    Normal cardiac stress test  11/16/2012    NPH (normal pressure hydrocephalus) 02/03/2025    Osteopenia 11/16/2012    PAF (paroxysmal atrial fibrillation) 12/20/2018    Prostate CA 01/15/2013    XRT 12/12  Dr. 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    Shingles 11/16/2012    Skin disease 2020    Seems to have started after Covid vaccine    Stage 3a chronic kidney disease 10/06/2014    Stroke 2017    tia   no residual    Thrombocytopenia 01/31/2025    TIA (transient ischemic attack) 11/16/2012    UC (ulcerative colitis) 11/16/2012       Past Surgical History:  Past Surgical History:   Procedure Laterality Date    ADENOIDECTOMY      COLONOSCOPY N/A 03/25/2022    Procedure: COLONOSCOPY;  Surgeon: Ashley Nguyen MD;  Location: [facility];  Service: Endoscopy;  Laterality: N/A;    ESOPHAGOGASTRODUODENOSCOPY N/A 03/25/2022    Procedure: EGD (ESOPHAGOGASTRODUODENOSCOPY);  Surgeon: Ashley Nguyen MD;  Location: [facility];  Service: Endoscopy;  Laterality: N/A;  added on per Dr. Nguyen    ESOPHAGOGASTRODUODENOSCOPY N/A 6/7/2024    Procedure: EGD (ESOPHAGOGASTRODUODENOSCOPY);  Surgeon: Audie Snell MD;  Location: [facility] (4TH FLR);  Service: Endoscopy;  Laterality: N/A;  5/21 R/s,sent updated instr via portal.pt informed to hold eliquis for 2 days.AC  Ref by: ,  approved to hold Eliquis (apixaban) for 2 days per Dr. Conklin-see media file  5/9/24-GT  5/31-pre call complete-tb    EYE SURGERY Right 11/2020    cataract    HERNIA REPAIR      LEFT HEART CATHETERIZATION Right 10/29/2021    Procedure: CATHETERIZATION, HEART, LEFT AND RIGHT  - LV DARNELL POSSIBLE;  Surgeon: Beau Conklin MD;  Location: [facility] CATH LAB;  Service: Cardiology;  Laterality: Right;    LUMBAR PUNCTURE N/A 1/13/2025    Procedure: Lumbar Puncture;  Surgeon: Jairo Blankenship MD;  Location: [facility] OR 2ND FLR;  Service: Neurosurgery;  Laterality: N/A;    RIGHT HEART CATHETERIZATION Right 10/29/2021    Procedure: INSERTION, CATHETER,  RIGHT HEART;  Surgeon: Beau Conklin MD;  Location: [facility] CATH LAB;  Service: Cardiology;  Laterality: Right;    SKIN BIOPSY  2020    TONSILLECTOMY      VENTRICULOPERITONEAL SHUNT Right 2025    Procedure: INSERTION, SHUNT, VENTRICULOPERITONEAL;  Surgeon: Jairo Blankenship MD;  Location: [facility] OR [address] [place] [address];  Service: Neurosurgery;  Laterality: Right;    VENTRICULOPERITONEAL SHUNT  2025    Procedure: INSERTION, SHUNT, VENTRICULOPERITONEAL;  Surgeon: Shar Elizabeth MD;  Location: [facility] OR [address] [place] [address];  Service: General;;       Social History:  Social History     Socioeconomic History    Marital status:      Spouse name: Mone    Number of children: 1+1   Tobacco Use    Smoking status: Former     Current packs/day: 0.00     Average packs/day: 1 pack/day for 25.0 years (25.0 ttl pk-yrs)     Types: Cigarettes     Start date: 1947     Quit date: 1972     Years since quittin.1    Smokeless tobacco: Never    Tobacco comments:     Patient Quit Smoking on 1972.   Substance and Sexual Activity    Alcohol use: Not Currently     Alcohol/week: 3.0 standard drinks of alcohol     Types: 2 Glasses of wine, 1 Cans of beer per week     Comment: Occasional    Drug use: Never    Sexual activity: Not Currently     Partners: Female     Birth control/protection: None   Social History Narrative    Rides bike - Quit due to poor balance     - he uses a cane or a walker.       Social Drivers of Health     Financial Resource Strain: Low Risk  (2025)    Overall Financial Resource Strain (CARDIA)     Difficulty of Paying Living Expenses: Not hard at all   Food Insecurity: No Food Insecurity (2025)    Hunger Vital Sign     Worried About Running Out of Food in the Last Year: Never true     Ran Out of Food in the Last Year: Never true   Transportation Needs: No Transportation Needs (2025)    PRAPARE - Transportation     Lack of Transportation (Medical): No     Lack of Transportation  (Non-Medical): No   Physical Activity: Inactive (2/11/2025)    Exercise Vital Sign     Days of Exercise per Week: 0 days     Minutes of Exercise per Session: 10 min   Stress: Stress Concern Present (2/11/2025)    [employer] - Occupational Stress Questionnaire     Feeling of Stress : To some extent   Housing Stability: Low Risk  (2/11/2025)    Housing Stability Vital Sign     Unable to Pay for Housing in the Last Year: No     Number of Times Moved in the Last Year: 0     Homeless in the Last Year: No       Family History:  Family History   Problem Relation Name Age of Onset    Hypertension Mother      Alzheimer's disease Mother      Heart attack Father      Diabetes Brother      Hypertension Brother      Cancer Brother      Heart disease Brother  56        MI    Colon cancer Neg Hx      Esophageal cancer Neg Hx         Allergies and Medications: (updated and reviewed)  Review of patient's allergies indicates:   Allergen Reactions    Benazepril Other (See Comments)     Cough     Current Outpatient Medications   Medication Sig Dispense Refill    acetaminophen (TYLENOL) 500 MG tablet Take 2 tablets (1,000 mg total) by mouth every 6 (six) hours as needed.      amitriptyline (ELAVIL) 10 MG tablet Take 10 mg by mouth every evening.      amLODIPine (NORVASC) 2.5 MG tablet Take 2.5 mg by mouth nightly.      ascorbic acid, vitamin C, (VITAMIN C) 500 MG tablet Take 500 mg by mouth once daily.      bacitracin 500 unit/gram ointment Apply topically 2 (two) times daily. for 14 days 14 g 0    balsalazide (COLAZAL) 750 mg capsule Take 5 tablets in the morning and 4 tablets in the evening. 270 capsule 11    docusate sodium (STOOL SOFTENER ORAL) Take by mouth.      ferrous sulfate (FEOSOL) 325 mg (65 mg iron) Tab tablet 1 tablet Orally Once a day      fluocinolone acetonide oiL 0.01 % Drop Place into both ears.      gabapentin (NEURONTIN) 100 MG capsule Take 100 mg by mouth 3 (three) times daily.       HYDROcodone-acetaminophen (NORCO) 5-325 mg per tablet Take 1 tablet by mouth every 6 (six) hours as needed for Pain. 28 tablet 0    levocetirizine (XYZAL) 5 MG tablet Take 5 mg by mouth every morning.      levothyroxine (SYNTHROID) 125 MCG tablet TAKE 1 TABLET BY MOUTH EVERY DAY IN THE MORNING 90 tablet 3    mag/aluminum/sod bicarb/alginc (GAVISCON ORAL) Take by mouth as needed. Acid reflux      memantine (NAMENDA) 10 MG Tab Take 10 mg by mouth 2 (two) times daily.      metoprolol succinate (TOPROL-XL) 25 MG 24 hr tablet TAKE 1 TABLET BY MOUTH EVERY DAY 90 tablet 3    pantoprazole (PROTONIX) 40 MG tablet TAKE 1 TABLET BY MOUTH DAILY AS  NEEDED 90 tablet 3    pravastatin (PRAVACHOL) 20 MG tablet Take 1 tablet (20 mg total) by mouth every evening. 90 tablet 3    tamsulosin (FLOMAX) 0.4 mg Cap Take 1 capsule (0.4 mg total) by mouth once daily. 90 capsule 12    triamcinolone acetonide 0.1% (KENALOG) 0.1 % cream Apply topically 2 (two) times daily. APPLY  CREAM TOPICALLY TO AFFECTED AREA TWICE DAILY as needed 454 g 0    vitC/E/Zn/copper/lutein/zeaxan (ICAPS AREDS2 ORAL) Take 1 capsule by mouth once daily.      carbamide peroxide (DEBROX) 6.5 % otic solution Place 5 drops into the left ear 2 (two) times daily. 15 mL 0    ELIQUIS 2.5 mg Tab Take 2.5 mg by mouth 2 (two) times daily. (Patient not taking: Reported on 2/12/2025)      fremanezumab-vfrm (AJOVY AUTOINJECTOR) 225 mg/1.5 mL autoinjector Inject 225 mg into the skin every 30 days. (Patient not taking: Reported on 2/12/2025)       No current facility-administered medications for this visit.       Patient Care Team:  Nakul Mandujano MD as PCP - General (Internal Medicine)  Guider, Jacob BARBER Jr., MD as Consulting Physician (Gastroenterology)  Ahmet Modi III, MD as Consulting Physician (Orthopedic Surgery)  Leader, MD Bennett as Consulting Physician (Ophthalmology)  Bernadette Camilo MD as Consulting Physician (Otolaryngology)  Ruddy Bailey MD  as Consulting Physician (Urology)  Robyn Marina MD as Consulting Physician (Gastroenterology)  Rudy Shepard as ED Navigator         - The patient is given an After Visit Summary that lists all medications with directions, allergies, education, orders placed during this encounter and follow-up instructions.      - I have reviewed the patient's medical information including past medical, family, and social history sections including the medications and allergies.      - We discussed the patient's current medications.     This note was created by combination of typed  and MModal dictation.  Transcription errors may be present.  If there are any questions, please contact me.

## 2025-02-18 ENCOUNTER — HOSPITAL ENCOUNTER (OUTPATIENT)
Dept: RADIOLOGY | Facility: HOSPITAL | Age: 85
Discharge: HOME | End: 2025-02-18
Attending: NEUROLOGICAL SURGERY
Payer: MEDICARE

## 2025-02-18 ENCOUNTER — HOSPITAL ENCOUNTER (OUTPATIENT)
Dept: RADIOLOGY | Facility: HOSPITAL | Age: 85
Discharge: HOME | End: 2025-02-18
Attending: PHYSICIAN ASSISTANT
Payer: MEDICARE

## 2025-02-18 ENCOUNTER — OFFICE VISIT (OUTPATIENT)
Dept: NEUROSURGERY | Facility: CLINIC | Age: 85
End: 2025-02-18
Payer: MEDICARE

## 2025-02-18 VITALS — DIASTOLIC BLOOD PRESSURE: 77 MMHG | SYSTOLIC BLOOD PRESSURE: 152 MMHG | HEART RATE: 60 BPM | TEMPERATURE: 99 F

## 2025-02-18 DIAGNOSIS — Z98.2 S/P VP SHUNT: ICD-10-CM

## 2025-02-18 DIAGNOSIS — G91.2 NORMAL PRESSURE HYDROCEPHALUS: ICD-10-CM

## 2025-02-18 DIAGNOSIS — I62.00 NONTRAUMATIC SUBDURAL HEMORRHAGE, UNSPECIFIED: Primary | ICD-10-CM

## 2025-02-18 PROCEDURE — 70250 X-RAY EXAM OF SKULL: CPT | Mod: 26,,, | Performed by: RADIOLOGY

## 2025-02-18 PROCEDURE — 70250 X-RAY EXAM OF SKULL: CPT | Mod: TC

## 2025-02-18 PROCEDURE — 70450 CT HEAD/BRAIN W/O DYE: CPT | Mod: TC

## 2025-02-18 NOTE — PROGRESS NOTES
Procedure:  Shunt reprogramming  Indication:NPH - overdrainage        The [place]man Hakim  was placed over the shunt valve, and the setting was reset to 140 mm of H2O. The patient tolerated this well, with no complications. he was informed to call the clinic with any further questions or concerns in the meantime.    Emeli Rivero PA-C  Neurosurgery   [facility]

## 2025-02-18 NOTE — PROGRESS NOTES
Neurosurgery  Established Patient    SUBJECTIVE:     History of Present Illness:  Ramesh Ricci is a 85 y.o. male with NPH who is s/p VPS placement with Dr. Blankenship on 2/6/25 who presents today for 2 week post op appointment. Hakim set at 90 intra-op. Patient reports headaches at baseline prior to shunt placement, but with worsening of headaches since the shunt was placed. Headaches are somewhat positional. He and his wife do report gait is improved. Denes nausea/vomiting.       Review of patient's allergies indicates:   Allergen Reactions    Benazepril Other (See Comments)     Cough       Current Medications[1]    Past Medical History:   Diagnosis Date    Allergies 01/31/2025    Anemia     Anticoagulant long-term use     Anxiety     Atrophic kidney 11/16/2012    BPH (benign prostatic hyperplasia) 11/16/2012    Congenital absence of right kidney 07/05/2017    DDD (degenerative disc disease), cervical 07/05/2017    x-ray 7/17 - Severe    Ex-smoker 12/20/2018    Exudative age-related macular degeneration, right eye, with active choroidal neovascularization 02/15/2024    GERD (gastroesophageal reflux disease) 11/16/2012    Glaucoma 11/16/2012    HTN (hypertension) 11/16/2012    Hypothyroid 11/16/2012    Internal carotid artery stenosis 11/16/2012    Iron deficiency anemia due to chronic blood loss 10/29/2021    Left ventricular diastolic dysfunction, NYHA class 1 04/16/2015    4/15    Low serum testosterone level 11/16/2012    Normal cardiac stress test 11/16/2012    NPH (normal pressure hydrocephalus) 02/03/2025    Osteopenia 11/16/2012    PAF (paroxysmal atrial fibrillation) 12/20/2018    Prostate CA 01/15/2013    XRT 12/12  Dr. 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    Shingles 11/16/2012    Skin disease 2020    Seems to have started after Covid vaccine    Stage 3a chronic kidney disease 10/06/2014    Stroke 2017    tia   no residual    Thrombocytopenia 01/31/2025    TIA  (transient ischemic attack) 11/16/2012    UC (ulcerative colitis) 11/16/2012     Past Surgical History:   Procedure Laterality Date    ADENOIDECTOMY      COLONOSCOPY N/A 03/25/2022    Procedure: COLONOSCOPY;  Surgeon: Ashley Nguyen MD;  Location: [facility];  Service: Endoscopy;  Laterality: N/A;    ESOPHAGOGASTRODUODENOSCOPY N/A 03/25/2022    Procedure: EGD (ESOPHAGOGASTRODUODENOSCOPY);  Surgeon: Ashley Nguyen MD;  Location: [facility];  Service: Endoscopy;  Laterality: N/A;  added on per Dr. Nguyen    ESOPHAGOGASTRODUODENOSCOPY N/A 6/7/2024    Procedure: EGD (ESOPHAGOGASTRODUODENOSCOPY);  Surgeon: Audie Snell MD;  Location: [facility] (4TH FLR);  Service: Endoscopy;  Laterality: N/A;  5/21 R/s,sent updated instr via portal.pt informed to hold eliquis for 2 days.AC  Ref by: ,  approved to hold Eliquis (apixaban) for 2 days per Dr. Conklin-see media file  5/9/24-GT  5/31-pre call complete-tb    EYE SURGERY Right 11/2020    cataract    HERNIA REPAIR      LEFT HEART CATHETERIZATION Right 10/29/2021    Procedure: CATHETERIZATION, HEART, LEFT AND RIGHT  - LV DARNELL POSSIBLE;  Surgeon: Beau Conklin MD;  Location: [facility] CATH LAB;  Service: Cardiology;  Laterality: Right;    LUMBAR PUNCTURE N/A 1/13/2025    Procedure: Lumbar Puncture;  Surgeon: Jairo Blankenship MD;  Location: [facility] OR 2ND FLR;  Service: Neurosurgery;  Laterality: N/A;    RIGHT HEART CATHETERIZATION Right 10/29/2021    Procedure: INSERTION, CATHETER, RIGHT HEART;  Surgeon: Beau Conklin MD;  Location: [facility] CATH LAB;  Service: Cardiology;  Laterality: Right;    SKIN BIOPSY  2020    TONSILLECTOMY      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    Procedure: INSERTION, SHUNT, VENTRICULOPERITONEAL;  Surgeon: Jairo Blankenship MD;  Location: [facility] OR 2ND FLR;  Service: Neurosurgery;  Laterality: Right;    VENTRICULOPERITONEAL SHUNT  2/6/2025    Procedure: INSERTION, SHUNT, VENTRICULOPERITONEAL;  Surgeon: Shar Elizabeth MD;   Location: [facility] OR [address] [place] [address];  Service: General;;     Family History       Problem Relation (Age of Onset)    Alzheimer's disease Mother    Cancer Brother    Diabetes Brother    Heart attack Father    Heart disease Brother (56)    Hypertension Mother, Brother          Social History     Socioeconomic History    Marital status:      Spouse name: Mone    Number of children: 1+1   Tobacco Use    Smoking status: Former     Current packs/day: 0.00     Average packs/day: 1 pack/day for 25.0 years (25.0 ttl pk-yrs)     Types: Cigarettes     Start date: 1947     Quit date: 1972     Years since quittin.1    Smokeless tobacco: Never    Tobacco comments:     Patient Quit Smoking on 1972.   Substance and Sexual Activity    Alcohol use: Not Currently     Alcohol/week: 3.0 standard drinks of alcohol     Types: 2 Glasses of wine, 1 Cans of beer per week     Comment: Occasional    Drug use: Never    Sexual activity: Not Currently     Partners: Female     Birth control/protection: None   Social History Narrative    Rides bike - Quit due to poor balance     - he uses a cane or a walker.       Social Drivers of Health     Financial Resource Strain: Low Risk  (2025)    Overall Financial Resource Strain (CARDIA)     Difficulty of Paying Living Expenses: Not hard at all   Food Insecurity: No Food Insecurity (2025)    Hunger Vital Sign     Worried About Running Out of Food in the Last Year: Never true     Ran Out of Food in the Last Year: Never true   Transportation Needs: No Transportation Needs (2025)    PRAPARE - Transportation     Lack of Transportation (Medical): No     Lack of Transportation (Non-Medical): No   Physical Activity: Inactive (2025)    Exercise Vital Sign     Days of Exercise per Week: 0 days     Minutes of Exercise per Session: 10 min   Stress: Stress Concern Present (2025)    [employer] - Occupational Stress Questionnaire     Feeling of  Stress : To some extent   Housing Stability: Low Risk  (2/11/2025)    Housing Stability Vital Sign     Unable to Pay for Housing in the Last Year: No     Number of Times Moved in the Last Year: 0     Homeless in the Last Year: No       Review of Systems    OBJECTIVE:     Vital Signs  Temp: 98.7 °F (37.1 °C)  Pulse: 60  BP: (!) 152/77  Pain Score: 10-Worst pain ever  There is no height or weight on file to calculate BMI.      Neurosurgery Physical Exam  General: well developed, well nourished, no distress.   Head: normocephalic  Neurologic: Alert and oriented. Thought content appropriate.  GCS: Motor: 6/Verbal: 5/Eyes: 4 GCS Total: 15  Mental Status: Awake, Alert, Oriented x 4  Language: No aphasia  Speech: No dysarthria  Cranial nerves: face symmetric, CN II-XII grossly intact.   Eyes: EOMI.   Pulmonary: normal respirations, no signs of respiratory distress  Abdomen: soft, non-distended, not tender to palpation; trochar incisions are healing well   Skin: Skin is warm, dry and intact.  Sensory: intact to light touch throughout  Motor Strength:Moves all extremities spontaneously with good tone.  Full strength upper and lower extremities. No abnormal movements seen.   Cranial incision is healing well.         Diagnostic Results:  I have personally reviewed imaging and agree with the findings    CT head wo contrast (2/18/25): Ventricular shunt in place with slight interval decrease in size of the ventricular system.  There are also new small subdural hygromas over the cerebral convexities, raising the possibility of over shunting.  Clinical correlation advised. Interval resolution of prior intraventricular hemorrhage. Subtle hypoattenuation about the parenchymal course of ventricular catheter, could reflect some edema or gliosi    ASSESSMENT/PLAN:      85 y.o. male with NPH who is s/p VPS placement with Dr. Blankenship on 2/6/25 who presents today for 2 week post op appointment. Hakim set at 90 intra-op. Reporting worsening  headaches post op. CT head today with new small subdural hygromas.     -Reprogram shunt to 140 today.   -I would like the patient to follow-up in clinic in 2 weeks with repeat CTH. I have encouraged him to contact the clinic with any questions, concerns, or adverse clinical changes. He verbalized understanding.     Emeli Rivero PA-C  Neurosurgery   [facility]          Note dictated with voice recognition software, please excuse any grammatical errors.           [1]   Current Outpatient Medications   Medication Sig Dispense Refill    acetaminophen (TYLENOL) 500 MG tablet Take 2 tablets (1,000 mg total) by mouth every 6 (six) hours as needed.      amitriptyline (ELAVIL) 10 MG tablet Take 10 mg by mouth every evening.      amLODIPine (NORVASC) 2.5 MG tablet Take 2.5 mg by mouth nightly.      ascorbic acid, vitamin C, (VITAMIN C) 500 MG tablet Take 500 mg by mouth once daily.      bacitracin 500 unit/gram ointment Apply topically 2 (two) times daily. for 14 days 14 g 0    balsalazide (COLAZAL) 750 mg capsule Take 5 tablets in the morning and 4 tablets in the evening. 270 capsule 11    carbamide peroxide (DEBROX) 6.5 % otic solution Place 5 drops into the left ear 2 (two) times daily. 15 mL 0    docusate sodium (STOOL SOFTENER ORAL) Take by mouth.      ELIQUIS 2.5 mg Tab Take 2.5 mg by mouth 2 (two) times daily.      ferrous sulfate (FEOSOL) 325 mg (65 mg iron) Tab tablet 1 tablet Orally Once a day      fluocinolone acetonide oiL 0.01 % Drop Place into both ears.      fremanezumab-vfrm (AJOVY AUTOINJECTOR) 225 mg/1.5 mL autoinjector Inject 225 mg into the skin every 30 days.      gabapentin (NEURONTIN) 100 MG capsule Take 100 mg by mouth 3 (three) times daily.      HYDROcodone-acetaminophen (NORCO) 5-325 mg per tablet Take 1 tablet by mouth every 6 (six) hours as needed for Pain. 28 tablet 0    levocetirizine (XYZAL) 5 MG tablet Take 5 mg by mouth every morning.      levothyroxine (SYNTHROID)  125 MCG tablet TAKE 1 TABLET BY MOUTH EVERY DAY IN THE MORNING 90 tablet 3    mag/aluminum/sod bicarb/alginc (GAVISCON ORAL) Take by mouth as needed. Acid reflux      memantine (NAMENDA) 10 MG Tab Take 10 mg by mouth 2 (two) times daily.      metoprolol succinate (TOPROL-XL) 25 MG 24 hr tablet TAKE 1 TABLET BY MOUTH EVERY DAY 90 tablet 3    pantoprazole (PROTONIX) 40 MG tablet TAKE 1 TABLET BY MOUTH DAILY AS  NEEDED 90 tablet 3    pravastatin (PRAVACHOL) 20 MG tablet Take 1 tablet (20 mg total) by mouth every evening. 90 tablet 3    tamsulosin (FLOMAX) 0.4 mg Cap Take 1 capsule (0.4 mg total) by mouth once daily. 90 capsule 12    triamcinolone acetonide 0.1% (KENALOG) 0.1 % cream Apply topically 2 (two) times daily. APPLY  CREAM TOPICALLY TO AFFECTED AREA TWICE DAILY as needed 454 g 0    vitC/E/Zn/copper/lutein/zeaxan (ICAPS AREDS2 ORAL) Take 1 capsule by mouth once daily.       No current facility-administered medications for this visit.

## 2025-02-20 ENCOUNTER — PATIENT MESSAGE (OUTPATIENT)
Dept: NEUROSURGERY | Facility: CLINIC | Age: 85
End: 2025-02-20
Payer: MEDICARE

## 2025-02-26 ENCOUNTER — PATIENT MESSAGE (OUTPATIENT)
Dept: NEUROSURGERY | Facility: CLINIC | Age: 85
End: 2025-02-26
Payer: MEDICARE

## 2025-02-26 ENCOUNTER — PATIENT MESSAGE (OUTPATIENT)
Dept: INTERNAL MEDICINE | Facility: CLINIC | Age: 85
End: 2025-02-26
Payer: MEDICARE

## 2025-02-27 ENCOUNTER — CLINICAL SUPPORT (OUTPATIENT)
Dept: NEUROSURGERY | Facility: CLINIC | Age: 85
End: 2025-02-27
Payer: MEDICARE

## 2025-02-27 ENCOUNTER — HOSPITAL ENCOUNTER (OUTPATIENT)
Dept: RADIOLOGY | Facility: HOSPITAL | Age: 85
Discharge: HOME | End: 2025-02-27
Attending: PHYSICIAN ASSISTANT
Payer: MEDICARE

## 2025-02-27 ENCOUNTER — OFFICE VISIT (OUTPATIENT)
Dept: NEUROSURGERY | Facility: CLINIC | Age: 85
End: 2025-02-27
Payer: MEDICARE

## 2025-02-27 DIAGNOSIS — Z98.2 S/P VP SHUNT: ICD-10-CM

## 2025-02-27 DIAGNOSIS — Z98.2 S/P VP SHUNT: Primary | ICD-10-CM

## 2025-02-27 DIAGNOSIS — G91.2 NPH (NORMAL PRESSURE HYDROCEPHALUS): Primary | ICD-10-CM

## 2025-02-27 DIAGNOSIS — G91.2 NPH (NORMAL PRESSURE HYDROCEPHALUS): ICD-10-CM

## 2025-02-27 PROCEDURE — 70250 X-RAY EXAM OF SKULL: CPT | Mod: 26,,, | Performed by: STUDENT IN AN ORGANIZED HEALTH CARE EDUCATION/TRAINING PROGRAM

## 2025-02-27 PROCEDURE — 70250 X-RAY EXAM OF SKULL: CPT | Mod: TC

## 2025-02-27 PROCEDURE — 99024 POSTOP FOLLOW-UP VISIT: CPT | Mod: S$GLB,,, | Performed by: PHYSICIAN ASSISTANT

## 2025-02-27 NOTE — PROGRESS NOTES
Pt seen in clinic today. He is complaining over daily headaches. Pts wife reports that pts walking is slightly worse since last shunt adjustment on 2/18.    Ina JACINTO adjusted shunt from 140--> 160 and verified by xray. The patient was instructed to notify the clinic if headaches do not improve or he has worsening symptoms.

## 2025-03-05 NOTE — PROGRESS NOTES
Neurosurgery  Established Patient    SUBJECTIVE:     History of Present Illness:  Ramesh Ricci is a 85 y.o. male with NPH who is s/p VPS placement with Dr. Blnakenship on 2/6/25 who presents today for 2 week post op appointment. Hakim set at 90 intra-op. Patient reports headaches at baseline prior to shunt placement, but with worsening of headaches since the shunt was placed. Headaches are somewhat positional. He and his wife do report gait is improved. Denes nausea/vomiting.     Interval history 2/27/25: Patient presents for continued positional headache with no improvement.     Review of patient's allergies indicates:   Allergen Reactions    Benazepril Other (See Comments)     Cough       Current Medications[1]    Past Medical History:   Diagnosis Date    Allergies 01/31/2025    Anemia     Anticoagulant long-term use     Anxiety     Atrophic kidney 11/16/2012    BPH (benign prostatic hyperplasia) 11/16/2012    Congenital absence of right kidney 07/05/2017    DDD (degenerative disc disease), cervical 07/05/2017    x-ray 7/17 - Severe    Ex-smoker 12/20/2018    Exudative age-related macular degeneration, right eye, with active choroidal neovascularization 02/15/2024    GERD (gastroesophageal reflux disease) 11/16/2012    Glaucoma 11/16/2012    HTN (hypertension) 11/16/2012    Hypothyroid 11/16/2012    Internal carotid artery stenosis 11/16/2012    Iron deficiency anemia due to chronic blood loss 10/29/2021    Left ventricular diastolic dysfunction, NYHA class 1 04/16/2015    4/15    Low serum testosterone level 11/16/2012    Normal cardiac stress test 11/16/2012    NPH (normal pressure hydrocephalus) 02/03/2025    Osteopenia 11/16/2012    PAF (paroxysmal atrial fibrillation) 12/20/2018    Prostate CA 01/15/2013    XRT 12/12  Dr. 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    Shingles 11/16/2012    Skin disease 2020    Seems to have started after Covid vaccine    Stage 3a chronic kidney  disease 10/06/2014    Stroke 2017    tia   no residual    Thrombocytopenia 01/31/2025    TIA (transient ischemic attack) 11/16/2012    UC (ulcerative colitis) 11/16/2012     Past Surgical History:   Procedure Laterality Date    ADENOIDECTOMY      COLONOSCOPY N/A 03/25/2022    Procedure: COLONOSCOPY;  Surgeon: Ashley Nguyen MD;  Location: [facility];  Service: Endoscopy;  Laterality: N/A;    ESOPHAGOGASTRODUODENOSCOPY N/A 03/25/2022    Procedure: EGD (ESOPHAGOGASTRODUODENOSCOPY);  Surgeon: Ashley Nguyen MD;  Location: [facility];  Service: Endoscopy;  Laterality: N/A;  added on per Dr. Nguyen    ESOPHAGOGASTRODUODENOSCOPY N/A 6/7/2024    Procedure: EGD (ESOPHAGOGASTRODUODENOSCOPY);  Surgeon: Audie Snell MD;  Location: [facility] (4TH FLR);  Service: Endoscopy;  Laterality: N/A;  5/21 R/s,sent updated instr via portal.pt informed to hold eliquis for 2 days.AC  Ref by: ,  approved to hold Eliquis (apixaban) for 2 days per Dr. Conklin-see media file  5/9/24-GT  5/31-pre call complete-tb    EYE SURGERY Right 11/2020    cataract    HERNIA REPAIR      LEFT HEART CATHETERIZATION Right 10/29/2021    Procedure: CATHETERIZATION, HEART, LEFT AND RIGHT  - LV DARNELL POSSIBLE;  Surgeon: Beau Conklin MD;  Location: [facility] [place] CATH LAB;  Service: Cardiology;  Laterality: Right;    LUMBAR PUNCTURE N/A 1/13/2025    Procedure: Lumbar Puncture;  Surgeon: Jairo Blankenship MD;  Location: [facility] OR 2ND FLR;  Service: Neurosurgery;  Laterality: N/A;    RIGHT HEART CATHETERIZATION Right 10/29/2021    Procedure: INSERTION, CATHETER, RIGHT HEART;  Surgeon: Beau Conklin MD;  Location: [facility] [place] CATH LAB;  Service: Cardiology;  Laterality: Right;    SKIN BIOPSY  2020    TONSILLECTOMY      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    Procedure: INSERTION, SHUNT, VENTRICULOPERITONEAL;  Surgeon: Jairo Blankenship MD;  Location: [facility] OR 2ND FLR;  Service: Neurosurgery;  Laterality: Right;    VENTRICULOPERITONEAL SHUNT  2/6/2025     Procedure: INSERTION, SHUNT, VENTRICULOPERITONEAL;  Surgeon: Shar Elizabeth MD;  Location: [facility] OR [address] [place] [address];  Service: General;;     Family History       Problem Relation (Age of Onset)    Alzheimer's disease Mother    Cancer Brother    Diabetes Brother    Heart attack Father    Heart disease Brother (56)    Hypertension Mother, Brother          Social History     Socioeconomic History    Marital status:      Spouse name: Mone    Number of children: 1+1   Tobacco Use    Smoking status: Former     Current packs/day: 0.00     Average packs/day: 1 pack/day for 25.0 years (25.0 ttl pk-yrs)     Types: Cigarettes     Start date: 1947     Quit date: 1972     Years since quittin.2    Smokeless tobacco: Never    Tobacco comments:     Patient Quit Smoking on 1972.   Substance and Sexual Activity    Alcohol use: Not Currently     Alcohol/week: 3.0 standard drinks of alcohol     Types: 2 Glasses of wine, 1 Cans of beer per week     Comment: Occasional    Drug use: Never    Sexual activity: Not Currently     Partners: Female     Birth control/protection: None   Social History Narrative    Rides bike - Quit due to poor balance     - he uses a cane or a walker.       Social Drivers of Health     Financial Resource Strain: Low Risk  (2025)    Overall Financial Resource Strain (CARDIA)     Difficulty of Paying Living Expenses: Not hard at all   Food Insecurity: No Food Insecurity (2025)    Hunger Vital Sign     Worried About Running Out of Food in the Last Year: Never true     Ran Out of Food in the Last Year: Never true   Transportation Needs: No Transportation Needs (2025)    PRAPARE - Transportation     Lack of Transportation (Medical): No     Lack of Transportation (Non-Medical): No   Physical Activity: Inactive (2025)    Exercise Vital Sign     Days of Exercise per Week: 0 days     Minutes of Exercise per Session: 10 min   Stress: Stress Concern Present (2025)     [facility] [employer] [facility] - Occupational Stress Questionnaire     Feeling of Stress : To some extent   Housing Stability: Low Risk  (2/11/2025)    Housing Stability Vital Sign     Unable to Pay for Housing in the Last Year: No     Number of Times Moved in the Last Year: 0     Homeless in the Last Year: No       Review of Systems    OBJECTIVE:     Vital Signs     There is no height or weight on file to calculate BMI.      Neurosurgery Physical Exam  General: well developed, well nourished, no distress.   Head: normocephalic  Neurologic: Alert and oriented. Thought content appropriate.  GCS: Motor: 6/Verbal: 5/Eyes: 4 GCS Total: 15  Mental Status: Awake, Alert, Oriented x 4  Language: No aphasia  Speech: No dysarthria  Cranial nerves: face symmetric, CN II-XII grossly intact.   Eyes: EOMI.   Pulmonary: normal respirations, no signs of respiratory distress  Abdomen: soft, non-distended, not tender to palpation; trochar incisions are healing well   Skin: Skin is warm, dry and intact.  Sensory: intact to light touch throughout  Motor Strength:Moves all extremities spontaneously with good tone.  Full strength upper and lower extremities. No abnormal movements seen.   Cranial incision is healing well.         Diagnostic Results:  I have personally reviewed imaging and agree with the findings    CT head wo contrast (2/18/25): Ventricular shunt in place with slight interval decrease in size of the ventricular system.  There are also new small subdural hygromas over the cerebral convexities, raising the possibility of over shunting.  Clinical correlation advised. Interval resolution of prior intraventricular hemorrhage. Subtle hypoattenuation about the parenchymal course of ventricular catheter, could reflect some edema or gliosi    ASSESSMENT/PLAN:      85 y.o. male with NPH who is s/p VPS placement with Dr. Blankenship on 2/6/25 who presents today for continued positional headaches, shunt adjusted last  appointment from 90 to 140.     -Reprogram shunt to 160 today.   -Repeat CTH in 4 weeks  -Patient and wife educated that patient may note a decline in his walking              [1]   Current Outpatient Medications   Medication Sig Dispense Refill    acetaminophen (TYLENOL) 500 MG tablet Take 2 tablets (1,000 mg total) by mouth every 6 (six) hours as needed.      amitriptyline (ELAVIL) 10 MG tablet Take 10 mg by mouth every evening.      amLODIPine (NORVASC) 2.5 MG tablet Take 2.5 mg by mouth nightly.      ascorbic acid, vitamin C, (VITAMIN C) 500 MG tablet Take 500 mg by mouth once daily.      balsalazide (COLAZAL) 750 mg capsule Take 5 tablets in the morning and 4 tablets in the evening. 270 capsule 11    carbamide peroxide (DEBROX) 6.5 % otic solution Place 5 drops into the left ear 2 (two) times daily. 15 mL 0    docusate sodium (STOOL SOFTENER ORAL) Take by mouth.      ELIQUIS 2.5 mg Tab Take 2.5 mg by mouth 2 (two) times daily.      ferrous sulfate (FEOSOL) 325 mg (65 mg iron) Tab tablet 1 tablet Orally Once a day      fluocinolone acetonide oiL 0.01 % Drop Place into both ears.      fremanezumab-vfrm (AJOVY AUTOINJECTOR) 225 mg/1.5 mL autoinjector Inject 225 mg into the skin every 30 days.      gabapentin (NEURONTIN) 100 MG capsule Take 100 mg by mouth 3 (three) times daily.      HYDROcodone-acetaminophen (NORCO) 5-325 mg per tablet Take 1 tablet by mouth every 6 (six) hours as needed for Pain. 28 tablet 0    levocetirizine (XYZAL) 5 MG tablet Take 5 mg by mouth every morning.      levothyroxine (SYNTHROID) 125 MCG tablet TAKE 1 TABLET BY MOUTH EVERY DAY IN THE MORNING 90 tablet 3    mag/aluminum/sod bicarb/alginc (GAVISCON ORAL) Take by mouth as needed. Acid reflux      memantine (NAMENDA) 10 MG Tab Take 10 mg by mouth 2 (two) times daily.      metoprolol succinate (TOPROL-XL) 25 MG 24 hr tablet TAKE 1 TABLET BY MOUTH EVERY DAY 90 tablet 3    pantoprazole (PROTONIX) 40 MG tablet TAKE 1 TABLET BY MOUTH DAILY AS   NEEDED 90 tablet 3    pravastatin (PRAVACHOL) 20 MG tablet Take 1 tablet (20 mg total) by mouth every evening. 90 tablet 3    tamsulosin (FLOMAX) 0.4 mg Cap Take 1 capsule (0.4 mg total) by mouth once daily. 90 capsule 12    triamcinolone acetonide 0.1% (KENALOG) 0.1 % cream Apply topically 2 (two) times daily. APPLY  CREAM TOPICALLY TO AFFECTED AREA TWICE DAILY as needed 454 g 0    vitC/E/Zn/copper/lutein/zeaxan (ICAPS AREDS2 ORAL) Take 1 capsule by mouth once daily.       No current facility-administered medications for this visit.

## 2025-03-07 ENCOUNTER — OFFICE VISIT (OUTPATIENT)
Dept: NEUROSURGERY | Facility: CLINIC | Age: 85
End: 2025-03-07
Payer: MEDICARE

## 2025-03-07 ENCOUNTER — HOSPITAL ENCOUNTER (OUTPATIENT)
Dept: RADIOLOGY | Facility: HOSPITAL | Age: 85
Discharge: HOME | End: 2025-03-07
Attending: PHYSICIAN ASSISTANT
Payer: MEDICARE

## 2025-03-07 DIAGNOSIS — G91.2 NPH (NORMAL PRESSURE HYDROCEPHALUS): Primary | ICD-10-CM

## 2025-03-07 DIAGNOSIS — I62.00 NONTRAUMATIC SUBDURAL HEMORRHAGE, UNSPECIFIED: ICD-10-CM

## 2025-03-07 DIAGNOSIS — Z98.2 S/P VP SHUNT: ICD-10-CM

## 2025-03-07 DIAGNOSIS — G96.08 SUBDURAL HYGROMA: ICD-10-CM

## 2025-03-07 DIAGNOSIS — G91.2 NPH (NORMAL PRESSURE HYDROCEPHALUS): ICD-10-CM

## 2025-03-07 PROCEDURE — 70250 X-RAY EXAM OF SKULL: CPT | Mod: 26,,, | Performed by: RADIOLOGY

## 2025-03-07 PROCEDURE — 99999 PR PBB SHADOW E&M-EST. PATIENT-LVL I: CPT | Mod: PBBFAC,,, | Performed by: PHYSICIAN ASSISTANT

## 2025-03-07 PROCEDURE — 70450 CT HEAD/BRAIN W/O DYE: CPT | Mod: TC

## 2025-03-07 PROCEDURE — 70450 CT HEAD/BRAIN W/O DYE: CPT | Mod: 26,,, | Performed by: STUDENT IN AN ORGANIZED HEALTH CARE EDUCATION/TRAINING PROGRAM

## 2025-03-07 PROCEDURE — 70250 X-RAY EXAM OF SKULL: CPT | Mod: TC

## 2025-03-07 RX ORDER — BUTALBITAL, ACETAMINOPHEN AND CAFFEINE 50; 325; 40 MG/1; MG/1; MG/1
1 TABLET ORAL EVERY 4 HOURS PRN
Qty: 20 TABLET | Refills: 0 | Status: SHIPPED | OUTPATIENT
Start: 2025-03-07

## 2025-03-07 NOTE — PROGRESS NOTES
Neurosurgery  Established Patient    SUBJECTIVE:     History of Present Illness:  Ramesh Ricci is a 85 y.o. male with NPH who is s/p VPS placement with Dr. Blankenship on 2/6/25 who presents today for continued low pressure headaches. VPS was adjusted from 140 to 160 at his last clinic appointment on 2/27/25. He reports positional headaches are unresolved. No improvement with tylenol, tramadol, norco. Only relief is laying flat on his back.     Shunt programming history:  2/6/25: OR for VPS placement, Hakim set to 90  2/18/25: Hakim reprogrammed to 140 for low pressure headaches   2/27/25: Hakim reprogrammed to 160 for low pressure headaches         Review of patient's allergies indicates:   Allergen Reactions    Benazepril Other (See Comments)     Cough       Current Medications[1]    Past Medical History:   Diagnosis Date    Allergies 01/31/2025    Anemia     Anticoagulant long-term use     Anxiety     Atrophic kidney 11/16/2012    BPH (benign prostatic hyperplasia) 11/16/2012    Congenital absence of right kidney 07/05/2017    DDD (degenerative disc disease), cervical 07/05/2017    x-ray 7/17 - Severe    Ex-smoker 12/20/2018    Exudative age-related macular degeneration, right eye, with active choroidal neovascularization 02/15/2024    GERD (gastroesophageal reflux disease) 11/16/2012    Glaucoma 11/16/2012    HTN (hypertension) 11/16/2012    Hypothyroid 11/16/2012    Internal carotid artery stenosis 11/16/2012    Iron deficiency anemia due to chronic blood loss 10/29/2021    Left ventricular diastolic dysfunction, NYHA class 1 04/16/2015    4/15    Low serum testosterone level 11/16/2012    Normal cardiac stress test 11/16/2012    NPH (normal pressure hydrocephalus) 02/03/2025    Osteopenia 11/16/2012    PAF (paroxysmal atrial fibrillation) 12/20/2018    Prostate CA 01/15/2013    XRT 12/12  Dr. 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    Shingles 11/16/2012    Skin disease 2020     Seems to have started after Covid vaccine    Stage 3a chronic kidney disease 10/06/2014    Stroke 2017    tia   no residual    Thrombocytopenia 01/31/2025    TIA (transient ischemic attack) 11/16/2012    UC (ulcerative colitis) 11/16/2012     Past Surgical History:   Procedure Laterality Date    ADENOIDECTOMY      COLONOSCOPY N/A 03/25/2022    Procedure: COLONOSCOPY;  Surgeon: Ashley Nguyen MD;  Location: [facility];  Service: Endoscopy;  Laterality: N/A;    ESOPHAGOGASTRODUODENOSCOPY N/A 03/25/2022    Procedure: EGD (ESOPHAGOGASTRODUODENOSCOPY);  Surgeon: Ashley Nguyen MD;  Location: [facility];  Service: Endoscopy;  Laterality: N/A;  added on per Dr. Nguyen    ESOPHAGOGASTRODUODENOSCOPY N/A 6/7/2024    Procedure: EGD (ESOPHAGOGASTRODUODENOSCOPY);  Surgeon: Audie Snell MD;  Location: [facility] (4TH FLR);  Service: Endoscopy;  Laterality: N/A;  5/21 R/s,sent updated instr via portal.pt informed to hold eliquis for 2 days.AC  Ref by: ,  approved to hold Eliquis (apixaban) for 2 days per Dr. Conklin-see media file  5/9/24-GT  5/31-pre call complete-tb    EYE SURGERY Right 11/2020    cataract    HERNIA REPAIR      LEFT HEART CATHETERIZATION Right 10/29/2021    Procedure: CATHETERIZATION, HEART, LEFT AND RIGHT  - LV DARNELL POSSIBLE;  Surgeon: Beau Conklin MD;  Location: [facility] CATH LAB;  Service: Cardiology;  Laterality: Right;    LUMBAR PUNCTURE N/A 1/13/2025    Procedure: Lumbar Puncture;  Surgeon: Jairo Blankenship MD;  Location: [facility] OR 2ND FLR;  Service: Neurosurgery;  Laterality: N/A;    RIGHT HEART CATHETERIZATION Right 10/29/2021    Procedure: INSERTION, CATHETER, RIGHT HEART;  Surgeon: Beau Conklin MD;  Location: [facility] CATH LAB;  Service: Cardiology;  Laterality: Right;    SKIN BIOPSY  2020    TONSILLECTOMY      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    Procedure: INSERTION, SHUNT, VENTRICULOPERITONEAL;  Surgeon: Jairo Blankenship MD;  Location: [facility] OR 2ND FLR;  Service:  Neurosurgery;  Laterality: Right;    VENTRICULOPERITONEAL SHUNT  2025    Procedure: INSERTION, SHUNT, VENTRICULOPERITONEAL;  Surgeon: Shar Elizabeth MD;  Location: [facility] OR [address] [place] [address];  Service: General;;     Family History       Problem Relation (Age of Onset)    Alzheimer's disease Mother    Cancer Brother    Diabetes Brother    Heart attack Father    Heart disease Brother (56)    Hypertension Mother, Brother          Social History     Socioeconomic History    Marital status:      Spouse name: Mone    Number of children: 1+1   Tobacco Use    Smoking status: Former     Current packs/day: 0.00     Average packs/day: 1 pack/day for 25.0 years (25.0 ttl pk-yrs)     Types: Cigarettes     Start date: 1947     Quit date: 1972     Years since quittin.2    Smokeless tobacco: Never    Tobacco comments:     Patient Quit Smoking on 1972.   Substance and Sexual Activity    Alcohol use: Not Currently     Alcohol/week: 3.0 standard drinks of alcohol     Types: 2 Glasses of wine, 1 Cans of beer per week     Comment: Occasional    Drug use: Never    Sexual activity: Not Currently     Partners: Female     Birth control/protection: None   Social History Narrative    Rides bike - Quit due to poor balance     - he uses a cane or a walker.       Social Drivers of Health     Financial Resource Strain: Low Risk  (2025)    Overall Financial Resource Strain (CARDIA)     Difficulty of Paying Living Expenses: Not hard at all   Food Insecurity: No Food Insecurity (2025)    Hunger Vital Sign     Worried About Running Out of Food in the Last Year: Never true     Ran Out of Food in the Last Year: Never true   Transportation Needs: No Transportation Needs (2025)    PRAPARE - Transportation     Lack of Transportation (Medical): No     Lack of Transportation (Non-Medical): No   Physical Activity: Inactive (2025)    Exercise Vital Sign     Days of Exercise per Week: 0 days     Minutes of  Exercise per Session: 10 min   Stress: Stress Concern Present (2/11/2025)    [employer] [place] [employer] - Occupational Stress Questionnaire     Feeling of Stress : To some extent   Housing Stability: Low Risk  (2/11/2025)    Housing Stability Vital Sign     Unable to Pay for Housing in the Last Year: No     Number of Times Moved in the Last Year: 0     Homeless in the Last Year: No       Review of Systems    OBJECTIVE:     Vital Signs     There is no height or weight on file to calculate BMI.      Neurosurgery Physical Exam  General: well developed, well nourished, no distress.   Head: normocephalic  Neurologic: Alert and oriented. Thought content appropriate.  GCS: Motor: 6/Verbal: 5/Eyes: 4 GCS Total: 15  Mental Status: Awake, Alert, Oriented x 4  Language: No aphasia  Speech: No dysarthria  Cranial nerves: face symmetric, CN II-XII grossly intact.   Eyes: EOMI.   Pulmonary: normal respirations, no signs of respiratory distress  Skin: Skin is warm, dry and intact.  Sensory: intact to light touch throughout  Motor Strength:Moves all extremities spontaneously with good tone.  Full strength upper and lower extremities. No abnormal movements seen.   Cranial incision is healing well.         Diagnostic Results:  I have personally reviewed imaging and agree with the findings    CT head wo contrast (3/7/25): Interval development of bilateral subdural hygromas (L>R) without MLS.     ASSESSMENT/PLAN:      85 y.o. male with NPH who is s/p VPS placement with Dr. 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 Patient is neurologically intact and lives with his wife. I will adjust the Hakim to 200 today. Discussed red flag signs/symptoms that would warrant emergent evaluation.    -Fioricet prescribed for positional headaches  -Encouraged laying flat and fluids   -Reprogram shunt to 200 today  -I would like the patient to  follow-up in clinic in 1 week with repeat CTH. I have encouraged him to contact the clinic with any questions, concerns, or adverse clinical changes. He verbalized understanding.     Emeli Rivero PA-C  Neurosurgery [facility]          Note dictated with voice recognition software, please excuse any grammatical errors.             [1]   Current Outpatient Medications   Medication Sig Dispense Refill    acetaminophen (TYLENOL) 500 MG tablet Take 2 tablets (1,000 mg total) by mouth every 6 (six) hours as needed.      amitriptyline (ELAVIL) 10 MG tablet Take 10 mg by mouth every evening.      amLODIPine (NORVASC) 2.5 MG tablet Take 2.5 mg by mouth nightly.      ascorbic acid, vitamin C, (VITAMIN C) 500 MG tablet Take 500 mg by mouth once daily.      balsalazide (COLAZAL) 750 mg capsule Take 5 tablets in the morning and 4 tablets in the evening. 270 capsule 11    butalbital-acetaminophen-caffeine -40 mg (FIORICET, ESGIC) -40 mg per tablet Take 1 tablet by mouth every 4 (four) hours as needed for Headaches. 20 tablet 0    carbamide peroxide (DEBROX) 6.5 % otic solution Place 5 drops into the left ear 2 (two) times daily. 15 mL 0    docusate sodium (STOOL SOFTENER ORAL) Take by mouth.      ELIQUIS 2.5 mg Tab Take 2.5 mg by mouth 2 (two) times daily.      ferrous sulfate (FEOSOL) 325 mg (65 mg iron) Tab tablet 1 tablet Orally Once a day      fluocinolone acetonide oiL 0.01 % Drop Place into both ears.      fremanezumab-vfrm (AJOVY AUTOINJECTOR) 225 mg/1.5 mL autoinjector Inject 225 mg into the skin every 30 days.      gabapentin (NEURONTIN) 100 MG capsule Take 100 mg by mouth 3 (three) times daily.      HYDROcodone-acetaminophen (NORCO) 5-325 mg per tablet Take 1 tablet by mouth every 6 (six) hours as needed for Pain. 28 tablet 0    levocetirizine (XYZAL) 5 MG tablet Take 5 mg by mouth every morning.      levothyroxine (SYNTHROID) 125 MCG tablet TAKE 1 TABLET BY MOUTH EVERY DAY IN THE  MORNING 90 tablet 3    mag/aluminum/sod bicarb/alginc (GAVISCON ORAL) Take by mouth as needed. Acid reflux      memantine (NAMENDA) 10 MG Tab Take 10 mg by mouth 2 (two) times daily.      metoprolol succinate (TOPROL-XL) 25 MG 24 hr tablet TAKE 1 TABLET BY MOUTH EVERY DAY 90 tablet 3    pantoprazole (PROTONIX) 40 MG tablet TAKE 1 TABLET BY MOUTH DAILY AS  NEEDED 90 tablet 3    pravastatin (PRAVACHOL) 20 MG tablet Take 1 tablet (20 mg total) by mouth every evening. 90 tablet 3    tamsulosin (FLOMAX) 0.4 mg Cap Take 1 capsule (0.4 mg total) by mouth once daily. 90 capsule 12    triamcinolone acetonide 0.1% (KENALOG) 0.1 % cream Apply topically 2 (two) times daily. APPLY  CREAM TOPICALLY TO AFFECTED AREA TWICE DAILY as needed 454 g 0    vitC/E/Zn/copper/lutein/zeaxan (ICAPS AREDS2 ORAL) Take 1 capsule by mouth once daily.       No current facility-administered medications for this visit.

## 2025-03-07 NOTE — PROGRESS NOTES
Procedure:  Shunt reprogramming  Indication: bilateral subdural hygromas        The [employer]  was placed over the shunt valve, and the setting was reset to 200 mm of H2O. The patient tolerated this well, with no complications.     Emeli Rivero PA-C  Neurosurgery   [facility]

## 2025-03-13 ENCOUNTER — HOSPITAL ENCOUNTER (OUTPATIENT)
Dept: RADIOLOGY | Facility: HOSPITAL | Age: 85
Discharge: HOME | End: 2025-03-13
Attending: PHYSICIAN ASSISTANT
Payer: MEDICARE

## 2025-03-13 ENCOUNTER — DOCUMENTATION ONLY (OUTPATIENT)
Dept: NEUROSURGERY | Facility: HOSPITAL | Age: 85
End: 2025-03-13
Payer: MEDICARE

## 2025-03-13 DIAGNOSIS — I62.00 NONTRAUMATIC SUBDURAL HEMORRHAGE, UNSPECIFIED: ICD-10-CM

## 2025-03-13 PROCEDURE — 70450 CT HEAD/BRAIN W/O DYE: CPT | Mod: TC

## 2025-03-13 PROCEDURE — 70450 CT HEAD/BRAIN W/O DYE: CPT | Mod: 26,,, | Performed by: STUDENT IN AN ORGANIZED HEALTH CARE EDUCATION/TRAINING PROGRAM

## 2025-03-13 NOTE — PROGRESS NOTES
Called patient's wife to discuss today's repeat CTH for bilateral subdural hygromas, which have slightly increased in size. Patient's wife reports Ramesh still has positional headaches, but that they have slightly improved since we turned the shunt to 200 last week - she states he rates them a 8/10 when they were 10/10 previously. No additional complaints, she reports he is otherwise at his baseline. Dr. Blankenship is currently in surgery, but I have messaged him to review the imaging and will update patient and his wife on plan pending his review.     Emeli Rivero PA-C  Neurosurgery   [facility]      CT Head Without Contrast  Narrative: EXAMINATION:  CT HEAD WITHOUT CONTRAST    CLINICAL HISTORY:  Subdural hemorrhage; Nontraumatic subdural hemorrhage, unspecified    TECHNIQUE:  Low dose axial images were obtained through the head.  Coronal and sagittal reformations were also performed. Contrast was not administered.    COMPARISON:  CT head without contrast 03/07/2025.    FINDINGS:  Right parietal coursing ventriculoperitoneal shunt catheter with tip in stable position.  Continued decreased caliber of the ventricles from comparison CT 03/07/2025.    Redemonstration of bilateral hypodense subdural collections overlying the cerebral convexities, measuring approximately 1.4 cm along the right cerebral convexity and 1.5 cm along the left cerebral convexity in greatest dimension.  Continued localized sulcal effacement without significant worsening in midline shift.    Brain parenchyma appears unchanged.  No parenchymal mass, edema, hemorrhage, or major vascular territory infarct.  Remote left occipital infarct.    Focal vascular calcification at the skull base.    Osseous structures demonstrate no evidence for acute fracture or osseous destructive lesion.  Paranasal sinuses and mastoid air cells are essentially clear.  Impression: Right parietal coursing ventriculoperitoneal shunt catheter with tip  in stable position.  Continued decreased caliber of the ventricles from comparison CT 03/07/2025.    Redemonstration of bilateral hypodense subdural collections overlying the cerebral convexities, slightly increased in size from comparison CT 03/07/2025.  No significant worsening and localized mass effect or midline shift.    No acute intracranial hemorrhage or major vascular territory infarct.    This report was flagged in Epic as abnormal.    Electronically signed by: Jan Islas  Date:    03/13/2025  Time:    11:41

## 2025-03-18 ENCOUNTER — PATIENT MESSAGE (OUTPATIENT)
Dept: NEUROSURGERY | Facility: CLINIC | Age: 85
End: 2025-03-18
Payer: MEDICARE

## 2025-03-18 DIAGNOSIS — I62.00 NONTRAUMATIC SUBDURAL HEMORRHAGE, UNSPECIFIED: Primary | ICD-10-CM

## 2025-03-19 ENCOUNTER — OFFICE VISIT (OUTPATIENT)
Dept: NEUROSURGERY | Facility: CLINIC | Age: 85
End: 2025-03-19
Payer: MEDICARE

## 2025-03-19 ENCOUNTER — TELEPHONE (OUTPATIENT)
Dept: NEUROSURGERY | Facility: CLINIC | Age: 85
End: 2025-03-19

## 2025-03-19 ENCOUNTER — HOSPITAL ENCOUNTER (OUTPATIENT)
Dept: RADIOLOGY | Facility: HOSPITAL | Age: 85
Discharge: HOME | End: 2025-03-19
Attending: PHYSICIAN ASSISTANT
Payer: MEDICARE

## 2025-03-19 VITALS — HEART RATE: 54 BPM | DIASTOLIC BLOOD PRESSURE: 76 MMHG | SYSTOLIC BLOOD PRESSURE: 152 MMHG

## 2025-03-19 DIAGNOSIS — I62.00 NONTRAUMATIC SUBDURAL HEMORRHAGE, UNSPECIFIED: ICD-10-CM

## 2025-03-19 DIAGNOSIS — I62.00 NONTRAUMATIC SUBDURAL HEMORRHAGE, UNSPECIFIED: Primary | ICD-10-CM

## 2025-03-19 PROCEDURE — 70450 CT HEAD/BRAIN W/O DYE: CPT | Mod: 26,,, | Performed by: RADIOLOGY

## 2025-03-19 PROCEDURE — 70450 CT HEAD/BRAIN W/O DYE: CPT | Mod: TC

## 2025-03-19 PROCEDURE — 1101F PT FALLS ASSESS-DOCD LE1/YR: CPT | Mod: CPTII,S$GLB,, | Performed by: PHYSICIAN ASSISTANT

## 2025-03-19 PROCEDURE — 99024 POSTOP FOLLOW-UP VISIT: CPT | Mod: S$GLB,,, | Performed by: PHYSICIAN ASSISTANT

## 2025-03-19 PROCEDURE — 99999 PR PBB SHADOW E&M-EST. PATIENT-LVL II: CPT | Mod: PBBFAC,,, | Performed by: PHYSICIAN ASSISTANT

## 2025-03-19 PROCEDURE — 3288F FALL RISK ASSESSMENT DOCD: CPT | Mod: CPTII,S$GLB,, | Performed by: PHYSICIAN ASSISTANT

## 2025-03-19 PROCEDURE — 1125F AMNT PAIN NOTED PAIN PRSNT: CPT | Mod: CPTII,S$GLB,, | Performed by: PHYSICIAN ASSISTANT

## 2025-03-19 NOTE — PROGRESS NOTES
Neurosurgery  Established Patient    SUBJECTIVE:     History of Present Illness:  Ramesh Ricci is a 85 y.o. male with NPH who is s/p VPS placement with Dr. Blankenship on 2/6/25. This was complicated by subdural hygromas. See below for detailed shunt history. Patients his headaches are improving. He only has a headache now when he shakes his head. He and his wife noticed improvement in cognition and he is more socially outgoing.     Shunt programming history:  2/6/25: OR for VPS placement, Hakim set to 90  2/18/25: Hakim reprogrammed to 140 for low pressure headaches   2/27/25: Hakim reprogrammed to 160 for low pressure headaches   3/7/25: Hakim reprogrammed to 200 for bilateral subdural hygromas        Review of patient's allergies indicates:   Allergen Reactions    Benazepril Other (See Comments)     Cough       Current Medications[1]    Past Medical History:   Diagnosis Date    Allergies 01/31/2025    Anemia     Anticoagulant long-term use     Anxiety     Atrophic kidney 11/16/2012    BPH (benign prostatic hyperplasia) 11/16/2012    Congenital absence of right kidney 07/05/2017    DDD (degenerative disc disease), cervical 07/05/2017    x-ray 7/17 - Severe    Ex-smoker 12/20/2018    Exudative age-related macular degeneration, right eye, with active choroidal neovascularization 02/15/2024    GERD (gastroesophageal reflux disease) 11/16/2012    Glaucoma 11/16/2012    HTN (hypertension) 11/16/2012    Hypothyroid 11/16/2012    Internal carotid artery stenosis 11/16/2012    Iron deficiency anemia due to chronic blood loss 10/29/2021    Left ventricular diastolic dysfunction, NYHA class 1 04/16/2015    4/15    Low serum testosterone level 11/16/2012    Normal cardiac stress test 11/16/2012    NPH (normal pressure hydrocephalus) 02/03/2025    Osteopenia 11/16/2012    PAF (paroxysmal atrial fibrillation) 12/20/2018    Prostate CA 01/15/2013    XRT 12/12  Dr. 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    Shingles 11/16/2012    Skin disease 2020    Seems to have started after Covid vaccine    Stage 3a chronic kidney disease 10/06/2014    Stroke 2017    tia   no residual    Thrombocytopenia 01/31/2025    TIA (transient ischemic attack) 11/16/2012    UC (ulcerative colitis) 11/16/2012     Past Surgical History:   Procedure Laterality Date    ADENOIDECTOMY      COLONOSCOPY N/A 03/25/2022    Procedure: COLONOSCOPY;  Surgeon: Ashley Nguyen MD;  Location: [facility];  Service: Endoscopy;  Laterality: N/A;    ESOPHAGOGASTRODUODENOSCOPY N/A 03/25/2022    Procedure: EGD (ESOPHAGOGASTRODUODENOSCOPY);  Surgeon: Ashley Nguyen MD;  Location: [facility];  Service: Endoscopy;  Laterality: N/A;  added on per Dr. Nguyen    ESOPHAGOGASTRODUODENOSCOPY N/A 6/7/2024    Procedure: EGD (ESOPHAGOGASTRODUODENOSCOPY);  Surgeon: Audie Snell MD;  Location: [facility] (4TH FLR);  Service: Endoscopy;  Laterality: N/A;  5/21 R/s,sent updated instr via portal.pt informed to hold eliquis for 2 days.AC  Ref by: ,  approved to hold Eliquis (apixaban) for 2 days per Dr. Conklin-see media file  5/9/24-GT  5/31-pre call complete-tb    EYE SURGERY Right 11/2020    cataract    HERNIA REPAIR      LEFT HEART CATHETERIZATION Right 10/29/2021    Procedure: CATHETERIZATION, HEART, LEFT AND RIGHT  - LV DARNELL POSSIBLE;  Surgeon: Beau Conklin MD;  Location: [facility] CATH LAB;  Service: Cardiology;  Laterality: Right;    LUMBAR PUNCTURE N/A 1/13/2025    Procedure: Lumbar Puncture;  Surgeon: Jairo Blankenship MD;  Location: [facility] OR 2ND FLR;  Service: Neurosurgery;  Laterality: N/A;    RIGHT HEART CATHETERIZATION Right 10/29/2021    Procedure: INSERTION, CATHETER, RIGHT HEART;  Surgeon: Beau Conklin MD;  Location: [facility] CATH LAB;  Service: Cardiology;  Laterality: Right;    SKIN BIOPSY  2020    TONSILLECTOMY      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    Procedure: INSERTION, SHUNT, VENTRICULOPERITONEAL;  Surgeon: Krzysztof  Jairo MOY MD;  Location: [facility] OR [address] [place] [address];  Service: Neurosurgery;  Laterality: Right;    VENTRICULOPERITONEAL SHUNT  2025    Procedure: INSERTION, SHUNT, VENTRICULOPERITONEAL;  Surgeon: Shar Elizabeth MD;  Location: [facility] OR [address] [place] [address];  Service: General;;     Family History       Problem Relation (Age of Onset)    Alzheimer's disease Mother    Cancer Brother    Diabetes Brother    Heart attack Father    Heart disease Brother (56)    Hypertension Mother, Brother          Social History     Socioeconomic History    Marital status:      Spouse name: Mone    Number of children: 1+1   Tobacco Use    Smoking status: Former     Current packs/day: 0.00     Average packs/day: 1 pack/day for 25.0 years (25.0 ttl pk-yrs)     Types: Cigarettes     Start date: 1947     Quit date: 1972     Years since quittin.2    Smokeless tobacco: Never    Tobacco comments:     Patient Quit Smoking on 1972.   Substance and Sexual Activity    Alcohol use: Not Currently     Alcohol/week: 3.0 standard drinks of alcohol     Types: 2 Glasses of wine, 1 Cans of beer per week     Comment: Occasional    Drug use: Never    Sexual activity: Not Currently     Partners: Female     Birth control/protection: None   Social History Narrative    Rides bike - Quit due to poor balance     - he uses a cane or a walker.       Social Drivers of Health     Financial Resource Strain: Low Risk  (2025)    Overall Financial Resource Strain (CARDIA)     Difficulty of Paying Living Expenses: Not hard at all   Food Insecurity: No Food Insecurity (2025)    Hunger Vital Sign     Worried About Running Out of Food in the Last Year: Never true     Ran Out of Food in the Last Year: Never true   Transportation Needs: No Transportation Needs (2025)    PRAPARE - Transportation     Lack of Transportation (Medical): No     Lack of Transportation (Non-Medical): No   Physical Activity: Inactive (2025)    Exercise Vital Sign      Days of Exercise per Week: 0 days     Minutes of Exercise per Session: 10 min   Stress: Stress Concern Present (2/11/2025)    [employer] - Occupational Stress Questionnaire     Feeling of Stress : To some extent   Housing Stability: Low Risk  (2/11/2025)    Housing Stability Vital Sign     Unable to Pay for Housing in the Last Year: No     Number of Times Moved in the Last Year: 0     Homeless in the Last Year: No       Review of Systems    OBJECTIVE:     Vital Signs  Pulse: (!) 54  BP: (!) 152/76  Pain Score:   3  There is no height or weight on file to calculate BMI.      Neurosurgery Physical Exam  General: well developed, well nourished, no distress.   Head: normocephalic  Neurologic: Alert and oriented. Thought content appropriate.  GCS: Motor: 6/Verbal: 5/Eyes: 4 GCS Total: 15  Mental Status: Awake, Alert, Oriented x 4  Language: No aphasia  Speech: No dysarthria  Cranial nerves: face symmetric, CN II-XII grossly intact.   Eyes: EOMI.   Pulmonary: normal respirations, no signs of respiratory distress  Skin: Skin is warm, dry and intact.  Sensory: intact to light touch throughout  Motor Strength:Moves all extremities spontaneously with good tone.  Full strength upper and lower extremities. No abnormal movements seen.        Diagnostic Results:  I have personally reviewed imaging and agree with the findings    CT Head Without Contrast  Narrative: EXAMINATION:  CT HEAD WITHOUT CONTRAST    CLINICAL HISTORY:  Subdural hemorrhage; Nontraumatic subdural hemorrhage, unspecified    TECHNIQUE:  Low dose axial CT images obtained throughout the head without the use of intravenous contrast.  Axial, sagittal and coronal reconstructions were performed.    COMPARISON:  CT 03/13/2025    FINDINGS:  Right-sided coursing ventriculoperitoneal shunt in place with tip terminating in the left frontal horn, stable.  There is further minimal decompression of the lateral ventricles when compared to  prior CT 03/13/2025.  When compared to prior CT there has also been minimal interval enlargement in subdural collections, measuring up to 15 mm bilaterally.    The brain parenchyma otherwise appears unchanged.  Remote left occipital infarct.  No new hemorrhage, edema, or acute major vascular distribution infarct.  No mass effect or midline shift.    No new extra-axial blood or fluid collections.    Prominent atherosclerotic vascular calcifications.  Punctate calcifications within the parietal sulci again noted.    No displaced calvarial fracture.    Mastoid air cells and paranasal sinuses are essentially clear.    These findings were communicated to OPHELIA Noble at 14:34 on 03/19/2025.  Impression: Right  shunt with minimal decrease in size of the ventricles and minimal increase in size of the low-density extra-axial collections.  No intracranial hemorrhage.    This report was flagged in Epic as abnormal.    Electronically signed by resident: Malik Noble  Date:    03/19/2025  Time:    13:04    Electronically signed by: Kyaw Weiner  Date:    03/19/2025  Time:    14:34      ASSESSMENT/PLAN:      85 y.o. male with NPH who is s/p VPS placement with Dr. Blankenship on 2/6/25 who presents today for continued follow up of subdural hygromas. I reviewed the imaging and that the patient's headaches are improving despite CTH read indicating slight enlargement of hygromas with Dr. Blankenship. Per Dr. Blankenship- we will defer distal catheter tie off as patient is clinically improving and repeat CTH in 2 weeks. I discussed in detail with the patient and his wife that if his headaches get worse or if he has any new symptoms, they are to go to the ED and/or contact our clinic immediately.     -I would like the patient to follow up in 2 weeks with repeat CTH. I have encouraged him to contact the clinic with any questions, concerns, or adverse clinical changes. He verbalized understanding.     OPHELIA Noble-SUMEET  Neurosurgery    [facility]          Note dictated with voice recognition software, please excuse any grammatical errors.               [1]   Current Outpatient Medications   Medication Sig Dispense Refill    acetaminophen (TYLENOL) 500 MG tablet Take 2 tablets (1,000 mg total) by mouth every 6 (six) hours as needed.      amitriptyline (ELAVIL) 10 MG tablet Take 10 mg by mouth every evening.      amLODIPine (NORVASC) 2.5 MG tablet Take 2.5 mg by mouth nightly.      ascorbic acid, vitamin C, (VITAMIN C) 500 MG tablet Take 500 mg by mouth once daily.      balsalazide (COLAZAL) 750 mg capsule Take 5 tablets in the morning and 4 tablets in the evening. 270 capsule 11    butalbital-acetaminophen-caffeine -40 mg (FIORICET, ESGIC) -40 mg per tablet Take 1 tablet by mouth every 4 (four) hours as needed for Headaches. 20 tablet 0    carbamide peroxide (DEBROX) 6.5 % otic solution Place 5 drops into the left ear 2 (two) times daily. 15 mL 0    docusate sodium (STOOL SOFTENER ORAL) Take by mouth.      ELIQUIS 2.5 mg Tab Take 2.5 mg by mouth 2 (two) times daily.      ferrous sulfate (FEOSOL) 325 mg (65 mg iron) Tab tablet 1 tablet Orally Once a day      fluocinolone acetonide oiL 0.01 % Drop Place into both ears.      gabapentin (NEURONTIN) 100 MG capsule Take 100 mg by mouth 3 (three) times daily.      HYDROcodone-acetaminophen (NORCO) 5-325 mg per tablet Take 1 tablet by mouth every 6 (six) hours as needed for Pain. 28 tablet 0    levocetirizine (XYZAL) 5 MG tablet Take 5 mg by mouth every morning.      levothyroxine (SYNTHROID) 125 MCG tablet TAKE 1 TABLET BY MOUTH EVERY DAY IN THE MORNING 90 tablet 3    mag/aluminum/sod bicarb/alginc (GAVISCON ORAL) Take by mouth as needed. Acid reflux      memantine (NAMENDA) 10 MG Tab Take 10 mg by mouth 2 (two) times daily.      metoprolol succinate (TOPROL-XL) 25 MG 24 hr tablet TAKE 1 TABLET BY MOUTH EVERY DAY 90 tablet 3    pantoprazole (PROTONIX) 40 MG tablet TAKE 1  TABLET BY MOUTH DAILY AS  NEEDED 90 tablet 3    pravastatin (PRAVACHOL) 20 MG tablet Take 1 tablet (20 mg total) by mouth every evening. 90 tablet 3    tamsulosin (FLOMAX) 0.4 mg Cap Take 1 capsule (0.4 mg total) by mouth once daily. 90 capsule 12    triamcinolone acetonide 0.1% (KENALOG) 0.1 % cream Apply topically 2 (two) times daily. APPLY  CREAM TOPICALLY TO AFFECTED AREA TWICE DAILY as needed 454 g 0    vitC/E/Zn/copper/lutein/zeaxan (ICAPS AREDS2 ORAL) Take 1 capsule by mouth once daily.      fremanezumab-vfrm (AJOVY AUTOINJECTOR) 225 mg/1.5 mL autoinjector Inject 225 mg into the skin every 30 days.       No current facility-administered medications for this visit.

## 2025-03-20 ENCOUNTER — PATIENT MESSAGE (OUTPATIENT)
Dept: NEUROSURGERY | Facility: CLINIC | Age: 85
End: 2025-03-20
Payer: MEDICARE

## 2025-03-21 ENCOUNTER — HOSPITAL ENCOUNTER (INPATIENT)
Facility: HOSPITAL | Age: 85
LOS: 11 days | Discharge: NURSING, REHAB OR LONG-TERM CARE | DRG: 025 | End: 2025-04-01
Attending: EMERGENCY MEDICINE | Admitting: NEUROLOGICAL SURGERY
Payer: MEDICARE

## 2025-03-21 ENCOUNTER — TELEPHONE (OUTPATIENT)
Dept: NEUROSURGERY | Facility: CLINIC | Age: 85
End: 2025-03-21
Payer: MEDICARE

## 2025-03-21 ENCOUNTER — PATIENT MESSAGE (OUTPATIENT)
Dept: NEUROSURGERY | Facility: CLINIC | Age: 85
End: 2025-03-21
Payer: MEDICARE

## 2025-03-21 DIAGNOSIS — R51.9 CHRONIC INTRACTABLE HEADACHE, UNSPECIFIED HEADACHE TYPE: Primary | ICD-10-CM

## 2025-03-21 DIAGNOSIS — G89.29 CHRONIC INTRACTABLE HEADACHE, UNSPECIFIED HEADACHE TYPE: Primary | ICD-10-CM

## 2025-03-21 DIAGNOSIS — G96.08 SUBDURAL HYGROMA: ICD-10-CM

## 2025-03-21 DIAGNOSIS — R00.1 BRADYCARDIA: ICD-10-CM

## 2025-03-21 DIAGNOSIS — Z98.2 S/P VP SHUNT: ICD-10-CM

## 2025-03-21 LAB
ALBUMIN SERPL BCP-MCNC: 3.9 G/DL (ref 3.5–5.2)
ALP SERPL-CCNC: 61 U/L (ref 40–150)
ALT SERPL W/O P-5'-P-CCNC: <5 U/L (ref 10–44)
ANION GAP SERPL CALC-SCNC: 11 MMOL/L (ref 8–16)
APTT PPP: 30.8 SEC (ref 21–32)
AST SERPL-CCNC: 15 U/L (ref 10–40)
BASOPHILS # BLD AUTO: 0.04 K/UL (ref 0–0.2)
BASOPHILS NFR BLD: 0.6 % (ref 0–1.9)
BILIRUB SERPL-MCNC: 0.3 MG/DL (ref 0.1–1)
BUN SERPL-MCNC: 27 MG/DL (ref 8–23)
CALCIUM SERPL-MCNC: 9.3 MG/DL (ref 8.7–10.5)
CHLORIDE SERPL-SCNC: 104 MMOL/L (ref 95–110)
CO2 SERPL-SCNC: 27 MMOL/L (ref 23–29)
CREAT SERPL-MCNC: 1.3 MG/DL (ref 0.5–1.4)
DIFFERENTIAL METHOD BLD: ABNORMAL
EOSINOPHIL # BLD AUTO: 0.1 K/UL (ref 0–0.5)
EOSINOPHIL NFR BLD: 0.7 % (ref 0–8)
ERYTHROCYTE [DISTWIDTH] IN BLOOD BY AUTOMATED COUNT: 13.2 % (ref 11.5–14.5)
EST. GFR  (NO RACE VARIABLE): 53.2 ML/MIN/1.73 M^2
GLUCOSE SERPL-MCNC: 108 MG/DL (ref 70–110)
HCT VFR BLD AUTO: 46.2 % (ref 40–54)
HGB BLD-MCNC: 14.7 G/DL (ref 14–18)
IMM GRANULOCYTES # BLD AUTO: 0.02 K/UL (ref 0–0.04)
IMM GRANULOCYTES NFR BLD AUTO: 0.3 % (ref 0–0.5)
INR PPP: 1.1 (ref 0.8–1.2)
LYMPHOCYTES # BLD AUTO: 1.6 K/UL (ref 1–4.8)
LYMPHOCYTES NFR BLD: 23 % (ref 18–48)
MCH RBC QN AUTO: 30.6 PG (ref 27–31)
MCHC RBC AUTO-ENTMCNC: 31.8 G/DL (ref 32–36)
MCV RBC AUTO: 96 FL (ref 82–98)
MONOCYTES # BLD AUTO: 0.7 K/UL (ref 0.3–1)
MONOCYTES NFR BLD: 10.6 % (ref 4–15)
NEUTROPHILS # BLD AUTO: 4.5 K/UL (ref 1.8–7.7)
NEUTROPHILS NFR BLD: 64.8 % (ref 38–73)
NRBC BLD-RTO: 0 /100 WBC
PLATELET # BLD AUTO: 153 K/UL (ref 150–450)
PMV BLD AUTO: 11.1 FL (ref 9.2–12.9)
POTASSIUM SERPL-SCNC: 4 MMOL/L (ref 3.5–5.1)
PROT SERPL-MCNC: 7.6 G/DL (ref 6–8.4)
PROTHROMBIN TIME: 11.8 SEC (ref 9–12.5)
RBC # BLD AUTO: 4.81 M/UL (ref 4.6–6.2)
SODIUM SERPL-SCNC: 142 MMOL/L (ref 136–145)
WBC # BLD AUTO: 6.9 K/UL (ref 3.9–12.7)

## 2025-03-21 PROCEDURE — 85730 THROMBOPLASTIN TIME PARTIAL: CPT | Performed by: STUDENT IN AN ORGANIZED HEALTH CARE EDUCATION/TRAINING PROGRAM

## 2025-03-21 PROCEDURE — 85025 COMPLETE CBC W/AUTO DIFF WBC: CPT | Performed by: EMERGENCY MEDICINE

## 2025-03-21 PROCEDURE — 25000003 PHARM REV CODE 250: Performed by: STUDENT IN AN ORGANIZED HEALTH CARE EDUCATION/TRAINING PROGRAM

## 2025-03-21 PROCEDURE — 36415 COLL VENOUS BLD VENIPUNCTURE: CPT | Performed by: STUDENT IN AN ORGANIZED HEALTH CARE EDUCATION/TRAINING PROGRAM

## 2025-03-21 PROCEDURE — 85610 PROTHROMBIN TIME: CPT | Performed by: STUDENT IN AN ORGANIZED HEALTH CARE EDUCATION/TRAINING PROGRAM

## 2025-03-21 PROCEDURE — 86901 BLOOD TYPING SEROLOGIC RH(D): CPT | Performed by: STUDENT IN AN ORGANIZED HEALTH CARE EDUCATION/TRAINING PROGRAM

## 2025-03-21 PROCEDURE — 80053 COMPREHEN METABOLIC PANEL: CPT | Performed by: EMERGENCY MEDICINE

## 2025-03-21 PROCEDURE — 11000001 HC ACUTE MED/SURG PRIVATE ROOM

## 2025-03-21 RX ORDER — SODIUM CHLORIDE 9 MG/ML
INJECTION, SOLUTION INTRAVENOUS CONTINUOUS
Status: DISCONTINUED | OUTPATIENT
Start: 2025-03-23 | End: 2025-03-25

## 2025-03-21 RX ORDER — TAMSULOSIN HYDROCHLORIDE 0.4 MG/1
0.4 CAPSULE ORAL DAILY
Status: DISCONTINUED | OUTPATIENT
Start: 2025-03-22 | End: 2025-03-27

## 2025-03-21 RX ORDER — POLYETHYLENE GLYCOL 3350 17 G/17G
17 POWDER, FOR SOLUTION ORAL DAILY
Status: DISCONTINUED | OUTPATIENT
Start: 2025-03-22 | End: 2025-03-25

## 2025-03-21 RX ORDER — IBUPROFEN 200 MG
16 TABLET ORAL
Status: DISCONTINUED | OUTPATIENT
Start: 2025-03-21 | End: 2025-04-01 | Stop reason: HOSPADM

## 2025-03-21 RX ORDER — METOPROLOL SUCCINATE 25 MG/1
25 TABLET, EXTENDED RELEASE ORAL DAILY
Status: DISCONTINUED | OUTPATIENT
Start: 2025-03-22 | End: 2025-03-27

## 2025-03-21 RX ORDER — MEMANTINE HYDROCHLORIDE 10 MG/1
10 TABLET ORAL 2 TIMES DAILY
Status: DISCONTINUED | OUTPATIENT
Start: 2025-03-21 | End: 2025-04-01 | Stop reason: HOSPADM

## 2025-03-21 RX ORDER — LEVOTHYROXINE SODIUM 125 UG/1
125 TABLET ORAL
Status: DISCONTINUED | OUTPATIENT
Start: 2025-03-22 | End: 2025-04-01 | Stop reason: HOSPADM

## 2025-03-21 RX ORDER — DOCUSATE SODIUM 100 MG/1
100 CAPSULE, LIQUID FILLED ORAL 2 TIMES DAILY
Status: DISCONTINUED | OUTPATIENT
Start: 2025-03-21 | End: 2025-04-01 | Stop reason: HOSPADM

## 2025-03-21 RX ORDER — CETIRIZINE HYDROCHLORIDE 5 MG/1
10 TABLET ORAL DAILY
Status: DISCONTINUED | OUTPATIENT
Start: 2025-03-22 | End: 2025-04-01 | Stop reason: HOSPADM

## 2025-03-21 RX ORDER — BUTALBITAL, ACETAMINOPHEN AND CAFFEINE 50; 325; 40 MG/1; MG/1; MG/1
2 TABLET ORAL EVERY 6 HOURS PRN
Status: DISCONTINUED | OUTPATIENT
Start: 2025-03-21 | End: 2025-03-25

## 2025-03-21 RX ORDER — PANTOPRAZOLE SODIUM 40 MG/1
40 TABLET, DELAYED RELEASE ORAL DAILY
Status: DISCONTINUED | OUTPATIENT
Start: 2025-03-22 | End: 2025-04-01 | Stop reason: HOSPADM

## 2025-03-21 RX ORDER — GABAPENTIN 100 MG/1
100 CAPSULE ORAL 3 TIMES DAILY
Status: DISCONTINUED | OUTPATIENT
Start: 2025-03-21 | End: 2025-03-25

## 2025-03-21 RX ORDER — GLUCAGON 1 MG
1 KIT INJECTION
Status: DISCONTINUED | OUTPATIENT
Start: 2025-03-21 | End: 2025-04-01 | Stop reason: HOSPADM

## 2025-03-21 RX ORDER — HYDROMORPHONE HYDROCHLORIDE 1 MG/ML
0.5 INJECTION, SOLUTION INTRAMUSCULAR; INTRAVENOUS; SUBCUTANEOUS EVERY 6 HOURS PRN
Status: DISCONTINUED | OUTPATIENT
Start: 2025-03-21 | End: 2025-03-25

## 2025-03-21 RX ORDER — PRAVASTATIN SODIUM 20 MG/1
20 TABLET ORAL NIGHTLY
Status: DISCONTINUED | OUTPATIENT
Start: 2025-03-21 | End: 2025-04-01 | Stop reason: HOSPADM

## 2025-03-21 RX ORDER — AMITRIPTYLINE HYDROCHLORIDE 10 MG/1
10 TABLET, FILM COATED ORAL NIGHTLY
Status: DISCONTINUED | OUTPATIENT
Start: 2025-03-21 | End: 2025-03-31

## 2025-03-21 RX ORDER — AMLODIPINE BESYLATE 2.5 MG/1
2.5 TABLET ORAL NIGHTLY
Status: DISCONTINUED | OUTPATIENT
Start: 2025-03-21 | End: 2025-03-27

## 2025-03-21 RX ORDER — INSULIN ASPART 100 [IU]/ML
0-5 INJECTION, SOLUTION INTRAVENOUS; SUBCUTANEOUS
Status: DISCONTINUED | OUTPATIENT
Start: 2025-03-21 | End: 2025-04-01 | Stop reason: HOSPADM

## 2025-03-21 RX ORDER — TALC
6 POWDER (GRAM) TOPICAL NIGHTLY PRN
Status: DISCONTINUED | OUTPATIENT
Start: 2025-03-21 | End: 2025-03-27

## 2025-03-21 RX ORDER — IBUPROFEN 200 MG
24 TABLET ORAL
Status: DISCONTINUED | OUTPATIENT
Start: 2025-03-21 | End: 2025-04-01 | Stop reason: HOSPADM

## 2025-03-21 RX ORDER — ONDANSETRON HYDROCHLORIDE 2 MG/ML
4 INJECTION, SOLUTION INTRAVENOUS EVERY 6 HOURS PRN
Status: DISCONTINUED | OUTPATIENT
Start: 2025-03-21 | End: 2025-04-01 | Stop reason: HOSPADM

## 2025-03-21 RX ADMIN — GABAPENTIN 100 MG: 100 CAPSULE ORAL at 09:03

## 2025-03-21 RX ADMIN — AMITRIPTYLINE HYDROCHLORIDE 10 MG: 10 TABLET, FILM COATED ORAL at 09:03

## 2025-03-21 RX ADMIN — AMLODIPINE BESYLATE 2.5 MG: 2.5 TABLET ORAL at 09:03

## 2025-03-21 RX ADMIN — PRAVASTATIN SODIUM 20 MG: 20 TABLET ORAL at 09:03

## 2025-03-21 RX ADMIN — MEMANTINE 10 MG: 10 TABLET ORAL at 09:03

## 2025-03-21 RX ADMIN — CARBAMIDE PEROXIDE 5 DROP: 65 SOLUTION/ DROPS TOPICAL at 09:03

## 2025-03-21 NOTE — ED TRIAGE NOTES
Pt came into the ED with wife via shunt problems. Sghx shunt placement 02/06/2025. Hx of NPH. Endorses headaches. Endorses temperature problems. Pt feels like he's been colder than usual. Endorses photophobia chronic onset. Pts wife states his balance looks like its been deteriorating. Pts wife states that his neurologist wants to admit the pt for obs and clamp off the shunt Monday. No other complaints at this time.

## 2025-03-21 NOTE — ED PROVIDER NOTES
Encounter Date: 3/21/2025       History     Chief Complaint   Patient presents with    Headache     VPS placement with Dr. Blankenship on 2/6/25. This was complicated by subdural hygromas. Sent to ED for admit per MD Krzysztof     Ramesh Ricci is a 85-year-old male with a PMH of paroxysmal Afib on long-term anticoagulation (Eliquis), TIA, GERD, hypothyroidism, CHF, congenital solitary kidney, TAVR, and NPH status post  shunt placement on 02/06/2025 presents to the ED after being instructed to do so for headache.  The majority of the HPI was obtained from the patient's wife, as patient is a little bit hard of hearing.  Wife states that he has had issues with headache since the shunt was placed and it is has been modified a few times by Dr. Blankenship.  Recently, his headaches have worsened and he has developed trouble walking, issues with making it to the bathroom in time to urinate, and some confusion.  He denies fever, photosensitivity, neck stiffness, nausea and vomiting, and falls.        Review of patient's allergies indicates:   Allergen Reactions    Benazepril Other (See Comments)     Cough     Past Medical History:   Diagnosis Date    Allergies 01/31/2025    Anemia     Anticoagulant long-term use     Anxiety     Atrophic kidney 11/16/2012    BPH (benign prostatic hyperplasia) 11/16/2012    Congenital absence of right kidney 07/05/2017    DDD (degenerative disc disease), cervical 07/05/2017    x-ray 7/17 - Severe    Ex-smoker 12/20/2018    Exudative age-related macular degeneration, right eye, with active choroidal neovascularization 02/15/2024    GERD (gastroesophageal reflux disease) 11/16/2012    Glaucoma 11/16/2012    HTN (hypertension) 11/16/2012    Hypothyroid 11/16/2012    Internal carotid artery stenosis 11/16/2012    Iron deficiency anemia due to chronic blood loss 10/29/2021    Left ventricular diastolic dysfunction, NYHA class 1 04/16/2015    4/15    Low serum testosterone level 11/16/2012    Normal cardiac  stress test 11/16/2012    NPH (normal pressure hydrocephalus) 02/03/2025    Osteopenia 11/16/2012    PAF (paroxysmal atrial fibrillation) 12/20/2018    Prostate CA 01/15/2013    XRT 12/12  Dr. 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    Shingles 11/16/2012    Skin disease 2020    Seems to have started after Covid vaccine    Stage 3a chronic kidney disease 10/06/2014    Stroke 2017    tia   no residual    Thrombocytopenia 01/31/2025    TIA (transient ischemic attack) 11/16/2012    UC (ulcerative colitis) 11/16/2012     Past Surgical History:   Procedure Laterality Date    ADENOIDECTOMY      COLONOSCOPY N/A 03/25/2022    Procedure: COLONOSCOPY;  Surgeon: Ashley Nguyen MD;  Location: [facility];  Service: Endoscopy;  Laterality: N/A;    ESOPHAGOGASTRODUODENOSCOPY N/A 03/25/2022    Procedure: EGD (ESOPHAGOGASTRODUODENOSCOPY);  Surgeon: Ashley Nguyen MD;  Location: [facility];  Service: Endoscopy;  Laterality: N/A;  added on per Dr. Nguyen    ESOPHAGOGASTRODUODENOSCOPY N/A 6/7/2024    Procedure: EGD (ESOPHAGOGASTRODUODENOSCOPY);  Surgeon: Audie Snell MD;  Location: [facility] (4TH FLR);  Service: Endoscopy;  Laterality: N/A;  5/21 R/s,sent updated instr via portal.pt informed to hold eliquis for 2 days.AC  Ref by: ,  approved to hold Eliquis (apixaban) for 2 days per Dr. Conklin-see media file  5/9/24-GT  5/31-pre call complete-tb    EYE SURGERY Right 11/2020    cataract    HERNIA REPAIR      LEFT HEART CATHETERIZATION Right 10/29/2021    Procedure: CATHETERIZATION, HEART, LEFT AND RIGHT  - LV DARNELL POSSIBLE;  Surgeon: Beau Conklin MD;  Location: [facility] CATH LAB;  Service: Cardiology;  Laterality: Right;    LUMBAR PUNCTURE N/A 1/13/2025    Procedure: Lumbar Puncture;  Surgeon: Jairo Blankenship MD;  Location: [facility] OR 2ND FLR;  Service: Neurosurgery;  Laterality: N/A;    RIGHT HEART CATHETERIZATION Right 10/29/2021    Procedure: INSERTION, CATHETER, RIGHT HEART;   Surgeon: Beau Conklin MD;  Location: [facility] CATH LAB;  Service: Cardiology;  Laterality: Right;    SKIN BIOPSY  2020    TONSILLECTOMY      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    Procedure: INSERTION, SHUNT, VENTRICULOPERITONEAL;  Surgeon: Jairo Blankenship MD;  Location: [facility] OR [address] [place] [address];  Service: Neurosurgery;  Laterality: Right;    VENTRICULOPERITONEAL SHUNT  2/6/2025    Procedure: INSERTION, SHUNT, VENTRICULOPERITONEAL;  Surgeon: Shar Elizabeth MD;  Location: [facility] OR [address] [place] [address];  Service: General;;     Family History   Problem Relation Name Age of Onset    Hypertension Mother      Alzheimer's disease Mother      Heart attack Father      Diabetes Brother      Hypertension Brother      Cancer Brother      Heart disease Brother  56        MI    Colon cancer Neg Hx      Esophageal cancer Neg Hx       Social History[1]  Review of Systems   Cardiovascular:  Negative for chest pain, palpitations and leg swelling.   Genitourinary:  Positive for urgency.   Musculoskeletal:  Negative for neck pain and neck stiffness.   Neurological:  Positive for headaches.       Physical Exam     Initial Vitals [03/21/25 1543]   BP Pulse Resp Temp SpO2   (!) 165/74 60 16 97.5 °F (36.4 °C) 95 %      MAP       --         Physical Exam    Constitutional: Vital signs are normal. He appears well-developed and well-nourished. He does not have a sickly appearance. He does not appear ill. No distress.   HENT:   Head: Normocephalic and atraumatic.   Eyes: Conjunctivae and EOM are normal. No scleral icterus.   Neck: No JVD present.   Cardiovascular:  Normal rate, regular rhythm, normal heart sounds and intact distal pulses.           No murmur heard.  Pulmonary/Chest: Breath sounds normal. No respiratory distress. He has no wheezes. He has no rhonchi. He has no rales.   Abdominal: Abdomen is soft. He exhibits no distension. There is no abdominal tenderness. There is no rebound.   Musculoskeletal:         General: No edema.      Neurological: He is alert and oriented to person, place, and time. GCS eye subscore is 4. GCS verbal subscore is 5. GCS motor subscore is 5.         ED Course   Procedures  Labs Reviewed   CBC W/ AUTO DIFFERENTIAL - Abnormal       Result Value    WBC 6.90      RBC 4.81      Hemoglobin 14.7      Hematocrit 46.2      MCV 96      MCH 30.6      MCHC 31.8 (*)     RDW 13.2      Platelets 153      MPV 11.1      Immature Granulocytes 0.3      Gran # (ANC) 4.5      Immature Grans (Abs) 0.02      Lymph # 1.6      Mono # 0.7      Eos # 0.1      Baso # 0.04      nRBC 0      Gran % 64.8      Lymph % 23.0      Mono % 10.6      Eosinophil % 0.7      Basophil % 0.6      Differential Method Automated     COMPREHENSIVE METABOLIC PANEL - Abnormal    Sodium 142      Potassium 4.0      Chloride 104      CO2 27      Glucose 108      BUN 27 (*)     Creatinine 1.3      Calcium 9.3      Total Protein 7.6      Albumin 3.9      Total Bilirubin 0.3      Alkaline Phosphatase 61      AST 15      ALT <5 (*)     eGFR 53.2 (*)     Anion Gap 11            Imaging Results              X-Ray Shunt Series (XPD) (In process)                      CT Head Without Contrast (Final result)  Result time 03/21/25 17:59:45      Final result by Allen Rey MD (03/21/25 17:59:45)                   Impression:      No significant interval detrimental change.  No intracranial hemorrhage or major vascular distribution infarct.    Right-sided ventriculostomy shunt in place with unchanged size and configuration of the ventricles.    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  No worsening intracranial mass effect.    Electronically signed by resident: Mihir Moody  Date:    03/21/2025  Time:    17:50    Electronically signed by: Allen Rey MD  Date:    03/21/2025  Time:    17:59               Narrative:    EXAMINATION:  CT HEAD WITHOUT CONTRAST    CLINICAL HISTORY:  Headache, new or worsening (Age >=  50y);    TECHNIQUE:  Low dose axial CT images obtained throughout the head without the use of intravenous contrast.  Axial, sagittal and coronal reconstructions were performed.    COMPARISON:  Multiple priors, most recent CT head 03/19/2025.    FINDINGS:  Right-sided  shunt in unchanged position with tip terminating in the frontal horn of the left lateral ventricle.  Ventricles are unchanged in size without evidence of hydrocephalus.    There are bilateral low-density extra-axial collections measuring up to 1.6 cm on the left and 1.7 cm on the right, unchanged.  Mild sulcal effacement on the underlying parenchyma as before.  No midline shift or evidence of herniation.    Brain parenchyma is unchanged.  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  Grossly stable patchy hypoattenuation of the supratentorial white matter consistent with chronic microvascular ischemic change.  No new parenchymal hemorrhage, edema, mass effect or major vascular distribution infarct.  Few scattered punctate parenchymal calcifications in the parietal sulci bilaterally are unchanged.    Skull/extracranial contents (limited evaluation):    No displaced calvarial fracture.    Small left sphenoid sinus mucous retention cyst.  The mastoid air cells and visualized paranasal sinuses are otherwise essentially clear.                                       Medications - No data to display  Medical Decision Making  This patient presents with a headache likely secondary to a known history of NPH status  shunt.  His wife endorses symptoms of gait ataxia, urinary incontinence, and confusion.  All of which are consistent with NPH.  Neuro exam without evidence of meningismus, AMS, focal neurologic findings so consider meningitis, encephalitis, and stroke unlikely.  Presentation is not consistent with acute intracranial bleed to include subarachnoid hemorrhage; however; the patient is on long-term anticoagulation  but no recent trauma.  No history of trauma so doubt ICH.  Given history and physical doubt temporal arteritis as well as acute angle closure glaucoma.  Consider carotid artery dissection unlikely in the setting of no focal neuro deficits, no neck trauma or recent neck strain.    We will workup with CBC, CMP CT head without contrast, and shunt series x-rays.  We will consult Neurosurgery.    Amount and/or Complexity of Data Reviewed  Independent Historian: spouse  Labs:  Decision-making details documented in ED Course.  Radiology:  Decision-making details documented in ED Course.    Risk  Decision regarding hospitalization.               ED Course as of 03/21/25 2004   Fri Mar 21, 2025   180 CT Head Without Contrast  Impression:     No significant interval detrimental change.  No intracranial hemorrhage or major vascular distribution infarct.     Right-sided ventriculostomy shunt in place with unchanged size and configuration of the ventricles.     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  No worsening intracranial mass effect.      [BERNY]   1826 WBC: 6.90 [BERNY]   1826 Hemoglobin: 14.7 [BERNY]   1826 Platelet Count: 153 [BERNY]   1826 Immature Granulocytes: 0.3 [BERNY]   1826 Gran # (ANC): 4.5 [BERNY]   1843 Comprehensive metabolic panel(!)  Unremarkable [BERNY]      ED Course User Index  [BERNY] Griffin Lemus MD                           Clinical Impression:  Final diagnoses:  [R51.9, G89.29] Chronic intractable headache, unspecified headache type (Primary)  [Z98.2] S/P  shunt          ED Disposition Condition    Admit Stable                  [1]   Social History  Tobacco Use    Smoking status: Former     Current packs/day: 0.00     Average packs/day: 1 pack/day for 25.0 years (25.0 ttl pk-yrs)     Types: Cigarettes     Start date: 1947     Quit date: 1972     Years since quittin.2    Smokeless tobacco: Never    Tobacco comments:     Patient Quit Smoking on 1972.    Substance Use Topics    Alcohol use: Not Currently     Alcohol/week: 3.0 standard drinks of alcohol     Types: 2 Glasses of wine, 1 Cans of beer per week     Comment: Occasional    Drug use: Never        Griffin Lemus MD  Resident  03/21/25 2040

## 2025-03-21 NOTE — ED NOTES
Pt on continuous cardiac monitoring, continuous pulse oximetry, and automatic BP cuff cycling Q15min. Pt in hospital gown, side rails up X2, bed low and locked, and call light is placed within reach. Wife at bedside at this time. Pt denies any complaints or needs.

## 2025-03-21 NOTE — TELEPHONE ENCOUNTER
Emeli JACINTO called patient's wife to discuss symptoms. Pt is having a severe headache today.  Dr. Blankenship updated on pts condition.  Pts wife was instructed to bring patient to the ER Kulwinder velásquez per Dr. Blankenship. Pts spouse marcela

## 2025-03-21 NOTE — FIRST PROVIDER EVALUATION
Medical screening examination initiated.  I have conducted a focused provider triage encounter, findings are as follows:    Brief history of present illness:   shunt placed feb 6th, sent in to have it clamped off by nsgy. Reports severe headaches. No fevers.     Emeli JACINTO called patient's wife to discuss symptoms. Pt is having a severe headache today. Dr. Blankenship updated on pts condition. Pts wife was instructed to bring patient to the ER Kulwinder velásquez per Dr. Blankenship. Pts spouse stephania.     Vitals:    03/21/25 1543   BP: (!) 165/74   BP Location: Right arm   Pulse: 60   Resp: 16   Temp: 97.5 °F (36.4 °C)   TempSrc: Oral   SpO2: 95%   Weight: 70.3 kg (155 lb)       Pertinent physical exam:  awake and alerte. Nontoxic. Grossly intact neuro exam    Brief workup plan:  shunt eval    Preliminary workup initiated; this workup will be continued and followed by the physician or advanced practice provider that is assigned to the patient when roomed.

## 2025-03-22 ENCOUNTER — ANESTHESIA EVENT (OUTPATIENT)
Dept: SURGERY | Facility: HOSPITAL | Age: 85
End: 2025-03-22
Payer: MEDICARE

## 2025-03-22 LAB
ABO + RH BLD: NORMAL
ABSOLUTE EOSINOPHIL (OHS): 0.09 K/UL
ABSOLUTE MONOCYTE (OHS): 0.81 K/UL (ref 0.3–1)
ABSOLUTE NEUTROPHIL COUNT (OHS): 4.03 K/UL (ref 1.8–7.7)
ANION GAP (OHS): 8 MMOL/L (ref 8–16)
BASOPHILS # BLD AUTO: 0.03 K/UL
BASOPHILS NFR BLD AUTO: 0.5 %
BLD GP AB SCN CELLS X3 SERPL QL: NORMAL
BUN SERPL-MCNC: 21 MG/DL (ref 8–23)
CALCIUM SERPL-MCNC: 8.6 MG/DL (ref 8.7–10.5)
CHLORIDE SERPL-SCNC: 107 MMOL/L (ref 95–110)
CO2 SERPL-SCNC: 28 MMOL/L (ref 23–29)
CREAT SERPL-MCNC: 1.1 MG/DL (ref 0.5–1.4)
ERYTHROCYTE [DISTWIDTH] IN BLOOD BY AUTOMATED COUNT: 13 % (ref 11.5–14.5)
GFR SERPLBLD CREATININE-BSD FMLA CKD-EPI: >60 ML/MIN/1.73/M2
GLUCOSE SERPL-MCNC: 71 MG/DL (ref 70–110)
HCT VFR BLD AUTO: 41 % (ref 40–54)
HGB BLD-MCNC: 13.1 GM/DL (ref 14–18)
IMM GRANULOCYTES # BLD AUTO: 0.03 K/UL (ref 0–0.04)
IMM GRANULOCYTES NFR BLD AUTO: 0.5 % (ref 0–0.5)
LYMPHOCYTES # BLD AUTO: 1.38 K/UL (ref 1–4.8)
MCH RBC QN AUTO: 30.5 PG (ref 27–50)
MCHC RBC AUTO-ENTMCNC: 32 G/DL (ref 32–36)
MCV RBC AUTO: 95 FL (ref 82–98)
NUCLEATED RBC (/100WBC) (OHS): 0 /100 WBC
PLATELET # BLD AUTO: 137 K/UL (ref 150–450)
PMV BLD AUTO: 11.6 FL (ref 9.2–12.9)
POTASSIUM SERPL-SCNC: 3.8 MMOL/L (ref 3.5–5.1)
RBC # BLD AUTO: 4.3 M/UL (ref 4.6–6.2)
RELATIVE EOSINOPHIL (OHS): 1.4 %
RELATIVE LYMPHOCYTE (OHS): 21.7 % (ref 18–48)
RELATIVE MONOCYTE (OHS): 12.7 % (ref 4–15)
RELATIVE NEUTROPHIL (OHS): 63.2 % (ref 38–73)
SODIUM SERPL-SCNC: 143 MMOL/L (ref 136–145)
SPECIMEN OUTDATE: NORMAL
WBC # BLD AUTO: 6.37 K/UL (ref 3.9–12.7)

## 2025-03-22 PROCEDURE — 82310 ASSAY OF CALCIUM: CPT | Performed by: STUDENT IN AN ORGANIZED HEALTH CARE EDUCATION/TRAINING PROGRAM

## 2025-03-22 PROCEDURE — 36415 COLL VENOUS BLD VENIPUNCTURE: CPT | Performed by: STUDENT IN AN ORGANIZED HEALTH CARE EDUCATION/TRAINING PROGRAM

## 2025-03-22 PROCEDURE — 85025 COMPLETE CBC W/AUTO DIFF WBC: CPT | Performed by: STUDENT IN AN ORGANIZED HEALTH CARE EDUCATION/TRAINING PROGRAM

## 2025-03-22 PROCEDURE — 99233 SBSQ HOSP IP/OBS HIGH 50: CPT | Mod: 24,,, | Performed by: NEUROLOGICAL SURGERY

## 2025-03-22 PROCEDURE — 11000001 HC ACUTE MED/SURG PRIVATE ROOM

## 2025-03-22 PROCEDURE — 25000003 PHARM REV CODE 250: Performed by: STUDENT IN AN ORGANIZED HEALTH CARE EDUCATION/TRAINING PROGRAM

## 2025-03-22 RX ADMIN — BUTALBITAL, ACETAMINOPHEN, AND CAFFEINE 2 TABLET: 325; 50; 40 TABLET ORAL at 08:03

## 2025-03-22 RX ADMIN — AMITRIPTYLINE HYDROCHLORIDE 10 MG: 10 TABLET, FILM COATED ORAL at 08:03

## 2025-03-22 RX ADMIN — GABAPENTIN 100 MG: 100 CAPSULE ORAL at 03:03

## 2025-03-22 RX ADMIN — POLYETHYLENE GLYCOL 3350 17 G: 17 POWDER, FOR SOLUTION ORAL at 08:03

## 2025-03-22 RX ADMIN — GABAPENTIN 100 MG: 100 CAPSULE ORAL at 08:03

## 2025-03-22 RX ADMIN — MEMANTINE 10 MG: 10 TABLET ORAL at 08:03

## 2025-03-22 RX ADMIN — METOPROLOL SUCCINATE 25 MG: 25 TABLET, EXTENDED RELEASE ORAL at 08:03

## 2025-03-22 RX ADMIN — LEVOTHYROXINE SODIUM 125 MCG: 0.12 TABLET ORAL at 06:03

## 2025-03-22 RX ADMIN — PANTOPRAZOLE SODIUM 40 MG: 40 TABLET, DELAYED RELEASE ORAL at 08:03

## 2025-03-22 RX ADMIN — TAMSULOSIN HYDROCHLORIDE 0.4 MG: 0.4 CAPSULE ORAL at 08:03

## 2025-03-22 RX ADMIN — AMLODIPINE BESYLATE 2.5 MG: 2.5 TABLET ORAL at 08:03

## 2025-03-22 RX ADMIN — DOCUSATE SODIUM 100 MG: 100 CAPSULE, LIQUID FILLED ORAL at 08:03

## 2025-03-22 RX ADMIN — PRAVASTATIN SODIUM 20 MG: 20 TABLET ORAL at 08:03

## 2025-03-22 NOTE — ASSESSMENT & PLAN NOTE
This is a 85-year-old male with a history of NPH status post right-sided  shunt on 02/06/2025 by Dr. Ojeda, who presents with several days of worsening bitemporal headache.  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  CT head obtained today demonstrates interval stability of the bilateral subdural hygromas.  When the patient saw Emeli Rivero in clinic on 03/07/2025 the shunt was reprogrammed to 200.  He was and remains neurologically intact.    Plan:  --admit neurosurgery with q.4 hours neuro checks   --all imaging and diagnostics reviewed  --SBP less than 160  --Sodium greater than 135  --regular diet, NPO Sunday at midnight  --Weston hose and SCD, hold home Eliquis and chemical VTE prophylaxis  --tentative plan for OR on Monday for shunt tile clavicle, plus or minus bobby holes for hygroma drainage.    Plan discussed with Dr. Blankenship.

## 2025-03-22 NOTE — HPI
The patient is an 85-year-old male with a past medical history of normal pressure hydrocephalus status post insertion of a right-sided ventriculoperitoneal shunt on 02/06 2025, who presents with several days of bitemporal headache.  Patient was seen in clinic by Dr. Glenn Rivero on 3 7 and 319.  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  The patient denies fever/chills, HA, vision/hearing changes, dysphagia, dysarthria, nausea/vomiting, new-onset weakness or sensory change, bowel/bladder changes.

## 2025-03-22 NOTE — CONSULTS
[facility] - Emergency Dept  Neurosurgery  Consult Note    Inpatient consult to Neurosurgery  Consult performed by: Raymond Roach MD  Consult ordered by: Griffin Lemus MD        Subjective:     Chief Complaint/Reason for Admission: Subdural Hygroma s/p  Shunt    History of Present Illness: The patient is an 85-year-old male with a past medical history of normal pressure hydrocephalus status post insertion of a right-sided ventriculoperitoneal shunt on 02/06 2025, who presents with several days of bitemporal headache.  Patient was seen in clinic by Dr. Glenn Rivero on 3 7 and 319.  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  The patient denies fever/chills, HA, vision/hearing changes, dysphagia, dysarthria, nausea/vomiting, new-onset weakness or sensory change, bowel/bladder changes.       Prescriptions Prior to Admission[1]    Review of patient's allergies indicates:   Allergen Reactions    Benazepril Other (See Comments)     Cough       Past Medical History:   Diagnosis Date    Allergies 01/31/2025    Anemia     Anticoagulant long-term use     Anxiety     Atrophic kidney 11/16/2012    BPH (benign prostatic hyperplasia) 11/16/2012    Congenital absence of right kidney 07/05/2017    DDD (degenerative disc disease), cervical 07/05/2017    x-ray 7/17 - Severe    Ex-smoker 12/20/2018    Exudative age-related macular degeneration, right eye, with active choroidal neovascularization 02/15/2024    GERD (gastroesophageal reflux disease) 11/16/2012    Glaucoma 11/16/2012    HTN (hypertension) 11/16/2012    Hypothyroid 11/16/2012    Internal carotid artery stenosis 11/16/2012    Iron deficiency anemia due to chronic blood loss 10/29/2021    Left ventricular diastolic dysfunction,  NYHA class 1 04/16/2015    4/15    Low serum testosterone level 11/16/2012    Normal cardiac stress test 11/16/2012    NPH (normal pressure hydrocephalus) 02/03/2025    Osteopenia 11/16/2012    PAF (paroxysmal atrial fibrillation) 12/20/2018    Prostate CA 01/15/2013    XRT 12/12  Dr. 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    Shingles 11/16/2012    Skin disease 2020    Seems to have started after Covid vaccine    Stage 3a chronic kidney disease 10/06/2014    Stroke 2017    tia   no residual    Thrombocytopenia 01/31/2025    TIA (transient ischemic attack) 11/16/2012    UC (ulcerative colitis) 11/16/2012     Past Surgical History:   Procedure Laterality Date    ADENOIDECTOMY      COLONOSCOPY N/A 03/25/2022    Procedure: COLONOSCOPY;  Surgeon: Ashley Nguyen MD;  Location: [facility];  Service: Endoscopy;  Laterality: N/A;    ESOPHAGOGASTRODUODENOSCOPY N/A 03/25/2022    Procedure: EGD (ESOPHAGOGASTRODUODENOSCOPY);  Surgeon: Ashley Nguyen MD;  Location: [facility];  Service: Endoscopy;  Laterality: N/A;  added on per Dr. Nguyen    ESOPHAGOGASTRODUODENOSCOPY N/A 6/7/2024    Procedure: EGD (ESOPHAGOGASTRODUODENOSCOPY);  Surgeon: Audie Snell MD;  Location: [facility] (4TH FLR);  Service: Endoscopy;  Laterality: N/A;  5/21 R/s,sent updated instr via portal.pt informed to hold eliquis for 2 days.AC  Ref by: ,  approved to hold Eliquis (apixaban) for 2 days per Dr. Conklin-see media file  5/9/24-GT  5/31-pre call complete-tb    EYE SURGERY Right 11/2020    cataract    HERNIA REPAIR      LEFT HEART CATHETERIZATION Right 10/29/2021    Procedure: CATHETERIZATION, HEART, LEFT AND RIGHT  - LV DARNELL POSSIBLE;  Surgeon: Beau Conklin MD;  Location: [facility] CATH LAB;  Service: Cardiology;  Laterality: Right;    LUMBAR PUNCTURE N/A 1/13/2025    Procedure: Lumbar Puncture;  Surgeon: Jairo Blankenship MD;  Location: [facility] OR 2ND FLR;  Service: Neurosurgery;  Laterality: N/A;     RIGHT HEART CATHETERIZATION Right 10/29/2021    Procedure: INSERTION, CATHETER, RIGHT HEART;  Surgeon: Beau Conklin MD;  Location: [facility] CATH LAB;  Service: Cardiology;  Laterality: Right;    SKIN BIOPSY  2020    TONSILLECTOMY      VENTRICULOPERITONEAL SHUNT Right 2025    Procedure: INSERTION, SHUNT, VENTRICULOPERITONEAL;  Surgeon: Jairo Blankenship MD;  [facility];  Service: Neurosurgery;  Laterality: Right;    VENTRICULOPERITONEAL SHUNT  2025    Procedure: INSERTION, SHUNT, VENTRICULOPERITONEAL;  Surgeon: Shar Elizabeth MD;  Location: [facility] OR [address] [place] [address];  Service: General;;     Family History       Problem Relation (Age of Onset)    Alzheimer's disease Mother    Cancer Brother    Diabetes Brother    Heart attack Father    Heart disease Brother (56)    Hypertension Mother, Brother          Tobacco Use    Smoking status: Former     Current packs/day: 0.00     Average packs/day: 1 pack/day for 25.0 years (25.0 ttl pk-yrs)     Types: Cigarettes     Start date: 1947     Quit date: 1972     Years since quittin.2    Smokeless tobacco: Never    Tobacco comments:     Patient Quit Smoking on 1972.   Substance and Sexual Activity    Alcohol use: Not Currently     Alcohol/week: 3.0 standard drinks of alcohol     Types: 2 Glasses of wine, 1 Cans of beer per week     Comment: Occasional    Drug use: Never    Sexual activity: Not Currently     Partners: Female     Birth control/protection: None     Review of Systems: see HPI.  Objective:     Weight: 67.1 kg (148 lb)  Body mass index is 20.64 kg/m².  Vital Signs (Most Recent):  Temp: 97.7 °F (36.5 °C) (25)  Pulse: (!) 59 (25)  Resp: (!) 22 (25)  BP: (!) 175/74 (25)  SpO2: 99 % (25) Vital Signs (24h Range):  Temp:  [97.5 °F (36.4 °C)-97.7 °F (36.5 °C)] 97.7 °F (36.5 °C)  Pulse:  [59-61] 59  Resp:  [16-29] 22  SpO2:  [95 %-100 %] 99 %  BP: (165-175)/(74-78) 175/74            "                      Physical Exam         Neurosurgery Physical Exam    GENERAL: resting comfortably  HEENT: NCAT, PERRL, mucous membranes moist  NECK: supple, trachea midline  CV: normal capillary refill  PULM: aerating well, symmetric expansion, no distress  ABD: soft, NT, ND  EXT: no c/c/e    NEURO:    AAO x 3  CN II-XII grossly intact; very hard of hearing  Fc x 4 antigravity  SILT    No drift or dysmetria    R VPS valve reservoir pumps and refills briskly      Significant Labs:  Recent Labs   Lab 03/21/25  1802         K 4.0      CO2 27   BUN 27*   CREATININE 1.3   CALCIUM 9.3     Recent Labs   Lab 03/21/25  1802   WBC 6.90   HGB 14.7   HCT 46.2        No results for input(s): "LABPT", "INR", "APTT" in the last 48 hours.  Microbiology Results (last 7 days)       ** No results found for the last 168 hours. **          All pertinent labs from the last 24 hours have been reviewed.    Significant Diagnostics:  I have reviewed and interpreted all pertinent imaging results/findings within the past 24 hours.  CT Head Without Contrast  Result Date: 3/21/2025  No significant interval detrimental change.  No intracranial hemorrhage or major vascular distribution infarct. Right-sided ventriculostomy shunt in place with unchanged size and configuration of the ventricles. 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  No worsening intracranial mass effect. Electronically signed by resident: Mihir Moody Date:    03/21/2025 Time:    17:50 Electronically signed by: Allen Rey MD Date:    03/21/2025 Time:    17:59    Assessment/Plan:     S/P ventriculoperitoneal shunt  This is a 85-year-old male with a history of NPH status post right-sided  shunt on 02/06/2025 by Dr. Ojeda, who presents with several days of worsening bitemporal headache.  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  CT head " obtained today demonstrates interval stability of the bilateral subdural hygromas.  When the patient saw Emeli Rivero in clinic on 03/07/2025 the shunt was reprogrammed to 200.  He was and remains neurologically intact.    Plan:  --admit neurosurgery with q.4 hours neuro checks   --all imaging and diagnostics reviewed  --SBP less than 160  --Sodium greater than 135  --regular diet, NPO Sunday at midnight  --[place] hose and SCD, hold home Eliquis and chemical VTE prophylaxis  --tentative plan for OR on Monday for shunt tile clavicle, plus or minus bobby holes for hygroma drainage.    Plan discussed with Dr. Blankenship.        Thank you for your consult. I will follow-up with patient. Please contact us if you have any additional questions.    Raymond Roach MD  Neurosurgery  [facility] - Emergency Dept         [1] (Not in a hospital admission)

## 2025-03-22 NOTE — SUBJECTIVE & OBJECTIVE
Prescriptions Prior to Admission[1]    Review of patient's allergies indicates:   Allergen Reactions    Benazepril Other (See Comments)     Cough       Past Medical History:   Diagnosis Date    Allergies 01/31/2025    Anemia     Anticoagulant long-term use     Anxiety     Atrophic kidney 11/16/2012    BPH (benign prostatic hyperplasia) 11/16/2012    Congenital absence of right kidney 07/05/2017    DDD (degenerative disc disease), cervical 07/05/2017    x-ray 7/17 - Severe    Ex-smoker 12/20/2018    Exudative age-related macular degeneration, right eye, with active choroidal neovascularization 02/15/2024    GERD (gastroesophageal reflux disease) 11/16/2012    Glaucoma 11/16/2012    HTN (hypertension) 11/16/2012    Hypothyroid 11/16/2012    Internal carotid artery stenosis 11/16/2012    Iron deficiency anemia due to chronic blood loss 10/29/2021    Left ventricular diastolic dysfunction, NYHA class 1 04/16/2015    4/15    Low serum testosterone level 11/16/2012    Normal cardiac stress test 11/16/2012    NPH (normal pressure hydrocephalus) 02/03/2025    Osteopenia 11/16/2012    PAF (paroxysmal atrial fibrillation) 12/20/2018    Prostate CA 01/15/2013    XRT 12/12  Dr. 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    Shingles 11/16/2012    Skin disease 2020    Seems to have started after Covid vaccine    Stage 3a chronic kidney disease 10/06/2014    Stroke 2017    tia   no residual    Thrombocytopenia 01/31/2025    TIA (transient ischemic attack) 11/16/2012    UC (ulcerative colitis) 11/16/2012     Past Surgical History:   Procedure Laterality Date    ADENOIDECTOMY      COLONOSCOPY N/A 03/25/2022    Procedure: COLONOSCOPY;  Surgeon: Ashley Nguyen MD;  Location: [facility] ENDO;  Service: Endoscopy;  Laterality: N/A;    ESOPHAGOGASTRODUODENOSCOPY N/A 03/25/2022    Procedure: EGD (ESOPHAGOGASTRODUODENOSCOPY);  Surgeon: Ashley Nguyen MD;  Location: [facility] ENDO;  Service: Endoscopy;  Laterality:  N/A;  added on per Dr. Nguyen    ESOPHAGOGASTRODUODENOSCOPY N/A 6/7/2024    Procedure: EGD (ESOPHAGOGASTRODUODENOSCOPY);  Surgeon: Audie Snell MD;  Location: [facility] (4TH FLR);  Service: Endoscopy;  Laterality: N/A;  5/21 R/s,sent updated instr via portal.pt informed to hold eliquis for 2 days.AC  Ref by: ,  approved to hold Eliquis (apixaban) for 2 days per Dr. Conklin-see media file  5/9/24-GT  5/31-pre call complete-tb    EYE SURGERY Right 11/2020    cataract    HERNIA REPAIR      LEFT HEART CATHETERIZATION Right 10/29/2021    Procedure: CATHETERIZATION, HEART, LEFT AND RIGHT  - LV DARNELL POSSIBLE;  Surgeon: Beau Conklin MD;  Location: [facility] CATH LAB;  Service: Cardiology;  Laterality: Right;    LUMBAR PUNCTURE N/A 1/13/2025    Procedure: Lumbar Puncture;  Surgeon: Jairo Blankenship MD;  Location: [facility] OR 2ND FLR;  Service: Neurosurgery;  Laterality: N/A;    RIGHT HEART CATHETERIZATION Right 10/29/2021    Procedure: INSERTION, CATHETER, RIGHT HEART;  Surgeon: Beau Conklin MD;  Location: [facility] CATH LAB;  Service: Cardiology;  Laterality: Right;    SKIN BIOPSY  2020    TONSILLECTOMY      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    Procedure: INSERTION, SHUNT, VENTRICULOPERITONEAL;  Surgeon: Jairo Blankenship MD;  Location: [facility] OR 2ND FLR;  Service: Neurosurgery;  Laterality: Right;    VENTRICULOPERITONEAL SHUNT  2/6/2025    Procedure: INSERTION, SHUNT, VENTRICULOPERITONEAL;  Surgeon: Shar Elizabeth MD;  Location: [facility] OR 2ND FLR;  Service: General;;     Family History       Problem Relation (Age of Onset)    Alzheimer's disease Mother    Cancer Brother    Diabetes Brother    Heart attack Father    Heart disease Brother (56)    Hypertension Mother, Brother          Tobacco Use    Smoking status: Former     Current packs/day: 0.00     Average packs/day: 1 pack/day for 25.0 years (25.0 ttl pk-yrs)     Types: Cigarettes     Start date: 1[birth year]1/1947     Quit date: 1/1/1972     Years  "since quittin.2    Smokeless tobacco: Never    Tobacco comments:     Patient Quit Smoking on 1972.   Substance and Sexual Activity    Alcohol use: Not Currently     Alcohol/week: 3.0 standard drinks of alcohol     Types: 2 Glasses of wine, 1 Cans of beer per week     Comment: Occasional    Drug use: Never    Sexual activity: Not Currently     Partners: Female     Birth control/protection: None     Review of Systems: see HPI.  Objective:     Weight: 67.1 kg (148 lb)  Body mass index is 20.64 kg/m².  Vital Signs (Most Recent):  Temp: 97.7 °F (36.5 °C) (25)  Pulse: (!) 59 (25)  Resp: (!) 22 (25)  BP: (!) 175/74 (25)  SpO2: 99 % (25) Vital Signs (24h Range):  Temp:  [97.5 °F (36.4 °C)-97.7 °F (36.5 °C)] 97.7 °F (36.5 °C)  Pulse:  [59-61] 59  Resp:  [16-29] 22  SpO2:  [95 %-100 %] 99 %  BP: (165-175)/(74-78) 175/74                                 Physical Exam         Neurosurgery Physical Exam    GENERAL: resting comfortably  HEENT: NCAT, PERRL, mucous membranes moist  NECK: supple, trachea midline  CV: normal capillary refill  PULM: aerating well, symmetric expansion, no distress  ABD: soft, NT, ND  EXT: no c/c/e    NEURO:    AAO x 3  CN II-XII grossly intact; very hard of hearing  Fc x 4 antigravity  SILT    No drift or dysmetria    R VPS valve reservoir pumps and refills briskly      Significant Labs:  Recent Labs   Lab 25  1802         K 4.0      CO2 27   BUN 27*   CREATININE 1.3   CALCIUM 9.3     Recent Labs   Lab 25  1802   WBC 6.90   HGB 14.7   HCT 46.2        No results for input(s): "LABPT", "INR", "APTT" in the last 48 hours.  Microbiology Results (last 7 days)       ** No results found for the last 168 hours. **          All pertinent labs from the last 24 hours have been reviewed.    Significant Diagnostics:  I have reviewed and interpreted all pertinent imaging results/findings within the past 24 " hours.  CT Head Without Contrast  Result Date: 3/21/2025  No significant interval detrimental change.  No intracranial hemorrhage or major vascular distribution infarct. Right-sided ventriculostomy shunt in place with unchanged size and configuration of the ventricles. 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  No worsening intracranial mass effect. Electronically signed by resident: Mihir Moody Date:    03/21/2025 Time:    17:50 Electronically signed by: Allen Rey MD Date:    03/21/2025 Time:    17:59         [1] (Not in a hospital admission)

## 2025-03-22 NOTE — SUBJECTIVE & OBJECTIVE
Interval History: 3/22: NAEON. Neuro exam stable. Pain controlled. Will plan for OR on Monday.    Medications:  Continuous Infusions:   [START ON 3/23/2025] 0.9% NaCl   Intravenous Continuous         Scheduled Meds:   amitriptyline  10 mg Oral QHS    amLODIPine  2.5 mg Oral Nightly    carbamide peroxide  5 drop Left Ear BID    cetirizine  10 mg Oral Daily    docusate sodium  100 mg Oral BID    gabapentin  100 mg Oral TID    levothyroxine  125 mcg Oral Before breakfast    memantine  10 mg Oral BID    metoprolol succinate  25 mg Oral Daily    pantoprazole  40 mg Oral Daily    polyethylene glycol  17 g Oral Daily    pravastatin  20 mg Oral QHS    tamsulosin  0.4 mg Oral Daily     PRN Meds:  Current Facility-Administered Medications:     butalbital-acetaminophen-caffeine -40 mg, 2 tablet, Oral, Q6H PRN    dextrose 50%, 12.5 g, Intravenous, PRN    dextrose 50%, 25 g, Intravenous, PRN    glucagon (human recombinant), 1 mg, Intramuscular, PRN    glucose, 16 g, Oral, PRN    glucose, 16 g, Oral, PRN    glucose, 24 g, Oral, PRN    HYDROmorphone, 0.5 mg, Intravenous, Q6H PRN    insulin aspart U-100, 0-5 Units, Subcutaneous, QID (AC + HS) PRN    melatonin, 6 mg, Oral, Nightly PRN    ondansetron, 4 mg, Intravenous, Q6H PRN     Review of Systems  Objective:     Weight: 67.9 kg (149 lb 11.1 oz)  Body mass index is 20.88 kg/m².  Vital Signs (Most Recent):  Temp: 97.7 °F (36.5 °C) (03/22/25 0809)  Pulse: (!) 57 (03/22/25 0812)  Resp: 18 (03/22/25 0809)  BP: (!) 156/77 (03/22/25 0812)  SpO2: 96 % (03/22/25 0809) Vital Signs (24h Range):  Temp:  [97.5 °F (36.4 °C)-98.2 °F (36.8 °C)] 97.7 °F (36.5 °C)  Pulse:  [52-63] 57  Resp:  [16-29] 18  SpO2:  [95 %-100 %] 96 %  BP: (156-176)/(64-83) 156/77                                 Physical Exam         Neurosurgery Physical Exam    GENERAL: resting comfortably  HEENT: NCAT, PERRL, mucous membranes moist  NECK: supple, trachea midline  CV: normal capillary refill  PULM: aerating well,  symmetric expansion, no distress  ABD: soft, NT, ND  EXT: no c/c/e     NEURO:     AAO x 3  CN II-XII grossly intact; very hard of hearing  Fc x 4 antigravity  SILT     No drift or dysmetria     R VPS valve reservoir pumps and refills briskly    Significant Labs:  Recent Labs   Lab 03/21/25  1802 03/22/25  0539     --     143   K 4.0 3.8    107   CO2 27 28   BUN 27* 21   CREATININE 1.3 1.1   CALCIUM 9.3 8.6*     Recent Labs   Lab 03/21/25  1802 03/22/25  0539   WBC 6.90 6.37   HGB 14.7 13.1*   HCT 46.2 41.0    137*     Recent Labs   Lab 03/21/25  2235   INR 1.1   APTT 30.8     Microbiology Results (last 7 days)       ** No results found for the last 168 hours. **          All pertinent labs from the last 24 hours have been reviewed.    Significant Diagnostics:  I have reviewed all pertinent imaging results/findings within the past 24 hours.

## 2025-03-22 NOTE — ANESTHESIA PREPROCEDURE EVALUATION
[facility]  Anesthesia Pre-Operative Evaluation         Patient Name: Ramesh Ricci  YOB: 1940  MRN: [id number]    SUBJECTIVE:     Pre-operative evaluation for Procedure(s) (LRB):  EVACUATION, HEMATOMA, SUBDURAL (Bilateral)     03/22/2025    Ramesh Ricci is a 85 y.o. 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    Patient now presents for the above procedure(s).      TTE: 1/24/23  The left ventricle is normal in size with concentric hypertrophy and normal systolic function.  The estimated ejection fraction is 70%.  Grade II left ventricular diastolic dysfunction.  Normal right ventricular size with normal right ventricular systolic function.  Severe left atrial enlargement.  There is moderate-to-severe aortic valve stenosis.  Aortic valve area is 0.94 cm2; peak velocity is 3.94 m/s; mean gradient is 34 mmHg.  There is mild mitral stenosis.  Mild mitral regurgitation.  Mild tricuspid regurgitation.  Normal central venous pressure (3 mmHg).  The estimated PA systolic pressure is 29 mmHg.    LDA:        Peripheral IV - Single Lumen 03/21/25 1802 20 G Anterior;Right Forearm (Active)   Site Assessment Clean;Dry;Intact 03/22/25 0400   Line Securement Device Secured with sutures 03/21/25 2240   Extremity Assessment Distal to IV No abnormal discoloration;No redness;No swelling;No warmth 03/21/25 2240   Line Status Flushed;Saline locked 03/22/25 0400   Dressing Status Clean;Dry;Intact 03/22/25 0400   Dressing Intervention Integrity maintained 03/22/25 0400   Dressing Change Due 03/24/25  03/21/25 2240   Site Change Due 03/24/25 03/21/25 2240   Reason Not Rotated Not due 03/21/25 2240   Number of days: 0       Prev airway:   Date/Time: 2/6/2025 8:32 AM     Performed by: Medical Student, Emergency  Authorized by: Sheldon Acuna MD    Intubation:     Induction:  Intravenous    Intubated:  Postinduction    Mask Ventilation:  Easy mask    Attempts:  1    Attempted By:  Resident anesthesiologist and student    Method of Intubation:  Video laryngoscopy    Blade:  Aj 3    Laryngeal View Grade: Grade I - full view of cords      Difficult Airway Encountered?: No      Complications:  None    Airway Device:  Oral endotracheal tube    Airway Device Size:  7.5    Style/Cuff Inflation:  Cuffed (inflated to minimal occlusive pressure)    Tube secured:  23    Secured at:  The lips    Placement Verified By:  Capnometry    Complicating Factors:  None    Findings Post-Intubation:  BS equal bilateral    Drips: None documented.    Problem List[1]    Review of patient's allergies indicates:   Allergen Reactions    Benazepril Other (See Comments)     Cough       Current Outpatient Medications:  Current Medications[2]    Past Surgical History:   Procedure Laterality Date    ADENOIDECTOMY      COLONOSCOPY N/A 03/25/2022    Procedure: COLONOSCOPY;  Surgeon: Ashley Nguyen MD;  Location: [facility];  Service: Endoscopy;  Laterality: N/A;    ESOPHAGOGASTRODUODENOSCOPY N/A 03/25/2022    Procedure: EGD (ESOPHAGOGASTRODUODENOSCOPY);  Surgeon: Ashley Nguyen MD;  Location: [facility];  Service: Endoscopy;  Laterality: N/A;  added on per Dr. Nguyen    ESOPHAGOGASTRODUODENOSCOPY N/A 6/7/2024    Procedure: EGD (ESOPHAGOGASTRODUODENOSCOPY);  Surgeon: Audie Snell MD;  Location: [address]);  Service: Endoscopy;  Laterality: N/A;  5/21 R/s,sent updated instr via portal.pt informed to hold eliquis for 2 days.AC  Ref by: ,  approved to hold Eliquis (apixaban) for 2 days per Dr. Conklin-see media file   5/9/24-GT  5/31-pre call complete-tb    EYE SURGERY Right 11/2020    cataract    HERNIA REPAIR      LEFT HEART CATHETERIZATION Right 10/29/2021    Procedure: CATHETERIZATION, HEART, LEFT AND RIGHT  - LV DARNELL POSSIBLE;  Surgeon: Beau Conklin MD;  Location: [facility] CATH LAB;  Service: Cardiology;  Laterality: Right;    LUMBAR PUNCTURE N/A 1/13/2025    Procedure: Lumbar Puncture;  Surgeon: Jairo Blankenship MD;  [facility];  Service: Neurosurgery;  Laterality: N/A;    RIGHT HEART CATHETERIZATION Right 10/29/2021    Procedure: INSERTION, CATHETER, RIGHT HEART;  Surgeon: Beau Conklin MD;  Location: [facility] CATH LAB;  Service: Cardiology;  Laterality: Right;    SKIN BIOPSY  2020    TONSILLECTOMY      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    Procedure: INSERTION, SHUNT, VENTRICULOPERITONEAL;  Surgeon: Jairo Blankenship MD;  [facility];  Service: Neurosurgery;  Laterality: Right;    VENTRICULOPERITONEAL SHUNT  2/6/2025    Procedure: INSERTION, SHUNT, VENTRICULOPERITONEAL;  Surgeon: Shar Elizabeth MD;  [facility];  Service: General;;       Social History[3]    OBJECTIVE:     Vital Signs Range (Last 24H):  Temp:  [36.4 °C (97.6 °F)-36.8 °C (98.2 °F)]   Pulse:  [52-63]   Resp:  [16-29]   BP: (130-176)/(64-83)   SpO2:  [95 %-100 %]       Significant Labs:  Lab Results   Component Value Date    WBC 6.37 03/22/2025    HGB 13.1 (L) 03/22/2025    HCT 41.0 03/22/2025     (L) 03/22/2025    CHOL 200 (H) 08/13/2024    TRIG 142 08/13/2024    HDL 62 08/13/2024    ALT <5 (L) 03/21/2025    AST 15 03/21/2025     03/22/2025    K 3.8 03/22/2025     03/22/2025    CREATININE 1.1 03/22/2025    BUN 21 03/22/2025    CO2 28 03/22/2025    TSH 0.633 02/09/2025    PSA 1.1 08/13/2024    INR 1.1 03/21/2025    HGBA1C 5.4 08/13/2024       Diagnostic Studies: No relevant studies.    EKG:   Results for orders placed or performed during the hospital encounter of 02/09/25   EKG  12-lead    Collection Time: 02/09/25 10:41 AM   Result Value Ref Range    QRS Duration 100 ms    [facility] QTC Calculation 445 ms    Narrative    Test Reason : Z00.8,    Vent. Rate :  56 BPM     Atrial Rate :  56 BPM     P-R Int : 142 ms          QRS Dur : 100 ms      QT Int : 462 ms       P-R-T Axes :  11  27  87 degrees    QTcB Int : 445 ms    Sinus bradycardia with occasional Premature ventricular complexes  Anteroseptal infarct Abnormal ECG  When compared with ECG of 18-Oct-2024 19:20,  Significant changes have occurred  Confirmed by Afia Pradhan ([id number]) on 2/12/2025 8:24:24 PM    Referred By: [email or web address] SELF           Confirmed By: Afia Pradhan       2D ECHO:  TTE:  Results for orders placed or performed during the hospital encounter of 01/23/23   Echo   Result Value Ref Range    BSA 1.87 m2    TDI SEPTAL 0.04 m/s    LV LATERAL E/E' RATIO 25.20 m/s    LV SEPTAL E/E' RATIO 31.50 m/s    IVC diameter 1.53 cm    Left Ventricular Outflow Tract Mean Velocity 0.67 cm/s    Left Ventricular Outflow Tract Mean Gradient 2.05 mmHg    TDI LATERAL 0.05 m/s    PV PEAK VELOCITY 1.16 cm/s    LVIDd 5.19 3.5 - 6.0 cm    IVS 1.21 (A) 0.6 - 1.1 cm    PW 1.24 (A) 0.6 - 1.1 cm    LVIDs 3.93 2.1 - 4.0 cm    FS 24 28 - 44 %    Sinus 3.62 cm    STJ 2.55 cm    LV mass 255.31 g    LA size 4.33 cm    RVDD 3.90 cm    TAPSE 2.09 cm    RV S' 0.01 cm/s    Left Ventricle Relative Wall Thickness 0.48 cm    AV mean gradient 34 mmHg    AV valve area 0.94 cm2    AV Velocity Ratio 0.24     AV index (prosthetic) 0.27     MV valve area p 1/2 method 1.82 cm2    E/A ratio 0.89     Mean e' 0.05 m/s    E wave deceleration time 415.94 msec    IVRT 114.18 msec    LVOT diameter 2.09 cm    LVOT area 3.4 cm2    LVOT peak angel luis 0.96 m/s    LVOT peak VTI 20.10 cm    Ao peak angel luis 3.94 m/s    Ao VTI 73.2 cm    LVOT stroke volume 68.92 cm3    AV peak gradient 62 mmHg    E/E' ratio 28.00 m/s    MV Peak E Angel Luis 1.26 m/s    TR Max Angel Luis 2.55 m/s    MV stenosis  pressure 1/2 time 120.62 ms    MV Peak A Angel Luis 1.42 m/s    LV Systolic Volume 67.33 mL    LV Systolic Volume Index 35.6 mL/m2    LV Diastolic Volume 128.95 mL    LV Diastolic Volume Index 68.23 mL/m2    LV Mass Index 135 g/m2    RA Major Axis 4.81 cm    Left Atrium Minor Axis 7.50 cm    Left Atrium Major Axis 7.39 cm    Triscuspid Valve Regurgitation Peak Gradient 26 mmHg    LA WIDTH 6.60 cm    LA Vol 180.84 cm3    ROSALES 95.7 mL/m2    RA Width 4.10 cm    Right Atrial Pressure (from IVC) 3 mmHg    EF 70 %    TV resting pulmonary artery pressure 29 mmHg    Narrative    · The left ventricle is normal in size with concentric hypertrophy and   normal systolic function.  · The estimated ejection fraction is 70%.  · Grade II left ventricular diastolic dysfunction.  · Normal right ventricular size with normal right ventricular systolic   function.  · Severe left atrial enlargement.  · There is moderate-to-severe aortic valve stenosis.  · Aortic valve area is 0.94 cm2; peak velocity is 3.94 m/s; mean gradient   is 34 mmHg.  · There is mild mitral stenosis.  · Mild mitral regurgitation.  · Mild tricuspid regurgitation.  · Normal central venous pressure (3 mmHg).  · The estimated PA systolic pressure is 29 mmHg.          CHRIST:  No results found for this or any previous visit.    ASSESSMENT/PLAN:                                                                                                                  03/22/2025  Ramesh Ricci is a 85 y.o., male.      Pre-op Assessment    I have reviewed the Patient Summary Reports.     I have reviewed the Nursing Notes. I have reviewed the NPO Status.   I have reviewed the Medications.     Review of Systems  Anesthesia Hx:  No problems with previous Anesthesia   History of prior surgery of interest to airway management or planning:          Denies Family Hx of Anesthesia complications.    Denies Personal Hx of Anesthesia complications.                    Social:  Former Smoker        Hematology/Oncology:       -- Anemia:                                  Cardiovascular:     Hypertension   CAD                                          Renal/:  Chronic Renal Disease                Hepatic/GI:     GERD                Neurological:  TIA, CVA   Headaches                                 Endocrine:   Hypothyroidism              Physical Exam  General: Well nourished, Cooperative, Alert and Oriented    Airway:  Mallampati: III / II  Mouth Opening: Normal  TM Distance: Normal  Tongue: Normal  Neck ROM: Normal ROM    Dental:  Periodontal disease, Intact    Chest/Lungs:  Clear to auscultation, Normal Respiratory Rate    Heart:  Rate: Normal  Rhythm: Regular Rhythm        Anesthesia Plan  Type of Anesthesia, risks & benefits discussed:    Anesthesia Type: Gen ETT  Intra-op Monitoring Plan: Standard ASA Monitors and Art Line  Post Op Pain Control Plan: multimodal analgesia and IV/PO Opioids PRN  Induction:  IV  Airway Plan: Direct, Post-Induction  Informed Consent: Informed consent signed with the Patient and all parties understand the risks and agree with anesthesia plan.  All questions answered.   ASA Score: 3  Day of Surgery Review of History & Physical: H&P Update referred to the surgeon/provider.    Ready For Surgery From Anesthesia Perspective.     .           [1]   Patient Active Problem List  Diagnosis    UC (ulcerative colitis)    Acquired hypothyroidism    BPH (benign prostatic hyperplasia)    Essential hypertension    Glaucoma    Atrophic kidney    Osteopenia    GERD (gastroesophageal reflux disease)    Shingles    TIA (transient ischemic attack)    Internal carotid artery stenosis    Normal cardiac stress test    Abnormal echocardiogram    Prostate CA    Echocardiogram abnormal    Stage 3a chronic kidney disease    Left ventricular diastolic dysfunction, NYHA class 1    Nonrheumatic aortic valve stenosis    Mitral valve stenosis, mild    Primary osteoarthritis of both knees    DDD (degenerative  disc disease), cervical    Lung nodules    Congenital absence of right kidney    History of nuclear stress test    PAF (paroxysmal atrial fibrillation)    Ex-smoker    Other headache syndrome    Intermediate stage nonexudative age-related macular degeneration of both eyes    Hypertensive retinopathy of both eyes    Iron deficiency anemia due to chronic blood loss    Coronary artery disease involving native coronary artery of native heart without angina pectoris    Colitis    Advanced care planning/counseling discussion    Known medical problems    Exudative age-related macular degeneration, right eye, with active choroidal neovascularization    Drug-induced immunodeficiency    Atherosclerosis of aorta    Complex medical condition    S/P TAVR (transcatheter aortic valve replacement)    Impaired functional mobility, balance, gait, and endurance    Anemia    Constipation    Allergies    History of COVID-19    Weight loss    Thrombocytopenia    NPH (normal pressure hydrocephalus)    S/P ventriculoperitoneal shunt    Chronic diastolic heart failure    Hypercoagulability due to atrial fibrillation    Stage 3b chronic kidney disease   [2]   Current Facility-Administered Medications:     [START ON 3/23/2025] 0.9% NaCl infusion, , Intravenous, Continuous, Raymond Roach MD    amitriptyline tablet 10 mg, 10 mg, Oral, QHS, Raymond Roach MD, 10 mg at 03/21/25 2126    amLODIPine tablet 2.5 mg, 2.5 mg, Oral, Nightly, Raymond Roach MD, 2.5 mg at 03/21/25 2126    butalbital-acetaminophen-caffeine -40 mg per tablet 2 tablet, 2 tablet, Oral, Q6H PRN, Raymond Roach MD, 2 tablet at 03/22/25 0811    carbamide peroxide 6.5 % otic solution 5 drop, 5 drop, Left Ear, BID, Raymond Roach MD, 5 drop at 03/21/25 2114    cetirizine tablet 10 mg, 10 mg, Oral, Daily, Raymond Roach MD    dextrose 50% injection 12.5 g, 12.5 g, Intravenous, PRN, Raymond Roach MD    dextrose 50% injection 25 g, 25 g,  Intravenous, PRN, Raymond Roach MD    docusate sodium capsule 100 mg, 100 mg, Oral, BID, Raymond Roach MD, 100 mg at 03/22/25 0812    gabapentin capsule 100 mg, 100 mg, Oral, TID, Raymond Roach MD, 100 mg at 03/22/25 1556    glucagon (human recombinant) injection 1 mg, 1 mg, Intramuscular, PRN, Raymond Roach MD    glucose chewable tablet 16 g, 16 g, Oral, PRN, Raymond Roach MD    glucose chewable tablet 16 g, 16 g, Oral, PRN, Raymond Roach MD    glucose chewable tablet 24 g, 24 g, Oral, PRN, Raymond Roach MD    HYDROmorphone injection 0.5 mg, 0.5 mg, Intravenous, Q6H PRN, Raymond Roach MD    insulin aspart U-100 pen 0-5 Units, 0-5 Units, Subcutaneous, QID (AC + HS) PRN, Raymond Roach MD    levothyroxine tablet 125 mcg, 125 mcg, Oral, Before breakfast, Raymond Roach MD, 125 mcg at 03/22/25 0630    melatonin tablet 6 mg, 6 mg, Oral, Nightly PRN, Raymond Roach MD    memantine tablet 10 mg, 10 mg, Oral, BID, Raymond Roach MD, 10 mg at 03/22/25 0812    metoprolol succinate (TOPROL-XL) 24 hr tablet 25 mg, 25 mg, Oral, Daily, Raymond Roach MD, 25 mg at 03/22/25 0812    ondansetron injection 4 mg, 4 mg, Intravenous, Q6H PRN, Raymond Roach MD    pantoprazole EC tablet 40 mg, 40 mg, Oral, Daily, Raymond Roach MD, 40 mg at 03/22/25 0811    polyethylene glycol packet 17 g, 17 g, Oral, Daily, Raymond Roach MD, 17 g at 03/22/25 0813    pravastatin tablet 20 mg, 20 mg, Oral, QHS, Raymond Roach MD, 20 mg at 03/21/25 2114    tamsulosin 24 hr capsule 0.4 mg, 0.4 mg, Oral, Daily, Raymond Roach MD, 0.4 mg at 03/22/25 0811  [3]   Social History  Socioeconomic History    Marital status:      Spouse name: Mone    Number of children: 1+1   Tobacco Use    Smoking status: Former     Current packs/day: 0.00     Average packs/day: 1 pack/day for 25.0 years (25.0 ttl pk-yrs)     Types: Cigarettes     Start date: 1/1/1947     Quit  date: 1972     Years since quittin.2    Smokeless tobacco: Never    Tobacco comments:     Patient Quit Smoking on 1972.   Substance and Sexual Activity    Alcohol use: Not Currently     Alcohol/week: 3.0 standard drinks of alcohol     Types: 2 Glasses of wine, 1 Cans of beer per week     Comment: Occasional    Drug use: Never    Sexual activity: Not Currently     Partners: Female     Birth control/protection: None   Social History Narrative    Rides bike - Quit due to poor balance     - he uses a cane or a walker.       Social Drivers of Health     Financial Resource Strain: Low Risk  (3/22/2025)    Overall Financial Resource Strain (CARDIA)     Difficulty of Paying Living Expenses: Not hard at all   Food Insecurity: No Food Insecurity (3/22/2025)    Hunger Vital Sign     Worried About Running Out of Food in the Last Year: Never true     Ran Out of Food in the Last Year: Never true   Transportation Needs: No Transportation Needs (3/22/2025)    PRAPARE - Transportation     Lack of Transportation (Medical): No     Lack of Transportation (Non-Medical): No   Physical Activity: Inactive (3/22/2025)    Exercise Vital Sign     Days of Exercise per Week: 0 days     Minutes of Exercise per Session: 0 min   Stress: No Stress Concern Present (3/22/2025)    [employer] - Occupational Stress Questionnaire     Feeling of Stress : Not at all   Recent Concern: Stress - Stress Concern Present (2025)    [employer] - Occupational Stress Questionnaire     Feeling of Stress : To some extent   Housing Stability: Low Risk  (3/22/2025)    Housing Stability Vital Sign     Unable to Pay for Housing in the Last Year: No     Number of Times Moved in the Last Year: 0     Homeless in the Last Year: No

## 2025-03-22 NOTE — PLAN OF CARE
Problem: Adult Inpatient Plan of Care  Goal: Plan of Care Review  Outcome: Progressing  Goal: Patient-Specific Goal (Individualized)  Outcome: Progressing  Goal: Optimal Comfort and Wellbeing  Outcome: Progressing  Goal: Readiness for Transition of Care  Outcome: Progressing     Problem: Wound  Goal: Optimal Functional Ability  Outcome: Progressing  Goal: Optimal Pain Control and Function  Outcome: Progressing   Patient alert and oriented x 4. Denied any pain or discomfort. No acute distress noted. No c/o headache voiced. VS within limits; see flowsheet.  SCDs in progress. Used urinal; ambulate to bathroom with one assistance. All safety measures maintained; bed alarms on. POC ongoing.

## 2025-03-22 NOTE — HOSPITAL COURSE
"3/22: NAEON. Neuro exam stable. Pain controlled. Will plan for OR on Monday.  3/23: NAEON. Neuro exam stable. Pain controlled. Will plan for OR on Monday.  3/24: NAEON. OR today for bilateral crani for subdural hygroma evacuation with distal shunt catheter tie off.   3/25: POD1 bilateral mini crani for SDH evac, shunt tie off at R clavicle. Postop CTH with expected appearance and pneumocephalus. Patient doing well except for R VI palsy and baseline higher order confusion. Will transfer to floor today. Drains with output of 70 each overnight.   3/26: Patient report's severe headache this morning. CTH obtained, with expected post operative changes. Will adjust regimen. Continue drains. Molina placed yesterday for urinary retention. Cr increased to 1.6 today. Will start LR fluids.  3/27: Headache remains and unremitting to positional changes. Per family, patient experienced HA especially when moving head prior to initial shunt placement surgery. Requesting to be OOB in chair. Family with concerns for his difficulty in word finding. States he has a limited vocabulary and does not always answer questions and instead "stares off into the distance." Answered questions regarding care plan and present symptoms.  3/28: NAEON. In chair interacting with his spouse on entering room. Patient cognitive status similar to that of yesterday. Difficulty with word finding but conversing and answering questions. Patient denies pain. Per wife, PT came by this morning and pt ambulated into masters however still reporting dizziness with moving.  3/29: NAEO. AFVSS. Patient sitting up comfortable, eating his breakfast. Denies any complaints. Exam stable. Pending auth for IPR. Hospital Medicine on board for co-management.   3/30: Neuro stable. Amlodipine/tamsulosin held per  for hypotension, Pending IPR, medically ready  3/31: NAEON. Patient reported headache this morning, but better once eaten breakfast. Will obtain CTH today for re-eval. " Voiding trial today. HM following for co-management. Gen neuro consult given report of long-standing positional headaches that were occurring prior to any neurosurgical intervention. Patient becomes confused in the afternoon, delirium precautions in place. Discussed updates with patient's son via telephone. Pending IPR.

## 2025-03-22 NOTE — H&P
Please see NSY consult note dated 3/21/25.    Raymond Roach MD  Neurosurgery  [facility]

## 2025-03-22 NOTE — PROGRESS NOTES
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  Patient was seen in clinic by Dr. Glenn Rivero on 3 7 and 319.  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  The patient denies fever/chills, HA, vision/hearing changes, dysphagia, dysarthria, nausea/vomiting, new-onset weakness or sensory change, bowel/bladder changes.       Post-Op Info:  * No surgery found *       Interval History: 3/22: NAEON. Neuro exam stable. Pain controlled. Will plan for OR on Monday.    Medications:  Continuous Infusions:   [START ON 3/23/2025] 0.9% NaCl   Intravenous Continuous         Scheduled Meds:   amitriptyline  10 mg Oral QHS    amLODIPine  2.5 mg Oral Nightly    carbamide peroxide  5 drop Left Ear BID    cetirizine  10 mg Oral Daily    docusate sodium  100 mg Oral BID    gabapentin  100 mg Oral TID    levothyroxine  125 mcg Oral Before breakfast    memantine  10 mg Oral BID    metoprolol succinate  25 mg Oral Daily    pantoprazole  40 mg Oral Daily    polyethylene glycol  17 g Oral Daily    pravastatin  20 mg Oral QHS    tamsulosin  0.4 mg Oral Daily     PRN Meds:  Current Facility-Administered Medications:     butalbital-acetaminophen-caffeine -40 mg, 2 tablet, Oral, Q6H PRN    dextrose 50%, 12.5 g, Intravenous, PRN    dextrose 50%, 25 g, Intravenous, PRN    glucagon (human recombinant), 1 mg, Intramuscular, PRN    glucose, 16 g, Oral, PRN    glucose, 16 g, Oral, PRN    glucose, 24 g, Oral, PRN     HYDROmorphone, 0.5 mg, Intravenous, Q6H PRN    insulin aspart U-100, 0-5 Units, Subcutaneous, QID (AC + HS) PRN    melatonin, 6 mg, Oral, Nightly PRN    ondansetron, 4 mg, Intravenous, Q6H PRN     Review of Systems  Objective:     Weight: 67.9 kg (149 lb 11.1 oz)  Body mass index is 20.88 kg/m².  Vital Signs (Most Recent):  Temp: 97.7 °F (36.5 °C) (03/22/25 0809)  Pulse: (!) 57 (03/22/25 0812)  Resp: 18 (03/22/25 0809)  BP: (!) 156/77 (03/22/25 0812)  SpO2: 96 % (03/22/25 0809) Vital Signs (24h Range):  Temp:  [97.5 °F (36.4 °C)-98.2 °F (36.8 °C)] 97.7 °F (36.5 °C)  Pulse:  [52-63] 57  Resp:  [16-29] 18  SpO2:  [95 %-100 %] 96 %  BP: (156-176)/(64-83) 156/77                                 Physical Exam         Neurosurgery Physical Exam    GENERAL: resting comfortably  HEENT: NCAT, PERRL, mucous membranes moist  NECK: supple, trachea midline  CV: normal capillary refill  PULM: aerating well, symmetric expansion, no distress  ABD: soft, NT, ND  EXT: no c/c/e     NEURO:     AAO x 3  CN II-XII grossly intact; very hard of hearing  Fc x 4 antigravity  SILT     No drift or dysmetria     R VPS valve reservoir pumps and refills briskly    Significant Labs:  Recent Labs   Lab 03/21/25 1802 03/22/25  0539     --     143   K 4.0 3.8    107   CO2 27 28   BUN 27* 21   CREATININE 1.3 1.1   CALCIUM 9.3 8.6*     Recent Labs   Lab 03/21/25 1802 03/22/25  0539   WBC 6.90 6.37   HGB 14.7 13.1*   HCT 46.2 41.0    137*     Recent Labs   Lab 03/21/25 2235   INR 1.1   APTT 30.8     Microbiology Results (last 7 days)       ** No results found for the last 168 hours. **          All pertinent labs from the last 24 hours have been reviewed.    Significant Diagnostics:  I have reviewed all pertinent imaging results/findings within the past 24 hours.  Assessment/Plan:     S/P ventriculoperitoneal shunt  This is a 85-year-old male with a history of NPH status post right-sided  shunt on 02/06/2025 by Dr. Ojeda,  who presents with several days of worsening bitemporal headache.  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  CT head obtained today demonstrates interval stability of the bilateral subdural hygromas.  When the patient saw Emeli Rivero in clinic on 03/07/2025 the shunt was reprogrammed to 200.  He was and remains neurologically intact.    Plan:  --admit neurosurgery with q.4 hours neuro checks   --all imaging and diagnostics reviewed  --SBP less than 160  --Sodium greater than 135  --regular diet, NPO Sunday at midnight  --Weston hose and SCD, hold home Eliquis and chemical VTE prophylaxis  --tentative plan for OR on Monday for shunt tile clavicle, plus or minus bobby holes for hygroma drainage.    Plan discussed with Dr. Blankenship and on call staff Dr. Birmingham.        Bal Freitas MD  Neurosurgery  Kulwinder Elias - Neurosurgery ([facility])

## 2025-03-22 NOTE — PLAN OF CARE
Kulwinder Elias - Neurosurgery (Hospital)  Initial Discharge Assessment       Primary Care Provider: Nakul Mandujano MD    Admission Diagnosis: S/P  shunt [Z98.2]  Chronic intractable headache, unspecified headache type [R51.9, G89.29]    Admission Date: 3/21/2025  Expected Discharge Date:     Transition of Care Barriers: None    Payor: [employer] [facility] / Plan: [employer] CHOICES / Product Type: Medicare Advantage /     Extended Emergency Contact Information  Primary Emergency Contact: Mone Ricci  Address: [address] [place] [address] [place]  Mobile Phone: [phone]  Relation: Spouse   needed? No  Secondary Emergency Contact: Jr. Ricci Dominick  Address: [facility] [address] [place]  Mobile Phone: [phone]  Relation: Son    Discharge Plan A: Rehab, Skilled Nursing Facility  Discharge Plan B: Home Health, Home with family      [id number] - [place], [place] - [address] [place] [address]  Phone: [phone] Fax: [phone]      Initial Assessment (most recent)       Adult Discharge Assessment - 03/22/25 1003          Discharge Assessment    Assessment Type Discharge Planning Assessment     Confirmed/corrected address, phone number and insurance Yes     Confirmed Demographics Correct on Facesheet     Source of Information patient;family;health record     Does patient/caregiver understand observation status Yes     Communicated LIDIA with patient/caregiver Yes;Date not available/Unable to determine     Reason For Admission No active principal problem     People in Home spouse     Do you expect to return to your current living situation? Yes     Do you have help at home or someone to help you manage your care at home? Yes     Prior to hospitilization cognitive status: Alert/Oriented     Current cognitive status: Alert/Oriented     Walking or Climbing Stairs Difficulty yes      Walking or Climbing Stairs ambulation difficulty, requires equipment     Dressing/Bathing Difficulty no     Equipment Currently Used at Home cane, straight     Readmission within 30 days? No     Patient currently being followed by outpatient case management? No     Do you currently have service(s) that help you manage your care at home? No     Do you take prescription medications? Yes     Do you have prescription coverage? Yes     Coverage Payor: [email or web address] MGD [facility] CHOICES -     Do you have any problems affording any of your prescribed medications? No     Is the patient taking medications as prescribed? yes     How do you get to doctors appointments? family or friend will provide     Are you on dialysis? No     Do you take coumadin? No     Discharge Plan A Rehab;Skilled Nursing Facility     Discharge Plan B Home Health;Home with family     DME Needed Upon Discharge  other (see comments)   TBD    Transition of Care Barriers None        Physical Activity    On average, how many days per week do you engage in moderate to strenuous exercise (like a brisk walk)? 0 days     On average, how many minutes do you engage in exercise at this level? 0 min        Financial Resource Strain    How hard is it for you to pay for the very basics like food, housing, medical care, and heating? Not hard at all        Housing Stability    In the last 12 months, was there a time when you were not able to pay the mortgage or rent on time? No     At any time in the past 12 months, were you homeless or living in a shelter (including now)? No        Transportation Needs    In the past 12 months, has lack of transportation kept you from medical appointments or from getting medications? No     In the past 12 months, has lack of transportation kept you from meetings, work, or from getting things needed for daily living? No        Food Insecurity    Within the past 12 months, you worried that your food would run out  before you got the money to buy more. Never true     Within the past 12 months, the food you bought just didn't last and you didn't have money to get more. Never true        Stress    Do you feel stress - tense, restless, nervous, or anxious, or unable to sleep at night because your mind is troubled all the time - these days? Not at all        Social Isolation    How often do you feel lonely or isolated from those around you?  Never        Alcohol Use    Q1: How often do you have a drink containing alcohol? Never     Q2: How many drinks containing alcohol do you have on a typical day when you are drinking? Patient does not drink     Q3: How often do you have six or more drinks on one occasion? Never        Utilities    In the past 12 months has the electric, gas, oil, or water company threatened to shut off services in your home? No        Health Literacy    How often do you need to have someone help you when you read instructions, pamphlets, or other written material from your doctor or pharmacy? Rarely                   Patient lives at home with his wife Mone. Post hospital stay,  patients wife will be patients support person. Patient has transportation at d/c with his family. There have been no hospitalizations within the last 30 days. Verified patients PCP and preferred pharmacy. Patient not on Coumadin and is not receiving dialysis. All questions answered regarding case management/ discharge planning , patient verbalized understanding. Discharge booklet with SW contact information given to patient.    Discharge Plan A and Plan B have been determined by review of patient's clinical status, future medical and therapeutic needs, and coverage/benefits for post-acute care in coordination with multidisciplinary team members.      PEDRO Armas, MSW  Case Management  [facility]

## 2025-03-22 NOTE — ASSESSMENT & PLAN NOTE
This is a 85-year-old male with a history of NPH status post right-sided  shunt on 02/06/2025 by Dr. Ojeda, who presents with several days of worsening bitemporal headache.  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  CT head obtained today demonstrates interval stability of the bilateral subdural hygromas.  When the patient saw Emeli Rivero in clinic on 03/07/2025 the shunt was reprogrammed to 200.  He was and remains neurologically intact.    Plan:  --admit neurosurgery with q.4 hours neuro checks   --all imaging and diagnostics reviewed  --SBP less than 160  --Sodium greater than 135  --regular diet, NPO Sunday at midnight  --Weston hose and SCD, hold home Eliquis and chemical VTE prophylaxis  --tentative plan for OR on Monday for shunt tile clavicle, plus or minus bobby holes for hygroma drainage.    Plan discussed with Dr. Blankenship and on call staff Dr. Birmingham.

## 2025-03-23 LAB
ABSOLUTE EOSINOPHIL (OHS): 0.13 K/UL
ABSOLUTE MONOCYTE (OHS): 0.99 K/UL (ref 0.3–1)
ABSOLUTE NEUTROPHIL COUNT (OHS): 4.53 K/UL (ref 1.8–7.7)
ANION GAP (OHS): 8 MMOL/L (ref 8–16)
BASOPHILS # BLD AUTO: 0.06 K/UL
BASOPHILS NFR BLD AUTO: 0.8 %
BUN SERPL-MCNC: 28 MG/DL (ref 8–23)
CALCIUM SERPL-MCNC: 8.3 MG/DL (ref 8.7–10.5)
CHLORIDE SERPL-SCNC: 109 MMOL/L (ref 95–110)
CO2 SERPL-SCNC: 24 MMOL/L (ref 23–29)
CREAT SERPL-MCNC: 1.1 MG/DL (ref 0.5–1.4)
ERYTHROCYTE [DISTWIDTH] IN BLOOD BY AUTOMATED COUNT: 13 % (ref 11.5–14.5)
GFR SERPLBLD CREATININE-BSD FMLA CKD-EPI: >60 ML/MIN/1.73/M2
GLUCOSE SERPL-MCNC: 76 MG/DL (ref 70–110)
HCT VFR BLD AUTO: 38.6 % (ref 40–54)
HGB BLD-MCNC: 12.3 GM/DL (ref 14–18)
IMM GRANULOCYTES # BLD AUTO: 0.03 K/UL (ref 0–0.04)
IMM GRANULOCYTES NFR BLD AUTO: 0.4 % (ref 0–0.5)
INDIRECT COOMBS: NORMAL
LYMPHOCYTES # BLD AUTO: 1.44 K/UL (ref 1–4.8)
MCH RBC QN AUTO: 30.5 PG (ref 27–50)
MCHC RBC AUTO-ENTMCNC: 31.9 G/DL (ref 32–36)
MCV RBC AUTO: 96 FL (ref 82–98)
NUCLEATED RBC (/100WBC) (OHS): 0 /100 WBC
PLATELET # BLD AUTO: 136 K/UL (ref 150–450)
PMV BLD AUTO: 11.3 FL (ref 9.2–12.9)
POCT GLUCOSE: 188 MG/DL (ref 70–110)
POCT GLUCOSE: 81 MG/DL (ref 70–110)
POCT GLUCOSE: 85 MG/DL (ref 70–110)
POTASSIUM SERPL-SCNC: 4.2 MMOL/L (ref 3.5–5.1)
RBC # BLD AUTO: 4.03 M/UL (ref 4.6–6.2)
RELATIVE EOSINOPHIL (OHS): 1.8 %
RELATIVE LYMPHOCYTE (OHS): 20.1 % (ref 18–48)
RELATIVE MONOCYTE (OHS): 13.8 % (ref 4–15)
RELATIVE NEUTROPHIL (OHS): 63.1 % (ref 38–73)
RH BLD: NORMAL
SODIUM SERPL-SCNC: 141 MMOL/L (ref 136–145)
SPECIMEN OUTDATE: NORMAL
WBC # BLD AUTO: 7.18 K/UL (ref 3.9–12.7)

## 2025-03-23 PROCEDURE — 80048 BASIC METABOLIC PNL TOTAL CA: CPT | Performed by: STUDENT IN AN ORGANIZED HEALTH CARE EDUCATION/TRAINING PROGRAM

## 2025-03-23 PROCEDURE — 86850 RBC ANTIBODY SCREEN: CPT

## 2025-03-23 PROCEDURE — 99233 SBSQ HOSP IP/OBS HIGH 50: CPT | Mod: 24,,, | Performed by: NEUROLOGICAL SURGERY

## 2025-03-23 PROCEDURE — 36415 COLL VENOUS BLD VENIPUNCTURE: CPT | Performed by: STUDENT IN AN ORGANIZED HEALTH CARE EDUCATION/TRAINING PROGRAM

## 2025-03-23 PROCEDURE — 25000003 PHARM REV CODE 250

## 2025-03-23 PROCEDURE — 25000003 PHARM REV CODE 250: Performed by: STUDENT IN AN ORGANIZED HEALTH CARE EDUCATION/TRAINING PROGRAM

## 2025-03-23 PROCEDURE — 85025 COMPLETE CBC W/AUTO DIFF WBC: CPT | Performed by: STUDENT IN AN ORGANIZED HEALTH CARE EDUCATION/TRAINING PROGRAM

## 2025-03-23 PROCEDURE — 11000001 HC ACUTE MED/SURG PRIVATE ROOM

## 2025-03-23 RX ORDER — ACETAMINOPHEN 325 MG/1
650 TABLET ORAL EVERY 6 HOURS PRN
Status: DISCONTINUED | OUTPATIENT
Start: 2025-03-23 | End: 2025-03-25

## 2025-03-23 RX ADMIN — CARBAMIDE PEROXIDE 5 DROP: 65 SOLUTION/ DROPS TOPICAL at 09:03

## 2025-03-23 RX ADMIN — LEVOTHYROXINE SODIUM 125 MCG: 0.12 TABLET ORAL at 05:03

## 2025-03-23 RX ADMIN — ACETAMINOPHEN 650 MG: 325 TABLET ORAL at 03:03

## 2025-03-23 RX ADMIN — AMITRIPTYLINE HYDROCHLORIDE 10 MG: 10 TABLET, FILM COATED ORAL at 09:03

## 2025-03-23 RX ADMIN — MEMANTINE 10 MG: 10 TABLET ORAL at 09:03

## 2025-03-23 RX ADMIN — TAMSULOSIN HYDROCHLORIDE 0.4 MG: 0.4 CAPSULE ORAL at 09:03

## 2025-03-23 RX ADMIN — GABAPENTIN 100 MG: 100 CAPSULE ORAL at 09:03

## 2025-03-23 RX ADMIN — CETIRIZINE HYDROCHLORIDE 10 MG: 10 TABLET, FILM COATED ORAL at 09:03

## 2025-03-23 RX ADMIN — GABAPENTIN 100 MG: 100 CAPSULE ORAL at 03:03

## 2025-03-23 RX ADMIN — PRAVASTATIN SODIUM 20 MG: 20 TABLET ORAL at 09:03

## 2025-03-23 RX ADMIN — DOCUSATE SODIUM 100 MG: 100 CAPSULE, LIQUID FILLED ORAL at 09:03

## 2025-03-23 RX ADMIN — POLYETHYLENE GLYCOL 3350 17 G: 17 POWDER, FOR SOLUTION ORAL at 09:03

## 2025-03-23 RX ADMIN — PANTOPRAZOLE SODIUM 40 MG: 40 TABLET, DELAYED RELEASE ORAL at 09:03

## 2025-03-23 RX ADMIN — METOPROLOL SUCCINATE 25 MG: 25 TABLET, EXTENDED RELEASE ORAL at 09:03

## 2025-03-23 RX ADMIN — AMLODIPINE BESYLATE 2.5 MG: 2.5 TABLET ORAL at 09:03

## 2025-03-23 NOTE — PLAN OF CARE
Problem: Adult Inpatient Plan of Care  Goal: Plan of Care Review  Outcome: Progressing  Goal: Patient-Specific Goal (Individualized)  Outcome: Progressing  Goal: Optimal Comfort and Wellbeing  Outcome: Progressing  Goal: Readiness for Transition of Care  Outcome: Progressing     Problem: Wound  Goal: Optimal Functional Ability  Outcome: Progressing  Goal: Optimal Pain Control and Function  Outcome: Progressing     Problem: Fall Injury Risk  Goal: Absence of Fall and Fall-Related Injury  Outcome: Progressing   Patient alert and verbally responsive to staffs. VS within limits; see flowsheet. All safety measures maintained. Denied pain or discomfort. POC ongoing.

## 2025-03-23 NOTE — PROGRESS NOTES
Kulwinder Elias - Neurosurgery ([facility])  Neurosurgery  Progress Note    Subjective:     History of Present Illness: The patient is an 85-year-old male with a past medical history of normal pressure hydrocephalus status post insertion of a right-sided ventriculoperitoneal shunt on 02/06 2025, who presents with several days of bitemporal headache.  Patient was seen in clinic by Dr. Glenn Rivero on 3 7 and 319.  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  The patient denies fever/chills, HA, vision/hearing changes, dysphagia, dysarthria, nausea/vomiting, new-onset weakness or sensory change, bowel/bladder changes.       Post-Op Info:  Procedure(s) (LRB):  EVACUATION, HEMATOMA, SUBDURAL (Bilateral)       Interval History: NAEON. Neuro exam stable. Pain controlled. Will plan for OR on Monday.    Medications:  Continuous Infusions:   0.9% NaCl   Intravenous Continuous         Scheduled Meds:   amitriptyline  10 mg Oral QHS    amLODIPine  2.5 mg Oral Nightly    carbamide peroxide  5 drop Left Ear BID    cetirizine  10 mg Oral Daily    docusate sodium  100 mg Oral BID    gabapentin  100 mg Oral TID    levothyroxine  125 mcg Oral Before breakfast    memantine  10 mg Oral BID    metoprolol succinate  25 mg Oral Daily    pantoprazole  40 mg Oral Daily    polyethylene glycol  17 g Oral Daily    pravastatin  20 mg Oral QHS    tamsulosin  0.4 mg Oral Daily     PRN Meds:  Current Facility-Administered Medications:     butalbital-acetaminophen-caffeine -40 mg, 2 tablet, Oral, Q6H PRN    dextrose 50%, 12.5 g, Intravenous, PRN    dextrose 50%, 25 g, Intravenous, PRN    glucagon (human recombinant), 1 mg, Intramuscular, PRN    glucose, 16 g, Oral, PRN    glucose, 16 g, Oral, PRN    glucose, 24  g, Oral, PRN    HYDROmorphone, 0.5 mg, Intravenous, Q6H PRN    insulin aspart U-100, 0-5 Units, Subcutaneous, QID (AC + HS) PRN    melatonin, 6 mg, Oral, Nightly PRN    ondansetron, 4 mg, Intravenous, Q6H PRN     Review of Systems  Objective:     Weight: 67.9 kg (149 lb 11.1 oz)  Body mass index is 20.88 kg/m².  Vital Signs (Most Recent):  Temp: 97.5 °F (36.4 °C) (03/23/25 0731)  Pulse: (!) 50 (03/23/25 0731)  Resp: 18 (03/23/25 0731)  BP: (!) 145/66 (03/23/25 0731)  SpO2: 96 % (03/23/25 0731) Vital Signs (24h Range):  Temp:  [97.5 °F (36.4 °C)-97.9 °F (36.6 °C)] 97.5 °F (36.4 °C)  Pulse:  [50-63] 50  Resp:  [14-18] 18  SpO2:  [96 %-100 %] 96 %  BP: (111-154)/(54-79) 145/66                                 Physical Exam         Neurosurgery Physical Exam    GENERAL: resting comfortably  HEENT: NCAT, PERRL, mucous membranes moist  NECK: supple, trachea midline  CV: normal capillary refill  PULM: aerating well, symmetric expansion, no distress  ABD: soft, NT, ND  EXT: no c/c/e     NEURO:     AAO x 3  CN II-XII grossly intact; very hard of hearing  Fc x 4 antigravity  SILT     No drift or dysmetria     R VPS valve reservoir pumps and refills briskly    Significant Labs:  Recent Labs   Lab 03/21/25 1802 03/22/25  0539 03/23/25  0639     --   --     143 141   K 4.0 3.8 4.2    107 109   CO2 27 28 24   BUN 27* 21 28*   CREATININE 1.3 1.1 1.1   CALCIUM 9.3 8.6* 8.3*     Recent Labs   Lab 03/21/25  1802 03/22/25  0539 03/23/25  0639   WBC 6.90 6.37 7.18   HGB 14.7 13.1* 12.3*   HCT 46.2 41.0 38.6*    137* 136*     Recent Labs   Lab 03/21/25  2235   INR 1.1   APTT 30.8     Microbiology Results (last 7 days)       ** No results found for the last 168 hours. **          All pertinent labs from the last 24 hours have been reviewed.    Significant Diagnostics:  I have reviewed all pertinent imaging results/findings within the past 24 hours.  Assessment/Plan:     S/P ventriculoperitoneal shunt  This is a  85-year-old male with a history of NPH status post right-sided  shunt on 02/06/2025 by Dr. Ojeda, who presents with several days of worsening bitemporal headache.  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  CT head obtained today demonstrates interval stability of the bilateral subdural hygromas.  When the patient saw Emeli Rivero in clinic on 03/07/2025 the shunt was reprogrammed to 200.  He was and remains neurologically intact.    Plan:  --admit neurosurgery with q.4 hours neuro checks   --all imaging and diagnostics reviewed  --SBP less than 160  --Sodium greater than 135  --regular diet, NPO Sunday at midnight  --Weston hose and SCD, hold home Eliquis and chemical VTE prophylaxis  --tentative plan for OR on Monday for shunt tile clavicle, plus or minus bobby holes for hygroma drainage.    Plan discussed with Dr. Blankenship and on call staff Dr. Birmingham.        Bal Freitas MD  Neurosurgery  [facility] - Neurosurgery ([facility])

## 2025-03-23 NOTE — SUBJECTIVE & OBJECTIVE
Interval History: NAEON. Neuro exam stable. Pain controlled. Will plan for OR on Monday.    Medications:  Continuous Infusions:   0.9% NaCl   Intravenous Continuous         Scheduled Meds:   amitriptyline  10 mg Oral QHS    amLODIPine  2.5 mg Oral Nightly    carbamide peroxide  5 drop Left Ear BID    cetirizine  10 mg Oral Daily    docusate sodium  100 mg Oral BID    gabapentin  100 mg Oral TID    levothyroxine  125 mcg Oral Before breakfast    memantine  10 mg Oral BID    metoprolol succinate  25 mg Oral Daily    pantoprazole  40 mg Oral Daily    polyethylene glycol  17 g Oral Daily    pravastatin  20 mg Oral QHS    tamsulosin  0.4 mg Oral Daily     PRN Meds:  Current Facility-Administered Medications:     butalbital-acetaminophen-caffeine -40 mg, 2 tablet, Oral, Q6H PRN    dextrose 50%, 12.5 g, Intravenous, PRN    dextrose 50%, 25 g, Intravenous, PRN    glucagon (human recombinant), 1 mg, Intramuscular, PRN    glucose, 16 g, Oral, PRN    glucose, 16 g, Oral, PRN    glucose, 24 g, Oral, PRN    HYDROmorphone, 0.5 mg, Intravenous, Q6H PRN    insulin aspart U-100, 0-5 Units, Subcutaneous, QID (AC + HS) PRN    melatonin, 6 mg, Oral, Nightly PRN    ondansetron, 4 mg, Intravenous, Q6H PRN     Review of Systems  Objective:     Weight: 67.9 kg (149 lb 11.1 oz)  Body mass index is 20.88 kg/m².  Vital Signs (Most Recent):  Temp: 97.5 °F (36.4 °C) (03/23/25 0731)  Pulse: (!) 50 (03/23/25 0731)  Resp: 18 (03/23/25 0731)  BP: (!) 145/66 (03/23/25 0731)  SpO2: 96 % (03/23/25 0731) Vital Signs (24h Range):  Temp:  [97.5 °F (36.4 °C)-97.9 °F (36.6 °C)] 97.5 °F (36.4 °C)  Pulse:  [50-63] 50  Resp:  [14-18] 18  SpO2:  [96 %-100 %] 96 %  BP: (111-154)/(54-79) 145/66                                 Physical Exam         Neurosurgery Physical Exam    GENERAL: resting comfortably  HEENT: NCAT, PERRL, mucous membranes moist  NECK: supple, trachea midline  CV: normal capillary refill  PULM: aerating well, symmetric expansion, no  distress  ABD: soft, NT, ND  EXT: no c/c/e     NEURO:     AAO x 3  CN II-XII grossly intact; very hard of hearing  Fc x 4 antigravity  SILT     No drift or dysmetria     R VPS valve reservoir pumps and refills briskly    Significant Labs:  Recent Labs   Lab 03/21/25  1802 03/22/25  0539 03/23/25  0639     --   --     143 141   K 4.0 3.8 4.2    107 109   CO2 27 28 24   BUN 27* 21 28*   CREATININE 1.3 1.1 1.1   CALCIUM 9.3 8.6* 8.3*     Recent Labs   Lab 03/21/25  1802 03/22/25  0539 03/23/25  0639   WBC 6.90 6.37 7.18   HGB 14.7 13.1* 12.3*   HCT 46.2 41.0 38.6*    137* 136*     Recent Labs   Lab 03/21/25  2235   INR 1.1   APTT 30.8     Microbiology Results (last 7 days)       ** No results found for the last 168 hours. **          All pertinent labs from the last 24 hours have been reviewed.    Significant Diagnostics:  I have reviewed all pertinent imaging results/findings within the past 24 hours.

## 2025-03-24 ENCOUNTER — ANESTHESIA (OUTPATIENT)
Dept: SURGERY | Facility: HOSPITAL | Age: 85
End: 2025-03-24
Payer: MEDICARE

## 2025-03-24 ENCOUNTER — PATIENT MESSAGE (OUTPATIENT)
Dept: NEUROSURGERY | Facility: CLINIC | Age: 85
End: 2025-03-24
Payer: MEDICARE

## 2025-03-24 DIAGNOSIS — Z00.00 ENCOUNTER FOR MEDICARE ANNUAL WELLNESS EXAM: ICD-10-CM

## 2025-03-24 PROBLEM — I62.03 SUBDURAL HEMATOMA, CHRONIC: Status: ACTIVE | Noted: 2025-03-24

## 2025-03-24 LAB
ABSOLUTE EOSINOPHIL (OHS): 0.14 K/UL
ABSOLUTE MONOCYTE (OHS): 0.89 K/UL (ref 0.3–1)
ABSOLUTE NEUTROPHIL COUNT (OHS): 4.18 K/UL (ref 1.8–7.7)
ANION GAP (OHS): 9 MMOL/L (ref 8–16)
APTT PPP: 29 SECONDS (ref 21–32)
BASOPHILS # BLD AUTO: 0.06 K/UL
BASOPHILS NFR BLD AUTO: 0.9 %
BUN SERPL-MCNC: 28 MG/DL (ref 8–23)
CALCIUM SERPL-MCNC: 8.5 MG/DL (ref 8.7–10.5)
CHLORIDE SERPL-SCNC: 108 MMOL/L (ref 95–110)
CO2 SERPL-SCNC: 23 MMOL/L (ref 23–29)
CREAT SERPL-MCNC: 1.1 MG/DL (ref 0.5–1.4)
ERYTHROCYTE [DISTWIDTH] IN BLOOD BY AUTOMATED COUNT: 13.1 % (ref 11.5–14.5)
GFR SERPLBLD CREATININE-BSD FMLA CKD-EPI: >60 ML/MIN/1.73/M2
GLUCOSE SERPL-MCNC: 88 MG/DL (ref 70–110)
HCT VFR BLD AUTO: 40.2 % (ref 40–54)
HGB BLD-MCNC: 12.7 GM/DL (ref 14–18)
IMM GRANULOCYTES # BLD AUTO: 0.03 K/UL (ref 0–0.04)
IMM GRANULOCYTES NFR BLD AUTO: 0.4 % (ref 0–0.5)
INR PPP: 1 (ref 0.8–1.2)
LYMPHOCYTES # BLD AUTO: 1.64 K/UL (ref 1–4.8)
MCH RBC QN AUTO: 30 PG (ref 27–50)
MCHC RBC AUTO-ENTMCNC: 31.6 G/DL (ref 32–36)
MCV RBC AUTO: 95 FL (ref 82–98)
NUCLEATED RBC (/100WBC) (OHS): 0 /100 WBC
PLATELET # BLD AUTO: 140 K/UL (ref 150–450)
PMV BLD AUTO: 11.5 FL (ref 9.2–12.9)
POCT GLUCOSE: 60 MG/DL (ref 70–110)
POCT GLUCOSE: 81 MG/DL (ref 70–110)
POCT GLUCOSE: 96 MG/DL (ref 70–110)
POTASSIUM SERPL-SCNC: 4.2 MMOL/L (ref 3.5–5.1)
PROTHROMBIN TIME: 11.4 SECONDS (ref 9–12.5)
RBC # BLD AUTO: 4.24 M/UL (ref 4.6–6.2)
RELATIVE EOSINOPHIL (OHS): 2 %
RELATIVE LYMPHOCYTE (OHS): 23.6 % (ref 18–48)
RELATIVE MONOCYTE (OHS): 12.8 % (ref 4–15)
RELATIVE NEUTROPHIL (OHS): 60.3 % (ref 38–73)
SODIUM SERPL-SCNC: 140 MMOL/L (ref 136–145)
WBC # BLD AUTO: 6.94 K/UL (ref 3.9–12.7)

## 2025-03-24 PROCEDURE — 99233 SBSQ HOSP IP/OBS HIGH 50: CPT | Mod: FS,,, | Performed by: PSYCHIATRY & NEUROLOGY

## 2025-03-24 PROCEDURE — 37000009 HC ANESTHESIA EA ADD 15 MINS: Performed by: NEUROLOGICAL SURGERY

## 2025-03-24 PROCEDURE — 25000003 PHARM REV CODE 250

## 2025-03-24 PROCEDURE — 20000000 HC ICU ROOM

## 2025-03-24 PROCEDURE — 25000003 PHARM REV CODE 250: Performed by: NURSE ANESTHETIST, CERTIFIED REGISTERED

## 2025-03-24 PROCEDURE — 25000003 PHARM REV CODE 250: Performed by: STUDENT IN AN ORGANIZED HEALTH CARE EDUCATION/TRAINING PROGRAM

## 2025-03-24 PROCEDURE — 63600175 PHARM REV CODE 636 W HCPCS: Performed by: NURSE ANESTHETIST, CERTIFIED REGISTERED

## 2025-03-24 PROCEDURE — 00940ZZ DRAINAGE OF INTRACRANIAL SUBDURAL SPACE, OPEN APPROACH: ICD-10-PCS | Performed by: NEUROLOGICAL SURGERY

## 2025-03-24 PROCEDURE — 36415 COLL VENOUS BLD VENIPUNCTURE: CPT | Performed by: STUDENT IN AN ORGANIZED HEALTH CARE EDUCATION/TRAINING PROGRAM

## 2025-03-24 PROCEDURE — C1713 ANCHOR/SCREW BN/BN,TIS/BN: HCPCS | Performed by: NEUROLOGICAL SURGERY

## 2025-03-24 PROCEDURE — 85730 THROMBOPLASTIN TIME PARTIAL: CPT

## 2025-03-24 PROCEDURE — 82310 ASSAY OF CALCIUM: CPT | Performed by: STUDENT IN AN ORGANIZED HEALTH CARE EDUCATION/TRAINING PROGRAM

## 2025-03-24 PROCEDURE — 99499 UNLISTED E&M SERVICE: CPT | Mod: ,,, | Performed by: PHYSICIAN ASSISTANT

## 2025-03-24 PROCEDURE — 36000710: Performed by: NEUROLOGICAL SURGERY

## 2025-03-24 PROCEDURE — 36000711: Performed by: NEUROLOGICAL SURGERY

## 2025-03-24 PROCEDURE — 85025 COMPLETE CBC W/AUTO DIFF WBC: CPT | Performed by: STUDENT IN AN ORGANIZED HEALTH CARE EDUCATION/TRAINING PROGRAM

## 2025-03-24 PROCEDURE — C1729 CATH, DRAINAGE: HCPCS | Performed by: NEUROLOGICAL SURGERY

## 2025-03-24 PROCEDURE — 37000008 HC ANESTHESIA 1ST 15 MINUTES: Performed by: NEUROLOGICAL SURGERY

## 2025-03-24 PROCEDURE — 99024 POSTOP FOLLOW-UP VISIT: CPT | Mod: ,,, | Performed by: PHYSICIAN ASSISTANT

## 2025-03-24 PROCEDURE — 27201423 OPTIME MED/SURG SUP & DEVICES STERILE SUPPLY: Performed by: NEUROLOGICAL SURGERY

## 2025-03-24 PROCEDURE — 63600175 PHARM REV CODE 636 W HCPCS

## 2025-03-24 PROCEDURE — D9220A PRA ANESTHESIA: Mod: CRNA,,, | Performed by: NURSE ANESTHETIST, CERTIFIED REGISTERED

## 2025-03-24 PROCEDURE — 85610 PROTHROMBIN TIME: CPT

## 2025-03-24 PROCEDURE — 63600175 PHARM REV CODE 636 W HCPCS: Performed by: NEUROLOGICAL SURGERY

## 2025-03-24 PROCEDURE — 25000003 PHARM REV CODE 250: Performed by: NEUROLOGICAL SURGERY

## 2025-03-24 PROCEDURE — 63600175 PHARM REV CODE 636 W HCPCS: Performed by: STUDENT IN AN ORGANIZED HEALTH CARE EDUCATION/TRAINING PROGRAM

## 2025-03-24 PROCEDURE — D9220A PRA ANESTHESIA: Mod: ANES,,, | Performed by: STUDENT IN AN ORGANIZED HEALTH CARE EDUCATION/TRAINING PROGRAM

## 2025-03-24 DEVICE — PLATE BONE 2X2 HOLE SM BOX: Type: IMPLANTABLE DEVICE | Site: CRANIAL | Status: FUNCTIONAL

## 2025-03-24 DEVICE — SCREW UN3 AXS SD 1.5X4MM: Type: IMPLANTABLE DEVICE | Site: CRANIAL | Status: FUNCTIONAL

## 2025-03-24 DEVICE — PLATE BONE BUR HOLE COVER 10MM: Type: IMPLANTABLE DEVICE | Site: CRANIAL | Status: FUNCTIONAL

## 2025-03-24 RX ORDER — ROCURONIUM BROMIDE 10 MG/ML
INJECTION, SOLUTION INTRAVENOUS
Status: DISCONTINUED | OUTPATIENT
Start: 2025-03-24 | End: 2025-03-24

## 2025-03-24 RX ORDER — ENOXAPARIN SODIUM 100 MG/ML
40 INJECTION SUBCUTANEOUS EVERY 24 HOURS
Status: DISCONTINUED | OUTPATIENT
Start: 2025-03-25 | End: 2025-04-01 | Stop reason: HOSPADM

## 2025-03-24 RX ORDER — ETOMIDATE 2 MG/ML
INJECTION INTRAVENOUS
Status: DISCONTINUED | OUTPATIENT
Start: 2025-03-24 | End: 2025-03-24

## 2025-03-24 RX ORDER — CEFAZOLIN 2 G/1
2 INJECTION, POWDER, FOR SOLUTION INTRAMUSCULAR; INTRAVENOUS
Status: DISCONTINUED | OUTPATIENT
Start: 2025-03-24 | End: 2025-03-27

## 2025-03-24 RX ORDER — PROPOFOL 10 MG/ML
VIAL (ML) INTRAVENOUS
Status: DISCONTINUED | OUTPATIENT
Start: 2025-03-24 | End: 2025-03-24

## 2025-03-24 RX ORDER — HYDRALAZINE HYDROCHLORIDE 20 MG/ML
10 INJECTION INTRAMUSCULAR; INTRAVENOUS EVERY 6 HOURS PRN
Status: DISCONTINUED | OUTPATIENT
Start: 2025-03-24 | End: 2025-03-31

## 2025-03-24 RX ORDER — PHENYLEPHRINE HYDROCHLORIDE 10 MG/ML
INJECTION INTRAVENOUS
Status: DISCONTINUED | OUTPATIENT
Start: 2025-03-24 | End: 2025-03-24

## 2025-03-24 RX ORDER — FENTANYL CITRATE 50 UG/ML
INJECTION, SOLUTION INTRAMUSCULAR; INTRAVENOUS
Status: DISCONTINUED | OUTPATIENT
Start: 2025-03-24 | End: 2025-03-24

## 2025-03-24 RX ORDER — BACITRACIN ZINC 500 UNIT/G
OINTMENT (GRAM) TOPICAL
Status: DISCONTINUED | OUTPATIENT
Start: 2025-03-24 | End: 2025-03-24 | Stop reason: HOSPADM

## 2025-03-24 RX ORDER — DEXAMETHASONE SODIUM PHOSPHATE 4 MG/ML
INJECTION, SOLUTION INTRA-ARTICULAR; INTRALESIONAL; INTRAMUSCULAR; INTRAVENOUS; SOFT TISSUE
Status: DISCONTINUED | OUTPATIENT
Start: 2025-03-24 | End: 2025-03-24

## 2025-03-24 RX ORDER — BUPIVACAINE HYDROCHLORIDE AND EPINEPHRINE 5; 5 MG/ML; UG/ML
INJECTION, SOLUTION EPIDURAL; INTRACAUDAL; PERINEURAL
Status: DISCONTINUED | OUTPATIENT
Start: 2025-03-24 | End: 2025-03-24 | Stop reason: HOSPADM

## 2025-03-24 RX ORDER — LIDOCAINE HYDROCHLORIDE AND EPINEPHRINE 10; 10 UG/ML; MG/ML
INJECTION, SOLUTION INFILTRATION; PERINEURAL
Status: DISCONTINUED | OUTPATIENT
Start: 2025-03-24 | End: 2025-03-24 | Stop reason: HOSPADM

## 2025-03-24 RX ORDER — LEVETIRACETAM 500 MG/5ML
INJECTION, SOLUTION, CONCENTRATE INTRAVENOUS
Status: DISCONTINUED | OUTPATIENT
Start: 2025-03-24 | End: 2025-03-24

## 2025-03-24 RX ORDER — LEVETIRACETAM 500 MG/1
500 TABLET ORAL 2 TIMES DAILY
Status: DISCONTINUED | OUTPATIENT
Start: 2025-03-24 | End: 2025-03-27

## 2025-03-24 RX ADMIN — PHENYLEPHRINE HYDROCHLORIDE 100 MCG: 10 INJECTION INTRAVENOUS at 04:03

## 2025-03-24 RX ADMIN — PHENYLEPHRINE HYDROCHLORIDE 200 MCG: 10 INJECTION INTRAVENOUS at 03:03

## 2025-03-24 RX ADMIN — Medication 16 G: at 01:03

## 2025-03-24 RX ADMIN — ROCURONIUM BROMIDE 50 MG: 10 INJECTION, SOLUTION INTRAVENOUS at 03:03

## 2025-03-24 RX ADMIN — ACETAMINOPHEN 650 MG: 325 TABLET ORAL at 08:03

## 2025-03-24 RX ADMIN — AMLODIPINE BESYLATE 2.5 MG: 2.5 TABLET ORAL at 08:03

## 2025-03-24 RX ADMIN — PHENYLEPHRINE HYDROCHLORIDE 20 MCG/MIN: 10 INJECTION INTRAVENOUS at 03:03

## 2025-03-24 RX ADMIN — ONDANSETRON 4 MG: 2 INJECTION INTRAMUSCULAR; INTRAVENOUS at 04:03

## 2025-03-24 RX ADMIN — PHENYLEPHRINE HYDROCHLORIDE 200 MCG: 10 INJECTION INTRAVENOUS at 04:03

## 2025-03-24 RX ADMIN — LEVETIRACETAM 1000 MG: 100 INJECTION, SOLUTION INTRAVENOUS at 03:03

## 2025-03-24 RX ADMIN — ROCURONIUM BROMIDE 10 MG: 10 INJECTION, SOLUTION INTRAVENOUS at 04:03

## 2025-03-24 RX ADMIN — PHENYLEPHRINE HYDROCHLORIDE 100 MCG: 10 INJECTION INTRAVENOUS at 03:03

## 2025-03-24 RX ADMIN — FENTANYL CITRATE 75 MCG: 50 INJECTION, SOLUTION INTRAMUSCULAR; INTRAVENOUS at 03:03

## 2025-03-24 RX ADMIN — POLYETHYLENE GLYCOL 3350 17 G: 17 POWDER, FOR SOLUTION ORAL at 09:03

## 2025-03-24 RX ADMIN — GABAPENTIN 100 MG: 100 CAPSULE ORAL at 09:03

## 2025-03-24 RX ADMIN — PANTOPRAZOLE SODIUM 40 MG: 40 TABLET, DELAYED RELEASE ORAL at 09:03

## 2025-03-24 RX ADMIN — METOPROLOL SUCCINATE 25 MG: 25 TABLET, EXTENDED RELEASE ORAL at 09:03

## 2025-03-24 RX ADMIN — CETIRIZINE HYDROCHLORIDE 10 MG: 10 TABLET, FILM COATED ORAL at 09:03

## 2025-03-24 RX ADMIN — LEVOTHYROXINE SODIUM 125 MCG: 0.12 TABLET ORAL at 05:03

## 2025-03-24 RX ADMIN — FENTANYL CITRATE 25 MCG: 50 INJECTION, SOLUTION INTRAMUSCULAR; INTRAVENOUS at 03:03

## 2025-03-24 RX ADMIN — DEXAMETHASONE SODIUM PHOSPHATE 4 MG: 4 INJECTION, SOLUTION INTRAMUSCULAR; INTRAVENOUS at 03:03

## 2025-03-24 RX ADMIN — AMITRIPTYLINE HYDROCHLORIDE 10 MG: 10 TABLET, FILM COATED ORAL at 08:03

## 2025-03-24 RX ADMIN — SODIUM CHLORIDE: 9 INJECTION, SOLUTION INTRAVENOUS at 03:03

## 2025-03-24 RX ADMIN — PRAVASTATIN SODIUM 20 MG: 20 TABLET ORAL at 08:03

## 2025-03-24 RX ADMIN — MEMANTINE 10 MG: 10 TABLET ORAL at 08:03

## 2025-03-24 RX ADMIN — ACETAMINOPHEN 650 MG: 325 TABLET ORAL at 09:03

## 2025-03-24 RX ADMIN — DOCUSATE SODIUM 100 MG: 100 CAPSULE, LIQUID FILLED ORAL at 08:03

## 2025-03-24 RX ADMIN — CEFTRIAXONE 2 G: 1 INJECTION, POWDER, FOR SOLUTION INTRAMUSCULAR; INTRAVENOUS at 03:03

## 2025-03-24 RX ADMIN — HYDRALAZINE HYDROCHLORIDE 10 MG: 20 INJECTION, SOLUTION INTRAMUSCULAR; INTRAVENOUS at 09:03

## 2025-03-24 RX ADMIN — PHENYLEPHRINE HYDROCHLORIDE 100 MCG: 10 INJECTION INTRAVENOUS at 05:03

## 2025-03-24 RX ADMIN — PROPOFOL 40 MG: 10 INJECTION, EMULSION INTRAVENOUS at 03:03

## 2025-03-24 RX ADMIN — LEVETIRACETAM 500 MG: 500 TABLET, FILM COATED ORAL at 08:03

## 2025-03-24 RX ADMIN — DOCUSATE SODIUM 100 MG: 100 CAPSULE, LIQUID FILLED ORAL at 09:03

## 2025-03-24 RX ADMIN — MEMANTINE 10 MG: 10 TABLET ORAL at 09:03

## 2025-03-24 RX ADMIN — ETOMIDATE 10 MG: 2 INJECTION, SOLUTION INTRAVENOUS at 03:03

## 2025-03-24 RX ADMIN — SODIUM CHLORIDE: 9 INJECTION, SOLUTION INTRAVENOUS at 12:03

## 2025-03-24 RX ADMIN — CARBAMIDE PEROXIDE 5 DROP: 65 SOLUTION/ DROPS TOPICAL at 09:03

## 2025-03-24 RX ADMIN — TAMSULOSIN HYDROCHLORIDE 0.4 MG: 0.4 CAPSULE ORAL at 09:03

## 2025-03-24 RX ADMIN — CARBAMIDE PEROXIDE 5 DROP: 65 SOLUTION/ DROPS TOPICAL at 10:03

## 2025-03-24 RX ADMIN — GABAPENTIN 100 MG: 100 CAPSULE ORAL at 08:03

## 2025-03-24 RX ADMIN — SUGAMMADEX 200 MG: 100 INJECTION, SOLUTION INTRAVENOUS at 05:03

## 2025-03-24 RX ADMIN — CEFAZOLIN 2 G: 2 INJECTION, POWDER, FOR SOLUTION INTRAMUSCULAR; INTRAVENOUS at 05:03

## 2025-03-24 NOTE — HPI
86 yo male with a PMHx of Afib, UC, HTN, and aortic stenosis and normal pressure hydrocephalus status post insertion of a right-sided  shunt on 02/06 2025, who presents with several days of bitemporal headache. Imaging revealed bilateral SD hygromas. Patient was seen in NSGY and shunt was dialed to 200. He then presented to the ED for worsening bitemporal headaches. Patient was admitted to NSGY service and scheduled for SD evacuation today as well as distal shunt tie off. Patient is admitted to [facility] for higher level care and post op monitoring.

## 2025-03-24 NOTE — NURSING
Patient blood sugar was 60 at lunch time.    Patient was given Glucose tab PRN and blood sugar recheck.. CBG 81

## 2025-03-24 NOTE — PLAN OF CARE
Kulwinder Elias - Neurosurgery ([facility])  Discharge Reassessment    Primary Care Provider: Nakul Mandujano MD    Expected Discharge Date: 3/26/2025    Patient is not medically ready for discharge.  Plan for patient to go to OR today for craniotomy for sdh.  Patient to go to [facility] post surgery. Once patient is med stable, PT/OT to eval for discharge planning needs.    Discharge Plan A and Plan B have been determined by review of patient's clinical status, future medical and therapeutic needs, and coverage/benefits for post-acute care in coordination with multidisciplinary team members.   Reassessment (most recent)       Discharge Reassessment - 03/24/25 1050          Discharge Reassessment    Assessment Type Discharge Planning Reassessment     Did the patient's condition or plan change since previous assessment? No     Discharge Plan discussed with: Patient     Communicated LIDIA with patient/caregiver Yes     Discharge Plan A Rehab     Discharge Plan B Home with family;Home Health     DME Needed Upon Discharge  other (see comments)   tbd    Transition of Care Barriers None     Why the patient remains in the hospital Requires continued medical care        Post-Acute Status    Discharge Delays None known at this time

## 2025-03-24 NOTE — ASSESSMENT & PLAN NOTE
S/p bilateral bobby hole evac with SG drains in place and tie off of VPS at clavicle   -admit to NCC   -NSGY following   -post op CT pending   -SBP goal <140  -multimodal pain meds   -keppra ppx per NSGY   -SG drains to full suction.

## 2025-03-24 NOTE — BRIEF OP NOTE
Kulwinder Elias - Neuro Critical Care  Brief Operative Note    SUMMARY     Surgery Date: 3/24/2025     Surgeons and Role:     * Jairo Blankenship MD - Primary     * Saundra Camilo MD    Assisting Surgeon: None    Pre-op Diagnosis:  S/P  shunt [Z98.2]  Subdural hygroma [G96.08]    Post-op Diagnosis:  Post-Op Diagnosis Codes:     * S/P  shunt [Z98.2]     * Subdural hygroma [G96.08]    Procedure(s) (LRB):  EVACUATION, HEMATOMA, SUBDURAL  AND SHUNT TIE OFF (Bilateral)    Anesthesia: General    Implants:  Implant Name Type Inv. Item Serial No.  Lot No. LRB No. Used Action   PLATE BONE 2X2 HOLE SM BOX - [id number]  PLATE BONE 2X2 HOLE SM BOX  [employer].   2 Implanted   PLATE BONE BUR HOLE COVER 10MM - [id number]  PLATE BONE BUR HOLE COVER 10MM  [employer].   2 Implanted   SCREW UN3 AXS SD 1.5X4MM - [id number]  SCREW UN3 AXS SD 1.5X4MM  [employer].   17 Implanted       Operative Findings: bilateral mini crani for subdural hygroma evacuation, shunt tied off at right clavicle.     Estimated Blood Loss: * No values recorded between 3/24/2025  2:58 PM and 3/24/2025  5:34 PM *    Estimated Blood Loss has not been documented. EBL = 40.         Specimens:   Specimen (24h ago, onward)      None          * No specimens in log *    [id number]

## 2025-03-24 NOTE — PROGRESS NOTES
Anesthesia notified of glucose tablets that were administered per MAR at 1342. Per Dr. Kendall with anesthesia, pt ok to proceed to OR at current scheduled time.

## 2025-03-24 NOTE — NURSING
Pt transported to CT on continuous monitoring and 6L simple face mask. VSS throughout. Pt tolerated well. Pt return to room 9065 by RN at 1825 and back on bedside monitoring. HERMINIA.

## 2025-03-24 NOTE — NURSING
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 N/a   *If wounds noted, LDA placed? Y/N  Skin Assessment Verified by:  Cherie Trejo Completed? Pending     Patient Belongings on Admit: eyeglasses at the bedside     [facility] notified: Sherry JACINTO

## 2025-03-24 NOTE — ASSESSMENT & PLAN NOTE
This is a 85-year-old male with a history of NPH status post right-sided  shunt on 02/06/2025 by Dr. Ojeda, who presents with several days of worsening bitemporal headache.  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  CT head obtained today demonstrates interval stability of the bilateral subdural hygromas.  When the patient saw Emeli Rivero in clinic on 03/07/2025 the shunt was reprogrammed to 200.  He was and remains neurologically intact.    Plan:  --admit neurosurgery with q.4 hours neuro checks   --all imaging and diagnostics reviewed  --regular diet, NPO   --Weston hose and SCD, hold home Eliquis and chemical VTE prophylaxis  --OR today for distal shunt catheter at the clavicle tie off with bilateral crani for subdural hygroma evacuation   Seen with Dr. Blankenship

## 2025-03-24 NOTE — PROGRESS NOTES
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  Patient was seen in clinic by Dr. Glenn Rivero on 3 7 and 319.  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  The patient denies fever/chills, HA, vision/hearing changes, dysphagia, dysarthria, nausea/vomiting, new-onset weakness or sensory change, bowel/bladder changes.       Post-Op Info:  Procedure(s) (LRB):  EVACUATION, HEMATOMA, SUBDURAL (Bilateral)       Interval History: NAEON. OR today for bilateral crani for subdural hygroma evacuation with distal shunt catheter tie off.     Medications:  Continuous Infusions:   0.9% NaCl   Intravenous Continuous 50 mL/hr at 03/24/25 0940 Rate Change at 03/24/25 0940     Scheduled Meds:   amitriptyline  10 mg Oral QHS    amLODIPine  2.5 mg Oral Nightly    carbamide peroxide  5 drop Left Ear BID    cetirizine  10 mg Oral Daily    docusate sodium  100 mg Oral BID    gabapentin  100 mg Oral TID    levothyroxine  125 mcg Oral Before breakfast    memantine  10 mg Oral BID    metoprolol succinate  25 mg Oral Daily    pantoprazole  40 mg Oral Daily    polyethylene glycol  17 g Oral Daily    pravastatin  20 mg Oral QHS    tamsulosin  0.4 mg Oral Daily     PRN Meds:  Current Facility-Administered Medications:     acetaminophen, 650 mg, Oral, Q6H PRN    butalbital-acetaminophen-caffeine -40 mg, 2 tablet, Oral, Q6H PRN    dextrose 50%, 12.5 g, Intravenous, PRN    dextrose 50%, 25 g, Intravenous, PRN     glucagon (human recombinant), 1 mg, Intramuscular, PRN    glucose, 16 g, Oral, PRN    glucose, 16 g, Oral, PRN    glucose, 24 g, Oral, PRN    HYDROmorphone, 0.5 mg, Intravenous, Q6H PRN    insulin aspart U-100, 0-5 Units, Subcutaneous, QID (AC + HS) PRN    melatonin, 6 mg, Oral, Nightly PRN    ondansetron, 4 mg, Intravenous, Q6H PRN     Review of Systems  Objective:     Weight: 67.9 kg (149 lb 11.1 oz)  Body mass index is 20.88 kg/m².  Vital Signs (Most Recent):  Temp: 97.9 °F (36.6 °C) (03/24/25 0754)  Pulse: (!) 52 (03/24/25 0754)  Resp: 18 (03/24/25 0754)  BP: (!) 157/69 (03/24/25 0754)  SpO2: 97 % (03/24/25 0754) Vital Signs (24h Range):  Temp:  [97.4 °F (36.3 °C)-97.9 °F (36.6 °C)] 97.9 °F (36.6 °C)  Pulse:  [50-53] 52  Resp:  [15-18] 18  SpO2:  [95 %-99 %] 97 %  BP: (113-162)/(61-82) 157/69           Neurosurgery Physical Exam  General: well developed, well nourished, no distress. Hard of hearing  Head: normocephalic, atraumatic  Neurologic: Alert and oriented. Thought content appropriate.  GCS: Motor: 6/Verbal: 5/Eyes: 4 GCS Total: 15  Mental Status: Awake, Alert, Oriented x 4  Language: No aphasia  Speech: No dysarthria  Cranial nerves: face symmetric, tongue midline, CN II-XII grossly intact.   Eyes: pupils equal, round, reactive to light with accommodation, EOMI.   Pulmonary: normal respirations, no signs of respiratory distress  Skin: Skin is warm, dry and intact.  Sensory: intact to light touch throughout  Motor Strength:Moves all extremities spontaneously with good tone.  Full strength upper and lower extremities. No abnormal movements seen.             Significant Labs:  Recent Labs   Lab 03/23/25  0639 03/24/25  0432    140   K 4.2 4.2    108   CO2 24 23   BUN 28* 28*   CREATININE 1.1 1.1   CALCIUM 8.3* 8.5*     Recent Labs   Lab 03/23/25  0639 03/24/25  0432   WBC 7.18 6.94   HGB 12.3* 12.7*   HCT 38.6* 40.2   * 140*     Recent Labs   Lab 03/24/25  0432   INR 1.0   APTT 29.0      Microbiology Results (last 7 days)       ** No results found for the last 168 hours. **          All pertinent labs from the last 24 hours have been reviewed.    Significant Diagnostics:  I have reviewed all pertinent imaging results/findings within the past 24 hours.  Assessment/Plan:     S/P ventriculoperitoneal shunt  This is a 85-year-old male with a history of NPH status post right-sided  shunt on 02/06/2025 by Dr. Ojeda, who presents with several days of worsening bitemporal headache.  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  CT head obtained today demonstrates interval stability of the bilateral subdural hygromas.  When the patient saw Emeli Rivero in clinic on 03/07/2025 the shunt was reprogrammed to 200.  He was and remains neurologically intact.    Plan:  --admit neurosurgery with q.4 hours neuro checks   --all imaging and diagnostics reviewed  --regular diet, NPO   --Weston hose and SCD, hold home Eliquis and chemical VTE prophylaxis  --OR today for distal shunt catheter at the clavicle tie off with bilateral crani for subdural hygroma evacuation   Seen with Dr. Krzysztof Rivero, [place]  Neurosurgery  [facility]as - Neurosurgery ([facility])

## 2025-03-24 NOTE — ANESTHESIA PROCEDURE NOTES
Intubation    Date/Time: 3/24/2025 3:27 PM    Performed by: Elidia Castañeda  Authorized by: Reno Torres MD    Intubation:     Induction:  Intravenous    Intubated:  Postinduction    Mask Ventilation:  Easy with oral airway    Attempts:  1    Attempted By:  Student (OANH Cheung)    Method of Intubation:  Video laryngoscopy    Blade:  Aj 3    Laryngeal View Grade: Grade I - full view of cords      Difficult Airway Encountered?: No      Complications:  None    Airway Device:  Oral endotracheal tube    Airway Device Size:  7.5    Style/Cuff Inflation:  Cuffed (inflated to minimal occlusive pressure)    Inflation Amount (mL):  6    Tube secured:  22    Secured at:  The teeth    Placement Verified By:  Capnometry    Complicating Factors:  None    Findings Post-Intubation:  BS equal bilateral and atraumatic/condition of teeth unchanged

## 2025-03-24 NOTE — SUBJECTIVE & OBJECTIVE
Past Medical History:   Diagnosis Date    Allergies 01/31/2025    Anemia     Anticoagulant long-term use     Anxiety     Atrophic kidney 11/16/2012    BPH (benign prostatic hyperplasia) 11/16/2012    Congenital absence of right kidney 07/05/2017    DDD (degenerative disc disease), cervical 07/05/2017    x-ray 7/17 - Severe    Ex-smoker 12/20/2018    Exudative age-related macular degeneration, right eye, with active choroidal neovascularization 02/15/2024    GERD (gastroesophageal reflux disease) 11/16/2012    Glaucoma 11/16/2012    HTN (hypertension) 11/16/2012    Hypothyroid 11/16/2012    Internal carotid artery stenosis 11/16/2012    Iron deficiency anemia due to chronic blood loss 10/29/2021    Left ventricular diastolic dysfunction, NYHA class 1 04/16/2015    4/15    Low serum testosterone level 11/16/2012    Normal cardiac stress test 11/16/2012    NPH (normal pressure hydrocephalus) 02/03/2025    Osteopenia 11/16/2012    PAF (paroxysmal atrial fibrillation) 12/20/2018    Prostate CA 01/15/2013    XRT 12/12  Dr. 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    Shingles 11/16/2012    Skin disease 2020    Seems to have started after Covid vaccine    Stage 3a chronic kidney disease 10/06/2014    Stroke 2017    tia   no residual    Thrombocytopenia 01/31/2025    TIA (transient ischemic attack) 11/16/2012    UC (ulcerative colitis) 11/16/2012     Past Surgical History:   Procedure Laterality Date    ADENOIDECTOMY      COLONOSCOPY N/A 03/25/2022    Procedure: COLONOSCOPY;  Surgeon: Ashley Nguyen MD;  Location: [facility] ENDO;  Service: Endoscopy;  Laterality: N/A;    ESOPHAGOGASTRODUODENOSCOPY N/A 03/25/2022    Procedure: EGD (ESOPHAGOGASTRODUODENOSCOPY);  Surgeon: Ashley Nguyen MD;  Location: [facility] ENDO;  Service: Endoscopy;  Laterality: N/A;  added on per Dr. Nguyen    ESOPHAGOGASTRODUODENOSCOPY N/A 6/7/2024    Procedure: EGD (ESOPHAGOGASTRODUODENOSCOPY);  Surgeon: Audie Snell  MD;  Location: [facility] ENDO (4TH FLR);  Service: Endoscopy;  Laterality: N/A;  5/21 R/s,sent updated instr via portal.pt informed to hold eliquis for 2 days.AC  Ref by: ,  approved to hold Eliquis (apixaban) for 2 days per Dr. Conklin-see media file  5/9/24-GT  5/31-pre call complete-tb    EYE SURGERY Right 11/2020    cataract    HERNIA REPAIR      LEFT HEART CATHETERIZATION Right 10/29/2021    Procedure: CATHETERIZATION, HEART, LEFT AND RIGHT  - LV DARNELL POSSIBLE;  Surgeon: Beau Conklin MD;  Location: [facility] CATH LAB;  Service: Cardiology;  Laterality: Right;    LUMBAR PUNCTURE N/A 1/13/2025    Procedure: Lumbar Puncture;  Surgeon: Jairo Blankenship MD;  Location: [facility] OR 2ND FLR;  Service: Neurosurgery;  Laterality: N/A;    RIGHT HEART CATHETERIZATION Right 10/29/2021    Procedure: INSERTION, CATHETER, RIGHT HEART;  Surgeon: Beau Conklin MD;  Location: [facility] CATH LAB;  Service: Cardiology;  Laterality: Right;    SKIN BIOPSY  2020    TONSILLECTOMY      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    Procedure: INSERTION, SHUNT, VENTRICULOPERITONEAL;  Surgeon: Jairo Blankenship MD;  Location: [facility] OR 2ND FLR;  Service: Neurosurgery;  Laterality: Right;    VENTRICULOPERITONEAL SHUNT  2/6/2025    Procedure: INSERTION, SHUNT, VENTRICULOPERITONEAL;  Surgeon: Shar Elizabeth MD;  [facility];  Service: General;;      Medications Ordered Prior to Encounter[1]   Allergies: Benazepril  Family History   Problem Relation Name Age of Onset    Hypertension Mother      Alzheimer's disease Mother      Heart attack Father      Diabetes Brother      Hypertension Brother      Cancer Brother      Heart disease Brother  56        MI    Colon cancer Neg Hx      Esophageal cancer Neg Hx       Social History[2]  Review of Systems   Unable to perform ROS: Other (post anesthesia)     Objective:     Vitals:    Temp: 97 °F (36.1 °C)  Pulse: (!) 51  Rhythm: sinus bradycardia  BP: 138/63  MAP (mmHg):  90  Resp: (!) 26  SpO2: 100 %    Temp  Min: 97 °F (36.1 °C)  Max: 97.9 °F (36.6 °C)  Pulse  Min: 48  Max: 52  BP  Min: 123/70  Max: 162/77  MAP (mmHg)  Min: 88  Max: 108  Resp  Min: 15  Max: 26  SpO2  Min: 95 %  Max: 100 %    03/23 0701 - 03/24 0700  In: 240 [P.O.:240]  Out: 800 [Urine:800]   Unmeasured Output  Stool Occurrence: 0        Physical Exam  Vitals and nursing note reviewed.   Constitutional:       Appearance: Normal appearance.   HENT:      Head: Normocephalic.      Comments: Bilateral SG drains      Mouth/Throat:      Mouth: Mucous membranes are moist.   Neck:      Comments: Dressing intact   Cardiovascular:      Rate and Rhythm: Normal rate.   Pulmonary:      Effort: Pulmonary effort is normal. No respiratory distress.   Abdominal:      General: Abdomen is flat. There is no distension.      Palpations: Abdomen is soft.   Skin:     General: Skin is warm.      Coloration: Skin is not jaundiced.   Neurological:      Comments: --sedation: post op  --GCS:  E2 V3 M6  --Mental Status: Awakens, follows simple commands.   --CN II-XII grossly intact.   --PERRL   --Motor: moves all to command              Unable to test language, memory, judgment, insight, fund of knowledge, hearing, shoulder shrug, tongue protrusion, coordination, gait due to level of consciousness.       Today I personally reviewed pertinent medications, lines/drains/airways, imaging, cardiology results, laboratory results,          [1]   No current facility-administered medications on file prior to encounter.     Current Outpatient Medications on File Prior to Encounter   Medication Sig Dispense Refill    metoprolol succinate (TOPROL-XL) 25 MG 24 hr tablet TAKE 1 TABLET BY MOUTH EVERY DAY 90 tablet 3    acetaminophen (TYLENOL) 500 MG tablet Take 2 tablets (1,000 mg total) by mouth every 6 (six) hours as needed.      amitriptyline (ELAVIL) 10 MG tablet Take 10 mg by mouth every evening.      amLODIPine (NORVASC) 2.5 MG tablet Take 2.5 mg by  mouth nightly.      ascorbic acid, vitamin C, (VITAMIN C) 500 MG tablet Take 500 mg by mouth once daily.      balsalazide (COLAZAL) 750 mg capsule Take 5 tablets in the morning and 4 tablets in the evening. 270 capsule 11    butalbital-acetaminophen-caffeine -40 mg (FIORICET, ESGIC) -40 mg per tablet Take 1 tablet by mouth every 4 (four) hours as needed for Headaches. 20 tablet 0    carbamide peroxide (DEBROX) 6.5 % otic solution Place 5 drops into the left ear 2 (two) times daily. 15 mL 0    docusate sodium (STOOL SOFTENER ORAL) Take by mouth.      ELIQUIS 2.5 mg Tab Take 2.5 mg by mouth 2 (two) times daily.      ferrous sulfate (FEOSOL) 325 mg (65 mg iron) Tab tablet 1 tablet Orally Once a day      fluocinolone acetonide oiL 0.01 % Drop Place into both ears.      fremanezumab-vfrm (AJOVY AUTOINJECTOR) 225 mg/1.5 mL autoinjector Inject 225 mg into the skin every 30 days.      gabapentin (NEURONTIN) 100 MG capsule Take 100 mg by mouth 3 (three) times daily.      HYDROcodone-acetaminophen (NORCO) 5-325 mg per tablet Take 1 tablet by mouth every 6 (six) hours as needed for Pain. 28 tablet 0    levocetirizine (XYZAL) 5 MG tablet Take 5 mg by mouth every morning.      levothyroxine (SYNTHROID) 125 MCG tablet TAKE 1 TABLET BY MOUTH EVERY DAY IN THE MORNING 90 tablet 3    mag/aluminum/sod bicarb/alginc (GAVISCON ORAL) Take by mouth as needed. Acid reflux      memantine (NAMENDA) 10 MG Tab Take 10 mg by mouth 2 (two) times daily.      pantoprazole (PROTONIX) 40 MG tablet TAKE 1 TABLET BY MOUTH DAILY AS  NEEDED 90 tablet 3    pravastatin (PRAVACHOL) 20 MG tablet Take 1 tablet (20 mg total) by mouth every evening. 90 tablet 3    tamsulosin (FLOMAX) 0.4 mg Cap Take 1 capsule (0.4 mg total) by mouth once daily. 90 capsule 12    triamcinolone acetonide 0.1% (KENALOG) 0.1 % cream Apply topically 2 (two) times daily. APPLY  CREAM TOPICALLY TO AFFECTED AREA TWICE DAILY as needed 454 g 0     vitC/E/Zn/copper/lutein/zeaxan (ICAPS AREDS2 ORAL) Take 1 capsule by mouth once daily.      [DISCONTINUED] telmisartan (MICARDIS) 40 MG Tab TAKE 1/2 TABLET BY MOUTH DAILY 90 tablet 1   [2]   Social History  Tobacco Use    Smoking status: Former     Current packs/day: 0.00     Average packs/day: 1 pack/day for 25.0 years (25.0 ttl pk-yrs)     Types: Cigarettes     Start date: 1947     Quit date: 1972     Years since quittin.2    Smokeless tobacco: Never    Tobacco comments:     Patient Quit Smoking on 1972.   Substance Use Topics    Alcohol use: Not Currently     Alcohol/week: 3.0 standard drinks of alcohol     Types: 2 Glasses of wine, 1 Cans of beer per week     Comment: Occasional    Drug use: Never

## 2025-03-24 NOTE — PLAN OF CARE
Problem: Adult Inpatient Plan of Care  Goal: Plan of Care Review  Outcome: Progressing  Goal: Patient-Specific Goal (Individualized)  Outcome: Progressing  Goal: Optimal Comfort and Wellbeing  Outcome: Progressing  Goal: Readiness for Transition of Care  Outcome: Progressing     Problem: Wound  Goal: Optimal Functional Ability  Outcome: Progressing  Goal: Optimal Pain Control and Function  Outcome: Progressing     Problem: Fall Injury Risk  Goal: Absence of Fall and Fall-Related Injury  Outcome: Progressing   Patient alert and verbally responsive. All safety measures maintained. VS within limits; see flowsheet. No acute distress observed. POC ongoing.

## 2025-03-24 NOTE — SUBJECTIVE & OBJECTIVE
Interval History: NAEON. OR today for bilateral crani for subdural hygroma evacuation with distal shunt catheter tie off.     Medications:  Continuous Infusions:   0.9% NaCl   Intravenous Continuous 50 mL/hr at 03/24/25 0940 Rate Change at 03/24/25 0940     Scheduled Meds:   amitriptyline  10 mg Oral QHS    amLODIPine  2.5 mg Oral Nightly    carbamide peroxide  5 drop Left Ear BID    cetirizine  10 mg Oral Daily    docusate sodium  100 mg Oral BID    gabapentin  100 mg Oral TID    levothyroxine  125 mcg Oral Before breakfast    memantine  10 mg Oral BID    metoprolol succinate  25 mg Oral Daily    pantoprazole  40 mg Oral Daily    polyethylene glycol  17 g Oral Daily    pravastatin  20 mg Oral QHS    tamsulosin  0.4 mg Oral Daily     PRN Meds:  Current Facility-Administered Medications:     acetaminophen, 650 mg, Oral, Q6H PRN    butalbital-acetaminophen-caffeine -40 mg, 2 tablet, Oral, Q6H PRN    dextrose 50%, 12.5 g, Intravenous, PRN    dextrose 50%, 25 g, Intravenous, PRN    glucagon (human recombinant), 1 mg, Intramuscular, PRN    glucose, 16 g, Oral, PRN    glucose, 16 g, Oral, PRN    glucose, 24 g, Oral, PRN    HYDROmorphone, 0.5 mg, Intravenous, Q6H PRN    insulin aspart U-100, 0-5 Units, Subcutaneous, QID (AC + HS) PRN    melatonin, 6 mg, Oral, Nightly PRN    ondansetron, 4 mg, Intravenous, Q6H PRN     Review of Systems  Objective:     Weight: 67.9 kg (149 lb 11.1 oz)  Body mass index is 20.88 kg/m².  Vital Signs (Most Recent):  Temp: 97.9 °F (36.6 °C) (03/24/25 0754)  Pulse: (!) 52 (03/24/25 0754)  Resp: 18 (03/24/25 0754)  BP: (!) 157/69 (03/24/25 0754)  SpO2: 97 % (03/24/25 0754) Vital Signs (24h Range):  Temp:  [97.4 °F (36.3 °C)-97.9 °F (36.6 °C)] 97.9 °F (36.6 °C)  Pulse:  [50-53] 52  Resp:  [15-18] 18  SpO2:  [95 %-99 %] 97 %  BP: (113-162)/(61-82) 157/69           Neurosurgery Physical Exam  General: well developed, well nourished, no distress. Hard of hearing  Head: normocephalic,  atraumatic  Neurologic: Alert and oriented. Thought content appropriate.  GCS: Motor: 6/Verbal: 5/Eyes: 4 GCS Total: 15  Mental Status: Awake, Alert, Oriented x 4  Language: No aphasia  Speech: No dysarthria  Cranial nerves: face symmetric, tongue midline, CN II-XII grossly intact.   Eyes: pupils equal, round, reactive to light with accommodation, EOMI.   Pulmonary: normal respirations, no signs of respiratory distress  Skin: Skin is warm, dry and intact.  Sensory: intact to light touch throughout  Motor Strength:Moves all extremities spontaneously with good tone.  Full strength upper and lower extremities. No abnormal movements seen.             Significant Labs:  Recent Labs   Lab 03/23/25  0639 03/24/25  0432    140   K 4.2 4.2    108   CO2 24 23   BUN 28* 28*   CREATININE 1.1 1.1   CALCIUM 8.3* 8.5*     Recent Labs   Lab 03/23/25  0639 03/24/25  0432   WBC 7.18 6.94   HGB 12.3* 12.7*   HCT 38.6* 40.2   * 140*     Recent Labs   Lab 03/24/25  0432   INR 1.0   APTT 29.0     Microbiology Results (last 7 days)       ** No results found for the last 168 hours. **          All pertinent labs from the last 24 hours have been reviewed.    Significant Diagnostics:  I have reviewed all pertinent imaging results/findings within the past 24 hours.

## 2025-03-24 NOTE — H&P
[facility] - Neuro Critical Care  Neurocritical Care  History & Physical    Admit Date: 3/21/2025  Service Date: 03/24/2025  Length of Stay: 3    Subjective:     Chief Complaint: Subdural hematoma, chronic    History of Present Illness: 84 yo male with a PMHx of Afib, UC, HTN, and aortic stenosis and normal pressure hydrocephalus status post insertion of a right-sided  shunt on 02/06 2025, who presents with several days of bitemporal headache. Imaging revealed bilateral SD hygromas. Patient was seen in NSGY and shunt was dialed to 200. He then presented to the ED for worsening bitemporal headaches. Patient was admitted to NSGY service and scheduled for SD evacuation today as well as distal shunt tie off. Patient is admitted to [facility] for higher level care and post op monitoring.       Past Medical History:   Diagnosis Date    Allergies 01/31/2025    Anemia     Anticoagulant long-term use     Anxiety     Atrophic kidney 11/16/2012    BPH (benign prostatic hyperplasia) 11/16/2012    Congenital absence of right kidney 07/05/2017    DDD (degenerative disc disease), cervical 07/05/2017    x-ray 7/17 - Severe    Ex-smoker 12/20/2018    Exudative age-related macular degeneration, right eye, with active choroidal neovascularization 02/15/2024    GERD (gastroesophageal reflux disease) 11/16/2012    Glaucoma 11/16/2012    HTN (hypertension) 11/16/2012    Hypothyroid 11/16/2012    Internal carotid artery stenosis 11/16/2012    Iron deficiency anemia due to chronic blood loss 10/29/2021    Left ventricular diastolic dysfunction, NYHA class 1 04/16/2015    4/15    Low serum testosterone level 11/16/2012    Normal cardiac stress test 11/16/2012    NPH (normal pressure hydrocephalus) 02/03/2025    Osteopenia 11/16/2012    PAF (paroxysmal atrial fibrillation) 12/20/2018    Prostate CA 01/15/2013    XRT 12/12  Dr. 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    Shingles 11/16/2012    Skin disease 2020     Seems to have started after Covid vaccine    Stage 3a chronic kidney disease 10/06/2014    Stroke 2017    tia   no residual    Thrombocytopenia 01/31/2025    TIA (transient ischemic attack) 11/16/2012    UC (ulcerative colitis) 11/16/2012     Past Surgical History:   Procedure Laterality Date    ADENOIDECTOMY      COLONOSCOPY N/A 03/25/2022    Procedure: COLONOSCOPY;  Surgeon: Ashley Nguyen MD;  Location: [facility];  Service: Endoscopy;  Laterality: N/A;    ESOPHAGOGASTRODUODENOSCOPY N/A 03/25/2022    Procedure: EGD (ESOPHAGOGASTRODUODENOSCOPY);  Surgeon: Ashley Nguyen MD;  Location: [facility];  Service: Endoscopy;  Laterality: N/A;  added on per Dr. Nguyen    ESOPHAGOGASTRODUODENOSCOPY N/A 6/7/2024    Procedure: EGD (ESOPHAGOGASTRODUODENOSCOPY);  Surgeon: Audie Snell MD;  Location: [facility] (4TH FLR);  Service: Endoscopy;  Laterality: N/A;  5/21 R/s,sent updated instr via portal.pt informed to hold eliquis for 2 days.AC  Ref by: ,  approved to hold Eliquis (apixaban) for 2 days per Dr. Conklin-see media file  5/9/24-GT  5/31-pre call complete-tb    EYE SURGERY Right 11/2020    cataract    HERNIA REPAIR      LEFT HEART CATHETERIZATION Right 10/29/2021    Procedure: CATHETERIZATION, HEART, LEFT AND RIGHT  - LV DARNELL POSSIBLE;  Surgeon: Beau Conklin MD;  Location: [facility] CATH LAB;  Service: Cardiology;  Laterality: Right;    LUMBAR PUNCTURE N/A 1/13/2025    Procedure: Lumbar Puncture;  Surgeon: Jairo Blankenship MD;  Location: [facility] OR 2ND FLR;  Service: Neurosurgery;  Laterality: N/A;    RIGHT HEART CATHETERIZATION Right 10/29/2021    Procedure: INSERTION, CATHETER, RIGHT HEART;  Surgeon: Beau Conklin MD;  Location: [facility] CATH LAB;  Service: Cardiology;  Laterality: Right;    SKIN BIOPSY  2020    TONSILLECTOMY      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    Procedure: INSERTION, SHUNT, VENTRICULOPERITONEAL;  Surgeon: Jairo Blankenship MD;  Location: [facility] OR 2ND FLR;  Service:  Neurosurgery;  Laterality: Right;    VENTRICULOPERITONEAL SHUNT  2/6/2025    Procedure: INSERTION, SHUNT, VENTRICULOPERITONEAL;  Surgeon: Shar Elizabeth MD;  Location: [facility] OR [address] [place] [address];  Service: General;;      Medications Ordered Prior to Encounter[1]   Allergies: Benazepril  Family History   Problem Relation Name Age of Onset    Hypertension Mother      Alzheimer's disease Mother      Heart attack Father      Diabetes Brother      Hypertension Brother      Cancer Brother      Heart disease Brother  56        MI    Colon cancer Neg Hx      Esophageal cancer Neg Hx       Social History[2]  Review of Systems   Unable to perform ROS: Other (post anesthesia)     Objective:     Vitals:    Temp: 97 °F (36.1 °C)  Pulse: (!) 51  Rhythm: sinus bradycardia  BP: 138/63  MAP (mmHg): 90  Resp: (!) 26  SpO2: 100 %    Temp  Min: 97 °F (36.1 °C)  Max: 97.9 °F (36.6 °C)  Pulse  Min: 48  Max: 52  BP  Min: 123/70  Max: 162/77  MAP (mmHg)  Min: 88  Max: 108  Resp  Min: 15  Max: 26  SpO2  Min: 95 %  Max: 100 %    03/23 0701 - 03/24 0700  In: 240 [P.O.:240]  Out: 800 [Urine:800]   Unmeasured Output  Stool Occurrence: 0        Physical Exam  Vitals and nursing note reviewed.   Constitutional:       Appearance: Normal appearance.   HENT:      Head: Normocephalic.      Comments: Bilateral SG drains      Mouth/Throat:      Mouth: Mucous membranes are moist.   Neck:      Comments: Dressing intact   Cardiovascular:      Rate and Rhythm: Normal rate.   Pulmonary:      Effort: Pulmonary effort is normal. No respiratory distress.   Abdominal:      General: Abdomen is flat. There is no distension.      Palpations: Abdomen is soft.   Skin:     General: Skin is warm.      Coloration: Skin is not jaundiced.   Neurological:      Comments: --sedation: post op  --GCS:  E2 V3 M6  --Mental Status: Awakens, follows simple commands.   --CN II-XII grossly intact.   --PERRL   --Motor: moves all to command              Unable to test language, memory,  judgment, insight, fund of knowledge, hearing, shoulder shrug, tongue protrusion, coordination, gait due to level of consciousness.       Today I personally reviewed pertinent medications, lines/drains/airways, imaging, cardiology results, laboratory results,     Assessment/Plan:     Neuro  * Subdural hematoma, chronic  S/p bilateral bobby hole evac with SG drains in place and tie off of VPS at clavicle   -admit to NCC   -NSGY following   -post op CT pending   -SBP goal <140  -multimodal pain meds   -keppra ppx per NSGY   -SG drains to full suction.     S/P ventriculoperitoneal shunt  Placed 2/6/25 with subdural hygromas from continued overdrainage          The patient is being Prophylaxed for:  Venous Thromboembolism with: Mechanical  Stress Ulcer with: Not Applicable   Ventilator Pneumonia with: not applicable    Activity Orders            Diet Renal Non-Dialysis: Renal Non-Dialysis starting at 03/24 1726    Progressive Mobility Protocol (mobilize patient to their highest level of functioning at least twice daily) starting at 03/22 0800          Full Code    Sherry Alvarez PA-C  Neurocritical Care  [facility] - Neuro Critical Care       [1]   No current facility-administered medications on file prior to encounter.     Current Outpatient Medications on File Prior to Encounter   Medication Sig Dispense Refill    metoprolol succinate (TOPROL-XL) 25 MG 24 hr tablet TAKE 1 TABLET BY MOUTH EVERY DAY 90 tablet 3    acetaminophen (TYLENOL) 500 MG tablet Take 2 tablets (1,000 mg total) by mouth every 6 (six) hours as needed.      amitriptyline (ELAVIL) 10 MG tablet Take 10 mg by mouth every evening.      amLODIPine (NORVASC) 2.5 MG tablet Take 2.5 mg by mouth nightly.      ascorbic acid, vitamin C, (VITAMIN C) 500 MG tablet Take 500 mg by mouth once daily.      balsalazide (COLAZAL) 750 mg capsule Take 5 tablets in the morning and 4 tablets in the evening. 270 capsule 11    butalbital-acetaminophen-caffeine -40 mg  (FIORICET, ESGIC) -40 mg per tablet Take 1 tablet by mouth every 4 (four) hours as needed for Headaches. 20 tablet 0    carbamide peroxide (DEBROX) 6.5 % otic solution Place 5 drops into the left ear 2 (two) times daily. 15 mL 0    docusate sodium (STOOL SOFTENER ORAL) Take by mouth.      ELIQUIS 2.5 mg Tab Take 2.5 mg by mouth 2 (two) times daily.      ferrous sulfate (FEOSOL) 325 mg (65 mg iron) Tab tablet 1 tablet Orally Once a day      fluocinolone acetonide oiL 0.01 % Drop Place into both ears.      fremanezumab-vfrm (AJOVY AUTOINJECTOR) 225 mg/1.5 mL autoinjector Inject 225 mg into the skin every 30 days.      gabapentin (NEURONTIN) 100 MG capsule Take 100 mg by mouth 3 (three) times daily.      HYDROcodone-acetaminophen (NORCO) 5-325 mg per tablet Take 1 tablet by mouth every 6 (six) hours as needed for Pain. 28 tablet 0    levocetirizine (XYZAL) 5 MG tablet Take 5 mg by mouth every morning.      levothyroxine (SYNTHROID) 125 MCG tablet TAKE 1 TABLET BY MOUTH EVERY DAY IN THE MORNING 90 tablet 3    mag/aluminum/sod bicarb/alginc (GAVISCON ORAL) Take by mouth as needed. Acid reflux      memantine (NAMENDA) 10 MG Tab Take 10 mg by mouth 2 (two) times daily.      pantoprazole (PROTONIX) 40 MG tablet TAKE 1 TABLET BY MOUTH DAILY AS  NEEDED 90 tablet 3    pravastatin (PRAVACHOL) 20 MG tablet Take 1 tablet (20 mg total) by mouth every evening. 90 tablet 3    tamsulosin (FLOMAX) 0.4 mg Cap Take 1 capsule (0.4 mg total) by mouth once daily. 90 capsule 12    triamcinolone acetonide 0.1% (KENALOG) 0.1 % cream Apply topically 2 (two) times daily. APPLY  CREAM TOPICALLY TO AFFECTED AREA TWICE DAILY as needed 454 g 0    vitC/E/Zn/copper/lutein/zeaxan (ICAPS AREDS2 ORAL) Take 1 capsule by mouth once daily.      [DISCONTINUED] telmisartan (MICARDIS) 40 MG Tab TAKE 1/2 TABLET BY MOUTH DAILY 90 tablet 1   [2]   Social History  Tobacco Use    Smoking status: Former     Current packs/day: 0.00     Average packs/day: 1  pack/day for 25.0 years (25.0 ttl pk-yrs)     Types: Cigarettes     Start date: 1947     Quit date: 1972     Years since quittin.2    Smokeless tobacco: Never    Tobacco comments:     Patient Quit Smoking on 1972.   Substance Use Topics    Alcohol use: Not Currently     Alcohol/week: 3.0 standard drinks of alcohol     Types: 2 Glasses of wine, 1 Cans of beer per week     Comment: Occasional    Drug use: Never

## 2025-03-24 NOTE — OP NOTE
DATE OF PROCEDURE:  3/24/2025     SURGEON:  Jairo Blankenship M.D., Ph.D.     ASSISTANT FOR THIS SURGERY:   Saundra Camilo M.D.  (RES) (the assistant is a [facility] resident).     PREOPERATIVE DIAGNOSES:  Normal pressure hydrocephalus.  S/P  shunt.  Headache.  Bilateral subdural hygromas.    POSTOPERATIVE DIAGNOSES:  Normal pressure hydrocephalus.  S/P  shunt.  Headache.  Bilateral subdural hygromas.     PROCEDURES PERFORMED:  1.  Right craniotomy for subdural hygroma.  2.  Left craniotomy for subdural hygroma.     INDICATIONS IN DETAIL:    Mr. Ramesh Ricci is an 85-year-old male with a past medical history of normal pressure hydrocephalus status post insertion of a right-sided ventriculoperitoneal shunt on 02/06 2025, who presents with several days of bitemporal headache.  He began developing worsening headache over the past several days and was told to come to the emergency room CT scan from the prior clinic visits demonstrated a 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  The patient denies fever/chills, HA, vision/hearing changes, dysphagia, dysarthria, nausea/vomiting, new-onset weakness or sensory change, bowel/bladder changes. CT Head Without Contrast. No significant interval detrimental change.  No intracranial hemorrhage or major vascular distribution infarct. Right-sided ventriculostomy shunt in place with unchanged size and configuration of the ventricles. 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  No worsening intracranial mass effect.  This patient has large subdural hygromas bilaterally despite his shunt being turned to the highest setting.  This creates a dangerous situation for him in that these can expand.  I have discussed the risks, benefits, and alternatives with the patient his family.  I am recommending that we perform surgery to drain both of these hygromas and  to tie off his shunt for now.  We will reassess in several weeks.  The patient and his family understand and wished to proceed.       PROCEDURE IN DETAIL:    The patient was seen in the pretreatment area and the risks, benefits and alternatives were again discussed and the patient wished to proceed.  The patient was brought to the Operating Room and a general anesthetic was administered.  All proper lines were placed.  The patient was placed in the supine position.  The head was placed in 3-point fixation using Pierce headholder.  Two lines were drawn bisecting the subdural hygromas.  One of these was on the right and another on the left.  We also ozzie a line over the shunt catheter.  Hair was clipped using electric clippers and the 3 sites were cleaned, prepped, and draped in the usual fashion.  A lidocaine/Marcaine mix was infiltrated over the incisions in the head.  We turned our attention initially to stopping flow completely from his shunt.  An incision was made over the shunt and carried down to the catheter using Bovie cautery.  The catheter was access.  A 2-0 silk tie was tied around the shunt tightly so that no fluid could past.  This incision was closed in layers with a 4-0 Monocryl in the skin.  Then on the patient's right side we made an incision over the calvarial portion that was centered over the hygroma.  This incision was carried down to the skull using Bovie cautery.  A cerebellar retractor was used to hold the soft tissue in place.  A single bur hole was made at the inferior aspect of the exposure.  A small craniotomy flap was turned.  The  the dura was opened and the hygroma was removed using suction and irrigation.  This hygroma was mostly fluid but was blood-tinged.  There was a small membrane over the cortex that was removed and this was irrigated also.  The dura was then tacked closed and the bone flap was replaced using Terese plates and screws.  A Hemovac drain was placed underneath the  skin.  The soft tissues were then closed in layers with a 4-0 Monocryl in the skin.  This procedure was then repeated on the patient's left side.  The hygroma on this side was exactly identical to the other side.  Clean dressings were then placed on all of the incisions.  The patient was taken out of three-point fixation.  The patient was then awakened by the anesthesia staff.  At the point that the patient was awakened following commands he was extubated.    No specimen were taken during this operation.      EBL was approximately 50 mL.     There were no intraprocedural complications.     All counts were correct at the end of surgery.     Dr. Jairo Blankenship was present during the entire procedure.

## 2025-03-24 NOTE — TRANSFER OF CARE
"Anesthesia Transfer of Care Note    Patient: Ramesh Ricci    Procedure(s) Performed: Procedure(s) (LRB):  EVACUATION, HEMATOMA, SUBDURAL  AND SHUNT TIE OFF (Bilateral)    Patient location: ICU    Anesthesia Type: general    Transport from OR: Transported from OR on 6-10 L/min O2 by face mask with adequate spontaneous ventilation. Continuous ECG monitoring in transport. Continuous SpO2 monitoring in transport. Continuos invasive BP monitoring in transport    Post pain: adequate analgesia    Post assessment: no apparent anesthetic complications and tolerated procedure well    Post vital signs: stable    Level of consciousness: awake (Return to baseline mental status)    Nausea/Vomiting: no nausea/vomiting    Complications: none    Transfer of care protocol was followedComments: Report given to ICU team at bedside.     Last vitals: Visit Vitals  BP (!) 148/69 (BP Location: Right arm, Patient Position: Lying)   Pulse (!) 52   Temp 36.1 °C (97 °F) (Temporal)   Resp 17   Ht 5' 11" (1.803 m)   Wt 67.9 kg (149 lb 11.1 oz)   SpO2 100%   BMI 20.88 kg/m²     "

## 2025-03-25 LAB
ABSOLUTE EOSINOPHIL (OHS): 0 K/UL
ABSOLUTE MONOCYTE (OHS): 0.41 K/UL (ref 0.3–1)
ABSOLUTE NEUTROPHIL COUNT (OHS): 10.09 K/UL (ref 1.8–7.7)
ANION GAP (OHS): 11 MMOL/L (ref 8–16)
BASOPHILS # BLD AUTO: 0.03 K/UL
BASOPHILS NFR BLD AUTO: 0.3 %
BUN SERPL-MCNC: 25 MG/DL (ref 8–23)
CALCIUM SERPL-MCNC: 8.6 MG/DL (ref 8.7–10.5)
CHLORIDE SERPL-SCNC: 107 MMOL/L (ref 95–110)
CO2 SERPL-SCNC: 19 MMOL/L (ref 23–29)
CREAT SERPL-MCNC: 1.1 MG/DL (ref 0.5–1.4)
ERYTHROCYTE [DISTWIDTH] IN BLOOD BY AUTOMATED COUNT: 13.1 % (ref 11.5–14.5)
GFR SERPLBLD CREATININE-BSD FMLA CKD-EPI: >60 ML/MIN/1.73/M2
GLUCOSE SERPL-MCNC: 109 MG/DL (ref 70–110)
HCT VFR BLD AUTO: 39.4 % (ref 40–54)
HGB BLD-MCNC: 12.8 GM/DL (ref 14–18)
IMM GRANULOCYTES # BLD AUTO: 0.07 K/UL (ref 0–0.04)
IMM GRANULOCYTES NFR BLD AUTO: 0.6 % (ref 0–0.5)
INDIRECT COOMBS: NORMAL
LYMPHOCYTES # BLD AUTO: 1.11 K/UL (ref 1–4.8)
MAGNESIUM SERPL-MCNC: 1.8 MG/DL (ref 1.6–2.6)
MCH RBC QN AUTO: 30.5 PG (ref 27–50)
MCHC RBC AUTO-ENTMCNC: 32.5 G/DL (ref 32–36)
MCV RBC AUTO: 94 FL (ref 82–98)
NUCLEATED RBC (/100WBC) (OHS): 0 /100 WBC
OHS QRS DURATION: 94 MS
OHS QTC CALCULATION: 479 MS
PHOSPHATE SERPL-MCNC: 4.1 MG/DL (ref 2.7–4.5)
PLATELET # BLD AUTO: 152 K/UL (ref 150–450)
PMV BLD AUTO: 11.5 FL (ref 9.2–12.9)
POCT GLUCOSE: 121 MG/DL (ref 70–110)
POCT GLUCOSE: 126 MG/DL (ref 70–110)
POCT GLUCOSE: 140 MG/DL (ref 70–110)
POCT GLUCOSE: 54 MG/DL (ref 70–110)
POCT GLUCOSE: 75 MG/DL (ref 70–110)
POCT GLUCOSE: 83 MG/DL (ref 70–110)
POCT GLUCOSE: 85 MG/DL (ref 70–110)
POTASSIUM SERPL-SCNC: 4.7 MMOL/L (ref 3.5–5.1)
RBC # BLD AUTO: 4.19 M/UL (ref 4.6–6.2)
RELATIVE EOSINOPHIL (OHS): 0 %
RELATIVE LYMPHOCYTE (OHS): 9.5 % (ref 18–48)
RELATIVE MONOCYTE (OHS): 3.5 % (ref 4–15)
RELATIVE NEUTROPHIL (OHS): 86.1 % (ref 38–73)
RH BLD: NORMAL
SODIUM SERPL-SCNC: 137 MMOL/L (ref 136–145)
SPECIMEN OUTDATE: NORMAL
WBC # BLD AUTO: 11.71 K/UL (ref 3.9–12.7)

## 2025-03-25 PROCEDURE — 99233 SBSQ HOSP IP/OBS HIGH 50: CPT | Mod: FS,,, | Performed by: PSYCHIATRY & NEUROLOGY

## 2025-03-25 PROCEDURE — 51798 US URINE CAPACITY MEASURE: CPT

## 2025-03-25 PROCEDURE — 25000003 PHARM REV CODE 250

## 2025-03-25 PROCEDURE — 51702 INSERT TEMP BLADDER CATH: CPT

## 2025-03-25 PROCEDURE — 11000001 HC ACUTE MED/SURG PRIVATE ROOM

## 2025-03-25 PROCEDURE — 63600175 PHARM REV CODE 636 W HCPCS

## 2025-03-25 PROCEDURE — 25000003 PHARM REV CODE 250: Performed by: STUDENT IN AN ORGANIZED HEALTH CARE EDUCATION/TRAINING PROGRAM

## 2025-03-25 PROCEDURE — 97530 THERAPEUTIC ACTIVITIES: CPT

## 2025-03-25 PROCEDURE — 51701 INSERT BLADDER CATHETER: CPT

## 2025-03-25 PROCEDURE — 97162 PT EVAL MOD COMPLEX 30 MIN: CPT

## 2025-03-25 PROCEDURE — 83735 ASSAY OF MAGNESIUM: CPT

## 2025-03-25 PROCEDURE — 85025 COMPLETE CBC W/AUTO DIFF WBC: CPT | Performed by: STUDENT IN AN ORGANIZED HEALTH CARE EDUCATION/TRAINING PROGRAM

## 2025-03-25 PROCEDURE — 86901 BLOOD TYPING SEROLOGIC RH(D): CPT | Performed by: NURSE PRACTITIONER

## 2025-03-25 PROCEDURE — 84100 ASSAY OF PHOSPHORUS: CPT

## 2025-03-25 PROCEDURE — 25000003 PHARM REV CODE 250: Performed by: NEUROLOGICAL SURGERY

## 2025-03-25 PROCEDURE — 97110 THERAPEUTIC EXERCISES: CPT

## 2025-03-25 PROCEDURE — 82310 ASSAY OF CALCIUM: CPT | Performed by: STUDENT IN AN ORGANIZED HEALTH CARE EDUCATION/TRAINING PROGRAM

## 2025-03-25 PROCEDURE — 94761 N-INVAS EAR/PLS OXIMETRY MLT: CPT

## 2025-03-25 PROCEDURE — 97165 OT EVAL LOW COMPLEX 30 MIN: CPT

## 2025-03-25 PROCEDURE — 25000003 PHARM REV CODE 250: Performed by: PHYSICIAN ASSISTANT

## 2025-03-25 PROCEDURE — 99499 UNLISTED E&M SERVICE: CPT | Mod: ,,,

## 2025-03-25 RX ORDER — HYDROMORPHONE HYDROCHLORIDE 1 MG/ML
0.5 INJECTION, SOLUTION INTRAMUSCULAR; INTRAVENOUS; SUBCUTANEOUS EVERY 6 HOURS PRN
Status: DISCONTINUED | OUTPATIENT
Start: 2025-03-25 | End: 2025-03-25

## 2025-03-25 RX ORDER — OXYCODONE HYDROCHLORIDE 10 MG/1
10 TABLET ORAL EVERY 4 HOURS PRN
Refills: 0 | Status: DISCONTINUED | OUTPATIENT
Start: 2025-03-25 | End: 2025-04-01 | Stop reason: HOSPADM

## 2025-03-25 RX ORDER — LIDOCAINE HYDROCHLORIDE 20 MG/ML
JELLY TOPICAL ONCE
Status: COMPLETED | OUTPATIENT
Start: 2025-03-25 | End: 2025-03-25

## 2025-03-25 RX ORDER — ACETAMINOPHEN 500 MG
1000 TABLET ORAL EVERY 8 HOURS
Status: DISCONTINUED | OUTPATIENT
Start: 2025-03-25 | End: 2025-03-25

## 2025-03-25 RX ORDER — MUPIROCIN 20 MG/G
OINTMENT TOPICAL 2 TIMES DAILY
Status: COMPLETED | OUTPATIENT
Start: 2025-03-25 | End: 2025-03-29

## 2025-03-25 RX ORDER — POLYETHYLENE GLYCOL 3350 17 G/17G
17 POWDER, FOR SOLUTION ORAL 2 TIMES DAILY
Status: DISCONTINUED | OUTPATIENT
Start: 2025-03-26 | End: 2025-04-01 | Stop reason: HOSPADM

## 2025-03-25 RX ORDER — OXYCODONE HYDROCHLORIDE 5 MG/1
5 TABLET ORAL EVERY 4 HOURS PRN
Refills: 0 | Status: DISCONTINUED | OUTPATIENT
Start: 2025-03-25 | End: 2025-04-01 | Stop reason: HOSPADM

## 2025-03-25 RX ORDER — SODIUM,POTASSIUM PHOSPHATES 280-250MG
2 POWDER IN PACKET (EA) ORAL
Status: DISCONTINUED | OUTPATIENT
Start: 2025-03-25 | End: 2025-03-25

## 2025-03-25 RX ORDER — LANOLIN ALCOHOL/MO/W.PET/CERES
800 CREAM (GRAM) TOPICAL
Status: DISCONTINUED | OUTPATIENT
Start: 2025-03-25 | End: 2025-03-25

## 2025-03-25 RX ORDER — MAGNESIUM SULFATE HEPTAHYDRATE 40 MG/ML
2 INJECTION, SOLUTION INTRAVENOUS ONCE
Status: COMPLETED | OUTPATIENT
Start: 2025-03-26 | End: 2025-03-26

## 2025-03-25 RX ORDER — ACETAMINOPHEN 500 MG
1000 TABLET ORAL EVERY 8 HOURS
Status: DISCONTINUED | OUTPATIENT
Start: 2025-03-25 | End: 2025-04-01 | Stop reason: HOSPADM

## 2025-03-25 RX ORDER — BISACODYL 10 MG/1
10 SUPPOSITORY RECTAL DAILY
Status: DISCONTINUED | OUTPATIENT
Start: 2025-03-26 | End: 2025-03-27

## 2025-03-25 RX ADMIN — CARBAMIDE PEROXIDE 5 DROP: 65 SOLUTION/ DROPS TOPICAL at 10:03

## 2025-03-25 RX ADMIN — METOPROLOL SUCCINATE 25 MG: 25 TABLET, EXTENDED RELEASE ORAL at 08:03

## 2025-03-25 RX ADMIN — OXYCODONE HYDROCHLORIDE 10 MG: 10 TABLET ORAL at 05:03

## 2025-03-25 RX ADMIN — OXYCODONE HYDROCHLORIDE 10 MG: 10 TABLET ORAL at 09:03

## 2025-03-25 RX ADMIN — TAMSULOSIN HYDROCHLORIDE 0.4 MG: 0.4 CAPSULE ORAL at 08:03

## 2025-03-25 RX ADMIN — ENOXAPARIN SODIUM 40 MG: 40 INJECTION SUBCUTANEOUS at 05:03

## 2025-03-25 RX ADMIN — ACETAMINOPHEN 1000 MG: 500 TABLET ORAL at 10:03

## 2025-03-25 RX ADMIN — CEFAZOLIN 2 G: 2 INJECTION, POWDER, FOR SOLUTION INTRAMUSCULAR; INTRAVENOUS at 05:03

## 2025-03-25 RX ADMIN — OXYCODONE 5 MG: 5 TABLET ORAL at 08:03

## 2025-03-25 RX ADMIN — POLYETHYLENE GLYCOL 3350 17 G: 17 POWDER, FOR SOLUTION ORAL at 08:03

## 2025-03-25 RX ADMIN — LEVETIRACETAM 500 MG: 500 TABLET, FILM COATED ORAL at 09:03

## 2025-03-25 RX ADMIN — PANTOPRAZOLE SODIUM 40 MG: 40 TABLET, DELAYED RELEASE ORAL at 08:03

## 2025-03-25 RX ADMIN — Medication 6 MG: at 09:03

## 2025-03-25 RX ADMIN — ONDANSETRON 4 MG: 2 INJECTION INTRAMUSCULAR; INTRAVENOUS at 09:03

## 2025-03-25 RX ADMIN — BUTALBITAL, ACETAMINOPHEN, AND CAFFEINE 2 TABLET: 325; 50; 40 TABLET ORAL at 02:03

## 2025-03-25 RX ADMIN — DOCUSATE SODIUM 100 MG: 100 CAPSULE, LIQUID FILLED ORAL at 09:03

## 2025-03-25 RX ADMIN — OXYCODONE HYDROCHLORIDE 10 MG: 10 TABLET ORAL at 10:03

## 2025-03-25 RX ADMIN — LEVOTHYROXINE SODIUM 125 MCG: 0.12 TABLET ORAL at 05:03

## 2025-03-25 RX ADMIN — ACETAMINOPHEN 1000 MG: 500 TABLET ORAL at 09:03

## 2025-03-25 RX ADMIN — LIDOCAINE HYDROCHLORIDE 10 ML: 20 JELLY TOPICAL at 03:03

## 2025-03-25 RX ADMIN — MUPIROCIN: 20 OINTMENT TOPICAL at 09:03

## 2025-03-25 RX ADMIN — AMLODIPINE BESYLATE 2.5 MG: 2.5 TABLET ORAL at 10:03

## 2025-03-25 RX ADMIN — PRAVASTATIN SODIUM 20 MG: 20 TABLET ORAL at 09:03

## 2025-03-25 RX ADMIN — AMITRIPTYLINE HYDROCHLORIDE 10 MG: 10 TABLET, FILM COATED ORAL at 09:03

## 2025-03-25 RX ADMIN — CEFAZOLIN 2 G: 2 INJECTION, POWDER, FOR SOLUTION INTRAMUSCULAR; INTRAVENOUS at 02:03

## 2025-03-25 RX ADMIN — DOCUSATE SODIUM 100 MG: 100 CAPSULE, LIQUID FILLED ORAL at 08:03

## 2025-03-25 RX ADMIN — LEVETIRACETAM 500 MG: 500 TABLET, FILM COATED ORAL at 08:03

## 2025-03-25 RX ADMIN — CEFAZOLIN 2 G: 2 INJECTION, POWDER, FOR SOLUTION INTRAMUSCULAR; INTRAVENOUS at 09:03

## 2025-03-25 RX ADMIN — MEMANTINE 10 MG: 10 TABLET ORAL at 09:03

## 2025-03-25 RX ADMIN — ACETAMINOPHEN 1000 MG: 500 TABLET ORAL at 03:03

## 2025-03-25 RX ADMIN — CARBAMIDE PEROXIDE 5 DROP: 65 SOLUTION/ DROPS TOPICAL at 08:03

## 2025-03-25 RX ADMIN — MUPIROCIN: 20 OINTMENT TOPICAL at 08:03

## 2025-03-25 RX ADMIN — MEMANTINE 10 MG: 10 TABLET ORAL at 08:03

## 2025-03-25 RX ADMIN — CETIRIZINE HYDROCHLORIDE 10 MG: 10 TABLET, FILM COATED ORAL at 08:03

## 2025-03-25 RX ADMIN — OXYCODONE 5 MG: 5 TABLET ORAL at 01:03

## 2025-03-25 NOTE — PLAN OF CARE
"[facility] Care Plan    POC reviewed with Ramesh Ricci and family at 0300. Patient verbalized understanding. Questions and concerns addressed. No acute events today. Pt progressing toward goals. Will continue to monitor. See below and flowsheets for full assessment and VS info.       - Bilateral SG drains to full suction   - PRN hydralazine x1  - PRN tylenol x2   - Straight cath x1      Is this a stroke patient? no    Neuro:  [place] Coma Scale  Best Eye Response: 3-->(E3) to speech  Best Motor Response: 6-->(M6) obeys commands  Best Verbal Response: 5-->(V5) oriented  [place] Coma Scale Score: 14  Assessment Qualifiers: no eye obstruction present  Pupil PERRLA: yes     24 hr Temp:  [96.7 °F (35.9 °C)-97.9 °F (36.6 °C)]     CV:   Rhythm: sinus bradycardia  BP goals:   SBP < 140  MAP > 65    Resp:           Plan: N/A    GI/:     Diet/Nutrition Received: NPO  Last Bowel Movement: 03/21/25  Voiding Characteristics: hesitancy    Intake/Output Summary (Last 24 hours) at 3/25/2025 0735  Last data filed at 3/25/2025 0601  Gross per 24 hour   Intake 800 ml   Output 950 ml   Net -150 ml     Unmeasured Output  Stool Occurrence: 0    Labs/Accuchecks:  Recent Labs   Lab 03/25/25  0023   WBC 11.71   RBC 4.19*   HGB 12.8*   HCT 39.4*         Recent Labs   Lab 03/21/25  1802 03/22/25  0539 03/25/25  0023      < > 137   K 4.0   < > 4.7   CO2 27   < > 19*      < > 107   BUN 27*   < > 25*   CREATININE 1.3   < > 1.1   ALKPHOS 61  --   --    ALT <5*  --   --    AST 15  --   --    BILITOT 0.3  --   --     < > = values in this interval not displayed.      Recent Labs   Lab 03/24/25  0432   INR 1.0   APTT 29.0    No results for input(s): "CPK", "CPKMB", "TROPONINI", "MB" in the last 168 hours.    Electrolytes: N/A - electrolytes WDL  Accuchecks: Q6H    Gtts:   0.9% NaCl   Intravenous Continuous 50 mL/hr at 03/24/25 0940 Rate Change at 03/24/25 0940       LDA/Wounds:    Gabino Risk Assessment  Sensory Perception: " 4-->no impairment  Moisture: 4-->rarely moist  Activity: 3-->walks occasionally  Mobility: 3-->slightly limited  Nutrition: 2-->probably inadequate  Friction and Shear: 2-->potential problem  Gabino Score: 18  Is your gabino score 12 or less? no          Restraints:        [facility]

## 2025-03-25 NOTE — PROGRESS NOTES
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 Imaging revealed bilateral SD hygromas. Patient was seen in NSGY and shunt was dialed to 200. He then presented to the ED for worsening bitemporal headaches. Patient was admitted to NSGY service and scheduled for SD evacuation today as well as distal shunt tie off. Patient is admitted to [facility] for higher level care and post op monitoring.     Hospital Course: 03/25/2025 D/c art line. Stable to step down to NSGY.      Interval History:  NAEON.      Objective:     Vitals:  Temp: 97.5 °F (36.4 °C)  Pulse: (!) 53  Rhythm: sinus bradycardia  BP: 131/67  MAP (mmHg): 94  Resp: 18  SpO2: 100 %    Temp  Min: 96.7 °F (35.9 °C)  Max: 97.5 °F (36.4 °C)  Pulse  Min: 45  Max: 61  BP  Min: 101/49  Max: 160/75  MAP (mmHg)  Min: 71  Max: 108  Resp  Min: 10  Max: 38  SpO2  Min: 94 %  Max: 100 %    03/24 0701 - 03/25 0700  In: 900 [P.O.:200]  Out: 950 [Urine:700; Drains:230]   Unmeasured Output  Stool Occurrence: 0        Physical Exam  Vitals and nursing note reviewed.   Constitutional:       Appearance: Normal appearance.   HENT:      Head: Normocephalic.      Comments: Bilateral SG drains      Mouth/Throat:      Mouth: Mucous membranes are moist.   Neck:      Comments: Dressing intact   Cardiovascular:      Rate and Rhythm: Normal rate.   Pulmonary:      Effort: Pulmonary effort is normal. No respiratory distress.   Abdominal:      General: Abdomen is flat. There is no distension.      Palpations: Abdomen is soft.   Skin:     General: Skin is warm.      Coloration: Skin is not jaundiced.    Neurological:      Comments:   --GCS:  [id number]  --AAOx3  --CN II-XII grossly intact.   --PERRL   --Motor: moves all to command                Medications:  Continuous Scheduledacetaminophen, 1,000 mg, Q8H  amitriptyline, 10 mg, QHS  amLODIPine, 2.5 mg, Nightly  carbamide peroxide, 5 drop, BID  ceFAZolin (Ancef) IV (PEDS and ADULTS), 2 g, Q8H  cetirizine, 10 mg, Daily  docusate sodium, 100 mg, BID  enoxparin, 40 mg, Q24H (prophylaxis, 1700)  levETIRAcetam, 500 mg, BID  levothyroxine, 125 mcg, Before breakfast  memantine, 10 mg, BID  metoprolol succinate, 25 mg, Daily  mupirocin, , BID  pantoprazole, 40 mg, Daily  polyethylene glycol, 17 g, Daily  pravastatin, 20 mg, QHS  tamsulosin, 0.4 mg, Daily    PRNdextrose 50%, 12.5 g, PRN  dextrose 50%, 25 g, PRN  glucagon (human recombinant), 1 mg, PRN  glucose, 16 g, PRN  glucose, 16 g, PRN  glucose, 24 g, PRN  hydrALAZINE, 10 mg, Q6H PRN  insulin aspart U-100, 0-5 Units, QID (AC + HS) PRN  magnesium oxide, 800 mg, PRN  magnesium oxide, 800 mg, PRN  melatonin, 6 mg, Nightly PRN  ondansetron, 4 mg, Q6H PRN  oxyCODONE, 5 mg, Q4H PRN  oxyCODONE, 10 mg, Q4H PRN  potassium bicarbonate, 35 mEq, PRN  potassium bicarbonate, 50 mEq, PRN  potassium bicarbonate, 60 mEq, PRN  potassium, sodium phosphates, 2 packet, PRN  potassium, sodium phosphates, 2 packet, PRN  potassium, sodium phosphates, 2 packet, PRN      Today I personally reviewed pertinent medications, lines/drains/airways, imaging, cardiology results, laboratory results, microbiology results, notably:    Imaging Results              X-Ray Shunt Series (XPD) (Final result)  Result time 03/21/25 20:24:11      Final result by Zeyad Krueger MD (03/21/25 20:24:11)                   Impression:      No acute abnormality.  See above comments.      Electronically signed by: Zeyad Krueger  Date:    03/21/2025  Time:    20:24               Narrative:    EXAMINATION:  XR SHUNT SERIES (XPD)    CLINICAL  HISTORY:  headache;    TECHNIQUE:  Six images are submitted.    COMPARISON:  03/07/2025, 02/09/2025    FINDINGS:  Shunt catheter entering from a right posterior parietal approach.  Shunt tubing descends on the right to the anterior chest crossing the midline near the level the umbilicus into the left pelvis.  The distal tip extends into the right pelvis.  No fractures of the tubing are detected.  Slight tortuosity.  No significant change.    Aortic stent valve replacement.  Lungs are clear.  Heart is normal size.    Nonspecific nonobstructing pattern of bowel gas.                                       CT Head Without Contrast (Final result)  Result time 03/21/25 17:59:45      Final result by Allen Rey MD (03/21/25 17:59:45)                   Impression:      No significant interval detrimental change.  No intracranial hemorrhage or major vascular distribution infarct.    Right-sided ventriculostomy shunt in place with unchanged size and configuration of the ventricles.    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  No worsening intracranial mass effect.    Electronically signed by resident: Mihir Moody  Date:    03/21/2025  Time:    17:50    Electronically signed by: Allen Rey MD  Date:    03/21/2025  Time:    17:59               Narrative:    EXAMINATION:  CT HEAD WITHOUT CONTRAST    CLINICAL HISTORY:  Headache, new or worsening (Age >= 50y);    TECHNIQUE:  Low dose axial CT images obtained throughout the head without the use of intravenous contrast.  Axial, sagittal and coronal reconstructions were performed.    COMPARISON:  Multiple priors, most recent CT head 03/19/2025.    FINDINGS:  Right-sided  shunt in unchanged position with tip terminating in the frontal horn of the left lateral ventricle.  Ventricles are unchanged in size without evidence of hydrocephalus.    There are bilateral low-density extra-axial collections measuring up to 1.6 cm on the  left and 1.7 cm on the right, unchanged.  Mild sulcal effacement on the underlying parenchyma as before.  No midline shift or evidence of herniation.    Brain parenchyma is unchanged.  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  Grossly stable patchy hypoattenuation of the supratentorial white matter consistent with chronic microvascular ischemic change.  No new parenchymal hemorrhage, edema, mass effect or major vascular distribution infarct.  Few scattered punctate parenchymal calcifications in the parietal sulci bilaterally are unchanged.    Skull/extracranial contents (limited evaluation):    No displaced calvarial fracture.    Small left sphenoid sinus mucous retention cyst.  The mastoid air cells and visualized paranasal sinuses are otherwise essentially clear.                                        Diet  Diet Renal Non-Dialysis  Diet Renal Non-Dialysis    \    Assessment/Plan:     Neuro  * Subdural hematoma, chronic  S/p bilateral bobby hole evac with SG drains in place and tie off of VPS at clavicle   -admit to [facility]   -NSGY following   -Post op CT with pneumocephalus, exam improving  -SBP goal <140  -multimodal pain meds   -keppra ppx per NSGY x 7d  -SG drains to full suction.     S/P ventriculoperitoneal shunt  Placed 2/6/25 for NPH with subdural hygromas from continued overdrainage  See primary problem     Cardiac/Vascular  S/P TAVR (transcatheter aortic valve replacement)  Noted    PAF (paroxysmal atrial fibrillation)  Home toprol resumed  Hold home eliquis, resumption per NSGY    Left ventricular diastolic dysfunction, NYHA class 1  Per chart review  ECHO pending    Essential hypertension  SBP < 140  PRN labetalol, hydralazine  Norvasc 2.5, Toprol 25 daily    Renal/  Stage 3a chronic kidney disease  Renal diet  Daily CMP   Renally dose medications and avoid nephrotoxic medications as appropriate    BPH (benign prostatic hyperplasia)  Silodosin      Endocrine  Acquired hypothyroidism  synthroid          The patient is being Prophylaxed for:  Venous Thromboembolism with: Mechanical or Chemical  Stress Ulcer with: PPI  Ventilator Pneumonia with: none    Activity Orders            Diet Renal Non-Dialysis: Renal Non-Dialysis starting at 03/24 1726    Progressive Mobility Protocol (mobilize patient to their highest level of functioning at least twice daily) starting at 03/22 0800          Full Code    Level III    ANATOLIY SalvadorC  Neurocritical Care  [facility] - Neuro Critical Care

## 2025-03-25 NOTE — SUBJECTIVE & OBJECTIVE
Interval History: 3/25: POD1 bilateral mini crani for SDH evac, shunt tie off at R clavicle. Postop CTH with expected appearance and pneumocephalus. Patient doing well except for R VI palsy and baseline higher order confusion. Will transfer to floor today. Drains with output of 70 each overnight.     Medications:  Continuous Infusions:  Scheduled Meds:   acetaminophen  1,000 mg Oral Q8H    amitriptyline  10 mg Oral QHS    amLODIPine  2.5 mg Oral Nightly    carbamide peroxide  5 drop Left Ear BID    ceFAZolin (Ancef) IV (PEDS and ADULTS)  2 g Intravenous Q8H    cetirizine  10 mg Oral Daily    docusate sodium  100 mg Oral BID    enoxparin  40 mg Subcutaneous Q24H (prophylaxis, 1700)    levETIRAcetam  500 mg Oral BID    levothyroxine  125 mcg Oral Before breakfast    memantine  10 mg Oral BID    metoprolol succinate  25 mg Oral Daily    mupirocin   Nasal BID    pantoprazole  40 mg Oral Daily    polyethylene glycol  17 g Oral Daily    pravastatin  20 mg Oral QHS    tamsulosin  0.4 mg Oral Daily     PRN Meds:  Current Facility-Administered Medications:     dextrose 50%, 12.5 g, Intravenous, PRN    dextrose 50%, 25 g, Intravenous, PRN    glucagon (human recombinant), 1 mg, Intramuscular, PRN    glucose, 16 g, Oral, PRN    glucose, 16 g, Oral, PRN    glucose, 24 g, Oral, PRN    hydrALAZINE, 10 mg, Intravenous, Q6H PRN    insulin aspart U-100, 0-5 Units, Subcutaneous, QID (AC + HS) PRN    magnesium oxide, 800 mg, Oral, PRN    magnesium oxide, 800 mg, Oral, PRN    melatonin, 6 mg, Oral, Nightly PRN    ondansetron, 4 mg, Intravenous, Q6H PRN    oxyCODONE, 5 mg, Oral, Q4H PRN    oxyCODONE, 10 mg, Oral, Q4H PRN    potassium bicarbonate, 35 mEq, Oral, PRN    potassium bicarbonate, 50 mEq, Oral, PRN    potassium bicarbonate, 60 mEq, Oral, PRN    potassium, sodium phosphates, 2 packet, Oral, PRN    potassium, sodium phosphates, 2 packet, Oral, PRN    potassium, sodium phosphates, 2 packet, Oral, PRN     Review of  Systems  Objective:     Weight: 67.9 kg (149 lb 11.1 oz)  Body mass index is 20.88 kg/m².  Vital Signs (Most Recent):  Temp: 97.5 °F (36.4 °C) (03/25/25 0700)  Pulse: (!) 53 (03/25/25 1115)  Resp: 18 (03/25/25 1115)  BP: 132/63 (03/25/25 1115)  SpO2: 98 % (03/25/25 1115) Vital Signs (24h Range):  Temp:  [96.7 °F (35.9 °C)-97.5 °F (36.4 °C)] 97.5 °F (36.4 °C)  Pulse:  [45-61] 53  Resp:  [10-38] 18  SpO2:  [94 %-100 %] 98 %  BP: (101-160)/(49-81) 132/63  Arterial Line BP: (121-145)/(47-60) 136/60     Date 03/25/25 0700 - 03/26/25 0659   Shift 9639-1826 8042-6090 9536-6681 24 Hour Total   INTAKE   P.O. 250   250   Shift Total(mL/kg) 250(3.7)   250(3.7)   OUTPUT   Urine(mL/kg/hr) 600   600   Shift Total(mL/kg) 600(8.8)   600(8.8)   Weight (kg) 67.9 67.9 67.9 67.9                            Closed/Suction Drain 03/24/25 1613 Tube - 1 Left Scalp Accordion 10 Fr. (Active)   Site Description Unable to view 03/25/25 0700   Dressing Type Gauze 03/25/25 0700   Dressing Status Clean;Dry;Intact 03/25/25 0700   Dressing Intervention Integrity maintained 03/25/25 0700   Drainage Sanguineous 03/25/25 0700   Status Other (Comment) 03/25/25 0700   Output (mL) 70 mL 03/25/25 0601            Closed/Suction Drain 03/24/25 1614 Tube - 2 Right Scalp Accordion 10 Fr. (Active)   Site Description Unable to view 03/25/25 0700   Dressing Type Gauze 03/25/25 0700   Dressing Status Clean;Dry;Intact 03/25/25 0700   Dressing Intervention Integrity maintained 03/25/25 0700   Drainage Sanguineous 03/25/25 0700   Status Other (Comment) 03/25/25 0700   Output (mL) 70 mL 03/25/25 0601          Physical Exam         Neurosurgery Physical Exam    Neurosurgery Physical Exam    General: well developed, well nourished, no distress.   HEENT: normocephalic, atraumatic  CV: regular rate   Pulmonary: normal respirations, no signs of respiratory distress  Abdomen: soft, non-distended, not tender to palpation  Skin: Skin is warm, dry and intact  Heme: no  bruising    Neuro:   Mental Status: AO x4, no aphasia, no dysarthria - higher order confusion.   CN: PERRL, EOMI other than R VI palsy, sensation intact bilaterally, eyebrow raise and grimace symmetric, tongue midline. Hard of hearing  Motor: moves all extremities spontaneously, full strength throughout, no pronator drift   Sensory: intact to light touch throughout  Gait: deferred     Dressings c/d/I  Subgaleal drains in place bilaterally      Significant Labs:  Recent Labs   Lab 03/24/25  0432 03/25/25  0023 03/25/25  0747    137  --    K 4.2 4.7  --     107  --    CO2 23 19*  --    BUN 28* 25*  --    CREATININE 1.1 1.1  --    CALCIUM 8.5* 8.6*  --    MG  --   --  1.8     Recent Labs   Lab 03/24/25  0432 03/25/25  0023   WBC 6.94 11.71   HGB 12.7* 12.8*   HCT 40.2 39.4*   * 152     Recent Labs   Lab 03/24/25 0432   INR 1.0   APTT 29.0     Microbiology Results (last 7 days)       ** No results found for the last 168 hours. **          All pertinent labs from the last 24 hours have been reviewed.    Significant Diagnostics:  CT: CT Head Without Contrast  Result Date: 3/24/2025  Interval postoperative changes from bifrontal craniotomies for subdural hygroma evacuation.  Findings concerning for developing bifrontal tension pneumocephalus.  Recommend close follow-up and clinical evaluation. Significantly decreased size of the subdural collections, now containing residual hygroma and postoperative blood products. Stable positioning of right-sided ventriculostomy shunt with mild interval dilatation of the ventricular system.  No evidence of acute hydrocephalus. Unchanged small vessel ischemic changes.  No intra-axial hemorrhage or major vascular distribution infarct. This report was flagged in Epic as abnormal. This finding was discovered by and communicated by Charles Beckman MD to Aura Claudio MD via phone at 21:05 on 03/24/2025 with confirmation of message by recipient. Electronically  signed by resident: Charles Beckman Date:    03/24/2025 Time:    19:30 Electronically signed by: Allen Rey MD Date:    03/24/2025 Time:    21:13    MRI: No results found in the last 24 hours.

## 2025-03-25 NOTE — PLAN OF CARE
Problem: Occupational Therapy  Goal: Occupational Therapy Goal  Description: Goals to be met by: 4/8/2025     Patient will increase functional independence with ADLs by performing:    UE Dressing with Set-up Assistance.  LE Dressing with Stand-by Assistance.  Grooming while standing at sink with Stand-by Assistance.  Toileting from toilet with Stand-by Assistance for hygiene and clothing management.   Supine to sit with Stand-by Assistance.  Step transfer with Stand-by Assistance using LRAD.  Toilet transfer to toilet with Stand-by Assistance using LRAD.    Outcome: Progressing     Pt evaluated and OT goals established.

## 2025-03-25 NOTE — PT/OT/SLP EVAL
Occupational Therapy   Evaluation and Treatment    Co-evaluation with PT to have 2 skilled therapists present to safely assess pt's functional mobility.     Name: Ramesh Ricci  MRN: [id number]  Admitting Diagnosis: Subdural hematoma, chronic  Recent Surgery: Procedure(s) (LRB):  EVACUATION, HEMATOMA, SUBDURAL  AND SHUNT TIE OFF (Bilateral) 1 Day Post-Op    Recommendations:     Discharge Recommendations: High Intensity Therapy  Discharge Equipment Recommendations:  none  Barriers to discharge:  Other (Comment) (increased (A) with ADLs and mobility)    Assessment:     Ramesh Ricci is a 85 y.o. male with a medical diagnosis of Subdural hematoma, chronic.  Pt tolerated session well and without incident, but he requires (A) with ADLs and mobility and is performing below his functional baseline.  Due to his current level of function compared to his PLOF and his motivation to participate in therapy, high intensity therapy recommended at d/c for maximal pt gains in functional independence and to ensure his safety upon returning home.  He presents with the following.  Performance deficits affecting function: weakness, impaired endurance, impaired self care skills, impaired functional mobility, gait instability, impaired balance, pain, impaired cardiopulmonary response to activity, decreased safety awareness, decreased lower extremity function.      Rehab Prognosis: Good; patient would benefit from acute skilled OT services to address these deficits and reach maximum level of function.       Plan:     Patient to be seen 4 x/week to address the above listed problems via self-care/home management, therapeutic activities, therapeutic exercises  Plan of Care Expires: 04/23/25  Plan of Care Reviewed with: patient, spouse    Subjective     Chief Complaint: dizziness  Patient/Family Comments/goals: to get stronger    Occupational Profile:  Living Environment: Pt lives with his wife in a [facility] with a threshold to enter.  He  has a walk-in shower with grab bars (doesn't use the shower chair).  Pt also owns but doesn't use a bedside commode.   Previous level of function: Independent with ADLs.  Mod I to ambulate using rollator in am for long distances and uses a RW for short distances.  Pt hasn't driven since last year.    Roles and Routines: , father; pt's wife said he likes to stay active   Equipment Used at Home: cane, straight, wheelchair, walker, rolling, rollator, bedside commode, shower chair (transport w/c)  Assistance upon Discharge: Pt's wife, who's a retired nurse and uses a SPC to ambulate.  Their son teaches.     Pain/Comfort:  Pain Rating 1: 7/10  Location - Side 1: Bilateral  Location - Orientation 1: generalized  Location 1: head  Pain Addressed 1: Pre-medicate for activity, Reposition, Distraction, Cessation of Activity, Nurse notified  Pain Rating Post-Intervention 1: 7/10    Patients cultural, spiritual, Buddhist conflicts given the current situation: no    Objective:     Communicated with: nurse and PT prior to session.  Patient found sitting edge of bed with telemetry, pulse ox (continuous), blood pressure cuff, SCD, hemovac, peripheral IV with his wife and PT present upon OT entry to room.    General Precautions: Standard, fall, hearing impaired  Orthopedic Precautions: N/A  Braces: N/A  Respiratory Status: Room air    Occupational Performance:    Bed Mobility:    Patient completed Sit to Supine from the R with minimum assistance for BLE management     Functional Mobility/Transfers:  Patient completed Sit <> Stand Transfer from EOB x 1 trial with minimum assistance  with  rolling walker   Functional Mobility: not performed due to pt's reported dizziness while standing, which didn't subside.      Activities of Daily Living:  Grooming: stand by assistance to wash his face while seated EOB     Cognitive/Visual Perceptual:  Cognitive/Psychosocial Skills:     -       Oriented to: Person, Place, Time, and  Situation   -       Follows Commands/attention: Follows all one-step commands  -       Communication: clear/fluent    Physical Exam:  Upper Extremity Range of Motion:     -       Right Upper Extremity: WFL  -       Left Upper Extremity: WFL  Upper Extremity Strength:    -       Right Upper Extremity: WFL  -       Left Upper Extremity: WFL   Strength:    -       Right Upper Extremity: WFL  -       Left Upper Extremity: WFL  Fine Motor Coordination:    -       Intact  Left hand, finger to nose and Right hand, finger to nose.  Pt attempted to perform bilateral thumb opposition with therapist demonstration and cueing, but he was unable to perform.     AMPA 6 Click ADL:  AMPAC Total Score: 15    Treatment & Education:  - Pt's BP read 116/60 while seated EOB with PT prior to OT arrival.  It increased to 149/66 while HOB elevated immediately after performing mobility (eclipsed the goal of <140 - nurse notified), and 137/66 while HOB elevated after resting for a couple of minutes.    Pt and his wife edu on role of OT, POC, safety when performing self care tasks, benefit of performing OOB activity, and safety when performing functional transfers and mobility.    - Self care tasks completed-- as noted above      Patient left HOB elevated with all lines intact, call button in reach, bed alarm on, and his wife present    GOALS:   Multidisciplinary Problems       Occupational Therapy Goals          Problem: Occupational Therapy    Goal Priority Disciplines Outcome Interventions   Occupational Therapy Goal     OT, PT/OT Progressing    Description: Goals to be met by: 4/8/2025     Patient will increase functional independence with ADLs by performing:    UE Dressing with Set-up Assistance.  LE Dressing with Stand-by Assistance.  Grooming while standing at sink with Stand-by Assistance.  Toileting from toilet with Stand-by Assistance for hygiene and clothing management.   Supine to sit with Stand-by Assistance.  Step transfer  with Stand-by Assistance using LRAD.  Toilet transfer to toilet with Stand-by Assistance using LRAD.                         DME Justifications:  No DME recommended requiring DME justifications    History:     Past Medical History:   Diagnosis Date    Allergies 01/31/2025    Anemia     Anticoagulant long-term use     Anxiety     Atrophic kidney 11/16/2012    BPH (benign prostatic hyperplasia) 11/16/2012    Congenital absence of right kidney 07/05/2017    DDD (degenerative disc disease), cervical 07/05/2017    x-ray 7/17 - Severe    Ex-smoker 12/20/2018    Exudative age-related macular degeneration, right eye, with active choroidal neovascularization 02/15/2024    GERD (gastroesophageal reflux disease) 11/16/2012    Glaucoma 11/16/2012    HTN (hypertension) 11/16/2012    Hypothyroid 11/16/2012    Internal carotid artery stenosis 11/16/2012    Iron deficiency anemia due to chronic blood loss 10/29/2021    Left ventricular diastolic dysfunction, NYHA class 1 04/16/2015    4/15    Low serum testosterone level 11/16/2012    Normal cardiac stress test 11/16/2012    NPH (normal pressure hydrocephalus) 02/03/2025    Osteopenia 11/16/2012    PAF (paroxysmal atrial fibrillation) 12/20/2018    Prostate CA 01/15/2013    XRT 12/12  Dr. 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    Shingles 11/16/2012    Skin disease 2020    Seems to have started after Covid vaccine    Stage 3a chronic kidney disease 10/06/2014    Stroke 2017    tia   no residual    Thrombocytopenia 01/31/2025    TIA (transient ischemic attack) 11/16/2012    UC (ulcerative colitis) 11/16/2012         Past Surgical History:   Procedure Laterality Date    ADENOIDECTOMY      COLONOSCOPY N/A 03/25/2022    Procedure: COLONOSCOPY;  Surgeon: Ashley Nguyen MD;  [facility]: [facility];  Service: Endoscopy;  Laterality: N/A;    ESOPHAGOGASTRODUODENOSCOPY N/A 03/25/2022    Procedure: EGD (ESOPHAGOGASTRODUODENOSCOPY);  Surgeon: Ashley BARBER  MD Wendy;  Location: [facility] ENDO;  Service: Endoscopy;  Laterality: N/A;  added on per Dr. Nguyen    ESOPHAGOGASTRODUODENOSCOPY N/A 6/7/2024    Procedure: EGD (ESOPHAGOGASTRODUODENOSCOPY);  Surgeon: Audie Snell MD;  Location: [facility] ENDO (4TH FLR);  Service: Endoscopy;  Laterality: N/A;  5/21 R/s,sent updated instr via portal.pt informed to hold eliquis for 2 days.AC  Ref by: ,  approved to hold Eliquis (apixaban) for 2 days per Dr. Conklin-see media file  5/9/24-GT  5/31-pre call complete-tb    EYE SURGERY Right 11/2020    cataract    HERNIA REPAIR      LEFT HEART CATHETERIZATION Right 10/29/2021    Procedure: CATHETERIZATION, HEART, LEFT AND RIGHT  - LV DARNELL POSSIBLE;  Surgeon: Beau Conklin MD;  Location: [facility] CATH LAB;  Service: Cardiology;  Laterality: Right;    LUMBAR PUNCTURE N/A 1/13/2025    Procedure: Lumbar Puncture;  Surgeon: Jairo Blankenship MD;  Location: [facility] OR 2ND FLR;  Service: Neurosurgery;  Laterality: N/A;    RIGHT HEART CATHETERIZATION Right 10/29/2021    Procedure: INSERTION, CATHETER, RIGHT HEART;  Surgeon: Beau Conklin MD;  Location: [facility] CATH LAB;  Service: Cardiology;  Laterality: Right;    SKIN BIOPSY  2020    TONSILLECTOMY      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    Procedure: INSERTION, SHUNT, VENTRICULOPERITONEAL;  Surgeon: Jairo Blankenship MD;  Location: [facility] OR 2ND FLR;  Service: Neurosurgery;  Laterality: Right;    VENTRICULOPERITONEAL SHUNT  2/6/2025    Procedure: INSERTION, SHUNT, VENTRICULOPERITONEAL;  Surgeon: Shar Elizabeth MD;  Location: [facility] OR 2ND FLR;  Service: General;;       Time Tracking:     OT Date of Treatment: 03/25/25  OT Start Time: 1042  OT Stop Time: 1059  OT Total Time (min): 17 min    Billable Minutes:Evaluation 8 min  Therapeutic Activity 9 min    3/25/2025

## 2025-03-25 NOTE — SUBJECTIVE & OBJECTIVE
Interval History:  NAEON.      Objective:     Vitals:  Temp: 97.5 °F (36.4 °C)  Pulse: (!) 53  Rhythm: sinus bradycardia  BP: 131/67  MAP (mmHg): 94  Resp: 18  SpO2: 100 %    Temp  Min: 96.7 °F (35.9 °C)  Max: 97.5 °F (36.4 °C)  Pulse  Min: 45  Max: 61  BP  Min: 101/49  Max: 160/75  MAP (mmHg)  Min: 71  Max: 108  Resp  Min: 10  Max: 38  SpO2  Min: 94 %  Max: 100 %    03/24 0701 - 03/25 0700  In: 900 [P.O.:200]  Out: 950 [Urine:700; Drains:230]   Unmeasured Output  Stool Occurrence: 0        Physical Exam  Vitals and nursing note reviewed.   Constitutional:       Appearance: Normal appearance.   HENT:      Head: Normocephalic.      Comments: Bilateral SG drains      Mouth/Throat:      Mouth: Mucous membranes are moist.   Neck:      Comments: Dressing intact   Cardiovascular:      Rate and Rhythm: Normal rate.   Pulmonary:      Effort: Pulmonary effort is normal. No respiratory distress.   Abdominal:      General: Abdomen is flat. There is no distension.      Palpations: Abdomen is soft.   Skin:     General: Skin is warm.      Coloration: Skin is not jaundiced.   Neurological:      Comments:   --GCS:  E4V5M6  --AAOx3  --CN II-XII grossly intact.   --PERRL   --Motor: moves all to command                Medications:  Continuous Scheduledacetaminophen, 1,000 mg, Q8H  amitriptyline, 10 mg, QHS  amLODIPine, 2.5 mg, Nightly  carbamide peroxide, 5 drop, BID  ceFAZolin (Ancef) IV (PEDS and ADULTS), 2 g, Q8H  cetirizine, 10 mg, Daily  docusate sodium, 100 mg, BID  enoxparin, 40 mg, Q24H (prophylaxis, 1700)  levETIRAcetam, 500 mg, BID  levothyroxine, 125 mcg, Before breakfast  memantine, 10 mg, BID  metoprolol succinate, 25 mg, Daily  mupirocin, , BID  pantoprazole, 40 mg, Daily  polyethylene glycol, 17 g, Daily  pravastatin, 20 mg, QHS  tamsulosin, 0.4 mg, Daily    PRNdextrose 50%, 12.5 g, PRN  dextrose 50%, 25 g, PRN  glucagon (human recombinant), 1 mg, PRN  glucose, 16 g, PRN  glucose, 16 g, PRN  glucose, 24 g,  PRN  hydrALAZINE, 10 mg, Q6H PRN  insulin aspart U-100, 0-5 Units, QID (AC + HS) PRN  magnesium oxide, 800 mg, PRN  magnesium oxide, 800 mg, PRN  melatonin, 6 mg, Nightly PRN  ondansetron, 4 mg, Q6H PRN  oxyCODONE, 5 mg, Q4H PRN  oxyCODONE, 10 mg, Q4H PRN  potassium bicarbonate, 35 mEq, PRN  potassium bicarbonate, 50 mEq, PRN  potassium bicarbonate, 60 mEq, PRN  potassium, sodium phosphates, 2 packet, PRN  potassium, sodium phosphates, 2 packet, PRN  potassium, sodium phosphates, 2 packet, PRN      Today I personally reviewed pertinent medications, lines/drains/airways, imaging, cardiology results, laboratory results, microbiology results, notably:    Imaging Results              X-Ray Shunt Series (XPD) (Final result)  Result time 03/21/25 20:24:11      Final result by Zeyad Krueger MD (03/21/25 20:24:11)                   Impression:      No acute abnormality.  See above comments.      Electronically signed by: Zeyad Krueger  Date:    03/21/2025  Time:    20:24               Narrative:    EXAMINATION:  XR SHUNT SERIES (XPD)    CLINICAL HISTORY:  headache;    TECHNIQUE:  Six images are submitted.    COMPARISON:  03/07/2025, 02/09/2025    FINDINGS:  Shunt catheter entering from a right posterior parietal approach.  Shunt tubing descends on the right to the anterior chest crossing the midline near the level the umbilicus into the left pelvis.  The distal tip extends into the right pelvis.  No fractures of the tubing are detected.  Slight tortuosity.  No significant change.    Aortic stent valve replacement.  Lungs are clear.  Heart is normal size.    Nonspecific nonobstructing pattern of bowel gas.                                       CT Head Without Contrast (Final result)  Result time 03/21/25 17:59:45      Final result by Allen Rey MD (03/21/25 17:59:45)                   Impression:      No significant interval detrimental change.  No intracranial hemorrhage or major vascular distribution  infarct.    Right-sided ventriculostomy shunt in place with unchanged size and configuration of the ventricles.    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  No worsening intracranial mass effect.    Electronically signed by resident: Mihir Moody  Date:    03/21/2025  Time:    17:50    Electronically signed by: Allen Rey MD  Date:    03/21/2025  Time:    17:59               Narrative:    EXAMINATION:  CT HEAD WITHOUT CONTRAST    CLINICAL HISTORY:  Headache, new or worsening (Age >= 50y);    TECHNIQUE:  Low dose axial CT images obtained throughout the head without the use of intravenous contrast.  Axial, sagittal and coronal reconstructions were performed.    COMPARISON:  Multiple priors, most recent CT head 03/19/2025.    FINDINGS:  Right-sided  shunt in unchanged position with tip terminating in the frontal horn of the left lateral ventricle.  Ventricles are unchanged in size without evidence of hydrocephalus.    There are bilateral low-density extra-axial collections measuring up to 1.6 cm on the left and 1.7 cm on the right, unchanged.  Mild sulcal effacement on the underlying parenchyma as before.  No midline shift or evidence of herniation.    Brain parenchyma is unchanged.  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  Grossly stable patchy hypoattenuation of the supratentorial white matter consistent with chronic microvascular ischemic change.  No new parenchymal hemorrhage, edema, mass effect or major vascular distribution infarct.  Few scattered punctate parenchymal calcifications in the parietal sulci bilaterally are unchanged.    Skull/extracranial contents (limited evaluation):    No displaced calvarial fracture.    Small left sphenoid sinus mucous retention cyst.  The mastoid air cells and visualized paranasal sinuses are otherwise essentially clear.                                        Diet  Diet Renal  Non-Dialysis  Diet Renal Non-Dialysis    \

## 2025-03-25 NOTE — ANESTHESIA POSTPROCEDURE EVALUATION
Anesthesia Post Evaluation    Patient: Ramesh Ricci    Procedure(s) Performed: Procedure(s) (LRB):  EVACUATION, HEMATOMA, SUBDURAL  AND SHUNT TIE OFF (Bilateral)    Final Anesthesia Type: general      Patient location during evaluation: ICU  Patient participation: Yes- Able to Participate  Level of consciousness: awake and alert  Post-procedure vital signs: reviewed and stable  Pain management: adequate  Airway patency: patent    PONV status at discharge: No PONV  Anesthetic complications: no      Cardiovascular status: blood pressure returned to baseline  Respiratory status: unassisted, spontaneous ventilation and room air  Hydration status: euvolemic  Follow-up not needed.              Vitals Value Taken Time   /63 03/25/25 11:15   Temp 36.4 °C (97.5 °F) 03/25/25 07:00   Pulse 53 03/25/25 11:15   Resp 18 03/25/25 11:15   SpO2 98 % 03/25/25 11:15         No case tracking events are documented in the log.      Pain/Yrn Score: Pain Rating Prior to Med Admin: 7 (3/25/2025 10:26 AM)  Pain Rating Post Med Admin: 8 (3/25/2025 11:26 AM)

## 2025-03-25 NOTE — PT/OT/SLP EVAL
Physical Therapy Evaluation and Treatment    Patient Name: Ramesh Ricci   MRN: [id number]  Recent Surgery: Procedure(s) (LRB):  EVACUATION, HEMATOMA, SUBDURAL  AND SHUNT TIE OFF (Bilateral) 1 Day Post-Op    Recommendations:     Discharge Recommendations: High Intensity Therapy    Discharge Equipment Recommendations: none   Barriers to discharge: Increased level of assist  Nursing Mobilization: Patient clear to stand pivot transfer with RN/PCT, assist x2 .    Assessment:     Ramesh Ricci is a 85 y.o. male admitted with a medical diagnosis of Subdural hematoma, chronic. He presents with the following impairments/functional limitations: weakness, impaired self care skills, impaired functional mobility, gait instability, impaired balance, decreased upper extremity function, decreased lower extremity function, decreased safety awareness, pain, impaired cardiopulmonary response to activity. Pt w/ good strength and mobility but w/ dizziness which worsened w/ position changes ultimately leading to pt needing to lay back down (did have positive orthostatics, BLE therex helped but not enough to continue to tolerate treatment session). Patient presents with good participation and motivation to return to prior level of function with high intensity therapy.  The patient demonstrates appropriate endurance and strength to participate in up to 3 hours or 15hrs of combined therapy post acute.     Rehab Prognosis: Good; patient would benefit from acute skilled PT services to address these deficits and reach maximum level of function.  Recent Surgery: Procedure(s) (LRB):  EVACUATION, HEMATOMA, SUBDURAL  AND SHUNT TIE OFF (Bilateral) 1 Day Post-Op    Plan:     During this hospitalization, patient to be seen 4 x/week to address the identified rehab impairments via gait training, therapeutic activities, therapeutic exercises, neuromuscular re-education and progress toward the following goals:    Plan of Care Expires:  04/11/25    Subjective     Chief Complaint: dizziness  Patient/Family Comments/Goals: to return to PLOF  Pain/Comfort:  Pain Rating 1: 7/10  Location - Side 1: Bilateral  Location - Orientation 1: generalized  Location 1: head  Pain Addressed 1: Pre-medicate for activity, Reposition, Distraction, Cessation of Activity, Nurse notified  Pain Rating Post-Intervention 1: 7/10    Patients cultural, spiritual, Zoroastrianism conflicts given the current situation: no    Social History:  Living Environment: Patient lives with their spouse in a single story home, number of outside stairs: 1, walk-in shower w/ grab bars.  Prior Level of Function: Prior to admission, patient was independent with ADLs, using rolling walker for ambulation. He uses rollator in AM to take long walk, likes to draw and do DIY home projects.  Equipment Used at Home: cane, straight, wheelchair, rollator, walker, rolling, bedside commode, shower chair    DME owned (not currently used):  [employer], transport w/c, bsc, shower chair  Assistance Upon Discharge:  wife who is retired LPN but can only provided minimal physical assist, son can physically assist but works during the day    Objective:     Communicated with RN prior to session. Patient found HOB elevated with telemetry, hemovac, blood pressure cuff, pulse ox (continuous), SCD upon PT entry to room.    General Precautions: Standard, fall   Orthopedic Precautions:N/A   Braces: N/A  Respiratory Status: Room air    Exams:  Cognitive Exam: Patient is oriented to Person, Place, Time, Situation, follows commands 100% of the time  RLE ROM: WFL  RLE Strength: WFL  LLE ROM: WFL  LLE Strength: WFL  Sensation: Intact light touch to BLEs    Functional Mobility:  Bed Mobility:  Supine to Sit: contact guard assistance  Sit to Supine: minimum assistance for BLE mgmt  Transfers:  Sit to Stand: minimum assistance with rolling walker with cues for hand placement and foot placement  Balance:   Static Sitting: stand by  assistance at EOB  Dynamic Sitting: contact guard assistance at EOB  Static Standing: minimum assistance with rolling walker    Therapeutic Activities and Exercises:  Patient educated on role of acute care PT and PT POC, safety while in hospital including calling nurse for mobility, and call light usage  Patient performed 4 set(s) of 8 repetitions of  the following bed level exercises: ankle pumps and heel slides and seated exercises: ankle pumps, long arc quads, marches, and knee flexion for bilateral LE. Patient required skilled PT for instruction of exercises and appropriate cues to perform exercises safely and appropriately  Patient educated about importance of OOB mobility    AM-PAC 6 CLICK MOBILITY  Turning over in bed (including adjusting bedclothes, sheets and blankets)?: 4  Sitting down on and standing up from a chair with arms (e.g., wheelchair, bedside commode, etc.): 3  Moving from lying on back to sitting on the side of the bed?: 3  Moving to and from a bed to a chair (including a wheelchair)?: 3  Need to walk in hospital room?: 2  Climbing 3-5 steps with a railing?: 2  Basic Mobility Total Score: 17     Patient left HOB elevated with all lines intact, call button in reach, RN notified, bed alarm on, and RN and spouse present.    GOALS:   Multidisciplinary Problems       Physical Therapy Goals          Problem: Physical Therapy    Goal Priority Disciplines Outcome Interventions   Physical Therapy Goal     PT, PT/OT Progressing    Description: Goals to be completed by: 4/11/25    Pt will be able to stand up from EOB w/ stand by assistance using LRAD  Pt will ambulate 50' feet w/ stand by assistance using LRAD  Pt will be independent w/ HEP therex on BLE w/ good form and ROM                        History:     Past Medical History:   Diagnosis Date    Allergies 01/31/2025    Anemia     Anticoagulant long-term use     Anxiety     Atrophic kidney 11/16/2012    BPH (benign prostatic hyperplasia) 11/16/2012     Congenital absence of right kidney 07/05/2017    DDD (degenerative disc disease), cervical 07/05/2017    x-ray 7/17 - Severe    Ex-smoker 12/20/2018    Exudative age-related macular degeneration, right eye, with active choroidal neovascularization 02/15/2024    GERD (gastroesophageal reflux disease) 11/16/2012    Glaucoma 11/16/2012    HTN (hypertension) 11/16/2012    Hypothyroid 11/16/2012    Internal carotid artery stenosis 11/16/2012    Iron deficiency anemia due to chronic blood loss 10/29/2021    Left ventricular diastolic dysfunction, NYHA class 1 04/16/2015    4/15    Low serum testosterone level 11/16/2012    Normal cardiac stress test 11/16/2012    NPH (normal pressure hydrocephalus) 02/03/2025    Osteopenia 11/16/2012    PAF (paroxysmal atrial fibrillation) 12/20/2018    Prostate CA 01/15/2013    XRT 12/12  Dr. 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    Shingles 11/16/2012    Skin disease 2020    Seems to have started after Covid vaccine    Stage 3a chronic kidney disease 10/06/2014    Stroke 2017    tia   no residual    Thrombocytopenia 01/31/2025    TIA (transient ischemic attack) 11/16/2012    UC (ulcerative colitis) 11/16/2012       Past Surgical History:   Procedure Laterality Date    ADENOIDECTOMY      COLONOSCOPY N/A 03/25/2022    Procedure: COLONOSCOPY;  Surgeon: Ashley Nguyen MD;  Location: [facility];  Service: Endoscopy;  Laterality: N/A;    ESOPHAGOGASTRODUODENOSCOPY N/A 03/25/2022    Procedure: EGD (ESOPHAGOGASTRODUODENOSCOPY);  Surgeon: Ashley Nguyen MD;  Location: [facility];  Service: Endoscopy;  Laterality: N/A;  added on per Dr. Nguyen    ESOPHAGOGASTRODUODENOSCOPY N/A 6/7/2024    Procedure: EGD (ESOPHAGOGASTRODUODENOSCOPY);  Surgeon: Audie Snell MD;  Location: [address]);  Service: Endoscopy;  Laterality: N/A;  5/21 R/s,sent updated instr via portal.pt informed to hold eliquis for 2 days.AC  Ref by: ,  approved to hold  Eliquis (apixaban) for 2 days per Dr. Conklin-see media file  5/9/24-GT  5/31-pre call complete-tb    EYE SURGERY Right 11/2020    cataract    HERNIA REPAIR      LEFT HEART CATHETERIZATION Right 10/29/2021    Procedure: CATHETERIZATION, HEART, LEFT AND RIGHT  - LV DARNELL POSSIBLE;  Surgeon: Beau Conklin MD;  Location: [facility] CATH LAB;  Service: Cardiology;  Laterality: Right;    LUMBAR PUNCTURE N/A 1/13/2025    Procedure: Lumbar Puncture;  Surgeon: Jairo Blankenship MD;  [facility];  Service: Neurosurgery;  Laterality: N/A;    RIGHT HEART CATHETERIZATION Right 10/29/2021    Procedure: INSERTION, CATHETER, RIGHT HEART;  Surgeon: Beau Conklin MD;  Location: [facility] CATH LAB;  Service: Cardiology;  Laterality: Right;    SKIN BIOPSY  2020    TONSILLECTOMY      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    Procedure: INSERTION, SHUNT, VENTRICULOPERITONEAL;  Surgeon: Jairo Blankenship MD;  [facility];  Service: Neurosurgery;  Laterality: Right;    VENTRICULOPERITONEAL SHUNT  2/6/2025    Procedure: INSERTION, SHUNT, VENTRICULOPERITONEAL;  Surgeon: Shar Elizabeth MD;  Location: [facility] OR [address] [place] [address];  Service: General;;       Time Tracking:     PT Received On: 03/25/25  PT Start Time: 1030  PT Stop Time: 1100  PT Total Time (min): 30 min     Billable Minutes: Evaluation 7, Therapeutic Activity 15, and Therapeutic Exercise 8    03/25/2025

## 2025-03-25 NOTE — PROGRESS NOTES
[facility]as - Neurosurgery ([facility])  Neurosurgery  Progress Note    Subjective:     History of Present Illness: The patient is an 85-year-old male with a past medical history of normal pressure hydrocephalus status post insertion of a right-sided ventriculoperitoneal shunt on 02/06 2025, who presents with several days of bitemporal headache.  Patient was seen in clinic by Dr. Glenn Rivero on 3 7 and 319.  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  The patient denies fever/chills, HA, vision/hearing changes, dysphagia, dysarthria, nausea/vomiting, new-onset weakness or sensory change, bowel/bladder changes.       Post-Op Info:  Procedure(s) (LRB):  EVACUATION, HEMATOMA, SUBDURAL  AND SHUNT TIE OFF (Bilateral)   1 Day Post-Op   Interval History: 3/25: POD1 bilateral mini crani for SDH evac, shunt tie off at R clavicle. Postop CTH with expected appearance and pneumocephalus. Patient doing well except for R VI palsy and baseline higher order confusion. Will transfer to floor today. Drains with output of 70 each overnight.     Medications:  Continuous Infusions:  Scheduled Meds:   acetaminophen  1,000 mg Oral Q8H    amitriptyline  10 mg Oral QHS    amLODIPine  2.5 mg Oral Nightly    carbamide peroxide  5 drop Left Ear BID    ceFAZolin (Ancef) IV (PEDS and ADULTS)  2 g Intravenous Q8H    cetirizine  10 mg Oral Daily    docusate sodium  100 mg Oral BID    enoxparin  40 mg Subcutaneous Q24H (prophylaxis, 1700)    levETIRAcetam  500 mg Oral BID    levothyroxine  125 mcg Oral Before breakfast    memantine  10 mg Oral BID    metoprolol succinate  25 mg Oral Daily    mupirocin   Nasal BID    pantoprazole  40 mg Oral Daily    polyethylene glycol  17 g Oral Daily    pravastatin  20 mg Oral  QHS    tamsulosin  0.4 mg Oral Daily     PRN Meds:  Current Facility-Administered Medications:     dextrose 50%, 12.5 g, Intravenous, PRN    dextrose 50%, 25 g, Intravenous, PRN    glucagon (human recombinant), 1 mg, Intramuscular, PRN    glucose, 16 g, Oral, PRN    glucose, 16 g, Oral, PRN    glucose, 24 g, Oral, PRN    hydrALAZINE, 10 mg, Intravenous, Q6H PRN    insulin aspart U-100, 0-5 Units, Subcutaneous, QID (AC + HS) PRN    magnesium oxide, 800 mg, Oral, PRN    magnesium oxide, 800 mg, Oral, PRN    melatonin, 6 mg, Oral, Nightly PRN    ondansetron, 4 mg, Intravenous, Q6H PRN    oxyCODONE, 5 mg, Oral, Q4H PRN    oxyCODONE, 10 mg, Oral, Q4H PRN    potassium bicarbonate, 35 mEq, Oral, PRN    potassium bicarbonate, 50 mEq, Oral, PRN    potassium bicarbonate, 60 mEq, Oral, PRN    potassium, sodium phosphates, 2 packet, Oral, PRN    potassium, sodium phosphates, 2 packet, Oral, PRN    potassium, sodium phosphates, 2 packet, Oral, PRN     Review of Systems  Objective:     Weight: 67.9 kg (149 lb 11.1 oz)  Body mass index is 20.88 kg/m².  Vital Signs (Most Recent):  Temp: 97.5 °F (36.4 °C) (03/25/25 0700)  Pulse: (!) 53 (03/25/25 1115)  Resp: 18 (03/25/25 1115)  BP: 132/63 (03/25/25 1115)  SpO2: 98 % (03/25/25 1115) Vital Signs (24h Range):  Temp:  [96.7 °F (35.9 °C)-97.5 °F (36.4 °C)] 97.5 °F (36.4 °C)  Pulse:  [45-61] 53  Resp:  [10-38] 18  SpO2:  [94 %-100 %] 98 %  BP: (101-160)/(49-81) 132/63  Arterial Line BP: (121-145)/(47-60) 136/60     Date 03/25/25 0700 - 03/26/25 0659   Shift 7067-5609 5311-5275 7919-6316 24 Hour Total   INTAKE   P.O. 250   250   Shift Total(mL/kg) 250(3.7)   250(3.7)   OUTPUT   Urine(mL/kg/hr) 600   600   Shift Total(mL/kg) 600(8.8)   600(8.8)   Weight (kg) 67.9 67.9 67.9 67.9                            Closed/Suction Drain 03/24/25 1613 Tube - 1 Left Scalp Accordion 10 Fr. (Active)   Site Description Unable to view 03/25/25 0700   Dressing Type Gauze 03/25/25 0700   Dressing Status  Clean;Dry;Intact 03/25/25 0700   Dressing Intervention Integrity maintained 03/25/25 0700   Drainage Sanguineous 03/25/25 0700   Status Other (Comment) 03/25/25 0700   Output (mL) 70 mL 03/25/25 0601            Closed/Suction Drain 03/24/25 1614 Tube - 2 Right Scalp Accordion 10 Fr. (Active)   Site Description Unable to view 03/25/25 0700   Dressing Type Gauze 03/25/25 0700   Dressing Status Clean;Dry;Intact 03/25/25 0700   Dressing Intervention Integrity maintained 03/25/25 0700   Drainage Sanguineous 03/25/25 0700   Status Other (Comment) 03/25/25 0700   Output (mL) 70 mL 03/25/25 0601          Physical Exam         Neurosurgery Physical Exam    Neurosurgery Physical Exam    General: well developed, well nourished, no distress.   HEENT: normocephalic, atraumatic  CV: regular rate   Pulmonary: normal respirations, no signs of respiratory distress  Abdomen: soft, non-distended, not tender to palpation  Skin: Skin is warm, dry and intact  Heme: no bruising    Neuro:   Mental Status: AO x4, no aphasia, no dysarthria - higher order confusion.   CN: PERRL, EOMI other than R VI palsy, sensation intact bilaterally, eyebrow raise and grimace symmetric, tongue midline. Hard of hearing  Motor: moves all extremities spontaneously, full strength throughout, no pronator drift   Sensory: intact to light touch throughout  Gait: deferred     Dressings c/d/I  Subgaleal drains in place bilaterally      Significant Labs:  Recent Labs   Lab 03/24/25 0432 03/25/25  0023 03/25/25  0747    137  --    K 4.2 4.7  --     107  --    CO2 23 19*  --    BUN 28* 25*  --    CREATININE 1.1 1.1  --    CALCIUM 8.5* 8.6*  --    MG  --   --  1.8     Recent Labs   Lab 03/24/25 0432 03/25/25  0023   WBC 6.94 11.71   HGB 12.7* 12.8*   HCT 40.2 39.4*   * 152     Recent Labs   Lab 03/24/25 0432   INR 1.0   APTT 29.0     Microbiology Results (last 7 days)       ** No results found for the last 168 hours. **          All pertinent labs  from the last 24 hours have been reviewed.    Significant Diagnostics:  CT: CT Head Without Contrast  Result Date: 3/24/2025  Interval postoperative changes from bifrontal craniotomies for subdural hygroma evacuation.  Findings concerning for developing bifrontal tension pneumocephalus.  Recommend close follow-up and clinical evaluation. Significantly decreased size of the subdural collections, now containing residual hygroma and postoperative blood products. Stable positioning of right-sided ventriculostomy shunt with mild interval dilatation of the ventricular system.  No evidence of acute hydrocephalus. Unchanged small vessel ischemic changes.  No intra-axial hemorrhage or major vascular distribution infarct. This report was flagged in Epic as abnormal. This finding was discovered by and communicated by Charles Beckman MD to Aura Claudio MD via phone at 21:05 on 03/24/2025 with confirmation of message by recipient. Electronically signed by resident: Charles Beckman Date:    03/24/2025 Time:    19:30 Electronically signed by: Allen Rey MD Date:    03/24/2025 Time:    21:13    MRI: No results found in the last 24 hours.  Assessment/Plan:     S/P ventriculoperitoneal shunt  This is a 85-year-old male with a history of NPH status post right-sided  shunt on 02/06/2025 by Dr. Blankenship, who presents with several days of worsening bitemporal headache.  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  CT head obtained today demonstrates interval stability of the bilateral subdural hygromas.  When the patient saw Emeli Rivero in clinic on 03/07/2025 the shunt was reprogrammed to 200, however hygromas were persistent and he had continued headaches.    He is now s/p bilateral mini cranis for SDH evacuation and tying off of distal shunt catheter at right clavicle on 3/24. Postop CTH with expected findings and pneumocephalus.     Plan:  --admitted to [facility] postop,  stable for transfer to NSGY floor today  --q4h neuro checks/vitals  --SG HV to full suction bilaterally; ancef while in place. Please record output each shift  --PT/OT/OOB  --keppra ppx  --LVT DVT ppx    Seen by Dr. Blankenship on AM rounds        Saundra Camilo MD  Neurosurgery  [facility] fl[facility] - Neurosurgery ([facility])

## 2025-03-25 NOTE — PLAN OF CARE
PT Eval complete, appropriate goals created    Problem: Physical Therapy  Goal: Physical Therapy Goal  Description: Goals to be completed by: 4/11/25    Pt will be able to stand up from EOB w/ stand by assistance using LRAD  Pt will ambulate 50' feet w/ stand by assistance using LRAD  Pt will be independent w/ HEP therex on BLE w/ good form and ROM   Outcome: Progressing

## 2025-03-26 PROBLEM — N17.9 AKI (ACUTE KIDNEY INJURY): Status: ACTIVE | Noted: 2025-03-26

## 2025-03-26 PROBLEM — R51.9 HEADACHE: Status: ACTIVE | Noted: 2019-09-17

## 2025-03-26 PROBLEM — R33.9 URINARY RETENTION: Status: ACTIVE | Noted: 2025-03-26

## 2025-03-26 PROBLEM — E88.09 HYPOALBUMINEMIA: Status: ACTIVE | Noted: 2025-03-26

## 2025-03-26 LAB
ABSOLUTE EOSINOPHIL (OHS): 0.16 K/UL
ABSOLUTE MONOCYTE (OHS): 1.27 K/UL (ref 0.3–1)
ABSOLUTE NEUTROPHIL COUNT (OHS): 7.5 K/UL (ref 1.8–7.7)
ALBUMIN SERPL BCP-MCNC: 3 G/DL (ref 3.5–5.2)
ALP SERPL-CCNC: 46 UNIT/L (ref 40–150)
ALT SERPL W/O P-5'-P-CCNC: <5 UNIT/L (ref 10–44)
ANION GAP (OHS): 8 MMOL/L (ref 8–16)
ANION GAP (OHS): 9 MMOL/L (ref 8–16)
AST SERPL-CCNC: 12 UNIT/L (ref 11–45)
BASOPHILS # BLD AUTO: 0.04 K/UL
BASOPHILS NFR BLD AUTO: 0.4 %
BILIRUB SERPL-MCNC: 0.2 MG/DL (ref 0.1–1)
BUN SERPL-MCNC: 26 MG/DL (ref 8–23)
BUN SERPL-MCNC: 28 MG/DL (ref 8–23)
CALCIUM SERPL-MCNC: 8.4 MG/DL (ref 8.7–10.5)
CALCIUM SERPL-MCNC: 8.6 MG/DL (ref 8.7–10.5)
CHLORIDE SERPL-SCNC: 103 MMOL/L (ref 95–110)
CHLORIDE SERPL-SCNC: 105 MMOL/L (ref 95–110)
CO2 SERPL-SCNC: 23 MMOL/L (ref 23–29)
CO2 SERPL-SCNC: 24 MMOL/L (ref 23–29)
CREAT SERPL-MCNC: 1.5 MG/DL (ref 0.5–1.4)
CREAT SERPL-MCNC: 1.6 MG/DL (ref 0.5–1.4)
ERYTHROCYTE [DISTWIDTH] IN BLOOD BY AUTOMATED COUNT: 13.3 % (ref 11.5–14.5)
GFR SERPLBLD CREATININE-BSD FMLA CKD-EPI: 41 ML/MIN/1.73/M2
GFR SERPLBLD CREATININE-BSD FMLA CKD-EPI: 45 ML/MIN/1.73/M2
GLUCOSE SERPL-MCNC: 127 MG/DL (ref 70–110)
GLUCOSE SERPL-MCNC: 93 MG/DL (ref 70–110)
HCT VFR BLD AUTO: 35.4 % (ref 40–54)
HGB BLD-MCNC: 11.3 GM/DL (ref 14–18)
IMM GRANULOCYTES # BLD AUTO: 0.04 K/UL (ref 0–0.04)
IMM GRANULOCYTES NFR BLD AUTO: 0.4 % (ref 0–0.5)
LYMPHOCYTES # BLD AUTO: 1.57 K/UL (ref 1–4.8)
MCH RBC QN AUTO: 30.5 PG (ref 27–50)
MCHC RBC AUTO-ENTMCNC: 31.9 G/DL (ref 32–36)
MCV RBC AUTO: 96 FL (ref 82–98)
NUCLEATED RBC (/100WBC) (OHS): 0 /100 WBC
PLATELET # BLD AUTO: 134 K/UL (ref 150–450)
PMV BLD AUTO: 12.1 FL (ref 9.2–12.9)
POCT GLUCOSE: 111 MG/DL (ref 70–110)
POCT GLUCOSE: 112 MG/DL (ref 70–110)
POCT GLUCOSE: 124 MG/DL (ref 70–110)
POCT GLUCOSE: 138 MG/DL (ref 70–110)
POCT GLUCOSE: 62 MG/DL (ref 70–110)
POCT GLUCOSE: 90 MG/DL (ref 70–110)
POTASSIUM SERPL-SCNC: 4.2 MMOL/L (ref 3.5–5.1)
POTASSIUM SERPL-SCNC: 4.3 MMOL/L (ref 3.5–5.1)
PROT SERPL-MCNC: 6 GM/DL (ref 6–8.4)
RBC # BLD AUTO: 3.7 M/UL (ref 4.6–6.2)
RELATIVE EOSINOPHIL (OHS): 1.5 %
RELATIVE LYMPHOCYTE (OHS): 14.8 % (ref 18–48)
RELATIVE MONOCYTE (OHS): 12 % (ref 4–15)
RELATIVE NEUTROPHIL (OHS): 70.9 % (ref 38–73)
SODIUM SERPL-SCNC: 136 MMOL/L (ref 136–145)
SODIUM SERPL-SCNC: 136 MMOL/L (ref 136–145)
WBC # BLD AUTO: 10.58 K/UL (ref 3.9–12.7)

## 2025-03-26 PROCEDURE — 25000003 PHARM REV CODE 250

## 2025-03-26 PROCEDURE — 36415 COLL VENOUS BLD VENIPUNCTURE: CPT

## 2025-03-26 PROCEDURE — 92610 EVALUATE SWALLOWING FUNCTION: CPT

## 2025-03-26 PROCEDURE — 63600175 PHARM REV CODE 636 W HCPCS

## 2025-03-26 PROCEDURE — 99024 POSTOP FOLLOW-UP VISIT: CPT | Mod: ,,,

## 2025-03-26 PROCEDURE — 63600175 PHARM REV CODE 636 W HCPCS: Performed by: STUDENT IN AN ORGANIZED HEALTH CARE EDUCATION/TRAINING PROGRAM

## 2025-03-26 PROCEDURE — 97535 SELF CARE MNGMENT TRAINING: CPT

## 2025-03-26 PROCEDURE — 25000003 PHARM REV CODE 250: Performed by: STUDENT IN AN ORGANIZED HEALTH CARE EDUCATION/TRAINING PROGRAM

## 2025-03-26 PROCEDURE — 97110 THERAPEUTIC EXERCISES: CPT

## 2025-03-26 PROCEDURE — 11000001 HC ACUTE MED/SURG PRIVATE ROOM

## 2025-03-26 RX ORDER — SODIUM CHLORIDE, SODIUM LACTATE, POTASSIUM CHLORIDE, CALCIUM CHLORIDE 600; 310; 30; 20 MG/100ML; MG/100ML; MG/100ML; MG/100ML
INJECTION, SOLUTION INTRAVENOUS CONTINUOUS
Status: DISCONTINUED | OUTPATIENT
Start: 2025-03-26 | End: 2025-03-27

## 2025-03-26 RX ORDER — SODIUM CHLORIDE 9 MG/ML
INJECTION, SOLUTION INTRAVENOUS CONTINUOUS
Status: DISCONTINUED | OUTPATIENT
Start: 2025-03-26 | End: 2025-03-26

## 2025-03-26 RX ORDER — SODIUM CHLORIDE, SODIUM LACTATE, POTASSIUM CHLORIDE, CALCIUM CHLORIDE 600; 310; 30; 20 MG/100ML; MG/100ML; MG/100ML; MG/100ML
INJECTION, SOLUTION INTRAVENOUS CONTINUOUS
Status: DISCONTINUED | OUTPATIENT
Start: 2025-03-26 | End: 2025-03-26

## 2025-03-26 RX ORDER — CYCLOBENZAPRINE HCL 5 MG
5 TABLET ORAL 3 TIMES DAILY PRN
Status: DISCONTINUED | OUTPATIENT
Start: 2025-03-26 | End: 2025-03-31

## 2025-03-26 RX ADMIN — CEFAZOLIN 2 G: 2 INJECTION, POWDER, FOR SOLUTION INTRAMUSCULAR; INTRAVENOUS at 10:03

## 2025-03-26 RX ADMIN — ACETAMINOPHEN 1000 MG: 500 TABLET ORAL at 01:03

## 2025-03-26 RX ADMIN — MEMANTINE 10 MG: 10 TABLET ORAL at 08:03

## 2025-03-26 RX ADMIN — POLYETHYLENE GLYCOL 3350 17 G: 17 POWDER, FOR SOLUTION ORAL at 09:03

## 2025-03-26 RX ADMIN — Medication 16 G: at 08:03

## 2025-03-26 RX ADMIN — DOCUSATE SODIUM 100 MG: 100 CAPSULE, LIQUID FILLED ORAL at 09:03

## 2025-03-26 RX ADMIN — ENOXAPARIN SODIUM 40 MG: 40 INJECTION SUBCUTANEOUS at 05:03

## 2025-03-26 RX ADMIN — AMITRIPTYLINE HYDROCHLORIDE 10 MG: 10 TABLET, FILM COATED ORAL at 08:03

## 2025-03-26 RX ADMIN — TAMSULOSIN HYDROCHLORIDE 0.4 MG: 0.4 CAPSULE ORAL at 09:03

## 2025-03-26 RX ADMIN — POLYETHYLENE GLYCOL 3350 17 G: 17 POWDER, FOR SOLUTION ORAL at 08:03

## 2025-03-26 RX ADMIN — CYCLOBENZAPRINE HYDROCHLORIDE 5 MG: 5 TABLET, FILM COATED ORAL at 03:03

## 2025-03-26 RX ADMIN — DOCUSATE SODIUM 100 MG: 100 CAPSULE, LIQUID FILLED ORAL at 08:03

## 2025-03-26 RX ADMIN — AMLODIPINE BESYLATE 2.5 MG: 2.5 TABLET ORAL at 08:03

## 2025-03-26 RX ADMIN — CEFAZOLIN 2 G: 2 INJECTION, POWDER, FOR SOLUTION INTRAMUSCULAR; INTRAVENOUS at 03:03

## 2025-03-26 RX ADMIN — LEVETIRACETAM 500 MG: 500 TABLET, FILM COATED ORAL at 08:03

## 2025-03-26 RX ADMIN — MUPIROCIN: 20 OINTMENT TOPICAL at 09:03

## 2025-03-26 RX ADMIN — ACETAMINOPHEN 1000 MG: 500 TABLET ORAL at 05:03

## 2025-03-26 RX ADMIN — SODIUM CHLORIDE, POTASSIUM CHLORIDE, SODIUM LACTATE AND CALCIUM CHLORIDE: 600; 310; 30; 20 INJECTION, SOLUTION INTRAVENOUS at 09:03

## 2025-03-26 RX ADMIN — MEMANTINE 10 MG: 10 TABLET ORAL at 09:03

## 2025-03-26 RX ADMIN — PRAVASTATIN SODIUM 20 MG: 20 TABLET ORAL at 08:03

## 2025-03-26 RX ADMIN — ACETAMINOPHEN 1000 MG: 500 TABLET ORAL at 09:03

## 2025-03-26 RX ADMIN — CEFAZOLIN 2 G: 2 INJECTION, POWDER, FOR SOLUTION INTRAMUSCULAR; INTRAVENOUS at 05:03

## 2025-03-26 RX ADMIN — CARBAMIDE PEROXIDE 5 DROP: 65 SOLUTION/ DROPS TOPICAL at 08:03

## 2025-03-26 RX ADMIN — METOPROLOL SUCCINATE 25 MG: 25 TABLET, EXTENDED RELEASE ORAL at 09:03

## 2025-03-26 RX ADMIN — SODIUM CHLORIDE, POTASSIUM CHLORIDE, SODIUM LACTATE AND CALCIUM CHLORIDE: 600; 310; 30; 20 INJECTION, SOLUTION INTRAVENOUS at 10:03

## 2025-03-26 RX ADMIN — PANTOPRAZOLE SODIUM 40 MG: 40 TABLET, DELAYED RELEASE ORAL at 09:03

## 2025-03-26 RX ADMIN — LEVETIRACETAM 500 MG: 500 TABLET, FILM COATED ORAL at 09:03

## 2025-03-26 RX ADMIN — OXYCODONE HYDROCHLORIDE 10 MG: 10 TABLET ORAL at 09:03

## 2025-03-26 RX ADMIN — CETIRIZINE HYDROCHLORIDE 10 MG: 10 TABLET, FILM COATED ORAL at 09:03

## 2025-03-26 RX ADMIN — Medication 6 MG: at 08:03

## 2025-03-26 RX ADMIN — LEVOTHYROXINE SODIUM 125 MCG: 0.12 TABLET ORAL at 05:03

## 2025-03-26 RX ADMIN — MAGNESIUM SULFATE HEPTAHYDRATE 2 G: 40 INJECTION, SOLUTION INTRAVENOUS at 01:03

## 2025-03-26 RX ADMIN — MUPIROCIN: 20 OINTMENT TOPICAL at 08:03

## 2025-03-26 RX ADMIN — OXYCODONE HYDROCHLORIDE 10 MG: 10 TABLET ORAL at 11:03

## 2025-03-26 RX ADMIN — CARBAMIDE PEROXIDE 5 DROP: 65 SOLUTION/ DROPS TOPICAL at 09:03

## 2025-03-26 NOTE — PT/OT/SLP EVAL
Speech Language Pathology Evaluation  Bedside Swallow    Patient Name:  Ramesh Ricci   MRN:  [id number]  [id number]/922 [id number]    Admitting Diagnosis: Subdural hematoma, chronic    Recommendations:                 General Recommendations:   follow up  Diet recommendations:  Regular Diet - IDDSI Level 7, Thin liquids - IDDSI Level 0   Aspiration Precautions: HOB to 90 degrees, Meds whole buried in puree (to avoid large sips), No straws, and Standard aspiration precautions   General Precautions: Standard, fall, hearing impaired  Communication strategies:   eliminate background noise    Assessment:     Ramesh Ricci is a 85 y.o. male with coughing with straw sips of thin liquids. ST will follow up to assess tolerance of diet with safe swallowing precautions in place.     History:     Past Medical History:   Diagnosis Date    Allergies 01/31/2025    Anemia     Anticoagulant long-term use     Anxiety     Atrophic kidney 11/16/2012    BPH (benign prostatic hyperplasia) 11/16/2012    Congenital absence of right kidney 07/05/2017    DDD (degenerative disc disease), cervical 07/05/2017    x-ray 7/17 - Severe    Ex-smoker 12/20/2018    Exudative age-related macular degeneration, right eye, with active choroidal neovascularization 02/15/2024    GERD (gastroesophageal reflux disease) 11/16/2012    Glaucoma 11/16/2012    HTN (hypertension) 11/16/2012    Hypothyroid 11/16/2012    Internal carotid artery stenosis 11/16/2012    Iron deficiency anemia due to chronic blood loss 10/29/2021    Left ventricular diastolic dysfunction, NYHA class 1 04/16/2015    4/15    Low serum testosterone level 11/16/2012    Normal cardiac stress test 11/16/2012    NPH (normal pressure hydrocephalus) 02/03/2025    Osteopenia 11/16/2012    PAF (paroxysmal atrial fibrillation) 12/20/2018    Prostate CA 01/15/2013    XRT 12/12  Dr. 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    Shingles 11/16/2012    Skin disease 2020    Seems  to have started after Covid vaccine    Stage 3a chronic kidney disease 10/06/2014    Stroke 2017    tia   no residual    Thrombocytopenia 01/31/2025    TIA (transient ischemic attack) 11/16/2012    UC (ulcerative colitis) 11/16/2012       Past Surgical History:   Procedure Laterality Date    ADENOIDECTOMY      COLONOSCOPY N/A 03/25/2022    Procedure: COLONOSCOPY;  Surgeon: Ashley Nguyen MD;  Location: [facility];  Service: Endoscopy;  Laterality: N/A;    ESOPHAGOGASTRODUODENOSCOPY N/A 03/25/2022    Procedure: EGD (ESOPHAGOGASTRODUODENOSCOPY);  Surgeon: Ashley Nguyen MD;  Location: [facility];  Service: Endoscopy;  Laterality: N/A;  added on per Dr. Nguyen    ESOPHAGOGASTRODUODENOSCOPY N/A 6/7/2024    Procedure: EGD (ESOPHAGOGASTRODUODENOSCOPY);  Surgeon: Audie Snell MD;  [facility]  Service: Endoscopy;  Laterality: N/A;  5/21 R/s,sent updated instr via portal.pt informed to hold eliquis for 2 days.AC  Ref by: ,  approved to hold Eliquis (apixaban) for 2 days per Dr. Conklin-see media file  5/9/24-GT  5/31-pre call complete-tb    EVACUATION OF SUBDURAL HEMATOMA Bilateral 3/24/2025    Procedure: EVACUATION, HEMATOMA, SUBDURAL  AND SHUNT TIE OFF;  Surgeon: Jairo Blankenship MD;  Location: [facility] OR [address] [place] [address];  Service: Neurosurgery;  Laterality: Bilateral;  bilateral bobby holes for subdural hygroma and tying off of  shunt at clavicle. to follow other ware cases    EYE SURGERY Right 11/2020    cataract    HERNIA REPAIR      LEFT HEART CATHETERIZATION Right 10/29/2021    Procedure: CATHETERIZATION, HEART, LEFT AND RIGHT  - LV DARNELL POSSIBLE;  Surgeon: Beau Conklin MD;  Location: [facility] CATH LAB;  Service: Cardiology;  Laterality: Right;    LUMBAR PUNCTURE N/A 1/13/2025    Procedure: Lumbar Puncture;  Surgeon: Jairo Blankenship MD;  Location: [facility] OR [address] [place] [address];  Service: Neurosurgery;  Laterality: N/A;    RIGHT HEART CATHETERIZATION Right 10/29/2021    Procedure: INSERTION,  CATHETER, RIGHT HEART;  Surgeon: Beau Conklin MD;  Location: [facility] CATH LAB;  Service: Cardiology;  Laterality: Right;    SKIN BIOPSY  2020    TONSILLECTOMY      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    Procedure: INSERTION, SHUNT, VENTRICULOPERITONEAL;  Surgeon: Jairo Blankenship MD;  Location: [facility] OR [address] [place] [address];  Service: Neurosurgery;  Laterality: Right;    VENTRICULOPERITONEAL SHUNT  2/6/2025    Procedure: INSERTION, SHUNT, VENTRICULOPERITONEAL;  Surgeon: Shar Elizabeth MD;  Location: [facility] OR [address] [place] [address];  Service: General;;     MD note: History of Present Illness: The patient is an 85-year-old male with a past medical history of normal pressure hydrocephalus status post insertion of a right-sided ventriculoperitoneal shunt on 02/06 2025, who presents with several days of bitemporal headache.  Patient was seen in clinic by Dr. Glenn Rivero on 3 7 and 319.  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  The patient denies fever/chills, HA, vision/hearing changes, dysphagia, dysarthria, nausea/vomiting, new-onset weakness or sensory change, bowel/bladder changes.     Chest X-Rays: none for current admission    Prior diet: reg/thin; orders placed 2/2 patient coughing with meds    Subjective     Patient lethargic, however, agreeable to trials.   SLP communicated with RN prior to entry. RN cleared SLP to administer po trials.     Pain/Comfort:  Pain Rating 1: 10/10  Location - Orientation 1:  (head)  Pain Addressed 1:  (RN aware and gave pain meds)    Objective:     Oral Musculature Evaluation  Oral Musculature: WFL  Dentition: present and adequate  Mandibular Strength and Mobility: WFL  Oral Labial Strength and Mobility: WFL  Lingual Strength and Mobility:  WFL  Volitional Cough: elicited  Volitional Swallow: timely  Voice Prior to PO Intake: clear    Bedside Swallow Eval:   Consistencies Assessed:  Thin liquid sips of water via cup and straw  Puree tsp bites of pudding   Solids bites of cali cracker     Oral Phase:   WFL     Pharyngeal Phase:   Following straw sips:   Immediate coughing  Following all further trials:   no overt clinical signs/symptoms of aspiration  no overt clinical signs/symptoms of pharyngeal dysphagia     Compensatory Strategies  None     Treatment: SLP provided patient education on SLP role, s/s and risks of aspiraiton, safe swallow precautions, and POC. Patient v/u of all discussed. White board updated. RN notified.       Goals:  Multidisciplinary Problems       SLP Goals          Problem: SLP    Goal Priority Disciplines Outcome   SLP Goal     SLP Progressing   Description: Short Term Goals:   1 Pt will participate in an ongoing assessment to determine the least restrictive and safest diet with possible updated goals to follow pending results.                         Plan:     Patient to be seen:  3 x/week   Plan of Care expires:     Plan of Care reviewed with:  patient   SLP Follow-Up:  Yes       Discharge recommendations:  High Intensity Therapy   Barriers to Discharge:  None    Time Tracking:     SLP Treatment Date:   03/26/25  Speech Start Time:  1023  Speech Stop Time:  1040     Speech Total Time (min):  17 min    Billable Minutes: Eval Swallow and Oral Function 9 and Self Care/Home Management Training 8    03/26/2025

## 2025-03-26 NOTE — PT/OT/SLP PROGRESS
Physical Therapy      Patient Name:  Ramesh Ricci   MRN:  [id number]    Patient seen this AM w/ reports of 10/10 dizziness and head pain at rest, elevated HOB w/ symptoms immediately worsening. Pt also progressively lethargic unable to keep eyes open so OOB mobility deferred, was assisted w/ adjusting positioning in bed and education on therex. Will follow up as appropriate.  Bernadette Snell, PT   Time: 5977-0219 one unit TE billed for education on HEP

## 2025-03-26 NOTE — CARE UPDATE
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 ELIGIO Narvaez      PAF (paroxysmal atrial fibrillation)    Left ventricular diastolic dysfunction, NYHA class 1     4/15  7/20 - and increase LA      Stage 3a chronic kidney disease    BPH (benign prostatic hyperplasia)     S/P Biopsy 8/08, 2/09  Dr. Nassar      Essential hypertension     X 5 years      Acquired hypothyroidism        Lauren Garza, VALARIEP-C  Unit Based [facility]

## 2025-03-26 NOTE — ASSESSMENT & PLAN NOTE
Creatine stable for now. BMP reviewed- noted Estimated Creatinine Clearance: 31.4 mL/min (A) (based on SCr of 1.6 mg/dL (H)). according to latest data. Based on current GFR, CKD stage is stage 3 - GFR 30-59.  Monitor UOP and serial BMP and adjust therapy as needed. Renally dose meds. Avoid nephrotoxic medications and procedures.

## 2025-03-26 NOTE — ASSESSMENT & PLAN NOTE
Patient's blood pressure range in the last 24 hours was: BP  Min: 102/59  Max: 122/58.The patient's inpatient anti-hypertensive regimen is listed below:  Current Antihypertensives  amLODIPine tablet 2.5 mg, Nightly, Oral  metoprolol succinate (TOPROL-XL) 24 hr tablet 25 mg, Daily, Oral  hydrALAZINE injection 10 mg, Every 6 hours PRN, Intravenous    Plan  - BP is controlled, no changes needed to their regimen.

## 2025-03-26 NOTE — ASSESSMENT & PLAN NOTE
This is a 85-year-old male with a history of NPH status post right-sided  shunt on 02/06/2025 by Dr. Blankenship, who presents with several days of worsening bitemporal headache. 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  CT head obtained today demonstrates interval stability of the bilateral subdural hygromas. When the patient saw Emeli Rivero in clinic on 03/07/2025 the shunt was reprogrammed to 200, however hygromas were persistent and he had continued headaches.    He is now s/p bilateral mini cranis for SDH evacuation and tying off of distal shunt catheter at right clavicle on 3/24. Postop CTH with expected findings and pneumocephalus.     Repeat Head CT today for severe headache obtained and is with expected post operative changes.     Plan:  - Stepped down to nsgy floor.  - q4h neurochecks.  - SG HV to full suction bilaterally; ancef while in place. Please record output each shift.  - Multimodal pain control.  - PT/OT/OOB.  - Keppra 500 MG BID for seizure ppx.  - DVT ppx: LVX.  - Bowel regimen.  - Molina placed 3/25 for urinary retention. Will plan for voiding trial in 1 week.     Seen by Dr. Blankenship on AM rounds

## 2025-03-26 NOTE — PLAN OF CARE
Problem: Adult Inpatient Plan of Care  Goal: Plan of Care Review  3/26/2025 0654 by Mamie Michael RN  Outcome: Progressing  3/26/2025 0654 by Mamie Michael RN  Outcome: Progressing  Goal: Patient-Specific Goal (Individualized)  3/26/2025 0654 by Mamie Michael RN  Outcome: Progressing  3/26/2025 0654 by Mamie Michael RN  Outcome: Progressing  Goal: Absence of Hospital-Acquired Illness or Injury  3/26/2025 0654 by Mamie Michael RN  Outcome: Progressing  3/26/2025 0654 by Mamie Michael RN  Outcome: Progressing  Goal: Optimal Comfort and Wellbeing  3/26/2025 0654 by Mamie Michael RN  Outcome: Progressing  3/26/2025 0654 by Mamie Michael RN  Outcome: Progressing  Goal: Readiness for Transition of Care  3/26/2025 0654 by Mamie Michael RN  Outcome: Progressing  3/26/2025 0654 by Mamie Michael RN  Outcome: Progressing     Problem: Wound  Goal: Optimal Coping  3/26/2025 0654 by Mamie Michael RN  Outcome: Progressing  3/26/2025 0654 by Mamie Michael RN  Outcome: Progressing  Goal: Optimal Functional Ability  3/26/2025 0654 by Mamie Michael RN  Outcome: Progressing  3/26/2025 0654 by Mamie Michael RN  Outcome: Progressing  Goal: Absence of Infection Signs and Symptoms  3/26/2025 0654 by Mamie Michael RN  Outcome: Progressing  3/26/2025 0654 by Mamie Michael RN  Outcome: Progressing  Goal: Improved Oral Intake  3/26/2025 0654 by Mamie Michael RN  Outcome: Progressing  3/26/2025 0654 by Mamie Michael RN  Outcome: Progressing  Goal: Optimal Pain Control and Function  3/26/2025 0654 by Mamie Michael RN  Outcome: Progressing  3/26/2025 0654 by Mamie Michael RN  Outcome: Progressing  Goal: Skin Health and Integrity  3/26/2025 0654 by Mamie Michael RN  Outcome: Progressing  3/26/2025 0654 by Mamie Michael RN  Outcome: Progressing  Goal: Optimal Wound Healing  3/26/2025 0654 by Mamie Michael RN  Outcome:  Progressing  3/26/2025 0654 by Mamie Michael RN  Outcome: Progressing     Problem: Fall Injury Risk  Goal: Absence of Fall and Fall-Related Injury  3/26/2025 0654 by Mamie Michael RN  Outcome: Progressing  3/26/2025 0654 by Mamie Michael RN  Outcome: Progressing     Problem: Skin Injury Risk Increased  Goal: Skin Health and Integrity  3/26/2025 0654 by Mamie Michael RN  Outcome: Progressing  3/26/2025 0654 by Mamie Michael RN  Outcome: Progressing     Problem: Infection  Goal: Absence of Infection Signs and Symptoms  3/26/2025 0654 by Mamie Michael RN  Outcome: Progressing  3/26/2025 0654 by Mamie Michael RN  Outcome: Progressing     Problem: Pain Acute  Goal: Optimal Pain Control and Function  Outcome: Progressing  POC reviewed and updated with the patient at the bedside. Questions regarding POC were encouraged and addressed. VSS, see flowsheets. Tele maintained per provider's order.  NAEON.  Patient is AOx 3 at this time. Seizure, fall, and safety precautions maintained, no signs of injury noted during shift. 2 hemovac drains to full suction see flowsheets for output. Urinary retention over night orders received to place larry cath see flow sheet for intake and output. Pain management utilizing PRN pain medications, see MAR for administration details. Upon exiting room, patient's bed locked in low position, side rails up x 4, bed alarm set, with call light within reach. Instructed patient to call staff for mobility, verbalized understanding.  No acute signs of distress noted at this time.

## 2025-03-26 NOTE — SUBJECTIVE & OBJECTIVE
Interval History: Patient report's severe headache this morning. CTH obtained, with expected post operative changes. Will adjust regimen. Continue drains. Molina placed yesterday for urinary retention. Cr increased to 1.6 today. Will start LR fluids.    Medications:  Continuous Infusions:   lactated ringers   Intravenous Continuous 100 mL/hr at 03/26/25 1014 New Bag at 03/26/25 1014     Scheduled Meds:   acetaminophen  1,000 mg Oral Q8H    amitriptyline  10 mg Oral QHS    amLODIPine  2.5 mg Oral Nightly    bisacodyL  10 mg Rectal Daily    carbamide peroxide  5 drop Left Ear BID    ceFAZolin (Ancef) IV (PEDS and ADULTS)  2 g Intravenous Q8H    cetirizine  10 mg Oral Daily    docusate sodium  100 mg Oral BID    enoxparin  40 mg Subcutaneous Q24H (prophylaxis, 1700)    levETIRAcetam  500 mg Oral BID    levothyroxine  125 mcg Oral Before breakfast    memantine  10 mg Oral BID    metoprolol succinate  25 mg Oral Daily    mupirocin   Nasal BID    pantoprazole  40 mg Oral Daily    polyethylene glycol  17 g Oral BID    pravastatin  20 mg Oral QHS    tamsulosin  0.4 mg Oral Daily     PRN Meds:  Current Facility-Administered Medications:     dextrose 50%, 12.5 g, Intravenous, PRN    dextrose 50%, 25 g, Intravenous, PRN    glucagon (human recombinant), 1 mg, Intramuscular, PRN    glucose, 16 g, Oral, PRN    glucose, 16 g, Oral, PRN    glucose, 24 g, Oral, PRN    hydrALAZINE, 10 mg, Intravenous, Q6H PRN    insulin aspart U-100, 0-5 Units, Subcutaneous, QID (AC + HS) PRN    melatonin, 6 mg, Oral, Nightly PRN    ondansetron, 4 mg, Intravenous, Q6H PRN    oxyCODONE, 5 mg, Oral, Q4H PRN    oxyCODONE, 10 mg, Oral, Q4H PRN     Review of Systems  Objective:     Weight: 68.2 kg (150 lb 5.7 oz)  Body mass index is 20.97 kg/m².  Vital Signs (Most Recent):  Temp: 97.4 °F (36.3 °C) (03/26/25 1231)  Pulse: (!) 51 (03/26/25 1231)  Resp: 20 (03/26/25 1231)  BP: 110/66 (03/26/25 1231)  SpO2: 97 % (03/26/25 1231) Vital Signs (24h Range):  Temp:   "[97.4 °F (36.3 °C)-98.2 °F (36.8 °C)] 97.4 °F (36.3 °C)  Pulse:  [48-65] 51  Resp:  [16-20] 20  SpO2:  [93 %-98 %] 97 %  BP: (102-122)/(55-66) 110/66                              Closed/Suction Drain 03/24/25 1613 Tube - 1 Left Scalp Accordion 10 Fr. (Active)   Site Description Healing 03/26/25 0900   Dressing Type Gauze 03/26/25 0900   Dressing Status Old drainage;Intact 03/26/25 0900   Dressing Intervention Integrity maintained 03/26/25 0900   Drainage Serosanguineous 03/26/25 0900   Status To bulb suction 03/26/25 0900   Output (mL) 70 mL 03/25/25 1910            Closed/Suction Drain 03/24/25 1614 Tube - 2 Right Scalp Accordion 10 Fr. (Active)   Site Description Healing 03/26/25 0900   Dressing Type Gauze 03/26/25 0900   Dressing Status Old drainage;Intact 03/26/25 0900   Dressing Intervention Integrity maintained 03/26/25 0900   Drainage Serosanguineous 03/26/25 0900   Status To bulb suction 03/26/25 0900   Output (mL) 30 mL 03/25/25 1910            Urethral Catheter 03/25/25 2315 Silicone 16 Fr. (Active)   $ Molina Insertion Bedside Insertion Performed 03/25/25 2315   Site Assessment Clean;Intact 03/26/25 0900   Collection Container Urimeter 03/26/25 0900   Securement Method secured to top of thigh w/ adhesive device 03/26/25 0900   Catheter Care Performed yes 03/26/25 0900   Reason for Continuing Urinary Catheterization Urinary retention 03/26/25 0900   CAUTI Prevention Bundle Securement Device in place with 1" slack;Intact seal between catheter & drainage tubing;Drainage bag/urimeter off the floor;Sheeting clip in use;No dependent loops or kinks;Drainage bag/urimeter not overfilled (<2/3 full);Drainage bag/urimeter below bladder 03/26/25 0900   Output (mL) 650 mL 03/25/25 2315       Neurosurgery Physical Exam  General: well developed, well nourished, no distress.   HEENT: normocephalic, atraumatic  CV: regular rate   Pulmonary: normal respirations, no signs of respiratory distress  Abdomen: soft, " "non-distended, not tender to palpation  Skin: Skin is warm, dry and intact  Heme: no bruising     Neuro:   Mental Status: AO x4, no aphasia, no dysarthria - higher order confusion.   CN: PERRL, EOMI other than R VI palsy, sensation intact bilaterally, eyebrow raise and grimace symmetric, tongue midline. Hard of hearing  Motor: moves all extremities spontaneously, full strength throughout, no pronator drift   Sensory: intact to light touch throughout  Gait: deferred     Dressings c/d/I  Subgaleal drains in place bilaterally    Significant Labs:  Recent Labs   Lab 03/25/25  0023 03/25/25  0747 03/26/25  0550     --  136   K 4.7  --  4.3     --  105   CO2 19*  --  23   BUN 25*  --  28*   CREATININE 1.1  --  1.6*   CALCIUM 8.6*  --  8.6*   MG  --  1.8  --      Recent Labs   Lab 03/25/25  0023 03/26/25  0550   WBC 11.71 10.58   HGB 12.8* 11.3*   HCT 39.4* 35.4*    134*     No results for input(s): "LABPT", "INR", "APTT" in the last 48 hours.  Microbiology Results (last 7 days)       ** No results found for the last 168 hours. **          All pertinent labs from the last 24 hours have been reviewed.    Significant Diagnostics:  I have reviewed and interpreted all pertinent imaging results/findings within the past 24 hours.  "

## 2025-03-26 NOTE — ASSESSMENT & PLAN NOTE
Patient has paroxysmal (<7 days) atrial fibrillation. Patient is currently in sinus rhythm. [id number] Score: 3. The patients heart rate in the last 24 hours is as follows:  Pulse  Min: 48  Max: 65     Antiarrhythmics  metoprolol succinate (TOPROL-XL) 24 hr tablet 25 mg, Daily, Oral    Anticoagulants  enoxaparin injection 40 mg, Every 24 hours, Subcutaneous    Plan  - On tele.  - Patient takes Eliquis at home. Hold perioperatively.  - Patient's afib is currently controlled.

## 2025-03-26 NOTE — ASSESSMENT & PLAN NOTE
"Creatine increased today. See "KRISTAN". BMP reviewed- noted Estimated Creatinine Clearance: 31.4 mL/min (A) (based on SCr of 1.6 mg/dL (H)). according to latest data. Based on current GFR, CKD stage is stage 3 - GFR 30-59.  Monitor UOP and serial BMP and adjust therapy as needed. Renally dose meds. Avoid nephrotoxic medications and procedures.  "

## 2025-03-26 NOTE — PLAN OF CARE
Problem: Adult Inpatient Plan of Care  Goal: Plan of Care Review  Outcome: Progressing  Goal: Patient-Specific Goal (Individualized)  Outcome: Progressing  Goal: Absence of Hospital-Acquired Illness or Injury  Outcome: Progressing  Goal: Optimal Comfort and Wellbeing  Outcome: Progressing  Goal: Readiness for Transition of Care  Outcome: Progressing     Problem: Wound  Goal: Optimal Coping  Outcome: Progressing  Goal: Optimal Functional Ability  Outcome: Progressing  Goal: Absence of Infection Signs and Symptoms  Outcome: Progressing  Goal: Improved Oral Intake  Outcome: Progressing  Goal: Optimal Pain Control and Function  Outcome: Progressing  Goal: Skin Health and Integrity  Outcome: Progressing  Goal: Optimal Wound Healing  Outcome: Progressing     Problem: Fall Injury Risk  Goal: Absence of Fall and Fall-Related Injury  Outcome: Progressing     Problem: Skin Injury Risk Increased  Goal: Skin Health and Integrity  Outcome: Progressing     Problem: Infection  Goal: Absence of Infection Signs and Symptoms  Outcome: Progressing     Problem: Pain Acute  Goal: Optimal Pain Control and Function  Outcome: Progressing     Problem: Acute Kidney Injury/Impairment  Goal: Fluid and Electrolyte Balance  Outcome: Progressing  Goal: Improved Oral Intake  Outcome: Progressing  Goal: Effective Renal Function  Outcome: Progressing

## 2025-03-26 NOTE — PROGRESS NOTES
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  Patient was seen in clinic by Dr. Glenn Rivero on 3 7 and 319.  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  The patient denies fever/chills, HA, vision/hearing changes, dysphagia, dysarthria, nausea/vomiting, new-onset weakness or sensory change, bowel/bladder changes.       Post-Op Info:  Procedure(s) (LRB):  EVACUATION, HEMATOMA, SUBDURAL  AND SHUNT TIE OFF (Bilateral)   2 Days Post-Op   Interval History: Patient report's severe headache this morning. CTH obtained, with expected post operative changes. Will adjust regimen. Continue drains. Molina placed yesterday for urinary retention. Cr increased to 1.6 today. Will start LR fluids.    Medications:  Continuous Infusions:   lactated ringers   Intravenous Continuous 100 mL/hr at 03/26/25 1014 New Bag at 03/26/25 1014     Scheduled Meds:   acetaminophen  1,000 mg Oral Q8H    amitriptyline  10 mg Oral QHS    amLODIPine  2.5 mg Oral Nightly    bisacodyL  10 mg Rectal Daily    carbamide peroxide  5 drop Left Ear BID    ceFAZolin (Ancef) IV (PEDS and ADULTS)  2 g Intravenous Q8H    cetirizine  10 mg Oral Daily    docusate sodium  100 mg Oral BID    enoxparin  40 mg Subcutaneous Q24H (prophylaxis, 1700)    levETIRAcetam  500 mg Oral BID    levothyroxine  125 mcg Oral Before breakfast    memantine  10 mg Oral BID    metoprolol succinate  25 mg Oral Daily    mupirocin   Nasal BID    pantoprazole   40 mg Oral Daily    polyethylene glycol  17 g Oral BID    pravastatin  20 mg Oral QHS    tamsulosin  0.4 mg Oral Daily     PRN Meds:  Current Facility-Administered Medications:     dextrose 50%, 12.5 g, Intravenous, PRN    dextrose 50%, 25 g, Intravenous, PRN    glucagon (human recombinant), 1 mg, Intramuscular, PRN    glucose, 16 g, Oral, PRN    glucose, 16 g, Oral, PRN    glucose, 24 g, Oral, PRN    hydrALAZINE, 10 mg, Intravenous, Q6H PRN    insulin aspart U-100, 0-5 Units, Subcutaneous, QID (AC + HS) PRN    melatonin, 6 mg, Oral, Nightly PRN    ondansetron, 4 mg, Intravenous, Q6H PRN    oxyCODONE, 5 mg, Oral, Q4H PRN    oxyCODONE, 10 mg, Oral, Q4H PRN     Review of Systems  Objective:     Weight: 68.2 kg (150 lb 5.7 oz)  Body mass index is 20.97 kg/m².  Vital Signs (Most Recent):  Temp: 97.4 °F (36.3 °C) (03/26/25 1231)  Pulse: (!) 51 (03/26/25 1231)  Resp: 20 (03/26/25 1231)  BP: 110/66 (03/26/25 1231)  SpO2: 97 % (03/26/25 1231) Vital Signs (24h Range):  Temp:  [97.4 °F (36.3 °C)-98.2 °F (36.8 °C)] 97.4 °F (36.3 °C)  Pulse:  [48-65] 51  Resp:  [16-20] 20  SpO2:  [93 %-98 %] 97 %  BP: (102-122)/(55-66) 110/66                              Closed/Suction Drain 03/24/25 1613 Tube - 1 Left Scalp Accordion 10 Fr. (Active)   Site Description Healing 03/26/25 0900   Dressing Type Gauze 03/26/25 0900   Dressing Status Old drainage;Intact 03/26/25 0900   Dressing Intervention Integrity maintained 03/26/25 0900   Drainage Serosanguineous 03/26/25 0900   Status To bulb suction 03/26/25 0900   Output (mL) 70 mL 03/25/25 1910            Closed/Suction Drain 03/24/25 1614 Tube - 2 Right Scalp Accordion 10 Fr. (Active)   Site Description Healing 03/26/25 0900   Dressing Type Gauze 03/26/25 0900   Dressing Status Old drainage;Intact 03/26/25 0900   Dressing Intervention Integrity maintained 03/26/25 0900   Drainage Serosanguineous 03/26/25 0900   Status To bulb suction 03/26/25 0900   Output (mL) 30 mL 03/25/25 1910             "Urethral Catheter 03/25/25 2315 Silicone 16 Fr. (Active)   $ Molina Insertion Bedside Insertion Performed 03/25/25 2315   Site Assessment Clean;Intact 03/26/25 0900   Collection Container Urimeter 03/26/25 0900   Securement Method secured to top of thigh w/ adhesive device 03/26/25 0900   Catheter Care Performed yes 03/26/25 0900   Reason for Continuing Urinary Catheterization Urinary retention 03/26/25 0900   CAUTI Prevention Bundle Securement Device in place with 1" slack;Intact seal between catheter & drainage tubing;Drainage bag/urimeter off the floor;Sheeting clip in use;No dependent loops or kinks;Drainage bag/urimeter not overfilled (<2/3 full);Drainage bag/urimeter below bladder 03/26/25 0900   Output (mL) 650 mL 03/25/25 2315       Neurosurgery Physical Exam  General: well developed, well nourished, no distress.   HEENT: normocephalic, atraumatic  CV: regular rate   Pulmonary: normal respirations, no signs of respiratory distress  Abdomen: soft, non-distended, not tender to palpation  Skin: Skin is warm, dry and intact  Heme: no bruising     Neuro:   Mental Status: AO x4, no aphasia, no dysarthria - higher order confusion.   CN: PERRL, EOMI other than R VI palsy, sensation intact bilaterally, eyebrow raise and grimace symmetric, tongue midline. Hard of hearing  Motor: moves all extremities spontaneously, full strength throughout, no pronator drift   Sensory: intact to light touch throughout  Gait: deferred     Dressings c/d/I  Subgaleal drains in place bilaterally    Significant Labs:  Recent Labs   Lab 03/25/25  0023 03/25/25  0747 03/26/25  0550     --  136   K 4.7  --  4.3     --  105   CO2 19*  --  23   BUN 25*  --  28*   CREATININE 1.1  --  1.6*   CALCIUM 8.6*  --  8.6*   MG  --  1.8  --      Recent Labs   Lab 03/25/25  0023 03/26/25  0550   WBC 11.71 10.58   HGB 12.8* 11.3*   HCT 39.4* 35.4*    134*     No results for input(s): "LABPT", "INR", "APTT" in the last 48 " hours.  Microbiology Results (last 7 days)       ** No results found for the last 168 hours. **          All pertinent labs from the last 24 hours have been reviewed.    Significant Diagnostics:  I have reviewed and interpreted all pertinent imaging results/findings within the past 24 hours.  Assessment/Plan:     KRISTAN (acute kidney injury)  Baseline creatinine is unknown. Most recent creatinine and eGFR are listed below.  Recent Labs     03/24/25  0432 03/25/25  0023 03/26/25  0550   CREATININE 1.1 1.1 1.6*   EGFRNORACEVR >60 >60 41*      Plan  - Avoid nephrotoxins and renally dose meds for GFR listed above.  - Monitor urine output, serial BMP, and adjust therapy as needed.  - Lactaded ringers started.    Urinary retention  - Molina placed 3/25.  - Voiding trial in 1 week.    S/P ventriculoperitoneal shunt  This is a 85-year-old male with a history of NPH status post right-sided  shunt on 02/06/2025 by Dr. Blankenship, who presents with several days of worsening bitemporal headache. 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  CT head obtained today demonstrates interval stability of the bilateral subdural hygromas. When the patient saw Emeli Rivero in clinic on 03/07/2025 the shunt was reprogrammed to 200, however hygromas were persistent and he had continued headaches.    He is now s/p bilateral mini cranis for SDH evacuation and tying off of distal shunt catheter at right clavicle on 3/24. Postop CTH with expected findings and pneumocephalus.     Repeat Head CT today for severe headache obtained and is with expected post operative changes.     Plan:  - Stepped down to nsgy floor.  - q4h neurochecks.  - SG HV to full suction bilaterally; ancef while in place. Please record output each shift.  - Multimodal pain control.  - PT/OT/OOB.  - Keppra 500 MG BID for seizure ppx.  - DVT ppx: LVX.  - Bowel regimen.  - Molina placed 3/25 for urinary retention. Will plan for  "voiding trial in 1 week.     Seen by Dr. Blankenship on AM rounds    PAF (paroxysmal atrial fibrillation)  Patient has paroxysmal (<7 days) atrial fibrillation. Patient is currently in sinus rhythm. [id number] Score: 3. The patients heart rate in the last 24 hours is as follows:  Pulse  Min: 48  Max: 65     Antiarrhythmics  metoprolol succinate (TOPROL-XL) 24 hr tablet 25 mg, Daily, Oral    Anticoagulants  enoxaparin injection 40 mg, Every 24 hours, Subcutaneous    Plan  - On tele.  - Patient takes Eliquis at home. Hold perioperatively.  - Patient's afib is currently controlled.      Stage 3a chronic kidney disease  Creatine increased. See "KRISTAN'. BMP reviewed- noted Estimated Creatinine Clearance: 31.4 mL/min (A) (based on SCr of 1.6 mg/dL (H)). according to latest data. Based on current GFR, CKD stage is stage 3 - GFR 30-59.  Monitor UOP and serial BMP and adjust therapy as needed. Renally dose meds. Avoid nephrotoxic medications and procedures.    GERD (gastroesophageal reflux disease)  - Continue Pantoprazole.    Essential hypertension  Patient's blood pressure range in the last 24 hours was: BP  Min: 102/59  Max: 122/58.The patient's inpatient anti-hypertensive regimen is listed below:  Current Antihypertensives  amLODIPine tablet 2.5 mg, Nightly, Oral  metoprolol succinate (TOPROL-XL) 24 hr tablet 25 mg, Daily, Oral  hydrALAZINE injection 10 mg, Every 6 hours PRN, Intravenous    Plan  - BP is controlled, no changes needed to their regimen.    BPH (benign prostatic hyperplasia)  - Continue Flomax.    Acquired hypothyroidism  - Continue Levothyroxine.        Nina Cerda PA-C  Neurosurgery  [facility] - Neurosurgery ([facility])  "

## 2025-03-26 NOTE — ASSESSMENT & PLAN NOTE
Baseline creatinine is unknown. Most recent creatinine and eGFR are listed below.  Recent Labs     03/24/25  0432 03/25/25  0023 03/26/25  0550   CREATININE 1.1 1.1 1.6*   EGFRNORACEVR >60 >60 41*      Plan  - Avoid nephrotoxins and renally dose meds for GFR listed above.  - Monitor urine output, serial BMP, and adjust therapy as needed.  - Lactaded ringers started.

## 2025-03-26 NOTE — PLAN OF CARE
Problem: SLP  Goal: SLP Goal  Description: Short Term Goals:   1 Pt will participate in an ongoing assessment to determine the least restrictive and safest diet with possible updated goals to follow pending results.    Outcome: Progressing   Bedside Swallow Study completed. Recommend: regular diet, thin liquids, one small sip at a time, NO STRAWS, medications buried in puree to avoid large sips of liquids, standard aspiration precautions. ST will follow up.

## 2025-03-26 NOTE — NURSING
Neurosurgery on call notified of patient with urinary retention and multiple In/Out cath throughout prior shift. Orders to place Molina cath, per Dr Fortino MD on call.

## 2025-03-27 PROBLEM — R00.1 BRADYCARDIA: Chronic | Status: ACTIVE | Noted: 2025-03-27

## 2025-03-27 PROBLEM — R53.81 DEBILITY: Status: ACTIVE | Noted: 2025-03-27

## 2025-03-27 PROBLEM — G93.5 BRAIN COMPRESSION: Status: ACTIVE | Noted: 2025-03-27

## 2025-03-27 PROBLEM — D62 ACUTE POSTOPERATIVE ANEMIA DUE TO EXPECTED BLOOD LOSS: Status: ACTIVE | Noted: 2025-03-27

## 2025-03-27 LAB
ABSOLUTE EOSINOPHIL (OHS): 0.11 K/UL
ABSOLUTE MONOCYTE (OHS): 0.85 K/UL (ref 0.3–1)
ABSOLUTE NEUTROPHIL COUNT (OHS): 5.89 K/UL (ref 1.8–7.7)
ALBUMIN SERPL BCP-MCNC: 2.6 G/DL (ref 3.5–5.2)
ALP SERPL-CCNC: 50 UNIT/L (ref 40–150)
ALT SERPL W/O P-5'-P-CCNC: <5 UNIT/L (ref 10–44)
ANION GAP (OHS): 8 MMOL/L (ref 8–16)
AST SERPL-CCNC: 14 UNIT/L (ref 11–45)
BASOPHILS # BLD AUTO: 0.03 K/UL
BASOPHILS NFR BLD AUTO: 0.4 %
BILIRUB SERPL-MCNC: 0.2 MG/DL (ref 0.1–1)
BUN SERPL-MCNC: 25 MG/DL (ref 8–23)
CALCIUM SERPL-MCNC: 8.3 MG/DL (ref 8.7–10.5)
CHLORIDE SERPL-SCNC: 106 MMOL/L (ref 95–110)
CO2 SERPL-SCNC: 23 MMOL/L (ref 23–29)
CREAT SERPL-MCNC: 1.2 MG/DL (ref 0.5–1.4)
ERYTHROCYTE [DISTWIDTH] IN BLOOD BY AUTOMATED COUNT: 13.3 % (ref 11.5–14.5)
GFR SERPLBLD CREATININE-BSD FMLA CKD-EPI: 59 ML/MIN/1.73/M2
GLUCOSE SERPL-MCNC: 93 MG/DL (ref 70–110)
HCT VFR BLD AUTO: 33.6 % (ref 40–54)
HGB BLD-MCNC: 10.7 GM/DL (ref 14–18)
IMM GRANULOCYTES # BLD AUTO: 0.04 K/UL (ref 0–0.04)
IMM GRANULOCYTES NFR BLD AUTO: 0.5 % (ref 0–0.5)
LYMPHOCYTES # BLD AUTO: 0.97 K/UL (ref 1–4.8)
MCH RBC QN AUTO: 30.3 PG (ref 27–50)
MCHC RBC AUTO-ENTMCNC: 31.8 G/DL (ref 32–36)
MCV RBC AUTO: 95 FL (ref 82–98)
NUCLEATED RBC (/100WBC) (OHS): 0 /100 WBC
OHS QRS DURATION: 110 MS
OHS QTC CALCULATION: 459 MS
PLATELET # BLD AUTO: 127 K/UL (ref 150–450)
PMV BLD AUTO: 11.4 FL (ref 9.2–12.9)
POCT GLUCOSE: 107 MG/DL (ref 70–110)
POCT GLUCOSE: 127 MG/DL (ref 70–110)
POCT GLUCOSE: 84 MG/DL (ref 70–110)
POTASSIUM SERPL-SCNC: 4.5 MMOL/L (ref 3.5–5.1)
PROT SERPL-MCNC: 5.7 GM/DL (ref 6–8.4)
RBC # BLD AUTO: 3.53 M/UL (ref 4.6–6.2)
RELATIVE EOSINOPHIL (OHS): 1.4 %
RELATIVE LYMPHOCYTE (OHS): 12.3 % (ref 18–48)
RELATIVE MONOCYTE (OHS): 10.8 % (ref 4–15)
RELATIVE NEUTROPHIL (OHS): 74.6 % (ref 38–73)
SODIUM SERPL-SCNC: 137 MMOL/L (ref 136–145)
WBC # BLD AUTO: 7.89 K/UL (ref 3.9–12.7)

## 2025-03-27 PROCEDURE — 92526 ORAL FUNCTION THERAPY: CPT

## 2025-03-27 PROCEDURE — 97530 THERAPEUTIC ACTIVITIES: CPT

## 2025-03-27 PROCEDURE — 11000001 HC ACUTE MED/SURG PRIVATE ROOM

## 2025-03-27 PROCEDURE — 63600175 PHARM REV CODE 636 W HCPCS

## 2025-03-27 PROCEDURE — 97535 SELF CARE MNGMENT TRAINING: CPT

## 2025-03-27 PROCEDURE — 25000003 PHARM REV CODE 250

## 2025-03-27 PROCEDURE — 99222 1ST HOSP IP/OBS MODERATE 55: CPT | Mod: ,,, | Performed by: NURSE PRACTITIONER

## 2025-03-27 PROCEDURE — 25000003 PHARM REV CODE 250: Performed by: PHYSICIAN ASSISTANT

## 2025-03-27 PROCEDURE — 25000003 PHARM REV CODE 250: Performed by: STUDENT IN AN ORGANIZED HEALTH CARE EDUCATION/TRAINING PROGRAM

## 2025-03-27 PROCEDURE — 85025 COMPLETE CBC W/AUTO DIFF WBC: CPT

## 2025-03-27 PROCEDURE — 97110 THERAPEUTIC EXERCISES: CPT

## 2025-03-27 PROCEDURE — 97116 GAIT TRAINING THERAPY: CPT

## 2025-03-27 PROCEDURE — 93010 ELECTROCARDIOGRAM REPORT: CPT | Mod: ,,, | Performed by: INTERNAL MEDICINE

## 2025-03-27 PROCEDURE — 36415 COLL VENOUS BLD VENIPUNCTURE: CPT

## 2025-03-27 PROCEDURE — 99024 POSTOP FOLLOW-UP VISIT: CPT | Mod: ,,, | Performed by: PHYSICIAN ASSISTANT

## 2025-03-27 PROCEDURE — 93005 ELECTROCARDIOGRAM TRACING: CPT

## 2025-03-27 PROCEDURE — 80053 COMPREHEN METABOLIC PANEL: CPT

## 2025-03-27 RX ORDER — METHOCARBAMOL 500 MG/1
500 TABLET, FILM COATED ORAL 3 TIMES DAILY
Status: DISCONTINUED | OUTPATIENT
Start: 2025-03-27 | End: 2025-03-31

## 2025-03-27 RX ORDER — TALC
6 POWDER (GRAM) TOPICAL NIGHTLY
Status: DISCONTINUED | OUTPATIENT
Start: 2025-03-27 | End: 2025-04-01 | Stop reason: HOSPADM

## 2025-03-27 RX ORDER — METHOCARBAMOL 500 MG/1
500 TABLET, FILM COATED ORAL 4 TIMES DAILY
Status: DISCONTINUED | OUTPATIENT
Start: 2025-03-27 | End: 2025-03-27

## 2025-03-27 RX ORDER — METOPROLOL SUCCINATE 25 MG/1
25 TABLET, EXTENDED RELEASE ORAL DAILY
Status: DISCONTINUED | OUTPATIENT
Start: 2025-03-27 | End: 2025-03-28

## 2025-03-27 RX ORDER — LACOSAMIDE 100 MG/1
100 TABLET ORAL EVERY 12 HOURS
Status: DISCONTINUED | OUTPATIENT
Start: 2025-03-27 | End: 2025-04-01 | Stop reason: HOSPADM

## 2025-03-27 RX ADMIN — DOCUSATE SODIUM 100 MG: 100 CAPSULE, LIQUID FILLED ORAL at 08:03

## 2025-03-27 RX ADMIN — METHOCARBAMOL 500 MG: 500 TABLET ORAL at 09:03

## 2025-03-27 RX ADMIN — CYCLOBENZAPRINE HYDROCHLORIDE 5 MG: 5 TABLET, FILM COATED ORAL at 06:03

## 2025-03-27 RX ADMIN — METHOCARBAMOL 500 MG: 500 TABLET ORAL at 01:03

## 2025-03-27 RX ADMIN — AMITRIPTYLINE HYDROCHLORIDE 10 MG: 10 TABLET, FILM COATED ORAL at 08:03

## 2025-03-27 RX ADMIN — ENOXAPARIN SODIUM 40 MG: 40 INJECTION SUBCUTANEOUS at 04:03

## 2025-03-27 RX ADMIN — CARBAMIDE PEROXIDE 5 DROP: 65 SOLUTION/ DROPS TOPICAL at 08:03

## 2025-03-27 RX ADMIN — ACETAMINOPHEN 1000 MG: 500 TABLET ORAL at 01:03

## 2025-03-27 RX ADMIN — METOPROLOL SUCCINATE 25 MG: 25 TABLET, EXTENDED RELEASE ORAL at 01:03

## 2025-03-27 RX ADMIN — METHOCARBAMOL 500 MG: 500 TABLET ORAL at 08:03

## 2025-03-27 RX ADMIN — CARBAMIDE PEROXIDE 5 DROP: 65 SOLUTION/ DROPS TOPICAL at 09:03

## 2025-03-27 RX ADMIN — ACETAMINOPHEN 1000 MG: 500 TABLET ORAL at 05:03

## 2025-03-27 RX ADMIN — POLYETHYLENE GLYCOL 3350 17 G: 17 POWDER, FOR SOLUTION ORAL at 08:03

## 2025-03-27 RX ADMIN — Medication 6 MG: at 08:03

## 2025-03-27 RX ADMIN — CEFAZOLIN 2 G: 2 INJECTION, POWDER, FOR SOLUTION INTRAMUSCULAR; INTRAVENOUS at 09:03

## 2025-03-27 RX ADMIN — DOCUSATE SODIUM 100 MG: 100 CAPSULE, LIQUID FILLED ORAL at 09:03

## 2025-03-27 RX ADMIN — LEVOTHYROXINE SODIUM 125 MCG: 0.12 TABLET ORAL at 05:03

## 2025-03-27 RX ADMIN — LACOSAMIDE 100 MG: 100 TABLET, FILM COATED ORAL at 08:03

## 2025-03-27 RX ADMIN — CETIRIZINE HYDROCHLORIDE 10 MG: 10 TABLET, FILM COATED ORAL at 09:03

## 2025-03-27 RX ADMIN — MUPIROCIN: 20 OINTMENT TOPICAL at 08:03

## 2025-03-27 RX ADMIN — LACOSAMIDE 100 MG: 100 TABLET, FILM COATED ORAL at 11:03

## 2025-03-27 RX ADMIN — CEFAZOLIN 2 G: 2 INJECTION, POWDER, FOR SOLUTION INTRAMUSCULAR; INTRAVENOUS at 02:03

## 2025-03-27 RX ADMIN — PANTOPRAZOLE SODIUM 40 MG: 40 TABLET, DELAYED RELEASE ORAL at 09:03

## 2025-03-27 RX ADMIN — PRAVASTATIN SODIUM 20 MG: 20 TABLET ORAL at 08:03

## 2025-03-27 RX ADMIN — MEMANTINE 10 MG: 10 TABLET ORAL at 09:03

## 2025-03-27 RX ADMIN — POLYETHYLENE GLYCOL 3350 17 G: 17 POWDER, FOR SOLUTION ORAL at 09:03

## 2025-03-27 RX ADMIN — TAMSULOSIN HYDROCHLORIDE 0.4 MG: 0.4 CAPSULE ORAL at 09:03

## 2025-03-27 RX ADMIN — MUPIROCIN: 20 OINTMENT TOPICAL at 09:03

## 2025-03-27 RX ADMIN — ACETAMINOPHEN 1000 MG: 500 TABLET ORAL at 09:03

## 2025-03-27 RX ADMIN — MEMANTINE 10 MG: 10 TABLET ORAL at 08:03

## 2025-03-27 NOTE — ASSESSMENT & PLAN NOTE
Patient's blood pressure range in the last 24 hours was: BP  Min: 107/49  Max: 124/61.The patient's inpatient anti-hypertensive regimen is listed below:  Current Antihypertensives  hydrALAZINE injection 10 mg, Every 6 hours PRN, Intravenous  metoprolol succinate (TOPROL-XL) 24 hr tablet 25 mg, Daily, Oral    Plan  - BP is controlled, no changes needed to their regimen  - Holding amlodipine given normal blood pressures.

## 2025-03-27 NOTE — ASSESSMENT & PLAN NOTE
Baseline creatinine is unknown. Most recent creatinine and eGFR are listed below.  Recent Labs     03/26/25  0550 03/26/25  1555 03/27/25  0512   CREATININE 1.6* 1.5* 1.2   EGFRNORACEVR 41* 45* 59*        Plan  - Avoid nephrotoxins and renally dose meds for GFR listed above.  - Monitor urine output, serial BMP, and adjust therapy as needed.

## 2025-03-27 NOTE — CARE UPDATE
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   S/p evacuation of hematoma      Acute postoperative anemia due to expected blood loss     4 point drop in hemoglobin post op  Monitor with daily cbc  Transfuse <7      Debility     Patient with Acute on chronic debility due to other reduced mobility. Latest AMPAC and GEMS scores have been reviewed. Evaluation for etiology is complete. Plan includes - Progressive mobility protocol initated  - PT/OT consulted  - Fall precautions in place  - Physical Medicine/Rehab consulted.       Hypoalbuminemia     MONITOR CMP      Urinary retention    KRISTAN (acute kidney injury)    Hypercoagulability due to atrial fibrillation    S/P ventriculoperitoneal shunt    S/P TAVR (transcatheter aortic valve replacement)     4/23      Headache     MRI - 9/18, 6/23 (and MRA), 8/24  Dr. ELIGIO Narvaez      PAF (paroxysmal atrial fibrillation)    Left ventricular diastolic dysfunction, NYHA class 1     4/15  7/20 - and increase LA      Stage 3a chronic kidney disease    BPH (benign prostatic hyperplasia)     S/P Biopsy 8/08, 2/09  Dr. Nassar      Essential hypertension     X 5 years      Acquired hypothyroidism    GERD (gastroesophageal reflux disease)     EGD 7/05, 7/15 - normal          Glenny Garcia NP  [facility]

## 2025-03-27 NOTE — PROGRESS NOTES
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  Patient was seen in clinic by Dr. Glenn Rivero on 3 7 and 319.  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  The patient denies fever/chills, HA, vision/hearing changes, dysphagia, dysarthria, nausea/vomiting, new-onset weakness or sensory change, bowel/bladder changes.       Post-Op Info:  Procedure(s) (LRB):  EVACUATION, HEMATOMA, SUBDURAL  AND SHUNT TIE OFF (Bilateral)   3 Days Post-Op   Interval History:   Headache remains and unremitting to positional changes. Per family, patient experienced HA especially when moving head prior to initial shunt placement surgery. Requesting to be OOB in chair. Family with concerns for his difficulty in word finding. States he has a limited vocabulary and does not always answer questions and instead "stares off into the distance." Answered family questions regarding care plan and present symptoms. Molina placed prior for urinary retention, voiding trial in 5 days, flomax started. Plan to dc drains today.    Medications:  Continuous Infusions:   lactated ringers   Intravenous Continuous 100 mL/hr at 03/26/25 2136 New Bag at 03/26/25 2136     Scheduled Meds:   acetaminophen  1,000 mg Oral Q8H    amitriptyline  10 mg Oral QHS    amLODIPine  2.5 mg Oral Nightly    carbamide peroxide  5 drop Left Ear BID    ceFAZolin (Ancef) IV (PEDS and ADULTS)  2 g Intravenous Q8H    cetirizine  10 mg Oral Daily    " docusate sodium  100 mg Oral BID    enoxparin  40 mg Subcutaneous Q24H (prophylaxis, 1700)    levETIRAcetam  500 mg Oral BID    levothyroxine  125 mcg Oral Before breakfast    memantine  10 mg Oral BID    methocarbamoL  500 mg Oral QID    metoprolol succinate  25 mg Oral Daily    mupirocin   Nasal BID    pantoprazole  40 mg Oral Daily    polyethylene glycol  17 g Oral BID    pravastatin  20 mg Oral QHS    tamsulosin  0.4 mg Oral Daily     PRN Meds:  Current Facility-Administered Medications:     cyclobenzaprine, 5 mg, Oral, TID PRN    dextrose 50%, 12.5 g, Intravenous, PRN    dextrose 50%, 25 g, Intravenous, PRN    glucagon (human recombinant), 1 mg, Intramuscular, PRN    glucose, 16 g, Oral, PRN    glucose, 16 g, Oral, PRN    glucose, 24 g, Oral, PRN    hydrALAZINE, 10 mg, Intravenous, Q6H PRN    insulin aspart U-100, 0-5 Units, Subcutaneous, QID (AC + HS) PRN    melatonin, 6 mg, Oral, Nightly PRN    ondansetron, 4 mg, Intravenous, Q6H PRN    oxyCODONE, 5 mg, Oral, Q4H PRN    oxyCODONE, 10 mg, Oral, Q4H PRN     Review of Systems  Objective:     Weight: 68.2 kg (150 lb 5.7 oz)  Body mass index is 20.97 kg/m².  Vital Signs (Most Recent):  Temp: 98.3 °F (36.8 °C) (03/27/25 0720)  Pulse: (!) 53 (03/27/25 0720)  Resp: 18 (03/27/25 0720)  BP: 116/62 (03/27/25 0720)  SpO2: 95 % (03/27/25 0720) Vital Signs (24h Range):  Temp:  [97.4 °F (36.3 °C)-98.6 °F (37 °C)] 98.3 °F (36.8 °C)  Pulse:  [48-58] 53  Resp:  [18-20] 18  SpO2:  [95 %-97 %] 95 %  BP: (107-124)/(49-66) 116/62                              Closed/Suction Drain 03/24/25 1613 Tube - 1 Left Scalp Accordion 10 Fr. (Active)   Site Description Healing 03/27/25 0730   Dressing Type Gauze 03/27/25 0730   Dressing Status Old drainage;Intact 03/27/25 0730   Dressing Intervention Integrity maintained 03/27/25 0730   Drainage Serosanguineous 03/27/25 0730   Status To bulb suction 03/27/25 0730   Output (mL) 5 mL 03/26/25 1929            Closed/Suction Drain 03/24/25 1614  "Tube - 2 Right Scalp Accordion 10 Fr. (Active)   Site Description Healing 03/27/25 0730   Dressing Type Gauze 03/27/25 0730   Dressing Status Old drainage;Intact 03/27/25 0730   Dressing Intervention Integrity maintained 03/27/25 0730   Drainage Serosanguineous 03/27/25 0730   Status To bulb suction 03/27/25 0730   Output (mL) 5 mL 03/26/25 1929            Urethral Catheter 03/25/25 2315 Silicone 16 Fr. (Active)   $ Molina Insertion Bedside Insertion Performed 03/25/25 2315   Site Assessment Clean;Intact 03/27/25 0730   Collection Container Urimeter 03/27/25 0730   Securement Method secured to top of thigh w/ adhesive device 03/27/25 0730   Catheter Care Performed yes 03/27/25 0730   Reason for Continuing Urinary Catheterization Urinary retention 03/27/25 0730   CAUTI Prevention Bundle Securement Device in place with 1" slack;Intact seal between catheter & drainage tubing;Drainage bag/urimeter off the floor;Sheeting clip in use;No dependent loops or kinks;Drainage bag/urimeter not overfilled (<2/3 full);Drainage bag/urimeter below bladder 03/27/25 0730   Output (mL) 375 mL 03/27/25 0413          Neurosurgery Physical Exam  General: well developed, well nourished, no distress.  Pulmonary: quiet, deep respirations, no signs of respiratory distress  Skin: Skin is warm and dry     Neuro:   Mental Status: Alert and oriented. Answering questions when prompted however higher order confusion with limited vocabulary   CN: PERRL, EOMI other than R VI palsy, sensation intact bilaterally, eyebrow raise and grimace symmetric, tongue midline. Hard of hearing  Motor: moves all extremities spontaneously, full strength throughout, no pronator drift   Sensory: intact to light touch throughout     Dressings c/d/I  Subgaleal drains in place bilaterally    Significant Labs:  Recent Labs   Lab 03/26/25  0550 03/26/25  1555 03/27/25  0512    136 137   K 4.3 4.2 4.5    103 106   CO2 23 24 23   BUN 28* 26* 25*   CREATININE 1.6* " "1.5* 1.2   CALCIUM 8.6* 8.4* 8.3*     Recent Labs   Lab 03/26/25  0550 03/27/25  0512   WBC 10.58 7.89   HGB 11.3* 10.7*   HCT 35.4* 33.6*   * 127*     No results for input(s): "LABPT", "INR", "APTT" in the last 48 hours.  Microbiology Results (last 7 days)       ** No results found for the last 168 hours. **          All pertinent labs from the last 24 hours have been reviewed.    Significant Diagnostics:  I have reviewed and interpreted all pertinent imaging results/findings within the past 24 hours.  Assessment/Plan:     KRISTAN (acute kidney injury)  Baseline creatinine is unknown. Most recent creatinine and eGFR are listed below.  Recent Labs     03/26/25  0550 03/26/25  1555 03/27/25  0512   CREATININE 1.6* 1.5* 1.2   EGFRNORACEVR 41* 45* 59*        Plan  - Avoid nephrotoxins and renally dose meds for GFR listed above.  - Monitor urine output, serial BMP, and adjust therapy as needed.    Urinary retention  - Molina placed 3/25.  - Voiding trial in 1 week.    S/P ventriculoperitoneal shunt  This is a 85-year-old male with a history of NPH status post right-sided  shunt on 02/06/2025 by Dr. Blankenship, who presents with several days of worsening bitemporal headache. 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  CT head obtained today demonstrates interval stability of the bilateral subdural hygromas. When the patient saw Emeli Rivero in clinic on 03/07/2025 the shunt was reprogrammed to 200, however hygromas were persistent and he had continued headaches.    He is now s/p bilateral mini cranis for SDH evacuation and tying off of distal shunt catheter at right clavicle on 3/24. Postop CTH with expected findings and pneumocephalus.     Repeat Head CT 3/27 for severe headache with expected post operative changes.     Plan:  - Stepped down to nsgy floor.  - q4h neurochecks.  - SG HV to be removed today  - Multimodal pain control.  - PT/OT. Encourage OOB  - " "Discontinue Keppra due to deviation from cognitive baseline  - Initiate vimpat as seizure prophylaxis   - DVT ppx: LVX.  - Bowel regimen.  - Molina placed 3/25 for urinary retention. Will plan for voiding trial in 1 week.   - Discussed care plan with patient and HA concerns. Likely will need referral to HA specialist on discharge   - Educated on benefit of high intensity rehab placement      PAF (paroxysmal atrial fibrillation)  Patient has paroxysmal (<7 days) atrial fibrillation. Patient is currently in sinus rhythm. [id number] Score: 3. The patients heart rate in the last 24 hours is as follows:  Pulse  Min: 48  Max: 65     Antiarrhythmics  metoprolol succinate (TOPROL-XL) 24 hr tablet 25 mg, Daily, Oral    Anticoagulants  enoxaparin injection 40 mg, Every 24 hours, Subcutaneous    Plan  - On tele.  - Patient takes Eliquis at home. Hold perioperatively.  - Patient's afib is currently controlled.      Stage 3a chronic kidney disease  Creatine increased today. See "KRISTAN". BMP reviewed- noted Estimated Creatinine Clearance: 31.4 mL/min (A) (based on SCr of 1.6 mg/dL (H)). according to latest data. Based on current GFR, CKD stage is stage 3 - GFR 30-59.  Monitor UOP and serial BMP and adjust therapy as needed. Renally dose meds. Avoid nephrotoxic medications and procedures.    GERD (gastroesophageal reflux disease)  - Continue Pantoprazole.    Essential hypertension  Patient's blood pressure range in the last 24 hours was: BP  Min: 102/59  Max: 122/58.The patient's inpatient anti-hypertensive regimen is listed below:  Current Antihypertensives  amLODIPine tablet 2.5 mg, Nightly, Oral  metoprolol succinate (TOPROL-XL) 24 hr tablet 25 mg, Daily, Oral  hydrALAZINE injection 10 mg, Every 6 hours PRN, Intravenous    Plan  - BP is controlled, no changes needed to their regimen.    BPH (benign prostatic hyperplasia)  - Continue Flomax.    Acquired hypothyroidism  - Continue Levothyroxine.      Sherman [id number]    Emeli " ZOYA Rivero  Neurosurgery  Kulwinder Elias - Neurosurgery ([facility])

## 2025-03-27 NOTE — CONSULTS
Inpatient consult to Physical Medicine Rehab  Consult performed by: Maribel Guzman NP  Consult ordered by: Jairo Blankenship MD      Consult received.     Maribel Guzamn NP  Physical Medicine & Rehabilitation   03/27/2025

## 2025-03-27 NOTE — HPI
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 CTH revealed 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 Pt is now S/ P bilateral mini cranis for SDH evacuation and tying off of distal shunt catheter at right clavicle on 3/24.  Post op CTH was deemed to be stable as per NSGY.  Pt's hospital course was complicated by severe HA for which CTH was done and showed expected post op findings. Pt's hospital course further complicated by KRISTAN requiring IVF and urinary retention requiring Molina placement. PM &R was consulted to evaluate pt for post acute placement recommendation.       Functional History: Patient lives  with spouse  in a single  story home with threshold  to enter.  Prior to admission, Pt was mod I with RW/Rollator use.  DME: RW/Rollator.

## 2025-03-27 NOTE — CONSULTS
[facility] - Neurosurgery ([facility])  Physical Medicine & Rehab  Consult Note    Patient Name: Ramesh Ricci  MRN: [id number]  Admission Date: 3/21/2025  Hospital Length of Stay: 6 days  Attending Physician: Jairo Blankenship MD   Consults  Subjective:     Principal Problem: Subdural hematoma, chronic    HPI: Per chart review, The patient is an 85-year-old male with a past medical history of normal pressure hydrocephalus status post insertion of a right-sided ventriculoperitoneal shunt on 02/06 2025, who presents with several days of bitemporal headache. CTH revealed 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 Pt is now S/ P bilateral mini cranis for SDH evacuation and tying off of distal shunt catheter at right clavicle on 3/24.  Post op CTH was deemed to be stable as per NSGY.  Pt's hospital course was complicated by severe HA for which CTH was done and showed expected post op findings. Pt's hospital course further complicated by KRISTAN requiring IVF and urinary retention requiring Molina placement. PM &R was consulted to evaluate pt for post acute placement recommendation.       Functional History: Patient lives  with spouse  in a single  story home with threshold  to enter.  Prior to admission, Pt was mod I with RW/Rollator use.  DME: RW/Rollator.      Hospital Course: Per chart review,    PT- 03/25    Functional Mobility:  Bed Mobility:  Supine to Sit: contact guard assistance  Sit to Supine: minimum assistance for BLE mgmt  Transfers:  Sit to Stand: minimum assistance with rolling walker        Past Medical History:   Diagnosis Date    Allergies 01/31/2025    Anemia     Anticoagulant long-term use     Anxiety     Atrophic kidney 11/16/2012    BPH (benign prostatic hyperplasia) 11/16/2012    Congenital absence of right kidney 07/05/2017    DDD (degenerative disc disease), cervical 07/05/2017    x-ray 7/17 - Severe    Ex-smoker 12/20/2018    Exudative  age-related macular degeneration, right eye, with active choroidal neovascularization 02/15/2024    GERD (gastroesophageal reflux disease) 11/16/2012    Glaucoma 11/16/2012    HTN (hypertension) 11/16/2012    Hypothyroid 11/16/2012    Internal carotid artery stenosis 11/16/2012    Iron deficiency anemia due to chronic blood loss 10/29/2021    Left ventricular diastolic dysfunction, NYHA class 1 04/16/2015    4/15    Low serum testosterone level 11/16/2012    Normal cardiac stress test 11/16/2012    NPH (normal pressure hydrocephalus) 02/03/2025    Osteopenia 11/16/2012    PAF (paroxysmal atrial fibrillation) 12/20/2018    Prostate CA 01/15/2013    XRT 12/12  Dr. 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    Shingles 11/16/2012    Skin disease 2020    Seems to have started after Covid vaccine    Stage 3a chronic kidney disease 10/06/2014    Stroke 2017    tia   no residual    Thrombocytopenia 01/31/2025    TIA (transient ischemic attack) 11/16/2012    UC (ulcerative colitis) 11/16/2012     Past Surgical History:   Procedure Laterality Date    ADENOIDECTOMY      COLONOSCOPY N/A 03/25/2022    Procedure: COLONOSCOPY;  Surgeon: Ashley Nguyen MD;  Location: [facility];  Service: Endoscopy;  Laterality: N/A;    ESOPHAGOGASTRODUODENOSCOPY N/A 03/25/2022    Procedure: EGD (ESOPHAGOGASTRODUODENOSCOPY);  Surgeon: Ashley Nguyen MD;  Location: [facility];  Service: Endoscopy;  Laterality: N/A;  added on per Dr. Nguyen    ESOPHAGOGASTRODUODENOSCOPY N/A 6/7/2024    Procedure: EGD (ESOPHAGOGASTRODUODENOSCOPY);  Surgeon: Audie Snell MD;  [address]  Service: Endoscopy;  Laterality: N/A;  5/21 R/s,sent updated instr via portal.pt informed to hold eliquis for 2 days.AC  Ref by: ,  approved to hold Eliquis (apixaban) for 2 days per Dr. Conklin-see media file  5/9/24-GT  5/31-pre call complete-tb    EVACUATION OF SUBDURAL HEMATOMA Bilateral 3/24/2025     Procedure: EVACUATION, HEMATOMA, SUBDURAL  AND SHUNT TIE OFF;  Surgeon: Jairo Blankenship MD;  [facility];  Service: Neurosurgery;  Laterality: Bilateral;  bilateral bobby holes for subdural hygroma and tying off of  shunt at clavicle. to follow other ware cases    EYE SURGERY Right 11/2020    cataract    HERNIA REPAIR      LEFT HEART CATHETERIZATION Right 10/29/2021    Procedure: CATHETERIZATION, HEART, LEFT AND RIGHT  - LV DARNELL POSSIBLE;  Surgeon: Beau Conklin MD;  Location: [facility] CATH LAB;  Service: Cardiology;  Laterality: Right;    LUMBAR PUNCTURE N/A 1/13/2025    Procedure: Lumbar Puncture;  Surgeon: Jairo Blankenship MD;  [facility];  Service: Neurosurgery;  Laterality: N/A;    RIGHT HEART CATHETERIZATION Right 10/29/2021    Procedure: INSERTION, CATHETER, RIGHT HEART;  Surgeon: Beau Conklin MD;  Location: [facility] CATH LAB;  Service: Cardiology;  Laterality: Right;    SKIN BIOPSY  2020    TONSILLECTOMY      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    Procedure: INSERTION, SHUNT, VENTRICULOPERITONEAL;  Surgeon: Jairo Blankenship MD;  [facility];  Service: Neurosurgery;  Laterality: Right;    VENTRICULOPERITONEAL SHUNT  2/6/2025    Procedure: INSERTION, SHUNT, VENTRICULOPERITONEAL;  Surgeon: Shar Elizabeth MD;  [facility];  Service: General;;     Review of patient's allergies indicates:   Allergen Reactions    Benazepril Other (See Comments)     Cough       Scheduled Medications:    acetaminophen  1,000 mg Oral Q8H    amitriptyline  10 mg Oral QHS    carbamide peroxide  5 drop Left Ear BID    ceFAZolin (Ancef) IV (PEDS and ADULTS)  2 g Intravenous Q8H    cetirizine  10 mg Oral Daily    docusate sodium  100 mg Oral BID    enoxparin  40 mg Subcutaneous Q24H (prophylaxis, 1700)    lacosamide  100 mg Oral Q12H    levothyroxine  125 mcg Oral Before breakfast    memantine  10 mg Oral BID    methocarbamoL  500 mg Oral QID    metoprolol  succinate  25 mg Oral Daily    mupirocin   Nasal BID    pantoprazole  40 mg Oral Daily    polyethylene glycol  17 g Oral BID    pravastatin  20 mg Oral QHS       PRN Medications:   Current Facility-Administered Medications:     cyclobenzaprine, 5 mg, Oral, TID PRN    dextrose 50%, 12.5 g, Intravenous, PRN    dextrose 50%, 25 g, Intravenous, PRN    glucagon (human recombinant), 1 mg, Intramuscular, PRN    glucose, 16 g, Oral, PRN    glucose, 16 g, Oral, PRN    glucose, 24 g, Oral, PRN    hydrALAZINE, 10 mg, Intravenous, Q6H PRN    insulin aspart U-100, 0-5 Units, Subcutaneous, QID (AC + HS) PRN    melatonin, 6 mg, Oral, Nightly PRN    ondansetron, 4 mg, Intravenous, Q6H PRN    oxyCODONE, 5 mg, Oral, Q4H PRN    oxyCODONE, 10 mg, Oral, Q4H PRN    Family History       Problem Relation (Age of Onset)    Alzheimer's disease Mother    Cancer Brother    Diabetes Brother    Heart attack Father    Heart disease Brother (56)    Hypertension Mother, Brother          Tobacco Use    Smoking status: Former     Current packs/day: 0.00     Average packs/day: 1 pack/day for 25.0 years (25.0 ttl pk-yrs)     Types: Cigarettes     Start date: 1947     Quit date: 1972     Years since quittin.2    Smokeless tobacco: Never    Tobacco comments:     Patient Quit Smoking on 1972.   Substance and Sexual Activity    Alcohol use: Not Currently     Alcohol/week: 3.0 standard drinks of alcohol     Types: 2 Glasses of wine, 1 Cans of beer per week     Comment: Occasional    Drug use: Never    Sexual activity: Not Currently     Partners: Female     Birth control/protection: None     Review of Systems   Constitutional:  Positive for activity change.   Musculoskeletal:  Positive for gait problem.   Skin:  Positive for wound.   Neurological:  Positive for weakness.   Psychiatric/Behavioral:  Positive for decreased concentration.      Objective:     Vital Signs (Most Recent):  Temp: 98.2 °F (36.8 °C) (25 1212)  Pulse: 66  (03/27/25 1335)  Resp: 18 (03/27/25 1212)  BP: 117/63 (03/27/25 1335)  SpO2: 95 % (03/27/25 1212)    Vital Signs (24h Range):  Temp:  [97.4 °F (36.3 °C)-98.6 °F (37 °C)] 98.2 °F (36.8 °C)  Pulse:  [52-66] 66  Resp:  [18-20] 18  SpO2:  [95 %] 95 %  BP: (107-124)/(49-63) 117/63     Body mass index is 20.97 kg/m².     Physical Exam  Vitals and nursing note reviewed.   Constitutional:       General: He is awake.      Appearance: Normal appearance.   Pulmonary:      Effort: Pulmonary effort is normal. No respiratory distress.   Abdominal:      General: There is no distension.      Palpations: Abdomen is soft.   Musculoskeletal:         General: Normal range of motion.      Cervical back: Normal range of motion and neck supple.      Comments: Generalized weakness.    Skin:     General: Skin is warm and dry.      Capillary Refill: Capillary refill takes 2 to 3 seconds.      Comments: Crani sites bilaterally with HV drains    Neurological:      General: No focal deficit present.      Mental Status: He is alert and oriented to person, place, and time.      GCS: GCS eye subscore is 4. GCS verbal subscore is 5. GCS motor subscore is 6.      Motor: Weakness present.      Gait: Gait abnormal.   Psychiatric:         Mood and Affect: Mood normal.         Behavior: Behavior normal. Behavior is cooperative.          NEUROLOGICAL EXAMINATION:     MENTAL STATUS   Oriented to person, place, and time.       Diagnostic Results: Labs: Reviewed  Assessment/Plan:     * Subdural hematoma, chronic  -S/P bilateral mini cranis for SDH evacuation and tying off of distal shunt catheter at right clavicle on 3/24 per NSGY.   -HV drains in place.  -HA ongoing. Monitor closely.  -DVT PPX- Lovenox.  -Post op CTH stable as per NSGY.  -PT/OT rec for HIT. Pt and son amenable. Deciding what facility.  -     KRISTAN (acute kidney injury)  -Monitor CMP daily.  -Avoid nephrotoxins.  -Monitor I and O.      Urinary retention  -Molina in place. Monitor.  -Continue  Flomax.    S/P ventriculoperitoneal shunt  -Shunt was programmed on 03/07/2025 in clinic as per chart review.  -Follow NSGY  plan.     PAF (paroxysmal atrial fibrillation)  -Continue Toprol XL.  -On Lovenox now for AC.     Stage 3a chronic kidney disease  -Monitor CMP daily.  -S/P IVF.    PM&R Recommendation:     At this time, the PM&R team has reviewed this patient's ongoing medical case including inpatient diagnosis, medical history, clinical examination, labs, vitals, current social and functional history to provide the post-acute recommendation as follows:     RECOMMENDATIONS: inpatient rehabilitation due to fair to good potential for improvement with therapies and need of physician oversight for management of ongoing active medical issues, once medically stable. Will follow up on improvement in HA, dizziness, therapies progress and tolerance.       The patient will be admitted for comprehensive interdisciplinary inpatient rehabilitation to address the impairments due to his  medical diagnosis of S/P crani bilaterally for subdural hygromas removal. The patient will benefit from an inpatient rehabilitation program to promote functional recovery, implement compensatory strategies and will undergo assessment for needs for durable medical equipment for safe discharge to the community. This patient will benefit from a coordinated interdisciplinary rehabilitation program services that require close monitoring and treatment with 24-hour rehabilitative nursing and physical/occupational therapies for 3 hours/day for 5 days/week. This interdisciplinary program will be performed under the direction of a physiatrist.        We will continue to follow.       Thank you for your consult.     Maribel Guzman NP  Department of Physical Medicine & Rehab  [facility] - Neurosurgery [facility])

## 2025-03-27 NOTE — ASSESSMENT & PLAN NOTE
This is a 85-year-old male with a history of NPH status post right-sided  shunt on 02/06/2025 by Dr. Blankenship, who presents with several days of worsening bitemporal headache. 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  CT head obtained today demonstrates interval stability of the bilateral subdural hygromas. When the patient saw Emeli iRvero in clinic on 03/07/2025 the shunt was reprogrammed to 200, however hygromas were persistent and he had continued headaches.    He is now s/p bilateral mini cranis for SDH evacuation and tying off of distal shunt catheter at right clavicle on 3/24. Postop CTH with expected findings and pneumocephalus.     Repeat Head CT 3/27 for severe headache with expected post operative changes.     Plan:  - Stepped down to nsgy floor.  - q4h neurochecks.  - SG HV to be removed today  - Multimodal pain control.  - PT/OT. Encourage OOB  - Discontinue Keppra due to deviation from cognitive baseline  - Initiate vimpat as seizure prophylaxis   - DVT ppx: LVX.  - Bowel regimen.  - Molina placed 3/25 for urinary retention. Will plan for voiding trial in 1 week.   - Discussed care plan with patient and HA concerns. Likely will need referral to HA specialist on discharge   - Educated on benefit of high intensity rehab placement

## 2025-03-27 NOTE — PLAN OF CARE
[facility] - Neurosurgery ([facility])  Discharge Reassessment    Primary Care Provider: Nakul Mandujano MD    Expected Discharge Date: 3/31/2025    Patient is not medically ready for discharge.  Patient is high intensity therapy level. Referral out for Rehab.    Discharge Plan A and Plan B have been determined by review of patient's clinical status, future medical and therapeutic needs, and coverage/benefits for post-acute care in coordination with multidisciplinary team members.     Reassessment (most recent)       Discharge Reassessment - 03/27/25 1435          Discharge Reassessment    Assessment Type Discharge Planning Reassessment     Did the patient's condition or plan change since previous assessment? Yes     Communicated LIDIA with patient/caregiver Date not available/Unable to determine     Discharge Plan A Rehab     Discharge Plan B Home with family;Home Health     DME Needed Upon Discharge  other (see comments)   tbd    Transition of Care Barriers None     Why the patient remains in the hospital Requires continued medical care        Post-Acute Status    Post-Acute Authorization Placement     Post-Acute Placement Status Referrals Sent     Discharge Delays None known at this time

## 2025-03-27 NOTE — PT/OT/SLP PROGRESS
Physical Therapy Co-Treatment    Patient Name:  Ramesh Ricci   MRN:  [id number]    Recommendations:     Discharge Recommendations: High Intensity Therapy  Discharge Equipment Recommendations: hospital bed  Barriers to discharge:  inc level of assist required    Assessment:     Ramesh Ricci is a 85 y.o. male admitted with a medical diagnosis of Subdural hematoma, chronic.  He presents with the following impairments/functional limitations: weakness, impaired endurance, impaired self care skills, impaired functional mobility, gait instability, impaired balance, visual deficits, impaired cognition, decreased coordination, decreased upper extremity function, decreased lower extremity function, decreased safety awareness, pain, impaired fine motor, impaired coordination, impaired cardiopulmonary response to activity. Pt able to tolerate further activity today w/ noted dec speed for all activities. During gait training RLE remains externally rotated and w/ R trunk rotation leading to pt veering to R side, on further assessment does have slow tracking w/ L eye and L beat nystagmus w/ L gaze. Patient has demonstrated sufficient progression to warrant high intensity therapy evidenced by objectives noted below.     Rehab Prognosis: Good; patient would benefit from acute skilled PT services to address these deficits and reach maximum level of function.    Recent Surgery: Procedure(s) (LRB):  EVACUATION, HEMATOMA, SUBDURAL  AND SHUNT TIE OFF (Bilateral) 3 Days Post-Op    Plan:     During this hospitalization, patient to be seen 4 x/week to address the identified rehab impairments via gait training, therapeutic activities, therapeutic exercises, neuromuscular re-education and progress toward the following goals:    Plan of Care Expires:  04/11/25    Subjective     Chief Complaint: dizziness and HA; dizziness worse w/ position change and Orthostatics positive   Patient/Family Comments/goals: to return to  [facility]  Pain/Comfort:  Location 1:  (unrated head pain)  Pain Addressed 1: Reposition, Distraction, Pre-medicate for activity, Cessation of Activity, Nurse notified      Objective:     Communicated with RN prior to session.  Patient found up in chair with telemetry, hemovac, larry catheter upon PT entry to room. Co-tx w/ OT 2/2 suspected pt complexity and requirement of 2 skilled therapists to assist in order to maximize pt treatment     General Precautions: Standard, fall, hearing impaired  Orthopedic Precautions: N/A  Braces: N/A  Respiratory Status: Room air     Functional Mobility:  Transfers:     Sit to Stand:  minimum assistance with rolling walker x2 reps  Gait: 8' w/ RW Gustavo w/ RLE externally rotated and R trunk rotation w/ path deviation towards R as he ambulates; fwd flexed posture, dec step length w/ inconsistent step height/width and dec gabby  Balance: static standing balance CGA w/ RW, dynamic standing balance Gustavo w/ RW      AM-PAC 6 CLICK MOBILITY  Turning over in bed (including adjusting bedclothes, sheets and blankets)?: 3  Sitting down on and standing up from a chair with arms (e.g., wheelchair, bedside commode, etc.): 3  Moving from lying on back to sitting on the side of the bed?: 3  Moving to and from a bed to a chair (including a wheelchair)?: 3  Need to walk in hospital room?: 3  Climbing 3-5 steps with a railing?: 1  Basic Mobility Total Score: 16       Treatment & Education:  Pt educated on PT POC/goals, d/c recs, and continued treatment. All questions answered and pt in agreement w/ POC.  Gait training w/ verbal, visual, and tactile cueing for proper use of AD, step length, step width, step height, postural control, safety awareness, obstacle avoidance, width of INGRID, and proximity to AD     Patient left up in chair with all lines intact, call button in reach, RN and NSGY PA notified, and OT Sheri present..    GOALS:   Multidisciplinary Problems       Physical Therapy Goals           Problem: Physical Therapy    Goal Priority Disciplines Outcome Interventions   Physical Therapy Goal     PT, PT/OT Progressing    Description: Goals to be completed by: 4/11/25    Pt will be able to stand up from EOB w/ stand by assistance using LRAD  Pt will ambulate 50' feet w/ stand by assistance using LRAD  Pt will be independent w/ HEP therex on BLE w/ good form and ROM                        Time Tracking:     PT Received On: 03/27/25  PT Start Time: 1150     PT Stop Time: 1216  PT Total Time (min): 26 min     Billable Minutes: Gait Training 11 and Therapeutic Activity 15    Treatment Type: Treatment  PT/PTA: PT     Number of PTA visits since last PT visit: 0     03/27/2025

## 2025-03-27 NOTE — SUBJECTIVE & OBJECTIVE
"Interval History:   Headache remains and unremitting to positional changes. Per family, patient experienced HA especially when moving head prior to initial shunt placement surgery. Requesting to be OOB in chair. Family with concerns for his difficulty in word finding. States he has a limited vocabulary and does not always answer questions and instead "stares off into the distance." Answered questions regarding care plan and present symptoms. Molina in place. Plan to dc drains today.    Medications:  Continuous Infusions:   lactated ringers   Intravenous Continuous 100 mL/hr at 03/26/25 2136 New Bag at 03/26/25 2136     Scheduled Meds:   acetaminophen  1,000 mg Oral Q8H    amitriptyline  10 mg Oral QHS    amLODIPine  2.5 mg Oral Nightly    carbamide peroxide  5 drop Left Ear BID    ceFAZolin (Ancef) IV (PEDS and ADULTS)  2 g Intravenous Q8H    cetirizine  10 mg Oral Daily    docusate sodium  100 mg Oral BID    enoxparin  40 mg Subcutaneous Q24H (prophylaxis, 1700)    levETIRAcetam  500 mg Oral BID    levothyroxine  125 mcg Oral Before breakfast    memantine  10 mg Oral BID    methocarbamoL  500 mg Oral QID    metoprolol succinate  25 mg Oral Daily    mupirocin   Nasal BID    pantoprazole  40 mg Oral Daily    polyethylene glycol  17 g Oral BID    pravastatin  20 mg Oral QHS    tamsulosin  0.4 mg Oral Daily     PRN Meds:  Current Facility-Administered Medications:     cyclobenzaprine, 5 mg, Oral, TID PRN    dextrose 50%, 12.5 g, Intravenous, PRN    dextrose 50%, 25 g, Intravenous, PRN    glucagon (human recombinant), 1 mg, Intramuscular, PRN    glucose, 16 g, Oral, PRN    glucose, 16 g, Oral, PRN    glucose, 24 g, Oral, PRN    hydrALAZINE, 10 mg, Intravenous, Q6H PRN    insulin aspart U-100, 0-5 Units, Subcutaneous, QID (AC + HS) PRN    melatonin, 6 mg, Oral, Nightly PRN    ondansetron, 4 mg, Intravenous, Q6H PRN    oxyCODONE, 5 mg, Oral, Q4H PRN    oxyCODONE, 10 mg, Oral, Q4H PRN     Review of Systems  Objective: " "    Weight: 68.2 kg (150 lb 5.7 oz)  Body mass index is 20.97 kg/m².  Vital Signs (Most Recent):  Temp: 98.3 °F (36.8 °C) (03/27/25 0720)  Pulse: (!) 53 (03/27/25 0720)  Resp: 18 (03/27/25 0720)  BP: 116/62 (03/27/25 0720)  SpO2: 95 % (03/27/25 0720) Vital Signs (24h Range):  Temp:  [97.4 °F (36.3 °C)-98.6 °F (37 °C)] 98.3 °F (36.8 °C)  Pulse:  [48-58] 53  Resp:  [18-20] 18  SpO2:  [95 %-97 %] 95 %  BP: (107-124)/(49-66) 116/62                              Closed/Suction Drain 03/24/25 1613 Tube - 1 Left Scalp Accordion 10 Fr. (Active)   Site Description Healing 03/27/25 0730   Dressing Type Gauze 03/27/25 0730   Dressing Status Old drainage;Intact 03/27/25 0730   Dressing Intervention Integrity maintained 03/27/25 0730   Drainage Serosanguineous 03/27/25 0730   Status To bulb suction 03/27/25 0730   Output (mL) 5 mL 03/26/25 1929            Closed/Suction Drain 03/24/25 1614 Tube - 2 Right Scalp Accordion 10 Fr. (Active)   Site Description Healing 03/27/25 0730   Dressing Type Gauze 03/27/25 0730   Dressing Status Old drainage;Intact 03/27/25 0730   Dressing Intervention Integrity maintained 03/27/25 0730   Drainage Serosanguineous 03/27/25 0730   Status To bulb suction 03/27/25 0730   Output (mL) 5 mL 03/26/25 1929            Urethral Catheter 03/25/25 2315 Silicone 16 Fr. (Active)   $ Molina Insertion Bedside Insertion Performed 03/25/25 2315   Site Assessment Clean;Intact 03/27/25 0730   Collection Container Urimeter 03/27/25 0730   Securement Method secured to top of thigh w/ adhesive device 03/27/25 0730   Catheter Care Performed yes 03/27/25 0730   Reason for Continuing Urinary Catheterization Urinary retention 03/27/25 0730   CAUTI Prevention Bundle Securement Device in place with 1" slack;Intact seal between catheter & drainage tubing;Drainage bag/urimeter off the floor;Sheeting clip in use;No dependent loops or kinks;Drainage bag/urimeter not overfilled (<2/3 full);Drainage bag/urimeter below bladder " "03/27/25 0730   Output (mL) 375 mL 03/27/25 0413          Neurosurgery Physical Exam  General: well developed, well nourished, no distress.  Pulmonary: quiet, deep respirations, no signs of respiratory distress  Skin: Skin is warm and dry     Neuro:   Mental Status: Alert and oriented. No aphasia, no dysarthria - higher order confusion.   CN: PERRL, EOMI other than R VI palsy, sensation intact bilaterally, eyebrow raise and grimace symmetric, tongue midline. Hard of hearing  Motor: moves all extremities spontaneously, full strength throughout, no pronator drift   Sensory: intact to light touch throughout     Dressings c/d/I  Subgaleal drains in place bilaterally    Significant Labs:  Recent Labs   Lab 03/26/25  0550 03/26/25  1555 03/27/25  0512    136 137   K 4.3 4.2 4.5    103 106   CO2 23 24 23   BUN 28* 26* 25*   CREATININE 1.6* 1.5* 1.2   CALCIUM 8.6* 8.4* 8.3*     Recent Labs   Lab 03/26/25  0550 03/27/25  0512   WBC 10.58 7.89   HGB 11.3* 10.7*   HCT 35.4* 33.6*   * 127*     No results for input(s): "LABPT", "INR", "APTT" in the last 48 hours.  Microbiology Results (last 7 days)       ** No results found for the last 168 hours. **          All pertinent labs from the last 24 hours have been reviewed.    Significant Diagnostics:  I have reviewed and interpreted all pertinent imaging results/findings within the past 24 hours.  "

## 2025-03-27 NOTE — PT/OT/SLP PROGRESS
Speech Language Pathology Treatment    Patient Name:  Ramesh Ricci   MRN:  [id number]  [id number]/922 [id number]    Admitting Diagnosis: Subdural hematoma, chronic    Recommendations:                 General Recommendations:  Follow-up not indicated  Diet recommendations:  Regular Diet - IDDSI Level 7, Liquid Diet Level: Thin liquids - IDDSI Level 0   Aspiration Precautions: Standard aspiration precautions   General Precautions: Standard, fall, hearing impaired  Communication strategies:  none    Assessment:     Ramesh Ricci is a 85 y.o. male with adequate tolerance of regular solids and thin liquids. No further skilled acute Speech Therapy services warranted at this time. Please re-consult as needed.       Subjective     Patient awake and finishing meal tray upon SLP entry. RN and family member at bedside report no noted difficulties with meals.       Objective:     Has the patient been evaluated by SLP for swallowing?   Yes  Keep patient NPO? No     Patient seen for an ongoing swallowing assessment. Significantly improved ANA from prior ST session. Patient assessed with large, consecutive sips of water via straw. He presents with no overt s/s of aspiration and timely/adequate oral clearance. SLP provided education on SLP recommendations, SLP role, s/s and risks of aspiration, safe swallow precautions, and POC. All questions addressed and patient/family in agreement with POC.     Goals:   Multidisciplinary Problems       SLP Goals          Problem: SLP    Goal Priority Disciplines Outcome   SLP Goal     SLP Progressing   Description: Short Term Goals:   1 Pt will participate in an ongoing assessment to determine the least restrictive and safest diet with possible updated goals to follow pending results.                         Plan:     Patient to be seen:  3 x/week   Plan of Care expires:     Plan of Care reviewed with:  patient, family   SLP Follow-Up:  No       Discharge recommendations:  High Intensity Therapy    Barriers to Discharge:  None    Time Tracking:     SLP Treatment Date:   03/27/25  Speech Start Time:  0915  Speech Stop Time:  0925     Speech Total Time (min):  10 min    Billable Minutes: Eval Swallow and Oral Function 10    03/27/2025

## 2025-03-27 NOTE — PLAN OF CARE
Patient is high intensity therapy level. CM sent referral for Rehab. PMR placed.   03/27/25 0859   Post-Acute Status   Post-Acute Authorization Placement   Post-Acute Placement Status Referrals Sent

## 2025-03-27 NOTE — HOSPITAL COURSE
Per chart review,    PT- 03/25    Functional Mobility:  Bed Mobility:  Supine to Sit: contact guard assistance  Sit to Supine: minimum assistance for BLE mgmt  Transfers:  Sit to Stand: minimum assistance with rolling walker

## 2025-03-27 NOTE — ASSESSMENT & PLAN NOTE
Patient has paroxysmal (<7 days) atrial fibrillation. Patient is currently in sinus rhythm. [id number] Score: 3. The patients heart rate in the last 24 hours is as follows:  Pulse  Min: 52  Max: 66     Antiarrhythmics  metoprolol succinate (TOPROL-XL) 24 hr tablet 25 mg, Daily, Oral    Anticoagulants  enoxaparin injection 40 mg, Every 24 hours, Subcutaneous    Plan  - Replete lytes with a goal of K>4, Mg >2  - Patient is anticoagulated, see medications listed above.  - Patient's afib is currently controlled  - continue metoprolol; hold if HR <50

## 2025-03-27 NOTE — ASSESSMENT & PLAN NOTE
85-year-old male w/ A-fib, BPH, HTN, hypothyroidism, CKD 3b, Ulcerative Colitis, CAD, Chronic diastolic heart failure, NPH s/p  shunt who presented to the ED for worsening bitemporal headaches. Now s/p bilateral mini cranis for SDH evacuation and tying off of distal shunt catheter at right clavicle on 3/24. Concern for orthostatic hypotension per primary team. Suspect soft blood pressures 2/2 likely medication induce and likely at baseline. Bradycardia is likely chronic and known per family. EKG with normal sinus rhythm.    - hold Amlodipine and Flomax in the setting of soft blood pressure  - continue Metoprolol; can hold if HR <50

## 2025-03-27 NOTE — SUBJECTIVE & OBJECTIVE
Past Medical History:   Diagnosis Date    Allergies 01/31/2025    Anemia     Anticoagulant long-term use     Anxiety     Atrophic kidney 11/16/2012    BPH (benign prostatic hyperplasia) 11/16/2012    Congenital absence of right kidney 07/05/2017    DDD (degenerative disc disease), cervical 07/05/2017    x-ray 7/17 - Severe    Ex-smoker 12/20/2018    Exudative age-related macular degeneration, right eye, with active choroidal neovascularization 02/15/2024    GERD (gastroesophageal reflux disease) 11/16/2012    Glaucoma 11/16/2012    HTN (hypertension) 11/16/2012    Hypothyroid 11/16/2012    Internal carotid artery stenosis 11/16/2012    Iron deficiency anemia due to chronic blood loss 10/29/2021    Left ventricular diastolic dysfunction, NYHA class 1 04/16/2015    4/15    Low serum testosterone level 11/16/2012    Normal cardiac stress test 11/16/2012    NPH (normal pressure hydrocephalus) 02/03/2025    Osteopenia 11/16/2012    PAF (paroxysmal atrial fibrillation) 12/20/2018    Prostate CA 01/15/2013    XRT 12/12  Dr. 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    Shingles 11/16/2012    Skin disease 2020    Seems to have started after Covid vaccine    Stage 3a chronic kidney disease 10/06/2014    Stroke 2017    tia   no residual    Thrombocytopenia 01/31/2025    TIA (transient ischemic attack) 11/16/2012    UC (ulcerative colitis) 11/16/2012       Past Surgical History:   Procedure Laterality Date    ADENOIDECTOMY      COLONOSCOPY N/A 03/25/2022    Procedure: COLONOSCOPY;  Surgeon: Ashley Nguyen MD;  Location: [facility] ENDO;  Service: Endoscopy;  Laterality: N/A;    ESOPHAGOGASTRODUODENOSCOPY N/A 03/25/2022    Procedure: EGD (ESOPHAGOGASTRODUODENOSCOPY);  Surgeon: Ashley Nguyen MD;  Location: [facility] ENDO;  Service: Endoscopy;  Laterality: N/A;  added on per Dr. Nguyen    ESOPHAGOGASTRODUODENOSCOPY N/A 6/7/2024    Procedure: EGD (ESOPHAGOGASTRODUODENOSCOPY);  Surgeon: Audie Snell  MD;  Location: [facility] ENDO (4TH FLR);  Service: Endoscopy;  Laterality: N/A;  5/21 R/s,sent updated instr via portal.pt informed to hold eliquis for 2 days.AC  Ref by: ,  approved to hold Eliquis (apixaban) for 2 days per Dr. Conklin-see media file  5/9/24-GT  5/31-pre call complete-tb    EVACUATION OF SUBDURAL HEMATOMA Bilateral 3/24/2025    Procedure: EVACUATION, HEMATOMA, SUBDURAL  AND SHUNT TIE OFF;  Surgeon: Jairo Blankenship MD;  Location: [facility] OR 2ND FLR;  Service: Neurosurgery;  Laterality: Bilateral;  bilateral bobby holes for subdural hygroma and tying off of  shunt at clavicle. to follow other ware cases    EYE SURGERY Right 11/2020    cataract    HERNIA REPAIR      LEFT HEART CATHETERIZATION Right 10/29/2021    Procedure: CATHETERIZATION, HEART, LEFT AND RIGHT  - LV DARNELL POSSIBLE;  Surgeon: Beau Conklin MD;  Location: [facility] CATH LAB;  Service: Cardiology;  Laterality: Right;    LUMBAR PUNCTURE N/A 1/13/2025    Procedure: Lumbar Puncture;  Surgeon: Jairo Blankenship MD;  [facility];  Service: Neurosurgery;  Laterality: N/A;    RIGHT HEART CATHETERIZATION Right 10/29/2021    Procedure: INSERTION, CATHETER, RIGHT HEART;  Surgeon: Beau Conklin MD;  Location: [facility] CATH LAB;  Service: Cardiology;  Laterality: Right;    SKIN BIOPSY  2020    TONSILLECTOMY      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    Procedure: INSERTION, SHUNT, VENTRICULOPERITONEAL;  Surgeon: Jairo Blankenship MD;  [facility];  Service: Neurosurgery;  Laterality: Right;    VENTRICULOPERITONEAL SHUNT  2/6/2025    Procedure: INSERTION, SHUNT, VENTRICULOPERITONEAL;  Surgeon: Shar Elizabeth MD;  [facility];  Service: General;;       Review of patient's allergies indicates:   Allergen Reactions    Benazepril Other (See Comments)     Cough       No current facility-administered medications on file prior to encounter.     Current Outpatient Medications on File Prior to  Encounter   Medication Sig    metoprolol succinate (TOPROL-XL) 25 MG 24 hr tablet TAKE 1 TABLET BY MOUTH EVERY DAY    acetaminophen (TYLENOL) 500 MG tablet Take 2 tablets (1,000 mg total) by mouth every 6 (six) hours as needed.    amitriptyline (ELAVIL) 10 MG tablet Take 10 mg by mouth every evening.    amLODIPine (NORVASC) 2.5 MG tablet Take 2.5 mg by mouth nightly.    ascorbic acid, vitamin C, (VITAMIN C) 500 MG tablet Take 500 mg by mouth once daily.    balsalazide (COLAZAL) 750 mg capsule Take 5 tablets in the morning and 4 tablets in the evening.    butalbital-acetaminophen-caffeine -40 mg (FIORICET, ESGIC) -40 mg per tablet Take 1 tablet by mouth every 4 (four) hours as needed for Headaches.    carbamide peroxide (DEBROX) 6.5 % otic solution Place 5 drops into the left ear 2 (two) times daily.    docusate sodium (STOOL SOFTENER ORAL) Take by mouth.    ELIQUIS 2.5 mg Tab Take 2.5 mg by mouth 2 (two) times daily.    ferrous sulfate (FEOSOL) 325 mg (65 mg iron) Tab tablet 1 tablet Orally Once a day    fluocinolone acetonide oiL 0.01 % Drop Place into both ears.    fremanezumab-vfrm (AJOVY AUTOINJECTOR) 225 mg/1.5 mL autoinjector Inject 225 mg into the skin every 30 days.    gabapentin (NEURONTIN) 100 MG capsule Take 100 mg by mouth 3 (three) times daily.    HYDROcodone-acetaminophen (NORCO) 5-325 mg per tablet Take 1 tablet by mouth every 6 (six) hours as needed for Pain.    levocetirizine (XYZAL) 5 MG tablet Take 5 mg by mouth every morning.    levothyroxine (SYNTHROID) 125 MCG tablet TAKE 1 TABLET BY MOUTH EVERY DAY IN THE MORNING    mag/aluminum/sod bicarb/alginc (GAVISCON ORAL) Take by mouth as needed. Acid reflux    memantine (NAMENDA) 10 MG Tab Take 10 mg by mouth 2 (two) times daily.    pantoprazole (PROTONIX) 40 MG tablet TAKE 1 TABLET BY MOUTH DAILY AS  NEEDED    pravastatin (PRAVACHOL) 20 MG tablet Take 1 tablet (20 mg total) by mouth every evening.    tamsulosin (FLOMAX) 0.4 mg Cap Take 1  capsule (0.4 mg total) by mouth once daily.    triamcinolone acetonide 0.1% (KENALOG) 0.1 % cream Apply topically 2 (two) times daily. APPLY  CREAM TOPICALLY TO AFFECTED AREA TWICE DAILY as needed    vitC/E/Zn/copper/lutein/zeaxan (ICAPS AREDS2 ORAL) Take 1 capsule by mouth once daily.    [DISCONTINUED] telmisartan (MICARDIS) 40 MG Tab TAKE 1/2 TABLET BY MOUTH DAILY     Family History       Problem Relation (Age of Onset)    Alzheimer's disease Mother    Cancer Brother    Diabetes Brother    Heart attack Father    Heart disease Brother (56)    Hypertension Mother, Brother          Tobacco Use    Smoking status: Former     Current packs/day: 0.00     Average packs/day: 1 pack/day for 25.0 years (25.0 ttl pk-yrs)     Types: Cigarettes     Start date: 1947     Quit date: 1972     Years since quittin.2    Smokeless tobacco: Never    Tobacco comments:     Patient Quit Smoking on 1972.   Substance and Sexual Activity    Alcohol use: Not Currently     Alcohol/week: 3.0 standard drinks of alcohol     Types: 2 Glasses of wine, 1 Cans of beer per week     Comment: Occasional    Drug use: Never    Sexual activity: Not Currently     Partners: Female     Birth control/protection: None     Review of Systems   Constitutional:  Negative for chills and fever.   HENT:  Negative for congestion.    Respiratory:  Negative for shortness of breath.    Cardiovascular:  Negative for chest pain, palpitations and leg swelling.   Gastrointestinal:  Negative for abdominal pain, diarrhea, nausea and vomiting.   Musculoskeletal:  Positive for back pain.   Skin:  Negative for wound.   Neurological:  Positive for dizziness and headaches.   All other systems reviewed and are negative.    Objective:     Vital Signs (Most Recent):  Temp: 98.2 °F (36.8 °C) (25 1212)  Pulse: 66 (25 1335)  Resp: 18 (25 1212)  BP: 117/63 (25 1335)  SpO2: 95 % (25 121) Vital Signs (24h Range):  Temp:  [97.4 °F (36.3  °C)-98.6 °F (37 °C)] 98.2 °F (36.8 °C)  Pulse:  [52-66] 66  Resp:  [18-20] 18  SpO2:  [95 %] 95 %  BP: (107-124)/(49-63) 117/63     Weight: 68.2 kg (150 lb 5.7 oz)  Body mass index is 20.97 kg/m².     Physical Exam  Vitals and nursing note reviewed.   Constitutional:       General: He is awake.      Appearance: Normal appearance.   HENT:      Head: Atraumatic.      Right Ear: External ear normal.      Left Ear: External ear normal.      Mouth/Throat:      Mouth: Mucous membranes are moist.   Cardiovascular:      Rate and Rhythm: Normal rate and regular rhythm.      Pulses: Normal pulses.      Heart sounds: Normal heart sounds.   Pulmonary:      Effort: Pulmonary effort is normal. No respiratory distress.   Abdominal:      General: Abdomen is flat. Bowel sounds are normal. There is no distension.      Palpations: Abdomen is soft.   Musculoskeletal:         General: Normal range of motion.      Cervical back: Normal range of motion and neck supple.      Right lower leg: No edema.      Left lower leg: No edema.   Skin:     General: Skin is warm and dry.      Comments: Crani sites bilaterally with HV drains    Neurological:      General: No focal deficit present.      Mental Status: He is alert and oriented to person, place, and time. Mental status is at baseline.   Psychiatric:         Mood and Affect: Mood normal.         Behavior: Behavior normal. Behavior is cooperative.          Significant Labs: All pertinent labs within the past 24 hours have been reviewed.    Significant Imaging: I have reviewed all pertinent imaging results/findings within the past 24 hours.

## 2025-03-27 NOTE — SUBJECTIVE & OBJECTIVE
Past Medical History:   Diagnosis Date    Allergies 01/31/2025    Anemia     Anticoagulant long-term use     Anxiety     Atrophic kidney 11/16/2012    BPH (benign prostatic hyperplasia) 11/16/2012    Congenital absence of right kidney 07/05/2017    DDD (degenerative disc disease), cervical 07/05/2017    x-ray 7/17 - Severe    Ex-smoker 12/20/2018    Exudative age-related macular degeneration, right eye, with active choroidal neovascularization 02/15/2024    GERD (gastroesophageal reflux disease) 11/16/2012    Glaucoma 11/16/2012    HTN (hypertension) 11/16/2012    Hypothyroid 11/16/2012    Internal carotid artery stenosis 11/16/2012    Iron deficiency anemia due to chronic blood loss 10/29/2021    Left ventricular diastolic dysfunction, NYHA class 1 04/16/2015    4/15    Low serum testosterone level 11/16/2012    Normal cardiac stress test 11/16/2012    NPH (normal pressure hydrocephalus) 02/03/2025    Osteopenia 11/16/2012    PAF (paroxysmal atrial fibrillation) 12/20/2018    Prostate CA 01/15/2013    XRT 12/12  Dr. 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    Shingles 11/16/2012    Skin disease 2020    Seems to have started after Covid vaccine    Stage 3a chronic kidney disease 10/06/2014    Stroke 2017    tia   no residual    Thrombocytopenia 01/31/2025    TIA (transient ischemic attack) 11/16/2012    UC (ulcerative colitis) 11/16/2012     Past Surgical History:   Procedure Laterality Date    ADENOIDECTOMY      COLONOSCOPY N/A 03/25/2022    Procedure: COLONOSCOPY;  Surgeon: Ashley Nguyen MD;  Location: [facility] ENDO;  Service: Endoscopy;  Laterality: N/A;    ESOPHAGOGASTRODUODENOSCOPY N/A 03/25/2022    Procedure: EGD (ESOPHAGOGASTRODUODENOSCOPY);  Surgeon: Ashley Nguyen MD;  Location: [facility] ENDO;  Service: Endoscopy;  Laterality: N/A;  added on per Dr. Nguyen    ESOPHAGOGASTRODUODENOSCOPY N/A 6/7/2024    Procedure: EGD (ESOPHAGOGASTRODUODENOSCOPY);  Surgeon: Audie Snell  MD;  Location: [facility] ENDO (4TH FLR);  Service: Endoscopy;  Laterality: N/A;  5/21 R/s,sent updated instr via portal.pt informed to hold eliquis for 2 days.AC  Ref by: ,  approved to hold Eliquis (apixaban) for 2 days per Dr. Conklin-see media file  5/9/24-GT  5/31-pre call complete-tb    EVACUATION OF SUBDURAL HEMATOMA Bilateral 3/24/2025    Procedure: EVACUATION, HEMATOMA, SUBDURAL  AND SHUNT TIE OFF;  Surgeon: Jairo Blankenship MD;  Location: [facility] OR 2ND FLR;  Service: Neurosurgery;  Laterality: Bilateral;  bilateral bobby holes for subdural hygroma and tying off of  shunt at clavicle. to follow other ware cases    EYE SURGERY Right 11/2020    cataract    HERNIA REPAIR      LEFT HEART CATHETERIZATION Right 10/29/2021    Procedure: CATHETERIZATION, HEART, LEFT AND RIGHT  - LV DARNELL POSSIBLE;  Surgeon: Beau Conklin MD;  Location: [facility] CATH LAB;  Service: Cardiology;  Laterality: Right;    LUMBAR PUNCTURE N/A 1/13/2025    Procedure: Lumbar Puncture;  Surgeon: Jairo Blankenship MD;  [facility];  Service: Neurosurgery;  Laterality: N/A;    RIGHT HEART CATHETERIZATION Right 10/29/2021    Procedure: INSERTION, CATHETER, RIGHT HEART;  Surgeon: Beau Conklin MD;  Location: [facility] CATH LAB;  Service: Cardiology;  Laterality: Right;    SKIN BIOPSY  2020    TONSILLECTOMY      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    Procedure: INSERTION, SHUNT, VENTRICULOPERITONEAL;  Surgeon: Jairo Blankenship MD;  [facility];  Service: Neurosurgery;  Laterality: Right;    VENTRICULOPERITONEAL SHUNT  2/6/2025    Procedure: INSERTION, SHUNT, VENTRICULOPERITONEAL;  Surgeon: Shar Elizabeth MD;  [facility];  Service: General;;     Review of patient's allergies indicates:   Allergen Reactions    Benazepril Other (See Comments)     Cough       Scheduled Medications:    acetaminophen  1,000 mg Oral Q8H    amitriptyline  10 mg Oral QHS    carbamide peroxide  5 drop Left Ear  BID    ceFAZolin (Ancef) IV (PEDS and ADULTS)  2 g Intravenous Q8H    cetirizine  10 mg Oral Daily    docusate sodium  100 mg Oral BID    enoxparin  40 mg Subcutaneous Q24H (prophylaxis, 1700)    lacosamide  100 mg Oral Q12H    levothyroxine  125 mcg Oral Before breakfast    memantine  10 mg Oral BID    methocarbamoL  500 mg Oral QID    metoprolol succinate  25 mg Oral Daily    mupirocin   Nasal BID    pantoprazole  40 mg Oral Daily    polyethylene glycol  17 g Oral BID    pravastatin  20 mg Oral QHS       PRN Medications:   Current Facility-Administered Medications:     cyclobenzaprine, 5 mg, Oral, TID PRN    dextrose 50%, 12.5 g, Intravenous, PRN    dextrose 50%, 25 g, Intravenous, PRN    glucagon (human recombinant), 1 mg, Intramuscular, PRN    glucose, 16 g, Oral, PRN    glucose, 16 g, Oral, PRN    glucose, 24 g, Oral, PRN    hydrALAZINE, 10 mg, Intravenous, Q6H PRN    insulin aspart U-100, 0-5 Units, Subcutaneous, QID (AC + HS) PRN    melatonin, 6 mg, Oral, Nightly PRN    ondansetron, 4 mg, Intravenous, Q6H PRN    oxyCODONE, 5 mg, Oral, Q4H PRN    oxyCODONE, 10 mg, Oral, Q4H PRN    Family History       Problem Relation (Age of Onset)    Alzheimer's disease Mother    Cancer Brother    Diabetes Brother    Heart attack Father    Heart disease Brother (56)    Hypertension Mother, Brother          Tobacco Use    Smoking status: Former     Current packs/day: 0.00     Average packs/day: 1 pack/day for 25.0 years (25.0 ttl pk-yrs)     Types: Cigarettes     Start date: 1947     Quit date: 1972     Years since quittin.2    Smokeless tobacco: Never    Tobacco comments:     Patient Quit Smoking on 1972.   Substance and Sexual Activity    Alcohol use: Not Currently     Alcohol/week: 3.0 standard drinks of alcohol     Types: 2 Glasses of wine, 1 Cans of beer per week     Comment: Occasional    Drug use: Never    Sexual activity: Not Currently     Partners: Female     Birth control/protection: None      Review of Systems   Constitutional:  Positive for activity change.   Musculoskeletal:  Positive for gait problem.   Skin:  Positive for wound.   Neurological:  Positive for weakness.   Psychiatric/Behavioral:  Positive for decreased concentration.      Objective:     Vital Signs (Most Recent):  Temp: 98.2 °F (36.8 °C) (03/27/25 1212)  Pulse: 66 (03/27/25 1335)  Resp: 18 (03/27/25 1212)  BP: 117/63 (03/27/25 1335)  SpO2: 95 % (03/27/25 1212)    Vital Signs (24h Range):  Temp:  [97.4 °F (36.3 °C)-98.6 °F (37 °C)] 98.2 °F (36.8 °C)  Pulse:  [52-66] 66  Resp:  [18-20] 18  SpO2:  [95 %] 95 %  BP: (107-124)/(49-63) 117/63     Body mass index is 20.97 kg/m².     Physical Exam  Vitals and nursing note reviewed.   Constitutional:       General: He is awake.      Appearance: Normal appearance.   Pulmonary:      Effort: Pulmonary effort is normal. No respiratory distress.   Abdominal:      General: There is no distension.      Palpations: Abdomen is soft.   Musculoskeletal:         General: Normal range of motion.      Cervical back: Normal range of motion and neck supple.      Comments: Generalized weakness.    Skin:     General: Skin is warm and dry.      Capillary Refill: Capillary refill takes 2 to 3 seconds.      Comments: Crani sites bilaterally with HV drains    Neurological:      General: No focal deficit present.      Mental Status: He is alert and oriented to person, place, and time.      GCS: GCS eye subscore is 4. GCS verbal subscore is 5. GCS motor subscore is 6.      Motor: Weakness present.      Gait: Gait abnormal.   Psychiatric:         Mood and Affect: Mood normal.         Behavior: Behavior normal. Behavior is cooperative.          NEUROLOGICAL EXAMINATION:     MENTAL STATUS   Oriented to person, place, and time.       Diagnostic Results: Labs: Reviewed

## 2025-03-27 NOTE — ASSESSMENT & PLAN NOTE
-S/P bilateral mini cranis for SDH evacuation and tying off of distal shunt catheter at right clavicle on 3/24 per NSGY.   -HV drains in place.  -HA ongoing. Monitor closely.  -DVT PPX- Lovenox.  -Post op CTH stable as per NSGY.  -PT/OT rec for HIT. Pt and son amenable. Deciding what facility.  -

## 2025-03-27 NOTE — PLAN OF CARE
Problem: Adult Inpatient Plan of Care  Goal: Plan of Care Review  Outcome: Progressing  Goal: Patient-Specific Goal (Individualized)  Outcome: Progressing  Goal: Absence of Hospital-Acquired Illness or Injury  Outcome: Progressing  Goal: Optimal Comfort and Wellbeing  Outcome: Progressing  Goal: Readiness for Transition of Care  Outcome: Progressing           POC reviewed and updated with the patient at the bedside. Questions regarding POC were encouraged and addressed.  Tele maintained per provider's order.  NAEON.  Patient is AOx 4 at this time. Seizure, fall, and safety precautions maintained, no signs of injury noted during shift. 2 hemovac/ TOÑITO drains to full/gravity suction and woundvac in place, see flowsheets for output. Pain management utilizing PRN pain medications, see MAR for administration details. Upon exiting room, patient's bed locked in low position, side rails up x 3, bed alarm set, with call light within reach. Instructed patient to call staff for mobility, verbalized understanding.  No acute signs of distress noted at this time.

## 2025-03-27 NOTE — CONSULTS
"[facility] - Neurosurgery ([facility])  Hospital Medicine  Consult Note    Patient Name: Ramesh Ricci  MRN: [id number]  Admission Date: 3/21/2025  Hospital Length of Stay: 6 days  Attending Physician: Jairo Blankenship MD   Primary Care Provider: Nakul Mandujano MD           Patient information was obtained from patient, past medical records, and ER records.     Inpatient consult to Hospital Medicine-General  Consult performed by: Gerardo Sotelo DO  Consult ordered by: Emeli Rivero PA-C        Subjective:     Principal Problem: Subdural hematoma, chronic    Chief Complaint:   Chief Complaint   Patient presents with    Headache     VPS placement with Dr. Blankenship on 2/6/25. This was complicated by subdural hygromas. Sent to ED for admit per MD Krzysztof        HPI: 85-year-old male w/ A-fib, BPH, HTN, hypothyroidism, CKD 3b, Ulcerative Colitis, CAD, Chronic diastolic heart failure, NPH s/p  shunt who presented to the ED for worsening bitemporal headaches. Of note, patient had prior CT scans showing stable subdural hygromas. He was seen in clinic on 3/07 where shunt was reprogrammed to 200 but patient continue to have headaches. CT head upon admission showed interval stability of the bilateral subdural hygromas. Now s/p bilateral mini cranis for SDH evacuation and tying off of distal shunt catheter at right clavicle on 3/24. On 3/27, patient began to have softer blood pressures with known bradycardia. Hospital medicine consulted for "Orthostatic hypotension''.    Upon examination, patient reports having dizziness and headache that has been ongoing prior to admission. Currently complaining of back pain from laying in bed. Denies any chest pain, shortness of breath, fever, chills, abd pain, nausea, vomiting, diarrhea, palpitation. Per wife, baseline heart rate is in the high 50s which is known.    Past Medical History:   Diagnosis Date    Allergies 01/31/2025    Anemia     Anticoagulant long-term use     Anxiety     " Atrophic kidney 11/16/2012    BPH (benign prostatic hyperplasia) 11/16/2012    Congenital absence of right kidney 07/05/2017    DDD (degenerative disc disease), cervical 07/05/2017    x-ray 7/17 - Severe    Ex-smoker 12/20/2018    Exudative age-related macular degeneration, right eye, with active choroidal neovascularization 02/15/2024    GERD (gastroesophageal reflux disease) 11/16/2012    Glaucoma 11/16/2012    HTN (hypertension) 11/16/2012    Hypothyroid 11/16/2012    Internal carotid artery stenosis 11/16/2012    Iron deficiency anemia due to chronic blood loss 10/29/2021    Left ventricular diastolic dysfunction, NYHA class 1 04/16/2015    4/15    Low serum testosterone level 11/16/2012    Normal cardiac stress test 11/16/2012    NPH (normal pressure hydrocephalus) 02/03/2025    Osteopenia 11/16/2012    PAF (paroxysmal atrial fibrillation) 12/20/2018    Prostate CA 01/15/2013    XRT 12/12  Dr. 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    Shingles 11/16/2012    Skin disease 2020    Seems to have started after Covid vaccine    Stage 3a chronic kidney disease 10/06/2014    Stroke 2017    tia   no residual    Thrombocytopenia 01/31/2025    TIA (transient ischemic attack) 11/16/2012    UC (ulcerative colitis) 11/16/2012       Past Surgical History:   Procedure Laterality Date    ADENOIDECTOMY      COLONOSCOPY N/A 03/25/2022    Procedure: COLONOSCOPY;  Surgeon: Ashley Nguyen MD;  Location: [facility];  Service: Endoscopy;  Laterality: N/A;    ESOPHAGOGASTRODUODENOSCOPY N/A 03/25/2022    Procedure: EGD (ESOPHAGOGASTRODUODENOSCOPY);  Surgeon: Ashley Nguyen MD;  Location: [facility];  Service: Endoscopy;  Laterality: N/A;  added on per Dr. Nguyen    ESOPHAGOGASTRODUODENOSCOPY N/A 6/7/2024    Procedure: EGD (ESOPHAGOGASTRODUODENOSCOPY);  Surgeon: Audie Snell MD;  Location: [facility] (4TH FLR);  Service: Endoscopy;  Laterality: N/A;  5/21 R/s,sent updated instr via portal.pt  informed to hold eliquis for 2 days.  Ref by: ,  approved to hold Eliquis (apixaban) for 2 days per Dr. Conlkin-see media file  5/9/24-GT  5/31-pre call complete-tb    EVACUATION OF SUBDURAL HEMATOMA Bilateral 3/24/2025    Procedure: EVACUATION, HEMATOMA, SUBDURAL  AND SHUNT TIE OFF;  Surgeon: Jairo Blankenship MD;  [facility];  Service: Neurosurgery;  Laterality: Bilateral;  bilateral bobby holes for subdural hygroma and tying off of  shunt at clavicle. to follow other ware cases    EYE SURGERY Right 11/2020    cataract    HERNIA REPAIR      LEFT HEART CATHETERIZATION Right 10/29/2021    Procedure: CATHETERIZATION, HEART, LEFT AND RIGHT  - LV DARNELL POSSIBLE;  Surgeon: Beau Conklin MD;  Location: [facility] [place] CATH LAB;  Service: Cardiology;  Laterality: Right;    LUMBAR PUNCTURE N/A 1/13/2025    Procedure: Lumbar Puncture;  Surgeon: Jairo Blankenship MD;  [facility];  Service: Neurosurgery;  Laterality: N/A;    RIGHT HEART CATHETERIZATION Right 10/29/2021    Procedure: INSERTION, CATHETER, RIGHT HEART;  Surgeon: Beau Conklin MD;  Location: [facility] [place] CATH LAB;  Service: Cardiology;  Laterality: Right;    SKIN BIOPSY  2020    TONSILLECTOMY      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    Procedure: INSERTION, SHUNT, VENTRICULOPERITONEAL;  Surgeon: Jairo Blankenship MD;  [facility];  Service: Neurosurgery;  Laterality: Right;    VENTRICULOPERITONEAL SHUNT  2/6/2025    Procedure: INSERTION, SHUNT, VENTRICULOPERITONEAL;  Surgeon: Shar Elizabeth MD;  Location: [facility] OR [address] [place] [address];  Service: General;;       Review of patient's allergies indicates:   Allergen Reactions    Benazepril Other (See Comments)     Cough       No current facility-administered medications on file prior to encounter.     Current Outpatient Medications on File Prior to Encounter   Medication Sig    metoprolol succinate (TOPROL-XL) 25 MG 24 hr tablet TAKE 1 TABLET BY MOUTH EVERY DAY     acetaminophen (TYLENOL) 500 MG tablet Take 2 tablets (1,000 mg total) by mouth every 6 (six) hours as needed.    amitriptyline (ELAVIL) 10 MG tablet Take 10 mg by mouth every evening.    amLODIPine (NORVASC) 2.5 MG tablet Take 2.5 mg by mouth nightly.    ascorbic acid, vitamin C, (VITAMIN C) 500 MG tablet Take 500 mg by mouth once daily.    balsalazide (COLAZAL) 750 mg capsule Take 5 tablets in the morning and 4 tablets in the evening.    butalbital-acetaminophen-caffeine -40 mg (FIORICET, ESGIC) -40 mg per tablet Take 1 tablet by mouth every 4 (four) hours as needed for Headaches.    carbamide peroxide (DEBROX) 6.5 % otic solution Place 5 drops into the left ear 2 (two) times daily.    docusate sodium (STOOL SOFTENER ORAL) Take by mouth.    ELIQUIS 2.5 mg Tab Take 2.5 mg by mouth 2 (two) times daily.    ferrous sulfate (FEOSOL) 325 mg (65 mg iron) Tab tablet 1 tablet Orally Once a day    fluocinolone acetonide oiL 0.01 % Drop Place into both ears.    fremanezumab-vfrm (AJOVY AUTOINJECTOR) 225 mg/1.5 mL autoinjector Inject 225 mg into the skin every 30 days.    gabapentin (NEURONTIN) 100 MG capsule Take 100 mg by mouth 3 (three) times daily.    HYDROcodone-acetaminophen (NORCO) 5-325 mg per tablet Take 1 tablet by mouth every 6 (six) hours as needed for Pain.    levocetirizine (XYZAL) 5 MG tablet Take 5 mg by mouth every morning.    levothyroxine (SYNTHROID) 125 MCG tablet TAKE 1 TABLET BY MOUTH EVERY DAY IN THE MORNING    mag/aluminum/sod bicarb/alginc (GAVISCON ORAL) Take by mouth as needed. Acid reflux    memantine (NAMENDA) 10 MG Tab Take 10 mg by mouth 2 (two) times daily.    pantoprazole (PROTONIX) 40 MG tablet TAKE 1 TABLET BY MOUTH DAILY AS  NEEDED    pravastatin (PRAVACHOL) 20 MG tablet Take 1 tablet (20 mg total) by mouth every evening.    tamsulosin (FLOMAX) 0.4 mg Cap Take 1 capsule (0.4 mg total) by mouth once daily.    triamcinolone acetonide 0.1% (KENALOG) 0.1 % cream Apply topically 2  (two) times daily. APPLY  CREAM TOPICALLY TO AFFECTED AREA TWICE DAILY as needed    vitC/E/Zn/copper/lutein/zeaxan (ICAPS AREDS2 ORAL) Take 1 capsule by mouth once daily.    [DISCONTINUED] telmisartan (MICARDIS) 40 MG Tab TAKE 1/2 TABLET BY MOUTH DAILY     Family History       Problem Relation (Age of Onset)    Alzheimer's disease Mother    Cancer Brother    Diabetes Brother    Heart attack Father    Heart disease Brother (56)    Hypertension Mother, Brother          Tobacco Use    Smoking status: Former     Current packs/day: 0.00     Average packs/day: 1 pack/day for 25.0 years (25.0 ttl pk-yrs)     Types: Cigarettes     Start date: 1947     Quit date: 1972     Years since quittin.2    Smokeless tobacco: Never    Tobacco comments:     Patient Quit Smoking on 1972.   Substance and Sexual Activity    Alcohol use: Not Currently     Alcohol/week: 3.0 standard drinks of alcohol     Types: 2 Glasses of wine, 1 Cans of beer per week     Comment: Occasional    Drug use: Never    Sexual activity: Not Currently     Partners: Female     Birth control/protection: None     Review of Systems   Constitutional:  Negative for chills and fever.   HENT:  Negative for congestion.    Respiratory:  Negative for shortness of breath.    Cardiovascular:  Negative for chest pain, palpitations and leg swelling.   Gastrointestinal:  Negative for abdominal pain, diarrhea, nausea and vomiting.   Musculoskeletal:  Positive for back pain.   Skin:  Negative for wound.   Neurological:  Positive for dizziness and headaches.   All other systems reviewed and are negative.    Objective:     Vital Signs (Most Recent):  Temp: 98.2 °F (36.8 °C) (25 1212)  Pulse: 66 (25 1335)  Resp: 18 (25 1212)  BP: 117/63 (25 1335)  SpO2: 95 % (25 1212) Vital Signs (24h Range):  Temp:  [97.4 °F (36.3 °C)-98.6 °F (37 °C)] 98.2 °F (36.8 °C)  Pulse:  [52-66] 66  Resp:  [18-20] 18  SpO2:  [95 %] 95 %  BP: (107-124)/(49-63)  117/63     Weight: 68.2 kg (150 lb 5.7 oz)  Body mass index is 20.97 kg/m².     Physical Exam  Vitals and nursing note reviewed.   Constitutional:       General: He is awake.      Appearance: Normal appearance.   HENT:      Head: Atraumatic.      Right Ear: External ear normal.      Left Ear: External ear normal.      Mouth/Throat:      Mouth: Mucous membranes are moist.   Cardiovascular:      Rate and Rhythm: Normal rate and regular rhythm.      Pulses: Normal pulses.      Heart sounds: Normal heart sounds.   Pulmonary:      Effort: Pulmonary effort is normal. No respiratory distress.   Abdominal:      General: Abdomen is flat. Bowel sounds are normal. There is no distension.      Palpations: Abdomen is soft.   Musculoskeletal:         General: Normal range of motion.      Cervical back: Normal range of motion and neck supple.      Right lower leg: No edema.      Left lower leg: No edema.   Skin:     General: Skin is warm and dry.      Comments: Crani sites bilaterally with HV drains    Neurological:      General: No focal deficit present.      Mental Status: He is alert and oriented to person, place, and time. Mental status is at baseline.   Psychiatric:         Mood and Affect: Mood normal.         Behavior: Behavior normal. Behavior is cooperative.          Significant Labs: All pertinent labs within the past 24 hours have been reviewed.    Significant Imaging: I have reviewed all pertinent imaging results/findings within the past 24 hours.  Assessment/Plan:     Bradycardia  85-year-old male w/ A-fib, BPH, HTN, hypothyroidism, CKD 3b, Ulcerative Colitis, CAD, Chronic diastolic heart failure, NPH s/p  shunt who presented to the ED for worsening bitemporal headaches. Now s/p bilateral mini cranis for SDH evacuation and tying off of distal shunt catheter at right clavicle on 3/24. Concern for orthostatic hypotension per primary team. Suspect soft blood pressures 2/2 likely medication induce and likely at  baseline. Bradycardia is likely chronic and known per family. EKG with normal sinus rhythm.    - hold Amlodipine and Flomax in the setting of soft blood pressure  - continue Metoprolol; can hold if HR <50    PAF (paroxysmal atrial fibrillation)  Patient has paroxysmal (<7 days) atrial fibrillation. Patient is currently in sinus rhythm. [id number] Score: 3. The patients heart rate in the last 24 hours is as follows:  Pulse  Min: 52  Max: 66     Antiarrhythmics  metoprolol succinate (TOPROL-XL) 24 hr tablet 25 mg, Daily, Oral    Anticoagulants  enoxaparin injection 40 mg, Every 24 hours, Subcutaneous    Plan  - Replete lytes with a goal of K>4, Mg >2  - Patient is anticoagulated, see medications listed above.  - Patient's afib is currently controlled  - continue metoprolol; hold if HR <50        Essential hypertension  Patient's blood pressure range in the last 24 hours was: BP  Min: 107/49  Max: 124/61.The patient's inpatient anti-hypertensive regimen is listed below:  Current Antihypertensives  hydrALAZINE injection 10 mg, Every 6 hours PRN, Intravenous  metoprolol succinate (TOPROL-XL) 24 hr tablet 25 mg, Daily, Oral    Plan  - BP is controlled, no changes needed to their regimen  - Holding amlodipine given normal blood pressures.    Acquired hypothyroidism  Continue home Synthyroid        VTE Risk Mitigation (From admission, onward)           Ordered     enoxaparin injection 40 mg  Every 24 hours         03/24/25 2776     Reason for No Pharmacological VTE Prophylaxis  Once        Question:  Reasons:  Answer:  Patient is Ambulatory    03/21/25 2008     IP VTE HIGH RISK PATIENT  Once         03/21/25 2008     Place sequential compression device  Until discontinued         03/21/25 2008     Place HARIKA hose  Until discontinued         03/21/25 2008                        Thank you for your consult. I will follow-up with patient. Please contact us if you have any additional questions.    Gerardo Sotelo DO  Department of  [facility] Medicine   [facility] - Neurosurgery ([facility])

## 2025-03-27 NOTE — PT/OT/SLP PROGRESS
Occupational Therapy  Co- Treatment    Name: Ramesh Ricci  MRN: [id number]  Admitting Diagnosis:  Subdural hematoma, chronic  3 Days Post-Op    Recommendations:     Discharge Recommendations: High Intensity Therapy  Discharge Equipment Recommendations:  none  Barriers to discharge:  None    Assessment:     Ramesh Ricci is a 85 y.o. male with a medical diagnosis of Subdural hematoma, chronic.  He presents with increased dizziness with functional mobility. D/t this. vitals taken in sitting and immediately standing (see below for details) with c/o increased dizziness. Noted nystagmus primarily at fixed points in left visual field. Patient with bradykinesia and generalized slowing noted requiring significantly increased time to brush teeth in sitting today. Performance deficits affecting function are impaired endurance, impaired self care skills, impaired functional mobility, gait instability, impaired balance, decreased safety awareness, decreased lower extremity function, decreased upper extremity function, impaired cognition, impaired skin. Patient has demonstrated sufficient progression to warrant high intensity therapy evidenced by objectives noted below.     Vitals as follows:  Sittin/63 (81) HR 58  Standin/55 (68) HR 67    Rehab Prognosis:  Good; patient would benefit from acute skilled OT services to address these deficits and reach maximum level of function.       Plan:     Patient to be seen 4 x/week to address the above listed problems via self-care/home management, therapeutic activities, therapeutic exercises, neuromuscular re-education  Plan of Care Expires: 25  Plan of Care Reviewed with: patient    Subjective     Chief Complaint: dizziness  Patient/Family Comments/goals:   Pain/Comfort:  Pain Rating 1: other (see comments) (no pain, dizziness present)  Pain Rating Post-Intervention 1: other (see comments)    Objective:   Co-treatment with PT performed for patient safety,  education, and facilitation of treatment to maximize activity tolerance and progression towards goals from two skilled therapy disciplines.    Communicated with: nursing prior to session.  Patient found up in chair with telemetry, hemovac, larry catheter upon OT entry to room.    General Precautions: Standard, fall, hearing impaired    Orthopedic Precautions:N/A  Braces: N/A  Respiratory Status: Room air     Occupational Performance:     Functional Mobility/Transfers:  Patient completed Sit <> Stand Transfer with minimum assistance  with  rolling walker x2 reps  Functional Mobility: Patient ambulated ~8 ft with RW and min A; veering to R noted with difficulty navigating    Activities of Daily Living:  Grooming: contact guard assistance to brush teeth in sitting; substantial time required d/t perseveration on task  Upper Body Dressing: contact guard assistance to don gown like jacket      [facility] 6 Click ADL: 14    Treatment & Education:    Patient educated on:   -purpose of OT and OT POC  -facilitation and education on proper body mechanics, energy conservation, and safety  -importance of early mobility and out of bed activities with staff assist  -overall benefits of therapy     All questions answered within OT scope and to patient's satisfaction    Patient left up in chair with all lines intact, call button in reach, chair alarm on, and nursing notified    GOALS:   Multidisciplinary Problems       Occupational Therapy Goals          Problem: Occupational Therapy    Goal Priority Disciplines Outcome Interventions   Occupational Therapy Goal     OT, PT/OT Progressing    Description: Goals to be met by: 4/8/2025     Patient will increase functional independence with ADLs by performing:    UE Dressing with Set-up Assistance.  LE Dressing with Stand-by Assistance.  Grooming while standing at sink with Stand-by Assistance.  Toileting from toilet with Stand-by Assistance for hygiene and clothing management.   Supine to sit  with Stand-by Assistance.  Step transfer with Stand-by Assistance using LRAD.  Toilet transfer to toilet with Stand-by Assistance using LRAD.                         DME Justifications:   Ramesh Yo requires a commode for home use because he is confined to a single room.  Ramesh Ricci has a mobility limitation that significantly impairs his ability to participate in one or more mobility related activities of daily living (MRADLs) such as toileting, feeding, dressing, grooming, and bathing in customary locations in the home.  The mobility limitation cannot be sufficiently resolved by the use of a cane or walker.   The use of a manual wheelchair will significantly improve the patients ability to participate in MRADLS and the patient will use it on regular basis in the home.  Ramesh Ricci has expressed his willingness to use a manual wheelchair in the home. Patients upper body strength is sufficient for propulsion.  He also has a caregiver who is available, willing, and able to provide assistance with the wheelchair when needed.     Ramesh Yo's mobility limitation cannot be sufficiently resolved by the use of a cane. His functional mobility deficit can be sufficiently resolved with the use of a Rolling Walker. Patient's mobility limitation significantly impairs their ability to participate in one of more activities of daily living.  The use of a RW will significantly improve the patient's ability to participate in MRADLS and the patient will use it on regular basis in the home.    Time Tracking:     OT Date of Treatment: 03/27/25  OT Start Time: 1150  OT Stop Time: 1224  OT Total Time (min): 34 min    Billable Minutes:Self Care/Home Management 20  Therapeutic Exercise 14    OT/CARLOS: OT          3/27/2025

## 2025-03-27 NOTE — HPI
"85-year-old male w/ A-fib, BPH, HTN, hypothyroidism, CKD 3b, Ulcerative Colitis, CAD, Chronic diastolic heart failure, NPH s/p  shunt who presented to the ED for worsening bitemporal headaches. Of note, patient had prior CT scans showing stable subdural hygromas. He was seen in clinic on 3/07 where shunt was reprogrammed to 200 but patient continue to have headaches. CT head upon admission showed interval stability of the bilateral subdural hygromas. Now s/p bilateral mini cranis for SDH evacuation and tying off of distal shunt catheter at right clavicle on 3/24. On 3/27, patient began to have softer blood pressures with known bradycardia. Hospital medicine consulted for "Orthostatic hypotension''.    Upon examination, patient reports having dizziness and headache that has been ongoing prior to admission. Currently complaining of back pain from laying in bed. Denies any chest pain, shortness of breath, fever, chills, abd pain, nausea, vomiting, diarrhea, palpitation. Per wife, baseline heart rate is in the high 50s which is known.  "

## 2025-03-28 PROBLEM — I95.1 ORTHOSTATIC HYPOTENSION: Status: ACTIVE | Noted: 2025-03-28

## 2025-03-28 PROBLEM — E46 HYPOALBUMINEMIA DUE TO PROTEIN-CALORIE MALNUTRITION: Status: ACTIVE | Noted: 2025-03-26

## 2025-03-28 PROBLEM — N17.9 AKI (ACUTE KIDNEY INJURY): Status: RESOLVED | Noted: 2025-03-26 | Resolved: 2025-03-28

## 2025-03-28 PROBLEM — E83.51 HYPOCALCEMIA: Status: ACTIVE | Noted: 2025-03-28

## 2025-03-28 LAB
ABSOLUTE EOSINOPHIL (OHS): 0.13 K/UL
ABSOLUTE MONOCYTE (OHS): 0.88 K/UL (ref 0.3–1)
ABSOLUTE NEUTROPHIL COUNT (OHS): 5.02 K/UL (ref 1.8–7.7)
ALBUMIN SERPL BCP-MCNC: 2.7 G/DL (ref 3.5–5.2)
ALP SERPL-CCNC: 51 UNIT/L (ref 40–150)
ALT SERPL W/O P-5'-P-CCNC: <5 UNIT/L (ref 10–44)
ANION GAP (OHS): 9 MMOL/L (ref 8–16)
AST SERPL-CCNC: 13 UNIT/L (ref 11–45)
BASOPHILS # BLD AUTO: 0.03 K/UL
BASOPHILS NFR BLD AUTO: 0.4 %
BILIRUB SERPL-MCNC: 0.2 MG/DL (ref 0.1–1)
BUN SERPL-MCNC: 22 MG/DL (ref 8–23)
CALCIUM SERPL-MCNC: 8.4 MG/DL (ref 8.7–10.5)
CHLORIDE SERPL-SCNC: 106 MMOL/L (ref 95–110)
CO2 SERPL-SCNC: 24 MMOL/L (ref 23–29)
CREAT SERPL-MCNC: 1.1 MG/DL (ref 0.5–1.4)
ERYTHROCYTE [DISTWIDTH] IN BLOOD BY AUTOMATED COUNT: 13.2 % (ref 11.5–14.5)
GFR SERPLBLD CREATININE-BSD FMLA CKD-EPI: >60 ML/MIN/1.73/M2
GLUCOSE SERPL-MCNC: 90 MG/DL (ref 70–110)
HCT VFR BLD AUTO: 34.7 % (ref 40–54)
HGB BLD-MCNC: 10.7 GM/DL (ref 14–18)
IMM GRANULOCYTES # BLD AUTO: 0.02 K/UL (ref 0–0.04)
IMM GRANULOCYTES NFR BLD AUTO: 0.3 % (ref 0–0.5)
LYMPHOCYTES # BLD AUTO: 1 K/UL (ref 1–4.8)
MCH RBC QN AUTO: 29.5 PG (ref 27–50)
MCHC RBC AUTO-ENTMCNC: 30.8 G/DL (ref 32–36)
MCV RBC AUTO: 96 FL (ref 82–98)
NUCLEATED RBC (/100WBC) (OHS): 0 /100 WBC
PLATELET # BLD AUTO: 136 K/UL (ref 150–450)
PMV BLD AUTO: 12 FL (ref 9.2–12.9)
POCT GLUCOSE: 102 MG/DL (ref 70–110)
POCT GLUCOSE: 105 MG/DL (ref 70–110)
POCT GLUCOSE: 86 MG/DL (ref 70–110)
POCT GLUCOSE: 94 MG/DL (ref 70–110)
POTASSIUM SERPL-SCNC: 4.4 MMOL/L (ref 3.5–5.1)
PROT SERPL-MCNC: 5.9 GM/DL (ref 6–8.4)
RBC # BLD AUTO: 3.63 M/UL (ref 4.6–6.2)
RELATIVE EOSINOPHIL (OHS): 1.8 %
RELATIVE LYMPHOCYTE (OHS): 14.1 % (ref 18–48)
RELATIVE MONOCYTE (OHS): 12.4 % (ref 4–15)
RELATIVE NEUTROPHIL (OHS): 71 % (ref 38–73)
SODIUM SERPL-SCNC: 139 MMOL/L (ref 136–145)
WBC # BLD AUTO: 7.08 K/UL (ref 3.9–12.7)

## 2025-03-28 PROCEDURE — 11000001 HC ACUTE MED/SURG PRIVATE ROOM

## 2025-03-28 PROCEDURE — 63600175 PHARM REV CODE 636 W HCPCS

## 2025-03-28 PROCEDURE — 97116 GAIT TRAINING THERAPY: CPT

## 2025-03-28 PROCEDURE — 99024 POSTOP FOLLOW-UP VISIT: CPT | Mod: ,,, | Performed by: PHYSICIAN ASSISTANT

## 2025-03-28 PROCEDURE — 25000003 PHARM REV CODE 250

## 2025-03-28 PROCEDURE — 25000003 PHARM REV CODE 250: Performed by: STUDENT IN AN ORGANIZED HEALTH CARE EDUCATION/TRAINING PROGRAM

## 2025-03-28 PROCEDURE — 97530 THERAPEUTIC ACTIVITIES: CPT

## 2025-03-28 PROCEDURE — 97110 THERAPEUTIC EXERCISES: CPT

## 2025-03-28 PROCEDURE — 85025 COMPLETE CBC W/AUTO DIFF WBC: CPT

## 2025-03-28 PROCEDURE — 36415 COLL VENOUS BLD VENIPUNCTURE: CPT

## 2025-03-28 PROCEDURE — 84075 ASSAY ALKALINE PHOSPHATASE: CPT

## 2025-03-28 PROCEDURE — 25000003 PHARM REV CODE 250: Performed by: PHYSICIAN ASSISTANT

## 2025-03-28 RX ORDER — SILODOSIN 4 MG/1
4 CAPSULE ORAL DAILY
Status: DISCONTINUED | OUTPATIENT
Start: 2025-03-28 | End: 2025-04-01 | Stop reason: HOSPADM

## 2025-03-28 RX ADMIN — OXYCODONE HYDROCHLORIDE 10 MG: 10 TABLET ORAL at 09:03

## 2025-03-28 RX ADMIN — AMITRIPTYLINE HYDROCHLORIDE 10 MG: 10 TABLET, FILM COATED ORAL at 09:03

## 2025-03-28 RX ADMIN — ENOXAPARIN SODIUM 40 MG: 40 INJECTION SUBCUTANEOUS at 04:03

## 2025-03-28 RX ADMIN — LACOSAMIDE 100 MG: 100 TABLET, FILM COATED ORAL at 09:03

## 2025-03-28 RX ADMIN — MEMANTINE 10 MG: 10 TABLET ORAL at 09:03

## 2025-03-28 RX ADMIN — DOCUSATE SODIUM 100 MG: 100 CAPSULE, LIQUID FILLED ORAL at 09:03

## 2025-03-28 RX ADMIN — ACETAMINOPHEN 1000 MG: 500 TABLET ORAL at 09:03

## 2025-03-28 RX ADMIN — SILODOSIN 4 MG: 4 CAPSULE ORAL at 01:03

## 2025-03-28 RX ADMIN — POLYETHYLENE GLYCOL 3350 17 G: 17 POWDER, FOR SOLUTION ORAL at 09:03

## 2025-03-28 RX ADMIN — PANTOPRAZOLE SODIUM 40 MG: 40 TABLET, DELAYED RELEASE ORAL at 09:03

## 2025-03-28 RX ADMIN — CYCLOBENZAPRINE HYDROCHLORIDE 5 MG: 5 TABLET, FILM COATED ORAL at 12:03

## 2025-03-28 RX ADMIN — Medication 6 MG: at 09:03

## 2025-03-28 RX ADMIN — METHOCARBAMOL 500 MG: 500 TABLET ORAL at 09:03

## 2025-03-28 RX ADMIN — MUPIROCIN: 20 OINTMENT TOPICAL at 09:03

## 2025-03-28 RX ADMIN — METOPROLOL SUCCINATE 25 MG: 25 TABLET, EXTENDED RELEASE ORAL at 09:03

## 2025-03-28 RX ADMIN — CARBAMIDE PEROXIDE 5 DROP: 65 SOLUTION/ DROPS TOPICAL at 09:03

## 2025-03-28 RX ADMIN — CETIRIZINE HYDROCHLORIDE 10 MG: 10 TABLET, FILM COATED ORAL at 09:03

## 2025-03-28 RX ADMIN — METHOCARBAMOL 500 MG: 500 TABLET ORAL at 03:03

## 2025-03-28 RX ADMIN — PRAVASTATIN SODIUM 20 MG: 20 TABLET ORAL at 09:03

## 2025-03-28 RX ADMIN — ACETAMINOPHEN 1000 MG: 500 TABLET ORAL at 06:03

## 2025-03-28 RX ADMIN — LEVOTHYROXINE SODIUM 125 MCG: 0.12 TABLET ORAL at 06:03

## 2025-03-28 RX ADMIN — ACETAMINOPHEN 1000 MG: 500 TABLET ORAL at 01:03

## 2025-03-28 NOTE — SUBJECTIVE & OBJECTIVE
Interval History: NAEON. Patient cognitive status similar to that as yesterday. Difficulty with word finding but conversing and answering questions. Patient denies pain. Per wife, PT came by this morning and pt ambulated into [facility] however still orthostatic.     Medications:  Continuous Infusions:  Scheduled Meds:   acetaminophen  1,000 mg Oral Q8H    amitriptyline  10 mg Oral QHS    carbamide peroxide  5 drop Left Ear BID    cetirizine  10 mg Oral Daily    docusate sodium  100 mg Oral BID    enoxparin  40 mg Subcutaneous Q24H (prophylaxis, 1700)    lacosamide  100 mg Oral Q12H    levothyroxine  125 mcg Oral Before breakfast    melatonin  6 mg Oral Nightly    memantine  10 mg Oral BID    methocarbamoL  500 mg Oral TID    [START ON 3/29/2025] metoprolol succinate  12.5 mg Oral Daily    mupirocin   Nasal BID    pantoprazole  40 mg Oral Daily    polyethylene glycol  17 g Oral BID    pravastatin  20 mg Oral QHS     PRN Meds:  Current Facility-Administered Medications:     cyclobenzaprine, 5 mg, Oral, TID PRN    dextrose 50%, 12.5 g, Intravenous, PRN    dextrose 50%, 25 g, Intravenous, PRN    glucagon (human recombinant), 1 mg, Intramuscular, PRN    glucose, 16 g, Oral, PRN    glucose, 16 g, Oral, PRN    glucose, 24 g, Oral, PRN    hydrALAZINE, 10 mg, Intravenous, Q6H PRN    insulin aspart U-100, 0-5 Units, Subcutaneous, QID (AC + HS) PRN    ondansetron, 4 mg, Intravenous, Q6H PRN    oxyCODONE, 5 mg, Oral, Q4H PRN    oxyCODONE, 10 mg, Oral, Q4H PRN     Review of Systems  Objective:     Weight: 68.2 kg (150 lb 5.7 oz)  Body mass index is 20.97 kg/m².  Vital Signs (Most Recent):  Temp: 97.9 °F (36.6 °C) (03/28/25 0734)  Pulse: (!) 55 (03/28/25 0911)  Resp: 18 (03/28/25 0911)  BP: 138/76 (03/28/25 0911)  SpO2: 97 % (03/28/25 0734) Vital Signs (24h Range):  Temp:  [97.4 °F (36.3 °C)-98.3 °F (36.8 °C)] 97.9 °F (36.6 °C)  Pulse:  [55-68] 55  Resp:  [18] 18  SpO2:  [94 %-97 %] 97 %  BP: (116-146)/(63-80) 138/76                    "   Urethral Catheter 03/25/25 2315 Silicone 16 Fr. (Active)   $ Molina Insertion Bedside Insertion Performed 03/25/25 2315   Site Assessment Clean;Intact 03/28/25 0720   Collection Container Urimeter 03/28/25 0720   Securement Method secured to top of thigh w/ adhesive device 03/28/25 0720   Catheter Care Performed yes 03/28/25 0720   Reason for Continuing Urinary Catheterization Urinary retention 03/28/25 0720   CAUTI Prevention Bundle Securement Device in place with 1" slack;Intact seal between catheter & drainage tubing;Drainage bag/urimeter off the floor;Sheeting clip in use;No dependent loops or kinks;Drainage bag/urimeter not overfilled (<2/3 full);Drainage bag/urimeter below bladder 03/28/25 0720   Output (mL) 950 mL 03/28/25 0610        Neurosurgery Physical Exam  General: well developed, well nourished. Answers questions when prompted.  Pulmonary: quiet, deep respirations, no signs of respiratory distress  Skin: Skin is warm and dry. Incision sites intact with staples. No purulent discharge or diffuse tenderness     Neuro:   Mental Status: AAOx4. No aphasia, no dysarthria - higher order confusion.   CN: PERRL, EOMI other than R VI palsy, sensation intact bilaterally, eyebrow raise and grimace symmetric, tongue midline. Hard of hearing  Motor: moves all extremities spontaneously, full strength throughout, no pronator drift   Sensory: intact to light touch throughout       Significant Labs:  Recent Labs   Lab 03/26/25  1555 03/27/25  0512 03/28/25  0313    137 139   K 4.2 4.5 4.4    106 106   CO2 24 23 24   BUN 26* 25* 22   CREATININE 1.5* 1.2 1.1   CALCIUM 8.4* 8.3* 8.4*     Recent Labs   Lab 03/27/25  0512 03/28/25  0313   WBC 7.89 7.08   HGB 10.7* 10.7*   HCT 33.6* 34.7*   * 136*     No results for input(s): "LABPT", "INR", "APTT" in the last 48 hours.  Microbiology Results (last 7 days)       ** No results found for the last 168 hours. **          All pertinent labs from the last 24 " hours have been reviewed.    Significant Diagnostics:  I have reviewed and interpreted all pertinent imaging results/findings within the past 24 hours.

## 2025-03-28 NOTE — SUBJECTIVE & OBJECTIVE
Interval History: NAEON. Patient's blood pressure stable overnight. Patient had positive orthostatic vitals this morning. Still continues to have a headache and some dizziness.    Review of Systems  Objective:     Vital Signs (Most Recent):  Temp: 97.6 °F (36.4 °C) (03/28/25 1139)  Pulse: (!) 57 (03/28/25 1147)  Resp: 18 (03/28/25 1139)  BP: (!) 145/75 (03/28/25 1139)  SpO2: 99 % (03/28/25 1139) Vital Signs (24h Range):  Temp:  [97.4 °F (36.3 °C)-98.3 °F (36.8 °C)] 97.6 °F (36.4 °C)  Pulse:  [55-68] 57  Resp:  [18] 18  SpO2:  [94 %-99 %] 99 %  BP: (116-146)/(63-80) 145/75     Weight: 68.2 kg (150 lb 5.7 oz)  Body mass index is 20.97 kg/m².    Intake/Output Summary (Last 24 hours) at 3/28/2025 1153  Last data filed at 3/28/2025 0610  Gross per 24 hour   Intake 150 ml   Output 1850 ml   Net -1700 ml         Physical Exam  Vitals and nursing note reviewed.   Constitutional:       General: He is awake.      Appearance: Normal appearance.   HENT:      Head: Atraumatic.      Right Ear: External ear normal.      Left Ear: External ear normal.      Mouth/Throat:      Mouth: Mucous membranes are moist.   Cardiovascular:      Rate and Rhythm: Normal rate and regular rhythm.      Pulses: Normal pulses.      Heart sounds: Normal heart sounds.   Pulmonary:      Effort: Pulmonary effort is normal. No respiratory distress.   Abdominal:      General: Abdomen is flat. Bowel sounds are normal. There is no distension.      Palpations: Abdomen is soft.   Musculoskeletal:         General: Normal range of motion.      Cervical back: Normal range of motion and neck supple.      Right lower leg: No edema.      Left lower leg: No edema.   Skin:     General: Skin is warm and dry.      Comments: Crani sites bilaterally with HV drains    Neurological:      General: No focal deficit present.      Mental Status: He is alert and oriented to person, place, and time. Mental status is at baseline.   Psychiatric:         Mood and Affect: Mood normal.          Behavior: Behavior normal. Behavior is cooperative.               Significant Labs: All pertinent labs within the past 24 hours have been reviewed.    Significant Imaging: I have reviewed all pertinent imaging results/findings within the past 24 hours.

## 2025-03-28 NOTE — ASSESSMENT & PLAN NOTE
Patient's blood pressure range in the last 24 hours was: BP  Min: 116/66  Max: 146/80.The patient's inpatient anti-hypertensive regimen is listed below:  Current Antihypertensives  hydrALAZINE injection 10 mg, Every 6 hours PRN, Intravenous  metoprolol succinate (TOPROL-XL) 24 hr split tablet 12.5 mg, Daily, Oral    - Per hospital med recommendations and stable BP, hold amlodipine   - Continuing medical co management with hospital medicine and appreciate recs

## 2025-03-28 NOTE — ASSESSMENT & PLAN NOTE
Creatine stable for now. BMP reviewed- noted Estimated Creatinine Clearance: 45.6 mL/min (based on SCr of 1.1 mg/dL). according to latest data. Based on current GFR, CKD stage is stage 3 - GFR 30-59.  Monitor UOP and serial BMP and adjust therapy as needed. Renally dose meds. Avoid nephrotoxic medications and procedures.

## 2025-03-28 NOTE — PLAN OF CARE
Problem: Adult Inpatient Plan of Care  Goal: Plan of Care Review  Outcome: Progressing  Goal: Patient-Specific Goal (Individualized)  Outcome: Progressing  Goal: Absence of Hospital-Acquired Illness or Injury  Outcome: Progressing  Goal: Optimal Comfort and Wellbeing  Outcome: Progressing  Goal: Readiness for Transition of Care  Outcome: Progressing           POC reviewed and updated with the patient at the bedside. Questions regarding POC were encouraged and addressed. VSS, see flowsheets.  NAEON.  Patient is AOx 4 at this time. Seizure, fall, and safety precautions maintained, no signs of injury noted during shift.  Pain management utilizing PRN pain medications, see MAR for administration details. Upon exiting room, patient's bed locked in low position, side rails up x 3, bed alarm set, with call light within reach. Instructed patient to call staff for mobility, verbalized understanding.  No acute signs of distress noted at this time.

## 2025-03-28 NOTE — ASSESSMENT & PLAN NOTE
Baseline creatinine is unknown. Most recent creatinine and eGFR are listed below.  Recent Labs     03/26/25  1555 03/27/25  0512 03/28/25  0313   CREATININE 1.5* 1.2 1.1   EGFRNORACEVR 45* 59* >60        Plan  - Avoid nephrotoxins and renally dose meds for GFR listed above.  - Monitor urine output, serial BMP, and adjust therapy as needed.

## 2025-03-28 NOTE — PT/OT/SLP PROGRESS
"Occupational Therapy   Treatment    Name: Ramesh Ricci  MRN: [id number]  Admitting Diagnosis:  Subdural hematoma, chronic  4 Days Post-Op    Recommendations:     Discharge Recommendations: High Intensity Therapy  Discharge Equipment Recommendations:  hospital bed  Barriers to discharge:  None    Assessment:     Ramesh Ricci is a 85 y.o. male with a medical diagnosis of Subdural hematoma, chronic.  He presents symptomatic throughout session reporting feeling "terrible". Orhtostatic vitals obtained d/t extreme dizzines yesterday. Performance deficits affecting function are weakness, impaired endurance, impaired self care skills, impaired functional mobility, decreased lower extremity function, decreased upper extremity function, impaired fine motor, decreased safety awareness, impaired balance, gait instability, pain, impaired skin, impaired cognition, impaired cardiopulmonary response to activity.     Vitals as follows during session:   HOB elevated at start of session: 162/72 (102) HR 60  Sitting EOB: 145/65 (91) HR 63  Standin/66 (94) HR 69  Sitting after first mobility trial: 119/66 (84) HR 69  Standing after     Rehab Prognosis:  Good; patient would benefit from acute skilled OT services to address these deficits and reach maximum level of function.       Plan:     Patient to be seen 4 x/week to address the above listed problems via self-care/home management, therapeutic activities, therapeutic exercises, neuromuscular re-education  Plan of Care Expires: 25  Plan of Care Reviewed with: patient    Subjective     Chief Complaint: "Terrible" (when asked most questions)  Patient/Family Comments/goals: get better  Pain/Comfort:  Pain Rating 1: other (see comments) (unrated)  Location - Side 1: Left  Location - Orientation 1: generalized  Location 1: arm (during BP measurements)  Pain Addressed 1: Reposition, Distraction, Cessation of Activity  Pain Rating Post-Intervention 1: other (see " comments)    Objective:     Communicated with: nursing prior to session.  Patient found HOB elevated with telemetry, larry catheter upon OT entry to room.    General Precautions: Standard, fall, hearing impaired    Orthopedic Precautions:N/A  Braces: N/A  Respiratory Status: Room air     Occupational Performance:     Bed Mobility:    Patient completed Supine to Sit with minimum assistance   Patient sat EOB for ~ 5 minutes with Minimal Assistance with anterior lean and kyphotic posture    Functional Mobility/Transfers:  Patient completed Sit <> Stand Transfer with minimum assistance  with  rolling walker x2 reps; increased time required  Functional Mobility: Patient ambulated ~16 ft, ~9 ft with min A regressing to mod A and RW     Activities of Daily Living:  Feeding:  supervision with breakfast at start of session; prolonged mastication noted  Upper Body Dressing: minimum assistance to don gown like jacket      [facility] 6 Click ADL: 14    Treatment & Education:  Patient educated on:   -purpose of OT and OT POC  -facilitation and education on proper body mechanics, energy conservation, and safety  -importance of early mobility and out of bed activities with staff assist  -overall benefits of therapy     All questions answered within OT scope and to patient's satisfaction    Patient left up in chair with all lines intact, call button in reach, chair alarm on, and nursing notified    GOALS:   Multidisciplinary Problems       Occupational Therapy Goals          Problem: Occupational Therapy    Goal Priority Disciplines Outcome Interventions   Occupational Therapy Goal     OT, PT/OT Progressing    Description: Goals to be met by: 4/8/2025     Patient will increase functional independence with ADLs by performing:    UE Dressing with Set-up Assistance.  LE Dressing with Stand-by Assistance.  Grooming while standing at sink with Stand-by Assistance.  Toileting from toilet with Stand-by Assistance for hygiene and clothing  management.   Supine to sit with Stand-by Assistance.  Step transfer with Stand-by Assistance using LRAD.  Toilet transfer to toilet with Stand-by Assistance using LRAD.                         DME Justifications:  Ramesh Yo requires a hospital bed due to him requiring positioning of the body in ways not feasible with an ordinary bed due to limited ability and cannot independently make changes in body position without the use of the bed.The positioning of the body cannot be sufficiently resolved by the use of pillows and wedges.    Time Tracking:     OT Date of Treatment: 03/28/25  OT Start Time: 0835  OT Stop Time: 0900  OT Total Time (min): 25 min    Billable Minutes:Therapeutic Activity 10  Therapeutic Exercise 15    OT/CARLOS: OT          3/28/2025

## 2025-03-28 NOTE — ASSESSMENT & PLAN NOTE
Patient's blood pressure range in the last 24 hours was: BP  Min: 116/66  Max: 146/80.The patient's inpatient anti-hypertensive regimen is listed below:  Current Antihypertensives  hydrALAZINE injection 10 mg, Every 6 hours PRN, Intravenous  metoprolol succinate (TOPROL-XL) 24 hr split tablet 12.5 mg, Daily, Oral    Plan  - BP is controlled, no changes needed to their regimen  - Holding amlodipine given normal blood pressures.

## 2025-03-28 NOTE — PROGRESS NOTES
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  Patient was seen in clinic by Dr. Glenn Rivero on 3 7 and 319.  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  The patient denies fever/chills, HA, vision/hearing changes, dysphagia, dysarthria, nausea/vomiting, new-onset weakness or sensory change, bowel/bladder changes.       Post-Op Info:  Procedure(s) (LRB):  EVACUATION, HEMATOMA, SUBDURAL  AND SHUNT TIE OFF (Bilateral)   4 Days Post-Op   Interval History: NAEON. In chair interacting with his spouse on entering room. Patient cognitive status similar to that of yesterday. Difficulty with word finding but conversing and answering questions. Patient denies pain. Per wife, PT came by this morning and pt ambulated into masters however still reporting dizziness with moving.    Medications:  Continuous Infusions:  Scheduled Meds:   acetaminophen  1,000 mg Oral Q8H    amitriptyline  10 mg Oral QHS    carbamide peroxide  5 drop Left Ear BID    cetirizine  10 mg Oral Daily    docusate sodium  100 mg Oral BID    enoxparin  40 mg Subcutaneous Q24H (prophylaxis, 1700)    lacosamide  100 mg Oral Q12H    levothyroxine  125 mcg Oral Before breakfast    melatonin  6 mg Oral Nightly    memantine  10 mg Oral BID    methocarbamoL  500 mg Oral TID    [START ON 3/29/2025] metoprolol succinate  12.5 mg Oral Daily    mupirocin   Nasal BID    pantoprazole  40 mg Oral Daily    polyethylene glycol  17 g Oral BID     "pravastatin  20 mg Oral QHS     PRN Meds:  Current Facility-Administered Medications:     cyclobenzaprine, 5 mg, Oral, TID PRN    dextrose 50%, 12.5 g, Intravenous, PRN    dextrose 50%, 25 g, Intravenous, PRN    glucagon (human recombinant), 1 mg, Intramuscular, PRN    glucose, 16 g, Oral, PRN    glucose, 16 g, Oral, PRN    glucose, 24 g, Oral, PRN    hydrALAZINE, 10 mg, Intravenous, Q6H PRN    insulin aspart U-100, 0-5 Units, Subcutaneous, QID (AC + HS) PRN    ondansetron, 4 mg, Intravenous, Q6H PRN    oxyCODONE, 5 mg, Oral, Q4H PRN    oxyCODONE, 10 mg, Oral, Q4H PRN     Review of Systems  Objective:     Weight: 68.2 kg (150 lb 5.7 oz)  Body mass index is 20.97 kg/m².  Vital Signs (Most Recent):  Temp: 97.9 °F (36.6 °C) (03/28/25 0734)  Pulse: (!) 55 (03/28/25 0911)  Resp: 18 (03/28/25 0911)  BP: 138/76 (03/28/25 0911)  SpO2: 97 % (03/28/25 0734) Vital Signs (24h Range):  Temp:  [97.4 °F (36.3 °C)-98.3 °F (36.8 °C)] 97.9 °F (36.6 °C)  Pulse:  [55-68] 55  Resp:  [18] 18  SpO2:  [94 %-97 %] 97 %  BP: (116-146)/(63-80) 138/76                      Urethral Catheter 03/25/25 2315 Silicone 16 Fr. (Active)   $ Molina Insertion Bedside Insertion Performed 03/25/25 2315   Site Assessment Clean;Intact 03/28/25 0720   Collection Container Urimeter 03/28/25 0720   Securement Method secured to top of thigh w/ adhesive device 03/28/25 0720   Catheter Care Performed yes 03/28/25 0720   Reason for Continuing Urinary Catheterization Urinary retention 03/28/25 0720   CAUTI Prevention Bundle Securement Device in place with 1" slack;Intact seal between catheter & drainage tubing;Drainage bag/urimeter off the floor;Sheeting clip in use;No dependent loops or kinks;Drainage bag/urimeter not overfilled (<2/3 full);Drainage bag/urimeter below bladder 03/28/25 0720   Output (mL) 950 mL 03/28/25 0610        Neurosurgery Physical Exam  General: well developed, well nourished. Answers questions when prompted.  Pulmonary: quiet, deep " "respirations, no signs of respiratory distress  Skin: Skin is warm and dry. Incision sites intact with staples. No purulent discharge or diffuse tenderness     Neuro:   Mental Status: AAOx4. No aphasia, no dysarthria - higher order confusion.   CN: PERRL, EOMI other than R VI palsy, sensation intact bilaterally, eyebrow raise and grimace symmetric, tongue midline. Hard of hearing  Motor: moves all extremities spontaneously, full strength throughout, no pronator drift   Sensory: intact to light touch throughout       Significant Labs:  Recent Labs   Lab 03/26/25  1555 03/27/25  0512 03/28/25  0313    137 139   K 4.2 4.5 4.4    106 106   CO2 24 23 24   BUN 26* 25* 22   CREATININE 1.5* 1.2 1.1   CALCIUM 8.4* 8.3* 8.4*     Recent Labs   Lab 03/27/25  0512 03/28/25  0313   WBC 7.89 7.08   HGB 10.7* 10.7*   HCT 33.6* 34.7*   * 136*     No results for input(s): "LABPT", "INR", "APTT" in the last 48 hours.  Microbiology Results (last 7 days)       ** No results found for the last 168 hours. **          All pertinent labs from the last 24 hours have been reviewed.    Significant Diagnostics:  I have reviewed and interpreted all pertinent imaging results/findings within the past 24 hours.  Assessment/Plan:     KRISTAN (acute kidney injury)  Baseline creatinine is unknown. Most recent creatinine and eGFR are listed below.  Recent Labs     03/26/25  1555 03/27/25  0512 03/28/25  0313   CREATININE 1.5* 1.2 1.1   EGFRNORACEVR 45* 59* >60        Plan  - Avoid nephrotoxins and renally dose meds for GFR listed above.  - Monitor urine output, serial BMP, and adjust therapy as needed.    Urinary retention  - Molina placed 3/25.  - Voiding trial in 1 week.    S/P ventriculoperitoneal shunt  This is a 85-year-old male with a history of NPH status post right-sided  shunt on 02/06/2025 by Dr. Blankenship, who presents with several days of worsening bitemporal headache. CT head on 03/07/2025 did demonstrate bilateral convexity " "subdural hygromas with mild mass effect.  CT head obtained today demonstrates interval stability of the bilateral subdural hygromas. When the patient saw Emeli Mat in clinic on 03/07/2025 the shunt was reprogrammed to 200, however hygromas were persistent and he had continued headaches.    He is now s/p bilateral mini cranis for SDH evacuation and tying off of distal shunt catheter at right clavicle on 3/24. Postop CTH with expected findings and pneumocephalus.     Repeat Head CT 3/27 for severe headache with expected post operative changes.     Plan:  - Stepped down to nsgy floor.  - q4h neurochecks.  - Multimodal pain control.  - PT/OT. Encourage OOB and transferring as needed for comfort  - Discontinued Keppra due to deviation from cognitive baseline  - Initiated vimpat 100mg as seizure prophylaxis   - DVT ppx: LVX.  - Bowel regimen.  - Molina placed 3/25 for urinary retention. Will plan for voiding trial in 1 week.   - Discussed care plan with patient and HA concerns. With longstanding hx of HA prior to shunt placement, likely will need referral to HA specialist on discharge   - Educated on benefit of high intensity rehab placement      PAF (paroxysmal atrial fibrillation)  Patient has paroxysmal (<7 days) atrial fibrillation. Patient is currently in sinus rhythm. [id number] Score: 3. The patients heart rate in the last 24 hours is as follows:  Pulse  Min: 48  Max: 65     Antiarrhythmics  metoprolol succinate (TOPROL-XL) 24 hr tablet 25 mg, Daily, Oral    Anticoagulants  enoxaparin injection 40 mg, Every 24 hours, Subcutaneous    Plan  - On tele.  - Patient takes Eliquis at home. Hold perioperatively.  - Patient's afib is currently controlled.      Stage 3a chronic kidney disease  Creatine increased today. See "KRISTAN". BMP reviewed- noted Estimated Creatinine Clearance: 31.4 mL/min (A) (based on SCr of 1.6 mg/dL (H)). according to latest data. Based on current GFR, CKD stage is stage 3 - GFR 30-59.  Monitor " UOP and serial BMP and adjust therapy as needed. Renally dose meds. Avoid nephrotoxic medications and procedures.    GERD (gastroesophageal reflux disease)  - Continue Pantoprazole.    Essential hypertension  Patient's blood pressure range in the last 24 hours was: BP  Min: 116/66  Max: 146/80.The patient's inpatient anti-hypertensive regimen is listed below:  Current Antihypertensives  hydrALAZINE injection 10 mg, Every 6 hours PRN, Intravenous  metoprolol succinate (TOPROL-XL) 24 hr split tablet 12.5 mg, Daily, Oral    - Per hospital med recommendations and stable BP, hold amlodipine   - Continuing medical co management with [facility] medicine and appreciate recs    BPH (benign prostatic hyperplasia)  - Per hospital med recs  - Holding Flomax based on soft BP    Acquired hypothyroidism  - Continue Levothyroxine.    Sherman Hernandez PA-S3    Emeli Rivero PA-C  Neurosurgery  Kulwinder Elias - Neurosurgery ([facility])

## 2025-03-28 NOTE — PT/OT/SLP PROGRESS
"Physical Therapy Co-Treatment with OT    Patient Name:  Ramesh Ricci   MRN:  [id number]    Recent Surgery: Procedure(s) (LRB):  EVACUATION, HEMATOMA, SUBDURAL  AND SHUNT TIE OFF (Bilateral) 4 Days Post-Op    Patient required co-tx with OT secondary to need for multiple set of skilled hands to provide safest therapy and best outcomes.      Recommendations:     Discharge Recommendations:    High intensity therapy  Discharge Equipment Recommendations: hospital bed   Barriers to discharge: Increased level of assist    Highest Level of Mobility: Gait 16', then 9'  Assistance Required: Min(A)-mod(A) w/ RW    Assessment:     Ramesh Ricci is a 85 y.o. male admitted with a medical diagnosis of Subdural hematoma, chronic.    Pt met with HOB elevated and agreeable to PT treatment. Today's PT treatment focus was on continued gait training to improve function. Pt c/o feeling "terrible" and dizziness with movement, so vitals were monitored closely and are listed below. He did have significant BP fluctuations during activity and positional changes. Pt demonstrated an anterior trunk lean and anterior LOB today both in sitting and standing. He also ambulates with decreased foot clearance B with increased shuffling on the R LE. He is a high fall risk at this time.    Pt is progressing towards acute PT goals appropriately and continues to benefit from acute PT sessions.     Vitals as follows during session:   HOB elevated at start of session: 162/72 (102) HR 60  Sitting EOB: 145/65 (91) HR 63  Standin/66 (94) HR 69  Sitting after first mobility trial: 119/66 (84) HR 69    Rehab Prognosis: Good; patient would benefit from acute skilled PT services to address these deficits and reach maximum level of function.      Plan:     During this hospitalization, patient to be seen 4 x/week to address the identified rehab impairments via gait training, therapeutic activities, therapeutic exercises, neuromuscular re-education and " "progress toward the following goals:    Plan of Care Expires:  04/11/25    This plan of care has been discussed with the patient/caregiver, who was included in its development and is in agreement with the identified goals and treatment plan.     Subjective     Communicated with RN prior to session.  Patient agreeable to participate.     Pain/Comfort:  Pain Rating 1:  (unrated)  Location - Side 1: Left  Location - Orientation 1: generalized  Location 1: arm (during BP measurements)  Pain Addressed 1: Reposition, Cessation of Activity, Distraction    Chief Complaint: SDH  Patient/Family Comments/goals: "I feel terrible"      Objective:     Patient found HOB elevated with telemetry, larry catheter  upon PT entry to room.    General Precautions: Standard, fall, hearing impaired   Orthopedic Precautions:N/A   Braces: N/A     Exams:    Cognition:  Patient is alert  Follows one-step commands   Insight to deficits/safety awareness: impaired    Functional Mobility:    Bed Mobility:  Supine to Sit: Minimal Assistance on L side of bed  Scooting anteriorly to EOB to plant feet on floor: Minimal Assistance    Transfers:   Sit to Stand Transfer:   Minimal Assistance  from EOB with RW   Increased trunk FL and downward gaze- PT provided cues for correction   Minimal Assistance from bedside chair with RW             Gait:  Patient received gait training in hallway 16' then 9' with Minimal Assistance and Moderate Assistance and rolling walker  Gait Assessment: unsteady gait, decreased step length, narrow base of support, flexed posture, decreased gabby, downward gaze and shuffled gait  Gait Pattern Observed: Step-to  Comments: All lines remained intact throughout ambulation trial, gait belt utilized, chair follow for patient safety. PT provided near constant cues for upright posture, forward gaze, and increased step length    Balance:  Static Sit:   Stand-By Assist at EOB   Static Stand:   Min Assist with Rolling walker  Dynamic " Stand:  Min-mod(A)  with Rolling walker      Therapeutic Activities/Exercises     Patient assisted with functional mobility as noted above  Patient educated on the importance of early mobility to prevent functional decline during hospital stay  Patient was instructed to utilize staff assistance for mobility/transfers.  Patient is appropriate to transfer with Min-mod(A) and RN/PCT assist  Patient educated on PT POC and role of PT in acute care  White board updated regarding patient's safest level of mobility with staff assistance, RN also updated.     AM-PAC 6 CLICK MOBILITY  Turning over in bed (including adjusting bedclothes, sheets and blankets)?: 3  Sitting down on and standing up from a chair with arms (e.g., wheelchair, bedside commode, etc.): 3  Moving from lying on back to sitting on the side of the bed?: 3  Moving to and from a bed to a chair (including a wheelchair)?: 3  Need to walk in hospital room?: 2  Climbing 3-5 steps with a railing?: 1  Basic Mobility Total Score: 15     Patient left up in chair with OT with all lines intact.        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    DDD (degenerative disc disease), cervical 07/05/2017    x-ray 7/17 - Severe    Ex-smoker 12/20/2018    Exudative age-related macular degeneration, right eye, with active choroidal neovascularization 02/15/2024    GERD (gastroesophageal reflux disease) 11/16/2012    Glaucoma 11/16/2012    HTN (hypertension) 11/16/2012    Hypothyroid 11/16/2012    Internal carotid artery stenosis 11/16/2012    Iron deficiency anemia due to chronic blood loss 10/29/2021    Left ventricular diastolic dysfunction, NYHA class 1 04/16/2015    4/15    Low serum testosterone level 11/16/2012    Normal cardiac  stress test 11/16/2012    NPH (normal pressure hydrocephalus) 02/03/2025    Osteopenia 11/16/2012    PAF (paroxysmal atrial fibrillation) 12/20/2018    Prostate CA 01/15/2013    XRT 12/12  Dr. 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    Shingles 11/16/2012    Skin disease 2020    Seems to have started after Covid vaccine    Stage 3a chronic kidney disease 10/06/2014    Stroke 2017    tia   no residual    Thrombocytopenia 01/31/2025    TIA (transient ischemic attack) 11/16/2012    UC (ulcerative colitis) 11/16/2012       Past Surgical History:   Procedure Laterality Date    ADENOIDECTOMY      COLONOSCOPY N/A 03/25/2022    Procedure: COLONOSCOPY;  Surgeon: Ashley Nguyen MD;  Location: [facility];  Service: Endoscopy;  Laterality: N/A;    ESOPHAGOGASTRODUODENOSCOPY N/A 03/25/2022    Procedure: EGD (ESOPHAGOGASTRODUODENOSCOPY);  Surgeon: Ashley Nguyen MD;  Location: [facility];  Service: Endoscopy;  Laterality: N/A;  added on per Dr. Nguyen    ESOPHAGOGASTRODUODENOSCOPY N/A 6/7/2024    Procedure: EGD (ESOPHAGOGASTRODUODENOSCOPY);  Surgeon: Audie Snell MD;  Location: [facility] (4TH FLR);  Service: Endoscopy;  Laterality: N/A;  5/21 R/s,sent updated instr via portal.pt informed to hold eliquis for 2 days.AC  Ref by: ,  approved to hold Eliquis (apixaban) for 2 days per Dr. Conklin-see media file  5/9/24-GT  5/31-pre call complete-tb    EVACUATION OF SUBDURAL HEMATOMA Bilateral 3/24/2025    Procedure: EVACUATION, HEMATOMA, SUBDURAL  AND SHUNT TIE OFF;  Surgeon: Jairo Blankenship MD;  Location: [facility] OR [facility] FLR;  Service: Neurosurgery;  Laterality: Bilateral;  bilateral bobby holes for subdural hygroma and tying off of  shunt at clavicle. to follow other ware cases    EYE SURGERY Right 11/2020    cataract    HERNIA REPAIR      LEFT HEART CATHETERIZATION Right 10/29/2021    Procedure: CATHETERIZATION, HEART, LEFT AND RIGHT  - LV DARNELL POSSIBLE;  Surgeon: Beau  MD Katerin;  Location: [facility] CATH LAB;  Service: Cardiology;  Laterality: Right;    LUMBAR PUNCTURE N/A 1/13/2025    Procedure: Lumbar Puncture;  Surgeon: Jairo Blankenship MD;  [facility];  Service: Neurosurgery;  Laterality: N/A;    RIGHT HEART CATHETERIZATION Right 10/29/2021    Procedure: INSERTION, CATHETER, RIGHT HEART;  Surgeon: Beau Conklin MD;  Location: [facility] CATH LAB;  Service: Cardiology;  Laterality: Right;    SKIN BIOPSY  2020    TONSILLECTOMY      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    Procedure: INSERTION, SHUNT, VENTRICULOPERITONEAL;  Surgeon: Jairo Blankenship MD;  Location: [facility] OR [address] [place] [address];  Service: Neurosurgery;  Laterality: Right;    VENTRICULOPERITONEAL SHUNT  2/6/2025    Procedure: INSERTION, SHUNT, VENTRICULOPERITONEAL;  Surgeon: Shar Elizabeth MD;  Location: [facility] OR [address] [place] [address];  Service: General;;       GOALS:   Multidisciplinary Problems       Physical Therapy Goals          Problem: Physical Therapy    Goal Priority Disciplines Outcome Interventions   Physical Therapy Goal     PT, PT/OT Progressing    Description: Goals to be completed by: 4/11/25    Pt will be able to stand up from EOB w/ stand by assistance using LRAD  Pt will ambulate 50' feet w/ stand by assistance using LRAD  Pt will be independent w/ HEP therex on BLE w/ good form and ROM                        Time Tracking:     PT Received On: 03/28/25  PT Start Time: 0835     PT Stop Time: 0858  PT Total Time (min): 23 min     Billable Minutes: Gait Training 23      Mei Stoner, PT  03/28/2025  Pager# [phone]

## 2025-03-28 NOTE — ASSESSMENT & PLAN NOTE
KRISTAN is likely due to pre-renal azotemia due to intravascular volume depletion. Baseline creatinine is 1.0. Most recent creatinine and eGFR are listed below.  Recent Labs     03/26/25  1555 03/27/25  0512 03/28/25  0313   CREATININE 1.5* 1.2 1.1   EGFRNORACEVR 45* 59* >60      Plan  - KRISTAN is resolved  - Avoid nephrotoxins and renally dose meds for GFR listed above  - Monitor urine output, serial BMP, and adjust therapy as needed

## 2025-03-28 NOTE — PLAN OF CARE
Patient accepted by [facility]. Auth to be submitted for.    Discharge Plan A and Plan B have been determined by review of patient's clinical status, future medical and therapeutic needs, and coverage/benefits for post-acute care in coordination with multidisciplinary team members.     Spoke with son to review discharge recommendation of Rehab and is agreeable to plan    Patient/family provided list of facilities in-network with patient's payor plan. Providers that are owned, operated, or affiliated with [facility] are included on the list.     Notified that referral sent to below listed facilities from in-network list based on proximity to home/family support:   1.[facility]  2.[facility]  Patient/family instructed to identify preference.    Preferred Facility: (if more than 1, listed in order of descending preference)  1.[facility]    Patient has declined to select a preferred provider and elects placement with the first accepting in network provider that is available to provide services as ordered by the physician       If an additional preferred facility not listed above is identified, additional referral to be sent. If above facilities unable to accept, will send additional referrals to in-network providers.     03/28/25 0931   Post-Acute Status   Post-Acute Authorization Placement   Post-Acute Placement Status Pending payor review/awaiting authorization (if required)

## 2025-03-28 NOTE — ASSESSMENT & PLAN NOTE
This is a 85-year-old male with a history of NPH status post right-sided  shunt on 02/06/2025 by Dr. Blankenship, who presents with several days of worsening bitemporal headache. 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  CT head obtained today demonstrates interval stability of the bilateral subdural hygromas. When the patient saw Emeli Rivero in clinic on 03/07/2025 the shunt was reprogrammed to 200, however hygromas were persistent and he had continued headaches.    He is now s/p bilateral mini cranis for SDH evacuation and tying off of distal shunt catheter at right clavicle on 3/24. Postop CTH with expected findings and pneumocephalus.     Repeat Head CT 3/27 for severe headache with expected post operative changes.     Plan:  - Stepped down to nsgy floor.  - q4h neurochecks.  - Multimodal pain control.  - PT/OT. Encourage OOB and transferring as needed for comfort  - Discontinued Keppra due to deviation from cognitive baseline  - Initiated vimpat 100mg as seizure prophylaxis   - DVT ppx: LVX.  - Bowel regimen.  - Molina placed 3/25 for urinary retention. Will plan for voiding trial in 1 week.   - Discussed care plan with patient and HA concerns. With longstanding hx of HA prior to shunt placement, likely will need referral to HA specialist on discharge   - Educated on benefit of high intensity rehab placement

## 2025-03-28 NOTE — ASSESSMENT & PLAN NOTE
85-year-old male w/ A-fib, BPH, HTN, hypothyroidism, CKD 3b, Ulcerative Colitis, CAD, Chronic diastolic heart failure, NPH s/p  shunt who presented to the ED for worsening bitemporal headaches. Now s/p bilateral mini cranis for SDH evacuation and tying off of distal shunt catheter at right clavicle on 3/24. Concern for orthostatic hypotension per primary team. Suspect soft blood pressures 2/2 likely medication induce and likely at baseline. Bradycardia is likely chronic and known per family. EKG with normal sinus rhythm.     - Noted to have orthostatic vitals on 3/28 (see PT/OT note from that day)  - hold Amlodipine and Flomax in the setting of soft blood pressure  - Will decrease Metoprolol to 12.5; can hold if HR <50  - Can consider stopping amitriptyline if patient is still having orthostatic hypotension

## 2025-03-28 NOTE — PROGRESS NOTES
"Kulwinder Elias - Neurosurgery ([facility])  [facility] Medicine  Progress Note    Patient Name: Ramesh Ricci  MRN: [id number]  Patient Class: IP- Inpatient   Admission Date: 3/21/2025  Length of Stay: 7 days  Attending Physician: Jairo Blankenship MD  Primary Care Provider: Nakul Mandujano MD        Subjective     Principal Problem:Subdural hematoma, chronic        HPI:  85-year-old male w/ A-fib, BPH, HTN, hypothyroidism, CKD 3b, Ulcerative Colitis, CAD, Chronic diastolic heart failure, NPH s/p  shunt who presented to the ED for worsening bitemporal headaches. Of note, patient had prior CT scans showing stable subdural hygromas. He was seen in clinic on 3/07 where shunt was reprogrammed to 200 but patient continue to have headaches. CT head upon admission showed interval stability of the bilateral subdural hygromas. Now s/p bilateral mini cranis for SDH evacuation and tying off of distal shunt catheter at right clavicle on 3/24. On 3/27, patient began to have softer blood pressures with known bradycardia. Hospital medicine consulted for "Orthostatic hypotension''.    Upon examination, patient reports having dizziness and headache that has been ongoing prior to admission. Currently complaining of back pain from laying in bed. Denies any chest pain, shortness of breath, fever, chills, abd pain, nausea, vomiting, diarrhea, palpitation. Per wife, baseline heart rate is in the high 50s which is known.    Overview/Hospital Course:  No notes on file    Interval History: NAEON. Patient's blood pressure stable overnight. Patient had positive orthostatic vitals this morning. Still continues to have a headache and some dizziness.    Review of Systems  Objective:     Vital Signs (Most Recent):  Temp: 97.6 °F (36.4 °C) (03/28/25 1139)  Pulse: (!) 57 (03/28/25 1147)  Resp: 18 (03/28/25 1139)  BP: (!) 145/75 (03/28/25 1139)  SpO2: 99 % (03/28/25 1139) Vital Signs (24h Range):  Temp:  [97.4 °F (36.3 °C)-98.3 °F (36.8 °C)] 97.6 °F " (36.4 °C)  Pulse:  [55-68] 57  Resp:  [18] 18  SpO2:  [94 %-99 %] 99 %  BP: (116-146)/(63-80) 145/75     Weight: 68.2 kg (150 lb 5.7 oz)  Body mass index is 20.97 kg/m².    Intake/Output Summary (Last 24 hours) at 3/28/2025 1153  Last data filed at 3/28/2025 0610  Gross per 24 hour   Intake 150 ml   Output 1850 ml   Net -1700 ml         Physical Exam  Vitals and nursing note reviewed.   Constitutional:       General: He is awake.      Appearance: Normal appearance.   HENT:      Head: Atraumatic.      Right Ear: External ear normal.      Left Ear: External ear normal.      Mouth/Throat:      Mouth: Mucous membranes are moist.   Cardiovascular:      Rate and Rhythm: Normal rate and regular rhythm.      Pulses: Normal pulses.      Heart sounds: Normal heart sounds.   Pulmonary:      Effort: Pulmonary effort is normal. No respiratory distress.   Abdominal:      General: Abdomen is flat. Bowel sounds are normal. There is no distension.      Palpations: Abdomen is soft.   Musculoskeletal:         General: Normal range of motion.      Cervical back: Normal range of motion and neck supple.      Right lower leg: No edema.      Left lower leg: No edema.   Skin:     General: Skin is warm and dry.      Comments: Crani sites bilaterally with HV drains    Neurological:      General: No focal deficit present.      Mental Status: He is alert and oriented to person, place, and time. Mental status is at baseline.   Psychiatric:         Mood and Affect: Mood normal.         Behavior: Behavior normal. Behavior is cooperative.               Significant Labs: All pertinent labs within the past 24 hours have been reviewed.    Significant Imaging: I have reviewed all pertinent imaging results/findings within the past 24 hours.      Assessment & Plan  Bradycardia  Orthostatic hypotension  85-year-old male w/ A-fib, BPH, HTN, hypothyroidism, CKD 3b, Ulcerative Colitis, CAD, Chronic diastolic heart failure, NPH s/p  shunt who presented to  the ED for worsening bitemporal headaches. Now s/p bilateral mini cranis for SDH evacuation and tying off of distal shunt catheter at right clavicle on 3/24. Concern for orthostatic hypotension per primary team. Suspect soft blood pressures 2/2 likely medication induce and likely at baseline. Bradycardia is likely chronic and known per family. EKG with normal sinus rhythm.     - Noted to have orthostatic vitals on 3/28 (see PT/OT note from that day)  - hold Amlodipine and Flomax in the setting of soft blood pressure  - Will decrease Metoprolol to 12.5; can hold if HR <50  - Can consider stopping amitriptyline if patient is still having orthostatic hypotension       Acquired hypothyroidism  Continue home Synthyroid    BPH (benign prostatic hyperplasia)  Previously on Flomax    - Switched to Silodosin d/t concerns of orthostatic hypotension    Essential hypertension  Patient's blood pressure range in the last 24 hours was: BP  Min: 116/66  Max: 146/80.The patient's inpatient anti-hypertensive regimen is listed below:  Current Antihypertensives  hydrALAZINE injection 10 mg, Every 6 hours PRN, Intravenous  metoprolol succinate (TOPROL-XL) 24 hr split tablet 12.5 mg, Daily, Oral    Plan  - BP is controlled, no changes needed to their regimen  - Holding amlodipine given normal blood pressures.  GERD (gastroesophageal reflux disease)  Continue Protonix    Stage 3a chronic kidney disease  Creatine stable for now. BMP reviewed- noted Estimated Creatinine Clearance: 45.6 mL/min (based on SCr of 1.1 mg/dL). according to latest data. Based on current GFR, CKD stage is stage 3 - GFR 30-59.  Monitor UOP and serial BMP and adjust therapy as needed. Renally dose meds. Avoid nephrotoxic medications and procedures.  PAF (paroxysmal atrial fibrillation)  Patient has paroxysmal (<7 days) atrial fibrillation. Patient is currently in sinus rhythm. [id number] Score: 3. The patients heart rate in the last 24 hours is as follows:  Pulse   Min: 55  Max: 68     Antiarrhythmics  metoprolol succinate (TOPROL-XL) 24 hr split tablet 12.5 mg, Daily, Oral    Anticoagulants  enoxaparin injection 40 mg, Every 24 hours, Subcutaneous    Plan  - Replete lytes with a goal of K>4, Mg >2  - Patient is anticoagulated, see medications listed above.  - Patient's afib is currently controlled  - continue metoprolol; hold if HR <50      KRISTAN (acute kidney injury) (Resolved: 3/28/2025)  KRISTAN is likely due to pre-renal azotemia due to intravascular volume depletion. Baseline creatinine is  1.0 . Most recent creatinine and eGFR are listed below.  Recent Labs     03/26/25  1555 03/27/25  0512 03/28/25  0313   CREATININE 1.5* 1.2 1.1   EGFRNORACEVR 45* 59* >60      Plan  - KRISTAN is resolved  - Avoid nephrotoxins and renally dose meds for GFR listed above  - Monitor urine output, serial BMP, and adjust therapy as needed    VTE Risk Mitigation (From admission, onward)           Ordered     enoxaparin injection 40 mg  Every 24 hours         03/24/25 1823     Reason for No Pharmacological VTE Prophylaxis  Once        Question:  Reasons:  Answer:  Patient is Ambulatory    03/21/25 2008     IP VTE HIGH RISK PATIENT  Once         03/21/25 2008     Place sequential compression device  Until discontinued         03/21/25 2008     Place HARIKA hose  Until discontinued         03/21/25 2008                    Discharge Planning   LIDIA: 4/1/2025     Code Status: Full Code   Medical Readiness for Discharge Date:   Discharge Plan A: Rehab   Discharge Delays: None known at this time            Please place Justification for DME        Gerardo Sotelo DO  Department of Hospital Medicine   [facility] Neurosurgery ([facility])

## 2025-03-28 NOTE — ASSESSMENT & PLAN NOTE
Patient has paroxysmal (<7 days) atrial fibrillation. Patient is currently in sinus rhythm. [id number] Score: 3. The patients heart rate in the last 24 hours is as follows:  Pulse  Min: 55  Max: 68     Antiarrhythmics  metoprolol succinate (TOPROL-XL) 24 hr split tablet 12.5 mg, Daily, Oral    Anticoagulants  enoxaparin injection 40 mg, Every 24 hours, Subcutaneous    Plan  - Replete lytes with a goal of K>4, Mg >2  - Patient is anticoagulated, see medications listed above.  - Patient's afib is currently controlled  - continue metoprolol; hold if HR <50

## 2025-03-28 NOTE — PLAN OF CARE
POC updated and reviewed with the patient, wife, and son at the bedside. Questions regarding POC were encouraged and addressed. VSS, see flowsheets. Tele maintained per provider's order. Patient is AOX 4 at this time, however pt wax/wanes throughout shift. Pt complains of ongoing headache pain. No acute events noted during shift. Fall, and safety precautions maintained, no signs of injury noted during shift. Pt OOB to chair and commode as requested. Patient repositioned independently and with assistance in bed for comfort. Upon exiting room, patient's bed locked in low position, side rails up x 3, bed alarm on, with call light within reach. Instructed patient to call staff for mobility, verbalized understanding. No acute signs of distress noted at this time.      -Suzy Amin    Problem: Adult Inpatient Plan of Care  Goal: Plan of Care Review  Outcome: Progressing  Goal: Optimal Comfort and Wellbeing  Outcome: Progressing  Goal: Readiness for Transition of Care  Outcome: Progressing     Problem: Wound  Goal: Optimal Coping  Outcome: Progressing  Goal: Optimal Pain Control and Function  Outcome: Progressing  Goal: Skin Health and Integrity  Outcome: Progressing  Goal: Optimal Wound Healing  Outcome: Progressing     Problem: Fall Injury Risk  Goal: Absence of Fall and Fall-Related Injury  Outcome: Progressing     Problem: Skin Injury Risk Increased  Goal: Skin Health and Integrity  Outcome: Progressing     Problem: Pain Acute  Goal: Optimal Pain Control and Function  Outcome: Progressing

## 2025-03-29 LAB
ABSOLUTE EOSINOPHIL (OHS): 0.13 K/UL
ABSOLUTE MONOCYTE (OHS): 0.94 K/UL (ref 0.3–1)
ABSOLUTE NEUTROPHIL COUNT (OHS): 4.69 K/UL (ref 1.8–7.7)
BASOPHILS # BLD AUTO: 0.05 K/UL
BASOPHILS NFR BLD AUTO: 0.7 %
ERYTHROCYTE [DISTWIDTH] IN BLOOD BY AUTOMATED COUNT: 13.2 % (ref 11.5–14.5)
HCT VFR BLD AUTO: 34.8 % (ref 40–54)
HGB BLD-MCNC: 10.9 GM/DL (ref 14–18)
IMM GRANULOCYTES # BLD AUTO: 0.03 K/UL (ref 0–0.04)
IMM GRANULOCYTES NFR BLD AUTO: 0.4 % (ref 0–0.5)
LYMPHOCYTES # BLD AUTO: 1.14 K/UL (ref 1–4.8)
MCH RBC QN AUTO: 30.4 PG (ref 27–50)
MCHC RBC AUTO-ENTMCNC: 31.3 G/DL (ref 32–36)
MCV RBC AUTO: 97 FL (ref 82–98)
NUCLEATED RBC (/100WBC) (OHS): 0 /100 WBC
PLATELET # BLD AUTO: 152 K/UL (ref 150–450)
PMV BLD AUTO: 11.8 FL (ref 9.2–12.9)
POCT GLUCOSE: 102 MG/DL (ref 70–110)
POCT GLUCOSE: 115 MG/DL (ref 70–110)
POCT GLUCOSE: 119 MG/DL (ref 70–110)
RBC # BLD AUTO: 3.58 M/UL (ref 4.6–6.2)
RELATIVE EOSINOPHIL (OHS): 1.9 %
RELATIVE LYMPHOCYTE (OHS): 16.3 % (ref 18–48)
RELATIVE MONOCYTE (OHS): 13.5 % (ref 4–15)
RELATIVE NEUTROPHIL (OHS): 67.2 % (ref 38–73)
WBC # BLD AUTO: 6.98 K/UL (ref 3.9–12.7)

## 2025-03-29 PROCEDURE — 25000003 PHARM REV CODE 250

## 2025-03-29 PROCEDURE — 25000003 PHARM REV CODE 250: Performed by: STUDENT IN AN ORGANIZED HEALTH CARE EDUCATION/TRAINING PROGRAM

## 2025-03-29 PROCEDURE — 25000003 PHARM REV CODE 250: Performed by: PHYSICIAN ASSISTANT

## 2025-03-29 PROCEDURE — 36415 COLL VENOUS BLD VENIPUNCTURE: CPT | Performed by: NEUROLOGICAL SURGERY

## 2025-03-29 PROCEDURE — 63600175 PHARM REV CODE 636 W HCPCS

## 2025-03-29 PROCEDURE — 82310 ASSAY OF CALCIUM: CPT | Performed by: NEUROLOGICAL SURGERY

## 2025-03-29 PROCEDURE — 11000001 HC ACUTE MED/SURG PRIVATE ROOM

## 2025-03-29 PROCEDURE — 36415 COLL VENOUS BLD VENIPUNCTURE: CPT

## 2025-03-29 PROCEDURE — 85025 COMPLETE CBC W/AUTO DIFF WBC: CPT

## 2025-03-29 RX ADMIN — MEMANTINE 10 MG: 10 TABLET ORAL at 08:03

## 2025-03-29 RX ADMIN — ACETAMINOPHEN 1000 MG: 500 TABLET ORAL at 02:03

## 2025-03-29 RX ADMIN — SILODOSIN 4 MG: 4 CAPSULE ORAL at 08:03

## 2025-03-29 RX ADMIN — ENOXAPARIN SODIUM 40 MG: 40 INJECTION SUBCUTANEOUS at 04:03

## 2025-03-29 RX ADMIN — POLYETHYLENE GLYCOL 3350 17 G: 17 POWDER, FOR SOLUTION ORAL at 08:03

## 2025-03-29 RX ADMIN — MUPIROCIN: 20 OINTMENT TOPICAL at 08:03

## 2025-03-29 RX ADMIN — Medication 6 MG: at 08:03

## 2025-03-29 RX ADMIN — METHOCARBAMOL 500 MG: 500 TABLET ORAL at 08:03

## 2025-03-29 RX ADMIN — ACETAMINOPHEN 1000 MG: 500 TABLET ORAL at 05:03

## 2025-03-29 RX ADMIN — CETIRIZINE HYDROCHLORIDE 10 MG: 10 TABLET, FILM COATED ORAL at 08:03

## 2025-03-29 RX ADMIN — DOCUSATE SODIUM 100 MG: 100 CAPSULE, LIQUID FILLED ORAL at 08:03

## 2025-03-29 RX ADMIN — PANTOPRAZOLE SODIUM 40 MG: 40 TABLET, DELAYED RELEASE ORAL at 08:03

## 2025-03-29 RX ADMIN — MUPIROCIN: 20 OINTMENT TOPICAL at 09:03

## 2025-03-29 RX ADMIN — LACOSAMIDE 100 MG: 100 TABLET, FILM COATED ORAL at 08:03

## 2025-03-29 RX ADMIN — CARBAMIDE PEROXIDE 5 DROP: 65 SOLUTION/ DROPS TOPICAL at 08:03

## 2025-03-29 RX ADMIN — METHOCARBAMOL 500 MG: 500 TABLET ORAL at 02:03

## 2025-03-29 RX ADMIN — PRAVASTATIN SODIUM 20 MG: 20 TABLET ORAL at 08:03

## 2025-03-29 RX ADMIN — AMITRIPTYLINE HYDROCHLORIDE 10 MG: 10 TABLET, FILM COATED ORAL at 08:03

## 2025-03-29 RX ADMIN — METOPROLOL SUCCINATE 12.5 MG: 25 TABLET, EXTENDED RELEASE ORAL at 08:03

## 2025-03-29 RX ADMIN — LEVOTHYROXINE SODIUM 125 MCG: 0.12 TABLET ORAL at 05:03

## 2025-03-29 RX ADMIN — OXYCODONE HYDROCHLORIDE 10 MG: 10 TABLET ORAL at 08:03

## 2025-03-29 RX ADMIN — CARBAMIDE PEROXIDE 5 DROP: 65 SOLUTION/ DROPS TOPICAL at 09:03

## 2025-03-29 NOTE — PROGRESS NOTES
"Kulwinder Elias - Neurosurgery ([facility])  [facility] Medicine  Progress Note    Patient Name: Ramesh Ricci  MRN: [id number]  Patient Class: IP- Inpatient   Admission Date: 3/21/2025  Length of Stay: 8 days  Attending Physician: Jairo Blankenship MD  Primary Care Provider: Nakul Mandujano MD        Subjective     Principal Problem:Subdural hematoma, chronic        HPI:  85-year-old male w/ A-fib, BPH, HTN, hypothyroidism, CKD 3b, Ulcerative Colitis, CAD, Chronic diastolic heart failure, NPH s/p  shunt who presented to the ED for worsening bitemporal headaches. Of note, patient had prior CT scans showing stable subdural hygromas. He was seen in clinic on 3/07 where shunt was reprogrammed to 200 but patient continue to have headaches. CT head upon admission showed interval stability of the bilateral subdural hygromas. Now s/p bilateral mini cranis for SDH evacuation and tying off of distal shunt catheter at right clavicle on 3/24. On 3/27, patient began to have softer blood pressures with known bradycardia. Hospital medicine consulted for "Orthostatic hypotension''.    Upon examination, patient reports having dizziness and headache that has been ongoing prior to admission. Currently complaining of back pain from laying in bed. Denies any chest pain, shortness of breath, fever, chills, abd pain, nausea, vomiting, diarrhea, palpitation. Per wife, baseline heart rate is in the high 50s which is known.    Overview/Hospital Course:  No notes on file    Interval History: NAEON. Patient's blood pressure stable overnight. Patient orthostatic vitals improved from yesterday. Still complains of generalized fatigue.     Review of Systems  Objective:     Vital Signs (Most Recent):  Temp: 98.1 °F (36.7 °C) (03/29/25 1556)  Pulse: 63 (03/29/25 1556)  Resp: 18 (03/29/25 1556)  BP: (!) 140/71 (03/29/25 1556)  SpO2: 99 % (03/29/25 1556) Vital Signs (24h Range):  Temp:  [97.6 °F (36.4 °C)-99.2 °F (37.3 °C)] 98.1 °F (36.7 °C)  Pulse:  " [54-86] 63  Resp:  [18] 18  SpO2:  [94 %-99 %] 99 %  BP: (113-140)/(56-74) 140/71     Weight: 68.2 kg (150 lb 5.7 oz)  Body mass index is 20.97 kg/m².    Intake/Output Summary (Last 24 hours) at 3/29/2025 1611  Last data filed at 3/29/2025 0608  Gross per 24 hour   Intake 600 ml   Output 3351 ml   Net -2751 ml         Physical Exam  Vitals and nursing note reviewed.   Constitutional:       General: He is awake.      Appearance: Normal appearance.   HENT:      Head: Atraumatic.      Right Ear: External ear normal.      Left Ear: External ear normal.      Mouth/Throat:      Mouth: Mucous membranes are moist.   Cardiovascular:      Rate and Rhythm: Normal rate and regular rhythm.      Pulses: Normal pulses.      Heart sounds: Normal heart sounds.   Pulmonary:      Effort: Pulmonary effort is normal. No respiratory distress.   Abdominal:      General: Abdomen is flat. Bowel sounds are normal. There is no distension.      Palpations: Abdomen is soft.   Musculoskeletal:         General: Normal range of motion.      Cervical back: Normal range of motion and neck supple.      Right lower leg: No edema.      Left lower leg: No edema.   Skin:     General: Skin is warm and dry.      Comments: Crani sites bilaterally with HV drains    Neurological:      General: No focal deficit present.      Mental Status: He is alert and oriented to person, place, and time. Mental status is at baseline.   Psychiatric:         Mood and Affect: Mood normal.         Behavior: Behavior normal. Behavior is cooperative.               Significant Labs: All pertinent labs within the past 24 hours have been reviewed.    Significant Imaging: I have reviewed all pertinent imaging results/findings within the past 24 hours.      Assessment & Plan  Bradycardia  Orthostatic hypotension      85-year-old male w/ A-fib, BPH, HTN, hypothyroidism, CKD 3b, Ulcerative Colitis, CAD, Chronic diastolic heart failure, NPH s/p  shunt who presented to the ED for  worsening bitemporal headaches. Now s/p bilateral mini cranis for SDH evacuation and tying off of distal shunt catheter at right clavicle on 3/24. Concern for orthostatic hypotension per primary team. Suspect soft blood pressures 2/2 likely medication induce and likely at baseline. Bradycardia is likely chronic and known per family. EKG with normal sinus rhythm. Noted to have orthostatic vitals on 3/28 (see PT/OT note from that day). Switched Flomax to silodosin. Repeat orthostatics have stabilized on 3/29.       - hold Amlodipine in the setting of soft blood pressure.   - Continue silodosin for BPH  - Continue Metoprolol 12.5; can hold if HR <50  - Can consider stopping amitriptyline if patient is still having orthostatic hypotension       Acquired hypothyroidism  Continue home Synthyroid    BPH (benign prostatic hyperplasia)  Previously on Flomax    - Switched to Silodosin d/t concerns of orthostatic hypotension    Essential hypertension  Patient's blood pressure range in the last 24 hours was: BP  Min: 113/56  Max: 140/71.The patient's inpatient anti-hypertensive regimen is listed below:  Current Antihypertensives  hydrALAZINE injection 10 mg, Every 6 hours PRN, Intravenous  metoprolol succinate (TOPROL-XL) 24 hr split tablet 12.5 mg, Daily, Oral    Plan  - BP is controlled, no changes needed to their regimen  - Holding amlodipine given normal blood pressures.  GERD (gastroesophageal reflux disease)  Continue Protonix    Stage 3a chronic kidney disease  Creatine stable for now. BMP reviewed- noted Estimated Creatinine Clearance: 45.6 mL/min (based on SCr of 1.1 mg/dL). according to latest data. Based on current GFR, CKD stage is stage 3 - GFR 30-59.  Monitor UOP and serial BMP and adjust therapy as needed. Renally dose meds. Avoid nephrotoxic medications and procedures.  PAF (paroxysmal atrial fibrillation)  Patient has paroxysmal (<7 days) atrial fibrillation. Patient is currently in sinus rhythm. [id number]  Score: 3. The patients heart rate in the last 24 hours is as follows:  Pulse  Min: 54  Max: 86     Antiarrhythmics  metoprolol succinate (TOPROL-XL) 24 hr split tablet 12.5 mg, Daily, Oral    Anticoagulants  enoxaparin injection 40 mg, Every 24 hours, Subcutaneous    Plan  - Replete lytes with a goal of K>4, Mg >2  - Patient is anticoagulated, see medications listed above.  - Patient's afib is currently controlled  - continue metoprolol; hold if HR <50      VTE Risk Mitigation (From admission, onward)           Ordered     enoxaparin injection 40 mg  Every 24 hours         03/24/25 1823     Reason for No Pharmacological VTE Prophylaxis  Once        Question:  Reasons:  Answer:  Patient is Ambulatory    03/21/25 2008     IP VTE HIGH RISK PATIENT  Once         03/21/25 2008     Place sequential compression device  Until discontinued         03/21/25 2008     Place HARIKA hose  Until discontinued         03/21/25 2008                    Discharge Planning   LIDIA: 4/1/2025     Code Status: Full Code   Medical Readiness for Discharge Date:   Discharge Plan A: Rehab   Discharge Delays: None known at this time            Please place Justification for DME        Rell Phan MD  Department of Hospital Medicine   [facility] - Neurosurgery ([facility])

## 2025-03-29 NOTE — ASSESSMENT & PLAN NOTE
Patient's blood pressure range in the last 24 hours was: BP  Min: 113/56  Max: 140/71.The patient's inpatient anti-hypertensive regimen is listed below:  Current Antihypertensives  hydrALAZINE injection 10 mg, Every 6 hours PRN, Intravenous  metoprolol succinate (TOPROL-XL) 24 hr split tablet 12.5 mg, Daily, Oral    Plan  - BP is controlled, no changes needed to their regimen  - Holding amlodipine given normal blood pressures.

## 2025-03-29 NOTE — ASSESSMENT & PLAN NOTE
This is a 85-year-old male with a history of NPH status post right-sided  shunt on 02/06/2025 by Dr. Blankenship, who presents with several days of worsening bitemporal headache. 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  CT head obtained today demonstrates interval stability of the bilateral subdural hygromas. When the patient saw Emeli Rivero in clinic on 03/07/2025 the shunt was reprogrammed to 200, however hygromas were persistent and he had continued headaches.    He is now s/p bilateral mini cranis for SDH evacuation and tying off of distal shunt catheter at right clavicle on 3/24. Postop CTH with expected findings and pneumocephalus.     Repeat Head CT 3/27 for severe headache with expected post operative changes.     Plan:  - Stepped down to nsgy floor.  - q4h neurochecks.  - Multimodal pain control.  - PT/OT. Encourage OOB and transferring as needed for comfort  - Discontinued Keppra due to deviation from cognitive baseline  - Initiated vimpat 100mg as seizure prophylaxis   - DVT ppx: LVX.  - Bowel regimen.  - Molina placed 3/25 for urinary retention. Will plan for voiding trial in 1 week.   - Discussed care plan with patient and HA concerns. With longstanding hx of HA prior to shunt placement, likely will need referral to HA specialist on discharge   - Educated on benefit of high intensity rehab placement    Case and plan discussed with attending neurosurgeon Dr. Blankenship

## 2025-03-29 NOTE — ASSESSMENT & PLAN NOTE
"Creatine increased today. See "KRISTAN". BMP reviewed- noted Estimated Creatinine Clearance: 45.6 mL/min (based on SCr of 1.1 mg/dL). according to latest data. Based on current GFR, CKD stage is stage 3 - GFR 30-59.  Monitor UOP and serial BMP and adjust therapy as needed. Renally dose meds. Avoid nephrotoxic medications and procedures.  "

## 2025-03-29 NOTE — ASSESSMENT & PLAN NOTE
Patient has paroxysmal (<7 days) atrial fibrillation. Patient is currently in sinus rhythm. [id number] Score: 3. The patients heart rate in the last 24 hours is as follows:  Pulse  Min: 53  Max: 64     Antiarrhythmics  metoprolol succinate (TOPROL-XL) 24 hr split tablet 12.5 mg, Daily, Oral    Anticoagulants  enoxaparin injection 40 mg, Every 24 hours, Subcutaneous    Plan  - On tele.  - Patient takes Eliquis at home. Hold perioperatively.  - Patient's afib is currently controlled.

## 2025-03-29 NOTE — PLAN OF CARE
"  Problem: Adult Inpatient Plan of Care  Goal: Plan of Care Review  3/29/2025 0244 by Mame Cunningham LPN  Outcome: Progressing  3/29/2025 0244 by Mame Cunningham LPN  Outcome: Progressing     Problem: Adult Inpatient Plan of Care  Goal: Patient-Specific Goal (Individualized)  Outcome: Progressing     Problem: Adult Inpatient Plan of Care  Goal: Optimal Comfort and Wellbeing  3/29/2025 0244 by Mame Cunningham LPN  Outcome: Progressing  3/29/2025 0244 by Mame Cunningham LPN  Outcome: Progressing     Problem: Adult Inpatient Plan of Care  Goal: Readiness for Transition of Care  3/29/2025 0244 by Mame Cunningham LPN  Outcome: Progressing  3/29/2025 0244 by Mame Cunningham LPN  Outcome: Progressing     Problem: Wound  Goal: Optimal Coping  Outcome: Progressing     Problem: Wound  Goal: Optimal Functional Ability  Outcome: Progressing     Problem: Fall Injury Risk  Goal: Absence of Fall and Fall-Related Injury  Outcome: Progressing     Problem: Skin Injury Risk Increased  Goal: Skin Health and Integrity  Outcome: Progressing     POC updated, patient has been AAOx4 but has some cognition decline once it gets dark outside but is easily redirectable. Up to BSC with one assist but getting off of the BSC and into bed it required 2 assist. Mepilex to sacrum is clean and intact. Incision sited to both L/R side of head has staples that are intact and well approximated. Refused to let Nurse "clean" up the hair d/t dried blood. Bed in lowest position with SR's up times 3 and bed alarm activated for safety purposes. Continue with current POC and progression towards goals/discharge.   "

## 2025-03-29 NOTE — NURSING
Unable to get Blood Sugar machine to scan his id band.    1. I was told the name of the doctor(s) who took care of my child while in the hospital.    2. I have been told about any important findings on my child's plan of care.    3. The doctor clearly explained my child's diagnosis and other possible diagnoses that were considered.    4. My child's doctor explained all the tests that were done and their results (if available). I understand that some of the test results may not be ready before we go home and I was told how I can get these results. I understand that a summary of my child's hospitalization and important test results will be shared with my child's outpatient doctor.    5. My child's doctor talked to me about what I need to do when we go home.    6. I understand what signs and symptoms to watch for. I understand what symptoms I would need to call my doctor for and/or return to the hospital.    7. I have the phone number to call the hospital for results and/or questions after I leave the hospital.

## 2025-03-29 NOTE — SUBJECTIVE & OBJECTIVE
Interval History: 3/29: NAEO. AFVSS. Patient sitting up comfortable, eating his breakfast. Denies any complaints. Exam stable. Pending auth for IPR. [facility] Medicine on board for co-management.     Medications:  Continuous Infusions:  Scheduled Meds:   acetaminophen  1,000 mg Oral Q8H    amitriptyline  10 mg Oral QHS    carbamide peroxide  5 drop Left Ear BID    cetirizine  10 mg Oral Daily    docusate sodium  100 mg Oral BID    enoxparin  40 mg Subcutaneous Q24H (prophylaxis, 1700)    lacosamide  100 mg Oral Q12H    levothyroxine  125 mcg Oral Before breakfast    melatonin  6 mg Oral Nightly    memantine  10 mg Oral BID    methocarbamoL  500 mg Oral TID    metoprolol succinate  12.5 mg Oral Daily    mupirocin   Nasal BID    pantoprazole  40 mg Oral Daily    polyethylene glycol  17 g Oral BID    pravastatin  20 mg Oral QHS    silodosin  4 mg Oral Daily     PRN Meds:  Current Facility-Administered Medications:     cyclobenzaprine, 5 mg, Oral, TID PRN    dextrose 50%, 12.5 g, Intravenous, PRN    dextrose 50%, 25 g, Intravenous, PRN    glucagon (human recombinant), 1 mg, Intramuscular, PRN    glucose, 16 g, Oral, PRN    glucose, 16 g, Oral, PRN    glucose, 24 g, Oral, PRN    hydrALAZINE, 10 mg, Intravenous, Q6H PRN    insulin aspart U-100, 0-5 Units, Subcutaneous, QID (AC + HS) PRN    ondansetron, 4 mg, Intravenous, Q6H PRN    oxyCODONE, 5 mg, Oral, Q4H PRN    oxyCODONE, 10 mg, Oral, Q4H PRN     Objective:     Weight: 68.2 kg (150 lb 5.7 oz)  Body mass index is 20.97 kg/m².  Vital Signs (Most Recent):  Temp: 97.6 °F (36.4 °C) (03/29/25 0728)  Pulse: (!) 59 (03/29/25 1132)  Resp: 18 (03/29/25 1132)  BP: 129/73 (03/29/25 1132)  SpO2: 95 % (03/29/25 1132) Vital Signs (24h Range):  Temp:  [97.6 °F (36.4 °C)-99.2 °F (37.3 °C)] 97.6 °F (36.4 °C)  Pulse:  [53-64] 59  Resp:  [18] 18  SpO2:  [94 %-98 %] 95 %  BP: (116-132)/(58-73) 129/73                              Urethral Catheter 03/25/25 2315 Silicone 16 Fr. (Active)   $  "Molina Insertion Bedside Insertion Performed 03/25/25 2310   Site Assessment Clean;Intact 03/29/25 0745   Collection Container Urimeter 03/29/25 0745   Securement Method secured to top of thigh w/ adhesive device 03/29/25 0745   Catheter Care Performed yes 03/29/25 0745   Reason for Continuing Urinary Catheterization Urinary retention 03/29/25 0745   CAUTI Prevention Bundle Securement Device in place with 1" slack;Intact seal between catheter & drainage tubing;Drainage bag/urimeter off the floor;Sheeting clip in use;No dependent loops or kinks;Drainage bag/urimeter not overfilled (<2/3 full);Drainage bag/urimeter below bladder 03/29/25 0745   Output (mL) 600 mL 03/29/25 0608     Neurosurgery Physical Exam  General: well developed, well nourished. Answers questions when prompted.  Pulmonary: quiet, deep respirations, no signs of respiratory distress  Skin: Skin is warm and dry. Incision sites intact with staples. No purulent discharge or diffuse tenderness     Neuro:   Mental Status: AAOx4. No aphasia, no dysarthria - higher order confusion.   CN: PERRL, EOMI other than R VI palsy, sensation intact bilaterally, eyebrow raise and grimace symmetric, tongue midline. Hard of hearing  Motor: moves all extremities spontaneously, full strength throughout, no pronator drift   Sensory: intact to light touch throughout    Significant Labs:  Recent Labs   Lab 03/28/25  0313      K 4.4      CO2 24   BUN 22   CREATININE 1.1   CALCIUM 8.4*     Recent Labs   Lab 03/28/25  0313   WBC 7.08   HGB 10.7*   HCT 34.7*   *     No results for input(s): "LABPT", "INR", "APTT" in the last 48 hours.  Microbiology Results (last 7 days)       ** No results found for the last 168 hours. **          All pertinent labs from the last 24 hours have been reviewed.    Significant Diagnostics:  No new relevant imaging to discuss.   "

## 2025-03-29 NOTE — SUBJECTIVE & OBJECTIVE
Interval History: NAEON. Patient's blood pressure stable overnight. Patient orthostatic vitals improved from yesterday. Still complains of generalized fatigue.     Review of Systems  Objective:     Vital Signs (Most Recent):  Temp: 98.1 °F (36.7 °C) (03/29/25 1556)  Pulse: 63 (03/29/25 1556)  Resp: 18 (03/29/25 1556)  BP: (!) 140/71 (03/29/25 1556)  SpO2: 99 % (03/29/25 1556) Vital Signs (24h Range):  Temp:  [97.6 °F (36.4 °C)-99.2 °F (37.3 °C)] 98.1 °F (36.7 °C)  Pulse:  [54-86] 63  Resp:  [18] 18  SpO2:  [94 %-99 %] 99 %  BP: (113-140)/(56-74) 140/71     Weight: 68.2 kg (150 lb 5.7 oz)  Body mass index is 20.97 kg/m².    Intake/Output Summary (Last 24 hours) at 3/29/2025 1611  Last data filed at 3/29/2025 0608  Gross per 24 hour   Intake 600 ml   Output 3351 ml   Net -2751 ml         Physical Exam  Vitals and nursing note reviewed.   Constitutional:       General: He is awake.      Appearance: Normal appearance.   HENT:      Head: Atraumatic.      Right Ear: External ear normal.      Left Ear: External ear normal.      Mouth/Throat:      Mouth: Mucous membranes are moist.   Cardiovascular:      Rate and Rhythm: Normal rate and regular rhythm.      Pulses: Normal pulses.      Heart sounds: Normal heart sounds.   Pulmonary:      Effort: Pulmonary effort is normal. No respiratory distress.   Abdominal:      General: Abdomen is flat. Bowel sounds are normal. There is no distension.      Palpations: Abdomen is soft.   Musculoskeletal:         General: Normal range of motion.      Cervical back: Normal range of motion and neck supple.      Right lower leg: No edema.      Left lower leg: No edema.   Skin:     General: Skin is warm and dry.      Comments: Crani sites bilaterally with HV drains    Neurological:      General: No focal deficit present.      Mental Status: He is alert and oriented to person, place, and time. Mental status is at baseline.   Psychiatric:         Mood and Affect: Mood normal.         Behavior:  Behavior normal. Behavior is cooperative.               Significant Labs: All pertinent labs within the past 24 hours have been reviewed.    Significant Imaging: I have reviewed all pertinent imaging results/findings within the past 24 hours.

## 2025-03-29 NOTE — ASSESSMENT & PLAN NOTE
85-year-old male w/ A-fib, BPH, HTN, hypothyroidism, CKD 3b, Ulcerative Colitis, CAD, Chronic diastolic heart failure, NPH s/p  shunt who presented to the ED for worsening bitemporal headaches. Now s/p bilateral mini cranis for SDH evacuation and tying off of distal shunt catheter at right clavicle on 3/24. Concern for orthostatic hypotension per primary team. Suspect soft blood pressures 2/2 likely medication induce and likely at baseline. Bradycardia is likely chronic and known per family. EKG with normal sinus rhythm. Noted to have orthostatic vitals on 3/28 (see PT/OT note from that day). Switched Flomax to silodosin. Repeat orthostatics have stabilized on 3/29.       - hold Amlodipine in the setting of soft blood pressure.   - Continue silodosin for BPH  - Continue Metoprolol 12.5; can hold if HR <50  - Can consider stopping amitriptyline if patient is still having orthostatic hypotension

## 2025-03-29 NOTE — PLAN OF CARE
POC updated and reviewed with the patient and wife at the bedside. Questions regarding POC were encouraged and addressed. VSS, see flowsheets. Tele maintained per provider's order. Patient is AOX 4 at this time w. intermittent confusion such as forgetting who is wife is temporarily. No acute events noted during shift. Fall, and safety precautions maintained, no signs of injury noted during shift. Patient repositioned independently and with assistance in bed for comfort, OOB to commode and chair as needed. Upon exiting room, patient's bed locked in low position, side rails up x 3, bed alarm on, with call light within reach. Instructed patient to call staff for mobility, verbalized understanding. No acute signs of distress noted at this time.      -Suzy Amin    Problem: Adult Inpatient Plan of Care  Goal: Plan of Care Review  Outcome: Progressing  Goal: Optimal Comfort and Wellbeing  Outcome: Progressing  Goal: Readiness for Transition of Care  Outcome: Progressing     Problem: Wound  Goal: Optimal Coping  Outcome: Progressing  Goal: Improved Oral Intake  Outcome: Progressing  Goal: Optimal Pain Control and Function  Outcome: Progressing  Goal: Skin Health and Integrity  Outcome: Progressing  Goal: Optimal Wound Healing  Outcome: Progressing     Problem: Fall Injury Risk  Goal: Absence of Fall and Fall-Related Injury  Outcome: Progressing     Problem: Skin Injury Risk Increased  Goal: Skin Health and Integrity  Outcome: Progressing     Problem: Infection  Goal: Absence of Infection Signs and Symptoms  Outcome: Progressing     Problem: Pain Acute  Goal: Optimal Pain Control and Function  Outcome: Progressing

## 2025-03-29 NOTE — ASSESSMENT & PLAN NOTE
Patient has paroxysmal (<7 days) atrial fibrillation. Patient is currently in sinus rhythm. [id number] Score: 3. The patients heart rate in the last 24 hours is as follows:  Pulse  Min: 54  Max: 86     Antiarrhythmics  metoprolol succinate (TOPROL-XL) 24 hr split tablet 12.5 mg, Daily, Oral    Anticoagulants  enoxaparin injection 40 mg, Every 24 hours, Subcutaneous    Plan  - Replete lytes with a goal of K>4, Mg >2  - Patient is anticoagulated, see medications listed above.  - Patient's afib is currently controlled  - continue metoprolol; hold if HR <50

## 2025-03-29 NOTE — PROGRESS NOTES
[facility] - Neurosurgery (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  Patient was seen in clinic by Dr. Glenn Rivero on 3 7 and 319.  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  The patient denies fever/chills, HA, vision/hearing changes, dysphagia, dysarthria, nausea/vomiting, new-onset weakness or sensory change, bowel/bladder changes.       Post-Op Info:  Procedure(s) (LRB):  EVACUATION, HEMATOMA, SUBDURAL  AND SHUNT TIE OFF (Bilateral)   5 Days Post-Op   Interval History: 3/29: NAEO. AFVSS. Patient sitting up comfortable, eating his breakfast. Denies any complaints. Exam stable. Pending auth for IPR. [facility] Medicine on board for co-management.     Medications:  Continuous Infusions:  Scheduled Meds:   acetaminophen  1,000 mg Oral Q8H    amitriptyline  10 mg Oral QHS    carbamide peroxide  5 drop Left Ear BID    cetirizine  10 mg Oral Daily    docusate sodium  100 mg Oral BID    enoxparin  40 mg Subcutaneous Q24H (prophylaxis, 1700)    lacosamide  100 mg Oral Q12H    levothyroxine  125 mcg Oral Before breakfast    melatonin  6 mg Oral Nightly    memantine  10 mg Oral BID    methocarbamoL  500 mg Oral TID    metoprolol succinate  12.5 mg Oral Daily    mupirocin   Nasal BID    pantoprazole  40 mg Oral Daily    polyethylene glycol  17 g Oral BID    pravastatin  20 mg Oral QHS    silodosin  4 mg Oral Daily     PRN Meds:  Current Facility-Administered Medications:     cyclobenzaprine, 5 mg, Oral, TID PRN     "dextrose 50%, 12.5 g, Intravenous, PRN    dextrose 50%, 25 g, Intravenous, PRN    glucagon (human recombinant), 1 mg, Intramuscular, PRN    glucose, 16 g, Oral, PRN    glucose, 16 g, Oral, PRN    glucose, 24 g, Oral, PRN    hydrALAZINE, 10 mg, Intravenous, Q6H PRN    insulin aspart U-100, 0-5 Units, Subcutaneous, QID (AC + HS) PRN    ondansetron, 4 mg, Intravenous, Q6H PRN    oxyCODONE, 5 mg, Oral, Q4H PRN    oxyCODONE, 10 mg, Oral, Q4H PRN     Objective:     Weight: 68.2 kg (150 lb 5.7 oz)  Body mass index is 20.97 kg/m².  Vital Signs (Most Recent):  Temp: 97.6 °F (36.4 °C) (03/29/25 0728)  Pulse: (!) 59 (03/29/25 1132)  Resp: 18 (03/29/25 1132)  BP: 129/73 (03/29/25 1132)  SpO2: 95 % (03/29/25 1132) Vital Signs (24h Range):  Temp:  [97.6 °F (36.4 °C)-99.2 °F (37.3 °C)] 97.6 °F (36.4 °C)  Pulse:  [53-64] 59  Resp:  [18] 18  SpO2:  [94 %-98 %] 95 %  BP: (116-132)/(58-73) 129/73                              Urethral Catheter 03/25/25 2315 Silicone 16 Fr. (Active)   $ Molina Insertion Bedside Insertion Performed 03/25/25 2315   Site Assessment Clean;Intact 03/29/25 0745   Collection Container Urimeter 03/29/25 0745   Securement Method secured to top of thigh w/ adhesive device 03/29/25 0745   Catheter Care Performed yes 03/29/25 0745   Reason for Continuing Urinary Catheterization Urinary retention 03/29/25 0745   CAUTI Prevention Bundle Securement Device in place with 1" slack;Intact seal between catheter & drainage tubing;Drainage bag/urimeter off the floor;Sheeting clip in use;No dependent loops or kinks;Drainage bag/urimeter not overfilled (<2/3 full);Drainage bag/urimeter below bladder 03/29/25 0745   Output (mL) 600 mL 03/29/25 0608     Neurosurgery Physical Exam  General: well developed, well nourished. Answers questions when prompted.  Pulmonary: quiet, deep respirations, no signs of respiratory distress  Skin: Skin is warm and dry. Incision sites intact with staples. No purulent discharge or diffuse " "tenderness     Neuro:   Mental Status: AAOx4. No aphasia, no dysarthria - higher order confusion.   CN: PERRL, EOMI other than R VI palsy, sensation intact bilaterally, eyebrow raise and grimace symmetric, tongue midline. Hard of hearing  Motor: moves all extremities spontaneously, full strength throughout, no pronator drift   Sensory: intact to light touch throughout    Significant Labs:  Recent Labs   Lab 03/28/25  0313      K 4.4      CO2 24   BUN 22   CREATININE 1.1   CALCIUM 8.4*     Recent Labs   Lab 03/28/25  0313   WBC 7.08   HGB 10.7*   HCT 34.7*   *     No results for input(s): "LABPT", "INR", "APTT" in the last 48 hours.  Microbiology Results (last 7 days)       ** No results found for the last 168 hours. **          All pertinent labs from the last 24 hours have been reviewed.    Significant Diagnostics:  No new relevant imaging to discuss.   Assessment/Plan:     Urinary retention  - Molina placed 3/25.  - Voiding trial in 1 week.    S/P ventriculoperitoneal shunt  This is a 85-year-old male with a history of NPH status post right-sided  shunt on 02/06/2025 by Dr. Blankenship, who presents with several days of worsening bitemporal headache. 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  CT head obtained today demonstrates interval stability of the bilateral subdural hygromas. When the patient saw Emeli Rivero in clinic on 03/07/2025 the shunt was reprogrammed to 200, however hygromas were persistent and he had continued headaches.    He is now s/p bilateral mini cranis for SDH evacuation and tying off of distal shunt catheter at right clavicle on 3/24. Postop CTH with expected findings and pneumocephalus.     Repeat Head CT 3/27 for severe headache with expected post operative changes.     Plan:  - Stepped down to nsgy floor.  - q4h neurochecks.  - Multimodal pain control.  - PT/OT. Encourage OOB and transferring as needed for " "comfort  - Discontinued Keppra due to deviation from cognitive baseline  - Initiated vimpat 100mg as seizure prophylaxis   - DVT ppx: LVX.  - Bowel regimen.  - Molina placed 3/25 for urinary retention. Will plan for voiding trial in 1 week.   - Discussed care plan with patient and HA concerns. With longstanding hx of HA prior to shunt placement, likely will need referral to HA specialist on discharge   - Educated on benefit of high intensity rehab placement    Case and plan discussed with attending neurosurgeon Dr. Blankenship    PAF (paroxysmal atrial fibrillation)  Patient has paroxysmal (<7 days) atrial fibrillation. Patient is currently in sinus rhythm. [id number] Score: 3. The patients heart rate in the last 24 hours is as follows:  Pulse  Min: 53  Max: 64     Antiarrhythmics  metoprolol succinate (TOPROL-XL) 24 hr split tablet 12.5 mg, Daily, Oral    Anticoagulants  enoxaparin injection 40 mg, Every 24 hours, Subcutaneous    Plan  - On tele.  - Patient takes Eliquis at home. Hold perioperatively.  - Patient's afib is currently controlled.      Stage 3a chronic kidney disease  Creatine increased today. See "KRISTAN". BMP reviewed- noted Estimated Creatinine Clearance: 45.6 mL/min (based on SCr of 1.1 mg/dL). according to latest data. Based on current GFR, CKD stage is stage 3 - GFR 30-59.  Monitor UOP and serial BMP and adjust therapy as needed. Renally dose meds. Avoid nephrotoxic medications and procedures.    GERD (gastroesophageal reflux disease)  - Continue Pantoprazole.    Essential hypertension  Patient's blood pressure range in the last 24 hours was: BP  Min: 116/58  Max: 132/65.The patient's inpatient anti-hypertensive regimen is listed below:  Current Antihypertensives  hydrALAZINE injection 10 mg, Every 6 hours PRN, Intravenous  metoprolol succinate (TOPROL-XL) 24 hr split tablet 12.5 mg, Daily, Oral    - Per hospital med recommendations and stable BP, hold amlodipine   - Continuing medical co management with " hospital medicine and appreciate recs    BPH (benign prostatic hyperplasia)  - Per hospital medicine recommendations     Acquired hypothyroidism  - Continue Levothyroxine.        Aicha Byrnes PA-C  Neurosurgery  [facility] - Neurosurgery ([facility])

## 2025-03-29 NOTE — ASSESSMENT & PLAN NOTE
Patient's blood pressure range in the last 24 hours was: BP  Min: 116/58  Max: 132/65.The patient's inpatient anti-hypertensive regimen is listed below:  Current Antihypertensives  hydrALAZINE injection 10 mg, Every 6 hours PRN, Intravenous  metoprolol succinate (TOPROL-XL) 24 hr split tablet 12.5 mg, Daily, Oral    - Per hospital med recommendations and stable BP, hold amlodipine   - Continuing medical co management with hospital medicine and appreciate recs

## 2025-03-30 LAB
ABSOLUTE EOSINOPHIL (OHS): 0.11 K/UL
ABSOLUTE MONOCYTE (OHS): 0.87 K/UL (ref 0.3–1)
ABSOLUTE NEUTROPHIL COUNT (OHS): 4.26 K/UL (ref 1.8–7.7)
ALBUMIN SERPL BCP-MCNC: 2.7 G/DL (ref 3.5–5.2)
ALBUMIN SERPL BCP-MCNC: 2.8 G/DL (ref 3.5–5.2)
ALP SERPL-CCNC: 55 UNIT/L (ref 40–150)
ALP SERPL-CCNC: 58 UNIT/L (ref 40–150)
ALT SERPL W/O P-5'-P-CCNC: <5 UNIT/L (ref 10–44)
ALT SERPL W/O P-5'-P-CCNC: <5 UNIT/L (ref 10–44)
ANION GAP (OHS): 6 MMOL/L (ref 8–16)
ANION GAP (OHS): 8 MMOL/L (ref 8–16)
AST SERPL-CCNC: 16 UNIT/L (ref 11–45)
AST SERPL-CCNC: 16 UNIT/L (ref 11–45)
BASOPHILS # BLD AUTO: 0.03 K/UL
BASOPHILS NFR BLD AUTO: 0.5 %
BILIRUB SERPL-MCNC: 0.2 MG/DL (ref 0.1–1)
BILIRUB SERPL-MCNC: 0.2 MG/DL (ref 0.1–1)
BUN SERPL-MCNC: 18 MG/DL (ref 8–23)
BUN SERPL-MCNC: 19 MG/DL (ref 8–23)
CALCIUM SERPL-MCNC: 8.6 MG/DL (ref 8.7–10.5)
CALCIUM SERPL-MCNC: 8.7 MG/DL (ref 8.7–10.5)
CHLORIDE SERPL-SCNC: 102 MMOL/L (ref 95–110)
CHLORIDE SERPL-SCNC: 103 MMOL/L (ref 95–110)
CO2 SERPL-SCNC: 26 MMOL/L (ref 23–29)
CO2 SERPL-SCNC: 26 MMOL/L (ref 23–29)
CREAT SERPL-MCNC: 0.9 MG/DL (ref 0.5–1.4)
CREAT SERPL-MCNC: 1 MG/DL (ref 0.5–1.4)
ERYTHROCYTE [DISTWIDTH] IN BLOOD BY AUTOMATED COUNT: 12.8 % (ref 11.5–14.5)
GFR SERPLBLD CREATININE-BSD FMLA CKD-EPI: >60 ML/MIN/1.73/M2
GFR SERPLBLD CREATININE-BSD FMLA CKD-EPI: >60 ML/MIN/1.73/M2
GLUCOSE SERPL-MCNC: 105 MG/DL (ref 70–110)
GLUCOSE SERPL-MCNC: 109 MG/DL (ref 70–110)
HCT VFR BLD AUTO: 34.2 % (ref 40–54)
HGB BLD-MCNC: 10.9 GM/DL (ref 14–18)
IMM GRANULOCYTES # BLD AUTO: 0.03 K/UL (ref 0–0.04)
IMM GRANULOCYTES NFR BLD AUTO: 0.5 % (ref 0–0.5)
LYMPHOCYTES # BLD AUTO: 1.08 K/UL (ref 1–4.8)
MCH RBC QN AUTO: 29.8 PG (ref 27–50)
MCHC RBC AUTO-ENTMCNC: 31.9 G/DL (ref 32–36)
MCV RBC AUTO: 93 FL (ref 82–98)
NUCLEATED RBC (/100WBC) (OHS): 0 /100 WBC
PLATELET # BLD AUTO: 179 K/UL (ref 150–450)
PMV BLD AUTO: 11.1 FL (ref 9.2–12.9)
POCT GLUCOSE: 104 MG/DL (ref 70–110)
POCT GLUCOSE: 117 MG/DL (ref 70–110)
POCT GLUCOSE: 119 MG/DL (ref 70–110)
POCT GLUCOSE: 84 MG/DL (ref 70–110)
POCT GLUCOSE: 94 MG/DL (ref 70–110)
POTASSIUM SERPL-SCNC: 4 MMOL/L (ref 3.5–5.1)
POTASSIUM SERPL-SCNC: 4 MMOL/L (ref 3.5–5.1)
PROT SERPL-MCNC: 6.1 GM/DL (ref 6–8.4)
PROT SERPL-MCNC: 6.2 GM/DL (ref 6–8.4)
RBC # BLD AUTO: 3.66 M/UL (ref 4.6–6.2)
RELATIVE EOSINOPHIL (OHS): 1.7 %
RELATIVE LYMPHOCYTE (OHS): 16.9 % (ref 18–48)
RELATIVE MONOCYTE (OHS): 13.6 % (ref 4–15)
RELATIVE NEUTROPHIL (OHS): 66.8 % (ref 38–73)
SODIUM SERPL-SCNC: 135 MMOL/L (ref 136–145)
SODIUM SERPL-SCNC: 136 MMOL/L (ref 136–145)
WBC # BLD AUTO: 6.38 K/UL (ref 3.9–12.7)

## 2025-03-30 PROCEDURE — 25000003 PHARM REV CODE 250

## 2025-03-30 PROCEDURE — 63600175 PHARM REV CODE 636 W HCPCS

## 2025-03-30 PROCEDURE — 11000001 HC ACUTE MED/SURG PRIVATE ROOM

## 2025-03-30 PROCEDURE — 85025 COMPLETE CBC W/AUTO DIFF WBC: CPT

## 2025-03-30 PROCEDURE — 82040 ASSAY OF SERUM ALBUMIN: CPT

## 2025-03-30 PROCEDURE — 36415 COLL VENOUS BLD VENIPUNCTURE: CPT

## 2025-03-30 PROCEDURE — 25000003 PHARM REV CODE 250: Performed by: PHYSICIAN ASSISTANT

## 2025-03-30 RX ADMIN — LEVOTHYROXINE SODIUM 125 MCG: 0.12 TABLET ORAL at 05:03

## 2025-03-30 RX ADMIN — AMITRIPTYLINE HYDROCHLORIDE 10 MG: 10 TABLET, FILM COATED ORAL at 08:03

## 2025-03-30 RX ADMIN — CETIRIZINE HYDROCHLORIDE 10 MG: 10 TABLET, FILM COATED ORAL at 09:03

## 2025-03-30 RX ADMIN — CYCLOBENZAPRINE HYDROCHLORIDE 5 MG: 5 TABLET, FILM COATED ORAL at 08:03

## 2025-03-30 RX ADMIN — METHOCARBAMOL 500 MG: 500 TABLET ORAL at 09:03

## 2025-03-30 RX ADMIN — SILODOSIN 4 MG: 4 CAPSULE ORAL at 09:03

## 2025-03-30 RX ADMIN — METHOCARBAMOL 500 MG: 500 TABLET ORAL at 08:03

## 2025-03-30 RX ADMIN — ACETAMINOPHEN 1000 MG: 500 TABLET ORAL at 05:03

## 2025-03-30 RX ADMIN — PRAVASTATIN SODIUM 20 MG: 20 TABLET ORAL at 08:03

## 2025-03-30 RX ADMIN — CARBAMIDE PEROXIDE 5 DROP: 65 SOLUTION/ DROPS TOPICAL at 09:03

## 2025-03-30 RX ADMIN — MEMANTINE 10 MG: 10 TABLET ORAL at 09:03

## 2025-03-30 RX ADMIN — METHOCARBAMOL 500 MG: 500 TABLET ORAL at 02:03

## 2025-03-30 RX ADMIN — PANTOPRAZOLE SODIUM 40 MG: 40 TABLET, DELAYED RELEASE ORAL at 09:03

## 2025-03-30 RX ADMIN — Medication 6 MG: at 08:03

## 2025-03-30 RX ADMIN — LACOSAMIDE 100 MG: 100 TABLET, FILM COATED ORAL at 09:03

## 2025-03-30 RX ADMIN — METOPROLOL SUCCINATE 12.5 MG: 25 TABLET, EXTENDED RELEASE ORAL at 09:03

## 2025-03-30 RX ADMIN — LACOSAMIDE 100 MG: 100 TABLET, FILM COATED ORAL at 08:03

## 2025-03-30 RX ADMIN — ENOXAPARIN SODIUM 40 MG: 40 INJECTION SUBCUTANEOUS at 05:03

## 2025-03-30 RX ADMIN — MEMANTINE 10 MG: 10 TABLET ORAL at 08:03

## 2025-03-30 RX ADMIN — OXYCODONE 5 MG: 5 TABLET ORAL at 08:03

## 2025-03-30 RX ADMIN — ACETAMINOPHEN 1000 MG: 500 TABLET ORAL at 02:03

## 2025-03-30 NOTE — ASSESSMENT & PLAN NOTE
Patient's blood pressure range in the last 24 hours was: BP  Min: 113/56  Max: 148/65.The patient's inpatient anti-hypertensive regimen is listed below:  Current Antihypertensives  hydrALAZINE injection 10 mg, Every 6 hours PRN, Intravenous  metoprolol succinate (TOPROL-XL) 24 hr split tablet 12.5 mg, Daily, Oral    Plan  - BP is controlled, no changes needed to their regimen  - Holding amlodipine given normal blood pressures.

## 2025-03-30 NOTE — SUBJECTIVE & OBJECTIVE
Interval History: NAEON. Vital signs stable. Still complains of headaches.     Review of Systems  Objective:     Vital Signs (Most Recent):  Temp: 98.4 °F (36.9 °C) (03/30/25 0755)  Pulse: 60 (03/30/25 0755)  Resp: 18 (03/30/25 0755)  BP: 121/79 (03/30/25 0755)  SpO2: 96 % (03/30/25 0755) Vital Signs (24h Range):  Temp:  [97.9 °F (36.6 °C)-98.4 °F (36.9 °C)] 98.4 °F (36.9 °C)  Pulse:  [59-86] 60  Resp:  [17-18] 18  SpO2:  [94 %-99 %] 96 %  BP: (113-148)/(56-79) 121/79     Weight: 68.2 kg (150 lb 5.7 oz)  Body mass index is 20.97 kg/m².    Intake/Output Summary (Last 24 hours) at 3/30/2025 1039  Last data filed at 3/30/2025 0259  Gross per 24 hour   Intake 600 ml   Output 3650 ml   Net -3050 ml         Physical Exam  Vitals and nursing note reviewed.   Constitutional:       General: He is awake.      Appearance: Normal appearance.   HENT:      Head: Atraumatic.      Right Ear: External ear normal.      Left Ear: External ear normal.      Mouth/Throat:      Mouth: Mucous membranes are moist.   Cardiovascular:      Rate and Rhythm: Normal rate and regular rhythm.      Pulses: Normal pulses.      Heart sounds: Normal heart sounds.   Pulmonary:      Effort: Pulmonary effort is normal. No respiratory distress.   Abdominal:      General: Abdomen is flat. Bowel sounds are normal. There is no distension.      Palpations: Abdomen is soft.   Musculoskeletal:         General: Normal range of motion.      Cervical back: Normal range of motion and neck supple.      Right lower leg: No edema.      Left lower leg: No edema.   Skin:     General: Skin is warm and dry.      Comments: Crani sites bilaterally with HV drains    Neurological:      General: No focal deficit present.      Mental Status: He is alert and oriented to person, place, and time. Mental status is at baseline.   Psychiatric:         Mood and Affect: Mood normal.         Behavior: Behavior normal. Behavior is cooperative.               Significant Labs: All pertinent  labs within the past 24 hours have been reviewed.    Significant Imaging: I have reviewed all pertinent imaging results/findings within the past 24 hours.

## 2025-03-30 NOTE — ASSESSMENT & PLAN NOTE
This is a 85-year-old male with a history of NPH status post right-sided  shunt on 02/06/2025 by Dr. Blankenship, who presents with several days of worsening bitemporal headache. 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  CT head obtained today demonstrates interval stability of the bilateral subdural hygromas. When the patient saw Emeli Rivero in clinic on 03/07/2025 the shunt was reprogrammed to 200, however hygromas were persistent and he had continued headaches.    He is now s/p bilateral mini cranis for SDH evacuation and tying off of distal shunt catheter at right clavicle on 3/24. Postop CTH with expected findings and pneumocephalus.     Repeat Head CT 3/27 for severe headache with expected post operative changes.     Plan:  - Stepped down to nsgy floor.  - q4h neurochecks.  - Multimodal pain control.  - PT/OT. Encourage OOB and transferring as needed for comfort  - Discontinued Keppra due to deviation from cognitive baseline  - Initiated vimpat 100mg as seizure prophylaxis   - DVT ppx: LVX.  - Bowel regimen.  - Molina placed 3/25 for urinary retention. Will plan for voiding trial in 1 week.   - Discussed care plan with patient and HA concerns. With longstanding hx of HA prior to shunt placement, likely will need referral to HA specialist on discharge   - Educated on benefit of high intensity rehab placement    Dispo: IRP, medically ready    Case and plan discussed with attending neurosurgeon Dr. Blankenship

## 2025-03-30 NOTE — SUBJECTIVE & OBJECTIVE
Interval History: 3/30: Neuro stable. Amlodipine/tamsulosin held per HM for hypotension, Pending IPR, medically ready    Medications:  Continuous Infusions:  Scheduled Meds:   acetaminophen  1,000 mg Oral Q8H    amitriptyline  10 mg Oral QHS    carbamide peroxide  5 drop Left Ear BID    cetirizine  10 mg Oral Daily    docusate sodium  100 mg Oral BID    enoxparin  40 mg Subcutaneous Q24H (prophylaxis, 1700)    lacosamide  100 mg Oral Q12H    levothyroxine  125 mcg Oral Before breakfast    melatonin  6 mg Oral Nightly    memantine  10 mg Oral BID    methocarbamoL  500 mg Oral TID    metoprolol succinate  12.5 mg Oral Daily    pantoprazole  40 mg Oral Daily    polyethylene glycol  17 g Oral BID    pravastatin  20 mg Oral QHS    silodosin  4 mg Oral Daily     PRN Meds:  Current Facility-Administered Medications:     cyclobenzaprine, 5 mg, Oral, TID PRN    dextrose 50%, 12.5 g, Intravenous, PRN    dextrose 50%, 25 g, Intravenous, PRN    glucagon (human recombinant), 1 mg, Intramuscular, PRN    glucose, 16 g, Oral, PRN    glucose, 16 g, Oral, PRN    glucose, 24 g, Oral, PRN    hydrALAZINE, 10 mg, Intravenous, Q6H PRN    insulin aspart U-100, 0-5 Units, Subcutaneous, QID (AC + HS) PRN    ondansetron, 4 mg, Intravenous, Q6H PRN    oxyCODONE, 5 mg, Oral, Q4H PRN    oxyCODONE, 10 mg, Oral, Q4H PRN     Objective:     Weight: 68.2 kg (150 lb 5.7 oz)  Body mass index is 20.97 kg/m².  Vital Signs (Most Recent):  Temp: 97.9 °F (36.6 °C) (03/30/25 0458)  Pulse: 70 (03/30/25 0458)  Resp: 17 (03/30/25 0458)  BP: (!) 148/65 (03/30/25 0458)  SpO2: 95 % (03/30/25 0458) Vital Signs (24h Range):  Temp:  [97.6 °F (36.4 °C)-98.3 °F (36.8 °C)] 97.9 °F (36.6 °C)  Pulse:  [59-86] 70  Resp:  [17-18] 17  SpO2:  [94 %-99 %] 95 %  BP: (113-148)/(56-79) 148/65                              Urethral Catheter 03/25/25 2315 Silicone 16 Fr. (Active)   $ Molina Insertion Bedside Insertion Performed 03/25/25 2316   Site Assessment Clean;Intact 03/29/25  "0745   Collection Container Urimeter 03/29/25 0745   Securement Method secured to top of thigh w/ adhesive device 03/29/25 0745   Catheter Care Performed yes 03/29/25 0745   Reason for Continuing Urinary Catheterization Urinary retention 03/29/25 0745   CAUTI Prevention Bundle Securement Device in place with 1" slack;Intact seal between catheter & drainage tubing;Drainage bag/urimeter off the floor;Sheeting clip in use;No dependent loops or kinks;Drainage bag/urimeter not overfilled (<2/3 full);Drainage bag/urimeter below bladder 03/29/25 0745   Output (mL) 600 mL 03/29/25 0608     Neurosurgery Physical Exam  General: well developed, well nourished. Answers questions when prompted.  Pulmonary: quiet, deep respirations, no signs of respiratory distress  Skin: Skin is warm and dry. Incision sites intact with staples. No purulent discharge or diffuse tenderness     Neuro:   Mental Status: AAOx4. No aphasia, no dysarthria - higher order confusion.   CN: PERRL, EOMI other than R VI palsy, sensation intact bilaterally, eyebrow raise and grimace symmetric, tongue midline. Hard of hearing  Motor: moves all extremities spontaneously, full strength throughout, no pronator drift   Sensory: intact to light touch throughout    Significant Labs:  Recent Labs   Lab 03/29/25  2316 03/30/25  0519    135*   K 4.0 4.0    103   CO2 26 26   BUN 19 18   CREATININE 1.0 0.9   CALCIUM 8.6* 8.7     Recent Labs   Lab 03/29/25  0433 03/30/25  0519   WBC 6.98 6.38   HGB 10.9* 10.9*   HCT 34.8* 34.2*    179     No results for input(s): "LABPT", "INR", "APTT" in the last 48 hours.  Microbiology Results (last 7 days)       ** No results found for the last 168 hours. **          All pertinent labs from the last 24 hours have been reviewed.    Significant Diagnostics:  No new relevant imaging to discuss.   Review of Systems  Physical Exam  "

## 2025-03-30 NOTE — ASSESSMENT & PLAN NOTE
Creatine stable for now. BMP reviewed- noted Estimated Creatinine Clearance: 55.8 mL/min (based on SCr of 0.9 mg/dL). according to latest data. Based on current GFR, CKD stage is stage 3 - GFR 30-59.  Monitor UOP and serial BMP and adjust therapy as needed. Renally dose meds. Avoid nephrotoxic medications and procedures.

## 2025-03-30 NOTE — PROGRESS NOTES
"[facility] - Neurosurgery ([facility])  [facility] Medicine  Progress Note    Patient Name: Ramesh Ricci  MRN: [id number]  Patient Class: IP- Inpatient   Admission Date: 3/21/2025  Length of Stay: 9 days  Attending Physician: Jairo Blankenship MD  Primary Care Provider: Nakul Mandujano MD        Subjective     Principal Problem:Subdural hematoma, chronic        HPI:  85-year-old male w/ A-fib, BPH, HTN, hypothyroidism, CKD 3b, Ulcerative Colitis, CAD, Chronic diastolic heart failure, NPH s/p  shunt who presented to the ED for worsening bitemporal headaches. Of note, patient had prior CT scans showing stable subdural hygromas. He was seen in clinic on 3/07 where shunt was reprogrammed to 200 but patient continue to have headaches. CT head upon admission showed interval stability of the bilateral subdural hygromas. Now s/p bilateral mini cranis for SDH evacuation and tying off of distal shunt catheter at right clavicle on 3/24. On 3/27, patient began to have softer blood pressures with known bradycardia. Hospital medicine consulted for "Orthostatic hypotension''.    Upon examination, patient reports having dizziness and headache that has been ongoing prior to admission. Currently complaining of back pain from laying in bed. Denies any chest pain, shortness of breath, fever, chills, abd pain, nausea, vomiting, diarrhea, palpitation. Per wife, baseline heart rate is in the high 50s which is known.    Overview/Hospital Course:  No notes on file    Interval History: NAEON. Vital signs stable. Still complains of headaches.     Review of Systems  Objective:     Vital Signs (Most Recent):  Temp: 98.4 °F (36.9 °C) (03/30/25 0755)  Pulse: 60 (03/30/25 0755)  Resp: 18 (03/30/25 0755)  BP: 121/79 (03/30/25 0755)  SpO2: 96 % (03/30/25 0755) Vital Signs (24h Range):  Temp:  [97.9 °F (36.6 °C)-98.4 °F (36.9 °C)] 98.4 °F (36.9 °C)  Pulse:  [59-86] 60  Resp:  [17-18] 18  SpO2:  [94 %-99 %] 96 %  BP: (113-148)/(56-79) 121/79 "     Weight: 68.2 kg (150 lb 5.7 oz)  Body mass index is 20.97 kg/m².    Intake/Output Summary (Last 24 hours) at 3/30/2025 1039  Last data filed at 3/30/2025 0259  Gross per 24 hour   Intake 600 ml   Output 3650 ml   Net -3050 ml         Physical Exam  Vitals and nursing note reviewed.   Constitutional:       General: He is awake.      Appearance: Normal appearance.   HENT:      Head: Atraumatic.      Right Ear: External ear normal.      Left Ear: External ear normal.      Mouth/Throat:      Mouth: Mucous membranes are moist.   Cardiovascular:      Rate and Rhythm: Normal rate and regular rhythm.      Pulses: Normal pulses.      Heart sounds: Normal heart sounds.   Pulmonary:      Effort: Pulmonary effort is normal. No respiratory distress.   Abdominal:      General: Abdomen is flat. Bowel sounds are normal. There is no distension.      Palpations: Abdomen is soft.   Musculoskeletal:         General: Normal range of motion.      Cervical back: Normal range of motion and neck supple.      Right lower leg: No edema.      Left lower leg: No edema.   Skin:     General: Skin is warm and dry.      Comments: Crani sites bilaterally with HV drains    Neurological:      General: No focal deficit present.      Mental Status: He is alert and oriented to person, place, and time. Mental status is at baseline.   Psychiatric:         Mood and Affect: Mood normal.         Behavior: Behavior normal. Behavior is cooperative.               Significant Labs: All pertinent labs within the past 24 hours have been reviewed.    Significant Imaging: I have reviewed all pertinent imaging results/findings within the past 24 hours.      Assessment & Plan  Bradycardia  Orthostatic hypotension  85-year-old male w/ A-fib, BPH, HTN, hypothyroidism, CKD 3b, Ulcerative Colitis, CAD, Chronic diastolic heart failure, NPH s/p  shunt who presented to the ED for worsening bitemporal headaches. Now s/p bilateral mini cranis for SDH evacuation and tying  off of distal shunt catheter at right clavicle on 3/24. Concern for orthostatic hypotension per primary team. Suspect soft blood pressures 2/2 likely medication induce and likely at baseline. Bradycardia is likely chronic and known per family. EKG with normal sinus rhythm. Noted to have orthostatic vitals on 3/28 (see PT/OT note from that day). Switched Flomax to silodosin. Repeat orthostatics have stabilized on 3/29.       - hold Amlodipine in the setting of soft blood pressure.   - Continue silodosin for BPH  - Continue Metoprolol 12.5; can hold if HR <50  - Can consider stopping amitriptyline if patient is still having orthostatic hypotension       Acquired hypothyroidism  Continue home Synthyroid    BPH (benign prostatic hyperplasia)  Previously on Flomax    - Switched to Silodosin d/t concerns of orthostatic hypotension    Essential hypertension  Patient's blood pressure range in the last 24 hours was: BP  Min: 113/56  Max: 148/65.The patient's inpatient anti-hypertensive regimen is listed below:  Current Antihypertensives  hydrALAZINE injection 10 mg, Every 6 hours PRN, Intravenous  metoprolol succinate (TOPROL-XL) 24 hr split tablet 12.5 mg, Daily, Oral    Plan  - BP is controlled, no changes needed to their regimen  - Holding amlodipine given normal blood pressures.  GERD (gastroesophageal reflux disease)  Continue Protonix    Stage 3a chronic kidney disease  Creatine stable for now. BMP reviewed- noted Estimated Creatinine Clearance: 55.8 mL/min (based on SCr of 0.9 mg/dL). according to latest data. Based on current GFR, CKD stage is stage 3 - GFR 30-59.  Monitor UOP and serial BMP and adjust therapy as needed. Renally dose meds. Avoid nephrotoxic medications and procedures.  PAF (paroxysmal atrial fibrillation)  Patient has paroxysmal (<7 days) atrial fibrillation. Patient is currently in sinus rhythm. [id number] Score: 3. The patients heart rate in the last 24 hours is as follows:  Pulse  Min: 59  Max: 86      Antiarrhythmics  metoprolol succinate (TOPROL-XL) 24 hr split tablet 12.5 mg, Daily, Oral    Anticoagulants  enoxaparin injection 40 mg, Every 24 hours, Subcutaneous    Plan  - Replete lytes with a goal of K>4, Mg >2  - Patient is anticoagulated, see medications listed above.  - Patient's afib is currently controlled  - continue metoprolol; hold if HR <50      VTE Risk Mitigation (From admission, onward)           Ordered     enoxaparin injection 40 mg  Every 24 hours         03/24/25 1823     Reason for No Pharmacological VTE Prophylaxis  Once        Question:  Reasons:  Answer:  Patient is Ambulatory    03/21/25 2008     IP VTE HIGH RISK PATIENT  Once         03/21/25 2008     Place sequential compression device  Until discontinued         03/21/25 2008     Place HARIKA hose  Until discontinued         03/21/25 2008                    Discharge Planning   LIDIA: 4/1/2025     Code Status: Full Code   Medical Readiness for Discharge Date:   Discharge Plan A: Rehab   Discharge Delays: None known at this time      Thank you for involving us in the care of Ramesh Ricci. Please call with any additional questions, concerns or changes in the patient's clinical status. We will sign off.      Gerardo Sotelo DO  Department of Hospital Medicine   [facility] - Neurosurgery ([facility])

## 2025-03-30 NOTE — ASSESSMENT & PLAN NOTE
Patient's blood pressure range in the last 24 hours was: BP  Min: 113/56  Max: 148/65.The patient's inpatient anti-hypertensive regimen is listed below:  Current Antihypertensives  hydrALAZINE injection 10 mg, Every 6 hours PRN, Intravenous  metoprolol succinate (TOPROL-XL) 24 hr split tablet 12.5 mg, Daily, Oral    - Per hospital med recommendations and stable BP, hold amlodipine   - Continuing medical co management with hospital medicine and appreciate recs

## 2025-03-30 NOTE — PROGRESS NOTES
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  Patient was seen in clinic by Dr. Glenn Rivero on 3 7 and 319.  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  The patient denies fever/chills, HA, vision/hearing changes, dysphagia, dysarthria, nausea/vomiting, new-onset weakness or sensory change, bowel/bladder changes.       Post-Op Info:  Procedure(s) (LRB):  EVACUATION, HEMATOMA, SUBDURAL  AND SHUNT TIE OFF (Bilateral)   6 Days Post-Op   Interval History: 3/30: Neuro stable. Amlodipine/tamsulosin held per  for hypotension, Pending IPR, medically ready    Medications:  Continuous Infusions:  Scheduled Meds:   acetaminophen  1,000 mg Oral Q8H    amitriptyline  10 mg Oral QHS    carbamide peroxide  5 drop Left Ear BID    cetirizine  10 mg Oral Daily    docusate sodium  100 mg Oral BID    enoxparin  40 mg Subcutaneous Q24H (prophylaxis, 1700)    lacosamide  100 mg Oral Q12H    levothyroxine  125 mcg Oral Before breakfast    melatonin  6 mg Oral Nightly    memantine  10 mg Oral BID    methocarbamoL  500 mg Oral TID    metoprolol succinate  12.5 mg Oral Daily    pantoprazole  40 mg Oral Daily    polyethylene glycol  17 g Oral BID    pravastatin  20 mg Oral QHS    silodosin  4 mg Oral Daily     PRN Meds:  Current Facility-Administered Medications:     cyclobenzaprine, 5 mg, Oral, TID PRN    dextrose 50%, 12.5 g, Intravenous, PRN    dextrose 50%, 25 g, Intravenous, PRN    glucagon (human  "recombinant), 1 mg, Intramuscular, PRN    glucose, 16 g, Oral, PRN    glucose, 16 g, Oral, PRN    glucose, 24 g, Oral, PRN    hydrALAZINE, 10 mg, Intravenous, Q6H PRN    insulin aspart U-100, 0-5 Units, Subcutaneous, QID (AC + HS) PRN    ondansetron, 4 mg, Intravenous, Q6H PRN    oxyCODONE, 5 mg, Oral, Q4H PRN    oxyCODONE, 10 mg, Oral, Q4H PRN     Objective:     Weight: 68.2 kg (150 lb 5.7 oz)  Body mass index is 20.97 kg/m².  Vital Signs (Most Recent):  Temp: 97.9 °F (36.6 °C) (03/30/25 0458)  Pulse: 70 (03/30/25 0458)  Resp: 17 (03/30/25 0458)  BP: (!) 148/65 (03/30/25 0458)  SpO2: 95 % (03/30/25 0458) Vital Signs (24h Range):  Temp:  [97.6 °F (36.4 °C)-98.3 °F (36.8 °C)] 97.9 °F (36.6 °C)  Pulse:  [59-86] 70  Resp:  [17-18] 17  SpO2:  [94 %-99 %] 95 %  BP: (113-148)/(56-79) 148/65                              Urethral Catheter 03/25/25 2315 Silicone 16 Fr. (Active)   $ Molina Insertion Bedside Insertion Performed 03/25/25 2315   Site Assessment Clean;Intact 03/29/25 0745   Collection Container Urimeter 03/29/25 0745   Securement Method secured to top of thigh w/ adhesive device 03/29/25 0745   Catheter Care Performed yes 03/29/25 0745   Reason for Continuing Urinary Catheterization Urinary retention 03/29/25 0745   CAUTI Prevention Bundle Securement Device in place with 1" slack;Intact seal between catheter & drainage tubing;Drainage bag/urimeter off the floor;Sheeting clip in use;No dependent loops or kinks;Drainage bag/urimeter not overfilled (<2/3 full);Drainage bag/urimeter below bladder 03/29/25 0745   Output (mL) 600 mL 03/29/25 0608     Neurosurgery Physical Exam  General: well developed, well nourished. Answers questions when prompted.  Pulmonary: quiet, deep respirations, no signs of respiratory distress  Skin: Skin is warm and dry. Incision sites intact with staples. No purulent discharge or diffuse tenderness     Neuro:   Mental Status: AAOx4. No aphasia, no dysarthria - higher order confusion.   CN: " "PERRL, EOMI other than R VI palsy, sensation intact bilaterally, eyebrow raise and grimace symmetric, tongue midline. Hard of hearing  Motor: moves all extremities spontaneously, full strength throughout, no pronator drift   Sensory: intact to light touch throughout    Significant Labs:  Recent Labs   Lab 03/29/25  2316 03/30/25  0519    135*   K 4.0 4.0    103   CO2 26 26   BUN 19 18   CREATININE 1.0 0.9   CALCIUM 8.6* 8.7     Recent Labs   Lab 03/29/25  0433 03/30/25  0519   WBC 6.98 6.38   HGB 10.9* 10.9*   HCT 34.8* 34.2*    179     No results for input(s): "LABPT", "INR", "APTT" in the last 48 hours.  Microbiology Results (last 7 days)       ** No results found for the last 168 hours. **          All pertinent labs from the last 24 hours have been reviewed.    Significant Diagnostics:  No new relevant imaging to discuss.   Review of Systems  Physical Exam  Assessment/Plan:     Urinary retention  - Molina placed 3/25.  - Voiding trial in 1 week.    S/P ventriculoperitoneal shunt  This is a 85-year-old male with a history of NPH status post right-sided  shunt on 02/06/2025 by Dr. Blankenship, who presents with several days of worsening bitemporal headache. 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  CT head obtained today demonstrates interval stability of the bilateral subdural hygromas. When the patient saw Emeli Rivero in clinic on 03/07/2025 the shunt was reprogrammed to 200, however hygromas were persistent and he had continued headaches.    He is now s/p bilateral mini cranis for SDH evacuation and tying off of distal shunt catheter at right clavicle on 3/24. Postop CTH with expected findings and pneumocephalus.     Repeat Head CT 3/27 for severe headache with expected post operative changes.     Plan:  - Stepped down to nsgy floor.  - q4h neurochecks.  - Multimodal pain control.  - PT/OT. Encourage OOB and transferring as needed for " "comfort  - Discontinued Keppra due to deviation from cognitive baseline  - Initiated vimpat 100mg as seizure prophylaxis   - DVT ppx: LVX.  - Bowel regimen.  - Molina placed 3/25 for urinary retention. Will plan for voiding trial in 1 week.   - Discussed care plan with patient and HA concerns. With longstanding hx of HA prior to shunt placement, likely will need referral to HA specialist on discharge   - Educated on benefit of high intensity rehab placement    Dispo: IRP, medically ready    Case and plan discussed with attending neurosurgeon Dr. Blankenship    PAF (paroxysmal atrial fibrillation)  Patient has paroxysmal (<7 days) atrial fibrillation. Patient is currently in sinus rhythm. [id number] Score: 3. The patients heart rate in the last 24 hours is as follows:  Pulse  Min: 53  Max: 64     Antiarrhythmics  metoprolol succinate (TOPROL-XL) 24 hr split tablet 12.5 mg, Daily, Oral    Anticoagulants  enoxaparin injection 40 mg, Every 24 hours, Subcutaneous    Plan  - On tele.  - Patient takes Eliquis at home. Hold perioperatively.  - Patient's afib is currently controlled.      Stage 3a chronic kidney disease  Creatine increased today. See "KRISTAN". BMP reviewed- noted Estimated Creatinine Clearance: 45.6 mL/min (based on SCr of 1.1 mg/dL). according to latest data. Based on current GFR, CKD stage is stage 3 - GFR 30-59.  Monitor UOP and serial BMP and adjust therapy as needed. Renally dose meds. Avoid nephrotoxic medications and procedures.    GERD (gastroesophageal reflux disease)  - Continue Pantoprazole.    Essential hypertension  Patient's blood pressure range in the last 24 hours was: BP  Min: 113/56  Max: 148/65.The patient's inpatient anti-hypertensive regimen is listed below:  Current Antihypertensives  hydrALAZINE injection 10 mg, Every 6 hours PRN, Intravenous  metoprolol succinate (TOPROL-XL) 24 hr split tablet 12.5 mg, Daily, Oral    - Per hospital med recommendations and stable BP, hold amlodipine   - " Continuing medical co management with hospital medicine and appreciate recs    BPH (benign prostatic hyperplasia)  - Per hospital medicine recommendations     Acquired hypothyroidism  - Continue Levothyroxine.        Allen Clemens MD  Neurosurgery  [facility] - Neurosurgery ([facility])

## 2025-03-30 NOTE — ASSESSMENT & PLAN NOTE
Patient has paroxysmal (<7 days) atrial fibrillation. Patient is currently in sinus rhythm. [id number] Score: 3. The patients heart rate in the last 24 hours is as follows:  Pulse  Min: 59  Max: 86     Antiarrhythmics  metoprolol succinate (TOPROL-XL) 24 hr split tablet 12.5 mg, Daily, Oral    Anticoagulants  enoxaparin injection 40 mg, Every 24 hours, Subcutaneous    Plan  - Replete lytes with a goal of K>4, Mg >2  - Patient is anticoagulated, see medications listed above.  - Patient's afib is currently controlled  - continue metoprolol; hold if HR <50

## 2025-03-31 LAB
ABSOLUTE EOSINOPHIL (OHS): 0.18 K/UL
ABSOLUTE MONOCYTE (OHS): 0.85 K/UL (ref 0.3–1)
ABSOLUTE NEUTROPHIL COUNT (OHS): 3.59 K/UL (ref 1.8–7.7)
ALBUMIN SERPL BCP-MCNC: 2.9 G/DL (ref 3.5–5.2)
ALP SERPL-CCNC: 61 UNIT/L (ref 40–150)
ALT SERPL W/O P-5'-P-CCNC: 5 UNIT/L (ref 10–44)
ANION GAP (OHS): 10 MMOL/L (ref 8–16)
AST SERPL-CCNC: 24 UNIT/L (ref 11–45)
BASOPHILS # BLD AUTO: 0.06 K/UL
BASOPHILS NFR BLD AUTO: 1 %
BILIRUB SERPL-MCNC: 0.2 MG/DL (ref 0.1–1)
BUN SERPL-MCNC: 20 MG/DL (ref 8–23)
CALCIUM SERPL-MCNC: 8.8 MG/DL (ref 8.7–10.5)
CHLORIDE SERPL-SCNC: 101 MMOL/L (ref 95–110)
CO2 SERPL-SCNC: 25 MMOL/L (ref 23–29)
CREAT SERPL-MCNC: 1.2 MG/DL (ref 0.5–1.4)
ERYTHROCYTE [DISTWIDTH] IN BLOOD BY AUTOMATED COUNT: 12.9 % (ref 11.5–14.5)
GFR SERPLBLD CREATININE-BSD FMLA CKD-EPI: 59 ML/MIN/1.73/M2
GLUCOSE SERPL-MCNC: 135 MG/DL (ref 70–110)
HCT VFR BLD AUTO: 33.9 % (ref 40–54)
HGB BLD-MCNC: 10.8 GM/DL (ref 14–18)
IMM GRANULOCYTES # BLD AUTO: 0.02 K/UL (ref 0–0.04)
IMM GRANULOCYTES NFR BLD AUTO: 0.3 % (ref 0–0.5)
LYMPHOCYTES # BLD AUTO: 1.27 K/UL (ref 1–4.8)
MCH RBC QN AUTO: 29.7 PG (ref 27–50)
MCHC RBC AUTO-ENTMCNC: 31.9 G/DL (ref 32–36)
MCV RBC AUTO: 93 FL (ref 82–98)
NUCLEATED RBC (/100WBC) (OHS): 0 /100 WBC
PLATELET # BLD AUTO: 179 K/UL (ref 150–450)
PMV BLD AUTO: 11.3 FL (ref 9.2–12.9)
POCT GLUCOSE: 110 MG/DL (ref 70–110)
POCT GLUCOSE: 89 MG/DL (ref 70–110)
POTASSIUM SERPL-SCNC: 4 MMOL/L (ref 3.5–5.1)
PROT SERPL-MCNC: 6.6 GM/DL (ref 6–8.4)
RBC # BLD AUTO: 3.64 M/UL (ref 4.6–6.2)
RELATIVE EOSINOPHIL (OHS): 3 %
RELATIVE LYMPHOCYTE (OHS): 21.3 % (ref 18–48)
RELATIVE MONOCYTE (OHS): 14.2 % (ref 4–15)
RELATIVE NEUTROPHIL (OHS): 60.2 % (ref 38–73)
SODIUM SERPL-SCNC: 136 MMOL/L (ref 136–145)
WBC # BLD AUTO: 5.97 K/UL (ref 3.9–12.7)

## 2025-03-31 PROCEDURE — 25000003 PHARM REV CODE 250

## 2025-03-31 PROCEDURE — 99223 1ST HOSP IP/OBS HIGH 75: CPT | Mod: GC,,, | Performed by: PSYCHIATRY & NEUROLOGY

## 2025-03-31 PROCEDURE — 11000001 HC ACUTE MED/SURG PRIVATE ROOM

## 2025-03-31 PROCEDURE — 82040 ASSAY OF SERUM ALBUMIN: CPT | Performed by: PHYSICIAN ASSISTANT

## 2025-03-31 PROCEDURE — 36415 COLL VENOUS BLD VENIPUNCTURE: CPT | Performed by: PHYSICIAN ASSISTANT

## 2025-03-31 PROCEDURE — 36415 COLL VENOUS BLD VENIPUNCTURE: CPT

## 2025-03-31 PROCEDURE — 99024 POSTOP FOLLOW-UP VISIT: CPT | Mod: ,,, | Performed by: PHYSICIAN ASSISTANT

## 2025-03-31 PROCEDURE — 85025 COMPLETE CBC W/AUTO DIFF WBC: CPT

## 2025-03-31 PROCEDURE — 97530 THERAPEUTIC ACTIVITIES: CPT

## 2025-03-31 PROCEDURE — 25000003 PHARM REV CODE 250: Performed by: PHYSICIAN ASSISTANT

## 2025-03-31 PROCEDURE — 63600175 PHARM REV CODE 636 W HCPCS

## 2025-03-31 RX ORDER — OXYCODONE HYDROCHLORIDE 10 MG/1
10 TABLET ORAL EVERY 6 HOURS PRN
Start: 2025-03-31

## 2025-03-31 RX ORDER — METHOCARBAMOL 500 MG/1
500 TABLET, FILM COATED ORAL 3 TIMES DAILY PRN
Status: DISCONTINUED | OUTPATIENT
Start: 2025-03-31 | End: 2025-04-01 | Stop reason: HOSPADM

## 2025-03-31 RX ORDER — HYDRALAZINE HYDROCHLORIDE 20 MG/ML
10 INJECTION INTRAMUSCULAR; INTRAVENOUS EVERY 6 HOURS PRN
Status: DISCONTINUED | OUTPATIENT
Start: 2025-03-31 | End: 2025-04-01 | Stop reason: HOSPADM

## 2025-03-31 RX ORDER — ACETAMINOPHEN 500 MG
1000 TABLET ORAL EVERY 8 HOURS
Start: 2025-03-31

## 2025-03-31 RX ORDER — SILODOSIN 4 MG/1
4 CAPSULE ORAL DAILY
Qty: 30 CAPSULE | Refills: 11 | Status: SHIPPED | OUTPATIENT
Start: 2025-04-01 | End: 2026-04-01

## 2025-03-31 RX ORDER — METHOCARBAMOL 500 MG/1
500 TABLET, FILM COATED ORAL 3 TIMES DAILY PRN
Start: 2025-03-31 | End: 2025-04-10

## 2025-03-31 RX ORDER — LACOSAMIDE 100 MG/1
100 TABLET ORAL EVERY 12 HOURS
Qty: 60 TABLET | Refills: 11 | Status: SHIPPED | OUTPATIENT
Start: 2025-03-31 | End: 2026-03-31

## 2025-03-31 RX ADMIN — DOCUSATE SODIUM 100 MG: 100 CAPSULE, LIQUID FILLED ORAL at 09:03

## 2025-03-31 RX ADMIN — MEMANTINE 10 MG: 10 TABLET ORAL at 09:03

## 2025-03-31 RX ADMIN — ACETAMINOPHEN 1000 MG: 500 TABLET ORAL at 01:03

## 2025-03-31 RX ADMIN — CARBAMIDE PEROXIDE 5 DROP: 65 SOLUTION/ DROPS TOPICAL at 09:03

## 2025-03-31 RX ADMIN — ACETAMINOPHEN 1000 MG: 500 TABLET ORAL at 06:03

## 2025-03-31 RX ADMIN — DOCUSATE SODIUM 100 MG: 100 CAPSULE, LIQUID FILLED ORAL at 08:03

## 2025-03-31 RX ADMIN — LACOSAMIDE 100 MG: 100 TABLET, FILM COATED ORAL at 08:03

## 2025-03-31 RX ADMIN — CETIRIZINE HYDROCHLORIDE 10 MG: 10 TABLET, FILM COATED ORAL at 08:03

## 2025-03-31 RX ADMIN — CYCLOBENZAPRINE HYDROCHLORIDE 5 MG: 5 TABLET, FILM COATED ORAL at 08:03

## 2025-03-31 RX ADMIN — SILODOSIN 4 MG: 4 CAPSULE ORAL at 08:03

## 2025-03-31 RX ADMIN — ACETAMINOPHEN 1000 MG: 500 TABLET ORAL at 09:03

## 2025-03-31 RX ADMIN — CARBAMIDE PEROXIDE 5 DROP: 65 SOLUTION/ DROPS TOPICAL at 08:03

## 2025-03-31 RX ADMIN — ENOXAPARIN SODIUM 40 MG: 40 INJECTION SUBCUTANEOUS at 05:03

## 2025-03-31 RX ADMIN — METOPROLOL SUCCINATE 12.5 MG: 25 TABLET, EXTENDED RELEASE ORAL at 08:03

## 2025-03-31 RX ADMIN — PANTOPRAZOLE SODIUM 40 MG: 40 TABLET, DELAYED RELEASE ORAL at 08:03

## 2025-03-31 RX ADMIN — LACOSAMIDE 100 MG: 100 TABLET, FILM COATED ORAL at 09:03

## 2025-03-31 RX ADMIN — LEVOTHYROXINE SODIUM 125 MCG: 0.12 TABLET ORAL at 06:03

## 2025-03-31 RX ADMIN — MEMANTINE 10 MG: 10 TABLET ORAL at 08:03

## 2025-03-31 RX ADMIN — METHOCARBAMOL 500 MG: 500 TABLET ORAL at 08:03

## 2025-03-31 RX ADMIN — PRAVASTATIN SODIUM 20 MG: 20 TABLET ORAL at 09:03

## 2025-03-31 RX ADMIN — Medication 6 MG: at 09:03

## 2025-03-31 NOTE — HPI
Neurology is consulted for headache management in the case of Mr. 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 He has a long history of headaches and has been on daily elavil. He has known stable bilateral subdural hygromas following VPS placement. Prior to admission, patient was seen in NSGY clinic for reprogramming of shunt, without improvement in headache. Patient was admitted on 3/21 with a intractable bitemporal headache. He was taken for bilateral mini crani for SDH evacuation and shunt was tied at R clavicle. Hospital course has been complicated by orthostatic hypotension; patient has subsequently been swapped from flomax to slidosin and elavil is discontinued. Patient had persistent headache prompting Neurology evaluation.    Mr. Ricci describes a bitemporal, pressure-like headache stretching across his forehead. He denies associated photophobia, phonophobia, vomiting, or vision changes. He endorses occasional nausea with headaches. He denies association with change in position. He reports currently his headache is 5/10 and is tolerable whereas previously he rated at 5/10. He reports tylenol has been most effective agent for relief. Reports worsening of headache when rotating head side to side.

## 2025-03-31 NOTE — PLAN OF CARE
[facility] - Neurosurgery ([facility])  Discharge Reassessment    Primary Care Provider: Nakul Mandujano MD    Expected Discharge Date: 4/1/2025    Per Sunday's NS progress note, patient is medically ready for discharge.  Patient accepted by [facility] and auth was submitted for.    Update: 1:05 Per Pema with PH[facility], auth approved for IPR at . Pema will fax auth to [facility]. Orders requested from assigned provider.      3:45 Per [facility] MD states they need more time to review the home meds that are being restarted and cannot accept at this time of the day.    Discharge Plan A and Plan B have been determined by review of patient's clinical status, future medical and therapeutic needs, and coverage/benefits for post-acute care in coordination with multidisciplinary team members.     Reassessment (most recent)       Discharge Reassessment - 03/31/25 0912          Discharge Reassessment    Assessment Type Discharge Planning Reassessment     Did the patient's condition or plan change since previous assessment? No     Discharge Plan discussed with: Patient     Communicated LIDIA with patient/caregiver Yes     Discharge Plan A Rehab     Discharge Plan B Home with family;Home Health     DME Needed Upon Discharge  other (see comments)   tbd    Transition of Care Barriers None     Why the patient remains in the hospital Insurance issues        Post-Acute Status    Post-Acute Authorization Placement     Post-Acute Placement Status Pending payor review/awaiting authorization (if required)     Discharge Delays Payor Issues

## 2025-03-31 NOTE — PLAN OF CARE
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   S/p evacuation of hematoma      Acute postoperative anemia due to expected blood loss [D62]  No     4 point drop in hemoglobin post op  Monitor with daily cbc  Transfuse <7      Debility [R53.81]  Yes     Patient with Acute on chronic debility due to other reduced mobility. Latest AMPAC and GEMS scores have been reviewed. Evaluation for etiology is complete. Plan includes - Progressive mobility protocol initated  - PT/OT consulted  - Fall precautions in place  - Physical Medicine/Rehab consulted.       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 ELIGIO Narvaez      PAF (paroxysmal atrial fibrillation) [I48.0]  Yes     Chronic    Left ventricular diastolic dysfunction, NYHA class 1 [I51.89]  Yes     Chronic     4/15  7/20 - and increase LA      Stage 3a chronic kidney disease [N18.31]  Yes    BPH (benign prostatic  hyperplasia) [N40.0]  Yes     Chronic     S/P Biopsy 8/08, 2/09  Dr. Nassar      Essential hypertension [I10]  Yes     Chronic     X 5 years      Acquired hypothyroidism [E03.9]  Yes     Chronic    GERD (gastroesophageal reflux disease) [K21.9]  Yes     EGD 7/05, 7/15 - normal        Resolved Hospital Problems    Diagnosis Date Resolved POA    KRISTAN (acute kidney injury) [N17.9] 03/28/2025 No       Allergies:  Review of patient's allergies indicates:   Allergen Reactions    Benazepril Other (See Comments)     Cough       Diet: renal diet    Activities: Activity as tolerated    Goals of Care Treatment Preferences:  Code Status: Full Code      Nursing: per facility protocol  Delirium precautions  Progressive mobility protocol      Labs: CBC and BMP Once     CONSULTS:    Physical Therapy to evaluate and treat.  and Occupational Therapy to evaluate and treat.    MISCELLANEOUS CARE:  Facility provider to assess ongoing need for subcutaneous heparin for DVT prophylaxis.    Ok to resume Eliquis POD14 4/7/25    WOUND CARE ORDERS  Wound Care:  Keep your incision open to air. Keep incision clean and dry at all times. You may shower on the 2nd day after your surgery. You may wash your hair with baby shampoo. Do not rub or scrub the incision. Do not allow the force of water to hit the incision. If the incision gets damp, gently pat it dry. You cannot take a bath/swim/submerge the incision until 8 weeks after surgery. Apply a thin layer of bacitracin ointment to incision twice daily with a sterile q-tip for 14 days after surgery. No hair products, hats, scarfs, wigs.     The incision does not need to be cleaned with any water, soap, alcohol, peroxide, or other substance.    Call your doctor or go to the Emergency Room for any signs of infection including: increased redness, drainage, pain or fever (temperature greater than or equal to 101.4).     [facility] Office: [phone]              [facility] [place]  [place]: [phone]   [place] [facility] Office: [phone]       Medications: Review discharge medications with patient and family and provide education.         Medication List        PAUSE taking these medications      ELIQUIS 2.5 mg Tab  Wait to take this until: April 7, 2025  Generic drug: apixaban  Take 2.5 mg by mouth 2 (two) times daily.            START taking these medications      lacosamide 100 mg Tab  Commonly known as: VIMPAT  Take 1 tablet (100 mg total) by mouth every 12 (twelve) hours x 7 days      methocarbamoL 500 MG Tab  Commonly known as: Robaxin  Take 1 tablet (500 mg total) by mouth 3 (three) times daily as needed (muscle spasm).     oxyCODONE 10 mg Tab immediate release tablet  Commonly known as: ROXICODONE  Take 1 tablet (10 mg total) by mouth every 6 (six) hours as needed for Pain.     silodosin 4 mg Cap capsule  Commonly known as: RAPAFLO  Take 1 capsule (4 mg total) by mouth once daily.  Start taking on: April 1, 2025            CHANGE how you take these medications      acetaminophen 500 MG tablet  Commonly known as: TYLENOL  Take 2 tablets (1,000 mg total) by mouth every 8 (eight) hours.  What changed:   when to take this  reasons to take this            CONTINUE taking these medications      AJOVY AUTOINJECTOR 225 mg/1.5 mL autoinjector  Generic drug: fremanezumab-vfrm  Inject 225 mg into the skin every 30 days.     amitriptyline 10 MG tablet  Commonly known as: ELAVIL  Take 10 mg by mouth every evening.     amLODIPine 2.5 MG tablet  Commonly known as: NORVASC  Take 2.5 mg by mouth nightly.     ascorbic acid (vitamin C) 500 MG tablet  Commonly known as: VITAMIN C  Take 500 mg by mouth once daily.     balsalazide 750 mg capsule  Commonly known as: COLAZAL  Take 5 tablets in the morning and 4 tablets in the evening.     butalbital-acetaminophen-caffeine -40 mg -40 mg per tablet  Commonly known as: FIORICET, ESGIC  Take 1 tablet by mouth every 4 (four) hours  as needed for Headaches.     carbamide peroxide 6.5 % otic solution  Commonly known as: DEBROX  Place 5 drops into the left ear 2 (two) times daily.     ferrous sulfate 325 mg (65 mg iron) Tab tablet  Commonly known as: FEOSOL  1 tablet Orally Once a day     fluocinolone acetonide oiL 0.01 % Drop  Place into both ears.     gabapentin 100 MG capsule  Commonly known as: NEURONTIN  Take 100 mg by mouth 3 (three) times daily.     GAVISCON ORAL  Take by mouth as needed. Acid reflux     ICAPS AREDS2 ORAL  Take 1 capsule by mouth once daily.     levocetirizine 5 MG tablet  Commonly known as: XYZAL  Take 5 mg by mouth every morning.     levothyroxine 125 MCG tablet  Commonly known as: SYNTHROID  TAKE 1 TABLET BY MOUTH EVERY DAY IN THE MORNING     memantine 10 MG Tab  Commonly known as: NAMENDA  Take 10 mg by mouth 2 (two) times daily.     metoprolol succinate 25 MG 24 hr tablet  Commonly known as: TOPROL-XL  TAKE 1 TABLET BY MOUTH EVERY DAY     pantoprazole 40 MG tablet  Commonly known as: PROTONIX  TAKE 1 TABLET BY MOUTH DAILY AS  NEEDED     pravastatin 20 MG tablet  Commonly known as: PRAVACHOL  Take 1 tablet (20 mg total) by mouth every evening.     STOOL SOFTENER ORAL  Take by mouth.     tamsulosin 0.4 mg Cap  Commonly known as: FLOMAX  Take 1 capsule (0.4 mg total) by mouth once daily.     triamcinolone acetonide 0.1% 0.1 % cream  Commonly known as: KENALOG  Apply topically 2 (two) times daily. APPLY  CREAM TOPICALLY TO AFFECTED AREA TWICE DAILY as needed            STOP taking these medications      HYDROcodone-acetaminophen 5-325 mg per tablet  Commonly known as: NORCO                Immunizations Administered as of 3/31/2025       Name Date Dose VIS Date Route Exp Date    COVID-19, MRNA, LN-S, PF (Pfizer) (Purple Cap) 12/2/2021 0.3 mL 5/10/2021 Intramuscular --    Site: Left deltoid     : [employer]     Lot: [id number]     Comment: Adminis     COVID-19, MRNA, LN-S PF (Pfizer) (Purple Cap) 2/2/2021  3:53 PM  0.3 mL 12/12/2020 Intramuscular 5/31/2021    Site: Right deltoid     Given By: Lizabeth Pop RN     : [employer]     Lot: [id number]     COVID-19, MRNA, LN-S, PF (Pfizer) (Purple Cap) 1/12/2021  5:17 PM 0.3 mL 12/12/2020 Intramuscular 3/31/2021    Site: Right deltoid     Given By: Areli Weiner RN     : [employer]     Lot: [id number]                 _________________________________  Cecy Martinez PA-C  03/31/2025

## 2025-03-31 NOTE — PLAN OF CARE
Recommendations  1. Continue renal non dialysis diet   2. Add Novasource Renal daily   3. Monitor weight and labs   4.Encourage PO intake    Goals: Pt will consume 75% of meals by RD follow up

## 2025-03-31 NOTE — PLAN OF CARE
Problem: Adult Inpatient Plan of Care  Goal: Plan of Care Review  3/31/2025 0433 by Mame Cunningham LPN  Outcome: Progressing  3/31/2025 0341 by Mame Cunningham LPN  Outcome: Progressing     Problem: Adult Inpatient Plan of Care  Goal: Optimal Comfort and Wellbeing  3/31/2025 0433 by Mame Cunningham LPN  Outcome: Progressing  3/31/2025 0341 by Mame Cunningham LPN  Outcome: Progressing     Problem: Adult Inpatient Plan of Care  Goal: Readiness for Transition of Care  3/31/2025 0433 by Mame Cunningham LPN  Outcome: Progressing  3/31/2025 0341 by Mame Cunningham LPN  Outcome: Progressing     Problem: Wound  Goal: Optimal Coping  3/31/2025 0433 by Mame Cunningham LPN  Outcome: Progressing  3/31/2025 0341 by Mame Cunningham LPN  Outcome: Progressing     Problem: Wound  Goal: Optimal Functional Ability  3/31/2025 0433 by Mame Cunningham LPN  Outcome: Progressing  3/31/2025 0341 by Mame Cunningham LPN  Outcome: Progressing     Problem: Wound  Goal: Absence of Infection Signs and Symptoms  Outcome: Progressing     Problem: Wound  Goal: Optimal Pain Control and Function  3/31/2025 0433 by Mame Cunningham LPN  Outcome: Progressing  3/31/2025 0341 by Mame Cunningham LPN  Outcome: Progressing     Problem: Wound  Goal: Optimal Wound Healing  Outcome: Progressing     Problem: Fall Injury Risk  Goal: Absence of Fall and Fall-Related Injury  Outcome: Progressing     Problem: Skin Injury Risk Increased  Goal: Skin Health and Integrity  Outcome: Progressing     Patient has slept well this shift with no s/sx of distress, pain meds given were effective. Repositions in bed on his own, needs cuing to remind him he has a indwelling catheter and he needs to call when repositioning so larry doesn't get pulled out, voiced his understanding. Bed in lowest position with wheels locked, SR's up times 3 and bed alarm on for safety purposes, tele sitter active. Continue with  current POC and progression towards discharge.

## 2025-03-31 NOTE — PLAN OF CARE
03/31/25 1329   Medicare Message   Important Message from Medicare regarding Discharge Appeal Rights Given to patient/caregiver;Explained to patient/caregiver;Signed/date by patient/caregiver   Date IMM was signed 03/31/25   Time IMM was signed 0565

## 2025-03-31 NOTE — ASSESSMENT & PLAN NOTE
"Creatine increased today. See "KRISTAN". BMP reviewed- noted Estimated Creatinine Clearance: 55.8 mL/min (based on SCr of 0.9 mg/dL). according to latest data. Based on current GFR, CKD stage is stage 3 - GFR 30-59.  Monitor UOP and serial BMP and adjust therapy as needed. Renally dose meds. Avoid nephrotoxic medications and procedures.  "

## 2025-03-31 NOTE — ASSESSMENT & PLAN NOTE
- Molina placed 3/25.  - Tamulosin held for orthostatic hypotension, silodosin started  - Voiding trial today, q6h bladder scans

## 2025-03-31 NOTE — ASSESSMENT & PLAN NOTE
This is a 85-year-old male with a history of NPH status post right-sided  shunt on 02/06/2025 by Dr. Blankenship, who presents with several days of worsening bitemporal headache. 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  CT head obtained today demonstrates interval stability of the bilateral subdural hygromas. When the patient saw Emeli Rivero in clinic on 03/07/2025 the shunt was reprogrammed to 200, however hygromas were persistent and he had continued headaches.    He is now s/p bilateral mini cranis for SDH evacuation and tying off of distal shunt catheter at right clavicle on 3/24. Postop CTH with expected findings and pneumocephalus.     Repeat Head CT 3/27 for severe headache with expected post operative changes.     Plan:  - Stepped down to nsgy floor.  - q4h neurochecks.  - Multimodal pain control.  - PT/OT. Encourage OOB and transferring as needed for comfort  - Discontinued Keppra due to deviation from cognitive baseline  - Initiated vimpat 100mg as seizure prophylaxis, continue for 14 days total  - DVT ppx: LVX.  - Bowel regimen.  - Molina placed 3/25 for urinary retention.- voiding trial today  - Discussed care plan with patient and HA concerns. With longstanding hx of HA prior to shunt placement, likely will need referral to HA specialist on discharge -- will consult neurology in house for medication recommendations given concern for over-sedation/delirium  - Educated on benefit of high intensity rehab placement    Dispo: IRP, medically ready    Case and plan discussed with attending neurosurgeon Dr. Blankenship

## 2025-03-31 NOTE — PT/OT/SLP PROGRESS
Occupational Therapy   Treatment    Name: Ramesh Ricci  MRN: [id number]  Admitting Diagnosis:  Subdural hematoma, chronic  7 Days Post-Op    Recommendations:     Discharge Recommendations: High Intensity Therapy  Discharge Equipment Recommendations:  hospital bed  Barriers to discharge:  None    Assessment:     Ramesh Ricci is a 85 y.o. male with a medical diagnosis of Subdural hematoma, chronic.  He presents with increasing R lateral lean requiring increased assistance for functional mobility. Performance deficits affecting function are impaired endurance, weakness, impaired self care skills, impaired functional mobility, gait instability, impaired balance, decreased safety awareness, decreased lower extremity function, decreased upper extremity function, impaired cognition, decreased ROM, impaired skin, decreased coordination. Patient has demonstrated sufficient progression to warrant high intensity therapy evidenced by objectives noted below.     Rehab Prognosis:  Fair; patient would benefit from acute skilled OT services to address these deficits and reach maximum level of function.       Plan:     Patient to be seen 4 x/week to address the above listed problems via self-care/home management, therapeutic activities, therapeutic exercises, neuromuscular re-education  Plan of Care Expires: 04/23/25  Plan of Care Reviewed with: patient    Subjective     Chief Complaint: none  Patient/Family Comments/goals: get better  Pain/Comfort:  Pain Rating 1: 0/10  Pain Rating Post-Intervention 1: 0/10    Objective:   Additional Staff Present: Therapy tech utilized during session due to patient complexity and required physical assistance to ensure patient safety    Communicated with: nursing prior to session.  Patient found HOB elevated with telemetry, larry catheter upon OT entry to room.    General Precautions: Standard, fall, hearing impaired    Orthopedic Precautions:N/A  Braces: N/A  Respiratory Status: Room  air     Occupational Performance:     Bed Mobility:    Patient completed Supine to Sit with minimum assistance  Patient completed Sit to Supine with moderate assistance   Patient sat EOB for ~ 5 minutes with Contact Guard Assistance for static sitting balance with mod A for dynamic sitting balance      Functional Mobility/Transfers:  Patient completed Sit <> Stand Transfer with minimum assistance  with  rolling walker   Functional Mobility: Patient ambulated ~10 ft with RW and min progressing to max A  Patient with R lateral lean during  Chair follow for safety    Activities of Daily Living:  Upper Body Dressing: minimum assistance to don gown like jaket  Lower Body Dressing: moderate assistance for dynamic sitting balance while patient completed task      AMPA 6 Click ADL: 15    Treatment & Education:    Patient educated on:   -purpose of OT and OT POC  -facilitation and education on proper body mechanics, energy conservation, and safety  -importance of early mobility and out of bed activities with staff assist  -overall benefits of therapy     All questions answered within OT scope and to patient's satisfaction    Patient left  on stretcher  with all lines intact    GOALS:   Multidisciplinary Problems       Occupational Therapy Goals          Problem: Occupational Therapy    Goal Priority Disciplines Outcome Interventions   Occupational Therapy Goal     OT, PT/OT Progressing    Description: Goals to be met by: 4/8/2025     Patient will increase functional independence with ADLs by performing:    UE Dressing with Set-up Assistance.  LE Dressing with Stand-by Assistance.  Grooming while standing at sink with Stand-by Assistance.  Toileting from toilet with Stand-by Assistance for hygiene and clothing management.   Supine to sit with Stand-by Assistance.  Step transfer with Stand-by Assistance using LRAD.  Toilet transfer to toilet with Stand-by Assistance using LRAD.                         DME  Justifications:  Ramesh Yo requires a hospital bed due to him requiring positioning of the body in ways not feasible with an ordinary bed due to limited ability and cannot independently make changes in body position without the use of the bed.The positioning of the body cannot be sufficiently resolved by the use of pillows and wedges.    Time Tracking:     OT Date of Treatment: 03/31/25  OT Start Time: 1000  OT Stop Time: 1017  OT Total Time (min): 17 min    Billable Minutes:Therapeutic Activity 17    OT/CARLOS: OT          3/31/2025

## 2025-03-31 NOTE — ASSESSMENT & PLAN NOTE
Patient's blood pressure range in the last 24 hours was: BP  Min: 115/61  Max: 146/79.The patient's inpatient anti-hypertensive regimen is listed below:  Current Antihypertensives  hydrALAZINE injection 10 mg, Every 6 hours PRN, Intravenous  metoprolol succinate (TOPROL-XL) 24 hr split tablet 12.5 mg, Daily, Oral    - Per hospital med recommendations and stable BP, hold amlodipine   - Continuing medical co management with hospital medicine and appreciate recs

## 2025-03-31 NOTE — PROGRESS NOTES
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  Patient was seen in clinic by Dr. Glenn Rivero on 3 7 and 319.  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  The patient denies fever/chills, HA, vision/hearing changes, dysphagia, dysarthria, nausea/vomiting, new-onset weakness or sensory change, bowel/bladder changes.       Post-Op Info:  Procedure(s) (LRB):  EVACUATION, HEMATOMA, SUBDURAL  AND SHUNT TIE OFF (Bilateral)   7 Days Post-Op   Interval History: NAEON. Neuro intact for me with exception of forgetting the president's name. Patient reported headache this morning, but better once eaten breakfast. Will obtain CTH today for re-eval. Voiding trial today.  following for co-management. Gen neuro consult given report of long-standing positional headaches that were occurring prior to any neurosurgical intervention. Patient becomes confused in the afternoon, delirium precautions in place. Discussed updates with patient's son via telephone. Pending IPR.     Medications:  Continuous Infusions:  Scheduled Meds:   acetaminophen  1,000 mg Oral Q8H    carbamide peroxide  5 drop Left Ear BID    cetirizine  10 mg Oral Daily    docusate sodium  100 mg Oral BID    enoxparin  40 mg Subcutaneous Q24H (prophylaxis, 1700)    lacosamide  100 mg Oral Q12H    levothyroxine  125 mcg Oral Before breakfast    melatonin  6 mg Oral Nightly    memantine  10 mg Oral BID    metoprolol succinate  12.5 mg Oral  "Daily    pantoprazole  40 mg Oral Daily    polyethylene glycol  17 g Oral BID    pravastatin  20 mg Oral QHS    silodosin  4 mg Oral Daily     PRN Meds:  Current Facility-Administered Medications:     dextrose 50%, 12.5 g, Intravenous, PRN    dextrose 50%, 25 g, Intravenous, PRN    glucagon (human recombinant), 1 mg, Intramuscular, PRN    glucose, 16 g, Oral, PRN    glucose, 16 g, Oral, PRN    glucose, 24 g, Oral, PRN    hydrALAZINE, 10 mg, Intravenous, Q6H PRN    insulin aspart U-100, 0-5 Units, Subcutaneous, QID (AC + HS) PRN    methocarbamoL, 500 mg, Oral, TID PRN    ondansetron, 4 mg, Intravenous, Q6H PRN    oxyCODONE, 5 mg, Oral, Q4H PRN    oxyCODONE, 10 mg, Oral, Q4H PRN     Objective:     Weight: 68.2 kg (150 lb 5.7 oz)  Body mass index is 20.97 kg/m².  Vital Signs (Most Recent):  Temp: 98.6 °F (37 °C) (03/31/25 0719)  Pulse: 65 (03/31/25 0837)  Resp: 18 (03/31/25 0719)  BP: 115/61 (03/31/25 0837)  SpO2: (!) 94 % (03/31/25 0719) Vital Signs (24h Range):  Temp:  [97.5 °F (36.4 °C)-98.6 °F (37 °C)] 98.6 °F (37 °C)  Pulse:  [65-96] 65  Resp:  [18] 18  SpO2:  [94 %-98 %] 94 %  BP: (115-146)/(61-84) 115/61     Date 03/31/25 0700 - 04/01/25 0659   Shift 2242-8506 2091-2933 1957-3291 24 Hour Total   INTAKE   Shift Total(mL/kg)       OUTPUT   Urine(mL/kg/hr) 1400   1400   Shift Total(mL/kg) 1400(20.5)   1400(20.5)   Weight (kg) 68.2 68.2 68.2 68.2                            Urethral Catheter 03/25/25 2315 Silicone 16 Fr. (Active)   $ Molina Insertion Bedside Insertion Performed 03/25/25 2315   Site Assessment Clean;Intact 03/29/25 0745   Collection Container Urimeter 03/29/25 0745   Securement Method secured to top of thigh w/ adhesive device 03/29/25 0745   Catheter Care Performed yes 03/29/25 0745   Reason for Continuing Urinary Catheterization Urinary retention 03/29/25 0745   CAUTI Prevention Bundle Securement Device in place with 1" slack;Intact seal between catheter & drainage tubing;Drainage bag/urimeter off " "the floor;Sheeting clip in use;No dependent loops or kinks;Drainage bag/urimeter not overfilled (<2/3 full);Drainage bag/urimeter below bladder 03/29/25 0745   Output (mL) 600 mL 03/29/25 0608     Neurosurgery Physical Exam  General: well developed, well nourished. Answers questions when prompted.  Pulmonary: quiet, deep respirations, no signs of respiratory distress  Skin: Skin is warm and dry. Incision sites intact with staples. No purulent discharge or diffuse tenderness     Neuro:   Mental Status: AAOx3. No aphasia, no dysarthria - higher order confusion.   CN: PERRL, EOMI other than R VI palsy, sensation intact bilaterally, eyebrow raise and grimace symmetric, tongue midline. Hard of hearing  Motor: moves all extremities spontaneously, full strength throughout, no pronator drift   Sensory: intact to light touch throughout  Cranial incisions are C/D/I    Significant Labs:  Recent Labs   Lab 03/29/25  2316 03/30/25  0519    135*   K 4.0 4.0    103   CO2 26 26   BUN 19 18   CREATININE 1.0 0.9   CALCIUM 8.6* 8.7     Recent Labs   Lab 03/30/25  0519 03/31/25  0513   WBC 6.38 5.97   HGB 10.9* 10.8*   HCT 34.2* 33.9*    179     No results for input(s): "LABPT", "INR", "APTT" in the last 48 hours.  Microbiology Results (last 7 days)       ** No results found for the last 168 hours. **          All pertinent labs from the last 24 hours have been reviewed.    Significant Diagnostics:  No new relevant imaging to discuss.     Assessment/Plan:     Orthostatic hypotension  HM following    Urinary retention  - Molina placed 3/25.  - Tamulosin held for orthostatic hypotension, silodosin started  - Voiding trial today, q6h bladder scans    S/P ventriculoperitoneal shunt  This is a 85-year-old male with a history of NPH status post right-sided  shunt on 02/06/2025 by Dr. Blankenship, who presents with several days of worsening bitemporal headache. CT head on 03/07/2025 did demonstrate bilateral convexity subdural " hygromas with mild mass effect.  CT head obtained today demonstrates interval stability of the bilateral subdural hygromas. When the patient saw Emeli Rivero in clinic on 03/07/2025 the shunt was reprogrammed to 200, however hygromas were persistent and he had continued headaches.    He is now s/p bilateral mini cranis for SDH evacuation and tying off of distal shunt catheter at right clavicle on 3/24. Postop CTH with expected findings and pneumocephalus.     Repeat Head CT 3/27 for severe headache with expected post operative changes.     Plan:  - Stepped down to nsgy floor.  - q4h neurochecks.  - Multimodal pain control.  - PT/OT. Encourage OOB and transferring as needed for comfort  - Discontinued Keppra due to deviation from cognitive baseline  - Initiated vimpat 100mg as seizure prophylaxis, continue for 14 days total  - DVT ppx: LVX.  - Bowel regimen.  - Molina placed 3/25 for urinary retention.- voiding trial today  - Discussed care plan with patient and HA concerns. With longstanding hx of HA prior to shunt placement, likely will need referral to HA specialist on discharge -- will consult neurology in house for medication recommendations given concern for over-sedation/delirium  - Educated on benefit of high intensity rehab placement    Dispo: IRP, medically ready    Case and plan discussed with attending neurosurgeon Dr. Blankenship    PAF (paroxysmal atrial fibrillation)  Patient has paroxysmal (<7 days) atrial fibrillation. Patient is currently in sinus rhythm. [id number] Score: 3. The patients heart rate in the last 24 hours is as follows:  Pulse  Min: 65  Max: 96     Antiarrhythmics  metoprolol succinate (TOPROL-XL) 24 hr split tablet 12.5 mg, Daily, Oral    Anticoagulants  enoxaparin injection 40 mg, Every 24 hours, Subcutaneous    Plan  - On tele.  - Patient takes Eliquis at home. Hold perioperatively. Plan to restart POD 14  - Patient's afib is currently controlled.      Stage 3a chronic kidney  "disease  Creatine increased today. See "KRISTAN". BMP reviewed- noted Estimated Creatinine Clearance: 55.8 mL/min (based on SCr of 0.9 mg/dL). according to latest data. Based on current GFR, CKD stage is stage 3 - GFR 30-59.  Monitor UOP and serial BMP and adjust therapy as needed. Renally dose meds. Avoid nephrotoxic medications and procedures.    GERD (gastroesophageal reflux disease)  - Continue Pantoprazole.    Essential hypertension  Patient's blood pressure range in the last 24 hours was: BP  Min: 115/61  Max: 146/79.The patient's inpatient anti-hypertensive regimen is listed below:  Current Antihypertensives  hydrALAZINE injection 10 mg, Every 6 hours PRN, Intravenous  metoprolol succinate (TOPROL-XL) 24 hr split tablet 12.5 mg, Daily, Oral    - Per hospital med recommendations and stable BP, hold amlodipine   - Continuing medical co management with hospital medicine and appreciate recs    BPH (benign prostatic hyperplasia)  - Per hospital medicine recommendations     Acquired hypothyroidism  - Continue Levothyroxine.        Emeli Rivero PA-C  Neurosurgery  Kulwinder florian - Neurosurgery ([facility])  "

## 2025-03-31 NOTE — SUBJECTIVE & OBJECTIVE
Interval History: NAEON. Neuro intact for me with exception of forgetting the president's name. Patient reported headache this morning, but better once eaten breakfast. Will obtain [facility] today for re-eval. Voiding trial today.  following for co-management. Gen neuro consult given report of long-standing positional headaches that were occurring prior to any neurosurgical intervention. Patient becomes confused in the afternoon, delirium precautions in place. Discussed updates with patient's son via telephone. Pending IPR.     Medications:  Continuous Infusions:  Scheduled Meds:   acetaminophen  1,000 mg Oral Q8H    carbamide peroxide  5 drop Left Ear BID    cetirizine  10 mg Oral Daily    docusate sodium  100 mg Oral BID    enoxparin  40 mg Subcutaneous Q24H (prophylaxis, 1700)    lacosamide  100 mg Oral Q12H    levothyroxine  125 mcg Oral Before breakfast    melatonin  6 mg Oral Nightly    memantine  10 mg Oral BID    metoprolol succinate  12.5 mg Oral Daily    pantoprazole  40 mg Oral Daily    polyethylene glycol  17 g Oral BID    pravastatin  20 mg Oral QHS    silodosin  4 mg Oral Daily     PRN Meds:  Current Facility-Administered Medications:     dextrose 50%, 12.5 g, Intravenous, PRN    dextrose 50%, 25 g, Intravenous, PRN    glucagon (human recombinant), 1 mg, Intramuscular, PRN    glucose, 16 g, Oral, PRN    glucose, 16 g, Oral, PRN    glucose, 24 g, Oral, PRN    hydrALAZINE, 10 mg, Intravenous, Q6H PRN    insulin aspart U-100, 0-5 Units, Subcutaneous, QID (AC + HS) PRN    methocarbamoL, 500 mg, Oral, TID PRN    ondansetron, 4 mg, Intravenous, Q6H PRN    oxyCODONE, 5 mg, Oral, Q4H PRN    oxyCODONE, 10 mg, Oral, Q4H PRN     Objective:     Weight: 68.2 kg (150 lb 5.7 oz)  Body mass index is 20.97 kg/m².  Vital Signs (Most Recent):  Temp: 98.6 °F (37 °C) (03/31/25 0719)  Pulse: 65 (03/31/25 0837)  Resp: 18 (03/31/25 0719)  BP: 115/61 (03/31/25 0837)  SpO2: (!) 94 % (03/31/25 0719) Vital Signs (24h Range):  Temp:   "[97.5 °F (36.4 °C)-98.6 °F (37 °C)] 98.6 °F (37 °C)  Pulse:  [65-96] 65  Resp:  [18] 18  SpO2:  [94 %-98 %] 94 %  BP: (115-146)/(61-84) 115/61     Date 03/31/25 0700 - 04/01/25 0659   Shift 4663-2097 7126-7520 3643-7694 24 Hour Total   INTAKE   Shift Total(mL/kg)       OUTPUT   Urine(mL/kg/hr) 1400   1400   Shift Total(mL/kg) 1400(20.5)   1400(20.5)   Weight (kg) 68.2 68.2 68.2 68.2                            Urethral Catheter 03/25/25 2315 Silicone 16 Fr. (Active)   $ Molina Insertion Bedside Insertion Performed 03/25/25 2315   Site Assessment Clean;Intact 03/29/25 0745   Collection Container Urimeter 03/29/25 0745   Securement Method secured to top of thigh w/ adhesive device 03/29/25 0745   Catheter Care Performed yes 03/29/25 0745   Reason for Continuing Urinary Catheterization Urinary retention 03/29/25 0745   CAUTI Prevention Bundle Securement Device in place with 1" slack;Intact seal between catheter & drainage tubing;Drainage bag/urimeter off the floor;Sheeting clip in use;No dependent loops or kinks;Drainage bag/urimeter not overfilled (<2/3 full);Drainage bag/urimeter below bladder 03/29/25 0745   Output (mL) 600 mL 03/29/25 0608     Neurosurgery Physical Exam  General: well developed, well nourished. Answers questions when prompted.  Pulmonary: quiet, deep respirations, no signs of respiratory distress  Skin: Skin is warm and dry. Incision sites intact with staples. No purulent discharge or diffuse tenderness     Neuro:   Mental Status: AAOx3. No aphasia, no dysarthria - higher order confusion.   CN: PERRL, EOMI other than R VI palsy, sensation intact bilaterally, eyebrow raise and grimace symmetric, tongue midline. Hard of hearing  Motor: moves all extremities spontaneously, full strength throughout, no pronator drift   Sensory: intact to light touch throughout  Cranial incisions are C/D/I    Significant Labs:  Recent Labs   Lab 03/29/25 2316 03/30/25  0519    135*   K 4.0 4.0    103   CO2 26 " "26   BUN 19 18   CREATININE 1.0 0.9   CALCIUM 8.6* 8.7     Recent Labs   Lab 03/30/25  0519 03/31/25  0513   WBC 6.38 5.97   HGB 10.9* 10.8*   HCT 34.2* 33.9*    179     No results for input(s): "LABPT", "INR", "APTT" in the last 48 hours.  Microbiology Results (last 7 days)       ** No results found for the last 168 hours. **          All pertinent labs from the last 24 hours have been reviewed.    Significant Diagnostics:  No new relevant imaging to discuss.     "

## 2025-03-31 NOTE — PROGRESS NOTES
"Kulwinder Elias - Neurosurgery ([facility])  Adult Nutrition  Progress Note    SUMMARY     Recommendations  1. Continue renal non dialysis diet   2. Add Novasource Renal daily   3. Monitor weight and labs   4.Encourage PO intake    Goals: Pt will consume 75% of meals by RD follow up    Nutrition Goal Status: new  Communication of RD Recs:  (POC)    Nutrition Discharge Planning  Nutrition Discharge Planning: Therapeutic diet (comments), Oral supplement regimen (comments)  Therapeutic diet (comments): Renal non-dialysis diet  Oral supplement regimen (comments): Novasource Renal daily    Assessment and Plan  No nutrition diagnosis at this time     Malnutrition Assessment  Unable to assess at this time, RD to attempt at follow up     Reason for Assessment  Reason For Assessment: length of stay  Diagnosis:  (Subdural heamtoma, chronic)  General Information Comments: Pt is currently on a renal non-dialysis diet. PMH- UC, hypothyroidism, HTN, osteopenia, GERD, stroke, anemia, anxiety, prostate cancer, CKD3. Gabino-18/incision left head, incision right head, incision left chest. Noted 100% meal intake. S/p  shunt who presented to the ED for worsening bitemporal headaches. Generalized fatigue. Noted 9 lb weight loss in 5 months.    Nutrition/Diet History  Nutrition Intake History: [facility]  Food Preferences: [facility]  Spiritual, Cultural Beliefs, [employer] Practices, Values that Affect Care: no  Factors Affecting Nutritional Intake: None identified at this time    Food Insecurity: No Food Insecurity (3/22/2025)    Hunger Vital Sign     Worried About Running Out of Food in the Last Year: Never true     Ran Out of Food in the Last Year: Never true     Anthropometrics  Height: 5' 11" (180.3 cm)  Height (inches): 71 in  Height Method: Measured  Weight: 68.2 kg (150 lb 5.7 oz)  Weight (lb): 150.36 lb  Weight Method: Bed Scale  Ideal Body Weight (IBW), Male: 172 lb  % Ideal Body Weight, Male (lb): 87.42 %  BMI (Calculated): 21  BMI Grade: " 18.5-24.9 - normal  Usual Body Weight (UBW), k.1 kg (10/18/24)  % Usual Body Weight: 94.79  % Weight Change From Usual Weight: -5.41 %     Lab/Procedures/Meds  Pertinent Labs Reviewed: reviewed  Pertinent Labs Comments: Na 135, Cholesterol 200  Pertinent Medications Reviewed: reviewed  Pertinent Medications Comments: cetirizine, enoxaparin, levothyroxine, memantine, methocarbamol, pantoprazole, pravstatin    Estimated/Assessed Needs  Weight Used For Calorie Calculations: 68.2 kg (150 lb 5.7 oz)  Energy Calorie Requirements (kcal): 6015-6560 kcals (25-30 kcals/kg)  Energy Need Method: Kcal/kg  Protein Requirements: 54-68g (0.8-1g/kg)  Weight Used For Protein Calculations: 68.2 kg (150 lb 5.7 oz)     Estimated Fluid Requirement Method: RDA Method  RDA Method (mL): 1705     Nutrition Prescription Ordered  Current Diet Order: Renal non dialysis diet    Evaluation of Received Nutrient/Fluid Intake  I/O:   Energy Calories Required: meeting needs  Protein Required: meeting needs  Fluid Required: meeting needs  Comments: LBM-3/30/25  Tolerance: tolerating  % Intake of Estimated Energy Needs: 75 - 100 %  % Meal Intake: 75 - 100 %    Nutrition Risk  Level of Risk/Frequency of Follow-up: low (1x/week)     Monitor and Evaluation  Monitor and Evaluation: Energy intake, Food and beverage intake, Protein intake, Diet order, Weight, Electrolyte and renal panel, Gastrointestinal profile, Inflammatory profile, Lipid profile     Nutrition Follow-Up  RD Follow-up?: Yes

## 2025-03-31 NOTE — NURSING
Spoke with on call for Neuro SX Dr. Pringle, made her aware of patients issues with pain and current regimen isnt working for him also his left eye has more swelling traveling down face and now his right eye is swelling. Stated she would see him this morning.

## 2025-03-31 NOTE — ASSESSMENT & PLAN NOTE
Patient has paroxysmal (<7 days) atrial fibrillation. Patient is currently in sinus rhythm. [id number] Score: 3. The patients heart rate in the last 24 hours is as follows:  Pulse  Min: 65  Max: 96     Antiarrhythmics  metoprolol succinate (TOPROL-XL) 24 hr split tablet 12.5 mg, Daily, Oral    Anticoagulants  enoxaparin injection 40 mg, Every 24 hours, Subcutaneous    Plan  - On tele.  - Patient takes Eliquis at home. Hold perioperatively. Plan to restart POD 14  - Patient's afib is currently controlled.

## 2025-03-31 NOTE — PT/OT/SLP PROGRESS
Physical Therapy      Patient Name:  Ramesh Ricci   MRN:  [id number]    Patient not seen today secondary to session held by writing PTA d/t possible neuro status change. Awaiting CT results .   Will follow-up 4/1/25.

## 2025-04-01 VITALS
RESPIRATION RATE: 18 BRPM | TEMPERATURE: 98 F | BODY MASS INDEX: 21.05 KG/M2 | HEART RATE: 62 BPM | WEIGHT: 150.38 LBS | OXYGEN SATURATION: 98 % | DIASTOLIC BLOOD PRESSURE: 71 MMHG | HEIGHT: 71 IN | SYSTOLIC BLOOD PRESSURE: 131 MMHG

## 2025-04-01 PROBLEM — E87.1 HYPONATREMIA: Status: ACTIVE | Noted: 2025-04-01

## 2025-04-01 LAB
ABSOLUTE EOSINOPHIL (OHS): 0.16 K/UL
ABSOLUTE MONOCYTE (OHS): 1.01 K/UL (ref 0.3–1)
ABSOLUTE NEUTROPHIL COUNT (OHS): 3.96 K/UL (ref 1.8–7.7)
BASOPHILS # BLD AUTO: 0.06 K/UL
BASOPHILS NFR BLD AUTO: 0.9 %
ERYTHROCYTE [DISTWIDTH] IN BLOOD BY AUTOMATED COUNT: 12.8 % (ref 11.5–14.5)
HCT VFR BLD AUTO: 33.9 % (ref 40–54)
HGB BLD-MCNC: 11 GM/DL (ref 14–18)
IMM GRANULOCYTES # BLD AUTO: 0.02 K/UL (ref 0–0.04)
IMM GRANULOCYTES NFR BLD AUTO: 0.3 % (ref 0–0.5)
LYMPHOCYTES # BLD AUTO: 1.51 K/UL (ref 1–4.8)
MCH RBC QN AUTO: 30.4 PG (ref 27–31)
MCHC RBC AUTO-ENTMCNC: 32.4 G/DL (ref 32–36)
MCV RBC AUTO: 94 FL (ref 82–98)
NUCLEATED RBC (/100WBC) (OHS): 0 /100 WBC
PLATELET # BLD AUTO: 206 K/UL (ref 150–450)
PMV BLD AUTO: 11 FL (ref 9.2–12.9)
POCT GLUCOSE: 93 MG/DL (ref 70–110)
POCT GLUCOSE: 99 MG/DL (ref 70–110)
RBC # BLD AUTO: 3.62 M/UL (ref 4.6–6.2)
RELATIVE EOSINOPHIL (OHS): 2.4 %
RELATIVE LYMPHOCYTE (OHS): 22.5 % (ref 18–48)
RELATIVE MONOCYTE (OHS): 15 % (ref 4–15)
RELATIVE NEUTROPHIL (OHS): 58.9 % (ref 38–73)
WBC # BLD AUTO: 6.72 K/UL (ref 3.9–12.7)

## 2025-04-01 PROCEDURE — 25000003 PHARM REV CODE 250: Performed by: PHYSICIAN ASSISTANT

## 2025-04-01 PROCEDURE — 36415 COLL VENOUS BLD VENIPUNCTURE: CPT

## 2025-04-01 PROCEDURE — 51798 US URINE CAPACITY MEASURE: CPT

## 2025-04-01 PROCEDURE — 25000003 PHARM REV CODE 250

## 2025-04-01 PROCEDURE — 99024 POSTOP FOLLOW-UP VISIT: CPT | Mod: ,,,

## 2025-04-01 PROCEDURE — 85025 COMPLETE CBC W/AUTO DIFF WBC: CPT

## 2025-04-01 PROCEDURE — 25000003 PHARM REV CODE 250: Performed by: STUDENT IN AN ORGANIZED HEALTH CARE EDUCATION/TRAINING PROGRAM

## 2025-04-01 RX ORDER — METOPROLOL SUCCINATE 25 MG/1
12.5 TABLET, EXTENDED RELEASE ORAL DAILY
Qty: 45 TABLET | Refills: 3 | Status: SHIPPED | OUTPATIENT
Start: 2025-04-02 | End: 2026-04-02

## 2025-04-01 RX ADMIN — SILODOSIN 4 MG: 4 CAPSULE ORAL at 09:04

## 2025-04-01 RX ADMIN — ACETAMINOPHEN 1000 MG: 500 TABLET ORAL at 05:04

## 2025-04-01 RX ADMIN — ACETAMINOPHEN 1000 MG: 500 TABLET ORAL at 02:04

## 2025-04-01 RX ADMIN — MEMANTINE 10 MG: 10 TABLET ORAL at 09:04

## 2025-04-01 RX ADMIN — CARBAMIDE PEROXIDE 5 DROP: 65 SOLUTION/ DROPS TOPICAL at 09:04

## 2025-04-01 RX ADMIN — CETIRIZINE HYDROCHLORIDE 10 MG: 10 TABLET, FILM COATED ORAL at 09:04

## 2025-04-01 RX ADMIN — DOCUSATE SODIUM 100 MG: 100 CAPSULE, LIQUID FILLED ORAL at 09:04

## 2025-04-01 RX ADMIN — LACOSAMIDE 100 MG: 100 TABLET, FILM COATED ORAL at 09:04

## 2025-04-01 RX ADMIN — POLYETHYLENE GLYCOL 3350 17 G: 17 POWDER, FOR SOLUTION ORAL at 09:04

## 2025-04-01 RX ADMIN — PANTOPRAZOLE SODIUM 40 MG: 40 TABLET, DELAYED RELEASE ORAL at 09:04

## 2025-04-01 RX ADMIN — LEVOTHYROXINE SODIUM 125 MCG: 0.12 TABLET ORAL at 05:04

## 2025-04-01 RX ADMIN — METOPROLOL SUCCINATE 12.5 MG: 25 TABLET, EXTENDED RELEASE ORAL at 09:04

## 2025-04-01 NOTE — PLAN OF CARE
Problem: Adult Inpatient Plan of Care  Goal: Patient-Specific Goal (Individualized)  Description: Pt will maintain sbp below 160  Outcome: Progressing  Flowsheets (Taken 4/1/2025 0312)  Individualized Care Needs: care clustered  Anxieties, Fears or Concerns: none  Goal: Optimal Comfort and Wellbeing  Outcome: Progressing  Intervention: Monitor Pain and Promote Comfort  Flowsheets (Taken 4/1/2025 0312)  Pain Management Interventions:   care clustered   warm blanket provided   quiet environment facilitated  Intervention: Provide Person-Centered Care  Flowsheets (Taken 4/1/2025 0312)  Trust Relationship/Rapport:   care explained   choices provided   questions encouraged     VSS. Bed in lowest position, side rails x 3, and call light in use.

## 2025-04-01 NOTE — NURSING
@1950 Pt blood pressure 157/87, . Retaken showing 148/80, . Neurosurgery notified. Notify of sbp greater than or equal to 160.

## 2025-04-01 NOTE — CONSULTS
Kulwinder Elias - Neurosurgery ([facility])  Neurology  Consult Note    Patient Name: Ramesh Ricci  MRN: [id number]  Admission Date: 3/21/2025  Hospital Length of Stay: 10 days  Code Status: Full Code   Attending Provider: Jairo Blankenship MD   Consulting Provider: Jarrod Kim MD  Primary Care Physician: Nakul Mandujano MD  Principal Problem:Subdural hematoma, chronic    Inpatient consult to General Neurology  Consult performed by: Jarrod Kim MD  Consult ordered by: Emeli Rivero PA-C         Subjective:     Chief Complaint:  Intractable headache     HPI:   Neurology is consulted for headache management in the case of Mr. 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 He has a long history of headaches and has been on daily elavil. He has known stable bilateral subdural hygromas following VPS placement. Prior to admission, patient was seen in NSGY clinic for reprogramming of shunt, without improvement in headache. Patient was admitted on 3/21 with a intractable bitemporal headache. He was taken for bilateral mini crani for SDH evacuation and shunt was tied at R clavicle. Hospital course has been complicated by orthostatic hypotension; patient has subsequently been swapped from flomax to slidosin and elavil is discontinued. Patient had persistent headache prompting Neurology evaluation.    Mr. Ricci describes a bitemporal, pressure-like headache stretching across his forehead. He denies associated photophobia, phonophobia, vomiting, or vision changes. He endorses occasional nausea with headaches. He denies association with change in position. He reports currently his headache is 5/10 and is tolerable whereas previously he rated at 5/10. He reports tylenol has been most effective agent for relief. Reports worsening of headache when rotating head side to side.     Past Medical  History:   Diagnosis Date    Allergies 01/31/2025    Anemia     Anticoagulant long-term use     Anxiety     Atrophic kidney 11/16/2012    BPH (benign prostatic hyperplasia) 11/16/2012    Congenital absence of right kidney 07/05/2017    DDD (degenerative disc disease), cervical 07/05/2017    x-ray 7/17 - Severe    Ex-smoker 12/20/2018    Exudative age-related macular degeneration, right eye, with active choroidal neovascularization 02/15/2024    GERD (gastroesophageal reflux disease) 11/16/2012    Glaucoma 11/16/2012    HTN (hypertension) 11/16/2012    Hypothyroid 11/16/2012    Internal carotid artery stenosis 11/16/2012    Iron deficiency anemia due to chronic blood loss 10/29/2021    Left ventricular diastolic dysfunction, NYHA class 1 04/16/2015    4/15    Low serum testosterone level 11/16/2012    Normal cardiac stress test 11/16/2012    NPH (normal pressure hydrocephalus) 02/03/2025    Osteopenia 11/16/2012    PAF (paroxysmal atrial fibrillation) 12/20/2018    Prostate CA 01/15/2013    XRT 12/12  Dr. 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    Shingles 11/16/2012    Skin disease 2020    Seems to have started after Covid vaccine    Stage 3a chronic kidney disease 10/06/2014    Stroke 2017    tia   no residual    Thrombocytopenia 01/31/2025    TIA (transient ischemic attack) 11/16/2012    UC (ulcerative colitis) 11/16/2012       Past Surgical History:   Procedure Laterality Date    ADENOIDECTOMY      COLONOSCOPY N/A 03/25/2022    Procedure: COLONOSCOPY;  Surgeon: Ashley Nguyen MD;  Location: [facility] ENDO;  Service: Endoscopy;  Laterality: N/A;    ESOPHAGOGASTRODUODENOSCOPY N/A 03/25/2022    Procedure: EGD (ESOPHAGOGASTRODUODENOSCOPY);  Surgeon: Ashley Nguyen MD;  Location: [facility] ENDO;  Service: Endoscopy;  Laterality: N/A;  added on per Dr. Nguyen    ESOPHAGOGASTRODUODENOSCOPY N/A 6/7/2024    Procedure: EGD (ESOPHAGOGASTRODUODENOSCOPY);  Surgeon: Audie Snell MD;   Location: [facility] ENDO (4TH FLR);  Service: Endoscopy;  Laterality: N/A;  5/21 R/s,sent updated instr via portal.pt informed to hold eliquis for 2 days.AC  Ref by: ,  approved to hold Eliquis (apixaban) for 2 days per Dr. Conklin-see media file  5/9/24-GT  5/31-pre call complete-tb    EVACUATION OF SUBDURAL HEMATOMA Bilateral 3/24/2025    Procedure: EVACUATION, HEMATOMA, SUBDURAL  AND SHUNT TIE OFF;  Surgeon: Jairo Blankenship MD;  Location: [facility] OR 2ND FLR;  Service: Neurosurgery;  Laterality: Bilateral;  bilateral bobby holes for subdural hygroma and tying off of  shunt at clavicle. to follow other ware cases    EYE SURGERY Right 11/2020    cataract    HERNIA REPAIR      LEFT HEART CATHETERIZATION Right 10/29/2021    Procedure: CATHETERIZATION, HEART, LEFT AND RIGHT  - LV DARNELL POSSIBLE;  Surgeon: Beau Conklin MD;  Location: [facility] CATH LAB;  Service: Cardiology;  Laterality: Right;    LUMBAR PUNCTURE N/A 1/13/2025    Procedure: Lumbar Puncture;  Surgeon: Jairo Blankenship MD;  [facility];  Service: Neurosurgery;  Laterality: N/A;    RIGHT HEART CATHETERIZATION Right 10/29/2021    Procedure: INSERTION, CATHETER, RIGHT HEART;  Surgeon: Beau Conklin MD;  Location: [facility] CATH LAB;  Service: Cardiology;  Laterality: Right;    SKIN BIOPSY  2020    TONSILLECTOMY      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    Procedure: INSERTION, SHUNT, VENTRICULOPERITONEAL;  Surgeon: Jairo Blankenship MD;  [facility];  Service: Neurosurgery;  Laterality: Right;    VENTRICULOPERITONEAL SHUNT  2/6/2025    Procedure: INSERTION, SHUNT, VENTRICULOPERITONEAL;  Surgeon: Shar Elizabeth MD;  [facility];  Service: General;;       Review of patient's allergies indicates:   Allergen Reactions    Benazepril Other (See Comments)     Cough     No current facility-administered medications on file prior to encounter.     Current Outpatient Medications on File Prior to Encounter    Medication Sig    metoprolol succinate (TOPROL-XL) 25 MG 24 hr tablet TAKE 1 TABLET BY MOUTH EVERY DAY    amitriptyline (ELAVIL) 10 MG tablet Take 10 mg by mouth every evening.    amLODIPine (NORVASC) 2.5 MG tablet Take 2.5 mg by mouth nightly.    ascorbic acid, vitamin C, (VITAMIN C) 500 MG tablet Take 500 mg by mouth once daily.    balsalazide (COLAZAL) 750 mg capsule Take 5 tablets in the morning and 4 tablets in the evening.    butalbital-acetaminophen-caffeine -40 mg (FIORICET, ESGIC) -40 mg per tablet Take 1 tablet by mouth every 4 (four) hours as needed for Headaches.    carbamide peroxide (DEBROX) 6.5 % otic solution Place 5 drops into the left ear 2 (two) times daily.    docusate sodium (STOOL SOFTENER ORAL) Take by mouth.    [Paused] ELIQUIS 2.5 mg Tab Take 2.5 mg by mouth 2 (two) times daily.    ferrous sulfate (FEOSOL) 325 mg (65 mg iron) Tab tablet 1 tablet Orally Once a day    fluocinolone acetonide oiL 0.01 % Drop Place into both ears.    fremanezumab-vfrm (AJOVY AUTOINJECTOR) 225 mg/1.5 mL autoinjector Inject 225 mg into the skin every 30 days.    gabapentin (NEURONTIN) 100 MG capsule Take 100 mg by mouth 3 (three) times daily.    levocetirizine (XYZAL) 5 MG tablet Take 5 mg by mouth every morning.    levothyroxine (SYNTHROID) 125 MCG tablet TAKE 1 TABLET BY MOUTH EVERY DAY IN THE MORNING    mag/aluminum/sod bicarb/alginc (GAVISCON ORAL) Take by mouth as needed. Acid reflux    memantine (NAMENDA) 10 MG Tab Take 10 mg by mouth 2 (two) times daily.    pantoprazole (PROTONIX) 40 MG tablet TAKE 1 TABLET BY MOUTH DAILY AS  NEEDED    pravastatin (PRAVACHOL) 20 MG tablet Take 1 tablet (20 mg total) by mouth every evening.    tamsulosin (FLOMAX) 0.4 mg Cap Take 1 capsule (0.4 mg total) by mouth once daily.    triamcinolone acetonide 0.1% (KENALOG) 0.1 % cream Apply topically 2 (two) times daily. APPLY  CREAM TOPICALLY TO AFFECTED AREA TWICE DAILY as needed    vitC/E/Zn/copper/lutein/zeaxan  (ICAPS AREDS2 ORAL) Take 1 capsule by mouth once daily.    [DISCONTINUED] acetaminophen (TYLENOL) 500 MG tablet Take 2 tablets (1,000 mg total) by mouth every 6 (six) hours as needed.    [DISCONTINUED] HYDROcodone-acetaminophen (NORCO) 5-325 mg per tablet Take 1 tablet by mouth every 6 (six) hours as needed for Pain.    [DISCONTINUED] telmisartan (MICARDIS) 40 MG Tab TAKE 1/2 TABLET BY MOUTH DAILY     Family History       Problem Relation (Age of Onset)    Alzheimer's disease Mother    Cancer Brother    Diabetes Brother    Heart attack Father    Heart disease Brother (56)    Hypertension Mother, Brother          Tobacco Use    Smoking status: Former     Current packs/day: 0.00     Average packs/day: 1 pack/day for 25.0 years (25.0 ttl pk-yrs)     Types: Cigarettes     Start date: 1947     Quit date: 1972     Years since quittin.2    Smokeless tobacco: Never    Tobacco comments:     Patient Quit Smoking on 1972.   Substance and Sexual Activity    Alcohol use: Not Currently     Alcohol/week: 3.0 standard drinks of alcohol     Types: 2 Glasses of wine, 1 Cans of beer per week     Comment: Occasional    Drug use: Never    Sexual activity: Not Currently     Partners: Female     Birth control/protection: None     Review of Systems   Constitutional:  Negative for chills, fatigue and fever.   HENT:  Negative for trouble swallowing and voice change.    Eyes:  Negative for visual disturbance.   Respiratory:  Negative for cough and shortness of breath.    Gastrointestinal:  Positive for nausea. Negative for vomiting.   Neurological:  Positive for weakness and headaches. Negative for dizziness, tremors, seizures, syncope, facial asymmetry, speech difficulty, light-headedness and numbness.     Objective:     Vital Signs (Most Recent):  Temp: 98.3 °F (36.8 °C) (25)  Pulse: 93 (25)  Resp: 14 (25)  BP: (!) 149/85 (25)  SpO2: 95 % (25) Vital Signs (24h  Range):  Temp:  [97.5 °F (36.4 °C)-98.6 °F (37 °C)] 98.3 °F (36.8 °C)  Pulse:  [65-93] 93  Resp:  [14-20] 14  SpO2:  [94 %-98 %] 95 %  BP: (115-149)/(61-85) 149/85     Weight: 68.2 kg (150 lb 5.7 oz)  Body mass index is 20.97 kg/m².     Physical Exam  Vitals and nursing note reviewed.   Constitutional:       General: He is not in acute distress.     Appearance: Normal appearance.   HENT:      Head: Normocephalic and atraumatic.      Mouth/Throat:      Mouth: Mucous membranes are moist.      Pharynx: Oropharynx is clear.   Eyes:      General: No scleral icterus.     Conjunctiva/sclera: Conjunctivae normal.      Pupils: Pupils are equal, round, and reactive to light.   Cardiovascular:      Rate and Rhythm: Normal rate.      Pulses: Normal pulses.   Pulmonary:      Effort: Pulmonary effort is normal. No respiratory distress.   Abdominal:      General: Abdomen is flat. There is no distension.      Palpations: Abdomen is soft.   Musculoskeletal:      Cervical back: Neck supple.      Right lower leg: No edema.      Left lower leg: No edema.   Skin:     General: Skin is warm and dry.   Neurological:      Mental Status: He is alert and oriented to person, place, and time.      Coordination: Finger-Nose-Finger Test normal.      Deep Tendon Reflexes:      Reflex Scores:       Bicep reflexes are 2+ on the right side and 2+ on the left side.       Brachioradialis reflexes are 2+ on the right side and 2+ on the left side.       Achilles reflexes are 2+ on the right side and 2+ on the left side.  Psychiatric:         Speech: Speech is slurred.          NEUROLOGICAL EXAMINATION:     MENTAL STATUS   Oriented to person, place, and time.   Attention: normal. Concentration: normal.   Speech: slurred   Level of consciousness: alert    CRANIAL NERVES     CN II   Visual fields full to confrontation.     CN III, IV, VI   Pupils are equal, round, and reactive to light.  CN VI: bilateral CN VI palsy (mild; inability to bury sclera  b/l)    CN V   Facial sensation intact.     CN VII   Facial expression full, symmetric.     CN VIII   Hearing: impaired    CN IX, X   CN IX normal.     CN XI   CN XI normal.     CN XII   CN XII normal.     MOTOR EXAM     Strength   Right deltoid: 4/5  Left deltoid: 4/5  Right biceps: 4/5  Left biceps: 4/5  Right triceps: 4/5  Left triceps: 4/5  Right interossei: 4/5  Left interossei: 4/5  Right iliopsoas: 4/5  Left iliopsoas: 4/5  Right quadriceps: 4/5  Left quadriceps: 4/5  Right hamstrin/5  Left hamstrin/5  Right anterior tibial: 4/5  Left anterior tibial: 4/5  Right gastroc: 4/5  Left gastroc: 4/5    REFLEXES     Reflexes   Right brachioradialis: 2+  Left brachioradialis: 2+  Right biceps: 2+  Left biceps: 2+  Right achilles: 2+  Left achilles: 2+  Right plantar: equivocal  Left plantar: equivocal       Paratonia throughout     SENSORY EXAM   Light touch normal.        Temperature normal.     GAIT AND COORDINATION      Coordination   Finger to nose coordination: normal    Tremor   Resting tremor: absent      Significant Labs: CBC:   Recent Labs   Lab 25  0519 25  0513   WBC 6.38 5.97   HGB 10.9* 10.8*   HCT 34.2* 33.9*    179     CMP:   Recent Labs   Lab 25  2316 25  0519 25  0913    135* 136   K 4.0 4.0 4.0    103 101   CO2 26 26 25   BUN 19 18 20   CREATININE 1.0 0.9 1.2   CALCIUM 8.6* 8.7 8.8   ALBUMIN 2.8* 2.7* 2.9*   BILITOT 0.2 0.2 0.2   ALKPHOS 55 58 61   AST 16 16 24   ALT <5* <5* 5*   ANIONGAP 8 6* 10     All pertinent lab results from the past 24 hours have been reviewed.    Significant Imaging: I have reviewed and interpreted all pertinent imaging results/findings within the past 24 hours.  Assessment and Plan:     Headache  This is a 85 year old male with a history of bilateral hygromas, NPH s/p VPS placement, HTN, HLD that is admitted with intractable headache and is now s/p b/l mini crani for SDH evac. Patient reports that current headache is  controlled and is satisfied with improvement he's made over hospitalization. Patient is prescribed fioricet PRN outpatient; recommend discontinuation on discharge to avoid rebound headache. Continue conservative management, ensure adequate oral hydration, optimize sleep.    Recommendations  - Agree with current PRN tylenol  - Consider use of Magnesium IV 2g q8h for headache while inpatient  - Discontinue fioricet at discharge. Discussed with patient and family.  - Optimize sleep, hydration, nutrition, exercise/mobility  - Place Neurology referral at discharge  - Delirium precautions  - Neurology will sign off. Thank you for your consult. Please reach out with questions or changes in the clinical picture.          VTE Risk Mitigation (From admission, onward)           Ordered     enoxaparin injection 40 mg  Every 24 hours         03/24/25 1823     Reason for No Pharmacological VTE Prophylaxis  Once        Question:  Reasons:  Answer:  Patient is Ambulatory    03/21/25 2008     IP VTE HIGH RISK PATIENT  Once         03/21/25 2008     Place sequential compression device  Until discontinued         03/21/25 2008     Place HARIKA hose  Until discontinued         03/21/25 2008                    Thank you for your consult. I will sign off. Please contact us if you have any additional questions.    Jarrod Kim MD  Neurology  Kulwinder florian - Neurosurgery ([facility])

## 2025-04-01 NOTE — SUBJECTIVE & OBJECTIVE
Past Medical History:   Diagnosis Date    Allergies 01/31/2025    Anemia     Anticoagulant long-term use     Anxiety     Atrophic kidney 11/16/2012    BPH (benign prostatic hyperplasia) 11/16/2012    Congenital absence of right kidney 07/05/2017    DDD (degenerative disc disease), cervical 07/05/2017    x-ray 7/17 - Severe    Ex-smoker 12/20/2018    Exudative age-related macular degeneration, right eye, with active choroidal neovascularization 02/15/2024    GERD (gastroesophageal reflux disease) 11/16/2012    Glaucoma 11/16/2012    HTN (hypertension) 11/16/2012    Hypothyroid 11/16/2012    Internal carotid artery stenosis 11/16/2012    Iron deficiency anemia due to chronic blood loss 10/29/2021    Left ventricular diastolic dysfunction, NYHA class 1 04/16/2015    4/15    Low serum testosterone level 11/16/2012    Normal cardiac stress test 11/16/2012    NPH (normal pressure hydrocephalus) 02/03/2025    Osteopenia 11/16/2012    PAF (paroxysmal atrial fibrillation) 12/20/2018    Prostate CA 01/15/2013    XRT 12/12  Dr. 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    Shingles 11/16/2012    Skin disease 2020    Seems to have started after Covid vaccine    Stage 3a chronic kidney disease 10/06/2014    Stroke 2017    tia   no residual    Thrombocytopenia 01/31/2025    TIA (transient ischemic attack) 11/16/2012    UC (ulcerative colitis) 11/16/2012       Past Surgical History:   Procedure Laterality Date    ADENOIDECTOMY      COLONOSCOPY N/A 03/25/2022    Procedure: COLONOSCOPY;  Surgeon: Ashley Nguyen MD;  Location: [facility] ENDO;  Service: Endoscopy;  Laterality: N/A;    ESOPHAGOGASTRODUODENOSCOPY N/A 03/25/2022    Procedure: EGD (ESOPHAGOGASTRODUODENOSCOPY);  Surgeon: Ashley Nguyen MD;  Location: [facility] ENDO;  Service: Endoscopy;  Laterality: N/A;  added on per Dr. Nguyen    ESOPHAGOGASTRODUODENOSCOPY N/A 6/7/2024    Procedure: EGD (ESOPHAGOGASTRODUODENOSCOPY);  Surgeon: Audie Snell  MD;  Location: [facility] ENDO (4TH FLR);  Service: Endoscopy;  Laterality: N/A;  5/21 R/s,sent updated instr via portal.pt informed to hold eliquis for 2 days.AC  Ref by: ,  approved to hold Eliquis (apixaban) for 2 days per Dr. Conklin-see media file  5/9/24-GT  5/31-pre call complete-tb    EVACUATION OF SUBDURAL HEMATOMA Bilateral 3/24/2025    Procedure: EVACUATION, HEMATOMA, SUBDURAL  AND SHUNT TIE OFF;  Surgeon: Jairo Blankenship MD;  Location: [facility] OR 2ND FLR;  Service: Neurosurgery;  Laterality: Bilateral;  bilateral bobby holes for subdural hygroma and tying off of  shunt at clavicle. to follow other ware cases    EYE SURGERY Right 11/2020    cataract    HERNIA REPAIR      LEFT HEART CATHETERIZATION Right 10/29/2021    Procedure: CATHETERIZATION, HEART, LEFT AND RIGHT  - LV DARNELL POSSIBLE;  Surgeon: Beau Conkiln MD;  Location: [facility] [place] CATH LAB;  Service: Cardiology;  Laterality: Right;    LUMBAR PUNCTURE N/A 1/13/2025    Procedure: Lumbar Puncture;  Surgeon: Jairo Blankenship MD;  [facility];  Service: Neurosurgery;  Laterality: N/A;    RIGHT HEART CATHETERIZATION Right 10/29/2021    Procedure: INSERTION, CATHETER, RIGHT HEART;  Surgeon: Beau Conklin MD;  Location: [facility], [place] CATH LAB;  Service: Cardiology;  Laterality: Right;    SKIN BIOPSY  2020    TONSILLECTOMY      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    Procedure: INSERTION, SHUNT, VENTRICULOPERITONEAL;  Surgeon: Jairo Blankenship MD;  [facility];  Service: Neurosurgery;  Laterality: Right;    VENTRICULOPERITONEAL SHUNT  2/6/2025    Procedure: INSERTION, SHUNT, VENTRICULOPERITONEAL;  Surgeon: Shar Elizabeth MD;  [facility];  Service: General;;       Review of patient's allergies indicates:   Allergen Reactions    Benazepril Other (See Comments)     Cough     No current facility-administered medications on file prior to encounter.     Current Outpatient Medications on File Prior to  Encounter   Medication Sig    metoprolol succinate (TOPROL-XL) 25 MG 24 hr tablet TAKE 1 TABLET BY MOUTH EVERY DAY    amitriptyline (ELAVIL) 10 MG tablet Take 10 mg by mouth every evening.    amLODIPine (NORVASC) 2.5 MG tablet Take 2.5 mg by mouth nightly.    ascorbic acid, vitamin C, (VITAMIN C) 500 MG tablet Take 500 mg by mouth once daily.    balsalazide (COLAZAL) 750 mg capsule Take 5 tablets in the morning and 4 tablets in the evening.    butalbital-acetaminophen-caffeine -40 mg (FIORICET, ESGIC) -40 mg per tablet Take 1 tablet by mouth every 4 (four) hours as needed for Headaches.    carbamide peroxide (DEBROX) 6.5 % otic solution Place 5 drops into the left ear 2 (two) times daily.    docusate sodium (STOOL SOFTENER ORAL) Take by mouth.    [Paused] ELIQUIS 2.5 mg Tab Take 2.5 mg by mouth 2 (two) times daily.    ferrous sulfate (FEOSOL) 325 mg (65 mg iron) Tab tablet 1 tablet Orally Once a day    fluocinolone acetonide oiL 0.01 % Drop Place into both ears.    fremanezumab-vfrm (AJOVY AUTOINJECTOR) 225 mg/1.5 mL autoinjector Inject 225 mg into the skin every 30 days.    gabapentin (NEURONTIN) 100 MG capsule Take 100 mg by mouth 3 (three) times daily.    levocetirizine (XYZAL) 5 MG tablet Take 5 mg by mouth every morning.    levothyroxine (SYNTHROID) 125 MCG tablet TAKE 1 TABLET BY MOUTH EVERY DAY IN THE MORNING    mag/aluminum/sod bicarb/alginc (GAVISCON ORAL) Take by mouth as needed. Acid reflux    memantine (NAMENDA) 10 MG Tab Take 10 mg by mouth 2 (two) times daily.    pantoprazole (PROTONIX) 40 MG tablet TAKE 1 TABLET BY MOUTH DAILY AS  NEEDED    pravastatin (PRAVACHOL) 20 MG tablet Take 1 tablet (20 mg total) by mouth every evening.    tamsulosin (FLOMAX) 0.4 mg Cap Take 1 capsule (0.4 mg total) by mouth once daily.    triamcinolone acetonide 0.1% (KENALOG) 0.1 % cream Apply topically 2 (two) times daily. APPLY  CREAM TOPICALLY TO AFFECTED AREA TWICE DAILY as needed     vitC/E/Zn/copper/lutein/zeaxan (ICAPS AREDS2 ORAL) Take 1 capsule by mouth once daily.    [DISCONTINUED] acetaminophen (TYLENOL) 500 MG tablet Take 2 tablets (1,000 mg total) by mouth every 6 (six) hours as needed.    [DISCONTINUED] HYDROcodone-acetaminophen (NORCO) 5-325 mg per tablet Take 1 tablet by mouth every 6 (six) hours as needed for Pain.    [DISCONTINUED] telmisartan (MICARDIS) 40 MG Tab TAKE 1/2 TABLET BY MOUTH DAILY     Family History       Problem Relation (Age of Onset)    Alzheimer's disease Mother    Cancer Brother    Diabetes Brother    Heart attack Father    Heart disease Brother (56)    Hypertension Mother, Brother          Tobacco Use    Smoking status: Former     Current packs/day: 0.00     Average packs/day: 1 pack/day for 25.0 years (25.0 ttl pk-yrs)     Types: Cigarettes     Start date: 1947     Quit date: 1972     Years since quittin.2    Smokeless tobacco: Never    Tobacco comments:     Patient Quit Smoking on 1972.   Substance and Sexual Activity    Alcohol use: Not Currently     Alcohol/week: 3.0 standard drinks of alcohol     Types: 2 Glasses of wine, 1 Cans of beer per week     Comment: Occasional    Drug use: Never    Sexual activity: Not Currently     Partners: Female     Birth control/protection: None     Review of Systems   Constitutional:  Negative for chills, fatigue and fever.   HENT:  Negative for trouble swallowing and voice change.    Eyes:  Negative for visual disturbance.   Respiratory:  Negative for cough and shortness of breath.    Gastrointestinal:  Positive for nausea. Negative for vomiting.   Neurological:  Positive for weakness and headaches. Negative for dizziness, tremors, seizures, syncope, facial asymmetry, speech difficulty, light-headedness and numbness.     Objective:     Vital Signs (Most Recent):  Temp: 98.3 °F (36.8 °C) (25)  Pulse: 93 (25)  Resp: 14 (25)  BP: (!) 149/85 (25)  SpO2: 95 % (25  1859) Vital Signs (24h Range):  Temp:  [97.5 °F (36.4 °C)-98.6 °F (37 °C)] 98.3 °F (36.8 °C)  Pulse:  [65-93] 93  Resp:  [14-20] 14  SpO2:  [94 %-98 %] 95 %  BP: (115-149)/(61-85) 149/85     Weight: 68.2 kg (150 lb 5.7 oz)  Body mass index is 20.97 kg/m².     Physical Exam  Vitals and nursing note reviewed.   Constitutional:       General: He is not in acute distress.     Appearance: Normal appearance.   HENT:      Head: Normocephalic and atraumatic.      Mouth/Throat:      Mouth: Mucous membranes are moist.      Pharynx: Oropharynx is clear.   Eyes:      General: No scleral icterus.     Conjunctiva/sclera: Conjunctivae normal.      Pupils: Pupils are equal, round, and reactive to light.   Cardiovascular:      Rate and Rhythm: Normal rate.      Pulses: Normal pulses.   Pulmonary:      Effort: Pulmonary effort is normal. No respiratory distress.   Abdominal:      General: Abdomen is flat. There is no distension.      Palpations: Abdomen is soft.   Musculoskeletal:      Cervical back: Neck supple.      Right lower leg: No edema.      Left lower leg: No edema.   Skin:     General: Skin is warm and dry.   Neurological:      Mental Status: He is alert and oriented to person, place, and time.      Coordination: Finger-Nose-Finger Test normal.      Deep Tendon Reflexes:      Reflex Scores:       Bicep reflexes are 2+ on the right side and 2+ on the left side.       Brachioradialis reflexes are 2+ on the right side and 2+ on the left side.       Achilles reflexes are 2+ on the right side and 2+ on the left side.  Psychiatric:         Speech: Speech is slurred.          NEUROLOGICAL EXAMINATION:     MENTAL STATUS   Oriented to person, place, and time.   Attention: normal. Concentration: normal.   Speech: slurred   Level of consciousness: alert    CRANIAL NERVES     CN II   Visual fields full to confrontation.     CN III, IV, VI   Pupils are equal, round, and reactive to light.  CN VI: bilateral CN VI palsy (mild; inability  to bury sclera b/l)    CN V   Facial sensation intact.     CN VII   Facial expression full, symmetric.     CN VIII   Hearing: impaired    CN IX, X   CN IX normal.     CN XI   CN XI normal.     CN XII   CN XII normal.     MOTOR EXAM     Strength   Right deltoid: 4/5  Left deltoid: 4/5  Right biceps: 4/5  Left biceps: 4/5  Right triceps: 4/5  Left triceps: 4/5  Right interossei: 4/5  Left interossei: 4/5  Right iliopsoas: 4/5  Left iliopsoas: 4/5  Right quadriceps: 4/5  Left quadriceps: 4/5  Right hamstrin/5  Left hamstrin/5  Right anterior tibial: 4/5  Left anterior tibial: 4/5  Right gastroc: 4/5  Left gastroc: 4/5    REFLEXES     Reflexes   Right brachioradialis: 2+  Left brachioradialis: 2+  Right biceps: 2+  Left biceps: 2+  Right achilles: 2+  Left achilles: 2+  Right plantar: equivocal  Left plantar: equivocal       Paratonia throughout     SENSORY EXAM   Light touch normal.        Temperature normal.     GAIT AND COORDINATION      Coordination   Finger to nose coordination: normal    Tremor   Resting tremor: absent      Significant Labs: CBC:   Recent Labs   Lab 25  0519 25  0513   WBC 6.38 5.97   HGB 10.9* 10.8*   HCT 34.2* 33.9*    179     CMP:   Recent Labs   Lab 25  2316 25  0519 25  0913    135* 136   K 4.0 4.0 4.0    103 101   CO2 26 26 25   BUN 19 18 20   CREATININE 1.0 0.9 1.2   CALCIUM 8.6* 8.7 8.8   ALBUMIN 2.8* 2.7* 2.9*   BILITOT 0.2 0.2 0.2   ALKPHOS 55 58 61   AST 16 16 24   ALT <5* <5* 5*   ANIONGAP 8 6* 10     All pertinent lab results from the past 24 hours have been reviewed.    Significant Imaging: I have reviewed and interpreted all pertinent imaging results/findings within the past 24 hours.

## 2025-04-01 NOTE — NURSING
Pt transferred to Rehab per transport service in wheelchair. No complaints at present. Personal belonging sent with pt. Wife notified of transfer.

## 2025-04-01 NOTE — PLAN OF CARE
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   S/p evacuation of hematoma      Acute postoperative anemia due to expected blood loss [D62]  No     4 point drop in hemoglobin post op  Monitor with daily cbc  Transfuse <7      Debility [R53.81]  Yes     Patient with Acute on chronic debility due to other reduced mobility. Latest AMPAC and GEMS scores have been reviewed. Evaluation for etiology is complete. Plan includes - Progressive mobility protocol initated  - PT/OT consulted  - Fall precautions in place  - Physical Medicine/Rehab consulted.       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 ELIGIO Narvaez      PAF (paroxysmal atrial fibrillation) [I48.0]  Yes     Chronic    Left ventricular diastolic dysfunction, NYHA class 1 [I51.89]  Yes     Chronic     4/15  7/20 - and increase LA      Stage 3a chronic kidney disease [N18.31]  Yes    BPH (benign prostatic  hyperplasia) [N40.0]  Yes     Chronic     S/P Biopsy 8/08, 2/09  Dr. Nassar      Essential hypertension [I10]  Yes     Chronic     X 5 years      Acquired hypothyroidism [E03.9]  Yes     Chronic    GERD (gastroesophageal reflux disease) [K21.9]  Yes     EGD 7/05, 7/15 - normal        Resolved Hospital Problems    Diagnosis Date Resolved POA    KRISTAN (acute kidney injury) [N17.9] 03/28/2025 No       Allergies:  Review of patient's allergies indicates:   Allergen Reactions    Benazepril Other (See Comments)     Cough       Diet: renal diet    Activities: Activity as tolerated    Goals of Care Treatment Preferences:  Code Status: Full Code      Nursing: Fall precautions, delirium precautions, seizure precautions     Labs: CBC and BMP per facility protocol     CONSULTS:    Physical Therapy to evaluate and treat.  and Occupational Therapy to evaluate and treat.    MISCELLANEOUS CARE:  DVT prophylaxis with Lovenox 40 mg daily while in house and off home Eliquis.    Okay to resume Eliquis POD14 4/7/25.    WOUND CARE ORDERS  Wound Care:  Keep your incision open to air. Keep incision clean and dry at all times. You may shower on the 2nd day after your surgery. You may wash your hair with baby shampoo. Do not rub or scrub the incision. Do not allow the force of water to hit the incision. If the incision gets damp, gently pat it dry. You cannot take a bath/swim/submerge the incision until 8 weeks after surgery. Apply a thin layer of bacitracin ointment to incision twice daily with a sterile q-tip for 14 days after surgery. No hair products, hats, scarfs, wigs.      The incision does not need to be cleaned with any water, soap, alcohol, peroxide, or other substance.     Call your doctor or go to the Emergency Room for any signs of infection including: increased redness, drainage, pain or fever (temperature greater than or equal to 101.4).      [facility] Neurosurgery Office: [phone]              [facility]  Neurosurgery Office: [phone]                       [place] Neurosurgery Office: [phone]         Medications: Review discharge medications with patient and family and provide education.         Medication List        PAUSE taking these medications      amLODIPine 2.5 MG tablet  Wait to take this until: April 29, 2025  Commonly known as: NORVASC  Take 2.5 mg by mouth nightly.     ascorbic acid (vitamin C) 500 MG tablet  Wait to take this until: April 29, 2025  Commonly known as: VITAMIN C  Take 500 mg by mouth once daily.     balsalazide 750 mg capsule  Wait to take this until: April 29, 2025  Commonly known as: COLAZAL  Take 5 tablets in the morning and 4 tablets in the evening.     ELIQUIS 2.5 mg Tab  Wait to take this until: April 7, 2025  Generic drug: apixaban  Take 2.5 mg by mouth 2 (two) times daily.     ferrous sulfate 325 mg (65 mg iron) Tab tablet  Wait to take this until: April 29, 2025  Commonly known as: FEOSOL  1 tablet Orally Once a day     fluocinolone acetonide oiL 0.01 % Drop  Wait to take this until: April 29, 2025  Place into both ears.     gabapentin 100 MG capsule  Wait to take this until: April 29, 2025  Commonly known as: NEURONTIN  Take 100 mg by mouth 3 (three) times daily.     GAVISCON ORAL  Wait to take this until: April 29, 2025  Take by mouth as needed. Acid reflux     ICAPS AREDS2 ORAL  Wait to take this until your doctor or other care provider tells you to start again.  Take 1 capsule by mouth once daily.     triamcinolone acetonide 0.1% 0.1 % cream  Wait to take this until: April 29, 2025  Commonly known as: KENALOG  Apply topically 2 (two) times daily. APPLY  CREAM TOPICALLY TO AFFECTED AREA TWICE DAILY as needed            START taking these medications      lacosamide 100 mg Tab  Commonly known as: VIMPAT  Take 1 tablet (100 mg total) by mouth every 12 (twelve) hours.     methocarbamoL 500 MG Tab  Commonly known as: Robaxin  Take 1 tablet (500 mg total) by mouth 3 (three)  times daily as needed (muscle spasm).     oxyCODONE 10 mg Tab immediate release tablet  Commonly known as: ROXICODONE  Take 1 tablet (10 mg total) by mouth every 6 (six) hours as needed for Pain.     silodosin 4 mg Cap capsule  Commonly known as: RAPAFLO  Take 1 capsule (4 mg total) by mouth once daily.            CHANGE how you take these medications      acetaminophen 500 MG tablet  Commonly known as: TYLENOL  Take 2 tablets (1,000 mg total) by mouth every 8 (eight) hours.  What changed:   when to take this  reasons to take this     metoprolol succinate 25 MG 24 hr tablet  Commonly known as: TOPROL-XL  Take 0.5 tablets (12.5 mg total) by mouth once daily.  Start taking on: April 2, 2025  What changed:   how much to take  when to take this            CONTINUE taking these medications      levocetirizine 5 MG tablet  Commonly known as: XYZAL  Take 5 mg by mouth every morning.     levothyroxine 125 MCG tablet  Commonly known as: SYNTHROID  TAKE 1 TABLET BY MOUTH EVERY DAY IN THE MORNING     memantine 10 MG Tab  Commonly known as: NAMENDA  Take 10 mg by mouth 2 (two) times daily.     pantoprazole 40 MG tablet  Commonly known as: PROTONIX  TAKE 1 TABLET BY MOUTH DAILY AS  NEEDED     pravastatin 20 MG tablet  Commonly known as: PRAVACHOL  Take 1 tablet (20 mg total) by mouth every evening.     STOOL SOFTENER ORAL  Take by mouth.            STOP taking these medications      AJOVY AUTOINJECTOR 225 mg/1.5 mL autoinjector  Generic drug: fremanezumab-vfrm     amitriptyline 10 MG tablet  Commonly known as: ELAVIL     butalbital-acetaminophen-caffeine -40 mg -40 mg per tablet  Commonly known as: FIORICET, ESGIC     carbamide peroxide 6.5 % otic solution  Commonly known as: DEBROX     HYDROcodone-acetaminophen 5-325 mg per tablet  Commonly known as: NORCO     tamsulosin 0.4 mg Cap  Commonly known as: FLOMAX                Immunizations Administered as of 4/1/2025       Name Date Dose VIS Date Route Exp Date     COVID-19, MRNA, LN-S, PF (Pfizer) (Purple Cap) 12/2/2021 0.3 mL 5/10/2021 Intramuscular --    Site: Left deltoid     : [employer]     Lot: [id number]     Comment: Adminis     COVID-19, MRNA, LN-S, PF (Pfizer) (Purple Cap) 2/2/2021  3:53 PM 0.3 mL 12/12/2020 Intramuscular 5/31/2021    Site: Right deltoid     Given By: Lizabeth Pop RN     : [employer]     Lot: [id number]     COVID-19, MRNA, LN-S, PF (Pfizer) (Purple Cap) 1/12/2021  5:17 PM 0.3 mL 12/12/2020 Intramuscular 3/31/2021    Site: Right deltoid     Given By: Areli Weiner RN     : [employer]     Lot: [id number]             This patient has had both covid vaccinations    Some patients may experience side effects after vaccination.  These may include fever, headache, muscle or joint aches.  Most symptoms resolve with 24-48 hours and do not require urgent medical evaluation unless they persist for more than 72 hours or symptoms are concerning for an unrelated medical condition.          _________________________________  Nina Cerda PA-C  04/01/2025

## 2025-04-01 NOTE — CARE UPDATE
I have reviewed the chart of Ramesh Ricci who is hospitalized for the following:    Active Hospital Problems    Diagnosis    *Subdural hematoma, chronic    Hyponatremia     Monitor with daily cmp      Hypocalcemia     Monitor cmp      Orthostatic hypotension    Bradycardia    Brain compression     CT scan from the prior clinic visits demonstrated a 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   S/p evacuation of hematoma      Acute postoperative anemia due to expected blood loss     4 point drop in hemoglobin post op  Monitor with daily cbc  Transfuse <7      Debility     Patient with Acute on chronic debility due to other reduced mobility. Latest AMPAC and GEMS scores have been reviewed. Evaluation for etiology is complete. Plan includes - Progressive mobility protocol initated  - PT/OT consulted  - Fall precautions in place  - Physical Medicine/Rehab consulted.       Hypoalbuminemia due to protein-calorie malnutrition     MONITOR CMP      Urinary retention    KRISTAN (acute kidney injury)     Peak in creatinine to 1.5 on 3/26  Monitor with daily cmp   Avoid nephrotoxic agents  Renally dose meds  Improved       Hypercoagulability due to atrial fibrillation    S/P ventriculoperitoneal shunt    S/P TAVR (transcatheter aortic valve replacement)     4/23      Headache     MRI - 9/18, 6/23 (and MRA), 8/24  Dr. ELIGIO Narvaez      PAF (paroxysmal atrial fibrillation)    Left ventricular diastolic dysfunction, NYHA class 1     4/15  7/20 - and increase LA      Stage 3a chronic kidney disease    BPH (benign prostatic hyperplasia)     S/P Biopsy 8/08, 2/09  Dr. Nassar      Essential hypertension     X 5 years      Acquired hypothyroidism    GERD (gastroesophageal reflux disease)     EGD 7/05, 7/15 - normal          Glenny Garcia NP  [facility]

## 2025-04-01 NOTE — DISCHARGE SUMMARY
[facility]ulw[facility] Elias - Neurosurgery (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  Patient was seen in clinic by Dr. Glenn Rivero on 3 7 and 319.  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  The patient denies fever/chills, HA, vision/hearing changes, dysphagia, dysarthria, nausea/vomiting, new-onset weakness or sensory change, bowel/bladder changes.       Procedure(s) (LRB):  EVACUATION, HEMATOMA, SUBDURAL  AND SHUNT TIE OFF (Bilateral)     Hospital Course: 3/22: NAEON. Neuro exam stable. Pain controlled. Will plan for OR on Monday.  3/23: NAEON. Neuro exam stable. Pain controlled. Will plan for OR on Monday.  3/24: NAEON. OR today for bilateral crani for subdural hygroma evacuation with distal shunt catheter tie off.   3/25: POD1 bilateral mini crani for SDH evac, shunt tie off at R clavicle. Postop CTH with expected appearance and pneumocephalus. Patient doing well except for R VI palsy and baseline higher order confusion. Will transfer to floor today. Drains with output of 70 each overnight.   3/26: Patient report's severe headache this morning. CTH obtained, with expected post operative changes. Will adjust regimen. Continue drains. Molina placed yesterday for  "urinary retention. Cr increased to 1.6 today. Will start LR fluids.  3/27: Headache remains and unremitting to positional changes. Per family, patient experienced HA especially when moving head prior to initial shunt placement surgery. Requesting to be OOB in chair. Family with concerns for his difficulty in word finding. States he has a limited vocabulary and does not always answer questions and instead "stares off into the distance." Answered questions regarding care plan and present symptoms.  3/28: NAEON. In chair interacting with his spouse on entering room. Patient cognitive status similar to that of yesterday. Difficulty with word finding but conversing and answering questions. Patient denies pain. Per wife, PT came by this morning and pt ambulated into masters however still reporting dizziness with moving.  3/29: NAEO. AFVSS. Patient sitting up comfortable, eating his breakfast. Denies any complaints. Exam stable. Pending auth for IPR. [facility] on board for co-management.   3/30: Neuro stable. Amlodipine/tamsulosin held per  for hypotension, Pending IPR, medically ready  3/31: NAEON. Patient reported headache this morning, but better once eaten breakfast. Will obtain [facility] today for re-eval. Voiding trial today.  following for co-management. Gen neuro consult given report of long-standing positional headaches that were occurring prior to any neurosurgical intervention. Patient becomes confused in the afternoon, delirium precautions in place. Discussed updates with patient's son via telephone. Pending IPR.   4/1: NAEON. CTH yesterday as expected. Headaches improved. Patient will dc to to rehab today.    Neurosurgery Physical Exam  General: well developed, well nourished. Answers questions when prompted.  Pulmonary: quiet, deep respirations, no signs of respiratory distress  Skin: Skin is warm and dry. Incision sites intact with staples. No purulent discharge or diffuse tenderness     Neuro:   Mental " Status: AAOx3. No aphasia, no dysarthria - higher order confusion.   CN: PERRL, EOMI other than R VI palsy, sensation intact bilaterally, eyebrow raise and grimace symmetric, tongue midline. Hard of hearing  Motor: moves all extremities spontaneously, full strength throughout, no pronator drift   Sensory: intact to light touch throughout  Cranial incisions are C/D/I    Goals of Care Treatment Preferences:  Code Status: Full Code      Consults:   Consults (From admission, onward)          Status Ordering Provider     Inpatient consult to General Neurology  Once        Provider:  (Not yet assigned)    Completed TERESA CROSS     Inpatient consult to Hospital Medicine-General  Once        Provider:  (Not yet assigned)    Completed TERESA CROSS     Inpatient consult to Physical Medicine Rehab  Once        Provider:  (Not yet assigned)    Completed GUEVARA RICE     Inpatient consult to Neurosurgery  Once        Provider:  (Not yet assigned)    Completed SARAN ROCA            Significant Diagnostic Studies: Labs: CMP   Recent Labs   Lab 03/31/25  0913      K 4.0      CO2 25   BUN 20   CREATININE 1.2   CALCIUM 8.8   ALBUMIN 2.9*   BILITOT 0.2   ALKPHOS 61   AST 24   ALT 5*   ANIONGAP 10   , CBC   Recent Labs   Lab 03/31/25  0513 04/01/25  0346   WBC 5.97 6.72   HGB 10.8* 11.0*   HCT 33.9* 33.9*    206   , and All labs within the past 24 hours have been reviewed  Radiology: X-Ray: KUB, shunt series  CT scan: head without contrast    Pending Diagnostic Studies:       None          Final Active Diagnoses:    Diagnosis Date Noted POA    PRINCIPAL PROBLEM:  Subdural hematoma, 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] 03/24/2025 Yes    Hyponatremia [E87.1] 04/01/2025 No    Hypocalcemia [E83.51] 03/28/2025 Yes    Orthostatic hypotension [I95.1] 03/28/2025 Yes    Bradycardia [R00.1] 03/27/2025 Yes     Chronic    Brain compression [G93.5] 03/27/2025 Yes    Acute postoperative anemia due to expected blood loss [D62]  03/27/2025 No    Debility [R53.81] 03/27/2025 Yes    Hypoalbuminemia due to protein-calorie malnutrition [E88.09, E46] 03/26/2025 Yes    Urinary retention [R33.9] 03/26/2025 No    KRISTAN (acute kidney injury) [N17.9] 03/26/2025 No    Hypercoagulability due to atrial fibrillation [D68.69, I48.91] 02/07/2025 Yes    S/P ventriculoperitoneal shunt [Z98.2] 02/06/2025 Not Applicable    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] 08/13/2024 Not Applicable    Headache [R51.9] 09/17/2019 Yes    PAF (paroxysmal atrial fibrillation) [I48.0] 12/20/2018 Yes     Chronic    Left ventricular diastolic dysfunction, NYHA class 1 [I51.89] 04/16/2015 Yes     Chronic    Stage 3a chronic kidney disease [N18.31] 10/06/2014 Yes    BPH (benign prostatic hyperplasia) [N40.0] 11/16/2012 Yes     Chronic    Essential hypertension [I10] 11/16/2012 Yes     Chronic    Acquired hypothyroidism [E03.9] 11/16/2012 Yes     Chronic    GERD (gastroesophageal reflux disease) [K21.9] 11/16/2012 Yes      Problems Resolved During this Admission:      Discharged Condition: good     Disposition: Rehab Facility    Follow Up:    Patient Instructions:      Ambulatory referral/consult to Neurology   Standing Status: Future   Referral Priority: Routine Referral Type: Consultation   Referral Reason: Specialty Services Required   Requested Specialty: Neurology   Number of Visits Requested: 1     Notify your health care provider if you experience any of the following:  increased confusion or weakness     Notify your health care provider if you experience any of the following:  persistent dizziness, light-headedness, or visual disturbances     Notify your health care provider if you experience any of the following:  worsening rash     Notify your health care provider if you experience any of the following:  severe persistent headache     Notify your health care provider if you experience any of the following:  redness, tenderness, or signs of infection  (pain, swelling, redness, odor or green/yellow discharge around incision site)     Notify your health care provider if you experience any of the following:  severe uncontrolled pain     Notify your health care provider if you experience any of the following:  persistent nausea and vomiting or diarrhea     Notify your health care provider if you experience any of the following:  temperature >100.4     Notify your health care provider if you experience any of the following:  difficulty breathing or increased cough     No dressing needed     Activity as tolerated     Medications:  Reconciled Home Medications:      Medication List        PAUSE taking these medications      amLODIPine 2.5 MG tablet  Wait to take this until: April 29, 2025  Commonly known as: NORVASC  Take 2.5 mg by mouth nightly.     ascorbic acid (vitamin C) 500 MG tablet  Wait to take this until: April 29, 2025  Commonly known as: VITAMIN C  Take 500 mg by mouth once daily.     balsalazide 750 mg capsule  Wait to take this until: April 29, 2025  Commonly known as: COLAZAL  Take 5 tablets in the morning and 4 tablets in the evening.     ELIQUIS 2.5 mg Tab  Wait to take this until: April 7, 2025  Generic drug: apixaban  Take 2.5 mg by mouth 2 (two) times daily.     ferrous sulfate 325 mg (65 mg iron) Tab tablet  Wait to take this until: April 29, 2025  Commonly known as: FEOSOL  1 tablet Orally Once a day     fluocinolone acetonide oiL 0.01 % Drop  Wait to take this until: April 29, 2025  Place into both ears.     gabapentin 100 MG capsule  Wait to take this until: April 29, 2025  Commonly known as: NEURONTIN  Take 100 mg by mouth 3 (three) times daily.     GAVISCON ORAL  Wait to take this until: April 29, 2025  Take by mouth as needed. Acid reflux     ICAPS AREDS2 ORAL  Wait to take this until your doctor or other care provider tells you to start again.  Take 1 capsule by mouth once daily.     triamcinolone acetonide 0.1% 0.1 % cream  Wait to take this  until: April 29, 2025  Commonly known as: KENALOG  Apply topically 2 (two) times daily. APPLY  CREAM TOPICALLY TO AFFECTED AREA TWICE DAILY as needed            START taking these medications      lacosamide 100 mg Tab  Commonly known as: VIMPAT  Take 1 tablet (100 mg total) by mouth every 12 (twelve) hours.     methocarbamoL 500 MG Tab  Commonly known as: Robaxin  Take 1 tablet (500 mg total) by mouth 3 (three) times daily as needed (muscle spasm).     oxyCODONE 10 mg Tab immediate release tablet  Commonly known as: ROXICODONE  Take 1 tablet (10 mg total) by mouth every 6 (six) hours as needed for Pain.     silodosin 4 mg Cap capsule  Commonly known as: RAPAFLO  Take 1 capsule (4 mg total) by mouth once daily.            CHANGE how you take these medications      acetaminophen 500 MG tablet  Commonly known as: TYLENOL  Take 2 tablets (1,000 mg total) by mouth every 8 (eight) hours.  What changed:   when to take this  reasons to take this     metoprolol succinate 25 MG 24 hr tablet  Commonly known as: TOPROL-XL  Take 0.5 tablets (12.5 mg total) by mouth once daily.  Start taking on: April 2, 2025  What changed:   how much to take  when to take this            CONTINUE taking these medications      levocetirizine 5 MG tablet  Commonly known as: XYZAL  Take 5 mg by mouth every morning.     levothyroxine 125 MCG tablet  Commonly known as: SYNTHROID  TAKE 1 TABLET BY MOUTH EVERY DAY IN THE MORNING     memantine 10 MG Tab  Commonly known as: NAMENDA  Take 10 mg by mouth 2 (two) times daily.     pantoprazole 40 MG tablet  Commonly known as: PROTONIX  TAKE 1 TABLET BY MOUTH DAILY AS  NEEDED     pravastatin 20 MG tablet  Commonly known as: PRAVACHOL  Take 1 tablet (20 mg total) by mouth every evening.     STOOL SOFTENER ORAL  Take by mouth.            STOP taking these medications      AJOVY AUTOINJECTOR 225 mg/1.5 mL autoinjector  Generic drug: fremanezumab-vfrm     amitriptyline 10 MG tablet  Commonly known as: ELAVIL      butalbital-acetaminophen-caffeine -40 mg -40 mg per tablet  Commonly known as: FIORICET, ESGIC     carbamide peroxide 6.5 % otic solution  Commonly known as: DEBROX     HYDROcodone-acetaminophen 5-325 mg per tablet  Commonly known as: NORCO     tamsulosin 0.4 mg Cap  Commonly known as: FLOMAX              Nina Cerda PA-C  Neurosurgery  [facility] - Neurosurgery [facility])

## 2025-04-01 NOTE — ASSESSMENT & PLAN NOTE
This is a 85 year old male with a history of bilateral hygromas, NPH s/p VPS placement, HTN, HLD that is admitted with intractable headache and is now s/p b/l mini crani for SDH evac. Patient reports that current headache is controlled and is satisfied with improvement he's made over hospitalization. Patient is prescribed fioricet PRN outpatient; recommend discontinuation on discharge to avoid rebound headache. Continue conservative management, ensure adequate oral hydration, optimize sleep.    Recommendations  - Agree with current PRN tylenol  - Consider use of Magnesium IV 2g q8h for headache while inpatient  - Discontinue fioricet at discharge. Discussed with patient and family.  - Optimize sleep, hydration, nutrition, exercise/mobility  - Place Neurology referral at discharge  - Delirium precautions  - Neurology will sign off. Thank you for your consult. Please reach out with questions or changes in the clinical picture.

## 2025-04-01 NOTE — PLAN OF CARE
[facility]as - Neurosurgery (Hospital)  Discharge Final Note    Primary Care Provider: Nakul Mandujano MD    Expected Discharge Date: 4/1/2025    Patient to be discharged to  Rehab.  [facility] to provide wheelchair transportation.  Neurosurgery clinic to schedule follow up appointment.    Nurse to call report to [phone], Room 4109.  Wheelchair requested for 2:00 which is not a guaranteed arrival time.        Final Discharge Note (most recent)       Final Note - 04/01/25 1246          Final Note    Assessment Type Final Discharge Note     Anticipated Discharge Disposition Rehab Facility        Post-Acute Status    Post-Acute Authorization Placement     Post-Acute Placement Status Set-up Complete/Auth obtained     Discharge Delays None known at this time                     Important Message from Medicare  Important Message from Medicare regarding Discharge Appeal Rights: Given to patient/caregiver, Explained to patient/caregiver, Signed/date by patient/caregiver     Date IMM was signed: 03/31/25  Time IMM was signed: 2868

## 2025-04-04 ENCOUNTER — PATIENT MESSAGE (OUTPATIENT)
Dept: NEUROSURGERY | Facility: CLINIC | Age: 85
End: 2025-04-04
Payer: MEDICARE

## 2025-04-06 ENCOUNTER — PATIENT MESSAGE (OUTPATIENT)
Dept: NEUROSURGERY | Facility: CLINIC | Age: 85
End: 2025-04-06
Payer: MEDICARE

## 2025-04-07 ENCOUNTER — PATIENT MESSAGE (OUTPATIENT)
Dept: NEUROSURGERY | Facility: CLINIC | Age: 85
End: 2025-04-07
Payer: MEDICARE

## 2025-04-10 ENCOUNTER — HOSPITAL ENCOUNTER (OUTPATIENT)
Dept: RADIOLOGY | Facility: HOSPITAL | Age: 85
Discharge: HOME | End: 2025-04-10
Attending: PHYSICIAN ASSISTANT
Payer: MEDICARE

## 2025-04-10 ENCOUNTER — CLINICAL SUPPORT (OUTPATIENT)
Dept: NEUROSURGERY | Facility: CLINIC | Age: 85
End: 2025-04-10
Payer: MEDICARE

## 2025-04-10 DIAGNOSIS — G96.08 SUBDURAL HYGROMA: ICD-10-CM

## 2025-04-10 DIAGNOSIS — Z98.2 S/P VP SHUNT: Primary | ICD-10-CM

## 2025-04-10 DIAGNOSIS — Z98.2 S/P VP SHUNT: ICD-10-CM

## 2025-04-10 DIAGNOSIS — I62.00 NONTRAUMATIC SUBDURAL HEMORRHAGE, UNSPECIFIED: ICD-10-CM

## 2025-04-10 DIAGNOSIS — G96.08 SUBDURAL HYGROMA: Primary | ICD-10-CM

## 2025-04-10 PROCEDURE — 70450 CT HEAD/BRAIN W/O DYE: CPT | Mod: 26,,, | Performed by: STUDENT IN AN ORGANIZED HEALTH CARE EDUCATION/TRAINING PROGRAM

## 2025-04-10 PROCEDURE — 70450 CT HEAD/BRAIN W/O DYE: CPT | Mod: TC

## 2025-04-10 NOTE — PROGRESS NOTES
Mr. Ricci is a 86 y/o who underwent  shunt tie off and subdural washout by Dr. Blankenship on 3/25.  (For complete diagnosis and procedure, see OP note.) Pt presents with his spouse. He is in a wheelchair and in NAD. Denies any fever, chills.     Pt reports still having headaches. He has had headaches for years prior to shunt placement. He does not describe his headache as severe today in clinic. CT was completed prior to the appointment and reviewed by Mat JACINTO. She recommends f/u in 4 wks.     Incision C/D/I without any signs of redness or infection. No drainage noted. Wound edges are approximated. Staples removed without complication.     Pt received updated post-op care instructions.  All questions were answered. Patient was encouraged to call clinic with any future concerns.

## 2025-04-11 ENCOUNTER — PATIENT OUTREACH (OUTPATIENT)
Dept: ADMINISTRATIVE | Facility: CLINIC | Age: 85
End: 2025-04-11
Payer: MEDICARE

## 2025-04-11 NOTE — PROGRESS NOTES
C3 nurse spoke with Ramesh Sr Ricci's wife Mone for a TCC post hospital discharge follow up call. The patient has a scheduled HOSFU appointment with Nakul Mandujano MD  on 04/22/25 @ 1000.

## 2025-04-22 NOTE — TELEPHONE ENCOUNTER
Problem: Discharge Planning  Goal: Discharge to home or other facility with appropriate resources  Outcome: Progressing     Problem: Pain  Goal: Verbalizes/displays adequate comfort level or baseline comfort level  Outcome: Progressing  Flowsheets (Taken 4/21/2025 2000)  Verbalizes/displays adequate comfort level or baseline comfort level: Encourage patient to monitor pain and request assistance     Problem: Skin/Tissue Integrity  Goal: Skin integrity remains intact  Description: 1.  Monitor for areas of redness and/or skin breakdown2.  Assess vascular access sites hourly3.  Every 4-6 hours minimum:  Change oxygen saturation probe site4.  Every 4-6 hours:  If on nasal continuous positive airway pressure, respiratory therapy assess nares and determine need for appliance change or resting period  Outcome: Progressing  Flowsheets (Taken 4/21/2025 2000)  Skin Integrity Remains Intact: Monitor for areas of redness and/or skin breakdown     Problem: ABCDS Injury Assessment  Goal: Absence of physical injury  Outcome: Progressing  Flowsheets (Taken 4/21/2025 2000)  Absence of Physical Injury: Implement safety measures based on patient assessment     Problem: Safety - Adult  Goal: Free from fall injury  Outcome: Progressing  Flowsheets (Taken 4/21/2025 2000)  Free From Fall Injury: Instruct family/caregiver on patient safety     Problem: Neurosensory - Adult  Goal: Achieves maximal functionality and self care  Outcome: Progressing     Problem: Respiratory - Adult  Goal: Achieves optimal ventilation and oxygenation  Outcome: Progressing     Problem: Cardiovascular - Adult  Goal: Absence of cardiac dysrhythmias or at baseline  Outcome: Progressing     Problem: Skin/Tissue Integrity - Adult  Goal: Incisions, wounds, or drain sites healing without S/S of infection  Outcome: Progressing  Flowsheets (Taken 4/21/2025 2000)  Incisions, Wounds, or Drain Sites Healing Without Sign and Symptoms of Infection: ADMISSION and DAILY:  Stelara rx received   -PA is required.  CMM Key: [id number]   Reached out to Cat re: is initial infusion approved and/or scheduled? She responded that de is actually scheduled for 7/1    Welcome call outcome: Patient/caregiver reached via secure message.     -PA approved from 06/08/2022 to 12/31/2022  Case ID: [id number]      Benefits Investigation     Insurance name: [employer] Medicare Part D   Copay: $2635.15  LAURA LVL: n/a     Forwarding to FA

## 2025-04-23 PROBLEM — S06.5XAA SUBDURAL HEMATOMA: Status: ACTIVE | Noted: 2025-03-24

## 2025-04-23 PROBLEM — G93.40 ACUTE ENCEPHALOPATHY: Status: ACTIVE | Noted: 2025-04-23

## 2025-04-23 NOTE — ED TRIAGE NOTES
Ramesh Ricci, a 85 y.o. male presents to the ED w/ multiple complaints. Pt arrives to ED via personal vehicle with multiple complaints. Per pt's family he has been declining over the past few days, reporting that he has slow speech, and decreased function. Pt alert and oriented to person at this time. Pt had  shunt procedure [id number] ago.     Triage note:  Chief Complaint   Patient presents with    Mustiple complaints     Declining in past few days, has VPshunt, leaning to r more since yest and slow speech     Review of patient's allergies indicates:   Allergen Reactions    Benazepril Other (See Comments)     Cough     Past Medical History:   Diagnosis Date    Allergies 01/31/2025    Anemia     Anticoagulant long-term use     Anxiety     Atrophic kidney 11/16/2012    BPH (benign prostatic hyperplasia) 11/16/2012    Congenital absence of right kidney 07/05/2017    DDD (degenerative disc disease), cervical 07/05/2017    x-ray 7/17 - Severe    Ex-smoker 12/20/2018    Exudative age-related macular degeneration, right eye, with active choroidal neovascularization 02/15/2024    GERD (gastroesophageal reflux disease) 11/16/2012    Glaucoma 11/16/2012    HTN (hypertension) 11/16/2012    Hypothyroid 11/16/2012    Internal carotid artery stenosis 11/16/2012    Iron deficiency anemia due to chronic blood loss 10/29/2021    Left ventricular diastolic dysfunction, NYHA class 1 04/16/2015    4/15    Low serum testosterone level 11/16/2012    Normal cardiac stress test 11/16/2012    NPH (normal pressure hydrocephalus) 02/03/2025    Osteopenia 11/16/2012    PAF (paroxysmal atrial fibrillation) 12/20/2018    Prostate CA 01/15/2013    XRT 12/12  Dr. 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

## 2025-04-23 NOTE — ED PROVIDER NOTES
Encounter Date: 4/23/2025       History     Chief Complaint   Patient presents with    Mustiple complaints     Declining in past few days, has VPshunt, leaning to r more since yest and slow speech     HPI  Ramesh Ricci is an 86 y/o male with a PMH of Afib, Ulcerative Colitis, HTN, and aortic stenosis. He also has normal pressure hydrocephalus s/p  shunt (now tied off) is presenting with multiple complaints.    Wife reports concern for acute deconditioning over the past few days.  She describes increased weakness, urinary frequency, and speech changes that is been progressing for the past 3 days.  She states that he seems to slumped towards the right side and has been dragging his right leg.  Previously ambulated with his walker.  She also mentions that he seems to have speech hesitancy.  Denies any fevers, chills, abdominal pain or diarrhea.  States that he typically  Review of patient's allergies indicates:   Allergen Reactions    Benazepril Other (See Comments)     Cough     Past Medical History:   Diagnosis Date    Allergies 01/31/2025    Anemia     Anticoagulant long-term use     Anxiety     Atrophic kidney 11/16/2012    BPH (benign prostatic hyperplasia) 11/16/2012    Congenital absence of right kidney 07/05/2017    DDD (degenerative disc disease), cervical 07/05/2017    x-ray 7/17 - Severe    Ex-smoker 12/20/2018    Exudative age-related macular degeneration, right eye, with active choroidal neovascularization 02/15/2024    GERD (gastroesophageal reflux disease) 11/16/2012    Glaucoma 11/16/2012    HTN (hypertension) 11/16/2012    Hypothyroid 11/16/2012    Internal carotid artery stenosis 11/16/2012    Iron deficiency anemia due to chronic blood loss 10/29/2021    Left ventricular diastolic dysfunction, NYHA class 1 04/16/2015    4/15    Low serum testosterone level 11/16/2012    Normal cardiac stress test 11/16/2012    NPH (normal pressure hydrocephalus) 02/03/2025    Osteopenia 11/16/2012    PAF  (paroxysmal atrial fibrillation) 12/20/2018    Prostate CA 01/15/2013    XRT 12/12  Dr. 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    Shingles 11/16/2012    Skin disease 2020    Seems to have started after Covid vaccine    Stage 3a chronic kidney disease 10/06/2014    Stroke 2017    tia   no residual    Thrombocytopenia 01/31/2025    TIA (transient ischemic attack) 11/16/2012    UC (ulcerative colitis) 11/16/2012     Past Surgical History:   Procedure Laterality Date    ADENOIDECTOMY      COLONOSCOPY N/A 03/25/2022    Procedure: COLONOSCOPY;  Surgeon: Ashley Nguyen MD;  Location: [facility];  Service: Endoscopy;  Laterality: N/A;    ESOPHAGOGASTRODUODENOSCOPY N/A 03/25/2022    Procedure: EGD (ESOPHAGOGASTRODUODENOSCOPY);  Surgeon: Ashley Nguyen MD;  Location: [facility];  Service: Endoscopy;  Laterality: N/A;  added on per Dr. Nguyen    ESOPHAGOGASTRODUODENOSCOPY N/A 6/7/2024    Procedure: EGD (ESOPHAGOGASTRODUODENOSCOPY);  Surgeon: Audie Snell MD;  [facility]  Service: Endoscopy;  Laterality: N/A;  5/21 R/s,sent updated instr via portal.pt informed to hold eliquis for 2 days.AC  Ref by: ,  approved to hold Eliquis (apixaban) for 2 days per Dr. Conklin-see media file  5/9/24-GT  5/31-pre call complete-tb    EVACUATION OF SUBDURAL HEMATOMA Bilateral 3/24/2025    Procedure: EVACUATION, HEMATOMA, SUBDURAL  AND SHUNT TIE OFF;  Surgeon: Jairo Blankenship MD;  Location: [facility] OR 2ND FLR;  Service: Neurosurgery;  Laterality: Bilateral;  bilateral bobby holes for subdural hygroma and tying off of  shunt at clavicle. to follow other ware cases    EYE SURGERY Right 11/2020    cataract    HERNIA REPAIR      LEFT HEART CATHETERIZATION Right 10/29/2021    Procedure: CATHETERIZATION, HEART, LEFT AND RIGHT  - LV DARNELL POSSIBLE;  Surgeon: Beau Conklin MD;  Location: [facility] CATH LAB;  Service: Cardiology;  Laterality: Right;    LUMBAR PUNCTURE N/A  1/13/2025    Procedure: Lumbar Puncture;  Surgeon: Jairo Blankenship MD;  Location: [facility] OR [address] [place] [address];  Service: Neurosurgery;  Laterality: N/A;    RIGHT HEART CATHETERIZATION Right 10/29/2021    Procedure: INSERTION, CATHETER, RIGHT HEART;  Surgeon: Beau Conklin MD;  Location: [facility] CATH LAB;  Service: Cardiology;  Laterality: Right;    SKIN BIOPSY  2020    TONSILLECTOMY      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    Procedure: INSERTION, SHUNT, VENTRICULOPERITONEAL;  Surgeon: Jairo Blankenship MD;  Location: [facility] OR [address] [place] [address];  Service: Neurosurgery;  Laterality: Right;    VENTRICULOPERITONEAL SHUNT  2/6/2025    Procedure: INSERTION, SHUNT, VENTRICULOPERITONEAL;  Surgeon: Shar Elizabeth MD;  Location: [facility] OR [address] [place] [address];  Service: General;;     Family History   Problem Relation Name Age of Onset    Hypertension Mother      Alzheimer's disease Mother      Heart attack Father      Diabetes Brother      Hypertension Brother      Cancer Brother      Heart disease Brother  56        MI    Colon cancer Neg Hx      Esophageal cancer Neg Hx       Social History[1]  Review of Systems    Physical Exam     Initial Vitals [04/23/25 1428]   BP Pulse Resp Temp SpO2   (!) 146/80 65 20 98 °F (36.7 °C) 97 %      MAP       --         Physical Exam    Nursing note and vitals reviewed.  HENT:   Head: Normocephalic and atraumatic.   Cardiovascular:  Normal rate, regular rhythm, normal heart sounds and intact distal pulses.           Pulmonary/Chest: Breath sounds normal. No respiratory distress. He has no wheezes. He has no rhonchi. He has no rales.   Abdominal: Abdomen is soft. Bowel sounds are normal. He exhibits no distension. There is no abdominal tenderness.     Neurological: He is alert.   R horizontal nystagmus. 4/5 RLE power, 5/5 LLE, symmetrical upper extremities. Absent dorsiflexion on the R. Speech hesitancy.    Skin: Capillary refill takes less than 2 seconds. There is pallor.   Psychiatric: He has a normal mood  and affect.         ED Course   Procedures  Labs Reviewed   COMPREHENSIVE METABOLIC PANEL - Abnormal       Result Value    Sodium 138      Potassium 4.8      Chloride 101      CO2 27      Glucose 87      BUN 23      Creatinine 1.1      Calcium 9.5      Protein Total 7.9      Albumin 3.5      Bilirubin Total 0.3      ALP 78      AST 14      ALT <5 (*)     Anion Gap 10      eGFR >60     URINALYSIS, REFLEX TO URINE CULTURE - Abnormal    Color, UA Yellow      Appearance, UA Clear      pH, UA 7.0      Spec Grav UA 1.025      Protein, UA Trace (*)     Glucose, UA Negative      Ketones, UA Negative      Bilirubin, UA Negative      Blood, UA Negative      Nitrites, UA Negative      Urobilinogen, UA Negative      Leukocyte Esterase, UA 1+ (*)    CBC WITH DIFFERENTIAL - Abnormal    WBC 11.06      RBC 4.21 (*)     HGB 12.5 (*)     HCT 39.8 (*)     MCV 95      MCH 29.7      MCHC 31.4 (*)     RDW 13.1      Platelet Count 226      MPV 10.7      Nucleated RBC 0      Neut % 72.4      Lymph % 14.4 (*)     Mono % 11.9      Eos % 0.5      Basophil % 0.3      Imm Grans % 0.5      Neut # 8.00 (*)     Lymph # 1.59      Mono # 1.32 (*)     Eos # 0.06      Baso # 0.03      Imm Grans # 0.06 (*)    URINALYSIS MICROSCOPIC - Abnormal    RBC, UA 1      WBC, UA 6 (*)     Bacteria, UA Rare      Microscopic Comment       CBC W/ AUTO DIFFERENTIAL    Narrative:     The following orders were created for panel order CBC auto differential.  Procedure                               Abnormality         Status                     ---------                               -----------         ------                     CBC with Differential[[id number]]       Abnormal            Final result                 Please view results for these tests on the individual orders.   GREY TOP URINE HOLD    Extra Tube Hold for add-ons.     URINALYSIS, REFLEX TO URINE CULTURE   TYPE & SCREEN        ECG Results              EKG 12-lead (Final result)        Collection Time  Result Time QRS Duration OHS QTC Calculation    04/23/25 15:42:24 04/23/25 16:42:40 102 464                     Final result by Shadi, [facility] (04/23/25 16:42:45)                   Narrative:    Test Reason : R53.1,    Vent. Rate :  60 BPM     Atrial Rate :  60 BPM     P-R Int : 144 ms          QRS Dur : 102 ms      QT Int : 464 ms       P-R-T Axes :  41  41  82 degrees    QTcB Int : 464 ms    Normal sinus rhythm  Abnormal R wave progression in the precordial leads  Nonspecific ST abnormality  Abnormal ECG  When compared with ECG of 27-Mar-2025 13:48,  The axis Shifted right  Confirmed by Johnnie Davis ([id number]) on 4/23/2025 4:42:37 PM    Referred By: AAAREFERRAL SELF           Confirmed By: Johnnie Davis                                  Imaging Results               CT Head Without Contrast (Final result)  Result time 04/23/25 19:28:05      Final result by Phillip Johnson MD (04/23/25 19:28:05)                   Impression:      Interval increase in size of bilateral holohemispheric subdural collections, with new bilateral hemorrhages in the subdural collections, concerning for acute on subacute subdural hematomas.    Right-sided coursing ventriculoperitoneal shunt in place.    This report was flagged in Epic as abnormal.    This critical information above was relayed by myself  by secure chat to Neal Chan MD on 04/23/2025 at 18:40.    CRITICAL FINDINGS:  Intracranial Hemorrhage    RECOMMENDATIONS:  Consult neurosurgery.    Electronically signed by resident: Malik Noble  Date:    04/23/2025  Time:    18:23    Electronically signed by: Phillip Johnson MD  Date:    04/23/2025  Time:    19:28               Narrative:    EXAMINATION:  CT HEAD WITHOUT CONTRAST    CLINICAL HISTORY:  Neuro deficit, acute, stroke suspected;Mental status change, unknown cause;    TECHNIQUE:  Low dose axial CT images obtained throughout the head without the use of intravenous contrast.  Axial, sagittal and coronal reconstructions  were performed.    COMPARISON:  CT 04/10/2025    FINDINGS:  There are bilateral holohemispheric subdural collections, both of which appear to have increased in size when compared to prior CT 04/10/2025.  The right subdural collection measures 1.8 cm in greatest dimension, previously measuring 0.8 cm.  The left subdural collection measures 1.8 cm in greatest dimension, previously measuring 1.2 cm.    Within the subdural collections there is new hyperattenuating material, on the right (series 601, image 46), and on the left (series 601, image 116), which represents new hemorrhage.    There is a trace amount of blood products along the tentorial leaflets.    There is moderate degree of mass effect from the subdural hematomas on both cerebral hemispheres.  The brain parenchyma otherwise appears unchanged.  Remote left occipital infarct.  No new hemorrhage, edema, or acute major vascular distribution infarct.  No mass effect or midline shift.  Partially empty sella.    There is a right-sided coursing ventriculoperitoneal shunt which terminates in the right lateral ventricle. Ventricles appear stable in size and configuration.    No displaced calvarial fracture.  The changes of bilateral bobby hole placement.    Mastoid air cells and paranasal sinuses are essentially clear.                                       Medications   sodium chloride 0.9% flush 10 mL (has no administration in time range)   bisacodyL suppository 10 mg (has no administration in time range)   hydrALAZINE injection 10 mg (has no administration in time range)   labetalol 20 mg/4 mL (5 mg/mL) IV syring (has no administration in time range)   senna-docusate 8.6-50 mg per tablet 1 tablet (has no administration in time range)   pravastatin tablet 20 mg (has no administration in time range)   metoprolol succinate (TOPROL-XL) 24 hr split tablet 12.5 mg (has no administration in time range)   levothyroxine tablet 125 mcg (has no administration in time range)      Medical Decision Making  Amount and/or Complexity of Data Reviewed  Labs: ordered. Decision-making details documented in ED Course.  Radiology: ordered.    Ramesh Ricci is an 86 y/o male with a PMH of Afib, Ulcerative Colitis, HTN, and aortic stenosis. He also has normal pressure hydrocephalus s/p  shunt (now tied off) is presenting with generalized weakness, weakness of the right lower extremity, speech hesitancy, urinary frequency.  On arrival patient is hemodynamically stable.  I agree is comfortable on exam.  Exam notable for right-sided horizontal nystagmus.  He also has weakness in his right lower extremity compared to his left.  Differentials at this time include return of his increased intracranial hypertension, UTI, electrolyte disturbance, and stroke-though given his recent procedure time is it is most likely these reaccumulation of CSF and increased intracranial hypertension.  Plan to obtain CBC, CMP, UA, and CT head.    CBC and CMP grossly unremarkable.  UA does not look infected.  Contacted by radiology to convey a new right-sided subdural hemorrhage.  Called Neurosurgery to update findings and recommended who admit to neuro ICU. Advised to reverse eliquis. Last dose was 8am. Consulted with pharmacist for kcentra dosing. Discussed case with neuro ICU who admitted the patient.           ED Course as of 04/23/25 1956 Wed Apr 23, 2025   1649 WBC: 11.06 [AC]   1649 Hemoglobin(!): 12.5  Improved compared to previous [AC]   1655 Leukocyte Esterase, UA(!): 1+ [AC]   1655 WBC, UA(!): 6 [AC]   1655 Bacteria, UA: Rare [AC]   1655 Urinalysis Microscopic(!)  Not convincing for infection [AC]   1655 Comprehensive metabolic panel(!)  unremarkable [AC]   1841 Discussed case with neurosurgery who is aware of the findings and plans to admit to neuro ICU [AC]   1842 Discussed case with neuro ICU who will come to evaluate the patient after sign out [AC]   1942 CT Head Without Contrast(!)  Interval increase in  size of bilateral holohemispheric subdural collections, with new bilateral hemorrhages in the subdural collections, concerning for acute on subacute subdural hematomas.     Right-sided coursing ventriculoperitoneal shunt in place   [AC]    Last dose eliquis around 8am. Discussed with pharmacist for kcentra dosing. [AC]      ED Course User Index  [AC] Bella Jaime MD                           Clinical Impression:  Final diagnoses:  [R53.1] Weakness  [I62.00] Subdural hemorrhage (Primary)          ED Disposition Condition    Admit                     [1]   Social History  Tobacco Use    Smoking status: Former     Current packs/day: 0.00     Average packs/day: 1 pack/day for 25.0 years (25.0 ttl pk-yrs)     Types: Cigarettes     Start date: 1947     Quit date: 1972     Years since quittin.3    Smokeless tobacco: Never    Tobacco comments:     Patient Quit Smoking on 1972.   Substance Use Topics    Alcohol use: Not Currently     Alcohol/week: 3.0 standard drinks of alcohol     Types: 2 Glasses of wine, 1 Cans of beer per week     Comment: Occasional    Drug use: Never        Bella Jaime MD  Resident  25

## 2025-04-23 NOTE — SUBJECTIVE & OBJECTIVE
Prescriptions Prior to Admission[1]    Review of patient's allergies indicates:   Allergen Reactions    Benazepril Other (See Comments)     Cough       Past Medical History:   Diagnosis Date    Allergies 01/31/2025    Anemia     Anticoagulant long-term use     Anxiety     Atrophic kidney 11/16/2012    BPH (benign prostatic hyperplasia) 11/16/2012    Congenital absence of right kidney 07/05/2017    DDD (degenerative disc disease), cervical 07/05/2017    x-ray 7/17 - Severe    Ex-smoker 12/20/2018    Exudative age-related macular degeneration, right eye, with active choroidal neovascularization 02/15/2024    GERD (gastroesophageal reflux disease) 11/16/2012    Glaucoma 11/16/2012    HTN (hypertension) 11/16/2012    Hypothyroid 11/16/2012    Internal carotid artery stenosis 11/16/2012    Iron deficiency anemia due to chronic blood loss 10/29/2021    Left ventricular diastolic dysfunction, NYHA class 1 04/16/2015    4/15    Low serum testosterone level 11/16/2012    Normal cardiac stress test 11/16/2012    NPH (normal pressure hydrocephalus) 02/03/2025    Osteopenia 11/16/2012    PAF (paroxysmal atrial fibrillation) 12/20/2018    Prostate CA 01/15/2013    XRT 12/12  Dr. 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    Shingles 11/16/2012    Skin disease 2020    Seems to have started after Covid vaccine    Stage 3a chronic kidney disease 10/06/2014    Stroke 2017    tia   no residual    Thrombocytopenia 01/31/2025    TIA (transient ischemic attack) 11/16/2012    UC (ulcerative colitis) 11/16/2012     Past Surgical History:   Procedure Laterality Date    ADENOIDECTOMY      COLONOSCOPY N/A 03/25/2022    Procedure: COLONOSCOPY;  Surgeon: Ashley Nguyen MD;  Location: [facility] ENDO;  Service: Endoscopy;  Laterality: N/A;    ESOPHAGOGASTRODUODENOSCOPY N/A 03/25/2022    Procedure: EGD (ESOPHAGOGASTRODUODENOSCOPY);  Surgeon: Ashley Nguyen MD;  Location: [facility] ENDO;  Service: Endoscopy;  Laterality:  N/A;  added on per Dr. Nguyen    ESOPHAGOGASTRODUODENOSCOPY N/A 6/7/2024    Procedure: EGD (ESOPHAGOGASTRODUODENOSCOPY);  Surgeon: Audie Snell MD;  Location: [facility] (4TH FLR);  Service: Endoscopy;  Laterality: N/A;  5/21 R/s,sent updated instr via portal.pt informed to hold eliquis for 2 days.AC  Ref by: ,  approved to hold Eliquis (apixaban) for 2 days per Dr. Conklin-see media file  5/9/24-GT  5/31-pre call complete-tb    EVACUATION OF SUBDURAL HEMATOMA Bilateral 3/24/2025    Procedure: EVACUATION, HEMATOMA, SUBDURAL  AND SHUNT TIE OFF;  Surgeon: Jairo Blankenship MD;  Location: [facility] OR 2ND FLR;  Service: Neurosurgery;  Laterality: Bilateral;  bilateral bobby holes for subdural hygroma and tying off of  shunt at clavicle. to follow other ware cases    EYE SURGERY Right 11/2020    cataract    HERNIA REPAIR      LEFT HEART CATHETERIZATION Right 10/29/2021    Procedure: CATHETERIZATION, HEART, LEFT AND RIGHT  - LV DARNELL POSSIBLE;  Surgeon: Baeu Conklin MD;  Location: [facility] CATH LAB;  Service: Cardiology;  Laterality: Right;    LUMBAR PUNCTURE N/A 1/13/2025    Procedure: Lumbar Puncture;  Surgeon: Jairo Blankenship MD;  Location: [facility] OR 2ND FLR;  Service: Neurosurgery;  Laterality: N/A;    RIGHT HEART CATHETERIZATION Right 10/29/2021    Procedure: INSERTION, CATHETER, RIGHT HEART;  Surgeon: Beau Conklin MD;  Location: [facility] CATH LAB;  Service: Cardiology;  Laterality: Right;    SKIN BIOPSY  2020    TONSILLECTOMY      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    Procedure: INSERTION, SHUNT, VENTRICULOPERITONEAL;  Surgeon: Jairo Blankenship MD;  [facility];  Service: Neurosurgery;  Laterality: Right;    VENTRICULOPERITONEAL SHUNT  2/6/2025    Procedure: INSERTION, SHUNT, VENTRICULOPERITONEAL;  Surgeon: Shar Elizabeth MD;  [facility];  Service: General;;     Family History       Problem Relation (Age of Onset)    Alzheimer's disease Mother    Cancer  Brother    Diabetes Brother    Heart attack Father    Heart disease Brother (56)    Hypertension Mother, Brother          Tobacco Use    Smoking status: Former     Current packs/day: 0.00     Average packs/day: 1 pack/day for 25.0 years (25.0 ttl pk-yrs)     Types: Cigarettes     Start date: 1947     Quit date: 1972     Years since quittin.3    Smokeless tobacco: Never    Tobacco comments:     Patient Quit Smoking on 1972.   Substance and Sexual Activity    Alcohol use: Not Currently     Alcohol/week: 3.0 standard drinks of alcohol     Types: 2 Glasses of wine, 1 Cans of beer per week     Comment: Occasional    Drug use: Never    Sexual activity: Not Currently     Partners: Female     Birth control/protection: None     Review of Systems  Objective:     Weight: 68 kg (150 lb)  Body mass index is 20.92 kg/m².  Vital Signs (Most Recent):  Temp: 98 °F (36.7 °C) (25 1428)  Pulse: 64 (25 1800)  Resp: 20 (25 1509)  BP: (!) 174/94 (25 1700)  SpO2: 99 % (25 1800) Vital Signs (24h Range):  Temp:  [98 °F (36.7 °C)] 98 °F (36.7 °C)  Pulse:  [61-65] 64  Resp:  [20] 20  SpO2:  [97 %-100 %] 99 %  BP: (128-174)/(74-94) 174/94                                 Physical Exam         Neurosurgery Physical Exam  Neurosurgery Physical Exam    General: well developed, well nourished, no distress.   HEENT: normocephalic, atraumatic  CV: regular rate   Pulmonary: normal respirations, no signs of respiratory distress  Abdomen: soft, non-distended, not tender to palpation  Skin: Skin is warm, dry and intact  Heme: no bruising    Neuro:   Mental Status: AO x3, aphasic  CN: PERRL, EOMI, VF intact to confrontation, sensation intact bilaterally, eyebrow raise and grimace symmetric, tongue midline  Motor: moves all extremities spontaneously, full strength throughout, R pronator drift  Sensory: intact to light touch throughout  Reflexes: -Adames's, -Babinski, no clonus. Patellar: 2+ bilaterally  "      Significant Labs:  Recent Labs   Lab 04/23/25  1553      K 4.8      CO2 27   BUN 23   CREATININE 1.1   CALCIUM 9.5     Recent Labs   Lab 04/23/25  1553   WBC 11.06   HGB 12.5*   HCT 39.8*        No results for input(s): "LABPT", "INR", "APTT" in the last 48 hours.  Microbiology Results (last 7 days)       ** No results found for the last 168 hours. **          All pertinent labs from the last 24 hours have been reviewed.    Significant Diagnostics:  CT: No results found in the last 24 hours.         [1] (Not in a hospital admission)    "

## 2025-04-23 NOTE — HPI
Ramesh Ricci is an 84 y/o male with a PMH of Afib, Ulcerative Colitis, HTN, and aortic stenosis. He also has normal pressure hydrocephalus s/p  shunt (now tied off) is presenting with multiple complaints.     Wife reports concern for acute deconditioning over the past few days.  She describes increased weakness, urinary frequency, and speech changes that is been progressing for the past 3 days.  She states that he seems to slumped towards the right side and has been dragging his right leg.  Previously ambulated with his walker.  She also mentions that he seems to have speech hesitancy.  Denies any fevers, chills, abdominal pain or diarrhea

## 2025-04-23 NOTE — ASSESSMENT & PLAN NOTE
84 yo with recent admission for VPS ty off and SDH evacuation on 3/24 presenting for new LSW and speech hesitation found to have enlarged chronic SDH as well as a new small R convexity acute SDH. Pt takes eliquis and will need reversal prior to going to [facility].    Recommendations:  --Please admit to [facility]  --All labs and diagnostics reviewed  --Follow-up CTH and 6h scan for stability  -- Pt will need eliquis reversed.  --SBP <140 (cardene ggt; hydralazine & labetalol PRN; transition to home meds when appropriate)  --Na >135  --Keppra 500 BID  --HOB >30  --Follow-up full pre-op labs (Urinalysis/CBC/CMP/PT-INR/PTT/T&S)  --NPO at this time for possible operative intervention  --Pt will likely need SDH evacuation.  --Continue to monitor clinically, notify NSGY immediately with any changes in neuro status    Discussed with Dr. Galvan  Dispo: pending

## 2025-04-24 ENCOUNTER — ANESTHESIA EVENT (OUTPATIENT)
Dept: SURGERY | Facility: HOSPITAL | Age: 85
End: 2025-04-24
Payer: MEDICARE

## 2025-04-24 PROBLEM — G40.909 SEIZURE DISORDER: Status: ACTIVE | Noted: 2025-04-24

## 2025-04-24 NOTE — HPI
85 year old male with a PMHx of HTN, afib on Eliquis, AS s/p TAVR, CKD, UC, NPH s/p VPS c/b bilateral SDH s/p bilateral evacuation with subsequent seizure disorder maintained on Lacosamide 100 mg BID who presented with worsening weakness, urinary frequency, and speech changes. HPI per chart review due to patient's altered mental status. Imaging revealed interval increase in size of bilateral holohemispheric subdural collections, with new bilateral hemorrhages in the subdural collections, concerning for acute on subacute SDH; right VPS in place. Eliquis was reversed in the ER. Patient noted to have staring episode which resolved ~few minutes. Wife reported staring episodes at home. Patient admitted to [facility] for higher level of care and further management with Neurosurgery consult. CAP EEG placed; Epilepsy following for assistance in management.    Per chart review, patient was recently started on Lacosamide 100 mg BID (filled 4/8) by Neurology group [place].

## 2025-04-24 NOTE — CONSULTS
Inpatient consult to Physical Medicine Rehab  Consult performed by: Noelle Jorge NP  Consult ordered by: Yuridia Kaplan, ZOYA  Reason for consult: Rehab      Consult received.     DANNY Oh, FNP-C  Physical Medicine & Rehabilitation   04/24/2025

## 2025-04-24 NOTE — PLAN OF CARE
Problem: SLP  Goal: SLP Goal  Description: Speech Language Pathology Goals  Goals expected to be met by 5/8  1. Pt will tolerate regular diet with thin liquids with strict aspiration precautions.   2. Pt will answer mod complex y/n questions with 70%  3. Pt will follow simple commands with 90% accy given mod cues.   4. Pt will complete simple word finding tasks with 70% given min cues.     Outcome: Progressing     Evaluation completed.  Rec regular diet with thin liquids with strict aspiration precautions.

## 2025-04-24 NOTE — PROGRESS NOTES
[facility]  Neurosurgery  Progress Note    Subjective:     History of Present Illness: Ramesh Ricci is an 86 y/o male with a PMH of Afib, Ulcerative Colitis, HTN, and aortic stenosis. He also has normal pressure hydrocephalus s/p  shunt (now tied off) is presenting with multiple complaints.     Wife reports concern for acute deconditioning over the past few days.  She describes increased weakness, urinary frequency, and speech changes that is been progressing for the past 3 days.  She states that he seems to slumped towards the right side and has been dragging his right leg.  Previously ambulated with his walker.  She also mentions that he seems to have speech hesitancy.  Denies any fevers, chills, abdominal pain or diarrhea    Post-Op Info:  Procedure(s) (LRB):  CRANIOTOMY, FOR SUBDURAL HEMATOMA EVACUATION, bilateral. shunt removal (Bilateral)       Interval History: 4/24: severe aphasia continues. Still able to move all extremities AG to command. On EEG cap.    Medications:  Continuous Infusions:  Scheduled Meds:   lacosamide (Vimpat) IV orderable  150 mg Intravenous Q12H    levothyroxine  125 mcg Oral Before breakfast    metoprolol succinate  12.5 mg Oral Daily    pravastatin  20 mg Oral QHS    senna-docusate  1 tablet Oral Daily    silodosin  4 mg Oral Daily     PRN Meds:  Current Facility-Administered Medications:     bisacodyL, 10 mg, Rectal, Daily PRN    hydrALAZINE, 10 mg, Intravenous, Q4H PRN    labetalol, 10 mg, Intravenous, Q4H PRN    sodium chloride 0.9%, 10 mL, Intravenous, PRN     Review of Systems  Objective:     Weight: 68 kg (150 lb)  Body mass index is 20.92 kg/m².  Vital Signs (Most Recent):  Temp: 99.2 °F (37.3 °C) (04/24/25 0701)  Pulse: 65 (04/24/25 1001)  Resp: (!) 27 (04/24/25 1001)  BP: (!) 142/63 (04/24/25 1001)  SpO2: 99 % (04/24/25 1001) Vital Signs (24h Range):  Temp:  [98 °F (36.7 °C)-99.2 °F (37.3 °C)] 99.2 °F (37.3 °C)  Pulse:  [56-65] 65  Resp:  [13-27]  27  SpO2:  [94 %-100 %] 99 %  BP: (114-174)/(56-94) 142/63                         Male External Urinary Catheter 04/23/25 1858 (Active)   Collection Container Other (Comment) 04/24/25 0701   Securement Method secured to lower ABD w/ tape 04/24/25 0701   Skin no redness;no breakdown 04/24/25 0701   Tolerance no signs/symptoms of discomfort 04/24/25 0701   Output (mL) 120 mL 04/24/25 0202   Catheter Change Date 04/24/25 04/24/25 0701          Physical Exam         Neurosurgery Physical Exam    General: well developed, well nourished, no distress.   HEENT: normocephalic, atraumatic  CV: regular rate   Pulmonary: normal respirations, no signs of respiratory distress  Abdomen: soft, non-distended, not tender to palpation  Skin: Skin is warm, dry and intact  Heme: no bruising     Neuro:   Mental Status: AO x1 with choices, aphasic  CN: PERRL, central to left gaze preference, face grossly symmetric  Motor: FC x4 AG, not compliant with more detailed motor exam  Sensory: intact to light touch throughout  Reflexes: no clonus    Significant Labs:  Recent Labs   Lab 04/23/25  1553 04/24/25  0435    137   K 4.8 4.2    101   CO2 27 25   BUN 23 19   CREATININE 1.1 1.1   CALCIUM 9.5 9.0   MG  --  2.1     Recent Labs   Lab 04/23/25  1553 04/24/25  0435   WBC 11.06 8.54   HGB 12.5* 11.4*   HCT 39.8* 36.1*    205     Recent Labs   Lab 04/24/25  0435   INR 1.0   APTT 28.0     Microbiology Results (last 7 days)       ** No results found for the last 168 hours. **          All pertinent labs from the last 24 hours have been reviewed.    Significant Diagnostics:  CT: CT Head Without Contrast  Result Date: 4/24/2025  Stable bilateral subdural collections with similar appearing hyperdense material along the bilateral frontal cerebral convexities likely representing recent hemorrhage. Stable positioning of ventriculoperitoneal shunt catheter. No new hemorrhage or major vascular distribution infarct. Electronically signed  by resident: Charles Beckman Date:    04/24/2025 Time:    00:12 Electronically signed by: Jonah Vlileda MD Date:    04/24/2025 Time:    00:51    CT Head Without Contrast  Result Date: 4/23/2025  Interval increase in size of bilateral holohemispheric subdural collections, with new bilateral hemorrhages in the subdural collections, concerning for acute on subacute subdural hematomas. Right-sided coursing ventriculoperitoneal shunt in place. This report was flagged in Epic as abnormal. This critical information above was relayed by myself  by secure chat to Neal Chan MD on 04/23/2025 at 18:40. CRITICAL FINDINGS:  Intracranial Hemorrhage RECOMMENDATIONS:  Consult neurosurgery. Electronically signed by resident: Malik Noble Date:    04/23/2025 Time:    18:23 Electronically signed by: Phillip Johnson MD Date:    04/23/2025 Time:    19:28    MRI: No results found in the last 24 hours.  Assessment/Plan:     * Subdural hematoma  86 yo with recent admission for VPS tie off and SDH evacuation on 3/24 presenting for new LSW and speech hesitation found to have enlarged chronic SDH as well as a new small R convexity acute SDH. Pt takes eliquis, s/p PCC. Interval CTH stable.     Recommendations:  --admitted to [facility]  --on eliquis, s/p kcentra reversal. Hold all AC/AP  --SBP <140  --Vimpat  --HOB >30  --f/u cEEG  --npo midnight for OR tomorrow, bilateral subdural reevac and shunt removal with Dr. Blankenship  --Continue to monitor clinically, notify NSGY immediately with any changes in neuro status    Discussed with on call staff Dr. Galvan and Dr. Blankenship  Dispo: OR tomorrow          Saundra Camilo MD  Neurosurgery  [facility] - Neuro Critical Care

## 2025-04-24 NOTE — PLAN OF CARE
"[facility] Care Plan  POC reviewed with Ramesh Ricci and family at 1400. Patient and Family verbalized understanding. Questions and concerns addressed. No acute events today. Pt progressing toward goals. Will continue to monitor. See below and flowsheets for full assessment and VS info.     -passed SLP, regular diet ordered, NPO @midnight 4/25/25  -PTOT  -cEEG hooked up  -OR for shunt remove on 4/25/25   -Temp 100.6 tylenol given  -Bath complete, linen changed  -1 Bm  -Hydralazine x1, labetalol x1 to maintain SBP goal<160            Is this a stroke patient? no    Neuro:  [place] Coma Scale  Best Eye Response: 4-->(E4) spontaneous  Best Motor Response: 6-->(M6) obeys commands  Best Verbal Response: 4-->(V4) confused  [place] Coma Scale Score: 14  Assessment Qualifiers: no eye obstruction present  Pupil PERRLA: yes     24 hr Temp:  [97.9 °F (36.6 °C)-100.6 °F (38.1 °C)]     CV:   Rhythm: normal sinus rhythm  BP goals:   SBP < 160  MAP > 65    Resp:           Plan: N/A    GI/:     Diet/Nutrition Received: regular  Last Bowel Movement: 04/24/25  Voiding Characteristics: external catheter    Intake/Output Summary (Last 24 hours) at 4/24/2025 1728  Last data filed at 4/24/2025 1701  Gross per 24 hour   Intake 480 ml   Output 1420 ml   Net -940 ml          Labs/Accuchecks:  Recent Labs   Lab 04/24/25  0435   WBC 8.54   RBC 3.86*   HGB 11.4*   HCT 36.1*         Recent Labs   Lab 04/24/25  0435      K 4.2   CO2 25      BUN 19   CREATININE 1.1   ALKPHOS 70   ALT <5*   AST 13   BILITOT 0.5      Recent Labs   Lab 04/24/25 0435   INR 1.0   APTT 28.0    No results for input(s): "CPK", "CPKMB", "TROPONINI", "MB" in the last 168 hours.    Electrolytes: N/A - electrolytes WDL  Accuchecks: none    Gtts:      LDA/Wounds:    Gabino Risk Assessment  Sensory Perception: 3-->slightly limited  Moisture: 3-->occasionally moist  Activity: 1-->bedfast  Mobility: 2-->very limited  Nutrition: 2-->probably " inadequate  Friction and Shear: 2-->potential problem  Gabino Score: 13    Is your gabino score 12 or less? no            Restraints:        WCTM  Problem: Stroke, Intracerebral Hemorrhage  Goal: Optimal Coping  Outcome: Progressing  Goal: Effective Bowel Elimination  Outcome: Progressing  Goal: Effective Communication Skills  Outcome: Progressing  Goal: Optimal Functional Ability  Outcome: Progressing  Goal: Optimal Nutrition Intake  Outcome: Progressing  Goal: Effective Oxygenation and Ventilation  Outcome: Progressing     Problem: Skin Injury Risk Increased  Goal: Skin Health and Integrity  Outcome: Progressing     Problem: Wound  Goal: Optimal Coping  Outcome: Progressing

## 2025-04-24 NOTE — NURSING
Patient arrived to [facility] from [facility] (name of hospital or home) by (mode of transportation & company name)     Report received from: SYLVAIN CUELLAR, Lisandro    Type of stroke/diagnosis:  bilateral subdural hygromas with bilateral acute versus subacute SDH     Current symptoms: right side weakness.    Skin Assessment done: Yes  Wounds noted: No  Skin Assessment Verified by:      Neil Completed? Yes    Patient Belongings on Admit: N/A    NCC notified: Yuridia Kaplan.

## 2025-04-24 NOTE — PLAN OF CARE
Problem: Occupational Therapy  Goal: Occupational Therapy Goal  Description: Goals to be met by: 5/24/25     Patient will increase functional independence with ADLs by performing:    UE Dressing with Moderate Assistance.  LE Dressing with Moderate Assistance.  Grooming while EOB with Moderate Assistance.  Toileting from bedside commode with Moderate Assistance for hygiene and clothing management.   Stand pivot transfers with Moderate Assistance.  Step transfer with Moderate Assistance  Toilet transfer to bedside commode with Moderate Assistance.      4/24/2025 1242 by Ester Michael OT  Outcome: Progressing

## 2025-04-24 NOTE — ANESTHESIA PREPROCEDURE EVALUATION
[facility]  Anesthesia Pre-Operative Evaluation       PAPER CONSENT IN CHART        Patient Name: Ramesh Ricci  YOB: 1940  MRN: [id number]    SUBJECTIVE:     Pre-operative evaluation for Procedure(s) (LRB):  CRANIOTOMY, FOR SUBDURAL HEMATOMA EVACUATION, bilateral. shunt removal (Bilateral)     04/24/2025    Ramesh Ricci is a 85 y.o. male w/ a significant PMHx of  HTN, ulcerative colitis, pAF, anemia, CAD, CKD3, congenital absence of right kidney, aortic stenosis s/p TAVR in 2023 and normal pressure hydrocephalus status post insertion of a right-sided ventriculoperitoneal shunt on 02/06 2025, who presented with a subdural hematoma. He is s/p previous subdural evacuation on 3/24/25.    Patient now presents for the above procedure(s).    New Echo ordered today.    Echo (04/2025)    Summary  Show Result Comparison     Left Ventricle: The left ventricle is normal in size. Ventricular mass is normal. Normal wall thickness. There is normal diastolic function.    Right Ventricle: The right ventricle is normal in size. Wall thickness is normal. Systolic function is normal.    Aortic Valve: There is a transcatheter valve replacement in the aortic position.    Mitral Valve: There is severe mitral annular calcification. There is moderate stenosis. The mean pressure gradient across the mitral valve is 4 mmHg at a heart rate of 63 bpm; MVA 1.4. There is mild regurgitation.    IVC/SVC: Normal venous pressure at 3 mmHg.    LDA:        Peripheral IV - Single Lumen 04/23/25 1925 20 G Right Antecubital (Active)   Site Assessment Clean;Dry;Intact 04/24/25 0701   Line Securement Device Antimicrobial Adhesive 04/24/25 0701   Extremity Assessment Distal to IV No warmth;No swelling;No redness;No abnormal discoloration 04/24/25 0701   Line Status Saline locked 04/24/25 0701   Dressing Status Clean;Dry;Intact 04/24/25 0701   Dressing Intervention Integrity maintained 04/24/25 0701   Dressing  Change Due 04/27/25 04/24/25 0501   Site Change Due 04/27/25 04/23/25 2230   Reason Not Rotated Not due 04/24/25 0701   Number of days: 0            Peripheral IV - Single Lumen 04/23/25 1915 20 G Anterior;Left Forearm (Active)   Site Assessment Clean;Dry;Intact 04/24/25 0701   Line Securement Device Antimicrobial Adhesive 04/24/25 0701   Extremity Assessment Distal to IV No warmth;No swelling;No redness;No abnormal discoloration 04/24/25 0701   Line Status Saline locked 04/24/25 0701   Dressing Status Clean;Dry;Intact 04/24/25 0701   Dressing Intervention Integrity maintained 04/24/25 0701   Dressing Change Due 04/27/25 04/24/25 0501   Site Change Due 04/27/25 04/23/25 2230   Reason Not Rotated Not due 04/24/25 0701   Number of days: 0       Male External Urinary Catheter 04/23/25 1858 (Active)   Collection Container Other (Comment) 04/24/25 0701   Securement Method secured to lower ABD w/ tape 04/24/25 0701   Skin no redness;no breakdown 04/24/25 0701   Tolerance no signs/symptoms of discomfort 04/24/25 0701   Output (mL) 120 mL 04/24/25 0202   Catheter Change Date 04/24/25 04/24/25 0701   Number of days: 0       Prev airway:    Intubation     Date/Time: 3/24/2025 3:27 PM     Performed by: Elidia Castañeda  Authorized by: Reno Torres MD    Intubation:     Induction:  Intravenous    Intubated:  Postinduction    Mask Ventilation:  Easy with oral airway    Attempts:  1    Attempted By:  Student (OANH Cheung)    Method of Intubation:  Video laryngoscopy    Blade:  Aj 3    Laryngeal View Grade: Grade I - full view of cords      Difficult Airway Encountered?: No      Complications:  None    Airway Device:  Oral endotracheal tube    Airway Device Size:  7.5    Style/Cuff Inflation:  Cuffed (inflated to minimal occlusive pressure)    Inflation Amount (mL):  6    Tube secured:  22    Secured at:  The teeth    Placement Verified By:  Capnometry    Complicating Factors:  None    Findings Post-Intubation:  BS  equal bilateral and atraumatic/condition of teeth unchanged       Drips: None documented.      Problem List[1]    Review of patient's allergies indicates:   Allergen Reactions    Benazepril Other (See Comments)     Cough       Current Inpatient Medications:   lacosamide (Vimpat) IV orderable  150 mg Intravenous Q12H    levothyroxine  125 mcg Oral Before breakfast    metoprolol succinate  12.5 mg Oral Daily    pravastatin  20 mg Oral QHS    senna-docusate  1 tablet Oral Daily    silodosin  4 mg Oral Daily       Medications Ordered Prior to Encounter[2]    Past Surgical History:   Procedure Laterality Date    ADENOIDECTOMY      COLONOSCOPY N/A 03/25/2022    Procedure: COLONOSCOPY;  Surgeon: Ashley Nguyen MD;  Location: [facility];  Service: Endoscopy;  Laterality: N/A;    ESOPHAGOGASTRODUODENOSCOPY N/A 03/25/2022    Procedure: EGD (ESOPHAGOGASTRODUODENOSCOPY);  Surgeon: Ashley Nguyen MD;  Location: [facility];  Service: Endoscopy;  Laterality: N/A;  added on per Dr. Nguyen    ESOPHAGOGASTRODUODENOSCOPY N/A 6/7/2024    Procedure: EGD (ESOPHAGOGASTRODUODENOSCOPY);  Surgeon: Audie Snell MD;  Location: [facility] (4TH FLR);  Service: Endoscopy;  Laterality: N/A;  5/21 R/s,sent updated instr via portal.pt informed to hold eliquis for 2 days.AC  Ref by: ,  approved to hold Eliquis (apixaban) for 2 days per Dr. Conklin-see media file  5/9/24-GT  5/31-pre call complete-tb    EVACUATION OF SUBDURAL HEMATOMA Bilateral 3/24/2025    Procedure: EVACUATION, HEMATOMA, SUBDURAL  AND SHUNT TIE OFF;  Surgeon: Jairo Blankenship MD;  [facility];  Service: Neurosurgery;  Laterality: Bilateral;  bilateral bobby holes for subdural hygroma and tying off of  shunt at clavicle. to follow other ware cases    EYE SURGERY Right 11/2020    cataract    HERNIA REPAIR      LEFT HEART CATHETERIZATION Right 10/29/2021    Procedure: CATHETERIZATION, HEART, LEFT AND RIGHT  - LV DARNELL POSSIBLE;  Surgeon: Beau  MD Katerin;  Location: [facility] CATH LAB;  Service: Cardiology;  Laterality: Right;    LUMBAR PUNCTURE N/A 1/13/2025    Procedure: Lumbar Puncture;  Surgeon: Jairo Blankenship MD;  Location: [facility] OR 2ND FLR;  Service: Neurosurgery;  Laterality: N/A;    RIGHT HEART CATHETERIZATION Right 10/29/2021    Procedure: INSERTION, CATHETER, RIGHT HEART;  Surgeon: Beau Conklin MD;  Location: [facility] CATH LAB;  Service: Cardiology;  Laterality: Right;    SKIN BIOPSY  2020    TONSILLECTOMY      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    Procedure: INSERTION, SHUNT, VENTRICULOPERITONEAL;  Surgeon: Jairo Blankenship MD;  [facility];  Service: Neurosurgery;  Laterality: Right;    VENTRICULOPERITONEAL SHUNT  2/6/2025    Procedure: INSERTION, SHUNT, VENTRICULOPERITONEAL;  Surgeon: Shar Elizabeth MD;  [facility];  Service: General;;       Social History[3]    OBJECTIVE:     Vital Signs Range (Last 24H):  Temp:  [36.7 °C (98 °F)-37.3 °C (99.2 °F)]   Pulse:  [56-65]   Resp:  [13-26]   BP: (114-174)/(56-94)   SpO2:  [94 %-100 %]       Significant Labs:  Lab Results   Component Value Date    WBC 8.54 04/24/2025    HGB 11.4 (L) 04/24/2025    HCT 36.1 (L) 04/24/2025     04/24/2025    CHOL 200 (H) 08/13/2024    TRIG 142 08/13/2024    HDL 62 08/13/2024    ALT <5 (L) 04/24/2025    AST 13 04/24/2025     04/24/2025    K 4.2 04/24/2025     04/24/2025    CREATININE 1.1 04/24/2025    BUN 19 04/24/2025    CO2 25 04/24/2025    TSH 0.633 02/09/2025    PSA 1.1 08/13/2024    INR 1.0 04/24/2025    HGBA1C 5.4 08/13/2024       Diagnostic Studies: No relevant studies.    EKG:   Results for orders placed or performed during the hospital encounter of 04/23/25   EKG 12-lead    Collection Time: 04/23/25  3:42 PM   Result Value Ref Range    QRS Duration 102 ms    [facility] QTC Calculation 464 ms    Narrative    Test Reason : R53.1,    Vent. Rate :  60 BPM     Atrial Rate :  60 BPM     P-R Int : 144 ms           QRS Dur : 102 ms      QT Int : 464 ms       P-R-T Axes :  41  41  82 degrees    QTcB Int : 464 ms    Normal sinus rhythm  Abnormal R wave progression in the precordial leads  Nonspecific ST abnormality  Abnormal ECG  When compared with ECG of 27-Mar-2025 13:48,  The axis Shifted right  Confirmed by Johnnie Davis ([id number]) on 4/23/2025 4:42:37 PM    Referred By: AAAREFERRAL SELF           Confirmed By: Johnnie Davis       2D ECHO:  TTE:  Results for orders placed or performed during the hospital encounter of 01/23/23   Echo   Result Value Ref Range    BSA 1.87 m2    TDI SEPTAL 0.04 m/s    LV LATERAL E/E' RATIO 25.20 m/s    LV SEPTAL E/E' RATIO 31.50 m/s    IVC diameter 1.53 cm    Left Ventricular Outflow Tract Mean Velocity 0.67 cm/s    Left Ventricular Outflow Tract Mean Gradient 2.05 mmHg    TDI LATERAL 0.05 m/s    PV PEAK VELOCITY 1.16 cm/s    LVIDd 5.19 3.5 - 6.0 cm    IVS 1.21 (A) 0.6 - 1.1 cm    PW 1.24 (A) 0.6 - 1.1 cm    LVIDs 3.93 2.1 - 4.0 cm    FS 24 28 - 44 %    Sinus 3.62 cm    STJ 2.55 cm    LV mass 255.31 g    LA size 4.33 cm    RVDD 3.90 cm    TAPSE 2.09 cm    RV S' 0.01 cm/s    Left Ventricle Relative Wall Thickness 0.48 cm    AV mean gradient 34 mmHg    AV valve area 0.94 cm2    AV Velocity Ratio 0.24     AV index (prosthetic) 0.27     MV valve area p 1/2 method 1.82 cm2    E/A ratio 0.89     Mean e' 0.05 m/s    E wave deceleration time 415.94 msec    IVRT 114.18 msec    LVOT diameter 2.09 cm    LVOT area 3.4 cm2    LVOT peak liv 0.96 m/s    LVOT peak VTI 20.10 cm    Ao peak liv 3.94 m/s    Ao VTI 73.2 cm    LVOT stroke volume 68.92 cm3    AV peak gradient 62 mmHg    E/E' ratio 28.00 m/s    MV Peak E Liv 1.26 m/s    TR Max Liv 2.55 m/s    MV stenosis pressure 1/2 time 120.62 ms    MV Peak A Liv 1.42 m/s    LV Systolic Volume 67.33 mL    LV Systolic Volume Index 35.6 mL/m2    LV Diastolic Volume 128.95 mL    LV Diastolic Volume Index 68.23 mL/m2    LV Mass Index 135 g/m2    RA Major Axis 4.81 cm    Left  Atrium Minor Axis 7.50 cm    Left Atrium Major Axis 7.39 cm    Triscuspid Valve Regurgitation Peak Gradient 26 mmHg    LA WIDTH 6.60 cm    LA Vol 180.84 cm3    ROSALES 95.7 mL/m2    RA Width 4.10 cm    Right Atrial Pressure (from IVC) 3 mmHg    EF 70 %    TV resting pulmonary artery pressure 29 mmHg    Narrative    · The left ventricle is normal in size with concentric hypertrophy and   normal systolic function.  · The estimated ejection fraction is 70%.  · Grade II left ventricular diastolic dysfunction.  · Normal right ventricular size with normal right ventricular systolic   function.  · Severe left atrial enlargement.  · There is moderate-to-severe aortic valve stenosis.  · Aortic valve area is 0.94 cm2; peak velocity is 3.94 m/s; mean gradient   is 34 mmHg.  · There is mild mitral stenosis.  · Mild mitral regurgitation.  · Mild tricuspid regurgitation.  · Normal central venous pressure (3 mmHg).  · The estimated PA systolic pressure is 29 mmHg.          CHRIST:  No results found for this or any previous visit.    ASSESSMENT/PLAN:           Pre-op Assessment    I have reviewed the Patient Summary Reports.     I have reviewed the Nursing Notes. I have reviewed the NPO Status.   I have reviewed the Medications.     Review of Systems  Anesthesia Hx:  No problems with previous Anesthesia   History of prior surgery of interest to airway management or planning:          Denies Family Hx of Anesthesia complications.    Denies Personal Hx of Anesthesia complications.                    Social:  Former Smoker       Hematology/Oncology:       -- Anemia:                                  Cardiovascular:     Hypertension   CAD                                          Renal/:  Chronic Renal Disease                Hepatic/GI:     GERD                Neurological:  TIA, CVA   Headaches                                 Endocrine:   Hypothyroidism              Physical Exam  General: Well nourished, Cooperative and Alert  Alert to  person.  Airway:  Mallampati: III / II  Mouth Opening: Normal  TM Distance: Normal  Tongue: Normal  Neck ROM: Normal ROM      Anesthesia Plan  Type of Anesthesia, risks & benefits discussed:    Anesthesia Type: Gen ETT  Intra-op Monitoring Plan: Standard ASA Monitors and Art Line  Post Op Pain Control Plan: multimodal analgesia and IV/PO Opioids PRN  Induction:  IV  Airway Plan: Direct, Post-Induction  Informed Consent: Informed consent signed with the Patient representative and all parties understand the risks and agree with anesthesia plan.  All questions answered.   ASA Score: 3  Day of Surgery Review of History & Physical: H&P Update referred to the surgeon/provider.    Ready For Surgery From Anesthesia Perspective.     .           [1]   Patient Active Problem List  Diagnosis    UC (ulcerative colitis)    Acquired hypothyroidism    BPH (benign prostatic hyperplasia)    Essential hypertension    Glaucoma    Atrophic kidney    Osteopenia    GERD (gastroesophageal reflux disease)    Shingles    TIA (transient ischemic attack)    Internal carotid artery stenosis    Normal cardiac stress test    Abnormal echocardiogram    Prostate CA    Echocardiogram abnormal    Stage 3a chronic kidney disease    Left ventricular diastolic dysfunction, NYHA class 1    Nonrheumatic aortic valve stenosis    Mitral valve stenosis, mild    Primary osteoarthritis of both knees    DDD (degenerative disc disease), cervical    Lung nodules    Congenital absence of right kidney    History of nuclear stress test    PAF (paroxysmal atrial fibrillation)    Ex-smoker    Headache    Intermediate stage nonexudative age-related macular degeneration of both eyes    Hypertensive retinopathy of both eyes    Iron deficiency anemia due to chronic blood loss    Coronary artery disease involving native coronary artery of native heart without angina pectoris    Colitis    Advanced care planning/counseling discussion    Known medical problems    Exudative  age-related macular degeneration, right eye, with active choroidal neovascularization    Drug-induced immunodeficiency    Atherosclerosis of aorta    Complex medical condition    S/P TAVR (transcatheter aortic valve replacement)    Impaired functional mobility, balance, gait, and endurance    Anemia    Constipation    Allergies    History of COVID-19    Weight loss    Thrombocytopenia    NPH (normal pressure hydrocephalus)    S/P ventriculoperitoneal shunt    Chronic diastolic heart failure    Hypercoagulability due to atrial fibrillation    Stage 3b chronic kidney disease    Subdural hematoma    Hypoalbuminemia due to protein-calorie malnutrition    Urinary retention    KRISTAN (acute kidney injury)    Bradycardia    Brain compression    Acute postoperative anemia due to expected blood loss    Debility    Hypocalcemia    Orthostatic hypotension    Hyponatremia    Acute encephalopathy   [2]   No current facility-administered medications on file prior to encounter.     Current Outpatient Medications on File Prior to Encounter   Medication Sig Dispense Refill    acetaminophen (TYLENOL) 500 MG tablet Take 2 tablets (1,000 mg total) by mouth every 8 (eight) hours. (Patient taking differently: Take 1,000 mg by mouth every 8 (eight) hours. PRN)      [Paused] amLODIPine (NORVASC) 2.5 MG tablet Take 2.5 mg by mouth nightly. (Patient not taking: Reported on 4/11/2025)      [Paused] ascorbic acid, vitamin C, (VITAMIN C) 500 MG tablet Take 500 mg by mouth once daily. (Patient not taking: Reported on 4/11/2025)      [Paused] balsalazide (COLAZAL) 750 mg capsule Take 5 tablets in the morning and 4 tablets in the evening. 270 capsule 11    docusate sodium (STOOL SOFTENER ORAL) Take by mouth.      ELIQUIS 2.5 mg Tab Take 2.5 mg by mouth 2 (two) times daily.      [Paused] ferrous sulfate (FEOSOL) 325 mg (65 mg iron) Tab tablet 1 tablet Orally Once a day (Patient not taking: Reported on 4/11/2025)      [Paused] fluocinolone acetonide  oiL 0.01 % Drop Place into both ears. (Patient not taking: Reported on 4/11/2025)      [Paused] gabapentin (NEURONTIN) 100 MG capsule Take 100 mg by mouth 3 (three) times daily. (Patient not taking: Reported on 4/11/2025)      lacosamide (VIMPAT) 100 mg Tab Take 1 tablet (100 mg total) by mouth every 12 (twelve) hours. 60 tablet 11    levocetirizine (XYZAL) 5 MG tablet Take 5 mg by mouth every morning.      levothyroxine (SYNTHROID) 125 MCG tablet TAKE 1 TABLET BY MOUTH EVERY DAY IN THE MORNING 90 tablet 3    [Paused] mag/aluminum/sod bicarb/alginc (GAVISCON ORAL) Take by mouth as needed. Acid reflux      memantine (NAMENDA) 10 MG Tab Take 10 mg by mouth 2 (two) times daily.      metoprolol succinate (TOPROL-XL) 25 MG 24 hr tablet Take 0.5 tablets (12.5 mg total) by mouth once daily. 45 tablet 3    oxyCODONE (ROXICODONE) 10 mg Tab immediate release tablet Take 1 tablet (10 mg total) by mouth every 6 (six) hours as needed for Pain. (Patient not taking: Reported on 4/11/2025)      pantoprazole (PROTONIX) 40 MG tablet TAKE 1 TABLET BY MOUTH DAILY AS  NEEDED 90 tablet 3    pravastatin (PRAVACHOL) 20 MG tablet Take 1 tablet (20 mg total) by mouth every evening. 90 tablet 3    silodosin (RAPAFLO) 4 mg Cap capsule Take 1 capsule (4 mg total) by mouth once daily. 30 capsule 11    [Paused] triamcinolone acetonide 0.1% (KENALOG) 0.1 % cream Apply topically 2 (two) times daily. APPLY  CREAM TOPICALLY TO AFFECTED AREA TWICE DAILY as needed 454 g 0    [Paused] vitC/E/Zn/copper/lutein/zeaxan (ICAPS AREDS2 ORAL) Take 1 capsule by mouth once daily.      [DISCONTINUED] telmisartan (MICARDIS) 40 MG Tab TAKE 1/2 TABLET BY MOUTH DAILY 90 tablet 1   [3]   Social History  Socioeconomic History    Marital status:      Spouse name: Mone    Number of children: 1+1   Tobacco Use    Smoking status: Former     Current packs/day: 0.00     Average packs/day: 1 pack/day for 25.0 years (25.0 ttl pk-yrs)     Types: Cigarettes     Start  date: 1947     Quit date: 1972     Years since quittin.3    Smokeless tobacco: Never    Tobacco comments:     Patient Quit Smoking on 1972.   Substance and Sexual Activity    Alcohol use: Not Currently     Alcohol/week: 3.0 standard drinks of alcohol     Types: 2 Glasses of wine, 1 Cans of beer per week     Comment: Occasional    Drug use: Never    Sexual activity: Not Currently     Partners: Female     Birth control/protection: None   Social History Narrative    Rides bike - Quit due to poor balance     - he uses a cane or a walker.       Social Drivers of Health     Financial Resource Strain: Low Risk  (2025)    Overall Financial Resource Strain (CARDIA)     Difficulty of Paying Living Expenses: Not hard at all   Food Insecurity: Patient Unable To Answer (2025)    Hunger Vital Sign     Worried About Running Out of Food in the Last Year: Patient unable to answer     Ran Out of Food in the Last Year: Patient unable to answer   Transportation Needs: No Transportation Needs (2025)    PRAPARE - Transportation     Lack of Transportation (Medical): No     Lack of Transportation (Non-Medical): No   Physical Activity: Patient Unable To Answer (2025)    Exercise Vital Sign     Days of Exercise per Week: Patient unable to answer     Minutes of Exercise per Session: Patient unable to answer   Recent Concern: Physical Activity - Inactive (3/22/2025)    Exercise Vital Sign     Days of Exercise per Week: 0 days     Minutes of Exercise per Session: 0 min   Stress: No Stress Concern Present (3/22/2025)    [employer] [place] [employer] - Occupational Stress Questionnaire     Feeling of Stress : Not at all   Recent Concern: Stress - Stress Concern Present (2025)    [employer] [place] [employer] - Occupational Stress Questionnaire     Feeling of Stress : To some extent   Housing Stability: Low Risk  (2025)    Housing Stability Vital Sign     Unable to Pay for  Housing in the Last Year: No     Number of Times Moved in the Last Year: 0     Homeless in the Last Year: No

## 2025-04-24 NOTE — PLAN OF CARE
Recommendations  --Continue Regular diet as tolerated and clinically indicated   --Encourage good intakes   --Nursing: please continue to document % meal eaten on flowsheet   --RD to monitor weight, PO intake      Goals: Meet % EEN/EPN by next RD follow-up  Nutrition Goal Status: new  Communication of RD Recs:  (POC)   English

## 2025-04-24 NOTE — ASSESSMENT & PLAN NOTE
S/p VPS  Hx of NPH s/p VPS c/b bilateral SDH s/p bilateral evacuation with subsequent seizure disorder maintained on Lacosamide 100 mg BID   - NSGY planning for evacuation tomorrow, 4/25  - Management per NSGY, NCC

## 2025-04-24 NOTE — PT/OT/SLP EVAL
Occupational Therapy  Co- Evaluation and treatment     Name: Ramesh Ricci  MRN: [id number]  Admitting Diagnosis: Subdural hematoma  Recent Surgery: Procedure(s) (LRB):  CRANIOTOMY, FOR SUBDURAL HEMATOMA EVACUATION, bilateral. shunt removal (Bilateral)      Recommendations:     Discharge Recommendations:  high Intensity therapy   Discharge Equipment Recommendations:  hospital bed   Barriers to discharge:  None    Assessment:     Ramesh Ricci is a 85 y.o. male with a medical diagnosis of Subdural hematoma.  He presents with decrease functional status secondary to medical diagnosis. Performance deficits affecting function: weakness, impaired endurance, impaired self care skills, impaired functional mobility, gait instability, impaired balance, decreased lower extremity function, decreased safety awareness, decreased upper extremity function, decreased coordination. Patient limited by AMS this date presenting with limited command following, decreased attention, and significant language deficits. Patient requiring increased assistance for all functional mobility due to significant R lean and use of LUE/LLE for R sided pushing. Patient is therefore considered a high fall risk and is unable to complete independent ADL activity at this time. Patient is therefore appropriate for acute OT services to increase patient self care performance and functional mobility. Following DC from [facility] patient should continue with a high intensity therapy to ensure patient safety and promote return to independence.     Rehab Prognosis: Good; patient would benefit from acute skilled OT services to address these deficits and reach maximum level of function.       Plan:     Patient to be seen 4 x/week to address the above listed problems via self-care/home management, therapeutic activities, therapeutic exercises, neuromuscular re-education  Plan of Care Expires: 05/24/25  Plan of Care Reviewed with: patient    Subjective     Chief  Complaint: none   Patient/Family Comments/goals: DC     Occupational Profile:  Living Environment: Patient lives with spouse in [facility] with 1 MISSY.   Previous level of function: Independent with ADLs and using RW for ambulation   Roles and Routines: unknown   Equipment Used at Home: rollator, walker, rolling, bedside commode, cane, straight, wheelchair, shower chair, grab bar   Assistance upon Discharge: wife- limited physical assistance     Pain/Comfort:  Pain Rating 1: 0/10    Patients cultural, spiritual, Judaism conflicts given the current situation: no    Objective:     Communicated with: RN prior to session.  Patient found supine with EEG, bed alarm, blood pressure cuff, pulse ox (continuous), telemetry, PureWick upon OT entry to room.    General Precautions: Standard, aspiration, fall, hearing impaired, aphasia  Orthopedic Precautions: N/A  Braces: N/A  Respiratory Status: Room air    Occupational Performance:    Bed Mobility:    Patient completed Scooting/Bridging with total assistance  Patient completed Supine to Sit with maximal assistance and 2 persons  Patient completed Sit to Supine with total assistance and 2 persons    Functional Mobility/Transfers:  Patient completed Sit <> Stand Transfer with maximal assistance and of 2 persons  with  rolling walker   W/ no AD patient total A x2 pt with decreased attention to task and increased R lean requiring significant assistance for upright support     Activities of Daily Living:  Lower Body Dressing: maximal assistance to don socks supine in bed     Cognitive/Visual Perceptual:  Cognitive/Psychosocial Skills:     -       Oriented to: Person, Place, and year- with yes/no options provided    -       Follows Commands/attention:10% of commands   -       Communication: expressive aphasia  -       Safety awareness/insight to disability: impaired     Physical Exam:  Upper Extremity Range of Motion:     -       Right Upper Extremity: WFL  -       Left Upper Extremity:  WFL  Upper Extremity Strength:    -       Right Upper Extremity: 4/5  -       Left Upper Extremity: 4/5   Strength:    -       Right Upper Extremity: WFL  -       Left Upper Extremity: WFL  Fine Motor Coordination:    -       decreased- likely due to attention deficits     AMPAC 6 Click ADL:  AMPAC Total Score: 19    Treatment & Education:  Co-evaluation completed due to patient medical instability and to ensure patient safety. Provided education regarding role of OT, POC, & discharge recommendations.  Pt with no further questions/concerns at this time. OT provided education on home recommendations and fall prevention in preparation for D/C.     Patient left supine with all lines intact and call button in reach    GOALS:   Multidisciplinary Problems       Occupational Therapy Goals          Problem: Occupational Therapy    Goal Priority Disciplines Outcome Interventions   Occupational Therapy Goal     OT, PT/OT Progressing    Description: Goals to be met by: 5/24/25     Patient will increase functional independence with ADLs by performing:    UE Dressing with Moderate Assistance.  LE Dressing with Moderate Assistance.  Grooming while EOB with Moderate Assistance.  Toileting from bedside commode with Moderate Assistance for hygiene and clothing management.   Stand pivot transfers with Moderate Assistance.  Step transfer with Moderate Assistance  Toilet transfer to bedside commode with Moderate Assistance.                           DME Justifications:  Ramesh Yo requires a hospital bed due to him requiring positioning of the body in ways not feasible with an ordinary bed due to limited ability and cannot independently make changes in body position without the use of the bed.The positioning of the body cannot be sufficiently resolved by the use of pillows and wedges.    History:     Past Medical History:   Diagnosis Date    Allergies 01/31/2025    Anemia     Anticoagulant long-term use     Anxiety     Atrophic  kidney 11/16/2012    BPH (benign prostatic hyperplasia) 11/16/2012    Congenital absence of right kidney 07/05/2017    DDD (degenerative disc disease), cervical 07/05/2017    x-ray 7/17 - Severe    Ex-smoker 12/20/2018    Exudative age-related macular degeneration, right eye, with active choroidal neovascularization 02/15/2024    GERD (gastroesophageal reflux disease) 11/16/2012    Glaucoma 11/16/2012    HTN (hypertension) 11/16/2012    Hypothyroid 11/16/2012    Internal carotid artery stenosis 11/16/2012    Iron deficiency anemia due to chronic blood loss 10/29/2021    Left ventricular diastolic dysfunction, NYHA class 1 04/16/2015    4/15    Low serum testosterone level 11/16/2012    Normal cardiac stress test 11/16/2012    NPH (normal pressure hydrocephalus) 02/03/2025    Osteopenia 11/16/2012    PAF (paroxysmal atrial fibrillation) 12/20/2018    Prostate CA 01/15/2013    XRT 12/12  Dr. 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    Seizure disorder 4/24/2025    Shingles 11/16/2012    Skin disease 2020    Seems to have started after Covid vaccine    Stage 3a chronic kidney disease 10/06/2014    Stroke 2017    tia   no residual    Thrombocytopenia 01/31/2025    TIA (transient ischemic attack) 11/16/2012    UC (ulcerative colitis) 11/16/2012         Past Surgical History:   Procedure Laterality Date    ADENOIDECTOMY      COLONOSCOPY N/A 03/25/2022    Procedure: COLONOSCOPY;  Surgeon: Ashley Nguyen MD;  Location: [facility];  Service: Endoscopy;  Laterality: N/A;    ESOPHAGOGASTRODUODENOSCOPY N/A 03/25/2022    Procedure: EGD (ESOPHAGOGASTRODUODENOSCOPY);  Surgeon: Ashley Nguyen MD;  Location: [facility];  Service: Endoscopy;  Laterality: N/A;  added on per Dr. Nguyen    ESOPHAGOGASTRODUODENOSCOPY N/A 6/7/2024    Procedure: EGD (ESOPHAGOGASTRODUODENOSCOPY);  Surgeon: Audie Snell MD;  Location: [facility] ([facility]);  Service: Endoscopy;  Laterality: N/A;  5/21 R/s,sent updated  instr via portal.pt informed to hold eliquis for 2 days.AC  Ref by: ,  approved to hold Eliquis (apixaban) for 2 days per Dr. Conklin-see media file  5/9/24-GT  5/31-pre call complete-tb    EVACUATION OF SUBDURAL HEMATOMA Bilateral 3/24/2025    Procedure: EVACUATION, HEMATOMA, SUBDURAL  AND SHUNT TIE OFF;  Surgeon: Jairo Blankenship MD;  [facility];  Service: Neurosurgery;  Laterality: Bilateral;  bilateral bobby holes for subdural hygroma and tying off of  shunt at clavicle. to follow other ware cases    EYE SURGERY Right 11/2020    cataract    HERNIA REPAIR      LEFT HEART CATHETERIZATION Right 10/29/2021    Procedure: CATHETERIZATION, HEART, LEFT AND RIGHT  - LV DARNELL POSSIBLE;  Surgeon: Beau Conklin MD;  Location: [facility] CATH LAB;  Service: Cardiology;  Laterality: Right;    LUMBAR PUNCTURE N/A 1/13/2025    Procedure: Lumbar Puncture;  Surgeon: Jairo Blankenship MD;  [facility];  Service: Neurosurgery;  Laterality: N/A;    RIGHT HEART CATHETERIZATION Right 10/29/2021    Procedure: INSERTION, CATHETER, RIGHT HEART;  Surgeon: Beau Conklin MD;  Location: [facility] CATH LAB;  Service: Cardiology;  Laterality: Right;    SKIN BIOPSY  2020    TONSILLECTOMY      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    Procedure: INSERTION, SHUNT, VENTRICULOPERITONEAL;  Surgeon: Jairo Blankenship MD;  [facility];  Service: Neurosurgery;  Laterality: Right;    VENTRICULOPERITONEAL SHUNT  2/6/2025    Procedure: INSERTION, SHUNT, VENTRICULOPERITONEAL;  Surgeon: Shar Elizabeth MD;  [facility];  Service: General;;       Time Tracking:     OT Date of Treatment: 04/24/25  OT Start Time: 1050  OT Stop Time: 1121  OT Total Time (min): 31 min    Billable Minutes:Evaluation 5  Therapeutic Activity 23    4/24/2025

## 2025-04-24 NOTE — ASSESSMENT & PLAN NOTE
84 y/o M with VPS 2/2 NPH c/b bilateral SDH s/p bilateral evac and VPS tie off in March 2025 who re-presented to [facility] on 4/23 after 3 day of decline in overal functional status. CT head with bilatateral acute versus subacute SDH and known VPS. His eliquis was reversed with PCC in the ED.    -Admit to NCC, NSGY following  -q1h neuro checks, vital checks  -EKG, ECHO, CXR  -Daily CBC, CMP, mag, phos  -SBP < 160, prn labetalol and hydralazine  -SCDs, hold DVT chemoppx in setting of acute bleed  -Vimpat 100 bid at home, increase to 150 bid   -PT/OT/SLP as appropriate  -Repeat CT pending  -See encephalopathy

## 2025-04-24 NOTE — ASSESSMENT & PLAN NOTE
84 yo with recent admission for VPS tie off and SDH evacuation on 3/24 presenting for new LSW and speech hesitation found to have enlarged chronic SDH as well as a new small R convexity acute SDH. Pt takes eliquis, s/p PCC. Interval CTH stable.     Recommendations:  --admitted to [facility]  --on eliquis, s/p kcentra reversal. Hold all AC/AP  --SBP <140  --Vimpat  --HOB >30  --f/u cEEG  --npo midnight for OR tomorrow, bilateral subdural reevac and shunt removal with Dr. Blankenship  --Continue to monitor clinically, notify NSGY immediately with any changes in neuro status    Discussed with on call staff Dr. Galvan and Dr. Blankenship  Dispo: OR tomorrow

## 2025-04-24 NOTE — EICU
Virtual ICU Quality Rounds    Admit Date: 4/23/2025  Hospital Day: 1    ICU Day: 16h    24H Vital Sign Range:  Temp:  [97.9 °F (36.6 °C)-99.2 °F (37.3 °C)]   Pulse:  [56-76]   Resp:  [13-27]   BP: (114-174)/(56-94)   SpO2:  [94 %-100 %]     VICU Surveillance Screening    LDA reconciliation : Yes

## 2025-04-24 NOTE — HPI
Ramesh Ricci is an 84 y/o male with a PMH of Afib on eliquis at home, Ulcerative Colitis, HTN, aortic stenosis s/p TAVR, and VPS 2/2 NPH c/b bilateral SDH s/p bilateral evac and VPS tie off in March 2025 who presents to [facility] with acute on subacute bilateral SDH. His wife at bedside reports concern for acute deconditioning over the past three days involving worsening weakness, urinary frequency, speech changes, intermittent cessation in interaction with family, and dragging of his R leg. CT head showed bilateral subdural hygromas with bilateral acute versus subacute SDH. He takes eliquis for A fib at home which was reversed with PCC in the ED. During evaluation of the patient, he was initially interactive, following, commands, and oriented to person and place. He then had a brief staring spell where he no longer followed commands and displayed no usable speech. This then resolved after a few minutes and he returned to baseline. He takes Vimpat 100 bid at home but has not gotten his nightly dose. Per his wife, these spells occurred at home as well.    He is admitted to [facility] for hourly neuro-monitoring and a higher level of care.

## 2025-04-24 NOTE — PT/OT/SLP EVAL
Physical Therapy Co-Evaluation and Treatment    Patient Name: Ramesh Ricci   MRN: [id number]  Recent Surgery: Procedure(s) (LRB):  CRANIOTOMY, FOR SUBDURAL HEMATOMA EVACUATION, bilateral. shunt removal (Bilateral)      Recommendations:     Discharge Recommendations: High Intensity Therapy    Discharge Equipment Recommendations: hospital bed   Barriers to discharge: Increased level of assist  Nursing Mobilization: Patient not clear to transfer with RN/PCT.    Assessment:     Ramesh Ricci is a 85 y.o. male admitted with a medical diagnosis of Subdural hematoma. He presents with the following impairments/functional limitations: weakness, impaired endurance, impaired self care skills, impaired functional mobility, gait instability, impaired balance, impaired cognition, decreased coordination, decreased upper extremity function, decreased lower extremity function, decreased safety awareness, abnormal tone, impaired coordination, decreased ROM, impaired fine motor. Pt confused and w/ severe R lean in all positions and utilizes LUE/LE to push himself further over towards R side. No spontaneous movement noted in RLE and mild inc tone vs pt resistance to PROM of RLE. Patient presents with good participation and motivation to return to prior level of function with high intensity therapy.  The patient demonstrates appropriate endurance to participate in up to 3 hours or 15hrs of combined therapy post acute.     Rehab Prognosis: Good; patient would benefit from acute skilled PT services to address these deficits and reach maximum level of function.  Recent Surgery: Procedure(s) (LRB):  CRANIOTOMY, FOR SUBDURAL HEMATOMA EVACUATION, bilateral. shunt removal (Bilateral)      Plan:     During this hospitalization, patient to be seen 4 x/week to address the identified rehab impairments via therapeutic activities, therapeutic exercises, neuromuscular re-education and progress toward the following goals:    Plan of Care  Expires: 05/02/25    Subjective     Chief Complaint: NA 2/2 AMS  Patient/Family Comments/Goals: pt's wife reports he did well at [facility] recently and returned home walking w/ RW w/out help  Pain/Comfort:  Pain Rating 1: 0/10  Pain Addressed 1: Reposition, Distraction  Pain Rating Post-Intervention 1: 0/10    Patients cultural, spiritual, Nondenominational conflicts given the current situation: no    Social History:  Living Environment: Patient lives with their spouse in a single story home, number of outside stairs: 1 .  Prior Level of Function: Prior to admission, patient was independent with ADLs, using rolling walker for ambulation.  Equipment Used at Home: rollator, walker, rolling, bedside commode, cane, straight, wheelchair, shower chair, grab bar    DME owned (not currently used): single point cane, bedside commode, and wheelchair  Assistance Upon Discharge:  wife but  uncertain if able to provide physical assist    Objective:     Communicated with RN prior to session. Patient found HOB elevated with EEG, bed alarm, blood pressure cuff, pulse ox (continuous), telemetry, Other (comments) (male purewick present in bed but pt had ripped it off) upon PT entry to room. Co-evaluation w/ OT 2/2 suspected pt complexity and requirement of assistance from 2 skilled therapists to maximize treatment potential and maintain pt safety     General Precautions: Standard, aspiration, fall, seizure, aphasia, hearing impaired   Orthopedic Precautions:N/A   Braces: N/A  Respiratory Status: Room air    Exams:  Cognitive Exam: Patient is oriented to Person, Place, year when given options w/ Y/N questions, head nods were not accurate , follows commands 10% of the time  RLE ROM: Deficits: moderately limited 2/2 resistance (uncertain if inc tone or pt resistance)  RLE Strength:  0/5 but limited assessment as not following commands or paying attention to RLE  LLE ROM: WFL  LLE Strength: WFL, grossly 4/5  Sensation: SHELLY 2/2 AMS  Coordination: SHELLY  2/2 AMS  Tone: hypertonic RLE    Functional Mobility:  Bed Mobility:  Scooting: total assistance  Supine to Sit: maximal assistance of 2 persons  Sit to Supine: total assistance of 2 persons  Transfers:  Sit to Stand: maximal assistance of 2 persons with rolling walker with cues for hand placement and foot placement  W/ no AD total A x2 2/2 pt moving arms randomly distracted from task and not assisting  Pt w/ poor positioning of RLE w/ both stands and leans/pushes to R once standing, unable to self-correct and resists therapist correction  Balance:   Static Sitting: maximal assistance at EOB w/ R lean/push and posterior lean  Dynamic Sitting: total assistance at EOB w/ R lean/push and posterior lean  Static Standing: total assistance of 2 persons with rolling walker    Therapeutic Activities and Exercises:  Patient educated on role of acute care PT and PT POC, safety while in hospital including calling nurse for mobility, and call light usage  Patient encountered with soiled bedding. Extra time spent changing linens and performing pericare with total assistance  Patient educated about importance of OOB mobility  Sitting balance w/ verbal, visual, and tactile cues for upright posture, safety awareness, midline orientation, attention to task, LUE placement to prevent pushing, core activation, and decreasing posterior lean    AM-PAC 6 CLICK MOBILITY  Turning over in bed (including adjusting bedclothes, sheets and blankets)?: 2  Sitting down on and standing up from a chair with arms (e.g., wheelchair, bedside commode, etc.): 2  Moving from lying on back to sitting on the side of the bed?: 2  Moving to and from a bed to a chair (including a wheelchair)?: 1  Need to walk in hospital room?: 1  Climbing 3-5 steps with a railing?: 1  Basic Mobility Total Score: 9     Patient left left sidelying with HOB elevated with all lines intact, call button in reach, RN notified, bed alarm on, and RN and spouse present.    GOALS:    Multidisciplinary Problems       Physical Therapy Goals          Problem: Physical Therapy    Goal Priority Disciplines Outcome Interventions   Physical Therapy Goal     PT, PT/OT Progressing    Description: Goals to be completed by: 5/2/25    Pt will perform sup<>sit transfers w/ moderate assistance  Pt will have sufficient dynamic balance to sit EOB while performing ADLs/therex w/ minimum assistance  Pt will be able to stand up from EOB w/ moderate assistance using LRAD  Bed to chair transfer w/ maxA using LRAD  Pt will be independent w/ HEP therex on BLE w/ good form and ROM                        History:     Past Medical History:   Diagnosis Date    Allergies 01/31/2025    Anemia     Anticoagulant long-term use     Anxiety     Atrophic kidney 11/16/2012    BPH (benign prostatic hyperplasia) 11/16/2012    Congenital absence of right kidney 07/05/2017    DDD (degenerative disc disease), cervical 07/05/2017    x-ray 7/17 - Severe    Ex-smoker 12/20/2018    Exudative age-related macular degeneration, right eye, with active choroidal neovascularization 02/15/2024    GERD (gastroesophageal reflux disease) 11/16/2012    Glaucoma 11/16/2012    HTN (hypertension) 11/16/2012    Hypothyroid 11/16/2012    Internal carotid artery stenosis 11/16/2012    Iron deficiency anemia due to chronic blood loss 10/29/2021    Left ventricular diastolic dysfunction, NYHA class 1 04/16/2015    4/15    Low serum testosterone level 11/16/2012    Normal cardiac stress test 11/16/2012    NPH (normal pressure hydrocephalus) 02/03/2025    Osteopenia 11/16/2012    PAF (paroxysmal atrial fibrillation) 12/20/2018    Prostate CA 01/15/2013    XRT 12/12  Dr. 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    Seizure disorder 4/24/2025    Shingles 11/16/2012    Skin disease 2020    Seems to have started after Covid vaccine    Stage 3a chronic kidney disease 10/06/2014    Stroke 2017    tia   no residual    Thrombocytopenia  01/31/2025    TIA (transient ischemic attack) 11/16/2012    UC (ulcerative colitis) 11/16/2012       Past Surgical History:   Procedure Laterality Date    ADENOIDECTOMY      COLONOSCOPY N/A 03/25/2022    Procedure: COLONOSCOPY;  Surgeon: Ashley Nguyen MD;  Location: [facility];  Service: Endoscopy;  Laterality: N/A;    ESOPHAGOGASTRODUODENOSCOPY N/A 03/25/2022    Procedure: EGD (ESOPHAGOGASTRODUODENOSCOPY);  Surgeon: Ashley Nguyen MD;  Location: [facility];  Service: Endoscopy;  Laterality: N/A;  added on per Dr. Nguyen    ESOPHAGOGASTRODUODENOSCOPY N/A 6/7/2024    Procedure: EGD (ESOPHAGOGASTRODUODENOSCOPY);  Surgeon: Audie Snell MD;  [facility]  Service: Endoscopy;  Laterality: N/A;  5/21 R/s,sent updated instr via portal.pt informed to hold eliquis for 2 days.AC  Ref by: ,  approved to hold Eliquis (apixaban) for 2 days per Dr. Conklin-see media file  5/9/24-GT  5/31-pre call complete-tb    EVACUATION OF SUBDURAL HEMATOMA Bilateral 3/24/2025    Procedure: EVACUATION, HEMATOMA, SUBDURAL  AND SHUNT TIE OFF;  Surgeon: Jairo Blankenship MD;  Location: [facility] OR [address] [place] [address];  Service: Neurosurgery;  Laterality: Bilateral;  bilateral bobby holes for subdural hygroma and tying off of  shunt at clavicle. to follow other ware cases    EYE SURGERY Right 11/2020    cataract    HERNIA REPAIR      LEFT HEART CATHETERIZATION Right 10/29/2021    Procedure: CATHETERIZATION, HEART, LEFT AND RIGHT  - LV DARNELL POSSIBLE;  Surgeon: Beau Conklin MD;  Location: [facility] CATH LAB;  Service: Cardiology;  Laterality: Right;    LUMBAR PUNCTURE N/A 1/13/2025    Procedure: Lumbar Puncture;  Surgeon: Jairo Blankenship MD;  Location: [facility] OR [address] [place] [address];  Service: Neurosurgery;  Laterality: N/A;    RIGHT HEART CATHETERIZATION Right 10/29/2021    Procedure: INSERTION, CATHETER, RIGHT HEART;  Surgeon: Beau Conklin MD;  Location: [facility] CATH LAB;  Service: Cardiology;  Laterality: Right;    SKIN BIOPSY   2020    TONSILLECTOMY      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    Procedure: INSERTION, SHUNT, VENTRICULOPERITONEAL;  Surgeon: Jairo Blankenship MD;  Location: [facility] OR [address] [place] [address];  Service: Neurosurgery;  Laterality: Right;    VENTRICULOPERITONEAL SHUNT  2/6/2025    Procedure: INSERTION, SHUNT, VENTRICULOPERITONEAL;  Surgeon: Shar Elizabeth MD;  Location: [facility] OR [address] [place] [address];  Service: General;;       Time Tracking:     PT Received On: 04/24/25  PT Start Time: 1052  PT Stop Time: 1120  PT Total Time (min): 28 min     Billable Minutes: Evaluation 5 and Neuromuscular Re-education 23 04/24/2025

## 2025-04-24 NOTE — PLAN OF CARE
[facility] - Emergency Dept  Discharge Assessment    Primary Care Provider: Nakul Mandujano MD     Discharge Assessment (most recent)       BRIEF DISCHARGE ASSESSMENT - 04/23/25 1946          Discharge Planning    Assessment Type Discharge Planning Assessment (P)      Resource/Environmental Concerns none (P)      Support Systems Spouse/significant other (P)      Equipment Currently Used at Home walker, standard;rollator (P)      Current Living Arrangements home (P)      Patient/Family Anticipates Transition to home with family (P)      Patient/Family Anticipated Services at Transition none (P)      DME Needed Upon Discharge  none (P)      Discharge Plan A Home with family;Home Health (P)    pt receives home health via [facility]    Discharge Plan B Home Health (P)    pt receives home health via [facility]       Physical Activity    On average, how many days per week do you engage in moderate to strenuous exercise (like a brisk walk)? Patient unable to answer (P)      On average, how many minutes do you engage in exercise at this level? Patient unable to answer (P)         Financial Resource Strain    How hard is it for you to pay for the very basics like food, housing, medical care, and heating? Not hard at all (P)         Housing Stability    In the last 12 months, was there a time when you were not able to pay the mortgage or rent on time? No (P)      At any time in the past 12 months, were you homeless or living in a shelter (including now)? No (P)         Transportation Needs    In the past 12 months, has lack of transportation kept you from medical appointments or from getting medications? No (P)      In the past 12 months, has lack of transportation kept you from meetings, work, or from getting things needed for daily living? No (P)         Food Insecurity    Within the past 12 months, you worried that your food would run out before you got the money to buy more. Patient unable to answer  (P)      Within the past 12 months, the food you bought just didn't last and you didn't have money to get more. Patient unable to answer (P)         Social Isolation    How often do you feel lonely or isolated from those around you?  Patient unable to answer (P)         Alcohol Use    Q1: How often do you have a drink containing alcohol? Never (P)      Q2: How many drinks containing alcohol do you have on a typical day when you are drinking? Patient does not drink (P)      Q3: How often do you have six or more drinks on one occasion? Never (P)         Utilities    In the past 12 months has the electric, gas, oil, or water company threatened to shut off services in your home? No (P)         Health Literacy    How often do you need to have someone help you when you read instructions, pamphlets, or other written material from your doctor or pharmacy? Never (P)                        Pt's spouse Mone completed assessment. Pt ambulates with a walker and rolator and pt use a shower chair, as per Mone. Pt currently receives home health via [facility]. Pt has no acute case management needs at this time.        Nu Vázquez, XAVIER  Case Management  Emergency Department  [phone]

## 2025-04-24 NOTE — SUBJECTIVE & OBJECTIVE
Past Medical History:   Diagnosis Date    Allergies 01/31/2025    Anemia     Anticoagulant long-term use     Anxiety     Atrophic kidney 11/16/2012    BPH (benign prostatic hyperplasia) 11/16/2012    Congenital absence of right kidney 07/05/2017    DDD (degenerative disc disease), cervical 07/05/2017    x-ray 7/17 - Severe    Ex-smoker 12/20/2018    Exudative age-related macular degeneration, right eye, with active choroidal neovascularization 02/15/2024    GERD (gastroesophageal reflux disease) 11/16/2012    Glaucoma 11/16/2012    HTN (hypertension) 11/16/2012    Hypothyroid 11/16/2012    Internal carotid artery stenosis 11/16/2012    Iron deficiency anemia due to chronic blood loss 10/29/2021    Left ventricular diastolic dysfunction, NYHA class 1 04/16/2015    4/15    Low serum testosterone level 11/16/2012    Normal cardiac stress test 11/16/2012    NPH (normal pressure hydrocephalus) 02/03/2025    Osteopenia 11/16/2012    PAF (paroxysmal atrial fibrillation) 12/20/2018    Prostate CA 01/15/2013    XRT 12/12  Dr. 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    Shingles 11/16/2012    Skin disease 2020    Seems to have started after Covid vaccine    Stage 3a chronic kidney disease 10/06/2014    Stroke 2017    tia   no residual    Thrombocytopenia 01/31/2025    TIA (transient ischemic attack) 11/16/2012    UC (ulcerative colitis) 11/16/2012     Past Surgical History:   Procedure Laterality Date    ADENOIDECTOMY      COLONOSCOPY N/A 03/25/2022    Procedure: COLONOSCOPY;  Surgeon: Ashley Nguyen MD;  Location: [facility] ENDO;  Service: Endoscopy;  Laterality: N/A;    ESOPHAGOGASTRODUODENOSCOPY N/A 03/25/2022    Procedure: EGD (ESOPHAGOGASTRODUODENOSCOPY);  Surgeon: Ashley Nguyen MD;  Location: [facility] ENDO;  Service: Endoscopy;  Laterality: N/A;  added on per Dr. Nguyen    ESOPHAGOGASTRODUODENOSCOPY N/A 6/7/2024    Procedure: EGD (ESOPHAGOGASTRODUODENOSCOPY);  Surgeon: Audie Snell  MD;  Location: [facility] ENDO (4TH FLR);  Service: Endoscopy;  Laterality: N/A;  5/21 R/s,sent updated instr via portal.pt informed to hold eliquis for 2 days.AC  Ref by: ,  approved to hold Eliquis (apixaban) for 2 days per Dr. Conklin-see media file  5/9/24-GT  5/31-pre call complete-tb    EVACUATION OF SUBDURAL HEMATOMA Bilateral 3/24/2025    Procedure: EVACUATION, HEMATOMA, SUBDURAL  AND SHUNT TIE OFF;  Surgeon: Jairo Blankenship MD;  Location: [facility] OR 2ND FLR;  Service: Neurosurgery;  Laterality: Bilateral;  bilateral bobby holes for subdural hygroma and tying off of  shunt at clavicle. to follow other ware cases    EYE SURGERY Right 11/2020    cataract    HERNIA REPAIR      LEFT HEART CATHETERIZATION Right 10/29/2021    Procedure: CATHETERIZATION, HEART, LEFT AND RIGHT  - LV DARNELL POSSIBLE;  Surgeon: Beau Conklin MD;  Location: [facility] CATH LAB;  Service: Cardiology;  Laterality: Right;    LUMBAR PUNCTURE N/A 1/13/2025    Procedure: Lumbar Puncture;  Surgeon: Jairo Blankenship MD;  [facility];  Service: Neurosurgery;  Laterality: N/A;    RIGHT HEART CATHETERIZATION Right 10/29/2021    Procedure: INSERTION, CATHETER, RIGHT HEART;  Surgeon: Beau Conklin MD;  Location: [facility] CATH LAB;  Service: Cardiology;  Laterality: Right;    SKIN BIOPSY  2020    TONSILLECTOMY      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    Procedure: INSERTION, SHUNT, VENTRICULOPERITONEAL;  Surgeon: Jairo Blankenship MD;  Location: [facility];  Service: Neurosurgery;  Laterality: Right;    VENTRICULOPERITONEAL SHUNT  2/6/2025    Procedure: INSERTION, SHUNT, VENTRICULOPERITONEAL;  Surgeon: Shar Elizabeth MD;  Location: [facility] OR [facility];  Service: General;;      Medications Ordered Prior to Encounter[1]   Allergies: Benazepril  Family History   Problem Relation Name Age of Onset    Hypertension Mother      Alzheimer's disease Mother      Heart attack Father      Diabetes Brother      Hypertension  Brother      Cancer Brother      Heart disease Brother  56        MI    Colon cancer Neg Hx      Esophageal cancer Neg Hx       Social History[2]  Review of Systems   Unable to perform ROS: Mental status change     Objective:     Vitals:    Temp: 98 °F (36.7 °C)  Pulse: 63  BP: (!) 147/67  MAP (mmHg): 96  Resp: (!) 22  SpO2: 98 %    Temp  Min: 98 °F (36.7 °C)  Max: 98 °F (36.7 °C)  Pulse  Min: 59  Max: 65  BP  Min: 128/75  Max: 174/94  MAP (mmHg)  Min: 96  Max: 116  Resp  Min: 20  Max: 22  SpO2  Min: 66 %  Max: 100 %    No intake/output data recorded.            Physical Exam  HENT:      Head: Normocephalic and atraumatic.   Cardiovascular:      Rate and Rhythm: Normal rate and regular rhythm.   Pulmonary:      Effort: Pulmonary effort is normal. No respiratory distress.   Neurological:      Mental Status: He is alert.      Comments: [id number]  AAOx person, place, but not time  Follows commands, though delayed in RLE  Tracks  PONCHO               Today I personally reviewed pertinent medications, lines/drains/airways, imaging, cardiology results, laboratory results, microbiology results, notably:    Imaging Results               CT Head Without Contrast (Final result)  Result time 04/23/25 19:28:05      Final result by Phillip Johnson MD (04/23/25 19:28:05)                   Impression:      Interval increase in size of bilateral holohemispheric subdural collections, with new bilateral hemorrhages in the subdural collections, concerning for acute on subacute subdural hematomas.    Right-sided coursing ventriculoperitoneal shunt in place.    This report was flagged in Epic as abnormal.    This critical information above was relayed by myself  by secure chat to Neal Chan MD on 04/23/2025 at 18:40.    CRITICAL FINDINGS:  Intracranial Hemorrhage    RECOMMENDATIONS:  Consult neurosurgery.    Electronically signed by resident: Malik Noble  Date:    04/23/2025  Time:    18:23    Electronically signed by: Phillip Johnson  MD  Date:    04/23/2025  Time:    19:28               Narrative:    EXAMINATION:  CT HEAD WITHOUT CONTRAST    CLINICAL HISTORY:  Neuro deficit, acute, stroke suspected;Mental status change, unknown cause;    TECHNIQUE:  Low dose axial CT images obtained throughout the head without the use of intravenous contrast.  Axial, sagittal and coronal reconstructions were performed.    COMPARISON:  CT 04/10/2025    FINDINGS:  There are bilateral holohemispheric subdural collections, both of which appear to have increased in size when compared to prior CT 04/10/2025.  The right subdural collection measures 1.8 cm in greatest dimension, previously measuring 0.8 cm.  The left subdural collection measures 1.8 cm in greatest dimension, previously measuring 1.2 cm.    Within the subdural collections there is new hyperattenuating material, on the right (series 601, image 46), and on the left (series 601, image 116), which represents new hemorrhage.    There is a trace amount of blood products along the tentorial leaflets.    There is moderate degree of mass effect from the subdural hematomas on both cerebral hemispheres.  The brain parenchyma otherwise appears unchanged.  Remote left occipital infarct.  No new hemorrhage, edema, or acute major vascular distribution infarct.  No mass effect or midline shift.  Partially empty sella.    There is a right-sided coursing ventriculoperitoneal shunt which terminates in the right lateral ventricle. Ventricles appear stable in size and configuration.    No displaced calvarial fracture.  The changes of bilateral bobby hole placement.    Mastoid air cells and paranasal sinuses are essentially clear.                                            [1]   No current facility-administered medications on file prior to encounter.     Current Outpatient Medications on File Prior to Encounter   Medication Sig Dispense Refill    acetaminophen (TYLENOL) 500 MG tablet Take 2 tablets (1,000 mg total) by mouth  every 8 (eight) hours. (Patient taking differently: Take 1,000 mg by mouth every 8 (eight) hours. PRN)      [Paused] amLODIPine (NORVASC) 2.5 MG tablet Take 2.5 mg by mouth nightly. (Patient not taking: Reported on 4/11/2025)      [Paused] ascorbic acid, vitamin C, (VITAMIN C) 500 MG tablet Take 500 mg by mouth once daily. (Patient not taking: Reported on 4/11/2025)      [Paused] balsalazide (COLAZAL) 750 mg capsule Take 5 tablets in the morning and 4 tablets in the evening. 270 capsule 11    docusate sodium (STOOL SOFTENER ORAL) Take by mouth.      ELIQUIS 2.5 mg Tab Take 2.5 mg by mouth 2 (two) times daily.      [Paused] ferrous sulfate (FEOSOL) 325 mg (65 mg iron) Tab tablet 1 tablet Orally Once a day (Patient not taking: Reported on 4/11/2025)      [Paused] fluocinolone acetonide oiL 0.01 % Drop Place into both ears. (Patient not taking: Reported on 4/11/2025)      [Paused] gabapentin (NEURONTIN) 100 MG capsule Take 100 mg by mouth 3 (three) times daily. (Patient not taking: Reported on 4/11/2025)      lacosamide (VIMPAT) 100 mg Tab Take 1 tablet (100 mg total) by mouth every 12 (twelve) hours. 60 tablet 11    levocetirizine (XYZAL) 5 MG tablet Take 5 mg by mouth every morning.      levothyroxine (SYNTHROID) 125 MCG tablet TAKE 1 TABLET BY MOUTH EVERY DAY IN THE MORNING 90 tablet 3    [Paused] mag/aluminum/sod bicarb/alginc (GAVISCON ORAL) Take by mouth as needed. Acid reflux      memantine (NAMENDA) 10 MG Tab Take 10 mg by mouth 2 (two) times daily.      metoprolol succinate (TOPROL-XL) 25 MG 24 hr tablet Take 0.5 tablets (12.5 mg total) by mouth once daily. 45 tablet 3    oxyCODONE (ROXICODONE) 10 mg Tab immediate release tablet Take 1 tablet (10 mg total) by mouth every 6 (six) hours as needed for Pain. (Patient not taking: Reported on 4/11/2025)      pantoprazole (PROTONIX) 40 MG tablet TAKE 1 TABLET BY MOUTH DAILY AS  NEEDED 90 tablet 3    pravastatin (PRAVACHOL) 20 MG tablet Take 1 tablet (20 mg total) by  mouth every evening. 90 tablet 3    silodosin (RAPAFLO) 4 mg Cap capsule Take 1 capsule (4 mg total) by mouth once daily. 30 capsule 11    [Paused] triamcinolone acetonide 0.1% (KENALOG) 0.1 % cream Apply topically 2 (two) times daily. APPLY  CREAM TOPICALLY TO AFFECTED AREA TWICE DAILY as needed 454 g 0    [Paused] vitC/E/Zn/copper/lutein/zeaxan (ICAPS AREDS2 ORAL) Take 1 capsule by mouth once daily.      [DISCONTINUED] telmisartan (MICARDIS) 40 MG Tab TAKE 1/2 TABLET BY MOUTH DAILY 90 tablet 1   [2]   Social History  Tobacco Use    Smoking status: Former     Current packs/day: 0.00     Average packs/day: 1 pack/day for 25.0 years (25.0 ttl pk-yrs)     Types: Cigarettes     Start date: 1947     Quit date: 1972     Years since quittin.3    Smokeless tobacco: Never    Tobacco comments:     Patient Quit Smoking on 1972.   Substance Use Topics    Alcohol use: Not Currently     Alcohol/week: 3.0 standard drinks of alcohol     Types: 2 Glasses of wine, 1 Cans of beer per week     Comment: Occasional    Drug use: Never

## 2025-04-24 NOTE — PLAN OF CARE
PT Eval complete, appropriate goals created    Problem: Physical Therapy  Goal: Physical Therapy Goal  Description: Goals to be completed by: 5/2/25    Pt will perform sup<>sit transfers w/ moderate assistance  Pt will have sufficient dynamic balance to sit EOB while performing ADLs/therex w/ minimum assistance  Pt will be able to stand up from EOB w/ moderate assistance using LRAD  Bed to chair transfer w/ maxA using LRAD  Pt will be independent w/ HEP therex on BLE w/ good form and ROM   Outcome: Progressing

## 2025-04-24 NOTE — PLAN OF CARE
"[facility] Care Plan    POC reviewed with Ramesh Ricci and family at 0300. Patient and Family verbalized understanding. Questions and concerns addressed. No acute events today. See below and flowsheets for full assessment and VS info.     -CTH completed   -CHG completed  -ceeg cap on.     Is this a stroke patient? yes- Stroke booklet reviewed with patient and family, risk factors identified for patient and stroke booklet remains at bedside for ongoing education.     Care individualization:   Problem: Stroke, Intracerebral Hemorrhage  Goal: Optimal Cerebral Tissue Perfusion  Intervention: Protect and Optimize Cerebral Perfusion  Flowsheets (Taken 4/24/2025 0440)  Cerebral Perfusion Promotion: blood pressure monitored       Neuro:  [place] Coma Scale  Best Eye Response: 4-->(E4) spontaneous  Best Motor Response: 6-->(M6) obeys commands  Best Verbal Response: 2-->(V2) incomprehensible speech  [place] Coma Scale Score: 12  Pupil PERRLA: yes     24 hr Temp:  [98 °F (36.7 °C)-98.2 °F (36.8 °C)]     CV:   Rhythm: normal sinus rhythm  BP goals:   SBP < 160  MAP > 65    Resp:           Plan: N/A    GI/:        Last Bowel Movement: 04/22/25  Voiding Characteristics: external catheter    Intake/Output Summary (Last 24 hours) at 4/24/2025 0441  Last data filed at 4/24/2025 0202  Gross per 24 hour   Intake --   Output 220 ml   Net -220 ml          Labs/Accuchecks:  Recent Labs   Lab 04/23/25  1553   WBC 11.06   RBC 4.21*   HGB 12.5*   HCT 39.8*         Recent Labs   Lab 04/23/25  1553      K 4.8   CO2 27      BUN 23   CREATININE 1.1   ALKPHOS 78   ALT <5*   AST 14   BILITOT 0.3    No results for input(s): "PROTIME", "INR", "APTT", "HEPANTIXA" in the last 168 hours. No results for input(s): "CPK", "CPKMB", "TROPONINI", "MB" in the last 168 hours.    Electrolytes: Electrolytes replaced  Accuchecks: none    Gtts:      LDA/Wounds:    Gabino Risk Assessment  Sensory Perception: 3-->slightly limited  Moisture: " 3-->occasionally moist  Activity: 1-->bedfast  Mobility: 2-->very limited  Nutrition: 2-->probably inadequate  Friction and Shear: 2-->potential problem  Gabino Score: 13  Is your gabino score 12 or less? no

## 2025-04-24 NOTE — EICU
"Intervention Initiated From:  [facility] / EICU    Juanis intervened regarding:  Rounding (Video assessment)    Virtual ICU Admission    Admit Date: 2025  LOS: 0  Code Status: Full Code   : 1940  Bed: 9082/9082 A:     Diagnosis: Subdural hematoma    Patient  has a past medical history of Allergies, Anemia, Anticoagulant long-term use, Anxiety, Atrophic kidney, BPH (benign prostatic hyperplasia), Congenital absence of right kidney, DDD (degenerative disc disease), cervical, Ex-smoker, Exudative age-related macular degeneration, right eye, with active choroidal neovascularization, GERD (gastroesophageal reflux disease), Glaucoma, HTN (hypertension), Hypothyroid, Internal carotid artery stenosis, Iron deficiency anemia due to chronic blood loss, Left ventricular diastolic dysfunction, NYHA class 1, Low serum testosterone level, Normal cardiac stress test, NPH (normal pressure hydrocephalus), Osteopenia, PAF (paroxysmal atrial fibrillation), Prostate CA, S/P TAVR (transcatheter aortic valve replacement), Shingles, Skin disease, Stage 3a chronic kidney disease, Stroke, Thrombocytopenia, TIA (transient ischemic attack), and UC (ulcerative colitis).    Last VS: /61   Pulse 60   Temp 98 °F (36.7 °C) (Oral)   Resp (!) 22   Ht 5' 11" (1.803 m)   Wt 68 kg (150 lb)   SpO2 96%   BMI 20.92 kg/m²       VICU Review    VICU nurse assessment :  Resighini completed, LDA documentation reconciliation completed, and VTE prophylaxis review        Nursing orders placed : IP RODO Peripheral IV Access          "

## 2025-04-24 NOTE — H&P
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 with acute on subacute bilateral SDH. His wife at bedside reports concern for acute deconditioning over the past three days involving worsening weakness, urinary frequency, speech changes, intermittent cessation in interaction with family, and dragging of his R leg. CT head showed bilateral subdural hygromas with bilateral acute versus subacute SDH. He takes eliquis for A fib at home which was reversed with PCC in the ED. During evaluation of the patient, he was initially interactive, following, commands, and oriented to person and place. He then had a brief staring spell where he no longer followed commands and displayed no usable speech. This then resolved after a few minutes and he returned to baseline. He takes Vimpat 100 bid at home but has not gotten his nightly dose. Per his wife, these spells occurred at home as well.    He is admitted to [facility] for hourly neuro-monitoring and a higher level of care.       Past Medical History:   Diagnosis Date    Allergies 01/31/2025    Anemia     Anticoagulant long-term use     Anxiety     Atrophic kidney 11/16/2012    BPH (benign prostatic hyperplasia) 11/16/2012    Congenital absence of right kidney 07/05/2017    DDD (degenerative disc disease), cervical 07/05/2017    x-ray 7/17 - Severe    Ex-smoker 12/20/2018    Exudative age-related macular degeneration, right eye, with active choroidal neovascularization 02/15/2024    GERD (gastroesophageal reflux disease) 11/16/2012    Glaucoma 11/16/2012    HTN (hypertension) 11/16/2012    Hypothyroid  11/16/2012    Internal carotid artery stenosis 11/16/2012    Iron deficiency anemia due to chronic blood loss 10/29/2021    Left ventricular diastolic dysfunction, NYHA class 1 04/16/2015    4/15    Low serum testosterone level 11/16/2012    Normal cardiac stress test 11/16/2012    NPH (normal pressure hydrocephalus) 02/03/2025    Osteopenia 11/16/2012    PAF (paroxysmal atrial fibrillation) 12/20/2018    Prostate CA 01/15/2013    XRT 12/12  Dr. 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    Shingles 11/16/2012    Skin disease 2020    Seems to have started after Covid vaccine    Stage 3a chronic kidney disease 10/06/2014    Stroke 2017    tia   no residual    Thrombocytopenia 01/31/2025    TIA (transient ischemic attack) 11/16/2012    UC (ulcerative colitis) 11/16/2012     Past Surgical History:   Procedure Laterality Date    ADENOIDECTOMY      COLONOSCOPY N/A 03/25/2022    Procedure: COLONOSCOPY;  Surgeon: Ashley Nguyen MD;  Location: [facility];  Service: Endoscopy;  Laterality: N/A;    ESOPHAGOGASTRODUODENOSCOPY N/A 03/25/2022    Procedure: EGD (ESOPHAGOGASTRODUODENOSCOPY);  Surgeon: Ashley Nguyen MD;  Location: [facility];  Service: Endoscopy;  Laterality: N/A;  added on per Dr. Nguyen    ESOPHAGOGASTRODUODENOSCOPY N/A 6/7/2024    Procedure: EGD (ESOPHAGOGASTRODUODENOSCOPY);  Surgeon: Audie Snell MD;  Location: [facility] (4TH FLR);  Service: Endoscopy;  Laterality: N/A;  5/21 R/s,sent updated instr via portal.pt informed to hold eliquis for 2 days.AC  Ref by: ,  approved to hold Eliquis (apixaban) for 2 days per Dr. Conklin-see media file  5/9/24-GT  5/31-pre call complete-tb    EVACUATION OF SUBDURAL HEMATOMA Bilateral 3/24/2025    Procedure: EVACUATION, HEMATOMA, SUBDURAL  AND SHUNT TIE OFF;  Surgeon: Jairo Blankenship MD;  Location: [facility] OR [facility] FLR;  Service: Neurosurgery;  Laterality: Bilateral;  bilateral bobby holes for subdural hygroma and tying off of   shunt at clavicle. to follow other ware cases    EYE SURGERY Right 11/2020    cataract    HERNIA REPAIR      LEFT HEART CATHETERIZATION Right 10/29/2021    Procedure: CATHETERIZATION, HEART, LEFT AND RIGHT  - LV DARNELL POSSIBLE;  Surgeon: Beau Conklin MD;  Location: [facility] CATH LAB;  Service: Cardiology;  Laterality: Right;    LUMBAR PUNCTURE N/A 1/13/2025    Procedure: Lumbar Puncture;  Surgeon: Jairo Blankenship MD;  Location: [facility] OR [address] [place] [address];  Service: Neurosurgery;  Laterality: N/A;    RIGHT HEART CATHETERIZATION Right 10/29/2021    Procedure: INSERTION, CATHETER, RIGHT HEART;  Surgeon: Beau Conklin MD;  Location: [facility] CATH LAB;  Service: Cardiology;  Laterality: Right;    SKIN BIOPSY  2020    TONSILLECTOMY      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    Procedure: INSERTION, SHUNT, VENTRICULOPERITONEAL;  Surgeon: Jairo Blankenship MD;  Location: [facility] OR [address] [place] [address];  Service: Neurosurgery;  Laterality: Right;    VENTRICULOPERITONEAL SHUNT  2/6/2025    Procedure: INSERTION, SHUNT, VENTRICULOPERITONEAL;  Surgeon: Shar Elizabeth MD;  Location: [facility] OR [address] [place] [address];  Service: General;;      Medications Ordered Prior to Encounter[1]   Allergies: Benazepril  Family History   Problem Relation Name Age of Onset    Hypertension Mother      Alzheimer's disease Mother      Heart attack Father      Diabetes Brother      Hypertension Brother      Cancer Brother      Heart disease Brother  56        MI    Colon cancer Neg Hx      Esophageal cancer Neg Hx       Social History[2]  Review of Systems   Unable to perform ROS: Mental status change     Objective:     Vitals:    Temp: 98 °F (36.7 °C)  Pulse: 63  BP: (!) 147/67  MAP (mmHg): 96  Resp: (!) 22  SpO2: 98 %    Temp  Min: 98 °F (36.7 °C)  Max: 98 °F (36.7 °C)  Pulse  Min: 59  Max: 65  BP  Min: 128/75  Max: 174/94  MAP (mmHg)  Min: 96  Max: 116  Resp  Min: 20  Max: 22  SpO2  Min: 66 %  Max: 100 %    No intake/output data recorded.            Physical  Exam  HENT:      Head: Normocephalic and atraumatic.   Cardiovascular:      Rate and Rhythm: Normal rate and regular rhythm.   Pulmonary:      Effort: Pulmonary effort is normal. No respiratory distress.   Neurological:      Mental Status: He is alert.      Comments: [id number]  AAOx person, place, but not time  Follows commands, though delayed in RLE  Tracks  PONCHO               Today I personally reviewed pertinent medications, lines/drains/airways, imaging, cardiology results, laboratory results, microbiology results, notably:    Imaging Results               CT Head Without Contrast (Final result)  Result time 04/23/25 19:28:05      Final result by Phillip Johnson MD (04/23/25 19:28:05)                   Impression:      Interval increase in size of bilateral holohemispheric subdural collections, with new bilateral hemorrhages in the subdural collections, concerning for acute on subacute subdural hematomas.    Right-sided coursing ventriculoperitoneal shunt in place.    This report was flagged in Epic as abnormal.    This critical information above was relayed by myself  by secure chat to Neal Chan MD on 04/23/2025 at 18:40.    CRITICAL FINDINGS:  Intracranial Hemorrhage    RECOMMENDATIONS:  Consult neurosurgery.    Electronically signed by resident: Malik Noble  Date:    04/23/2025  Time:    18:23    Electronically signed by: Phillip Johnson MD  Date:    04/23/2025  Time:    19:28               Narrative:    EXAMINATION:  CT HEAD WITHOUT CONTRAST    CLINICAL HISTORY:  Neuro deficit, acute, stroke suspected;Mental status change, unknown cause;    TECHNIQUE:  Low dose axial CT images obtained throughout the head without the use of intravenous contrast.  Axial, sagittal and coronal reconstructions were performed.    COMPARISON:  CT 04/10/2025    FINDINGS:  There are bilateral holohemispheric subdural collections, both of which appear to have increased in size when compared to prior CT 04/10/2025.  The right subdural  collection measures 1.8 cm in greatest dimension, previously measuring 0.8 cm.  The left subdural collection measures 1.8 cm in greatest dimension, previously measuring 1.2 cm.    Within the subdural collections there is new hyperattenuating material, on the right (series 601, image 46), and on the left (series 601, image 116), which represents new hemorrhage.    There is a trace amount of blood products along the tentorial leaflets.    There is moderate degree of mass effect from the subdural hematomas on both cerebral hemispheres.  The brain parenchyma otherwise appears unchanged.  Remote left occipital infarct.  No new hemorrhage, edema, or acute major vascular distribution infarct.  No mass effect or midline shift.  Partially empty sella.    There is a right-sided coursing ventriculoperitoneal shunt which terminates in the right lateral ventricle. Ventricles appear stable in size and configuration.    No displaced calvarial fracture.  The changes of bilateral bobby hole placement.    Mastoid air cells and paranasal sinuses are essentially clear.                                       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 CT head with bilatateral acute versus subacute SDH and known VPS. His eliquis was reversed with PCC in the ED.    -Admit to NCC, NSGY following  -q1h neuro checks, vital checks  -EKG, ECHO, CXR  -Daily CBC, CMP, mag, phos  -SBP < 160, prn labetalol and hydralazine  -SCDs, hold DVT chemoppx in setting of acute bleed  -Vimpat 100 bid at home, increase to 150 bid   -PT/OT/SLP as appropriate  -Repeat CT pending  -See encephalopathy    Acute encephalopathy  See primary problem  Acute change in mentation/staring spell in the ED with quick return to baseline  Increase home vimpat dose to 150 bid, EEG cap pending formal  hookup when able  Afebrile, no leukocytosis  UA unremarkable  Glucose WNL  BUN WNL    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   Tie off on 3/25 2/2 bilateral hygromas that were evacuated    Cardiac/Vascular  Coronary artery disease involving native coronary artery of native heart without angina pectoris  Resume home statin  Hold home eliquis    PAF (paroxysmal atrial fibrillation)  Resume home toprol  Hold home eliquis, reversed on admission  Currently rate controlled    Left ventricular diastolic dysfunction, NYHA class 1  ECHO pending    Essential hypertension  SBP < 160  PRN labetalol, hydralazine    Renal/  Urinary retention  Resume home silodosin    Endocrine  Acquired hypothyroidism  Resume home synthroid    GI  UC (ulcerative colitis)  Hold home colazal in acute setting          The patient is being Prophylaxed for:  Venous Thromboembolism with: Mechanical  Stress Ulcer with: None  Ventilator Pneumonia with: none    Activity Orders            Turn patient starting at 04/23 2000    Elevate HOB starting at 04/23 1948    Diet NPO: NPO starting at 04/23 1948          Full Code     71 minutes of Critical care time was spent personally by me on the following activities: development of treatment plan with patient or surrogate and bedside caregivers, discussions with consultants, evaluation of patient's response to treatment, examination of patient, ordering and performing treatments and interventions, ordering and review of laboratory studies, ordering and review of radiographic studies, pulse oximetry, antibiotic titration if applicable, vasopressor titration if applicable, re-evaluation of patient's condition. This critical care time did not overlap with that of any other provider or involve time for any procedures. There is high probability for acute neurological change leading to clinical and possibly life-threatening deterioration requiring highest level of provider preparedness for urgent intervention.      Yuridia Kaplan PA-C  Neurocritical Care  [facility] - Emergency Dept       [1]   No current facility-administered medications on file prior to encounter.     Current Outpatient Medications on File Prior to Encounter   Medication Sig Dispense Refill    acetaminophen (TYLENOL) 500 MG tablet Take 2 tablets (1,000 mg total) by mouth every 8 (eight) hours. (Patient taking differently: Take 1,000 mg by mouth every 8 (eight) hours. PRN)      [Paused] amLODIPine (NORVASC) 2.5 MG tablet Take 2.5 mg by mouth nightly. (Patient not taking: Reported on 4/11/2025)      [Paused] ascorbic acid, vitamin C, (VITAMIN C) 500 MG tablet Take 500 mg by mouth once daily. (Patient not taking: Reported on 4/11/2025)      [Paused] balsalazide (COLAZAL) 750 mg capsule Take 5 tablets in the morning and 4 tablets in the evening. 270 capsule 11    docusate sodium (STOOL SOFTENER ORAL) Take by mouth.      ELIQUIS 2.5 mg Tab Take 2.5 mg by mouth 2 (two) times daily.      [Paused] ferrous sulfate (FEOSOL) 325 mg (65 mg iron) Tab tablet 1 tablet Orally Once a day (Patient not taking: Reported on 4/11/2025)      [Paused] fluocinolone acetonide oiL 0.01 % Drop Place into both ears. (Patient not taking: Reported on 4/11/2025)      [Paused] gabapentin (NEURONTIN) 100 MG capsule Take 100 mg by mouth 3 (three) times daily. (Patient not taking: Reported on 4/11/2025)      lacosamide (VIMPAT) 100 mg Tab Take 1 tablet (100 mg total) by mouth every 12 (twelve) hours. 60 tablet 11    levocetirizine (XYZAL) 5 MG tablet Take 5 mg by mouth every morning.      levothyroxine (SYNTHROID) 125 MCG tablet TAKE 1 TABLET BY MOUTH EVERY DAY IN THE MORNING 90 tablet 3    [Paused] mag/aluminum/sod bicarb/alginc (GAVISCON ORAL) Take by mouth as needed. Acid reflux      memantine (NAMENDA) 10 MG Tab Take 10 mg by mouth 2 (two) times daily.      metoprolol succinate (TOPROL-XL) 25 MG 24 hr tablet Take 0.5 tablets (12.5 mg total) by mouth once daily. 45 tablet 3     oxyCODONE (ROXICODONE) 10 mg Tab immediate release tablet Take 1 tablet (10 mg total) by mouth every 6 (six) hours as needed for Pain. (Patient not taking: Reported on 2025)      pantoprazole (PROTONIX) 40 MG tablet TAKE 1 TABLET BY MOUTH DAILY AS  NEEDED 90 tablet 3    pravastatin (PRAVACHOL) 20 MG tablet Take 1 tablet (20 mg total) by mouth every evening. 90 tablet 3    silodosin (RAPAFLO) 4 mg Cap capsule Take 1 capsule (4 mg total) by mouth once daily. 30 capsule 11    [Paused] triamcinolone acetonide 0.1% (KENALOG) 0.1 % cream Apply topically 2 (two) times daily. APPLY  CREAM TOPICALLY TO AFFECTED AREA TWICE DAILY as needed 454 g 0    [Paused] vitC/E/Zn/copper/lutein/zeaxan (ICAPS AREDS2 ORAL) Take 1 capsule by mouth once daily.      [DISCONTINUED] telmisartan (MICARDIS) 40 MG Tab TAKE 1/2 TABLET BY MOUTH DAILY 90 tablet 1   [2]   Social History  Tobacco Use    Smoking status: Former     Current packs/day: 0.00     Average packs/day: 1 pack/day for 25.0 years (25.0 ttl pk-yrs)     Types: Cigarettes     Start date: 1947     Quit date: 1972     Years since quittin.3    Smokeless tobacco: Never    Tobacco comments:     Patient Quit Smoking on 1972.   Substance Use Topics    Alcohol use: Not Currently     Alcohol/week: 3.0 standard drinks of alcohol     Types: 2 Glasses of wine, 1 Cans of beer per week     Comment: Occasional    Drug use: Never

## 2025-04-24 NOTE — CONSULTS
[facility] - Neuro Critical Care  Neurology-Epilepsy  Consult Note    Patient Name: Ramesh Ricci  MRN: [id number]  Admission Date: 4/23/2025  Hospital Length of Stay: 1 days  Code Status: Full Code   Attending Provider: Deven Ahumada MD   Consulting Provider: Pema Shepard PA-C  Primary Care Physician: Nakul Mandujano MD  Principal Problem:Seizure disorder    Consults   Subjective:     HPI:   85 year old male with a PMHx of HTN, afib on Eliquis, AS s/p TAVR, CKD, UC, NPH s/p VPS c/b bilateral SDH s/p bilateral evacuation with subsequent seizure disorder maintained on Lacosamide 100 mg BID who presented with worsening weakness, urinary frequency, and speech changes. HPI per chart review due to patient's altered mental status. Imaging revealed interval increase in size of bilateral holohemispheric subdural collections, with new bilateral hemorrhages in the subdural collections, concerning for acute on subacute SDH; right VPS in place. Eliquis was reversed in the ER. Patient noted to have staring episode which resolved ~few minutes. Wife reported staring episodes at home. Patient admitted to [facility] for higher level of care and further management with Neurosurgery consult. CAP EEG placed; Epilepsy following for assistance in management.    Per chart review, patient was recently started on Lacosamide 100 mg BID (filled 4/8) by Neurology group at [facility].    Hospital Course:   CAP  EEG 4/24: preliminary findings, include WNL, no epileptiform activity, no seizures; no staring episodes    See EEG reports for details.     Past Medical History:   Diagnosis Date    Allergies 01/31/2025    Anemia     Anticoagulant long-term use     Anxiety     Atrophic kidney 11/16/2012    BPH (benign prostatic hyperplasia) 11/16/2012    Congenital absence of right kidney 07/05/2017    DDD (degenerative disc disease), cervical 07/05/2017    x-ray 7/17 - Severe    Ex-smoker 12/20/2018    Exudative age-related macular degeneration,  right eye, with active choroidal neovascularization 02/15/2024    GERD (gastroesophageal reflux disease) 11/16/2012    Glaucoma 11/16/2012    HTN (hypertension) 11/16/2012    Hypothyroid 11/16/2012    Internal carotid artery stenosis 11/16/2012    Iron deficiency anemia due to chronic blood loss 10/29/2021    Left ventricular diastolic dysfunction, NYHA class 1 04/16/2015    4/15    Low serum testosterone level 11/16/2012    Normal cardiac stress test 11/16/2012    NPH (normal pressure hydrocephalus) 02/03/2025    Osteopenia 11/16/2012    PAF (paroxysmal atrial fibrillation) 12/20/2018    Prostate CA 01/15/2013    XRT 12/12  Dr. 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    Seizure disorder 4/24/2025    Shingles 11/16/2012    Skin disease 2020    Seems to have started after Covid vaccine    Stage 3a chronic kidney disease 10/06/2014    Stroke 2017    tia   no residual    Thrombocytopenia 01/31/2025    TIA (transient ischemic attack) 11/16/2012    UC (ulcerative colitis) 11/16/2012       Past Surgical History:   Procedure Laterality Date    ADENOIDECTOMY      COLONOSCOPY N/A 03/25/2022    Procedure: COLONOSCOPY;  Surgeon: Ashley Nguyen MD;  Location: [facility];  Service: Endoscopy;  Laterality: N/A;    ESOPHAGOGASTRODUODENOSCOPY N/A 03/25/2022    Procedure: EGD (ESOPHAGOGASTRODUODENOSCOPY);  Surgeon: Ashley Nguyen MD;  Location: [facility];  Service: Endoscopy;  Laterality: N/A;  added on per Dr. Nguyen    ESOPHAGOGASTRODUODENOSCOPY N/A 6/7/2024    Procedure: EGD (ESOPHAGOGASTRODUODENOSCOPY);  Surgeon: Audie Snell MD;  Location: [address]);  Service: Endoscopy;  Laterality: N/A;  5/21 R/s,sent updated instr via portal.pt informed to hold eliquis for 2 days.AC  Ref by: ,  approved to hold Eliquis (apixaban) for 2 days per Dr. Conklin-see media file  5/9/24-GT  5/31-pre call complete-tb    EVACUATION OF SUBDURAL HEMATOMA Bilateral 3/24/2025    Procedure:  EVACUATION, HEMATOMA, SUBDURAL  AND SHUNT TIE OFF;  Surgeon: Jairo Blankenship MD;  [facility];  Service: Neurosurgery;  Laterality: Bilateral;  bilateral bobby holes for subdural hygroma and tying off of  shunt at clavicle. to follow other ware cases    EYE SURGERY Right 11/2020    cataract    HERNIA REPAIR      LEFT HEART CATHETERIZATION Right 10/29/2021    Procedure: CATHETERIZATION, HEART, LEFT AND RIGHT  - LV DARNELL POSSIBLE;  Surgeon: Beau Conklin MD;  Location: [facility] CATH LAB;  Service: Cardiology;  Laterality: Right;    LUMBAR PUNCTURE N/A 1/13/2025    Procedure: Lumbar Puncture;  Surgeon: Jairo Blankenship MD;  [facility];  Service: Neurosurgery;  Laterality: N/A;    RIGHT HEART CATHETERIZATION Right 10/29/2021    Procedure: INSERTION, CATHETER, RIGHT HEART;  Surgeon: Beau Conklin MD;  Location: [facility] CATH LAB;  Service: Cardiology;  Laterality: Right;    SKIN BIOPSY  2020    TONSILLECTOMY      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    Procedure: INSERTION, SHUNT, VENTRICULOPERITONEAL;  Surgeon: Jairo Blankenship MD;  [facility];  Service: Neurosurgery;  Laterality: Right;    VENTRICULOPERITONEAL SHUNT  2/6/2025    Procedure: INSERTION, SHUNT, VENTRICULOPERITONEAL;  Surgeon: Shar Elizabeth MD;  [facility];  Service: General;;       Review of patient's allergies indicates:   Allergen Reactions    Benazepril Other (See Comments)     Cough       No current facility-administered medications on file prior to encounter.     Current Outpatient Medications on File Prior to Encounter   Medication Sig    acetaminophen (TYLENOL) 500 MG tablet Take 2 tablets (1,000 mg total) by mouth every 8 (eight) hours. (Patient taking differently: Take 1,000 mg by mouth every 8 (eight) hours. PRN)    [Paused] amLODIPine (NORVASC) 2.5 MG tablet Take 2.5 mg by mouth nightly. (Patient not taking: Reported on 4/11/2025)    [Paused] ascorbic acid, vitamin C,  (VITAMIN C) 500 MG tablet Take 500 mg by mouth once daily. (Patient not taking: Reported on 4/11/2025)    [Paused] balsalazide (COLAZAL) 750 mg capsule Take 5 tablets in the morning and 4 tablets in the evening.    docusate sodium (STOOL SOFTENER ORAL) Take by mouth.    ELIQUIS 2.5 mg Tab Take 2.5 mg by mouth 2 (two) times daily.    [Paused] ferrous sulfate (FEOSOL) 325 mg (65 mg iron) Tab tablet 1 tablet Orally Once a day (Patient not taking: Reported on 4/11/2025)    [Paused] fluocinolone acetonide oiL 0.01 % Drop Place into both ears. (Patient not taking: Reported on 4/11/2025)    [Paused] gabapentin (NEURONTIN) 100 MG capsule Take 100 mg by mouth 3 (three) times daily. (Patient not taking: Reported on 4/11/2025)    lacosamide (VIMPAT) 100 mg Tab Take 1 tablet (100 mg total) by mouth every 12 (twelve) hours.    levocetirizine (XYZAL) 5 MG tablet Take 5 mg by mouth every morning.    levothyroxine (SYNTHROID) 125 MCG tablet TAKE 1 TABLET BY MOUTH EVERY DAY IN THE MORNING    [Paused] mag/aluminum/sod bicarb/alginc (GAVISCON ORAL) Take by mouth as needed. Acid reflux    memantine (NAMENDA) 10 MG Tab Take 10 mg by mouth 2 (two) times daily.    metoprolol succinate (TOPROL-XL) 25 MG 24 hr tablet Take 0.5 tablets (12.5 mg total) by mouth once daily.    oxyCODONE (ROXICODONE) 10 mg Tab immediate release tablet Take 1 tablet (10 mg total) by mouth every 6 (six) hours as needed for Pain. (Patient not taking: Reported on 4/11/2025)    pantoprazole (PROTONIX) 40 MG tablet TAKE 1 TABLET BY MOUTH DAILY AS  NEEDED    pravastatin (PRAVACHOL) 20 MG tablet Take 1 tablet (20 mg total) by mouth every evening.    silodosin (RAPAFLO) 4 mg Cap capsule Take 1 capsule (4 mg total) by mouth once daily.    [Paused] triamcinolone acetonide 0.1% (KENALOG) 0.1 % cream Apply topically 2 (two) times daily. APPLY  CREAM TOPICALLY TO AFFECTED AREA TWICE DAILY as needed    [Paused] vitC/E/Zn/copper/lutein/zeaxan (ICAPS AREDS2 ORAL) Take 1 capsule  by mouth once daily.    [DISCONTINUED] telmisartan (MICARDIS) 40 MG Tab TAKE 1/2 TABLET BY MOUTH DAILY     Continuous Infusions:    Family History       Problem Relation (Age of Onset)    Alzheimer's disease Mother    Cancer Brother    Diabetes Brother    Heart attack Father    Heart disease Brother (56)    Hypertension Mother, Brother          Tobacco Use    Smoking status: Former     Current packs/day: 0.00     Average packs/day: 1 pack/day for 25.0 years (25.0 ttl pk-yrs)     Types: Cigarettes     Start date: 1947     Quit date: 1972     Years since quittin.3    Smokeless tobacco: Never    Tobacco comments:     Patient Quit Smoking on 1972.   Substance and Sexual Activity    Alcohol use: Not Currently     Alcohol/week: 3.0 standard drinks of alcohol     Types: 2 Glasses of wine, 1 Cans of beer per week     Comment: Occasional    Drug use: Never    Sexual activity: Not Currently     Partners: Female     Birth control/protection: None     Review of Systems  Objective:     Vital Signs (Most Recent):  Temp: 97.9 °F (36.6 °C) (25 1101)  Pulse: 61 (25 1301)  Resp: 14 (25 1301)  BP: (!) 153/70 (25 1301)  SpO2: 100 % (25 1301) Vital Signs (24h Range):  Temp:  [97.9 °F (36.6 °C)-99.2 °F (37.3 °C)] 97.9 °F (36.6 °C)  Pulse:  [56-76] 61  Resp:  [13-27] 14  SpO2:  [94 %-100 %] 100 %  BP: (114-174)/(56-94) 153/70     Weight: 68 kg (149 lb 14.6 oz)  Body mass index is 20.91 kg/m².     Physical Exam  Constitutional:       General: He is not in acute distress.     Appearance: He is not diaphoretic.   HENT:      Head: Normocephalic and atraumatic.      Comments: EEG in place  Eyes:      General: No scleral icterus.        Right eye: No discharge.         Left eye: No discharge.      Conjunctiva/sclera: Conjunctivae normal.      Pupils: Pupils are equal, round, and reactive to light.   Cardiovascular:      Rate and Rhythm: Normal rate.   Pulmonary:      Effort: Pulmonary effort is  normal. No respiratory distress.   Musculoskeletal:         General: No deformity or signs of injury.   Skin:     General: Skin is warm and dry.   Psychiatric:      Comments: Unable to assess            NEUROLOGICAL EXAMINATION:     CRANIAL NERVES     CN III, IV, VI   Pupils are equal, round, and reactive to light.       Arouses to voice  Aphasic  SHASHA OU    Corneal intact OU   + spontaneous eye opening   Face symmetric   Tongue midline   Follows commands with repeated prompting  Moves all extremities     Exam findings to suggest seizures:  Myoclonus - no   eye twitching - no   Nystagmus - no   gaze deviation - no   waxy rigidity - no        Significant Labs: All pertinent lab results from the past 24 hours have been reviewed.    Significant Studies: I have reviewed all pertinent imaging results/findings within the past 24 hours.  Assessment and Plan:     * Seizure disorder  ?breakthrough seizures (staring events)  85M PMHx of HTN, afib on Eliquis, AS s/p TAVR, CKD, UC, NPH s/p VPS c/b bilateral SDH s/p bilateral evacuation with subsequent seizure disorder maintained on Lacosamide 100 mg BID who presented with worsening weakness, urinary frequency, and speech changes. Imaging revealed interval increase in size of bilateral holohemispheric subdural collections, with new bilateral hemorrhages in the subdural collections, concerning for acute on subacute SDH; right VPS in place. Eliquis was reversed in the ER. Patient noted to have staring episode which resolved ~few minutes. Wife reported staring episodes at home. Patient admitted to [facility] for higher level of care and further management with Neurosurgery consult. CAP EEG placed; Epilepsy following for assistance in management.  Per chart review, patient was recently started on Lacosamide 100 mg BID (filled 4/8) by Neurology group at [facility].    Recommendations:  - Rehook to continuous vEEG monitoring  - Recommend to continue Lacosamide 150 mg BID  - No level drawn  -  Avoid agents that lower seizure threshold  - NSGY planning for evacuation tomorrow, 4/25  - Management of infectious/metabolic abnormalities per NCC  - Seizure precautions  - Per [place] Law, patient must refrain from driving for 6 months after having a seizure or cleared by a neurologist to legally resume driving      Discussed plan of care with NCC team. No family at bedside on rounds. Will follow, please call with questions.    Subdural hematoma  S/p VPS  Hx of NPH s/p VPS c/b bilateral SDH s/p bilateral evacuation with subsequent seizure disorder maintained on Lacosamide 100 mg BID   - NSGY planning for evacuation tomorrow, 4/25  - Management per NSGY, NCC    PAF (paroxysmal atrial fibrillation)  Hx of, On Eliquis  - Reversed in ER  - Management per NSGY, NCC        VTE Risk Mitigation (From admission, onward)           Ordered     Reason for No Pharmacological VTE Prophylaxis  Once        Question:  Reasons:  Answer:  Active Bleeding    04/23/25 1950     IP VTE HIGH RISK PATIENT  Once         04/23/25 1950     Place sequential compression device  Until discontinued         04/23/25 1950                    Thank you for your consult. I will follow-up with patient. Please contact us if you have any additional questions.    Pema Shepard PA-C  Neurology-Epilepsy  [facility]  Staff: Dr. Monsivais

## 2025-04-24 NOTE — HOSPITAL COURSE
CAP- WNL  4/24>4/25: mild encephalopathy, no epileptiform activity, no seizures; no staring episodes reported    4/26>4/27: moderate encephalopathy, bilateral breach, independent bilateral sharps, no seizures  4/27>4/28: moderate encephalopathy, generalized cortical irritability, no discrete seizures  4/28>4/29: moderate encephalopathy, triphasics, generalized cortical irritability, no discrete seizures (marginally improved with increased Lacosamide to 200 mg BID)  OFF EEG   5/6>5/7: EEG rehooked to wean Lacosamide. Overnight study with bilateral, independent epileptiform discharges, no discrete seizures  5/7>5/8: mild left and moderate right hemisphere dysfunction with seizure focus in the right parietal region, no electrogrpahic seizures. Discontinue EEG 5/8.     See EEG reports for details.

## 2025-04-24 NOTE — CONSULTS
[facility] - Neuro Critical Care  Adult Nutrition  Consult Note    SUMMARY     Recommendations  --Continue Regular diet as tolerated and clinically indicated   --Encourage good intakes   --Nursing: please continue to document % meal eaten on flowsheet   --RD to monitor weight, PO intake     Goals: Meet % EEN/EPN by next RD follow-up  Nutrition Goal Status: new  Communication of RD Recs:  (POC)    Nutrition Discharge Planning     Nutrition Discharge Planning: General healthy diet    Assessment and Plan    No nutrition diagnosis at this time    Reason for Assessment    Reason For Assessment: consult (Assess dietary needs)  Diagnosis:  (Subdural hematoma)  General Information Comments: 86 y/o male with a PMH of Afib on eliquis at home, Ulcerative Colitis, HTN, aortic stenosis, and 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 His wife at bedside reports concern for acute deconditioning over the past three days involving worsening weakness, urinary frequency, speech changes, intermittent cessation in interaction with family, and dragging of his R leg. RD consult to assess dietary needs. SLP following - recommended regular diet this morning. No PO intake documented yet. Attempted to speak to pt - pt with AMS at time of visit. Spoke to nurse - nurse reports pt ate 50% of lunch. Pt NPO at midnight for OR tomorrow, bilateral subdural reevac and shunt removal. Weight stable - no concerns for malnutrition at this time.     Nutrition Related Social Determinants of Health: SDOH: Unable to assess at this time.      Food Insecurity: Patient Unable To Answer (4/23/2025)    Hunger Vital Sign     Worried About Running Out of Food in the Last Year: Patient unable to answer     Ran Out of Food in the Last Year: Patient unable to answer       Nutrition/Diet History    Spiritual, Cultural Beliefs, Shinto Practices, Values that Affect  "Care: no  Food Allergies: NKFA    Anthropometrics    Height: 5' 11" (180.3 cm)  Height (inches): 71 in  Height Method: Stated  Weight: 68 kg (149 lb 14.6 oz)  Weight (lb): 149.91 lb  Weight Method: Stated  Ideal Body Weight (IBW), Male: 172 lb  % Ideal Body Weight, Male (lb): 87.16 %  BMI (Calculated): 20.9  BMI Grade: 18.5-24.9 - normal       Lab/Procedures/Meds    Pertinent Labs Reviewed: reviewed - AST <5    Pertinent Medications Reviewed: reviewed - levothyroxine, pravastatin, senna-docusate     Estimated/Assessed Needs    Weight Used For Calorie Calculations: 68 kg (149 lb 14.6 oz)  Energy Calorie Requirements (kcal): 5670-4785 kcal/day (25-30 kcal/kg)  Energy Need Method: Kcal/kg  Protein Requirements: 68-82 g/day (1.0-1.2 g/kg)  Weight Used For Protein Calculations: 68 kg (149 lb 14.6 oz)     Estimated Fluid Requirement Method: RDA Method  RDA Method (mL): 1700         Nutrition Prescription Ordered    Current Diet Order: Regular    Evaluation of Received Nutrient/Fluid Intake    Comments: LBM 4/22  % Intake of Estimated Energy Needs: 50 - 75 %  % Meal Intake: 50 - 75 %    Nutrition Risk    Level of Risk/Frequency of Follow-up: moderate       Monitor and Evaluation    Monitor and Evaluation: Energy intake, Food and beverage intake, Diet order, Food and nutrition knowledge, Weight, Electrolyte and renal panel, Gastrointestinal profile, Glucose/endocrine profile, Inflammatory profile, Lipid profile, Nutrition focused physical findings, Skin       Nutrition Follow-Up    RD Follow-up?: Yes    "

## 2025-04-24 NOTE — PT/OT/SLP EVAL
Speech Language Pathology Evaluation  Cognitive/Bedside Swallow    Patient Name:  Ramesh Ricci   MRN:  [id number]  Admitting Diagnosis: Subdural hematoma    Recommendations:                  General Recommendations:  Dysphagia therapy, Speech/language therapy, and Cognitive-linguistic therapy  Diet recommendations:  Regular Diet - IDDSI Level 7, Thin liquids - IDDSI Level 0   Aspiration Precautions: Strict aspiration precautions   General Precautions: Standard, aspiration, fall, hearing impaired, aphasia  Communication strategies:  provide increased time to answer and go to room if call light pushed, loud vocal intensity    Assessment:     Ramesh Ricci is a 85 y.o. male with an SLP diagnosis of Aphasia, Dysphagia, and Cognitive-Linguistic Impairment.  He presents with functional swallow for regular diet with thin liquids though limited PO trials presented 2/23 pt pending possible intervention. SLP to continue to follow.    History:     Past Medical History:   Diagnosis Date    Allergies 01/31/2025    Anemia     Anticoagulant long-term use     Anxiety     Atrophic kidney 11/16/2012    BPH (benign prostatic hyperplasia) 11/16/2012    Congenital absence of right kidney 07/05/2017    DDD (degenerative disc disease), cervical 07/05/2017    x-ray 7/17 - Severe    Ex-smoker 12/20/2018    Exudative age-related macular degeneration, right eye, with active choroidal neovascularization 02/15/2024    GERD (gastroesophageal reflux disease) 11/16/2012    Glaucoma 11/16/2012    HTN (hypertension) 11/16/2012    Hypothyroid 11/16/2012    Internal carotid artery stenosis 11/16/2012    Iron deficiency anemia due to chronic blood loss 10/29/2021    Left ventricular diastolic dysfunction, NYHA class 1 04/16/2015    4/15    Low serum testosterone level 11/16/2012    Normal cardiac stress test 11/16/2012    NPH (normal pressure hydrocephalus) 02/03/2025    Osteopenia 11/16/2012    PAF (paroxysmal atrial fibrillation) 12/20/2018     Prostate CA 01/15/2013    XRT 12/12  Dr. 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    Seizure disorder 4/24/2025    Shingles 11/16/2012    Skin disease 2020    Seems to have started after Covid vaccine    Stage 3a chronic kidney disease 10/06/2014    Stroke 2017    tia   no residual    Thrombocytopenia 01/31/2025    TIA (transient ischemic attack) 11/16/2012    UC (ulcerative colitis) 11/16/2012       Past Surgical History:   Procedure Laterality Date    ADENOIDECTOMY      COLONOSCOPY N/A 03/25/2022    Procedure: COLONOSCOPY;  Surgeon: Ashley Nguyen MD;  Location: [facility];  Service: Endoscopy;  Laterality: N/A;    ESOPHAGOGASTRODUODENOSCOPY N/A 03/25/2022    Procedure: EGD (ESOPHAGOGASTRODUODENOSCOPY);  Surgeon: Ashley Nguyen MD;  Location: [facility] ENDO;  Service: Endoscopy;  Laterality: N/A;  added on per Dr. Nguyen    ESOPHAGOGASTRODUODENOSCOPY N/A 6/7/2024    Procedure: EGD (ESOPHAGOGASTRODUODENOSCOPY);  Surgeon: Audie Snell MD;  Location: [facility] (4TH FLR);  Service: Endoscopy;  Laterality: N/A;  5/21 R/s,sent updated instr via portal.pt informed to hold eliquis for 2 days.AC  Ref by: ,  approved to hold Eliquis (apixaban) for 2 days per Dr. Conklin-see media file  5/9/24-GT  5/31-pre call complete-tb    EVACUATION OF SUBDURAL HEMATOMA Bilateral 3/24/2025    Procedure: EVACUATION, HEMATOMA, SUBDURAL  AND SHUNT TIE OFF;  Surgeon: Jairo Blankenship MD;  Location: [facility] OR 2ND FLR;  Service: Neurosurgery;  Laterality: Bilateral;  bilateral bobby holes for subdural hygroma and tying off of  shunt at clavicle. to follow other ware cases    EYE SURGERY Right 11/2020    cataract    HERNIA REPAIR      LEFT HEART CATHETERIZATION Right 10/29/2021    Procedure: CATHETERIZATION, HEART, LEFT AND RIGHT  - LV DARNELL POSSIBLE;  Surgeon: Beau Conklin MD;  Location: [facility] CATH LAB;  Service: Cardiology;  Laterality: Right;    LUMBAR PUNCTURE N/A 1/13/2025     "Procedure: Lumbar Puncture;  Surgeon: Jairo Blankenship MD;  Location: [address];  Service: Neurosurgery;  Laterality: N/A;    RIGHT HEART CATHETERIZATION Right 10/29/2021    Procedure: INSERTION, CATHETER, RIGHT HEART;  Surgeon: Beau Conklin MD;  Location: [facility] CATH LAB;  Service: Cardiology;  Laterality: Right;    SKIN BIOPSY  2020    TONSILLECTOMY      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    Procedure: INSERTION, SHUNT, VENTRICULOPERITONEAL;  Surgeon: Jairo Blankenship MD;  Location: [facility] OR [address] [place] [address];  Service: Neurosurgery;  Laterality: Right;    VENTRICULOPERITONEAL SHUNT  2/6/2025    Procedure: INSERTION, SHUNT, VENTRICULOPERITONEAL;  Surgeon: Shar Elizabeth MD;  Location: [facility] OR [address] [place] [address];  Service: General;;       Social History: Patient lives with his spouse. Pt recently seen in [facility] (4/8/25) for mild cognitive linguistic impairment.  SLP diet recs at this facility 3/27/25 were regular/thin.  Pt reports increased difficulty communicating at this time.     Prior Intubation HX:  none this admission     Modified Barium Swallow: none documented     Chest X-Rays: none this admission       Subjective     "Please"     Nurse cleared for evaluation.     Pain/Comfort:  Pain Rating 1: 0/10  Pain Rating Post-Intervention 1: 0/10    Respiratory Status: Room air    Objective:     Cognitive Status:    Cont to assess 2/2 aphasia, oriented to self and place via y/n questions        Receptive Language:   Comprehension:   Impaired, fluctuating, impacted by decreased hearing  Questions Simple yes/no 90%  Complex yes/no 50%  Commands  One step 50% during oral motor exam, 80% in task    Expressive Language:  Verbal:    Impaired, pt with limited functional spontaneous speech other than y/n and simple single word responses to open ended questions, strings of jargon observed with some perseveration, oral groping evident.  Pt aware of difficulty with visible frustration.    Automatic Speech  Counting 10% 1-10  " and Phrase completion 20%  Naming Confrontation 0%      Motor Speech:  Apraxia possible 2/2 oral groping, cont to assess    Voice:   WFL    Visual-Spatial:  tba    Reading:   tba      Written Expression:   tba    Oral Musculature Evaluation  Oral Musculature: WFL (limited assessment 2/2 difficulty following oral motor commands consistently)  Dentition: present and adequate  Secretion Management: adequate  Mucosal Quality: adequate  Oral Labial Strength and Mobility: functional retraction  Lingual Strength and Mobility: functional protrusion  Volitional Cough: not elicited  Volitional Swallow: delayed, visible rise  Voice Prior to PO Intake: clear    Bedside Swallow Eval:   Consistencies Assessed:  Thin liquids    Solids        Oral Phase:   Prolonged mastication mild    Pharyngeal Phase:   no overt clinical signs/symptoms of aspiration    Compensatory Strategies  None    Treatment: Education provided re: role of SLP, diet recs, swallow precs, s/s aspiration, and POC.  Pt nodding in agreement though would benefit from reinforcement.       Goals:   Multidisciplinary Problems       SLP Goals          Problem: SLP    Goal Priority Disciplines Outcome   SLP Goal     SLP Progressing   Description: Speech Language Pathology Goals  Goals expected to be met by 5/8  1. Pt will tolerate regular diet with thin liquids with strict aspiration precautions.   2. Pt will answer mod complex y/n questions with 70%  3. Pt will follow simple commands with 90% accy given mod cues.   4. Pt will complete simple word finding tasks with 70% given min cues.                                Plan:     Patient to be seen:  4 x/week   Plan of Care expires:  05/24/25  Plan of Care reviewed with:  patient   SLP Follow-Up:  Yes       Discharge recommendations:  Therapy Intensity Recommendations at Discharge:  (tbd)   Barriers to Discharge:  Level of Skilled Assistance Needed      Time Tracking:     SLP Treatment Date:   04/24/25  Speech Start Time:   0759  Speech Stop Time:  0815     Speech Total Time (min):  16 min    Billable Minutes: Eval 8  and Eval Swallow and Oral Function 8    04/24/2025

## 2025-04-24 NOTE — HOSPITAL COURSE
4/24: severe aphasia continues. Still able to move all extremities AG to command. On EEG cap.  4/26 POD1 s/p bilateral craniotomy for subdural hematoma evacuation. Extubated but unarousable this morning postop. CTH with good decompressions and expected changes. Admitted to ICU for monitoring  4/28: NAEON. Neuro stable. No seizures on EEG. Drains with 10/4cc output  4/29: SG HV removed yesterday, continued poor exam, withdraws does not follow commands, nonverbal. No sz on EEG.  4/30: NAEON. Neuro stable , poor. If any further scans then recommend MRI  5/1: No changes from nsgy perspective  5/2: No changes from nsgy perspective  5/3: stable poor exam, WD LUE  05/04/2025: NAEON. AFVSS. Exam stable, poor. GOC discussion ongoing.  5/5: NAEON. No change neuro  5/6: No significant changes.   5/7: localizing this morning in LUE. Okay to resume Eliquis for a fib. Per discussion with NCC family would not want PEG at this time, patient slowly improving will follow up SLP. Recommend consideration of LTAC placement.

## 2025-04-24 NOTE — SUBJECTIVE & OBJECTIVE
Past Medical History:   Diagnosis Date    Allergies 01/31/2025    Anemia     Anticoagulant long-term use     Anxiety     Atrophic kidney 11/16/2012    BPH (benign prostatic hyperplasia) 11/16/2012    Congenital absence of right kidney 07/05/2017    DDD (degenerative disc disease), cervical 07/05/2017    x-ray 7/17 - Severe    Ex-smoker 12/20/2018    Exudative age-related macular degeneration, right eye, with active choroidal neovascularization 02/15/2024    GERD (gastroesophageal reflux disease) 11/16/2012    Glaucoma 11/16/2012    HTN (hypertension) 11/16/2012    Hypothyroid 11/16/2012    Internal carotid artery stenosis 11/16/2012    Iron deficiency anemia due to chronic blood loss 10/29/2021    Left ventricular diastolic dysfunction, NYHA class 1 04/16/2015    4/15    Low serum testosterone level 11/16/2012    Normal cardiac stress test 11/16/2012    NPH (normal pressure hydrocephalus) 02/03/2025    Osteopenia 11/16/2012    PAF (paroxysmal atrial fibrillation) 12/20/2018    Prostate CA 01/15/2013    XRT 12/12  Dr. 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    Seizure disorder 4/24/2025    Shingles 11/16/2012    Skin disease 2020    Seems to have started after Covid vaccine    Stage 3a chronic kidney disease 10/06/2014    Stroke 2017    tia   no residual    Thrombocytopenia 01/31/2025    TIA (transient ischemic attack) 11/16/2012    UC (ulcerative colitis) 11/16/2012       Past Surgical History:   Procedure Laterality Date    ADENOIDECTOMY      COLONOSCOPY N/A 03/25/2022    Procedure: COLONOSCOPY;  Surgeon: Ashley Nguyen MD;  Location: [facility] ENDO;  Service: Endoscopy;  Laterality: N/A;    ESOPHAGOGASTRODUODENOSCOPY N/A 03/25/2022    Procedure: EGD (ESOPHAGOGASTRODUODENOSCOPY);  Surgeon: Ashley Nguyen MD;  Location: [facility] ENDO;  Service: Endoscopy;  Laterality: N/A;  added on per Dr. Nguyen    ESOPHAGOGASTRODUODENOSCOPY N/A 6/7/2024    Procedure: EGD (ESOPHAGOGASTRODUODENOSCOPY);   Surgeon: Audie Snell MD;  Location: [facility] ENDO (4TH FLR);  Service: Endoscopy;  Laterality: N/A;  5/21 R/s,sent updated instr via portal.pt informed to hold eliquis for 2 days.AC  Ref by: ,  approved to hold Eliquis (apixaban) for 2 days per Dr. Conklin-see media file  5/9/24-GT  5/31-pre call complete-tb    EVACUATION OF SUBDURAL HEMATOMA Bilateral 3/24/2025    Procedure: EVACUATION, HEMATOMA, SUBDURAL  AND SHUNT TIE OFF;  Surgeon: Jairo Blankenship MD;  Location: [facility] OR 2ND FLR;  Service: Neurosurgery;  Laterality: Bilateral;  bilateral bobby holes for subdural hygroma and tying off of  shunt at clavicle. to follow other ware cases    EYE SURGERY Right 11/2020    cataract    HERNIA REPAIR      LEFT HEART CATHETERIZATION Right 10/29/2021    Procedure: CATHETERIZATION, HEART, LEFT AND RIGHT  - LV DARNELL POSSIBLE;  Surgeon: Beau Conklin MD;  Location: [facility] CATH LAB;  Service: Cardiology;  Laterality: Right;    LUMBAR PUNCTURE N/A 1/13/2025    Procedure: Lumbar Puncture;  Surgeon: Jairo Blankenship MD;  [facility];  Service: Neurosurgery;  Laterality: N/A;    RIGHT HEART CATHETERIZATION Right 10/29/2021    Procedure: INSERTION, CATHETER, RIGHT HEART;  Surgeon: Beau Conklin MD;  Location: [facility] CATH LAB;  Service: Cardiology;  Laterality: Right;    SKIN BIOPSY  2020    TONSILLECTOMY      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    Procedure: INSERTION, SHUNT, VENTRICULOPERITONEAL;  Surgeon: Jairo Blankenship MD;  [facility];  Service: Neurosurgery;  Laterality: Right;    VENTRICULOPERITONEAL SHUNT  2/6/2025    Procedure: INSERTION, SHUNT, VENTRICULOPERITONEAL;  Surgeon: Shar Elizabeth MD;  Location: [facility] OR [address] [place] [address];  Service: General;;       Review of patient's allergies indicates:   Allergen Reactions    Benazepril Other (See Comments)     Cough       No current facility-administered medications on file prior to encounter.     Current Outpatient  Medications on File Prior to Encounter   Medication Sig    acetaminophen (TYLENOL) 500 MG tablet Take 2 tablets (1,000 mg total) by mouth every 8 (eight) hours. (Patient taking differently: Take 1,000 mg by mouth every 8 (eight) hours. PRN)    [Paused] amLODIPine (NORVASC) 2.5 MG tablet Take 2.5 mg by mouth nightly. (Patient not taking: Reported on 4/11/2025)    [Paused] ascorbic acid, vitamin C, (VITAMIN C) 500 MG tablet Take 500 mg by mouth once daily. (Patient not taking: Reported on 4/11/2025)    [Paused] balsalazide (COLAZAL) 750 mg capsule Take 5 tablets in the morning and 4 tablets in the evening.    docusate sodium (STOOL SOFTENER ORAL) Take by mouth.    ELIQUIS 2.5 mg Tab Take 2.5 mg by mouth 2 (two) times daily.    [Paused] ferrous sulfate (FEOSOL) 325 mg (65 mg iron) Tab tablet 1 tablet Orally Once a day (Patient not taking: Reported on 4/11/2025)    [Paused] fluocinolone acetonide oiL 0.01 % Drop Place into both ears. (Patient not taking: Reported on 4/11/2025)    [Paused] gabapentin (NEURONTIN) 100 MG capsule Take 100 mg by mouth 3 (three) times daily. (Patient not taking: Reported on 4/11/2025)    lacosamide (VIMPAT) 100 mg Tab Take 1 tablet (100 mg total) by mouth every 12 (twelve) hours.    levocetirizine (XYZAL) 5 MG tablet Take 5 mg by mouth every morning.    levothyroxine (SYNTHROID) 125 MCG tablet TAKE 1 TABLET BY MOUTH EVERY DAY IN THE MORNING    [Paused] mag/aluminum/sod bicarb/alginc (GAVISCON ORAL) Take by mouth as needed. Acid reflux    memantine (NAMENDA) 10 MG Tab Take 10 mg by mouth 2 (two) times daily.    metoprolol succinate (TOPROL-XL) 25 MG 24 hr tablet Take 0.5 tablets (12.5 mg total) by mouth once daily.    oxyCODONE (ROXICODONE) 10 mg Tab immediate release tablet Take 1 tablet (10 mg total) by mouth every 6 (six) hours as needed for Pain. (Patient not taking: Reported on 4/11/2025)    pantoprazole (PROTONIX) 40 MG tablet TAKE 1 TABLET BY MOUTH DAILY AS  NEEDED    pravastatin  (PRAVACHOL) 20 MG tablet Take 1 tablet (20 mg total) by mouth every evening.    silodosin (RAPAFLO) 4 mg Cap capsule Take 1 capsule (4 mg total) by mouth once daily.    [Paused] triamcinolone acetonide 0.1% (KENALOG) 0.1 % cream Apply topically 2 (two) times daily. APPLY  CREAM TOPICALLY TO AFFECTED AREA TWICE DAILY as needed    [Paused] vitC/E/Zn/copper/lutein/zeaxan (ICAPS AREDS2 ORAL) Take 1 capsule by mouth once daily.    [DISCONTINUED] telmisartan (MICARDIS) 40 MG Tab TAKE 1/2 TABLET BY MOUTH DAILY     Continuous Infusions:    Family History       Problem Relation (Age of Onset)    Alzheimer's disease Mother    Cancer Brother    Diabetes Brother    Heart attack Father    Heart disease Brother (56)    Hypertension Mother, Brother          Tobacco Use    Smoking status: Former     Current packs/day: 0.00     Average packs/day: 1 pack/day for 25.0 years (25.0 ttl pk-yrs)     Types: Cigarettes     Start date: 1947     Quit date: 1972     Years since quittin.3    Smokeless tobacco: Never    Tobacco comments:     Patient Quit Smoking on 1972.   Substance and Sexual Activity    Alcohol use: Not Currently     Alcohol/week: 3.0 standard drinks of alcohol     Types: 2 Glasses of wine, 1 Cans of beer per week     Comment: Occasional    Drug use: Never    Sexual activity: Not Currently     Partners: Female     Birth control/protection: None     Review of Systems  Objective:     Vital Signs (Most Recent):  Temp: 97.9 °F (36.6 °C) (25 1101)  Pulse: 61 (25 1301)  Resp: 14 (25 1301)  BP: (!) 153/70 (25 1301)  SpO2: 100 % (25 1301) Vital Signs (24h Range):  Temp:  [97.9 °F (36.6 °C)-99.2 °F (37.3 °C)] 97.9 °F (36.6 °C)  Pulse:  [56-76] 61  Resp:  [13-27] 14  SpO2:  [94 %-100 %] 100 %  BP: (114-174)/(56-94) 153/70     Weight: 68 kg (149 lb 14.6 oz)  Body mass index is 20.91 kg/m².     Physical Exam  Constitutional:       General: He is not in acute distress.     Appearance: He is  not diaphoretic.   HENT:      Head: Normocephalic and atraumatic.      Comments: EEG in place  Eyes:      General: No scleral icterus.        Right eye: No discharge.         Left eye: No discharge.      Conjunctiva/sclera: Conjunctivae normal.      Pupils: Pupils are equal, round, and reactive to light.   Cardiovascular:      Rate and Rhythm: Normal rate.   Pulmonary:      Effort: Pulmonary effort is normal. No respiratory distress.   Musculoskeletal:         General: No deformity or signs of injury.   Skin:     General: Skin is warm and dry.   Psychiatric:      Comments: Unable to assess            NEUROLOGICAL EXAMINATION:     CRANIAL NERVES     CN III, IV, VI   Pupils are equal, round, and reactive to light.       Arouses to voice  Aphasic  SHASHA OU    Corneal intact OU   + spontaneous eye opening   Face symmetric   Tongue midline   Follows commands with repeated prompting  Moves all extremities     Exam findings to suggest seizures:  Myoclonus - no   eye twitching - no   Nystagmus - no   gaze deviation - no   waxy rigidity - no        Significant Labs: All pertinent lab results from the past 24 hours have been reviewed.    Significant Studies: I have reviewed all pertinent imaging results/findings within the past 24 hours.

## 2025-04-24 NOTE — SUBJECTIVE & OBJECTIVE
Interval History: 4/24: severe aphasia continues. Still able to move all extremities AG to command. On EEG cap.    Medications:  Continuous Infusions:  Scheduled Meds:   lacosamide (Vimpat) IV orderable  150 mg Intravenous Q12H    levothyroxine  125 mcg Oral Before breakfast    metoprolol succinate  12.5 mg Oral Daily    pravastatin  20 mg Oral QHS    senna-docusate  1 tablet Oral Daily    silodosin  4 mg Oral Daily     PRN Meds:  Current Facility-Administered Medications:     bisacodyL, 10 mg, Rectal, Daily PRN    hydrALAZINE, 10 mg, Intravenous, Q4H PRN    labetalol, 10 mg, Intravenous, Q4H PRN    sodium chloride 0.9%, 10 mL, Intravenous, PRN     Review of Systems  Objective:     Weight: 68 kg (150 lb)  Body mass index is 20.92 kg/m².  Vital Signs (Most Recent):  Temp: 99.2 °F (37.3 °C) (04/24/25 0701)  Pulse: 65 (04/24/25 1001)  Resp: (!) 27 (04/24/25 1001)  BP: (!) 142/63 (04/24/25 1001)  SpO2: 99 % (04/24/25 1001) Vital Signs (24h Range):  Temp:  [98 °F (36.7 °C)-99.2 °F (37.3 °C)] 99.2 °F (37.3 °C)  Pulse:  [56-65] 65  Resp:  [13-27] 27  SpO2:  [94 %-100 %] 99 %  BP: (114-174)/(56-94) 142/63                         Male External Urinary Catheter 04/23/25 1858 (Active)   Collection Container Other (Comment) 04/24/25 0701   Securement Method secured to lower ABD w/ tape 04/24/25 0701   Skin no redness;no breakdown 04/24/25 0701   Tolerance no signs/symptoms of discomfort 04/24/25 0701   Output (mL) 120 mL 04/24/25 0202   Catheter Change Date 04/24/25 04/24/25 0701          Physical Exam         Neurosurgery Physical Exam    General: well developed, well nourished, no distress.   HEENT: normocephalic, atraumatic  CV: regular rate   Pulmonary: normal respirations, no signs of respiratory distress  Abdomen: soft, non-distended, not tender to palpation  Skin: Skin is warm, dry and intact  Heme: no bruising     Neuro:   Mental Status: AO x1 with choices, aphasic  CN: PERRL, central to left gaze preference, face  grossly symmetric  Motor: FC x4 AG, not compliant with more detailed motor exam  Sensory: intact to light touch throughout  Reflexes: no clonus    Significant Labs:  Recent Labs   Lab 04/23/25  1553 04/24/25  0435    137   K 4.8 4.2    101   CO2 27 25   BUN 23 19   CREATININE 1.1 1.1   CALCIUM 9.5 9.0   MG  --  2.1     Recent Labs   Lab 04/23/25  1553 04/24/25  0435   WBC 11.06 8.54   HGB 12.5* 11.4*   HCT 39.8* 36.1*    205     Recent Labs   Lab 04/24/25  0435   INR 1.0   APTT 28.0     Microbiology Results (last 7 days)       ** No results found for the last 168 hours. **          All pertinent labs from the last 24 hours have been reviewed.    Significant Diagnostics:  CT: CT Head Without Contrast  Result Date: 4/24/2025  Stable bilateral subdural collections with similar appearing hyperdense material along the bilateral frontal cerebral convexities likely representing recent hemorrhage. Stable positioning of ventriculoperitoneal shunt catheter. No new hemorrhage or major vascular distribution infarct. Electronically signed by resident: Charles Beckman Date:    04/24/2025 Time:    00:12 Electronically signed by: Jonah Villeda MD Date:    04/24/2025 Time:    00:51    CT Head Without Contrast  Result Date: 4/23/2025  Interval increase in size of bilateral holohemispheric subdural collections, with new bilateral hemorrhages in the subdural collections, concerning for acute on subacute subdural hematomas. Right-sided coursing ventriculoperitoneal shunt in place. This report was flagged in Epic as abnormal. This critical information above was relayed by myself  by secure chat to Neal Chan MD on 04/23/2025 at 18:40. CRITICAL FINDINGS:  Intracranial Hemorrhage RECOMMENDATIONS:  Consult neurosurgery. Electronically signed by resident: Malik Noble Date:    04/23/2025 Time:    18:23 Electronically signed by: Phillip Johnson MD Date:    04/23/2025 Time:    19:28    MRI: No results found in the last 24  hours.

## 2025-04-24 NOTE — CONSULTS
[facility] - Emergency Dept  Neurosurgery  Consult Note    Inpatient consult to Neurosurgery  Consult performed by: Brenden Kaufman MD  Consult ordered by: Bella Jaime MD        Subjective:     Chief Complaint/Reason for Admission: SDH    History of Present Illness: Ramesh Ricci is an 84 y/o male with a PMH of Afib, Ulcerative Colitis, HTN, and aortic stenosis. He also has normal pressure hydrocephalus s/p  shunt (now tied off) is presenting with multiple complaints.     Wife reports concern for acute deconditioning over the past few days.  She describes increased weakness, urinary frequency, and speech changes that is been progressing for the past 3 days.  She states that he seems to slumped towards the right side and has been dragging his right leg.  Previously ambulated with his walker.  She also mentions that he seems to have speech hesitancy.  Denies any fevers, chills, abdominal pain or diarrhea    Prescriptions Prior to Admission[1]    Review of patient's allergies indicates:   Allergen Reactions    Benazepril Other (See Comments)     Cough       Past Medical History:   Diagnosis Date    Allergies 01/31/2025    Anemia     Anticoagulant long-term use     Anxiety     Atrophic kidney 11/16/2012    BPH (benign prostatic hyperplasia) 11/16/2012    Congenital absence of right kidney 07/05/2017    DDD (degenerative disc disease), cervical 07/05/2017    x-ray 7/17 - Severe    Ex-smoker 12/20/2018    Exudative age-related macular degeneration, right eye, with active choroidal neovascularization 02/15/2024    GERD (gastroesophageal reflux disease) 11/16/2012    Glaucoma 11/16/2012    HTN (hypertension) 11/16/2012    Hypothyroid 11/16/2012    Internal carotid artery stenosis 11/16/2012    Iron deficiency anemia due to chronic blood loss 10/29/2021    Left ventricular diastolic dysfunction, NYHA class 1 04/16/2015    4/15    Low serum testosterone level 11/16/2012    Normal cardiac stress test 11/16/2012     NPH (normal pressure hydrocephalus) 02/03/2025    Osteopenia 11/16/2012    PAF (paroxysmal atrial fibrillation) 12/20/2018    Prostate CA 01/15/2013    XRT 12/12  Dr. 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;  Service: Endoscopy;  Laterality: N/A;    ESOPHAGOGASTRODUODENOSCOPY N/A 03/25/2022    Procedure: EGD (ESOPHAGOGASTRODUODENOSCOPY);  Surgeon: Ashley Nguyen MD;  Location: [facility];  Service: Endoscopy;  Laterality: N/A;  added on per Dr. Nguyen    ESOPHAGOGASTRODUODENOSCOPY N/A 6/7/2024    Procedure: EGD (ESOPHAGOGASTRODUODENOSCOPY);  Surgeon: Audie Snell MD;  [facility]  Service: Endoscopy;  Laterality: N/A;  5/21 R/s,sent updated instr via portal.pt informed to hold eliquis for 2 days.AC  Ref by: ,  approved to hold Eliquis (apixaban) for 2 days per Dr. Conklin-see media file  5/9/24-GT  5/31-pre call complete-tb    EVACUATION OF SUBDURAL HEMATOMA Bilateral 3/24/2025    Procedure: EVACUATION, HEMATOMA, SUBDURAL  AND SHUNT TIE OFF;  Surgeon: Jairo Blankenship MD;  [facility];  Service: Neurosurgery;  Laterality: Bilateral;  bilateral bobby holes for subdural hygroma and tying off of  shunt at clavicle. to follow other ware cases    EYE SURGERY Right 11/2020    cataract    HERNIA REPAIR      LEFT HEART CATHETERIZATION Right 10/29/2021    Procedure: CATHETERIZATION, HEART, LEFT AND RIGHT  - LV DARNELL POSSIBLE;  Surgeon: Beau Conklin MD;  Location: [facility]  LAB;  Service: Cardiology;  Laterality: Right;    LUMBAR PUNCTURE N/A 2025    Procedure: Lumbar Puncture;  Surgeon: Jairo Blankenship MD;  [facility];  Service: Neurosurgery;  Laterality: N/A;    RIGHT HEART CATHETERIZATION Right 10/29/2021    Procedure: INSERTION, CATHETER, RIGHT HEART;  Surgeon: Beau Conklin MD;  Location: [facility] CATH LAB;  Service: Cardiology;  Laterality: Right;    SKIN BIOPSY  2020    TONSILLECTOMY      VENTRICULOPERITONEAL SHUNT Right 2025    Procedure: INSERTION, SHUNT, VENTRICULOPERITONEAL;  Surgeon: Jairo Blankenship MD;  [facility];  Service: Neurosurgery;  Laterality: Right;    VENTRICULOPERITONEAL SHUNT  2025    Procedure: INSERTION, SHUNT, VENTRICULOPERITONEAL;  Surgeon: Shar Elizabeth MD;  Location: [facility] OR [address] [place] [address];  Service: General;;     Family History       Problem Relation (Age of Onset)    Alzheimer's disease Mother    Cancer Brother    Diabetes Brother    Heart attack Father    Heart disease Brother (56)    Hypertension Mother, Brother          Tobacco Use    Smoking status: Former     Current packs/day: 0.00     Average packs/day: 1 pack/day for 25.0 years (25.0 ttl pk-yrs)     Types: Cigarettes     Start date: 1947     Quit date: 1972     Years since quittin.3    Smokeless tobacco: Never    Tobacco comments:     Patient Quit Smoking on 1972.   Substance and Sexual Activity    Alcohol use: Not Currently     Alcohol/week: 3.0 standard drinks of alcohol     Types: 2 Glasses of wine, 1 Cans of beer per week     Comment: Occasional    Drug use: Never    Sexual activity: Not Currently     Partners: Female     Birth control/protection: None     Review of Systems  Objective:     Weight: 68 kg (150 lb)  Body mass index is 20.92 kg/m².  Vital Signs (Most Recent):  Temp: 98 °F (36.7 °C) (25 1428)  Pulse: 64 (25 1800)  Resp: 20 (25 1509)  BP: (!) 174/94 (25 1700)  SpO2: 99 % (25 1800)  "Vital Signs (24h Range):  Temp:  [98 °F (36.7 °C)] 98 °F (36.7 °C)  Pulse:  [61-65] 64  Resp:  [20] 20  SpO2:  [97 %-100 %] 99 %  BP: (128-174)/(74-94) 174/94                                 Physical Exam         Neurosurgery Physical Exam  Neurosurgery Physical Exam    General: well developed, well nourished, no distress.   HEENT: normocephalic, atraumatic  CV: regular rate   Pulmonary: normal respirations, no signs of respiratory distress  Abdomen: soft, non-distended, not tender to palpation  Skin: Skin is warm, dry and intact  Heme: no bruising    Neuro:   Mental Status: AO x3, aphasic  CN: PERRL, EOMI, VF intact to confrontation, sensation intact bilaterally, eyebrow raise and grimace symmetric, tongue midline  Motor: moves all extremities spontaneously, full strength throughout, R pronator drift  Sensory: intact to light touch throughout  Reflexes: -Adames's, -Babinski, no clonus. Patellar: 2+ bilaterally       Significant Labs:  Recent Labs   Lab 04/23/25  1553      K 4.8      CO2 27   BUN 23   CREATININE 1.1   CALCIUM 9.5     Recent Labs   Lab 04/23/25  1553   WBC 11.06   HGB 12.5*   HCT 39.8*        No results for input(s): "LABPT", "INR", "APTT" in the last 48 hours.  Microbiology Results (last 7 days)       ** No results found for the last 168 hours. **          All pertinent labs from the last 24 hours have been reviewed.    Significant Diagnostics:  CT: No results found in the last 24 hours.    Assessment/Plan:     Subdural hematoma  86 yo with recent admission for VPS ty off and SDH evacuation on 3/24 presenting for new LSW and speech hesitation found to have enlarged chronic SDH as well as a new small R convexity acute SDH. Pt takes eliquis and will need reversal prior to going to [facility].    Recommendations:  --Please admit to NCC  --All labs and diagnostics reviewed  --Follow-up CTH and 6h scan for stability  -- Pt will need eliquis reversed.  --SBP <140 (cardene ggt; hydralazine & " labetalol PRN; transition to home meds when appropriate)  --Na >135  --Keppra 500 BID  --HOB >30  --Follow-up full pre-op labs (Urinalysis/CBC/CMP/PT-INR/PTT/T&S)  --NPO at this time for possible operative intervention  --Pt will likely need SDH evacuation.  --Continue to monitor clinically, notify NSGY immediately with any changes in neuro status    Discussed with Dr. Galvan  Dispo: pending           Thank you for your consult. I will follow-up with patient. Please contact us if you have any additional questions.    Brenden Kaufman MD  Neurosurgery  [facility] - Emergency Dept       [1] (Not in a hospital admission)

## 2025-04-24 NOTE — ASSESSMENT & PLAN NOTE
See primary problem  Acute change in mentation/staring spell in the ED with quick return to baseline  Increase home vimpat dose to 150 bid, EEG cap pending formal hookup when able  Afebrile, no leukocytosis  UA unremarkable  Glucose WNL  BUN WNL

## 2025-04-24 NOTE — ASSESSMENT & PLAN NOTE
?breakthrough seizures (staring events)  85M PMHx of HTN, afib on Eliquis, AS s/p TAVR, CKD, UC, NPH s/p VPS c/b bilateral SDH s/p bilateral evacuation with subsequent seizure disorder maintained on Lacosamide 100 mg BID who presented with worsening weakness, urinary frequency, and speech changes. Imaging revealed interval increase in size of bilateral holohemispheric subdural collections, with new bilateral hemorrhages in the subdural collections, concerning for acute on subacute SDH; right VPS in place. Eliquis was reversed in the ER. Patient noted to have staring episode which resolved ~few minutes. Wife reported staring episodes at home. Patient admitted to [facility] for higher level of care and further management with Neurosurgery consult. CAP EEG placed; Epilepsy following for assistance in management.  Per chart review, patient was recently started on Lacosamide 100 mg BID (filled 4/8) by Neurology group at [facility].    Recommendations:  - Rehook to continuous vEEG monitoring  - Recommend to continue Lacosamide 150 mg BID  - No level drawn  - Avoid agents that lower seizure threshold  - NSGY planning for evacuation tomorrow, 4/25  - Management of infectious/metabolic abnormalities per [facility]  - Seizure precautions  - Per [place] Law, patient must refrain from driving for 6 months after having a seizure or cleared by a neurologist to legally resume driving      Discussed plan of care with [facility] team. No family at bedside on rounds. Will follow, please call with questions.

## 2025-04-24 NOTE — PROGRESS NOTES
[facility] - Neuro Critical Care  Neurocritical Care  Progress Note    Admit Date: 4/23/2025  Service Date: 04/24/2025  Length of Stay: 1    Subjective:     Chief Complaint: Subdural hematoma    History of Present Illness: Ramesh Ricci is an 86 y/o male with a PMH of Afib on eliquis at home, Ulcerative Colitis, HTN, aortic stenosis, and VPS 2/2 NPH c/b bilateral SDH s/p bilateral evac and VPS tie off in March 2025 who presents to [facility] with acute on subacute bilateral SDH. His wife at bedside reports concern for acute deconditioning over the past three days involving worsening weakness, urinary frequency, speech changes, intermittent cessation in interaction with family, and dragging of his R leg. CT head showed bilateral subdural hygromas with bilateral acute versus subacute SDH. He takes eliquis for A fib at home which was reversed with PCC in the ED. During evaluation of the patient, he was initially interactive, following, commands, and oriented to person and place. He then had a brief staring spell where he no longer followed commands and displayed no usable speech. This then resolved after a few minutes and he returned to baseline. He takes Vimpat 100 bid at home but has not gotten his nightly dose. Per his wife, these spells occurred at home as well.     Hospital Course:   04/24/25:  The patient has not had any further clinical events suggestive of seizure activity since admission to the ICU.  EEGs currently recording, but no epileptiform events have been seen thus far.  On exam he is stable.  He is slanted for subdural hematoma evacuation tomorrow.    Review of Systems  Unable to obtain a complete ROS due to his inability to fully answer complex questions  Objective:     Vitals:  Temp: 97.9 °F (36.6 °C) (04/24/25 1101)  Pulse: 61 (04/24/25 1301)  Resp: 14 (04/24/25 1301)  BP: (!) 153/70 (04/24/25 1301)  SpO2: 100 % (04/24/25 1301)    Temp:  [97.9 °F (36.6 °C)-99.2 °F (37.3 °C)] 97.9 °F (36.6 °C)  Pulse:   [56-76] 61  Resp:  [13-27] 14  SpO2:  [94 %-100 %] 100 %  BP: (114-174)/(56-94) 153/70    04/23 0701 - 04/24 0700  In: -   Out: 220 [Urine:220]    Physical Exam  Vitals reviewed.   Constitutional:       Appearance: Normal appearance.   HENT:      Head: Normocephalic and atraumatic.      Mouth/Throat:      Mouth: Mucous membranes are moist.   Cardiovascular:      Rate and Rhythm: Normal rate. Rhythm irregular.      Heart sounds: Normal heart sounds.   Pulmonary:      Breath sounds: Normal breath sounds.   Abdominal:      General: Abdomen is flat.      Palpations: Abdomen is soft.   Musculoskeletal:         General: Normal range of motion.      Cervical back: Normal range of motion.   Neurological:      Mental Status: He is alert.     On neurological exam, he is spontaneously awake and follows commands in all 4 extremities.  He was able to stay awake throughout the exam without prodding.  He appeared confused at times and asked several times who the examiner was.  Language exam was not fully possible.  He appeared mildly dysarthric.  On cranial nerve exam, there was facial symmetry.  Extraocular movements appeared full.  Pupils were reactive to light bilaterally.  Lower cranial nerves appeared intact.  On motor exam, he had minimal right upper extremity pronator and downward drift when asked to suspend his arms in mid air.   strength was slightly diminished in the right hand.  Lower extremity strength was almost equal, but likely also with mild right lower extremity weakness.  (subjectively, his wife had noted lower extremity weakness and difficulties walking prior to admission).  Coordination and gait were not tested.    Medications:  Continuous Scheduledlacosamide (Vimpat) IV orderable, 150 mg, Q12H  levothyroxine, 125 mcg, Before breakfast  metoprolol succinate, 12.5 mg, Daily  pravastatin, 20 mg, QHS  senna-docusate, 1 tablet, Daily  silodosin, 4 mg, Daily    PRNbisacodyL, 10 mg, Daily PRN  hydrALAZINE, 10 mg,  Q4H PRN  labetalol, 10 mg, Q4H PRN  sodium chloride 0.9%, 10 mL, PRN      Today I personally reviewed pertinent medications, lines/drains/airways, imaging, laboratory results, notably:    NCCTH, CXR, previous CT imaging     Assessment/Plan:     Active Diagnoses:    Diagnosis Date Noted POA    PRINCIPAL PROBLEM:  Subdural hematoma [S06.5XAA] 03/24/2025 Yes    Seizure disorder [G40.909] 04/24/2025 Yes    Acute encephalopathy [G93.40] 04/23/2025 Yes    Urinary retention [R33.9] 03/26/2025 Yes    S/P ventriculoperitoneal shunt [Z98.2] 02/06/2025 Not Applicable    Coronary artery disease involving native coronary artery of native heart without angina pectoris [I25.10] 01/31/2022 Yes     Chronic    PAF (paroxysmal atrial fibrillation) [I48.0] 12/20/2018 Yes     Chronic    Left ventricular diastolic dysfunction, NYHA class 1 [I51.89] 04/16/2015 Yes     Chronic    Essential hypertension [I10] 11/16/2012 Yes     Chronic    UC (ulcerative colitis) [K51.90] 11/16/2012 Yes     Chronic    Acquired hypothyroidism [E03.9] 11/16/2012 Yes     Chronic      Problems Resolved During this Admission:         Prophylaxis:  Venous Thromboembolism: Mechanical  Stress Ulcer: H2B  Ventilator Pneumonia: not applicable     Activity Orders            Diet NPO: NPO starting at 04/25 0001    Diet Adult Regular Thin; Standard Tray: Regular starting at 04/24 0927    Turn patient starting at 04/23 2000    Elevate HOB starting at 04/23 1948          Full Code    Assessment and plan:  85-year-old gentleman, with remote history of normal pressure hydrocephalus, status post shunt placement with subsequent development of subdural hematomas.  The shunt has been obliterated, however, the patient has had recurrence of subdural hematomas, again with this admission.  He may benefit from middle meningeal artery embolization.  Complicating, he has had seizure activity during this admission, but is not in status epilepticus.  His home Vimpat was increased to 150  mg b.i.d..    Neuro  * Subdural hematoma  - recurring subdural hematoma, symptomatic, due to mass effect, with small acute components  -SBP < 160, prn labetalol and hydralazine  - anticoagulation discontinued and anticoagulant reversed with PCC  -SCDs, hold DVT chemoppx in setting of acute bleed  -Vimpat 100 bid at home, increase to 150 bid   -PT/OT/SLP as appropriate  -Repeat CT      Acute encephalopathy, likely related to seizure activity  Acute change in mentation/staring spell in the ED with quick return to baseline  Increase home vimpat dose to 150 bid, EEG cap pending formal hookup when able  Afebrile, no leukocytosis  UA unremarkable  Glucose WNL  BUN WNL     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   Tie off on 3/25 2/2 bilateral hygromas that were evacuated     Cardiac/Vascular  Coronary artery disease involving native coronary artery of native heart without angina pectoris  Resume home statin  Hold home eliquis     PAF (paroxysmal atrial fibrillation)  Resume home toprol  Hold home eliquis, reversed on admission  Currently rate controlled     Left ventricular diastolic dysfunction, NYHA class 1  ECHO pending     Essential hypertension  SBP < 160  PRN labetalol, hydralazine     Renal/  Urinary retention  Resume home silodosin     Endocrine  Acquired hypothyroidism  Resume home synthroid     GI  UC (ulcerative colitis)  Hold home colazal in acute setting    Disposition:   The patient is scheduled for bilateral subdural hematoma evacuation tomorrow.  He was treated for intermittent seizure activity with increase in Vimpat, and he has not had any further clinical or electrographic seizure activity.  EEG monitoring is ongoing and will be continued for the next 24 hours.  His wife was in the room and we have fully discussed his current status and treatment plan with her and her .    This patient is critically ill and at high risk for severe morbidity and/or mortality. I have spent 60 minutes of critical  care time on this patient in direct patient care, counseling and evaluating data and imaging studies.     Patient is seen and fully evaluated and all labs, films and data was personally reviewed and I personally performed the key portions of the E/M visit. The case was discussed with the ICU care team under my guidance.      The plan of care was discussed with family / patient and all questions answered as applicable.    CHANDRAKANT Ahumada MD  Neurology/Neuro-Critical Care    Deven Ahumada MD  Neurocritical Care  [facility] - Neuro Critical Care

## 2025-04-25 ENCOUNTER — ANESTHESIA (OUTPATIENT)
Dept: SURGERY | Facility: HOSPITAL | Age: 85
End: 2025-04-25
Payer: MEDICARE

## 2025-04-25 PROBLEM — I62.00 SUBDURAL HEMORRHAGE: Status: ACTIVE | Noted: 2025-03-24

## 2025-04-25 PROCEDURE — 63600175 PHARM REV CODE 636 W HCPCS: Performed by: NURSE ANESTHETIST, CERTIFIED REGISTERED

## 2025-04-25 PROCEDURE — D9220A PRA ANESTHESIA: Mod: ANES,,, | Performed by: ANESTHESIOLOGY

## 2025-04-25 PROCEDURE — 25000003 PHARM REV CODE 250: Performed by: NURSE ANESTHETIST, CERTIFIED REGISTERED

## 2025-04-25 PROCEDURE — D9220A PRA ANESTHESIA: Mod: CRNA,,, | Performed by: NURSE ANESTHETIST, CERTIFIED REGISTERED

## 2025-04-25 RX ORDER — LEVETIRACETAM 500 MG/5ML
INJECTION, SOLUTION, CONCENTRATE INTRAVENOUS
Status: DISCONTINUED | OUTPATIENT
Start: 2025-04-25 | End: 2025-04-25

## 2025-04-25 RX ORDER — LIDOCAINE HYDROCHLORIDE 20 MG/ML
INJECTION, SOLUTION EPIDURAL; INFILTRATION; INTRACAUDAL; PERINEURAL
Status: DISCONTINUED | OUTPATIENT
Start: 2025-04-25 | End: 2025-04-25

## 2025-04-25 RX ORDER — ONDANSETRON HYDROCHLORIDE 2 MG/ML
INJECTION, SOLUTION INTRAVENOUS
Status: DISCONTINUED | OUTPATIENT
Start: 2025-04-25 | End: 2025-04-25

## 2025-04-25 RX ORDER — ROCURONIUM BROMIDE 10 MG/ML
INJECTION, SOLUTION INTRAVENOUS
Status: DISCONTINUED | OUTPATIENT
Start: 2025-04-25 | End: 2025-04-25

## 2025-04-25 RX ORDER — VASOPRESSIN 20 [USP'U]/ML
INJECTION, SOLUTION INTRAMUSCULAR; SUBCUTANEOUS
Status: DISCONTINUED | OUTPATIENT
Start: 2025-04-25 | End: 2025-04-25

## 2025-04-25 RX ORDER — FENTANYL CITRATE 50 UG/ML
INJECTION, SOLUTION INTRAMUSCULAR; INTRAVENOUS
Status: DISCONTINUED | OUTPATIENT
Start: 2025-04-25 | End: 2025-04-25

## 2025-04-25 RX ORDER — ETOMIDATE 2 MG/ML
INJECTION INTRAVENOUS
Status: DISCONTINUED | OUTPATIENT
Start: 2025-04-25 | End: 2025-04-25

## 2025-04-25 RX ORDER — PHENYLEPHRINE HYDROCHLORIDE 10 MG/ML
INJECTION INTRAVENOUS
Status: DISCONTINUED | OUTPATIENT
Start: 2025-04-25 | End: 2025-04-25

## 2025-04-25 RX ADMIN — ONDANSETRON 4 MG: 2 INJECTION INTRAMUSCULAR; INTRAVENOUS at 05:04

## 2025-04-25 RX ADMIN — FENTANYL CITRATE 50 MCG: 50 INJECTION, SOLUTION INTRAMUSCULAR; INTRAVENOUS at 04:04

## 2025-04-25 RX ADMIN — SODIUM CHLORIDE, SODIUM ACETATE ANHYDROUS, SODIUM GLUCONATE, POTASSIUM CHLORIDE, AND MAGNESIUM CHLORIDE: 526; 222; 502; 37; 30 INJECTION, SOLUTION INTRAVENOUS at 03:04

## 2025-04-25 RX ADMIN — ROCURONIUM BROMIDE 40 MG: 10 INJECTION, SOLUTION INTRAVENOUS at 03:04

## 2025-04-25 RX ADMIN — PHENYLEPHRINE HYDROCHLORIDE 0.4 MCG/KG/MIN: 10 INJECTION INTRAVENOUS at 04:04

## 2025-04-25 RX ADMIN — ROCURONIUM BROMIDE 40 MG: 10 INJECTION, SOLUTION INTRAVENOUS at 05:04

## 2025-04-25 RX ADMIN — VASOPRESSIN 2 UNITS: 20 INJECTION INTRAVENOUS at 05:04

## 2025-04-25 RX ADMIN — PHENYLEPHRINE HYDROCHLORIDE 200 MCG: 10 INJECTION INTRAVENOUS at 05:04

## 2025-04-25 RX ADMIN — LEVETIRACETAM 1000 MG: 100 INJECTION, SOLUTION INTRAVENOUS at 04:04

## 2025-04-25 RX ADMIN — VASOPRESSIN 1 UNITS: 20 INJECTION INTRAVENOUS at 04:04

## 2025-04-25 RX ADMIN — SUGAMMADEX 300 MG: 100 INJECTION, SOLUTION INTRAVENOUS at 05:04

## 2025-04-25 RX ADMIN — ROCURONIUM BROMIDE 20 MG: 10 INJECTION, SOLUTION INTRAVENOUS at 04:04

## 2025-04-25 RX ADMIN — FENTANYL CITRATE 50 MCG: 50 INJECTION, SOLUTION INTRAMUSCULAR; INTRAVENOUS at 03:04

## 2025-04-25 RX ADMIN — PHENYLEPHRINE HYDROCHLORIDE 100 MCG: 10 INJECTION INTRAVENOUS at 04:04

## 2025-04-25 RX ADMIN — PHENYLEPHRINE HYDROCHLORIDE 200 MCG: 10 INJECTION INTRAVENOUS at 04:04

## 2025-04-25 RX ADMIN — LIDOCAINE HYDROCHLORIDE 60 MG: 20 INJECTION, SOLUTION EPIDURAL; INFILTRATION; INTRACAUDAL; PERINEURAL at 03:04

## 2025-04-25 RX ADMIN — CEFTRIAXONE 1 G: 1 INJECTION, POWDER, FOR SOLUTION INTRAMUSCULAR; INTRAVENOUS at 04:04

## 2025-04-25 RX ADMIN — ETOMIDATE 12 MG: 2 INJECTION, SOLUTION INTRAVENOUS at 03:04

## 2025-04-25 NOTE — PT/OT/SLP PROGRESS
"Speech Language Pathology Treatment    Patient Name:  Ramesh Ricci   MRN:  [id number]  Admitting Diagnosis: Subdural hemorrhage    Recommendations:                 General Recommendations:  Dysphagia therapy, Speech/language therapy, and Cognitive-linguistic therapy  Diet recommendations:  Regular Diet - IDDSI Level 7, Thin liquids - IDDSI Level 0   Aspiration Precautions: Strict aspiration precautions   General Precautions: Standard, aspiration, fall, hearing impaired, aphasia  Communication strategies:  provide increased time to answer and go to room if call light pushed, loud vocal intensity    Assessment:     Ramesh Ricci is a 85 y.o. male with an SLP diagnosis of Aphasia, Dysphagia, and Cognitive-Linguistic Impairment.  He presents with possible apraxia.    Subjective     "Ugh!"     Pain/Comfort:  Pain Rating 1: 0/10  Pain Rating Post-Intervention 1: 0/10    Respiratory Status: Room air    Objective:     Has the patient been evaluated by SLP for swallowing?   Yes  Keep patient NPO? No     Pt seen bedside, alert and cooperative.  Continued difficulty participating in simple conversation, slightly worse today 2/2 significant perseveration.  He was able to state his name and his wife's name.  He was oriented to place via y/n questions.  Vowel repetition completed x2 though constant perseveration on 1st vowel presented limited further attempts.  Groping evident.  Auto speech task of counting 1-5 attempted though pt able to state single number only 2/2 continued perseverations on vowel repetitions.  Simple y/n questions answered with 80% accy.  Simple commands followed with 60% accy.  Pt visibly frustrated by difficulty communicating.  Education provided re: aphasia, possible apraxia, and POC.  Pt nodding in agreement.  PO trials not presented as pt npo for possible surgical intervention today.     Goals:   Multidisciplinary Problems       SLP Goals          Problem: SLP    Goal Priority Disciplines " Outcome   SLP Goal     SLP Progressing   Description: Speech Language Pathology Goals  Goals expected to be met by 5/8  1. Pt will tolerate regular diet with thin liquids with strict aspiration precautions.   2. Pt will answer mod complex y/n questions with 70%  3. Pt will follow simple commands with 90% accy given mod cues.   4. Pt will complete simple word finding tasks with 70% given min cues.                                Plan:     Patient to be seen:  4 x/week   Plan of Care expires:  05/24/25  Plan of Care reviewed with:  patient   SLP Follow-Up:  Yes       Discharge recommendations:  High Intensity Therapy   Barriers to Discharge:  Level of Skilled Assistance Needed      Time Tracking:     SLP Treatment Date:   04/25/25  Speech Start Time:  0850  Speech Stop Time:  0858     Speech Total Time (min):  8 min    Billable Minutes: Speech Therapy Individual 8    04/25/2025

## 2025-04-25 NOTE — ANESTHESIA PROCEDURE NOTES
Intubation    Date/Time: 4/25/2025 3:45 PM    Performed by: Sophie Parker CRNA  Authorized by: Saleem Romo MD    Intubation:     Induction:  Intravenous    Intubated:  Postinduction    Mask Ventilation:  Easy mask    Attempts:  1    Attempted By:  CRNA    Method of Intubation:  Video laryngoscopy    Blade:  Alia 3    Laryngeal View Grade: Grade I - full view of cords      Difficult Airway Encountered?: No      Complications:  None    Airway Device:  Oral endotracheal tube    Airway Device Size:  7.0    Style/Cuff Inflation:  Cuffed    Inflation Amount (mL):  10    Tube secured:  22    Secured at:  The teeth    Placement Verified By:  Capnometry    Complicating Factors:  None    Findings Post-Intubation:  BS equal bilateral

## 2025-04-25 NOTE — TRANSFER OF CARE
"  Anesthesia Transfer of Care Note    Patient: Ramesh Ricci    Procedure(s) Performed: Procedure(s) (LRB):  CRANIOTOMY, FOR SUBDURAL HEMATOMA EVACUATION, bilateral. shunt removal (Bilateral)  REMOVAL, SHUNT, VENTRICULOPERITONEAL    Patient location: ICU    Anesthesia Type: general    Transport from OR: Transported from OR on 100% O2 by closed face mask with adequate spontaneous ventilation. Continuous ECG monitoring in transport. Continuous SpO2 monitoring in transport    Post pain: adequate analgesia    Post assessment: no apparent anesthetic complications    Post vital signs: stable    Level of consciousness: sedated    Nausea/Vomiting: no nausea/vomiting    Complications: none    Transfer of care protocol was followed      Last vitals: Visit Vitals  BP (!) 143/81   Pulse 75   Temp 36.9 °C (98.4 °F) (Oral)   Resp 20   Ht 5' 11" (1.803 m)   Wt 68 kg (149 lb 14.6 oz)   SpO2 100%   BMI 20.91 kg/m²     "

## 2025-04-25 NOTE — PROGRESS NOTES
[facility] - Neuro Critical Care  Neurology-Epilepsy  Progress Note    Patient Name: Ramesh Ricci  MRN: [id number]  Admission Date: 4/23/2025  Hospital Length of Stay: 2 days  Code Status: Full Code   Attending Provider: Deven Ahumada MD  Primary Care Physician: Nakul Mandujano MD   Principal Problem:Seizure disorder    Subjective:     Hospital Course:   CAP- WNL  EEG 4/24>4/25: mild encephalopathy, no epileptiform activity, no seizures; no staring episodes reported    See EEG reports for details.    Interval History: NAEON. No epileptiform activity on EEG. No family at bedside on rounds.    Current Medications[1]  Continuous Infusions:    Review of Systems  Objective:     Vital Signs (Most Recent):  Temp: 99 °F (37.2 °C) (04/25/25 0701)  Pulse: 67 (04/25/25 1036)  Resp: (!) 29 (04/25/25 0827)  BP: 134/63 (04/25/25 0801)  SpO2: 100 % (04/25/25 0827) Vital Signs (24h Range):  Temp:  [98.2 °F (36.8 °C)-100.6 °F (38.1 °C)] 99 °F (37.2 °C)  Pulse:  [60-83] 67  Resp:  [14-33] 29  SpO2:  [97 %-100 %] 100 %  BP: (103-172)/(51-87) 134/63     Weight: 68 kg (149 lb 14.6 oz)  Body mass index is 20.91 kg/m².     Physical Exam  Constitutional:       General: He is not in acute distress.     Appearance: He is not diaphoretic.   HENT:      Head: Normocephalic and atraumatic.      Comments: EEG removed  Eyes:      General: No scleral icterus.        Right eye: No discharge.         Left eye: No discharge.      Conjunctiva/sclera: Conjunctivae normal.      Pupils: Pupils are equal, round, and reactive to light.   Cardiovascular:      Rate and Rhythm: Normal rate.   Pulmonary:      Effort: Pulmonary effort is normal. No respiratory distress.   Musculoskeletal:         General: No deformity or signs of injury.   Skin:     General: Skin is warm and dry.   Psychiatric:      Comments: Unable to assess            NEUROLOGICAL EXAMINATION:     CRANIAL NERVES     CN III, IV, VI   Pupils are equal, round, and reactive to light.     "   Awake, tracks provider  Says "yes", no other speech  SHASHA OU    Corneal intact OU   + spontaneous eye opening   Face symmetric   Tongue midline   Follows commands in BUE  Spontaneous movements in BLE    Exam findings to suggest seizures:  Myoclonus - no   eye twitching - no   Nystagmus - no   gaze deviation - no   waxy rigidity - no        Significant Labs: All pertinent lab results from the past 24 hours have been reviewed.    Significant Studies: I have reviewed all pertinent imaging results/findings within the past 24 hours.    Assessment and Plan:     * Seizure disorder  ?breakthrough seizures (staring events)  85M PMHx of HTN, afib on Eliquis, AS s/p TAVR, CKD, UC, NPH s/p VPS c/b bilateral SDH s/p bilateral evacuation with subsequent seizure disorder maintained on Lacosamide 100 mg BID who presented with worsening weakness, urinary frequency, and speech changes. Imaging revealed interval increase in size of bilateral holohemispheric subdural collections, with new bilateral hemorrhages in the subdural collections, concerning for acute on subacute SDH; right VPS in place. Eliquis was reversed in the ER. Patient noted to have staring episode which resolved ~few minutes. Wife reported staring episodes at home. Patient admitted to [facility] for higher level of care and further management with Neurosurgery consult. CAP EEG placed; Epilepsy following for assistance in management.  Per chart review, patient was recently started on Lacosamide 100 mg BID (filled 4/8) by Neurology group at [facility].    Recommendations:  - Discontinue vEEG monitoring  - Recommend to continue Lacosamide 150 mg BID  - No level drawn  - Avoid agents that lower seizure threshold  - NSGY planning for evacuation today  - Management of infectious/metabolic abnormalities per [facility]  - Seizure precautions  - Per [place] Law, patient must refrain from driving for 6 months after having a seizure or cleared by a neurologist to legally resume " driving      Discussed plan of care with NCC team. No family at bedside on rounds. Will sign off, please call with questions.    Subdural hemorrhage  S/p VPS  Hx of NPH s/p VPS c/b bilateral SDH s/p bilateral evacuation with subsequent seizure disorder maintained on Lacosamide 100 mg BID   - NSGY planning for evacuation today  - Management per NSGY, NCC    PAF (paroxysmal atrial fibrillation)  Hx of, On Eliquis  - Reversed in ER  - Management per NSGY, NCC        VTE Risk Mitigation (From admission, onward)           Ordered     Reason for No Pharmacological VTE Prophylaxis  Once        Question:  Reasons:  Answer:  Active Bleeding    04/23/25 1950     IP VTE HIGH RISK PATIENT  Once         04/23/25 1950     Place sequential compression device  Until discontinued         04/23/25 1950                    Pema Shepard PA-C  Neurology-Epilepsy  [facility]  Staff: Dr. Shepard       [1]   Current Facility-Administered Medications   Medication Dose Route Frequency Provider Last Rate Last Admin    acetaminophen tablet 650 mg  650 mg Oral Q6H PRN Shahla Washington MD   650 mg at 04/24/25 1555    bisacodyL suppository 10 mg  10 mg Rectal Daily PRN Yuridia Kaplan PA-C        hydrALAZINE injection 10 mg  10 mg Intravenous Q4H PRN Yuridia Kaplan PA-C   10 mg at 04/24/25 1419    labetalol 20 mg/4 mL (5 mg/mL) IV syring  10 mg Intravenous Q4H PRN Yuridia Kaplan PA-C   10 mg at 04/24/25 1601    lacosamide injection 150 mg  150 mg Intravenous Q12H Yuridia Kaplan PA-C   150 mg at 04/25/25 0937    levothyroxine tablet 125 mcg  125 mcg Oral Before breakfast Yuridia Kaplan PA-SUMEET   125 mcg at 04/25/25 0608    magnesium oxide tablet 800 mg  800 mg Oral PRN Yuridia Kaplan PA-C        magnesium oxide tablet 800 mg  800 mg Oral PRN Yuridia Kaplan PA-C        metoprolol succinate (TOPROL-XL) 24 hr split tablet 12.5 mg  12.5 mg Oral Daily Yuridia Kaplan PA-C   12.5 mg at 04/25/25 0938     mupirocin 2 % ointment   Nasal BID Baumgarten, Katherine L., MD        potassium bicarbonate disintegrating tablet 35 mEq  35 mEq Oral PRN Yuridia Kaplan PA-C        potassium bicarbonate disintegrating tablet 50 mEq  50 mEq Oral PRN Yuridia Kaplan PA-C        potassium bicarbonate disintegrating tablet 60 mEq  60 mEq Oral PRN Yuridia Kaplan PA-C        potassium, sodium phosphates 280-160-250 mg packet 2 packet  2 packet Oral PRN Yuridia Kaplan PA-C        potassium, sodium phosphates 280-160-250 mg packet 2 packet  2 packet Oral PRN NicYuridia pierre PA-C        potassium, sodium phosphates 280-160-250 mg packet 2 packet  2 packet Oral PRN Yuridia Kaplan PA-C        pravastatin tablet 20 mg  20 mg Oral QHS Yuridia Kaplan PA-C   20 mg at 04/24/25 2058    senna-docusate 8.6-50 mg per tablet 1 tablet  1 tablet Oral Daily Yuridia Kaplan PA-C   1 tablet at 04/25/25 0937    silodosin capsule 4 mg  4 mg Oral Daily Yuridia Kaplan PA-C   4 mg at 04/25/25 0937    sodium chloride 0.9% flush 10 mL  10 mL Intravenous PRN Yuridia Kaplan PA-C

## 2025-04-25 NOTE — SUBJECTIVE & OBJECTIVE
Interval History: NAEON. Pt exam stable this morning. Going to OR for SDH evacuation today.     Medications:  Continuous Infusions:  Scheduled Meds:   lacosamide (Vimpat) IV orderable  150 mg Intravenous Q12H    levothyroxine  125 mcg Oral Before breakfast    metoprolol succinate  12.5 mg Oral Daily    pravastatin  20 mg Oral QHS    senna-docusate  1 tablet Oral Daily    silodosin  4 mg Oral Daily     PRN Meds:  Current Facility-Administered Medications:     acetaminophen, 650 mg, Oral, Q6H PRN    bisacodyL, 10 mg, Rectal, Daily PRN    hydrALAZINE, 10 mg, Intravenous, Q4H PRN    labetalol, 10 mg, Intravenous, Q4H PRN    magnesium oxide, 800 mg, Oral, PRN    magnesium oxide, 800 mg, Oral, PRN    potassium bicarbonate, 35 mEq, Oral, PRN    potassium bicarbonate, 50 mEq, Oral, PRN    potassium bicarbonate, 60 mEq, Oral, PRN    potassium, sodium phosphates, 2 packet, Oral, PRN    potassium, sodium phosphates, 2 packet, Oral, PRN    potassium, sodium phosphates, 2 packet, Oral, PRN    sodium chloride 0.9%, 10 mL, Intravenous, PRN     Review of Systems  Objective:     Weight: 68 kg (149 lb 14.6 oz)  Body mass index is 20.91 kg/m².  Vital Signs (Most Recent):  Temp: 99 °F (37.2 °C) (04/25/25 0701)  Pulse: 69 (04/25/25 0827)  Resp: (!) 29 (04/25/25 0827)  BP: (!) 103/52 (04/25/25 0601)  SpO2: 100 % (04/25/25 0827) Vital Signs (24h Range):  Temp:  [97.9 °F (36.6 °C)-100.6 °F (38.1 °C)] 99 °F (37.2 °C)  Pulse:  [60-83] 69  Resp:  [14-33] 29  SpO2:  [96 %-100 %] 100 %  BP: (103-172)/(52-87) 103/52                         Male External Urinary Catheter 04/23/25 4220 (Active)   Collection Container Suction canister 04/25/25 0501   Securement Method secured to top of thigh w/ adhesive device 04/25/25 0501   Skin no redness;no breakdown 04/25/25 0501   Tolerance no signs/symptoms of discomfort 04/25/25 0501   Output (mL) 500 mL 04/25/25 0606   Catheter Change Date 04/23/25 04/25/25 0501          Physical Exam         Neurosurgery  Physical Exam  [id number]   Aphasic  Ox3  tracks to L  spont x4, int FC for RN overnight    Significant Labs:  Recent Labs   Lab 04/23/25  1553 04/24/25  0435 04/25/25  0440    137 136   K 4.8 4.2 4.3    101 101   CO2 27 25 23   BUN 23 19 20   CREATININE 1.1 1.1 1.0   CALCIUM 9.5 9.0 9.1   MG  --  2.1 2.1     Recent Labs   Lab 04/23/25  1553 04/24/25  0435 04/25/25  0440   WBC 11.06 8.54 9.45   HGB 12.5* 11.4* 11.9*   HCT 39.8* 36.1* 36.6*    205 208     Recent Labs   Lab 04/24/25  0435   INR 1.0   APTT 28.0     Microbiology Results (last 7 days)       ** No results found for the last 168 hours. **          All pertinent labs from the last 24 hours have been reviewed.    Significant Diagnostics:  CT: No results found in the last 24 hours.  I have reviewed all pertinent imaging results/findings within the past 24 hours.

## 2025-04-25 NOTE — HOSPITAL COURSE
"04/25/2025: OR today for SDH evacuation   04/26/2025: NAEON. POD1 s/p SDH evac. Post-op CTH good decompression and expected post-op changes. Post-op episode of seizure-like activity, described as "LUE & LLE shaking" lasting approximately 1 minute that resolved without intervention. Patient unarousable post-operatively and this AM. NGT placed. Repeat CTH obtained, stable. Procal, EEG ordered and pending.   04/27/2025:  POD2 s/p SDH evacuation. Procal elevated and patient febrile with low grade temp. Pan-cultured and started on broad spec antibiotics. Aggressive pulm toileting started yesterday. Overnight with Tmax 100.9°F. Cx with NGTD. Respiratory panel sent. US extremities ordered and pending to rule out DVT. Start lovenox pending neurosurgery recs.   04/28/2025: Continue aggressive pulmonary toileting. EEG with periodic complexes with some epileptiform morphology, optimized lacosamide to 200mg BID.   04/29/2025 EEG negative for seizures, small improvement after increasing vimpat yesterday. Discontinued EEG. CTH with new small R parietal IPH with mild surrounding edema. BUN 67, started enteral water flushes 250 q4h. MRSA swab negative, discontinued vanc. CXR with slight improvement from yesterday. Continue aggressive pulm toilet.   04/30/2025 CTH overnight with stable R IPH. POD5 and pt still not waking up. Relax to q2h neuro checks. BUN 72, increased enteral water to 300 q4h and given additional 300 ml x 2 today.   05/01/2025 exam remains stable. Started modafinil 100mg. Continue pulm toilet spaced to q6h and added IPV q6h.   05/02/2025 Tolerating Modafinil with mild improvement in level of alertness, increase to 200mg. Decreased O2 demands.   05/03/2025 Alertness slightly improving. Continue to monitor. Respiratory status improved, no longer requiring supplemental O2.  05/04/2025 Alertness slightly improved. On room air. NGT in place. Continue to monitor.  05/05/2025 Infectious workup started due to hypotension " requiring levophed, fever overnight of 100.8, and worsening leukocytosis. Blood cultures, repeat urinalysis, CXR. Stated on zosyn. Switched to DNR.  05/06/2025 Vimpat dose decreased, cEEG. Leukocytosis resolved. Continue zosyn, pending Bcx. UA negative for infection. Off pressors.  05/07/2025 Per NSGY, okay to resume eliquis. Family confirmed that they do not want to pursue with the PEG tube. EEG with moderate diffuse background slowing and bilateral, independent epileptiform discharges. Okay to remain on lower dose of vimpat. Continue with EEG monitoring.   05/08/2025 No seizures noted on EEG, discontinued order. SLP consulted again.  05/09/2025 Low grade fever of 100.9 overnight. UA negative for infection. Repeat CXR improved. Continuing to work with SLP.  05/10/2025 No events.  05/11/2025 No significant events overnight. Neuro exam stable at this moment

## 2025-04-25 NOTE — ASSESSMENT & PLAN NOTE
S/p VPS  Hx of NPH s/p VPS c/b bilateral SDH s/p bilateral evacuation with subsequent seizure disorder maintained on Lacosamide 100 mg BID   - NSGY planning for evacuation today  - Management per NSGY, NCC

## 2025-04-25 NOTE — SUBJECTIVE & OBJECTIVE
"Interval History: NAEON. No epileptiform activity on EEG. No family at bedside on rounds.    Current Medications[1]  Continuous Infusions:    Review of Systems  Objective:     Vital Signs (Most Recent):  Temp: 99 °F (37.2 °C) (04/25/25 0701)  Pulse: 67 (04/25/25 1036)  Resp: (!) 29 (04/25/25 0827)  BP: 134/63 (04/25/25 0801)  SpO2: 100 % (04/25/25 0827) Vital Signs (24h Range):  Temp:  [98.2 °F (36.8 °C)-100.6 °F (38.1 °C)] 99 °F (37.2 °C)  Pulse:  [60-83] 67  Resp:  [14-33] 29  SpO2:  [97 %-100 %] 100 %  BP: (103-172)/(51-87) 134/63     Weight: 68 kg (149 lb 14.6 oz)  Body mass index is 20.91 kg/m².     Physical Exam  Constitutional:       General: He is not in acute distress.     Appearance: He is not diaphoretic.   HENT:      Head: Normocephalic and atraumatic.      Comments: EEG removed  Eyes:      General: No scleral icterus.        Right eye: No discharge.         Left eye: No discharge.      Conjunctiva/sclera: Conjunctivae normal.      Pupils: Pupils are equal, round, and reactive to light.   Cardiovascular:      Rate and Rhythm: Normal rate.   Pulmonary:      Effort: Pulmonary effort is normal. No respiratory distress.   Musculoskeletal:         General: No deformity or signs of injury.   Skin:     General: Skin is warm and dry.   Psychiatric:      Comments: Unable to assess            NEUROLOGICAL EXAMINATION:     CRANIAL NERVES     CN III, IV, VI   Pupils are equal, round, and reactive to light.       Awake, tracks provider  Says "yes", no other speech  SHASHA OU    Corneal intact OU   + spontaneous eye opening   Face symmetric   Tongue midline   Follows commands in BUE  Spontaneous movements in BLE    Exam findings to suggest seizures:  Myoclonus - no   eye twitching - no   Nystagmus - no   gaze deviation - no   waxy rigidity - no        Significant Labs: All pertinent lab results from the past 24 hours have been reviewed.    Significant Studies: I have reviewed all pertinent imaging results/findings " within the past 24 hours.       [1]   Current Facility-Administered Medications   Medication Dose Route Frequency Provider Last Rate Last Admin    acetaminophen tablet 650 mg  650 mg Oral Q6H PRN Shahla Washington MD   650 mg at 04/24/25 1555    bisacodyL suppository 10 mg  10 mg Rectal Daily PRN Yuridia Kaplan PA-C        hydrALAZINE injection 10 mg  10 mg Intravenous Q4H PRN Yuridia Kaplan PA-C   10 mg at 04/24/25 1419    labetalol 20 mg/4 mL (5 mg/mL) IV syring  10 mg Intravenous Q4H PRN Yuridia Kaplan PA-C   10 mg at 04/24/25 1601    lacosamide injection 150 mg  150 mg Intravenous Q12H Yuridia Kaplan PA-C   150 mg at 04/25/25 0937    levothyroxine tablet 125 mcg  125 mcg Oral Before breakfast Yuridia Kaplan PA-C   125 mcg at 04/25/25 0608    magnesium oxide tablet 800 mg  800 mg Oral PRN Yuridia Kaplan PA-SUMEET        magnesium oxide tablet 800 mg  800 mg Oral PRN Yuridia Kaplan PA-C        metoprolol succinate (TOPROL-XL) 24 hr split tablet 12.5 mg  12.5 mg Oral Daily Yuridia Kaplan PA-C   12.5 mg at 04/25/25 0938    mupirocin 2 % ointment   Nasal BID Baumgarten, Katherine L., MD        potassium bicarbonate disintegrating tablet 35 mEq  35 mEq Oral PRN Yuridia aKplan PA-SUMEET        potassium bicarbonate disintegrating tablet 50 mEq  50 mEq Oral PRN NicShawn pierreison SSarita PA-C        potassium bicarbonate disintegrating tablet 60 mEq  60 mEq Oral PRN NicYuridia pierre PA-SUMEET        potassium, sodium phosphates 280-160-250 mg packet 2 packet  2 packet Oral PRN NicYuridia pierre S., PA-SUMEET        potassium, sodium phosphates 280-160-250 mg packet 2 packet  2 packet Oral PRN Nicaud, Yuridia S., PA-SUMEET        potassium, sodium phosphates 280-160-250 mg packet 2 packet  2 packet Oral PRN NicYuridia pierre PA-SUMEET        pravastatin tablet 20 mg  20 mg Oral QHS Yuridia Kaplan, ZOYA   20 mg at 04/24/25 2058    senna-docusate 8.6-50 mg per tablet 1 tablet  1 tablet Oral Daily Eusebio,  Yuridia DAVENPORT PA-C   1 tablet at 04/25/25 0937    silodosin capsule 4 mg  4 mg Oral Daily Yuridia Kaplan PA-C   4 mg at 04/25/25 0937    sodium chloride 0.9% flush 10 mL  10 mL Intravenous PRN Yuridia Kaplan PA-C

## 2025-04-25 NOTE — ASSESSMENT & PLAN NOTE
?breakthrough seizures (staring events)  85M PMHx of HTN, afib on Eliquis, AS s/p TAVR, CKD, UC, NPH s/p VPS c/b bilateral SDH s/p bilateral evacuation with subsequent seizure disorder maintained on Lacosamide 100 mg BID who presented with worsening weakness, urinary frequency, and speech changes. Imaging revealed interval increase in size of bilateral holohemispheric subdural collections, with new bilateral hemorrhages in the subdural collections, concerning for acute on subacute SDH; right VPS in place. Eliquis was reversed in the ER. Patient noted to have staring episode which resolved ~few minutes. Wife reported staring episodes at home. Patient admitted to [facility] for higher level of care and further management with Neurosurgery consult. CAP EEG placed; Epilepsy following for assistance in management.  Per chart review, patient was recently started on Lacosamide 100 mg BID (filled 4/8) by Neurology group at [facility].    Recommendations:  - Discontinue vEEG monitoring  - Recommend to continue Lacosamide 150 mg BID  - No level drawn  - Avoid agents that lower seizure threshold  - NSGY planning for evacuation today  - Management of infectious/metabolic abnormalities per [facility]  - Seizure precautions  - Per [place] Law, patient must refrain from driving for 6 months after having a seizure or cleared by a neurologist to legally resume driving      Discussed plan of care with [facility] team. No family at bedside on rounds. Will sign off, please call with questions.

## 2025-04-25 NOTE — PROCEDURES
24 hr. Video EEG Monitoring    Date/Time: 4/25/2025 9:22 AM    Performed by: Saleem Shepard MD  Authorized by: Yuridia Kaplan PA-C        ICU EEG/VIDEO MONITORING REPORT    DATE OF SERVICE: 4/24/25-4/25/25   EEG NUMBER: [id number]  REQUESTED BY: Eusebio  LOCATION OF SERVICE: [facility] [place]    METHODOLOGY   Electroencephalographic (EEG) recording is with electrodes placed according to the International 10-20 placement system.  Thirty two (32) channels of digital signal are simultaneously recorded from the scalp and may include EKG, EMG, and/or eye monitors.   Recording band pass was 0.1 to 512 hz.  Digital video recording of the patient is simultaneously recorded with the EEG.  The nursing staff report clinical symptoms and may press an event button when the patient has symptoms of clinical interest to the treating physicians.  EEG and video recording is stored and archived in digital format.  The entire recording is visually reviewed and the times identified by computer analysis as being spikes or seizures are reviewed again.  Activation procedures which include photic stimulation, hyperventilation and instructing patients to perform simple task are done in selected patients.   Compresses spectral analysis (CSA) is also performed on the activity recorded from each individual channel.  This is displayed as a power display of frequencies from 0 to 30 Hz over time.   The CSA analysis is done and displayed continuously.  This is reviewed for asymmetries in power between homologous areas of the scalp and for presence of changes in power which canbe seen when seizures occur.  Sections of suspected abnormalities on the CSA is then compared with the original EEG recording.     [employer] software was also utilized in the review of this study.  This software suite analyzes the EEG recording in multiple domains.  Coherence and rhythmicity is computed to identify EEG sections which may contain organized seizures.  Each channel  undergoes analysis to detect presence of spike and sharp waves which have special and morphological characteristic of epileptic activity.  The routine EEG recording is converted from spacial into frequency domain.  This is then displayed comparing homologous areas to identify areas of significant asymmetry.  Algorithm to identify non-cortically generated artifact is used to separate eye movement, EMG and other artifact from the EEG.      Recording Times  Start on 4/24/25 at 07:01  Stop on 4/25/25 at 08:59  Start on 4/24/25 at 09:48  Stop on 4/25/25 at 07:01  Start on 4/25/25 at 07:00  Stop on 4/25/25 at 08:30  A total of 25 hoursof EEG was recorded.    EEG FINDINGS  The record shows a good  organization at rest, consisting of a 8 Hz posterior dominant rhythm with good  reactivity. There is mild bilateral beta activity.  There is bilateral independent delta and theta range background slowing with shifting predominance.    Drowsiness is characterized by attenuation of the background, vertex waves, and bilateral theta slowing. Stage II sleep is characterized by slowing, vertex waves, and symmetric sleep spindles.     Provocative maneuvers including hyperventilation and photic stimulation were not performed.     EKG recording shows a regular rhythm.    There is no push button or clinical event.    IMPRESSION:  Abnormal study due to mild  diffuse background slowing consistent with diffuse cerebral dysfunction and encephalopathy which may be on the basis of toxic, metabolic, or primary neuronal disorder.       4/25/2025

## 2025-04-25 NOTE — PROGRESS NOTES
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 His wife at bedside reports concern for acute deconditioning over the past three days involving worsening weakness, urinary frequency, speech changes, intermittent cessation in interaction with family, and dragging of his R leg. CT head showed bilateral subdural hygromas with bilateral acute versus subacute SDH. He takes eliquis for A fib at home which was reversed with PCC in the ED. During evaluation of the patient, he was initially interactive, following, commands, and oriented to person and place. He then had a brief staring spell where he no longer followed commands and displayed no usable speech. This then resolved after a few minutes and he returned to baseline. He takes Vimpat 100 bid at home but has not gotten his nightly dose. Per his wife, these spells occurred at home as well.    He is admitted to [facility] for hourly neuro-monitoring and a higher level of care.     Hospital Course: 04/25/2025: OR today for SDH evacuation     Interval History: EEG unremarkable, will be removing. Going to OR today for evacuation and removal of shunt.     Review of Systems: Unable to obtain a complete ROS due to level of consciousness.     Vitals:   Temp: 98.4 °F (36.9 °C)  Pulse: 73  Rhythm: normal sinus rhythm  BP: (!) 103/48  MAP (mmHg): 67  Resp: (!) 30  SpO2: 100 %    Temp  Min: 98.2 °F (36.8 °C)  Max: 99 °F (37.2 °C)  Pulse  Min: 60  Max: 77  BP  Min: 103/48  Max: 146/65  MAP (mmHg)  Min: 67  Max: 108  Resp  Min: 17  Max: 33  SpO2  Min: 97 %  Max: 100  "%    04/24 0701 - 04/25 0700  In: 630 [P.O.:630]  Out: 1900 [Urine:1900]   Unmeasured Output  Urine Occurrence: 1  Pad Count: 2     Examination:   Constitutional: Well-nourished and -developed. No apparent distress.   Eyes: Conjunctiva clear, anicteric. Lids no lesions.  Head/Ears/Nose/Mouth/Throat/Neck: Moist mucous membranes. External ears, nose atraumatic.   Cardiovascular: Regular rhythm.   Respiratory: Comfortable respirations. Lungs clear to auscultation     Neurologic:  -GCS E4V3M6  -Alert. Answers with "yes" and "no", no other speech. Follows commands in upper extremities.   -Cranial nerves: PERRL, tracks provider, no facial assymetry.   -Motor AG in bilateral upper extremities, AG in left lower extremity, no antigravity movement in right lower extremity.       Medications:   Continuous Scheduledlacosamide (Vimpat) IV orderable, 150 mg, Q12H  levothyroxine, 125 mcg, Before breakfast  metoprolol succinate, 12.5 mg, Daily  mupirocin, , BID  pravastatin, 20 mg, QHS  senna-docusate, 1 tablet, Daily  silodosin, 4 mg, Daily    PRNacetaminophen, 650 mg, Q6H PRN  bacitracin, , PRN  bisacodyL, 10 mg, Daily PRN  BUPivacaine-EPINEPHrine (PF) 0.5%-1:200,000, , PRN  gelatin adsorbable 100cm top sponge, , PRN  hydrALAZINE, 10 mg, Q4H PRN  labetalol, 10 mg, Q4H PRN  LIDOcaine-EPINEPHrine 1%-1:100,000, , PRN  magnesium oxide, 800 mg, PRN  magnesium oxide, 800 mg, PRN  potassium bicarbonate, 35 mEq, PRN  potassium bicarbonate, 50 mEq, PRN  potassium bicarbonate, 60 mEq, PRN  potassium, sodium phosphates, 2 packet, PRN  potassium, sodium phosphates, 2 packet, PRN  potassium, sodium phosphates, 2 packet, PRN  sodium chloride 0.9%, 10 mL, PRN  thrombin (bovine), , PRN       Today I independently reviewed pertinent medications, imaging, laboratory results, notably:       Chem:   Recent Labs   Lab 04/25/25  0440      K 4.3      CO2 23   BUN 20   CREATININE 1.0   CALCIUM 9.1   MG 2.1   PHOS 4.8*   ANIONGAP 12   ALBUMIN " 3.1*   BILITOT 0.4   ALKPHOS 70   AST 13   ALT <5*     Heme:   Recent Labs   Lab 04/25/25  0440   WBC 9.45   HGB 11.9*   HCT 36.6*          Assessment/Plan:     Neuro  * Subdural hemorrhage  84 y/o M with 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 CT head with bilatateral acute versus subacute SDH and known VPS. His eliquis was reversed with PCC in the ED.    -Admit to [facility], NSGY following  -q1h neuro checks, vital checks  -EKG, ECHO, CXR  -Daily CBC, CMP, mag, phos  -SBP < 160, prn labetalol and hydralazine  -SCDs, hold DVT chemoppx in setting of acute bleed  -Vimpat 100 bid at home, increased to 150 bid   -PT/OT/SLP as appropriate  -Going to OR today for SD evacuation     Seizure disorder  - vimpat 150 bid     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   Tie off on 3/25 2/2 bilateral hygromas that were evacuated  Plan for removal today in OR     Cardiac/Vascular  PAF (paroxysmal atrial fibrillation)  Resume home toprol  Hold home eliquis, reversed on admission  Currently rate controlled    Essential hypertension  SBP < 160  PRN labetalol, hydralazine    Renal/  Urinary retention  Resume home silodosin    Endocrine  Acquired hypothyroidism  Resume home synthroid    GI  UC (ulcerative colitis)  Hold home colazal in acute setting          The patient is being Prophylaxed for:  Venous Thromboembolism with: Mechanical  Stress Ulcer with: None  Ventilator Pneumonia with: not applicable    Activity Orders            Diet NPO: NPO starting at 04/25 0001    Turn patient starting at 04/23 2000    Elevate HOB starting at 04/23 1948          Full Code    Critical care time spent on the evaluation and treatment of severe organ dysfunction, review of pertinent labs and imaging studies, discussions with consulting providers and discussions with patient/family: 30 minutes.      Noelle Mondragon,  ZOYA  Neurocritical Care  [facility] - Neuro Critical Care

## 2025-04-25 NOTE — EICU
Intervention Initiated From:  COR / FRANCOU    Juanis intervened regarding:  Rounding (Video assessment)    VICU Night Rounds Checklist  24H Vital Sign Range:  Temp:  [97.9 °F (36.6 °C)-100.6 °F (38.1 °C)]   Pulse:  [56-83]   Resp:  [13-33]   BP: (114-172)/(56-87)   SpO2:  [96 %-100 %]     Video rounds and LDA reconciliation

## 2025-04-25 NOTE — EICU
Virtual ICU Quality Rounds    Admit Date: 4/23/2025  Hospital Day: 2    ICU Day: 1d 13h    24H Vital Sign Range:  Temp:  [98.2 °F (36.8 °C)-100.6 °F (38.1 °C)]   Pulse:  [60-83]   Resp:  [14-33]   BP: (103-172)/(51-87)   SpO2:  [97 %-100 %]     VICU Surveillance Screening

## 2025-04-25 NOTE — ASSESSMENT & PLAN NOTE
2/2 NPH   Tie off on 3/25 2/2 bilateral hygromas that were evacuated  Plan for removal today in OR

## 2025-04-25 NOTE — ASSESSMENT & PLAN NOTE
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 CT head with bilatateral acute versus subacute SDH and known VPS. His eliquis was reversed with PCC in the ED.    -Admit to [facility], NSGY following  -q1h neuro checks, vital checks  -EKG, ECHO, CXR  -Daily CBC, CMP, mag, phos  -SBP < 160, prn labetalol and hydralazine  -SCDs, hold DVT chemoppx in setting of acute bleed  -Vimpat 100 bid at home, increased to 150 bid   -PT/OT/SLP as appropriate  -Going to OR today for SD evacuation

## 2025-04-25 NOTE — PT/OT/SLP PROGRESS
Physical Therapy      Patient Name:  Ramesh Ricci   MRN:  [id number]    Patient discharged from therapy at this time- OR for craniotomy.  Please re-consult with new PT orders as appropriate post-surgically.

## 2025-04-25 NOTE — ASSESSMENT & PLAN NOTE
84 yo with recent admission for VPS tie off and SDH evacuation on 3/24 presenting for new LSW and speech hesitation found to have enlarged chronic SDH as well as a new small R convexity acute SDH. Pt takes eliquis, s/p PCC. Interval CTH stable.     Recommendations:  --admitted to [facility]  --on eliquis, s/p kcentra reversal. Hold all AC/AP  --SBP <140  --Vimpat  --HOB >30  --f/u cEEG  --OR today, bilateral subdural reevac and shunt removal with Dr. Blankenship  --Continue to monitor clinically, notify NSGY immediately with any changes in neuro status    Discussed with Dr. Blankenship  Dispo: OR tomorrow

## 2025-04-25 NOTE — PT/OT/SLP PROGRESS
Occupational Therapy      Patient Name:  Ramesh Ricci   MRN:  [id number]    Patient not seen today secondary to off floor for craniotomy . OT will require new orders post surgical intervention for continuation of care. Will follow-up as appropriate.    4/25/2025

## 2025-04-26 NOTE — SUBJECTIVE & OBJECTIVE
Interval History: 4/26 POD1 s/p bilateral craniotomy for subdural hematoma evacuation. Extubated but unarousable this morning postop. CTH with good decompressions and expected changes. Admitted to ICU for monitoring    Medications:  Continuous Infusions:  Scheduled Meds:   lacosamide (Vimpat) IV orderable  150 mg Intravenous Q12H    levETIRAcetam (Keppra) IV (PEDS and ADULTS)  1,000 mg Intravenous Q12H    levothyroxine  125 mcg Oral Before breakfast    metoprolol succinate  12.5 mg Oral Daily    mupirocin   Nasal BID    pravastatin  20 mg Oral QHS    senna-docusate  1 tablet Oral Daily    silodosin  4 mg Oral Daily     PRN Meds:  Current Facility-Administered Medications:     acetaminophen, 650 mg, Oral, Q6H PRN    bisacodyL, 10 mg, Rectal, Daily PRN    hydrALAZINE, 10 mg, Intravenous, Q4H PRN    HYDROcodone-acetaminophen, 1 tablet, Oral, Q6H PRN    labetalol, 10 mg, Intravenous, Q4H PRN    magnesium oxide, 800 mg, Oral, PRN    magnesium oxide, 800 mg, Oral, PRN    morphine, 2 mg, Intravenous, Q6H PRN    potassium bicarbonate, 35 mEq, Oral, PRN    potassium bicarbonate, 50 mEq, Oral, PRN    potassium bicarbonate, 60 mEq, Oral, PRN    potassium, sodium phosphates, 2 packet, Oral, PRN    potassium, sodium phosphates, 2 packet, Oral, PRN    potassium, sodium phosphates, 2 packet, Oral, PRN    sodium chloride 0.9%, 10 mL, Intravenous, PRN     Review of Systems  Objective:     Weight: 68 kg (149 lb 14.6 oz)  Body mass index is 20.92 kg/m².  Vital Signs (Most Recent):  Temp: 98 °F (36.7 °C) (04/26/25 0701)  Pulse: 78 (04/26/25 0601)  Resp: (!) 30 (04/26/25 0601)  BP: (!) 118/56 (04/26/25 0601)  SpO2: 96 % (04/26/25 0601) Vital Signs (24h Range):  Temp:  [98 °F (36.7 °C)-98.9 °F (37.2 °C)] 98 °F (36.7 °C)  Pulse:  [65-88] 78  Resp:  [20-44] 30  SpO2:  [92 %-100 %] 96 %  BP: ()/(47-91) 118/56     Date 04/26/25 0700 - 04/27/25 0659   Shift 5964-0147 7253-3932 2537-9260 24 Hour Total   INTAKE   Shift Total(mL/kg)      "  OUTPUT   Urine(mL/kg/hr) 160   160   Shift Total(mL/kg) 160(2.4)   160(2.4)   Weight (kg) 68 68 68 68                       Male External Urinary Catheter 04/23/25 1858 (Active)   Collection Container Suction canister 04/25/25 0501   Securement Method secured to top of thigh w/ adhesive device 04/25/25 0501   Skin no redness;no breakdown 04/25/25 0501   Tolerance no signs/symptoms of discomfort 04/25/25 0501   Output (mL) 500 mL 04/25/25 0606   Catheter Change Date 04/23/25 04/25/25 0501          Physical Exam         Neurosurgery Physical Exam  [id number]  Extubated, breathing spontaneously, vitals stable. But unarousable  Brainstems intact  Minimal movement or reaction to noxious stimuli x4  Cranial incisions dressed CDI    Significant Labs:  Recent Labs   Lab 04/25/25  0440 04/26/25  0053    136   K 4.3 4.6    101   CO2 23 21*   BUN 20 28*   CREATININE 1.0 1.5*   CALCIUM 9.1 8.7   MG 2.1 2.1     Recent Labs   Lab 04/25/25  0440 04/26/25  0053   WBC 9.45 14.34*   HGB 11.9* 11.5*   HCT 36.6* 35.9*    237     No results for input(s): "LABPT", "INR", "APTT" in the last 48 hours.    Microbiology Results (last 7 days)       ** No results found for the last 168 hours. **          All pertinent labs from the last 24 hours have been reviewed.    Significant Diagnostics:  CT: No results found in the last 24 hours.  I have reviewed all pertinent imaging results/findings within the past 24 hours.  "

## 2025-04-26 NOTE — EICU
Virtual ICU Quality Rounds    Admit Date: 4/23/2025  Hospital Day: 3    ICU Day: 2d 12h    24H Vital Sign Range:  Temp:  [98 °F (36.7 °C)-98.9 °F (37.2 °C)]   Pulse:  [68-88]   Resp:  [20-44]   BP: ()/(47-91)   SpO2:  [92 %-100 %]     VICU Surveillance Screening                    LDA reconciliation : Yes

## 2025-04-26 NOTE — PLAN OF CARE
"[facility] Care Plan  POC reviewed with Ramesh Ricci and family at 1400. Family verbalized understanding. Questions and concerns addressed. No acute events today. Pt progressing toward goals. Will continue to monitor. See below and flowsheets for full assessment and VS info.     - CTH completed.  - 1 L NS given  - Pan-culture completed.  - Full bath given.  - Hydralazine x1  - EEG in place  - Oxygen via nasal cannula at 2L  - NGT placed, clamped.  - Pt's code status changed to "Partial code - DNI"        Is this a stroke patient? yes- Stroke booklet reviewed with patient and family, risk factors identified for patient and stroke booklet remains at bedside for ongoing education.     Care individualization: Suction pt as needed for increased thick secretion.    Neuro:  [place] Coma Scale  Best Eye Response: 1-->(E1) none  Best Motor Response: 4-->(M4) withdraws from pain  Best Verbal Response: 1-->(V1) none  [place] Coma Scale Score: 6  Assessment Qualifiers: no eye obstruction present  Pupil PERRLA: yes     24 hr Temp:  [98 °F (36.7 °C)-100.5 °F (38.1 °C)]     CV:   Rhythm: normal sinus rhythm  BP goals:   SBP < 160  MAP > 65    Resp:           Plan:  wean from nasal cannula    GI/:     Diet/Nutrition Received: NPO  Last Bowel Movement: 04/26/25  Voiding Characteristics: external catheter    Intake/Output Summary (Last 24 hours) at 4/26/2025 1838  Last data filed at 4/26/2025 1801  Gross per 24 hour   Intake 1196.63 ml   Output 895 ml   Net 301.63 ml     Unmeasured Output  Urine Occurrence: 1  Stool Occurrence: 1  Pad Count: 2    Labs/Accuchecks:  Recent Labs   Lab 04/26/25  0053   WBC 14.34*   RBC 3.84*   HGB 11.5*   HCT 35.9*         Recent Labs   Lab 04/26/25  0053      K 4.6   CO2 21*      BUN 28*   CREATININE 1.5*   ALKPHOS 66   ALT <5*   AST 13   BILITOT 0.3      Recent Labs   Lab 04/24/25  0435   INR 1.0   APTT 28.0    No results for input(s): "CPK", "CPKMB", "TROPONINI", "MB" in the last " 168 hours.    Electrolytes: N/A - electrolytes WDL  Accuchecks: none    Gtts:      LDA/Wounds:    Gabino Risk Assessment  Sensory Perception: 2-->very limited  Moisture: 3-->occasionally moist  Activity: 1-->bedfast  Mobility: 2-->very limited  Nutrition: 1-->very poor  Friction and Shear: 2-->potential problem  Gabino Score: 11    Is your gabino score 12 or less? yes            Restraints:        [facility]

## 2025-04-26 NOTE — NURSING
While doing straight cath to obtain urine sample for urinalysis with reflex, RN met some resistance during catheter insertion. After catheter go pass the resistance, some blood and blood clot flowed into bag. RN massages the bladder, and clear urine flow seen. HERMINIA.

## 2025-04-26 NOTE — NURSING
Witnessed seizure activity to bilateral lower extremities and left upper extremity.  Ness, NP notified and at bedside.

## 2025-04-26 NOTE — NURSING
Pt transferred via bed with monitor, O2, and ambu bag by Sharon RN to CT. Pt tolerated scan well. No acute events. [facility] VS stable. WCTM.

## 2025-04-26 NOTE — PROGRESS NOTES
Pharmacokinetic Initial Assessment: IV Vancomycin    Assessment/Plan:    Initiate intravenous vancomycin 15 mg/kg (1000 mg) IV once - patient's renal function declining - SCr up to 1.5 and UOP decreased.   Desired empiric serum trough concentration is 10 to 20 mcg/mL  Draw vancomycin random level on 4/27/25 at 0600.  Pharmacy will continue to follow and monitor vancomycin.      Please contact pharmacy at extension [phone] with any questions regarding this assessment.     Thank you for the consult,   Mally Anderson       Patient brief summary:  Ramesh Ricci is a 85 y.o. male initiated on antimicrobial therapy with IV Vancomycin for treatment of suspected sepsis    Drug Allergies:   Review of patient's allergies indicates:   Allergen Reactions    Benazepril Other (See Comments)     Cough       Actual Body Weight:   68 kg    Renal Function:   Estimated Creatinine Clearance: 33.4 mL/min (A) (based on SCr of 1.5 mg/dL (H)).,     Dialysis Method (if applicable):  KRISTAN    CBC (last 72 hours):  Recent Labs   Lab Result Units 04/24/25 0435 04/25/25 0440 04/26/25  0053   WBC K/uL 8.54 9.45 14.34*   HGB gm/dL 11.4* 11.9* 11.5*   HCT % 36.1* 36.6* 35.9*   Platelet Count K/uL 205 208 237   Lymph % % 21.0 13.4* 7.2*   Mono % % 10.0 13.0 12.3   Eos % % 0.8 0.5 0.0   Basophil % % 0.6 0.3 0.2       Metabolic Panel (last 72 hours):  Recent Labs   Lab Result Units 04/23/25 2018 04/24/25 0435 04/25/25 0440 04/26/25  0053   Sodium mmol/L  --  137 136 136   Potassium mmol/L  --  4.2 4.3 4.6   Chloride mmol/L  --  101 101 101   CO2 mmol/L  --  25 23 21*   Glucose mg/dL  --  100 107 151*   Glucose, UA  Negative  --   --   --    BUN mg/dL  --  19 20 28*   Creatinine mg/dL  --  1.1 1.0 1.5*   Albumin g/dL  --  3.1* 3.1* 2.8*   Bilirubin Total mg/dL  --  0.5 0.4 0.3   ALP unit/L  --  70 70 66   AST unit/L  --  13 13 13   ALT unit/L  --  <5* <5* <5*   Magnesium  mg/dL  --  2.1 2.1 2.1   Phosphorus Level mg/dL  --  4.2 4.8* 4.7*  "      Drug levels (last 3 results):  No results for input(s): "VANCOMYCINRA", "VANCORANDOM", "VANCOMYCINPE", "VANCOPEAK", "VANCOMYCINTR", "VANCOTROUGH" in the last 72 hours.    Microbiologic Results:  Microbiology Results (last 7 days)       Procedure Component Value Units Date/Time    Blood culture [[id number]]     Order Status: Sent Specimen: Blood     Blood culture [[id number]]     Order Status: Sent Specimen: Blood     Culture, Respiratory with Gram Stain [[id number]] Collected: 04/26/25 1428    Order Status: Sent Specimen: Respiratory from Sputum, Induced             "

## 2025-04-26 NOTE — ASSESSMENT & PLAN NOTE
84 yo with recent admission for VPS tie off and SDH evacuation on 3/24 presenting for new LSW and speech hesitation found to have enlarged chronic SDH as well as a new small R convexity acute SDH. Pt takes eliquis, s/p PCC. Interval CTH stable.     Now s/p bialteral subdural evacuations with VPS removal on 4/25    Recommendations:  --admitted to [facility]  --on eliquis, s/p kcentra reversal. Hold all AC/AP  --SBP <140  --Vimpat, unresponsive POD1 FU EEG and seizure workup today  --HOB >30  --f/u cEEG  --drains to suction, abx while in place  --Continue to monitor clinically, notify NSGY immediately with any changes in neuro status    Discussed with Dr. Blankenship

## 2025-04-26 NOTE — PLAN OF CARE
"[facility] Care Plan  POC reviewed with Ramesh Ricci and family at 1400. Patient and family verbalized understanding. Questions and concerns addressed. No acute events today. Pt progressing toward goals. Will continue to monitor. See below and flowsheets for full assessment and VS info.     - D/c EEG  - Shower cap hair wash given before going to OR.  - CT s/p bilateral crani completed        Is this a stroke patient? yes- Stroke booklet reviewed with patient and family, risk factors identified for patient and stroke booklet remains at bedside for ongoing education.     Care individualization: Turn pt every 2 hours.    Neuro:  [place] Coma Scale  Best Eye Response: 2-->(E2) to pain  Best Motor Response: 4-->(M4) withdraws from pain  Best Verbal Response: 1-->(V1) none  [place] Coma Scale Score: 7  Assessment Qualifiers: patient chemically sedated or paralyzed  Pupil PERRLA: yes     24 hr Temp:  [98.2 °F (36.8 °C)-99 °F (37.2 °C)]     CV:   Rhythm: normal sinus rhythm  BP goals:   SBP < 140  MAP > 65    Resp:           Plan:  wean from nasal cannula    GI/:     Diet/Nutrition Received: NPO  Last Bowel Movement: 04/24/25  Voiding Characteristics: external catheter    Intake/Output Summary (Last 24 hours) at 4/25/2025 1948  Last data filed at 4/25/2025 1819  Gross per 24 hour   Intake 1150 ml   Output 1100 ml   Net 50 ml     Unmeasured Output  Urine Occurrence: 1  Pad Count: 2    Labs/Accuchecks:  Recent Labs   Lab 04/25/25  0440   WBC 9.45   RBC 4.03*   HGB 11.9*   HCT 36.6*         Recent Labs   Lab 04/25/25  0440      K 4.3   CO2 23      BUN 20   CREATININE 1.0   ALKPHOS 70   ALT <5*   AST 13   BILITOT 0.4      Recent Labs   Lab 04/24/25  0435   INR 1.0   APTT 28.0    No results for input(s): "CPK", "CPKMB", "TROPONINI", "MB" in the last 168 hours.    Electrolytes: N/A - electrolytes WDL  Accuchecks: none    Gtts:      LDA/Wounds:    Gabino Risk Assessment  Sensory Perception: 3-->slightly " limited  Moisture: 3-->occasionally moist  Activity: 1-->bedfast  Mobility: 2-->very limited  Nutrition: 2-->probably inadequate  Friction and Shear: 2-->potential problem  Gabino Score: 13    Is your gabino score 12 or less? no            Restraints:        WC

## 2025-04-26 NOTE — ACP (ADVANCE CARE PLANNING)
Advance Care Planning     Date: 04/26/2025    Code Status  In light of the patients advanced and life limiting illness, family expressed wishes to make the patient a DNI. They expressed that they were okay with chest compressions and medications, but did not want the patient to be intubated and mechanically ventilated again. I communicated to the family that a Partial Code/DNI order would be placed in the patient's chart..    Jodie Perea PA-C  Neurocritical Care

## 2025-04-26 NOTE — EICU
[facility] Night Rounds Checklist  24H Vital Sign Range:  Temp:  [98.4 °F (36.9 °C)-99 °F (37.2 °C)]   Pulse:  [62-88]   Resp:  [20-44]   BP: ()/(47-91)   SpO2:  [92 %-100 %]     Video rounds and LDA reconciliation

## 2025-04-26 NOTE — OP NOTE
DATE OF PROCEDURE:  4/25/2025     SURGEON:  Jairo Blankenship M.D., Ph.D.     ASSISTANTS:  Ina Pringle PA-C ( a qualified resident was not available at the time of surgery.     PREOPERATIVE DIAGNOSES:  Normal pressure hydrocephalus.  Bilateral subdural hematomas.  Speech impairment.  Progressive weakness.        POSTOPERATIVE DIAGNOSES:  Normal pressure hydrocephalus.  Bilateral subdural hematomas.  Speech impairment.  Progressive weakness.      PROCEDURES:  1.   Right parieto-occipital.craniotomy for subdural hematoma.  2.   Placement of right subgaleal drain.  3.   Left parieto-occipital.craniotomy for subdural hematoma.  4.   Placement of left subgaleal drain.  5.   Removal of  shunt.   6.   Stealth navigation.      SPECIMEN: None.      INDICATIONS IN DETAIL:    Mr. Ramesh Ricci  is an 85 year-old man with a known history of normal pressure hydrocephalus.  He has had a shunt placement for this problem and had very temporary improvement.  However since that time he has had bilateral subdural hematomas that had required evacuation.  He has had his shunt turned that 2 the maximal setting and then tied off.  Unfortunately, the patient presents with worsening deconditioning.  He also has headaches in was dragging his right leg over last few days.  Who was seen in our emergency department and had subdural hematomas bilaterally.  These were slightly more posterior than his previous subdural hematomas and require evacuation.  Given the progression of the symptoms, I have also elected to remove the shunt completely     PROCEDURE IN DETAIL:    The patient was seen in the pretreatment area and the risks, benefits and alternatives were again discussed and the patient wished to proceed.  The patient was brought to the Operating Room and general anesthetic was administered.  All proper lines were placed.  The patient was placed in supine position with his neck flexed.  The patient's head was placed in 3-point  fixation using a [place] headholder.  The stealth navigation system was brought into the field and an accurate registration was achieved using the tracer function.  Over the subdural hematoma both eyes could be seen.  A line I will proximally 5 mm were areas parietal occipital region bilaterally.  The hair was clipped in the area using electric clippers. The previous incision for his shunt was also marked.  Aforementioned areas were cleaned, prepped and draped in the usual fashion.  A lidocaine/bupivacaine mix was infiltrated under the skin.  We began on the left side. An incision was made using a # 10 blade.  This was carried down to the calvarium using Bovie cautery.  The soft tissues were held in retraction using 2 cerebellar retractors.  Two bur holes were made using the high-speed [place] drill with an acorn bur.  First was at the superior aspect of the exposure. The 2nd was at the most inferior.  Then using high-speed [place] drill with the craniotome attachment, a bone flap was turned.  The bone flap was removed.  The dura was opened using a #15 blade. Both acute and chronic appearing blood was expressed from the opening.  The wound was irrigated copiously.  All membranes seen were removed and or cauterized.  The dura was tacked closed.  We then repeat this procedure on the right side. Once this was performed the bone flaps were replaced   Hemovac drains were placed bilaterally.  A separate incision was made over the shunt using a #10 blade,  the shunt was exposed and removed.   All incisions were closed in layers with staples in the skin.     Clean dressings were placed.  The patient was taken out of three-point fixation.  Patient was then awakened by the anesthesia staff.  At the patient point the patient was awake he was extubated.  Patient was then transferred to the ICU in stable condition.     EBL was less than 50 mL.     There were no procedure complications.     All counts were correct at surgery.      Dr. Jairo Blankenship was present during the entire procedure.

## 2025-04-26 NOTE — NURSING
Pt need to have EEG placed. EEG tech needs head incision dressing removed in order to please the wires. POLO Price notified OPHELIA Perea, who received permission to remove the dressing from [facility]. PA instructed RN to take the dressings off. RN completed the task. HERMINIA.

## 2025-04-26 NOTE — PT/OT/SLP PROGRESS
Occupational Therapy      Patient Name:  Ramesh Ricci   MRN:  [id number]    Patient not seen today secondary to  (pt leaving for CT upon 1st attempt 0955, somnolent per RN upon 2nd attempt 1103 & then RN hold upon 3rd attempt 1312 due to somnolence & desatting.). Will follow-up as appropriate.    4/26/2025

## 2025-04-26 NOTE — PROGRESS NOTES
[facility] - Neuro Critical [facility]  Neurosurgery  Progress Note    Subjective:     History of Present Illness: Ramesh Ricci is an 86 y/o male with a PMH of Afib, Ulcerative Colitis, HTN, and aortic stenosis. He also has normal pressure hydrocephalus s/p  shunt (now tied off) is presenting with multiple complaints.     Wife reports concern for acute deconditioning over the past few days.  She describes increased weakness, urinary frequency, and speech changes that is been progressing for the past 3 days.  She states that he seems to slumped towards the right side and has been dragging his right leg.  Previously ambulated with his walker.  She also mentions that he seems to have speech hesitancy.  Denies any fevers, chills, abdominal pain or diarrhea    Post-Op Info:  Procedure(s) (LRB):  CRANIOTOMY, FOR SUBDURAL HEMATOMA EVACUATION, bilateral. shunt removal (Bilateral)  REMOVAL, SHUNT, VENTRICULOPERITONEAL   1 Day Post-Op   Interval History: 4/26 POD1 s/p bilateral craniotomy for subdural hematoma evacuation. Extubated but unarousable this morning postop. CTH with good decompressions and expected changes. Admitted to ICU for monitoring    Medications:  Continuous Infusions:  Scheduled Meds:   lacosamide (Vimpat) IV orderable  150 mg Intravenous Q12H    levETIRAcetam (Keppra) IV (PEDS and ADULTS)  1,000 mg Intravenous Q12H    levothyroxine  125 mcg Oral Before breakfast    metoprolol succinate  12.5 mg Oral Daily    mupirocin   Nasal BID    pravastatin  20 mg Oral QHS    senna-docusate  1 tablet Oral Daily    silodosin  4 mg Oral Daily     PRN Meds:  Current Facility-Administered Medications:     acetaminophen, 650 mg, Oral, Q6H PRN    bisacodyL, 10 mg, Rectal, Daily PRN    hydrALAZINE, 10 mg, Intravenous, Q4H PRN    HYDROcodone-acetaminophen, 1 tablet, Oral, Q6H PRN    labetalol, 10 mg, Intravenous, Q4H PRN    magnesium oxide, 800 mg, Oral, PRN    magnesium oxide, 800 mg, Oral, PRN    morphine, 2 mg, Intravenous,  Q6H PRN    potassium bicarbonate, 35 mEq, Oral, PRN    potassium bicarbonate, 50 mEq, Oral, PRN    potassium bicarbonate, 60 mEq, Oral, PRN    potassium, sodium phosphates, 2 packet, Oral, PRN    potassium, sodium phosphates, 2 packet, Oral, PRN    potassium, sodium phosphates, 2 packet, Oral, PRN    sodium chloride 0.9%, 10 mL, Intravenous, PRN     Review of Systems  Objective:     Weight: 68 kg (149 lb 14.6 oz)  Body mass index is 20.92 kg/m².  Vital Signs (Most Recent):  Temp: 98 °F (36.7 °C) (04/26/25 0701)  Pulse: 78 (04/26/25 0601)  Resp: (!) 30 (04/26/25 0601)  BP: (!) 118/56 (04/26/25 0601)  SpO2: 96 % (04/26/25 0601) Vital Signs (24h Range):  Temp:  [98 °F (36.7 °C)-98.9 °F (37.2 °C)] 98 °F (36.7 °C)  Pulse:  [65-88] 78  Resp:  [20-44] 30  SpO2:  [92 %-100 %] 96 %  BP: ()/(47-91) 118/56     Date 04/26/25 0700 - 04/27/25 0659   Shift 5663-9869 7069-2128 4660-5425 24 Hour Total   INTAKE   Shift Total(mL/kg)       OUTPUT   Urine(mL/kg/hr) 160   160   Shift Total(mL/kg) 160(2.4)   160(2.4)   Weight (kg) 68 68 68 68                       Male External Urinary Catheter 04/23/25 1858 (Active)   Collection Container Suction canister 04/25/25 0501   Securement Method secured to top of thigh w/ adhesive device 04/25/25 0501   Skin no redness;no breakdown 04/25/25 0501   Tolerance no signs/symptoms of discomfort 04/25/25 0501   Output (mL) 500 mL 04/25/25 0606   Catheter Change Date 04/23/25 04/25/25 0501          Physical Exam         Neurosurgery Physical Exam  E1V1M1  Extubated, breathing spontaneously, vitals stable. But unarousable  Brainstems intact  Minimal movement or reaction to noxious stimuli x4  Cranial incisions dressed CDI    Significant Labs:  Recent Labs   Lab 04/25/25  0440 04/26/25  0053    136   K 4.3 4.6    101   CO2 23 21*   BUN 20 28*   CREATININE 1.0 1.5*   CALCIUM 9.1 8.7   MG 2.1 2.1     Recent Labs   Lab 04/25/25  0440 04/26/25  0053   WBC 9.45 14.34*   HGB 11.9* 11.5*   HCT  "36.6* 35.9*    237     No results for input(s): "LABPT", "INR", "APTT" in the last 48 hours.    Microbiology Results (last 7 days)       ** No results found for the last 168 hours. **          All pertinent labs from the last 24 hours have been reviewed.    Significant Diagnostics:  CT: No results found in the last 24 hours.  I have reviewed all pertinent imaging results/findings within the past 24 hours.  Assessment/Plan:     * Subdural hemorrhage  84 yo with recent admission for VPS tie off and SDH evacuation on 3/24 presenting for new LSW and speech hesitation found to have enlarged chronic SDH as well as a new small R convexity acute SDH. Pt takes eliquis, s/p PCC. Interval CTH stable.     Now s/p bialteral subdural evacuations with VPS removal on 4/25    Recommendations:  --admitted to [facility]  --on eliquis, s/p kcentra reversal. Hold all AC/AP  --SBP <140  --Vimpat, unresponsive POD1 FU EEG and seizure workup today  --HOB >30  --f/u cEEG  --drains to suction, abx while in place  --Continue to monitor clinically, notify NSGY immediately with any changes in neuro status    Discussed with Dr. Krzysztof Jurado MD  Neurosurgery  [facility] - Neuro Critical Care  "

## 2025-04-26 NOTE — PT/OT/SLP PROGRESS
Physical Therapy      Patient Name:  Ramesh Ricci   MRN:  [id number]    PT orders received and acknowledged. Patient not seen today secondary to going DAMIAN for CT upon attempt at 0955, increased lethargy at 1103, and respiratory instability at 1312. Will follow-up as appropriate for PT re-evaluation.    4/26/2025

## 2025-04-27 NOTE — PROGRESS NOTES
"[facility] - Neuro Critical Care  Neurocritical Care  Progress Note    Admit Date: 4/23/2025  Service Date: 04/26/2025  Length of Stay: 3    Subjective:     Chief Complaint: Subdural hemorrhage    History of Present Illness: Ramesh Ricci is an 86 y/o male with a PMH of Afib on eliquis at home, Ulcerative Colitis, HTN, aortic stenosis, and VPS 2/2 NPH c/b bilateral SDH s/p bilateral evac and VPS tie off in March 2025 who presents to [facility] with acute on subacute bilateral SDH. His wife at bedside reports concern for acute deconditioning over the past three days involving worsening weakness, urinary frequency, speech changes, intermittent cessation in interaction with family, and dragging of his R leg. CT head showed bilateral subdural hygromas with bilateral acute versus subacute SDH. He takes eliquis for A fib at home which was reversed with PCC in the ED. During evaluation of the patient, he was initially interactive, following, commands, and oriented to person and place. He then had a brief staring spell where he no longer followed commands and displayed no usable speech. This then resolved after a few minutes and he returned to baseline. He takes Vimpat 100 bid at home but has not gotten his nightly dose. Per his wife, these spells occurred at home as well.    He is admitted to [facility] for hourly neuro-monitoring and a higher level of care.     Hospital Course: 04/25/2025: OR today for SDH evacuation   04/26/2025: NAEON. POD1 s/p SDH evac. Post-op CTH good decompression and expected post-op changes. Post-op episode of seizure-like activity, described as "LUE & LLE shaking" lasting approximately 1 minute that resolved without intervention. Patient unarousable post-operatively and this AM. NGT placed. Repeat CTH obtained, stable. Procal, EEG ordered and pending.     Interval History:  see hospital course above    Review of Systems   Unable to perform ROS: Other (level of consciousness)      Objective:     Vitals:  Temp: " 99.4 °F (37.4 °C)  Pulse: 77  Rhythm: normal sinus rhythm  BP: 137/60  MAP (mmHg): 86  Resp: (!) 26  SpO2: 97 %    Temp  Min: 98 °F (36.7 °C)  Max: 100.5 °F (38.1 °C)  Pulse  Min: 61  Max: 87  BP  Min: 98/47  Max: 183/65  MAP (mmHg)  Min: 68  Max: 95  Resp  Min: 20  Max: 32  SpO2  Min: 92 %  Max: 100 %    04/25 0701 - 04/26 0700  In: 1000 [I.V.:1000]  Out: 700 [Urine:600; Drains:100]   Unmeasured Output  Urine Occurrence: 1  Stool Occurrence: 1  Pad Count: 2        Physical Exam  Vitals and nursing note reviewed.   Constitutional:       General: He is not in acute distress.     Appearance: Normal appearance.      Comments: Elderly male. Well developed. Well nourished. Chronically ill appearing.   HENT:      Head: Normocephalic.      Right Ear: External ear normal.      Left Ear: External ear normal.      Nose: Nose normal.      Comments: NGT in place.     Mouth/Throat:      Mouth: Mucous membranes are moist.      Pharynx: Oropharynx is clear.   Eyes:      General: No scleral icterus.     Pupils: Pupils are equal, round, and reactive to light.   Cardiovascular:      Rate and Rhythm: Normal rate and regular rhythm.   Pulmonary:      Effort: Pulmonary effort is normal. No respiratory distress.   Abdominal:      General: Abdomen is flat. There is no distension.   Musculoskeletal:      Right lower leg: No edema.      Left lower leg: No edema.   Skin:     General: Skin is warm and dry.   Neurological:      Mental Status: He is alert.      Comments: E1V1M4  Not on sedation. Does not open eyes to noxious stimuli. Does not answer questions. Does not follow commands.   PERRL. Gaze midline, no nystagmus. No gross facial asymmetry.   Withdraws in LUE, postures in RUE, triple flexion in bilateral lower extremities. Sensation intact to noxious stimuli. No involuntary movements noted.        Unable to test orientation, language, memory, judgment, insight, fund of knowledge, hearing, shoulder shrug, tongue protrusion, coordination,  gait due to level of consciousness.       Medications:  Continuous Scheduledacetylcysteine 100 mg/ml (10%), 4 mL, Q4H  albuterol-ipratropium, 3 mL, Q4H  lacosamide (Vimpat) IV orderable, 150 mg, Q12H  levETIRAcetam (Keppra) IV (PEDS and ADULTS), 500 mg, Q12H  [START ON 4/27/2025] levothyroxine, 125 mcg, Before breakfast  metoprolol tartrate, 12.5 mg, Q8H  mupirocin, , BID  piperacillin-tazobactam (Zosyn) IV (PEDS and ADULTS) (extended infusion is not appropriate), 4.5 g, Q8H  pravastatin, 20 mg, QHS  [START ON 4/27/2025] senna-docusate, 1 tablet, Daily  [START ON 4/27/2025] silodosin, 4 mg, Daily    PRNacetaminophen, 650 mg, Q6H PRN  bisacodyL, 10 mg, Daily PRN  hydrALAZINE, 10 mg, Q4H PRN  labetalol, 10 mg, Q4H PRN  magnesium oxide, 800 mg, PRN  magnesium oxide, 800 mg, PRN  oxyCODONE, 5 mg, Q6H PRN  potassium bicarbonate, 35 mEq, PRN  potassium bicarbonate, 50 mEq, PRN  potassium bicarbonate, 60 mEq, PRN  potassium, sodium phosphates, 2 packet, PRN  potassium, sodium phosphates, 2 packet, PRN  potassium, sodium phosphates, 2 packet, PRN  sodium chloride 0.9%, 10 mL, PRN  vancomycin - pharmacy to dose, , pharmacy to manage frequency      Today I personally reviewed pertinent medications, lines/drains/airways, imaging, cardiology results, laboratory results, microbiology results, notably:    Laboratory:  CBC:  Recent Labs   Lab 04/26/25 0053   WBC 14.34*   RBC 3.84*   HGB 11.5*   HCT 35.9*      MCV 94   MCH 29.9   MCHC 32.0       CMP:  Recent Labs   Lab 04/26/25 0053   GLUCOSE 151*   CALCIUM 8.7   ALBUMIN 2.8*      K 4.6   CO2 21*      BUN 28*   CREATININE 1.5*   ALKPHOS 66   ALT <5*   AST 13   BILITOT 0.3     Recent Labs   Lab 04/26/25 0053   MG 2.1   PHOS 4.7*       Coagulation:  Recent Labs   Lab 04/24/25  0435 04/25/25  0440 04/26/25 0053   LABPROT 6.9  11.2   < > 6.8   INR 1.0  --   --    APTT 28.0  --   --     < > = values in this interval not displayed.       Imaging:  CT Head Withotu  Contrast:  Impression:  Evolving operative changes with bilateral parietal craniotomies and subdural hematoma evacuation  Continue though reduced sized mixed density extra-axial collections overlie the cerebral convexities compatible with improving postoperative gas fluid and residual hemorrhage.  No significant new hemorrhage or new abnormal parenchymal attenuation.  Probable partial re-expansion of the ventricles without definite hydrocephalus  Clinical correlation and continued follow-up advised.  Electronically signed by:Emerson Liu DO  Date:                                            04/26/2025  Time:                                           10:19    CT Head Without Contrast:  Impression:  Interval postoperative changes of bilateral craniotomies for subdural hematoma evacuation.  Continued residual mixed density subdural fluid collections as above, significantly decreased in size from prior.  No evidence of significant mass effect or midline shift.  Interval  shunt removal.  No hydrocephalus.  Electronically signed by:Gabe Gale  Date:                                            04/25/2025  Time:                                           21:20           Diet  Diet NPO  Diet NPO        Assessment/Plan:     Neuro  * Subdural hemorrhage  86 y/o M with VPS 2/2 NPH c/b bilateral SDH s/p bilateral evac and VPS tie off in March 2025 who re-presented to [facility] on 4/23 after 3 day of decline in overal functional status. CT head with bilatateral acute versus subacute SDH and known VPS. His eliquis was reversed with PCC in the ED. Now post-op SDH evac 4/25    -Admit to [facility], NSGY following  -q1h neuro checks, vital checks  -Post-op CTH (4/25) with good decompression and expected postop changes  -CTH 4/26 stable  -EKG, ECHO, CXR  -Daily CBC, CMP, mag, phos  -Goal eunatremia, euvolemia  -SBP < 160, prn labetalol and hydralazine  -Continue vimpat 150mg BID, keppra 500mg BID    -Seizure precautions  -PT/OT/SLP as  appropriate  -SCDs, hold DVT chemoppx in setting of acute bleed    Seizure disorder  - vimpat 150 bid   - keppra 500mg bid   - EEG pending    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   Tie off on 3/25 2/2 bilateral hygromas that were evacuated  Plan for removal today in OR     Cardiac/Vascular  Coronary artery disease involving native coronary artery of native heart without angina pectoris  Resume home statin  Hold home eliquis    PAF (paroxysmal atrial fibrillation)  Resume home toprol  Hold home eliquis, reversed on admission  Currently rate controlled    Left ventricular diastolic dysfunction, NYHA class 1  ECHO pending    Essential hypertension  SBP < 160  PRN labetalol, hydralazine    Renal/  Urinary retention  Resume home silodosin    Endocrine  Acquired hypothyroidism  Resume home synthroid    GI  UC (ulcerative colitis)  Hold home colazal in acute setting          The patient is being Prophylaxed for:  Venous Thromboembolism with: Mechanical  Stress Ulcer with: Not Applicable   Ventilator Pneumonia with: not applicable    Activity Orders            Diet NPO: NPO starting at 04/25 0001    Turn patient starting at 04/23 2000    Elevate HOB starting at 04/23 1948          Partial Code    Critical condition in that Patient has a condition that poses threat to life and bodily function: see problem list above     50 minutes of Critical care time was spent personally by me on the following activities: development of treatment plan with patient or surrogate and bedside caregivers, discussions with consultants, evaluation of patient's response to treatment, examination of patient, ordering and performing treatments and interventions, ordering and review of laboratory studies, ordering and review of radiographic studies, pulse oximetry, antibiotic titration if applicable, re-evaluation of patient's condition. This critical care time did not overlap with that of any other provider or involve time for any procedures.  There is high probability for acute neurological change leading to clinical and possibly life-threatening deterioration requiring highest level of provider preparedness for urgent intervention.     Jodie Perea PA-C  Neurocritical Care  [facility] - Neuro Critical Care

## 2025-04-27 NOTE — PT/OT/SLP PROGRESS
Physical Therapy    Update    Ramesh Sr Ricci   MRN: [id number]    Attempted to see patient for PT re-evaluation this morning at 1000. Spoke with RN who confirms patient is unable to arouse/awaken at this time so we will hold off on PT at this time. Will continue to follow-along and plan to re-evaluate once more awake and able to participate. No billable units today.    Blayne Elizabeth, PT  4/27/2025

## 2025-04-27 NOTE — PLAN OF CARE
Recommendations    --TF recommendation: Isosource 1.5 @ 50 mL/hr to provide 1200 mL total volume, 1800 kcal, 211 g CHO, 82 g protein, 18 g fiber, 916 mL free water      -150 mL free water flush q4 to provide an additional 900 mL free water (1816 mL total)      --Diet advance to goal Regular diet as tolerated and clinically indicated- texture per SLP    --Recommend weekly weights    --Nursing: please continue to document rate, formula, % meal eaten and supplements on flowsheets    --Encourage good intakes and provide feeding assistance as needed    Goals:   1.  75% nutritional needs met with diet/EN/PN during admission    2. Maintain dry weight during admission    Nutrition Goal Status: new

## 2025-04-27 NOTE — CONSULTS
[facility] - Neuro Critical Care  Adult Nutrition  Progress Note    SUMMARY       Recommendations    --TF recommendation: Isosource 1.5 @ 50 mL/hr to provide 1200 mL total volume, 1800 kcal, 211 g CHO, 82 g protein, 18 g fiber, 916 mL free water      -150 mL free water flush q4 to provide an additional 900 mL free water (1816 mL total)      --Diet advance to goal Regular diet as tolerated and clinically indicated- texture per SLP    --Recommend weekly weights    --Nursing: please continue to document rate, formula, % meal eaten and supplements on flowsheets    --Encourage good intakes and provide feeding assistance as needed    Goals:   1.  75% nutritional needs met with diet/EN/PN during admission    2. Maintain dry weight during admission    Nutrition Goal Status: new  Communication of RD Recs: other (comment) (POC)    Nutrition Discharge Planning    Nutrition Discharge Planning: Too early to determine, pending clinical course, General healthy diet    Assessment and Plan    Nutrition Problem  Inadequate enteral infusion    Related to (etiology):   EN not yet initiated    Signs and Symptoms (as evidenced by):   EEN> energy intake, NPO     Interventions/Recommendations (treatment strategy):  EN, diet advancement    Nutrition Diagnosis Status:   New      Malnutrition Assessment     No concerns at this time.  Provide nutrition within 24 hours    Reason for Assessment    Reason For Assessment: consult, new tube feeding  Diagnosis: other (see comments) (subdural hemorrhage)  General Information Comments: Consult received for TF recs.  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  Patient is currently NPO with Impact Peptide @45 mL/hr ordered- not currently running.  Patient is s/p SDH evac 4/26.  NGT placed today.  Prior to NPO status, patient with 50% intake of  "regular diet.  +BM 4/26.  No GI s/s noted.  Weight stable x 3 months. 8 lb weight loss x 12 months (5%- non-significant).    Nutrition/Diet History    Spiritual, Cultural Beliefs, Uatsdin Practices, Values that Affect Care: no  Food Allergies: NKFA  Factors Affecting Nutritional Intake: NPO    Anthropometrics    Height: 5' 10.98" (180.3 cm)  Height (inches): 70.98 in  Height Method: Measured  Weight: 68 kg (149 lb 14.6 oz)  Weight (lb): 149.91 lb  Weight Method: Bed Scale  Ideal Body Weight (IBW), Male: 171.88 lb  % Ideal Body Weight, Male (lb): 87.22 %  BMI (Calculated): 20.9  BMI Grade: less than 23 (older than 65 years) - underweight       Lab/Procedures/Meds    Pertinent Labs Reviewed: reviewed  Pertinent Labs Comments: Renal function decreased- trending up, -151 x 24 hours  Pertinent Medications Reviewed: reviewed  Pertinent Medications Comments: Acetylcysteine, lacosamide, levothyroxine, zosyn, vanco, pravastatin, bowel reg    Estimated/Assessed Needs    Weight Used For Calorie Calculations: 68 kg (149 lb 14.6 oz)  Energy Calorie Requirements (kcal): 7435-6466 kcal (25-30 kcal/kg)  Energy Need Method: Kcal/kg  Protein Requirements: 68-82 g (1.0-1.2 g/kg)  Weight Used For Protein Calculations: 68 kg (149 lb 14.6 oz)     Estimated Fluid Requirement Method: RDA Method  RDA Method (mL): 1700  CHO Requirement: 213 g      Nutrition Prescription Ordered    Current Diet Order: NPO    Evaluation of Received Nutrient/Fluid Intake    Energy Calories Required: not meeting needs  Protein Required: not meeting needs  Fluid Required: not meeting needs  Comments: LBM 4/22  Tolerance: other (see comments) (TF not initiated)  % Intake of Estimated Energy Needs: 0 - 25 %  % Meal Intake: NPO    Nutrition Risk    Level of Risk/Frequency of Follow-up: high     Monitor and Evaluation    Monitor and Evaluation: Energy intake, Food and beverage intake, Protein intake, Carbohydrate intake, Enteral and parenteral nutrition " administration, Diet order, Glucose/endocrine profile, Gastrointestinal profile, Electrolyte and renal panel, Inflammatory profile, Lipid profile     Nutrition Follow-Up    RD Follow-up?: Yes

## 2025-04-27 NOTE — EICU
Intervention Initiated From:  [facility] Night Rounds Checklist  24H Vital Sign Range:  Temp:  [98 °F (36.7 °C)-100.5 °F (38.1 °C)]   Pulse:  [61-86]   Resp:  [20-32]   BP: ()/(51-66)   SpO2:  [92 %-100 %]     Video rounds    Pressure injury prevention panel (order)

## 2025-04-27 NOTE — SUBJECTIVE & OBJECTIVE
Interval History: 4/27: POD2 s/p bilateral craniotomy for subdural hematoma evacuation. NAEON. Neuro exam stable. Febrile with Tmax at 100.5 will monitor. Drain output: 30 cc and 15 cc. EEG negative.    Medications:  Continuous Infusions:  Scheduled Meds:   acetylcysteine 100 mg/ml (10%)  4 mL Nebulization Q4H    albuterol-ipratropium  3 mL Nebulization Q4H    lacosamide (Vimpat) IV orderable  150 mg Intravenous Q12H    levothyroxine  125 mcg Per NG tube Before breakfast    metoprolol tartrate  12.5 mg Per NG tube Q8H    mupirocin   Nasal BID    piperacillin-tazobactam (Zosyn) IV (PEDS and ADULTS) (extended infusion is not appropriate)  4.5 g Intravenous Q8H    pravastatin  20 mg Per NG tube QHS    senna-docusate  1 tablet Per NG tube Daily    silodosin  4 mg Per NG tube Daily     PRN Meds:  Current Facility-Administered Medications:     acetaminophen, 650 mg, Per NG tube, Q6H PRN    bisacodyL, 10 mg, Rectal, Daily PRN    hydrALAZINE, 10 mg, Intravenous, Q4H PRN    labetalol, 10 mg, Intravenous, Q4H PRN    magnesium oxide, 800 mg, Per NG tube, PRN    magnesium oxide, 800 mg, Per NG tube, PRN    oxyCODONE, 5 mg, Per NG tube, Q6H PRN    potassium bicarbonate, 35 mEq, Per NG tube, PRN    potassium bicarbonate, 50 mEq, Per NG tube, PRN    potassium bicarbonate, 60 mEq, Per NG tube, PRN    potassium, sodium phosphates, 2 packet, Per NG tube, PRN    potassium, sodium phosphates, 2 packet, Per NG tube, PRN    potassium, sodium phosphates, 2 packet, Per NG tube, PRN    sodium chloride 0.9%, 10 mL, Intravenous, PRN    Pharmacy to dose Vancomycin consult, , , Once **AND** vancomycin - pharmacy to dose, , Intravenous, pharmacy to manage frequency     Review of Systems  Objective:     Weight: 68 kg (149 lb 14.6 oz)  Body mass index is 20.92 kg/m².  Vital Signs (Most Recent):  Temp: 98.9 °F (37.2 °C) (04/27/25 1101)  Pulse: 83 (04/27/25 1338)  Resp: 16 (04/27/25 1209)  BP: (!) 184/77 (04/27/25 1338)  SpO2: 98 % (04/27/25 1209)  Vital Signs (24h Range):  Temp:  [98.8 °F (37.1 °C)-100.9 °F (38.3 °C)] 98.9 °F (37.2 °C)  Pulse:  [61-94] 83  Resp:  [9-32] 16  SpO2:  [92 %-100 %] 98 %  BP: ()/(48-77) 184/77     Date 04/27/25 0700 - 04/28/25 0659   Shift 0671-5709 1145-3376 4059-5346 24 Hour Total   INTAKE   NG/GT 55   55   IV Piggyback 115   115   Shift Total(mL/kg) 170(2.5)   170(2.5)   OUTPUT   Urine(mL/kg/hr) 110   110   Shift Total(mL/kg) 110(1.6)   110(1.6)   Weight (kg) 68 68 68 68              Oxygen Concentration (%):  [2] 2             Closed/Suction Drain 04/25/25 1703 Right Scalp Accordion 10 Fr. (Active)   Site Description Unable to view 04/27/25 1101   Output (mL) 30 mL 04/26/25 1801            Closed/Suction Drain 04/25/25 1728 Left Scalp Accordion 10 Fr. (Active)   Site Description Unable to view 04/27/25 1101   Output (mL) 15 mL 04/26/25 1801            NG/OG Tube 04/26/25 0819 14 Fr. Right nostril (Active)   Placement Check other (see comments);placement verified by x-ray 04/27/25 1101   Tolerance no signs/symptoms of discomfort 04/27/25 1101   Securement secured to nostril center w/ adhesive device 04/27/25 1101   Clamp Status/Tolerance clamped 04/27/25 1101   Suction Setting/Drainage Method suction at the bedside 04/27/25 1101   Insertion Site Appearance no redness, warmth, tenderness, skin breakdown, drainage 04/27/25 1101   Drainage None 04/27/25 1101   Intake (mL) 55 mL 04/27/25 0901       Male External Urinary Catheter 04/23/25 1858 (Active)   Collection Container Urimeter 04/27/25 1101   Securement Method secured to top of thigh w/ adhesive device 04/27/25 1101   Skin no redness;no breakdown 04/27/25 1101   Tolerance no signs/symptoms of discomfort 04/27/25 1101   Output (mL) 110 mL 04/27/25 0701   Catheter Change Date 04/26/25 04/26/25 0819          Physical Exam         Neurosurgery Physical Exam    [id number]  Extubated, breathing spontaneously, vitals stable. But unarousable  Brainstems intact  Minimal movement or  "reaction to noxious stimuli x4  Cranial incisions dressed CDI    Significant Labs:  Recent Labs   Lab 04/26/25  0053 04/27/25  0028    138   K 4.6 4.4    104   CO2 21* 18*   BUN 28* 29*   CREATININE 1.5* 1.3   CALCIUM 8.7 9.0   MG 2.1 2.3     Recent Labs   Lab 04/26/25  0053 04/27/25  0028   WBC 14.34* 16.29*   HGB 11.5* 11.9*   HCT 35.9* 37.5*    247     No results for input(s): "LABPT", "INR", "APTT" in the last 48 hours.  Microbiology Results (last 7 days)       Procedure Component Value Units Date/Time    Respiratory Infection Panel (PCR), Nasopharyngeal [[id number]] Collected: 04/27/25 1004    Order Status: Sent Specimen: Nasopharyngeal Swab Updated: 04/27/25 1222    Blood culture [[id number]]  (Normal) Collected: 04/26/25 1628    Order Status: Completed Specimen: Blood from Peripheral, Antecubital, Right Updated: 04/27/25 0203     Blood Culture No Growth After 6 Hours    Blood culture [[id number]]  (Normal) Collected: 04/26/25 1628    Order Status: Completed Specimen: Blood from Peripheral, Forearm, Left Updated: 04/27/25 0203     Blood Culture No Growth After 6 Hours    Culture, Respiratory with Gram Stain [[id number]] Collected: 04/26/25 1428    Order Status: Completed Specimen: Respiratory from Sputum, Induced Updated: 04/26/25 2321     GRAM STAIN <10 Epithelial Cells/LPF      Rare WBC seen      No organisms seen          All pertinent labs from the last 24 hours have been reviewed.    Significant Diagnostics:  I have reviewed all pertinent imaging results/findings within the past 24 hours.  "

## 2025-04-27 NOTE — PROGRESS NOTES
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 His wife at bedside reports concern for acute deconditioning over the past three days involving worsening weakness, urinary frequency, speech changes, intermittent cessation in interaction with family, and dragging of his R leg. CT head showed bilateral subdural hygromas with bilateral acute versus subacute SDH. He takes eliquis for A fib at home which was reversed with PCC in the ED. During evaluation of the patient, he was initially interactive, following, commands, and oriented to person and place. He then had a brief staring spell where he no longer followed commands and displayed no usable speech. This then resolved after a few minutes and he returned to baseline. He takes Vimpat 100 bid at home but has not gotten his nightly dose. Per his wife, these spells occurred at home as well.    He is admitted to [facility] for hourly neuro-monitoring and a higher level of care.     Hospital Course: 04/25/2025: OR today for SDH evacuation   04/26/2025: NAEON. POD1 s/p SDH evac. Post-op CTH good decompression and expected post-op changes. Post-op episode of seizure-like activity, described as "LUE & LLE shaking" lasting approximately 1 minute that resolved without intervention. Patient unarousable post-operatively and this AM. NGT placed. Repeat CTH obtained, stable. Procal, EEG ordered and pending.   04/27/2025: NAEON. POD2 s/p SDH. Procal elevated and patient febrile with low grade temp. Pan-cultured and started on broad spec antibiotics. Aggressive pulm " toileting started yesterday. Overnight with Tmax 100.9°F. Cx with NGTD. Respiratory panel sent. US extremities ordered and pending to rule out DVT. Start lovenox pending neurosurgery recs.     Interval History:  see hospital course  above    Review of Systems   Unable to perform ROS: Other (level of consciousness)       Objective:     Vitals:  Temp: 99 °F (37.2 °C)  Pulse: 69  Rhythm: normal sinus rhythm  BP: (!) 120/48  MAP (mmHg): 69  Resp: (!) 21  SpO2: 96 %    Temp  Min: 98.8 °F (37.1 °C)  Max: 100.9 °F (38.3 °C)  Pulse  Min: 69  Max: 94  BP  Min: 92/48  Max: 184/77  MAP (mmHg)  Min: 69  Max: 101  Resp  Min: 9  Max: 31  SpO2  Min: 92 %  Max: 100 %  Oxygen Concentration (%)  Min: 2  Max: 2    04/26 0701 - 04/27 0700  In: 1650.3 [I.V.:20]  Out: 595 [Urine:550; Drains:45]   Unmeasured Output  Urine Occurrence: 1  Stool Occurrence: 1  Pad Count: 2        Physical Exam  Vitals and nursing note reviewed.   Constitutional:       General: He is not in acute distress.     Appearance: Normal appearance.      Comments: Elderly male. Well developed. Well nourished. Chronically ill appearing.   HENT:      Head: Normocephalic.      Right Ear: External ear normal.      Left Ear: External ear normal.      Nose: Nose normal.      Comments: NGT in place.     Mouth/Throat:      Mouth: Mucous membranes are moist.      Pharynx: Oropharynx is clear.   Eyes:      General: No scleral icterus.     Pupils: Pupils are equal, round, and reactive to light.   Cardiovascular:      Rate and Rhythm: Normal rate and regular rhythm.   Pulmonary:      Effort: Pulmonary effort is normal. No respiratory distress.   Abdominal:      General: Abdomen is flat. There is no distension.   Musculoskeletal:      Right lower leg: No edema.      Left lower leg: No edema.   Skin:     General: Skin is warm and dry.   Neurological:      Mental Status: He is alert.      Comments: E2V1M4  Not on sedation. Minimally opens eyes to noxious stimuli. Does not answer  questions. Does not follow commands.   PERRL. Gaze midline, no nystagmus. No gross facial asymmetry.   Withdraws in LUE, postures in RUE, triple flexion in bilateral lower extremities. Sensation intact to noxious stimuli. No involuntary movements noted.        Gait & coordination exams deferred.        Medications:  Continuous Scheduledacetylcysteine 100 mg/ml (10%), 4 mL, Q4H  albuterol-ipratropium, 3 mL, Q4H  enoxparin, 40 mg, Q24H (prophylaxis, 1700)  lacosamide (Vimpat) IV orderable, 150 mg, Q12H  levothyroxine, 125 mcg, Before breakfast  metoprolol tartrate, 12.5 mg, Q8H  mupirocin, , BID  piperacillin-tazobactam (Zosyn) IV (PEDS and ADULTS) (extended infusion is not appropriate), 4.5 g, Q8H  pravastatin, 20 mg, QHS  senna-docusate, 1 tablet, Daily  silodosin, 4 mg, Daily    PRNacetaminophen, 650 mg, Q6H PRN  bisacodyL, 10 mg, Daily PRN  hydrALAZINE, 10 mg, Q4H PRN  labetalol, 10 mg, Q4H PRN  magnesium oxide, 800 mg, PRN  magnesium oxide, 800 mg, PRN  oxyCODONE, 5 mg, Q6H PRN  potassium bicarbonate, 35 mEq, PRN  potassium bicarbonate, 50 mEq, PRN  potassium bicarbonate, 60 mEq, PRN  potassium, sodium phosphates, 2 packet, PRN  potassium, sodium phosphates, 2 packet, PRN  potassium, sodium phosphates, 2 packet, PRN  sodium chloride 0.9%, 10 mL, PRN  vancomycin - pharmacy to dose, , pharmacy to manage frequency      Today I personally reviewed pertinent medications, lines/drains/airways, imaging, laboratory results, microbiology results, notably:    Laboratory:  CBC:  Recent Labs   Lab 04/27/25  0028   WBC 16.29*   RBC 4.00*   HGB 11.9*   HCT 37.5*      MCV 94   MCH 29.8   MCHC 31.7*       CMP:  Recent Labs   Lab 04/27/25  0028   GLUCOSE 114*   CALCIUM 9.0   ALBUMIN 2.7*      K 4.4   CO2 18*      BUN 29*   CREATININE 1.3   ALKPHOS 84   ALT <5*   AST 27   BILITOT 0.4     Recent Labs   Lab 04/27/25  0028   MG 2.3   PHOS 3.5       Coagulation:  Recent Labs   Lab 04/24/25  0435 04/25/25  0440  04/27/25  0028   LABPROT 6.9  11.2   < > 7.6   INR 1.0  --   --    APTT 28.0  --   --     < > = values in this interval not displayed.       Endocrine:  Recent Labs     04/27/25  1018   TSH 1.183     Microbiology  Microbiology Results (last 7 days)       Procedure Component Value Units Date/Time    Respiratory Infection Panel (PCR), Nasopharyngeal [[id number]]     Order Status: Sent Specimen: Nasopharyngeal Swab     Respiratory Infection Panel (PCR), Nasopharyngeal [[id number]] Collected: 04/27/25 1004    Order Status: Canceled Specimen: Nasopharyngeal Swab Updated: 04/27/25 1222    Blood culture [[id number]]  (Normal) Collected: 04/26/25 1628    Order Status: Completed Specimen: Blood from Peripheral, Antecubital, Right Updated: 04/27/25 0203     Blood Culture No Growth After 6 Hours    Blood culture [[id number]]  (Normal) Collected: 04/26/25 1628    Order Status: Completed Specimen: Blood from Peripheral, Forearm, Left Updated: 04/27/25 0203     Blood Culture No Growth After 6 Hours    Culture, Respiratory with Gram Stain [[id number]] Collected: 04/26/25 1428    Order Status: Completed Specimen: Respiratory from Sputum, Induced Updated: 04/26/25 2321     GRAM STAIN <10 Epithelial Cells/LPF      Rare WBC seen      No organisms seen                    Diet  Diet NPO  Diet NPO        Assessment/Plan:     Neuro  * Subdural hemorrhage  86 y/o M with 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 CT head with bilatateral acute versus subacute SDH and known VPS. His eliquis was reversed with PCC in the ED. Now post-op SDH evac 4/25    -Admit to [facility], NSGY following  -q1h neuro checks, vital checks  -Post-op CTH (4/25) with good decompression and expected postop changes  -CTH 4/26 stable  -EKG, ECHO, CXR  -Daily CBC, CMP, mag, phos  -Goal eunatremia, euvolemia  -SBP < 160, prn labetalol and  hydralazine  -Continue vimpat 150mg BID, keppra 500mg BID    -Seizure precautions  -PT/OT/SLP as appropriate  -SCDs, hold DVT chemoppx in setting of acute bleed    Seizure disorder  - vimpat 150 bid   - keppra 500mg bid   - EEG pending    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   Tie off on 3/25 2/2 bilateral hygromas that were evacuated  Plan for removal today in OR     Cardiac/Vascular  Coronary artery disease involving native coronary artery of native heart without angina pectoris  Resume home statin  Hold home eliquis    PAF (paroxysmal atrial fibrillation)  Resume home toprol  Hold home eliquis, reversed on admission  Currently rate controlled    Left ventricular diastolic dysfunction, NYHA class 1  ECHO pending    Essential hypertension  SBP < 160  PRN labetalol, hydralazine    Renal/  Urinary retention  Resume home silodosin    Endocrine  Acquired hypothyroidism  Resume home synthroid    GI  UC (ulcerative colitis)  Hold home colazal in acute setting          The patient is being Prophylaxed for:  Venous Thromboembolism with: Mechanical  Stress Ulcer with: not applicable  Ventilator Pneumonia with: none    Activity Orders            Elevate HOB Elevate HOB 30-45 degrees during feeding unless contraindicated starting at 04/27 0844    Diet NPO: NPO starting at 04/25 0001    Turn patient starting at 04/23 2000    Elevate HOB starting at 04/23 1948          Partial Code    Critical condition in that Patient has a condition that poses threat to life and bodily function: see problem list above     40 minutes of Critical care time was spent personally by me on the following activities: development of treatment plan with patient or surrogate and  bedside caregivers, discussions with consultants, evaluation of patient's response to treatment, examination of patient, ordering and performing treatments and interventions, ordering and review of laboratory studies, ordering and review of radiographic studies, pulse oximetry, antibiotic titration if applicable,re-evaluation of patient's condition. This critical care time did not overlap with that of any other provider or involve time for any procedures. There is high probability for acute neurological change leading to clinical and possibly life-threatening deterioration requiring highest level of provider preparedness for urgent intervention.       Jodie Perea PA-C  Neurocritical Care  [facility] - Neuro Critical Care

## 2025-04-27 NOTE — ASSESSMENT & PLAN NOTE
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 CT head with bilatateral acute versus subacute SDH and known VPS. His eliquis was reversed with PCC in the ED. Now post-op SDH evac 4/25    -Admit to [facility], NSGY following  -q1h neuro checks, vital checks  -Post-op CTH (4/25) with good decompression and expected postop changes  -CTH 4/26 stable  -EKG, ECHO, CXR  -Daily CBC, CMP, mag, phos  -Goal eunatremia, euvolemia  -SBP < 160, prn labetalol and hydralazine  -Continue vimpat 150mg BID, keppra 500mg BID    -Seizure precautions  -PT/OT/SLP as appropriate  -SCDs, hold DVT chemoppx in setting of acute bleed

## 2025-04-27 NOTE — SUBJECTIVE & OBJECTIVE
Interval History:  see hospital course  above    Review of Systems   Unable to perform ROS: Other (level of consciousness)       Objective:     Vitals:  Temp: 99 °F (37.2 °C)  Pulse: 69  Rhythm: normal sinus rhythm  BP: (!) 120/48  MAP (mmHg): 69  Resp: (!) 21  SpO2: 96 %    Temp  Min: 98.8 °F (37.1 °C)  Max: 100.9 °F (38.3 °C)  Pulse  Min: 69  Max: 94  BP  Min: 92/48  Max: 184/77  MAP (mmHg)  Min: 69  Max: 101  Resp  Min: 9  Max: 31  SpO2  Min: 92 %  Max: 100 %  Oxygen Concentration (%)  Min: 2  Max: 2    04/26 0701 - 04/27 0700  In: 1650.3 [I.V.:20]  Out: 595 [Urine:550; Drains:45]   Unmeasured Output  Urine Occurrence: 1  Stool Occurrence: 1  Pad Count: 2        Physical Exam  Vitals and nursing note reviewed.   Constitutional:       General: He is not in acute distress.     Appearance: Normal appearance.      Comments: Elderly male. Well developed. Well nourished. Chronically ill appearing.   HENT:      Head: Normocephalic.      Right Ear: External ear normal.      Left Ear: External ear normal.      Nose: Nose normal.      Comments: NGT in place.     Mouth/Throat:      Mouth: Mucous membranes are moist.      Pharynx: Oropharynx is clear.   Eyes:      General: No scleral icterus.     Pupils: Pupils are equal, round, and reactive to light.   Cardiovascular:      Rate and Rhythm: Normal rate and regular rhythm.   Pulmonary:      Effort: Pulmonary effort is normal. No respiratory distress.   Abdominal:      General: Abdomen is flat. There is no distension.   Musculoskeletal:      Right lower leg: No edema.      Left lower leg: No edema.   Skin:     General: Skin is warm and dry.   Neurological:      Mental Status: He is alert.      Comments: E2V1M4  Not on sedation. Does not open eyes to noxious stimuli. Does not answer questions. Does not follow commands.   PERRL. Gaze midline, no nystagmus. No gross facial asymmetry.   Withdraws in LUE, postures in RUE, triple flexion in bilateral lower extremities. Sensation  intact to noxious stimuli. No involuntary movements noted.        Gait & coordination exams deferred.        Medications:  Continuous Scheduledacetylcysteine 100 mg/ml (10%), 4 mL, Q4H  albuterol-ipratropium, 3 mL, Q4H  enoxparin, 40 mg, Q24H (prophylaxis, 1700)  lacosamide (Vimpat) IV orderable, 150 mg, Q12H  levothyroxine, 125 mcg, Before breakfast  metoprolol tartrate, 12.5 mg, Q8H  mupirocin, , BID  piperacillin-tazobactam (Zosyn) IV (PEDS and ADULTS) (extended infusion is not appropriate), 4.5 g, Q8H  pravastatin, 20 mg, QHS  senna-docusate, 1 tablet, Daily  silodosin, 4 mg, Daily    PRNacetaminophen, 650 mg, Q6H PRN  bisacodyL, 10 mg, Daily PRN  hydrALAZINE, 10 mg, Q4H PRN  labetalol, 10 mg, Q4H PRN  magnesium oxide, 800 mg, PRN  magnesium oxide, 800 mg, PRN  oxyCODONE, 5 mg, Q6H PRN  potassium bicarbonate, 35 mEq, PRN  potassium bicarbonate, 50 mEq, PRN  potassium bicarbonate, 60 mEq, PRN  potassium, sodium phosphates, 2 packet, PRN  potassium, sodium phosphates, 2 packet, PRN  potassium, sodium phosphates, 2 packet, PRN  sodium chloride 0.9%, 10 mL, PRN  vancomycin - pharmacy to dose, , pharmacy to manage frequency      Today I personally reviewed pertinent medications, lines/drains/airways, imaging, laboratory results, microbiology results, notably:    Laboratory:  CBC:  Recent Labs   Lab 04/27/25  0028   WBC 16.29*   RBC 4.00*   HGB 11.9*   HCT 37.5*      MCV 94   MCH 29.8   MCHC 31.7*       CMP:  Recent Labs   Lab 04/27/25  0028   GLUCOSE 114*   CALCIUM 9.0   ALBUMIN 2.7*      K 4.4   CO2 18*      BUN 29*   CREATININE 1.3   ALKPHOS 84   ALT <5*   AST 27   BILITOT 0.4     Recent Labs   Lab 04/27/25  0028   MG 2.3   PHOS 3.5       Coagulation:  Recent Labs   Lab 04/24/25  0435 04/25/25  0440 04/27/25  0028   LABPROT 6.9  11.2   < > 7.6   INR 1.0  --   --    APTT 28.0  --   --     < > = values in this interval not displayed.       Endocrine:  Recent Labs     04/27/25  1018   TSH 1.183      Microbiology  Microbiology Results (last 7 days)       Procedure Component Value Units Date/Time    Respiratory Infection Panel (PCR), Nasopharyngeal [[id number]]     Order Status: Sent Specimen: Nasopharyngeal Swab     Respiratory Infection Panel (PCR), Nasopharyngeal [[id number]] Collected: 04/27/25 1004    Order Status: Canceled Specimen: Nasopharyngeal Swab Updated: 04/27/25 1222    Blood culture [[id number]]  (Normal) Collected: 04/26/25 1628    Order Status: Completed Specimen: Blood from Peripheral, Antecubital, Right Updated: 04/27/25 0203     Blood Culture No Growth After 6 Hours    Blood culture [[id number]]  (Normal) Collected: 04/26/25 1628    Order Status: Completed Specimen: Blood from Peripheral, Forearm, Left Updated: 04/27/25 0203     Blood Culture No Growth After 6 Hours    Culture, Respiratory with Gram Stain [[id number]] Collected: 04/26/25 1428    Order Status: Completed Specimen: Respiratory from Sputum, Induced Updated: 04/26/25 2321     GRAM STAIN <10 Epithelial Cells/LPF      Rare WBC seen      No organisms seen                    Diet  Diet NPO  Diet NPO

## 2025-04-27 NOTE — PROGRESS NOTES
[facility] - Neuro Critical Care  Neurosurgery  Progress Note    Subjective:     History of Present Illness: Ramesh Ricci is an 86 y/o male with a PMH of Afib, Ulcerative Colitis, HTN, and aortic stenosis. He also has normal pressure hydrocephalus s/p  shunt (now tied off) is presenting with multiple complaints.     Wife reports concern for acute deconditioning over the past few days.  She describes increased weakness, urinary frequency, and speech changes that is been progressing for the past 3 days.  She states that he seems to slumped towards the right side and has been dragging his right leg.  Previously ambulated with his walker.  She also mentions that he seems to have speech hesitancy.  Denies any fevers, chills, abdominal pain or diarrhea    Post-Op Info:  Procedure(s) (LRB):  CRANIOTOMY, FOR SUBDURAL HEMATOMA EVACUATION, bilateral. shunt removal (Bilateral)  REMOVAL, SHUNT, VENTRICULOPERITONEAL   2 Days Post-Op   Interval History: 4/27: POD2 s/p bilateral craniotomy for subdural hematoma evacuation. NAEON. Neuro exam stable. Febrile with Tmax at 100.5 will monitor. Drain output: 30 cc and 15 cc. EEG negative.    Medications:  Continuous Infusions:  Scheduled Meds:   acetylcysteine 100 mg/ml (10%)  4 mL Nebulization Q4H    albuterol-ipratropium  3 mL Nebulization Q4H    lacosamide (Vimpat) IV orderable  150 mg Intravenous Q12H    levothyroxine  125 mcg Per NG tube Before breakfast    metoprolol tartrate  12.5 mg Per NG tube Q8H    mupirocin   Nasal BID    piperacillin-tazobactam (Zosyn) IV (PEDS and ADULTS) (extended infusion is not appropriate)  4.5 g Intravenous Q8H    pravastatin  20 mg Per NG tube QHS    senna-docusate  1 tablet Per NG tube Daily    silodosin  4 mg Per NG tube Daily     PRN Meds:  Current Facility-Administered Medications:     acetaminophen, 650 mg, Per NG tube, Q6H PRN    bisacodyL, 10 mg, Rectal, Daily PRN    hydrALAZINE, 10 mg, Intravenous, Q4H PRN    labetalol, 10 mg,  Intravenous, Q4H PRN    magnesium oxide, 800 mg, Per NG tube, PRN    magnesium oxide, 800 mg, Per NG tube, PRN    oxyCODONE, 5 mg, Per NG tube, Q6H PRN    potassium bicarbonate, 35 mEq, Per NG tube, PRN    potassium bicarbonate, 50 mEq, Per NG tube, PRN    potassium bicarbonate, 60 mEq, Per NG tube, PRN    potassium, sodium phosphates, 2 packet, Per NG tube, PRN    potassium, sodium phosphates, 2 packet, Per NG tube, PRN    potassium, sodium phosphates, 2 packet, Per NG tube, PRN    sodium chloride 0.9%, 10 mL, Intravenous, PRN    Pharmacy to dose Vancomycin consult, , , Once **AND** vancomycin - pharmacy to dose, , Intravenous, pharmacy to manage frequency     Review of Systems  Objective:     Weight: 68 kg (149 lb 14.6 oz)  Body mass index is 20.92 kg/m².  Vital Signs (Most Recent):  Temp: 98.9 °F (37.2 °C) (04/27/25 1101)  Pulse: 83 (04/27/25 1338)  Resp: 16 (04/27/25 1209)  BP: (!) 184/77 (04/27/25 1338)  SpO2: 98 % (04/27/25 1209) Vital Signs (24h Range):  Temp:  [98.8 °F (37.1 °C)-100.9 °F (38.3 °C)] 98.9 °F (37.2 °C)  Pulse:  [61-94] 83  Resp:  [9-32] 16  SpO2:  [92 %-100 %] 98 %  BP: ()/(48-77) 184/77     Date 04/27/25 0700 - 04/28/25 0659   Shift 8258-2013 8375-9193 8086-6729 24 Hour Total   INTAKE   NG/GT 55   55   IV Piggyback 115   115   Shift Total(mL/kg) 170(2.5)   170(2.5)   OUTPUT   Urine(mL/kg/hr) 110   110   Shift Total(mL/kg) 110(1.6)   110(1.6)   Weight (kg) 68 68 68 68              Oxygen Concentration (%):  [2] 2             Closed/Suction Drain 04/25/25 1703 Right Scalp Accordion 10 Fr. (Active)   Site Description Unable to view 04/27/25 1101   Output (mL) 30 mL 04/26/25 1801            Closed/Suction Drain 04/25/25 1728 Left Scalp Accordion 10 Fr. (Active)   Site Description Unable to view 04/27/25 1101   Output (mL) 15 mL 04/26/25 1801            NG/OG Tube 04/26/25 0819 14 Fr. Right nostril (Active)   Placement Check other (see comments);placement verified by x-ray 04/27/25 1101  "  Tolerance no signs/symptoms of discomfort 04/27/25 1101   Securement secured to nostril center w/ adhesive device 04/27/25 1101   Clamp Status/Tolerance clamped 04/27/25 1101   Suction Setting/Drainage Method suction at the bedside 04/27/25 1101   Insertion Site Appearance no redness, warmth, tenderness, skin breakdown, drainage 04/27/25 1101   Drainage None 04/27/25 1101   Intake (mL) 55 mL 04/27/25 0901       Male External Urinary Catheter 04/23/25 1858 (Active)   Collection Container Urimeter 04/27/25 1101   Securement Method secured to top of thigh w/ adhesive device 04/27/25 1101   Skin no redness;no breakdown 04/27/25 1101   Tolerance no signs/symptoms of discomfort 04/27/25 1101   Output (mL) 110 mL 04/27/25 0701   Catheter Change Date 04/26/25 04/26/25 0819          Physical Exam         Neurosurgery Physical Exam    E1V1M1  Extubated, breathing spontaneously, vitals stable. But unarousable  Brainstems intact  Minimal movement or reaction to noxious stimuli x4  Cranial incisions dressed CDI    Significant Labs:  Recent Labs   Lab 04/26/25  0053 04/27/25  0028    138   K 4.6 4.4    104   CO2 21* 18*   BUN 28* 29*   CREATININE 1.5* 1.3   CALCIUM 8.7 9.0   MG 2.1 2.3     Recent Labs   Lab 04/26/25  0053 04/27/25  0028   WBC 14.34* 16.29*   HGB 11.5* 11.9*   HCT 35.9* 37.5*    247     No results for input(s): "LABPT", "INR", "APTT" in the last 48 hours.  Microbiology Results (last 7 days)       Procedure Component Value Units Date/Time    Respiratory Infection Panel (PCR), Nasopharyngeal [[id number]] Collected: 04/27/25 1004    Order Status: Sent Specimen: Nasopharyngeal Swab Updated: 04/27/25 1222    Blood culture [[id number]]  (Normal) Collected: 04/26/25 1628    Order Status: Completed Specimen: Blood from Peripheral, Antecubital, Right Updated: 04/27/25 0203     Blood Culture No Growth After 6 Hours    Blood culture [[id number]]  (Normal) Collected: 04/26/25 5664    Order Status: " Completed Specimen: Blood from Peripheral, Forearm, Left Updated: 04/27/25 0203     Blood Culture No Growth After 6 Hours    Culture, Respiratory with Gram Stain [[id number]] Collected: 04/26/25 1428    Order Status: Completed Specimen: Respiratory from Sputum, Induced Updated: 04/26/25 2321     GRAM STAIN <10 Epithelial Cells/LPF      Rare WBC seen      No organisms seen          All pertinent labs from the last 24 hours have been reviewed.    Significant Diagnostics:  I have reviewed all pertinent imaging results/findings within the past 24 hours.  Assessment/Plan:     * Subdural hemorrhage  86 yo with recent admission for VPS tie off and SDH evacuation on 3/24 presenting for new LSW and speech hesitation found to have enlarged chronic SDH as well as a new small R convexity acute SDH. Pt takes eliquis, s/p PCC. Interval CTH stable.     Now s/p bialteral subdural evacuations with VPS removal on 4/25    Recommendations:  --admitted to [facility]  --on eliquis, s/p kcentra reversal. Hold all AC/AP  --SBP <140  --Vimpat, unresponsive POD1 FU EEG and seizure workup today  --HOB >30  --f/u cEEG  --drains to suction, abx while in place  --Continue to monitor clinically, notify NSGY immediately with any changes in neuro status    Discussed with on call staff Dr. Jessica and Dr. Krzysztof Freitas MD  Neurosurgery  [facility] - Neuro Critical Care

## 2025-04-27 NOTE — EICU
Virtual ICU Quality Rounds    Admit Date: 4/23/2025  Hospital Day: 4    ICU Day: 3d 12h    24H Vital Sign Range:  Temp:  [98.8 °F (37.1 °C)-100.9 °F (38.3 °C)]   Pulse:  [61-94]   Resp:  [9-32]   BP: ()/(48-67)   SpO2:  [92 %-100 %]     VICU Surveillance Screening                    LDA reconciliation : Yes

## 2025-04-27 NOTE — PLAN OF CARE
"[facility] Care Plan    POC reviewed with Ramesh Ricci at 0300. Patient unable to verbalize understanding. Questions and concerns addressed with wife over the phone. No acute events today. Pt progressing toward goals. Will continue to monitor. See below and flowsheets for full assessment and VS info.     -0 mL output from both drains.       Is this a stroke patient? no    Neuro:  [place] Coma Scale  Best Eye Response: 1-->(E1) none  Best Motor Response: 4-->(M4) withdraws from pain  Best Verbal Response: 1-->(V1) none  [place] Coma Scale Score: 6  Assessment Qualifiers: no eye obstruction present  Pupil PERRLA: yes     24 hr Temp:  [98 °F (36.7 °C)-100.5 °F (38.1 °C)]     CV:   Rhythm: normal sinus rhythm  BP goals:   SBP < 160  MAP > 65    Resp:      Oxygen Concentration (%): 2    Plan: N/A    GI/:     Diet/Nutrition Received: NPO  Last Bowel Movement: 04/24/25  Voiding Characteristics: external catheter    Intake/Output Summary (Last 24 hours) at 4/27/2025 0642  Last data filed at 4/27/2025 0201  Gross per 24 hour   Intake 1650.32 ml   Output 595 ml   Net 1055.32 ml     Unmeasured Output  Urine Occurrence: 1  Stool Occurrence: 1  Pad Count: 2    Labs/Accuchecks:  Recent Labs   Lab 04/27/25  0028   WBC 16.29*   RBC 4.00*   HGB 11.9*   HCT 37.5*         Recent Labs   Lab 04/27/25  0028      K 4.4   CO2 18*      BUN 29*   CREATININE 1.3   ALKPHOS 84   ALT <5*   AST 27   BILITOT 0.4      Recent Labs   Lab 04/24/25  0435   INR 1.0   APTT 28.0    No results for input(s): "CPK", "CPKMB", "TROPONINI", "MB" in the last 168 hours.    Electrolytes: N/A - electrolytes WDL  Accuchecks: none    Gtts:      LDA/Wounds:    Gabino Risk Assessment  Sensory Perception: 2-->very limited  Moisture: 3-->occasionally moist  Activity: 1-->bedfast  Mobility: 2-->very limited  Nutrition: 1-->very poor  Friction and Shear: 2-->potential problem  Gabino Score: 11  Is your gabino score 12 or less? yes enrolled in the " [employer] program and heel boots on          Restraints:    NA    MAYATM

## 2025-04-27 NOTE — ASSESSMENT & PLAN NOTE
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 CT head with bilatateral acute versus subacute SDH and known VPS. His eliquis was reversed with PCC in the ED. Now post-op SDH evac 4/25    -Admit to [facility], NSGY following  -q1h neuro checks, vital checks  -Post-op CTH (4/25) with good decompression and expected postop changes  -CTH 4/26 stable  -EKG, ECHO, CXR  -Daily CBC, CMP, mag, phos  -Goal eunatremia, euvolemia  -SBP < 160, prn labetalol and hydralazine  -Continue vimpat 150mg BID, keppra 500mg BID    -Seizure precautions  -PT/OT/SLP as appropriate  -SCDs, hold DVT chemoppx in setting of acute bleed

## 2025-04-27 NOTE — PT/OT/SLP PROGRESS
Occupational Therapy      Patient Name:  Ramesh Ricci   MRN:  [id number]    Patient not seen today secondary to RN hold due to patient somnolence. Will follow-up as appropriate.    4/27/2025

## 2025-04-27 NOTE — ASSESSMENT & PLAN NOTE
86 yo with recent admission for VPS tie off and SDH evacuation on 3/24 presenting for new LSW and speech hesitation found to have enlarged chronic SDH as well as a new small R convexity acute SDH. Pt takes eliquis, s/p PCC. Interval CTH stable.     Now s/p bialteral subdural evacuations with VPS removal on 4/25    Recommendations:  --admitted to [facility]  --on eliquis, s/p kcentra reversal. Hold all AC/AP  --SBP <140  --Vimpat, unresponsive POD1 FU EEG and seizure workup today  --HOB >30  --f/u cEEG  --drains to suction, abx while in place  --Continue to monitor clinically, notify NSGY immediately with any changes in neuro status    Discussed with on call staff Dr. Jessica and Dr. Blankenship

## 2025-04-27 NOTE — PROGRESS NOTES
Pharmacokinetic Assessment Follow Up: IV Vancomycin    Vancomycin Regimen Assessment/Plan:    - Vancomycin random level resulted below goal as 9.6 mcg/mL.  Goal 10-20 mcg/mL.  - Continue pulse dosing in the setting of increased SCr and advanced age.   - Administer vancomycin 500 mg x 1 dose.   - A random level is ordered tomorrow with AM labs.  Pharmacy to re-dose based on level and renal function.    Drug levels (last 3 results):  Recent Labs   Lab Result Units 04/27/25  0534   Vancomycin Random ug/ml 9.6       Pharmacy will continue to follow and monitor vancomycin.    Please contact pharmacy at extension [phone] for questions regarding this assessment.    Thank you for the consult,   Zulema Murry, PharmD, BCCCP       Patient brief summary:  Ramesh Ricci is a 85 y.o. male initiated on antimicrobial therapy with IV Vancomycin for treatment of sepsis    Drug Allergies:   Review of patient's allergies indicates:   Allergen Reactions    Benazepril Other (See Comments)     Cough       Actual Body Weight:   68 kg    Renal Function:   Estimated Creatinine Clearance: 38.5 mL/min (based on SCr of 1.3 mg/dL).,     Dialysis Method (if applicable):  N/A    CBC (last 72 hours):  Recent Labs   Lab Result Units 04/25/25  0440 04/26/25  0053 04/27/25  0028   WBC K/uL 9.45 14.34* 16.29*   HGB gm/dL 11.9* 11.5* 11.9*   HCT % 36.6* 35.9* 37.5*   Platelet Count K/uL 208 237 247   Lymph % % 13.4* 7.2* 5.8*   Mono % % 13.0 12.3 10.6   Eos % % 0.5 0.0 0.0   Basophil % % 0.3 0.2 0.1       Metabolic Panel (last 72 hours):  Recent Labs   Lab Result Units 04/25/25  0440 04/26/25  0053 04/26/25  1652 04/27/25  0028   Sodium mmol/L 136 136  --  138   Potassium mmol/L 4.3 4.6  --  4.4   Chloride mmol/L 101 101  --  104   CO2 mmol/L 23 21*  --  18*   Glucose mg/dL 107 151*  --  114*   Glucose, UA   --   --  Negative  --    BUN mg/dL 20 28*  --  29*   Creatinine mg/dL 1.0 1.5*  --  1.3   Albumin g/dL 3.1* 2.8*  --  2.7*   Bilirubin  Total mg/dL 0.4 0.3  --  0.4   ALP unit/L 70 66  --  84   AST unit/L 13 13  --  27   ALT unit/L <5* <5*  --  <5*   Magnesium  mg/dL 2.1 2.1  --  2.3   Phosphorus Level mg/dL 4.8* 4.7*  --  3.5       Vancomycin Administrations:  vancomycin given in the last 96 hours                     vancomycin (VANCOCIN) 1,000 mg in 0.9% NaCl 250 mL IVPB (admixture device) (mg) 1,000 mg New Bag 04/26/25 0736                    Microbiologic Results:  Microbiology Results (last 7 days)       Procedure Component Value Units Date/Time    Blood culture [[id number]]  (Normal) Collected: 04/26/25 1628    Order Status: Completed Specimen: Blood from Peripheral, Antecubital, Right Updated: 04/27/25 0203     Blood Culture No Growth After 6 Hours    Blood culture [[id number]]  (Normal) Collected: 04/26/25 1628    Order Status: Completed Specimen: Blood from Peripheral, Forearm, Left Updated: 04/27/25 0203     Blood Culture No Growth After 6 Hours    Culture, Respiratory with Gram Stain [[id number]] Collected: 04/26/25 1428    Order Status: Completed Specimen: Respiratory from Sputum, Induced Updated: 04/26/25 2321     GRAM STAIN <10 Epithelial Cells/LPF      Rare WBC seen      No organisms seen

## 2025-04-27 NOTE — SUBJECTIVE & OBJECTIVE
Interval History:  see hospital course above    Review of Systems   Unable to perform ROS: Other (level of consciousness)      Objective:     Vitals:  Temp: 99.4 °F (37.4 °C)  Pulse: 77  Rhythm: normal sinus rhythm  BP: 137/60  MAP (mmHg): 86  Resp: (!) 26  SpO2: 97 %    Temp  Min: 98 °F (36.7 °C)  Max: 100.5 °F (38.1 °C)  Pulse  Min: 61  Max: 87  BP  Min: 98/47  Max: 183/65  MAP (mmHg)  Min: 68  Max: 95  Resp  Min: 20  Max: 32  SpO2  Min: 92 %  Max: 100 %    04/25 0701 - 04/26 0700  In: 1000 [I.V.:1000]  Out: 700 [Urine:600; Drains:100]   Unmeasured Output  Urine Occurrence: 1  Stool Occurrence: 1  Pad Count: 2        Physical Exam  Vitals and nursing note reviewed.   Constitutional:       General: He is not in acute distress.     Appearance: Normal appearance.      Comments: Elderly male. Well developed. Well nourished. Chronically ill appearing.   HENT:      Head: Normocephalic.      Right Ear: External ear normal.      Left Ear: External ear normal.      Nose: Nose normal.      Comments: NGT in place.     Mouth/Throat:      Mouth: Mucous membranes are moist.      Pharynx: Oropharynx is clear.   Eyes:      General: No scleral icterus.     Pupils: Pupils are equal, round, and reactive to light.   Cardiovascular:      Rate and Rhythm: Normal rate and regular rhythm.   Pulmonary:      Effort: Pulmonary effort is normal. No respiratory distress.   Abdominal:      General: Abdomen is flat. There is no distension.   Musculoskeletal:      Right lower leg: No edema.      Left lower leg: No edema.   Skin:     General: Skin is warm and dry.   Neurological:      Mental Status: He is alert.      Comments: E1V1M4  Not on sedation. Does not open eyes to noxious stimuli. Does not answer questions. Does not follow commands.   PERRL. Gaze midline, no nystagmus. No gross facial asymmetry.   Withdraws in LUE, postures in RUE, triple flexion in bilateral lower extremities. Sensation intact to noxious stimuli. No involuntary  movements noted.        Unable to test orientation, language, memory, judgment, insight, fund of knowledge, hearing, shoulder shrug, tongue protrusion, coordination, gait due to level of consciousness.       Medications:  Continuous Scheduledacetylcysteine 100 mg/ml (10%), 4 mL, Q4H  albuterol-ipratropium, 3 mL, Q4H  lacosamide (Vimpat) IV orderable, 150 mg, Q12H  levETIRAcetam (Keppra) IV (PEDS and ADULTS), 500 mg, Q12H  [START ON 4/27/2025] levothyroxine, 125 mcg, Before breakfast  metoprolol tartrate, 12.5 mg, Q8H  mupirocin, , BID  piperacillin-tazobactam (Zosyn) IV (PEDS and ADULTS) (extended infusion is not appropriate), 4.5 g, Q8H  pravastatin, 20 mg, QHS  [START ON 4/27/2025] senna-docusate, 1 tablet, Daily  [START ON 4/27/2025] silodosin, 4 mg, Daily    PRNacetaminophen, 650 mg, Q6H PRN  bisacodyL, 10 mg, Daily PRN  hydrALAZINE, 10 mg, Q4H PRN  labetalol, 10 mg, Q4H PRN  magnesium oxide, 800 mg, PRN  magnesium oxide, 800 mg, PRN  oxyCODONE, 5 mg, Q6H PRN  potassium bicarbonate, 35 mEq, PRN  potassium bicarbonate, 50 mEq, PRN  potassium bicarbonate, 60 mEq, PRN  potassium, sodium phosphates, 2 packet, PRN  potassium, sodium phosphates, 2 packet, PRN  potassium, sodium phosphates, 2 packet, PRN  sodium chloride 0.9%, 10 mL, PRN  vancomycin - pharmacy to dose, , pharmacy to manage frequency      Today I personally reviewed pertinent medications, lines/drains/airways, imaging, cardiology results, laboratory results, microbiology results, notably:    Laboratory:  CBC:  Recent Labs   Lab 04/26/25 0053   WBC 14.34*   RBC 3.84*   HGB 11.5*   HCT 35.9*      MCV 94   MCH 29.9   MCHC 32.0       CMP:  Recent Labs   Lab 04/26/25 0053   GLUCOSE 151*   CALCIUM 8.7   ALBUMIN 2.8*      K 4.6   CO2 21*      BUN 28*   CREATININE 1.5*   ALKPHOS 66   ALT <5*   AST 13   BILITOT 0.3     Recent Labs   Lab 04/26/25  0053   MG 2.1   PHOS 4.7*       Coagulation:  Recent Labs   Lab 04/24/25  0435 04/25/25  0440  04/26/25  0053   LABPROT 6.9  11.2   < > 6.8   INR 1.0  --   --    APTT 28.0  --   --     < > = values in this interval not displayed.       Imaging:  CT Head Withotu Contrast:  Impression:  Evolving operative changes with bilateral parietal craniotomies and subdural hematoma evacuation  Continue though reduced sized mixed density extra-axial collections overlie the cerebral convexities compatible with improving postoperative gas fluid and residual hemorrhage.  No significant new hemorrhage or new abnormal parenchymal attenuation.  Probable partial re-expansion of the ventricles without definite hydrocephalus  Clinical correlation and continued follow-up advised.  Electronically signed by:Emerson Liu DO  Date:                                            04/26/2025  Time:                                           10:19    CT Head Without Contrast:  Impression:  Interval postoperative changes of bilateral craniotomies for subdural hematoma evacuation.  Continued residual mixed density subdural fluid collections as above, significantly decreased in size from prior.  No evidence of significant mass effect or midline shift.  Interval  shunt removal.  No hydrocephalus.  Electronically signed by:Gabe Gale  Date:                                            04/25/2025  Time:                                           21:20           Diet  Diet NPO  Diet NPO

## 2025-04-27 NOTE — PROCEDURES
DATE: 4/26/25    EEG NUMBER: FH -1    REFERRING PHYSICIAN:  Dr. Carrizales      This EEG was performed to assess for subclinical seizures      ELECTROENCEPHALOGRAM REPORT     METHODOLOGY:  Electroencephalographic (EEG) recording is with electrodes placed according to the International 10-20 placement system.  Thirty two (32) channels of digital signal are simultaneously recorded from the scalp and may include EKG, EMG, and/or eye monitors.   Recording band pass was 0.1 to 512 hz.  Digital video recording of the patient is simultaneously recorded with the EEG.  The nursing staff report clinical symptoms and may press an event button when the patient has symptoms of clinical interest to the treating physicians.  EEG and video recording is stored and archived in digital format.  The entire recording is visually reviewed, and the times identified by computer analysis as being spikes or seizures are reviewed again.  Activation procedures which include photic stimulation, hyperventilation and instructing patients to perform simple task are done in selected patients.   Compresses spectral analysis (CSA) is also performed on the activity recorded from each individual channel.  This is displayed as a power display of frequencies from 0 to 30 Hz over time.   The CSA analysis is done and displayed continuously.  This is reviewed for asymmetries in power between homologous areas of the scalp and for presence of changes in power which can be seen when seizures occur.  Sections of suspected abnormalities on the CSA is then compared with the original EEG recording.                [employer] software was also utilized in the review of this study.  This software suite analyzes the EEG recording in multiple domains.  Coherence and rhythmicity is computed to identify EEG sections which may contain organized seizures.  Each channel undergoes analysis to detect presence of spike and sharp waves which have special and morphological  characteristic of epileptic activity.  The routine EEG recording is converted from spacial into frequency domain.  This is then displayed comparing homologous areas to identify areas of significant asymmetry.  Algorithm to identify non-cortically generated artifact is used to separate eye movement, EMG and other artifact from the EEG.     Recording times   Start on April 26, 2025 at hour 15 minute 28 seconds 46   End on April 27, 2025 at hours 7 minute 0 seconds 5   The total time of EEG recording for the study was 15 hours and 31 minutes    EEG FINDINGS:  The recording was obtained with a number of standard bipolar and referential montages during wakefulness, drowsiness and sleep.  In the alert state, the posterior background rhythm was a symmetric, well-modulated 6-7 Hz activity, which reacted symmetrically to eye opening.  Mixed theta range slowing was noted in both hemisphere.  High-amplitude sharply contoured irregular slowing was noted in the frontal central regions bilaterally consistent with breach rhythms.  Infrequent lateralized sharp waves were noted in both hemispheres at a frequency of less than 1 hertz.  During drowsiness, the background rhythm waxed and waned and there were periods of slowing.  During stage II sleep, symmetric V waves and sleep spindles were noted.  No seizures were recorded during this study.    The EKG channel revealed a sinus rhythm.     IMPRESSION:  This is an abnormal EEG during wakefulness, drowsiness and sleep. Diffuse slowing was noted.  Bilateral breach rhythms were noted.  Independent left and right lateralized infrequent epileptiform discharges were noted.     CLINICAL CORRELATION: 85-year-old gentleman status post multiple neurosurgical procedures over the last 4 months, now status post re-evacuation of bilateral subdural hematomas, left worse than right, as well as explantation of previous  shunt who, postoperatively, who presented with altered sensorium.  The patient  is currently maintained on Keppra and Vimpat.  This is an abnormal EEG during wakefulness drowsiness and sleep.  The overall degree of slowing disorganization is suggestive of a moderate degree of encephalopathy nonspecific to the cause.  The presence of breach rhythms bilaterally correlates with history of bilateral craniotomies.  The presence of independent left and right lateralized epileptiform discharges suggestive of cortical irritability in both hemispheres.  During the study no seizures were recorded.

## 2025-04-27 NOTE — PLAN OF CARE
"[facility] Care Plan  POC reviewed with Ramesh Ricci and family at 1500. Family verbalized understanding. Questions and concerns addressed. No acute events today. Pt progressing toward goals. Will continue to monitor. See below and flowsheets for full assessment and VS info.     - Nasal swab culture obtained.  - Tube feeding started.  - Limb alert placed on pt's right arm due to clot found in cephalic vein. No IV or blood pressure cuff.  - Full bath given.          Is this a stroke patient? yes- Stroke booklet reviewed with patient and family, risk factors identified for patient and stroke booklet remains at bedside for ongoing education.     Care individualization: Turn pt every 2 hours.    Neuro:  [place] Coma Scale  Best Eye Response: 2-->(E2) to pain  Best Motor Response: 4-->(M4) withdraws from pain  Best Verbal Response: 1-->(V1) none  [place] Coma Scale Score: 7  Assessment Qualifiers: no eye obstruction present  Pupil PERRLA: yes     24 hr Temp:  [98.8 °F (37.1 °C)-100.9 °F (38.3 °C)]     CV:   Rhythm: normal sinus rhythm  BP goals:   SBP < 160  MAP > 65    Resp:      Oxygen Concentration (%): 2    Plan:  wean from nasal cannula    GI/:     Diet/Nutrition Received: tube feeding  Last Bowel Movement: 04/26/25  Voiding Characteristics: external catheter    Intake/Output Summary (Last 24 hours) at 4/27/2025 1652  Last data filed at 4/27/2025 1501  Gross per 24 hour   Intake 876.32 ml   Output 265 ml   Net 611.32 ml     Unmeasured Output  Urine Occurrence: 1  Stool Occurrence: 1  Pad Count: 2    Labs/Accuchecks:  Recent Labs   Lab 04/27/25  0028   WBC 16.29*   RBC 4.00*   HGB 11.9*   HCT 37.5*         Recent Labs   Lab 04/27/25  0028      K 4.4   CO2 18*      BUN 29*   CREATININE 1.3   ALKPHOS 84   ALT <5*   AST 27   BILITOT 0.4      Recent Labs   Lab 04/24/25  0435   INR 1.0   APTT 28.0    No results for input(s): "CPK", "CPKMB", "TROPONINI", "MB" in the last 168 hours.    Electrolytes: " N/A - electrolytes WDL  Accuchecks: none    Gtts:      LDA/Wounds:    Gabino Risk Assessment  Sensory Perception: 2-->very limited  Moisture: 3-->occasionally moist  Activity: 1-->bedfast  Mobility: 2-->very limited  Nutrition: 2-->probably inadequate  Friction and Shear: 2-->potential problem  Gabino Score: 12    Is your gabino score 12 or less? yes heel boots on            Restraints:        [facility]

## 2025-04-28 PROBLEM — S06.5XAA SUBDURAL HEMATOMA: Status: ACTIVE | Noted: 2025-04-28

## 2025-04-28 NOTE — PROCEDURES
EEG REPORT      Ramesh Ricci  [id number]  1940    DATE OF SERVICE: 4/27/2025     -2    METHODOLOGY      Extended electroencephalographic recording is made while the patient is ambulatory and continuing normal daily activities.  Electrodes are placed according to the International 10-20 placement system and included T1 and T2 electrode placement.  Twenty four (24) channels of digital signal (sampling rate of 512/sec) was simultaneously recorded from the scalp including EKG and eye monitors.  Recording band pass was 0.1 to 100 hz and all data was stored digitally on the recorder.  The patient is instructed to press an event button when clinical symptoms occur and write the symptoms into a diary. Activation procedures which include photic stimulation, hyperventilation and instructing patients to perform simple task are done in selected patients.        The EEG is displayed on a monitor screen and can be reformatted into different montages for evaluation.  The entire recoding is submitted for computer assisted analysis to detect spike and electrographic seizure activity.  The entire recording is visually reviewed and the times identified by computer analysis as being spikes or seizures are reviewed again.  Compresses spectral analysis (CSA) is also performed on the activity recorded from each individual channel.  This is displayed as a power display of frequencies from 0 to 30 Hz over time.   The CSA analysis is done and displayed continuously.  This is reviewed for asymmetries in power between homologous areas of the scalp and for presence of changes in power which canbe seen when seizures occur.  Sections of suspected abnormalities on the CSA is then compared with the original EEG recording.  .     [employer] software was also utilized in the review of this study.  This software suite analyzes the EEG recording in multiple domains.  Coherence and rhythmicity is computed to identify EEG sections which may  contain organized seizures.  Each channel undergoes analysis to detect presence of spike and sharp waves which have special and morphological characteristic of epileptic activity.  The routine EEG recording is converted from spacial into frequency domain.  This is then displayed comparing homologous areas to identify areas of significant asymmetry.  Algorithm to identify non-cortically generated artifact is used to separate eye movement, EMG and other artifact from the EEG     Recording Times  Start on 4/27/2025  Stop on 4/28/2025    A total of 21:59:19 hours of EEG was recorded.      EEG FINDINGS:  Background activity:   The background rhythm was characterized by poorly organized theta activity with absent posterior dominant rhythm.   Symmetry and continuity: the background was continuous and symmetric     Sleep:   No sleep transients although there is cycling of the background.    Activation procedures:   NA    Abnormal activity:   During periods of arousal there are 1-2Hz periodic complexes of fronto-central dominant sharply contoured slowing which frequently takes epileptiform morphology.    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        Jose J oMnsivais MD  Neurology-Epilepsy.  [facility].

## 2025-04-28 NOTE — PROGRESS NOTES
[facility] - [facility] Critical [facility]  Neurology-Epilepsy  Progress Note    Patient Name: Ramesh Ricci  MRN: [id number]  Admission Date: 4/23/2025  Hospital Length of Stay: 5 days  Code Status: Partial Code   Attending Provider: Johnnie Mahajan MD  Primary Care Physician: Nakul Mandujano MD   Principal Problem:Seizure disorder    Subjective:     Hospital Course:   CAP- WNL  4/24>4/25: mild encephalopathy, no epileptiform activity, no seizures; no staring episodes reported    4/26>4/27: moderate encephalopathy, bilateral breach, independent bilateral sharps, no seizures  4/27>4/28: moderate encephalopathy, generalized cortical irritability, no discrete seizures    See EEG reports for details.    Interval History: EEG with generalized cortical irritability, no discrete seizures. Updated daughter at bedside this morning, answered all questions.    Current Medications[1]  Continuous Infusions:    Review of Systems  Objective:     Vital Signs (Most Recent):  Temp: 97.9 °F (36.6 °C) (04/28/25 1100)  Pulse: 80 (04/28/25 1216)  Resp: 20 (04/28/25 1216)  BP: (!) 133/56 (04/28/25 1200)  SpO2: 99 % (04/28/25 1216) Vital Signs (24h Range):  Temp:  [97.9 °F (36.6 °C)-99.4 °F (37.4 °C)] 97.9 °F (36.6 °C)  Pulse:  [69-84] 80  Resp:  [17-36] 20  SpO2:  [90 %-99 %] 99 %  BP: (102-184)/(45-77) 133/56     Weight: 68 kg (149 lb 14.6 oz)  Body mass index is 20.92 kg/m².     Physical Exam  Constitutional:       General: He is not in acute distress.     Appearance: He is ill-appearing.      Interventions: Nasal cannula in place.   HENT:      Head: Normocephalic.      Comments: EEG in place  Drains in place  Eyes:      General: No scleral icterus.        Right eye: No discharge.         Left eye: No discharge.      Conjunctiva/sclera: Conjunctivae normal.      Pupils: Pupils are equal, round, and reactive to light.   Cardiovascular:      Rate and Rhythm: Normal rate.   Pulmonary:      Effort: Pulmonary effort is normal. No  respiratory distress.   Musculoskeletal:         General: No deformity or signs of injury.   Skin:     General: Skin is warm and dry.   Psychiatric:      Comments: Unable to assess            NEUROLOGICAL EXAMINATION:     CRANIAL NERVES     CN III, IV, VI   Pupils are equal, round, and reactive to light.       Does not arouse to stimuli  SHASHA OU   Corneal intact OU   No spontaneous eye opening   Face symmetric   Tongue midline   Does not follow commands  Withdraws to noxious stimuli in BLE    Exam findings to suggest seizures:  Myoclonus - no   eye twitching - no   Nystagmus - no   gaze deviation - no   waxy rigidity - no        Significant Labs: All pertinent lab results from the past 24 hours have been reviewed.    Significant Studies: I have reviewed all pertinent imaging results/findings within the past 24 hours.    Assessment and Plan:     * Seizure disorder  85M PMHx of HTN, afib on Eliquis, AS s/p TAVR, CKD, UC, NPH s/p VPS c/b bilateral SDH s/p bilateral evacuation with subsequent seizure disorder maintained on Lacosamide 100 mg BID who presented with worsening weakness, urinary frequency, and speech changes. Imaging revealed interval increase in size of bilateral holohemispheric subdural collections, with new bilateral hemorrhages in the subdural collections, concerning for acute on subacute SDH; right VPS in place. Eliquis was reversed in the ER. Patient noted to have staring episode which resolved ~few minutes. Wife reported staring episodes at home. Patient admitted to [facility] for higher level of care and further management with Neurosurgery consult. CAP EEG placed; Epilepsy following for assistance in management. Per chart review, patient was recently started on Lacosamide 100 mg BID (filled 4/8) by Neurology group at [facility].    Recommendations:  - Continuous vEEG monitoring  - Recommend to continue Lacosamide 150 mg BID  - Avoid agents that lower seizure threshold  - Post operative management per  NSGY  - Management of infectious/metabolic abnormalities per [facility]  - Seizure precautions  - Per [place] Law, patient must refrain from driving for 6 months after having a seizure or cleared by a neurologist to legally resume driving      Discussed plan of care with [facility] team and daughter at bedside. Will follow, please call with questions.    Subdural hemorrhage  S/p VPS  Hx of NPH s/p VPS c/b bilateral SDH s/p bilateral evacuation with subsequent seizure disorder maintained on Lacosamide 100 mg BID   - S/p b/l craniotomies on 4/25 by Dr. Blankenship  - NS removing drains today  - Management per NSGY, [facility]    PAF (paroxysmal atrial fibrillation)  Hx of, On Eliquis  - Reversed in ER  - Management per NSGY, [facility]        VTE Risk Mitigation (From admission, onward)           Ordered     enoxaparin injection 40 mg  Every 24 hours         04/27/25 1734     Reason for No Pharmacological VTE Prophylaxis  Once        Question:  Reasons:  Answer:  Active Bleeding    04/23/25 1950     IP VTE HIGH RISK PATIENT  Once         04/23/25 1950     Place sequential compression device  Until discontinued         04/23/25 1950                    Pema Shepard PA-C  Neurology-Epilepsy  [facility] - [facility]  Staff: Dr. Monsivais       [1]   Current Facility-Administered Medications   Medication Dose Route Frequency Provider Last Rate Last Admin    acetaminophen tablet 650 mg  650 mg Per NG tube Q6H PRN Jodie Perea PA-C   650 mg at 04/27/25 0645    acetylcysteine 100 mg/ml (10%) solution 4 mL  4 mL Nebulization Q4H Deven Ahumada MD   4 mL at 04/28/25 1217    albuterol-ipratropium 2.5 mg-0.5 mg/3 mL nebulizer solution 3 mL  3 mL Nebulization Q4H Jodie Perea PA-C   3 mL at 04/28/25 1216    bisacodyL suppository 10 mg  10 mg Rectal Daily PRN Yuridia Kaplan PA-C        enoxaparin injection 40 mg  40 mg Subcutaneous Q24H (prophylaxis, 1700) Jodie Perea PA-C   40 mg at 04/27/25 1739    hydrALAZINE injection 10 mg  10 mg  Intravenous Q4H PRN Yuridia Kaplan PA-C   10 mg at 04/27/25 1338    labetalol 20 mg/4 mL (5 mg/mL) IV syring  10 mg Intravenous Q4H PRN Yuridia Kaplan PA-C   10 mg at 04/27/25 1433    lacosamide injection 150 mg  150 mg Intravenous Q12H Yuridia Kaplan PA-C   150 mg at 04/28/25 0843    levothyroxine tablet 125 mcg  125 mcg Per NG tube Before breakfast Jodie Perea PA-C   125 mcg at 04/28/25 0514    magnesium oxide tablet 800 mg  800 mg Per NG tube PRN Jodie Perea PA-C        magnesium oxide tablet 800 mg  800 mg Per NG tube PRN Jodie Perea PA-C        metoprolol tartrate (LOPRESSOR) split tablet 12.5 mg  12.5 mg Per NG tube Q8H Jodie Perea PA-C   12.5 mg at 04/28/25 0514    mupirocin 2 % ointment   Nasal BID Baumgarten, Katherine L., MD   Given at 04/28/25 0848    oxyCODONE immediate release tablet 5 mg  5 mg Per NG tube Q6H PRN Jodie Perea PA-C        piperacillin-tazobactam (ZOSYN) 4.5 g in D5W 100 mL IVPB (MB+)  4.5 g Intravenous Q8H Deven Ahumada MD 25 mL/hr at 04/28/25 1117 Rate Verify at 04/28/25 1117    potassium bicarbonate disintegrating tablet 35 mEq  35 mEq Per NG tube PRN Jodie Perea PA-C        potassium bicarbonate disintegrating tablet 50 mEq  50 mEq Per NG tube PRN Jodie Perea PA-C   50 mEq at 04/28/25 0516    potassium bicarbonate disintegrating tablet 60 mEq  60 mEq Per NG tube PRN Jodie Perea PA-C        potassium, sodium phosphates 280-160-250 mg packet 2 packet  2 packet Per NG tube PRN Jodie Perea PA-C   2 packet at 04/28/25 0843    potassium, sodium phosphates 280-160-250 mg packet 2 packet  2 packet Per NG tube PRN Jodie Perea PA-C        potassium, sodium phosphates 280-160-250 mg packet 2 packet  2 packet Per NG tube PRN Jodie Perea PA-C        pravastatin tablet 20 mg  20 mg Per NG tube QHS Jodie Perea PA-C   20 mg at 04/27/25 2125    senna-docusate 8.6-50 mg per tablet 1 tablet  1 tablet  Per NG tube Daily Jodie Perea PA-C   1 tablet at 04/28/25 0843    silodosin capsule 4 mg  4 mg Per NG tube Daily Jodie Perea PA-C   4 mg at 04/28/25 0842    sodium chloride 0.9% flush 10 mL  10 mL Intravenous PRN Yuridia Kaplan PA-C        vancomycin - pharmacy to dose   Intravenous pharmacy to manage frequency Jodie Perea PA-C          Consent (Marginal Mandibular)/Introductory Paragraph: The rationale for Mohs was explained to the patient and consent was obtained. The risks, benefits and alternatives to therapy were discussed in detail. Specifically, the risks of damage to the marginal mandibular branch of the facial nerve, infection, scarring, bleeding, prolonged wound healing, incomplete removal, allergy to anesthesia, and recurrence were addressed. Prior to the procedure, the treatment site was clearly identified and confirmed by the patient. All components of Universal Protocol/PAUSE Rule completed.

## 2025-04-28 NOTE — ASSESSMENT & PLAN NOTE
84 yo with recent admission for VPS tie off and SDH evacuation on 3/24 presenting for new LSW and speech hesitation found to have enlarged chronic SDH as well as a new small R convexity acute SDH. Pt takes eliquis, s/p PCC. Interval CTH stable.     Now s/p bialteral subdural evacuations with VPS removal on 4/25    Recommendations:  --admitted to [facility]  --on eliquis, s/p kcentra reversal. Hold all AC/AP  --SBP <140  --Vimpat, unresponsive POD1 FU EEG and seizure workup   --HOB >30  --f/u cEEG  --drains to suction, abx while in place  --Continue to monitor clinically, notify NSGY immediately with any changes in neuro status    Discussed with Dr. Blankenship

## 2025-04-28 NOTE — PT/OT/SLP PROGRESS
Occupational Therapy      Patient Name:  Ramesh Ricci   MRN:  [id number]  Patient not seen today secondary to RN hold due to pt unarousable, not following commands, respiratory status. Will follow-up as able.     4/28/2025

## 2025-04-28 NOTE — SUBJECTIVE & OBJECTIVE
Interval History: EEG with generalized cortical irritability, no discrete seizures. Updated daughter at bedside this morning, answered all questions.    Current Medications[1]  Continuous Infusions:    Review of Systems  Objective:     Vital Signs (Most Recent):  Temp: 97.9 °F (36.6 °C) (04/28/25 1100)  Pulse: 80 (04/28/25 1216)  Resp: 20 (04/28/25 1216)  BP: (!) 133/56 (04/28/25 1200)  SpO2: 99 % (04/28/25 1216) Vital Signs (24h Range):  Temp:  [97.9 °F (36.6 °C)-99.4 °F (37.4 °C)] 97.9 °F (36.6 °C)  Pulse:  [69-84] 80  Resp:  [17-36] 20  SpO2:  [90 %-99 %] 99 %  BP: (102-184)/(45-77) 133/56     Weight: 68 kg (149 lb 14.6 oz)  Body mass index is 20.92 kg/m².     Physical Exam  Constitutional:       General: He is not in acute distress.     Appearance: He is ill-appearing.      Interventions: Nasal cannula in place.   HENT:      Head: Normocephalic.      Comments: EEG in place  Drains in place  Eyes:      General: No scleral icterus.        Right eye: No discharge.         Left eye: No discharge.      Conjunctiva/sclera: Conjunctivae normal.      Pupils: Pupils are equal, round, and reactive to light.   Cardiovascular:      Rate and Rhythm: Normal rate.   Pulmonary:      Effort: Pulmonary effort is normal. No respiratory distress.   Musculoskeletal:         General: No deformity or signs of injury.   Skin:     General: Skin is warm and dry.   Psychiatric:      Comments: Unable to assess            NEUROLOGICAL EXAMINATION:     CRANIAL NERVES     CN III, IV, VI   Pupils are equal, round, and reactive to light.       Does not arouse to stimuli  SHASHA OU   Corneal intact OU   No spontaneous eye opening   Face symmetric   Tongue midline   Does not follow commands  Withdraws to noxious stimuli in BLE    Exam findings to suggest seizures:  Myoclonus - no   eye twitching - no   Nystagmus - no   gaze deviation - no   waxy rigidity - no        Significant Labs: All pertinent lab results from the past 24 hours have been  reviewed.    Significant Studies: I have reviewed all pertinent imaging results/findings within the past 24 hours.       [1]   Current Facility-Administered Medications   Medication Dose Route Frequency Provider Last Rate Last Admin    acetaminophen tablet 650 mg  650 mg Per NG tube Q6H PRN Jodie Perea PA-C   650 mg at 04/27/25 0645    acetylcysteine 100 mg/ml (10%) solution 4 mL  4 mL Nebulization Q4H Deven Ahumada MD   4 mL at 04/28/25 1217    albuterol-ipratropium 2.5 mg-0.5 mg/3 mL nebulizer solution 3 mL  3 mL Nebulization Q4H Jodie Perea PA-C   3 mL at 04/28/25 1216    bisacodyL suppository 10 mg  10 mg Rectal Daily PRN Yuridia Kaplan PA-C        enoxaparin injection 40 mg  40 mg Subcutaneous Q24H (prophylaxis, 1700) Jodie Perea PA-C   40 mg at 04/27/25 1739    hydrALAZINE injection 10 mg  10 mg Intravenous Q4H PRN Yuridia Kaplan PA-C   10 mg at 04/27/25 1338    labetalol 20 mg/4 mL (5 mg/mL) IV syring  10 mg Intravenous Q4H PRN Yuridia Kaplan PA-C   10 mg at 04/27/25 1433    lacosamide injection 150 mg  150 mg Intravenous Q12H Yuridia Kaplan PA-C   150 mg at 04/28/25 0843    levothyroxine tablet 125 mcg  125 mcg Per NG tube Before breakfast Jodie Perea PA-C   125 mcg at 04/28/25 0514    magnesium oxide tablet 800 mg  800 mg Per NG tube PRN Jodie Perea PA-C        magnesium oxide tablet 800 mg  800 mg Per NG tube PRN Jodie Perea PA-C        metoprolol tartrate (LOPRESSOR) split tablet 12.5 mg  12.5 mg Per NG tube Q8H Jodie Perea PA-C   12.5 mg at 04/28/25 0514    mupirocin 2 % ointment   Nasal BID Baumgarten, Katherine L., MD   Given at 04/28/25 0848    oxyCODONE immediate release tablet 5 mg  5 mg Per NG tube Q6H PRN Jodie Perea, PA-C        piperacillin-tazobactam (ZOSYN) 4.5 g in D5W 100 mL IVPB (MB+)  4.5 g Intravenous Q8H Deven Ahumada MD 25 mL/hr at 04/28/25 1117 Rate Verify at 04/28/25 1117    potassium bicarbonate  disintegrating tablet 35 mEq  35 mEq Per NG tube PRN Jodie Perea PA-C        potassium bicarbonate disintegrating tablet 50 mEq  50 mEq Per NG tube PRN Jodie Perea PA-C   50 mEq at 04/28/25 0516    potassium bicarbonate disintegrating tablet 60 mEq  60 mEq Per NG tube PRN Jodie Perea PA-C        potassium, sodium phosphates 280-160-250 mg packet 2 packet  2 packet Per NG tube PRN Jodie Perea PA-C   2 packet at 04/28/25 0843    potassium, sodium phosphates 280-160-250 mg packet 2 packet  2 packet Per NG tube PRN Jodie Perea PA-C        potassium, sodium phosphates 280-160-250 mg packet 2 packet  2 packet Per NG tube PRN Jodie Perea PA-C        pravastatin tablet 20 mg  20 mg Per NG tube QHS Jodie Perea PA-C   20 mg at 04/27/25 2125    senna-docusate 8.6-50 mg per tablet 1 tablet  1 tablet Per NG tube Daily Jodie Perea PA-C   1 tablet at 04/28/25 0843    silodosin capsule 4 mg  4 mg Per NG tube Daily Jodie Perea PA-C   4 mg at 04/28/25 0842    sodium chloride 0.9% flush 10 mL  10 mL Intravenous PRN Yuridia Kaplan PA-C        vancomycin - pharmacy to dose   Intravenous pharmacy to manage frequency Jodie Perea PA-C

## 2025-04-28 NOTE — ASSESSMENT & PLAN NOTE
85M PMHx of HTN, afib on Eliquis, AS s/p TAVR, CKD, UC, NPH s/p VPS c/b bilateral SDH s/p bilateral evacuation with subsequent seizure disorder maintained on Lacosamide 100 mg BID who presented with worsening weakness, urinary frequency, and speech changes. Imaging revealed interval increase in size of bilateral holohemispheric subdural collections, with new bilateral hemorrhages in the subdural collections, concerning for acute on subacute SDH; right VPS in place. Eliquis was reversed in the ER. Patient noted to have staring episode which resolved ~few minutes. Wife reported staring episodes at home. Patient admitted to [facility] for higher level of care and further management with Neurosurgery consult. CAP EEG placed; Epilepsy following for assistance in management. Per chart review, patient was recently started on Lacosamide 100 mg BID (filled 4/8) by Neurology group at [facility].    Recommendations:  - Continuous vEEG monitoring  - Recommend to continue Lacosamide 150 mg BID  - Avoid agents that lower seizure threshold  - Post operative management per NSGY  - Management of infectious/metabolic abnormalities per [facility]  - Seizure precautions  - Per [place] Law, patient must refrain from driving for 6 months after having a seizure or cleared by a neurologist to legally resume driving      Discussed plan of care with [facility] team and daughter at bedside. Will follow, please call with questions.

## 2025-04-28 NOTE — PROGRESS NOTES
"[facility]  Neurocritical Care  Progress Note    Admit Date: 4/23/2025  Service Date: 04/28/2025  Length of Stay: 5    Subjective:     Chief Complaint: Subdural hemorrhage    History of Present Illness: Ramesh Ricci is an 86 y/o male with a PMH of Afib on eliquis at home, Ulcerative Colitis, HTN, aortic stenosis, and VPS 2/2 NPH c/b bilateral SDH s/p bilateral evac and VPS tie off in March 2025 who presents to [facility] with acute on subacute bilateral SDH. His wife at bedside reports concern for acute deconditioning over the past three days involving worsening weakness, urinary frequency, speech changes, intermittent cessation in interaction with family, and dragging of his R leg. CT head showed bilateral subdural hygromas with bilateral acute versus subacute SDH. He takes eliquis for A fib at home which was reversed with PCC in the ED. During evaluation of the patient, he was initially interactive, following, commands, and oriented to person and place. He then had a brief staring spell where he no longer followed commands and displayed no usable speech. This then resolved after a few minutes and he returned to baseline. He takes Vimpat 100 bid at home but has not gotten his nightly dose. Per his wife, these spells occurred at home as well.    He is admitted to [facility] for hourly neuro-monitoring and a higher level of care.     Hospital Course: 04/25/2025: OR today for SDH evacuation   04/26/2025: NAEON. POD1 s/p SDH evac. Post-op CTH good decompression and expected post-op changes. Post-op episode of seizure-like activity, described as "LUE & LLE shaking" lasting approximately 1 minute that resolved without intervention. Patient unarousable post-operatively and this AM. NGT placed. Repeat CTH obtained, stable. Procal, EEG ordered and pending.   04/27/2025:  POD2 s/p SDH evacuation. Procal elevated and patient febrile with low grade temp. Pan-cultured and started on broad spec antibiotics. Aggressive " pulm toileting started yesterday. Overnight with Tmax 100.9°F. Cx with NGTD. Respiratory panel sent. US extremities ordered and pending to rule out DVT. Start lovenox pending neurosurgery recs.   04/28/2025: Continue aggressive pulmonary toileting. EEG with periodic complexes with some epileptiform morphology, optimized lacosamide to 200mg BID.     Interval History: Infectious w/u remains unremarkable, but fever curve downtrending on antibiotics, will continue at this time. Continue aggressive pulmonary toileting. EEG with periodic complexes with some epileptiform morphology, optimized lacosamide to 200mg BID.    Review of Systems: Unable to obtain a complete ROS due to level of consciousness.     Vitals:   Temp: 98.2 °F (36.8 °C)  Pulse: 83  Rhythm: normal sinus rhythm  BP: (!) 138/58  MAP (mmHg): 83  Resp: (!) 26  SpO2: 98 %    Temp  Min: 97.9 °F (36.6 °C)  Max: 99.4 °F (37.4 °C)  Pulse  Min: 71  Max: 84  BP  Min: 102/46  Max: 138/58  MAP (mmHg)  Min: 65  Max: 86  Resp  Min: 20  Max: 37  SpO2  Min: 90 %  Max: 99 %    04/27 0701 - 04/28 0700  In: 1412.5   Out: 514 [Urine:500; Drains:14]   Unmeasured Output  Urine Occurrence: 1  Stool Occurrence: 1  Pad Count: 2     Examination:   Constitutional: Elderly male. Well developed. Well nourished. Chronically ill appearing.   Eyes: Conjunctiva clear, anicteric. Lids no lesions.  Head/Ears/Nose/Mouth/Throat/Neck: Moist mucous membranes. External ears, nose atraumatic.   Cardiovascular: Regular rhythm.   Respiratory: Decreased lung sounds in left lung fields. Increased respiratory effort without accessory mucle use. RR in low 20s.       Neurologic:  -GCS E2V1M4  Not on sedation.   Minimally opens eyes to noxious stimuli.   Does not answer questions. Does not follow commands.   PERRL. Gaze midline, no nystagmus. No gross facial asymmetry.   Triple flexion in BLE. Minimal movement to stimuli in BUE.   No involuntary movements noted.      Medications:   Continuous  "Scheduledacetylcysteine 100 mg/ml (10%), 4 mL, Q4H  albuterol-ipratropium, 3 mL, Q4H  enoxparin, 40 mg, Q24H (prophylaxis, 1700)  lacosamide (Vimpat) IV orderable, 200 mg, Q12H  levothyroxine, 125 mcg, Before breakfast  metoprolol tartrate, 12.5 mg, Q8H  mupirocin, , BID  piperacillin-tazobactam (Zosyn) IV (PEDS and ADULTS) (extended infusion is not appropriate), 4.5 g, Q8H  pravastatin, 20 mg, QHS  senna-docusate, 1 tablet, Daily  silodosin, 4 mg, Daily    PRNacetaminophen, 650 mg, Q6H PRN  bisacodyL, 10 mg, Daily PRN  hydrALAZINE, 10 mg, Q4H PRN  labetalol, 10 mg, Q4H PRN  magnesium oxide, 800 mg, PRN  magnesium oxide, 800 mg, PRN  oxyCODONE, 5 mg, Q6H PRN  potassium bicarbonate, 35 mEq, PRN  potassium bicarbonate, 50 mEq, PRN  potassium bicarbonate, 60 mEq, PRN  potassium, sodium phosphates, 2 packet, PRN  potassium, sodium phosphates, 2 packet, PRN  potassium, sodium phosphates, 2 packet, PRN  sodium chloride 0.9%, 10 mL, PRN  vancomycin - pharmacy to dose, , pharmacy to manage frequency       Today I independently reviewed pertinent medications, imaging, laboratory results, notably:     -  cultures ngtd   - EEG discussed with epilepsy team     Chem:   Recent Labs   Lab 04/28/25 0211      K 3.7      CO2 22*   BUN 45*   CREATININE 1.4   CALCIUM 8.8   MG 2.5   PHOS 2.4*   ANIONGAP 10   ALBUMIN 2.2*   BILITOT 0.3   ALKPHOS 63   AST 27   ALT 7*     Heme:   Recent Labs   Lab 04/28/25 0211   WBC 15.34*   HGB 10.3*   HCT 32.0*        Endo: No results for input(s): "POCTGLUCOSE" in the last 24 hours.       Assessment/Plan:     Neuro  * Subdural hemorrhage  86 y/o M with VPS 2/2 NPH c/b bilateral SDH s/p bilateral evac and VPS tie off in March 2025 who re-presented to [facility] on 4/23 after 3 day of decline in overal functional status. CT head with bilatateral acute versus subacute SDH and known VPS. His eliquis was reversed with PCC in the ED. Now post-op SDH evac 4/25    -Admit to [facility] " following  -q1h neuro checks, vital checks  -Post-op CTH (4/25) with good decompression and expected postop changes  -CTH 4/26 stable  -EKG, ECHO, CXR  -Daily CBC, CMP, mag, phos  -Goal eunatremia, euvolemia  -SBP < 160, prn labetalol and hydralazine  -Lacosamide dose increased to 200 mg BID in setting of generalized cortical irritability on EEG with NCSE conversion risk   -Seizure precautions  -SCDs, enoxaparin on board     Seizure disorder  - vimpat 150 bid   - keppra 500mg bid   - EEG pending    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   Tie off on 3/25 2/2 bilateral hygromas that were evacuated  Removed in OR on 4/25     Cardiac/Vascular  Coronary artery disease involving native coronary artery of native heart without angina pectoris  Resume home statin  Hold home eliquis    PAF (paroxysmal atrial fibrillation)  C/w home toprol  Hold home eliquis, reversed on admission  Currently rate controlled    Essential hypertension  SBP < 160  c/w metoprolol   PRN labetalol, hydralazine    Renal/  Urinary retention  C/w home silodosin    Endocrine  Acquired hypothyroidism  C/w home synthroid          The patient is being Prophylaxed for:  Venous Thromboembolism with: Mechanical or Chemical  Stress Ulcer with: Not Applicable   Ventilator Pneumonia with: not applicable    Activity Orders            Elevate HOB Elevate HOB 30-45 degrees during feeding unless contraindicated starting at 04/27 0844    Diet NPO: NPO starting at 04/25 0001    Turn patient starting at 04/23 2000    Elevate HOB starting at 04/23 1948          Partial Code    Critical care time spent on the evaluation and treatment of severe organ dysfunction, review of pertinent labs and imaging studies, discussions with consulting providers and discussions with patient/family: 35 minutes.      Noelle Mondragon PA-C  Neurocritical Care  [facility]

## 2025-04-28 NOTE — PT/OT/SLP PROGRESS
Physical Therapy      Patient Name:  Ramesh Ricci   MRN:  [id number]    Patient not seen today secondary to RN hold due to pt unarousable, not following commands, respiratory status. Will follow-up as able.

## 2025-04-28 NOTE — PLAN OF CARE
[facility]  Discharge Reassessment    Primary Care Provider: Nakul Mandujano MD    Expected Discharge Date: 5/3/2025    Reassessment (most recent)       Discharge Reassessment - 04/28/25 1348          Discharge Reassessment    Assessment Type Discharge Planning Reassessment (P)      Did the patient's condition or plan change since previous assessment? No (P)      Discharge Plan discussed with: Spouse/sig other (P)      Name(s) and Number(s) Mone frazier [phone] (P)      Communicated LIDIA with patient/caregiver No (P)      Discharge Plan A Other (P)    tbd    DME Needed Upon Discharge  none (P)      Transition of Care Barriers None (P)      Why the patient remains in the hospital Requires continued medical care (P)         Post-Acute Status    Discharge Delays None known at this time (P)                    Discharge Plan A and Plan B have been determined by review of patient's clinical status, future medical and therapeutic needs, and coverage/benefits for post-acute care in coordination with multidisciplinary team members.    Stacia Hilton MSW, LCSW  [facility]  Case Management Dept.

## 2025-04-28 NOTE — PROGRESS NOTES
Pharmacokinetic Assessment Follow Up: IV Vancomycin    Vancomycin Regimen Assessment/Plan:    - Vancomycin random level resulted below goal as 8.8 mcg/mL.  Goal 10-20 mcg/mL.  - Continue pulse dosing in the setting of increased SCr and advanced age.   - Administer vancomycin 750 mg x 1 dose.   - A random level is ordered tomorrow with AM labs.  Pharmacy to re-dose based on level and renal function.    Drug levels (last 3 results):  Recent Labs   Lab Result Units 04/27/25  0534 04/28/25  0211   Vancomycin Random ug/ml 9.6 8.8       Pharmacy will continue to follow and monitor vancomycin.    Please contact pharmacy at extension [phone] for questions regarding this assessment.    Thank you for the consult,   Daxa Lentz, PharmD, [facility]  Neurocritical Care Pharmacist  [phone]         Patient brief summary:  Ramesh Ricci is a 85 y.o. male initiated on antimicrobial therapy with IV Vancomycin for treatment of sepsis    Drug Allergies:   Review of patient's allergies indicates:   Allergen Reactions    Benazepril Other (See Comments)     Cough       Actual Body Weight:   68 kg    Renal Function:   Estimated Creatinine Clearance: 35.8 mL/min (based on SCr of 1.4 mg/dL).,     Dialysis Method (if applicable):  N/A    CBC (last 72 hours):  Recent Labs   Lab Result Units 04/26/25  0053 04/27/25  0028 04/28/25  0211   WBC K/uL 14.34* 16.29* 15.34*   HGB gm/dL 11.5* 11.9* 10.3*   HCT % 35.9* 37.5* 32.0*   Platelet Count K/uL 237 247 254   Lymph % % 7.2* 5.8* 5.3*   Mono % % 12.3 10.6 8.3   Eos % % 0.0 0.0 0.1   Basophil % % 0.2 0.1 0.1       Metabolic Panel (last 72 hours):  Recent Labs   Lab Result Units 04/26/25  0053 04/26/25  1652 04/27/25  0028 04/28/25  0211   Sodium mmol/L 136  --  138 136   Potassium mmol/L 4.6  --  4.4 3.7   Chloride mmol/L 101  --  104 104   CO2 mmol/L 21*  --  18* 22*   Glucose mg/dL 151*  --  114* 161*   Glucose, UA   --  Negative  --   --    BUN mg/dL 28*  --  29* 45*   Creatinine mg/dL 1.5*  --   1.3 1.4   Albumin g/dL 2.8*  --  2.7* 2.2*   Bilirubin Total mg/dL 0.3  --  0.4 0.3   ALP unit/L 66  --  84 63   AST unit/L 13  --  27 27   ALT unit/L <5*  --  <5* 7*   Magnesium  mg/dL 2.1  --  2.3 2.5   Phosphorus Level mg/dL 4.7*  --  3.5 2.4*       Vancomycin Administrations:  vancomycin given in the last 96 hours                     vancomycin (VANCOCIN) 1,000 mg in 0.9% NaCl 250 mL IVPB (admixture device) (mg) 1,000 mg New Bag 04/26/25 1748                    Microbiologic Results:  Microbiology Results (last 7 days)       Procedure Component Value Units Date/Time    Culture, Respiratory with Gram Stain [[id number]] Collected: 04/26/25 1428    Order Status: Completed Specimen: Respiratory from Sputum, Induced Updated: 04/28/25 0916     Respiratory Culture Normal respiratory audrey     GRAM STAIN <10 Epithelial Cells/LPF      Rare WBC seen      No organisms seen    Blood culture [[id number]]  (Normal) Collected: 04/26/25 1628    Order Status: Completed Specimen: Blood from Peripheral, Antecubital, Right Updated: 04/28/25 0800     Blood Culture No Growth After 36 Hours    Blood culture [[id number]]  (Normal) Collected: 04/26/25 1628    Order Status: Completed Specimen: Blood from Peripheral, Forearm, Left Updated: 04/28/25 0800     Blood Culture No Growth After 36 Hours    Respiratory Infection Panel (PCR), Nasopharyngeal [[id number]] Collected: 04/27/25 1759    Order Status: Completed Specimen: Nasopharyngeal Swab Updated: 04/27/25 2049     Respiratory Infection Panel Source Nasopharyngeal Swab     Adenovirus Not Detected     Coronavirus 229E, Common Cold Virus Not Detected     Coronavirus HKU1, Common Cold Virus Not Detected     Coronavirus NL63, Common Cold Virus Not Detected     Coronavirus OC43, Common Cold Virus Not Detected     SARS-CoV2 (COVID-19) Qualitative PCR Not Detected     Human Metapneumovirus Not Detected     Human Rhinovirus/Enterovirus Not Detected     Influenza A Not Detected     Influenza B  Not Detected     Parainfluenza Virus 1 Not Detected     Parainfluenza Virus 2 Not Detected     Parainfluenza Virus 3 Not Detected     Parainfluenza Virus 4 Not Detected     Respiratory Syncytial Virus Not Detected     Bordetella Parapertussis (ZI1102) Not Detected     Bordetella pertussis (ptxP) Not Detected     Chlamydia pneumoniae Not Detected     Mycoplasma pneumoniae Not Detected    Respiratory Infection Panel (PCR), Nasopharyngeal [[id number]] Collected: 04/27/25 1004    Order Status: Canceled Specimen: Nasopharyngeal Swab Updated: 04/27/25 1222

## 2025-04-28 NOTE — PROGRESS NOTES
[facility]  Adult Nutrition  Progress Note    SUMMARY       Recommendations  --Continuous TF recommendation: Impact Peptide 1.5 @ 45 mL/hr x 22 hours (levothyroxine administration) to provide 990 mL total volume, 1485 kcal, 139 g CHO, 93 g protein, 0 g fiber, 764 mL free water, 99% DRI         --120 mL flush q4 hrs to provide additional 720 mL free water (Total 1484 mL)    --Nursing: please continue to document rate and formula on flowsheet    --RD to monitor weight, rate, tolerance    Goals:   75% nutritional needs met with diet/EN/PN during admission  Maintain dry weight during admission  Nutrition Goal Status: goal met  Communication of RD Recs: other (comment) (POC)    Nutrition Discharge Planning    Nutrition Discharge Planning: Too early to determine, pending clinical course, General healthy diet    Assessment and Plan    Nutrition Problem  Inadequate enteral infusion     Related to (etiology):   EN not yet initiated     Signs and Symptoms (as evidenced by):   EEN> energy intake, NPO      Interventions/Recommendations (treatment strategy):  EN, diet advancement     Nutrition Diagnosis Status:   Resolved     Reason for Assessment    Reason For Assessment: consult, new tube feeding  Diagnosis: other (see comments) (subdural hemorrhage)  General Information Comments: 84 y/o male with a PMH of Afib on eliquis at home, Ulcerative Colitis, HTN, aortic stenosis, and 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 RD follow-up. Tube feed currently running at goal rate of 45 mL/hr via NG - meeting EEN/EPN. Weight stable - no concerns for malnutrition at this time.     Nutrition Related Social Determinants of Health: SDOH: Unable to assess at this time.      Food Insecurity: Patient Unable To Answer (4/25/2025)    Hunger Vital Sign     Worried About Running Out of Food in the Last Year: Patient unable to answer  "    Ran Out of Food in the Last Year: Patient unable to answer       Nutrition/Diet History    Spiritual, Cultural Beliefs, Worship Practices, Values that Affect Care: no  Food Allergies: NKFA  Factors Affecting Nutritional Intake: NPO    Anthropometrics    Height: 5' 10.98" (180.3 cm)  Height (inches): 70.98 in  Height Method: Measured  Weight: 68 kg (149 lb 14.6 oz)  Weight (lb): 149.91 lb  Weight Method: Bed Scale  Ideal Body Weight (IBW), Male: 171.88 lb  % Ideal Body Weight, Male (lb): 87.22 %  BMI (Calculated): 20.9  BMI Grade: less than 23 (older than 65 years) - underweight       Lab/Procedures/Meds    Pertinent Labs Reviewed: reviewed - BUN 45, eGFR 49, glucose 161, P 2.4, ALT 7     Pertinent Medications Reviewed: reviewed - enoxaparin, levothyroxine, pravastatin, senna-docusate    Estimated/Assessed Needs    Weight Used For Calorie Calculations: 68 kg (149 lb 14.6 oz)  Energy Calorie Requirements (kcal): 0662-3454 kcal (25-30 kcal/kg)  Energy Need Method: Kcal/kg  Protein Requirements:  g/day (1.2-2.0 g/kg)  Weight Used For Protein Calculations: 68 kg (149 lb 14.6 oz)     Estimated Fluid Requirement Method: RDA Method  RDA Method (mL): 1700  CHO Requirement: 213 g      Nutrition Prescription Ordered    Current Diet Order: NPO  Current Nutrition Support Formula Ordered: Impact Peptide 1.5  Current Nutrition Support Rate Ordered: 45 (ml)  Current Nutrition Support Frequency Ordered: x 24 hours    Evaluation of Received Nutrient/Fluid Intake    Enteral Calories (kcal): 1620  Enteral Protein (gm): 102  Enteral (Free Water) Fluid (mL): 834  % Kcal Needs: 95  % Protein Needs: 100  Energy Calories Required: meeting needs  Protein Required: meeting needs  Fluid Required:  (Per MD)  Comments: LBM 4/24  % Intake of Estimated Energy Needs: 75 - 100 %  % Meal Intake: NPO     Nutrition Risk    Level of Risk/Frequency of Follow-up: high     Monitor and Evaluation    Monitor and Evaluation: Energy intake, Food " and beverage intake, Protein intake, Carbohydrate intake, Enteral and parenteral nutrition administration, Diet order, Glucose/endocrine profile, Gastrointestinal profile, Electrolyte and renal panel, Inflammatory profile, Lipid profile     Nutrition Follow-Up    RD Follow-up?: Yes

## 2025-04-28 NOTE — EICU
Virtual ICU Quality Rounds    Admit Date: 4/23/2025  Hospital Day: 5    ICU Day: 4d 13h    24H Vital Sign Range:  Temp:  [98.3 °F (36.8 °C)-99.4 °F (37.4 °C)]   Pulse:  [69-84]   Resp:  [16-36]   BP: (102-184)/(45-77)   SpO2:  [90 %-99 %]     VICU Surveillance Screening    Daily review of  line necessity(optional): Urinary catheter in place            [place] Indications : Critically ill in the intensive care unit requiring hourly monitoring     LDA reconciliation : Yes

## 2025-04-28 NOTE — PT/OT/SLP PROGRESS
Speech Language Pathology  Discharge Summary     Ramesh Sr Ricci  MRN: [id number]    Patient not seen today and ST orders discontinued secondary to Patient with recent surgery and will require new orders when appropriate.     4/28/2025

## 2025-04-28 NOTE — SUBJECTIVE & OBJECTIVE
Interval History: 4/28: NAEON. Neuro stable. No seizures on EEG. Drains with 10/4cc output. Tm 100.9    Medications:  Continuous Infusions:  Scheduled Meds:   acetylcysteine 100 mg/ml (10%)  4 mL Nebulization Q4H    albuterol-ipratropium  3 mL Nebulization Q4H    enoxparin  40 mg Subcutaneous Q24H (prophylaxis, 1700)    lacosamide (Vimpat) IV orderable  150 mg Intravenous Q12H    levothyroxine  125 mcg Per NG tube Before breakfast    metoprolol tartrate  12.5 mg Per NG tube Q8H    mupirocin   Nasal BID    piperacillin-tazobactam (Zosyn) IV (PEDS and ADULTS) (extended infusion is not appropriate)  4.5 g Intravenous Q8H    pravastatin  20 mg Per NG tube QHS    senna-docusate  1 tablet Per NG tube Daily    silodosin  4 mg Per NG tube Daily     PRN Meds:  Current Facility-Administered Medications:     acetaminophen, 650 mg, Per NG tube, Q6H PRN    bisacodyL, 10 mg, Rectal, Daily PRN    hydrALAZINE, 10 mg, Intravenous, Q4H PRN    labetalol, 10 mg, Intravenous, Q4H PRN    magnesium oxide, 800 mg, Per NG tube, PRN    magnesium oxide, 800 mg, Per NG tube, PRN    oxyCODONE, 5 mg, Per NG tube, Q6H PRN    potassium bicarbonate, 35 mEq, Per NG tube, PRN    potassium bicarbonate, 50 mEq, Per NG tube, PRN    potassium bicarbonate, 60 mEq, Per NG tube, PRN    potassium, sodium phosphates, 2 packet, Per NG tube, PRN    potassium, sodium phosphates, 2 packet, Per NG tube, PRN    potassium, sodium phosphates, 2 packet, Per NG tube, PRN    sodium chloride 0.9%, 10 mL, Intravenous, PRN    Pharmacy to dose Vancomycin consult, , , Once **AND** vancomycin - pharmacy to dose, , Intravenous, pharmacy to manage frequency     Review of Systems  Objective:     Weight: 68 kg (149 lb 14.6 oz)  Body mass index is 20.92 kg/m².  Vital Signs (Most Recent):  Temp: 98.3 °F (36.8 °C) (04/28/25 0700)  Pulse: 76 (04/28/25 0900)  Resp: (!) 25 (04/28/25 0900)  BP: (!) 121/49 (04/28/25 0900)  SpO2: (!) 90 % (04/28/25 0900) Vital Signs (24h Range):  Temp:   [98.3 °F (36.8 °C)-99.4 °F (37.4 °C)] 98.3 °F (36.8 °C)  Pulse:  [69-84] 76  Resp:  [16-36] 25  SpO2:  [90 %-99 %] 90 %  BP: (102-184)/(45-77) 121/49     Date 04/28/25 0700 - 04/29/25 0659   Shift 8573-3849 8815-2938 9211-0279 24 Hour Total   INTAKE   NG/   135   Shift Total(mL/kg) 135(2)   135(2)   OUTPUT   Drains 0   0   Shift Total(mL/kg) 0(0)   0(0)   Weight (kg) 68 68 68 68                            Closed/Suction Drain 04/25/25 1703 Right Scalp Accordion 10 Fr. (Active)   Site Description Unable to view 04/27/25 1101   Output (mL) 30 mL 04/26/25 1801            Closed/Suction Drain 04/25/25 1728 Left Scalp Accordion 10 Fr. (Active)   Site Description Unable to view 04/27/25 1101   Output (mL) 15 mL 04/26/25 1801            NG/OG Tube 04/26/25 0819 14 Fr. Right nostril (Active)   Placement Check other (see comments);placement verified by x-ray 04/27/25 1101   Tolerance no signs/symptoms of discomfort 04/27/25 1101   Securement secured to nostril center w/ adhesive device 04/27/25 1101   Clamp Status/Tolerance clamped 04/27/25 1101   Suction Setting/Drainage Method suction at the bedside 04/27/25 1101   Insertion Site Appearance no redness, warmth, tenderness, skin breakdown, drainage 04/27/25 1101   Drainage None 04/27/25 1101   Intake (mL) 55 mL 04/27/25 0901       Male External Urinary Catheter 04/23/25 1858 (Active)   Collection Container Urimeter 04/27/25 1101   Securement Method secured to top of thigh w/ adhesive device 04/27/25 1101   Skin no redness;no breakdown 04/27/25 1101   Tolerance no signs/symptoms of discomfort 04/27/25 1101   Output (mL) 110 mL 04/27/25 0701   Catheter Change Date 04/26/25 04/26/25 0819          Physical Exam         Neurosurgery Physical Exam    [id number]  Extubated, breathing spontaneously, vitals stable. But unarousable  Brainstems intact  Minimal movement or reaction to noxious stimuli x4  Cranial incisions dressed CDI    Significant Labs:  Recent Labs   Lab  "04/27/25  0028 04/28/25  0211    136   K 4.4 3.7    104   CO2 18* 22*   BUN 29* 45*   CREATININE 1.3 1.4   CALCIUM 9.0 8.8   MG 2.3 2.5     Recent Labs   Lab 04/27/25  0028 04/28/25  0211   WBC 16.29* 15.34*   HGB 11.9* 10.3*   HCT 37.5* 32.0*    254     No results for input(s): "LABPT", "INR", "APTT" in the last 48 hours.  Microbiology Results (last 7 days)       Procedure Component Value Units Date/Time    MRSA Screen by PCR [[id number]] Collected: 04/28/25 1056    Order Status: Sent Specimen: Nasal Swab Updated: 04/28/25 1100    Culture, Respiratory with Gram Stain [[id number]] Collected: 04/26/25 1428    Order Status: Completed Specimen: Respiratory from Sputum, Induced Updated: 04/28/25 0916     Respiratory Culture Normal respiratory audrey     GRAM STAIN <10 Epithelial Cells/LPF      Rare WBC seen      No organisms seen    Blood culture [[id number]]  (Normal) Collected: 04/26/25 1628    Order Status: Completed Specimen: Blood from Peripheral, Antecubital, Right Updated: 04/28/25 0800     Blood Culture No Growth After 36 Hours    Blood culture [[id number]]  (Normal) Collected: 04/26/25 1628    Order Status: Completed Specimen: Blood from Peripheral, Forearm, Left Updated: 04/28/25 0800     Blood Culture No Growth After 36 Hours    Respiratory Infection Panel (PCR), Nasopharyngeal [[id number]] Collected: 04/27/25 1759    Order Status: Completed Specimen: Nasopharyngeal Swab Updated: 04/27/25 2049     Respiratory Infection Panel Source Nasopharyngeal Swab     Adenovirus Not Detected     Coronavirus 229E, Common Cold Virus Not Detected     Coronavirus HKU1, Common Cold Virus Not Detected     Coronavirus NL63, Common Cold Virus Not Detected     Coronavirus OC43, Common Cold Virus Not Detected     SARS-CoV2 (COVID-19) Qualitative PCR Not Detected     Human Metapneumovirus Not Detected     Human Rhinovirus/Enterovirus Not Detected     Influenza A Not Detected     Influenza B Not Detected     " Parainfluenza Virus 1 Not Detected     Parainfluenza Virus 2 Not Detected     Parainfluenza Virus 3 Not Detected     Parainfluenza Virus 4 Not Detected     Respiratory Syncytial Virus Not Detected     Bordetella Parapertussis (GI5836) Not Detected     Bordetella pertussis (ptxP) Not Detected     Chlamydia pneumoniae Not Detected     Mycoplasma pneumoniae Not Detected    Respiratory Infection Panel (PCR), Nasopharyngeal [[id number]] Collected: 04/27/25 1004    Order Status: Canceled Specimen: Nasopharyngeal Swab Updated: 04/27/25 1222          All pertinent labs from the last 24 hours have been reviewed.    Significant Diagnostics:  I have reviewed all pertinent imaging results/findings within the past 24 hours.  US Lower Extremity Veins Bilateral  Result Date: 4/27/2025  No evidence of deep venous thrombosis in either lower extremity. Electronically signed by resident: Greg Blue Date:    04/27/2025 Time:    12:19 Electronically signed by: Flash Andino MD Date:    04/27/2025 Time:    12:47    US Upper Extremity Veins Bilateral  Result Date: 4/27/2025  Superficial thrombophlebitis of the right cephalic vein. The left basilic vein was not identified which may be secondary to technical factors or non visualization secondary to thrombosis. Electronically signed by resident: Greg Blue Date:    04/27/2025 Time:    12:16 Electronically signed by: Flash Andino MD Date:    04/27/2025 Time:    12:46

## 2025-04-28 NOTE — PROGRESS NOTES
[facility]  Neurosurgery  Progress Note    Subjective:     History of Present Illness: Ramesh Ricci is an 86 y/o male with a PMH of Afib, Ulcerative Colitis, HTN, and aortic stenosis. He also has normal pressure hydrocephalus s/p  shunt (now tied off) is presenting with multiple complaints.     Wife reports concern for acute deconditioning over the past few days.  She describes increased weakness, urinary frequency, and speech changes that is been progressing for the past 3 days.  She states that he seems to slumped towards the right side and has been dragging his right leg.  Previously ambulated with his walker.  She also mentions that he seems to have speech hesitancy.  Denies any fevers, chills, abdominal pain or diarrhea    Post-Op Info:  Procedure(s) (LRB):  CRANIOTOMY, FOR SUBDURAL HEMATOMA EVACUATION, bilateral. shunt removal (Bilateral)  REMOVAL, SHUNT, VENTRICULOPERITONEAL   3 Days Post-Op   Interval History: 4/28: NAEON. Neuro stable. No seizures on EEG. Drains with 10/4cc output. Tm 100.9    Medications:  Continuous Infusions:  Scheduled Meds:   acetylcysteine 100 mg/ml (10%)  4 mL Nebulization Q4H    albuterol-ipratropium  3 mL Nebulization Q4H    enoxparin  40 mg Subcutaneous Q24H (prophylaxis, 1700)    lacosamide (Vimpat) IV orderable  150 mg Intravenous Q12H    levothyroxine  125 mcg Per NG tube Before breakfast    metoprolol tartrate  12.5 mg Per NG tube Q8H    mupirocin   Nasal BID    piperacillin-tazobactam (Zosyn) IV (PEDS and ADULTS) (extended infusion is not appropriate)  4.5 g Intravenous Q8H    pravastatin  20 mg Per NG tube QHS    senna-docusate  1 tablet Per NG tube Daily    silodosin  4 mg Per NG tube Daily     PRN Meds:  Current Facility-Administered Medications:     acetaminophen, 650 mg, Per NG tube, Q6H PRN    bisacodyL, 10 mg, Rectal, Daily PRN    hydrALAZINE, 10 mg, Intravenous, Q4H PRN    labetalol, 10 mg, Intravenous, Q4H PRN    magnesium oxide, 800 mg, Per  NG tube, PRN    magnesium oxide, 800 mg, Per NG tube, PRN    oxyCODONE, 5 mg, Per NG tube, Q6H PRN    potassium bicarbonate, 35 mEq, Per NG tube, PRN    potassium bicarbonate, 50 mEq, Per NG tube, PRN    potassium bicarbonate, 60 mEq, Per NG tube, PRN    potassium, sodium phosphates, 2 packet, Per NG tube, PRN    potassium, sodium phosphates, 2 packet, Per NG tube, PRN    potassium, sodium phosphates, 2 packet, Per NG tube, PRN    sodium chloride 0.9%, 10 mL, Intravenous, PRN    Pharmacy to dose Vancomycin consult, , , Once **AND** vancomycin - pharmacy to dose, , Intravenous, pharmacy to manage frequency     Review of Systems  Objective:     Weight: 68 kg (149 lb 14.6 oz)  Body mass index is 20.92 kg/m².  Vital Signs (Most Recent):  Temp: 98.3 °F (36.8 °C) (04/28/25 0700)  Pulse: 76 (04/28/25 0900)  Resp: (!) 25 (04/28/25 0900)  BP: (!) 121/49 (04/28/25 0900)  SpO2: (!) 90 % (04/28/25 0900) Vital Signs (24h Range):  Temp:  [98.3 °F (36.8 °C)-99.4 °F (37.4 °C)] 98.3 °F (36.8 °C)  Pulse:  [69-84] 76  Resp:  [16-36] 25  SpO2:  [90 %-99 %] 90 %  BP: (102-184)/(45-77) 121/49     Date 04/28/25 0700 - 04/29/25 0659   Shift 2583-8253 5623-8096 5391-2950 24 Hour Total   INTAKE   NG/   135   Shift Total(mL/kg) 135(2)   135(2)   OUTPUT   Drains 0   0   Shift Total(mL/kg) 0(0)   0(0)   Weight (kg) 68 68 68 68                            Closed/Suction Drain 04/25/25 1703 Right Scalp Accordion 10 Fr. (Active)   Site Description Unable to view 04/27/25 1101   Output (mL) 30 mL 04/26/25 1801            Closed/Suction Drain 04/25/25 1728 Left Scalp Accordion 10 Fr. (Active)   Site Description Unable to view 04/27/25 1101   Output (mL) 15 mL 04/26/25 1801            NG/OG Tube 04/26/25 0819 14 Fr. Right nostril (Active)   Placement Check other (see comments);placement verified by x-ray 04/27/25 1101   Tolerance no signs/symptoms of discomfort 04/27/25 1101   Securement secured to nostril center w/ adhesive device 04/27/25 1101  "  Clamp Status/Tolerance clamped 04/27/25 1101   Suction Setting/Drainage Method suction at the bedside 04/27/25 1101   Insertion Site Appearance no redness, warmth, tenderness, skin breakdown, drainage 04/27/25 1101   Drainage None 04/27/25 1101   Intake (mL) 55 mL 04/27/25 0901       Male External Urinary Catheter 04/23/25 1858 (Active)   Collection Container Urimeter 04/27/25 1101   Securement Method secured to top of thigh w/ adhesive device 04/27/25 1101   Skin no redness;no breakdown 04/27/25 1101   Tolerance no signs/symptoms of discomfort 04/27/25 1101   Output (mL) 110 mL 04/27/25 0701   Catheter Change Date 04/26/25 04/26/25 0819          Physical Exam         Neurosurgery Physical Exam    E1V1M1  Extubated, breathing spontaneously, vitals stable. But unarousable  Brainstems intact  Minimal movement or reaction to noxious stimuli x4  Cranial incisions dressed CDI    Significant Labs:  Recent Labs   Lab 04/27/25  0028 04/28/25  0211    136   K 4.4 3.7    104   CO2 18* 22*   BUN 29* 45*   CREATININE 1.3 1.4   CALCIUM 9.0 8.8   MG 2.3 2.5     Recent Labs   Lab 04/27/25  0028 04/28/25  0211   WBC 16.29* 15.34*   HGB 11.9* 10.3*   HCT 37.5* 32.0*    254     No results for input(s): "LABPT", "INR", "APTT" in the last 48 hours.  Microbiology Results (last 7 days)       Procedure Component Value Units Date/Time    MRSA Screen by PCR [[id number]] Collected: 04/28/25 1056    Order Status: Sent Specimen: Nasal Swab Updated: 04/28/25 1100    Culture, Respiratory with Gram Stain [[id number]] Collected: 04/26/25 1428    Order Status: Completed Specimen: Respiratory from Sputum, Induced Updated: 04/28/25 0916     Respiratory Culture Normal respiratory audrey     GRAM STAIN <10 Epithelial Cells/LPF      Rare WBC seen      No organisms seen    Blood culture [[id number]]  (Normal) Collected: 04/26/25 1628    Order Status: Completed Specimen: Blood from Peripheral, Antecubital, Right Updated: 04/28/25 0800 "     Blood Culture No Growth After 36 Hours    Blood culture [[id number]]  (Normal) Collected: 04/26/25 1628    Order Status: Completed Specimen: Blood from Peripheral, Forearm, Left Updated: 04/28/25 0800     Blood Culture No Growth After 36 Hours    Respiratory Infection Panel (PCR), Nasopharyngeal [[id number]] Collected: 04/27/25 1759    Order Status: Completed Specimen: Nasopharyngeal Swab Updated: 04/27/25 2049     Respiratory Infection Panel Source Nasopharyngeal Swab     Adenovirus Not Detected     Coronavirus 229E, Common Cold Virus Not Detected     Coronavirus HKU1, Common Cold Virus Not Detected     Coronavirus NL63, Common Cold Virus Not Detected     Coronavirus OC43, Common Cold Virus Not Detected     SARS-CoV2 (COVID-19) Qualitative PCR Not Detected     Human Metapneumovirus Not Detected     Human Rhinovirus/Enterovirus Not Detected     Influenza A Not Detected     Influenza B Not Detected     Parainfluenza Virus 1 Not Detected     Parainfluenza Virus 2 Not Detected     Parainfluenza Virus 3 Not Detected     Parainfluenza Virus 4 Not Detected     Respiratory Syncytial Virus Not Detected     Bordetella Parapertussis (CI9283) Not Detected     Bordetella pertussis (ptxP) Not Detected     Chlamydia pneumoniae Not Detected     Mycoplasma pneumoniae Not Detected    Respiratory Infection Panel (PCR), Nasopharyngeal [[id number]] Collected: 04/27/25 1004    Order Status: Canceled Specimen: Nasopharyngeal Swab Updated: 04/27/25 1222          All pertinent labs from the last 24 hours have been reviewed.    Significant Diagnostics:  I have reviewed all pertinent imaging results/findings within the past 24 hours.  US Lower Extremity Veins Bilateral  Result Date: 4/27/2025  No evidence of deep venous thrombosis in either lower extremity. Electronically signed by resident: Greg Blue Date:    04/27/2025 Time:    12:19 Electronically signed by: Flash Andino MD Date:    04/27/2025 Time:    12:47    US Upper  Extremity Veins Bilateral  Result Date: 4/27/2025  Superficial thrombophlebitis of the right cephalic vein. The left basilic vein was not identified which may be secondary to technical factors or non visualization secondary to thrombosis. Electronically signed by resident: Greg Blue Date:    04/27/2025 Time:    12:16 Electronically signed by: Flash Andino MD Date:    04/27/2025 Time:    12:46      Assessment/Plan:     * Subdural hemorrhage  84 yo with recent admission for VPS tie off and SDH evacuation on 3/24 presenting for new LSW and speech hesitation found to have enlarged chronic SDH as well as a new small R convexity acute SDH. Pt takes eliquis, s/p PCC. Interval CTH stable.     Now s/p bialteral subdural evacuations with VPS removal on 4/25    Recommendations:  --admitted to [facility]  --on eliquis, s/p kcentra reversal. Hold all AC/AP  --SBP <140  --Vimpat, unresponsive POD1 FU EEG and seizure workup   --HOB >30  --f/u cEEG  --drains to suction, abx while in place  --Continue to monitor clinically, notify NSGY immediately with any changes in neuro status    Discussed with Dr. Krzysztof Daley MD  Neurosurgery  [facility] - [facility]

## 2025-04-28 NOTE — PLAN OF CARE
"[facility] Care Plan    POC reviewed with Ramesh Ricci and family at 0300. Patient and Family verbalized understanding. Questions and concerns addressed. No acute events today. Pt progressing toward goals. Will continue to monitor. See below and flowsheets for full assessment and VS info.     -pulmonary toilet; NC at 5L O2 with 1x NT suction.     Is this a stroke patient? no    Neuro:  [place] Coma Scale  Best Eye Response: 1-->(E1) none  Best Motor Response: 4-->(M4) withdraws from pain  Best Verbal Response: 1-->(V1) none  [place] Coma Scale Score: 6  Assessment Qualifiers: no eye obstruction present  Pupil PERRLA: yes     24 hr Temp:  [98.7 °F (37.1 °C)-100.9 °F (38.3 °C)]     CV:   Rhythm: normal sinus rhythm  BP goals:   SBP < 160  MAP > 65    Resp:      Oxygen Concentration (%): 2    Plan: N/A    GI/:     Diet/Nutrition Received: tube feeding  Last Bowel Movement: 04/24/25  Voiding Characteristics: external catheter    Intake/Output Summary (Last 24 hours) at 4/28/2025 0526  Last data filed at 4/28/2025 0501  Gross per 24 hour   Intake 1303.73 ml   Output 514 ml   Net 789.73 ml     Unmeasured Output  Urine Occurrence: 1  Stool Occurrence: 1  Pad Count: 2    Labs/Accuchecks:  Recent Labs   Lab 04/28/25  0211   WBC 15.34*   RBC 3.46*   HGB 10.3*   HCT 32.0*         Recent Labs   Lab 04/28/25  0211      K 3.7   CO2 22*      BUN 45*   CREATININE 1.4   ALKPHOS 63   ALT 7*   AST 27   BILITOT 0.3      Recent Labs   Lab 04/24/25  0435   INR 1.0   APTT 28.0    No results for input(s): "CPK", "CPKMB", "TROPONINI", "MB" in the last 168 hours.    Electrolytes: Electrolytes replaced  Accuchecks: none    Gtts:      LDA/Wounds:    Gabino Risk Assessment  Sensory Perception: 2-->very limited  Moisture: 3-->occasionally moist  Activity: 1-->bedfast  Mobility: 2-->very limited  Nutrition: 2-->probably inadequate  Friction and Shear: 2-->potential problem  Gabino Score: 12  Is your gabino score 12 or less? " yes enrolled in the peach program and heel boots on          Restraints:    NA    TM

## 2025-04-28 NOTE — PLAN OF CARE
[facility] Care Plan  POC reviewed with Ramesh Ricci and family at 1400. Family verbalized understanding. Questions and concerns addressed. No acute events today. Pt progressing toward goals. Will continue to monitor. See below and flowsheets for full assessment and VS info.   - continue with extensive pulmonary toileting. Pt has had episodes of destating a few times today and needed chest PT and suctioning to help gets stats up. Pt continues to need this type of treatment very frequently  - Surgical drains removed by neurosx at bedside. No sutures placed. Pt tolerated well  - EEG to remain in place for another day and then will likely come off  - Pt now on 6L NC. If O2 requirements increase will need another method other than NC as we are at max level for NC.  - order CXR this morning as I was no longer able to hear left chest sounds over pts heart sounds. I was able to hear very course lung sound very early this morning but not later in the morning. CXR was no change since the Xray from 4/27. Lung sounds remain the same at this time.   - Neurosx at bedside and showed scan to pts daughter and explained that scans looked good and stable.         Is this a stroke patient? no    Neuro:  [place] Coma Scale  Best Eye Response: 2-->(E2) to pain  Best Motor Response: 4-->(M4) withdraws from pain  Best Verbal Response: 1-->(V1) none  [place] Coma Scale Score: 7  Assessment Qualifiers: patient not sedated/intubated  Pupil PERRLA: yes     24 hr Temp:  [97.9 °F (36.6 °C)-99.4 °F (37.4 °C)]     CV:   Rhythm: normal sinus rhythm  BP goals:   SBP < 160  MAP > 65    Resp:      Oxygen Concentration (%): 2    Plan: N/A    GI/:     Diet/Nutrition Received: NPO, tube feeding  Last Bowel Movement: 04/24/25  Voiding Characteristics: external catheter    Intake/Output Summary (Last 24 hours) at 4/28/2025 1611  Last data filed at 4/28/2025 1500  Gross per 24 hour   Intake 1643.59 ml   Output 419 ml   Net 1224.59 ml     Unmeasured  "Output  Urine Occurrence: 1  Stool Occurrence: 1  Pad Count: 2    Labs/Accuchecks:  Recent Labs   Lab 04/28/25  0211   WBC 15.34*   RBC 3.46*   HGB 10.3*   HCT 32.0*         Recent Labs   Lab 04/28/25  0211      K 3.7   CO2 22*      BUN 45*   CREATININE 1.4   ALKPHOS 63   ALT 7*   AST 27   BILITOT 0.3      Recent Labs   Lab 04/24/25  0435   INR 1.0   APTT 28.0    No results for input(s): "CPK", "CPKMB", "TROPONINI", "MB" in the last 168 hours.    Electrolytes: Electrolytes replaced  Accuchecks: none    Gtts:      LDA/Wounds:    Gabino Risk Assessment  Sensory Perception: 3-->slightly limited  Moisture: 3-->occasionally moist  Activity: 1-->bedfast  Mobility: 1-->completely immobile  Nutrition: 3-->adequate  Friction and Shear: 3-->no apparent problem  Gabino Score: 14    Is your gabino score 12 or less? no            Restraints:        WCTM   Problem: Stroke, Intracerebral Hemorrhage  Goal: Optimal Cerebral Tissue Perfusion  Outcome: Progressing  Goal: Optimal Nutrition Intake  Outcome: Progressing  Goal: Effective Urinary Elimination  Outcome: Progressing     Problem: Skin Injury Risk Increased  Goal: Skin Health and Integrity  Outcome: Progressing     Problem: Adult Inpatient Plan of Care  Goal: Absence of Hospital-Acquired Illness or Injury  Outcome: Progressing     Problem: Acute Kidney Injury/Impairment  Goal: Fluid and Electrolyte Balance  Outcome: Progressing  Goal: Effective Renal Function  Outcome: Progressing     Problem: Wound  Goal: Optimal Coping  Outcome: Progressing  Goal: Absence of Infection Signs and Symptoms  Outcome: Progressing  Goal: Skin Health and Integrity  Outcome: Progressing  Goal: Optimal Wound Healing  Outcome: Progressing     Problem: Fall Injury Risk  Goal: Absence of Fall and Fall-Related Injury  Outcome: Progressing     "

## 2025-04-28 NOTE — PLAN OF CARE
Recommendations  --Continuous TF recommendation: Impact Peptide 1.5 @ 45 mL/hr x 22 hours (levothyroxine administration) to provide 990 mL total volume, 1485 kcal, 139 g CHO, 93 g protein, 0 g fiber, 764 mL free water, 99% DRI         --120 mL flush q4 hrs to provide additional 720 mL free water (Total 1484 mL)    --Nursing: please continue to document rate and formula on flowsheet    --RD to monitor weight, rate, tolerance    Goals:   75% nutritional needs met with diet/EN/PN during admission  Maintain dry weight during admission  Nutrition Goal Status: goal met  Communication of RD Recs: other (comment) (POC)

## 2025-04-28 NOTE — EICU
Intervention Initiated From:  [facility] Night Rounds Checklist  24H Vital Sign Range:  Temp:  [98.7 °F (37.1 °C)-100.9 °F (38.3 °C)]   Pulse:  [69-94]   Resp:  [9-36]   BP: ()/(46-77)   SpO2:  [92 %-99 %]     Video rounds

## 2025-04-28 NOTE — ASSESSMENT & PLAN NOTE
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 CT head with bilatateral acute versus subacute SDH and known VPS. His eliquis was reversed with PCC in the ED. Now post-op SDH evac 4/25    -Admit to [facility], NSGY following  -q1h neuro checks, vital checks  -Post-op CTH (4/25) with good decompression and expected postop changes  -CTH 4/26 stable  -EKG, ECHO, CXR  -Daily CBC, CMP, mag, phos  -Goal eunatremia, euvolemia  -SBP < 160, prn labetalol and hydralazine  -Lacosamide dose increased to 200 mg BID in setting of generalized cortical irritability on EEG with NCSE conversion risk   -Seizure precautions  -SCDs, enoxaparin on board

## 2025-04-28 NOTE — ASSESSMENT & PLAN NOTE
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 Blankenship  - FOREST removing drains today  - Management per NSGY, NCC

## 2025-04-29 NOTE — ASSESSMENT & PLAN NOTE
85M PMHx of HTN, afib on Eliquis, AS s/p TAVR, CKD, UC, NPH s/p VPS c/b bilateral SDH s/p bilateral evacuation with subsequent seizure disorder maintained on Lacosamide 100 mg BID who presented with worsening weakness, urinary frequency, and speech changes. Imaging revealed interval increase in size of bilateral holohemispheric subdural collections, with new bilateral hemorrhages in the subdural collections, concerning for acute on subacute SDH; right VPS in place. Eliquis was reversed in the ER. Patient noted to have staring episode which resolved ~few minutes. Wife reported staring episodes at home. Patient admitted to [facility] for higher level of care and further management with Neurosurgery consult. CAP EEG placed; Epilepsy following for assistance in management. Per chart review, patient was recently started on Lacosamide 100 mg BID (filled 4/8) by Neurology group at [facility].    Recommendations:  - Discontinue vEEG monitoring; repeat as clinically indicated  - Recommend to continue Lacosamide 200 mg BID  - Avoid agents that lower seizure threshold  - Post operative management per NSGY  - Management of infectious/metabolic abnormalities per [facility]  - Seizure precautions  - Per [place] Law, patient must refrain from driving for 6 months after having a seizure or cleared by a neurologist to legally resume driving      Discussed plan of care with [facility] team and daughter at bedside. Will follow, please call with questions.

## 2025-04-29 NOTE — PROGRESS NOTES
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 with acute on subacute bilateral SDH. His wife at bedside reports concern for acute deconditioning over the past three days involving worsening weakness, urinary frequency, speech changes, intermittent cessation in interaction with family, and dragging of his R leg. CT head showed bilateral subdural hygromas with bilateral acute versus subacute SDH. He takes eliquis for A fib at home which was reversed with PCC in the ED. During evaluation of the patient, he was initially interactive, following, commands, and oriented to person and place. He then had a brief staring spell where he no longer followed commands and displayed no usable speech. This then resolved after a few minutes and he returned to baseline. He takes Vimpat 100 bid at home but has not gotten his nightly dose. Per his wife, these spells occurred at home as well.    He is admitted to [facility] for hourly neuro-monitoring and a higher level of care.     Hospital Course: 04/25/2025: OR today for SDH evacuation   04/26/2025: NAEON. POD1 s/p SDH evac. Post-op CTH good decompression and expected post-op changes. Post-op episode of seizure-like activity, described as "LUE & LLE shaking" lasting approximately 1 minute that resolved without intervention. Patient unarousable post-operatively and this AM. NGT placed. Repeat CTH obtained, stable. Procal, EEG ordered and pending.   04/27/2025:  POD2 s/p SDH evacuation. Procal elevated and patient febrile with low grade temp. Pan-cultured and started on broad spec antibiotics. Aggressive " pulm toileting started yesterday. Overnight with Tmax 100.9°F. Cx with NGTD. Respiratory panel sent. US extremities ordered and pending to rule out DVT. Start lovenox pending neurosurgery recs.   04/28/2025: Continue aggressive pulmonary toileting. EEG with periodic complexes with some epileptiform morphology, optimized lacosamide to 200mg BID.   04/29/2025 EEG negative for seizures, small improvement after increasing vimpat yesterday. Discontinued EEG. CTH with new small R parietal IPH with mild surrounding edema. BUN 67, started enteral water flushes 250 q4h. MRSA swab negative, discontinued vanc. CXR with slight improvement from yesterday. Continue aggressive pulm toilet.     Review of Systems   Unable to perform ROS: Other (level of consciousness)       Objective:     Vitals:  Temp: 97.8 °F (36.6 °C)  Pulse: 92  Rhythm: normal sinus rhythm  BP: 135/67  MAP (mmHg): 90  Resp: 20  SpO2: 100 %  Oxygen Concentration (%): 44    Temp  Min: 97.8 °F (36.6 °C)  Max: 98.7 °F (37.1 °C)  Pulse  Min: 70  Max: 92  BP  Min: 116/46  Max: 192/79  MAP (mmHg)  Min: 67  Max: 113  Resp  Min: 16  Max: 50  SpO2  Min: 94 %  Max: 100 %  Oxygen Concentration (%)  Min: 44  Max: 44    04/28 0701 - 04/29 0700  In: 1839.1   Out: 1225 [Urine:1225]   Unmeasured Output  Urine Occurrence: 1  Stool Occurrence: 1  Pad Count: 2        Physical Exam  Vitals and nursing note reviewed.   Constitutional:       General: He is not in acute distress.     Appearance: He is ill-appearing.   HENT:      Head:      Comments: Surgical site c/d/i     Right Ear: External ear normal.      Left Ear: External ear normal.      Nose: Nose normal.      Comments: NGT in place.     Mouth/Throat:      Mouth: Mucous membranes are dry.   Eyes:      Pupils: Pupils are equal, round, and reactive to light.   Cardiovascular:      Rate and Rhythm: Normal rate and regular rhythm.   Pulmonary:      Effort: Pulmonary effort is normal. No respiratory distress.      Comments: Upper  airway congestion   Bl coarse BS   Abdominal:      General: There is no distension.      Palpations: Abdomen is soft.      Tenderness: There is no abdominal tenderness. There is no guarding.   Musculoskeletal:      Right lower leg: No edema.      Left lower leg: No edema.   Skin:     General: Skin is warm and dry.   Neurological:      Comments: E2 V1 M3  Mental status: Lethargic, barely opens eyes to very noxious stimuli. Nonverbal. Not following commands.   CN II-XII grossly intact, specifically:  PERRL  EOMI  blinks to threat bilaterally   No facial asymmetry.  Weak spontaneous cough   Motor:  RUE flexion to noxious   RLE triple flexion to tickling foot  LUE minimal flexion to noxious  LLE triple flexion to tickling foot  Sensation intact to noxious x4       Gait & coordination exams deferred.        Medications:  Continuous Scheduledacetylcysteine 100 mg/ml (10%), 4 mL, Q4H  albuterol-ipratropium, 3 mL, Q4H  enoxparin, 40 mg, Q24H (prophylaxis, 1700)  lacosamide, 200 mg, Q12H  levothyroxine, 125 mcg, Before breakfast  metoprolol tartrate, 12.5 mg, Q8H  mupirocin, , BID  piperacillin-tazobactam (Zosyn) IV (PEDS and ADULTS) (extended infusion is not appropriate), 4.5 g, Q8H  pravastatin, 20 mg, QHS  senna-docusate, 1 tablet, Daily  silodosin, 4 mg, Daily    PRNacetaminophen, 650 mg, Q6H PRN  bisacodyL, 10 mg, Daily PRN  hydrALAZINE, 10 mg, Q4H PRN  labetalol, 10 mg, Q4H PRN  magnesium oxide, 800 mg, PRN  magnesium oxide, 800 mg, PRN  oxyCODONE, 5 mg, Q6H PRN  potassium bicarbonate, 35 mEq, PRN  potassium bicarbonate, 50 mEq, PRN  potassium bicarbonate, 60 mEq, PRN  potassium, sodium phosphates, 2 packet, PRN  potassium, sodium phosphates, 2 packet, PRN  potassium, sodium phosphates, 2 packet, PRN  sodium chloride 0.9%, 10 mL, PRN      Today I personally reviewed pertinent medications, lines/drains/airways, imaging, laboratory results, microbiology results, notably:    CTH with new small R parietal IPH with mild  surrounding edema.     CXR with slight improvement from yesterday.      Laboratory:  CBC:  Recent Labs   Lab 04/29/25  0336   WBC 13.39*   RBC 3.51*   HGB 10.4*   HCT 32.8*      MCV 93   MCH 29.6   MCHC 31.7*       CMP:  Recent Labs   Lab 04/29/25  0336   CALCIUM 9.1   ALBUMIN 2.2*   PROT 6.7      K 4.0   CO2 23      BUN 67*   CREATININE 1.2   ALKPHOS 59   ALT 11   AST 32   BILITOT 0.3     Recent Labs   Lab 04/29/25  0336   MG 2.5   PHOS 3.2       Coagulation:  Recent Labs   Lab 04/24/25  0435   INR 1.0   APTT 28.0       Endocrine:  Recent Labs     04/27/25  1018   TSH 1.183     Microbiology  Microbiology Results (last 7 days)       Procedure Component Value Units Date/Time    Culture, Respiratory with Gram Stain [[id number]] Collected: 04/26/25 1428    Order Status: Completed Specimen: Respiratory from Sputum, Induced Updated: 04/29/25 1131     Respiratory Culture Normal respiratory audrey, no Staph aureus or Pseudomonas isolated     GRAM STAIN <10 Epithelial Cells/LPF      Rare WBC seen      No organisms seen    Blood culture [[id number]]  (Normal) Collected: 04/26/25 1628    Order Status: Completed Specimen: Blood from Peripheral, Antecubital, Right Updated: 04/28/25 2001     Blood Culture No Growth After 48 Hours    Blood culture [[id number]]  (Normal) Collected: 04/26/25 1628    Order Status: Completed Specimen: Blood from Peripheral, Forearm, Left Updated: 04/28/25 2001     Blood Culture No Growth After 48 Hours    MRSA Screen by PCR [[id number]]  (Normal) Collected: 04/28/25 1056    Order Status: Completed Specimen: Nasal Swab Updated: 04/28/25 1704     MRSA PCR Screen Not Detected    Respiratory Infection Panel (PCR), Nasopharyngeal [[id number]] Collected: 04/27/25 1759    Order Status: Completed Specimen: Nasopharyngeal Swab Updated: 04/27/25 2049     Respiratory Infection Panel Source Nasopharyngeal Swab     Adenovirus Not Detected     Coronavirus 229E, Common Cold Virus Not Detected      Coronavirus HKU1, Common Cold Virus Not Detected     Coronavirus NL63, Common Cold Virus Not Detected     Coronavirus OC43, Common Cold Virus Not Detected     SARS-CoV2 (COVID-19) Qualitative PCR Not Detected     Human Metapneumovirus Not Detected     Human Rhinovirus/Enterovirus Not Detected     Influenza A Not Detected     Influenza B Not Detected     Parainfluenza Virus 1 Not Detected     Parainfluenza Virus 2 Not Detected     Parainfluenza Virus 3 Not Detected     Parainfluenza Virus 4 Not Detected     Respiratory Syncytial Virus Not Detected     Bordetella Parapertussis (US5806) Not Detected     Bordetella pertussis (ptxP) Not Detected     Chlamydia pneumoniae Not Detected     Mycoplasma pneumoniae Not Detected    Respiratory Infection Panel (PCR), Nasopharyngeal [[id number]] Collected: 04/27/25 1004    Order Status: Canceled Specimen: Nasopharyngeal Swab Updated: 04/27/25 1222               Diet  Diet NPO  Diet NPO        Assessment/Plan:     Neuro  * Subdural hemorrhage  84 y/o M with 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 CT head with bilatateral acute versus subacute SDH and known VPS. His eliquis was reversed with PCC in the ED. Now post-op SDH evac 4/25    -Admitted to [facility], NSGY following  -q1h neuro checks, vital checks  -Post-op CTH (4/25) with good decompression and expected postop changes  - CTH 4/29 with new R parietal IPH w mild edema  -Daily CBC, CMP, mag, phos  -Goal eunatremia, euvolemia  -SBP < 160   -Lacosamide dose increased to 200 mg BID on 4/28 in setting of generalized cortical irritability on EEG with NCSE conversion risk   -Seizure precautions  -SCDs, enoxaparin for VTE ppx  - EEG negative for seizure- discontinued EEG 4/29    Subdural hematoma  See primary problem    Seizure disorder  - vimpat 200 bid   - keppra 500mg bid   - EEG- moderate encephalopathy,  triphasics, generalized cortical irritability, no discrete seizures . Discontinued EEG.      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   Tie off on 3/25 2/2 bilateral hygromas that were evacuated  Removed in OR on 4/25     Cardiac/Vascular  Coronary artery disease involving native coronary artery of native heart without angina pectoris  Resume home statin  Hold home eliquis    PAF (paroxysmal atrial fibrillation)  C/w [id number] q8h  Hold home eliquis, reversed on admission  Currently rate controlled    Left ventricular diastolic dysfunction, NYHA class 1  Echo reviewed    Essential hypertension  SBP < 160  c/w metoprolol 12.5 q8h  PRN labetalol, hydralazine    Renal/  Urinary retention  C/w home silodosin 4    Endocrine  Acquired hypothyroidism  C/w home synthroid 125mcg     GI  UC (ulcerative colitis)  Hold home colazal in acute setting          The patient is being Prophylaxed for:  Venous Thromboembolism with: Mechanical or Chemical  Stress Ulcer with: Not Applicable   Ventilator Pneumonia with: not applicable    Activity Orders            Elevate HOB Elevate HOB 30-45 degrees during feeding unless contraindicated starting at 04/27 0844    Diet NPO: NPO starting at 04/25 0001    Turn patient starting at 04/23 2000    Elevate HOB starting at 04/23 1948          Partial Code    Critical care time spent on the evaluation and treatment of severe organ dysfunction, review of pertinent labs and imaging studies, discussions with consulting providers and discussions with patient/family: 35 minutes.    Flor Max, PA-C  Neurocritical Care  [facility]

## 2025-04-29 NOTE — PLAN OF CARE
[facility] Care Plan  POC reviewed with Ramesh Ricci and family at 1400. Family verbalized understanding. Questions and concerns addressed. No acute events today. Pt progressing toward goals. Will continue to monitor. See below and flowsheets for full assessment and VS info.   - Respiratory more stable today than yesterday  - CXR completed again today, shows better images than yesterday. Pt sounds better than yesterday as well. Pt has not needed as much extra CPT with RN outside of RT care as yesterday. He has also not needed as much suctioning outside of RT care as yesterday as well. Pt seems much more comfortable today than yesterday. No issues of destating today.  - neuro exam remain the same today with no changes  - Head CT completed today. Pt tolerated well New right parietal intraparenchymal hematoma with mild surrounding edema. Team is aware  - Pt had two Bms today second was large and loose. Would likely hold BM reg after tonight  - started pt on free water flushed 250 Q 4hours  - pt vanco was stopped  - EEG is off  - Pt hair was washed as best possible without disturbing the incision sites and hair was combed out as best as possible. pt tolerated well  - Pt was given one dose of PRN hydralazine for BP elevated during CPT up to 200. Pt did spike BP again up to 190s when coming off EEG but did come down on his own and no PRN meds were needed.   - PT/ OT came to see pt but were not able to work with him due to the elevated BP they were going to try and stop back by if they had the time but were not able to come back by hopefully pt will not have any issues tomorrow and they will finally be able to work with pt tomorrow.       Is this a stroke patient? no    Neuro:  Roscoe Coma Scale  Best Eye Response: 2-->(E2) to pain  Best Motor Response: 4-->(M4) withdraws from pain  Best Verbal Response: 1-->(V1) none  Roscoe Coma Scale Score: 7  Assessment Qualifiers: patient not sedated/intubated  Pupil PERRLA: yes    "  24 hr Temp:  [97.8 °F (36.6 °C)-98.7 °F (37.1 °C)]     CV:   Rhythm: normal sinus rhythm  BP goals:   SBP < 160  MAP > 65    Resp:      Oxygen Concentration (%): 44    Plan: N/A    GI/:     Diet/Nutrition Received: NPO, tube feeding  Last Bowel Movement: 04/29/25  Voiding Characteristics: external catheter    Intake/Output Summary (Last 24 hours) at 4/29/2025 1835  Last data filed at 4/29/2025 1832  Gross per 24 hour   Intake 2226.4 ml   Output 1140 ml   Net 1086.4 ml     Unmeasured Output  Urine Occurrence: 1  Stool Occurrence: 1  Pad Count: 2    Labs/Accuchecks:  Recent Labs   Lab 04/29/25  0336   WBC 13.39*   RBC 3.51*   HGB 10.4*   HCT 32.8*         Recent Labs   Lab 04/29/25  0336      K 4.0   CO2 23      BUN 67*   CREATININE 1.2   ALKPHOS 59   ALT 11   AST 32   BILITOT 0.3      Recent Labs   Lab 04/24/25  0435   PROTIME 11.2   INR 1.0   APTT 28.0    No results for input(s): "CPK", "CPKMB", "TROPONINI", "MB" in the last 168 hours.    Electrolytes: N/A - electrolytes WDL  Accuchecks: none    Gtts:      LDA/Wounds:    Gabino Risk Assessment  Sensory Perception: 3-->slightly limited  Moisture: 3-->occasionally moist  Activity: 1-->bedfast  Mobility: 1-->completely immobile  Nutrition: 3-->adequate  Friction and Shear: 2-->potential problem  Gabino Score: 13    Is your gabino score 12 or less? no            Restraints:        WCTM   Problem: Stroke, Intracerebral Hemorrhage  Goal: Effective Bowel Elimination  Outcome: Progressing  Goal: Optimal Cerebral Tissue Perfusion  Outcome: Progressing  Goal: Optimal Nutrition Intake  Outcome: Progressing  Goal: Optimal Pain Control and Function  Outcome: Progressing  Goal: Effective Oxygenation and Ventilation  Outcome: Progressing  Goal: Effective Urinary Elimination  Outcome: Progressing     Problem: Skin Injury Risk Increased  Goal: Skin Health and Integrity  Outcome: Progressing     Problem: Adult Inpatient Plan of Care  Goal: Plan of Care " Review  Outcome: Progressing  Goal: Patient-Specific Goal (Individualized)  Outcome: Progressing  Goal: Optimal Comfort and Wellbeing  Outcome: Progressing     Problem: Acute Kidney Injury/Impairment  Goal: Fluid and Electrolyte Balance  Outcome: Progressing  Goal: Effective Renal Function  Outcome: Progressing     Problem: Wound  Goal: Optimal Coping  Outcome: Progressing  Goal: Absence of Infection Signs and Symptoms  Outcome: Progressing     Problem: Fall Injury Risk  Goal: Absence of Fall and Fall-Related Injury  Outcome: Progressing

## 2025-04-29 NOTE — EICU
Virtual ICU Quality Rounds    Admit Date: 4/23/2025  Hospital Day: 6    ICU Day: 5d 10h    24H Vital Sign Range:  Temp:  [97.9 °F (36.6 °C)-98.7 °F (37.1 °C)]   Pulse:  [70-90]   Resp:  [16-50]   BP: (116-192)/(46-81)   SpO2:  [91 %-100 %]     VICU Surveillance Screening                    LDA reconciliation : Yes                           No

## 2025-04-29 NOTE — PT/OT/SLP PROGRESS
Occupational Therapy      Patient Name:  Ramesh Ricci   MRN:  [id number]    OT attempted to evaluate pt at 0826. Per RN, pt with increased O2 requirements overnight and now has a labile BP. RN recommended a hold on therapy on this date. Will follow-up as appropriate.     4/29/2025

## 2025-04-29 NOTE — EICU
Intervention Initiated From:  COR / FRANCOU    Juanis intervened regarding:  Rounding (Video assessment)  VICU Night Rounds Checklist  24H Vital Sign Range:  Temp:  [97.9 °F (36.6 °C)-99.1 °F (37.3 °C)]   Pulse:  [71-84]   Resp:  [20-37]   BP: (102-138)/(45-60)   SpO2:  [90 %-99 %]     Video rounds

## 2025-04-29 NOTE — PROCEDURES
EEG REPORT      Ramesh Ricci  [id number]  1940    DATE OF SERVICE: 4/29/2025     -4    METHODOLOGY      Extended electroencephalographic recording is made while the patient is ambulatory and continuing normal daily activities.  Electrodes are placed according to the International 10-20 placement system and included T1 and T2 electrode placement.  Twenty four (24) channels of digital signal (sampling rate of 512/sec) was simultaneously recorded from the scalp including EKG and eye monitors.  Recording band pass was 0.1 to 100 hz and all data was stored digitally on the recorder.  The patient is instructed to press an event button when clinical symptoms occur and write the symptoms into a diary. Activation procedures which include photic stimulation, hyperventilation and instructing patients to perform simple task are done in selected patients.        The EEG is displayed on a monitor screen and can be reformatted into different montages for evaluation.  The entire recoding is submitted for computer assisted analysis to detect spike and electrographic seizure activity.  The entire recording is visually reviewed and the times identified by computer analysis as being spikes or seizures are reviewed again.  Compresses spectral analysis (CSA) is also performed on the activity recorded from each individual channel.  This is displayed as a power display of frequencies from 0 to 30 Hz over time.   The CSA analysis is done and displayed continuously.  This is reviewed for asymmetries in power between homologous areas of the scalp and for presence of changes in power which canbe seen when seizures occur.  Sections of suspected abnormalities on the CSA is then compared with the original EEG recording.  .     [employer] software was also utilized in the review of this study.  This software suite analyzes the EEG recording in multiple domains.  Coherence and rhythmicity is computed to identify EEG sections which may  contain organized seizures.  Each channel undergoes analysis to detect presence of spike and sharp waves which have special and morphological characteristic of epileptic activity.  The routine EEG recording is converted from spacial into frequency domain.  This is then displayed comparing homologous areas to identify areas of significant asymmetry.  Algorithm to identify non-cortically generated artifact is used to separate eye movement, EMG and other artifact from the EEG     Recording Times    A total of 8:32:04 hours of EEG was recorded.      EEG FINDINGS:  Background activity:   The background rhythm was characterized by poorly organized theta activity with absent posterior dominant rhythm.   Symmetry and continuity: the background was continuous and symmetric     Sleep:   No sleep transients although there is cycling of the background.    Activation procedures:   NA    Abnormal activity:   During periods of arousal there are 1-2Hz periodic complexes of fronto-central dominant sharply contoured slowing which take either triphasic or epileptiform morphology.    IMPRESSION:   Abnormal EEG due to moderate generalized cerebral dysfunction with generalized cortical irritability and seizure potential.        Jose J Monsivais MD  Neurology-Epilepsy.  [facility].

## 2025-04-29 NOTE — PROGRESS NOTES
[facility]  Neurosurgery  Progress Note    Subjective:     History of Present Illness: Ramesh Ricci is an 86 y/o male with a PMH of Afib, Ulcerative Colitis, HTN, and aortic stenosis. He also has normal pressure hydrocephalus s/p  shunt (now tied off) is presenting with multiple complaints.     Wife reports concern for acute deconditioning over the past few days.  She describes increased weakness, urinary frequency, and speech changes that is been progressing for the past 3 days.  She states that he seems to slumped towards the right side and has been dragging his right leg.  Previously ambulated with his walker.  She also mentions that he seems to have speech hesitancy.  Denies any fevers, chills, abdominal pain or diarrhea    Post-Op Info:  Procedure(s) (LRB):  CRANIOTOMY, FOR SUBDURAL HEMATOMA EVACUATION, bilateral. shunt removal (Bilateral)  REMOVAL, SHUNT, VENTRICULOPERITONEAL   4 Days Post-Op   Interval History: 4/29: SG HV removed yesterday, continued poor exam, withdraws does not follow commands, nonverbal. No sz on EEG.    Medications:  Continuous Infusions:  Scheduled Meds:   acetylcysteine 100 mg/ml (10%)  4 mL Nebulization Q4H    albuterol-ipratropium  3 mL Nebulization Q4H    enoxparin  40 mg Subcutaneous Q24H (prophylaxis, 1700)    lacosamide (Vimpat) IV orderable  200 mg Intravenous Q12H    levothyroxine  125 mcg Per NG tube Before breakfast    metoprolol tartrate  12.5 mg Per NG tube Q8H    mupirocin   Nasal BID    piperacillin-tazobactam (Zosyn) IV (PEDS and ADULTS) (extended infusion is not appropriate)  4.5 g Intravenous Q8H    pravastatin  20 mg Per NG tube QHS    senna-docusate  1 tablet Per NG tube Daily    silodosin  4 mg Per NG tube Daily     PRN Meds:  Current Facility-Administered Medications:     acetaminophen, 650 mg, Per NG tube, Q6H PRN    bisacodyL, 10 mg, Rectal, Daily PRN    hydrALAZINE, 10 mg, Intravenous, Q4H PRN    labetalol, 10 mg, Intravenous, Q4H PRN     magnesium oxide, 800 mg, Per NG tube, PRN    magnesium oxide, 800 mg, Per NG tube, PRN    oxyCODONE, 5 mg, Per NG tube, Q6H PRN    potassium bicarbonate, 35 mEq, Per NG tube, PRN    potassium bicarbonate, 50 mEq, Per NG tube, PRN    potassium bicarbonate, 60 mEq, Per NG tube, PRN    potassium, sodium phosphates, 2 packet, Per NG tube, PRN    potassium, sodium phosphates, 2 packet, Per NG tube, PRN    potassium, sodium phosphates, 2 packet, Per NG tube, PRN    sodium chloride 0.9%, 10 mL, Intravenous, PRN     Review of Systems  Objective:     Weight: 68 kg (149 lb 14.6 oz)  Body mass index is 20.92 kg/m².  Vital Signs (Most Recent):  Temp: 97.9 °F (36.6 °C) (04/29/25 0700)  Pulse: 82 (04/29/25 0900)  Resp: (!) 27 (04/29/25 0900)  BP: 136/60 (04/29/25 0900)  SpO2: 100 % (04/29/25 0900) Vital Signs (24h Range):  Temp:  [97.9 °F (36.6 °C)-98.7 °F (37.1 °C)] 97.9 °F (36.6 °C)  Pulse:  [70-90] 82  Resp:  [16-50] 27  SpO2:  [91 %-100 %] 100 %  BP: (116-192)/(46-81) 136/60     Date 04/29/25 0700 - 04/30/25 0659   Shift 5002-4369 0143-0055 2468-9110 24 Hour Total   INTAKE   NG/   225   IV Piggyback 21.2   21.2   Shift Total(mL/kg) 246.2(3.6)   246.2(3.6)   OUTPUT   Urine(mL/kg/hr) 120   120   Shift Total(mL/kg) 120(1.8)   120(1.8)   Weight (kg) 68 68 68 68              Oxygen Concentration (%):  [44] 44             NG/OG Tube 04/26/25 0819 14 Fr. Right nostril (Active)   Placement Check other (see comments);placement verified by x-ray 04/27/25 1701   Tolerance no signs/symptoms of discomfort 04/29/25 0900   Securement secured to nostril center 04/29/25 0900   Clamp Status/Tolerance unclamped 04/29/25 0900   Suction Setting/Drainage Method suction at the bedside 04/29/25 0900   Insertion Site Appearance no redness, warmth, tenderness, skin breakdown, drainage 04/29/25 0900   Drainage None 04/27/25 1701   Flush/Irrigation flushed w/;water;no resistance met 04/29/25 0900   Feeding Type continuous 04/29/25 0900   Feeding  "Action feeding continued 04/29/25 0900   Current Rate (mL/hr) 45 mL/hr 04/29/25 0900   Goal Rate (mL/hr) 45 mL/hr 04/29/25 0900   Intake (mL) 60 mL 04/29/25 0809   Water Bolus (mL) 30 mL 04/29/25 0809   Formula Name Impact Peptide 04/27/25 1301   Intake (mL) - Formula Tube Feeding 45 04/29/25 0900       Male External Urinary Catheter 04/23/25 1858 (Active)   Collection Container Suction canister 04/29/25 0900   Securement Method secured to top of thigh w/ adhesive device 04/29/25 0900   Skin no redness;no breakdown 04/29/25 0900   Tolerance no signs/symptoms of discomfort 04/29/25 0900   Output (mL) 120 mL 04/29/25 0809   Catheter Change Date 04/29/25 04/29/25 0900   Catheter Change Time 0500 04/29/25 0900          Physical Exam         Neurosurgery Physical Exam    [id number]  Extubated, breathing spontaneously  Eyes open to stimulation, PERRL  Brainstems intact  LUE WD, RUE EXT  BLE no movement to stim  On cEEG    Significant Labs:  Recent Labs   Lab 04/28/25  0211 04/29/25  0336   * 143*    142   K 3.7 4.0    109   CO2 22* 23   BUN 45* 67*   CREATININE 1.4 1.2   CALCIUM 8.8 9.1   MG 2.5 2.5     Recent Labs   Lab 04/28/25  0211 04/29/25  0336   WBC 15.34* 13.39*   HGB 10.3* 10.4*   HCT 32.0* 32.8*    307     No results for input(s): "LABPT", "INR", "APTT" in the last 48 hours.  Microbiology Results (last 7 days)       Procedure Component Value Units Date/Time    Blood culture [[id number]]  (Normal) Collected: 04/26/25 1628    Order Status: Completed Specimen: Blood from Peripheral, Antecubital, Right Updated: 04/28/25 2001     Blood Culture No Growth After 48 Hours    Blood culture [[id number]]  (Normal) Collected: 04/26/25 1628    Order Status: Completed Specimen: Blood from Peripheral, Forearm, Left Updated: 04/28/25 2001     Blood Culture No Growth After 48 Hours    MRSA Screen by PCR [[id number]]  (Normal) Collected: 04/28/25 1056    Order Status: Completed Specimen: Nasal Swab Updated: " 04/28/25 1704     MRSA PCR Screen Not Detected    Culture, Respiratory with Gram Stain [[id number]] Collected: 04/26/25 1428    Order Status: Completed Specimen: Respiratory from Sputum, Induced Updated: 04/28/25 0916     Respiratory Culture Normal respiratory audrey     GRAM STAIN <10 Epithelial Cells/LPF      Rare WBC seen      No organisms seen    Respiratory Infection Panel (PCR), Nasopharyngeal [[id number]] Collected: 04/27/25 1759    Order Status: Completed Specimen: Nasopharyngeal Swab Updated: 04/27/25 2049     Respiratory Infection Panel Source Nasopharyngeal Swab     Adenovirus Not Detected     Coronavirus 229E, Common Cold Virus Not Detected     Coronavirus HKU1, Common Cold Virus Not Detected     Coronavirus NL63, Common Cold Virus Not Detected     Coronavirus OC43, Common Cold Virus Not Detected     SARS-CoV2 (COVID-19) Qualitative PCR Not Detected     Human Metapneumovirus Not Detected     Human Rhinovirus/Enterovirus Not Detected     Influenza A Not Detected     Influenza B Not Detected     Parainfluenza Virus 1 Not Detected     Parainfluenza Virus 2 Not Detected     Parainfluenza Virus 3 Not Detected     Parainfluenza Virus 4 Not Detected     Respiratory Syncytial Virus Not Detected     Bordetella Parapertussis (VP5481) Not Detected     Bordetella pertussis (ptxP) Not Detected     Chlamydia pneumoniae Not Detected     Mycoplasma pneumoniae Not Detected    Respiratory Infection Panel (PCR), Nasopharyngeal [[id number]] Collected: 04/27/25 1004    Order Status: Canceled Specimen: Nasopharyngeal Swab Updated: 04/27/25 1222          All pertinent labs from the last 24 hours have been reviewed.    Significant Diagnostics:  CT: No results found in the last 24 hours.  MRI: No results found in the last 24 hours.  Assessment/Plan:     * Subdural hemorrhage  84 yo with recent admission for VPS tie off and SDH evacuation on 3/24 presenting for new LSW and speech hesitation found to have enlarged chronic SDH as  well as a new small R convexity acute SDH. Pt takes eliquis, s/p PCC. Interval CTH stable.     Now s/p bialteral subdural evacuations with VPS removal on 4/25    Recommendations:  --admitted to [facility]  --on eliquis, s/p kcentra reversal. Hold all AC/AP, ok for dvt ppx  --SBP <140  --HOB >30  --f/u cEEG, remains on vimpat  --drains removed 4/28  --Continue to monitor clinically, notify NSGY immediately with any changes in neuro status    Discussed with Dr. Krzysztof Camilo MD  Neurosurgery  [facility] - [facility]

## 2025-04-29 NOTE — NURSING
Pt arrived back to room following CT        Pt was escorted by RN on cardiac monitoring, O2, and ambu bag.        Patient placed back on bedside monitor with alarms audible, bed in low position with bed alarm on, call light within reach. HERMINIA.

## 2025-04-29 NOTE — PROGRESS NOTES
[facility] - [facility] Critical [facility]  Neurology-Epilepsy  Progress Note    Patient Name: Ramesh Ricci  MRN: [id number]  Admission Date: 4/23/2025  Hospital Length of Stay: 6 days  Code Status: Partial Code   Attending Provider: Johnnie Mahajan MD  Primary Care Physician: Nakul Mandujano MD   Principal Problem:Seizure disorder    Subjective:     Hospital Course:   CAP- WNL  4/24>4/25: mild encephalopathy, no epileptiform activity, no seizures; no staring episodes reported    4/26>4/27: moderate encephalopathy, bilateral breach, independent bilateral sharps, no seizures  4/27>4/28: moderate encephalopathy, generalized cortical irritability, no discrete seizures  4/28>4/29: moderate encephalopathy, triphasics, generalized cortical irritability, no discrete seizures (marginally improved with increased Lacosamide to 200 mg BID)    See EEG reports for details.    Interval History: EEG continues to have interictals, no discrete seizures. CTH today with new right parietal IPH. No family at bedside on rounds.    Current Medications[1]  Continuous Infusions:    Review of Systems  Objective:     Vital Signs (Most Recent):  Temp: 97.8 °F (36.6 °C) (04/29/25 1100)  Pulse: 89 (04/29/25 1315)  Resp: 20 (04/29/25 1315)  BP: (!) 158/63 (04/29/25 1300)  SpO2: 100 % (04/29/25 1315) Vital Signs (24h Range):  Temp:  [97.8 °F (36.6 °C)-98.7 °F (37.1 °C)] 97.8 °F (36.6 °C)  Pulse:  [70-90] 89  Resp:  [16-50] 20  SpO2:  [94 %-100 %] 100 %  BP: (116-192)/(46-81) 158/63     Weight: 68 kg (149 lb 14.6 oz)  Body mass index is 20.92 kg/m².     Physical Exam  Constitutional:       General: He is not in acute distress.     Appearance: He is ill-appearing.      Interventions: Nasal cannula in place.   HENT:      Head: Normocephalic.      Comments: EEG in place  Eyes:      General: No scleral icterus.        Right eye: No discharge.         Left eye: No discharge.      Conjunctiva/sclera: Conjunctivae normal.      Pupils: Pupils are equal,  round, and reactive to light.   Cardiovascular:      Rate and Rhythm: Normal rate.   Pulmonary:      Effort: Pulmonary effort is normal. No respiratory distress.   Musculoskeletal:         General: No deformity or signs of injury.   Skin:     General: Skin is warm and dry.   Psychiatric:      Comments: Unable to assess            NEUROLOGICAL EXAMINATION:     CRANIAL NERVES     CN III, IV, VI   Pupils are equal, round, and reactive to light.       Does not arouse to stimuli  SHASHA OU   Corneal intact OU   No spontaneous eye opening   Face symmetric   Tongue midline   Does not follow commands  Withdraws to noxious stimuli in BLE    Exam findings to suggest seizures:  Myoclonus - no   eye twitching - no   Nystagmus - no   gaze deviation - no   waxy rigidity - no        Significant Labs: All pertinent lab results from the past 24 hours have been reviewed.    Significant Studies: I have reviewed all pertinent imaging results/findings within the past 24 hours.    Assessment and Plan:     * Seizure disorder  85M PMHx of HTN, afib on Eliquis, AS s/p TAVR, CKD, UC, NPH s/p VPS c/b bilateral SDH s/p bilateral evacuation with subsequent seizure disorder maintained on Lacosamide 100 mg BID who presented with worsening weakness, urinary frequency, and speech changes. Imaging revealed interval increase in size of bilateral holohemispheric subdural collections, with new bilateral hemorrhages in the subdural collections, concerning for acute on subacute SDH; right VPS in place. Eliquis was reversed in the ER. Patient noted to have staring episode which resolved ~few minutes. Wife reported staring episodes at home. Patient admitted to [facility] for higher level of care and further management with Neurosurgery consult. CAP EEG placed; Epilepsy following for assistance in management. Per chart review, patient was recently started on Lacosamide 100 mg BID (filled 4/8) by Neurology group at [facility].    Recommendations:  - Discontinue vEEG  monitoring; repeat as clinically indicated  - Recommend to continue Lacosamide 200 mg BID  - Avoid agents that lower seizure threshold  - Post operative management per NSGY  - Management of infectious/metabolic abnormalities per NCC  - Seizure precautions  - Per [place] Law, patient must refrain from driving for 6 months after having a seizure or cleared by a neurologist to legally resume driving      Discussed plan of care with NCC team. No family at bedside. Will follow peripherally, please call with questions.    Subdural hemorrhage  S/p VPS  Hx of NPH s/p VPS c/b bilateral SDH s/p bilateral evacuation with subsequent seizure disorder maintained on Lacosamide 100 mg BID   - S/p b/l craniotomies on 4/25 by Dr. Blankenship  - Drains removed 4/28  - New right parietal IPH on CTH today  - Management per NSGY, NCC    PAF (paroxysmal atrial fibrillation)  Hx of, On Eliquis  - Reversed in ER  - Management per NSGY, NCC        VTE Risk Mitigation (From admission, onward)           Ordered     enoxaparin injection 40 mg  Every 24 hours         04/27/25 1734     Reason for No Pharmacological VTE Prophylaxis  Once        Question:  Reasons:  Answer:  Active Bleeding    04/23/25 1950     IP VTE HIGH RISK PATIENT  Once         04/23/25 1950     Place sequential compression device  Until discontinued         04/23/25 1950                    Pema Shepard PA-C  Neurology-Epilepsy  [facility]  Staff: Dr. Monsivais       [1]   Current Facility-Administered Medications   Medication Dose Route Frequency Provider Last Rate Last Admin    acetaminophen tablet 650 mg  650 mg Per NG tube Q6H PRN Jodie Perea PA-C   650 mg at 04/27/25 0645    acetylcysteine 100 mg/ml (10%) solution 4 mL  4 mL Nebulization Q4H Deven Ahumada MD   4 mL at 04/29/25 1259    albuterol-ipratropium 2.5 mg-0.5 mg/3 mL nebulizer solution 3 mL  3 mL Nebulization Q4H Jodie Perea PA-C   3 mL at 04/29/25 1256    bisacodyL suppository 10 mg  10  mg Rectal Daily PRN Yuridia Kaplan PA-C        enoxaparin injection 40 mg  40 mg Subcutaneous Q24H (prophylaxis, 1700) Jodie Perea PA-C   40 mg at 04/28/25 1653    hydrALAZINE injection 10 mg  10 mg Intravenous Q4H PRN Yuridia Kaplan PA-SUMEET   10 mg at 04/29/25 0806    labetalol 20 mg/4 mL (5 mg/mL) IV syring  10 mg Intravenous Q4H PRN Yuridia Kaplan PA-SUMEET   10 mg at 04/27/25 1433    lacosamide 10 mg/mL oral liquid 200 mg  200 mg Per NG tube Q12H Johnnie Mahajan MD        levothyroxine tablet 125 mcg  125 mcg Per NG tube Before breakfast Jodie Perea PA-C   125 mcg at 04/29/25 0613    magnesium oxide tablet 800 mg  800 mg Per NG tube PRN Jodie Perea PA-C        magnesium oxide tablet 800 mg  800 mg Per NG tube PRN Jodie Perea PA-C        metoprolol tartrate (LOPRESSOR) split tablet 12.5 mg  12.5 mg Per NG tube Q8H Jodie Perea PA-C   12.5 mg at 04/29/25 0613    mupirocin 2 % ointment   Nasal BID Baumgarten, Katherine L., MD   Given at 04/29/25 0807    oxyCODONE immediate release tablet 5 mg  5 mg Per NG tube Q6H PRN Jodie Perea PA-C        piperacillin-tazobactam (ZOSYN) 4.5 g in D5W 100 mL IVPB (MB+)  4.5 g Intravenous Q8H Johnnie Mahajan MD 25 mL/hr at 04/29/25 1303 Rate Verify at 04/29/25 1303    potassium bicarbonate disintegrating tablet 35 mEq  35 mEq Per NG tube PRN Jodie Perea PA-C        potassium bicarbonate disintegrating tablet 50 mEq  50 mEq Per NG tube PRN Jodie Perea PA-C   50 mEq at 04/28/25 0516    potassium bicarbonate disintegrating tablet 60 mEq  60 mEq Per NG tube PRN Jodie Perea PA-C        potassium, sodium phosphates 280-160-250 mg packet 2 packet  2 packet Per NG tube Jodie Garcia PA-C   2 packet at 04/28/25 0843    potassium, sodium phosphates 280-160-250 mg packet 2 packet  2 packet Per NG tube Jodie Garcia PA-C        potassium, sodium phosphates 280-160-250 mg packet 2 packet  2 packet  Per NG tube PRN Jodie Perea PA-C        pravastatin tablet 20 mg  20 mg Per NG tube QHS Jodie Perea PA-C   20 mg at 04/28/25 2102    senna-docusate 8.6-50 mg per tablet 1 tablet  1 tablet Per NG tube Daily Jodie Perea PA-C   1 tablet at 04/29/25 0900    silodosin capsule 4 mg  4 mg Per NG tube Daily Jodie Perea PA-C   4 mg at 04/29/25 0806    sodium chloride 0.9% flush 10 mL  10 mL Intravenous PRN Yuridia Kaplan PA-C

## 2025-04-29 NOTE — ASSESSMENT & PLAN NOTE
86 yo with recent admission for VPS tie off and SDH evacuation on 3/24 presenting for new LSW and speech hesitation found to have enlarged chronic SDH as well as a new small R convexity acute SDH. Pt takes eliquis, s/p PCC. Interval CTH stable.     Now s/p bialteral subdural evacuations with VPS removal on 4/25    Recommendations:  --admitted to [facility]  --on eliquis, s/p kcentra reversal. Hold all AC/AP, ok for dvt ppx  --SBP <140  --HOB >30  --f/u cEEG, remains on vimpat  --drains removed 4/28  --Continue to monitor clinically, notify NSGY immediately with any changes in neuro status    Discussed with Dr. Blankenship

## 2025-04-29 NOTE — SUBJECTIVE & OBJECTIVE
Interval History: EEG continues to have interictals, no discrete seizures. CTH today with new right parietal IPH. No family at bedside on rounds.    Current Medications[1]  Continuous Infusions:    Review of Systems  Objective:     Vital Signs (Most Recent):  Temp: 97.8 °F (36.6 °C) (04/29/25 1100)  Pulse: 89 (04/29/25 1315)  Resp: 20 (04/29/25 1315)  BP: (!) 158/63 (04/29/25 1300)  SpO2: 100 % (04/29/25 1315) Vital Signs (24h Range):  Temp:  [97.8 °F (36.6 °C)-98.7 °F (37.1 °C)] 97.8 °F (36.6 °C)  Pulse:  [70-90] 89  Resp:  [16-50] 20  SpO2:  [94 %-100 %] 100 %  BP: (116-192)/(46-81) 158/63     Weight: 68 kg (149 lb 14.6 oz)  Body mass index is 20.92 kg/m².     Physical Exam  Constitutional:       General: He is not in acute distress.     Appearance: He is ill-appearing.      Interventions: Nasal cannula in place.   HENT:      Head: Normocephalic.      Comments: EEG in place  Eyes:      General: No scleral icterus.        Right eye: No discharge.         Left eye: No discharge.      Conjunctiva/sclera: Conjunctivae normal.      Pupils: Pupils are equal, round, and reactive to light.   Cardiovascular:      Rate and Rhythm: Normal rate.   Pulmonary:      Effort: Pulmonary effort is normal. No respiratory distress.   Musculoskeletal:         General: No deformity or signs of injury.   Skin:     General: Skin is warm and dry.   Psychiatric:      Comments: Unable to assess            NEUROLOGICAL EXAMINATION:     CRANIAL NERVES     CN III, IV, VI   Pupils are equal, round, and reactive to light.       Does not arouse to stimuli  SHASHA OU   Corneal intact OU   No spontaneous eye opening   Face symmetric   Tongue midline   Does not follow commands  Withdraws to noxious stimuli in BLE    Exam findings to suggest seizures:  Myoclonus - no   eye twitching - no   Nystagmus - no   gaze deviation - no   waxy rigidity - no        Significant Labs: All pertinent lab results from the past 24 hours have been reviewed.    Significant  Studies: I have reviewed all pertinent imaging results/findings within the past 24 hours.         [1]   Current Facility-Administered Medications   Medication Dose Route Frequency Provider Last Rate Last Admin    acetaminophen tablet 650 mg  650 mg Per NG tube Q6H PRN Jodie Perea PA-C   650 mg at 04/27/25 0645    acetylcysteine 100 mg/ml (10%) solution 4 mL  4 mL Nebulization Q4H Deven Ahumada MD   4 mL at 04/29/25 1259    albuterol-ipratropium 2.5 mg-0.5 mg/3 mL nebulizer solution 3 mL  3 mL Nebulization Q4H Jodie Perea PA-C   3 mL at 04/29/25 1256    bisacodyL suppository 10 mg  10 mg Rectal Daily PRN Yuridia Kaplan PA-C        enoxaparin injection 40 mg  40 mg Subcutaneous Q24H (prophylaxis, 1700) Jodie Perea PA-C   40 mg at 04/28/25 1653    hydrALAZINE injection 10 mg  10 mg Intravenous Q4H PRN Yuridia Kaplan PA-C   10 mg at 04/29/25 0806    labetalol 20 mg/4 mL (5 mg/mL) IV syring  10 mg Intravenous Q4H PRN Yuridia Kaplan PA-C   10 mg at 04/27/25 1433    lacosamide 10 mg/mL oral liquid 200 mg  200 mg Per NG tube Q12H Johnnie Mahajan MD        levothyroxine tablet 125 mcg  125 mcg Per NG tube Before breakfast Jodie Perea PA-C   125 mcg at 04/29/25 0613    magnesium oxide tablet 800 mg  800 mg Per NG tube PRN Jodie Perea PA-C        magnesium oxide tablet 800 mg  800 mg Per NG tube PRN Jodie Perea PA-C        metoprolol tartrate (LOPRESSOR) split tablet 12.5 mg  12.5 mg Per NG tube Q8H Jodie Perea PA-C   12.5 mg at 04/29/25 0613    mupirocin 2 % ointment   Nasal BID Baumgarten, Katherine L., MD   Given at 04/29/25 0807    oxyCODONE immediate release tablet 5 mg  5 mg Per NG tube Q6H PRN Jodie Perea, PA-C        piperacillin-tazobactam (ZOSYN) 4.5 g in D5W 100 mL IVPB (MB+)  4.5 g Intravenous Q8H Johnnie Mahajan MD 25 mL/hr at 04/29/25 1303 Rate Verify at 04/29/25 1303    potassium bicarbonate disintegrating tablet 35 mEq  35  mEq Per NG tube PRN Jodie Perea PA-C        potassium bicarbonate disintegrating tablet 50 mEq  50 mEq Per NG tube PRN Jodie Perea PA-C   50 mEq at 04/28/25 0516    potassium bicarbonate disintegrating tablet 60 mEq  60 mEq Per NG tube PRN Jodie Perea PA-C        potassium, sodium phosphates 280-160-250 mg packet 2 packet  2 packet Per NG tube PRN Jodie Perea PA-C   2 packet at 04/28/25 0843    potassium, sodium phosphates 280-160-250 mg packet 2 packet  2 packet Per NG tube PRN Jodie Perea PA-C        potassium, sodium phosphates 280-160-250 mg packet 2 packet  2 packet Per NG tube PRN Jodie Perea PA-C        pravastatin tablet 20 mg  20 mg Per NG tube QHS Jodie Perea PA-C   20 mg at 04/28/25 2102    senna-docusate 8.6-50 mg per tablet 1 tablet  1 tablet Per NG tube Daily Jodie Perea PA-C   1 tablet at 04/29/25 0900    silodosin capsule 4 mg  4 mg Per NG tube Daily Jodie Perea PA-C   4 mg at 04/29/25 0806    sodium chloride 0.9% flush 10 mL  10 mL Intravenous PRN Yuridia Kaplan PA-C

## 2025-04-29 NOTE — PLAN OF CARE
"[facility] Care Plan    POC reviewed with Ramesh Ricci at 0300. Patient unable to verbalize understanding.   No acute events today. Pt progressing toward goals. Will continue to monitor. See below and flowsheets for full assessment and VS info.     -continue pulmonary toileting.  NT suction 2x; switched to high flow nasal cannula at 6L.    -CHG bed bath given overnight.          Is this a stroke patient? no    Neuro:  [place] Coma Scale  Best Eye Response: 2-->(E2) to pain  Best Motor Response: 4-->(M4) withdraws from pain  Best Verbal Response: 1-->(V1) none  [place] Coma Scale Score: 7  Assessment Qualifiers: no eye obstruction present  Pupil PERRLA: yes     24 hr Temp:  [97.9 °F (36.6 °C)-98.7 °F (37.1 °C)]     CV:   Rhythm: normal sinus rhythm  BP goals:   SBP < 160  MAP > 65    Resp:      Oxygen Concentration (%): 44    Plan: N/A    GI/:     Diet/Nutrition Received: NPO, tube feeding  Last Bowel Movement: 04/29/25  Voiding Characteristics: external catheter    Intake/Output Summary (Last 24 hours) at 4/29/2025 0600  Last data filed at 4/29/2025 0501  Gross per 24 hour   Intake 1797.86 ml   Output 900 ml   Net 897.86 ml     Unmeasured Output  Urine Occurrence: 1  Stool Occurrence: 1  Pad Count: 2    Labs/Accuchecks:  Recent Labs   Lab 04/29/25  0336   WBC 13.39*   RBC 3.51*   HGB 10.4*   HCT 32.8*         Recent Labs   Lab 04/29/25  0336      K 4.0   CO2 23      BUN 67*   CREATININE 1.2   ALKPHOS 59   ALT 11   AST 32   BILITOT 0.3      Recent Labs   Lab 04/24/25  0435   INR 1.0   APTT 28.0    No results for input(s): "CPK", "CPKMB", "TROPONINI", "MB" in the last 168 hours.    Electrolytes: Electrolytes replaced  Accuchecks: none    Gtts:      LDA/Wounds:    Gabino Risk Assessment  Sensory Perception: 3-->slightly limited  Moisture: 3-->occasionally moist  Activity: 1-->bedfast  Mobility: 1-->completely immobile  Nutrition: 3-->adequate  Friction and Shear: 3-->no apparent problem  Gabino" Score: 14  Is your napoleon score 12 or less? no          Restraints:        WCTM

## 2025-04-29 NOTE — SUBJECTIVE & OBJECTIVE
Review of Systems   Unable to perform ROS: Other (level of consciousness)       Objective:     Vitals:  Temp: 97.8 °F (36.6 °C)  Pulse: 92  Rhythm: normal sinus rhythm  BP: 135/67  MAP (mmHg): 90  Resp: 20  SpO2: 100 %  Oxygen Concentration (%): 44    Temp  Min: 97.8 °F (36.6 °C)  Max: 98.7 °F (37.1 °C)  Pulse  Min: 70  Max: 92  BP  Min: 116/46  Max: 192/79  MAP (mmHg)  Min: 67  Max: 113  Resp  Min: 16  Max: 50  SpO2  Min: 94 %  Max: 100 %  Oxygen Concentration (%)  Min: 44  Max: 44    04/28 0701 - 04/29 0700  In: 1839.1   Out: 1225 [Urine:1225]   Unmeasured Output  Urine Occurrence: 1  Stool Occurrence: 1  Pad Count: 2        Physical Exam  Vitals and nursing note reviewed.   Constitutional:       General: He is not in acute distress.     Appearance: He is ill-appearing.   HENT:      Head:      Comments: Surgical site c/d/i     Right Ear: External ear normal.      Left Ear: External ear normal.      Nose: Nose normal.      Comments: NGT in place.     Mouth/Throat:      Mouth: Mucous membranes are dry.   Eyes:      Pupils: Pupils are equal, round, and reactive to light.   Cardiovascular:      Rate and Rhythm: Normal rate and regular rhythm.   Pulmonary:      Effort: Pulmonary effort is normal. No respiratory distress.      Comments: Upper airway congestion   Bl coarse BS   Abdominal:      General: There is no distension.      Palpations: Abdomen is soft.      Tenderness: There is no abdominal tenderness. There is no guarding.   Musculoskeletal:      Right lower leg: No edema.      Left lower leg: No edema.   Skin:     General: Skin is warm and dry.   Neurological:      Comments: E2 V1 M3  Mental status: Lethargic, barely opens eyes to very noxious stimuli. Nonverbal. Not following commands.   CN II-XII grossly intact, specifically:  PERRL  EOMI  blinks to threat bilaterally   No facial asymmetry.  Weak spontaneous cough   Motor:  RUE flexion to noxious   RLE triple flexion to tickling foot  LUE minimal flexion to  noxious  LLE triple flexion to tickling foot  Sensation intact to noxious x4       Gait & coordination exams deferred.        Medications:  Continuous Scheduledacetylcysteine 100 mg/ml (10%), 4 mL, Q4H  albuterol-ipratropium, 3 mL, Q4H  enoxparin, 40 mg, Q24H (prophylaxis, 1700)  lacosamide, 200 mg, Q12H  levothyroxine, 125 mcg, Before breakfast  metoprolol tartrate, 12.5 mg, Q8H  mupirocin, , BID  piperacillin-tazobactam (Zosyn) IV (PEDS and ADULTS) (extended infusion is not appropriate), 4.5 g, Q8H  pravastatin, 20 mg, QHS  senna-docusate, 1 tablet, Daily  silodosin, 4 mg, Daily    PRNacetaminophen, 650 mg, Q6H PRN  bisacodyL, 10 mg, Daily PRN  hydrALAZINE, 10 mg, Q4H PRN  labetalol, 10 mg, Q4H PRN  magnesium oxide, 800 mg, PRN  magnesium oxide, 800 mg, PRN  oxyCODONE, 5 mg, Q6H PRN  potassium bicarbonate, 35 mEq, PRN  potassium bicarbonate, 50 mEq, PRN  potassium bicarbonate, 60 mEq, PRN  potassium, sodium phosphates, 2 packet, PRN  potassium, sodium phosphates, 2 packet, PRN  potassium, sodium phosphates, 2 packet, PRN  sodium chloride 0.9%, 10 mL, PRN      Today I personally reviewed pertinent medications, lines/drains/airways, imaging, laboratory results, microbiology results, notably:    CTH with new small R parietal IPH with mild surrounding edema.     CXR with slight improvement from yesterday.      Laboratory:  CBC:  Recent Labs   Lab 04/29/25  0336   WBC 13.39*   RBC 3.51*   HGB 10.4*   HCT 32.8*      MCV 93   MCH 29.6   MCHC 31.7*       CMP:  Recent Labs   Lab 04/29/25  0336   CALCIUM 9.1   ALBUMIN 2.2*   PROT 6.7      K 4.0   CO2 23      BUN 67*   CREATININE 1.2   ALKPHOS 59   ALT 11   AST 32   BILITOT 0.3     Recent Labs   Lab 04/29/25  0336   MG 2.5   PHOS 3.2       Coagulation:  Recent Labs   Lab 04/24/25  0435   INR 1.0   APTT 28.0       Endocrine:  Recent Labs     04/27/25  1018   TSH 1.183     Microbiology  Microbiology Results (last 7 days)       Procedure Component Value Units  Date/Time    Culture, Respiratory with Gram Stain [[id number]] Collected: 04/26/25 1428    Order Status: Completed Specimen: Respiratory from Sputum, Induced Updated: 04/29/25 1131     Respiratory Culture Normal respiratory audrey, no Staph aureus or Pseudomonas isolated     GRAM STAIN <10 Epithelial Cells/LPF      Rare WBC seen      No organisms seen    Blood culture [[id number]]  (Normal) Collected: 04/26/25 1628    Order Status: Completed Specimen: Blood from Peripheral, Antecubital, Right Updated: 04/28/25 2001     Blood Culture No Growth After 48 Hours    Blood culture [[id number]]  (Normal) Collected: 04/26/25 1628    Order Status: Completed Specimen: Blood from Peripheral, Forearm, Left Updated: 04/28/25 2001     Blood Culture No Growth After 48 Hours    MRSA Screen by PCR [[id number]]  (Normal) Collected: 04/28/25 1056    Order Status: Completed Specimen: Nasal Swab Updated: 04/28/25 1704     MRSA PCR Screen Not Detected    Respiratory Infection Panel (PCR), Nasopharyngeal [[id number]] Collected: 04/27/25 1759    Order Status: Completed Specimen: Nasopharyngeal Swab Updated: 04/27/25 2049     Respiratory Infection Panel Source Nasopharyngeal Swab     Adenovirus Not Detected     Coronavirus 229E, Common Cold Virus Not Detected     Coronavirus HKU1, Common Cold Virus Not Detected     Coronavirus NL63, Common Cold Virus Not Detected     Coronavirus OC43, Common Cold Virus Not Detected     SARS-CoV2 (COVID-19) Qualitative PCR Not Detected     Human Metapneumovirus Not Detected     Human Rhinovirus/Enterovirus Not Detected     Influenza A Not Detected     Influenza B Not Detected     Parainfluenza Virus 1 Not Detected     Parainfluenza Virus 2 Not Detected     Parainfluenza Virus 3 Not Detected     Parainfluenza Virus 4 Not Detected     Respiratory Syncytial Virus Not Detected     Bordetella Parapertussis (IA0994) Not Detected     Bordetella pertussis (ptxP) Not Detected     Chlamydia pneumoniae Not Detected      Mycoplasma pneumoniae Not Detected    Respiratory Infection Panel (PCR), Nasopharyngeal [[id number]] Collected: 04/27/25 1004    Order Status: Canceled Specimen: Nasopharyngeal Swab Updated: 04/27/25 1222               Diet  Diet NPO  Diet NPO

## 2025-04-29 NOTE — PROCEDURES
EEG REPORT      Ramesh Ricci  [id number]  1940    DATE OF SERVICE: 4/28/2025     -3    METHODOLOGY      Extended electroencephalographic recording is made while the patient is ambulatory and continuing normal daily activities.  Electrodes are placed according to the International 10-20 placement system and included T1 and T2 electrode placement.  Twenty four (24) channels of digital signal (sampling rate of 512/sec) was simultaneously recorded from the scalp including EKG and eye monitors.  Recording band pass was 0.1 to 100 hz and all data was stored digitally on the recorder.  The patient is instructed to press an event button when clinical symptoms occur and write the symptoms into a diary. Activation procedures which include photic stimulation, hyperventilation and instructing patients to perform simple task are done in selected patients.        The EEG is displayed on a monitor screen and can be reformatted into different montages for evaluation.  The entire recoding is submitted for computer assisted analysis to detect spike and electrographic seizure activity.  The entire recording is visually reviewed and the times identified by computer analysis as being spikes or seizures are reviewed again.  Compresses spectral analysis (CSA) is also performed on the activity recorded from each individual channel.  This is displayed as a power display of frequencies from 0 to 30 Hz over time.   The CSA analysis is done and displayed continuously.  This is reviewed for asymmetries in power between homologous areas of the scalp and for presence of changes in power which canbe seen when seizures occur.  Sections of suspected abnormalities on the CSA is then compared with the original EEG recording.  .     [employer] software was also utilized in the review of this study.  This software suite analyzes the EEG recording in multiple domains.  Coherence and rhythmicity is computed to identify EEG sections which may  contain organized seizures.  Each channel undergoes analysis to detect presence of spike and sharp waves which have special and morphological characteristic of epileptic activity.  The routine EEG recording is converted from spacial into frequency domain.  This is then displayed comparing homologous areas to identify areas of significant asymmetry.  Algorithm to identify non-cortically generated artifact is used to separate eye movement, EMG and other artifact from the EEG     Recording Times  Start on 4/28/2025  Stop on 4/29/2025    A total of 23:59:11 hours of EEG was recorded.      EEG FINDINGS:  Background activity:   The background rhythm was characterized by poorly organized theta activity with absent posterior dominant rhythm.   Symmetry and continuity: the background was continuous and symmetric     Sleep:   No sleep transients although there is cycling of the background.    Activation procedures:   NA    Abnormal activity:   During periods of arousal there are 1-2Hz periodic complexes of fronto-central dominant sharply contoured slowing which take either triphasic or epileptiform morphology.    IMPRESSION:   Abnormal EEG due to moderate generalized cerebral dysfunction with generalized cortical irritability and seizure potential.        Jose J Monsivais MD  Neurology-Epilepsy.  [facility].

## 2025-04-29 NOTE — ASSESSMENT & PLAN NOTE
- vimpat 200 bid   - keppra 500mg bid   - EEG- moderate encephalopathy, triphasics, generalized cortical irritability, no discrete seizures . Discontinued EEG.

## 2025-04-29 NOTE — SUBJECTIVE & OBJECTIVE
Interval History: 4/29: SG HV removed yesterday, continued poor exam, withdraws does not follow commands, nonverbal. No sz on EEG.    Medications:  Continuous Infusions:  Scheduled Meds:   acetylcysteine 100 mg/ml (10%)  4 mL Nebulization Q4H    albuterol-ipratropium  3 mL Nebulization Q4H    enoxparin  40 mg Subcutaneous Q24H (prophylaxis, 1700)    lacosamide (Vimpat) IV orderable  200 mg Intravenous Q12H    levothyroxine  125 mcg Per NG tube Before breakfast    metoprolol tartrate  12.5 mg Per NG tube Q8H    mupirocin   Nasal BID    piperacillin-tazobactam (Zosyn) IV (PEDS and ADULTS) (extended infusion is not appropriate)  4.5 g Intravenous Q8H    pravastatin  20 mg Per NG tube QHS    senna-docusate  1 tablet Per NG tube Daily    silodosin  4 mg Per NG tube Daily     PRN Meds:  Current Facility-Administered Medications:     acetaminophen, 650 mg, Per NG tube, Q6H PRN    bisacodyL, 10 mg, Rectal, Daily PRN    hydrALAZINE, 10 mg, Intravenous, Q4H PRN    labetalol, 10 mg, Intravenous, Q4H PRN    magnesium oxide, 800 mg, Per NG tube, PRN    magnesium oxide, 800 mg, Per NG tube, PRN    oxyCODONE, 5 mg, Per NG tube, Q6H PRN    potassium bicarbonate, 35 mEq, Per NG tube, PRN    potassium bicarbonate, 50 mEq, Per NG tube, PRN    potassium bicarbonate, 60 mEq, Per NG tube, PRN    potassium, sodium phosphates, 2 packet, Per NG tube, PRN    potassium, sodium phosphates, 2 packet, Per NG tube, PRN    potassium, sodium phosphates, 2 packet, Per NG tube, PRN    sodium chloride 0.9%, 10 mL, Intravenous, PRN     Review of Systems  Objective:     Weight: 68 kg (149 lb 14.6 oz)  Body mass index is 20.92 kg/m².  Vital Signs (Most Recent):  Temp: 97.9 °F (36.6 °C) (04/29/25 0700)  Pulse: 82 (04/29/25 0900)  Resp: (!) 27 (04/29/25 0900)  BP: 136/60 (04/29/25 0900)  SpO2: 100 % (04/29/25 0900) Vital Signs (24h Range):  Temp:  [97.9 °F (36.6 °C)-98.7 °F (37.1 °C)] 97.9 °F (36.6 °C)  Pulse:  [70-90] 82  Resp:  [16-50] 27  SpO2:  [91  %-100 %] 100 %  BP: (116-192)/(46-81) 136/60     Date 04/29/25 0700 - 04/30/25 0659   Shift 8185-0959 7428-4965 0057-3566 24 Hour Total   INTAKE   NG/   225   IV Piggyback 21.2   21.2   Shift Total(mL/kg) 246.2(3.6)   246.2(3.6)   OUTPUT   Urine(mL/kg/hr) 120   120   Shift Total(mL/kg) 120(1.8)   120(1.8)   Weight (kg) 68 68 68 68              Oxygen Concentration (%):  [44] 44             NG/OG Tube 04/26/25 0819 14 Fr. Right nostril (Active)   Placement Check other (see comments);placement verified by x-ray 04/27/25 1701   Tolerance no signs/symptoms of discomfort 04/29/25 0900   Securement secured to nostril center 04/29/25 0900   Clamp Status/Tolerance unclamped 04/29/25 0900   Suction Setting/Drainage Method suction at the bedside 04/29/25 0900   Insertion Site Appearance no redness, warmth, tenderness, skin breakdown, drainage 04/29/25 0900   Drainage None 04/27/25 1701   Flush/Irrigation flushed w/;water;no resistance met 04/29/25 0900   Feeding Type continuous 04/29/25 0900   Feeding Action feeding continued 04/29/25 0900   Current Rate (mL/hr) 45 mL/hr 04/29/25 0900   Goal Rate (mL/hr) 45 mL/hr 04/29/25 0900   Intake (mL) 60 mL 04/29/25 0809   Water Bolus (mL) 30 mL 04/29/25 0809   Formula Name Impact Peptide 04/27/25 1301   Intake (mL) - Formula Tube Feeding 45 04/29/25 0900       Male External Urinary Catheter 04/23/25 1858 (Active)   Collection Container Suction canister 04/29/25 0900   Securement Method secured to top of thigh w/ adhesive device 04/29/25 0900   Skin no redness;no breakdown 04/29/25 0900   Tolerance no signs/symptoms of discomfort 04/29/25 0900   Output (mL) 120 mL 04/29/25 0809   Catheter Change Date 04/29/25 04/29/25 0900   Catheter Change Time 0500 04/29/25 0900          Physical Exam         Neurosurgery Physical Exam    E2V1M4  Extubated, breathing spontaneously  Eyes open to stimulation, PERRL  Brainstems intact  LUE WD, RUE EXT  BLE no movement to stim  On  "cEEG    Significant Labs:  Recent Labs   Lab 04/28/25  0211 04/29/25  0336   * 143*    142   K 3.7 4.0    109   CO2 22* 23   BUN 45* 67*   CREATININE 1.4 1.2   CALCIUM 8.8 9.1   MG 2.5 2.5     Recent Labs   Lab 04/28/25  0211 04/29/25  0336   WBC 15.34* 13.39*   HGB 10.3* 10.4*   HCT 32.0* 32.8*    307     No results for input(s): "LABPT", "INR", "APTT" in the last 48 hours.  Microbiology Results (last 7 days)       Procedure Component Value Units Date/Time    Blood culture [[id number]]  (Normal) Collected: 04/26/25 1628    Order Status: Completed Specimen: Blood from Peripheral, Antecubital, Right Updated: 04/28/25 2001     Blood Culture No Growth After 48 Hours    Blood culture [[id number]]  (Normal) Collected: 04/26/25 1628    Order Status: Completed Specimen: Blood from Peripheral, Forearm, Left Updated: 04/28/25 2001     Blood Culture No Growth After 48 Hours    MRSA Screen by PCR [[id number]]  (Normal) Collected: 04/28/25 1056    Order Status: Completed Specimen: Nasal Swab Updated: 04/28/25 1704     MRSA PCR Screen Not Detected    Culture, Respiratory with Gram Stain [[id number]] Collected: 04/26/25 1428    Order Status: Completed Specimen: Respiratory from Sputum, Induced Updated: 04/28/25 0916     Respiratory Culture Normal respiratory audrey     GRAM STAIN <10 Epithelial Cells/LPF      Rare WBC seen      No organisms seen    Respiratory Infection Panel (PCR), Nasopharyngeal [[id number]] Collected: 04/27/25 1759    Order Status: Completed Specimen: Nasopharyngeal Swab Updated: 04/27/25 2049     Respiratory Infection Panel Source Nasopharyngeal Swab     Adenovirus Not Detected     Coronavirus 229E, Common Cold Virus Not Detected     Coronavirus HKU1, Common Cold Virus Not Detected     Coronavirus NL63, Common Cold Virus Not Detected     Coronavirus OC43, Common Cold Virus Not Detected     SARS-CoV2 (COVID-19) Qualitative PCR Not Detected     Human Metapneumovirus Not Detected     " Human Rhinovirus/Enterovirus Not Detected     Influenza A Not Detected     Influenza B Not Detected     Parainfluenza Virus 1 Not Detected     Parainfluenza Virus 2 Not Detected     Parainfluenza Virus 3 Not Detected     Parainfluenza Virus 4 Not Detected     Respiratory Syncytial Virus Not Detected     Bordetella Parapertussis (ME9001) Not Detected     Bordetella pertussis (ptxP) Not Detected     Chlamydia pneumoniae Not Detected     Mycoplasma pneumoniae Not Detected    Respiratory Infection Panel (PCR), Nasopharyngeal [[id number]] Collected: 04/27/25 1004    Order Status: Canceled Specimen: Nasopharyngeal Swab Updated: 04/27/25 1222          All pertinent labs from the last 24 hours have been reviewed.    Significant Diagnostics:  CT: No results found in the last 24 hours.  MRI: No results found in the last 24 hours.

## 2025-04-29 NOTE — ASSESSMENT & PLAN NOTE
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 CT head with bilatateral acute versus subacute SDH and known VPS. His eliquis was reversed with PCC in the ED. Now post-op SDH evac 4/25    -Admitted to [facility], NSGY following  -q1h neuro checks, vital checks  -Post-op CTH (4/25) with good decompression and expected postop changes  - CTH 4/29 with new R parietal IPH w mild edema  -Daily CBC, CMP, mag, phos  -Goal eunatremia, euvolemia  -SBP < 160   -Lacosamide dose increased to 200 mg BID on 4/28 in setting of generalized cortical irritability on EEG with NCSE conversion risk   -Seizure precautions  -SCDs, enoxaparin for VTE ppx  - EEG negative for seizure- discontinued EEG 4/29

## 2025-04-29 NOTE — PT/OT/SLP PROGRESS
Physical Therapy      Patient Name:  Ramesh Ricci   MRN:  [id number]    PT attempted to evaluate pt at 0826. Per RN, pt with increased O2 requirements overnight and now has a labile BP. RN recommended a hold on therapy on this date. Will follow-up as appropriate.

## 2025-04-29 NOTE — ASSESSMENT & PLAN NOTE
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 Blankenship  - Drains removed 4/28  - Management per NSGY, NCC

## 2025-04-30 NOTE — EICU
Intervention Initiated From:  COR / FRANCOU    Juanis intervened regarding:  Rounding (Video assessment)    VICU Night Rounds Checklist  24H Vital Sign Range:  Temp:  [97.8 °F (36.6 °C)-98.7 °F (37.1 °C)]   Pulse:  [70-93]   Resp:  [16-50]   BP: (116-210)/(46-90)   SpO2:  [94 %-100 %]     Video rounds

## 2025-04-30 NOTE — PT/OT/SLP RE-EVAL
Physical Therapy Re-evaluation    Patient Name:  Ramesh Ricci   MRN:  [id number]    Recommendations:     Discharge Recommendations: Moderate Intensity Therapy  Discharge Equipment Recommendations: hospital bed, lift device   Barriers to discharge: Inaccessible home and Decreased caregiver support 1 MISSY and requires assist for mobility    Assessment:     Ramesh Ricci is a 85 y.o. male admitted with a medical diagnosis of Subdural hemorrhage.  He presents with the following impairments/functional limitations: weakness, impaired endurance, impaired balance, impaired self care skills, impaired functional mobility, abnormal tone, decreased safety awareness, decreased lower extremity function, decreased upper extremity function . Pt is unsafe with functional mobility at this time due to pt requires total assist of 2 for bed mobility and total assist for sitting balance due to multidirectional instability. Pt is motivated to progress with functional mobility.     Rehab Prognosis:  fair; patient would benefit from acute skilled PT services to address these deficits and reach maximum level of function.      Recent Surgery: Procedure(s) (LRB):  CRANIOTOMY, FOR SUBDURAL HEMATOMA EVACUATION, bilateral. shunt removal (Bilateral)  REMOVAL, SHUNT, VENTRICULOPERITONEAL 5 Days Post-Op    Plan:     During this hospitalization, patient to be seen 3 x/week to address the above listed problems via therapeutic activities, therapeutic exercises, neuromuscular re-education  Plan of Care Expires:  05/29/25  Plan of Care Reviewed with: patient, daughter    Subjective     Communicated with nurse prior to session.  Patient found HOB elevated with bed alarm, blood pressure cuff, oxygen, PureWick, telemetry, pulse ox (continuous), peripheral IV, NG tube upon PT entry to room, agreeable to evaluation.    Pt grunted x 3 times throughout treatment in response to commands. Pt's eyes remained open ~50% of the time   Pain/Comfort:  Pain  Rating 1: 0/10 (pt did not appear to be in pain, pt only able to grunt in response to questions at times.)  Pain Rating Post-Intervention 1: 0/10    Patients cultural, spiritual, Synagogue conflicts given the current situation: no    Objective:     Patient found with: bed alarm, blood pressure cuff, oxygen, PureWick, telemetry, pulse ox (continuous), peripheral IV, NG tube     General Precautions: Standard, aspiration, aphasia, fall, seizure, hearing impaired  Orthopedic Precautions: N/A  Braces: N/A  Respiratory Status: High flow, flow 6 L/min    Exams:  Cognitive Exam:  Patient is oriented to pt only grunted x 3 times during treatment, unable to follow commands and his eyes were open ~ 50% of the treatment session  Sensation: unable to assess due to pt unable to follow commands of testing  RLE ROM: WFL  RLE Strength: no active movement noted  LLE ROM: WFL  LLE Strength: no active movement noted    Functional Mobility:  Bed Mobility:     Rolling Left:  total assistance  Rolling Right: total assistance  Supine to Sit: total assistance and of 2 persons  Sit to Supine: total assistance and of 2 persons    AM-PAC 6 CLICK MOBILITY  Total Score:8     Treatment and Education:   Pt noted to be soiled with BM upon PT arrival. Pt was rolled to the L and R to clean pt and to replace linen. Nurse present. Pt required total assist to scoot to the EOB in sitting. Pt sat on the EOB ~ 10 min with total assist. Pt received PROM to B UE and B LE in sitting x 5 reps within available planes of movement. Pt given manual cues for upright posture and midline orientation. Pt's gaze was upward during most of sitting trial. Pt able to look forward at therapist upon return to supine while PT was speaking to pt.     Patient left HOB elevated with all lines intact, call button in reach, bed alarm on, nurse notified, and daughter present.    GOALS:   Multidisciplinary Problems       Physical Therapy Goals          Problem: Physical Therapy     Goal Priority Disciplines Outcome Interventions   Physical Therapy Goal     PT, PT/OT Progressing    Description: Goals to be completed by: 5/12/25    Pt will perform sup<>sit transfers w/ moderate assistance  Pt will have sufficient dynamic balance to sit EOB while performing ADLs/therex w/ minimum assistance  Pt will be able to stand up from EOB w/ moderate assistance using LRAD  Bed to chair transfer w/ maxA using LRAD  Pt will be independent w/ HEP therex on BLE w/ good form and ROM                      History:     Past Medical History:   Diagnosis Date    Allergies 01/31/2025    Anemia     Anticoagulant long-term use     Anxiety     Atrophic kidney 11/16/2012    BPH (benign prostatic hyperplasia) 11/16/2012    Congenital absence of right kidney 07/05/2017    DDD (degenerative disc disease), cervical 07/05/2017    x-ray 7/17 - Severe    Ex-smoker 12/20/2018    Exudative age-related macular degeneration, right eye, with active choroidal neovascularization 02/15/2024    GERD (gastroesophageal reflux disease) 11/16/2012    Glaucoma 11/16/2012    HTN (hypertension) 11/16/2012    Hypothyroid 11/16/2012    Internal carotid artery stenosis 11/16/2012    Iron deficiency anemia due to chronic blood loss 10/29/2021    Left ventricular diastolic dysfunction, NYHA class 1 04/16/2015    4/15    Low serum testosterone level 11/16/2012    Normal cardiac stress test 11/16/2012    NPH (normal pressure hydrocephalus) 02/03/2025    Osteopenia 11/16/2012    PAF (paroxysmal atrial fibrillation) 12/20/2018    Prostate CA 01/15/2013    XRT 12/12  Dr. 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    Seizure disorder 4/24/2025    Shingles 11/16/2012    Skin disease 2020    Seems to have started after Covid vaccine    Stage 3a chronic kidney disease 10/06/2014    Stroke 2017    tia   no residual    Thrombocytopenia 01/31/2025    TIA (transient ischemic attack) 11/16/2012    UC (ulcerative colitis) 11/16/2012        Past Surgical History:   Procedure Laterality Date    ADENOIDECTOMY      COLONOSCOPY N/A 03/25/2022    Procedure: COLONOSCOPY;  Surgeon: Ahsley Nguyen MD;  Location: [facility];  Service: Endoscopy;  Laterality: N/A;    CRANIOTOMY FOR EVACUATION OF SUBDURAL HEMATOMA Bilateral 4/25/2025    Procedure: CRANIOTOMY, FOR SUBDURAL HEMATOMA EVACUATION, bilateral. shunt removal;  Surgeon: Jairo Blankenship MD;  Location: [facility] OR [address] [place] [address];  Service: Neurosurgery;  Laterality: Bilateral;  segal, ariadna. cranial plating kit. drill available. hemovac drains. to follow other Ware case    ESOPHAGOGASTRODUODENOSCOPY N/A 03/25/2022    Procedure: EGD (ESOPHAGOGASTRODUODENOSCOPY);  Surgeon: Ashley Nguyen MD;  Location: [facility];  Service: Endoscopy;  Laterality: N/A;  added on per Dr. Nguyen    ESOPHAGOGASTRODUODENOSCOPY N/A 6/7/2024    Procedure: EGD (ESOPHAGOGASTRODUODENOSCOPY);  Surgeon: Audie Snell MD;  Location: [facility] (4TH FLR);  Service: Endoscopy;  Laterality: N/A;  5/21 R/s,sent updated instr via portal.pt informed to hold eliquis for 2 days.AC  Ref by: ,  approved to hold Eliquis (apixaban) for 2 days per Dr. Conklin-see media file  5/9/24-GT  5/31-pre call complete-tb    EVACUATION OF SUBDURAL HEMATOMA Bilateral 3/24/2025    Procedure: EVACUATION, HEMATOMA, SUBDURAL  AND SHUNT TIE OFF;  Surgeon: Jairo Blankenship MD;  [facility];  Service: Neurosurgery;  Laterality: Bilateral;  bilateral bobby holes for subdural hygroma and tying off of  shunt at clavicle. to follow other ware cases    EYE SURGERY Right 11/2020    cataract    HERNIA REPAIR      LEFT HEART CATHETERIZATION Right 10/29/2021    Procedure: CATHETERIZATION, HEART, LEFT AND RIGHT  - LV DARNELL POSSIBLE;  Surgeon: Beau Conklin MD;  Location: [facility] CATH LAB;  Service: Cardiology;  Laterality: Right;    LUMBAR PUNCTURE N/A 1/13/2025    Procedure: Lumbar Puncture;  Surgeon: Jairo Blankenship MD;  Location: [facility] OR  2ND FLR;  Service: Neurosurgery;  Laterality: N/A;    REMOVAL OF VENTRICULOPERITONEAL SHUNT  4/25/2025    Procedure: REMOVAL, SHUNT, VENTRICULOPERITONEAL;  Surgeon: Jairo Blankenship MD;  [facility];  Service: Neurosurgery;;    RIGHT HEART CATHETERIZATION Right 10/29/2021    Procedure: INSERTION, CATHETER, RIGHT HEART;  Surgeon: Beau Conklin MD;  Location: [facility] [place] C[facility] LAB;  Service: Cardiology;  Laterality: Right;    SKIN BIOPSY  2020    TONSILLECTOMY      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    Procedure: INSERTION, SHUNT, VENTRICULOPERITONEAL;  Surgeon: Jairo Blankenship MD;  [facility];  Service: Neurosurgery;  Laterality: Right;    VENTRICULOPERITONEAL SHUNT  2/6/2025    Procedure: INSERTION, SHUNT, VENTRICULOPERITONEAL;  Surgeon: Shar Elizabeth MD;  Location: [facility] OR [address] [place] [address];  Service: General;;       Time Tracking:     PT Received On: 04/30/25  PT Start Time: 0747     PT Stop Time: 0818  PT Total Time (min): 31 min     Billable Minutes: Re-eval 11 and Therapeutic Exercise 20      04/30/2025

## 2025-04-30 NOTE — PLAN OF CARE
Problem: Physical Therapy  Goal: Physical Therapy Goal  Description: Goals to be completed by: 5/12/25    Pt will perform sup<>sit transfers w/ moderate assistance  Pt will have sufficient dynamic balance to sit EOB while performing ADLs/therex w/ minimum assistance  Pt will be able to stand up from EOB w/ moderate assistance using LRAD  Bed to chair transfer w/ maxA using LRAD  Pt will be independent w/ HEP therex on BLE w/ good form and ROM   Outcome: Progressing  Pt underwent surgery for craniotomy for SDH on 4/25/25, re-eval completed. Pt's goals remain appropriate and pt will continue to benefit from skilled PT services to work towards improved functional mobility including: bed mobility, transfers, and sitting balance. Lia Junior PT  4/30/2025

## 2025-04-30 NOTE — ASSESSMENT & PLAN NOTE
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 CT head with bilatateral acute versus subacute SDH and known VPS. His eliquis was reversed with PCC in the ED. Now post-op SDH evac 4/25    -Admitted to [facility], NSGY following  -q1h neuro checks, vital checks  -Post-op CTH (4/25) with good decompression and expected postop changes  - CTH 4/29 with new R parietal IPH w mild edema  -Daily CBC, CMP, mag, phos  -Goal eunatremia, euvolemia  -SBP < 160   -Lacosamide dose increased to 200 mg BID on 4/28 in setting of generalized cortical irritability on EEG with NCSE conversion risk   -Seizure precautions  -SCDs, enoxaparin for VTE ppx  - EEG negative for seizure- discontinued EEG 4/29  - may consider stimulants if mental status not improving tomorrow

## 2025-04-30 NOTE — ANESTHESIA POSTPROCEDURE EVALUATION
Anesthesia Post Evaluation    Patient: Ramesh Ricci    Procedure(s) Performed: Procedure(s) (LRB):  CRANIOTOMY, FOR SUBDURAL HEMATOMA EVACUATION, bilateral. shunt removal (Bilateral)  REMOVAL, SHUNT, VENTRICULOPERITONEAL    Final Anesthesia Type: general      Patient location during evaluation: ICU  Patient participation: No - Unable to Participate, Coma/Other Inability to Communicate  Level of consciousness: seizure activity  Post-procedure vital signs: reviewed and stable  Pain management: adequate  Airway patency: patent    PONV status at discharge: No PONV  Anesthetic complications: no      Cardiovascular status: hemodynamically stable  Respiratory status: spontaneous ventilation  Hydration status: euvolemic  Follow-up not needed.              Vitals Value Taken Time   /63 04/30/25 06:02   Temp 37 °C (98.6 °F) 04/30/25 03:05   Pulse 74 04/30/25 06:54   Resp 21 04/30/25 06:54   SpO2 100 % 04/30/25 06:54   Vitals shown include unfiled device data.      No case tracking events are documented in the log.      Pain/Yrn Score: No data recorded

## 2025-04-30 NOTE — ASSESSMENT & PLAN NOTE
See primary problem  Acute change in mentation/staring spell in the ED with quick return to baseline  Increase home vimpat dose to 150 bid, EEG cap pending formal hookup when able  Afebrile, no leukocytosis  UA unremarkable  Glucose WNL  BUN elevated 72, increased enteral water to 300 q4h and given additional 300 ml x 2 today.

## 2025-04-30 NOTE — PLAN OF CARE
"[facility] Care Plan  POC reviewed with Ramesh Ricci and family at 1400. Family verbalized understanding. Questions and concerns addressed. No acute events today. Pt progressing toward goals. Will continue to monitor. See below and flowsheets for full assessment and VS info.     -bath given linens changed  -FWF increased   -3 BM so far this shift   -frequent pulmonary toileting performed           Is this a stroke patient? no    Neuro:  [place] Coma Scale  Best Eye Response: 2-->(E2) to pain  Best Motor Response: 4-->(M4) withdraws from pain  Best Verbal Response: 1-->(V1) none  [place] Coma Scale Score: 7  Assessment Qualifiers: patient not sedated/intubated  Pupil PERRLA: no     24 hr Temp:  [98.5 °F (36.9 °C)-99.6 °F (37.6 °C)]     CV:   Rhythm: normal sinus rhythm  BP goals:   SBP < 160  MAP > 65    Resp:      Oxygen Concentration (%): 44    Plan: N/A    GI/:     Diet/Nutrition Received: NPO, tube feeding  Last Bowel Movement: 04/30/25  Voiding Characteristics: external catheter    Intake/Output Summary (Last 24 hours) at 4/30/2025 1608  Last data filed at 4/30/2025 1601  Gross per 24 hour   Intake 2186.68 ml   Output 570 ml   Net 1616.68 ml     Unmeasured Output  Urine Occurrence: 1  Stool Occurrence: 1  Pad Count: 2    Labs/Accuchecks:  Recent Labs   Lab 04/30/25  0109   WBC 12.02   RBC 3.11*   HGB 9.1*   HCT 29.0*         Recent Labs   Lab 04/30/25  0109      K 4.2   CO2 21*      BUN 72*   CREATININE 1.2   ALKPHOS 56   ALT 17   AST 38   BILITOT 0.3      Recent Labs   Lab 04/24/25  0435   PROTIME 11.2   INR 1.0   APTT 28.0    No results for input(s): "CPK", "CPKMB", "TROPONINI", "MB" in the last 168 hours.    Electrolytes: N/A - electrolytes WDL  Accuchecks: none    Gtts:      LDA/Wounds:    Gabino Risk Assessment  Sensory Perception: 3-->slightly limited  Moisture: 3-->occasionally moist  Activity: 1-->bedfast  Mobility: 2-->very limited  Nutrition: 2-->probably inadequate  Friction and " Shear: 2-->potential problem  Gabino Score: 13    Is your gabino score 12 or less? no            Restraints:        [facility]

## 2025-04-30 NOTE — SUBJECTIVE & OBJECTIVE
Interval History: 4/30: NAEON. Neuro stable , poor. If any further scans then recommend MRI    Medications:  Continuous Infusions:  Scheduled Meds:   acetylcysteine 100 mg/ml (10%)  4 mL Nebulization Q4H    albuterol-ipratropium  3 mL Nebulization Q4H    enoxparin  40 mg Subcutaneous Q24H (prophylaxis, 1700)    lacosamide  200 mg Per NG tube Q12H    levothyroxine  125 mcg Per NG tube Before breakfast    metoprolol tartrate  12.5 mg Per NG tube Q8H    piperacillin-tazobactam (Zosyn) IV (PEDS and ADULTS) (extended infusion is not appropriate)  4.5 g Intravenous Q8H    pravastatin  20 mg Per NG tube QHS    senna-docusate  1 tablet Per NG tube Daily    silodosin  4 mg Per NG tube Daily     PRN Meds:  Current Facility-Administered Medications:     acetaminophen, 650 mg, Per NG tube, Q6H PRN    bisacodyL, 10 mg, Rectal, Daily PRN    hydrALAZINE, 10 mg, Intravenous, Q4H PRN    labetalol, 10 mg, Intravenous, Q4H PRN    magnesium oxide, 800 mg, Per NG tube, PRN    magnesium oxide, 800 mg, Per NG tube, PRN    oxyCODONE, 5 mg, Per NG tube, Q6H PRN    potassium bicarbonate, 35 mEq, Per NG tube, PRN    potassium bicarbonate, 50 mEq, Per NG tube, PRN    potassium bicarbonate, 60 mEq, Per NG tube, PRN    potassium, sodium phosphates, 2 packet, Per NG tube, PRN    potassium, sodium phosphates, 2 packet, Per NG tube, PRN    potassium, sodium phosphates, 2 packet, Per NG tube, PRN    sodium chloride 0.9%, 10 mL, Intravenous, PRN     Review of Systems  Objective:     Weight: 68 kg (149 lb 14.6 oz)  Body mass index is 20.92 kg/m².  Vital Signs (Most Recent):  Temp: 99.6 °F (37.6 °C) (04/30/25 0701)  Pulse: 91 (04/30/25 0803)  Resp: (!) 25 (04/30/25 0803)  BP: (!) 157/60 (04/30/25 0701)  SpO2: 96 % (04/30/25 0803) Vital Signs (24h Range):  Temp:  [97.8 °F (36.6 °C)-99.6 °F (37.6 °C)] 99.6 °F (37.6 °C)  Pulse:  [71-93] 91  Resp:  [18-35] 25  SpO2:  [89 %-100 %] 96 %  BP: (126-210)/(47-90) 157/60     Date 04/30/25 0700 - 05/01/25 0659    Shift 5188-4830 9161-4837 4752-3630 24 Hour Total   INTAKE   NG/   295   IV Piggyback 100   100   Shift Total(mL/kg) 395(5.8)   395(5.8)   OUTPUT   Shift Total(mL/kg)       Weight (kg) 68 68 68 68                            NG/OG Tube 04/26/25 0819 14 Fr. Right nostril (Active)   Placement Check other (see comments);placement verified by x-ray 04/27/25 1701   Tolerance no signs/symptoms of discomfort 04/29/25 0900   Securement secured to nostril center 04/29/25 0900   Clamp Status/Tolerance unclamped 04/29/25 0900   Suction Setting/Drainage Method suction at the bedside 04/29/25 0900   Insertion Site Appearance no redness, warmth, tenderness, skin breakdown, drainage 04/29/25 0900   Drainage None 04/27/25 1701   Flush/Irrigation flushed w/;water;no resistance met 04/29/25 0900   Feeding Type continuous 04/29/25 0900   Feeding Action feeding continued 04/29/25 0900   Current Rate (mL/hr) 45 mL/hr 04/29/25 0900   Goal Rate (mL/hr) 45 mL/hr 04/29/25 0900   Intake (mL) 60 mL 04/29/25 0809   Water Bolus (mL) 30 mL 04/29/25 0809   Formula Name Impact Peptide 04/27/25 1301   Intake (mL) - Formula Tube Feeding 45 04/29/25 0900       Male External Urinary Catheter 04/23/25 1858 (Active)   Collection Container Suction canister 04/29/25 0900   Securement Method secured to top of thigh w/ adhesive device 04/29/25 0900   Skin no redness;no breakdown 04/29/25 0900   Tolerance no signs/symptoms of discomfort 04/29/25 0900   Output (mL) 120 mL 04/29/25 0809   Catheter Change Date 04/29/25 04/29/25 0900   Catheter Change Time 0500 04/29/25 0900          Physical Exam         Neurosurgery Physical Exam    [id number]  Extubated, breathing spontaneously  Eyes open to stimulation, PERRL  Brainstems intact  LUE WD, RUE EXT  BLE no movement to stim  On cEEG    Significant Labs:  Recent Labs   Lab 04/29/25  0336 04/30/25  0109   * 149*    143   K 4.0 4.2    110   CO2 23 21*   BUN 67* 72*   CREATININE 1.2 1.2  "  CALCIUM 9.1 8.9   MG 2.5 2.4     Recent Labs   Lab 04/29/25  0336 04/30/25  0109   WBC 13.39* 12.02   HGB 10.4* 9.1*   HCT 32.8* 29.0*    323     No results for input(s): "LABPT", "INR", "APTT" in the last 48 hours.  Microbiology Results (last 7 days)       Procedure Component Value Units Date/Time    Blood culture [[id number]]  (Normal) Collected: 04/26/25 1628    Order Status: Completed Specimen: Blood from Peripheral, Antecubital, Right Updated: 04/29/25 2001     Blood Culture No Growth After 72 Hours    Blood culture [[id number]]  (Normal) Collected: 04/26/25 1628    Order Status: Completed Specimen: Blood from Peripheral, Forearm, Left Updated: 04/29/25 2001     Blood Culture No Growth After 72 Hours    Culture, Respiratory with Gram Stain [[id number]] Collected: 04/26/25 1428    Order Status: Completed Specimen: Respiratory from Sputum, Induced Updated: 04/29/25 1131     Respiratory Culture Normal respiratory audrey, no Staph aureus or Pseudomonas isolated     GRAM STAIN <10 Epithelial Cells/LPF      Rare WBC seen      No organisms seen    MRSA Screen by PCR [[id number]]  (Normal) Collected: 04/28/25 1056    Order Status: Completed Specimen: Nasal Swab Updated: 04/28/25 1704     MRSA PCR Screen Not Detected    Respiratory Infection Panel (PCR), Nasopharyngeal [[id number]] Collected: 04/27/25 1759    Order Status: Completed Specimen: Nasopharyngeal Swab Updated: 04/27/25 2049     Respiratory Infection Panel Source Nasopharyngeal Swab     Adenovirus Not Detected     Coronavirus 229E, Common Cold Virus Not Detected     Coronavirus HKU1, Common Cold Virus Not Detected     Coronavirus NL63, Common Cold Virus Not Detected     Coronavirus OC43, Common Cold Virus Not Detected     SARS-CoV2 (COVID-19) Qualitative PCR Not Detected     Human Metapneumovirus Not Detected     Human Rhinovirus/Enterovirus Not Detected     Influenza A Not Detected     Influenza B Not Detected     Parainfluenza Virus 1 Not Detected    "  Parainfluenza Virus 2 Not Detected     Parainfluenza Virus 3 Not Detected     Parainfluenza Virus 4 Not Detected     Respiratory Syncytial Virus Not Detected     Bordetella Parapertussis (TO8564) Not Detected     Bordetella pertussis (ptxP) Not Detected     Chlamydia pneumoniae Not Detected     Mycoplasma pneumoniae Not Detected    Respiratory Infection Panel (PCR), Nasopharyngeal [[id number]] Collected: 04/27/25 1004    Order Status: Canceled Specimen: Nasopharyngeal Swab Updated: 04/27/25 1222          All pertinent labs from the last 24 hours have been reviewed.    Significant Diagnostics:  CT: No results found in the last 24 hours.  MRI: No results found in the last 24 hours.  CT Head Without Contrast  Result Date: 4/30/2025  Stable appearance of the brain.  Stable small areas of extra-axial fluid and hemorrhage.  Stable right parietal intraparenchymal hemorrhage. Electronically signed by: Kyaw Weiner Date:    04/30/2025 Time:    07:34    X-Ray Chest AP Portable  Result Date: 4/29/2025  See above Electronically signed by: Johnnie Morelos MD Date:    04/29/2025 Time:    12:29    CT Head Without Contrast  Result Date: 4/29/2025  New right parietal intraparenchymal hematoma with mild surrounding edema. Exam otherwise grossly unchanged from recent study of 04/26/2025 as further described above. This report was flagged in Epic as abnormal. Electronically signed by: Sebastien Tian MD Date:    04/29/2025 Time:    11:58

## 2025-04-30 NOTE — EICU
Virtual ICU Quality Rounds    Admit Date: 4/23/2025  Hospital Day: 7    ICU Day: 6d 11h    24H Vital Sign Range:  Temp:  [97.8 °F (36.6 °C)-99.6 °F (37.6 °C)]   Pulse:  [71-93]   Resp:  [18-35]   BP: (126-210)/(49-90)   SpO2:  [89 %-100 %]     VICU Surveillance Screening                    LDA reconciliation : Yes

## 2025-04-30 NOTE — ASSESSMENT & PLAN NOTE
84 yo with recent admission for VPS tie off and SDH evacuation on 3/24 presenting for new LSW and speech hesitation found to have enlarged chronic SDH as well as a new small R convexity acute SDH. Pt takes eliquis, s/p PCC. Interval CTH stable.     Now s/p bialteral subdural evacuations with VPS removal on 4/25    Recommendations:  --admitted to [facility]  --on eliquis, s/p kcentra reversal. Hold all AC/AP, ok for dvt ppx  --SBP <140  --HOB >30  --f/u cEEG, remains on vimpat  --if any further scans per NCC then recommend MRI  --drains removed 4/28  --Continue to monitor clinically, notify NSGY immediately with any changes in neuro status    Discussed with Dr. Blankenship

## 2025-04-30 NOTE — PLAN OF CARE
"[facility] Care Plan    POC reviewed with Ramesh Ricci and family at 0300. Patient unable verbalized understanding. Questions and concerns addressed. No acute events today. Pt progressing toward goals. Will continue to monitor. See below and flowsheets for full assessment and VS info.             Is this a stroke patient? no    Neuro:  [place] Coma Scale  Best Eye Response: 2-->(E2) to pain  Best Motor Response: 4-->(M4) withdraws from pain  Best Verbal Response: 1-->(V1) none  [place] Coma Scale Score: 7  Assessment Qualifiers: patient not sedated/intubated  Pupil PERRLA: yes     24 hr Temp:  [97.8 °F (36.6 °C)-98.8 °F (37.1 °C)]     CV:   Rhythm: normal sinus rhythm  BP goals:   SBP < 160  MAP > 65    Resp:      Oxygen Concentration (%): 44    Plan: N/A    GI/:     Diet/Nutrition Received: NPO, tube feeding  Last Bowel Movement: 04/30/25  Voiding Characteristics: external catheter    Intake/Output Summary (Last 24 hours) at 4/30/2025 0440  Last data filed at 4/30/2025 0403  Gross per 24 hour   Intake 2072.54 ml   Output 1140 ml   Net 932.54 ml     Unmeasured Output  Urine Occurrence: 1  Stool Occurrence: 1  Pad Count: 2    Labs/Accuchecks:  Recent Labs   Lab 04/30/25  0109   WBC 12.02   RBC 3.11*   HGB 9.1*   HCT 29.0*         Recent Labs   Lab 04/30/25  0109      K 4.2   CO2 21*      BUN 72*   CREATININE 1.2   ALKPHOS 56   ALT 17   AST 38   BILITOT 0.3      Recent Labs   Lab 04/24/25  0435   PROTIME 11.2   INR 1.0   APTT 28.0    No results for input(s): "CPK", "CPKMB", "TROPONINI", "MB" in the last 168 hours.    Electrolytes: N/A - electrolytes WDL  Accuchecks: none    Gtts:      LDA/Wounds:    Gabino Risk Assessment  Sensory Perception: 3-->slightly limited  Moisture: 3-->occasionally moist  Activity: 1-->bedfast  Mobility: 1-->completely immobile  Nutrition: 3-->adequate  Friction and Shear: 2-->potential problem  Gabino Score: 13  Is your gabino score 12 or less? no          Restraints: "        WCTM

## 2025-04-30 NOTE — PROGRESS NOTES
[facility]  Neurosurgery  Progress Note    Subjective:     History of Present Illness: Ramesh Ricci is an 84 y/o male with a PMH of Afib, Ulcerative Colitis, HTN, and aortic stenosis. He also has normal pressure hydrocephalus s/p  shunt (now tied off) is presenting with multiple complaints.     Wife reports concern for acute deconditioning over the past few days.  She describes increased weakness, urinary frequency, and speech changes that is been progressing for the past 3 days.  She states that he seems to slumped towards the right side and has been dragging his right leg.  Previously ambulated with his walker.  She also mentions that he seems to have speech hesitancy.  Denies any fevers, chills, abdominal pain or diarrhea    Post-Op Info:  Procedure(s) (LRB):  CRANIOTOMY, FOR SUBDURAL HEMATOMA EVACUATION, bilateral. shunt removal (Bilateral)  REMOVAL, SHUNT, VENTRICULOPERITONEAL   5 Days Post-Op   Interval History: 4/30: NAEON. Neuro stable , poor. If any further scans then recommend MRI    Medications:  Continuous Infusions:  Scheduled Meds:   acetylcysteine 100 mg/ml (10%)  4 mL Nebulization Q4H    albuterol-ipratropium  3 mL Nebulization Q4H    enoxparin  40 mg Subcutaneous Q24H (prophylaxis, 1700)    lacosamide  200 mg Per NG tube Q12H    levothyroxine  125 mcg Per NG tube Before breakfast    metoprolol tartrate  12.5 mg Per NG tube Q8H    piperacillin-tazobactam (Zosyn) IV (PEDS and ADULTS) (extended infusion is not appropriate)  4.5 g Intravenous Q8H    pravastatin  20 mg Per NG tube QHS    senna-docusate  1 tablet Per NG tube Daily    silodosin  4 mg Per NG tube Daily     PRN Meds:  Current Facility-Administered Medications:     acetaminophen, 650 mg, Per NG tube, Q6H PRN    bisacodyL, 10 mg, Rectal, Daily PRN    hydrALAZINE, 10 mg, Intravenous, Q4H PRN    labetalol, 10 mg, Intravenous, Q4H PRN    magnesium oxide, 800 mg, Per NG tube, PRN    magnesium oxide, 800 mg, Per NG tube,  PRN    oxyCODONE, 5 mg, Per NG tube, Q6H PRN    potassium bicarbonate, 35 mEq, Per NG tube, PRN    potassium bicarbonate, 50 mEq, Per NG tube, PRN    potassium bicarbonate, 60 mEq, Per NG tube, PRN    potassium, sodium phosphates, 2 packet, Per NG tube, PRN    potassium, sodium phosphates, 2 packet, Per NG tube, PRN    potassium, sodium phosphates, 2 packet, Per NG tube, PRN    sodium chloride 0.9%, 10 mL, Intravenous, PRN     Review of Systems  Objective:     Weight: 68 kg (149 lb 14.6 oz)  Body mass index is 20.92 kg/m².  Vital Signs (Most Recent):  Temp: 99.6 °F (37.6 °C) (04/30/25 0701)  Pulse: 91 (04/30/25 0803)  Resp: (!) 25 (04/30/25 0803)  BP: (!) 157/60 (04/30/25 0701)  SpO2: 96 % (04/30/25 0803) Vital Signs (24h Range):  Temp:  [97.8 °F (36.6 °C)-99.6 °F (37.6 °C)] 99.6 °F (37.6 °C)  Pulse:  [71-93] 91  Resp:  [18-35] 25  SpO2:  [89 %-100 %] 96 %  BP: (126-210)/(47-90) 157/60     Date 04/30/25 0700 - 05/01/25 0659   Shift 2648-3157 3418-6777 1362-2003 24 Hour Total   INTAKE   NG/   295   IV Piggyback 100   100   Shift Total(mL/kg) 395(5.8)   395(5.8)   OUTPUT   Shift Total(mL/kg)       Weight (kg) 68 68 68 68                            NG/OG Tube 04/26/25 0819 14 Fr. Right nostril (Active)   Placement Check other (see comments);placement verified by x-ray 04/27/25 1701   Tolerance no signs/symptoms of discomfort 04/29/25 0900   Securement secured to nostril center 04/29/25 0900   Clamp Status/Tolerance unclamped 04/29/25 0900   Suction Setting/Drainage Method suction at the bedside 04/29/25 0900   Insertion Site Appearance no redness, warmth, tenderness, skin breakdown, drainage 04/29/25 0900   Drainage None 04/27/25 1701   Flush/Irrigation flushed w/;water;no resistance met 04/29/25 0900   Feeding Type continuous 04/29/25 0900   Feeding Action feeding continued 04/29/25 0900   Current Rate (mL/hr) 45 mL/hr 04/29/25 0900   Goal Rate (mL/hr) 45 mL/hr 04/29/25 0900   Intake (mL) 60 mL 04/29/25 0809  "  Water Bolus (mL) 30 mL 04/29/25 0809   Formula Name Impact Peptide 04/27/25 1301   Intake (mL) - Formula Tube Feeding 45 04/29/25 0900       Male External Urinary Catheter 04/23/25 1858 (Active)   Collection Container Suction canister 04/29/25 0900   Securement Method secured to top of thigh w/ adhesive device 04/29/25 0900   Skin no redness;no breakdown 04/29/25 0900   Tolerance no signs/symptoms of discomfort 04/29/25 0900   Output (mL) 120 mL 04/29/25 0809   Catheter Change Date 04/29/25 04/29/25 0900   Catheter Change Time 0500 04/29/25 0900          Physical Exam         Neurosurgery Physical Exam    [id number]  Extubated, breathing spontaneously  Eyes open to stimulation, PERRL  Brainstems intact  LUE WD, RUE EXT  BLE no movement to stim  On cEEG    Significant Labs:  Recent Labs   Lab 04/29/25  0336 04/30/25  0109   * 149*    143   K 4.0 4.2    110   CO2 23 21*   BUN 67* 72*   CREATININE 1.2 1.2   CALCIUM 9.1 8.9   MG 2.5 2.4     Recent Labs   Lab 04/29/25  0336 04/30/25  0109   WBC 13.39* 12.02   HGB 10.4* 9.1*   HCT 32.8* 29.0*    323     No results for input(s): "LABPT", "INR", "APTT" in the last 48 hours.  Microbiology Results (last 7 days)       Procedure Component Value Units Date/Time    Blood culture [[id number]]  (Normal) Collected: 04/26/25 1628    Order Status: Completed Specimen: Blood from Peripheral, Antecubital, Right Updated: 04/29/25 2001     Blood Culture No Growth After 72 Hours    Blood culture [[id number]]  (Normal) Collected: 04/26/25 1628    Order Status: Completed Specimen: Blood from Peripheral, Forearm, Left Updated: 04/29/25 2001     Blood Culture No Growth After 72 Hours    Culture, Respiratory with Gram Stain [[id number]] Collected: 04/26/25 1428    Order Status: Completed Specimen: Respiratory from Sputum, Induced Updated: 04/29/25 1131     Respiratory Culture Normal respiratory audrey, no Staph aureus or Pseudomonas isolated     GRAM STAIN <10 Epithelial " Cells/LPF      Rare WBC seen      No organisms seen    MRSA Screen by PCR [[id number]]  (Normal) Collected: 04/28/25 1056    Order Status: Completed Specimen: Nasal Swab Updated: 04/28/25 1704     MRSA PCR Screen Not Detected    Respiratory Infection Panel (PCR), Nasopharyngeal [[id number]] Collected: 04/27/25 1759    Order Status: Completed Specimen: Nasopharyngeal Swab Updated: 04/27/25 2049     Respiratory Infection Panel Source Nasopharyngeal Swab     Adenovirus Not Detected     Coronavirus 229E, Common Cold Virus Not Detected     Coronavirus HKU1, Common Cold Virus Not Detected     Coronavirus NL63, Common Cold Virus Not Detected     Coronavirus OC43, Common Cold Virus Not Detected     SARS-CoV2 (COVID-19) Qualitative PCR Not Detected     Human Metapneumovirus Not Detected     Human Rhinovirus/Enterovirus Not Detected     Influenza A Not Detected     Influenza B Not Detected     Parainfluenza Virus 1 Not Detected     Parainfluenza Virus 2 Not Detected     Parainfluenza Virus 3 Not Detected     Parainfluenza Virus 4 Not Detected     Respiratory Syncytial Virus Not Detected     Bordetella Parapertussis (QQ7669) Not Detected     Bordetella pertussis (ptxP) Not Detected     Chlamydia pneumoniae Not Detected     Mycoplasma pneumoniae Not Detected    Respiratory Infection Panel (PCR), Nasopharyngeal [[id number]] Collected: 04/27/25 1004    Order Status: Canceled Specimen: Nasopharyngeal Swab Updated: 04/27/25 1222          All pertinent labs from the last 24 hours have been reviewed.    Significant Diagnostics:  CT: No results found in the last 24 hours.  MRI: No results found in the last 24 hours.  CT Head Without Contrast  Result Date: 4/30/2025  Stable appearance of the brain.  Stable small areas of extra-axial fluid and hemorrhage.  Stable right parietal intraparenchymal hemorrhage. Electronically signed by: Kyaw Weiner Date:    04/30/2025 Time:    07:34    X-Ray Chest AP Portable  Result Date: 4/29/2025  See  above Electronically signed by: Johnnie Morelos MD Date:    04/29/2025 Time:    12:29    CT Head Without Contrast  Result Date: 4/29/2025  New right parietal intraparenchymal hematoma with mild surrounding edema. Exam otherwise grossly unchanged from recent study of 04/26/2025 as further described above. This report was flagged in Epic as abnormal. Electronically signed by: Sebastien Tian MD Date:    04/29/2025 Time:    11:58      Assessment/Plan:     * Subdural hemorrhage  86 yo with recent admission for VPS tie off and SDH evacuation on 3/24 presenting for new LSW and speech hesitation found to have enlarged chronic SDH as well as a new small R convexity acute SDH. Pt takes eliquis, s/p PCC. Interval CTH stable.     Now s/p bialteral subdural evacuations with VPS removal on 4/25    Recommendations:  --admitted to NCC  --on eliquis, s/p kcentra reversal. Hold all AC/AP, ok for dvt ppx  --SBP <140  --HOB >30  --f/u cEEG, remains on vimpat  --if any further scans per N[facility] then recommend MRI  --drains removed 4/28  --Continue to monitor clinically, notify NSGY immediately with any changes in neuro status    Discussed with Dr. Krzysztof Daley MD  Neurosurgery  [facility] - Neuro [facility]

## 2025-04-30 NOTE — PROGRESS NOTES
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 with acute on subacute bilateral SDH. His wife at bedside reports concern for acute deconditioning over the past three days involving worsening weakness, urinary frequency, speech changes, intermittent cessation in interaction with family, and dragging of his R leg. CT head showed bilateral subdural hygromas with bilateral acute versus subacute SDH. He takes eliquis for A fib at home which was reversed with PCC in the ED. During evaluation of the patient, he was initially interactive, following, commands, and oriented to person and place. He then had a brief staring spell where he no longer followed commands and displayed no usable speech. This then resolved after a few minutes and he returned to baseline. He takes Vimpat 100 bid at home but has not gotten his nightly dose. Per his wife, these spells occurred at home as well.    He is admitted to [facility] for hourly neuro-monitoring and a higher level of care.     Hospital Course: 04/25/2025: OR today for SDH evacuation   04/26/2025: NAEON. POD1 s/p SDH evac. Post-op CTH good decompression and expected post-op changes. Post-op episode of seizure-like activity, described as "LUE & LLE shaking" lasting approximately 1 minute that resolved without intervention. Patient unarousable post-operatively and this AM. NGT placed. Repeat CTH obtained, stable. Procal, EEG ordered and pending.   04/27/2025:  POD2 s/p SDH evacuation. Procal elevated and patient febrile with low grade temp. Pan-cultured and started on broad spec antibiotics. " Aggressive pulm toileting started yesterday. Overnight with Tmax 100.9°F. Cx with NGTD. Respiratory panel sent. US extremities ordered and pending to rule out DVT. Start lovenox pending neurosurgery recs.   04/28/2025: Continue aggressive pulmonary toileting. EEG with periodic complexes with some epileptiform morphology, optimized lacosamide to 200mg BID.   04/29/2025 EEG negative for seizures, small improvement after increasing vimpat yesterday. Discontinued EEG. CTH with new small R parietal IPH with mild surrounding edema. BUN 67, started enteral water flushes 250 q4h. MRSA swab negative, discontinued vanc. CXR with slight improvement from yesterday. Continue aggressive pulm toilet.   04/30/2025 CTH overnight with stable R IPH. POD5 and pt still not waking up. Relax to q2h neuro checks. BUN 72, increased enteral water to 300 q4h and given additional 300 ml x 2 today.     Review of Systems   Unable to perform ROS: Other (level of consciousness)     Objective:     Vitals:  Temp: 98.5 °F (36.9 °C)  Pulse: 87  Rhythm: normal sinus rhythm  BP: (!) 140/55  MAP (mmHg): 79  Resp: (!) 22  SpO2: 100 %    Temp  Min: 98 °F (36.7 °C)  Max: 99.6 °F (37.6 °C)  Pulse  Min: 71  Max: 93  BP  Min: 126/54  Max: 210/79  MAP (mmHg)  Min: 74  Max: 113  Resp  Min: 18  Max: 35  SpO2  Min: 89 %  Max: 100 %    04/29 0701 - 04/30 0700  In: 1876.8   Out: 990 [Urine:990]   Unmeasured Output  Urine Occurrence: 1  Stool Occurrence: 1  Pad Count: 1        Physical Exam  Vitals and nursing note reviewed.   Constitutional:       General: He is not in acute distress.     Appearance: He is ill-appearing.   HENT:      Head:      Comments: Surgical site c/d/i     Right Ear: External ear normal.      Left Ear: External ear normal.      Nose: Nose normal.      Comments: NGT in place.     Mouth/Throat:      Mouth: Mucous membranes are dry.   Eyes:      Pupils: Pupils are equal, round, and reactive to light.   Cardiovascular:      Rate and Rhythm: Normal  rate and regular rhythm.   Pulmonary:      Effort: Pulmonary effort is normal. No respiratory distress.      Comments: Upper airway congestion   Bl coarse BS   Abdominal:      General: There is no distension.      Palpations: Abdomen is soft.      Tenderness: There is no abdominal tenderness. There is no guarding.   Musculoskeletal:      Right lower leg: No edema.      Left lower leg: No edema.   Skin:     General: Skin is warm and dry.   Neurological:      Comments: E2 V1 M3  Mental status: Lethargic, barely opens eyes to very noxious stimuli. Nonverbal. Not following commands.   CN II-XII grossly intact, specifically:  PERRL  EOMI  blinks to threat bilaterally   No facial asymmetry.  Weak spontaneous cough   Motor:  RUE flexion to noxious   RLE triple flexion to tickling foot  LUE minimal flexion to noxious  LLE triple flexion to tickling foot  Sensation intact to noxious x4       Gait & coordination exams deferred.        Medications:  Continuous Scheduledacetylcysteine 100 mg/ml (10%), 4 mL, Q4H  albuterol-ipratropium, 3 mL, Q4H  enoxparin, 40 mg, Q24H (prophylaxis, 1700)  lacosamide, 200 mg, Q12H  levothyroxine, 125 mcg, Before breakfast  metoprolol tartrate, 12.5 mg, Q8H  piperacillin-tazobactam (Zosyn) IV (PEDS and ADULTS) (extended infusion is not appropriate), 4.5 g, Q8H  pravastatin, 20 mg, QHS  silodosin, 4 mg, Daily    PRNacetaminophen, 650 mg, Q6H PRN  bisacodyL, 10 mg, Daily PRN  hydrALAZINE, 10 mg, Q4H PRN  labetalol, 10 mg, Q4H PRN  magnesium oxide, 800 mg, PRN  magnesium oxide, 800 mg, PRN  oxyCODONE, 5 mg, Q6H PRN  potassium bicarbonate, 35 mEq, PRN  potassium bicarbonate, 50 mEq, PRN  potassium bicarbonate, 60 mEq, PRN  potassium, sodium phosphates, 2 packet, PRN  potassium, sodium phosphates, 2 packet, PRN  potassium, sodium phosphates, 2 packet, PRN  sodium chloride 0.9%, 10 mL, PRN      Today I personally reviewed pertinent medications, lines/drains/airways, imaging, laboratory results,  "microbiology results, notably:    CTH with new small R parietal IPH with mild surrounding edema.     CXR with slight improvement from yesterday.      Laboratory:  CBC:  Recent Labs   Lab 04/30/25  0109   WBC 12.02   RBC 3.11*   HGB 9.1*   HCT 29.0*      MCV 93   MCH 29.3   MCHC 31.4*       CMP:  Recent Labs   Lab 04/30/25  0109   CALCIUM 8.9   ALBUMIN 2.0*   PROT 6.4      K 4.2   CO2 21*      BUN 72*   CREATININE 1.2   ALKPHOS 56   ALT 17   AST 38   BILITOT 0.3     Recent Labs   Lab 04/30/25  0109   MG 2.4   PHOS 3.5       Coagulation:  Recent Labs   Lab 04/24/25  0435   INR 1.0   APTT 28.0       Endocrine:  No results for input(s): "HGBA1C", "TSH" in the last 72 hours.    Microbiology  Microbiology Results (last 7 days)       Procedure Component Value Units Date/Time    Blood culture [[id number]]  (Normal) Collected: 04/26/25 1628    Order Status: Completed Specimen: Blood from Peripheral, Antecubital, Right Updated: 04/29/25 2001     Blood Culture No Growth After 72 Hours    Blood culture [[id number]]  (Normal) Collected: 04/26/25 1628    Order Status: Completed Specimen: Blood from Peripheral, Forearm, Left Updated: 04/29/25 2001     Blood Culture No Growth After 72 Hours    Culture, Respiratory with Gram Stain [[id number]] Collected: 04/26/25 1428    Order Status: Completed Specimen: Respiratory from Sputum, Induced Updated: 04/29/25 1131     Respiratory Culture Normal respiratory audrey, no Staph aureus or Pseudomonas isolated     GRAM STAIN <10 Epithelial Cells/LPF      Rare WBC seen      No organisms seen    MRSA Screen by PCR [[id number]]  (Normal) Collected: 04/28/25 1056    Order Status: Completed Specimen: Nasal Swab Updated: 04/28/25 1704     MRSA PCR Screen Not Detected    Respiratory Infection Panel (PCR), Nasopharyngeal [[id number]] Collected: 04/27/25 1759    Order Status: Completed Specimen: Nasopharyngeal Swab Updated: 04/27/25 2049     Respiratory Infection Panel Source " Nasopharyngeal Swab     Adenovirus Not Detected     Coronavirus 229E, Common Cold Virus Not Detected     Coronavirus HKU1, Common Cold Virus Not Detected     Coronavirus NL63, Common Cold Virus Not Detected     Coronavirus OC43, Common Cold Virus Not Detected     SARS-CoV2 (COVID-19) Qualitative PCR Not Detected     Human Metapneumovirus Not Detected     Human Rhinovirus/Enterovirus Not Detected     Influenza A Not Detected     Influenza B Not Detected     Parainfluenza Virus 1 Not Detected     Parainfluenza Virus 2 Not Detected     Parainfluenza Virus 3 Not Detected     Parainfluenza Virus 4 Not Detected     Respiratory Syncytial Virus Not Detected     Bordetella Parapertussis ([id number]) Not Detected     Bordetella pertussis (ptxP) Not Detected     Chlamydia pneumoniae Not Detected     Mycoplasma pneumoniae Not Detected    Respiratory Infection Panel (PCR), Nasopharyngeal [[id number]] Collected: 04/27/25 1004    Order Status: Canceled Specimen: Nasopharyngeal Swab Updated: 04/27/25 1222               Diet  Diet NPO  Diet NPO        Assessment/Plan:     Neuro  * Subdural hemorrhage  84 y/o M with 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 CT head with bilatateral acute versus subacute SDH and known VPS. His eliquis was reversed with PCC in the ED. Now post-op SDH evac 4/25    -Admitted to [facility], NSGY following  -q1h neuro checks, vital checks  -Post-op CTH (4/25) with good decompression and expected postop changes  - CTH 4/29 with new R parietal IPH w mild edema  -Daily CBC, CMP, mag, phos  -Goal eunatremia, euvolemia  -SBP < 160   -Lacosamide dose increased to 200 mg BID on 4/28 in setting of generalized cortical irritability on EEG with NCSE conversion risk   -Seizure precautions  -SCDs, enoxaparin for VTE ppx  - EEG negative for seizure- discontinued EEG 4/29  - may consider stimulants if mental  status not improving tomorrow     Subdural hematoma  See primary problem    Seizure disorder  - vimpat 200 bid   - keppra 500mg bid   - EEG- moderate encephalopathy, triphasics, generalized cortical irritability, no discrete seizures . Discontinued EEG.      Acute encephalopathy  See primary problem  Acute change in mentation/staring spell in the ED with quick return to baseline  Increase home vimpat dose to 150 bid, EEG cap pending formal hookup when able  Afebrile, no leukocytosis  UA unremarkable  Glucose WNL  BUN elevated 72, increased enteral water to 300 q4h and given additional 300 ml x 2 today.     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   Tie off on 3/25 2/2 bilateral hygromas that were evacuated  Removed in OR on 4/25     Cardiac/Vascular  Coronary artery disease involving native coronary artery of native heart without angina pectoris  Resume home statin  Hold home eliquis    PAF (paroxysmal atrial fibrillation)  C/w [id number] q8h  Hold home eliquis, reversed on admission  Currently rate controlled    Left ventricular diastolic dysfunction, NYHA class 1  Echo reviewed    Essential hypertension  SBP < 160  c/w metoprolol 12.5 q8h  PRN labetalol, hydralazine    Renal/  Urinary retention  C/w home silodosin 4    Endocrine  Acquired hypothyroidism  C/w home synthroid 125mcg     GI  UC (ulcerative colitis)  Hold home colazal in acute setting          The patient is being Prophylaxed for:  Venous Thromboembolism with: Mechanical or Chemical  Stress Ulcer with: Not Applicable   Ventilator Pneumonia with: not applicable    Activity Orders            Elevate HOB Elevate HOB 30-45 degrees during feeding unless contraindicated starting at 04/27 0844    Diet NPO: NPO starting at 04/25 0001    Turn patient starting at 04/23 2000    Elevate HOB starting at 04/23 1948          Partial Code    Critical care time spent on the evaluation and treatment of severe organ dysfunction, review of pertinent labs and imaging  studies, discussions with consulting providers and discussions with patient/family: 35 minutes.    Flor Max, PA-C  Neurocritical Care  [facility] - Neuro [facility]

## 2025-04-30 NOTE — SUBJECTIVE & OBJECTIVE
Review of Systems   Unable to perform ROS: Other (level of consciousness)     Objective:     Vitals:  Temp: 98.5 °F (36.9 °C)  Pulse: 87  Rhythm: normal sinus rhythm  BP: (!) 140/55  MAP (mmHg): 79  Resp: (!) 22  SpO2: 100 %    Temp  Min: 98 °F (36.7 °C)  Max: 99.6 °F (37.6 °C)  Pulse  Min: 71  Max: 93  BP  Min: 126/54  Max: 210/79  MAP (mmHg)  Min: 74  Max: 113  Resp  Min: 18  Max: 35  SpO2  Min: 89 %  Max: 100 %    04/29 0701 - 04/30 0700  In: 1876.8   Out: 990 [Urine:990]   Unmeasured Output  Urine Occurrence: 1  Stool Occurrence: 1  Pad Count: 1        Physical Exam  Vitals and nursing note reviewed.   Constitutional:       General: He is not in acute distress.     Appearance: He is ill-appearing.   HENT:      Head:      Comments: Surgical site c/d/i     Right Ear: External ear normal.      Left Ear: External ear normal.      Nose: Nose normal.      Comments: NGT in place.     Mouth/Throat:      Mouth: Mucous membranes are dry.   Eyes:      Pupils: Pupils are equal, round, and reactive to light.   Cardiovascular:      Rate and Rhythm: Normal rate and regular rhythm.   Pulmonary:      Effort: Pulmonary effort is normal. No respiratory distress.      Comments: Upper airway congestion   Bl coarse BS   Abdominal:      General: There is no distension.      Palpations: Abdomen is soft.      Tenderness: There is no abdominal tenderness. There is no guarding.   Musculoskeletal:      Right lower leg: No edema.      Left lower leg: No edema.   Skin:     General: Skin is warm and dry.   Neurological:      Comments: E2 V1 M3  Mental status: Lethargic, barely opens eyes to very noxious stimuli. Nonverbal. Not following commands.   CN II-XII grossly intact, specifically:  PERRL  EOMI  blinks to threat bilaterally   No facial asymmetry.  Weak spontaneous cough   Motor:  RUE flexion to noxious   RLE triple flexion to tickling foot  LUE minimal flexion to noxious  LLE triple flexion to tickling foot  Sensation intact to noxious  "x4       Gait & coordination exams deferred.        Medications:  Continuous Scheduledacetylcysteine 100 mg/ml (10%), 4 mL, Q4H  albuterol-ipratropium, 3 mL, Q4H  enoxparin, 40 mg, Q24H (prophylaxis, 1700)  lacosamide, 200 mg, Q12H  levothyroxine, 125 mcg, Before breakfast  metoprolol tartrate, 12.5 mg, Q8H  piperacillin-tazobactam (Zosyn) IV (PEDS and ADULTS) (extended infusion is not appropriate), 4.5 g, Q8H  pravastatin, 20 mg, QHS  silodosin, 4 mg, Daily    PRNacetaminophen, 650 mg, Q6H PRN  bisacodyL, 10 mg, Daily PRN  hydrALAZINE, 10 mg, Q4H PRN  labetalol, 10 mg, Q4H PRN  magnesium oxide, 800 mg, PRN  magnesium oxide, 800 mg, PRN  oxyCODONE, 5 mg, Q6H PRN  potassium bicarbonate, 35 mEq, PRN  potassium bicarbonate, 50 mEq, PRN  potassium bicarbonate, 60 mEq, PRN  potassium, sodium phosphates, 2 packet, PRN  potassium, sodium phosphates, 2 packet, PRN  potassium, sodium phosphates, 2 packet, PRN  sodium chloride 0.9%, 10 mL, PRN      Today I personally reviewed pertinent medications, lines/drains/airways, imaging, laboratory results, microbiology results, notably:    CTH with new small R parietal IPH with mild surrounding edema.     CXR with slight improvement from yesterday.      Laboratory:  CBC:  Recent Labs   Lab 04/30/25  0109   WBC 12.02   RBC 3.11*   HGB 9.1*   HCT 29.0*      MCV 93   MCH 29.3   MCHC 31.4*       CMP:  Recent Labs   Lab 04/30/25  0109   CALCIUM 8.9   ALBUMIN 2.0*   PROT 6.4      K 4.2   CO2 21*      BUN 72*   CREATININE 1.2   ALKPHOS 56   ALT 17   AST 38   BILITOT 0.3     Recent Labs   Lab 04/30/25  0109   MG 2.4   PHOS 3.5       Coagulation:  Recent Labs   Lab 04/24/25  0435   INR 1.0   APTT 28.0       Endocrine:  No results for input(s): "HGBA1C", "TSH" in the last 72 hours.    Microbiology  Microbiology Results (last 7 days)       Procedure Component Value Units Date/Time    Blood culture [[id number]]  (Normal) Collected: 04/26/25 1628    Order Status: Completed " Specimen: Blood from Peripheral, Antecubital, Right Updated: 04/29/25 2001     Blood Culture No Growth After 72 Hours    Blood culture [[id number]]  (Normal) Collected: 04/26/25 1628    Order Status: Completed Specimen: Blood from Peripheral, Forearm, Left Updated: 04/29/25 2001     Blood Culture No Growth After 72 Hours    Culture, Respiratory with Gram Stain [[id number]] Collected: 04/26/25 1428    Order Status: Completed Specimen: Respiratory from Sputum, Induced Updated: 04/29/25 1131     Respiratory Culture Normal respiratory audrey, no Staph aureus or Pseudomonas isolated     GRAM STAIN <10 Epithelial Cells/LPF      Rare WBC seen      No organisms seen    MRSA Screen by PCR [[id number]]  (Normal) Collected: 04/28/25 1056    Order Status: Completed Specimen: Nasal Swab Updated: 04/28/25 1704     MRSA PCR Screen Not Detected    Respiratory Infection Panel (PCR), Nasopharyngeal [[id number]] Collected: 04/27/25 1759    Order Status: Completed Specimen: Nasopharyngeal Swab Updated: 04/27/25 2049     Respiratory Infection Panel Source Nasopharyngeal Swab     Adenovirus Not Detected     Coronavirus 229E, Common Cold Virus Not Detected     Coronavirus HKU1, Common Cold Virus Not Detected     Coronavirus NL63, Common Cold Virus Not Detected     Coronavirus OC43, Common Cold Virus Not Detected     SARS-CoV2 (COVID-19) Qualitative PCR Not Detected     Human Metapneumovirus Not Detected     Human Rhinovirus/Enterovirus Not Detected     Influenza A Not Detected     Influenza B Not Detected     Parainfluenza Virus 1 Not Detected     Parainfluenza Virus 2 Not Detected     Parainfluenza Virus 3 Not Detected     Parainfluenza Virus 4 Not Detected     Respiratory Syncytial Virus Not Detected     Bordetella Parapertussis (VI3339) Not Detected     Bordetella pertussis (ptxP) Not Detected     Chlamydia pneumoniae Not Detected     Mycoplasma pneumoniae Not Detected    Respiratory Infection Panel (PCR), Nasopharyngeal [[id number]]  Collected: 04/27/25 1004    Order Status: Canceled Specimen: Nasopharyngeal Swab Updated: 04/27/25 1222               Diet  Diet NPO  Diet NPO

## 2025-05-01 NOTE — PLAN OF CARE
[facility] - Neuro [facility]  Discharge Reassessment    Primary Care Provider: Nakul Mandujano MD    Expected Discharge Date: 5/7/2025    Reassessment (most recent)       Discharge Reassessment - 05/01/25 1635          Discharge Reassessment    Assessment Type Discharge Planning Reassessment (P)      Did the patient's condition or plan change since previous assessment? No (P)      Discharge Plan discussed with: Spouse/sig other (P)      Name(s) and Number(s) Mone Ricci  (P)      Communicated LIDIA with patient/caregiver Yes (P)      Discharge Plan A Other (P)    tbd    DME Needed Upon Discharge  none (P)      Transition of Care Barriers None (P)      Why the patient remains in the hospital Requires continued medical care (P)         Post-Acute Status    Discharge Delays None known at this time (P)                    Discharge Plan A and Plan B have been determined by review of patient's clinical status, future medical and therapeutic needs, and coverage/benefits for post-acute care in coordination with multidisciplinary team members.    Stacia Hilton MSW, LCSW  [facility]  Case Management Dept.

## 2025-05-01 NOTE — PHYSICIAN QUERY
Please clarify if there is any clinical correlation between Subdural hematoma  and  eliquis. Are the conditions:   Due to or associated with each other

## 2025-05-01 NOTE — PHYSICIAN QUERY
Question: Please specify diagnosis or diagnoses associated with the following clinical findings in the query.    Provider Query Response:  Traumatic Cerebral Edema

## 2025-05-01 NOTE — ASSESSMENT & PLAN NOTE
86 yo with recent admission for VPS tie off and SDH evacuation on 3/24 presenting for new LSW and speech hesitation found to have enlarged chronic SDH as well as a new small R convexity acute SDH. Pt takes eliquis, s/p PCC. Interval CTH stable.     Now s/p bialteral subdural evacuations with VPS removal on 4/25    Recommendations:  --admitted to [facility]  --on eliquis, s/p kcentra reversal. Hold all AC/AP, ok for dvt ppx  --SBP <140  --HOB >30  --vimpat/zosyn per ICU  --if any further scans per NCC then recommend MRI  --drains removed 4/28  --Continue to monitor clinically, notify NSGY immediately with any changes in neuro status    Discussed with Dr. Blankenship

## 2025-05-01 NOTE — PLAN OF CARE
"[facility] Care Plan    POC reviewed with Ramesh Ricci and family at 0300. Patient needed reinforcement.  Questions and concerns addressed. Pt progressing toward goals. Will continue to monitor. See below and flowsheets for full assessment and VS info.     - Neuro status unchanged, SBP goal maintained.  -  TF at 45 mL/ Hr, FWF : 300mL q 4 Hr  - Linens changed      Is this a stroke patient? no    Neuro:  [place] Coma Scale  Best Eye Response: 2-->(E2) to pain  Best Motor Response: 4-->(M4) withdraws from pain  Best Verbal Response: 1-->(V1) none  [place] Coma Scale Score: 7  Assessment Qualifiers: patient not sedated/intubated  Pupil PERRLA: no     24 hr Temp:  [98.3 °F (36.8 °C)-99.6 °F (37.6 °C)]     CV:   Rhythm: normal sinus rhythm  BP goals:   SBP < 160  MAP > 65    Resp:  O2: 5L  via High flow nasal cannula with humidified      Oxygen Concentration (%): 44    GI/:     Diet/Nutrition Received: tube feeding  Last Bowel Movement: 04/30/25  Voiding Characteristics: external catheter    Intake/Output Summary (Last 24 hours) at 5/1/2025 0418  Last data filed at 5/1/2025 0305  Gross per 24 hour   Intake 2852.67 ml   Output 700 ml   Net 2152.67 ml     Unmeasured Output  Urine Occurrence: 1  Stool Occurrence: 1  Emesis Occurrence: 0  Pad Count: 2    Labs/Accuchecks:  Recent Labs   Lab 05/01/25  0136   WBC 12.46   RBC 3.26*   HGB 9.6*   HCT 30.2*         Recent Labs   Lab 05/01/25  0136      K 3.9   CO2 23      BUN 66*   CREATININE 1.1   ALKPHOS 64   ALT 24   AST 41   BILITOT 0.3      Recent Labs   Lab 04/24/25  0435   PROTIME 11.2   INR 1.0   APTT 28.0    No results for input(s): "CPK", "CPKMB", "TROPONINI", "MB" in the last 168 hours.    Electrolytes: N/A - electrolytes WDL  Accuchecks: none    Gtts:      LDA/Wounds:    Gabino Risk Assessment  Sensory Perception: 3-->slightly limited  Moisture: 3-->occasionally moist  Activity: 1-->bedfast  Mobility: 2-->very limited  Nutrition: 2-->probably " inadequate  Friction and Shear: 2-->potential problem  Gabino Score: 13  Is your gabino score 12 or less? no                 WCTM

## 2025-05-01 NOTE — ASSESSMENT & PLAN NOTE
See primary problem  Acute change in mentation/staring spell in the ED with quick return to baseline  Increase home vimpat dose to 150 bid, EEG cap pending formal hookup when able  Afebrile, no leukocytosis  UA unremarkable  Glucose WNL  BUN elevated 72, increased enteral water to 300 q4h and given additional 300 ml x 2 on 4/30  Enteral water flushes 300 q4h

## 2025-05-01 NOTE — SUBJECTIVE & OBJECTIVE
Review of Systems   Unable to perform ROS: Other (level of consciousness)     Objective:     Vitals:  Temp: 97.7 °F (36.5 °C)  Pulse: 84  Rhythm: normal sinus rhythm  BP: (!) 161/69  MAP (mmHg): 99  Resp: (!) 33  SpO2: 100 %  Oxygen Concentration (%): 40    Temp  Min: 97.7 °F (36.5 °C)  Max: 99.2 °F (37.3 °C)  Pulse  Min: 68  Max: 98  BP  Min: 120/84  Max: 180/77  MAP (mmHg)  Min: 77  Max: 116  Resp  Min: 19  Max: 33  SpO2  Min: 97 %  Max: 100 %  Oxygen Concentration (%)  Min: 40  Max: 40    04/30 0701 - 05/01 0700  In: 3390.1 [I.V.:10]  Out: 500 [Urine:500]   Unmeasured Output  Urine Occurrence: 1  Stool Occurrence: 1  Emesis Occurrence: 0  Pad Count: 1        Physical Exam  Vitals and nursing note reviewed.   Constitutional:       General: He is not in acute distress.     Appearance: He is ill-appearing.   HENT:      Head:      Comments: Surgical site c/d/i     Right Ear: External ear normal.      Left Ear: External ear normal.      Nose: Nose normal.      Comments: NGT in place.     Mouth/Throat:      Mouth: Mucous membranes are dry.   Eyes:      Pupils: Pupils are equal, round, and reactive to light.   Cardiovascular:      Rate and Rhythm: Normal rate and regular rhythm.   Pulmonary:      Effort: Pulmonary effort is normal. No respiratory distress.      Comments: Upper airway congestion   Bl coarse BS   Abdominal:      General: There is no distension.      Palpations: Abdomen is soft.      Tenderness: There is no abdominal tenderness. There is no guarding.   Musculoskeletal:      Right lower leg: No edema.      Left lower leg: No edema.   Skin:     General: Skin is warm and dry.   Neurological:      Comments: E2 V1 M3  Mental status: Lethargic, barely opens eyes to very noxious stimuli. Nonverbal. Not following commands.   CN II-XII grossly intact, specifically:  PERRL  EOMI  blinks to threat bilaterally   No facial asymmetry.  Weak spontaneous cough   Motor:  RUE flexion to noxious   RLE triple flexion to  "tickling foot  LUE minimal flexion to noxious  LLE triple flexion to tickling foot  Sensation intact to noxious x4       Gait & coordination exams deferred.        Medications:  Continuous Scheduledacetylcysteine 100 mg/ml (10%), 4 mL, Q6H  albuterol-ipratropium, 3 mL, Q6H  enoxparin, 40 mg, Q24H (prophylaxis, 1700)  lacosamide, 200 mg, Q12H  levothyroxine, 125 mcg, Before breakfast  metoprolol tartrate, 12.5 mg, Q8H  modafiniL, 100 mg, Daily  piperacillin-tazobactam (Zosyn) IV (PEDS and ADULTS) (extended infusion is not appropriate), 4.5 g, Q8H  pravastatin, 20 mg, QHS  silodosin, 4 mg, Daily    PRNacetaminophen, 650 mg, Q6H PRN  bisacodyL, 10 mg, Daily PRN  hydrALAZINE, 10 mg, Q4H PRN  labetalol, 10 mg, Q4H PRN  magnesium oxide, 800 mg, PRN  magnesium oxide, 800 mg, PRN  potassium bicarbonate, 35 mEq, PRN  potassium bicarbonate, 50 mEq, PRN  potassium bicarbonate, 60 mEq, PRN  potassium, sodium phosphates, 2 packet, PRN  potassium, sodium phosphates, 2 packet, PRN  potassium, sodium phosphates, 2 packet, PRN  sodium chloride 0.9%, 10 mL, PRN      Today I personally reviewed pertinent medications, lines/drains/airways, imaging, laboratory results, microbiology results, notably:    CTH with new small R parietal IPH with mild surrounding edema.     CXR with slight improvement from yesterday.      Laboratory:  CBC:  Recent Labs   Lab 05/01/25 0136   WBC 12.46   RBC 3.26*   HGB 9.6*   HCT 30.2*      MCV 93   MCH 29.4   MCHC 31.8*       CMP:  Recent Labs   Lab 05/01/25 0136   CALCIUM 9.2   ALBUMIN 2.0*   PROT 6.6      K 3.9   CO2 23      BUN 66*   CREATININE 1.1   ALKPHOS 64   ALT 24   AST 41   BILITOT 0.3     Recent Labs   Lab 05/01/25 0136   MG 2.3   PHOS 3.6       Coagulation:  No results for input(s): "LABPROT", "INR", "APTT", "FIBRINOGEN" in the last 168 hours.      Endocrine:  No results for input(s): "HGBA1C", "TSH" in the last 72 hours.    Microbiology  Microbiology Results (last 7 days)  "      Procedure Component Value Units Date/Time    Blood culture [[id number]]  (Normal) Collected: 04/26/25 1628    Order Status: Completed Specimen: Blood from Peripheral, Antecubital, Right Updated: 04/30/25 2001     Blood Culture No Growth After 96 hours    Blood culture [[id number]]  (Normal) Collected: 04/26/25 1628    Order Status: Completed Specimen: Blood from Peripheral, Forearm, Left Updated: 04/30/25 2001     Blood Culture No Growth After 96 hours    Culture, Respiratory with Gram Stain [[id number]] Collected: 04/26/25 1428    Order Status: Completed Specimen: Respiratory from Sputum, Induced Updated: 04/29/25 1131     Respiratory Culture Normal respiratory audrey, no Staph aureus or Pseudomonas isolated     GRAM STAIN <10 Epithelial Cells/LPF      Rare WBC seen      No organisms seen    MRSA Screen by PCR [[id number]]  (Normal) Collected: 04/28/25 1056    Order Status: Completed Specimen: Nasal Swab Updated: 04/28/25 1704     MRSA PCR Screen Not Detected    Respiratory Infection Panel (PCR), Nasopharyngeal [[id number]] Collected: 04/27/25 1759    Order Status: Completed Specimen: Nasopharyngeal Swab Updated: 04/27/25 2049     Respiratory Infection Panel Source Nasopharyngeal Swab     Adenovirus Not Detected     Coronavirus 229E, Common Cold Virus Not Detected     Coronavirus HKU1, Common Cold Virus Not Detected     Coronavirus NL63, Common Cold Virus Not Detected     Coronavirus OC43, Common Cold Virus Not Detected     SARS-CoV2 (COVID-19) Qualitative PCR Not Detected     Human Metapneumovirus Not Detected     Human Rhinovirus/Enterovirus Not Detected     Influenza A Not Detected     Influenza B Not Detected     Parainfluenza Virus 1 Not Detected     Parainfluenza Virus 2 Not Detected     Parainfluenza Virus 3 Not Detected     Parainfluenza Virus 4 Not Detected     Respiratory Syncytial Virus Not Detected     Bordetella Parapertussis ([id number]) Not Detected     Bordetella pertussis (ptxP) Not Detected      Chlamydia pneumoniae Not Detected     Mycoplasma pneumoniae Not Detected    Respiratory Infection Panel (PCR), Nasopharyngeal [[id number]] Collected: 04/27/25 1004    Order Status: Canceled Specimen: Nasopharyngeal Swab Updated: 04/27/25 1222               Diet  Diet NPO  Diet NPO         No

## 2025-05-01 NOTE — ASSESSMENT & PLAN NOTE
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 CT head with bilatateral acute versus subacute SDH and known VPS. His eliquis was reversed with PCC in the ED. Now post-op SDH evac 4/25    -Admitted to [facility], NSGY following  -q1h neuro checks, vital checks  -Post-op CTH (4/25) with good decompression and expected postop changes  - CTH 4/29 with new R parietal IPH w mild edema  -Daily CBC, CMP, mag, phos  -Goal eunatremia, euvolemia  -SBP < 160   -Lacosamide dose increased to 200 mg BID on 4/28 in setting of generalized cortical irritability on EEG with NCSE conversion risk   -Seizure precautions  -SCDs, enoxaparin for VTE ppx  - EEG negative for seizure- discontinued EEG 4/29  - Starting modafinil 100mg qd  - Continue pulm toilet spaced to q6h and added IPV q6h.

## 2025-05-01 NOTE — EICU
Virtual ICU Quality Rounds    Admit Date: 4/23/2025  Hospital Day: 8    ICU Day: 7d 11h    24H Vital Sign Range:  Temp:  [98.1 °F (36.7 °C)-99.2 °F (37.3 °C)]   Pulse:  [70-98]   Resp:  [19-32]   BP: (120-173)/(51-97)   SpO2:  [94 %-100 %]     VICU Surveillance Screening                    LDA reconciliation : Yes

## 2025-05-01 NOTE — PLAN OF CARE
"[facility] Care Plan  POC reviewed with Ramesh Ricci and family at 1400. Family verbalized understanding. Questions and concerns addressed. No acute events today. Pt progressing toward goals. Will continue to monitor. See below and flowsheets for full assessment and VS info.     - Bath completed, linens changed  - FWF 300mL q4   - Tube feeds continued at 45mL/h  - modafinil started  - Pt remains on 5L HFNC at 40% O2    Is this a stroke patient? yes- Stroke booklet reviewed with family, risk factors identified for patient and stroke booklet remains at bedside for ongoing education.     Care individualization: Lights on during the day, preferred channel on tv    Neuro:  [place] Coma Scale  Best Eye Response: 2-->(E2) to pain  Best Motor Response: 4-->(M4) withdraws from pain  Best Verbal Response: 1-->(V1) none  [place] Coma Scale Score: 7  Assessment Qualifiers: patient not sedated/intubated  Pupil PERRLA: yes     24 hr Temp:  [97.7 °F (36.5 °C)-99.2 °F (37.3 °C)]     CV:   Rhythm: normal sinus rhythm  BP goals:   SBP < 160  MAP > 65    Resp:      Oxygen Concentration (%): 40    Plan:  5L NC (high flow)     GI/:     Diet/Nutrition Received: tube feeding  Last Bowel Movement: 05/01/25  Voiding Characteristics: external catheter    Intake/Output Summary (Last 24 hours) at 5/1/2025 1419  Last data filed at 5/1/2025 1201  Gross per 24 hour   Intake 2865.41 ml   Output 770 ml   Net 2095.41 ml     Unmeasured Output  Urine Occurrence: 1  Stool Occurrence: 1  Emesis Occurrence: 0  Pad Count: 1    Labs/Accuchecks:  Recent Labs   Lab 05/01/25  0136   WBC 12.46   RBC 3.26*   HGB 9.6*   HCT 30.2*         Recent Labs   Lab 05/01/25  0136      K 3.9   CO2 23      BUN 66*   CREATININE 1.1   ALKPHOS 64   ALT 24   AST 41   BILITOT 0.3    No results for input(s): "PROTIME", "INR", "APTT", "HEPANTIXA" in the last 168 hours. No results for input(s): "CPK", "CPKMB", "TROPONINI", "MB" in the last 168 " hours.    Electrolytes: N/A - electrolytes WDL  Accuchecks: none    Gtts:      LDA/Wounds:    Gabino Risk Assessment  Sensory Perception: 3-->slightly limited  Moisture: 3-->occasionally moist  Activity: 1-->bedfast  Mobility: 1-->completely immobile  Nutrition: 3-->adequate  Friction and Shear: 2-->potential problem  Gabino Score: 13    Is your gabino score 12 or less? no            Restraints:        TM

## 2025-05-01 NOTE — SUBJECTIVE & OBJECTIVE
Interval History: 5/1: No changes from nsgy perspective    Medications:  Continuous Infusions:  Scheduled Meds:   acetylcysteine 100 mg/ml (10%)  4 mL Nebulization Q4H    albuterol-ipratropium  3 mL Nebulization Q4H    enoxparin  40 mg Subcutaneous Q24H (prophylaxis, 1700)    lacosamide  200 mg Per NG tube Q12H    levothyroxine  125 mcg Per NG tube Before breakfast    metoprolol tartrate  12.5 mg Per NG tube Q8H    piperacillin-tazobactam (Zosyn) IV (PEDS and ADULTS) (extended infusion is not appropriate)  4.5 g Intravenous Q8H    pravastatin  20 mg Per NG tube QHS    silodosin  4 mg Per NG tube Daily     PRN Meds:  Current Facility-Administered Medications:     acetaminophen, 650 mg, Per NG tube, Q6H PRN    bisacodyL, 10 mg, Rectal, Daily PRN    hydrALAZINE, 10 mg, Intravenous, Q4H PRN    labetalol, 10 mg, Intravenous, Q4H PRN    magnesium oxide, 800 mg, Per NG tube, PRN    magnesium oxide, 800 mg, Per NG tube, PRN    oxyCODONE, 5 mg, Per NG tube, Q6H PRN    potassium bicarbonate, 35 mEq, Per NG tube, PRN    potassium bicarbonate, 50 mEq, Per NG tube, PRN    potassium bicarbonate, 60 mEq, Per NG tube, PRN    potassium, sodium phosphates, 2 packet, Per NG tube, PRN    potassium, sodium phosphates, 2 packet, Per NG tube, PRN    potassium, sodium phosphates, 2 packet, Per NG tube, PRN    sodium chloride 0.9%, 10 mL, Intravenous, PRN     Review of Systems  Objective:     Weight: 68 kg (149 lb 14.6 oz)  Body mass index is 20.92 kg/m².  Vital Signs (Most Recent):  Temp: 98.1 °F (36.7 °C) (05/01/25 0701)  Pulse: 70 (05/01/25 0701)  Resp: 20 (05/01/25 0701)  BP: (!) 137/58 (05/01/25 0701)  SpO2: 100 % (05/01/25 0701) Vital Signs (24h Range):  Temp:  [98.1 °F (36.7 °C)-99.2 °F (37.3 °C)] 98.1 °F (36.7 °C)  Pulse:  [70-98] 70  Resp:  [19-32] 20  SpO2:  [94 %-100 %] 100 %  BP: (120-173)/(51-97) 137/58     Date 05/01/25 0700 - 05/02/25 0659   Shift 1689-9259 4605-6169 8204-6427 24 Hour Total   INTAKE   NG/GT 45   45   Shift  Total(mL/kg) 45(0.7)   45(0.7)   OUTPUT   Urine(mL/kg/hr) 100   100   Shift Total(mL/kg) 100(1.5)   100(1.5)   Weight (kg) 68 68 68 68                            NG/OG Tube 04/26/25 0819 14 Fr. Right nostril (Active)   Placement Check other (see comments);placement verified by x-ray 04/27/25 1701   Tolerance no signs/symptoms of discomfort 04/29/25 0900   Securement secured to nostril center 04/29/25 0900   Clamp Status/Tolerance unclamped 04/29/25 0900   Suction Setting/Drainage Method suction at the bedside 04/29/25 0900   Insertion Site Appearance no redness, warmth, tenderness, skin breakdown, drainage 04/29/25 0900   Drainage None 04/27/25 1701   Flush/Irrigation flushed w/;water;no resistance met 04/29/25 0900   Feeding Type continuous 04/29/25 0900   Feeding Action feeding continued 04/29/25 0900   Current Rate (mL/hr) 45 mL/hr 04/29/25 0900   Goal Rate (mL/hr) 45 mL/hr 04/29/25 0900   Intake (mL) 60 mL 04/29/25 0809   Water Bolus (mL) 30 mL 04/29/25 0809   Formula Name Impact Peptide 04/27/25 1301   Intake (mL) - Formula Tube Feeding 45 04/29/25 0900       Male External Urinary Catheter 04/23/25 1858 (Active)   Collection Container Suction canister 04/29/25 0900   Securement Method secured to top of thigh w/ adhesive device 04/29/25 0900   Skin no redness;no breakdown 04/29/25 0900   Tolerance no signs/symptoms of discomfort 04/29/25 0900   Output (mL) 120 mL 04/29/25 0809   Catheter Change Date 04/29/25 04/29/25 0900   Catheter Change Time 0500 04/29/25 0900          Physical Exam         Neurosurgery Physical Exam    [id number]  Extubated, breathing spontaneously  Eyes open to stimulation, PERRL  Brainstems intact  LUE WD, RUE EXT  BLE no movement to stim  On cEEG    Significant Labs:  Recent Labs   Lab 04/30/25  0109 05/01/25  0136   * 153*    143   K 4.2 3.9    109   CO2 21* 23   BUN 72* 66*   CREATININE 1.2 1.1   CALCIUM 8.9 9.2   MG 2.4 2.3     Recent Labs   Lab 04/30/25  0109  "05/01/25  0136   WBC 12.02 12.46   HGB 9.1* 9.6*   HCT 29.0* 30.2*    349     No results for input(s): "LABPT", "INR", "APTT" in the last 48 hours.  Microbiology Results (last 7 days)       Procedure Component Value Units Date/Time    Blood culture [[id number]]  (Normal) Collected: 04/26/25 1628    Order Status: Completed Specimen: Blood from Peripheral, Antecubital, Right Updated: 04/30/25 2001     Blood Culture No Growth After 96 hours    Blood culture [[id number]]  (Normal) Collected: 04/26/25 1628    Order Status: Completed Specimen: Blood from Peripheral, Forearm, Left Updated: 04/30/25 2001     Blood Culture No Growth After 96 hours    Culture, Respiratory with Gram Stain [[id number]] Collected: 04/26/25 1428    Order Status: Completed Specimen: Respiratory from Sputum, Induced Updated: 04/29/25 1131     Respiratory Culture Normal respiratory audrey, no Staph aureus or Pseudomonas isolated     GRAM STAIN <10 Epithelial Cells/LPF      Rare WBC seen      No organisms seen    MRSA Screen by PCR [[id number]]  (Normal) Collected: 04/28/25 1056    Order Status: Completed Specimen: Nasal Swab Updated: 04/28/25 1704     MRSA PCR Screen Not Detected    Respiratory Infection Panel (PCR), Nasopharyngeal [[id number]] Collected: 04/27/25 1759    Order Status: Completed Specimen: Nasopharyngeal Swab Updated: 04/27/25 2049     Respiratory Infection Panel Source Nasopharyngeal Swab     Adenovirus Not Detected     Coronavirus 229E, Common Cold Virus Not Detected     Coronavirus HKU1, Common Cold Virus Not Detected     Coronavirus NL63, Common Cold Virus Not Detected     Coronavirus OC43, Common Cold Virus Not Detected     SARS-CoV2 (COVID-19) Qualitative PCR Not Detected     Human Metapneumovirus Not Detected     Human Rhinovirus/Enterovirus Not Detected     Influenza A Not Detected     Influenza B Not Detected     Parainfluenza Virus 1 Not Detected     Parainfluenza Virus 2 Not Detected     Parainfluenza Virus 3 Not " Detected     Parainfluenza Virus 4 Not Detected     Respiratory Syncytial Virus Not Detected     Bordetella Parapertussis (AB1763) Not Detected     Bordetella pertussis (ptxP) Not Detected     Chlamydia pneumoniae Not Detected     Mycoplasma pneumoniae Not Detected    Respiratory Infection Panel (PCR), Nasopharyngeal [[id number]] Collected: 04/27/25 1004    Order Status: Canceled Specimen: Nasopharyngeal Swab Updated: 04/27/25 1222          All pertinent labs from the last 24 hours have been reviewed.    Significant Diagnostics:  CT: No results found in the last 24 hours.  MRI: No results found in the last 24 hours.  CT Head Without Contrast  Result Date: 4/30/2025  Stable appearance of the brain.  Stable small areas of extra-axial fluid and hemorrhage.  Stable right parietal intraparenchymal hemorrhage. Electronically signed by: Kyaw Weiner Date:    04/30/2025 Time:    07:34    X-Ray Chest AP Portable  Result Date: 4/29/2025  See above Electronically signed by: Johnnie Morelos MD Date:    04/29/2025 Time:    12:29    CT Head Without Contrast  Result Date: 4/29/2025  New right parietal intraparenchymal hematoma with mild surrounding edema. Exam otherwise grossly unchanged from recent study of 04/26/2025 as further described above. This report was flagged in Epic as abnormal. Electronically signed by: Sebastien Tian MD Date:    04/29/2025 Time:    11:58    No results found in the last 24 hours.

## 2025-05-01 NOTE — PROGRESS NOTES
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 His wife at bedside reports concern for acute deconditioning over the past three days involving worsening weakness, urinary frequency, speech changes, intermittent cessation in interaction with family, and dragging of his R leg. CT head showed bilateral subdural hygromas with bilateral acute versus subacute SDH. He takes eliquis for A fib at home which was reversed with PCC in the ED. During evaluation of the patient, he was initially interactive, following, commands, and oriented to person and place. He then had a brief staring spell where he no longer followed commands and displayed no usable speech. This then resolved after a few minutes and he returned to baseline. He takes Vimpat 100 bid at home but has not gotten his nightly dose. Per his wife, these spells occurred at home as well.    He is admitted to [facility] for hourly neuro-monitoring and a higher level of care.     Hospital Course: 04/25/2025: OR today for SDH evacuation   04/26/2025: NAEON. POD1 s/p SDH evac. Post-op CTH good decompression and expected post-op changes. Post-op episode of seizure-like activity, described as "LUE & LLE shaking" lasting approximately 1 minute that resolved without intervention. Patient unarousable post-operatively and this AM. NGT placed. Repeat CTH obtained, stable. Procal, EEG ordered and pending.   04/27/2025:  POD2 s/p SDH evacuation. Procal elevated and patient febrile with low grade temp. Pan-cultured and started on broad spec antibiotics. " Aggressive pulm toileting started yesterday. Overnight with Tmax 100.9°F. Cx with NGTD. Respiratory panel sent. US extremities ordered and pending to rule out DVT. Start lovenox pending neurosurgery recs.   04/28/2025: Continue aggressive pulmonary toileting. EEG with periodic complexes with some epileptiform morphology, optimized lacosamide to 200mg BID.   04/29/2025 EEG negative for seizures, small improvement after increasing vimpat yesterday. Discontinued EEG. CTH with new small R parietal IPH with mild surrounding edema. BUN 67, started enteral water flushes 250 q4h. MRSA swab negative, discontinued vanc. CXR with slight improvement from yesterday. Continue aggressive pulm toilet.   04/30/2025 CTH overnight with stable R IPH. POD5 and pt still not waking up. Relax to q2h neuro checks. BUN 72, increased enteral water to 300 q4h and given additional 300 ml x 2 today.   05/01/2025 exam remains stable. Started modafinil 100mg. Continue pulm toilet spaced to q6h and added IPV q6h.     Review of Systems   Unable to perform ROS: Other (level of consciousness)     Objective:     Vitals:  Temp: 97.7 °F (36.5 °C)  Pulse: 84  Rhythm: normal sinus rhythm  BP: (!) 161/69  MAP (mmHg): 99  Resp: (!) 33  SpO2: 100 %  Oxygen Concentration (%): 40    Temp  Min: 97.7 °F (36.5 °C)  Max: 99.2 °F (37.3 °C)  Pulse  Min: 68  Max: 98  BP  Min: 120/84  Max: 180/77  MAP (mmHg)  Min: 77  Max: 116  Resp  Min: 19  Max: 33  SpO2  Min: 97 %  Max: 100 %  Oxygen Concentration (%)  Min: 40  Max: 40    04/30 0701 - 05/01 0700  In: 3390.1 [I.V.:10]  Out: 500 [Urine:500]   Unmeasured Output  Urine Occurrence: 1  Stool Occurrence: 1  Emesis Occurrence: 0  Pad Count: 1        Physical Exam  Vitals and nursing note reviewed.   Constitutional:       General: He is not in acute distress.     Appearance: He is ill-appearing.   HENT:      Head:      Comments: Surgical site c/d/i     Right Ear: External ear normal.      Left Ear: External ear normal.       Nose: Nose normal.      Comments: NGT in place.     Mouth/Throat:      Mouth: Mucous membranes are dry.   Eyes:      Pupils: Pupils are equal, round, and reactive to light.   Cardiovascular:      Rate and Rhythm: Normal rate and regular rhythm.   Pulmonary:      Effort: Pulmonary effort is normal. No respiratory distress.      Comments: Upper airway congestion   Bl coarse BS   Abdominal:      General: There is no distension.      Palpations: Abdomen is soft.      Tenderness: There is no abdominal tenderness. There is no guarding.   Musculoskeletal:      Right lower leg: No edema.      Left lower leg: No edema.   Skin:     General: Skin is warm and dry.   Neurological:      Comments: E2 V1 M3  Mental status: Lethargic, barely opens eyes to very noxious stimuli. Nonverbal. Not following commands.   CN II-XII grossly intact, specifically:  PERRL  EOMI  blinks to threat bilaterally   No facial asymmetry.  Weak spontaneous cough   Motor:  RUE flexion to noxious   RLE triple flexion to tickling foot  LUE minimal flexion to noxious  LLE triple flexion to tickling foot  Sensation intact to noxious x4       Gait & coordination exams deferred.        Medications:  Continuous Scheduledacetylcysteine 100 mg/ml (10%), 4 mL, Q6H  albuterol-ipratropium, 3 mL, Q6H  enoxparin, 40 mg, Q24H (prophylaxis, 1700)  lacosamide, 200 mg, Q12H  levothyroxine, 125 mcg, Before breakfast  metoprolol tartrate, 12.5 mg, Q8H  modafiniL, 100 mg, Daily  piperacillin-tazobactam (Zosyn) IV (PEDS and ADULTS) (extended infusion is not appropriate), 4.5 g, Q8H  pravastatin, 20 mg, QHS  silodosin, 4 mg, Daily    PRNacetaminophen, 650 mg, Q6H PRN  bisacodyL, 10 mg, Daily PRN  hydrALAZINE, 10 mg, Q4H PRN  labetalol, 10 mg, Q4H PRN  magnesium oxide, 800 mg, PRN  magnesium oxide, 800 mg, PRN  potassium bicarbonate, 35 mEq, PRN  potassium bicarbonate, 50 mEq, PRN  potassium bicarbonate, 60 mEq, PRN  potassium, sodium phosphates, 2 packet, PRN  potassium, sodium  "phosphates, 2 packet, PRN  potassium, sodium phosphates, 2 packet, PRN  sodium chloride 0.9%, 10 mL, PRN      Today I personally reviewed pertinent medications, lines/drains/airways, imaging, laboratory results, microbiology results, notably:    CTH with new small R parietal IPH with mild surrounding edema.     CXR with slight improvement from yesterday.      Laboratory:  CBC:  Recent Labs   Lab 05/01/25  0136   WBC 12.46   RBC 3.26*   HGB 9.6*   HCT 30.2*      MCV 93   MCH 29.4   MCHC 31.8*       CMP:  Recent Labs   Lab 05/01/25  0136   CALCIUM 9.2   ALBUMIN 2.0*   PROT 6.6      K 3.9   CO2 23      BUN 66*   CREATININE 1.1   ALKPHOS 64   ALT 24   AST 41   BILITOT 0.3     Recent Labs   Lab 05/01/25 0136   MG 2.3   PHOS 3.6       Coagulation:  No results for input(s): "LABPROT", "INR", "APTT", "FIBRINOGEN" in the last 168 hours.      Endocrine:  No results for input(s): "HGBA1C", "TSH" in the last 72 hours.    Microbiology  Microbiology Results (last 7 days)       Procedure Component Value Units Date/Time    Blood culture [[id number]]  (Normal) Collected: 04/26/25 1628    Order Status: Completed Specimen: Blood from Peripheral, Antecubital, Right Updated: 04/30/25 2001     Blood Culture No Growth After 96 hours    Blood culture [[id number]]  (Normal) Collected: 04/26/25 1628    Order Status: Completed Specimen: Blood from Peripheral, Forearm, Left Updated: 04/30/25 2001     Blood Culture No Growth After 96 hours    Culture, Respiratory with Gram Stain [[id number]] Collected: 04/26/25 1428    Order Status: Completed Specimen: Respiratory from Sputum, Induced Updated: 04/29/25 1131     Respiratory Culture Normal respiratory audrey, no Staph aureus or Pseudomonas isolated     GRAM STAIN <10 Epithelial Cells/LPF      Rare WBC seen      No organisms seen    MRSA Screen by PCR [[id number]]  (Normal) Collected: 04/28/25 1056    Order Status: Completed Specimen: Nasal Swab Updated: 04/28/25 1704     MRSA " PCR Screen Not Detected    Respiratory Infection Panel (PCR), Nasopharyngeal [[id number]] Collected: 04/27/25 1759    Order Status: Completed Specimen: Nasopharyngeal Swab Updated: 04/27/25 2049     Respiratory Infection Panel Source Nasopharyngeal Swab     Adenovirus Not Detected     Coronavirus 229E, Common Cold Virus Not Detected     Coronavirus HKU1, Common Cold Virus Not Detected     Coronavirus NL63, Common Cold Virus Not Detected     Coronavirus OC43, Common Cold Virus Not Detected     SARS-CoV2 (COVID-19) Qualitative PCR Not Detected     Human Metapneumovirus Not Detected     Human Rhinovirus/Enterovirus Not Detected     Influenza A Not Detected     Influenza B Not Detected     Parainfluenza Virus 1 Not Detected     Parainfluenza Virus 2 Not Detected     Parainfluenza Virus 3 Not Detected     Parainfluenza Virus 4 Not Detected     Respiratory Syncytial Virus Not Detected     Bordetella Parapertussis ([id number]) Not Detected     Bordetella pertussis (ptxP) Not Detected     Chlamydia pneumoniae Not Detected     Mycoplasma pneumoniae Not Detected    Respiratory Infection Panel (PCR), Nasopharyngeal [[id number]] Collected: 04/27/25 1004    Order Status: Canceled Specimen: Nasopharyngeal Swab Updated: 04/27/25 1222               Diet  Diet NPO  Diet NPO        Assessment/Plan:     Neuro  * Subdural hemorrhage  84 y/o M with 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 on 4/23 after 3 day of decline in overal functional status. CT head with bilatateral acute versus subacute SDH and known VPS. His eliquis was reversed with PCC in the ED. Now post-op SDH evac 4/25    -Admitted to [facility], NSGY following  -q1h neuro checks, vital checks  -Post-op CTH (4/25) with good decompression and expected postop changes  - CTH 4/29 with new R parietal IPH w mild edema  -Daily CBC, CMP, mag, phos  -Goal eunatremia, euvolemia  -SBP < 160   -Lacosamide dose increased to 200 mg BID  on 4/28 in setting of generalized cortical irritability on EEG with NCSE conversion risk   -Seizure precautions  -SCDs, enoxaparin for VTE ppx  - EEG negative for seizure- discontinued EEG 4/29  - Starting modafinil 100mg qd  - Continue pulm toilet spaced to q6h and added IPV q6h.     Subdural hematoma  See primary problem    Seizure disorder  - vimpat 200 bid   - keppra 500mg bid   - EEG- moderate encephalopathy, triphasics, generalized cortical irritability, no discrete seizures . Discontinued EEG.      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   Tie off on 3/25 2/2 bilateral hygromas that were evacuated  Removed in OR on 4/25     Cardiac/Vascular  Coronary artery disease involving native coronary artery of native heart without angina pectoris  Resume home statin  Hold home eliquis    PAF (paroxysmal atrial fibrillation)  C/w [id number] q8h  Hold home eliquis, reversed on admission  Currently rate controlled    Left ventricular diastolic dysfunction, NYHA class 1  Echo reviewed    Essential hypertension  SBP < 160  c/w metoprolol 12.5 q8h  PRN labetalol, hydralazine    Renal/  Urinary retention  C/w home silodosin 4    Endocrine  Acquired hypothyroidism  C/w home synthroid 125mcg     GI  UC (ulcerative colitis)  Hold home colazal in acute setting          The patient is being Prophylaxed for:  Venous Thromboembolism with: Mechanical or Chemical  Stress Ulcer with: Not Applicable   Ventilator Pneumonia with: not applicable    Activity Orders            Elevate HOB Elevate HOB 30-45 degrees during feeding unless contraindicated  starting at 04/27 0844    Diet NPO: NPO starting at 04/25 0001    Turn patient starting at 04/23 2000    Elevate HOB starting at 04/23 1948          Partial Code    Critical care time spent on the evaluation and treatment of severe organ dysfunction, review of pertinent labs and imaging studies, discussions with consulting providers and discussions with patient/family: 35 minutes.    Loy Calles PA-C  Neurocritical Care  [facility]

## 2025-05-01 NOTE — PROGRESS NOTES
[facility] - [facility] Critical [facility]  Neurosurgery  Progress Note    Subjective:     History of Present Illness: Ramesh Ricci is an 86 y/o male with a PMH of Afib, Ulcerative Colitis, HTN, and aortic stenosis. He also has normal pressure hydrocephalus s/p  shunt (now tied off) is presenting with multiple complaints.     Wife reports concern for acute deconditioning over the past few days.  She describes increased weakness, urinary frequency, and speech changes that is been progressing for the past 3 days.  She states that he seems to slumped towards the right side and has been dragging his right leg.  Previously ambulated with his walker.  She also mentions that he seems to have speech hesitancy.  Denies any fevers, chills, abdominal pain or diarrhea    Post-Op Info:  Procedure(s) (LRB):  CRANIOTOMY, FOR SUBDURAL HEMATOMA EVACUATION, bilateral. shunt removal (Bilateral)  REMOVAL, SHUNT, VENTRICULOPERITONEAL   6 Days Post-Op   Interval History: 5/1: No changes from nsgy perspective    Medications:  Continuous Infusions:  Scheduled Meds:   acetylcysteine 100 mg/ml (10%)  4 mL Nebulization Q4H    albuterol-ipratropium  3 mL Nebulization Q4H    enoxparin  40 mg Subcutaneous Q24H (prophylaxis, 1700)    lacosamide  200 mg Per NG tube Q12H    levothyroxine  125 mcg Per NG tube Before breakfast    metoprolol tartrate  12.5 mg Per NG tube Q8H    piperacillin-tazobactam (Zosyn) IV (PEDS and ADULTS) (extended infusion is not appropriate)  4.5 g Intravenous Q8H    pravastatin  20 mg Per NG tube QHS    silodosin  4 mg Per NG tube Daily     PRN Meds:  Current Facility-Administered Medications:     acetaminophen, 650 mg, Per NG tube, Q6H PRN    bisacodyL, 10 mg, Rectal, Daily PRN    hydrALAZINE, 10 mg, Intravenous, Q4H PRN    labetalol, 10 mg, Intravenous, Q4H PRN    magnesium oxide, 800 mg, Per NG tube, PRN    magnesium oxide, 800 mg, Per NG tube, PRN    oxyCODONE, 5 mg, Per NG tube, Q6H PRN    potassium bicarbonate, 35 mEq, Per  NG tube, PRN    potassium bicarbonate, 50 mEq, Per NG tube, PRN    potassium bicarbonate, 60 mEq, Per NG tube, PRN    potassium, sodium phosphates, 2 packet, Per NG tube, PRN    potassium, sodium phosphates, 2 packet, Per NG tube, PRN    potassium, sodium phosphates, 2 packet, Per NG tube, PRN    sodium chloride 0.9%, 10 mL, Intravenous, PRN     Review of Systems  Objective:     Weight: 68 kg (149 lb 14.6 oz)  Body mass index is 20.92 kg/m².  Vital Signs (Most Recent):  Temp: 98.1 °F (36.7 °C) (05/01/25 0701)  Pulse: 70 (05/01/25 0701)  Resp: 20 (05/01/25 0701)  BP: (!) 137/58 (05/01/25 0701)  SpO2: 100 % (05/01/25 0701) Vital Signs (24h Range):  Temp:  [98.1 °F (36.7 °C)-99.2 °F (37.3 °C)] 98.1 °F (36.7 °C)  Pulse:  [70-98] 70  Resp:  [19-32] 20  SpO2:  [94 %-100 %] 100 %  BP: (120-173)/(51-97) 137/58     Date 05/01/25 0700 - 05/02/25 0659   Shift 7041-8651 7864-4710 7421-3520 24 Hour Total   INTAKE   NG/GT 45   45   Shift Total(mL/kg) 45(0.7)   45(0.7)   OUTPUT   Urine(mL/kg/hr) 100   100   Shift Total(mL/kg) 100(1.5)   100(1.5)   Weight (kg) 68 68 68 68                            NG/OG Tube 04/26/25 0819 14 Fr. Right nostril (Active)   Placement Check other (see comments);placement verified by x-ray 04/27/25 1701   Tolerance no signs/symptoms of discomfort 04/29/25 0900   Securement secured to nostril center 04/29/25 0900   Clamp Status/Tolerance unclamped 04/29/25 0900   Suction Setting/Drainage Method suction at the bedside 04/29/25 0900   Insertion Site Appearance no redness, warmth, tenderness, skin breakdown, drainage 04/29/25 0900   Drainage None 04/27/25 1701   Flush/Irrigation flushed w/;water;no resistance met 04/29/25 0900   Feeding Type continuous 04/29/25 0900   Feeding Action feeding continued 04/29/25 0900   Current Rate (mL/hr) 45 mL/hr 04/29/25 0900   Goal Rate (mL/hr) 45 mL/hr 04/29/25 0900   Intake (mL) 60 mL 04/29/25 0809   Water Bolus (mL) 30 mL 04/29/25 0809   Formula Name Impact Peptide  "04/27/25 1301   Intake (mL) - Formula Tube Feeding 45 04/29/25 0900       Male External Urinary Catheter 04/23/25 1858 (Active)   Collection Container Suction canister 04/29/25 0900   Securement Method secured to top of thigh w/ adhesive device 04/29/25 0900   Skin no redness;no breakdown 04/29/25 0900   Tolerance no signs/symptoms of discomfort 04/29/25 0900   Output (mL) 120 mL 04/29/25 0809   Catheter Change Date 04/29/25 04/29/25 0900   Catheter Change Time 0500 04/29/25 0900          Physical Exam         Neurosurgery Physical Exam    [id number]  Extubated, breathing spontaneously  Eyes open to stimulation, PERRL  Brainstems intact  LUE WD, RUE EXT  BLE no movement to stim  On cEEG    Significant Labs:  Recent Labs   Lab 04/30/25 0109 05/01/25  0136   * 153*    143   K 4.2 3.9    109   CO2 21* 23   BUN 72* 66*   CREATININE 1.2 1.1   CALCIUM 8.9 9.2   MG 2.4 2.3     Recent Labs   Lab 04/30/25 0109 05/01/25  0136   WBC 12.02 12.46   HGB 9.1* 9.6*   HCT 29.0* 30.2*    349     No results for input(s): "LABPT", "INR", "APTT" in the last 48 hours.  Microbiology Results (last 7 days)       Procedure Component Value Units Date/Time    Blood culture [[id number]]  (Normal) Collected: 04/26/25 1628    Order Status: Completed Specimen: Blood from Peripheral, Antecubital, Right Updated: 04/30/25 2001     Blood Culture No Growth After 96 hours    Blood culture [[id number]]  (Normal) Collected: 04/26/25 1628    Order Status: Completed Specimen: Blood from Peripheral, Forearm, Left Updated: 04/30/25 2001     Blood Culture No Growth After 96 hours    Culture, Respiratory with Gram Stain [[id number]] Collected: 04/26/25 1428    Order Status: Completed Specimen: Respiratory from Sputum, Induced Updated: 04/29/25 1131     Respiratory Culture Normal respiratory audrey, no Staph aureus or Pseudomonas isolated     GRAM STAIN <10 Epithelial Cells/LPF      Rare WBC seen      No organisms seen    MRSA Screen by " PCR [[id number]]  (Normal) Collected: 04/28/25 1056    Order Status: Completed Specimen: Nasal Swab Updated: 04/28/25 1704     MRSA PCR Screen Not Detected    Respiratory Infection Panel (PCR), Nasopharyngeal [[id number]] Collected: 04/27/25 1759    Order Status: Completed Specimen: Nasopharyngeal Swab Updated: 04/27/25 2049     Respiratory Infection Panel Source Nasopharyngeal Swab     Adenovirus Not Detected     Coronavirus 229E, Common Cold Virus Not Detected     Coronavirus HKU1, Common Cold Virus Not Detected     Coronavirus NL63, Common Cold Virus Not Detected     Coronavirus OC43, Common Cold Virus Not Detected     SARS-CoV2 (COVID-19) Qualitative PCR Not Detected     Human Metapneumovirus Not Detected     Human Rhinovirus/Enterovirus Not Detected     Influenza A Not Detected     Influenza B Not Detected     Parainfluenza Virus 1 Not Detected     Parainfluenza Virus 2 Not Detected     Parainfluenza Virus 3 Not Detected     Parainfluenza Virus 4 Not Detected     Respiratory Syncytial Virus Not Detected     Bordetella Parapertussis (LI2295) Not Detected     Bordetella pertussis (ptxP) Not Detected     Chlamydia pneumoniae Not Detected     Mycoplasma pneumoniae Not Detected    Respiratory Infection Panel (PCR), Nasopharyngeal [[id number]] Collected: 04/27/25 1004    Order Status: Canceled Specimen: Nasopharyngeal Swab Updated: 04/27/25 1222          All pertinent labs from the last 24 hours have been reviewed.    Significant Diagnostics:  CT: No results found in the last 24 hours.  MRI: No results found in the last 24 hours.  CT Head Without Contrast  Result Date: 4/30/2025  Stable appearance of the brain.  Stable small areas of extra-axial fluid and hemorrhage.  Stable right parietal intraparenchymal hemorrhage. Electronically signed by: Kyaw Weiner Date:    04/30/2025 Time:    07:34    X-Ray Chest AP Portable  Result Date: 4/29/2025  See above Electronically signed by: Johnnie Morelos MD  Date:    04/29/2025 Time:    12:29    CT Head Without Contrast  Result Date: 4/29/2025  New right parietal intraparenchymal hematoma with mild surrounding edema. Exam otherwise grossly unchanged from recent study of 04/26/2025 as further described above. This report was flagged in Epic as abnormal. Electronically signed by: Sebastien Tina MD Date:    04/29/2025 Time:    11:58    No results found in the last 24 hours.    Assessment/Plan:     * Subdural hemorrhage  86 yo with recent admission for VPS tie off and SDH evacuation on 3/24 presenting for new LSW and speech hesitation found to have enlarged chronic SDH as well as a new small R convexity acute SDH. Pt takes eliquis, s/p PCC. Interval CTH stable.     Now s/p bialteral subdural evacuations with VPS removal on 4/25    Recommendations:  --admitted to [facility]CC  --on eliquis, s/p kcentra reversal. Hold all AC/AP, ok for dvt ppx  --SBP <140  --HOB >30  --vimpat/zosyn per ICU  --if any further scans per NCC then recommend MRI  --drains removed 4/28  --Continue to monitor clinically, notify NSGY immediately with any changes in neuro status    Discussed with Dr. Krzysztof Daley MD  Neurosurgery  [facility] - [facility] Critical [facility]

## 2025-05-01 NOTE — PLAN OF CARE
Problem: Occupational Therapy  Goal: Occupational Therapy Goal  Description: Goals to be met by: 6/24/25     Patient will increase functional independence with ADLs by performing:    UE Dressing with Maximum Assistance.  LE Dressing with Maximum Assistance.  Grooming while EOB with Maximum Assistance.  Toileting from bedside commode with Maximum Assistance for hygiene and clothing management.   Sitting at edge of bed x15 minutes with Maximum Assistance.  Rolling to Bilateral with Maximum Assistance.   Supine to sit with Maximum Assistance.  Stand pivot transfers with Maximum Assistance.    5/1/2025 1346 by Ester Michael, SAVITA  Outcome: Progressing

## 2025-05-01 NOTE — PT/OT/SLP RE-EVAL
Occupational Therapy   Re-evaluation    Name: Ramesh Ricci  MRN: [id number]  Admitting Diagnosis:  Subdural hemorrhage  Recent Surgery: Procedure(s) (LRB):  CRANIOTOMY, FOR SUBDURAL HEMATOMA EVACUATION, bilateral. shunt removal (Bilateral)  REMOVAL, SHUNT, VENTRICULOPERITONEAL 6 Days Post-Op    Recommendations:     Discharge Recommendations: Moderate Intensity Therapy  Discharge Equipment Recommendations: hospital bed, lift device  Barriers to discharge:  None    Assessment:     Ramesh Ricci is a 85 y.o. male with a medical diagnosis of Subdural hemorrhage.  He presents with decrease functional status secondary to medical diagnosis.  Performance deficits affecting function are weakness, impaired endurance, impaired self care skills, impaired functional mobility, gait instability, impaired balance, decreased upper extremity function, decreased lower extremity function, decreased ROM, decreased coordination.  Patient limited tolerance to OT session with absent command following, upward gaze, no purposeful movement, limited cervical control, and grunting for verbalizations. Patient requiring total assistance for all mobility completed. Patient presentation currently well below PLOF. Patient is therefore appropriate for acute OT services to increase patient self care performance and functional mobility. Following DC from [facility] patient should continue with a moderate intensity therapy to ensure patient safety and promote return to independence.     Rehab Prognosis:  fair; patient would benefit from acute skilled OT services to address these deficits and reach maximum level of function.       Plan:     Patient to be seen 3 x/week to address the above listed problems via self-care/home management, therapeutic activities, therapeutic exercises, neuromuscular re-education  Plan of Care Expires: 06/01/25  Plan of Care Reviewed with: patient, family    Subjective     Chief Complaint: No verbalizations   Patient/Family  stated goals: DC   Communicated with: RN prior to session.  Pain/Comfort:  Pain Rating 1: 0/10    Objective:     Communicated with: RN prior to session.  Patient found supine with: bed alarm, blood pressure cuff, PureWick, oxygen, peripheral IV, NG tube upon OT entry to room.    General Precautions: Standard, aspiration, aphasia, fall, seizure  Orthopedic Precautions: N/A  Braces: N/A    Occupational Performance:    Bed Mobility:    Patient completed Rolling/Turning to Left with  total assistance  Patient completed Rolling/Turning to Right with total assistance  Patient completed Scooting/Bridging with total assistance  Patient completed Supine to Sit with total assistance and 2 persons  Patient completed Sit to Supine with total assistance and 2 persons  Patient sat EOB with total assistance; patient needing increased assistance for postural stability and trunk support. While sitting EOB patient completed the following:   Trunk perturbations multidirectional; no safety responses   Auditory/visual/tactile stimulation; re-orientation completed   AAROM/PROM BUE/BLE at all joints     Activities of Daily Living:  Toileting: total assistance to complete perineal hygiene following BM    Cognitive/Visual Perceptual:  Cognitive/Psychosocial Skills:     -       Oriented to: SHELLY   -       Follows Commands/attention:No commands   -       Communication: Grunted verbalizations   -       Safety awareness/insight to disability: impaired   Visual/Perceptual:      -upward gaze      Physical Exam: No purposeful movement/limited spontaneous movement visualized     AMPAC 6 Click:  AMPAC Total Score: 6    Treatment & Education:  Provided education regarding role of OT, POC, & discharge recommendations.  Pt with no further questions/concerns at this time. OT provided education on home recommendations and fall prevention in preparation for D/C.     Patient left supine with all lines intact and call button in reach    GOALS:    Multidisciplinary Problems       Occupational Therapy Goals          Problem: Occupational Therapy    Goal Priority Disciplines Outcome Interventions   Occupational Therapy Goal     OT, PT/OT Progressing    Description: Goals to be met by: 6/24/25     Patient will increase functional independence with ADLs by performing:    UE Dressing with Maximum Assistance.  LE Dressing with Maximum Assistance.  Grooming while EOB with Maximum Assistance.  Toileting from bedside commode with Maximum Assistance for hygiene and clothing management.   Sitting at edge of bed x15 minutes with Maximum Assistance.  Rolling to Bilateral with Maximum Assistance.   Supine to sit with Maximum Assistance.  Stand pivot transfers with Maximum Assistance.                         DME Justifications:  Ramesh Yo requires a hospital bed due to him requiring positioning of the body in ways not feasible with an ordinary bed due to limited ability and cannot independently make changes in body position without the use of the bed.The positioning of the body cannot be sufficiently resolved by the use of pillows and wedges.    History:     Past Medical History:   Diagnosis Date    Allergies 01/31/2025    Anemia     Anticoagulant long-term use     Anxiety     Atrophic kidney 11/16/2012    BPH (benign prostatic hyperplasia) 11/16/2012    Congenital absence of right kidney 07/05/2017    DDD (degenerative disc disease), cervical 07/05/2017    x-ray 7/17 - Severe    Ex-smoker 12/20/2018    Exudative age-related macular degeneration, right eye, with active choroidal neovascularization 02/15/2024    GERD (gastroesophageal reflux disease) 11/16/2012    Glaucoma 11/16/2012    HTN (hypertension) 11/16/2012    Hypothyroid 11/16/2012    Internal carotid artery stenosis 11/16/2012    Iron deficiency anemia due to chronic blood loss 10/29/2021    Left ventricular diastolic dysfunction, NYHA class 1 04/16/2015    4/15    Low serum testosterone level 11/16/2012     Normal cardiac stress test 11/16/2012    NPH (normal pressure hydrocephalus) 02/03/2025    Osteopenia 11/16/2012    PAF (paroxysmal atrial fibrillation) 12/20/2018    Prostate CA 01/15/2013    XRT 12/12  Dr. 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    Seizure disorder 4/24/2025    Shingles 11/16/2012    Skin disease 2020    Seems to have started after Covid vaccine    Stage 3a chronic kidney disease 10/06/2014    Stroke 2017    tia   no residual    Thrombocytopenia 01/31/2025    TIA (transient ischemic attack) 11/16/2012    UC (ulcerative colitis) 11/16/2012         Past Surgical History:   Procedure Laterality Date    ADENOIDECTOMY      COLONOSCOPY N/A 03/25/2022    Procedure: COLONOSCOPY;  Surgeon: Ashley Nguyen MD;  Location: [facility];  Service: Endoscopy;  Laterality: N/A;    CRANIOTOMY FOR EVACUATION OF SUBDURAL HEMATOMA Bilateral 4/25/2025    Procedure: CRANIOTOMY, FOR SUBDURAL HEMATOMA EVACUATION, bilateral. shunt removal;  Surgeon: Jairo Blankenship MD;  Location: [facility] OR 2ND FLR;  Service: Neurosurgery;  Laterality: Bilateral;  mulugeta segal cranial plating kit. drill available. hemovac drains. to follow other Ware case    ESOPHAGOGASTRODUODENOSCOPY N/A 03/25/2022    Procedure: EGD (ESOPHAGOGASTRODUODENOSCOPY);  Surgeon: Ashley Nguyen MD;  Location: [facility];  Service: Endoscopy;  Laterality: N/A;  added on per Dr. Nguyen    ESOPHAGOGASTRODUODENOSCOPY N/A 6/7/2024    Procedure: EGD (ESOPHAGOGASTRODUODENOSCOPY);  Surgeon: Audie Snell MD;  Location: [facility] (4TH FLR);  Service: Endoscopy;  Laterality: N/A;  5/21 R/s,sent updated instr via portal.pt informed to hold eliquis for 2 days.AC  Ref by: ,  approved to hold Eliquis (apixaban) for 2 days per Dr. Conklin-see media file  5/9/24-GT  5/31-pre call complete-tb    EVACUATION OF SUBDURAL HEMATOMA Bilateral 3/24/2025    Procedure: EVACUATION, HEMATOMA, SUBDURAL  AND SHUNT TIE OFF;  Surgeon: Krzysztof  Jario MOY MD;  [facility];  Service: Neurosurgery;  Laterality: Bilateral;  bilateral bobby holes for subdural hygroma and tying off of  shunt at clavicle. to follow other ware cases    EYE SURGERY Right 11/2020    cataract    HERNIA REPAIR      LEFT HEART CATHETERIZATION Right 10/29/2021    Procedure: CATHETERIZATION, HEART, LEFT AND RIGHT  - LV DARNELL POSSIBLE;  Surgeon: Beau Conklin MD;  Location: [facility] CATH LAB;  Service: Cardiology;  Laterality: Right;    LUMBAR PUNCTURE N/A 1/13/2025    Procedure: Lumbar Puncture;  Surgeon: Jairo Blankenship MD;  [facility];  Service: Neurosurgery;  Laterality: N/A;    REMOVAL OF VENTRICULOPERITONEAL SHUNT  4/25/2025    Procedure: REMOVAL, SHUNT, VENTRICULOPERITONEAL;  Surgeon: Jairo Blankenship MD;  [facility];  Service: Neurosurgery;;    RIGHT HEART CATHETERIZATION Right 10/29/2021    Procedure: INSERTION, CATHETER, RIGHT HEART;  Surgeon: Beau Conklin MD;  Location: [facility] CATH LAB;  Service: Cardiology;  Laterality: Right;    SKIN BIOPSY  2020    TONSILLECTOMY      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    Procedure: INSERTION, SHUNT, VENTRICULOPERITONEAL;  Surgeon: Jairo Blankenship MD;  [facility];  Service: Neurosurgery;  Laterality: Right;    VENTRICULOPERITONEAL SHUNT  2/6/2025    Procedure: INSERTION, SHUNT, VENTRICULOPERITONEAL;  Surgeon: Shar Elizabeth MD;  Location: [facility] OR [address] [place] [address];  Service: General;;       Time Tracking:     OT Date of Treatment: 05/01/25  OT Start Time: 1125  OT Stop Time: 1155  OT Total Time (min): 30 min    Billable Minutes:Evaluation 10  Self Care/Home Management 10  Therapeutic Activity 10    5/1/2025

## 2025-05-02 NOTE — ASSESSMENT & PLAN NOTE
86 yo with recent admission for VPS tie off and SDH evacuation on 3/24 presenting for new LSW and speech hesitation found to have enlarged chronic SDH as well as a new small R convexity acute SDH. Pt takes eliquis, s/p PCC. Interval CTH stable.     Now s/p bialteral subdural evacuations with VPS removal on 4/25    Recommendations:  --admitted to [facility]  --on eliquis, s/p kcentra reversal. Hold all AC/AP, ok for dvt ppx  --SBP <140  --HOB >30  --vimpat/zosyn per ICU  --if any further scans per NCC then recommend MRI  --drains removed 4/28  --Continue to monitor clinically, notify NSGY immediately with any changes in neuro status    Discussed with Dr. Blankenship

## 2025-05-02 NOTE — PLAN OF CARE
Recommendations  --Continue Continuous TF recommendation: Impact Peptide 1.5 @ 45 mL/hr x 22 hours (levothyroxine administration) to provide 990 mL total volume, 1485 kcal, 139 g CHO, 93 g protein, 0 g fiber, 764 mL free water, 99% DRI         --120 mL flush q4 hrs to provide additional 720 mL free water (Total 1484 mL)     --Nursing: please continue to document rate and formula on flowsheet    --RD to monitor weight, rate, tolerance     Goals:   Meet % EEN/EPN by next RD follow-up  Maintain dry weight during admission  Nutrition Goal Status: goal met  Communication of RD Recs: other (comment) (POC)

## 2025-05-02 NOTE — PLAN OF CARE
"[facility] Care Plan  POC reviewed with Ramesh Ricci and family at 1400. Family verbalized understanding. Questions and concerns addressed. No acute events today. Pt progressing toward goals. Will continue to monitor. See below and flowsheets for full assessment and VS info.     - Bath completed, linens changed  - BM x3  - Flexi placed  - Modafinil dose increased by team during rounds  - TF continued at 45 mL/h      Is this a stroke patient? Yes, stroke booklet reviewed with family     Neuro:  [place] Coma Scale  Best Eye Response: 2-->(E2) to pain  Best Motor Response: 4-->(M4) withdraws from pain  Best Verbal Response: 1-->(V1) none  [place] Coma Scale Score: 7  Assessment Qualifiers: patient not sedated/intubated  Pupil PERRLA: yes     24 hr Temp:  [97.7 °F (36.5 °C)-99.1 °F (37.3 °C)]     CV:   Rhythm: normal sinus rhythm  BP goals:   SBP < 160  MAP > 65    Resp:      Oxygen Concentration (%): 40    Plan: N/A    GI/:     Diet/Nutrition Received: tube feeding  Last Bowel Movement: 05/02/25  Voiding Characteristics: external catheter    Intake/Output Summary (Last 24 hours) at 5/2/2025 1847  Last data filed at 5/2/2025 1701  Gross per 24 hour   Intake 3266.5 ml   Output 2135 ml   Net 1131.5 ml     Unmeasured Output  Urine Occurrence: 0  Stool Occurrence: 1  Emesis Occurrence: 0  Pad Count: 1    Labs/Accuchecks:  Recent Labs   Lab 05/02/25  0250   WBC 13.31*   RBC 3.18*   HGB 9.2*   HCT 29.0*         Recent Labs   Lab 05/02/25  0250      K 4.0   CO2 22*      BUN 58*   CREATININE 1.0   ALKPHOS 61   ALT 24   AST 41   BILITOT 0.3    No results for input(s): "PROTIME", "INR", "APTT", "HEPANTIXA" in the last 168 hours. No results for input(s): "CPK", "CPKMB", "TROPONINI", "MB" in the last 168 hours.    Electrolytes: N/A - electrolytes WDL  Accuchecks: none    Gtts:      LDA/Wounds:    Gabino Risk Assessment  Sensory Perception: 2-->very limited  Moisture: 3-->occasionally moist  Activity: " 1-->bedfast  Mobility: 3-->slightly limited  Nutrition: 3-->adequate  Friction and Shear: 2-->potential problem  Gabino Score: 14    Is your gabino score 12 or less? no            Restraints:        [facility]

## 2025-05-02 NOTE — EICU
Virtual ICU Quality Rounds    Admit Date: 4/23/2025  Hospital Day: 9    ICU Day: 8d 12h    24H Vital Sign Range:  Temp:  [97.7 °F (36.5 °C)-99.1 °F (37.3 °C)]   Pulse:  [68-94]   Resp:  [18-33]   BP: (128-161)/(57-83)   SpO2:  [94 %-100 %]     VICU Surveillance Screening                    LDA reconciliation : Yes

## 2025-05-02 NOTE — PLAN OF CARE
Problem: Physical Therapy  Goal: Physical Therapy Goal  Description: Goals to be completed by: 5/12/25    Pt will perform sup<>sit transfers w/ moderate assistance  Pt will have sufficient dynamic balance to sit EOB while performing ADLs/therex w/ minimum assistance  Pt will be able to stand up from EOB w/ moderate assistance using LRAD  Bed to chair transfer w/ maxA using LRAD  Pt will be independent w/ HEP therex on BLE w/ good form and ROM   Outcome: Progressing   Pt's goals remain appropriate and pt will continue to benefit from skilled PT services to work towards improved functional mobility including: bed mobility and sitting balance. Lia Junior PT  5/2/2025

## 2025-05-02 NOTE — PROGRESS NOTES
"[facility] - Neuro [facility]  Adult Nutrition  Progress Note    SUMMARY       Recommendations  --Continue Continuous TF recommendation: Impact Peptide 1.5 @ 45 mL/hr x 22 hours (levothyroxine administration) to provide 990 mL total volume, 1485 kcal, 139 g CHO, 93 g protein, 0 g fiber, 764 mL free water, 99% DRI         --120 mL flush q4 hrs to provide additional 720 mL free water (Total 1484 mL)     --Nursing: please continue to document rate and formula on flowsheet    --RD to monitor weight, rate, tolerance     Goals:   Meet % EEN/EPN by next RD follow-up  Maintain dry weight during admission  Nutrition Goal Status: goal met  Communication of RD Recs: other (comment) (POC)    Nutrition Discharge Planning    Nutrition Discharge Planning: Too early to determine, pending clinical course, General healthy diet    Reason for Assessment    Reason For Assessment: consult, new tube feeding  Diagnosis: other (see comments) (subdural hemorrhage)  General Information Comments:  84 y/o male with a PMH of Afib on eliquis at home, Ulcerative Colitis, HTN, aortic stenosis, and 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 RD follow-up. Tube feed currently running at goal rate of 45 mL/hr via NG - meeting EEN/EPN. Weight stable - no concerns for malnutrition at this time.     Nutrition Related Social Determinants of Health: SDOH: Unable to assess at this time.      Food Insecurity: Patient Unable To Answer (4/25/2025)    Hunger Vital Sign     Worried About Running Out of Food in the Last Year: Patient unable to answer     Ran Out of Food in the Last Year: Patient unable to answer       Nutrition/Diet History    Spiritual, Cultural Beliefs, Roman Catholic Practices, Values that Affect Care: no  Food Allergies: NKFA  Factors Affecting Nutritional Intake: NPO    Anthropometrics    Height: 5' 10.98" (180.3 cm)  Height (inches): 70.98 " in  Height Method: Measured  Weight: 68 kg (149 lb 14.6 oz)  Weight (lb): 149.91 lb  Weight Method: Bed Scale  Ideal Body Weight (IBW), Male: 171.88 lb  % Ideal Body Weight, Male (lb): 87.22 %  BMI (Calculated): 20.9  BMI Grade: less than 23 (older than 65 years) - underweight       Lab/Procedures/Meds    Pertinent Labs Reviewed: reviewed - BUN 58, glucose 146    Pertinent Medications Reviewed: reviewed - enoxaparin, levothyroxine, pravastatin, psyllium husk    Estimated/Assessed Needs    Weight Used For Calorie Calculations: 68 kg (149 lb 14.6 oz)  Energy Calorie Requirements (kcal): 1324-7758 kcal (25-30 kcal/kg)  Energy Need Method: Kcal/kg  Protein Requirements:  g/day (1.2-2.0 g/kg)  Weight Used For Protein Calculations: 68 kg (149 lb 14.6 oz)     Estimated Fluid Requirement Method: RDA Method  RDA Method (mL): 1700  CHO Requirement: 213 g      Nutrition Prescription Ordered    Current Diet Order: NPO  Current Nutrition Support Formula Ordered: Impact Peptide 1.5  Current Nutrition Support Rate Ordered: 45 (ml)  Current Nutrition Support Frequency Ordered: x 22 hours    Evaluation of Received Nutrient/Fluid Intake    Enteral Calories (kcal): 1485  Enteral Protein (gm): 93  Enteral (Free Water) Fluid (mL): 764  % Kcal Needs: 87  % Protein Needs: 100  Energy Calories Required: meeting needs  Protein Required: meeting needs  Fluid Required:  (Per MD)  Comments: LBM 5/2  % Intake of Estimated Energy Needs: 75 - 100 %  % Meal Intake: NPO    PES Statement  Nutrition PES Status: Resolved  Nutrition PES Problem: Inadequate enteral nutrition infusion  Nutrition PES Etiology:  (Infusion volume not reached)  Nutrition PES Signs and Symptoms:  (Inadequate EN volume compared to estimated requirements)    Nutrition Risk    Level of Risk/Frequency of Follow-up: high     Monitor and Evaluation    Monitor and Evaluation: Energy intake, Food and beverage intake, Protein intake, Carbohydrate intake, Enteral and parenteral  nutrition administration, Diet order, Glucose/endocrine profile, Gastrointestinal profile, Electrolyte and renal panel, Inflammatory profile, Lipid profile     Nutrition Follow-Up    RD Follow-up?: Yes

## 2025-05-02 NOTE — PT/OT/SLP PROGRESS
"Physical Therapy Treatment    Patient Name:  Ramesh Ricci   MRN:  [id number]    Recommendations:     Discharge Recommendations: Moderate Intensity Therapy  Discharge Equipment Recommendations: hospital bed, lift device  Barriers to discharge: Inaccessible home and Decreased caregiver support 1 MISSY and requires assist for mobility    Assessment:     Ramesh Ricci is a 85 y.o. male admitted with a medical diagnosis of Subdural hemorrhage.  He presents with the following impairments/functional limitations: weakness, impaired functional mobility, impaired endurance, decreased lower extremity function, decreased upper extremity function, impaired cognition .Pt is unsafe with functional mobility at this time due to pt requires total assist for bed mobility and total assist for sitting balance due to multidirectional LOB. Pt is motivated to progress with functional mobility.     Rehab Prognosis: Poor; patient would benefit from acute skilled PT services to address these deficits and reach maximum level of function.    Recent Surgery: Procedure(s) (LRB):  CRANIOTOMY, FOR SUBDURAL HEMATOMA EVACUATION, bilateral. shunt removal (Bilateral)  REMOVAL, SHUNT, VENTRICULOPERITONEAL 7 Days Post-Op    Plan:     During this hospitalization, patient to be seen 3 x/week to address the identified rehab impairments via therapeutic exercises, therapeutic activities, neuromuscular re-education and progress toward the following goals:    Plan of Care Expires:  05/29/25    Subjective   Pt non verbal during treatment; eyes were open ~ 20% of treatment  Patient/Family Comments/goals: Per daughter, "He really likes jazz music"  Pain/Comfort:  Pain Rating 1: 0/10 (pt nonverbal , did not appear to be in pain during treatment)  Pain Rating Post-Intervention 1: 0/10      Objective:     Communicated with nurse prior to session.  Patient found HOB elevated with bed alarm, blood pressure cuff, oxygen, peripheral IV, NG tube, PureWick upon PT " entry to room.     General Precautions: Standard, aphasia, aspiration, fall, seizure  Orthopedic Precautions: N/A  Braces: N/A  Respiratory Status: Nasal cannula, flow 2 L/min     Functional Mobility:  Bed Mobility:     Rolling Left:  total assistance  Rolling Right: total assistance  Supine to Sit: total assistance  Sit to Supine: total assistance and of 2 persons    AM-PAC 6 CLICK MOBILITY  Turning over in bed (including adjusting bedclothes, sheets and blankets)?: 2  Sitting down on and standing up from a chair with arms (e.g., wheelchair, bedside commode, etc.): 1  Moving from lying on back to sitting on the side of the bed?: 2  Moving to and from a bed to a chair (including a wheelchair)?: 1  Need to walk in hospital room?: 1  Climbing 3-5 steps with a railing?: 1  Basic Mobility Total Score: 8     Treatment & Education:  Pt received PROM to B UE and B LE in supine x 10 reps within available planes of movement. Pt assisted with hip and knee flex on the R inconsistently and was not able to move his LE upon command. Pt sat on the EOB ~ 5 min with total assist. Pt received manual cues to weight shift in all planes with UEs in a weight bearing position.     Patient left HOB elevated with all lines intact, call button in reach, bed alarm on, nurse notified, and wife and daughter present..    GOALS:   Multidisciplinary Problems       Physical Therapy Goals          Problem: Physical Therapy    Goal Priority Disciplines Outcome Interventions   Physical Therapy Goal     PT, PT/OT Progressing    Description: Goals to be completed by: 5/12/25    Pt will perform sup<>sit transfers w/ moderate assistance  Pt will have sufficient dynamic balance to sit EOB while performing ADLs/therex w/ minimum assistance  Pt will be able to stand up from EOB w/ moderate assistance using LRAD  Bed to chair transfer w/ maxA using LRAD  Pt will be independent w/ HEP therex on BLE w/ good form and ROM                        DME  Justifications:  Ramesh Yo requires a hospital bed due to him requiring positioning of the body in ways not feasible with an ordinary bed due to being completely immobile and cannot independently make changes in body position without the use of the bed.The positioning of the body cannot be sufficiently resolved by the use of pillows and wedges.  Ramesh Ricci has a mobility limitation that significantly impairs his ability to participate in one or more mobility related activities of daily living (MRADLs) such as toileting, feeding, dressing, grooming, and bathing in customary locations in the home.  The mobility limitation cannot be sufficiently resolved by the use of a cane or walker.   The use of a manual wheelchair will significantly improve the patients ability to participate in MRADLS and the patient will use it on regular basis in the home.  Ramesh Ricci has expressed his willingness to use a manual wheelchair in the home. Patients upper body strength is sufficient for propulsion.  He also has a caregiver who is available, willing, and able to provide assistance with the wheelchair when needed.      Time Tracking:     PT Received On: 05/02/25  PT Start Time: 1057     PT Stop Time: 1126  PT Total Time (min): 29 min     Billable Minutes: Therapeutic Activity 15 and Therapeutic Exercise 14    Treatment Type: Treatment  PT/PTA: PT     Number of PTA visits since last PT visit: 0     05/02/2025

## 2025-05-02 NOTE — ASSESSMENT & PLAN NOTE
Vimpat 200 bid   EEG- moderate encephalopathy, triphasics, generalized cortical irritability, no discrete seizures

## 2025-05-02 NOTE — ASSESSMENT & PLAN NOTE
85M with a medical history significant for atrial fibrillation on Eliquis, UC, HTN, and recent VPS placement (2/6/25) for NPH complicated by bilateral SDH s/p bilateral craniotomy for SD hygroma resection with VPS tie off (3/24/25) who was admitted to [facility] on 4/23/25 for ongoing management of SDH reexpansion, now s/p repeat bilateral craniotomy for evacuation on 4/25/25.      POD7. Remains lethargic and difficult to arouse, continue Modafinil trial, will increase to 200mg daily.      Continue Lacoasmide 200mg BID.       CTH with small right parietal ICH, repeat CTH at 6 hours stable.     SBP<160.      Aggressive pulmonary toileting and airway maintenance noting DNI.     TF + FWF.     Final dose of antibiotics this am.     Continue home statin, Silodosin for urinary retention and levothyroxine.      Lovenox for chemical DVT prophylaxis.

## 2025-05-02 NOTE — SUBJECTIVE & OBJECTIVE
Interval History: 5/2: No changes from nsgy perspective    Medications:  Continuous Infusions:  Scheduled Meds:   acetylcysteine 100 mg/ml (10%)  4 mL Nebulization Q6H    albuterol-ipratropium  3 mL Nebulization Q6H    enoxparin  40 mg Subcutaneous Q24H (prophylaxis, 1700)    lacosamide  200 mg Per NG tube Q12H    levothyroxine  125 mcg Per NG tube Before breakfast    metoprolol tartrate  12.5 mg Per NG tube Q8H    modafiniL  100 mg Per NG tube Daily    pravastatin  20 mg Per NG tube QHS    silodosin  4 mg Per NG tube Daily     PRN Meds:  Current Facility-Administered Medications:     acetaminophen, 650 mg, Per NG tube, Q6H PRN    bisacodyL, 10 mg, Rectal, Daily PRN    hydrALAZINE, 10 mg, Intravenous, Q4H PRN    labetalol, 10 mg, Intravenous, Q4H PRN    magnesium oxide, 800 mg, Per NG tube, PRN    magnesium oxide, 800 mg, Per NG tube, PRN    potassium bicarbonate, 35 mEq, Per NG tube, PRN    potassium bicarbonate, 50 mEq, Per NG tube, PRN    potassium bicarbonate, 60 mEq, Per NG tube, PRN    potassium, sodium phosphates, 2 packet, Per NG tube, PRN    potassium, sodium phosphates, 2 packet, Per NG tube, PRN    potassium, sodium phosphates, 2 packet, Per NG tube, PRN    sodium chloride 0.9%, 10 mL, Intravenous, PRN     Review of Systems  Objective:     Weight: 68 kg (149 lb 14.6 oz)  Body mass index is 20.92 kg/m².  Vital Signs (Most Recent):  Temp: 98.1 °F (36.7 °C) (05/02/25 0701)  Pulse: 85 (05/02/25 0901)  Resp: (!) 26 (05/02/25 0901)  BP: 137/65 (05/02/25 0901)  SpO2: 98 % (05/02/25 0901) Vital Signs (24h Range):  Temp:  [97.7 °F (36.5 °C)-99.1 °F (37.3 °C)] 98.1 °F (36.7 °C)  Pulse:  [68-94] 85  Resp:  [18-33] 26  SpO2:  [94 %-100 %] 98 %  BP: (128-180)/(57-83) 137/65     Date 05/02/25 0700 - 05/03/25 0659   Shift 7502-2374 8102-8089 5194-9476 24 Hour Total   INTAKE   NG/   500   Shift Total(mL/kg) 500(7.4)   500(7.4)   OUTPUT   Urine(mL/kg/hr) 245   245   Shift Total(mL/kg) 245(3.6)   245(3.6)   Weight  (kg) 68 68 68 68              Oxygen Concentration (%):  [32-40] 40             NG/OG Tube 04/26/25 0819 14 Fr. Right nostril (Active)   Placement Check other (see comments);placement verified by x-ray 04/27/25 1701   Tolerance no signs/symptoms of discomfort 04/29/25 0900   Securement secured to nostril center 04/29/25 0900   Clamp Status/Tolerance unclamped 04/29/25 0900   Suction Setting/Drainage Method suction at the bedside 04/29/25 0900   Insertion Site Appearance no redness, warmth, tenderness, skin breakdown, drainage 04/29/25 0900   Drainage None 04/27/25 1701   Flush/Irrigation flushed w/;water;no resistance met 04/29/25 0900   Feeding Type continuous 04/29/25 0900   Feeding Action feeding continued 04/29/25 0900   Current Rate (mL/hr) 45 mL/hr 04/29/25 0900   Goal Rate (mL/hr) 45 mL/hr 04/29/25 0900   Intake (mL) 60 mL 04/29/25 0809   Water Bolus (mL) 30 mL 04/29/25 0809   Formula Name Impact Peptide 04/27/25 1301   Intake (mL) - Formula Tube Feeding 45 04/29/25 0900       Male External Urinary Catheter 04/23/25 1858 (Active)   Collection Container Suction canister 04/29/25 0900   Securement Method secured to top of thigh w/ adhesive device 04/29/25 0900   Skin no redness;no breakdown 04/29/25 0900   Tolerance no signs/symptoms of discomfort 04/29/25 0900   Output (mL) 120 mL 04/29/25 0809   Catheter Change Date 04/29/25 04/29/25 0900   Catheter Change Time 0500 04/29/25 0900          Physical Exam         Neurosurgery Physical Exam    E2V2M4  Extubated, breathing spontaneously  Eyes open to stimulation, PERRL  Brainstems intact  LUE WD, RUE EXT  BLE no movement to stim  On cEEG    Significant Labs:  Recent Labs   Lab 05/01/25  0136 05/02/25  0250   * 146*    140   K 3.9 4.0    107   CO2 23 22*   BUN 66* 58*   CREATININE 1.1 1.0   CALCIUM 9.2 9.0   MG 2.3 2.1     Recent Labs   Lab 05/01/25  0136 05/02/25  0250   WBC 12.46 13.31*   HGB 9.6* 9.2*   HCT 30.2* 29.0*    374     No  "results for input(s): "LABPT", "INR", "APTT" in the last 48 hours.  Microbiology Results (last 7 days)       Procedure Component Value Units Date/Time    Blood culture [[id number]]  (Normal) Collected: 04/26/25 1628    Order Status: Completed Specimen: Blood from Peripheral, Antecubital, Right Updated: 05/01/25 2001     Blood Culture No Growth After 5 Days    Blood culture [[id number]]  (Normal) Collected: 04/26/25 1628    Order Status: Completed Specimen: Blood from Peripheral, Forearm, Left Updated: 05/01/25 2001     Blood Culture No Growth After 5 Days    Culture, Respiratory with Gram Stain [[id number]] Collected: 04/26/25 1428    Order Status: Completed Specimen: Respiratory from Sputum, Induced Updated: 04/29/25 1131     Respiratory Culture Normal respiratory audrey, no Staph aureus or Pseudomonas isolated     GRAM STAIN <10 Epithelial Cells/LPF      Rare WBC seen      No organisms seen    MRSA Screen by PCR [[id number]]  (Normal) Collected: 04/28/25 1056    Order Status: Completed Specimen: Nasal Swab Updated: 04/28/25 1704     MRSA PCR Screen Not Detected    Respiratory Infection Panel (PCR), Nasopharyngeal [[id number]] Collected: 04/27/25 1759    Order Status: Completed Specimen: Nasopharyngeal Swab Updated: 04/27/25 2049     Respiratory Infection Panel Source Nasopharyngeal Swab     Adenovirus Not Detected     Coronavirus 229E, Common Cold Virus Not Detected     Coronavirus HKU1, Common Cold Virus Not Detected     Coronavirus NL63, Common Cold Virus Not Detected     Coronavirus OC43, Common Cold Virus Not Detected     SARS-CoV2 (COVID-19) Qualitative PCR Not Detected     Human Metapneumovirus Not Detected     Human Rhinovirus/Enterovirus Not Detected     Influenza A Not Detected     Influenza B Not Detected     Parainfluenza Virus 1 Not Detected     Parainfluenza Virus 2 Not Detected     Parainfluenza Virus 3 Not Detected     Parainfluenza Virus 4 Not Detected     Respiratory Syncytial Virus Not Detected    "  Bordetella Parapertussis (FZ7543) Not Detected     Bordetella pertussis (ptxP) Not Detected     Chlamydia pneumoniae Not Detected     Mycoplasma pneumoniae Not Detected    Respiratory Infection Panel (PCR), Nasopharyngeal [[id number]] Collected: 04/27/25 1004    Order Status: Canceled Specimen: Nasopharyngeal Swab Updated: 04/27/25 1222          All pertinent labs from the last 24 hours have been reviewed.    Significant Diagnostics:  CT: No results found in the last 24 hours.  MRI: No results found in the last 24 hours.  CT Head Without Contrast  Result Date: 4/30/2025  Stable appearance of the brain.  Stable small areas of extra-axial fluid and hemorrhage.  Stable right parietal intraparenchymal hemorrhage. Electronically signed by: Kyaw Weiner Date:    04/30/2025 Time:    07:34    X-Ray Chest AP Portable  Result Date: 4/29/2025  See above Electronically signed by: Johnnie Morelos MD Date:    04/29/2025 Time:    12:29    CT Head Without Contrast  Result Date: 4/29/2025  New right parietal intraparenchymal hematoma with mild surrounding edema. Exam otherwise grossly unchanged from recent study of 04/26/2025 as further described above. This report was flagged in Epic as abnormal. Electronically signed by: Sebastien Tian MD Date:    04/29/2025 Time:    11:58    No results found in the last 24 hours.  No results found in the last 24 hours.

## 2025-05-02 NOTE — PLAN OF CARE
"[facility] Care Plan    POC reviewed with Ramesh Ricci and family at 0300. Patient needed reinforcement . Questions and concerns addressed. No acute events today. Pt progressing toward goals. Will continue to monitor. See below and flowsheets for full assessment and VS info.   - Neuro status unchanged,  - SBP goal maintained.  - TF continue  @ 45 mL/Hr, plus  mL q 4 .          Is this a stroke patient? yes- Stroke booklet reviewed with patient, risk factors identified for patient and stroke booklet remains at bedside for ongoing education.     Care individualization: suction after he cough     Neuro:  [place] Coma Scale  Best Eye Response: 2-->(E2) to pain  Best Motor Response: 4-->(M4) withdraws from pain  Best Verbal Response: 1-->(V1) none  [place] Coma Scale Score: 7  Assessment Qualifiers: patient not sedated/intubated  Pupil PERRLA: no     24 hr Temp:  [97.7 °F (36.5 °C)-99.1 °F (37.3 °C)]     CV:   Rhythm: normal sinus rhythm  BP goals:   SBP < 160  MAP > 65    Resp: O2:2L via nasal cannula     Oxygen Concentration (%): 40    Plan: N/A    GI/:     Diet/Nutrition Received: tube feeding  Last Bowel Movement: 05/02/25  Voiding Characteristics: external catheter    Intake/Output Summary (Last 24 hours) at 5/2/2025 0634  Last data filed at 5/2/2025 0605  Gross per 24 hour   Intake 3321.07 ml   Output 1845 ml   Net 1476.07 ml     Unmeasured Output  Urine Occurrence: 0  Stool Occurrence: 1  Emesis Occurrence: 0  Pad Count: 1    Labs/Accuchecks:  Recent Labs   Lab 05/02/25  0250   WBC 13.31*   RBC 3.18*   HGB 9.2*   HCT 29.0*         Recent Labs   Lab 05/02/25  0250      K 4.0   CO2 22*      BUN 58*   CREATININE 1.0   ALKPHOS 61   ALT 24   AST 41   BILITOT 0.3    No results for input(s): "PROTIME", "INR", "APTT", "HEPANTIXA" in the last 168 hours. No results for input(s): "CPK", "CPKMB", "TROPONINI", "MB" in the last 168 hours.    Electrolytes: N/A - electrolytes WDL  Accuchecks: " none    Gtts:      LDA/Wounds:    Gabino Risk Assessment  Sensory Perception: 3-->slightly limited  Moisture: 3-->occasionally moist  Activity: 1-->bedfast  Mobility: 1-->completely immobile  Nutrition: 3-->adequate  Friction and Shear: 2-->potential problem  Gabino Score: 13  Is your gabino score 12 or less? no            [facility]

## 2025-05-02 NOTE — SUBJECTIVE & OBJECTIVE
Review of Systems   Unable to perform ROS: Other (level of consciousness)     Objective:     Vitals:  Temp: 98.8 °F (37.1 °C)  Pulse: 90  Rhythm: normal sinus rhythm  BP: (!) 145/65  MAP (mmHg): 94  Resp: 18  SpO2: 99 %    Temp  Min: 98.1 °F (36.7 °C)  Max: 99.1 °F (37.3 °C)  Pulse  Min: 74  Max: 94  BP  Min: 128/57  Max: 161/69  MAP (mmHg)  Min: 82  Max: 102  Resp  Min: 18  Max: 33  SpO2  Min: 94 %  Max: 100 %  Oxygen Concentration (%)  Min: 40  Max: 40    05/01 0701 - 05/02 0700  In: 3321.1 [I.V.:10]  Out: 1845 [Urine:1845]   Unmeasured Output  Urine Occurrence: 0  Stool Occurrence: 1  Emesis Occurrence: 0  Pad Count: 1        Physical Exam  Vitals and nursing note reviewed.   Constitutional:       General: He is not in acute distress.     Appearance: He is ill-appearing.   HENT:      Head:      Comments: Surgical site c/d/i     Right Ear: External ear normal.      Left Ear: External ear normal.      Nose: Nose normal.      Comments: NGT in place.     Mouth/Throat:      Mouth: Mucous membranes are dry.   Eyes:      Pupils: Pupils are equal, round, and reactive to light.   Cardiovascular:      Rate and Rhythm: Normal rate and regular rhythm.   Pulmonary:      Effort: Pulmonary effort is normal. No respiratory distress.      Comments: Upper airway congestion   Bl coarse BS   Abdominal:      General: There is no distension.      Palpations: Abdomen is soft.      Tenderness: There is no abdominal tenderness. There is no guarding.   Musculoskeletal:      Right lower leg: No edema.      Left lower leg: No edema.   Skin:     General: Skin is warm and dry.   Neurological:      Comments:   E2 V2 M3  Mental status: Lethargic, opens eyes to very noxious stimuli. Not following commands.   CN II-XII grossly intact, specifically:  PERRL  EOMI  blinks to threat bilaterally   No facial asymmetry.  Weak spontaneous cough   Motor:  RUE flexion to noxious   RLE triple flexion to tickling foot  LUE minimal flexion to noxious  LLE  "triple flexion to tickling foot  Sensation intact to noxious x4       Gait & coordination exams deferred.        Medications:  Continuous Scheduledacetylcysteine 100 mg/ml (10%), 4 mL, Q6H  albuterol-ipratropium, 3 mL, Q6H  enoxparin, 40 mg, Q24H (prophylaxis, 1700)  lacosamide, 200 mg, Q12H  levothyroxine, 125 mcg, Before breakfast  metoprolol tartrate, 12.5 mg, Q8H  [START ON 5/3/2025] modafiniL, 200 mg, Daily  pravastatin, 20 mg, QHS  psyllium husk, 1 packet, Daily  silodosin, 4 mg, Daily    PRNacetaminophen, 650 mg, Q6H PRN  bisacodyL, 10 mg, Daily PRN  hydrALAZINE, 10 mg, Q4H PRN  labetalol, 10 mg, Q4H PRN  magnesium oxide, 800 mg, PRN  magnesium oxide, 800 mg, PRN  potassium bicarbonate, 35 mEq, PRN  potassium bicarbonate, 50 mEq, PRN  potassium bicarbonate, 60 mEq, PRN  potassium, sodium phosphates, 2 packet, PRN  potassium, sodium phosphates, 2 packet, PRN  potassium, sodium phosphates, 2 packet, PRN  sodium chloride 0.9%, 10 mL, PRN      Today I personally reviewed pertinent medications, lines/drains/airways, imaging, laboratory results, microbiology results, notably:    CTH with new small R parietal IPH with mild surrounding edema.     CXR with slight improvement from yesterday.      Laboratory:  CBC:  Recent Labs   Lab 05/02/25  0250   WBC 13.31*   RBC 3.18*   HGB 9.2*   HCT 29.0*      MCV 91   MCH 28.9   MCHC 31.7*       CMP:  Recent Labs   Lab 05/02/25  0250   CALCIUM 9.0   ALBUMIN 2.0*   PROT 6.5      K 4.0   CO2 22*      BUN 58*   CREATININE 1.0   ALKPHOS 61   ALT 24   AST 41   BILITOT 0.3     Recent Labs   Lab 05/02/25  0250   MG 2.1   PHOS 3.9       Coagulation:  No results for input(s): "LABPROT", "INR", "APTT", "FIBRINOGEN" in the last 168 hours.      Endocrine:  No results for input(s): "HGBA1C", "TSH" in the last 72 hours.    Diet  Diet NPO  Diet NPO        "

## 2025-05-02 NOTE — PROGRESS NOTES
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 His wife at bedside reports concern for acute deconditioning over the past three days involving worsening weakness, urinary frequency, speech changes, intermittent cessation in interaction with family, and dragging of his R leg. CT head showed bilateral subdural hygromas with bilateral acute versus subacute SDH. He takes eliquis for A fib at home which was reversed with PCC in the ED. During evaluation of the patient, he was initially interactive, following, commands, and oriented to person and place. He then had a brief staring spell where he no longer followed commands and displayed no usable speech. This then resolved after a few minutes and he returned to baseline. He takes Vimpat 100 bid at home but has not gotten his nightly dose. Per his wife, these spells occurred at home as well.    He is admitted to [facility] for hourly neuro-monitoring and a higher level of care.     Hospital Course: 04/25/2025: OR today for SDH evacuation   04/26/2025: NAEON. POD1 s/p SDH evac. Post-op CTH good decompression and expected post-op changes. Post-op episode of seizure-like activity, described as "LUE & LLE shaking" lasting approximately 1 minute that resolved without intervention. Patient unarousable post-operatively and this AM. NGT placed. Repeat CTH obtained, stable. Procal, EEG ordered and pending.   04/27/2025:  POD2 s/p SDH evacuation. Procal elevated and patient febrile with low grade temp. Pan-cultured and started on broad spec antibiotics. " Aggressive pulm toileting started yesterday. Overnight with Tmax 100.9°F. Cx with NGTD. Respiratory panel sent. US extremities ordered and pending to rule out DVT. Start lovenox pending neurosurgery recs.   04/28/2025: Continue aggressive pulmonary toileting. EEG with periodic complexes with some epileptiform morphology, optimized lacosamide to 200mg BID.   04/29/2025 EEG negative for seizures, small improvement after increasing vimpat yesterday. Discontinued EEG. CTH with new small R parietal IPH with mild surrounding edema. BUN 67, started enteral water flushes 250 q4h. MRSA swab negative, discontinued vanc. CXR with slight improvement from yesterday. Continue aggressive pulm toilet.   04/30/2025 CTH overnight with stable R IPH. POD5 and pt still not waking up. Relax to q2h neuro checks. BUN 72, increased enteral water to 300 q4h and given additional 300 ml x 2 today.   05/01/2025 exam remains stable. Started modafinil 100mg. Continue pulm toilet spaced to q6h and added IPV q6h.   05/02/2025 Tolerating Modafinil with mild improvement in level of alertness, increase to 200mg. Decreased O2 demands.     Review of Systems   Unable to perform ROS: Other (level of consciousness)     Objective:     Vitals:  Temp: 98.8 °F (37.1 °C)  Pulse: 90  Rhythm: normal sinus rhythm  BP: (!) 145/65  MAP (mmHg): 94  Resp: 18  SpO2: 99 %    Temp  Min: 98.1 °F (36.7 °C)  Max: 99.1 °F (37.3 °C)  Pulse  Min: 74  Max: 94  BP  Min: 128/57  Max: 161/69  MAP (mmHg)  Min: 82  Max: 102  Resp  Min: 18  Max: 33  SpO2  Min: 94 %  Max: 100 %  Oxygen Concentration (%)  Min: 40  Max: 40    05/01 0701 - 05/02 0700  In: 3321.1 [I.V.:10]  Out: 1845 [Urine:1845]   Unmeasured Output  Urine Occurrence: 0  Stool Occurrence: 1  Emesis Occurrence: 0  Pad Count: 1        Physical Exam  Vitals and nursing note reviewed.   Constitutional:       General: He is not in acute distress.     Appearance: He is ill-appearing.   HENT:      Head:      Comments: Surgical  site c/d/i     Right Ear: External ear normal.      Left Ear: External ear normal.      Nose: Nose normal.      Comments: NGT in place.     Mouth/Throat:      Mouth: Mucous membranes are dry.   Eyes:      Pupils: Pupils are equal, round, and reactive to light.   Cardiovascular:      Rate and Rhythm: Normal rate and regular rhythm.   Pulmonary:      Effort: Pulmonary effort is normal. No respiratory distress.      Comments: Upper airway congestion   Bl coarse BS   Abdominal:      General: There is no distension.      Palpations: Abdomen is soft.      Tenderness: There is no abdominal tenderness. There is no guarding.   Musculoskeletal:      Right lower leg: No edema.      Left lower leg: No edema.   Skin:     General: Skin is warm and dry.   Neurological:      Comments:   E2 V2 M3  Mental status: Lethargic, opens eyes to very noxious stimuli. Not following commands.   CN II-XII grossly intact, specifically:  PERRL  EOMI  blinks to threat bilaterally   No facial asymmetry.  Weak spontaneous cough   Motor:  RUE flexion to noxious   RLE triple flexion to tickling foot  LUE minimal flexion to noxious  LLE triple flexion to tickling foot  Sensation intact to noxious x4       Gait & coordination exams deferred.        Medications:  Continuous Scheduledacetylcysteine 100 mg/ml (10%), 4 mL, Q6H  albuterol-ipratropium, 3 mL, Q6H  enoxparin, 40 mg, Q24H (prophylaxis, 1700)  lacosamide, 200 mg, Q12H  levothyroxine, 125 mcg, Before breakfast  metoprolol tartrate, 12.5 mg, Q8H  [START ON 5/3/2025] modafiniL, 200 mg, Daily  pravastatin, 20 mg, QHS  psyllium husk, 1 packet, Daily  silodosin, 4 mg, Daily    PRNacetaminophen, 650 mg, Q6H PRN  bisacodyL, 10 mg, Daily PRN  hydrALAZINE, 10 mg, Q4H PRN  labetalol, 10 mg, Q4H PRN  magnesium oxide, 800 mg, PRN  magnesium oxide, 800 mg, PRN  potassium bicarbonate, 35 mEq, PRN  potassium bicarbonate, 50 mEq, PRN  potassium bicarbonate, 60 mEq, PRN  potassium, sodium phosphates, 2 packet,  "PRN  potassium, sodium phosphates, 2 packet, PRN  potassium, sodium phosphates, 2 packet, PRN  sodium chloride 0.9%, 10 mL, PRN      Today I personally reviewed pertinent medications, lines/drains/airways, imaging, laboratory results, microbiology results, notably:    CTH with new small R parietal IPH with mild surrounding edema.     CXR with slight improvement from yesterday.      Laboratory:  CBC:  Recent Labs   Lab 05/02/25  0250   WBC 13.31*   RBC 3.18*   HGB 9.2*   HCT 29.0*      MCV 91   MCH 28.9   MCHC 31.7*       CMP:  Recent Labs   Lab 05/02/25  0250   CALCIUM 9.0   ALBUMIN 2.0*   PROT 6.5      K 4.0   CO2 22*      BUN 58*   CREATININE 1.0   ALKPHOS 61   ALT 24   AST 41   BILITOT 0.3     Recent Labs   Lab 05/02/25  0250   MG 2.1   PHOS 3.9       Coagulation:  No results for input(s): "LABPROT", "INR", "APTT", "FIBRINOGEN" in the last 168 hours.      Endocrine:  No results for input(s): "HGBA1C", "TSH" in the last 72 hours.    Diet  Diet NPO  Diet NPO        Assessment/Plan:     Neuro  * Subdural hemorrhage  85M with a medical history significant for atrial fibrillation on Eliquis, UC, HTN, and recent VPS placement (2/6/25) for NPH complicated by bilateral SDH s/p bilateral craniotomy for SD hygroma resection with VPS tie off (3/24/25) who was admitted to [facility] on 4/23/25 for ongoing management of SDH reexpansion, now s/p repeat bilateral craniotomy for evacuation on 4/25/25.      POD7. Remains lethargic and difficult to arouse, continue Modafinil trial, will increase to 200mg daily.      Continue Lacoasmide 200mg BID.       CTH with small right parietal ICH, repeat CTH at 6 hours stable.     SBP<160.      Aggressive pulmonary toileting and airway maintenance noting DNI.     TF + FWF.     Final dose of antibiotics this am.     Continue home statin, Silodosin for urinary retention and levothyroxine.      Lovenox for chemical DVT prophylaxis.     Subdural hematoma  See primary " problem    Seizure disorder  Vimpat 200 bid   EEG- moderate encephalopathy, triphasics, generalized cortical irritability, no discrete seizures    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   Tie off on 3/25 2/2 bilateral hygromas that were evacuated  Removed in OR on 4/25     Cardiac/Vascular  Coronary artery disease involving native coronary artery of native heart without angina pectoris  Resume home statin  Hold home eliquis    PAF (paroxysmal atrial fibrillation)  C/w [id number] q8h  Hold home eliquis, reversed on admission  Currently rate controlled    Left ventricular diastolic dysfunction, NYHA class 1  Echo reviewed    Essential hypertension  SBP < 160  c/w metoprolol 12.5 q8h  PRN labetalol, hydralazine    Renal/  Urinary retention  C/w home silodosin 4    Endocrine  Acquired hypothyroidism  C/w home synthroid 125mcg     GI  UC (ulcerative colitis)  Hold home colazal in acute setting          The patient is being Prophylaxed for:  Venous Thromboembolism with: Chemical  Stress Ulcer with: Not Applicable   Ventilator Pneumonia with: not applicable    Activity Orders            Elevate HOB Elevate HOB 30-45 degrees during feeding unless contraindicated starting at 04/27 0844    Diet NPO: NPO starting at 04/25 0001    Turn patient starting at 04/23 2000    Elevate HOB starting at 04/23 1948          Partial Code    35 minutes of Critical care time was spent personally by me on the following activities: development of treatment plan with patient or surrogate and bedside caregivers, discussions with consultants, evaluation of  patient's response to treatment, examination of patient, ordering and performing treatments and interventions, ordering and review of laboratory studies, ordering and review of radiographic studies, pulse oximetry, antibiotic titration if applicable, vasopressor titration if applicable, re-evaluation of patient's condition. This critical care time did not overlap with that of any other provider or involve time for any procedures. There is high probability for acute neurological change leading to clinical and possibly life-threatening deterioration requiring highest level of physician preparedness for urgent intervention.    Johnnie Mahajan MD  Neurocritical Care  [facility] - Neuro Critical Care

## 2025-05-02 NOTE — PROGRESS NOTES
[facility]  Neurosurgery  Progress Note    Subjective:     History of Present Illness: Ramesh Ricci is an 84 y/o male with a PMH of Afib, Ulcerative Colitis, HTN, and aortic stenosis. He also has normal pressure hydrocephalus s/p  shunt (now tied off) is presenting with multiple complaints.     Wife reports concern for acute deconditioning over the past few days.  She describes increased weakness, urinary frequency, and speech changes that is been progressing for the past 3 days.  She states that he seems to slumped towards the right side and has been dragging his right leg.  Previously ambulated with his walker.  She also mentions that he seems to have speech hesitancy.  Denies any fevers, chills, abdominal pain or diarrhea    Post-Op Info:  Procedure(s) (LRB):  CRANIOTOMY, FOR SUBDURAL HEMATOMA EVACUATION, bilateral. shunt removal (Bilateral)  REMOVAL, SHUNT, VENTRICULOPERITONEAL   7 Days Post-Op   Interval History: 5/2: No changes from nsgy perspective    Medications:  Continuous Infusions:  Scheduled Meds:   acetylcysteine 100 mg/ml (10%)  4 mL Nebulization Q6H    albuterol-ipratropium  3 mL Nebulization Q6H    enoxparin  40 mg Subcutaneous Q24H (prophylaxis, 1700)    lacosamide  200 mg Per NG tube Q12H    levothyroxine  125 mcg Per NG tube Before breakfast    metoprolol tartrate  12.5 mg Per NG tube Q8H    modafiniL  100 mg Per NG tube Daily    pravastatin  20 mg Per NG tube QHS    silodosin  4 mg Per NG tube Daily     PRN Meds:  Current Facility-Administered Medications:     acetaminophen, 650 mg, Per NG tube, Q6H PRN    bisacodyL, 10 mg, Rectal, Daily PRN    hydrALAZINE, 10 mg, Intravenous, Q4H PRN    labetalol, 10 mg, Intravenous, Q4H PRN    magnesium oxide, 800 mg, Per NG tube, PRN    magnesium oxide, 800 mg, Per NG tube, PRN    potassium bicarbonate, 35 mEq, Per NG tube, PRN    potassium bicarbonate, 50 mEq, Per NG tube, PRN    potassium bicarbonate, 60 mEq, Per NG tube, PRN     potassium, sodium phosphates, 2 packet, Per NG tube, PRN    potassium, sodium phosphates, 2 packet, Per NG tube, PRN    potassium, sodium phosphates, 2 packet, Per NG tube, PRN    sodium chloride 0.9%, 10 mL, Intravenous, PRN     Review of Systems  Objective:     Weight: 68 kg (149 lb 14.6 oz)  Body mass index is 20.92 kg/m².  Vital Signs (Most Recent):  Temp: 98.1 °F (36.7 °C) (05/02/25 0701)  Pulse: 85 (05/02/25 0901)  Resp: (!) 26 (05/02/25 0901)  BP: 137/65 (05/02/25 0901)  SpO2: 98 % (05/02/25 0901) Vital Signs (24h Range):  Temp:  [97.7 °F (36.5 °C)-99.1 °F (37.3 °C)] 98.1 °F (36.7 °C)  Pulse:  [68-94] 85  Resp:  [18-33] 26  SpO2:  [94 %-100 %] 98 %  BP: (128-180)/(57-83) 137/65     Date 05/02/25 0700 - 05/03/25 0659   Shift 0358-1522 7918-4878 6056-4786 24 Hour Total   INTAKE   NG/   500   Shift Total(mL/kg) 500(7.4)   500(7.4)   OUTPUT   Urine(mL/kg/hr) 245   245   Shift Total(mL/kg) 245(3.6)   245(3.6)   Weight (kg) 68 68 68 68              Oxygen Concentration (%):  [32-40] 40             NG/OG Tube 04/26/25 0819 14 Fr. Right nostril (Active)   Placement Check other (see comments);placement verified by x-ray 04/27/25 1701   Tolerance no signs/symptoms of discomfort 04/29/25 0900   Securement secured to nostril center 04/29/25 0900   Clamp Status/Tolerance unclamped 04/29/25 0900   Suction Setting/Drainage Method suction at the bedside 04/29/25 0900   Insertion Site Appearance no redness, warmth, tenderness, skin breakdown, drainage 04/29/25 0900   Drainage None 04/27/25 1701   Flush/Irrigation flushed w/;water;no resistance met 04/29/25 0900   Feeding Type continuous 04/29/25 0900   Feeding Action feeding continued 04/29/25 0900   Current Rate (mL/hr) 45 mL/hr 04/29/25 0900   Goal Rate (mL/hr) 45 mL/hr 04/29/25 0900   Intake (mL) 60 mL 04/29/25 0809   Water Bolus (mL) 30 mL 04/29/25 0809   Formula Name Impact Peptide 04/27/25 1301   Intake (mL) - Formula Tube Feeding 45 04/29/25 0900       Male  "External Urinary Catheter 04/23/25 1858 (Active)   Collection Container Suction canister 04/29/25 0900   Securement Method secured to top of thigh w/ adhesive device 04/29/25 0900   Skin no redness;no breakdown 04/29/25 0900   Tolerance no signs/symptoms of discomfort 04/29/25 0900   Output (mL) 120 mL 04/29/25 0809   Catheter Change Date 04/29/25 04/29/25 0900   Catheter Change Time 0500 04/29/25 0900          Physical Exam         Neurosurgery Physical Exam    [id number]  Extubated, breathing spontaneously  Eyes open to stimulation, PERRL  Brainstems intact  LUE WD, RUE EXT  BLE no movement to stim  On cEEG    Significant Labs:  Recent Labs   Lab 05/01/25  0136 05/02/25  0250   * 146*    140   K 3.9 4.0    107   CO2 23 22*   BUN 66* 58*   CREATININE 1.1 1.0   CALCIUM 9.2 9.0   MG 2.3 2.1     Recent Labs   Lab 05/01/25 0136 05/02/25  0250   WBC 12.46 13.31*   HGB 9.6* 9.2*   HCT 30.2* 29.0*    374     No results for input(s): "LABPT", "INR", "APTT" in the last 48 hours.  Microbiology Results (last 7 days)       Procedure Component Value Units Date/Time    Blood culture [[id number]]  (Normal) Collected: 04/26/25 1628    Order Status: Completed Specimen: Blood from Peripheral, Antecubital, Right Updated: 05/01/25 2001     Blood Culture No Growth After 5 Days    Blood culture [[id number]]  (Normal) Collected: 04/26/25 1628    Order Status: Completed Specimen: Blood from Peripheral, Forearm, Left Updated: 05/01/25 2001     Blood Culture No Growth After 5 Days    Culture, Respiratory with Gram Stain [[id number]] Collected: 04/26/25 1428    Order Status: Completed Specimen: Respiratory from Sputum, Induced Updated: 04/29/25 1131     Respiratory Culture Normal respiratory audrey, no Staph aureus or Pseudomonas isolated     GRAM STAIN <10 Epithelial Cells/LPF      Rare WBC seen      No organisms seen    MRSA Screen by PCR [[id number]]  (Normal) Collected: 04/28/25 1056    Order Status: Completed " Specimen: Nasal Swab Updated: 04/28/25 1704     MRSA PCR Screen Not Detected    Respiratory Infection Panel (PCR), Nasopharyngeal [[id number]] Collected: 04/27/25 1759    Order Status: Completed Specimen: Nasopharyngeal Swab Updated: 04/27/25 2049     Respiratory Infection Panel Source Nasopharyngeal Swab     Adenovirus Not Detected     Coronavirus 229E, Common Cold Virus Not Detected     Coronavirus HKU1, Common Cold Virus Not Detected     Coronavirus NL63, Common Cold Virus Not Detected     Coronavirus OC43, Common Cold Virus Not Detected     SARS-CoV2 (COVID-19) Qualitative PCR Not Detected     Human Metapneumovirus Not Detected     Human Rhinovirus/Enterovirus Not Detected     Influenza A Not Detected     Influenza B Not Detected     Parainfluenza Virus 1 Not Detected     Parainfluenza Virus 2 Not Detected     Parainfluenza Virus 3 Not Detected     Parainfluenza Virus 4 Not Detected     Respiratory Syncytial Virus Not Detected     Bordetella Parapertussis ([id number]) Not Detected     Bordetella pertussis (ptxP) Not Detected     Chlamydia pneumoniae Not Detected     Mycoplasma pneumoniae Not Detected    Respiratory Infection Panel (PCR), Nasopharyngeal [[id number]] Collected: 04/27/25 1004    Order Status: Canceled Specimen: Nasopharyngeal Swab Updated: 04/27/25 1222          All pertinent labs from the last 24 hours have been reviewed.    Significant Diagnostics:  CT: No results found in the last 24 hours.  MRI: No results found in the last 24 hours.  CT Head Without Contrast  Result Date: 4/30/2025  Stable appearance of the brain.  Stable small areas of extra-axial fluid and hemorrhage.  Stable right parietal intraparenchymal hemorrhage. Electronically signed by: Kyaw Weiner Date:    04/30/2025 Time:    07:34    X-Ray Chest AP Portable  Result Date: 4/29/2025  See above Electronically signed by: Johnnie Morelos MD Date:    04/29/2025 Time:    12:29    CT Head Without Contrast  Result Date: 4/29/2025  New  right parietal intraparenchymal hematoma with mild surrounding edema. Exam otherwise grossly unchanged from recent study of 04/26/2025 as further described above. This report was flagged in Epic as abnormal. Electronically signed by: Sebastien Tian MD Date:    04/29/2025 Time:    11:58    No results found in the last 24 hours.  No results found in the last 24 hours.    Assessment/Plan:     * Subdural hemorrhage  84 yo with recent admission for VPS tie off and SDH evacuation on 3/24 presenting for new LSW and speech hesitation found to have enlarged chronic SDH as well as a new small R convexity acute SDH. Pt takes eliquis, s/p PCC. Interval CTH stable.     Now s/p bialteral subdural evacuations with VPS removal on 4/25    Recommendations:  --admitted to NCC  --on eliquis, s/p kcentra reversal. Hold all AC/AP, ok for dvt ppx  --SBP <140  --HOB >30  --vimpat/zosyn per ICU  --if any further scans per NCC then recommend MRI  --drains removed 4/28  --Continue to monitor clinically, notify NSGY immediately with any changes in neuro status    Discussed with Dr. Krzysztof Daley MD  Neurosurgery  [facility] - Neuro Critical [facility]

## 2025-05-03 NOTE — PLAN OF CARE
"[facility] Care Plan    POC reviewed with Ramesh Ricci at 0300. No acute events today. Pt progressing toward goals. Will continue to monitor. See below and flowsheets for full assessment and VS info.             Is this a stroke patient? yes    Neuro:  [place] Coma Scale  Best Eye Response: 2-->(E2) to pain  Best Motor Response: 4-->(M4) withdraws from pain  Best Verbal Response: 1-->(V1) none  [place] Coma Scale Score: 7  Assessment Qualifiers: patient not sedated/intubated  Pupil PERRLA: yes     24 hr Temp:  [97.7 °F (36.5 °C)-99.7 °F (37.6 °C)]     CV:   Rhythm: normal sinus rhythm  BP goals:   SBP < 160  MAP > 65    Resp:      Oxygen Concentration (%): 40    Plan: N/A    GI/:     Diet/Nutrition Received: tube feeding  Last Bowel Movement: 05/02/25  Voiding Characteristics: external catheter    Intake/Output Summary (Last 24 hours) at 5/3/2025 0407  Last data filed at 5/3/2025 0301  Gross per 24 hour   Intake 2887.86 ml   Output 1070 ml   Net 1817.86 ml     Unmeasured Output  Urine Occurrence: 0  Stool Occurrence: 1  Emesis Occurrence: 0  Pad Count: 1    Labs/Accuchecks:  Recent Labs   Lab 05/03/25  0036   WBC 14.20*   RBC 3.02*   HGB 8.7*   HCT 27.3*         Recent Labs   Lab 05/03/25  0036      K 3.9   CO2 21*      BUN 51*   CREATININE 0.8   ALKPHOS 62   ALT 23   AST 36   BILITOT 0.2    No results for input(s): "PROTIME", "INR", "APTT", "HEPANTIXA" in the last 168 hours. No results for input(s): "CPK", "CPKMB", "TROPONINI", "MB" in the last 168 hours.    Electrolytes: N/A - electrolytes WDL  Accuchecks: none    Gtts:      LDA/Wounds:    Gabino Risk Assessment  Sensory Perception: 2-->very limited  Moisture: 3-->occasionally moist  Activity: 1-->bedfast  Mobility: 2-->very limited  Nutrition: 3-->adequate  Friction and Shear: 2-->potential problem  Gabino Score: 13  Is your gabino score 12 or less? no          Restraints:        WCTM   "

## 2025-05-03 NOTE — EICU
Virtual ICU Quality Rounds    Admit Date: 4/23/2025  Hospital Day: 10    ICU Day: 9d 9h    24H Vital Sign Range:  Temp:  [97.7 °F (36.5 °C)-99.7 °F (37.6 °C)]   Pulse:  [71-96]   Resp:  [17-31]   BP: (126-164)/(53-81)   SpO2:  [91 %-100 %]     VICU Surveillance Screening    LDA reconciliation : Yes

## 2025-05-03 NOTE — ASSESSMENT & PLAN NOTE
85M with a medical history significant for atrial fibrillation on Eliquis, UC, HTN, and recent VPS placement (2/6/25) for NPH complicated by bilateral SDH s/p bilateral craniotomy for SD hygroma resection with VPS tie off (3/24/25) who was admitted to [facility] on 4/23/25 for ongoing management of SDH reexpansion, now s/p repeat bilateral craniotomy for evacuation on 4/25/25.      POD7. Remains lethargic and difficult to arouse, continue Modafinil trial, will increase to 200mg daily.      Continue Lacoasmide 200mg BID.       CTH with small right parietal ICH, repeat CTH at 6 hours stable.     SBP<160.      Aggressive pulmonary toileting and airway maintenance noting DNI.     TF + FWF.     Final dose of antibiotics this am.     Continue home statin, Silodosin for urinary retention and levothyroxine.      Lovenox for chemical DVT prophylaxis.

## 2025-05-03 NOTE — SUBJECTIVE & OBJECTIVE
Objective:     Vitals:  Temp: 99.9 °F (37.7 °C)  Pulse: 99  Rhythm: normal sinus rhythm  BP: (!) 141/66  MAP (mmHg): 95  Resp: (!) 27  SpO2: 100 %    Temp  Min: 98.2 °F (36.8 °C)  Max: 99.9 °F (37.7 °C)  Pulse  Min: 71  Max: 99  BP  Min: 126/60  Max: 167/70  MAP (mmHg)  Min: 82  Max: 101  Resp  Min: 17  Max: 30  SpO2  Min: 91 %  Max: 100 %    05/02 0701 - 05/03 0700  In: 3175   Out: 1770 [Urine:1770]   Unmeasured Output  Urine Occurrence: 0  Stool Occurrence: 1  Emesis Occurrence: 0  Pad Count: 1        Physical Exam  Vitals and nursing note reviewed.   Constitutional:       General: He is not in acute distress.     Appearance: He is ill-appearing.   HENT:      Head:      Comments: Surgical site c/d/i     Right Ear: External ear normal.      Left Ear: External ear normal.      Nose: Nose normal.      Comments: NGT in place.     Mouth/Throat:      Mouth: Mucous membranes are dry.   Eyes:      Pupils: Pupils are equal, round, and reactive to light.   Cardiovascular:      Rate and Rhythm: Normal rate and regular rhythm.   Pulmonary:      Effort: Pulmonary effort is normal. No respiratory distress.      Comments: Upper airway congestion   Bl coarse BS   Abdominal:      General: There is no distension.      Palpations: Abdomen is soft.      Tenderness: There is no abdominal tenderness. There is no guarding.   Musculoskeletal:      Right lower leg: No edema.      Left lower leg: No edema.   Skin:     General: Skin is warm and dry.   Neurological:      Comments: Mental status: Lethargic, opens spontaneously. Not following commands.   CN II-XII grossly intact, specifically:  PERRL  EOMI  blinks to threat bilaterally   No facial asymmetry.  Weak spontaneous cough   Motor:  RUE flexion to noxious   RLE triple flexion to tickling foot  LUE minimal flexion to noxious  LLE triple flexion to tickling foot  Sensation intact to noxious x4              Medications:  Continuous Scheduledalbuterol-ipratropium, 3 mL, Q6H  enoxparin,  40 mg, Q24H (prophylaxis, 1700)  lacosamide, 200 mg, Q12H  levothyroxine, 125 mcg, Before breakfast  metoprolol tartrate, 12.5 mg, Q8H  modafiniL, 200 mg, Daily  pravastatin, 20 mg, QHS  psyllium husk, 1 packet, Daily  silodosin, 4 mg, Daily    PRNacetaminophen, 650 mg, Q6H PRN  bisacodyL, 10 mg, Daily PRN  hydrALAZINE, 10 mg, Q4H PRN  labetalol, 10 mg, Q4H PRN  magnesium oxide, 800 mg, PRN  magnesium oxide, 800 mg, PRN  potassium bicarbonate, 35 mEq, PRN  potassium bicarbonate, 50 mEq, PRN  potassium bicarbonate, 60 mEq, PRN  potassium, sodium phosphates, 2 packet, PRN  potassium, sodium phosphates, 2 packet, PRN  potassium, sodium phosphates, 2 packet, PRN  sodium chloride 0.9%, 10 mL, PRN      Today I personally reviewed pertinent medications, laboratory results, notably:    Diet  Diet NPO  Diet NPO

## 2025-05-03 NOTE — PROGRESS NOTES
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 His wife at bedside reports concern for acute deconditioning over the past three days involving worsening weakness, urinary frequency, speech changes, intermittent cessation in interaction with family, and dragging of his R leg. CT head showed bilateral subdural hygromas with bilateral acute versus subacute SDH. He takes eliquis for A fib at home which was reversed with PCC in the ED. During evaluation of the patient, he was initially interactive, following, commands, and oriented to person and place. He then had a brief staring spell where he no longer followed commands and displayed no usable speech. This then resolved after a few minutes and he returned to baseline. He takes Vimpat 100 bid at home but has not gotten his nightly dose. Per his wife, these spells occurred at home as well.    He is admitted to [facility] for hourly neuro-monitoring and a higher level of care.     Hospital Course: 04/25/2025: OR today for SDH evacuation   04/26/2025: NAEON. POD1 s/p SDH evac. Post-op CTH good decompression and expected post-op changes. Post-op episode of seizure-like activity, described as "LUE & LLE shaking" lasting approximately 1 minute that resolved without intervention. Patient unarousable post-operatively and this AM. NGT placed. Repeat CTH obtained, stable. Procal, EEG ordered and pending.   04/27/2025:  POD2 s/p SDH evacuation. Procal elevated and patient febrile with low grade temp. Pan-cultured and started on broad spec antibiotics. " Aggressive pulm toileting started yesterday. Overnight with Tmax 100.9°F. Cx with NGTD. Respiratory panel sent. US extremities ordered and pending to rule out DVT. Start lovenox pending neurosurgery recs.   04/28/2025: Continue aggressive pulmonary toileting. EEG with periodic complexes with some epileptiform morphology, optimized lacosamide to 200mg BID.   04/29/2025 EEG negative for seizures, small improvement after increasing vimpat yesterday. Discontinued EEG. CTH with new small R parietal IPH with mild surrounding edema. BUN 67, started enteral water flushes 250 q4h. MRSA swab negative, discontinued vanc. CXR with slight improvement from yesterday. Continue aggressive pulm toilet.   04/30/2025 CTH overnight with stable R IPH. POD5 and pt still not waking up. Relax to q2h neuro checks. BUN 72, increased enteral water to 300 q4h and given additional 300 ml x 2 today.   05/01/2025 exam remains stable. Started modafinil 100mg. Continue pulm toilet spaced to q6h and added IPV q6h.   05/02/2025 Tolerating Modafinil with mild improvement in level of alertness, increase to 200mg. Decreased O2 demands.   05/03/2025 Alertness slightly improving. Continue to monitor. Respiratory status improved, no longer requiring supplemental O2.      Objective:     Vitals:  Temp: 99.9 °F (37.7 °C)  Pulse: 99  Rhythm: normal sinus rhythm  BP: (!) 141/66  MAP (mmHg): 95  Resp: (!) 27  SpO2: 100 %    Temp  Min: 98.2 °F (36.8 °C)  Max: 99.9 °F (37.7 °C)  Pulse  Min: 71  Max: 99  BP  Min: 126/60  Max: 167/70  MAP (mmHg)  Min: 82  Max: 101  Resp  Min: 17  Max: 30  SpO2  Min: 91 %  Max: 100 %    05/02 0701 - 05/03 0700  In: 3175   Out: 1770 [Urine:1770]   Unmeasured Output  Urine Occurrence: 0  Stool Occurrence: 1  Emesis Occurrence: 0  Pad Count: 1        Physical Exam  Vitals and nursing note reviewed.   Constitutional:       General: He is not in acute distress.     Appearance: He is ill-appearing.   HENT:      Head:      Comments:  Surgical site c/d/i     Right Ear: External ear normal.      Left Ear: External ear normal.      Nose: Nose normal.      Comments: NGT in place.     Mouth/Throat:      Mouth: Mucous membranes are dry.   Eyes:      Pupils: Pupils are equal, round, and reactive to light.   Cardiovascular:      Rate and Rhythm: Normal rate and regular rhythm.   Pulmonary:      Effort: Pulmonary effort is normal. No respiratory distress.      Comments: Upper airway congestion   Bl coarse BS   Abdominal:      General: There is no distension.      Palpations: Abdomen is soft.      Tenderness: There is no abdominal tenderness. There is no guarding.   Musculoskeletal:      Right lower leg: No edema.      Left lower leg: No edema.   Skin:     General: Skin is warm and dry.   Neurological:      Comments: Mental status: Lethargic, opens spontaneously. Not following commands.   CN II-XII grossly intact, specifically:  PERRL  EOMI  blinks to threat bilaterally   No facial asymmetry.  Weak spontaneous cough   Motor:  RUE flexion to noxious   RLE triple flexion to tickling foot  LUE minimal flexion to noxious  LLE triple flexion to tickling foot  Sensation intact to noxious x4              Medications:  Continuous Scheduledalbuterol-ipratropium, 3 mL, Q6H  enoxparin, 40 mg, Q24H (prophylaxis, 1700)  lacosamide, 200 mg, Q12H  levothyroxine, 125 mcg, Before breakfast  metoprolol tartrate, 12.5 mg, Q8H  modafiniL, 200 mg, Daily  pravastatin, 20 mg, QHS  psyllium husk, 1 packet, Daily  silodosin, 4 mg, Daily    PRNacetaminophen, 650 mg, Q6H PRN  bisacodyL, 10 mg, Daily PRN  hydrALAZINE, 10 mg, Q4H PRN  labetalol, 10 mg, Q4H PRN  magnesium oxide, 800 mg, PRN  magnesium oxide, 800 mg, PRN  potassium bicarbonate, 35 mEq, PRN  potassium bicarbonate, 50 mEq, PRN  potassium bicarbonate, 60 mEq, PRN  potassium, sodium phosphates, 2 packet, PRN  potassium, sodium phosphates, 2 packet, PRN  potassium, sodium phosphates, 2 packet, PRN  sodium chloride 0.9%, 10  mL, PRN      Today I personally reviewed pertinent medications, laboratory results, notably:    Diet  Diet NPO  Diet NPO      Assessment/Plan:     Neuro  * Subdural hemorrhage  85M with a medical history significant for atrial fibrillation on Eliquis, UC, HTN, and recent VPS placement (2/6/25) for NPH complicated by bilateral SDH s/p bilateral craniotomy for SD hygroma resection with VPS tie off (3/24/25) who was admitted to [facility] on 4/23/25 for ongoing management of SDH reexpansion, now s/p repeat bilateral craniotomy for evacuation on 4/25/25.      POD7. Remains lethargic and difficult to arouse, continue Modafinil trial, will increase to 200mg daily.      Continue Lacoasmide 200mg BID.       CTH with small right parietal ICH, repeat CTH at 6 hours stable.     SBP<160.      Aggressive pulmonary toileting and airway maintenance noting DNI.     TF + FWF.     Final dose of antibiotics this am.     Continue home statin, Silodosin for urinary retention and levothyroxine.      Lovenox for chemical DVT prophylaxis.     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   Tie off on 3/25 2/2 bilateral hygromas that were evacuated  Removed in OR on 4/25     Cardiac/Vascular  Coronary artery disease involving native coronary artery of native heart without angina pectoris  Resume home statin  Hold home eliquis    PAF (paroxysmal atrial fibrillation)  C/w [id number] q8h  Hold home  eliquis, reversed on admission  Currently rate controlled    Left ventricular diastolic dysfunction, NYHA class 1  Echo reviewed    Essential hypertension  SBP < 160  c/w metoprolol 12.5 q8h  PRN labetalol, hydralazine    Renal/  Urinary retention  C/w home silodosin 4    Endocrine  Acquired hypothyroidism  C/w home synthroid 125mcg     GI  UC (ulcerative colitis)  Hold home colazal in acute setting          The patient is being Prophylaxed for:  Venous Thromboembolism with: Chemical  Stress Ulcer with: None  Ventilator Pneumonia with: none    Activity Orders            Elevate HOB Elevate HOB 30-45 degrees during feeding unless contraindicated starting at 04/27 0844    Diet NPO: NPO starting at 04/25 0001    Turn patient starting at 04/23 2000    Elevate HOB starting at 04/23 1948          Partial Code    Shahla Washington MD  Neurocritical Care  [facility] - Neuro Critical Care

## 2025-05-03 NOTE — PLAN OF CARE
"[facility] Care Plan  POC reviewed with Ramesh Ricci and family at 1400. Family verbalized understanding. Questions and concerns addressed. No acute events today. Pt progressing toward goals. Will continue to monitor. See below and flowsheets for full assessment and VS info.     Bath complete   Linen changed   TF continued at goal       Is this a stroke patient? no    Neuro:  [place] Coma Scale  Best Eye Response: 2-->(E2) to pain  Best Motor Response: 4-->(M4) withdraws from pain  Best Verbal Response: 1-->(V1) none  [place] Coma Scale Score: 7  Assessment Qualifiers: patient not sedated/intubated  Pupil PERRLA: yes     24 hr Temp:  [98.2 °F (36.8 °C)-99.9 °F (37.7 °C)]     CV:   Rhythm: normal sinus rhythm  BP goals:   SBP < 160  MAP > 65    Resp:      Oxygen Concentration (%): 40    Plan: N/A    GI/:     Diet/Nutrition Received: tube feeding  Last Bowel Movement: 05/03/25  Voiding Characteristics: external catheter    Intake/Output Summary (Last 24 hours) at 5/3/2025 1715  Last data filed at 5/3/2025 1601  Gross per 24 hour   Intake 3190 ml   Output 1100 ml   Net 2090 ml     Unmeasured Output  Urine Occurrence: 0  Stool Occurrence: 1  Emesis Occurrence: 0  Pad Count: 1    Labs/Accuchecks:  Recent Labs   Lab 05/03/25  0036   WBC 14.20*   RBC 3.02*   HGB 8.7*   HCT 27.3*         Recent Labs   Lab 05/03/25  0036      K 3.9   CO2 21*      BUN 51*   CREATININE 0.8   ALKPHOS 62   ALT 23   AST 36   BILITOT 0.2    No results for input(s): "PROTIME", "INR", "APTT", "HEPANTIXA" in the last 168 hours. No results for input(s): "CPK", "CPKMB", "TROPONINI", "MB" in the last 168 hours.    Electrolytes: N/A - electrolytes WDL  Accuchecks: none    Gtts:      LDA/Wounds:    Gabino Risk Assessment  Sensory Perception: 2-->very limited  Moisture: 3-->occasionally moist  Activity: 1-->bedfast  Mobility: 2-->very limited  Nutrition: 3-->adequate  Friction and Shear: 2-->potential problem  Gabino Score: 13    Is " your napoleon score 12 or less? no            Restraints:        WCTM   Problem: Stroke, Intracerebral Hemorrhage  Goal: Optimal Cognitive Function  Outcome: Progressing  Goal: Optimal Nutrition Intake  Outcome: Progressing     Problem: Acute Kidney Injury/Impairment  Goal: Improved Oral Intake  Outcome: Progressing

## 2025-05-04 NOTE — ASSESSMENT & PLAN NOTE
See primary problem  Increase home vimpat dose to 200 bid  Afebrile, no leukocytosis  UA unremarkable  Glucose WNL  BUN elevated 72, increased enteral water to 300 q4h and given additional 300 ml x 2 on 4/30  Enteral water flushes 300 q4h

## 2025-05-04 NOTE — SUBJECTIVE & OBJECTIVE
Interval History: 5/5: NAEON. No change neuro    Medications:  Continuous Infusions:  Scheduled Meds:   albuterol-ipratropium  3 mL Nebulization Q6H    enoxparin  40 mg Subcutaneous Q24H (prophylaxis, 1700)    lacosamide  200 mg Per NG tube Q12H    levothyroxine  125 mcg Per NG tube Before breakfast    metoprolol tartrate  12.5 mg Per NG tube Q8H    modafiniL  200 mg Per NG tube Daily    pravastatin  20 mg Per NG tube QHS    silodosin  4 mg Per NG tube Daily     PRN Meds:  Current Facility-Administered Medications:     acetaminophen, 650 mg, Per NG tube, Q6H PRN    bisacodyL, 10 mg, Rectal, Daily PRN    hydrALAZINE, 10 mg, Intravenous, Q4H PRN    labetalol, 10 mg, Intravenous, Q4H PRN    magnesium oxide, 800 mg, Per NG tube, PRN    magnesium oxide, 800 mg, Per NG tube, PRN    potassium bicarbonate, 35 mEq, Per NG tube, PRN    potassium bicarbonate, 50 mEq, Per NG tube, PRN    potassium bicarbonate, 60 mEq, Per NG tube, PRN    potassium, sodium phosphates, 2 packet, Per NG tube, PRN    potassium, sodium phosphates, 2 packet, Per NG tube, PRN    potassium, sodium phosphates, 2 packet, Per NG tube, PRN    sodium chloride 0.9%, 10 mL, Intravenous, PRN     Review of Systems  Objective:     Weight: 68 kg (149 lb 14.6 oz)  Body mass index is 20.92 kg/m².  Vital Signs (Most Recent):  Temp: (!) 100.8 °F (38.2 °C) (05/05/25 0701)  Pulse: 79 (05/05/25 0737)  Resp: 20 (05/05/25 0737)  BP: (!) 137/58 (05/05/25 0701)  SpO2: 100 % (05/05/25 0737) Vital Signs (24h Range):  Temp:  [97.9 °F (36.6 °C)-100.8 °F (38.2 °C)] 100.8 °F (38.2 °C)  Pulse:  [] 79  Resp:  [20-34] 20  SpO2:  [98 %-100 %] 100 %  BP: ()/(47-68) 137/58     Date 05/05/25 0700 - 05/06/25 0659   Shift 2336-0949 7950-3074 8706-0436 24 Hour Total   INTAKE   NG/GT 45   45   Shift Total(mL/kg) 45(0.7)   45(0.7)   OUTPUT   Shift Total(mL/kg)       Weight (kg) 68 68 68 68                            NG/OG Tube 04/26/25 0819 14 Fr. Right nostril (Active)    Placement Check placement verified by x-ray;placement verified by distal tube length measurement 05/02/25 1701   Tolerance no signs/symptoms of discomfort 05/04/25 0101   Securement secured to nostril center w/ adhesive device 05/04/25 0101   Clamp Status/Tolerance no abdominal discomfort;no abdominal distention;no emesis;no nausea;no residual 05/04/25 0101   Suction Setting/Drainage Method suction at the bedside 05/04/25 0101   Insertion Site Appearance no redness, warmth, tenderness, skin breakdown, drainage 05/04/25 0101   Drainage None 05/04/25 0101   Flush/Irrigation flushed w/;water;no resistance met 05/04/25 0101   Feeding Type continuous;by pump 05/04/25 0101   Feeding Action feeding continued 05/04/25 0101   Current Rate (mL/hr) 45 mL/hr 05/03/25 0701   Goal Rate (mL/hr) 45 mL/hr 05/03/25 0701   Intake (mL) 100 mL 05/02/25 2101   Water Bolus (mL) 350 mL 05/03/25 1601   Rate Formula Tube Feeding (mL/hr) 45 mL/hr 05/03/25 0701   Formula Name Impact Peptide 05/04/25 0101   Intake (mL) - Formula Tube Feeding 45 05/04/25 0101   Residual Amount (ml) 0 ml 05/01/25 1905       Male External Urinary Catheter 05/02/25 1300 Small (Active)   Collection Container Urimeter 05/04/25 0101   Securement Method secured to top of thigh w/ adhesive device 05/04/25 0101   Skin skin barrier applied 05/04/25 0101   Tolerance no signs/symptoms of discomfort 05/04/25 0101   Output (mL) 150 mL 05/03/25 1601            Fecal Incontinence  05/02/25 1745 (Active)   Application fecal incontinence  in place 05/04/25 0101   Drainage Method attached to drainage bag 05/04/25 0101   Securement to gravity 05/04/25 0101   Skin cleansed, skin barrier applied 05/04/25 0101   Tolerance no signs/symptoms of discomfort 05/04/25 0101   Stool (mL) 200 mL 05/03/25 1601          Physical Exam         Neurosurgery Physical Exam    [id number]  No movement  Bsi , PERRL  Lip trembles to stim    Significant Labs:  Recent Labs   Lab  "05/04/25  0150 05/05/25  0157   * 113*    135*   K 4.1 4.2    100   CO2 23 24   BUN 49* 46*   CREATININE 0.8 0.9   CALCIUM 9.0 8.8   MG 2.0 2.0     Recent Labs   Lab 05/04/25  0150 05/05/25  0157   WBC 12.81* 14.06*   HGB 8.9* 8.8*   HCT 28.3* 27.1*    419     No results for input(s): "LABPT", "INR", "APTT" in the last 48 hours.  Microbiology Results (last 7 days)       Procedure Component Value Units Date/Time    Blood culture [[id number]]  (Normal) Collected: 04/26/25 1628    Order Status: Completed Specimen: Blood from Peripheral, Antecubital, Right Updated: 05/01/25 2001     Blood Culture No Growth After 5 Days    Blood culture [[id number]]  (Normal) Collected: 04/26/25 1628    Order Status: Completed Specimen: Blood from Peripheral, Forearm, Left Updated: 05/01/25 2001     Blood Culture No Growth After 5 Days    Culture, Respiratory with Gram Stain [[id number]] Collected: 04/26/25 1428    Order Status: Completed Specimen: Respiratory from Sputum, Induced Updated: 04/29/25 1131     Respiratory Culture Normal respiratory audrey, no Staph aureus or Pseudomonas isolated     GRAM STAIN <10 Epithelial Cells/LPF      Rare WBC seen      No organisms seen    MRSA Screen by PCR [[id number]]  (Normal) Collected: 04/28/25 1056    Order Status: Completed Specimen: Nasal Swab Updated: 04/28/25 1704     MRSA PCR Screen Not Detected          All pertinent labs from the last 24 hours have been reviewed.    Significant Diagnostics:  CT: No results found in the last 24 hours.  MRI: No results found in the last 24 hours.  No results found in the last 24 hours.    "

## 2025-05-04 NOTE — PLAN OF CARE
"[facility] Care Plan    POC reviewed with Ramesh Ricci at 0300. No acute events today. Pt progressing toward goals. Will continue to monitor. See below and flowsheets for full assessment and VS info.       TFs continued       Is this a stroke patient? yes    Neuro:  Garretson Coma Scale  Best Eye Response: 2-->(E2) to pain  Best Motor Response: 4-->(M4) withdraws from pain  Best Verbal Response: 1-->(V1) none  Garretson Coma Scale Score: 7  Assessment Qualifiers: patient not sedated/intubated  Pupil PERRLA: yes     24 hr Temp:  [98.2 °F (36.8 °C)-100.2 °F (37.9 °C)]     CV:   Rhythm: normal sinus rhythm  BP goals:   SBP < 160  MAP > 65    Resp:      Oxygen Concentration (%): 40    Plan: N/A    GI/:     Diet/Nutrition Received: tube feeding  Last Bowel Movement: 05/02/25  Voiding Characteristics: external catheter    Intake/Output Summary (Last 24 hours) at 5/4/2025 0612  Last data filed at 5/4/2025 0554  Gross per 24 hour   Intake 1995 ml   Output 2000 ml   Net -5 ml     Unmeasured Output  Urine Occurrence: 0  Stool Occurrence: 1  Emesis Occurrence: 0  Pad Count: 2    Labs/Accuchecks:  Recent Labs   Lab 05/04/25  0150   WBC 12.81*   RBC 3.12*   HGB 8.9*   HCT 28.3*         Recent Labs   Lab 05/04/25  0150      K 4.1   CO2 23      BUN 49*   CREATININE 0.8   ALKPHOS 62   ALT 20   AST 29   BILITOT 0.3    No results for input(s): "PROTIME", "INR", "APTT", "HEPANTIXA" in the last 168 hours. No results for input(s): "CPK", "CPKMB", "TROPONINI", "MB" in the last 168 hours.    Electrolytes: N/A - electrolytes WDL  Accuchecks: none    Gtts:      LDA/Wounds:    Gabino Risk Assessment  Sensory Perception: 2-->very limited  Moisture: 3-->occasionally moist  Activity: 1-->bedfast  Mobility: 2-->very limited  Nutrition: 3-->adequate  Friction and Shear: 2-->potential problem  Gabino Score: 13  Is your gabino score 12 or less? no          Restraints:        WCTM   "

## 2025-05-04 NOTE — SUBJECTIVE & OBJECTIVE
Interval History: 5/3: stable poor exam, WD LUE    Medications:  Continuous Infusions:  Scheduled Meds:   albuterol-ipratropium  3 mL Nebulization Q6H    enoxparin  40 mg Subcutaneous Q24H (prophylaxis, 1700)    lacosamide  200 mg Per NG tube Q12H    levothyroxine  125 mcg Per NG tube Before breakfast    metoprolol tartrate  12.5 mg Per NG tube Q8H    modafiniL  200 mg Per NG tube Daily    pravastatin  20 mg Per NG tube QHS    psyllium husk  1 packet Per NG tube Daily    silodosin  4 mg Per NG tube Daily     PRN Meds:  Current Facility-Administered Medications:     acetaminophen, 650 mg, Per NG tube, Q6H PRN    bisacodyL, 10 mg, Rectal, Daily PRN    hydrALAZINE, 10 mg, Intravenous, Q4H PRN    labetalol, 10 mg, Intravenous, Q4H PRN    magnesium oxide, 800 mg, Per NG tube, PRN    magnesium oxide, 800 mg, Per NG tube, PRN    potassium bicarbonate, 35 mEq, Per NG tube, PRN    potassium bicarbonate, 50 mEq, Per NG tube, PRN    potassium bicarbonate, 60 mEq, Per NG tube, PRN    potassium, sodium phosphates, 2 packet, Per NG tube, PRN    potassium, sodium phosphates, 2 packet, Per NG tube, PRN    potassium, sodium phosphates, 2 packet, Per NG tube, PRN    sodium chloride 0.9%, 10 mL, Intravenous, PRN     Review of Systems  Objective:     Weight: 68 kg (149 lb 14.6 oz)  Body mass index is 20.92 kg/m².  Vital Signs (Most Recent):  Temp: 98.4 °F (36.9 °C) (05/03/25 2301)  Pulse: 82 (05/04/25 0231)  Resp: 18 (05/04/25 0231)  BP: (!) 156/71 (05/04/25 0201)  SpO2: 100 % (05/04/25 0201) Vital Signs (24h Range):  Temp:  [98.2 °F (36.8 °C)-100.2 °F (37.9 °C)] 98.4 °F (36.9 °C)  Pulse:  [] 82  Resp:  [18-30] 18  SpO2:  [96 %-100 %] 100 %  BP: (104-167)/(48-71) 156/71                              NG/OG Tube 04/26/25 0819 14 Fr. Right nostril (Active)   Placement Check placement verified by x-ray;placement verified by distal tube length measurement 05/02/25 1701   Tolerance no signs/symptoms of discomfort 05/04/25 0101    Securement secured to nostril center w/ adhesive device 05/04/25 0101   Clamp Status/Tolerance no abdominal discomfort;no abdominal distention;no emesis;no nausea;no residual 05/04/25 0101   Suction Setting/Drainage Method suction at the bedside 05/04/25 0101   Insertion Site Appearance no redness, warmth, tenderness, skin breakdown, drainage 05/04/25 0101   Drainage None 05/04/25 0101   Flush/Irrigation flushed w/;water;no resistance met 05/04/25 0101   Feeding Type continuous;by pump 05/04/25 0101   Feeding Action feeding continued 05/04/25 0101   Current Rate (mL/hr) 45 mL/hr 05/03/25 0701   Goal Rate (mL/hr) 45 mL/hr 05/03/25 0701   Intake (mL) 100 mL 05/02/25 2101   Water Bolus (mL) 350 mL 05/03/25 1601   Rate Formula Tube Feeding (mL/hr) 45 mL/hr 05/03/25 0701   Formula Name Impact Peptide 05/04/25 0101   Intake (mL) - Formula Tube Feeding 45 05/04/25 0101   Residual Amount (ml) 0 ml 05/01/25 1905       Male External Urinary Catheter 05/02/25 1300 Small (Active)   Collection Container Urimeter 05/04/25 0101   Securement Method secured to top of thigh w/ adhesive device 05/04/25 0101   Skin skin barrier applied 05/04/25 0101   Tolerance no signs/symptoms of discomfort 05/04/25 0101   Output (mL) 150 mL 05/03/25 1601            Fecal Incontinence  05/02/25 1745 (Active)   Application fecal incontinence  in place 05/04/25 0101   Drainage Method attached to drainage bag 05/04/25 0101   Securement to gravity 05/04/25 0101   Skin cleansed, skin barrier applied 05/04/25 0101   Tolerance no signs/symptoms of discomfort 05/04/25 0101   Stool (mL) 200 mL 05/03/25 1601          Physical Exam         Neurosurgery Physical Exam    E2V2M4  Extubated, breathing spontaneously  Eyes open to stimulation, PERRL  Brainstems intact  LUE WD, RUE EXT  BLE no movement to stim    Significant Labs:  Recent Labs   Lab 05/03/25  0036 05/04/25  0150   * 127*    137   K 3.9 4.1    103   CO2 21* 23  "  BUN 51* 49*   CREATININE 0.8 0.8   CALCIUM 9.0 9.0   MG 2.0 2.0     Recent Labs   Lab 05/03/25  0036 05/04/25  0150   WBC 14.20* 12.81*   HGB 8.7* 8.9*   HCT 27.3* 28.3*    448     No results for input(s): "LABPT", "INR", "APTT" in the last 48 hours.  Microbiology Results (last 7 days)       Procedure Component Value Units Date/Time    Blood culture [[id number]]  (Normal) Collected: 04/26/25 1628    Order Status: Completed Specimen: Blood from Peripheral, Antecubital, Right Updated: 05/01/25 2001     Blood Culture No Growth After 5 Days    Blood culture [[id number]]  (Normal) Collected: 04/26/25 1628    Order Status: Completed Specimen: Blood from Peripheral, Forearm, Left Updated: 05/01/25 2001     Blood Culture No Growth After 5 Days    Culture, Respiratory with Gram Stain [[id number]] Collected: 04/26/25 1428    Order Status: Completed Specimen: Respiratory from Sputum, Induced Updated: 04/29/25 1131     Respiratory Culture Normal respiratory audrey, no Staph aureus or Pseudomonas isolated     GRAM STAIN <10 Epithelial Cells/LPF      Rare WBC seen      No organisms seen    MRSA Screen by PCR [[id number]]  (Normal) Collected: 04/28/25 1056    Order Status: Completed Specimen: Nasal Swab Updated: 04/28/25 1704     MRSA PCR Screen Not Detected    Respiratory Infection Panel (PCR), Nasopharyngeal [[id number]] Collected: 04/27/25 1759    Order Status: Completed Specimen: Nasopharyngeal Swab Updated: 04/27/25 2049     Respiratory Infection Panel Source Nasopharyngeal Swab     Adenovirus Not Detected     Coronavirus 229E, Common Cold Virus Not Detected     Coronavirus HKU1, Common Cold Virus Not Detected     Coronavirus NL63, Common Cold Virus Not Detected     Coronavirus OC43, Common Cold Virus Not Detected     SARS-CoV2 (COVID-19) Qualitative PCR Not Detected     Human Metapneumovirus Not Detected     Human Rhinovirus/Enterovirus Not Detected     Influenza A Not Detected     Influenza B Not Detected     " Parainfluenza Virus 1 Not Detected     Parainfluenza Virus 2 Not Detected     Parainfluenza Virus 3 Not Detected     Parainfluenza Virus 4 Not Detected     Respiratory Syncytial Virus Not Detected     Bordetella Parapertussis ([id number]) Not Detected     Bordetella pertussis (ptxP) Not Detected     Chlamydia pneumoniae Not Detected     Mycoplasma pneumoniae Not Detected    Respiratory Infection Panel (PCR), Nasopharyngeal [[id number]] Collected: 04/27/25 1004    Order Status: Canceled Specimen: Nasopharyngeal Swab Updated: 04/27/25 1222          All pertinent labs from the last 24 hours have been reviewed.    Significant Diagnostics:  CT: No results found in the last 24 hours.  MRI: No results found in the last 24 hours.

## 2025-05-04 NOTE — ASSESSMENT & PLAN NOTE
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      POD9. Remains lethargic and difficult to arouse, continue Modafinil trial, will increase to 200mg daily.      Continue Lacoasmide 200mg BID.       CTH with small right parietal ICH, repeat CTH at 6 hours stable.     SBP<160.      Aggressive pulmonary toileting and airway maintenance noting DNI.     TF + FWF.     Final dose of antibiotics this am.     Continue home statin, Silodosin for urinary retention and levothyroxine.      Lovenox for chemical DVT prophylaxis.

## 2025-05-04 NOTE — PROGRESS NOTES
[facility] - Neuro Critical Care  Neurosurgery  Progress Note    Subjective:     History of Present Illness: Ramesh Ricci is an 84 y/o male with a PMH of Afib, Ulcerative Colitis, HTN, and aortic stenosis. He also has normal pressure hydrocephalus s/p  shunt (now tied off) is presenting with multiple complaints.     Wife reports concern for acute deconditioning over the past few days.  She describes increased weakness, urinary frequency, and speech changes that is been progressing for the past 3 days.  She states that he seems to slumped towards the right side and has been dragging his right leg.  Previously ambulated with his walker.  She also mentions that he seems to have speech hesitancy.  Denies any fevers, chills, abdominal pain or diarrhea    Post-Op Info:  Procedure(s) (LRB):  CRANIOTOMY, FOR SUBDURAL HEMATOMA EVACUATION, bilateral. shunt removal (Bilateral)  REMOVAL, SHUNT, VENTRICULOPERITONEAL   9 Days Post-Op   Interval History: 5/3: stable poor exam, WD LUE    Medications:  Continuous Infusions:  Scheduled Meds:   albuterol-ipratropium  3 mL Nebulization Q6H    enoxparin  40 mg Subcutaneous Q24H (prophylaxis, 1700)    lacosamide  200 mg Per NG tube Q12H    levothyroxine  125 mcg Per NG tube Before breakfast    metoprolol tartrate  12.5 mg Per NG tube Q8H    modafiniL  200 mg Per NG tube Daily    pravastatin  20 mg Per NG tube QHS    psyllium husk  1 packet Per NG tube Daily    silodosin  4 mg Per NG tube Daily     PRN Meds:  Current Facility-Administered Medications:     acetaminophen, 650 mg, Per NG tube, Q6H PRN    bisacodyL, 10 mg, Rectal, Daily PRN    hydrALAZINE, 10 mg, Intravenous, Q4H PRN    labetalol, 10 mg, Intravenous, Q4H PRN    magnesium oxide, 800 mg, Per NG tube, PRN    magnesium oxide, 800 mg, Per NG tube, PRN    potassium bicarbonate, 35 mEq, Per NG tube, PRN    potassium bicarbonate, 50 mEq, Per NG tube, PRN    potassium bicarbonate, 60 mEq, Per NG tube, PRN    potassium, sodium  phosphates, 2 packet, Per NG tube, PRN    potassium, sodium phosphates, 2 packet, Per NG tube, PRN    potassium, sodium phosphates, 2 packet, Per NG tube, PRN    sodium chloride 0.9%, 10 mL, Intravenous, PRN     Review of Systems  Objective:     Weight: 68 kg (149 lb 14.6 oz)  Body mass index is 20.92 kg/m².  Vital Signs (Most Recent):  Temp: 98.4 °F (36.9 °C) (05/03/25 2301)  Pulse: 82 (05/04/25 0231)  Resp: 18 (05/04/25 0231)  BP: (!) 156/71 (05/04/25 0201)  SpO2: 100 % (05/04/25 0201) Vital Signs (24h Range):  Temp:  [98.2 °F (36.8 °C)-100.2 °F (37.9 °C)] 98.4 °F (36.9 °C)  Pulse:  [] 82  Resp:  [18-30] 18  SpO2:  [96 %-100 %] 100 %  BP: (104-167)/(48-71) 156/71                              NG/OG Tube 04/26/25 0819 14 Fr. Right nostril (Active)   Placement Check placement verified by x-ray;placement verified by distal tube length measurement 05/02/25 1701   Tolerance no signs/symptoms of discomfort 05/04/25 0101   Securement secured to nostril center w/ adhesive device 05/04/25 0101   Clamp Status/Tolerance no abdominal discomfort;no abdominal distention;no emesis;no nausea;no residual 05/04/25 0101   Suction Setting/Drainage Method suction at the bedside 05/04/25 0101   Insertion Site Appearance no redness, warmth, tenderness, skin breakdown, drainage 05/04/25 0101   Drainage None 05/04/25 0101   Flush/Irrigation flushed w/;water;no resistance met 05/04/25 0101   Feeding Type continuous;by pump 05/04/25 0101   Feeding Action feeding continued 05/04/25 0101   Current Rate (mL/hr) 45 mL/hr 05/03/25 0701   Goal Rate (mL/hr) 45 mL/hr 05/03/25 0701   Intake (mL) 100 mL 05/02/25 2101   Water Bolus (mL) 350 mL 05/03/25 1601   Rate Formula Tube Feeding (mL/hr) 45 mL/hr 05/03/25 0701   Formula Name Impact Peptide 05/04/25 0101   Intake (mL) - Formula Tube Feeding 45 05/04/25 0101   Residual Amount (ml) 0 ml 05/01/25 1905       Male External Urinary Catheter 05/02/25 1300 Small (Active)   Collection Container  "Urimeter 05/04/25 0101   Securement Method secured to top of thigh w/ adhesive device 05/04/25 0101   Skin skin barrier applied 05/04/25 0101   Tolerance no signs/symptoms of discomfort 05/04/25 0101   Output (mL) 150 mL 05/03/25 1601            Fecal Incontinence  05/02/25 1745 (Active)   Application fecal incontinence  in place 05/04/25 0101   Drainage Method attached to drainage bag 05/04/25 0101   Securement to gravity 05/04/25 0101   Skin cleansed, skin barrier applied 05/04/25 0101   Tolerance no signs/symptoms of discomfort 05/04/25 0101   Stool (mL) 200 mL 05/03/25 1601          Physical Exam         Neurosurgery Physical Exam    E2V2M4  Extubated, breathing spontaneously  Eyes open to stimulation, PERRL  Brainstems intact  LUE WD, RUE EXT  BLE no movement to stim    Significant Labs:  Recent Labs   Lab 05/03/25  0036 05/04/25  0150   * 127*    137   K 3.9 4.1    103   CO2 21* 23   BUN 51* 49*   CREATININE 0.8 0.8   CALCIUM 9.0 9.0   MG 2.0 2.0     Recent Labs   Lab 05/03/25  0036 05/04/25  0150   WBC 14.20* 12.81*   HGB 8.7* 8.9*   HCT 27.3* 28.3*    448     No results for input(s): "LABPT", "INR", "APTT" in the last 48 hours.  Microbiology Results (last 7 days)       Procedure Component Value Units Date/Time    Blood culture [[id number]]  (Normal) Collected: 04/26/25 1628    Order Status: Completed Specimen: Blood from Peripheral, Antecubital, Right Updated: 05/01/25 2001     Blood Culture No Growth After 5 Days    Blood culture [[id number]]  (Normal) Collected: 04/26/25 1628    Order Status: Completed Specimen: Blood from Peripheral, Forearm, Left Updated: 05/01/25 2001     Blood Culture No Growth After 5 Days    Culture, Respiratory with Gram Stain [[id number]] Collected: 04/26/25 1428    Order Status: Completed Specimen: Respiratory from Sputum, Induced Updated: 04/29/25 1131     Respiratory Culture Normal respiratory audrey, no Staph aureus or Pseudomonas " isolated     GRAM STAIN <10 Epithelial Cells/LPF      Rare WBC seen      No organisms seen    MRSA Screen by PCR [[id number]]  (Normal) Collected: 04/28/25 1056    Order Status: Completed Specimen: Nasal Swab Updated: 04/28/25 1704     MRSA PCR Screen Not Detected    Respiratory Infection Panel (PCR), Nasopharyngeal [[id number]] Collected: 04/27/25 1759    Order Status: Completed Specimen: Nasopharyngeal Swab Updated: 04/27/25 2049     Respiratory Infection Panel Source Nasopharyngeal Swab     Adenovirus Not Detected     Coronavirus 229E, Common Cold Virus Not Detected     Coronavirus HKU1, Common Cold Virus Not Detected     Coronavirus NL63, Common Cold Virus Not Detected     Coronavirus OC43, Common Cold Virus Not Detected     SARS-CoV2 (COVID-19) Qualitative PCR Not Detected     Human Metapneumovirus Not Detected     Human Rhinovirus/Enterovirus Not Detected     Influenza A Not Detected     Influenza B Not Detected     Parainfluenza Virus 1 Not Detected     Parainfluenza Virus 2 Not Detected     Parainfluenza Virus 3 Not Detected     Parainfluenza Virus 4 Not Detected     Respiratory Syncytial Virus Not Detected     Bordetella Parapertussis (OR1074) Not Detected     Bordetella pertussis (ptxP) Not Detected     Chlamydia pneumoniae Not Detected     Mycoplasma pneumoniae Not Detected    Respiratory Infection Panel (PCR), Nasopharyngeal [[id number]] Collected: 04/27/25 1004    Order Status: Canceled Specimen: Nasopharyngeal Swab Updated: 04/27/25 1222          All pertinent labs from the last 24 hours have been reviewed.    Significant Diagnostics:  CT: No results found in the last 24 hours.  MRI: No results found in the last 24 hours.  Assessment/Plan:     * Subdural hemorrhage  86 yo with recent admission for VPS tie off and SDH evacuation on 3/24 presenting for new LSW and speech hesitation found to have enlarged chronic SDH as well as a new small R convexity acute SDH. Pt takes eliquis, s/p PCC. Interval CTH  stable.     Now s/p bialteral subdural evacuations with VPS removal on 4/25    Recommendations:  --admitted to [facility]  --on eliquis, s/p kcentra reversal. Hold all AC/AP, ok for dvt ppx  --SBP <140  --HOB >30  --vimpat/zosyn per ICU  --if any further scans per [facility] then recommend MRI  --drains removed 4/28  --on modafinil trial  --Continue to monitor clinically, notify NSGY immediately with any changes in neuro status    Discussed with Dr. Krzysztof Camilo MD  Neurosurgery  [facility] - Neuro Critical Care

## 2025-05-04 NOTE — ASSESSMENT & PLAN NOTE
86 yo with recent admission for VPS tie off and SDH evacuation on 3/24 presenting for new LSW and speech hesitation found to have enlarged chronic SDH as well as a new small R convexity acute SDH. Pt takes eliquis, s/p PCC. Interval CTH stable.     Now s/p bialteral subdural evacuations with VPS removal on 4/25    Recommendations:  --admitted to [facility]  --on eliquis, s/p kcentra reversal. Hold all AC/AP, ok for dvt ppx  --SBP <140  --HOB >30  --vimpat/zosyn per ICU  --if any further scans per NCC then recommend MRI  --drains removed 4/28  --on modafinil trial  --Continue to monitor clinically, notify NSGY immediately with any changes in neuro status    Discussed with Dr. Blankenship

## 2025-05-04 NOTE — SUBJECTIVE & OBJECTIVE
Interval History: 05/04/2025 Alertness slightly improved. On room air. NGT in place. Continue to monitor.    Review of Systems  Unable to obtain a complete ROS due to level of consciousness.  Objective:     Vitals:  Temp: 98.2 °F (36.8 °C)  Pulse: 90  Rhythm: normal sinus rhythm  BP: (!) 123/55  MAP (mmHg): 76  Resp: (!) 28  SpO2: 99 %    Temp  Min: 98.2 °F (36.8 °C)  Max: 100.2 °F (37.9 °C)  Pulse  Min: 74  Max: 105  BP  Min: 94/47  Max: 169/69  MAP (mmHg)  Min: 67  Max: 102  Resp  Min: 18  Max: 30  SpO2  Min: 96 %  Max: 100 %    05/03 0701 - 05/04 0700  In: 3135   Out: 2110 [Urine:1850]   Unmeasured Output  Urine Occurrence: 0  Stool Occurrence: 1  Emesis Occurrence: 0  Pad Count: 2          Physical ExamVitals and nursing note reviewed.   Constitutional:       General: He is not in acute distress.     Appearance: He is ill-appearing.   HENT:      Head:      Comments: Surgical site c/d/i     Right Ear: External ear normal.      Left Ear: External ear normal.      Nose: Nose normal.      Comments: NGT in place.     Mouth/Throat:      Mouth: Mucous membranes are dry.   Eyes:      Pupils: Pupils are equal, round, and reactive to light.   Cardiovascular:      Rate and Rhythm: Normal rate and regular rhythm.   Pulmonary:      Effort: Pulmonary effort is normal. No respiratory distress.      Comments: Upper airway congestion   Bl coarse BS   Abdominal:      General: There is no distension.      Palpations: Abdomen is soft.      Tenderness: There is no abdominal tenderness. There is no guarding.   Musculoskeletal:      Right lower leg: No edema.      Left lower leg: No edema.   Skin:     General: Skin is warm and dry.   Neurological:      Comments: Mental status: Lethargic, opens spontaneously. Not following commands.   CN II-XII grossly intact, specifically:  PERRL  EOMI  blinks to threat bilaterally   No facial asymmetry.  Weak spontaneous cough   Motor:  RUE ext  RLE triple flexion to tickling foot  LUE wd  LLE triple  flexion to tickling foot  Sensation intact to noxious x4     Medications:  Continuous Scheduledalbuterol-ipratropium, 3 mL, Q6H  enoxparin, 40 mg, Q24H (prophylaxis, 1700)  lacosamide, 200 mg, Q12H  levothyroxine, 125 mcg, Before breakfast  metoprolol tartrate, 12.5 mg, Q8H  modafiniL, 200 mg, Daily  pravastatin, 20 mg, QHS  silodosin, 4 mg, Daily    PRNacetaminophen, 650 mg, Q6H PRN  bisacodyL, 10 mg, Daily PRN  hydrALAZINE, 10 mg, Q4H PRN  labetalol, 10 mg, Q4H PRN  magnesium oxide, 800 mg, PRN  magnesium oxide, 800 mg, PRN  potassium bicarbonate, 35 mEq, PRN  potassium bicarbonate, 50 mEq, PRN  potassium bicarbonate, 60 mEq, PRN  potassium, sodium phosphates, 2 packet, PRN  potassium, sodium phosphates, 2 packet, PRN  potassium, sodium phosphates, 2 packet, PRN  sodium chloride 0.9%, 10 mL, PRN      Today I personally reviewed pertinent medications, lines/drains/airways, laboratory results, notably:    Diet  Diet NPO  Diet NPO

## 2025-05-04 NOTE — EICU
Virtual ICU Quality Rounds    Admit Date: 4/23/2025  Hospital Day: 11    ICU Day: 10d 9h    24H Vital Sign Range:  Temp:  [98.2 °F (36.8 °C)-100.2 °F (37.9 °C)]   Pulse:  []   Resp:  [18-30]   BP: (104-169)/(48-71)   SpO2:  [96 %-100 %]     VICU Surveillance Screening    LDA reconciliation : Yes

## 2025-05-04 NOTE — PLAN OF CARE
[facility] Care Plan  POC reviewed with Ramesh Ricci and family at 1400. Family verbalized understanding. Questions and concerns addressed. No acute events today. Pt progressing toward goals. Will continue to monitor. See below and flowsheets for full assessment and VS info.   - pt had some more spontaneous movement noted of the left arm today more so noted during oral care. And while trying to place rolled towels in his hands to help prevent contractors of the hands. Pt hands have been getting tighter over the days.   - While doing range of motion with pt left arm did notice some grimacing. Would recommend using some heat packs to the elbow and shoulder as long as pt remain afebrile. Do not use if pt is febrile. Did not use the heat packs today since pt had a fever last night. But did continue to do some ROM today to help loosen up the arm and pt tolerated it ok.   - attempted to wake pt up more by trying oral care with chocolate and vanilla pudding rather than the regular thick oral care mouth moisturizer swabs kits that we use to see if pt would have more of a response. When pt was more alert in the late morning he seems to have more of a response but in the later afternoon there didn't seem to be any difference between using the pudding vs using the traditional oral care mouth moisturizer swabs kits.   - PIVs were changed and pt tolerated well  - pt bathed and linens changed  - condom cath changed and flexi flushed and working well.   - Pt remains on RA and tolerating well  - Pt has remained low ST all day despite his dose of metoprolol  - SBP < 160 has not been an issue      Is this a stroke patient? no    Neuro:  [place] Coma Scale  Best Eye Response: 2-->(E2) to pain  Best Motor Response: 4-->(M4) withdraws from pain  Best Verbal Response: 1-->(V1) none  [place] Coma Scale Score: 7  Assessment Qualifiers: patient not sedated/intubated  Pupil PERRLA: yes     24 hr Temp:  [97.9 °F (36.6 °C)-100.2 °F (37.9 °C)]  "    CV:   Rhythm: normal sinus rhythm  BP goals:   SBP < 160  MAP > 65    Resp:      Oxygen Concentration (%): 40    Plan: N/A    GI/:     Diet/Nutrition Received: tube feeding  Last Bowel Movement: 05/04/25  Voiding Characteristics: external catheter    Intake/Output Summary (Last 24 hours) at 5/4/2025 1822  Last data filed at 5/4/2025 1800  Gross per 24 hour   Intake 2890 ml   Output 2265 ml   Net 625 ml     Unmeasured Output  Urine Occurrence: 0  Stool Occurrence: 1  Emesis Occurrence: 0  Pad Count: 2    Labs/Accuchecks:  Recent Labs   Lab 05/04/25  0150   WBC 12.81*   RBC 3.12*   HGB 8.9*   HCT 28.3*         Recent Labs   Lab 05/04/25  0150      K 4.1   CO2 23      BUN 49*   CREATININE 0.8   ALKPHOS 62   ALT 20   AST 29   BILITOT 0.3    No results for input(s): "PROTIME", "INR", "APTT", "HEPANTIXA" in the last 168 hours. No results for input(s): "CPK", "CPKMB", "TROPONINI", "MB" in the last 168 hours.    Electrolytes: N/A - electrolytes WDL  Accuchecks: none    Gtts:      LDA/Wounds:    Gabino Risk Assessment  Sensory Perception: 3-->slightly limited  Moisture: 3-->occasionally moist  Activity: 1-->bedfast  Mobility: 2-->very limited  Nutrition: 3-->adequate  Friction and Shear: 2-->potential problem  Gabino Score: 14    Is your gabino score 12 or less? no            Restraints:        WCTM   Problem: Stroke, Intracerebral Hemorrhage  Goal: Effective Bowel Elimination  Outcome: Progressing  Goal: Optimal Cerebral Tissue Perfusion  Outcome: Progressing  Goal: Optimal Nutrition Intake  Outcome: Progressing  Goal: Effective Oxygenation and Ventilation  Outcome: Progressing  Goal: Effective Urinary Elimination  Outcome: Progressing     Problem: Skin Injury Risk Increased  Goal: Skin Health and Integrity  Outcome: Progressing     Problem: Adult Inpatient Plan of Care  Goal: Absence of Hospital-Acquired Illness or Injury  Outcome: Progressing  Goal: Optimal Comfort and Wellbeing  Outcome: " Progressing  Goal: Readiness for Transition of Care  Outcome: Progressing     Problem: Acute Kidney Injury/Impairment  Goal: Fluid and Electrolyte Balance  Outcome: Progressing  Goal: Effective Renal Function  Outcome: Progressing     Problem: Wound  Goal: Optimal Coping  Outcome: Progressing  Goal: Skin Health and Integrity  Outcome: Progressing     Problem: Fall Injury Risk  Goal: Absence of Fall and Fall-Related Injury  Outcome: Progressing

## 2025-05-04 NOTE — PROGRESS NOTES
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 His wife at bedside reports concern for acute deconditioning over the past three days involving worsening weakness, urinary frequency, speech changes, intermittent cessation in interaction with family, and dragging of his R leg. CT head showed bilateral subdural hygromas with bilateral acute versus subacute SDH. He takes eliquis for A fib at home which was reversed with PCC in the ED. During evaluation of the patient, he was initially interactive, following, commands, and oriented to person and place. He then had a brief staring spell where he no longer followed commands and displayed no usable speech. This then resolved after a few minutes and he returned to baseline. He takes Vimpat 100 bid at home but has not gotten his nightly dose. Per his wife, these spells occurred at home as well.    He is admitted to [facility] for hourly neuro-monitoring and a higher level of care.     Hospital Course: 04/25/2025: OR today for SDH evacuation   04/26/2025: NAEON. POD1 s/p SDH evac. Post-op CTH good decompression and expected post-op changes. Post-op episode of seizure-like activity, described as "LUE & LLE shaking" lasting approximately 1 minute that resolved without intervention. Patient unarousable post-operatively and this AM. NGT placed. Repeat CTH obtained, stable. Procal, EEG ordered and pending.   04/27/2025:  POD2 s/p SDH evacuation. Procal elevated and patient febrile with low grade temp. Pan-cultured and started on broad spec antibiotics. " Aggressive pulm toileting started yesterday. Overnight with Tmax 100.9°F. Cx with NGTD. Respiratory panel sent. US extremities ordered and pending to rule out DVT. Start lovenox pending neurosurgery recs.   04/28/2025: Continue aggressive pulmonary toileting. EEG with periodic complexes with some epileptiform morphology, optimized lacosamide to 200mg BID.   04/29/2025 EEG negative for seizures, small improvement after increasing vimpat yesterday. Discontinued EEG. CTH with new small R parietal IPH with mild surrounding edema. BUN 67, started enteral water flushes 250 q4h. MRSA swab negative, discontinued vanc. CXR with slight improvement from yesterday. Continue aggressive pulm toilet.   04/30/2025 CTH overnight with stable R IPH. POD5 and pt still not waking up. Relax to q2h neuro checks. BUN 72, increased enteral water to 300 q4h and given additional 300 ml x 2 today.   05/01/2025 exam remains stable. Started modafinil 100mg. Continue pulm toilet spaced to q6h and added IPV q6h.   05/02/2025 Tolerating Modafinil with mild improvement in level of alertness, increase to 200mg. Decreased O2 demands.   05/03/2025 Alertness slightly improving. Continue to monitor. Respiratory status improved, no longer requiring supplemental O2.  05/04/2025 Alertness slightly improved. On room air. NGT in place. Continue to monitor.    Interval History: 05/04/2025 Alertness slightly improved. On room air. NGT in place. Continue to monitor.    Review of Systems  Unable to obtain a complete ROS due to level of consciousness.  Objective:     Vitals:  Temp: 98.2 °F (36.8 °C)  Pulse: 90  Rhythm: normal sinus rhythm  BP: (!) 123/55  MAP (mmHg): 76  Resp: (!) 28  SpO2: 99 %    Temp  Min: 98.2 °F (36.8 °C)  Max: 100.2 °F (37.9 °C)  Pulse  Min: 74  Max: 105  BP  Min: 94/47  Max: 169/69  MAP (mmHg)  Min: 67  Max: 102  Resp  Min: 18  Max: 30  SpO2  Min: 96 %  Max: 100 %    05/03 0701 - 05/04 0700  In: 3135   Out: 2110 [Urine:1850]   Unmeasured  Output  Urine Occurrence: 0  Stool Occurrence: 1  Emesis Occurrence: 0  Pad Count: 2          Physical ExamVitals and nursing note reviewed.   Constitutional:       General: He is not in acute distress.     Appearance: He is ill-appearing.   HENT:      Head:      Comments: Surgical site c/d/i     Right Ear: External ear normal.      Left Ear: External ear normal.      Nose: Nose normal.      Comments: NGT in place.     Mouth/Throat:      Mouth: Mucous membranes are dry.   Eyes:      Pupils: Pupils are equal, round, and reactive to light.   Cardiovascular:      Rate and Rhythm: Normal rate and regular rhythm.   Pulmonary:      Effort: Pulmonary effort is normal. No respiratory distress.      Comments: Upper airway congestion   Bl coarse BS   Abdominal:      General: There is no distension.      Palpations: Abdomen is soft.      Tenderness: There is no abdominal tenderness. There is no guarding.   Musculoskeletal:      Right lower leg: No edema.      Left lower leg: No edema.   Skin:     General: Skin is warm and dry.   Neurological:      Comments: Mental status: Lethargic, opens spontaneously. Not following commands.   CN II-XII grossly intact, specifically:  PERRL  EOMI  blinks to threat bilaterally   No facial asymmetry.  Weak spontaneous cough   Motor:  RUE ext  RLE triple flexion to tickling foot  LUE wd  LLE triple flexion to tickling foot  Sensation intact to noxious x4     Medications:  Continuous Scheduledalbuterol-ipratropium, 3 mL, Q6H  enoxparin, 40 mg, Q24H (prophylaxis, 1700)  lacosamide, 200 mg, Q12H  levothyroxine, 125 mcg, Before breakfast  metoprolol tartrate, 12.5 mg, Q8H  modafiniL, 200 mg, Daily  pravastatin, 20 mg, QHS  silodosin, 4 mg, Daily    PRNacetaminophen, 650 mg, Q6H PRN  bisacodyL, 10 mg, Daily PRN  hydrALAZINE, 10 mg, Q4H PRN  labetalol, 10 mg, Q4H PRN  magnesium oxide, 800 mg, PRN  magnesium oxide, 800 mg, PRN  potassium bicarbonate, 35 mEq, PRN  potassium bicarbonate, 50 mEq,  PRN  potassium bicarbonate, 60 mEq, PRN  potassium, sodium phosphates, 2 packet, PRN  potassium, sodium phosphates, 2 packet, PRN  potassium, sodium phosphates, 2 packet, PRN  sodium chloride 0.9%, 10 mL, PRN      Today I personally reviewed pertinent medications, lines/drains/airways, laboratory results, notably:    Diet  Diet NPO  Diet NPO      Assessment/Plan:     Neuro  * Subdural hemorrhage  85M with a medical history significant for atrial fibrillation on Eliquis, UC, HTN, and recent VPS placement (2/6/25) for NPH complicated by bilateral SDH s/p bilateral craniotomy for SD hygroma resection with VPS tie off (3/24/25) who was admitted to [facility] on 4/23/25 for ongoing management of SDH reexpansion, now s/p repeat bilateral craniotomy for evacuation on 4/25/25.      POD9. Remains lethargic and difficult to arouse, continue Modafinil trial, will increase to 200mg daily.      Continue Lacoasmide 200mg BID.       CTH with small right parietal ICH, repeat CTH at 6 hours stable.     SBP<160.      Aggressive pulmonary toileting and airway maintenance noting DNI.     TF + FWF.     Final dose of antibiotics this am.     Continue home statin, Silodosin for urinary retention and levothyroxine.      Lovenox for chemical DVT prophylaxis.     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   Tie off on 3/25 2/2 bilateral hygromas that were evacuated  Removed in OR on 4/25     Cardiac/Vascular  Coronary artery disease involving native coronary artery of native heart without angina pectoris  Resume home  statin  Hold home eliquis    PAF (paroxysmal atrial fibrillation)  C/w [id number] q8h  Hold home eliquis, reversed on admission  Currently rate controlled    Left ventricular diastolic dysfunction, NYHA class 1  Echo reviewed    Essential hypertension  SBP < 160  c/w metoprolol 12.5 q8h  PRN labetalol, hydralazine    Renal/  Urinary retention  C/w home silodosin 4    Endocrine  Acquired hypothyroidism  C/w home synthroid 125mcg     GI  UC (ulcerative colitis)  Hold home colazal in acute setting          The patient is being Prophylaxed for:  Venous Thromboembolism with: Chemical  Stress Ulcer with: None  Ventilator Pneumonia with: not applicable    Activity Orders            Elevate HOB Elevate HOB 30-45 degrees during feeding unless contraindicated starting at 04/27 0844    Diet NPO: NPO starting at 04/25 0001    Turn patient starting at 04/23 2000    Elevate HOB starting at 04/23 1948          Partial Code    Critical care time spent on the evaluation and treatment of severe organ dysfunction, review of pertinent labs and imaging studies, discussions with consulting providers and discussions with patient/family: 35 minutes.      Nina Cerda PA-C  Neurocritical Care  [facility]

## 2025-05-04 NOTE — EICU
[facility] Night Rounds Checklist  24H Vital Sign Range:  Temp:  [98.2 °F (36.8 °C)-100.2 °F (37.9 °C)]   Pulse:  []   Resp:  [18-30]   BP: (120-167)/(57-70)   SpO2:  [91 %-100 %]     Video rounds

## 2025-05-05 PROBLEM — I95.9 HYPOTENSION: Status: ACTIVE | Noted: 2025-01-01

## 2025-05-05 NOTE — EICU
Virtual ICU Quality Rounds    Admit Date: 4/23/2025  Hospital Day: 12    ICU Day: 11d 15h    24H Vital Sign Range:  Temp:  [97.9 °F (36.6 °C)-100.8 °F (38.2 °C)]   Pulse:  []   Resp:  [20-34]   BP: ()/(36-68)   SpO2:  [96 %-100 %]     VICU Surveillance Screening                    LDA reconciliation : Yes

## 2025-05-05 NOTE — ASSESSMENT & PLAN NOTE
Holding lopressor in setting of hypotension requiring levophed.   Hold home eliquis, reversed on admission  Currently rate controlled

## 2025-05-05 NOTE — PROGRESS NOTES
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 His wife at bedside reports concern for acute deconditioning over the past three days involving worsening weakness, urinary frequency, speech changes, intermittent cessation in interaction with family, and dragging of his R leg. CT head showed bilateral subdural hygromas with bilateral acute versus subacute SDH. He takes eliquis for A fib at home which was reversed with PCC in the ED. During evaluation of the patient, he was initially interactive, following, commands, and oriented to person and place. He then had a brief staring spell where he no longer followed commands and displayed no usable speech. This then resolved after a few minutes and he returned to baseline. He takes Vimpat 100 bid at home but has not gotten his nightly dose. Per his wife, these spells occurred at home as well.    He is admitted to [facility] for hourly neuro-monitoring and a higher level of care.     Hospital Course: 04/25/2025: OR today for SDH evacuation   04/26/2025: NAEON. POD1 s/p SDH evac. Post-op CTH good decompression and expected post-op changes. Post-op episode of seizure-like activity, described as "LUE & LLE shaking" lasting approximately 1 minute that resolved without intervention. Patient unarousable post-operatively and this AM. NGT placed. Repeat CTH obtained, stable. Procal, EEG ordered and pending.   04/27/2025:  POD2 s/p SDH evacuation. Procal elevated and patient febrile with low grade temp. Pan-cultured and started on broad spec antibiotics. " Aggressive pulm toileting started yesterday. Overnight with Tmax 100.9°F. Cx with NGTD. Respiratory panel sent. US extremities ordered and pending to rule out DVT. Start lovenox pending neurosurgery recs.   04/28/2025: Continue aggressive pulmonary toileting. EEG with periodic complexes with some epileptiform morphology, optimized lacosamide to 200mg BID.   04/29/2025 EEG negative for seizures, small improvement after increasing vimpat yesterday. Discontinued EEG. CTH with new small R parietal IPH with mild surrounding edema. BUN 67, started enteral water flushes 250 q4h. MRSA swab negative, discontinued vanc. CXR with slight improvement from yesterday. Continue aggressive pulm toilet.   04/30/2025 CTH overnight with stable R IPH. POD5 and pt still not waking up. Relax to q2h neuro checks. BUN 72, increased enteral water to 300 q4h and given additional 300 ml x 2 today.   05/01/2025 exam remains stable. Started modafinil 100mg. Continue pulm toilet spaced to q6h and added IPV q6h.   05/02/2025 Tolerating Modafinil with mild improvement in level of alertness, increase to 200mg. Decreased O2 demands.   05/03/2025 Alertness slightly improving. Continue to monitor. Respiratory status improved, no longer requiring supplemental O2.  05/04/2025 Alertness slightly improved. On room air. NGT in place. Continue to monitor.  05/05/2025 Infectious workup started due to hypotension requiring levophed, fever overnight of 100.8, and worsening leukocytosis. Blood cultures, repeat urinalysis, CXR. Stated on zosyn. Switched to DNR.      Interval History: Infectious workup started due to hypotension requiring levophed. Blood cultures, repeat urinalysis, CXR. Stated on zosyn. NGT tube in place.    Spoke with family and will switch to dnr.     Review of Systems  Unable to obtain a complete ROS due to level of consciousness.  Objective:     Vitals:  Temp: 98.3 °F (36.8 °C)  Pulse: 83  Rhythm: normal sinus rhythm  BP: (!) 147/64  MAP  (mmHg): 92  Resp: (!) 23  SpO2: 99 %    Temp  Min: 97.9 °F (36.6 °C)  Max: 100.8 °F (38.2 °C)  Pulse  Min: 75  Max: 107  BP  Min: 91/36  Max: 158/67  MAP (mmHg)  Min: 52  Max: 97  Resp  Min: 20  Max: 34  SpO2  Min: 96 %  Max: 100 %    05/04 0701 - 05/05 0700  In: 1750   Out: 1305 [Urine:1250]   Unmeasured Output  Urine Occurrence: 0  Stool Occurrence: 1  Emesis Occurrence: 0  Pad Count: 2          Physical ExamVitals and nursing note reviewed.   Constitutional:       General: He is not in acute distress.     Appearance: He is ill-appearing.   HENT:      Head:      Comments: Surgical site c/d/i     Right Ear: External ear normal.      Left Ear: External ear normal.      Nose: Nose normal.      Comments: NGT in place.     Mouth/Throat:      Mouth: Mucous membranes are dry.   Eyes:      Pupils: Pupils are equal, round, and reactive to light.   Cardiovascular:      Rate and Rhythm: Normal rate and regular rhythm.   Pulmonary:      Effort: Pulmonary effort is normal. No respiratory distress.      Comments: Upper airway congestion   Bl coarse BS   Abdominal:      General: There is no distension.      Palpations: Abdomen is soft.      Tenderness: There is no abdominal tenderness. There is no guarding.   Musculoskeletal:      Right lower leg: No edema.      Left lower leg: No edema.   Skin:     General: Skin is warm and dry.   Neurological:      Comments: Mental status: Lethargic, opens spontaneously. Not following commands.   CN II-XII grossly intact, specifically:  PERRL  EOMI  blinks to threat bilaterally   No facial asymmetry.  Weak spontaneous cough   Motor:  RUE ext  RLE triple flexion to tickling foot  LUE wd  LLE triple flexion to tickling foot  Sensation intact to noxious x4     Medications:  ContinuousNORepinephrine bitartrate-D5W, Last Rate: 0.06 mcg/kg/min (05/05/25 1201)    Scheduledalbuterol-ipratropium, 3 mL, Q6H  enoxparin, 40 mg, Q24H (prophylaxis, 1700)  lacosamide, 200 mg, Q12H  levothyroxine, 125 mcg,  Before breakfast  modafiniL, 200 mg, Daily  piperacillin-tazobactam (Zosyn) IV (PEDS and ADULTS) (extended infusion is not appropriate), 4.5 g, Q8H  pravastatin, 20 mg, QHS  psyllium husk, 1 packet, Daily  silodosin, 4 mg, Daily    PRNacetaminophen, 650 mg, Q6H PRN  bisacodyL, 10 mg, Daily PRN  hydrALAZINE, 10 mg, Q4H PRN  labetalol, 10 mg, Q4H PRN  magnesium oxide, 800 mg, PRN  magnesium oxide, 800 mg, PRN  potassium bicarbonate, 35 mEq, PRN  potassium bicarbonate, 50 mEq, PRN  potassium bicarbonate, 60 mEq, PRN  potassium, sodium phosphates, 2 packet, PRN  potassium, sodium phosphates, 2 packet, PRN  potassium, sodium phosphates, 2 packet, PRN  sodium chloride 0.9%, 10 mL, PRN      Today I personally reviewed pertinent medications, lines/drains/airways, laboratory results, notably:    Diet  Diet NPO  Diet NPO      Assessment/Plan:     Neuro  * Subdural hemorrhage  85M with a medical history significant for atrial fibrillation on Eliquis, UC, HTN, and recent VPS placement (2/6/25) for NPH complicated by bilateral SDH s/p bilateral craniotomy for SD hygroma resection with VPS tie off (3/24/25) who was admitted to [facility] on 4/23/25 for ongoing management of SDH reexpansion, now s/p repeat bilateral craniotomy for evacuation on 4/25/25.      POD10. Mental status seems to be improving though still difficult to arouse, continue Modafinil trial at 200mg daily.      Continue Lacoasmide 200mg BID. Will attempt to decrease dose in the coming days as max dosing in this age group can cause sedation. Will need EEG monitoring at time of dose reduction.      CTH with small right parietal ICH, repeat CTH at 6 hours stable.     SBP<160.      Aggressive pulmonary toileting and airway maintenance.     TF + FWF.     Continue home statin, Silodosin for urinary retention and levothyroxine.      Lovenox for chemical DVT prophylaxis.     Subdural hematoma  See primary problem    Seizure disorder  Vimpat 200 bid. Will attempt to decrease  dose in the coming days as max dosing in this age group can cause sedation. Will need EEG monitoring at time of dose reduction.   EEG- moderate encephalopathy, triphasics, generalized cortical irritability, no discrete seizures    Acute encephalopathy  See primary problem  - Fever of 100.8 overnight with an increase in leukocytosis and hypotension requiring levophed. Infectious workup started.  - Placed on zosyn.    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 Now on levophed. Will attempt to wean as tolerated.   - Follow up infectious workup  - Start zosyn    Coronary artery disease involving native coronary artery of native heart without angina pectoris  Resume home statin  Hold home eliquis    PAF (paroxysmal atrial fibrillation)  Holding lopressor in setting of hypotension requiring levophed.   Hold home eliquis, reversed on admission  Currently rate controlled    Left ventricular diastolic dysfunction, NYHA class 1  Echo reviewed    Essential hypertension  SBP < 160  PRN labetalol, hydralazine    Renal/  Urinary retention  C/w home silodosin 4    Endocrine  Acquired hypothyroidism  C/w home synthroid 125mcg     GI  UC (ulcerative colitis)  Hold home colazal in acute setting          The patient is being Prophylaxed for:  Venous Thromboembolism with: Chemical  Stress Ulcer with: None  Ventilator Pneumonia with: not applicable    Activity Orders            Elevate HOB Elevate HOB 30-45 degrees during feeding unless contraindicated starting at 04/27 0844    Diet NPO: NPO starting at 04/25  0001    Turn patient starting at 04/23 2000    Elevate HOB starting at 04/23 1948          DNR    Jackson Walton MD  Neurocritical Care  [facility]

## 2025-05-05 NOTE — ASSESSMENT & PLAN NOTE
85M with a medical history significant for atrial fibrillation on Eliquis, UC, HTN, and recent VPS placement (2/6/25) for NPH complicated by bilateral SDH s/p bilateral craniotomy for SD hygroma resection with VPS tie off (3/24/25) who was admitted to [facility] on 4/23/25 for ongoing management of SDH reexpansion, now s/p repeat bilateral craniotomy for evacuation on 4/25/25.      POD10. Mental status seems to be improving though still difficult to arouse, continue Modafinil trial at 200mg daily.      Continue Lacoasmide 200mg BID. Will attempt to decrease dose in the coming days as max dosing in this age group can cause sedation. Will need EEG monitoring at time of dose reduction.      CTH with small right parietal ICH, repeat CTH at 6 hours stable.     SBP<160.      Aggressive pulmonary toileting and airway maintenance.     TF + FWF.     Continue home statin, Silodosin for urinary retention and levothyroxine.      Lovenox for chemical DVT prophylaxis.

## 2025-05-05 NOTE — PLAN OF CARE
[facility]  Discharge Reassessment    Primary Care Provider: Nakul Mandujano MD    Expected Discharge Date: 5/7/2025    Reassessment (most recent)       Discharge Reassessment - 05/05/25 0909          Discharge Reassessment    Assessment Type Discharge Planning Reassessment (P)      Did the patient's condition or plan change since previous assessment? Yes (P)      Discharge Plan discussed with: Spouse/sig other (P)      Name(s) and Number(s) Mone Ricci  (P)      Communicated LIDIA with patient/caregiver Yes (P)      Discharge Plan A Home with family (P)      Discharge Plan B Home (P)      DME Needed Upon Discharge  hospital bed (P)      Transition of Care Barriers None (P)      Why the patient remains in the hospital Requires continued medical care (P)         Post-Acute Status    Discharge Delays None known at this time (P)                    Discharge Plan A and Plan B have been determined by review of patient's clinical status, future medical and therapeutic needs, and coverage/benefits for post-acute care in coordination with multidisciplinary team members.    Stacia Hilton MSW, LCSW  [facility]  Case Management Dept.

## 2025-05-05 NOTE — ASSESSMENT & PLAN NOTE
See primary problem  - Fever of 100.8 overnight with an increase in leukocytosis and hypotension requiring levophed. Infectious workup started.  - Placed on zosyn.    Increase home vimpat dose to 200 bid  Glucose WNL  BUN elevated 72, increased enteral water to 300 q4h and given additional 300 ml x 2 on 4/30  Enteral water flushes 300 q4h

## 2025-05-05 NOTE — PROGRESS NOTES
[facility]  Neurosurgery  Progress Note    Subjective:     History of Present Illness: Ramesh Ricci is an 86 y/o male with a PMH of Afib, Ulcerative Colitis, HTN, and aortic stenosis. He also has normal pressure hydrocephalus s/p  shunt (now tied off) is presenting with multiple complaints.     Wife reports concern for acute deconditioning over the past few days.  She describes increased weakness, urinary frequency, and speech changes that is been progressing for the past 3 days.  She states that he seems to slumped towards the right side and has been dragging his right leg.  Previously ambulated with his walker.  She also mentions that he seems to have speech hesitancy.  Denies any fevers, chills, abdominal pain or diarrhea    Post-Op Info:  Procedure(s) (LRB):  CRANIOTOMY, FOR SUBDURAL HEMATOMA EVACUATION, bilateral. shunt removal (Bilateral)  REMOVAL, SHUNT, VENTRICULOPERITONEAL   10 Days Post-Op   Interval History: 5/5: NAEON. No change neuro    Medications:  Continuous Infusions:  Scheduled Meds:   albuterol-ipratropium  3 mL Nebulization Q6H    enoxparin  40 mg Subcutaneous Q24H (prophylaxis, 1700)    lacosamide  200 mg Per NG tube Q12H    levothyroxine  125 mcg Per NG tube Before breakfast    metoprolol tartrate  12.5 mg Per NG tube Q8H    modafiniL  200 mg Per NG tube Daily    pravastatin  20 mg Per NG tube QHS    silodosin  4 mg Per NG tube Daily     PRN Meds:  Current Facility-Administered Medications:     acetaminophen, 650 mg, Per NG tube, Q6H PRN    bisacodyL, 10 mg, Rectal, Daily PRN    hydrALAZINE, 10 mg, Intravenous, Q4H PRN    labetalol, 10 mg, Intravenous, Q4H PRN    magnesium oxide, 800 mg, Per NG tube, PRN    magnesium oxide, 800 mg, Per NG tube, PRN    potassium bicarbonate, 35 mEq, Per NG tube, PRN    potassium bicarbonate, 50 mEq, Per NG tube, PRN    potassium bicarbonate, 60 mEq, Per NG tube, PRN    potassium, sodium phosphates, 2 packet, Per NG tube, PRN     potassium, sodium phosphates, 2 packet, Per NG tube, PRN    potassium, sodium phosphates, 2 packet, Per NG tube, PRN    sodium chloride 0.9%, 10 mL, Intravenous, PRN     Review of Systems  Objective:     Weight: 68 kg (149 lb 14.6 oz)  Body mass index is 20.92 kg/m².  Vital Signs (Most Recent):  Temp: (!) 100.8 °F (38.2 °C) (05/05/25 0701)  Pulse: 79 (05/05/25 0737)  Resp: 20 (05/05/25 0737)  BP: (!) 137/58 (05/05/25 0701)  SpO2: 100 % (05/05/25 0737) Vital Signs (24h Range):  Temp:  [97.9 °F (36.6 °C)-100.8 °F (38.2 °C)] 100.8 °F (38.2 °C)  Pulse:  [] 79  Resp:  [20-34] 20  SpO2:  [98 %-100 %] 100 %  BP: ()/(47-68) 137/58     Date 05/05/25 0700 - 05/06/25 0659   Shift 1473-7917 3390-4196 3488-1332 24 Hour Total   INTAKE   NG/GT 45   45   Shift Total(mL/kg) 45(0.7)   45(0.7)   OUTPUT   Shift Total(mL/kg)       Weight (kg) 68 68 68 68                            NG/OG Tube 04/26/25 0819 14 Fr. Right nostril (Active)   Placement Check placement verified by x-ray;placement verified by distal tube length measurement 05/02/25 1701   Tolerance no signs/symptoms of discomfort 05/04/25 0101   Securement secured to nostril center w/ adhesive device 05/04/25 0101   Clamp Status/Tolerance no abdominal discomfort;no abdominal distention;no emesis;no nausea;no residual 05/04/25 0101   Suction Setting/Drainage Method suction at the bedside 05/04/25 0101   Insertion Site Appearance no redness, warmth, tenderness, skin breakdown, drainage 05/04/25 0101   Drainage None 05/04/25 0101   Flush/Irrigation flushed w/;water;no resistance met 05/04/25 0101   Feeding Type continuous;by pump 05/04/25 0101   Feeding Action feeding continued 05/04/25 0101   Current Rate (mL/hr) 45 mL/hr 05/03/25 0701   Goal Rate (mL/hr) 45 mL/hr 05/03/25 0701   Intake (mL) 100 mL 05/02/25 2101   Water Bolus (mL) 350 mL 05/03/25 1601   Rate Formula Tube Feeding (mL/hr) 45 mL/hr 05/03/25 0701   Formula Name Impact Peptide 05/04/25 0101   Intake (mL) -  "Formula Tube Feeding 45 05/04/25 0101   Residual Amount (ml) 0 ml 05/01/25 1905       Male External Urinary Catheter 05/02/25 1300 Small (Active)   Collection Container Urimeter 05/04/25 0101   Securement Method secured to top of thigh w/ adhesive device 05/04/25 0101   Skin skin barrier applied 05/04/25 0101   Tolerance no signs/symptoms of discomfort 05/04/25 0101   Output (mL) 150 mL 05/03/25 1601            Fecal Incontinence  05/02/25 1745 (Active)   Application fecal incontinence  in place 05/04/25 0101   Drainage Method attached to drainage bag 05/04/25 0101   Securement to gravity 05/04/25 0101   Skin cleansed, skin barrier applied 05/04/25 0101   Tolerance no signs/symptoms of discomfort 05/04/25 0101   Stool (mL) 200 mL 05/03/25 1601          Physical Exam         Neurosurgery Physical Exam    [id number]  No movement  Bsi , PERRL  Lip trembles to stim    Significant Labs:  Recent Labs   Lab 05/04/25  0150 05/05/25  0157   * 113*    135*   K 4.1 4.2    100   CO2 23 24   BUN 49* 46*   CREATININE 0.8 0.9   CALCIUM 9.0 8.8   MG 2.0 2.0     Recent Labs   Lab 05/04/25  0150 05/05/25  0157   WBC 12.81* 14.06*   HGB 8.9* 8.8*   HCT 28.3* 27.1*    419     No results for input(s): "LABPT", "INR", "APTT" in the last 48 hours.  Microbiology Results (last 7 days)       Procedure Component Value Units Date/Time    Blood culture [[id number]]  (Normal) Collected: 04/26/25 1628    Order Status: Completed Specimen: Blood from Peripheral, Antecubital, Right Updated: 05/01/25 2001     Blood Culture No Growth After 5 Days    Blood culture [[id number]]  (Normal) Collected: 04/26/25 1628    Order Status: Completed Specimen: Blood from Peripheral, Forearm, Left Updated: 05/01/25 2001     Blood Culture No Growth After 5 Days    Culture, Respiratory with Gram Stain [[id number]] Collected: 04/26/25 1428    Order Status: Completed Specimen: Respiratory from Sputum, Induced Updated: 04/29/25 " 1131     Respiratory Culture Normal respiratory audrey, no Staph aureus or Pseudomonas isolated     GRAM STAIN <10 Epithelial Cells/LPF      Rare WBC seen      No organisms seen    MRSA Screen by PCR [[id number]]  (Normal) Collected: 04/28/25 1056    Order Status: Completed Specimen: Nasal Swab Updated: 04/28/25 1704     MRSA PCR Screen Not Detected          All pertinent labs from the last 24 hours have been reviewed.    Significant Diagnostics:  CT: No results found in the last 24 hours.  MRI: No results found in the last 24 hours.  No results found in the last 24 hours.    Assessment/Plan:     * Subdural hemorrhage  84 yo with recent admission for VPS tie off and SDH evacuation on 3/24 presenting for new LSW and speech hesitation found to have enlarged chronic SDH as well as a new small R convexity acute SDH. Pt takes eliquis, s/p PCC. Interval CTH stable.     Now s/p bialteral subdural evacuations with VPS removal on 4/25    Recommendations:  --admitted to [facility]  --on eliquis, s/p kcentra reversal. Hold all AC/AP, ok for dvt ppx  --SBP <140  --HOB >30  --vimpat/zosyn per ICU  --if any further scans per NCC then recommend MRI  --drains removed 4/28  --on modafinil trial  --Continue to monitor clinically, notify NSGY immediately with any changes in neuro status    Discussed with Dr. Krzysztof Daley MD  Neurosurgery  [facility]

## 2025-05-05 NOTE — ASSESSMENT & PLAN NOTE
Vimpat 200 bid. Will attempt to decrease dose in the coming days as max dosing in this age group can cause sedation. Will need EEG monitoring at time of dose reduction.   EEG- moderate encephalopathy, triphasics, generalized cortical irritability, no discrete seizures

## 2025-05-05 NOTE — ASSESSMENT & PLAN NOTE
86 yo with recent admission for VPS tie off and SDH evacuation on 3/24 presenting for new LSW and speech hesitation found to have enlarged chronic SDH as well as a new small R convexity acute SDH. Pt takes eliquis, s/p PCC. Interval CTH stable.     Now s/p bialteral subdural evacuations with VPS removal on 4/25    Recommendations:  --admitted to [facility]  --on eliquis, s/p kcentra reversal. Hold all AC/AP, ok for dvt ppx  --SBP <140  --HOB >30  --vimpat/zosyn per ICU  --if any further scans per NCC then recommend MRI  --drains removed 4/28  --on modafinil trial  --Continue to monitor clinically, notify NSGY immediately with any changes in neuro status    Discussed with Dr. Blankenship

## 2025-05-05 NOTE — ASSESSMENT & PLAN NOTE
- Persistent hypotension morning of 5/5 unresponsive to 1L of fluids in the setting of fever 100.8 overnight and worsening leukocytosis. Now on levophed. Will attempt to wean as tolerated.   - Follow up infectious workup  - Start zosyn

## 2025-05-05 NOTE — SUBJECTIVE & OBJECTIVE
Interval History: Infectious workup started due to hypotension requiring levophed. Blood cultures, repeat urinalysis, CXR. Stated on zosyn. NGT tube in place.    Spoke with family and will switch to dnr.     Review of Systems  Unable to obtain a complete ROS due to level of consciousness.  Objective:     Vitals:  Temp: 98.3 °F (36.8 °C)  Pulse: 83  Rhythm: normal sinus rhythm  BP: (!) 147/64  MAP (mmHg): 92  Resp: (!) 23  SpO2: 99 %    Temp  Min: 97.9 °F (36.6 °C)  Max: 100.8 °F (38.2 °C)  Pulse  Min: 75  Max: 107  BP  Min: 91/36  Max: 158/67  MAP (mmHg)  Min: 52  Max: 97  Resp  Min: 20  Max: 34  SpO2  Min: 96 %  Max: 100 %    05/04 0701 - 05/05 0700  In: 1750   Out: 1305 [Urine:1250]   Unmeasured Output  Urine Occurrence: 0  Stool Occurrence: 1  Emesis Occurrence: 0  Pad Count: 2          Physical ExamVitals and nursing note reviewed.   Constitutional:       General: He is not in acute distress.     Appearance: He is ill-appearing.   HENT:      Head:      Comments: Surgical site c/d/i     Right Ear: External ear normal.      Left Ear: External ear normal.      Nose: Nose normal.      Comments: NGT in place.     Mouth/Throat:      Mouth: Mucous membranes are dry.   Eyes:      Pupils: Pupils are equal, round, and reactive to light.   Cardiovascular:      Rate and Rhythm: Normal rate and regular rhythm.   Pulmonary:      Effort: Pulmonary effort is normal. No respiratory distress.      Comments: Upper airway congestion   Bl coarse BS   Abdominal:      General: There is no distension.      Palpations: Abdomen is soft.      Tenderness: There is no abdominal tenderness. There is no guarding.   Musculoskeletal:      Right lower leg: No edema.      Left lower leg: No edema.   Skin:     General: Skin is warm and dry.   Neurological:      Comments: Mental status: Lethargic, opens spontaneously. Not following commands.   CN II-XII grossly intact, specifically:  PERRL  EOMI  blinks to threat bilaterally   No facial  asymmetry.  Weak spontaneous cough   Motor:  RUE ext  RLE triple flexion to tickling foot  LUE wd  LLE triple flexion to tickling foot  Sensation intact to noxious x4     Medications:  ContinuousNORepinephrine bitartrate-D5W, Last Rate: 0.06 mcg/kg/min (05/05/25 1201)    Scheduledalbuterol-ipratropium, 3 mL, Q6H  enoxparin, 40 mg, Q24H (prophylaxis, 1700)  lacosamide, 200 mg, Q12H  levothyroxine, 125 mcg, Before breakfast  modafiniL, 200 mg, Daily  piperacillin-tazobactam (Zosyn) IV (PEDS and ADULTS) (extended infusion is not appropriate), 4.5 g, Q8H  pravastatin, 20 mg, QHS  psyllium husk, 1 packet, Daily  silodosin, 4 mg, Daily    PRNacetaminophen, 650 mg, Q6H PRN  bisacodyL, 10 mg, Daily PRN  hydrALAZINE, 10 mg, Q4H PRN  labetalol, 10 mg, Q4H PRN  magnesium oxide, 800 mg, PRN  magnesium oxide, 800 mg, PRN  potassium bicarbonate, 35 mEq, PRN  potassium bicarbonate, 50 mEq, PRN  potassium bicarbonate, 60 mEq, PRN  potassium, sodium phosphates, 2 packet, PRN  potassium, sodium phosphates, 2 packet, PRN  potassium, sodium phosphates, 2 packet, PRN  sodium chloride 0.9%, 10 mL, PRN      Today I personally reviewed pertinent medications, lines/drains/airways, laboratory results, notably:    Diet  Diet NPO  Diet NPO

## 2025-05-05 NOTE — PLAN OF CARE
"[facility] Care Plan  POC reviewed with Ramesh Ricci and family at 1400. Family verbalized understanding. Questions and concerns addressed. No acute events today. Pt progressing toward goals. Will continue to monitor. See below and flowsheets for full assessment and VS info.     -DNR  -PRN tylenol given for temp of 100.8  -1L NS bolus administered for hypotension  -Levo gtt started (up to 0.18), off now  -Blood cultures/UA sent  -moved to immerse bed  -PRN Labetalol administered for SBP >160    Is this a stroke patient? yes    Neuro:  [place] Coma Scale  Best Eye Response: 3-->(E3) to speech  Best Motor Response: 4-->(M4) withdraws from pain  Best Verbal Response: 1-->(V1) none  [place] Coma Scale Score: 8  Assessment Qualifiers: patient not sedated/intubated, no eye obstruction present  Pupil PERRLA: yes     24 hr Temp:  [98.3 °F (36.8 °C)-100.8 °F (38.2 °C)]     CV:   Rhythm: normal sinus rhythm  BP goals:   SBP < 160  MAP > 65    Resp:      Oxygen Concentration (%): 40    Plan: N/A    GI/:     Diet/Nutrition Received: NPO, tube feeding  Last Bowel Movement: 05/05/25  Voiding Characteristics: external catheter    Intake/Output Summary (Last 24 hours) at 5/5/2025 1809  Last data filed at 5/5/2025 1801  Gross per 24 hour   Intake 2103.68 ml   Output 1675 ml   Net 428.68 ml     Unmeasured Output  Urine Occurrence: 0  Stool Occurrence: 1  Emesis Occurrence: 0  Pad Count: 2    Labs/Accuchecks:  Recent Labs   Lab 05/05/25  0157   WBC 14.06*   RBC 3.00*   HGB 8.8*   HCT 27.1*         Recent Labs   Lab 05/05/25  0157   *   K 4.2   CO2 24      BUN 46*   CREATININE 0.9   ALKPHOS 67   ALT 19   AST 30   BILITOT 0.2    No results for input(s): "PROTIME", "INR", "APTT", "HEPANTIXA" in the last 168 hours. No results for input(s): "CPK", "CPKMB", "TROPONINI", "MB" in the last 168 hours.    Electrolytes: N/A - electrolytes WDL  Accuchecks: none    Gtts:   NORepinephrine bitartrate-D5W  0-0.2 mcg/kg/min " Intravenous Continuous   Stopped at 05/05/25 1227       LDA/Wounds:    Gabino Risk Assessment  Sensory Perception: 1-->completely limited  Moisture: 3-->occasionally moist  Activity: 1-->bedfast  Mobility: 1-->completely immobile  Nutrition: 3-->adequate  Friction and Shear: 1-->problem  Gabino Score: 10    Is your gabino score 12 or less? yes enrolled in the peach program and heel boots on            Restraints:        [facility]

## 2025-05-05 NOTE — ASSESSMENT & PLAN NOTE
Advance Care Planning     Date: 05/05/2025    Code Status  In light of the patients advanced and life limiting illness,I engaged the patient and family in a voluntary conversation about the patient's preferences for care  at the very end of life. The patient wishes to have a natural, peaceful death.  Along those lines, the family does not wish to have CPR or other invasive treatments performed when his heart and/or breathing stops. I communicated to the patient and family that a DNR order would be placed in his medical record to reflect this preference.    A total of 15 min was spent on advance care planning, goals of care discussion, emotional support, formulating and communicating prognosis and exploring burden/benefit of various approaches of treatment. This discussion occurred on a fully voluntary basis with the verbal consent of the patient and/or family.

## 2025-05-05 NOTE — PLAN OF CARE
"[facility] Care Plan    POC reviewed with Ramesh Ricci at 0300.  Questions and concerns addressed. No acute events today. Pt progressing toward goals. Will continue to monitor. See below and flowsheets for full assessment and VS info.     Chest x-ray complete   TF continued   PRN tylenol x 1        Is this a stroke patient? yes    Neuro:  [place] Coma Scale  Best Eye Response: 2-->(E2) to pain  Best Motor Response: 4-->(M4) withdraws from pain  Best Verbal Response: 1-->(V1) none  [place] Coma Scale Score: 7  Assessment Qualifiers: patient not sedated/intubated  Pupil PERRLA: yes     24 hr Temp:  [97.9 °F (36.6 °C)-100.5 °F (38.1 °C)]     CV:   Rhythm: normal sinus rhythm  BP goals:   SBP < 160  MAP > 65    Resp:      Oxygen Concentration (%): 40    Plan: N/A    GI/:     Diet/Nutrition Received: tube feeding  Last Bowel Movement: 05/04/25  Voiding Characteristics: external catheter    Intake/Output Summary (Last 24 hours) at 5/5/2025 0640  Last data filed at 5/5/2025 0501  Gross per 24 hour   Intake 1795 ml   Output 1415 ml   Net 380 ml     Unmeasured Output  Urine Occurrence: 0  Stool Occurrence: 1  Emesis Occurrence: 0  Pad Count: 2    Labs/Accuchecks:  Recent Labs   Lab 05/05/25  0157   WBC 14.06*   RBC 3.00*   HGB 8.8*   HCT 27.1*         Recent Labs   Lab 05/05/25  0157   *   K 4.2   CO2 24      BUN 46*   CREATININE 0.9   ALKPHOS 67   ALT 19   AST 30   BILITOT 0.2    No results for input(s): "PROTIME", "INR", "APTT", "HEPANTIXA" in the last 168 hours. No results for input(s): "CPK", "CPKMB", "TROPONINI", "MB" in the last 168 hours.    Electrolytes: N/A - electrolytes WDL  Accuchecks: none    Gtts:      LDA/Wounds:    Gabino Risk Assessment  Sensory Perception: 2-->very limited  Moisture: 3-->occasionally moist  Activity: 1-->bedfast  Mobility: 2-->very limited  Nutrition: 3-->adequate  Friction and Shear: 2-->potential problem  Gabino Score: 13  Is your gabino score 12 or less? " no          Restraints:        [facility]

## 2025-05-05 NOTE — EICU
[facility] Night Rounds Checklist  24H Vital Sign Range:  Temp:  [97.9 °F (36.6 °C)-100.5 °F (38.1 °C)]   Pulse:  []   Resp:  [18-34]   BP: ()/(47-71)   SpO2:  [98 %-100 %]     Video rounds

## 2025-05-06 ENCOUNTER — DOCUMENTATION ONLY (OUTPATIENT)
Dept: NEUROLOGY | Facility: CLINIC | Age: 85
End: 2025-05-06
Payer: MEDICARE

## 2025-05-06 NOTE — ASSESSMENT & PLAN NOTE
Vimpat dose reduced to 100mg BID with cEEG.  EEG- moderate encephalopathy, triphasics, generalized cortical irritability, no discrete seizures

## 2025-05-06 NOTE — PROGRESS NOTES
[facility] - Neuro Critical Care  Neurosurgery  Progress Note    Subjective:     History of Present Illness: Ramesh Ricci is an 84 y/o male with a PMH of Afib, Ulcerative Colitis, HTN, and aortic stenosis. He also has normal pressure hydrocephalus s/p  shunt (now tied off) is presenting with multiple complaints.     Wife reports concern for acute deconditioning over the past few days.  She describes increased weakness, urinary frequency, and speech changes that is been progressing for the past 3 days.  She states that he seems to slumped towards the right side and has been dragging his right leg.  Previously ambulated with his walker.  She also mentions that he seems to have speech hesitancy.  Denies any fevers, chills, abdominal pain or diarrhea    Post-Op Info:  Procedure(s) (LRB):  CRANIOTOMY, FOR SUBDURAL HEMATOMA EVACUATION, bilateral. shunt removal (Bilateral)  REMOVAL, SHUNT, VENTRICULOPERITONEAL   11 Days Post-Op   Interval History: 5/6: No significant changes.     Medications:  Continuous Infusions:   NORepinephrine bitartrate-D5W  0-0.2 mcg/kg/min Intravenous Continuous   Stopped at 05/05/25 1227     Scheduled Meds:   albuterol-ipratropium  3 mL Nebulization Q6H    enoxparin  40 mg Subcutaneous Q24H (prophylaxis, 1700)    lacosamide  200 mg Per NG tube Q12H    levothyroxine  125 mcg Per NG tube Before breakfast    modafiniL  200 mg Per NG tube Daily    piperacillin-tazobactam (Zosyn) IV (PEDS and ADULTS) (extended infusion is not appropriate)  4.5 g Intravenous Q8H    pravastatin  20 mg Per NG tube QHS    psyllium husk  1 packet Per NG tube Daily    silodosin  4 mg Per NG tube Daily     PRN Meds:  Current Facility-Administered Medications:     acetaminophen, 650 mg, Per NG tube, Q6H PRN    bisacodyL, 10 mg, Rectal, Daily PRN    hydrALAZINE, 10 mg, Intravenous, Q4H PRN    labetalol, 5 mg, Intravenous, Q4H PRN    magnesium oxide, 800 mg, Per NG tube, PRN    magnesium oxide, 800 mg, Per NG tube, PRN     potassium bicarbonate, 35 mEq, Per NG tube, PRN    potassium bicarbonate, 50 mEq, Per NG tube, PRN    potassium bicarbonate, 60 mEq, Per NG tube, PRN    potassium, sodium phosphates, 2 packet, Per NG tube, PRN    potassium, sodium phosphates, 2 packet, Per NG tube, PRN    potassium, sodium phosphates, 2 packet, Per NG tube, PRN    sodium chloride 0.9%, 10 mL, Intravenous, PRN     Review of Systems  Objective:     Weight: 68 kg (149 lb 14.6 oz)  Body mass index is 20.92 kg/m².  Vital Signs (Most Recent):  Temp: 97.8 °F (36.6 °C) (05/06/25 0701)  Pulse: 82 (05/06/25 0701)  Resp: (!) 21 (05/06/25 0701)  BP: (!) 139/58 (05/06/25 0701)  SpO2: 100 % (05/06/25 0701) Vital Signs (24h Range):  Temp:  [97.7 °F (36.5 °C)-99.9 °F (37.7 °C)] 97.8 °F (36.6 °C)  Pulse:  [] 82  Resp:  [18-32] 21  SpO2:  [96 %-100 %] 100 %  BP: ()/(36-77) 139/58     Date 05/06/25 0700 - 05/07/25 0659   Shift 3473-9213 4002-6043 5322-9615 24 Hour Total   INTAKE   NG/GT 45   45   IV Piggyback 69.5   69.5   Shift Total(mL/kg) 114.5(1.7)   114.5(1.7)   OUTPUT   Shift Total(mL/kg)       Weight (kg) 68 68 68 68                            NG/OG Tube 04/26/25 0819 14 Fr. Right nostril (Active)   Placement Check placement verified by x-ray;placement verified by distal tube length measurement 05/02/25 1701   Tolerance no signs/symptoms of discomfort 05/04/25 0101   Securement secured to nostril center w/ adhesive device 05/04/25 0101   Clamp Status/Tolerance no abdominal discomfort;no abdominal distention;no emesis;no nausea;no residual 05/04/25 0101   Suction Setting/Drainage Method suction at the bedside 05/04/25 0101   Insertion Site Appearance no redness, warmth, tenderness, skin breakdown, drainage 05/04/25 0101   Drainage None 05/04/25 0101   Flush/Irrigation flushed w/;water;no resistance met 05/04/25 0101   Feeding Type continuous;by pump 05/04/25 0101   Feeding Action feeding continued 05/04/25 0101   Current Rate (mL/hr) 45 mL/hr  "05/03/25 0701   Goal Rate (mL/hr) 45 mL/hr 05/03/25 0701   Intake (mL) 100 mL 05/02/25 2101   Water Bolus (mL) 350 mL 05/03/25 1601   Rate Formula Tube Feeding (mL/hr) 45 mL/hr 05/03/25 0701   Formula Name Impact Peptide 05/04/25 0101   Intake (mL) - Formula Tube Feeding 45 05/04/25 0101   Residual Amount (ml) 0 ml 05/01/25 1905       Male External Urinary Catheter 05/02/25 1300 Small (Active)   Collection Container Urimeter 05/04/25 0101   Securement Method secured to top of thigh w/ adhesive device 05/04/25 0101   Skin skin barrier applied 05/04/25 0101   Tolerance no signs/symptoms of discomfort 05/04/25 0101   Output (mL) 150 mL 05/03/25 1601            Fecal Incontinence  05/02/25 1745 (Active)   Application fecal incontinence  in place 05/04/25 0101   Drainage Method attached to drainage bag 05/04/25 0101   Securement to gravity 05/04/25 0101   Skin cleansed, skin barrier applied 05/04/25 0101   Tolerance no signs/symptoms of discomfort 05/04/25 0101   Stool (mL) 200 mL 05/03/25 1601          Physical Exam         Neurosurgery Physical Exam    [id number]  No movement  Bsi , PERRL  Lip trembles to stim    Significant Labs:  Recent Labs   Lab 05/05/25  0157 05/06/25  0220   * 122*   * 138   K 4.2 3.7    106   CO2 24 21*   BUN 46* 42*   CREATININE 0.9 0.9   CALCIUM 8.8 8.2*   MG 2.0 1.9     Recent Labs   Lab 05/05/25  0157 05/06/25  0220   WBC 14.06* 11.23   HGB 8.8* 7.9*   HCT 27.1* 24.8*    371     No results for input(s): "LABPT", "INR", "APTT" in the last 48 hours.  Microbiology Results (last 7 days)       Procedure Component Value Units Date/Time    Blood culture [[id number]]  (Normal) Collected: 05/05/25 1100    Order Status: Completed Specimen: Blood from Peripheral, Ankle, Right Updated: 05/05/25 1901     Blood Culture No Growth After 6 Hours    Blood culture [[id number]]  (Normal) Collected: 05/05/25 1127    Order Status: Completed Specimen: Blood from Peripheral, " Lower Leg, Left Updated: 05/05/25 1901     Blood Culture No Growth After 6 Hours    Blood culture [[id number]]  (Normal) Collected: 04/26/25 1628    Order Status: Completed Specimen: Blood from Peripheral, Antecubital, Right Updated: 05/01/25 2001     Blood Culture No Growth After 5 Days    Blood culture [[id number]]  (Normal) Collected: 04/26/25 1628    Order Status: Completed Specimen: Blood from Peripheral, Forearm, Left Updated: 05/01/25 2001     Blood Culture No Growth After 5 Days    Culture, Respiratory with Gram Stain [[id number]] Collected: 04/26/25 1428    Order Status: Completed Specimen: Respiratory from Sputum, Induced Updated: 04/29/25 1131     Respiratory Culture Normal respiratory audrey, no Staph aureus or Pseudomonas isolated     GRAM STAIN <10 Epithelial Cells/LPF      Rare WBC seen      No organisms seen          All pertinent labs from the last 24 hours have been reviewed.    Significant Diagnostics:  CT: No results found in the last 24 hours.  MRI: No results found in the last 24 hours.  No results found in the last 24 hours.  No results found in the last 24 hours.    Assessment/Plan:     * Subdural hemorrhage  86 yo with recent admission for VPS tie off and SDH evacuation on 3/24 presenting for new LSW and speech hesitation found to have enlarged chronic SDH as well as a new small R convexity acute SDH. Pt takes eliquis, s/p PCC. Interval CTH stable.     Now s/p bialteral subdural evacuations with VPS removal on 4/25    Recommendations:  --admitted to [facility]  --on eliquis, s/p kcentra reversal. Hold all AC/AP, ok for dvt ppx  --SBP <140  --HOB >30  --vimpat/zosyn per ICU  --if any further scans per NCC then recommend MRI  --drains removed 4/28  --on modafinil trial  --Continue to monitor clinically, notify NSGY immediately with any changes in neuro status    Discussed with Dr. Krzysztof Daley MD  Neurosurgery  [facility] - Neuro Critical Care

## 2025-05-06 NOTE — ASSESSMENT & PLAN NOTE
RESOLVED. Off pressors.    - Persistent hypotension morning of 5/5 unresponsive to 1L of fluids in the setting of fever 100.8 overnight and worsening leukocytosis. Now on levophed. Will attempt to wean as tolerated.   - Follow up infectious workup  - Start zosyn

## 2025-05-06 NOTE — EICU
[facility] Night Rounds Checklist  24H Vital Sign Range:  Temp:  [98.3 °F (36.8 °C)-100.8 °F (38.2 °C)]   Pulse:  []   Resp:  [18-32]   BP: ()/(36-77)   SpO2:  [96 %-100 %]     Video rounds

## 2025-05-06 NOTE — ASSESSMENT & PLAN NOTE
Resumed lopressor. BP stable.  Hold home eliquis, reversed on admission.  Currently rate controlled

## 2025-05-06 NOTE — SUBJECTIVE & OBJECTIVE
Interval History: 5/6: No significant changes.     Medications:  Continuous Infusions:   NORepinephrine bitartrate-D5W  0-0.2 mcg/kg/min Intravenous Continuous   Stopped at 05/05/25 1227     Scheduled Meds:   albuterol-ipratropium  3 mL Nebulization Q6H    enoxparin  40 mg Subcutaneous Q24H (prophylaxis, 1700)    lacosamide  200 mg Per NG tube Q12H    levothyroxine  125 mcg Per NG tube Before breakfast    modafiniL  200 mg Per NG tube Daily    piperacillin-tazobactam (Zosyn) IV (PEDS and ADULTS) (extended infusion is not appropriate)  4.5 g Intravenous Q8H    pravastatin  20 mg Per NG tube QHS    psyllium husk  1 packet Per NG tube Daily    silodosin  4 mg Per NG tube Daily     PRN Meds:  Current Facility-Administered Medications:     acetaminophen, 650 mg, Per NG tube, Q6H PRN    bisacodyL, 10 mg, Rectal, Daily PRN    hydrALAZINE, 10 mg, Intravenous, Q4H PRN    labetalol, 5 mg, Intravenous, Q4H PRN    magnesium oxide, 800 mg, Per NG tube, PRN    magnesium oxide, 800 mg, Per NG tube, PRN    potassium bicarbonate, 35 mEq, Per NG tube, PRN    potassium bicarbonate, 50 mEq, Per NG tube, PRN    potassium bicarbonate, 60 mEq, Per NG tube, PRN    potassium, sodium phosphates, 2 packet, Per NG tube, PRN    potassium, sodium phosphates, 2 packet, Per NG tube, PRN    potassium, sodium phosphates, 2 packet, Per NG tube, PRN    sodium chloride 0.9%, 10 mL, Intravenous, PRN     Review of Systems  Objective:     Weight: 68 kg (149 lb 14.6 oz)  Body mass index is 20.92 kg/m².  Vital Signs (Most Recent):  Temp: 97.8 °F (36.6 °C) (05/06/25 0701)  Pulse: 82 (05/06/25 0701)  Resp: (!) 21 (05/06/25 0701)  BP: (!) 139/58 (05/06/25 0701)  SpO2: 100 % (05/06/25 0701) Vital Signs (24h Range):  Temp:  [97.7 °F (36.5 °C)-99.9 °F (37.7 °C)] 97.8 °F (36.6 °C)  Pulse:  [] 82  Resp:  [18-32] 21  SpO2:  [96 %-100 %] 100 %  BP: ()/(36-77) 139/58     Date 05/06/25 0700 - 05/07/25 0659   Shift 1583-2454 5284-9084 4828-1321 24 Hour Total    INTAKE   NG/GT 45   45   IV Piggyback 69.5   69.5   Shift Total(mL/kg) 114.5(1.7)   114.5(1.7)   OUTPUT   Shift Total(mL/kg)       Weight (kg) 68 68 68 68                            NG/OG Tube 04/26/25 0819 14 Fr. Right nostril (Active)   Placement Check placement verified by x-ray;placement verified by distal tube length measurement 05/02/25 1701   Tolerance no signs/symptoms of discomfort 05/04/25 0101   Securement secured to nostril center w/ adhesive device 05/04/25 0101   Clamp Status/Tolerance no abdominal discomfort;no abdominal distention;no emesis;no nausea;no residual 05/04/25 0101   Suction Setting/Drainage Method suction at the bedside 05/04/25 0101   Insertion Site Appearance no redness, warmth, tenderness, skin breakdown, drainage 05/04/25 0101   Drainage None 05/04/25 0101   Flush/Irrigation flushed w/;water;no resistance met 05/04/25 0101   Feeding Type continuous;by pump 05/04/25 0101   Feeding Action feeding continued 05/04/25 0101   Current Rate (mL/hr) 45 mL/hr 05/03/25 0701   Goal Rate (mL/hr) 45 mL/hr 05/03/25 0701   Intake (mL) 100 mL 05/02/25 2101   Water Bolus (mL) 350 mL 05/03/25 1601   Rate Formula Tube Feeding (mL/hr) 45 mL/hr 05/03/25 0701   Formula Name Impact Peptide 05/04/25 0101   Intake (mL) - Formula Tube Feeding 45 05/04/25 0101   Residual Amount (ml) 0 ml 05/01/25 1905       Male External Urinary Catheter 05/02/25 1300 Small (Active)   Collection Container Urimeter 05/04/25 0101   Securement Method secured to top of thigh w/ adhesive device 05/04/25 0101   Skin skin barrier applied 05/04/25 0101   Tolerance no signs/symptoms of discomfort 05/04/25 0101   Output (mL) 150 mL 05/03/25 1601            Fecal Incontinence  05/02/25 1745 (Active)   Application fecal incontinence  in place 05/04/25 0101   Drainage Method attached to drainage bag 05/04/25 0101   Securement to gravity 05/04/25 0101   Skin cleansed, skin barrier applied 05/04/25 0101   Tolerance no  "signs/symptoms of discomfort 05/04/25 0101   Stool (mL) 200 mL 05/03/25 1601          Physical Exam         Neurosurgery Physical Exam    E1V1M1  No movement  Bsi , PERRL  Lip trembles to stim    Significant Labs:  Recent Labs   Lab 05/05/25  0157 05/06/25  0220   * 122*   * 138   K 4.2 3.7    106   CO2 24 21*   BUN 46* 42*   CREATININE 0.9 0.9   CALCIUM 8.8 8.2*   MG 2.0 1.9     Recent Labs   Lab 05/05/25  0157 05/06/25  0220   WBC 14.06* 11.23   HGB 8.8* 7.9*   HCT 27.1* 24.8*    371     No results for input(s): "LABPT", "INR", "APTT" in the last 48 hours.  Microbiology Results (last 7 days)       Procedure Component Value Units Date/Time    Blood culture [[id number]]  (Normal) Collected: 05/05/25 1100    Order Status: Completed Specimen: Blood from Peripheral, Ankle, Right Updated: 05/05/25 1901     Blood Culture No Growth After 6 Hours    Blood culture [[id number]]  (Normal) Collected: 05/05/25 1127    Order Status: Completed Specimen: Blood from Peripheral, Lower Leg, Left Updated: 05/05/25 1901     Blood Culture No Growth After 6 Hours    Blood culture [[id number]]  (Normal) Collected: 04/26/25 1628    Order Status: Completed Specimen: Blood from Peripheral, Antecubital, Right Updated: 05/01/25 2001     Blood Culture No Growth After 5 Days    Blood culture [[id number]]  (Normal) Collected: 04/26/25 1628    Order Status: Completed Specimen: Blood from Peripheral, Forearm, Left Updated: 05/01/25 2001     Blood Culture No Growth After 5 Days    Culture, Respiratory with Gram Stain [[id number]] Collected: 04/26/25 1428    Order Status: Completed Specimen: Respiratory from Sputum, Induced Updated: 04/29/25 1131     Respiratory Culture Normal respiratory audrey, no Staph aureus or Pseudomonas isolated     GRAM STAIN <10 Epithelial Cells/LPF      Rare WBC seen      No organisms seen          All pertinent labs from the last 24 hours have been reviewed.    Significant Diagnostics:  CT: No " results found in the last 24 hours.  MRI: No results found in the last 24 hours.  No results found in the last 24 hours.  No results found in the last 24 hours.

## 2025-05-06 NOTE — ACP (ADVANCE CARE PLANNING)
"  Code Status  In light of the patients advanced and life limiting illness,I engaged the the family in a voluntary conversation about the patient's preferences for care  at the very end of life. The patient wishes to have a natural, peaceful death. We discussed and clarified his DNI status with "okay for CPR" which he presently has, and I provided some gentle counseling on the realities of cardiopulmonary resuscitation with or without defibrillation, namely that any situation where this occurs would almost certainly require intubation and mechanical ventilation anyway.  Along those lines and with this information, patient's family decided that the patient would not wish to have CPR or other invasive treatments performed if/when his heart and/or breathing stops. I communicated to the family that a DNR order would be placed in his medical record to reflect this preference. We did clarify that less invasive/painful resuscitative measures such as vasopressors, cardioversion for Afib etc, would be acceptable for patient and family believes they would be.     A total of 20 min was spent on advance care planning, goals of care discussion, emotional support, formulating and communicating prognosis and exploring burden/benefit of various approaches of treatment. This discussion occurred on a fully voluntary basis with the verbal consent of the patient and/or family.     Nikolai Abraham, DO  Neuro Critical Care     "

## 2025-05-06 NOTE — ASSESSMENT & PLAN NOTE
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 on 4/23/25 for ongoing management of SDH reexpansion, now s/p repeat bilateral craniotomy for evacuation on 4/25/25.      POD11. Mental status seems to be improving though still difficult to arouse, continue Modafinil trial at 200mg daily.      Decreased Lacoasmide to 100mg BID. cEEG monitoring at time of dose reduction.      CTH with small right parietal ICH, repeat CTH at 6 hours stable.     SBP<160.      Aggressive pulmonary toileting and airway maintenance.     TF +  q4h    PT/OT     Continue home statin, Silodosin for urinary retention and levothyroxine.      Lovenox for chemical DVT prophylaxis.

## 2025-05-06 NOTE — SUBJECTIVE & OBJECTIVE
Interval History: Vimpat dose decreased, cEEG. Leukocytosis resolved. Continue zosyn, pending Bcx. UA negative for infection. Off pressors.    Review of Systems  Unable to obtain a complete ROS due to level of consciousness.  Objective:     Vitals:  Temp: 98 °F (36.7 °C)  Pulse: 87  Rhythm: normal sinus rhythm  BP: (!) 132/54  MAP (mmHg): 78  Resp: (!) 22  SpO2: 99 %    Temp  Min: 97.7 °F (36.5 °C)  Max: 99.9 °F (37.7 °C)  Pulse  Min: 71  Max: 105  BP  Min: 124/56  Max: 182/77  MAP (mmHg)  Min: 70  Max: 110  Resp  Min: 18  Max: 32  SpO2  Min: 99 %  Max: 100 %    05/05 0701 - 05/06 0700  In: 1968.7 [I.V.:88.4]  Out: 2125 [Urine:1725]   Unmeasured Output  Urine Occurrence: 0  Stool Occurrence: 1  Emesis Occurrence: 0  Pad Count: 2          Physical ExamVitals and nursing note reviewed.   Constitutional:       General: He is not in acute distress.     Appearance: He is ill-appearing.   HENT:      Head:      Comments: Surgical site c/d/i     Right Ear: External ear normal.      Left Ear: External ear normal.      Nose: Nose normal.      Comments: NGT in place.     Mouth/Throat:      Mouth: Mucous membranes are dry.   Eyes:      Pupils: Pupils are equal, round, and reactive to light.   Cardiovascular:      Rate and Rhythm: Normal rate and regular rhythm.   Pulmonary:      Effort: Pulmonary effort is normal. No respiratory distress.      Comments: Upper airway congestion   Bl coarse BS   Abdominal:      General: There is no distension.      Palpations: Abdomen is soft.      Tenderness: There is no abdominal tenderness. There is no guarding.   Musculoskeletal:      Right lower leg: No edema.      Left lower leg: No edema.   Skin:     General: Skin is warm and dry.   Neurological:      Comments: Mental status: Lethargic, opens spontaneously. Following minimal commands.   CN II-XII grossly intact, specifically:  PERRL  EOMI  No facial asymmetry.  Weak spontaneous cough   Motor:  RUE ext  RLE triple flexion to tickling  foot  LUE wd  LLE triple flexion to tickling foot  Sensation intact to noxious x4     Medications:  Continuous   Scheduledalbuterol-ipratropium, 3 mL, Q6H  enoxparin, 40 mg, Q24H (prophylaxis, 1700)  lacosamide, 100 mg, Q12H  levothyroxine, 125 mcg, Before breakfast  metoprolol tartrate, 12.5 mg, Q8H  modafiniL, 200 mg, Daily  piperacillin-tazobactam (Zosyn) IV (PEDS and ADULTS) (extended infusion is not appropriate), 4.5 g, Q8H  pravastatin, 20 mg, QHS  psyllium husk, 1 packet, BID  silodosin, 4 mg, Daily    PRNacetaminophen, 650 mg, Q6H PRN  bisacodyL, 10 mg, Daily PRN  hydrALAZINE, 10 mg, Q4H PRN  labetalol, 5 mg, Q4H PRN  magnesium oxide, 800 mg, PRN  magnesium oxide, 800 mg, PRN  potassium bicarbonate, 35 mEq, PRN  potassium bicarbonate, 50 mEq, PRN  potassium bicarbonate, 60 mEq, PRN  potassium, sodium phosphates, 2 packet, PRN  potassium, sodium phosphates, 2 packet, PRN  potassium, sodium phosphates, 2 packet, PRN  sodium chloride 0.9%, 10 mL, PRN      Today I personally reviewed pertinent medications, lines/drains/airways, laboratory results, notably:    Diet  Diet NPO  Diet NPO

## 2025-05-06 NOTE — EICU
Intervention Initiated From:  [facility] / EICU    Juanis intervened regarding:  Rounding (Video assessment)    Comments: Virtual ICU Quality Rounds    Admit Date: 4/23/2025  Hospital Day: 13    ICU Day: 12d 14h    24H Vital Sign Range:  Temp:  [97.7 °F (36.5 °C)-99.9 °F (37.7 °C)]   Pulse:  []   Resp:  [18-32]   BP: (124-182)/(49-77)   SpO2:  [99 %-100 %]     VICU Surveillance Screening    LDA reconciliation : Yes

## 2025-05-06 NOTE — PROGRESS NOTES
EEG Hook up  Skin breakdown from previous EEGs are in healing: Fp1, Fp2, LOC  Head lightly wrapped up with a gauze  Skin Integrity: Mild     Keanu Walton   05/06/2025 2:40 PM

## 2025-05-06 NOTE — PROGRESS NOTES
"[facility]  Neurocritical Care  Progress Note    Admit Date: 4/23/2025  Service Date: 05/06/2025  Length of Stay: 13    Subjective:     Chief Complaint: Subdural hemorrhage    History of Present Illness: Ramesh Ricci is an 84 y/o male with a PMH of Afib on eliquis at home, Ulcerative Colitis, HTN, aortic stenosis s/p TAVR, and VPS 2/2 NPH c/b bilateral SDH s/p bilateral evac and VPS tie off in March 2025 who presents to [facility] with acute on subacute bilateral SDH. His wife at bedside reports concern for acute deconditioning over the past three days involving worsening weakness, urinary frequency, speech changes, intermittent cessation in interaction with family, and dragging of his R leg. CT head showed bilateral subdural hygromas with bilateral acute versus subacute SDH. He takes eliquis for A fib at home which was reversed with PCC in the ED. During evaluation of the patient, he was initially interactive, following, commands, and oriented to person and place. He then had a brief staring spell where he no longer followed commands and displayed no usable speech. This then resolved after a few minutes and he returned to baseline. He takes Vimpat 100 bid at home but has not gotten his nightly dose. Per his wife, these spells occurred at home as well.    He is admitted to [facility] for hourly neuro-monitoring and a higher level of care.     Hospital Course: 04/25/2025: OR today for SDH evacuation   04/26/2025: NAEON. POD1 s/p SDH evac. Post-op CTH good decompression and expected post-op changes. Post-op episode of seizure-like activity, described as "LUE & LLE shaking" lasting approximately 1 minute that resolved without intervention. Patient unarousable post-operatively and this AM. NGT placed. Repeat CTH obtained, stable. Procal, EEG ordered and pending.   04/27/2025:  POD2 s/p SDH evacuation. Procal elevated and patient febrile with low grade temp. Pan-cultured and started on broad spec antibiotics. " Aggressive pulm toileting started yesterday. Overnight with Tmax 100.9°F. Cx with NGTD. Respiratory panel sent. US extremities ordered and pending to rule out DVT. Start lovenox pending neurosurgery recs.   04/28/2025: Continue aggressive pulmonary toileting. EEG with periodic complexes with some epileptiform morphology, optimized lacosamide to 200mg BID.   04/29/2025 EEG negative for seizures, small improvement after increasing vimpat yesterday. Discontinued EEG. CTH with new small R parietal IPH with mild surrounding edema. BUN 67, started enteral water flushes 250 q4h. MRSA swab negative, discontinued vanc. CXR with slight improvement from yesterday. Continue aggressive pulm toilet.   04/30/2025 CTH overnight with stable R IPH. POD5 and pt still not waking up. Relax to q2h neuro checks. BUN 72, increased enteral water to 300 q4h and given additional 300 ml x 2 today.   05/01/2025 exam remains stable. Started modafinil 100mg. Continue pulm toilet spaced to q6h and added IPV q6h.   05/02/2025 Tolerating Modafinil with mild improvement in level of alertness, increase to 200mg. Decreased O2 demands.   05/03/2025 Alertness slightly improving. Continue to monitor. Respiratory status improved, no longer requiring supplemental O2.  05/04/2025 Alertness slightly improved. On room air. NGT in place. Continue to monitor.  05/05/2025 Infectious workup started due to hypotension requiring levophed, fever overnight of 100.8, and worsening leukocytosis. Blood cultures, repeat urinalysis, CXR. Stated on zosyn. Switched to DNR.  05/06/2025 Vimpat dose decreased, cEEG. Leukocytosis resolved. Continue zosyn, pending Bcx. UA negative for infection. Off pressors.      Interval History: Vimpat dose decreased, cEEG. Leukocytosis resolved. Continue zosyn, pending Bcx. UA negative for infection. Off pressors.    Review of Systems  Unable to obtain a complete ROS due to level of consciousness.  Objective:     Vitals:  Temp: 98 °F (36.7  °C)  Pulse: 87  Rhythm: normal sinus rhythm  BP: (!) 132/54  MAP (mmHg): 78  Resp: (!) 22  SpO2: 99 %    Temp  Min: 97.7 °F (36.5 °C)  Max: 99.9 °F (37.7 °C)  Pulse  Min: 71  Max: 105  BP  Min: 124/56  Max: 182/77  MAP (mmHg)  Min: 70  Max: 110  Resp  Min: 18  Max: 32  SpO2  Min: 99 %  Max: 100 %    05/05 0701 - 05/06 0700  In: 1968.7 [I.V.:88.4]  Out: 2125 [Urine:1725]   Unmeasured Output  Urine Occurrence: 0  Stool Occurrence: 1  Emesis Occurrence: 0  Pad Count: 2          Physical ExamVitals and nursing note reviewed.   Constitutional:       General: He is not in acute distress.     Appearance: He is ill-appearing.   HENT:      Head:      Comments: Surgical site c/d/i     Right Ear: External ear normal.      Left Ear: External ear normal.      Nose: Nose normal.      Comments: NGT in place.     Mouth/Throat:      Mouth: Mucous membranes are dry.   Eyes:      Pupils: Pupils are equal, round, and reactive to light.   Cardiovascular:      Rate and Rhythm: Normal rate and regular rhythm.   Pulmonary:      Effort: Pulmonary effort is normal. No respiratory distress.      Comments: Upper airway congestion   Bl coarse BS   Abdominal:      General: There is no distension.      Palpations: Abdomen is soft.      Tenderness: There is no abdominal tenderness. There is no guarding.   Musculoskeletal:      Right lower leg: No edema.      Left lower leg: No edema.   Skin:     General: Skin is warm and dry.   Neurological:      Comments: Mental status: Lethargic, opens spontaneously. Following minimal commands.   CN II-XII grossly intact, specifically:  PERRL  EOMI  No facial asymmetry.  Weak spontaneous cough   Motor:  RUE ext  RLE triple flexion to tickling foot  LUE wd  LLE triple flexion to tickling foot  Sensation intact to noxious x4     Medications:  Continuous   Scheduledalbuterol-ipratropium, 3 mL, Q6H  enoxparin, 40 mg, Q24H (prophylaxis, 1700)  lacosamide, 100 mg, Q12H  levothyroxine, 125 mcg, Before  breakfast  metoprolol tartrate, 12.5 mg, Q8H  modafiniL, 200 mg, Daily  piperacillin-tazobactam (Zosyn) IV (PEDS and ADULTS) (extended infusion is not appropriate), 4.5 g, Q8H  pravastatin, 20 mg, QHS  psyllium husk, 1 packet, BID  silodosin, 4 mg, Daily    PRNacetaminophen, 650 mg, Q6H PRN  bisacodyL, 10 mg, Daily PRN  hydrALAZINE, 10 mg, Q4H PRN  labetalol, 5 mg, Q4H PRN  magnesium oxide, 800 mg, PRN  magnesium oxide, 800 mg, PRN  potassium bicarbonate, 35 mEq, PRN  potassium bicarbonate, 50 mEq, PRN  potassium bicarbonate, 60 mEq, PRN  potassium, sodium phosphates, 2 packet, PRN  potassium, sodium phosphates, 2 packet, PRN  potassium, sodium phosphates, 2 packet, PRN  sodium chloride 0.9%, 10 mL, PRN      Today I personally reviewed pertinent medications, lines/drains/airways, laboratory results, notably:    Diet  Diet NPO  Diet NPO      Assessment/Plan:     Neuro  * Subdural hemorrhage  85M with a medical history significant for atrial fibrillation on Eliquis, UC, HTN, and recent 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      POD11. Mental status seems to be improving though still difficult to arouse, continue Modafinil trial at 200mg daily.      Decreased Lacoasmide to 100mg BID. cEEG monitoring at time of dose reduction.      CTH with small right parietal ICH, repeat CTH at 6 hours stable.     SBP<160.      Aggressive pulmonary toileting and airway maintenance.     TF +  q4h    PT/OT     Continue home statin, Silodosin for urinary retention and levothyroxine.      Lovenox for chemical DVT prophylaxis.     Subdural hematoma  See primary problem    Seizure disorder  Vimpat dose reduced to 100mg BID with cEEG.  EEG- moderate encephalopathy, triphasics, generalized cortical  irritability, no discrete seizures    Acute encephalopathy  See primary problem  - Fever of 100.8 overnight with an increase in leukocytosis and hypotension requiring levophed. Infectious workup started.  - Placed on zosyn.    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 Off pressors.    - Persistent hypotension morning of 5/5 unresponsive to 1L of fluids in the setting of fever 100.8 overnight and worsening leukocytosis. Now on levophed. Will attempt to wean as tolerated.   - Follow up infectious workup  - Start zosyn    Coronary artery disease involving native coronary artery of native heart without angina pectoris  Resume home statin  Hold home eliquis    PAF (paroxysmal atrial fibrillation)  Resumed lopressor. BP stable.  Hold home eliquis, reversed on admission.  Currently rate controlled    Left ventricular diastolic dysfunction, NYHA class 1  Echo reviewed    Essential hypertension  SBP < 160  PRN labetalol, hydralazine    Renal/  Urinary retention  C/w home silodosin 4    Endocrine  Acquired hypothyroidism  C/w home synthroid 125mcg     GI  UC (ulcerative colitis)  Hold home colazal in acute setting    Palliative Care  ACP (advance care planning)  Advance Care Planning    Date: 05/05/2025    Code Status  In light of the patients advanced and life limiting illness,I engaged the patient and family in a voluntary conversation about the patient's preferences for care  at the very end of life. The patient wishes to have a natural, peaceful death.  Along those lines, the family does not wish to have CPR or other invasive treatments performed when his heart and/or breathing stops. I communicated to the patient and  family that a DNR order would be placed in his medical record to reflect this preference.    A total of 15 min was spent on advance care planning, goals of care discussion, emotional support, formulating and communicating prognosis and exploring burden/benefit of various approaches of treatment. This discussion occurred on a fully voluntary basis with the verbal consent of the patient and/or family.                The patient is being Prophylaxed for:  Venous Thromboembolism with: Chemical  Stress Ulcer with: None  Ventilator Pneumonia with: not applicable    Activity Orders            Elevate HOB Elevate HOB 30-45 degrees during feeding unless contraindicated starting at 04/27 0844    Diet NPO: NPO starting at 04/25 0001    Turn patient starting at 04/23 2000    Elevate HOB starting at 04/23 1948          DNR    Jackson Walton MD  Neurocritical Care  [facility] - Neuro Critical [facility]

## 2025-05-06 NOTE — ASSESSMENT & PLAN NOTE
86 yo with recent admission for VPS tie off and SDH evacuation on 3/24 presenting for new LSW and speech hesitation found to have enlarged chronic SDH as well as a new small R convexity acute SDH. Pt takes eliquis, s/p PCC. Interval CTH stable.     Now s/p bialteral subdural evacuations with VPS removal on 4/25    Recommendations:  --admitted to [facility]  --on eliquis, s/p kcentra reversal. Hold all AC/AP, ok for dvt ppx  --SBP <140  --HOB >30  --vimpat/zosyn per ICU  --if any further scans per NCC then recommend MRI  --drains removed 4/28  --on modafinil trial  --Continue to monitor clinically, notify NSGY immediately with any changes in neuro status    Discussed with Dr. Blankenship

## 2025-05-06 NOTE — PLAN OF CARE
"[facility] Care Plan  POC reviewed with Ramesh Ricci and family at 1400. Family verbalized understanding. Questions and concerns addressed. No acute events today. Pt progressing toward goals. Will continue to monitor. See below and flowsheets for full assessment and VS info.     -Q4 neuro checks  -Weaning AEDs, cEEG connected    Is this a stroke patient? yes    Neuro:  [place] Coma Scale  Best Eye Response: 3-->(E3) to speech  Best Motor Response: 4-->(M4) withdraws from pain  Best Verbal Response: 1-->(V1) none  [place] Coma Scale Score: 8  Assessment Qualifiers: patient not sedated/intubated, no eye obstruction present  Pupil PERRLA: yes     24 hr Temp:  [97.7 °F (36.5 °C)-99.9 °F (37.7 °C)]     CV:   Rhythm: normal sinus rhythm  BP goals:   SBP < 160  MAP > 65    Resp:      Oxygen Concentration (%): 40    Plan: N/A    GI/:     Diet/Nutrition Received: NPO, tube feeding  Last Bowel Movement: 05/05/25  Voiding Characteristics: external catheter    Intake/Output Summary (Last 24 hours) at 5/6/2025 1814  Last data filed at 5/6/2025 1701  Gross per 24 hour   Intake 1210.96 ml   Output 1650 ml   Net -439.04 ml     Unmeasured Output  Urine Occurrence: 0  Stool Occurrence: 1  Emesis Occurrence: 0  Pad Count: 2    Labs/Accuchecks:  Recent Labs   Lab 05/06/25  0220   WBC 11.23   RBC 2.74*   HGB 7.9*   HCT 24.8*         Recent Labs   Lab 05/06/25  0220      K 3.7   CO2 21*      BUN 42*   CREATININE 0.9   ALKPHOS 65   ALT 15   AST 32   BILITOT 0.2    No results for input(s): "PROTIME", "INR", "APTT", "HEPANTIXA" in the last 168 hours. No results for input(s): "CPK", "CPKMB", "TROPONINI", "MB" in the last 168 hours.    Electrolytes: Electrolytes replaced  Accuchecks: none    Gtts:      LDA/Wounds:    Gabino Risk Assessment  Sensory Perception: 2-->very limited  Moisture: 3-->occasionally moist  Activity: 1-->bedfast  Mobility: 2-->very limited  Nutrition: 3-->adequate  Friction and Shear: " 1-->problem  Gabino Score: 12    Is your gabino score 12 or less? yes enrolled in the [employer] program and heel boots on

## 2025-05-07 PROBLEM — J18.9 RIGHT LOWER LOBE PNEUMONIA: Status: ACTIVE | Noted: 2025-01-01

## 2025-05-07 NOTE — SUBJECTIVE & OBJECTIVE
Interval History: 5/7: localizing this morning in LUE. Okay to resume Eliquis for a fib. Per discussion with NCC family would not want PEG at this time, patient slowly improving will follow up SLP. Recommend consideration of LTAC placement.     Medications:  Continuous Infusions:  Scheduled Meds:   albuterol-ipratropium  3 mL Nebulization Q6H    enoxparin  40 mg Subcutaneous Q24H (prophylaxis, 1700)    lacosamide  100 mg Per NG tube Q12H    levothyroxine  125 mcg Per NG tube Before breakfast    metoprolol tartrate  12.5 mg Per NG tube Q8H    modafiniL  200 mg Per NG tube Daily    piperacillin-tazobactam (Zosyn) IV (PEDS and ADULTS) (extended infusion is not appropriate)  4.5 g Intravenous Q8H    pravastatin  20 mg Per NG tube QHS    psyllium husk  1 packet Per NG tube BID    silodosin  4 mg Per NG tube Daily    vasopressin         PRN Meds:  Current Facility-Administered Medications:     acetaminophen, 650 mg, Per NG tube, Q6H PRN    bisacodyL, 10 mg, Rectal, Daily PRN    hydrALAZINE, 10 mg, Intravenous, Q4H PRN    labetalol, 5 mg, Intravenous, Q4H PRN    magnesium oxide, 800 mg, Per NG tube, PRN    magnesium oxide, 800 mg, Per NG tube, PRN    potassium bicarbonate, 35 mEq, Per NG tube, PRN    potassium bicarbonate, 50 mEq, Per NG tube, PRN    potassium bicarbonate, 60 mEq, Per NG tube, PRN    potassium, sodium phosphates, 2 packet, Per NG tube, PRN    potassium, sodium phosphates, 2 packet, Per NG tube, PRN    potassium, sodium phosphates, 2 packet, Per NG tube, PRN    sodium chloride 0.9%, 10 mL, Intravenous, PRN    vasopressin, , ,      Review of Systems  Objective:     Weight: 68 kg (149 lb 14.6 oz)  Body mass index is 20.92 kg/m².  Vital Signs (Most Recent):  Temp: 98.9 °F (37.2 °C) (05/07/25 0702)  Pulse: 79 (05/07/25 0702)  Resp: (!) 30 (05/07/25 0702)  BP: (!) 136/56 (05/07/25 0702)  SpO2: 100 % (05/07/25 0702) Vital Signs (24h Range):  Temp:  [97.4 °F (36.3 °C)-98.9 °F (37.2 °C)] 98.9 °F (37.2 °C)  Pulse:   [77-94] 79  Resp:  [11-30] 30  SpO2:  [99 %-100 %] 100 %  BP: (113-154)/(45-66) 136/56                              NG/OG Tube 04/26/25 0819 14 Fr. Right nostril (Active)   Placement Check placement verified by x-ray 05/07/25 0505   Tolerance no signs/symptoms of discomfort 05/07/25 0505   Securement secured to nostril center w/ adhesive device 05/07/25 0505   Clamp Status/Tolerance unclamped 05/07/25 0505   Suction Setting/Drainage Method suction at the bedside 05/07/25 0505   Insertion Site Appearance no redness, warmth, tenderness, skin breakdown, drainage 05/07/25 0505   Drainage None 05/07/25 0505   Flush/Irrigation flushed w/;water;no resistance met 05/07/25 0505   Feeding Type by pump 05/07/25 0505   Feeding Action feeding continued 05/07/25 0505   Current Rate (mL/hr) 45 mL/hr 05/06/25 1905   Goal Rate (mL/hr) 45 mL/hr 05/06/25 1905   Intake (mL) 60 mL 05/06/25 2105   Water Bolus (mL) 100 mL 05/07/25 0405   Rate Formula Tube Feeding (mL/hr) 45 mL/hr 05/03/25 0701   Formula Name Impact Peptide 05/06/25 1701   Intake (mL) - Formula Tube Feeding 45 05/07/25 0505   Residual Amount (ml) 0 ml 05/01/25 1905       Male External Urinary Catheter 05/02/25 1300 Small (Active)   Collection Container Urimeter 05/07/25 0505   Securement Method secured to top of thigh w/ adhesive device 05/07/25 0505   Skin no redness;no breakdown 05/07/25 0505   Tolerance no signs/symptoms of discomfort 05/07/25 0505   Output (mL) 600 mL 05/07/25 0505   Catheter Change Date 05/06/25 05/07/25 0505   Catheter Change Time 1905 05/07/25 0505            Fecal Incontinence  05/02/25 1745 (Active)   Application fecal incontinence  in place 05/06/25 1701   Drainage Method attached to drainage bag 05/07/25 0505   Securement secured to leg with adhesive device 05/07/25 0505   Skin cleansed, skin barrier applied;no breakdown 05/07/25 0505   Tolerance no signs/symptoms of discomfort 05/07/25 0505   Stool (mL) 100 mL 05/07/25 050  "         Physical Exam         Neurosurgery Physical Exam    [id number]  BSi  Opens eyes to stim, PERRL  LUE LOC, RUE min WD  LLE min WD, RLE TF   Lip trembles to stim       Significant Labs:  Recent Labs   Lab 05/06/25 0220 05/07/25 0117   * 129*    138   K 3.7 3.8    103   CO2 21* 24   BUN 42* 43*   CREATININE 0.9 0.9   CALCIUM 8.2* 8.7   MG 1.9 2.0     Recent Labs   Lab 05/06/25 0220 05/07/25 0117   WBC 11.23 13.93*   HGB 7.9* 8.7*   HCT 24.8* 27.4*    460*     No results for input(s): "LABPT", "INR", "APTT" in the last 48 hours.  Microbiology Results (last 7 days)       Procedure Component Value Units Date/Time    Blood culture [[id number]]  (Normal) Collected: 05/05/25 1100    Order Status: Completed Specimen: Blood from Peripheral, Ankle, Right Updated: 05/07/25 0110     Blood Culture No Growth After 36 Hours    Blood culture [[id number]]  (Normal) Collected: 05/05/25 1127    Order Status: Completed Specimen: Blood from Peripheral, Lower Leg, Left Updated: 05/07/25 0110     Blood Culture No Growth After 36 Hours    Blood culture [[id number]]  (Normal) Collected: 04/26/25 1628    Order Status: Completed Specimen: Blood from Peripheral, Antecubital, Right Updated: 05/01/25 2001     Blood Culture No Growth After 5 Days    Blood culture [[id number]]  (Normal) Collected: 04/26/25 1628    Order Status: Completed Specimen: Blood from Peripheral, Forearm, Left Updated: 05/01/25 2001     Blood Culture No Growth After 5 Days          All pertinent labs from the last 24 hours have been reviewed.    Significant Diagnostics:  CT: No results found in the last 24 hours.  MRI: No results found in the last 24 hours.  "

## 2025-05-07 NOTE — PT/OT/SLP PROGRESS
Occupational Therapy   Co Treatment    Name: Ramesh Ricci  MRN: [id number]  Admitting Diagnosis:  Subdural hemorrhage  12 Days Post-Op    Recommendations:     Discharge Recommendations: Moderate Intensity Therapy  Discharge Equipment Recommendations:  hospital bed, lift device, wheelchair  Barriers to discharge:  Other (Comment) (increased skilled A needed)    Assessment:     Ramesh Ricci is a 85 y.o. male with a medical diagnosis of Subdural hemorrhage.  He presents with continued decreased self-care and functional mobility independence 2/2 AMS. Performance deficits affecting function are weakness, gait instability, decreased upper extremity function, decreased ROM, impaired endurance, impaired balance, decreased lower extremity function, impaired coordination, impaired sensation, visual deficits, decreased safety awareness, impaired fine motor, impaired self care skills, impaired cognition, impaired functional mobility, decreased coordination, abnormal tone. This date pt with no verbalization attempts and no commands followed though pt with eyes open t/o session. Pt noted to attend to auditory stimuli briefly though with decreased tracking observed. Pt completing spontaneous LUE elbow flexion AROM WFL though no AROM on command. Pt continues to require significant assistance with all mobility activities and self-care 2/2 decreased command following and generalized weakness. While seated EOB pt with righting reactions observed with LUE though continuing to require assistance to maintain postural control. Patient continues to demonstrate the need for moderate intensity therapy on a daily basis post acute exhibited by decreased independence with self-care and functional mobility      Co-treatment with PT performed for patient safety, education, and facilitation of treatment to maximize activity tolerance and progression towards goals from two skilled therapy disciplines.     Rehab Prognosis:  Fair; patient  would benefit from acute skilled OT services to address these deficits and reach maximum level of function.       Plan:     Patient to be seen 4 x/week to address the above listed problems via self-care/home management, therapeutic activities, therapeutic exercises, neuromuscular re-education  Plan of Care Expires: 06/24/25  Plan of Care Reviewed with: patient    Subjective     Chief Complaint: nA  Patient/Family Comments/goals: increase independence  Pain/Comfort:  Pain Rating 1: other (see comments) (unable to verbalize)    Objective:     Communicated with: nursing prior to session.  Patient found HOB elevated with bed alarm, blood pressure cuff, peripheral IV, Condom Catheter, bowel management system, telemetry, pulse ox (continuous), SCD, EEG upon OT entry to room.    General Precautions: Standard, aphasia, aspiration, fall, seizure    Orthopedic Precautions:N/A  Braces: N/A  Respiratory Status: Room air     Occupational Performance:     Bed Mobility:    Patient completed Rolling/Turning to Left with  total assistance  Patient completed Rolling/Turning to Right with total assistance  Patient completed Scooting/Bridging with total assistance  Patient completed Supine to Sit with total assistance and 2 persons  Patient completed Sit to Supine with total assistance and 2 persons   Pt seated EOB with maximal assistance for static sitting balance and total assistance for dynamic sitting balance  While seated EOB pt completing  2 R/L lateral leans to forearm with total assistance and therapist providing tactile input at elbow to increase proprioceptive feedback  Therapist assisted R/LUE PROM shoulder flexion, elbow flexion/extension, wrist flexion/extension x5 reps ea with PT facilitating cervical rotation to increase visual attention to LUE  Mild resistance felt t/o LUE shoulder flexion and elbow flexion/extension PROM  No resistance felt with RUE PROM     Functional Mobility/Transfers:  Not attempted 2/2 decreased  postural control    Activities of Daily Living:  Grooming: total assistance to complete oral care while seated EOB with oral swab and [place] assistance attempted; pt completed face washing with [place] assistance while pt seated EOB   Lower Body Dressing: total assistance to alfie footwear while pt supine, pt with R/LEs flexing to stimuli  Toileting: total assistance for pericare bed level 2/2 minimal bowel management system leakage      AMPAC 6 Click ADL: 6    Treatment & Education:  Session this date targeted self-care and therapeutic exercises to increase pt's independence  Pt and family members educated on OT roles, POC, call button for assistance    Patient left HOB elevated with all lines intact, call button in reach, nursing notified, and family present    GOALS:   Multidisciplinary Problems       Occupational Therapy Goals          Problem: Occupational Therapy    Goal Priority Disciplines Outcome Interventions   Occupational Therapy Goal     OT, PT/OT Progressing    Description: Goals to be met by: 6/24/25     Patient will increase functional independence with ADLs by performing:    UE Dressing with Maximum Assistance.  LE Dressing with Maximum Assistance.  Grooming while EOB with Maximum Assistance.  Toileting from bedside commode with Maximum Assistance for hygiene and clothing management.   Sitting at edge of bed x15 minutes with Maximum Assistance.  Rolling to Bilateral with Maximum Assistance.   Supine to sit with Maximum Assistance.  Stand pivot transfers with Maximum Assistance.                         DME Justifications:  Ramesh Yo requires a hospital bed due to him requiring positioning of the body in ways not feasible with an ordinary bed due to limited ability and cannot independently make changes in body position without the use of the bed.The positioning of the body cannot be sufficiently resolved by the use of pillows and wedges.  Ramesh Ricci has a mobility limitation that significantly  impairs his ability to participate in one or more mobility related activities of daily living (MRADLs) such as toileting, feeding, dressing, grooming, and bathing in customary locations in the home.  The mobility limitation cannot be sufficiently resolved by the use of a cane or walker.   The use of a manual wheelchair will significantly improve the patients ability to participate in MRADLS and the patient will use it on regular basis in the home.  Ramesh Ricci has expressed his willingness to use a manual wheelchair in the home. Patients upper body strength is sufficient for propulsion.  He also has a caregiver who is available, willing, and able to provide assistance with the wheelchair when needed.      Time Tracking:     OT Date of Treatment: 05/07/25  OT Start Time: 1327  OT Stop Time: 1407  OT Total Time (min): 40 min    Billable Minutes:Self Care/Home Management 25  Therapeutic Exercise 15               5/7/2025

## 2025-05-07 NOTE — SUBJECTIVE & OBJECTIVE
Interval History: NAEO. Patient a bit hard to arouse this AM. Still responsive to pain distally in all extremities. Per NSGY, okay to resume eliquis. Family confirmed that they do not want to pursue with the PEG tube. EEG with moderate diffuse background slowing and bilateral, independent epileptiform discharges. Okay to remain on lower dose of vimpat. Continue with EEG monitoring.     Review of Systems  Unable to obtain a complete ROS due to level of consciousness.  Objective:     Vitals:  Temp: 98.4 °F (36.9 °C)  Pulse: 91  Rhythm: normal sinus rhythm  BP: (!) 145/73  MAP (mmHg): 103  Resp: (!) 25  SpO2: 99 %    Temp  Min: 97.4 °F (36.3 °C)  Max: 98.9 °F (37.2 °C)  Pulse  Min: 77  Max: 94  BP  Min: 113/50  Max: 154/64  MAP (mmHg)  Min: 65  Max: 103  Resp  Min: 11  Max: 32  SpO2  Min: 99 %  Max: 100 %    05/06 0701 - 05/07 0700  In: 1994.8   Out: 1600 [Urine:1400]   Unmeasured Output  Urine Occurrence: 0  Stool Occurrence: 1  Emesis Occurrence: 0  Pad Count: 2          Physical ExamVitals and nursing note reviewed.   Constitutional:       General: He is not in acute distress.     Appearance: He is ill-appearing.   HENT:      Head:      Comments: Surgical site c/d/i     Right Ear: External ear normal.      Left Ear: External ear normal.      Nose: Nose normal.      Comments: NGT in place.     Mouth/Throat:      Mouth: Mucous membranes are dry.   Eyes:      Pupils: Pupils are equal, round, and reactive to light.   Cardiovascular:      Rate and Rhythm: Normal rate and regular rhythm.   Pulmonary:      Effort: Pulmonary effort is normal. No respiratory distress.      Comments: Upper airway congestion   Bl coarse BS   Abdominal:      General: There is no distension.      Palpations: Abdomen is soft.      Tenderness: There is no abdominal tenderness. There is no guarding.   Musculoskeletal:      Right lower leg: No edema.      Left lower leg: No edema.   Skin:     General: Skin is warm and dry.   Neurological:       Comments: Mental status: Lethargic, opens spontaneously. Following minimal commands.   CN II-XII grossly intact, specifically:  PERRL  EOMI  No facial asymmetry.  Weak spontaneous cough   Motor:  RUE ext  RLE triple flexion to tickling foot  LUE wd  LLE triple flexion to tickling foot  Sensation intact to noxious x4     Medications:  Continuous   Scheduledalbuterol-ipratropium, 3 mL, Q6H  apixaban, 5 mg, BID  lacosamide, 100 mg, Q12H  levothyroxine, 125 mcg, Before breakfast  metoprolol tartrate, 12.5 mg, Q8H  modafiniL, 200 mg, Daily  piperacillin-tazobactam (Zosyn) IV (PEDS and ADULTS) (extended infusion is not appropriate), 4.5 g, Q8H  pravastatin, 20 mg, QHS  psyllium husk, 1 packet, BID  silodosin, 4 mg, Daily    PRNacetaminophen, 650 mg, Q6H PRN  bisacodyL, 10 mg, Daily PRN  hydrALAZINE, 10 mg, Q4H PRN  labetalol, 5 mg, Q4H PRN  magnesium oxide, 800 mg, PRN  magnesium oxide, 800 mg, PRN  potassium bicarbonate, 35 mEq, PRN  potassium bicarbonate, 50 mEq, PRN  potassium bicarbonate, 60 mEq, PRN  potassium, sodium phosphates, 2 packet, PRN  potassium, sodium phosphates, 2 packet, PRN  potassium, sodium phosphates, 2 packet, PRN  sodium chloride 0.9%, 10 mL, PRN      Today I personally reviewed pertinent medications, lines/drains/airways, laboratory results, notably:    Diet  Diet NPO  Diet NPO

## 2025-05-07 NOTE — ASSESSMENT & PLAN NOTE
Hx of, On Eliquis as outpatient  - Reversed in ER  - Eliquis resumed 5/7  - Management per NSGY, NCC

## 2025-05-07 NOTE — PLAN OF CARE
Problem: Physical Therapy  Goal: Physical Therapy Goal  Description: Goals to be completed by: 5/16/25    Pt will perform sup<>sit transfers w/ moderate assistance  Pt will have sufficient dynamic balance to sit EOB while performing ADLs/therex w/ minimum assistance  Pt will be able to stand up from EOB w/ moderate assistance using LRAD  Bed to chair transfer w/ maxA using LRAD  Pt will be independent w/ HEP therex on BLE w/ good form and ROM   Outcome: Progressing

## 2025-05-07 NOTE — EICU
Intervention Initiated From:  COR / FRANCOU    Juanis intervened regarding:  Rounding (Video assessment)  VICU Night Rounds Checklist  24H Vital Sign Range:  Temp:  [97.4 °F (36.3 °C)-99.9 °F (37.7 °C)]   Pulse:  [78-94]   Resp:  [11-30]   BP: (113-153)/(50-65)   SpO2:  [99 %-100 %]     Video rounds

## 2025-05-07 NOTE — PROGRESS NOTES
[facility]  Adult Nutrition  Progress Note    SUMMARY       Recommendations  --Continue Continuous TF recommendation: Impact Peptide 1.5 @ 45 mL/hr x 22 hours (levothyroxine administration) to provide 990 mL total volume, 1485 kcal, 139 g CHO, 93 g protein, 0 g fiber, 764 mL free water, 99% DRI         --120 mL flush q4 hrs to provide additional 720 mL free water (Total 1484 mL)     --Nursing: please continue to document rate and formula on flowsheet    --RD to monitor weight, rate, tolerance     Goals:   Meet % EEN/EPN by next RD follow-up  Maintain dry weight during admission  Nutrition Goal Status: goal met  Communication of RD Recs: other (comment) (POC)    Nutrition Discharge Planning    Nutrition Discharge Planning: Too early to determine, pending clinical course, General healthy diet    Reason for Assessment    Reason For Assessment: RD follow-up  Diagnosis: other (see comments) (subdural hemorrhage)  General Information Comments:  86 y/o male with a PMH of Afib on eliquis at home, Ulcerative Colitis, HTN, aortic stenosis, and 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 RD follow-up. Tube feed currently running at goal rate of 45 mL/hr via NG - meeting EEN/EPN. Discussion of PEG placement - family does not want PEG placed at this time, pt slowly improving. SLP to follow. Weight stable - no concerns for malnutrition at this time.     Nutrition Related Social Determinants of Health: SDOH: Unable to assess at this time.      Food Insecurity: Patient Unable To Answer (4/25/2025)    Hunger Vital Sign     Worried About Running Out of Food in the Last Year: Patient unable to answer     Ran Out of Food in the Last Year: Patient unable to answer       Nutrition/Diet History    Spiritual, Cultural Beliefs, Hinduism Practices, Values that Affect Care: no  Food Allergies: NKFA  Factors Affecting  "Nutritional Intake: NPO    Anthropometrics    Height: 5' 10.98" (180.3 cm)  Height (inches): 70.98 in  Height Method: Measured  Weight: 68 kg (149 lb 14.6 oz)  Weight (lb): 149.91 lb  Weight Method: Bed Scale  Ideal Body Weight (IBW), Male: 171.88 lb  % Ideal Body Weight, Male (lb): 87.22 %  BMI (Calculated): 20.9  BMI Grade: less than 23 (older than 65 years) - underweight       Lab/Procedures/Meds    Pertinent Labs Reviewed: reviewed - BUN 43, glucose 129    Pertinent Medications Reviewed: reviewed - enoxaparin, levothyroxine, pravastatin, psyllium husk    Estimated/Assessed Needs    Weight Used For Calorie Calculations: 68 kg (149 lb 14.6 oz)  Energy Calorie Requirements (kcal): 5144-4859 kcal (25-30 kcal/kg)  Energy Need Method: Kcal/kg  Protein Requirements:  g/day (1.2-2.0 g/kg)  Weight Used For Protein Calculations: 68 kg (149 lb 14.6 oz)     Estimated Fluid Requirement Method: RDA Method  RDA Method (mL): 1700  CHO Requirement: 213 g      Nutrition Prescription Ordered    Current Diet Order: NPO  Current Nutrition Support Formula Ordered: Impact Peptide 1.5  Current Nutrition Support Rate Ordered: 45 (ml)  Current Nutrition Support Frequency Ordered: x 22 hours    Evaluation of Received Nutrient/Fluid Intake    Enteral Calories (kcal): 1485  Enteral Protein (gm): 93  Enteral (Free Water) Fluid (mL): 764  % Kcal Needs: 87  % Protein Needs: 100  Energy Calories Required: meeting needs  Protein Required: meeting needs  Fluid Required:  (Per MD)  Comments: LBM 5/7  % Intake of Estimated Energy Needs: 75 - 100 %  % Meal Intake: NPO    PES Statement  Nutrition PES Status: Resolved  Nutrition PES Problem: Inadequate enteral nutrition infusion  Nutrition PES Etiology:  (Infusion volume not reached)  Nutrition PES Signs and Symptoms:  (Inadequate EN volume compared to estimated requirements)    Nutrition Risk    Level of Risk/Frequency of Follow-up: high     Monitor and Evaluation    Monitor and Evaluation: " Energy intake, Food and beverage intake, Protein intake, Carbohydrate intake, Enteral and parenteral nutrition administration, Diet order, Glucose/endocrine profile, Gastrointestinal profile, Electrolyte and renal panel, Inflammatory profile, Lipid profile     Nutrition Follow-Up    RD Follow-up?: Yes

## 2025-05-07 NOTE — PROGRESS NOTES
[facility]  Neurosurgery  Progress Note    Subjective:     History of Present Illness: Ramesh Ricci is an 86 y/o male with a PMH of Afib, Ulcerative Colitis, HTN, and aortic stenosis. He also has normal pressure hydrocephalus s/p  shunt (now tied off) is presenting with multiple complaints.     Wife reports concern for acute deconditioning over the past few days.  She describes increased weakness, urinary frequency, and speech changes that is been progressing for the past 3 days.  She states that he seems to slumped towards the right side and has been dragging his right leg.  Previously ambulated with his walker.  She also mentions that he seems to have speech hesitancy.  Denies any fevers, chills, abdominal pain or diarrhea    Post-Op Info:  Procedure(s) (LRB):  CRANIOTOMY, FOR SUBDURAL HEMATOMA EVACUATION, bilateral. shunt removal (Bilateral)  REMOVAL, SHUNT, VENTRICULOPERITONEAL   12 Days Post-Op   Interval History: 5/7: localizing this morning in LUE. Okay to resume Eliquis for a fib. Per discussion with NCC family would not want PEG at this time, patient slowly improving will follow up SLP. Recommend consideration of LTAC placement.     Medications:  Continuous Infusions:  Scheduled Meds:   albuterol-ipratropium  3 mL Nebulization Q6H    enoxparin  40 mg Subcutaneous Q24H (prophylaxis, 1700)    lacosamide  100 mg Per NG tube Q12H    levothyroxine  125 mcg Per NG tube Before breakfast    metoprolol tartrate  12.5 mg Per NG tube Q8H    modafiniL  200 mg Per NG tube Daily    piperacillin-tazobactam (Zosyn) IV (PEDS and ADULTS) (extended infusion is not appropriate)  4.5 g Intravenous Q8H    pravastatin  20 mg Per NG tube QHS    psyllium husk  1 packet Per NG tube BID    silodosin  4 mg Per NG tube Daily    vasopressin         PRN Meds:  Current Facility-Administered Medications:     acetaminophen, 650 mg, Per NG tube, Q6H PRN    bisacodyL, 10 mg, Rectal, Daily PRN    hydrALAZINE, 10 mg,  Intravenous, Q4H PRN    labetalol, 5 mg, Intravenous, Q4H PRN    magnesium oxide, 800 mg, Per NG tube, PRN    magnesium oxide, 800 mg, Per NG tube, PRN    potassium bicarbonate, 35 mEq, Per NG tube, PRN    potassium bicarbonate, 50 mEq, Per NG tube, PRN    potassium bicarbonate, 60 mEq, Per NG tube, PRN    potassium, sodium phosphates, 2 packet, Per NG tube, PRN    potassium, sodium phosphates, 2 packet, Per NG tube, PRN    potassium, sodium phosphates, 2 packet, Per NG tube, PRN    sodium chloride 0.9%, 10 mL, Intravenous, PRN    vasopressin, , ,      Review of Systems  Objective:     Weight: 68 kg (149 lb 14.6 oz)  Body mass index is 20.92 kg/m².  Vital Signs (Most Recent):  Temp: 98.9 °F (37.2 °C) (05/07/25 0702)  Pulse: 79 (05/07/25 0702)  Resp: (!) 30 (05/07/25 0702)  BP: (!) 136/56 (05/07/25 0702)  SpO2: 100 % (05/07/25 0702) Vital Signs (24h Range):  Temp:  [97.4 °F (36.3 °C)-98.9 °F (37.2 °C)] 98.9 °F (37.2 °C)  Pulse:  [77-94] 79  Resp:  [11-30] 30  SpO2:  [99 %-100 %] 100 %  BP: (113-154)/(45-66) 136/56                              NG/OG Tube 04/26/25 0819 14 Fr. Right nostril (Active)   Placement Check placement verified by x-ray 05/07/25 0505   Tolerance no signs/symptoms of discomfort 05/07/25 0505   Securement secured to nostril center w/ adhesive device 05/07/25 0505   Clamp Status/Tolerance unclamped 05/07/25 0505   Suction Setting/Drainage Method suction at the bedside 05/07/25 0505   Insertion Site Appearance no redness, warmth, tenderness, skin breakdown, drainage 05/07/25 0505   Drainage None 05/07/25 0505   Flush/Irrigation flushed w/;water;no resistance met 05/07/25 0505   Feeding Type by pump 05/07/25 0505   Feeding Action feeding continued 05/07/25 0505   Current Rate (mL/hr) 45 mL/hr 05/06/25 1905   Goal Rate (mL/hr) 45 mL/hr 05/06/25 1905   Intake (mL) 60 mL 05/06/25 2105   Water Bolus (mL) 100 mL 05/07/25 0405   Rate Formula Tube Feeding (mL/hr) 45 mL/hr 05/03/25 0701   Formula Name Impact  "Peptide 05/06/25 1701   Intake (mL) - Formula Tube Feeding 45 05/07/25 0505   Residual Amount (ml) 0 ml 05/01/25 1905       Male External Urinary Catheter 05/02/25 1300 Small (Active)   Collection Container Urimeter 05/07/25 0505   Securement Method secured to top of thigh w/ adhesive device 05/07/25 0505   Skin no redness;no breakdown 05/07/25 0505   Tolerance no signs/symptoms of discomfort 05/07/25 0505   Output (mL) 600 mL 05/07/25 0505   Catheter Change Date 05/06/25 05/07/25 0505   Catheter Change Time 1905 05/07/25 0505            Fecal Incontinence  05/02/25 1745 (Active)   Application fecal incontinence  in place 05/06/25 1701   Drainage Method attached to drainage bag 05/07/25 0505   Securement secured to leg with adhesive device 05/07/25 0505   Skin cleansed, skin barrier applied;no breakdown 05/07/25 0505   Tolerance no signs/symptoms of discomfort 05/07/25 0505   Stool (mL) 100 mL 05/07/25 0505          Physical Exam         Neurosurgery Physical Exam    [id number]  BSi  Opens eyes to stim, PERRL  LUE LOC, RUE min WD  LLE min WD, RLE TF   Lip trembles to stim       Significant Labs:  Recent Labs   Lab 05/06/25  0220 05/07/25  0117   * 129*    138   K 3.7 3.8    103   CO2 21* 24   BUN 42* 43*   CREATININE 0.9 0.9   CALCIUM 8.2* 8.7   MG 1.9 2.0     Recent Labs   Lab 05/06/25  0220 05/07/25  0117   WBC 11.23 13.93*   HGB 7.9* 8.7*   HCT 24.8* 27.4*    460*     No results for input(s): "LABPT", "INR", "APTT" in the last 48 hours.  Microbiology Results (last 7 days)       Procedure Component Value Units Date/Time    Blood culture [[id number]]  (Normal) Collected: 05/05/25 1100    Order Status: Completed Specimen: Blood from Peripheral, Ankle, Right Updated: 05/07/25 0110     Blood Culture No Growth After 36 Hours    Blood culture [[id number]]  (Normal) Collected: 05/05/25 1127    Order Status: Completed Specimen: Blood from Peripheral, Lower Leg, Left Updated: " 05/07/25 0110     Blood Culture No Growth After 36 Hours    Blood culture [[id number]]  (Normal) Collected: 04/26/25 1628    Order Status: Completed Specimen: Blood from Peripheral, Antecubital, Right Updated: 05/01/25 2001     Blood Culture No Growth After 5 Days    Blood culture [[id number]]  (Normal) Collected: 04/26/25 1628    Order Status: Completed Specimen: Blood from Peripheral, Forearm, Left Updated: 05/01/25 2001     Blood Culture No Growth After 5 Days          All pertinent labs from the last 24 hours have been reviewed.    Significant Diagnostics:  CT: No results found in the last 24 hours.  MRI: No results found in the last 24 hours.  Assessment/Plan:     * Subdural hemorrhage  86 yo with recent admission for VPS tie off and SDH evacuation on 3/24 presenting for new LSW and speech hesitation found to have enlarged chronic SDH as well as a new small R convexity acute SDH. Pt takes eliquis, s/p PCC. Interval CTH stable.     Now s/p bialteral subdural evacuations with VPS removal on 4/25    Recommendations:  --admitted to [facility]  --on eliquis, s/p kcentra reversal. Okay to resume eliquis at this point.   --SBP <140  --HOB >30  --vimpat/zosyn per ICU  --if any further scans per NCC then recommend MRI  --drains removed 4/28  --on modafinil trial  --recommend consideration of LTAC placement w/ PEG tube, but per NCC team family would not want PEG at this time. F/u SLP   --Continue to monitor clinically, notify NSGY immediately with any changes in neuro status. NSGY will sign off at this time and arrange for clinic follow up 1 week after discharge     Discussed with Dr. Krzysztof Camilo MD  Neurosurgery  [facility] - [facility]

## 2025-05-07 NOTE — PROCEDURES
24 hr. Video EEG Monitoring    Date/Time: 5/7/2025 11:27 AM    Performed by: Saleem Shepard MD  Authorized by: Nikolai Abraham DO      ICU EEG/VIDEO MONITORING REPORT    DATE OF SERVICE: 5/6/25-5/7/25  EEG NUMBER: [id number]  REQUESTED BY: Jarad  [place] [facility] [place]     METHODOLOGY   Electroencephalographic (EEG) recording is with electrodes placed according to the International 10-20 placement system.  Thirty two (32) channels of digital signal are simultaneously recorded from the scalp and may include EKG, EMG, and/or eye monitors.   Recording band pass was 0.1 to 512 hz.  Digital video recording of the patient is simultaneously recorded with the EEG.  The nursing staff report clinical symptoms and may press an event button when the patient has symptoms of clinical interest to the treating physicians.  EEG and video recording is stored and archived in digital format.  The entire recording is visually reviewed and the times identified by computer analysis as being spikes or seizures are reviewed again.  Activation procedures which include photic stimulation, hyperventilation and instructing patients to perform simple task are done in selected patients.   Compresses spectral analysis (CSA) is also performed on the activity recorded from each individual channel.  This is displayed as a power display of frequencies from 0 to 30 Hz over time.   The CSA analysis is done and displayed continuously.  This is reviewed for asymmetries in power between homologous areas of the scalp and for presence of changes in power which canbe seen when seizures occur.  Sections of suspected abnormalities on the CSA is then compared with the original EEG recording.     [employer] software was also utilized in the review of this study.  This software suite analyzes the EEG recording in multiple domains.  Coherence and rhythmicity is computed to identify EEG sections which may contain organized seizures.  Each channel undergoes  analysis to detect presence of spike and sharp waves which have special and morphological characteristic of epileptic activity.  The routine EEG recording is converted from spacial into frequency domain.  This is then displayed comparing homologous areas to identify areas of significant asymmetry.  Algorithm to identify non-cortically generated artifact is used to separate eye movement, EMG and other artifact from the EEG.      Recording Times  Start on 5/6/25 at 14:28  Stop on 5/7/25 at 07:00  A total of 16 hours  of EEG was recorded.    EEG FINDINGS  The record shows a poor organization at rest with no discernible posterior dominant rhythm with poor reactivity. There is mild bilateral beta activity. There is continuous diffuse delta and theta range background slowing. There are abundant bilateral frontally predominant epileptiform discharges at times with triphasic morphology. There are occasional bilateral, independent centrally predominant sharp wave discharges over C3/C4.    Drowsiness is characterized by attenuation of the background, vertex waves, and bilateral theta slowing. Stage II sleep is characterized by slowing, vertex waves, and symmetric sleep spindles. Slow wave and REM sleep are recorded.    Provocative maneuvers including hyperventilation and photic stimulation were not performed.     EKG recording shows a sinus rhythm.    There is no push button or clinical event.    IMPRESSION:  Abnormal study due to moderate  diffuse background slowing consistent with diffuse cerebral dysfunction and encephalopathy which may be on the basis of toxic, metabolic, or primary neuronal disorder. Bilateral, independent epileptiform discharges are consistent with underlying focal cortical irritability and potential seizure focus.    5/7/2025

## 2025-05-07 NOTE — PLAN OF CARE
"[facility] Care Plan  POC reviewed with Ramesh Ricci and family at 1400. Family verbalized understanding. Questions and concerns addressed. No acute events today. Pt progressing toward goals. Will continue to monitor. See below and flowsheets for full assessment and VS info.     - TF continued at goal  - Flexi remains in place  - cEEG remains in place  - K replaced    Is this a stroke patient? no    Neuro:  [place] Coma Scale  Best Eye Response: 3-->(E3) to speech  Best Motor Response: 4-->(M4) withdraws from pain  Best Verbal Response: 1-->(V1) none  [place] Coma Scale Score: 8  Assessment Qualifiers: patient not sedated/intubated  Pupil PERRLA: yes     24 hr Temp:  [98 °F (36.7 °C)-98.9 °F (37.2 °C)]     CV:   Rhythm: normal sinus rhythm  BP goals:   SBP < 160  MAP > 65    Resp:      Oxygen Concentration (%): 40    Plan: N/A    GI/:     Diet/Nutrition Received: tube feeding  Last Bowel Movement: 05/07/25  Voiding Characteristics: external catheter    Intake/Output Summary (Last 24 hours) at 5/7/2025 1640  Last data filed at 5/7/2025 1502  Gross per 24 hour   Intake 1962.1 ml   Output 1900 ml   Net 62.1 ml     Unmeasured Output  Urine Occurrence: 0  Stool Occurrence: 1  Emesis Occurrence: 0  Pad Count: 2    Labs/Accuchecks:  Recent Labs   Lab 05/07/25  0117   WBC 13.93*   RBC 2.99*   HGB 8.7*   HCT 27.4*   *      Recent Labs   Lab 05/07/25  0117 05/07/25  1434     --    K 3.8 3.9   CO2 24  --      --    BUN 43*  --    CREATININE 0.9  --    ALKPHOS 72  --    ALT 15  --    AST 35  --    BILITOT 0.2  --       Recent Labs   Lab 05/07/25  1214   PROTIME 11.7   INR 1.1   APTT 28.5    No results for input(s): "CPK", "CPKMB", "TROPONINI", "MB" in the last 168 hours.    Electrolytes: Electrolytes replaced  Accuchecks: none    Gtts:      LDA/Wounds:    Gabino Risk Assessment  Sensory Perception: 2-->very limited  Moisture: 3-->occasionally moist  Activity: 1-->bedfast  Mobility: 2-->very " limited  Nutrition: 3-->adequate  Friction and Shear: 2-->potential problem  Gabino Score: 13    Is your gabino score 12 or less? no            Restraints:        WCTM

## 2025-05-07 NOTE — EICU
Virtual ICU Quality Rounds    Admit Date: 4/23/2025  Hospital Day: 14    ICU Day: 13d 15h    24H Vital Sign Range:  Temp:  [97.4 °F (36.3 °C)-98.9 °F (37.2 °C)]   Pulse:  [77-94]   Resp:  [11-32]   BP: (113-154)/(45-73)   SpO2:  [99 %-100 %]     VICU Surveillance Screening    LDA reconciliation : Yes

## 2025-05-07 NOTE — PROGRESS NOTES
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 His wife at bedside reports concern for acute deconditioning over the past three days involving worsening weakness, urinary frequency, speech changes, intermittent cessation in interaction with family, and dragging of his R leg. CT head showed bilateral subdural hygromas with bilateral acute versus subacute SDH. He takes eliquis for A fib at home which was reversed with PCC in the ED. During evaluation of the patient, he was initially interactive, following, commands, and oriented to person and place. He then had a brief staring spell where he no longer followed commands and displayed no usable speech. This then resolved after a few minutes and he returned to baseline. He takes Vimpat 100 bid at home but has not gotten his nightly dose. Per his wife, these spells occurred at home as well.    He is admitted to [facility] for hourly neuro-monitoring and a higher level of care.     Hospital Course: 04/25/2025: OR today for SDH evacuation   04/26/2025: NAEON. POD1 s/p SDH evac. Post-op CTH good decompression and expected post-op changes. Post-op episode of seizure-like activity, described as "LUE & LLE shaking" lasting approximately 1 minute that resolved without intervention. Patient unarousable post-operatively and this AM. NGT placed. Repeat CTH obtained, stable. Procal, EEG ordered and pending.   04/27/2025:  POD2 s/p SDH evacuation. Procal elevated and patient febrile with low grade temp. Pan-cultured and started on broad spec antibiotics. " Aggressive pulm toileting started yesterday. Overnight with Tmax 100.9°F. Cx with NGTD. Respiratory panel sent. US extremities ordered and pending to rule out DVT. Start lovenox pending neurosurgery recs.   04/28/2025: Continue aggressive pulmonary toileting. EEG with periodic complexes with some epileptiform morphology, optimized lacosamide to 200mg BID.   04/29/2025 EEG negative for seizures, small improvement after increasing vimpat yesterday. Discontinued EEG. CTH with new small R parietal IPH with mild surrounding edema. BUN 67, started enteral water flushes 250 q4h. MRSA swab negative, discontinued vanc. CXR with slight improvement from yesterday. Continue aggressive pulm toilet.   04/30/2025 CTH overnight with stable R IPH. POD5 and pt still not waking up. Relax to q2h neuro checks. BUN 72, increased enteral water to 300 q4h and given additional 300 ml x 2 today.   05/01/2025 exam remains stable. Started modafinil 100mg. Continue pulm toilet spaced to q6h and added IPV q6h.   05/02/2025 Tolerating Modafinil with mild improvement in level of alertness, increase to 200mg. Decreased O2 demands.   05/03/2025 Alertness slightly improving. Continue to monitor. Respiratory status improved, no longer requiring supplemental O2.  05/04/2025 Alertness slightly improved. On room air. NGT in place. Continue to monitor.  05/05/2025 Infectious workup started due to hypotension requiring levophed, fever overnight of 100.8, and worsening leukocytosis. Blood cultures, repeat urinalysis, CXR. Stated on zosyn. Switched to DNR.  05/06/2025 Vimpat dose decreased, cEEG. Leukocytosis resolved. Continue zosyn, pending Bcx. UA negative for infection. Off pressors.  05/07/2025 Per NSGY, okay to resume eliquis. Family confirmed that they do not want to pursue with the PEG tube. EEG with moderate diffuse background slowing and bilateral, independent epileptiform discharges. Okay to remain on lower dose of vimpat. Continue with EEG  monitoring.       Interval History: NAEO. Patient a bit hard to arouse this AM. Still responsive to pain distally in all extremities. Per NSGY, okay to resume eliquis. Family confirmed that they do not want to pursue with the PEG tube. EEG with moderate diffuse background slowing and bilateral, independent epileptiform discharges. Okay to remain on lower dose of vimpat. Continue with EEG monitoring.     Review of Systems  Unable to obtain a complete ROS due to level of consciousness.  Objective:     Vitals:  Temp: 98.4 °F (36.9 °C)  Pulse: 91  Rhythm: normal sinus rhythm  BP: (!) 145/73  MAP (mmHg): 103  Resp: (!) 25  SpO2: 99 %    Temp  Min: 97.4 °F (36.3 °C)  Max: 98.9 °F (37.2 °C)  Pulse  Min: 77  Max: 94  BP  Min: 113/50  Max: 154/64  MAP (mmHg)  Min: 65  Max: 103  Resp  Min: 11  Max: 32  SpO2  Min: 99 %  Max: 100 %    05/06 0701 - 05/07 0700  In: 1994.8   Out: 1600 [Urine:1400]   Unmeasured Output  Urine Occurrence: 0  Stool Occurrence: 1  Emesis Occurrence: 0  Pad Count: 2          Physical ExamVitals and nursing note reviewed.   Constitutional:       General: He is not in acute distress.     Appearance: He is ill-appearing.   HENT:      Head:      Comments: Surgical site c/d/i     Right Ear: External ear normal.      Left Ear: External ear normal.      Nose: Nose normal.      Comments: NGT in place.     Mouth/Throat:      Mouth: Mucous membranes are dry.   Eyes:      Pupils: Pupils are equal, round, and reactive to light.   Cardiovascular:      Rate and Rhythm: Normal rate and regular rhythm.   Pulmonary:      Effort: Pulmonary effort is normal. No respiratory distress.      Comments: Upper airway congestion   Bl coarse BS   Abdominal:      General: There is no distension.      Palpations: Abdomen is soft.      Tenderness: There is no abdominal tenderness. There is no guarding.   Musculoskeletal:      Right lower leg: No edema.      Left lower leg: No edema.   Skin:     General: Skin is warm and dry.    Neurological:      Comments: Mental status: Lethargic, opens spontaneously. Following minimal commands.   CN II-XII grossly intact, specifically:  PERRL  EOMI  No facial asymmetry.  Weak spontaneous cough   Motor:  RUE ext  RLE triple flexion to tickling foot  LUE wd  LLE triple flexion to tickling foot  Sensation intact to noxious x4     Medications:  Continuous   Scheduledalbuterol-ipratropium, 3 mL, Q6H  apixaban, 5 mg, BID  lacosamide, 100 mg, Q12H  levothyroxine, 125 mcg, Before breakfast  metoprolol tartrate, 12.5 mg, Q8H  modafiniL, 200 mg, Daily  piperacillin-tazobactam (Zosyn) IV (PEDS and ADULTS) (extended infusion is not appropriate), 4.5 g, Q8H  pravastatin, 20 mg, QHS  psyllium husk, 1 packet, BID  silodosin, 4 mg, Daily    PRNacetaminophen, 650 mg, Q6H PRN  bisacodyL, 10 mg, Daily PRN  hydrALAZINE, 10 mg, Q4H PRN  labetalol, 5 mg, Q4H PRN  magnesium oxide, 800 mg, PRN  magnesium oxide, 800 mg, PRN  potassium bicarbonate, 35 mEq, PRN  potassium bicarbonate, 50 mEq, PRN  potassium bicarbonate, 60 mEq, PRN  potassium, sodium phosphates, 2 packet, PRN  potassium, sodium phosphates, 2 packet, PRN  potassium, sodium phosphates, 2 packet, PRN  sodium chloride 0.9%, 10 mL, PRN      Today I personally reviewed pertinent medications, lines/drains/airways, laboratory results, notably:    Diet  Diet NPO  Diet NPO      Assessment/Plan:     Neuro  * Subdural hemorrhage  85M with a medical history significant for atrial fibrillation on Eliquis, UC, HTN, and recent VPS placement (2/6/25) for NPH complicated by bilateral SDH s/p bilateral craniotomy for SD hygroma resection with VPS tie off (3/24/25) who was admitted to [facility] on 4/23/25 for ongoing management of SDH reexpansion, now s/p repeat bilateral craniotomy for evacuation on 4/25/25.      POD12. Mental status seems to be improving though still difficult to arouse, continue Modafinil trial at 200mg daily.      EEG with moderate diffuse background slowing and  bilateral, independent epileptiform discharges. Will remain on cEEG and reduced dose of Lacoasmide to 100mg BID.      CTH with small right parietal ICH, repeat CTH at 6 hours stable.     SBP<160.      Aggressive pulmonary toileting and airway maintenance.     TF +  q4h    PT/OT     Continue home statin, Silodosin for urinary retention and levothyroxine.      Lovenox for chemical DVT prophylaxis.     Subdural hematoma  See primary problem    Seizure disorder  Vimpat dose reduced to 100mg BID with cEEG.  EEG- moderate encephalopathy, triphasics, generalized cortical irritability, no discrete seizures    Acute encephalopathy  See primary problem  - Fever of 100.8 overnight with an increase in leukocytosis and hypotension requiring levophed. Infectious workup started.  - Placed on zosyn.    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    - Continue zosyn for 7 day course. D3/7  - Repeat CXR on 5/9    Cardiac/Vascular  Hypotension  RESOLVED. Off pressors.    - Persistent hypotension morning of 5/5 unresponsive to 1L of fluids in the setting of fever 100.8 overnight and worsening leukocytosis. Now on levophed. Will attempt to wean as tolerated.   - Follow up infectious workup  - Start zosyn    Coronary artery disease involving native coronary artery of native heart without angina pectoris  Resume home statin    PAF (paroxysmal atrial fibrillation)  Resumed lopressor. BP stable.  Resuming Eliqius 5mg BID.   Currently rate controlled    Left ventricular diastolic dysfunction, NYHA class 1  Echo reviewed    Essential  hypertension  SBP < 160  PRN labetalol, hydralazine    Renal/  Urinary retention  C/w home silodosin 4    Endocrine  Acquired hypothyroidism  C/w home synthroid 125mcg     GI  UC (ulcerative colitis)  Hold home colazal in acute setting    Palliative Care  ACP (advance care planning)  Advance Care Planning    Date: 05/05/2025    Code Status  In light of the patients advanced and life limiting illness,I engaged the patient and family in a voluntary conversation about the patient's preferences for care  at the very end of life. The patient wishes to have a natural, peaceful death.  Along those lines, the family does not wish to have CPR or other invasive treatments performed when his heart and/or breathing stops. I communicated to the patient and family that a DNR order would be placed in his medical record to reflect this preference.    A total of 15 min was spent on advance care planning, goals of care discussion, emotional support, formulating and communicating prognosis and exploring burden/benefit of various approaches of treatment. This discussion occurred on a fully voluntary basis with the verbal consent of the patient and/or family.               The patient is being Prophylaxed for:  Venous Thromboembolism with: Chemical  Stress Ulcer with: None  Ventilator Pneumonia with: not applicable    Activity Orders            Elevate HOB Elevate HOB 30-45 degrees during feeding unless contraindicated starting at 04/27 0844    Diet NPO: NPO starting at 04/25 0001    Turn patient starting at 04/23 2000    Elevate HOB starting at 04/23 1948          DNR    Jackson Walton MD  Neurocritical Care  [facility]

## 2025-05-07 NOTE — SUBJECTIVE & OBJECTIVE
Interval History: EEG with bilateral, independent epileptiform discharges overnight, no discrete seizures. Continue current dose Lacosamide, closely follow EEG for further management. Plan of care discussed in detail with patients wife, daughter at bedside.      Current Medications[1]  Continuous Infusions:    Review of Systems   Unable to perform ROS: Other     Objective:     Vital Signs (Most Recent):  Temp: 98.4 °F (36.9 °C) (05/07/25 1102)  Pulse: 91 (05/07/25 1209)  Resp: (!) 25 (05/07/25 1209)  BP: (!) 145/73 (05/07/25 1202)  SpO2: 99 % (05/07/25 1209) Vital Signs (24h Range):  Temp:  [97.4 °F (36.3 °C)-98.9 °F (37.2 °C)] 98.4 °F (36.9 °C)  Pulse:  [77-94] 91  Resp:  [11-32] 25  SpO2:  [99 %-100 %] 99 %  BP: (113-154)/(45-73) 145/73     Weight: 68 kg (149 lb 14.6 oz)  Body mass index is 20.92 kg/m².     Physical Exam  Constitutional:       General: He is not in acute distress.     Appearance: He is ill-appearing.      Interventions: Nasal cannula in place.   HENT:      Head: Normocephalic.      Comments: EEG in place  Eyes:      General: No scleral icterus.        Right eye: No discharge.         Left eye: No discharge.      Conjunctiva/sclera: Conjunctivae normal.      Pupils: Pupils are equal, round, and reactive to light.   Cardiovascular:      Rate and Rhythm: Normal rate.   Pulmonary:      Effort: Pulmonary effort is normal. No respiratory distress.   Musculoskeletal:         General: No deformity or signs of injury.   Skin:     General: Skin is warm and dry.   Psychiatric:      Comments: Unable to assess            NEUROLOGICAL EXAMINATION:     CRANIAL NERVES     CN III, IV, VI   Pupils are equal, round, and reactive to light.       Alert, tracking  SHASHA OU   Corneal intact OU   + spontaneous eye opening   Face symmetric   Tongue midline   Not following commands    Exam findings to suggest seizures:  Myoclonus - no   eye twitching - no   Nystagmus - no   gaze deviation - no   waxy rigidity - no         Significant Labs: All pertinent lab results from the past 24 hours have been reviewed.    Significant Studies: I have reviewed all pertinent imaging results/findings within the past 24 hours.       [1]   Current Facility-Administered Medications   Medication Dose Route Frequency Provider Last Rate Last Admin    acetaminophen tablet 650 mg  650 mg Per NG tube Q6H PRN Jodie Perea PA-C   650 mg at 05/05/25 0756    albuterol-ipratropium 2.5 mg-0.5 mg/3 mL nebulizer solution 3 mL  3 mL Nebulization Q6H Loy Calles PA-C   3 mL at 05/07/25 1209    apixaban tablet 5 mg  5 mg Oral BID Jackson Walton MD        bisacodyL suppository 10 mg  10 mg Rectal Daily PRN Yuridia Kaplan PA-C        hydrALAZINE injection 10 mg  10 mg Intravenous Q4H PRN Yuridia Kaplan PA-C   10 mg at 04/30/25 1618    labetalol 20 mg/4 mL (5 mg/mL) IV syring  5 mg Intravenous Q4H PRN Fahad Narayanan DO        lacosamide 10 mg/mL oral liquid 100 mg  100 mg Per NG tube Q12H Nikolai Abraham DO   100 mg at 05/07/25 0834    levothyroxine tablet 125 mcg  125 mcg Per NG tube Before breakfast Jodie Perea PA-C   125 mcg at 05/07/25 0547    magnesium oxide tablet 800 mg  800 mg Per NG tube PRN Jodie Perea PA-C        magnesium oxide tablet 800 mg  800 mg Per NG tube PRN Jodie Perea PA-C        metoprolol tartrate (LOPRESSOR) split tablet 12.5 mg  12.5 mg Per NG tube Q8H Nikolai Abraham DO   12.5 mg at 05/07/25 1428    modafiniL tablet 200 mg  200 mg Per NG tube Daily Shahla Washington MD   200 mg at 05/07/25 0834    piperacillin-tazobactam (ZOSYN) 4.5 g in D5W 100 mL IVPB (MB+)  4.5 g Intravenous Q8H Jackson Walton MD        potassium bicarbonate disintegrating tablet 35 mEq  35 mEq Per NG tube PRN Jodie Perea PA-C        potassium bicarbonate disintegrating tablet 50 mEq  50 mEq Per NG tube PRN Jodie Perea PA-C   50 mEq at 05/07/25 0854    potassium bicarbonate disintegrating tablet 60 mEq  60 mEq  Per NG tube PRN Jodie Perea PA-C        potassium, sodium phosphates 280-160-250 mg packet 2 packet  2 packet Per NG tube PRN Jodie Perea PA-C   2 packet at 04/28/25 0843    potassium, sodium phosphates 280-160-250 mg packet 2 packet  2 packet Per NG tube PRN Jodie Perea PA-C        potassium, sodium phosphates 280-160-250 mg packet 2 packet  2 packet Per NG tube PRN Jodie Perea PA-C        pravastatin tablet 20 mg  20 mg Per NG tube QHS Jodie Perea PA-C   20 mg at 05/06/25 2051    psyllium husk packet 1 packet  1 packet Per NG tube BID Nikolia Abraham, DO   1 packet at 05/07/25 0835    silodosin capsule 4 mg  4 mg Per NG tube Daily Jodie Perea PA-C   4 mg at 05/07/25 0834    sodium chloride 0.9% flush 10 mL  10 mL Intravenous PRN Yuridia Kaplan PA-C

## 2025-05-07 NOTE — PROGRESS NOTES
[facility] - [facility]  Neurology-Epilepsy  Progress Note    Patient Name: Ramesh Ricci  MRN: [id number]  Admission Date: 4/23/2025  Hospital Length of Stay: 14 days  Code Status: DNR   Attending Provider: Nikolai Abraham DO  Primary Care Physician: Nakul Mandujano MD   Principal Problem:Subdural hemorrhage    Subjective:     Hospital Course:   CAP- WNL  4/24>4/25: mild encephalopathy, no epileptiform activity, no seizures; no staring episodes reported    4/26>4/27: moderate encephalopathy, bilateral breach, independent bilateral sharps, no seizures  4/27>4/28: moderate encephalopathy, generalized cortical irritability, no discrete seizures  4/28>4/29: moderate encephalopathy, triphasics, generalized cortical irritability, no discrete seizures (marginally improved with increased Lacosamide to 200 mg BID)  OFF EEG   5/6>5/7: EEG rehooked to wean Lacosamide. Overnight study with bilateral, independent epileptiform discharges, no discrete seizures    See EEG reports for details.    Interval History: EEG with bilateral, independent epileptiform discharges overnight, no discrete seizures. Continue current dose Lacosamide, closely follow EEG for further management. Plan of care discussed in detail with patients wife, daughter at bedside.      Current Medications[1]  Continuous Infusions:    Review of Systems   Unable to perform ROS: Other     Objective:     Vital Signs (Most Recent):  Temp: 98.4 °F (36.9 °C) (05/07/25 1102)  Pulse: 91 (05/07/25 1209)  Resp: (!) 25 (05/07/25 1209)  BP: (!) 145/73 (05/07/25 1202)  SpO2: 99 % (05/07/25 1209) Vital Signs (24h Range):  Temp:  [97.4 °F (36.3 °C)-98.9 °F (37.2 °C)] 98.4 °F (36.9 °C)  Pulse:  [77-94] 91  Resp:  [11-32] 25  SpO2:  [99 %-100 %] 99 %  BP: (113-154)/(45-73) 145/73     Weight: 68 kg (149 lb 14.6 oz)  Body mass index is 20.92 kg/m².     Physical Exam  Constitutional:       General: He is not in acute distress.     Appearance: He is ill-appearing.       Interventions: Nasal cannula in place.   HENT:      Head: Normocephalic.      Comments: EEG in place  Eyes:      General: No scleral icterus.        Right eye: No discharge.         Left eye: No discharge.      Conjunctiva/sclera: Conjunctivae normal.      Pupils: Pupils are equal, round, and reactive to light.   Cardiovascular:      Rate and Rhythm: Normal rate.   Pulmonary:      Effort: Pulmonary effort is normal. No respiratory distress.   Musculoskeletal:         General: No deformity or signs of injury.   Skin:     General: Skin is warm and dry.   Psychiatric:      Comments: Unable to assess            NEUROLOGICAL EXAMINATION:     CRANIAL NERVES     CN III, IV, VI   Pupils are equal, round, and reactive to light.       Alert, tracking  SHASHA OU   Corneal intact OU   + spontaneous eye opening   Face symmetric   Tongue midline   Not following commands    Exam findings to suggest seizures:  Myoclonus - no   eye twitching - no   Nystagmus - no   gaze deviation - no   waxy rigidity - no        Significant Labs: All pertinent lab results from the past 24 hours have been reviewed.    Significant Studies: I have reviewed all pertinent imaging results/findings within the past 24 hours.    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 Blankenship  - Drains removed 4/28  - Management per NSGY, NCC    Seizure disorder  85M PMHx of HTN, afib on Eliquis, AS s/p TAVR, CKD, UC, NPH s/p VPS c/b bilateral SDH s/p bilateral evacuation with subsequent seizure disorder maintained on Lacosamide 100 mg BID who presented with worsening weakness, urinary frequency, and speech changes. Imaging revealed interval increase in size of bilateral holohemispheric subdural collections, with new bilateral hemorrhages in the subdural collections, concerning for acute on  subacute SDH; right VPS in place. Eliquis was reversed in the ER. Patient noted to have staring episode which resolved ~few minutes. Wife reported staring episodes at home. Patient admitted to [facility] for higher level of care and further management with Neurosurgery consult. CAP EEG placed; Epilepsy following for assistance in management. Per chart review, patient was recently started on Lacosamide 100 mg BID (filled 4/8) by Neurology group at [facility].    Recommendations:  - Continuous vEEG monitoring  - Recommend to continue Lacosamide 100 mg BID (decreased 5/6)  - Avoid agents that lower seizure threshold  - Post operative management per NSGY  - Management of infectious/metabolic abnormalities per [facility]  - Seizure precautions    Discussed plan of care with [facility] team, wife and daughter at bedside. Will follow, please call with questions.    PAF (paroxysmal atrial fibrillation)  Hx of, On Eliquis as outpatient  - Reversed in ER  - Eliquis resumed 5/7  - Management per NSGY, [facility]        VTE Risk Mitigation (From admission, onward)           Ordered     apixaban tablet 5 mg  2 times daily         05/07/25 1351     Reason for No Pharmacological VTE Prophylaxis  Once        Question:  Reasons:  Answer:  Active Bleeding    04/23/25 1950     IP VTE HIGH RISK PATIENT  Once         04/23/25 1950     Place sequential compression device  Until discontinued         04/23/25 1950                    Jodie Mckeon PA-C  Neurology-Epilepsy  [facility] - Neuro Critical Care  Staff: Dr. Shepard       [1]   Current Facility-Administered Medications   Medication Dose Route Frequency Provider Last Rate Last Admin    acetaminophen tablet 650 mg  650 mg Per NG tube Q6H PRN Jodie Perea PA-C   650 mg at 05/05/25 0756    albuterol-ipratropium 2.5 mg-0.5 mg/3 mL nebulizer solution 3 mL  3 mL Nebulization Q6H Loy Calles PA-C   3 mL at 05/07/25 1209    apixaban tablet 5 mg  5 mg Oral BID Jackson Walton MD        bisacodyL suppository 10 mg   10 mg Rectal Daily PRN Yuridia Kaplan PA-C        hydrALAZINE injection 10 mg  10 mg Intravenous Q4H PRN Yuridia Kaplan PA-C   10 mg at 04/30/25 1618    labetalol 20 mg/4 mL (5 mg/mL) IV syring  5 mg Intravenous Q4H PRN Fahad Narayanan DO        lacosamide 10 mg/mL oral liquid 100 mg  100 mg Per NG tube Q12H Nikolai Abraham DO   100 mg at 05/07/25 0834    levothyroxine tablet 125 mcg  125 mcg Per NG tube Before breakfast Jodie Perea PA-C   125 mcg at 05/07/25 0547    magnesium oxide tablet 800 mg  800 mg Per NG tube PRN Joide Perea PA-C        magnesium oxide tablet 800 mg  800 mg Per NG tube PRN Jodie Perea PA-C        metoprolol tartrate (LOPRESSOR) split tablet 12.5 mg  12.5 mg Per NG tube Q8H Nikolai Abraham DO   12.5 mg at 05/07/25 1428    modafiniL tablet 200 mg  200 mg Per NG tube Daily Shahla Washington MD   200 mg at 05/07/25 0834    piperacillin-tazobactam (ZOSYN) 4.5 g in D5W 100 mL IVPB (MB+)  4.5 g Intravenous Q8H Jackson Walton MD        potassium bicarbonate disintegrating tablet 35 mEq  35 mEq Per NG tube PRN Jodie Perea PA-C        potassium bicarbonate disintegrating tablet 50 mEq  50 mEq Per NG tube PRN Jodie Perea PA-C   50 mEq at 05/07/25 0854    potassium bicarbonate disintegrating tablet 60 mEq  60 mEq Per NG tube PRN Jodie Perea PA-C        potassium, sodium phosphates 280-160-250 mg packet 2 packet  2 packet Per NG tube PRN Jodie Perea PA-C   2 packet at 04/28/25 0843    potassium, sodium phosphates 280-160-250 mg packet 2 packet  2 packet Per NG tube PRN Jodie Perea PA-C        potassium, sodium phosphates 280-160-250 mg packet 2 packet  2 packet Per NG tube PRN Jodie Perea PA-C        pravastatin tablet 20 mg  20 mg Per NG tube QHS Jodie Perea PA-C   20 mg at 05/06/25 2051    psyllium husk packet 1 packet  1 packet Per NG tube BID Nikolai Abraham DO   1 packet at 05/07/25 0835    silodosin capsule 4 mg  4  mg Per NG tube Daily Jodie Perea PA-C   4 mg at 05/07/25 0834    sodium chloride 0.9% flush 10 mL  10 mL Intravenous PRN Yuridia Kaplan, PA-C

## 2025-05-07 NOTE — PLAN OF CARE
"[facility] Care Plan    POC reviewed with Ramesh Ricci and family at 0300. Patient unable to verbalize understanding. Questions and concerns addressed. No acute events today. Pt progressing toward goals. Will continue to monitor. See below and flowsheets for full assessment and VS info.     - TF at goal, tolerating well  - weaning AEDs, no sz episodes noted overnight  - cEEG in place  - Pt. Bathed and linens changed        Is this a stroke patient? Yes    Neuro:  [place] Coma Scale  Best Eye Response: 3-->(E3) to speech  Best Motor Response: 4-->(M4) withdraws from pain  Best Verbal Response: 1-->(V1) none  [place] Coma Scale Score: 8  Assessment Qualifiers: patient not sedated/intubated, no eye obstruction present  Pupil PERRLA: yes     24 hr Temp:  [97.4 °F (36.3 °C)-98.9 °F (37.2 °C)]     CV:   Rhythm: normal sinus rhythm  BP goals:   SBP < 160  MAP > 65    Resp:      Oxygen Concentration (%): 40    Plan: N/A    GI/:     Diet/Nutrition Received: tube feeding  Last Bowel Movement: 05/06/25  Voiding Characteristics: external catheter    Intake/Output Summary (Last 24 hours) at 5/7/2025 0618  Last data filed at 5/7/2025 0605  Gross per 24 hour   Intake 1994.76 ml   Output 1600 ml   Net 394.76 ml     Unmeasured Output  Urine Occurrence: 0  Stool Occurrence: 1  Emesis Occurrence: 0  Pad Count: 2    Labs/Accuchecks:  Recent Labs   Lab 05/07/25  0117   WBC 13.93*   RBC 2.99*   HGB 8.7*   HCT 27.4*   *      Recent Labs   Lab 05/07/25  0117      K 3.8   CO2 24      BUN 43*   CREATININE 0.9   ALKPHOS 72   ALT 15   AST 35   BILITOT 0.2    No results for input(s): "PROTIME", "INR", "APTT", "HEPANTIXA" in the last 168 hours. No results for input(s): "CPK", "CPKMB", "TROPONINI", "MB" in the last 168 hours.    Electrolytes: N/A - electrolytes WDL  Accuchecks: none    Gtts:      LDA/Wounds:    Gabino Risk Assessment  Sensory Perception: 2-->very limited  Moisture: 3-->occasionally moist  Activity: " 1-->bedfast  Mobility: 2-->very limited  Nutrition: 3-->adequate  Friction and Shear: 2-->potential problem  Gabino Score: 13  Is your gabino score 12 or less? no          Restraints:        WC

## 2025-05-07 NOTE — ASSESSMENT & PLAN NOTE
84 yo with recent admission for VPS tie off and SDH evacuation on 3/24 presenting for new LSW and speech hesitation found to have enlarged chronic SDH as well as a new small R convexity acute SDH. Pt takes eliquis, s/p PCC. Interval CTH stable.     Now s/p bialteral subdural evacuations with VPS removal on 4/25    Recommendations:  --admitted to [facility]  --on eliquis, s/p kcentra reversal. Okay to resume eliquis at this point.   --SBP <140  --HOB >30  --vimpat/zosyn per ICU  --if any further scans per [facility] then recommend MRI  --drains removed 4/28  --on modafinil trial  --recommend consideration of LTAC placement w/ PEG tube, but per NCC team family would not want PEG at this time. F/u SLP   --Continue to monitor clinically, notify NSGY immediately with any changes in neuro status. NSGY will sign off at this time and arrange for clinic follow up 1 week after discharge     Discussed with Dr. Blankenship

## 2025-05-07 NOTE — ASSESSMENT & PLAN NOTE
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      POD12. Mental status seems to be improving though still difficult to arouse, continue Modafinil trial at 200mg daily.      EEG with moderate diffuse background slowing and bilateral, independent epileptiform discharges. Will remain on cEEG and reduced dose of Lacoasmide to 100mg BID.      CTH with small right parietal ICH, repeat CTH at 6 hours stable.     SBP<160.      Aggressive pulmonary toileting and airway maintenance.     TF +  q4h    PT/OT     Continue home statin, Silodosin for urinary retention and levothyroxine.      Lovenox for chemical DVT prophylaxis.

## 2025-05-07 NOTE — ASSESSMENT & PLAN NOTE
85M PMHx of HTN, afib on Eliquis, AS s/p TAVR, CKD, UC, NPH s/p VPS c/b bilateral SDH s/p bilateral evacuation with subsequent seizure disorder maintained on Lacosamide 100 mg BID who presented with worsening weakness, urinary frequency, and speech changes. Imaging revealed interval increase in size of bilateral holohemispheric subdural collections, with new bilateral hemorrhages in the subdural collections, concerning for acute on subacute SDH; right VPS in place. Eliquis was reversed in the ER. Patient noted to have staring episode which resolved ~few minutes. Wife reported staring episodes at home. Patient admitted to [facility] for higher level of care and further management with Neurosurgery consult. CAP EEG placed; Epilepsy following for assistance in management. Per chart review, patient was recently started on Lacosamide 100 mg BID (filled 4/8) by Neurology group at [facility].    Recommendations:  - Continuous vEEG monitoring  - Recommend to continue Lacosamide 100 mg BID (decreased 5/6)  - Avoid agents that lower seizure threshold  - Post operative management per NSGY  - Management of infectious/metabolic abnormalities per [facility]  - Seizure precautions    Discussed plan of care with [facility] team, wife and daughter at bedside. Will follow, please call with questions.

## 2025-05-07 NOTE — PT/OT/SLP PROGRESS
Physical Therapy Co-Treatment    Patient Name:  Ramesh Ricci   MRN:  [id number]    Recommendations:     Discharge Recommendations: Moderate Intensity Therapy  Discharge Equipment Recommendations: hospital bed, lift device  Barriers to discharge: inc level of assist required    Assessment:     Ramesh Ricci is a 85 y.o. male admitted with a medical diagnosis of Subdural hemorrhage.  He presents with the following impairments/functional limitations: weakness, impaired endurance, impaired self care skills, impaired functional mobility, gait instability, impaired balance, visual deficits, impaired cognition, decreased coordination, decreased upper extremity function, decreased lower extremity function, decreased safety awareness, decreased ROM, impaired coordination, impaired fine motor. Pt w/ eyes open throughout session once sitting EOB, turns eyes to sound but doesn't track consistently. He demonstrated protective balance reactions but still requires total A for mobility. Patient continues to demonstrate the need for moderate intensity therapy on a daily basis post acute exhibited by decreased independence with self-care and functional mobility     Rehab Prognosis: Fair; patient would benefit from acute skilled PT services to address these deficits and reach maximum level of function.    Recent Surgery: Procedure(s) (LRB):  CRANIOTOMY, FOR SUBDURAL HEMATOMA EVACUATION, bilateral. shunt removal (Bilateral)  REMOVAL, SHUNT, VENTRICULOPERITONEAL 12 Days Post-Op    Plan:     During this hospitalization, patient to be seen 4 x/week to address the identified rehab impairments via therapeutic activities, therapeutic exercises, neuromuscular re-education and progress toward the following goals:    Plan of Care Expires:  05/16/25    Subjective     Chief Complaint: NA 2/2 AMS but grimaced once with LUE repositioning  Patient/Family Comments/goals: pt's family happy to see him sitting up and with improved  alertness  Pain/Comfort:  Pain Rating 1: 0/10  Pain Addressed 1: Reposition, Distraction  Pain Rating Post-Intervention 1: 0/10      Objective:     Communicated with RN prior to session.  Patient found left sidelying with telemetry, blood pressure cuff, peripheral IV, NG tube, larry catheter, bowel management system, EEG, pulse ox (continuous), SCD, pressure relief boots upon PT entry to room. Co-tx w/ OT 2/2 suspected pt complexity and requirement of 2 skilled therapists to assist in order to maximize pt treatment     General Precautions: Standard, aphasia, aspiration, fall, seizure  Orthopedic Precautions: N/A  Braces: N/A  Respiratory Status: Room air     Functional Mobility:  Bed Mobility:     Rolling Left:  total assistance  Rolling Right: total assistance  Scooting: total assistance and of 2 persons  Supine to Sit: total assistance and of 2 persons  Sit to Supine: total assistance and of 2 persons  Balance: EOB sitting balance maxA static, total A dynamic; random instances of jerking reactions when pt feels he is losing balance; does demonstrate protective reactions      AM-PAC 6 CLICK MOBILITY  Turning over in bed (including adjusting bedclothes, sheets and blankets)?: 1  Sitting down on and standing up from a chair with arms (e.g., wheelchair, bedside commode, etc.): 1  Moving from lying on back to sitting on the side of the bed?: 1  Moving to and from a bed to a chair (including a wheelchair)?: 1  Need to walk in hospital room?: 1  Climbing 3-5 steps with a railing?: 1  Basic Mobility Total Score: 6       Treatment & Education:  Pt educated on PT POC/goals, d/c recs, and continued treatment. All questions answered and pt in agreement w/ POC.  Dynamic sitting balance w/ visual, verbal, and tactile cueing for upright posture, midline orientation, WB through B forearms, pressure through BLE, and weight shift in all directions  Rolling L/R in bed to reposition pt w/ wedges and pillows to offload pressure, avoid  muscle contractures, and prevent skin breakdown. Pt and family educated on importance of pt being rotated every 2hrs for these reasons.     Patient left right sidelying with all lines intact, call button in reach, RN notified, and family present..    GOALS:   Multidisciplinary Problems       Physical Therapy Goals          Problem: Physical Therapy    Goal Priority Disciplines Outcome Interventions   Physical Therapy Goal     PT, PT/OT Progressing    Description: Goals to be completed by: 5/16/25    Pt will perform sup<>sit transfers w/ moderate assistance  Pt will have sufficient dynamic balance to sit EOB while performing ADLs/therex w/ minimum assistance  Pt will be able to stand up from EOB w/ moderate assistance using LRAD  Bed to chair transfer w/ maxA using LRAD  Pt will be independent w/ HEP therex on BLE w/ good form and ROM                        Time Tracking:     PT Received On: 05/07/25  PT Start Time: 1327     PT Stop Time: 1407  PT Total Time (min): 40 min     Billable Minutes: Therapeutic Activity 15 and Neuromuscular Re-education 25    Treatment Type: Treatment  PT/PTA: PT     Number of PTA visits since last PT visit: 0     05/07/2025

## 2025-05-07 NOTE — PLAN OF CARE
Problem: Occupational Therapy  Goal: Occupational Therapy Goal  Description: Goals to be met by: 6/24/25     Patient will increase functional independence with ADLs by performing:    UE Dressing with Maximum Assistance.  LE Dressing with Maximum Assistance.  Grooming while EOB with Maximum Assistance.  Toileting from bedside commode with Maximum Assistance for hygiene and clothing management.   Sitting at edge of bed x15 minutes with Maximum Assistance.  Rolling to Bilateral with Maximum Assistance.   Supine to sit with Maximum Assistance.  Stand pivot transfers with Maximum Assistance.    Outcome: Progressing     Pt with goals remaining in progress

## 2025-05-07 NOTE — ASSESSMENT & PLAN NOTE
CXR on 5/7 with patchy infiltrate to the right lower lung possibility representing pneumonia.    - Continue zosyn for 7 day course. D3/7  - Repeat CXR on 5/9

## 2025-05-08 NOTE — PLAN OF CARE
[facility] Neuro Critical Care  Discharge Reassessment    Primary Care Provider: Nakul Mandujano MD    Expected Discharge Date: 5/13/2025    Reassessment (most recent)       Discharge Reassessment - 05/08/25 1014          Discharge Reassessment    Assessment Type Discharge Planning Reassessment     Did the patient's condition or plan change since previous assessment? No     Discharge Plan discussed with: Spouse/sig other     Name(s) and Number(s) laura Ricci [id number]     Communicated LIDIA with patient/caregiver Yes     Discharge Plan A Skilled Nursing Facility     Discharge Plan B Home with family     DME Needed Upon Discharge  hospital bed;lift device;wheelchair (P)      Transition of Care Barriers None (P)      Why the patient remains in the hospital Requires continued medical care (P)                    Discharge Plan A and Plan B have been determined by review of patient's clinical status, future medical and therapeutic needs, and coverage/benefits for post-acute care in coordination with multidisciplinary team members.    Stacia Hilton MSW, LCSW  [facility]  Case Management Dept.

## 2025-05-08 NOTE — PT/OT/SLP PROGRESS
Physical Therapy Treatment/co treat with OT    Patient Name:  Ramesh Ricci   MRN:  [id number]    Recommendations:     Discharge Recommendations: Moderate Intensity Therapy  Discharge Equipment Recommendations: hospital bed, lift device, wheelchair  Barriers to discharge: Decreased caregiver support  Requires increased assistance for mobility  Assessment:     Ramesh Ricci is a 85 y.o. male admitted with a medical diagnosis of Subdural hemorrhage.  He presents with the following impairments/functional limitations: weakness, decreased upper extremity function, decreased lower extremity function, decreased coordination, impaired endurance, impaired functional mobility, impaired self care skills, impaired balance, impaired cardiopulmonary response to activity . Pt is unsafe with functional mobility at this time due to pt requires total assist for bed mobility and total assist for sitting balance due to weakness, lethargy, and instability. Pt is motivated to progress with functional mobility.     Rehab Prognosis: Fair; patient would benefit from acute skilled PT services to address these deficits and reach maximum level of function.    Recent Surgery: Procedure(s) (LRB):  CRANIOTOMY, FOR SUBDURAL HEMATOMA EVACUATION, bilateral. shunt removal (Bilateral)  REMOVAL, SHUNT, VENTRICULOPERITONEAL 13 Days Post-Op    Plan:     During this hospitalization, patient to be seen 4 x/week to address the identified rehab impairments via therapeutic activities, therapeutic exercises, neuromuscular re-education and progress toward the following goals:    Plan of Care Expires:  05/16/25    Subjective   Pt lethargic during treatment, opened his eyes upon sitting until return to supine    Pain/Comfort:  Pain Rating 1: 0/10 (pt did not appear to be in pain during treatment)  Pain Rating Post-Intervention 1: 0/10      Objective:     Communicated with nurse prior to session.  Patient found HOB elevated with bed alarm, blood pressure  cuff, bowel management system, pulse ox (continuous), telemetry, SCD, Condom Catheter upon PT entry to room.     General Precautions: Standard, aphasia, aspiration, fall, seizure, NPO  Orthopedic Precautions: N/A  Braces: N/A  Respiratory Status: Room air     Functional Mobility:  Bed Mobility:     Rolling Left:  total assistance  Rolling Right: total assistance  Supine to Sit: total assistance and of 2 persons  Sit to Supine: total assistance and of 2 persons    AM-PAC 6 CLICK MOBILITY  Turning over in bed (including adjusting bedclothes, sheets and blankets)?: 2  Sitting down on and standing up from a chair with arms (e.g., wheelchair, bedside commode, etc.): 1  Moving from lying on back to sitting on the side of the bed?: 2  Moving to and from a bed to a chair (including a wheelchair)?: 1  Need to walk in hospital room?: 1  Climbing 3-5 steps with a railing?: 1  Basic Mobility Total Score: 8     Treatment & Education:   pt sat on the EOB ~ 12 min with total assist due to posterior and lateral instability at times. Pt received verbal and manual cues for midline orientation and upright posture. Pt received PROM to B LE in sitting x 10 reps within available planes of movement. PT assisted with with remaining upright in sitting including lifting his head while OT performed ADLs and UE ROM exs with pt.   This session was done as a co treatment with OT to advance pt's mobility to progress towards discharge.   Patient left HOB elevated with all lines intact, call button in reach, and nurse notified..    GOALS:   Multidisciplinary Problems       Physical Therapy Goals          Problem: Physical Therapy    Goal Priority Disciplines Outcome Interventions   Physical Therapy Goal     PT, PT/OT Progressing    Description: Goals to be completed by: 5/16/25    Pt will perform sup<>sit transfers w/ moderate assistance  Pt will have sufficient dynamic balance to sit EOB while performing ADLs/therex w/ minimum assistance  Pt will  be able to stand up from EOB w/ moderate assistance using LRAD  Bed to chair transfer w/ maxA using LRAD  Pt will be independent w/ HEP therex on BLE w/ good form and ROM                        DME Justifications:  Ramesh Yo requires a hospital bed due to him requiring positioning of the body in ways not feasible with an ordinary bed due to limited ability and cannot independently make changes in body position without the use of the bed.The positioning of the body cannot be sufficiently resolved by the use of pillows and wedges.    Time Tracking:     PT Received On: 05/08/25  PT Start Time: 1405     PT Stop Time: 1428  PT Total Time (min): 23 min     Billable Minutes: Therapeutic Activity 23    Treatment Type: Treatment  PT/PTA: PT     Number of PTA visits since last PT visit: 0     05/08/2025

## 2025-05-08 NOTE — EICU
Virtual ICU Quality Rounds    Admit Date: 4/23/2025  Hospital Day: 15    ICU Day: 14d 11h    24H Vital Sign Range:  Temp:  [98.1 °F (36.7 °C)-99.8 °F (37.7 °C)]   Pulse:  [72-98]   Resp:  [18-32]   BP: (125-147)/(53-73)   SpO2:  [99 %-100 %]     VICU Surveillance Screening                    LDA reconciliation : Yes

## 2025-05-08 NOTE — PROGRESS NOTES
[facility]  Neurology-Epilepsy  Progress Note    Patient Name: Ramesh Ricci  MRN: [id number]  Admission Date: 4/23/2025  Hospital Length of Stay: 15 days  Code Status: DNR   Attending Provider: Nikolai Abraham DO  Primary Care Physician: Nakul Mandujano MD   Principal Problem:Subdural hemorrhage    Subjective:     Hospital Course:   CAP- WNL  4/24>4/25: mild encephalopathy, no epileptiform activity, no seizures; no staring episodes reported    4/26>4/27: moderate encephalopathy, bilateral breach, independent bilateral sharps, no seizures  4/27>4/28: moderate encephalopathy, generalized cortical irritability, no discrete seizures  4/28>4/29: moderate encephalopathy, triphasics, generalized cortical irritability, no discrete seizures (marginally improved with increased Lacosamide to 200 mg BID)  OFF EEG   5/6>5/7: EEG rehooked to wean Lacosamide. Overnight study with bilateral, independent epileptiform discharges, no discrete seizures  5/7>5/8: mild left and moderate right hemisphere dysfunction with seizure focus in the right parietal region, no electrogrpahic seizures. Discontinue EEG 5/8.     See EEG reports for details.    Interval History: EEG stable and without seizures overnight, discontinue EEG today. NCC continuing decreased dose Lacosamide 100 mg BID.     Current Medications[1]  Continuous Infusions:    Review of Systems   Unable to perform ROS: Other     Objective:     Vital Signs (Most Recent):  Temp: 99 °F (37.2 °C) (05/08/25 1101)  Pulse: 107 (05/08/25 1401)  Resp: (!) 29 (05/08/25 1401)  BP: (!) 142/60 (05/08/25 1401)  SpO2: 100 % (05/08/25 1401) Vital Signs (24h Range):  Temp:  [98.1 °F (36.7 °C)-99.8 °F (37.7 °C)] 99 °F (37.2 °C)  Pulse:  [] 107  Resp:  [18-30] 29  SpO2:  [99 %-100 %] 100 %  BP: (125-156)/(53-70) 142/60     Weight: 68 kg (149 lb 14.6 oz)  Body mass index is 20.92 kg/m².     Physical Exam  Constitutional:       General: He is not in acute distress.      Appearance: He is ill-appearing.      Interventions: Nasal cannula in place.   HENT:      Head: Normocephalic.      Comments: EEG in place  Eyes:      General: No scleral icterus.        Right eye: No discharge.         Left eye: No discharge.      Conjunctiva/sclera: Conjunctivae normal.      Pupils: Pupils are equal, round, and reactive to light.   Cardiovascular:      Rate and Rhythm: Normal rate.   Pulmonary:      Effort: Pulmonary effort is normal. No respiratory distress.   Musculoskeletal:         General: No deformity or signs of injury.   Skin:     General: Skin is warm and dry.   Psychiatric:      Comments: Unable to assess            NEUROLOGICAL EXAMINATION:     CRANIAL NERVES     CN III, IV, VI   Pupils are equal, round, and reactive to light.       Alert, tracking  SHASHA OU   Corneal intact OU   + spontaneous eye opening   Face symmetric   Tongue midline   Not following commands    Exam findings to suggest seizures:  Myoclonus - no   eye twitching - no   Nystagmus - no   gaze deviation - no   waxy rigidity - no        Significant Labs: All pertinent lab results from the past 24 hours have been reviewed.    Significant Studies: I have reviewed all pertinent imaging results/findings within the past 24 hours.    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 Blankenship  - Drains removed 4/28  - Management per NSGY, NCC    Seizure disorder  85M PMHx of HTN, afib on Eliquis, AS s/p TAVR, CKD, UC, NPH s/p VPS c/b bilateral SDH s/p bilateral evacuation with subsequent seizure disorder maintained on Lacosamide 100 mg BID who presented with worsening weakness, urinary frequency, and speech changes. Imaging revealed interval increase in size of bilateral holohemispheric subdural collections, with new bilateral hemorrhages in the subdural  collections, concerning for acute on subacute SDH; right VPS in place. Eliquis was reversed in the ER. Patient noted to have staring episode which resolved ~few minutes. Wife reported staring episodes at home. Patient admitted to [facility] for higher level of care and further management with Neurosurgery consult. CAP EEG placed; Epilepsy following for assistance in management. Per chart review, patient was recently started on Lacosamide 100 mg BID (filled 4/8) by Neurology group at [facility].    Recommendations:  - Discontinue vEEG 5/8  - Recommend to continue Lacosamide 100 mg BID (decreased 5/6)  - Avoid agents that lower seizure threshold  - Post operative management per NSGY  - Management of infectious/metabolic abnormalities per [facility]  - Seizure precautions    Discussed plan of care with [facility] team, no family available at bedside. Will sign off, please call with questions.    PAF (paroxysmal atrial fibrillation)  Hx of, On Eliquis as outpatient  - Reversed in ER  - Eliquis resumed 5/7  - Management per NSGY, [facility]        VTE Risk Mitigation (From admission, onward)           Ordered     apixaban tablet 5 mg  2 times daily         05/07/25 1611     Reason for No Pharmacological VTE Prophylaxis  Once        Question:  Reasons:  Answer:  Active Bleeding    04/23/25 1950     IP VTE HIGH RISK PATIENT  Once         04/23/25 1950     Place sequential compression device  Until discontinued         04/23/25 1950                    Jodie Mckeon PA-C  Neurology-Epilepsy  [facility] - Neuro Critical Care  Staff: Dr. Monsivais       [1]   Current Facility-Administered Medications   Medication Dose Route Frequency Provider Last Rate Last Admin    acetaminophen tablet 650 mg  650 mg Per NG tube Q6H PRN Jodie Perea PA-C   650 mg at 05/05/25 0756    albuterol-ipratropium 2.5 mg-0.5 mg/3 mL nebulizer solution 3 mL  3 mL Nebulization Q6H Loy Calles PA-C   3 mL at 05/08/25 1228    apixaban tablet 5 mg  5 mg Per NG tube BID Jarad  Nikolai SALINAS, DO   5 mg at 05/08/25 0932    bisacodyL suppository 10 mg  10 mg Rectal Daily PRN Yuridia Kaplan PA-C        hydrALAZINE injection 10 mg  10 mg Intravenous Q4H PRN Yuridia Kaplan PA-C   10 mg at 04/30/25 1618    labetalol 20 mg/4 mL (5 mg/mL) IV syring  5 mg Intravenous Q4H PRN Fahad Narayanan DO   5 mg at 05/08/25 1204    lacosamide 10 mg/mL oral liquid 100 mg  100 mg Per NG tube Q12H Nikolai Abraham DO   100 mg at 05/08/25 0930    levothyroxine tablet 125 mcg  125 mcg Per NG tube Before breakfast Jodie Perea PA-C   125 mcg at 05/08/25 0648    magnesium oxide tablet 800 mg  800 mg Per NG tube PRN Jodie Perea PA-C        magnesium oxide tablet 800 mg  800 mg Per NG tube PRN Jodie Perea PA-C        metoprolol tartrate (LOPRESSOR) split tablet 12.5 mg  12.5 mg Per NG tube Q8H Nikolai Abraham DO   12.5 mg at 05/08/25 0648    modafiniL tablet 200 mg  200 mg Per NG tube Daily Shahla Washington MD   200 mg at 05/08/25 0932    piperacillin-tazobactam (ZOSYN) 4.5 g in D5W 100 mL IVPB (MB+)  4.5 g Intravenous Q8H Jackson Walton MD 25 mL/hr at 05/08/25 1300 Rate Verify at 05/08/25 1300    potassium bicarbonate disintegrating tablet 35 mEq  35 mEq Per NG tube PRN Jodie Perea PA-C        potassium bicarbonate disintegrating tablet 50 mEq  50 mEq Per NG tube PRN Jodie Perea PA-C   50 mEq at 05/07/25 0854    potassium bicarbonate disintegrating tablet 60 mEq  60 mEq Per NG tube PRN Jodie Perea PA-C        potassium, sodium phosphates 280-160-250 mg packet 2 packet  2 packet Per NG tube PRN Jodie Perea PA-C   2 packet at 04/28/25 0843    potassium, sodium phosphates 280-160-250 mg packet 2 packet  2 packet Per NG tube PRN Jodie Perea PA-C        potassium, sodium phosphates 280-160-250 mg packet 2 packet  2 packet Per NG tube PRN Jodie Perea PA-C        pravastatin tablet 20 mg  20 mg Per NG tube QHS Jodie Perea PA-C   20 mg at 05/07/25  2043    psyllium husk packet 1 packet  1 packet Per NG tube TID Jackson Walton MD        silodosin capsule 4 mg  4 mg Per NG tube Daily Jodie Perea PA-C   4 mg at 05/08/25 0932    sodium chloride 0.9% flush 10 mL  10 mL Intravenous PRN Yuridia Kaplan, PA-C

## 2025-05-08 NOTE — PLAN OF CARE
Problem: SLP  Goal: SLP Goal  Description: Speech Language Pathology Goals  Goals expected to be met by 5/22  1. Pt will participate in ongoing assessment of swallow.   2. Pt will complete speech, language, cognitive evaluation to determine need for tx.     Outcome: Progressing     Evaluation completed.  Rec cont npo with strict aspiration precautions at this time. SLP to continue to follow.

## 2025-05-08 NOTE — PLAN OF CARE
"[facility] Care Plan  POC reviewed with Ramesh Ricci and family at 1400. Patient and Family verbalized understanding. Questions and concerns addressed. No acute events today. Pt progressing toward goals. Will continue to monitor. See below and flowsheets for full assessment and VS info.     -SLP reconsulted  -bath completed  -pt more alert today then previously- able to follow some commands  -cEEG dc'd        Is this a stroke patient? Yes- stroke booklet reviewed with patient and family and is at bedside.   Careplan individualization: patient likes his teeth brushed    Neuro:  [place] Coma Scale  Best Eye Response: 3-->(E3) to speech  Best Motor Response: 5-->(M5) localizes pain  Best Verbal Response: 1-->(V1) none  [place] Coma Scale Score: 9  Assessment Qualifiers: patient not sedated/intubated  Pupil PERRLA: yes     24 hr Temp:  [98.8 °F (37.1 °C)-99.8 °F (37.7 °C)]     CV:   Rhythm: normal sinus rhythm  BP goals:   SBP < 160  MAP > 65    Resp:      Oxygen Concentration (%): 40    Plan: N/A    GI/:     Diet/Nutrition Received: NPO, tube feeding  Last Bowel Movement: 05/08/25  Voiding Characteristics: incontinence, external catheter    Intake/Output Summary (Last 24 hours) at 5/8/2025 1832  Last data filed at 5/8/2025 1821  Gross per 24 hour   Intake 2328.65 ml   Output 1810 ml   Net 518.65 ml     Unmeasured Output  Unmeasured Urine Occurrence: 0  Unmeasured Stool Occurrence: 1  Unmeasured Emesis Occurrence: 0  Pad Count: 2    Labs/Accuchecks:  Recent Labs   Lab 05/08/25  0333   WBC 12.90*   RBC 2.99*   HGB 8.6*   HCT 27.5*         Recent Labs   Lab 05/08/25  0333      K 4.1   CO2 24      BUN 45*   CREATININE 0.9   ALKPHOS 75   ALT 17   AST 35   BILITOT 0.2      Recent Labs   Lab 05/07/25  1214   PROTIME 11.7   INR 1.1   APTT 28.5    No results for input(s): "CPK", "CPKMB", "TROPONINI", "MB" in the last 168 hours.    Electrolytes: N/A - electrolytes WDL  Accuchecks: " none    Gtts:      LDA/Wounds:    Gabino Risk Assessment  Sensory Perception: 2-->very limited  Moisture: 4-->rarely moist  Activity: 1-->bedfast  Mobility: 2-->very limited  Nutrition: 2-->probably inadequate  Friction and Shear: 2-->potential problem  Gabino Score: 13    Is your gabino score 12 or less? yes enrolled in the [facility] program, gabino mobility score is 2 or less, they are on a immerse bed, if their moisture score is 2 or less, they do not have a sacral mepilex and instead have zinc barrier cream, and heel boots on            Restraints:        [facility]

## 2025-05-08 NOTE — PROCEDURES
EEG REPORT      Ramesh Ricci  [id number]  1940    DATE OF SERVICE: 5/8/2025     -3    METHODOLOGY      Extended electroencephalographic recording is made while the patient is ambulatory and continuing normal daily activities.  Electrodes are placed according to the International 10-20 placement system and included T1 and T2 electrode placement.  Twenty four (24) channels of digital signal (sampling rate of 512/sec) was simultaneously recorded from the scalp including EKG and eye monitors.  Recording band pass was 0.1 to 100 hz and all data was stored digitally on the recorder.  The patient is instructed to press an event button when clinical symptoms occur and write the symptoms into a diary. Activation procedures which include photic stimulation, hyperventilation and instructing patients to perform simple task are done in selected patients.        The EEG is displayed on a monitor screen and can be reformatted into different montages for evaluation.  The entire recoding is submitted for computer assisted analysis to detect spike and electrographic seizure activity.  The entire recording is visually reviewed and the times identified by computer analysis as being spikes or seizures are reviewed again.  Compresses spectral analysis (CSA) is also performed on the activity recorded from each individual channel.  This is displayed as a power display of frequencies from 0 to 30 Hz over time.   The CSA analysis is done and displayed continuously.  This is reviewed for asymmetries in power between homologous areas of the scalp and for presence of changes in power which canbe seen when seizures occur.  Sections of suspected abnormalities on the CSA is then compared with the original EEG recording.  .     [employer] software was also utilized in the review of this study.  This software suite analyzes the EEG recording in multiple domains.  Coherence and rhythmicity is computed to identify EEG sections which may  contain organized seizures.  Each channel undergoes analysis to detect presence of spike and sharp waves which have special and morphological characteristic of epileptic activity.  The routine EEG recording is converted from spacial into frequency domain.  This is then displayed comparing homologous areas to identify areas of significant asymmetry.  Algorithm to identify non-cortically generated artifact is used to separate eye movement, EMG and other artifact from the EEG     Recording Times    A total of 5:59:09 hours of EEG was recorded.      EEG FINDINGS:  Background activity:   The background rhythm was characterized by partially organized theta and alpha activity with absent posterior dominant rhythm.   Symmetry and continuity: there is increased slowing over the right hemisphere and activity in the right parasaggital region is higher voltage and sharply contoured consistent with breach     Sleep:   No sleep transients although there is cycling of the background.    Activation procedures:   NA    Abnormal activity:   Frequent sharp waves at C4, P4    IMPRESSION:   Abnormal EEG due to mild left and moderate right hemisphere dysfunction with seizure focus in the right parietal region.  No electrographic seizures.        Jose J Monsivais MD  Neurology-Epilepsy.  [facility].

## 2025-05-08 NOTE — PT/OT/SLP PROGRESS
Occupational Therapy   Treatment    Name: Ramesh Ricci  MRN: [id number]  Admitting Diagnosis:  Subdural hemorrhage  13 Days Post-Op    Recommendations:     Discharge Recommendations: Moderate Intensity Therapy  Discharge Equipment Recommendations:  hospital bed, lift device, wheelchair  Barriers to discharge:  Other (Comment) (increased skilled A needed)    Assessment:     Ramesh Ricci is a 85 y.o. male with a medical diagnosis of Subdural hemorrhage.  He presents with continued decreased self-care and mobility independence 2/2 AMS. Performance deficits affecting function are weakness, gait instability, decreased upper extremity function, decreased ROM, impaired cardiopulmonary response to activity, impaired endurance, impaired balance, decreased lower extremity function, impaired coordination, visual deficits, decreased safety awareness, impaired fine motor, impaired self care skills, impaired cognition, impaired functional mobility, decreased coordination. Pt with decreased arousal upon OT entry to room while pt supine and HOB elevated, pt with eyes open t/o seated EOB trial though with no visual tracking observed. Pt with increased assistance needed to maintain seated EOB upright position with increased cervical flexion this date. Pt with no commands followed and no AROM observed. Pt with increased WOB while seated EOB, though VSS.  Patient continues to demonstrate the need for moderate intensity therapy on a daily basis post acute exhibited by decreased independence with self-care and functional mobility     Co-treatment with PT performed for patient safety, education, and facilitation of treatment to maximize activity tolerance and progression towards goals from two skilled therapy disciplines.     Rehab Prognosis:  Fair; patient would benefit from acute skilled OT services to address these deficits and reach maximum level of function.       Plan:     Patient to be seen 3 x/week to address the above  listed problems via self-care/home management, therapeutic activities, therapeutic exercises, neuromuscular re-education  Plan of Care Expires: 06/24/25  Plan of Care Reviewed with: patient    Subjective     Chief Complaint: none  Patient/Family Comments/goals: unknown  Pain/Comfort:  Pain Rating 1: 0/10  Pain Rating Post-Intervention 1: 0/10    Objective:     Communicated with: nursing prior to session.  Patient found HOB elevated with bed alarm, blood pressure cuff, pulse ox (continuous), telemetry, NG tube, bowel management system, Condom Catheter, SCD, pressure relief boots upon OT entry to room.    General Precautions: Standard, aspiration, fall, seizure, NPO, aphasia    Orthopedic Precautions:N/A  Braces: N/A  Respiratory Status: Room air     Occupational Performance:     Bed Mobility:    Patient completed Rolling/Turning to Left with  total assistance  Patient completed Rolling/Turning to Right with total assistance  Patient completed Scooting/Bridging with total assistance  Patient completed Supine to Sit with total assistance and 2 persons  Patient completed Sit to Supine with total assistance and 2 persons   Pt seated EOB with total assistance and increased assistance to maintain cervical extension while seated EOB     Functional Mobility/Transfers:  Not attempted 2/2 decreased postural control    Activities of Daily Living:  Grooming: total assistance for oral hygiene while pt seated EOB   Lower Body Dressing: total assistance to alfie footwear    Therapeutic exercise:  - therapist assisted PROM of shoulder flexion, elbow flexion/extension, wrist flexion/extension, and digit flexion/extension x5 reps dennis SHAH/Kin    [facility] 6 Click ADL: 6    Treatment & Education:  Session this date targeted therapeutic exercises to increase pt's independence  Pt educated on OT roles, POC, call button for assistance    Patient left HOB elevated with all lines intact, call button in reach, and nursing present    GOALS:    Multidisciplinary Problems       Occupational Therapy Goals          Problem: Occupational Therapy    Goal Priority Disciplines Outcome Interventions   Occupational Therapy Goal     OT, PT/OT Progressing    Description: Goals to be met by: 6/24/25     Patient will increase functional independence with ADLs by performing:    UE Dressing with Maximum Assistance.  LE Dressing with Maximum Assistance.  Grooming while EOB with Maximum Assistance.  Toileting from bedside commode with Maximum Assistance for hygiene and clothing management.   Sitting at edge of bed x15 minutes with Maximum Assistance.  Rolling to Bilateral with Maximum Assistance.   Supine to sit with Maximum Assistance.  Stand pivot transfers with Maximum Assistance.                         DME Justifications:  Ramesh Yo requires a hospital bed due to him requiring positioning of the body in ways not feasible with an ordinary bed due to limited ability and cannot independently make changes in body position without the use of the bed.The positioning of the body cannot be sufficiently resolved by the use of pillows and wedges.  Ramesh Ricci has a mobility limitation that significantly impairs his ability to participate in one or more mobility related activities of daily living (MRADLs) such as toileting, feeding, dressing, grooming, and bathing in customary locations in the home.  The mobility limitation cannot be sufficiently resolved by the use of a cane or walker.   The use of a manual wheelchair will significantly improve the patients ability to participate in MRADLS and the patient will use it on regular basis in the home.  Ramesh Ricci has expressed his willingness to use a manual wheelchair in the home. Patients upper body strength is sufficient for propulsion.  He also has a caregiver who is available, willing, and able to provide assistance with the wheelchair when needed.      Time Tracking:     OT Date of Treatment:  05/08/25  OT Start Time: 1406  OT Stop Time: 1427  OT Total Time (min): 21 min    Billable Minutes:Therapeutic Exercise 21 5/8/2025

## 2025-05-08 NOTE — SUBJECTIVE & OBJECTIVE
Interval History: EEG stable and without seizures overnight, discontinue EEG today. NCC continuing decreased dose Lacosamide 100 mg BID.     Current Medications[1]  Continuous Infusions:    Review of Systems   Unable to perform ROS: Other     Objective:     Vital Signs (Most Recent):  Temp: 99 °F (37.2 °C) (05/08/25 1101)  Pulse: 107 (05/08/25 1401)  Resp: (!) 29 (05/08/25 1401)  BP: (!) 142/60 (05/08/25 1401)  SpO2: 100 % (05/08/25 1401) Vital Signs (24h Range):  Temp:  [98.1 °F (36.7 °C)-99.8 °F (37.7 °C)] 99 °F (37.2 °C)  Pulse:  [] 107  Resp:  [18-30] 29  SpO2:  [99 %-100 %] 100 %  BP: (125-156)/(53-70) 142/60     Weight: 68 kg (149 lb 14.6 oz)  Body mass index is 20.92 kg/m².     Physical Exam  Constitutional:       General: He is not in acute distress.     Appearance: He is ill-appearing.      Interventions: Nasal cannula in place.   HENT:      Head: Normocephalic.      Comments: EEG in place  Eyes:      General: No scleral icterus.        Right eye: No discharge.         Left eye: No discharge.      Conjunctiva/sclera: Conjunctivae normal.      Pupils: Pupils are equal, round, and reactive to light.   Cardiovascular:      Rate and Rhythm: Normal rate.   Pulmonary:      Effort: Pulmonary effort is normal. No respiratory distress.   Musculoskeletal:         General: No deformity or signs of injury.   Skin:     General: Skin is warm and dry.   Psychiatric:      Comments: Unable to assess            NEUROLOGICAL EXAMINATION:     CRANIAL NERVES     CN III, IV, VI   Pupils are equal, round, and reactive to light.       Alert, tracking  SHASHA OU   Corneal intact OU   + spontaneous eye opening   Face symmetric   Tongue midline   Not following commands    Exam findings to suggest seizures:  Myoclonus - no   eye twitching - no   Nystagmus - no   gaze deviation - no   waxy rigidity - no        Significant Labs: All pertinent lab results from the past 24 hours have been reviewed.    Significant Studies: I have  reviewed all pertinent imaging results/findings within the past 24 hours.       [1]   Current Facility-Administered Medications   Medication Dose Route Frequency Provider Last Rate Last Admin    acetaminophen tablet 650 mg  650 mg Per NG tube Q6H PRN Jodie Perea PA-C   650 mg at 05/05/25 0756    albuterol-ipratropium 2.5 mg-0.5 mg/3 mL nebulizer solution 3 mL  3 mL Nebulization Q6H Loy Calles PA-C   3 mL at 05/08/25 1228    apixaban tablet 5 mg  5 mg Per NG tube BID Nikolai Abraham DO   5 mg at 05/08/25 0932    bisacodyL suppository 10 mg  10 mg Rectal Daily PRN Yuridia Kaplan PA-C        hydrALAZINE injection 10 mg  10 mg Intravenous Q4H PRN Yuridia Kaplan PA-C   10 mg at 04/30/25 1618    labetalol 20 mg/4 mL (5 mg/mL) IV syring  5 mg Intravenous Q4H PRN Fahad Narayanan DO   5 mg at 05/08/25 1204    lacosamide 10 mg/mL oral liquid 100 mg  100 mg Per NG tube Q12H Nikolai Abraham DO   100 mg at 05/08/25 0930    levothyroxine tablet 125 mcg  125 mcg Per NG tube Before breakfast Jodie Perea PA-C   125 mcg at 05/08/25 0648    magnesium oxide tablet 800 mg  800 mg Per NG tube PRN Jodie Perea PA-C        magnesium oxide tablet 800 mg  800 mg Per NG tube PRN Jodie Perea PA-C        metoprolol tartrate (LOPRESSOR) split tablet 12.5 mg  12.5 mg Per NG tube Q8H Nikolai Abraham DO   12.5 mg at 05/08/25 0648    modafiniL tablet 200 mg  200 mg Per NG tube Daily Shahla Washington MD   200 mg at 05/08/25 0932    piperacillin-tazobactam (ZOSYN) 4.5 g in D5W 100 mL IVPB (MB+)  4.5 g Intravenous Q8H Jackson Walton MD 25 mL/hr at 05/08/25 1300 Rate Verify at 05/08/25 1300    potassium bicarbonate disintegrating tablet 35 mEq  35 mEq Per NG tube PRN Jodie Perea PA-C        potassium bicarbonate disintegrating tablet 50 mEq  50 mEq Per NG tube PRN Jodie Perea PA-C   50 mEq at 05/07/25 0854    potassium bicarbonate disintegrating tablet 60 mEq  60 mEq Per NG tube PRN Brit  Jodie DEL VALLE PA-C        potassium, sodium phosphates 280-160-250 mg packet 2 packet  2 packet Per NG tube PRN Jodie Perea PA-C   2 packet at 04/28/25 0843    potassium, sodium phosphates 280-160-250 mg packet 2 packet  2 packet Per NG tube PRN Jodie Perea PA-C        potassium, sodium phosphates 280-160-250 mg packet 2 packet  2 packet Per NG tube PRN Jodie Perea PA-C        pravastatin tablet 20 mg  20 mg Per NG tube QHS Jodie Perea PA-C   20 mg at 05/07/25 2043    psyllium husk packet 1 packet  1 packet Per NG tube TID Jackson Walton MD        silodosin capsule 4 mg  4 mg Per NG tube Daily Jodie Perea PA-C   4 mg at 05/08/25 0932    sodium chloride 0.9% flush 10 mL  10 mL Intravenous PRN Yuridia Kaplan PA-C

## 2025-05-08 NOTE — PLAN OF CARE
LOCET and Demographics PASRR was completed by CHW, RSW.        Mor Cerda CHW, RSW  Case Management

## 2025-05-08 NOTE — ASSESSMENT & PLAN NOTE
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      POD13. Mental status seems to be improving though still difficult to arouse, continue Modafinil trial at 200mg daily.      EEG with moderate diffuse background slowing and bilateral, independent epileptiform discharges. No evidence of seizures. No changes on EEG after >24 hours. EEG discontinued 5/8.  Continue reduced dose of Lacoasmide to 100mg BID.      CTH with small right parietal ICH, repeat CTH at 6 hours stable.     SBP<160.      Aggressive pulmonary toileting and airway maintenance.     TF +  q4h    PT/OT/SLP     Continue home statin, Silodosin for urinary retention and levothyroxine.      Lovenox for chemical DVT prophylaxis.

## 2025-05-08 NOTE — PT/OT/SLP EVAL
Speech Language Pathology Evaluation  Bedside Swallow    Patient Name:  Ramesh Ricci   MRN:  [id number]  Admitting Diagnosis: Subdural hemorrhage    Recommendations:                 General Recommendations:  Dysphagia therapy, Speech language evaluation, and Cognitive-linguistic evaluation  Diet recommendations:  NPO, NPO   Aspiration Precautions: Frequent oral care and Strict aspiration precautions   General Precautions: Standard, aphasia, aspiration, fall, NPO, seizure  Communication strategies:  go to room if call light pushed and loud vocal intensity     Assessment:     Ramesh Ricci is a 85 y.o. male with an SLP diagnosis of Aphasia and Dysphagia.      History:     Past Medical History:   Diagnosis Date    Allergies 01/31/2025    Anemia     Anticoagulant long-term use     Anxiety     Atrophic kidney 11/16/2012    BPH (benign prostatic hyperplasia) 11/16/2012    Congenital absence of right kidney 07/05/2017    DDD (degenerative disc disease), cervical 07/05/2017    x-ray 7/17 - Severe    Ex-smoker 12/20/2018    Exudative age-related macular degeneration, right eye, with active choroidal neovascularization 02/15/2024    GERD (gastroesophageal reflux disease) 11/16/2012    Glaucoma 11/16/2012    HTN (hypertension) 11/16/2012    Hypothyroid 11/16/2012    Internal carotid artery stenosis 11/16/2012    Iron deficiency anemia due to chronic blood loss 10/29/2021    Left ventricular diastolic dysfunction, NYHA class 1 04/16/2015    4/15    Low serum testosterone level 11/16/2012    Normal cardiac stress test 11/16/2012    NPH (normal pressure hydrocephalus) 02/03/2025    Osteopenia 11/16/2012    PAF (paroxysmal atrial fibrillation) 12/20/2018    Prostate CA 01/15/2013    XRT 12/12  Dr. 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    Seizure disorder 4/24/2025    Shingles 11/16/2012    Skin disease 2020    Seems to have started after Covid vaccine    Stage 3a chronic kidney disease  10/06/2014    Stroke 2017    tia   no residual    Thrombocytopenia 01/31/2025    TIA (transient ischemic attack) 11/16/2012    UC (ulcerative colitis) 11/16/2012       Past Surgical History:   Procedure Laterality Date    ADENOIDECTOMY      COLONOSCOPY N/A 03/25/2022    Procedure: COLONOSCOPY;  Surgeon: Ashley Nguyen MD;  Location: [facility];  Service: Endoscopy;  Laterality: N/A;    CRANIOTOMY FOR EVACUATION OF SUBDURAL HEMATOMA Bilateral 4/25/2025    Procedure: CRANIOTOMY, FOR SUBDURAL HEMATOMA EVACUATION, bilateral. shunt removal;  Surgeon: Jairo Blankenship MD;  Location: [facility] OR [address] [place] [address];  Service: Neurosurgery;  Laterality: Bilateral;  segal, ariadna. cranial plating kit. drill available. hemovac drains. to follow other Ware case    ESOPHAGOGASTRODUODENOSCOPY N/A 03/25/2022    Procedure: EGD (ESOPHAGOGASTRODUODENOSCOPY);  Surgeon: Ashley Nguyen MD;  Location: [facility] ENDO;  Service: Endoscopy;  Laterality: N/A;  added on per Dr. Nguyen    ESOPHAGOGASTRODUODENOSCOPY N/A 6/7/2024    Procedure: EGD (ESOPHAGOGASTRODUODENOSCOPY);  Surgeon: Audie Snell MD;  Location: [facility] (4TH FLR);  Service: Endoscopy;  Laterality: N/A;  5/21 R/s,sent updated instr via portal.pt informed to hold eliquis for 2 days.AC  Ref by: ,  approved to hold Eliquis (apixaban) for 2 days per Dr. Conklin-see media file  5/9/24-GT  5/31-pre call complete-tb    EVACUATION OF SUBDURAL HEMATOMA Bilateral 3/24/2025    Procedure: EVACUATION, HEMATOMA, SUBDURAL  AND SHUNT TIE OFF;  Surgeon: Jairo Blankenship MD;  [facility];  Service: Neurosurgery;  Laterality: Bilateral;  bilateral bobby holes for subdural hygroma and tying off of  shunt at clavicle. to follow other ware cases    EYE SURGERY Right 11/2020    cataract    HERNIA REPAIR      LEFT HEART CATHETERIZATION Right 10/29/2021    Procedure: CATHETERIZATION, HEART, LEFT AND RIGHT  - LV DARNELL POSSIBLE;  Surgeon: Beau Conklin MD;  Location: [facility] CATH  "LAB;  Service: Cardiology;  Laterality: Right;    LUMBAR PUNCTURE N/A 1/13/2025    Procedure: Lumbar Puncture;  Surgeon: Jairo Blankenship MD;  [facility];  Service: Neurosurgery;  Laterality: N/A;    REMOVAL OF VENTRICULOPERITONEAL SHUNT  4/25/2025    Procedure: REMOVAL, SHUNT, VENTRICULOPERITONEAL;  Surgeon: Jairo Blankenship MD;  [facility];  Service: Neurosurgery;;    RIGHT HEART CATHETERIZATION Right 10/29/2021    Procedure: INSERTION, CATHETER, RIGHT HEART;  Surgeon: Beau Conklin MD;  Location: [facility] CATH LAB;  Service: Cardiology;  Laterality: Right;    SKIN BIOPSY  2020    TONSILLECTOMY      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    Procedure: INSERTION, SHUNT, VENTRICULOPERITONEAL;  Surgeon: Jairo Blankenship MD;  [facility];  Service: Neurosurgery;  Laterality: Right;    VENTRICULOPERITONEAL SHUNT  2/6/2025    Procedure: INSERTION, SHUNT, VENTRICULOPERITONEAL;  Surgeon: Shar Elizabeth MD;  Location: [facility] OR [address] [place] [address];  Service: General;;       Social History: Social History: Patient lives with his spouse. Pt recently seen in [facility] (4/8/25) for mild cognitive linguistic impairment.  SLP diet recs at this facility 3/27/25 were regular/thin. On previous evaluation by this SLP, diet recs for reg/thin (4/25/25).     SLP re-consulted s/p crani, has been npo with poor alertness since procedure per primary team.  Team also reports family is not amendable to long term alternative nutrition.     Prior Intubation HX:  4/25 for craniotomy     Modified Barium Swallow: none documented     Chest X-Rays: 5/7: "Cardiac size is normal. Enteric tube is seen the stomach and aortic valvular replacement is present. Some patchy infiltrate is seen at the right lung base and a few increased markings seen at the left lung base. "    Subjective     "She really cleaned his mouth this morning."     Team requesting swallow assessment. Nurse reports extensive oral car this morning " with removal of thick secretion build up.     Pain/Comfort:  Pain Rating 1:  (no response, NAD)  Pain Rating Post-Intervention 1:  (no change)    Respiratory Status: Room air    Objective:     Oral Musculature Evaluation  Oral Musculature: unable to assess due to poor participation/comprehension  Dentition: present and adequate  Secretion Management: adequate  Mucosal Quality: dry, sticky  Oral Labial Strength and Mobility: functional retraction  Lingual Strength and Mobility: functional protrusion  Volitional Cough: not elicited  Volitional Swallow: not elicited  Voice Prior to PO Intake: not elicited    Bedside Swallow Eval:   Consistencies Assessed:  Thin liquids ice chip x2, tsp x2  Nectar thick liquids tsp x4     Oral Phase:   Anterior loss  Dry mouth  Slow oral transit time    Pharyngeal Phase:   No pharyngeal swallow elicited with initial ice chip  coughing/choking with 2/4 tsps of NTL  delayed swallow initation  Multiple, audible spontaneous swallows    Compensatory Strategies  None    Treatment: Pt alert with fair eye contact with SLP on R though no vocalizations elicited. No responses elicited to simple y/n questions or commands.  Education provided re: role of SLP, cont npo, aspiration risk, s/s aspiration, ongoing assessment by SLP, and POC.  Family verbalized understanding with no additional questions.     Goals:   Multidisciplinary Problems       SLP Goals          Problem: SLP    Goal Priority Disciplines Outcome   SLP Goal     SLP Progressing   Description: Speech Language Pathology Goals  Goals expected to be met by 5/22  1. Pt will participate in ongoing assessment of swallow.   2. Pt will complete speech, language, cognitive evaluation to determine need for tx.                                Plan:     Patient to be seen:  4 x/week   Plan of Care expires:  06/07/25  Plan of Care reviewed with:  patient, family   SLP Follow-Up:  Yes       Discharge recommendations:  Moderate Intensity Therapy    Barriers to Discharge:  Level of Skilled Assistance Needed      Time Tracking:     SLP Treatment Date:   05/08/25  Speech Start Time:  1042  Speech Stop Time:  1105     Speech Total Time (min):  23 min    Billable Minutes: Eval Swallow and Oral Function 13 and Self Care/Home Management Training 10    05/08/2025

## 2025-05-08 NOTE — ASSESSMENT & PLAN NOTE
85M PMHx of HTN, afib on Eliquis, AS s/p TAVR, CKD, UC, NPH s/p VPS c/b bilateral SDH s/p bilateral evacuation with subsequent seizure disorder maintained on Lacosamide 100 mg BID who presented with worsening weakness, urinary frequency, and speech changes. Imaging revealed interval increase in size of bilateral holohemispheric subdural collections, with new bilateral hemorrhages in the subdural collections, concerning for acute on subacute SDH; right VPS in place. Eliquis was reversed in the ER. Patient noted to have staring episode which resolved ~few minutes. Wife reported staring episodes at home. Patient admitted to [facility] for higher level of care and further management with Neurosurgery consult. CAP EEG placed; Epilepsy following for assistance in management. Per chart review, patient was recently started on Lacosamide 100 mg BID (filled 4/8) by Neurology group at [facility].    Recommendations:  - Discontinue vEEG 5/8  - Recommend to continue Lacosamide 100 mg BID (decreased 5/6)  - Avoid agents that lower seizure threshold  - Post operative management per NSGY  - Management of infectious/metabolic abnormalities per NCC  - Seizure precautions    Discussed plan of care with [facility] team, no family available at bedside. Will sign off, please call with questions.

## 2025-05-08 NOTE — PROCEDURES
EEG REPORT      Ramesh Ricci  [id number]  1940    DATE OF SERVICE: 5/7/2025     -2    METHODOLOGY      Extended electroencephalographic recording is made while the patient is ambulatory and continuing normal daily activities.  Electrodes are placed according to the International 10-20 placement system and included T1 and T2 electrode placement.  Twenty four (24) channels of digital signal (sampling rate of 512/sec) was simultaneously recorded from the scalp including EKG and eye monitors.  Recording band pass was 0.1 to 100 hz and all data was stored digitally on the recorder.  The patient is instructed to press an event button when clinical symptoms occur and write the symptoms into a diary. Activation procedures which include photic stimulation, hyperventilation and instructing patients to perform simple task are done in selected patients.        The EEG is displayed on a monitor screen and can be reformatted into different montages for evaluation.  The entire recoding is submitted for computer assisted analysis to detect spike and electrographic seizure activity.  The entire recording is visually reviewed and the times identified by computer analysis as being spikes or seizures are reviewed again.  Compresses spectral analysis (CSA) is also performed on the activity recorded from each individual channel.  This is displayed as a power display of frequencies from 0 to 30 Hz over time.   The CSA analysis is done and displayed continuously.  This is reviewed for asymmetries in power between homologous areas of the scalp and for presence of changes in power which canbe seen when seizures occur.  Sections of suspected abnormalities on the CSA is then compared with the original EEG recording.  .     [employer] software was also utilized in the review of this study.  This software suite analyzes the EEG recording in multiple domains.  Coherence and rhythmicity is computed to identify EEG sections which may  contain organized seizures.  Each channel undergoes analysis to detect presence of spike and sharp waves which have special and morphological characteristic of epileptic activity.  The routine EEG recording is converted from spacial into frequency domain.  This is then displayed comparing homologous areas to identify areas of significant asymmetry.  Algorithm to identify non-cortically generated artifact is used to separate eye movement, EMG and other artifact from the EEG     Recording Times  Start 5/7/2025  Stop 5/8/2025    A total of 23:58:27 hours of EEG was recorded.      EEG FINDINGS:  Background activity:   The background rhythm was characterized by partially organized theta and alpha activity with absent posterior dominant rhythm.   Symmetry and continuity: there is increased slowing over the right hemisphere and activity in the right parasaggital region is higher voltage and sharply contoured consistent with breach     Sleep:   No sleep transients although there is cycling of the background.    Activation procedures:   NA    Abnormal activity:   Frequent sharp waves at C4, P4    IMPRESSION:   Abnormal EEG due to mild left and moderate right hemisphere dysfunction with seizure focus in the right parietal region.  No electrographic seizures.        Jose J Monsivais MD  Neurology-Epilepsy.  [facility].

## 2025-05-08 NOTE — PROGRESS NOTES
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 His wife at bedside reports concern for acute deconditioning over the past three days involving worsening weakness, urinary frequency, speech changes, intermittent cessation in interaction with family, and dragging of his R leg. CT head showed bilateral subdural hygromas with bilateral acute versus subacute SDH. He takes eliquis for A fib at home which was reversed with PCC in the ED. During evaluation of the patient, he was initially interactive, following, commands, and oriented to person and place. He then had a brief staring spell where he no longer followed commands and displayed no usable speech. This then resolved after a few minutes and he returned to baseline. He takes Vimpat 100 bid at home but has not gotten his nightly dose. Per his wife, these spells occurred at home as well.    He is admitted to [facility] for hourly neuro-monitoring and a higher level of care.     Hospital Course: 04/25/2025: OR today for SDH evacuation   04/26/2025: NAEON. POD1 s/p SDH evac. Post-op CTH good decompression and expected post-op changes. Post-op episode of seizure-like activity, described as "LUE & LLE shaking" lasting approximately 1 minute that resolved without intervention. Patient unarousable post-operatively and this AM. NGT placed. Repeat CTH obtained, stable. Procal, EEG ordered and pending.   04/27/2025:  POD2 s/p SDH evacuation. Procal elevated and patient febrile with low grade temp. Pan-cultured and started on broad spec antibiotics. " Aggressive pulm toileting started yesterday. Overnight with Tmax 100.9°F. Cx with NGTD. Respiratory panel sent. US extremities ordered and pending to rule out DVT. Start lovenox pending neurosurgery recs.   04/28/2025: Continue aggressive pulmonary toileting. EEG with periodic complexes with some epileptiform morphology, optimized lacosamide to 200mg BID.   04/29/2025 EEG negative for seizures, small improvement after increasing vimpat yesterday. Discontinued EEG. CTH with new small R parietal IPH with mild surrounding edema. BUN 67, started enteral water flushes 250 q4h. MRSA swab negative, discontinued vanc. CXR with slight improvement from yesterday. Continue aggressive pulm toilet.   04/30/2025 CTH overnight with stable R IPH. POD5 and pt still not waking up. Relax to q2h neuro checks. BUN 72, increased enteral water to 300 q4h and given additional 300 ml x 2 today.   05/01/2025 exam remains stable. Started modafinil 100mg. Continue pulm toilet spaced to q6h and added IPV q6h.   05/02/2025 Tolerating Modafinil with mild improvement in level of alertness, increase to 200mg. Decreased O2 demands.   05/03/2025 Alertness slightly improving. Continue to monitor. Respiratory status improved, no longer requiring supplemental O2.  05/04/2025 Alertness slightly improved. On room air. NGT in place. Continue to monitor.  05/05/2025 Infectious workup started due to hypotension requiring levophed, fever overnight of 100.8, and worsening leukocytosis. Blood cultures, repeat urinalysis, CXR. Stated on zosyn. Switched to DNR.  05/06/2025 Vimpat dose decreased, cEEG. Leukocytosis resolved. Continue zosyn, pending Bcx. UA negative for infection. Off pressors.  05/07/2025 Per NSGY, okay to resume eliquis. Family confirmed that they do not want to pursue with the PEG tube. EEG with moderate diffuse background slowing and bilateral, independent epileptiform discharges. Okay to remain on lower dose of vimpat. Continue with EEG  monitoring.   05/08/2025 No seizures noted on EEG, discontinued order. SLP consulted again.    Interval History: NAEO. No seizures noted on EEG, discontinued order. SLP consulted again. Delirium precautions overnight.     Review of Systems  Unable to obtain a complete ROS due to level of consciousness.  Objective:     Vitals:  Temp: 99.8 °F (37.7 °C)  Pulse: 100  Rhythm: normal sinus rhythm  BP: (!) 155/67  MAP (mmHg): 96  Resp: (!) 30  SpO2: 99 %    Temp  Min: 98.1 °F (36.7 °C)  Max: 99.8 °F (37.7 °C)  Pulse  Min: 72  Max: 100  BP  Min: 125/53  Max: 155/67  MAP (mmHg)  Min: 77  Max: 96  Resp  Min: 18  Max: 32  SpO2  Min: 99 %  Max: 100 %    05/07 0701 - 05/08 0700  In: 2095.4   Out: 1875 [Urine:1725]   Unmeasured Output  Unmeasured Urine Occurrence: 0  Unmeasured Stool Occurrence: 1  Unmeasured Emesis Occurrence: 0  Pad Count: 2          Physical ExamVitals and nursing note reviewed.   Constitutional:       General: He is not in acute distress.     Appearance: He is ill-appearing.   HENT:      Head:      Comments: Surgical site c/d/i     Right Ear: External ear normal.      Left Ear: External ear normal.      Nose: Nose normal.      Comments: NGT in place.     Mouth/Throat:      Mouth: Mucous membranes are dry.   Eyes:      Pupils: Pupils are equal, round, and reactive to light.   Cardiovascular:      Rate and Rhythm: Normal rate and regular rhythm.   Pulmonary:      Effort: Pulmonary effort is normal. No respiratory distress.      Comments: Upper airway congestion   Bl coarse BS   Abdominal:      General: There is no distension.      Palpations: Abdomen is soft.      Tenderness: There is no abdominal tenderness. There is no guarding.   Musculoskeletal:      Right lower leg: No edema.      Left lower leg: No edema.   Skin:     General: Skin is warm and dry.   Neurological:      Comments: Mental status: Lethargic, opens spontaneously. Following minimal commands.   CN II-XII grossly intact,  specifically:  PERRL  EOMI  No facial asymmetry.  Weak spontaneous cough   Motor:  RUE ext  RLE triple flexion to tickling foot  LUE wd  LLE triple flexion to tickling foot  Sensation intact to noxious x4     Medications:  Continuous   Scheduledalbuterol-ipratropium, 3 mL, Q6H  apixaban, 5 mg, BID  lacosamide, 100 mg, Q12H  levothyroxine, 125 mcg, Before breakfast  metoprolol tartrate, 12.5 mg, Q8H  modafiniL, 200 mg, Daily  piperacillin-tazobactam (Zosyn) IV (PEDS and ADULTS) (extended infusion is not appropriate), 4.5 g, Q8H  pravastatin, 20 mg, QHS  psyllium husk, 1 packet, TID  silodosin, 4 mg, Daily    PRNacetaminophen, 650 mg, Q6H PRN  bisacodyL, 10 mg, Daily PRN  hydrALAZINE, 10 mg, Q4H PRN  labetalol, 5 mg, Q4H PRN  magnesium oxide, 800 mg, PRN  magnesium oxide, 800 mg, PRN  potassium bicarbonate, 35 mEq, PRN  potassium bicarbonate, 50 mEq, PRN  potassium bicarbonate, 60 mEq, PRN  potassium, sodium phosphates, 2 packet, PRN  potassium, sodium phosphates, 2 packet, PRN  potassium, sodium phosphates, 2 packet, PRN  sodium chloride 0.9%, 10 mL, PRN      Today I personally reviewed pertinent medications, lines/drains/airways, laboratory results, notably:    Diet  Diet NPO  Diet NPO      Assessment/Plan:     Neuro  * Subdural hemorrhage  85M with a medical history significant for atrial fibrillation on Eliquis, UC, HTN, and recent VPS placement (2/6/25) for NPH complicated by bilateral SDH s/p bilateral craniotomy for SD hygroma resection with VPS tie off (3/24/25) who was admitted to [facility] on 4/23/25 for ongoing management of SDH reexpansion, now s/p repeat bilateral craniotomy for evacuation on 4/25/25.      POD13. Mental status seems to be improving though still difficult to arouse, continue Modafinil trial at 200mg daily.      EEG with moderate diffuse background slowing and bilateral, independent epileptiform discharges. No evidence of seizures. No changes on EEG after >24 hours. EEG discontinued  5/8.  Continue reduced dose of Lacoasmide to 100mg BID.      CTH with small right parietal ICH, repeat CTH at 6 hours stable.     SBP<160.      Aggressive pulmonary toileting and airway maintenance.     TF +  q4h    PT/OT/SLP     Continue home statin, Silodosin for urinary retention and levothyroxine.      Lovenox for chemical DVT prophylaxis.     Subdural hematoma  See primary problem    Seizure disorder  Continuous EEG without evidence of seizures. Continue Vimpat 100mg BID.    Acute encephalopathy  See primary problem  - Fever of 100.8 overnight with an increase in leukocytosis and hypotension requiring levophed. Infectious workup started.  - Placed on zosyn.    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    - Continue zosyn for 7 day course. D4/7  - Repeat CXR on 5/9    Cardiac/Vascular  Hypotension  RESOLVED. Off pressors.    - Persistent hypotension morning of 5/5 unresponsive to 1L of fluids in the setting of fever 100.8 overnight and worsening leukocytosis. Now on levophed. Will attempt to wean as tolerated.   - Follow up infectious workup  - Start zosyn    Coronary artery disease involving native coronary artery of native heart without angina pectoris  Resume home statin    PAF (paroxysmal atrial fibrillation)  Resumed lopressor. BP stable.  Resuming Eliqius 5mg BID.   Currently rate controlled    Left ventricular diastolic dysfunction, NYHA class 1  Echo reviewed    Essential hypertension  SBP < 160  PRN labetalol, hydralazine    Renal/  Urinary retention  C/w home silodosin 4    Endocrine  Acquired  hypothyroidism  C/w home synthroid 125mcg     GI  UC (ulcerative colitis)  Hold home colazal in acute setting    Palliative Care  ACP (advance care planning)  Advance Care Planning    Date: 05/05/2025    Code Status  In light of the patients advanced and life limiting illness,I engaged the patient and family in a voluntary conversation about the patient's preferences for care  at the very end of life. The patient wishes to have a natural, peaceful death.  Along those lines, the family does not wish to have CPR or other invasive treatments performed when his heart and/or breathing stops. I communicated to the patient and family that a DNR order would be placed in his medical record to reflect this preference.    A total of 15 min was spent on advance care planning, goals of care discussion, emotional support, formulating and communicating prognosis and exploring burden/benefit of various approaches of treatment. This discussion occurred on a fully voluntary basis with the verbal consent of the patient and/or family.               The patient is being Prophylaxed for:  Venous Thromboembolism with: Mechanical or Chemical  Stress Ulcer with: None  Ventilator Pneumonia with: not applicable    Activity Orders            Elevate HOB Elevate HOB 30-45 degrees during feeding unless contraindicated starting at 04/27 0844    Diet NPO: NPO starting at 04/25 0001    Turn patient starting at 04/23 2000    Elevate HOB starting at 04/23 1948          DNR    Jackson Walton MD  Neurocritical Care  [facility]

## 2025-05-08 NOTE — PLAN OF CARE
Problem: Physical Therapy  Goal: Physical Therapy Goal  Description: Goals to be completed by: 5/16/25    Pt will perform sup<>sit transfers w/ moderate assistance  Pt will have sufficient dynamic balance to sit EOB while performing ADLs/therex w/ minimum assistance  Pt will be able to stand up from EOB w/ moderate assistance using LRAD  Bed to chair transfer w/ maxA using LRAD  Pt will be independent w/ HEP therex on BLE w/ good form and ROM   Outcome: Progressing   Pt's goals remain appropriate and pt will continue to benefit from skilled PT services to work towards improved functional mobility including: bed mobility, transfers, and sitting balance. Lia Junior PT  5/8/2025

## 2025-05-08 NOTE — EICU
Intervention Initiated From:  COR / FRANCOU    Juanis intervened regarding:  Rounding (Video assessment)    VICU Night Rounds Checklist  24H Vital Sign Range:  Temp:  [98.1 °F (36.7 °C)-99.1 °F (37.3 °C)]   Pulse:  [72-98]   Resp:  [21-32]   BP: (119-154)/(51-73)   SpO2:  [99 %-100 %]     Video rounds and LDA reconciliation

## 2025-05-08 NOTE — ASSESSMENT & PLAN NOTE
CXR on 5/7 with patchy infiltrate to the right lower lung possibility representing pneumonia.    - Continue zosyn for 7 day course. D4/7  - Repeat CXR on 5/9

## 2025-05-08 NOTE — PLAN OF CARE
"[facility] Care Plan    POC reviewed with Ramesh Ricci and family at 0300. Family verbalized understanding. Questions and concerns addressed. No acute events today. Pt progressing toward goals. Will continue to monitor. See below and flowsheets for full assessment and VS info.             Is this a stroke patient? Yes    Neuro:  [place] Coma Scale  Best Eye Response: 3-->(E3) to speech  Best Motor Response: 5-->(M5) localizes pain  Best Verbal Response: 1-->(V1) none  [place] Coma Scale Score: 9  Assessment Qualifiers: patient not sedated/intubated  Pupil PERRLA: yes     24 hr Temp:  [98.1 °F (36.7 °C)-99.1 °F (37.3 °C)]     CV:   Rhythm: sinus tachycardia  BP goals:   SBP < 160  MAP > 65    Resp:      Oxygen Concentration (%): 40    Plan: N/A    GI/:     Diet/Nutrition Received: tube feeding  Last Bowel Movement: 05/07/25  Voiding Characteristics: external catheter    Intake/Output Summary (Last 24 hours) at 5/8/2025 0523  Last data filed at 5/8/2025 0501  Gross per 24 hour   Intake 2006.07 ml   Output 1075 ml   Net 931.07 ml     Unmeasured Output  Urine Occurrence: 0  Stool Occurrence: 1  Emesis Occurrence: 0  Pad Count: 2    Labs/Accuchecks:  Recent Labs   Lab 05/08/25  0333   WBC 12.90*   RBC 2.99*   HGB 8.6*   HCT 27.5*         Recent Labs   Lab 05/08/25  0333      K 4.1   CO2 24      BUN 45*   CREATININE 0.9   ALKPHOS 75   ALT 17   AST 35   BILITOT 0.2      Recent Labs   Lab 05/07/25  1214   PROTIME 11.7   INR 1.1   APTT 28.5    No results for input(s): "CPK", "CPKMB", "TROPONINI", "MB" in the last 168 hours.    Electrolytes: N/A - electrolytes WDL  Accuchecks: none    Gtts:      LDA/Wounds:    Gabino Risk Assessment  Sensory Perception: 2-->very limited  Moisture: 3-->occasionally moist  Activity: 1-->bedfast  Mobility: 2-->very limited  Nutrition: 3-->adequate  Friction and Shear: 2-->potential problem  Gabino Score: 13  Is your gabino score 12 or less? no            "

## 2025-05-08 NOTE — SUBJECTIVE & OBJECTIVE
Interval History: NAEO. No seizures noted on EEG, discontinued order. SLP consulted again. Delirium precautions overnight.     Review of Systems  Unable to obtain a complete ROS due to level of consciousness.  Objective:     Vitals:  Temp: 99.8 °F (37.7 °C)  Pulse: 100  Rhythm: normal sinus rhythm  BP: (!) 155/67  MAP (mmHg): 96  Resp: (!) 30  SpO2: 99 %    Temp  Min: 98.1 °F (36.7 °C)  Max: 99.8 °F (37.7 °C)  Pulse  Min: 72  Max: 100  BP  Min: 125/53  Max: 155/67  MAP (mmHg)  Min: 77  Max: 96  Resp  Min: 18  Max: 32  SpO2  Min: 99 %  Max: 100 %    05/07 0701 - 05/08 0700  In: 2095.4   Out: 1875 [Urine:1725]   Unmeasured Output  Unmeasured Urine Occurrence: 0  Unmeasured Stool Occurrence: 1  Unmeasured Emesis Occurrence: 0  Pad Count: 2          Physical ExamVitals and nursing note reviewed.   Constitutional:       General: He is not in acute distress.     Appearance: He is ill-appearing.   HENT:      Head:      Comments: Surgical site c/d/i     Right Ear: External ear normal.      Left Ear: External ear normal.      Nose: Nose normal.      Comments: NGT in place.     Mouth/Throat:      Mouth: Mucous membranes are dry.   Eyes:      Pupils: Pupils are equal, round, and reactive to light.   Cardiovascular:      Rate and Rhythm: Normal rate and regular rhythm.   Pulmonary:      Effort: Pulmonary effort is normal. No respiratory distress.      Comments: Upper airway congestion   Bl coarse BS   Abdominal:      General: There is no distension.      Palpations: Abdomen is soft.      Tenderness: There is no abdominal tenderness. There is no guarding.   Musculoskeletal:      Right lower leg: No edema.      Left lower leg: No edema.   Skin:     General: Skin is warm and dry.   Neurological:      Comments: Mental status: Lethargic, opens spontaneously. Following minimal commands.   CN II-XII grossly intact, specifically:  PERRL  EOMI  No facial asymmetry.  Weak spontaneous cough   Motor:  RUE ext  RLE triple flexion to  tickling foot  LUE wd  LLE triple flexion to tickling foot  Sensation intact to noxious x4     Medications:  Continuous   Scheduledalbuterol-ipratropium, 3 mL, Q6H  apixaban, 5 mg, BID  lacosamide, 100 mg, Q12H  levothyroxine, 125 mcg, Before breakfast  metoprolol tartrate, 12.5 mg, Q8H  modafiniL, 200 mg, Daily  piperacillin-tazobactam (Zosyn) IV (PEDS and ADULTS) (extended infusion is not appropriate), 4.5 g, Q8H  pravastatin, 20 mg, QHS  psyllium husk, 1 packet, TID  silodosin, 4 mg, Daily    PRNacetaminophen, 650 mg, Q6H PRN  bisacodyL, 10 mg, Daily PRN  hydrALAZINE, 10 mg, Q4H PRN  labetalol, 5 mg, Q4H PRN  magnesium oxide, 800 mg, PRN  magnesium oxide, 800 mg, PRN  potassium bicarbonate, 35 mEq, PRN  potassium bicarbonate, 50 mEq, PRN  potassium bicarbonate, 60 mEq, PRN  potassium, sodium phosphates, 2 packet, PRN  potassium, sodium phosphates, 2 packet, PRN  potassium, sodium phosphates, 2 packet, PRN  sodium chloride 0.9%, 10 mL, PRN      Today I personally reviewed pertinent medications, lines/drains/airways, laboratory results, notably:    Diet  Diet NPO  Diet NPO

## 2025-05-09 NOTE — EICU
Intervention Initiated From:  COR / FRANCOU    Juanis intervened regarding:  Rounding (Video assessment)      VICU Night Rounds Checklist  24H Vital Sign Range:  Temp:  [98.8 °F (37.1 °C)-100.9 °F (38.3 °C)]   Pulse:  []   Resp:  [17-37]   BP: (124-161)/(46-70)   SpO2:  [98 %-100 %]     Video rounds and LDA reconciliation

## 2025-05-09 NOTE — PLAN OF CARE
"[facility] Care Plan    POC reviewed with Ramesh Ricci and family at 0300. Family verbalized understanding. Questions and concerns addressed. No acute events today. Pt progressing toward goals. Will continue to monitor. See below and flowsheets for full assessment and VS info.     -MRSA swab and labs sent for elevated WBC  -LR bolus 500cc x1  -PRN for BP x2  -Tmax 100.9, tylenol given      Is this a stroke patient? YES    Neuro:  [place] Coma Scale  Best Eye Response: 3-->(E3) to speech  Best Motor Response: 5-->(M5) localizes pain  Best Verbal Response: 1-->(V1) none  [place] Coma Scale Score: 9  Assessment Qualifiers: patient not sedated/intubated  Pupil PERRLA: yes     24 hr Temp:  [98.2 °F (36.8 °C)-100.9 °F (38.3 °C)]     CV:   Rhythm: sinus tachycardia  BP goals:   SBP < 160  MAP > 65    Resp:      Oxygen Concentration (%): 40    Plan: N/A    GI/:     Diet/Nutrition Received: tube feeding  Last Bowel Movement: 05/08/25  Voiding Characteristics: external catheter    Intake/Output Summary (Last 24 hours) at 5/9/2025 0349  Last data filed at 5/9/2025 0305  Gross per 24 hour   Intake 2419.48 ml   Output 1650 ml   Net 769.48 ml     Unmeasured Output  Unmeasured Urine Occurrence: 0  Unmeasured Stool Occurrence: 1  Unmeasured Emesis Occurrence: 0  Pad Count: 2    Labs/Accuchecks:  Recent Labs   Lab 05/09/25  0002   WBC 24.30*   RBC 3.18*   HGB 9.1*   HCT 29.5*   *      Recent Labs   Lab 05/09/25  0002      K 4.1   CO2 22*      BUN 57*   CREATININE 1.2   ALKPHOS 117   ALT 22   AST 43   BILITOT 0.3      Recent Labs   Lab 05/07/25  1214   PROTIME 11.7   INR 1.1   APTT 28.5    No results for input(s): "CPK", "CPKMB", "TROPONINI", "MB" in the last 168 hours.    Electrolytes: N/A - electrolytes WDL  Accuchecks: none    Gtts:      LDA/Wounds:    Gabino Risk Assessment  Sensory Perception: 2-->very limited  Moisture: 4-->rarely moist  Activity: 1-->bedfast  Mobility: 2-->very limited  Nutrition: " 2-->probably inadequate  Friction and Shear: 2-->potential problem  Gabino Score: 13  Is your gabino score 12 or less? no

## 2025-05-09 NOTE — PT/OT/SLP PROGRESS
Physical Therapy Co-Treatment    Patient Name:  Ramesh Ricci   MRN:  [id number]    Recommendations:     Discharge Recommendations: Moderate Intensity Therapy  Discharge Equipment Recommendations: hospital bed  Barriers to discharge: inc level of assist required    Assessment:     Ramesh Ricci is a 85 y.o. male admitted with a medical diagnosis of Subdural hemorrhage.  He presents with the following impairments/functional limitations: weakness, impaired endurance, impaired self care skills, gait instability, impaired functional mobility, impaired balance, visual deficits, impaired cognition, decreased coordination, decreased upper extremity function, decreased lower extremity function, decreased safety awareness, abnormal tone, decreased ROM, impaired coordination, impaired fine motor. Pt w/ triple flexion posturing response when B feet touched, kept eyes open throughout session and vital signs tolerated activity well, progressed to EOB scooting. Pt's R shoulder appears significantly subluxed anterior inferior, pt w/ previous shoulder dislocation but more pronounced than previous sessions, MD alerted. Patient continues to demonstrate the need for moderate intensity therapy on a daily basis post acute exhibited by decreased independence with self-care and functional mobility.     Rehab Prognosis: Fair; patient would benefit from acute skilled PT services to address these deficits and reach maximum level of function.    Recent Surgery: Procedure(s) (LRB):  CRANIOTOMY, FOR SUBDURAL HEMATOMA EVACUATION, bilateral. shunt removal (Bilateral)  REMOVAL, SHUNT, VENTRICULOPERITONEAL 14 Days Post-Op    Plan:     During this hospitalization, patient to be seen 4 x/week to address the identified rehab impairments via therapeutic exercises, therapeutic activities, neuromuscular re-education and progress toward the following goals:    Plan of Care Expires:  05/16/25    Subjective     Chief Complaint: NA 2/2 AMS  nonverbal  Patient/Family Comments/goals: dtr reporting he engaged in listening to music with her earlier in day  Pain/Comfort:  Pain Rating 1: 0/10  Pain Addressed 1: Reposition, Distraction  Pain Rating Post-Intervention 1: 0/10      Objective:     Communicated with RN prior to session.  Patient found HOB elevated with bed alarm, blood pressure cuff, bowel management system, PureWick, telemetry, NG tube, SCD upon PT entry to room. Co-tx w/ OT 2/2 suspected pt complexity and requirement of 2 skilled therapists to assist in order to maximize pt treatment     General Precautions: Standard, aphasia, aspiration, fall, NPO  Orthopedic Precautions: N/A  Braces: N/A  Respiratory Status: Room air     Cognition: AO x0, eyes open 100% of session but did not track, no command following    Functional Mobility:  Bed Mobility:     Rolling Left:  total assistance  Rolling Right: total assistance  Scooting: total assistance and of 2 persons  Supine to Sit: total assistance and of 2 persons  Sit to Supine: total assistance and of 2 persons  Balance: EOB sitting balance total A; fwd rounded shoulders/head, posterior pelvic tilt, R shoulder sublux anterior-inferior      AM-PAC 6 CLICK MOBILITY  Turning over in bed (including adjusting bedclothes, sheets and blankets)?: 1  Sitting down on and standing up from a chair with arms (e.g., wheelchair, bedside commode, etc.): 1  Moving from lying on back to sitting on the side of the bed?: 1  Moving to and from a bed to a chair (including a wheelchair)?: 1  Need to walk in hospital room?: 1  Climbing 3-5 steps with a railing?: 1  Basic Mobility Total Score: 6       Treatment & Education:  Pt educated on PT POC/goals, d/c recs, and continued treatment. All questions answered and pt in agreement w/ POC.  PROM BLE, deep pressure and weight bearing in seated position at EOB, stretches to gastroc-soleus complex and hamstrings on BLE  Rolling L/R in bed to reposition pt w/ wedges and pillows to  offload pressure, avoid muscle contractures, and prevent skin breakdown. Pt and family educated on importance of pt being rotated every 2hrs for these reasons.     Patient left with bed in chair position with all lines intact, call button in reach, RN notified, and MD and family present..    GOALS:   Multidisciplinary Problems       Physical Therapy Goals          Problem: Physical Therapy    Goal Priority Disciplines Outcome Interventions   Physical Therapy Goal     PT, PT/OT Progressing    Description: Goals to be completed by: 5/16/25    Pt will perform sup<>sit transfers w/ moderate assistance  Pt will have sufficient dynamic balance to sit EOB while performing ADLs/therex w/ minimum assistance  Pt will be able to stand up from EOB w/ moderate assistance using LRAD  Bed to chair transfer w/ maxA using LRAD  Pt will be independent w/ HEP therex on BLE w/ good form and ROM                        Time Tracking:     PT Received On: 05/09/25  PT Start Time: 1128     PT Stop Time: 1206  PT Total Time (min): 38 min     Billable Minutes: Neuromuscular Re-education 38    Treatment Type: Treatment  PT/PTA: PT     Number of PTA visits since last PT visit: 0     05/09/2025

## 2025-05-09 NOTE — PLAN OF CARE
"[facility] Care Plan  POC reviewed with Ramesh Ricci and family at 1400. Family verbalized understanding. Questions and concerns addressed. No acute events today. Pt progressing toward goals. Will continue to monitor. See below and flowsheets for full assessment and VS info.     - Bath complete, linens changed  - Flexi removed, pt had lg partly formed BM after removal   - PIV removed from [place], OK per team to keep only one IV   - TF continued at 45mL/h  - UA sent   - COVID/flu swab done   - FWF 100mL q4     Is this a stroke patient?yes     Neuro:  [place] Coma Scale  Best Eye Response: 4-->(E4) spontaneous  Best Motor Response: 5-->(M5) localizes pain  Best Verbal Response: 1-->(V1) none  [place] Coma Scale Score: 10  Assessment Qualifiers: patient not sedated/intubated  Pupil PERRLA: yes     24 hr Temp:  [98.2 °F (36.8 °C)-100.9 °F (38.3 °C)]     CV:   Rhythm: normal sinus rhythm  BP goals:   SBP < 160  MAP > 65    Resp:      Oxygen Concentration (%): 40    Plan: N/A    GI/:     Diet/Nutrition Received: tube feeding  Last Bowel Movement: 05/09/25  Voiding Characteristics: external catheter    Intake/Output Summary (Last 24 hours) at 5/9/2025 1816  Last data filed at 5/9/2025 1701  Gross per 24 hour   Intake 2563.79 ml   Output 1295 ml   Net 1268.79 ml     Unmeasured Output  Unmeasured Urine Occurrence: 0  Unmeasured Stool Occurrence: 1  Unmeasured Emesis Occurrence: 0  Pad Count: 2    Labs/Accuchecks:  Recent Labs   Lab 05/09/25  0002   WBC 24.30*   RBC 3.18*   HGB 9.1*   HCT 29.5*   *      Recent Labs   Lab 05/09/25  0002      K 4.1   CO2 22*      BUN 57*   CREATININE 1.2   ALKPHOS 117   ALT 22   AST 43   BILITOT 0.3      Recent Labs   Lab 05/07/25  1214   PROTIME 11.7   INR 1.1   APTT 28.5    No results for input(s): "CPK", "CPKMB", "TROPONINI", "MB" in the last 168 hours.    Electrolytes: N/A - electrolytes WDL  Accuchecks: none    Gtts:      LDA/Wounds:    Gabino Risk Assessment  Sensory " Perception: 2-->very limited  Moisture: 3-->occasionally moist  Activity: 1-->bedfast  Mobility: 2-->very limited  Nutrition: 2-->probably inadequate  Friction and Shear: 2-->potential problem  Gabino Score: 12    Is your gabino score 12 or less? no            Restraints:        [facility]

## 2025-05-09 NOTE — PROGRESS NOTES
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 His wife at bedside reports concern for acute deconditioning over the past three days involving worsening weakness, urinary frequency, speech changes, intermittent cessation in interaction with family, and dragging of his R leg. CT head showed bilateral subdural hygromas with bilateral acute versus subacute SDH. He takes eliquis for A fib at home which was reversed with PCC in the ED. During evaluation of the patient, he was initially interactive, following, commands, and oriented to person and place. He then had a brief staring spell where he no longer followed commands and displayed no usable speech. This then resolved after a few minutes and he returned to baseline. He takes Vimpat 100 bid at home but has not gotten his nightly dose. Per his wife, these spells occurred at home as well.    He is admitted to [facility] for hourly neuro-monitoring and a higher level of care.     Hospital Course: 04/25/2025: OR today for SDH evacuation   04/26/2025: NAEON. POD1 s/p SDH evac. Post-op CTH good decompression and expected post-op changes. Post-op episode of seizure-like activity, described as "LUE & LLE shaking" lasting approximately 1 minute that resolved without intervention. Patient unarousable post-operatively and this AM. NGT placed. Repeat CTH obtained, stable. Procal, EEG ordered and pending.   04/27/2025:  POD2 s/p SDH evacuation. Procal elevated and patient febrile with low grade temp. Pan-cultured and started on broad spec antibiotics. " Aggressive pulm toileting started yesterday. Overnight with Tmax 100.9°F. Cx with NGTD. Respiratory panel sent. US extremities ordered and pending to rule out DVT. Start lovenox pending neurosurgery recs.   04/28/2025: Continue aggressive pulmonary toileting. EEG with periodic complexes with some epileptiform morphology, optimized lacosamide to 200mg BID.   04/29/2025 EEG negative for seizures, small improvement after increasing vimpat yesterday. Discontinued EEG. CTH with new small R parietal IPH with mild surrounding edema. BUN 67, started enteral water flushes 250 q4h. MRSA swab negative, discontinued vanc. CXR with slight improvement from yesterday. Continue aggressive pulm toilet.   04/30/2025 CTH overnight with stable R IPH. POD5 and pt still not waking up. Relax to q2h neuro checks. BUN 72, increased enteral water to 300 q4h and given additional 300 ml x 2 today.   05/01/2025 exam remains stable. Started modafinil 100mg. Continue pulm toilet spaced to q6h and added IPV q6h.   05/02/2025 Tolerating Modafinil with mild improvement in level of alertness, increase to 200mg. Decreased O2 demands.   05/03/2025 Alertness slightly improving. Continue to monitor. Respiratory status improved, no longer requiring supplemental O2.  05/04/2025 Alertness slightly improved. On room air. NGT in place. Continue to monitor.  05/05/2025 Infectious workup started due to hypotension requiring levophed, fever overnight of 100.8, and worsening leukocytosis. Blood cultures, repeat urinalysis, CXR. Stated on zosyn. Switched to DNR.  05/06/2025 Vimpat dose decreased, cEEG. Leukocytosis resolved. Continue zosyn, pending Bcx. UA negative for infection. Off pressors.  05/07/2025 Per NSGY, okay to resume eliquis. Family confirmed that they do not want to pursue with the PEG tube. EEG with moderate diffuse background slowing and bilateral, independent epileptiform discharges. Okay to remain on lower dose of vimpat. Continue with EEG  monitoring.   05/08/2025 No seizures noted on EEG, discontinued order. SLP consulted again.  05/09/2025 Low grade fever of 100.9 overnight. UA negative for infection. Repeat CXR improved. Continuing to work with SLP.    Interval History: Low grade fever of 100.9 overnight. UA negative for infection. Repeat CXR improved. LA normal. No concerning findings. Continuing to work with SLP. Continue to monitor. Continue zosyn.    Review of Systems  Unable to obtain a complete ROS due to level of consciousness.  Objective:     Vitals:  Temp: 98.6 °F (37 °C)  Pulse: 74  Rhythm: normal sinus rhythm  BP: (!) 131/58  MAP (mmHg): 84  Resp: (!) 25  SpO2: (!) 94 %    Temp  Min: 98.2 °F (36.8 °C)  Max: 100.9 °F (38.3 °C)  Pulse  Min: 69  Max: 112  BP  Min: 106/54  Max: 162/67  MAP (mmHg)  Min: 66  Max: 101  Resp  Min: 23  Max: 37  SpO2  Min: 93 %  Max: 100 %    05/08 0701 - 05/09 0700  In: 2702.3   Out: 1400 [Urine:1220]   Unmeasured Output  Unmeasured Urine Occurrence: 0  Unmeasured Stool Occurrence: 1  Unmeasured Emesis Occurrence: 0  Pad Count: 2          Physical ExamVitals and nursing note reviewed.   Constitutional:       General: He is not in acute distress.     Appearance: He is ill-appearing.   HENT:      Head:      Comments: Surgical site c/d/i     Right Ear: External ear normal.      Left Ear: External ear normal.      Nose: Nose normal.      Comments: NGT in place.     Mouth/Throat:      Mouth: Mucous membranes are dry.   Eyes:      Pupils: Pupils are equal, round, and reactive to light.   Cardiovascular:      Rate and Rhythm: Normal rate and regular rhythm.   Pulmonary:      Effort: Pulmonary effort is normal. No respiratory distress.      Comments: Upper airway congestion   Bl coarse BS   Abdominal:      General: There is no distension.      Palpations: Abdomen is soft.      Tenderness: There is no abdominal tenderness. There is no guarding.   Musculoskeletal:      Right lower leg: No edema.      Left lower leg: No  edema.   Skin:     General: Skin is warm and dry.   Neurological:      Comments: Mental status: Lethargic, opens spontaneously. Following minimal commands.   CN II-XII grossly intact, specifically:  PERRL  EOMI  No facial asymmetry.  Weak spontaneous cough   Motor:  RUE ext  RLE triple flexion to tickling foot  LUE wd  LLE triple flexion to tickling foot  Sensation intact to noxious x4     Medications:  Continuous   Scheduledalbuterol-ipratropium, 3 mL, Q6H  apixaban, 5 mg, BID  lacosamide, 100 mg, Q12H  levothyroxine, 125 mcg, Before breakfast  metoprolol tartrate, 12.5 mg, Q8H  modafiniL, 200 mg, Daily  piperacillin-tazobactam (Zosyn) IV (PEDS and ADULTS) (extended infusion is not appropriate), 4.5 g, Q8H  pravastatin, 20 mg, QHS  psyllium husk, 1 packet, BID  silodosin, 4 mg, Daily    PRNacetaminophen, 650 mg, Q6H PRN  bisacodyL, 10 mg, Daily PRN  hydrALAZINE, 10 mg, Q4H PRN  labetalol, 5 mg, Q4H PRN  magnesium oxide, 800 mg, PRN  magnesium oxide, 800 mg, PRN  potassium bicarbonate, 35 mEq, PRN  potassium bicarbonate, 50 mEq, PRN  potassium bicarbonate, 60 mEq, PRN  potassium, sodium phosphates, 2 packet, PRN  potassium, sodium phosphates, 2 packet, PRN  potassium, sodium phosphates, 2 packet, PRN  sodium chloride 0.9%, 10 mL, PRN      Today I personally reviewed pertinent medications, lines/drains/airways, laboratory results, notably:    Diet  Diet NPO  Diet NPO      Assessment/Plan:     Neuro  * Subdural hemorrhage  85M with a medical history significant for atrial fibrillation on Eliquis, UC, HTN, and recent VPS placement (2/6/25) for NPH complicated by bilateral SDH s/p bilateral craniotomy for SD hygroma resection with VPS tie off (3/24/25) who was admitted to [facility] on 4/23/25 for ongoing management of SDH reexpansion, now s/p repeat bilateral craniotomy for evacuation on 4/25/25.      POD13. Mental status seems to be improving though still difficult to arouse, continue Modafinil trial at 200mg daily.      EEG  with moderate diffuse background slowing and bilateral, independent epileptiform discharges. No evidence of seizures. No changes on EEG after >24 hours. EEG discontinued 5/8.  Continue reduced dose of Lacoasmide to 100mg BID.      CTH with small right parietal ICH, repeat CTH at 6 hours stable.     SBP<160.      Aggressive pulmonary toileting and airway maintenance.     TF +  q4h    PT/OT/SLP     Continue home statin, Silodosin for urinary retention and levothyroxine.     Subdural hematoma  See primary problem    Seizure disorder  Continuous EEG without evidence of seizures. Continue Vimpat 100mg BID.    Acute encephalopathy  See primary problem  - Fever of 100.8 overnight with an increase in leukocytosis and hypotension requiring levophed. Infectious workup started.  - Placed on zosyn.    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    - Continue zosyn for 7 day course. D5/7  - Repeat CXR on 5/9 shows improvement.    Cardiac/Vascular  Hypotension  RESOLVED. Off pressors.    - Persistent hypotension morning of 5/5 unresponsive to 1L of fluids in the setting of fever 100.8 overnight and worsening leukocytosis. Now on levophed. Will attempt to wean as tolerated.   - Follow up infectious workup  - Start zosyn    Coronary artery disease involving native coronary artery of native heart without angina pectoris  Resume home statin    PAF (paroxysmal atrial fibrillation)  Resumed lopressor. BP stable.  Resuming Eliqius 5mg BID.   Currently rate controlled    Left ventricular diastolic dysfunction, NYHA class 1  Echo  reviewed    Essential hypertension  SBP < 160  PRN labetalol, hydralazine    Renal/  Urinary retention  C/w home silodosin 4    Endocrine  Acquired hypothyroidism  C/w home synthroid 125mcg     GI  UC (ulcerative colitis)  Hold home colazal in acute setting    Palliative Care  ACP (advance care planning)  Advance Care Planning    Date: 05/05/2025    Code Status  In light of the patients advanced and life limiting illness,I engaged the patient and family in a voluntary conversation about the patient's preferences for care  at the very end of life. The patient wishes to have a natural, peaceful death.  Along those lines, the family does not wish to have CPR or other invasive treatments performed when his heart and/or breathing stops. I communicated to the patient and family that a DNR order would be placed in his medical record to reflect this preference.    A total of 15 min was spent on advance care planning, goals of care discussion, emotional support, formulating and communicating prognosis and exploring burden/benefit of various approaches of treatment. This discussion occurred on a fully voluntary basis with the verbal consent of the patient and/or family.               The patient is being Prophylaxed for:  Venous Thromboembolism with: Mechanical or Chemical  Stress Ulcer with: None  Ventilator Pneumonia with: not applicable    Activity Orders            Elevate HOB Elevate HOB 30-45 degrees during feeding unless contraindicated starting at 04/27 0844    Diet NPO: NPO starting at 04/25 0001    Turn patient starting at 04/23 2000    Elevate HOB starting at 04/23 1948          DNR    Jackson Walton MD  Neurocritical Care  [facility]

## 2025-05-09 NOTE — PT/OT/SLP EVAL
Speech Language Pathology Evaluation  Cognitive Communication    Patient Name:  Ramesh Ricci   MRN:  [id number]  Admitting Diagnosis: Subdural hemorrhage    Recommendations:     Recommendations:                General Recommendations:  Dysphagia therapy, Speech/language therapy, and Cognitive-linguistic therapy  Diet recommendations:  NPO, NPO   Aspiration Precautions: Continue alternate means of nutrition, Frequent oral care, and Strict aspiration precautions   General Precautions: Standard, aspiration, aphasia, NPO, fall  Communication strategies:  provide increased time to answer and go to room if call light pushed    Assessment:     Ramesh Ricci is a 85 y.o. male with an SLP diagnosis of Aphasia and Dysphagia.      History:     Past Medical History:   Diagnosis Date    Allergies 01/31/2025    Anemia     Anticoagulant long-term use     Anxiety     Atrophic kidney 11/16/2012    BPH (benign prostatic hyperplasia) 11/16/2012    Congenital absence of right kidney 07/05/2017    DDD (degenerative disc disease), cervical 07/05/2017    x-ray 7/17 - Severe    Ex-smoker 12/20/2018    Exudative age-related macular degeneration, right eye, with active choroidal neovascularization 02/15/2024    GERD (gastroesophageal reflux disease) 11/16/2012    Glaucoma 11/16/2012    HTN (hypertension) 11/16/2012    Hypothyroid 11/16/2012    Internal carotid artery stenosis 11/16/2012    Iron deficiency anemia due to chronic blood loss 10/29/2021    Left ventricular diastolic dysfunction, NYHA class 1 04/16/2015    4/15    Low serum testosterone level 11/16/2012    Normal cardiac stress test 11/16/2012    NPH (normal pressure hydrocephalus) 02/03/2025    Osteopenia 11/16/2012    PAF (paroxysmal atrial fibrillation) 12/20/2018    Prostate CA 01/15/2013    XRT 12/12  Dr. 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    Seizure disorder 4/24/2025    Shingles 11/16/2012    Skin disease 2020    Seems to have  started after Covid vaccine    Stage 3a chronic kidney disease 10/06/2014    Stroke 2017    tia   no residual    Thrombocytopenia 01/31/2025    TIA (transient ischemic attack) 11/16/2012    UC (ulcerative colitis) 11/16/2012       Past Surgical History:   Procedure Laterality Date    ADENOIDECTOMY      COLONOSCOPY N/A 03/25/2022    Procedure: COLONOSCOPY;  Surgeon: Ashley Nguyen MD;  Location: [facility];  Service: Endoscopy;  Laterality: N/A;    CRANIOTOMY FOR EVACUATION OF SUBDURAL HEMATOMA Bilateral 4/25/2025    Procedure: CRANIOTOMY, FOR SUBDURAL HEMATOMA EVACUATION, bilateral. shunt removal;  Surgeon: Jairo Blankenship MD;  Location: [facility] OR 2ND FLR;  Service: Neurosurgery;  Laterality: Bilateral;  ariadna segal. cranial plating kit. drill available. hemovac drains. to follow other Ware case    ESOPHAGOGASTRODUODENOSCOPY N/A 03/25/2022    Procedure: EGD (ESOPHAGOGASTRODUODENOSCOPY);  Surgeon: Ashley Nguyen MD;  Location: [facility];  Service: Endoscopy;  Laterality: N/A;  added on per Dr. Nguyen    ESOPHAGOGASTRODUODENOSCOPY N/A 6/7/2024    Procedure: EGD (ESOPHAGOGASTRODUODENOSCOPY);  Surgeon: Audie Snell MD;  Location: [facility] (4TH FLR);  Service: Endoscopy;  Laterality: N/A;  5/21 R/s,sent updated instr via portal.pt informed to hold eliquis for 2 days.AC  Ref by: ,  approved to hold Eliquis (apixaban) for 2 days per Dr. Conklin-see media file  5/9/24-GT  5/31-pre call complete-tb    EVACUATION OF SUBDURAL HEMATOMA Bilateral 3/24/2025    Procedure: EVACUATION, HEMATOMA, SUBDURAL  AND SHUNT TIE OFF;  Surgeon: Jairo Blankenship MD;  Location: [facility] OR 2ND FLR;  Service: Neurosurgery;  Laterality: Bilateral;  bilateral bobby holes for subdural hygroma and tying off of  shunt at clavicle. to follow other ware cases    EYE SURGERY Right 11/2020    cataract    HERNIA REPAIR      LEFT HEART CATHETERIZATION Right 10/29/2021    Procedure: CATHETERIZATION, HEART, LEFT AND RIGHT  - LV DARNELL  "POSSIBLE;  Surgeon: Beau Conklin MD;  Location: [facility] CATH LAB;  Service: Cardiology;  Laterality: Right;    LUMBAR PUNCTURE N/A 1/13/2025    Procedure: Lumbar Puncture;  Surgeon: Jairo Blankenship MD;  Location: [facility] OR [facility] FLR;  Service: Neurosurgery;  Laterality: N/A;    REMOVAL OF VENTRICULOPERITONEAL SHUNT  4/25/2025    Procedure: REMOVAL, SHUNT, VENTRICULOPERITONEAL;  Surgeon: Jairo Blankenship MD;  Location: [facility] OR [facility] FLR;  Service: Neurosurgery;;    RIGHT HEART CATHETERIZATION Right 10/29/2021    Procedure: INSERTION, CATHETER, RIGHT HEART;  Surgeon: Beau Conklin MD;  Location: [facility] CATH LAB;  Service: Cardiology;  Laterality: Right;    SKIN BIOPSY  2020    TONSILLECTOMY      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    Procedure: INSERTION, SHUNT, VENTRICULOPERITONEAL;  Surgeon: Jairo Blankenship MD;  [facility];  Service: Neurosurgery;  Laterality: Right;    VENTRICULOPERITONEAL SHUNT  2/6/2025    Procedure: INSERTION, SHUNT, VENTRICULOPERITONEAL;  Surgeon: Shar Elizabeth MD;  [facility];  Service: General;;       Social History: Social History: Patient lives with his spouse. Pt recently seen in [facility] (4/8/25) for mild cognitive linguistic impairment.  SLP diet recs at this facility 3/27/25 were regular/thin. On previous evaluation by this SLP, diet recs for reg/thin (4/25/25).      SLP re-consulted s/p crani, has been npo with poor alertness since procedure per primary team.  Team also reports family is not amendable to long term alternative nutrition.      Prior Intubation HX:  4/25 for craniotomy      Modified Barium Swallow: none documented      Chest X-Rays: 5/7: "Cardiac size is normal. Enteric tube is seen the stomach and aortic valvular replacement is present. Some patchy infiltrate is seen at the right lung base and a few increased markings seen at the left lung base. "    Subjective     "Yeah"     Nurse cleared for evaluation " "    Pain/Comfort:  Pain Rating 1:  (no response, NAD)  Pain Rating Post-Intervention 1:  (no response, NAD)    Respiratory Status: Room air    Objective:     Cognitive Status:    Cont to assess 2/2 severe aphasia       Receptive Language:   Comprehension:   impaired  Questions Simple yes/no minimal response, head nod x1, "yeah" response x1  Commands  One step 0%    Expressive Language:  Verbal:    Impaired, no response to auto speech tasks or vowel repetitions, spontaneous "yeah" response x1      Motor Speech:  Dysarthria single utterance x1, cont to assess    Voice:   Strained, single utterance x1, cont to assess     Visual-Spatial:  TBA, head turn to R positioning t/o session     Reading:   tba     Written Expression:   tba    Treatment: HOB raised upright for safety with po trials.  Oral care provided and tolerated without difficulty.  Pt presented with ice chip x2, nectar thick liquids via tsp x1 and cup x1, and puree via 1/2 tsp x3.  Anterior loss with significant drooling from R t/o trials.  Oral transit was delayed.  Swallow response was significantly delayed at times, absent on 1st ice chip.  Max cues required at time to initiate swallow.  Cough noted x1 following all trials.  Education provided re: role of SLP, cont npo, aspiration risk, s/s aspiration, and POC.  Pt would benefit from reinforcement.     Goals:   Multidisciplinary Problems       SLP Goals          Problem: SLP    Goal Priority Disciplines Outcome   SLP Goal     SLP Progressing   Description: Speech Language Pathology Goals  Goals expected to be met by 5/23  1. Pt will participate in ongoing assessment of swallow.   2. Pt will answer simple y/n questions with 60% accy with max cues.   3. Pt will follow simple commands with 50% accy with max cues.   4. Pt will verbalize in response to any stim x5 given max cues.                               Plan:   Patient to be seen:  4 x/week   Plan of Care expires:  06/07/25  Plan of Care reviewed with:  " patient   SLP Follow-Up:  Yes       Discharge recommendations:  Therapy Intensity Recommendations at Discharge: Moderate Intensity Therapy   Barriers to Discharge:  Level of Skilled Assistance Needed      Time Tracking:     SLP Treatment Date:   05/09/25  Speech Start Time:  0730  Speech Stop Time:  0746     Speech Total Time (min):  16 min    Billable Minutes: Eval 8  and Treatment Swallowing Dysfunction 8    05/09/2025

## 2025-05-09 NOTE — ASSESSMENT & PLAN NOTE
CXR on 5/7 with patchy infiltrate to the right lower lung possibility representing pneumonia.    - Continue zosyn for 7 day course. D5/7  - Repeat CXR on 5/9 shows improvement.

## 2025-05-09 NOTE — PT/OT/SLP PROGRESS
Occupational Therapy   Co Treatment    Name: Ramesh Ricci  MRN: [id number]  Admitting Diagnosis:  Subdural hemorrhage  14 Days Post-Op    Recommendations:     Discharge Recommendations: Moderate Intensity Therapy  Discharge Equipment Recommendations:  hospital bed, lift device, wheelchair  Barriers to discharge:  None    Assessment:     Ramesh Ricci is a 85 y.o. male with a medical diagnosis of Subdural hemorrhage.  He presents with continued decreased functional status 2./2 AMS. Performance deficits affecting function are weakness, gait instability, decreased upper extremity function, decreased ROM, impaired endurance, impaired balance, decreased lower extremity function, impaired coordination, visual deficits, decreased safety awareness, impaired fine motor, impaired self care skills, impaired cognition, impaired functional mobility, decreased coordination, abnormal tone. Pt continues to require significant assistance with all self-care and mobility activities. Pt with no commands followed and no verbalization attempts. Pt with anterior and inferiorly subluxed RUE with nursing and MD notified as well as pt positioned with RUE supported. Patient continues to demonstrate the need for moderate intensity therapy on a daily basis post acute exhibited by decreased independence with self-care and functional mobility     Co-treatment with PT performed for patient safety, education, and facilitation of treatment to maximize activity tolerance and progression towards goals from two skilled therapy disciplines.     Rehab Prognosis:  Fair; patient would benefit from acute skilled OT services to address these deficits and reach maximum level of function.       Plan:     Patient to be seen 3 x/week to address the above listed problems via self-care/home management, therapeutic activities, therapeutic exercises, neuromuscular re-education, cognitive retraining  Plan of Care Expires: 06/24/25  Plan of Care Reviewed  with: patient, daughter    Subjective     Chief Complaint: n.a  Patient/Family Comments/goals: increase independence  Pain/Comfort:  Pain Rating 1: 0/10    Objective:     Communicated with: nursing prior to session.  Patient found HOB elevated with bed alarm, blood pressure cuff, bowel management system, PureWick, pressure relief boots, SCD, telemetry, NG tube upon OT entry to room.    General Precautions: Standard, aphasia, aspiration, fall, NPO    Orthopedic Precautions:N/A  Braces: N/A  Respiratory Status: Room air     Occupational Performance:     Bed Mobility:    Patient completed Rolling/Turning to Left with  total assistance  Patient completed Rolling/Turning to Right with total assistance  Patient completed Scooting/Bridging with total assistance  Patient completed Supine to Sit with total assistance and 2 persons  Patient completed Sit to Supine with total assistance and 2 persons   Pt seated EOB with total assistance 2/2 decreased postural control and multidirectional LOB w/o support with minimal protective responses of LUE  While seated EOB pt completing 1 R lateral lean to forearm with total assistance to increase  RUE GHJ joint proprioceptive feedback      Therapeutic exercises:   - therapist assisted PROM of R/LUEs shoulder flexion, elbow flexion/extension, wrist flexion/extension, digit flexion/extension with joint protection of R shoulder x5 reps ea while pt seated EOB    Pt with mild resistance to LUE PROM      AMPA 6 Click ADL: 6    Treatment & Education:  Session this date targeted therapeutic exercises and therapeutic activities to increase pt's independence  Pt educated on OT roles, POC, call button for assistance  Pt positioned with RUE supported to maximize joint protection    Patient left HOB elevated with all lines intact, call button in reach, nursing and MD notified, and daughter present    GOALS:   Multidisciplinary Problems       Occupational Therapy Goals          Problem: Occupational  Therapy    Goal Priority Disciplines Outcome Interventions   Occupational Therapy Goal     OT, PT/OT Progressing    Description: Goals to be met by: 6/24/25     Patient will increase functional independence with ADLs by performing:    UE Dressing with Maximum Assistance.  LE Dressing with Maximum Assistance.  Grooming while EOB with Maximum Assistance.  Toileting from bedside commode with Maximum Assistance for hygiene and clothing management.   Sitting at edge of bed x15 minutes with Maximum Assistance.  Rolling to Bilateral with Maximum Assistance.   Supine to sit with Maximum Assistance.  Stand pivot transfers with Maximum Assistance.                         DME Justifications:  Ramesh Yo requires a hospital bed due to him requiring positioning of the body in ways not feasible with an ordinary bed due to limited ability and cannot independently make changes in body position without the use of the bed.The positioning of the body cannot be sufficiently resolved by the use of pillows and wedges.  Ramesh Ricci has a mobility limitation that significantly impairs his ability to participate in one or more mobility related activities of daily living (MRADLs) such as toileting, feeding, dressing, grooming, and bathing in customary locations in the home.  The mobility limitation cannot be sufficiently resolved by the use of a cane or walker.   The use of a manual wheelchair will significantly improve the patients ability to participate in MRADLS and the patient will use it on regular basis in the home.  Ramesh Ricci has expressed his willingness to use a manual wheelchair in the home. Patients upper body strength is sufficient for propulsion.  He also has a caregiver who is available, willing, and able to provide assistance with the wheelchair when needed.      Time Tracking:     OT Date of Treatment: 05/09/25  OT Start Time: 1128  OT Stop Time: 1206  OT Total Time (min): 38 min    Billable  Minutes:Therapeutic Activity 23  Therapeutic Exercise 15               5/9/2025

## 2025-05-09 NOTE — SUBJECTIVE & OBJECTIVE
Interval History: Low grade fever of 100.9 overnight. UA negative for infection. Repeat CXR improved. LA normal. No concerning findings. Continuing to work with SLP. Continue to monitor. Continue zosyn.    Review of Systems  Unable to obtain a complete ROS due to level of consciousness.  Objective:     Vitals:  Temp: 98.6 °F (37 °C)  Pulse: 74  Rhythm: normal sinus rhythm  BP: (!) 131/58  MAP (mmHg): 84  Resp: (!) 25  SpO2: (!) 94 %    Temp  Min: 98.2 °F (36.8 °C)  Max: 100.9 °F (38.3 °C)  Pulse  Min: 69  Max: 112  BP  Min: 106/54  Max: 162/67  MAP (mmHg)  Min: 66  Max: 101  Resp  Min: 23  Max: 37  SpO2  Min: 93 %  Max: 100 %    05/08 0701 - 05/09 0700  In: 2702.3   Out: 1400 [Urine:1220]   Unmeasured Output  Unmeasured Urine Occurrence: 0  Unmeasured Stool Occurrence: 1  Unmeasured Emesis Occurrence: 0  Pad Count: 2          Physical ExamVitals and nursing note reviewed.   Constitutional:       General: He is not in acute distress.     Appearance: He is ill-appearing.   HENT:      Head:      Comments: Surgical site c/d/i     Right Ear: External ear normal.      Left Ear: External ear normal.      Nose: Nose normal.      Comments: NGT in place.     Mouth/Throat:      Mouth: Mucous membranes are dry.   Eyes:      Pupils: Pupils are equal, round, and reactive to light.   Cardiovascular:      Rate and Rhythm: Normal rate and regular rhythm.   Pulmonary:      Effort: Pulmonary effort is normal. No respiratory distress.      Comments: Upper airway congestion   Bl coarse BS   Abdominal:      General: There is no distension.      Palpations: Abdomen is soft.      Tenderness: There is no abdominal tenderness. There is no guarding.   Musculoskeletal:      Right lower leg: No edema.      Left lower leg: No edema.   Skin:     General: Skin is warm and dry.   Neurological:      Comments: Mental status: Lethargic, opens spontaneously. Following minimal commands.   CN II-XII grossly intact, specifically:  PERRL  EOMI  No facial  asymmetry.  Weak spontaneous cough   Motor:  RUE ext  RLE triple flexion to tickling foot  LUE wd  LLE triple flexion to tickling foot  Sensation intact to noxious x4     Medications:  Continuous   Scheduledalbuterol-ipratropium, 3 mL, Q6H  apixaban, 5 mg, BID  lacosamide, 100 mg, Q12H  levothyroxine, 125 mcg, Before breakfast  metoprolol tartrate, 12.5 mg, Q8H  modafiniL, 200 mg, Daily  piperacillin-tazobactam (Zosyn) IV (PEDS and ADULTS) (extended infusion is not appropriate), 4.5 g, Q8H  pravastatin, 20 mg, QHS  psyllium husk, 1 packet, BID  silodosin, 4 mg, Daily    PRNacetaminophen, 650 mg, Q6H PRN  bisacodyL, 10 mg, Daily PRN  hydrALAZINE, 10 mg, Q4H PRN  labetalol, 5 mg, Q4H PRN  magnesium oxide, 800 mg, PRN  magnesium oxide, 800 mg, PRN  potassium bicarbonate, 35 mEq, PRN  potassium bicarbonate, 50 mEq, PRN  potassium bicarbonate, 60 mEq, PRN  potassium, sodium phosphates, 2 packet, PRN  potassium, sodium phosphates, 2 packet, PRN  potassium, sodium phosphates, 2 packet, PRN  sodium chloride 0.9%, 10 mL, PRN      Today I personally reviewed pertinent medications, lines/drains/airways, laboratory results, notably:    Diet  Diet NPO  Diet NPO

## 2025-05-09 NOTE — PLAN OF CARE
Problem: SLP  Goal: SLP Goal  Description: Speech Language Pathology Goals  Goals expected to be met by 5/23  1. Pt will participate in ongoing assessment of swallow.   2. Pt will answer simple y/n questions with 60% accy with max cues.   3. Pt will follow simple commands with 50% accy with max cues.   4. Pt will verbalize in response to any stim x5 given max cues.    Outcome: Progressing     Evaluation completed and POC updated.  Rec cont npo with strict aspiration precautions.

## 2025-05-09 NOTE — EICU
Virtual ICU Quality Rounds    Admit Date: 4/23/2025  Hospital Day: 16    ICU Day: 15d 9h    24H Vital Sign Range:  Temp:  [98.2 °F (36.8 °C)-100.9 °F (38.3 °C)]   Pulse:  []   Resp:  [17-37]   BP: (120-162)/(46-70)   SpO2:  [98 %-100 %]     VICU Surveillance Screening    LDA reconciliation : Yes

## 2025-05-09 NOTE — ASSESSMENT & PLAN NOTE
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      POD13. Mental status seems to be improving though still difficult to arouse, continue Modafinil trial at 200mg daily.      EEG with moderate diffuse background slowing and bilateral, independent epileptiform discharges. No evidence of seizures. No changes on EEG after >24 hours. EEG discontinued 5/8.  Continue reduced dose of Lacoasmide to 100mg BID.      CTH with small right parietal ICH, repeat CTH at 6 hours stable.     SBP<160.      Aggressive pulmonary toileting and airway maintenance.     TF +  q4h    PT/OT/SLP     Continue home statin, Silodosin for urinary retention and levothyroxine.

## 2025-05-10 NOTE — ASSESSMENT & PLAN NOTE
CXR on 5/7 with patchy infiltrate to the right lower lung possibility representing pneumonia.    - Continue zosyn for 7 day course. D6/7  - Repeat CXR on 5/9 shows improvement.

## 2025-05-10 NOTE — PROGRESS NOTES
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 His wife at bedside reports concern for acute deconditioning over the past three days involving worsening weakness, urinary frequency, speech changes, intermittent cessation in interaction with family, and dragging of his R leg. CT head showed bilateral subdural hygromas with bilateral acute versus subacute SDH. He takes eliquis for A fib at home which was reversed with PCC in the ED. During evaluation of the patient, he was initially interactive, following, commands, and oriented to person and place. He then had a brief staring spell where he no longer followed commands and displayed no usable speech. This then resolved after a few minutes and he returned to baseline. He takes Vimpat 100 bid at home but has not gotten his nightly dose. Per his wife, these spells occurred at home as well.    He is admitted to [facility] for hourly neuro-monitoring and a higher level of care.     Hospital Course: 04/25/2025: OR today for SDH evacuation   04/26/2025: NAEON. POD1 s/p SDH evac. Post-op CTH good decompression and expected post-op changes. Post-op episode of seizure-like activity, described as "LUE & LLE shaking" lasting approximately 1 minute that resolved without intervention. Patient unarousable post-operatively and this AM. NGT placed. Repeat CTH obtained, stable. Procal, EEG ordered and pending.   04/27/2025:  POD2 s/p SDH evacuation. Procal elevated and patient febrile with low grade temp. Pan-cultured and started on broad spec antibiotics. " Aggressive pulm toileting started yesterday. Overnight with Tmax 100.9°F. Cx with NGTD. Respiratory panel sent. US extremities ordered and pending to rule out DVT. Start lovenox pending neurosurgery recs.   04/28/2025: Continue aggressive pulmonary toileting. EEG with periodic complexes with some epileptiform morphology, optimized lacosamide to 200mg BID.   04/29/2025 EEG negative for seizures, small improvement after increasing vimpat yesterday. Discontinued EEG. CTH with new small R parietal IPH with mild surrounding edema. BUN 67, started enteral water flushes 250 q4h. MRSA swab negative, discontinued vanc. CXR with slight improvement from yesterday. Continue aggressive pulm toilet.   04/30/2025 CTH overnight with stable R IPH. POD5 and pt still not waking up. Relax to q2h neuro checks. BUN 72, increased enteral water to 300 q4h and given additional 300 ml x 2 today.   05/01/2025 exam remains stable. Started modafinil 100mg. Continue pulm toilet spaced to q6h and added IPV q6h.   05/02/2025 Tolerating Modafinil with mild improvement in level of alertness, increase to 200mg. Decreased O2 demands.   05/03/2025 Alertness slightly improving. Continue to monitor. Respiratory status improved, no longer requiring supplemental O2.  05/04/2025 Alertness slightly improved. On room air. NGT in place. Continue to monitor.  05/05/2025 Infectious workup started due to hypotension requiring levophed, fever overnight of 100.8, and worsening leukocytosis. Blood cultures, repeat urinalysis, CXR. Stated on zosyn. Switched to DNR.  05/06/2025 Vimpat dose decreased, cEEG. Leukocytosis resolved. Continue zosyn, pending Bcx. UA negative for infection. Off pressors.  05/07/2025 Per NSGY, okay to resume eliquis. Family confirmed that they do not want to pursue with the PEG tube. EEG with moderate diffuse background slowing and bilateral, independent epileptiform discharges. Okay to remain on lower dose of vimpat. Continue with EEG  monitoring.   05/08/2025 No seizures noted on EEG, discontinued order. SLP consulted again.  05/09/2025 Low grade fever of 100.9 overnight. UA negative for infection. Repeat CXR improved. Continuing to work with SLP.  05/10/2025 No events.    Interval History: No events overnight. No changes in exam or mental status. WBC trending down.    Review of Systems  Unable to obtain a complete ROS due to level of consciousness.  Objective:     Vitals:  Temp: 98.9 °F (37.2 °C)  Pulse: 89  Rhythm: normal sinus rhythm  BP: (!) 162/67  MAP (mmHg): 97  Resp: 20  SpO2: (!) 94 %    Temp  Min: 98.4 °F (36.9 °C)  Max: 99.1 °F (37.3 °C)  Pulse  Min: 72  Max: 89  BP  Min: 103/44  Max: 167/70  MAP (mmHg)  Min: 63  Max: 101  Resp  Min: 12  Max: 35  SpO2  Min: 93 %  Max: 99 %    05/09 0701 - 05/10 0700  In: 2068.7   Out: 1355 [Urine:1275]   Unmeasured Output  Unmeasured Urine Occurrence: 0  Unmeasured Stool Occurrence: 1  Unmeasured Emesis Occurrence: 0  Pad Count: 2          Physical ExamVitals and nursing note reviewed.   Constitutional:       General: He is not in acute distress.     Appearance: He is ill-appearing.   HENT:      Head:      Comments: Surgical site c/d/i     Right Ear: External ear normal.      Left Ear: External ear normal.      Nose: Nose normal.      Comments: NGT in place.     Mouth/Throat:      Mouth: Mucous membranes are dry.   Eyes:      Pupils: Pupils are equal, round, and reactive to light.   Cardiovascular:      Rate and Rhythm: Normal rate and regular rhythm.   Pulmonary:      Effort: Pulmonary effort is normal. No respiratory distress.      Comments: Upper airway congestion   Bl coarse BS   Abdominal:      General: There is no distension.      Palpations: Abdomen is soft.      Tenderness: There is no abdominal tenderness. There is no guarding.   Musculoskeletal:      Right lower leg: No edema.      Left lower leg: No edema.   Skin:     General: Skin is warm and dry.   Neurological:      Comments: Mental  status: Lethargic, opens spontaneously. Following minimal commands.   CN II-XII grossly intact, specifically:  PERRL  EOMI  No facial asymmetry.  Weak spontaneous cough   Motor:  RUE ext  RLE triple flexion to tickling foot  LUE wd  LLE triple flexion to tickling foot  Sensation intact to noxious x4     Medications:  Continuous   Scheduledalbuterol-ipratropium, 3 mL, Q6H  apixaban, 5 mg, BID  lacosamide, 100 mg, Q12H  levothyroxine, 125 mcg, Before breakfast  metoprolol tartrate, 12.5 mg, Q8H  modafiniL, 200 mg, Daily  piperacillin-tazobactam (Zosyn) IV (PEDS and ADULTS) (extended infusion is not appropriate), 4.5 g, Q8H  pravastatin, 20 mg, QHS  psyllium husk, 1 packet, BID  silodosin, 4 mg, Daily    PRNacetaminophen, 650 mg, Q6H PRN  bisacodyL, 10 mg, Daily PRN  hydrALAZINE, 10 mg, Q4H PRN  labetalol, 5 mg, Q4H PRN  magnesium oxide, 800 mg, PRN  magnesium oxide, 800 mg, PRN  potassium bicarbonate, 35 mEq, PRN  potassium bicarbonate, 50 mEq, PRN  potassium bicarbonate, 60 mEq, PRN  potassium, sodium phosphates, 2 packet, PRN  potassium, sodium phosphates, 2 packet, PRN  potassium, sodium phosphates, 2 packet, PRN  sodium chloride 0.9%, 10 mL, PRN      Today I personally reviewed pertinent medications, lines/drains/airways, laboratory results, notably:    Diet  Diet NPO  Diet NPO      Assessment/Plan:     Neuro  * Subdural hemorrhage  85M with a medical history significant for atrial fibrillation on Eliquis, UC, HTN, and recent VPS placement (2/6/25) for NPH complicated by bilateral SDH s/p bilateral craniotomy for SD hygroma resection with VPS tie off (3/24/25) who was admitted to [facility] on 4/23/25 for ongoing management of SDH reexpansion, now s/p repeat bilateral craniotomy for evacuation on 4/25/25.      POD13. Mental status seems to be improving though still difficult to arouse, continue Modafinil trial at 200mg daily.      EEG with moderate diffuse background slowing and bilateral, independent epileptiform  discharges. No evidence of seizures. No changes on EEG after >24 hours. EEG discontinued 5/8.  Continue reduced dose of Lacoasmide to 100mg BID.      CTH with small right parietal ICH, repeat CTH at 6 hours stable.     SBP<160.      Aggressive pulmonary toileting and airway maintenance.     TF +  q4h    PT/OT/SLP     Continue home statin, Silodosin for urinary retention and levothyroxine.     Dr. Abraham spoke extensively with the family regarding stepping down to the floor to see how the patient does. Patient will discuss amongst themselves. Will keep patient in the ICU as family decides. All questions and concerns were addressed.     Subdural hematoma  See primary problem    Seizure disorder  Continuous EEG without evidence of seizures. Continue Vimpat 100mg BID.    Acute encephalopathy  See primary problem  - Fever of 100.8 overnight with an increase in leukocytosis and hypotension requiring levophed. Infectious workup started.  - Placed on zosyn.    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    - Continue zosyn for 7 day course. D6/7  - Repeat CXR on 5/9 shows improvement.    Cardiac/Vascular  Hypotension  RESOLVED. Off pressors.    - Persistent hypotension morning of 5/5 unresponsive to 1L of fluids in the setting of fever 100.8 overnight and worsening leukocytosis. Now on levophed. Will attempt to wean as tolerated.   - Follow up infectious workup  - Start zosyn    Coronary artery disease involving native coronary artery of native heart without angina pectoris  Resume home statin    PAF (paroxysmal atrial  fibrillation)  Resumed lopressor. BP stable.  Resuming Eliqius 5mg BID.   Currently rate controlled    Left ventricular diastolic dysfunction, NYHA class 1  Echo reviewed    Essential hypertension  SBP < 160  PRN labetalol, hydralazine    Renal/  Urinary retention  C/w home silodosin 4    Endocrine  Acquired hypothyroidism  C/w home synthroid 125mcg     GI  UC (ulcerative colitis)  Hold home colazal in acute setting    Palliative Care  ACP (advance care planning)  Advance Care Planning    Date: 05/05/2025    Code Status  In light of the patients advanced and life limiting illness,I engaged the patient and family in a voluntary conversation about the patient's preferences for care  at the very end of life. The patient wishes to have a natural, peaceful death.  Along those lines, the family does not wish to have CPR or other invasive treatments performed when his heart and/or breathing stops. I communicated to the patient and family that a DNR order would be placed in his medical record to reflect this preference.    A total of 15 min was spent on advance care planning, goals of care discussion, emotional support, formulating and communicating prognosis and exploring burden/benefit of various approaches of treatment. This discussion occurred on a fully voluntary basis with the verbal consent of the patient and/or family.               The patient is being Prophylaxed for:  Venous Thromboembolism with: Mechanical or Chemical  Stress Ulcer with: Not Applicable   Ventilator Pneumonia with: not applicable    Activity Orders            Elevate HOB Elevate HOB 30-45 degrees during feeding unless contraindicated starting at 04/27 0844    Diet NPO: NPO starting at 04/25 0001    Turn patient starting at 04/23 2000    Elevate HOB starting at 04/23 1948          DNR    Jackson Walton MD  Neurocritical Care  [facility] - Neuro Critical Care

## 2025-05-10 NOTE — EICU
Intervention Initiated From:  COR / EICU    Virtual ICU Quality Rounds    Admit Date: 4/23/2025  Hospital Day: 17    ICU Day: 16d 12h    24H Vital Sign Range:  Temp:  [98.4 °F (36.9 °C)-99.1 °F (37.3 °C)]   Pulse:  [72-89]   Resp:  [12-35]   BP: (103-167)/(44-70)   SpO2:  [93 %-99 %]     VICU Surveillance Screening                                             Vitals soft s/p PRN hydralazine; secure chat with primary RN.

## 2025-05-10 NOTE — ASSESSMENT & PLAN NOTE
85M with a medical history significant for atrial fibrillation on Eliquis, UC, HTN, and recent VPS placement (2/6/25) for NPH complicated by bilateral SDH s/p bilateral craniotomy for SD hygroma resection with VPS tie off (3/24/25) who was admitted to [facility] on 4/23/25 for ongoing management of SDH reexpansion, now s/p repeat bilateral craniotomy for evacuation on 4/25/25.      POD13. Mental status seems to be improving though still difficult to arouse, continue Modafinil trial at 200mg daily.      EEG with moderate diffuse background slowing and bilateral, independent epileptiform discharges. No evidence of seizures. No changes on EEG after >24 hours. EEG discontinued 5/8.  Continue reduced dose of Lacoasmide to 100mg BID.      CTH with small right parietal ICH, repeat CTH at 6 hours stable.     SBP<160.      Aggressive pulmonary toileting and airway maintenance.     TF +  q4h    PT/OT/SLP     Continue home statin, Silodosin for urinary retention and levothyroxine.     Dr. Abraham spoke extensively with the family regarding stepping down to the floor to see how the patient does. Patient will discuss amongst themselves. Will keep patient in the ICU as family decides. All questions and concerns were addressed.

## 2025-05-10 NOTE — PLAN OF CARE
"[facility] Care Plan    POC reviewed with Ramesh Ricci and family at 0300. Patient and Family verbalized understanding. Questions and concerns addressed. No acute events today. Pt progressing toward goals. Will continue to monitor. See below and flowsheets for full assessment and VS info.     -NAEON      Is this a stroke patient? Yes    Neuro:  [place] Coma Scale  Best Eye Response: 3-->(E3) to speech  Best Motor Response: 5-->(M5) localizes pain  Best Verbal Response: 1-->(V1) none  [place] Coma Scale Score: 9  Assessment Qualifiers: patient not sedated/intubated  Pupil PERRLA: yes     24 hr Temp:  [98.4 °F (36.9 °C)-99.1 °F (37.3 °C)]     CV:   Rhythm: normal sinus rhythm  BP goals:   SBP < 160  MAP > 65    Resp:      Oxygen Concentration (%): 40    Plan: N/A    GI/:     Diet/Nutrition Received: tube feeding  Last Bowel Movement: 05/09/25  Voiding Characteristics: external catheter    Intake/Output Summary (Last 24 hours) at 5/10/2025 0434  Last data filed at 5/10/2025 0431  Gross per 24 hour   Intake 2361.23 ml   Output 1555 ml   Net 806.23 ml     Unmeasured Output  Unmeasured Urine Occurrence: 0  Unmeasured Stool Occurrence: 1  Unmeasured Emesis Occurrence: 0  Pad Count: 2    Labs/Accuchecks:  Recent Labs   Lab 05/10/25  0213   WBC 16.63*   RBC 2.86*   HGB 8.2*   HCT 26.2*   *      Recent Labs   Lab 05/10/25  0213      K 4.0   CO2 24      BUN 63*   CREATININE 1.0   ALKPHOS 100   ALT 20   AST 39   BILITOT 0.3      Recent Labs   Lab 05/07/25  1214   PROTIME 11.7   INR 1.1   APTT 28.5    No results for input(s): "CPK", "CPKMB", "TROPONINI", "MB" in the last 168 hours.    Electrolytes: N/A - electrolytes WDL  Accuchecks: none    Gtts:      LDA/Wounds:    Gabino Risk Assessment  Sensory Perception: 3-->slightly limited  Moisture: 3-->occasionally moist  Activity: 1-->bedfast  Mobility: 2-->very limited  Nutrition: 2-->probably inadequate  Friction and Shear: 2-->potential problem  Gabino Score: " 13  Is your napoleon score 12 or less? no

## 2025-05-10 NOTE — SUBJECTIVE & OBJECTIVE
Interval History: No events overnight. No changes in exam or mental status. WBC trending down.    Review of Systems  Unable to obtain a complete ROS due to level of consciousness.  Objective:     Vitals:  Temp: 98.9 °F (37.2 °C)  Pulse: 89  Rhythm: normal sinus rhythm  BP: (!) 162/67  MAP (mmHg): 97  Resp: 20  SpO2: (!) 94 %    Temp  Min: 98.4 °F (36.9 °C)  Max: 99.1 °F (37.3 °C)  Pulse  Min: 72  Max: 89  BP  Min: 103/44  Max: 167/70  MAP (mmHg)  Min: 63  Max: 101  Resp  Min: 12  Max: 35  SpO2  Min: 93 %  Max: 99 %    05/09 0701 - 05/10 0700  In: 2068.7   Out: 1355 [Urine:1275]   Unmeasured Output  Unmeasured Urine Occurrence: 0  Unmeasured Stool Occurrence: 1  Unmeasured Emesis Occurrence: 0  Pad Count: 2          Physical ExamVitals and nursing note reviewed.   Constitutional:       General: He is not in acute distress.     Appearance: He is ill-appearing.   HENT:      Head:      Comments: Surgical site c/d/i     Right Ear: External ear normal.      Left Ear: External ear normal.      Nose: Nose normal.      Comments: NGT in place.     Mouth/Throat:      Mouth: Mucous membranes are dry.   Eyes:      Pupils: Pupils are equal, round, and reactive to light.   Cardiovascular:      Rate and Rhythm: Normal rate and regular rhythm.   Pulmonary:      Effort: Pulmonary effort is normal. No respiratory distress.      Comments: Upper airway congestion   Bl coarse BS   Abdominal:      General: There is no distension.      Palpations: Abdomen is soft.      Tenderness: There is no abdominal tenderness. There is no guarding.   Musculoskeletal:      Right lower leg: No edema.      Left lower leg: No edema.   Skin:     General: Skin is warm and dry.   Neurological:      Comments: Mental status: Lethargic, opens spontaneously. Following minimal commands.   CN II-XII grossly intact, specifically:  PERRL  EOMI  No facial asymmetry.  Weak spontaneous cough   Motor:  RUE ext  RLE triple flexion to tickling foot  LUE wd  LLE triple  flexion to tickling foot  Sensation intact to noxious x4     Medications:  Continuous   Scheduledalbuterol-ipratropium, 3 mL, Q6H  apixaban, 5 mg, BID  lacosamide, 100 mg, Q12H  levothyroxine, 125 mcg, Before breakfast  metoprolol tartrate, 12.5 mg, Q8H  modafiniL, 200 mg, Daily  piperacillin-tazobactam (Zosyn) IV (PEDS and ADULTS) (extended infusion is not appropriate), 4.5 g, Q8H  pravastatin, 20 mg, QHS  psyllium husk, 1 packet, BID  silodosin, 4 mg, Daily    PRNacetaminophen, 650 mg, Q6H PRN  bisacodyL, 10 mg, Daily PRN  hydrALAZINE, 10 mg, Q4H PRN  labetalol, 5 mg, Q4H PRN  magnesium oxide, 800 mg, PRN  magnesium oxide, 800 mg, PRN  potassium bicarbonate, 35 mEq, PRN  potassium bicarbonate, 50 mEq, PRN  potassium bicarbonate, 60 mEq, PRN  potassium, sodium phosphates, 2 packet, PRN  potassium, sodium phosphates, 2 packet, PRN  potassium, sodium phosphates, 2 packet, PRN  sodium chloride 0.9%, 10 mL, PRN      Today I personally reviewed pertinent medications, lines/drains/airways, laboratory results, notably:    Diet  Diet NPO  Diet NPO

## 2025-05-10 NOTE — EICU
Intervention Initiated From:  COR / [facility]    Juanis intervened regarding:  Rounding (Video assessment)    VICU Night Rounds Checklist  24H Vital Sign Range:  Temp:  [98.2 °F (36.8 °C)-99.1 °F (37.3 °C)]   Pulse:  [69-86]   Resp:  [12-34]   BP: (106-162)/(50-68)   SpO2:  [93 %-100 %]     Video rounds and LDA reconciliation

## 2025-05-11 NOTE — ASSESSMENT & PLAN NOTE
85M with a medical history significant for atrial fibrillation on Eliquis, UC, HTN, and recent VPS placement (2/6/25) for NPH complicated by bilateral SDH s/p bilateral craniotomy for SD hygroma resection with VPS tie off (3/24/25) who was admitted to [facility] on 4/23/25 for ongoing management of SDH reexpansion, now s/p repeat bilateral craniotomy for evacuation on 4/25/25.      POD13. Mental status seems to be improving though still difficult to arouse, continue Modafinil trial at 200mg daily.      EEG with moderate diffuse background slowing and bilateral, independent epileptiform discharges. No evidence of seizures. No changes on EEG after >24 hours. EEG discontinued 5/8.  Continue reduced dose of Lacoasmide to 100mg BID.      CTH with small right parietal ICH, repeat CTH at 6 hours stable.     SBP<160.      Aggressive pulmonary toileting and airway maintenance.     TF +  q4h    PT/OT/SLP     Continue home statin, Silodosin for urinary retention and levothyroxine.     Dr. Abraham spoke extensively with the family regarding stepping down to the floor, Today they are agreable to trial at stepping down out of the ICU.

## 2025-05-11 NOTE — SUBJECTIVE & OBJECTIVE
Interval History:  See hospital course  Objective:     Vitals:  Temp: 98.5 °F (36.9 °C)  Pulse: 92  Rhythm: normal sinus rhythm  BP: (!) 115/59  MAP (mmHg): 84  Resp: (!) 25  SpO2: 95 %    Temp  Min: 98 °F (36.7 °C)  Max: 99.1 °F (37.3 °C)  Pulse  Min: 80  Max: 105  BP  Min: 86/58  Max: 164/78  MAP (mmHg)  Min: 67  Max: 109  Resp  Min: 10  Max: 38  SpO2  Min: 89 %  Max: 96 %    05/10 0701 - 05/11 0700  In: 3185.5   Out: 1400 [Urine:1400]   Unmeasured Output  Unmeasured Urine Occurrence: 0  Unmeasured Stool Occurrence: 1  Unmeasured Emesis Occurrence: 0  Pad Count: 2        Physical Exam  Constitutional:       General: He is not in acute distress.     Appearance: He is ill-appearing.      Interventions: Nasal cannula in place.   HENT:      Head: Normocephalic.      Comments: NGT in place  Eyes:      General: No scleral icterus.        Right eye: No discharge.         Left eye: No discharge.      Conjunctiva/sclera: Conjunctivae normal.      Pupils: Pupils are equal, round, and reactive to light.   Cardiovascular:      Rate and Rhythm: Normal rate.   Pulmonary:      Effort: Pulmonary effort is normal. No respiratory distress.   Musculoskeletal:         General: No deformity or signs of injury.   Skin:     General: Skin is warm and dry.   Psychiatric:      Comments: Unable to assess              Medications:  Continuous Scheduledalbuterol-ipratropium, 3 mL, Q6H  apixaban, 5 mg, BID  lacosamide, 100 mg, Q12H  levothyroxine, 125 mcg, Before breakfast  metoprolol tartrate, 12.5 mg, Q8H  modafiniL, 200 mg, Daily  piperacillin-tazobactam (Zosyn) IV (PEDS and ADULTS) (extended infusion is not appropriate), 4.5 g, Q8H  pravastatin, 20 mg, QHS  psyllium husk, 1 packet, BID  silodosin, 4 mg, Daily    PRNacetaminophen, 650 mg, Q6H PRN  bisacodyL, 10 mg, Daily PRN  hydrALAZINE, 10 mg, Q4H PRN  labetalol, 5 mg, Q4H PRN  magnesium oxide, 800 mg, PRN  magnesium oxide, 800 mg, PRN  potassium bicarbonate, 35 mEq, PRN  potassium  bicarbonate, 50 mEq, PRN  potassium bicarbonate, 60 mEq, PRN  potassium, sodium phosphates, 2 packet, PRN  potassium, sodium phosphates, 2 packet, PRN  potassium, sodium phosphates, 2 packet, PRN  sodium chloride 0.9%, 10 mL, PRN      Today I personally reviewed pertinent medications, lines/drains/airways, imaging, laboratory results, notably:    Diet  Diet NPO  Diet NPO

## 2025-05-11 NOTE — PLAN OF CARE
Problem: Stroke, Intracerebral Hemorrhage  Goal: Optimal Coping  Outcome: Progressing  Goal: Effective Bowel Elimination  Outcome: Progressing  Goal: Optimal Cerebral Tissue Perfusion  Outcome: Progressing  Goal: Optimal Cognitive Function  Outcome: Progressing  Goal: Effective Communication Skills  Outcome: Progressing  Goal: Optimal Functional Ability  Outcome: Progressing  Goal: Optimal Nutrition Intake  Outcome: Progressing  Goal: Optimal Pain Control and Function  Outcome: Progressing  Goal: Effective Oxygenation and Ventilation  Outcome: Progressing  Goal: Improved Sensorimotor Function  Outcome: Progressing  Goal: Safe and Effective Swallow  Outcome: Progressing  Goal: Effective Urinary Elimination  Outcome: Progressing   Stepdown to [facility].

## 2025-05-11 NOTE — EICU
Intervention Initiated From:  COR / [facility]    Juanis intervened regarding:  Rounding (Video assessment)    VICU Night Rounds Checklist  24H Vital Sign Range:  Temp:  [98 °F (36.7 °C)-99.5 °F (37.5 °C)]   Pulse:  [72-89]   Resp:  [20-35]   BP: ()/(44-70)   SpO2:  [93 %-96 %]     Video rounds

## 2025-05-11 NOTE — EICU
Intervention Initiated From:  COR / EICU    Virtual ICU Quality Rounds    Admit Date: 4/23/2025  Hospital Day: 18    ICU Day: 17d 14h    24H Vital Sign Range:  Temp:  [98 °F (36.7 °C)-99.5 °F (37.5 °C)]   Pulse:  []   Resp:  [10-38]   BP: ()/(47-82)   SpO2:  [89 %-96 %]     VICU Surveillance Screening                                             Patient has floor bed assignment.

## 2025-05-11 NOTE — NURSING
Patient brought to stepdown room with 2 ICU nurse escort with family at bedside. All personal belongings with family. Patient in no apparent distress and settled in. No change in assessment from this morning.

## 2025-05-11 NOTE — ASSESSMENT & PLAN NOTE
CXR on 5/7 with patchy infiltrate to the right lower lung possibility representing pneumonia.    - Continue zosyn for 7 day course. Last day 5/11  - Repeat CXR on 5/9 shows improvement.

## 2025-05-11 NOTE — PROVIDER TRANSFER
"Handoff     Primary Team: Networked reference to record PCT  Room Number: 8092/8092 A     Patient Name: Ramesh Ricci MRN: [id number]     Date of Birth: [id number] Allergies: Benazepril     Age: 85 y.o. Admit Date: 4/23/2025     Sex: male  BMI: Body mass index is 20.92 kg/m².     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 His wife at bedside reports concern for acute deconditioning over the past three days involving worsening weakness, urinary frequency, speech changes, intermittent cessation in interaction with family, and dragging of his R leg. CT head showed bilateral subdural hygromas with bilateral acute versus subacute SDH. He takes eliquis for A fib at home which was reversed with PCC in the ED. During evaluation of the patient, he was initially interactive, following, commands, and oriented to person and place. He then had a brief staring spell where he no longer followed commands and displayed no usable speech. This then resolved after a few minutes and he returned to baseline. He takes Vimpat 100 bid at home but has not gotten his nightly dose. Per his wife, these spells occurred at home as well.     He is admitted to [facility] for hourly neuro-monitoring and a higher level of care.     Hospital Course:  04/25/2025: OR today for SDH evacuation   04/26/2025: NAEON. POD1 s/p SDH evac. Post-op CTH good decompression and expected post-op changes. Post-op episode of seizure-like activity, described as "LUE & LLE shaking" lasting approximately 1 minute that resolved without intervention. Patient unarousable post-operatively " and this AM. NGT placed. Repeat CTH obtained, stable. Procal, EEG ordered and pending.   04/27/2025:  POD2 s/p SDH evacuation. Procal elevated and patient febrile with low grade temp. Pan-cultured and started on broad spec antibiotics. Aggressive pulm toileting started yesterday. Overnight with Tmax 100.9°F. Cx with NGTD. Respiratory panel sent. US extremities ordered and pending to rule out DVT. Start lovenox pending neurosurgery recs.   04/28/2025: Continue aggressive pulmonary toileting. EEG with periodic complexes with some epileptiform morphology, optimized lacosamide to 200mg BID.   04/29/2025 EEG negative for seizures, small improvement after increasing vimpat yesterday. Discontinued EEG. CTH with new small R parietal IPH with mild surrounding edema. BUN 67, started enteral water flushes 250 q4h. MRSA swab negative, discontinued vanc. CXR with slight improvement from yesterday. Continue aggressive pulm toilet.   04/30/2025 CTH overnight with stable R IPH. POD5 and pt still not waking up. Relax to q2h neuro checks. BUN 72, increased enteral water to 300 q4h and given additional 300 ml x 2 today.   05/01/2025 exam remains stable. Started modafinil 100mg. Continue pulm toilet spaced to q6h and added IPV q6h.   05/02/2025 Tolerating Modafinil with mild improvement in level of alertness, increase to 200mg. Decreased O2 demands.   05/03/2025 Alertness slightly improving. Continue to monitor. Respiratory status improved, no longer requiring supplemental O2.  05/04/2025 Alertness slightly improved. On room air. NGT in place. Continue to monitor.  05/05/2025 Infectious workup started due to hypotension requiring levophed, fever overnight of 100.8, and worsening leukocytosis. Blood cultures, repeat urinalysis, CXR. Stated on zosyn. Switched to DNR.  05/06/2025 Vimpat dose decreased, cEEG. Leukocytosis resolved. Continue zosyn, pending Bcx. UA negative for infection. Off pressors.  05/07/2025 Per NSGY, okay to resume  eliquis. Family confirmed that they do not want to pursue with the PEG tube. EEG with moderate diffuse background slowing and bilateral, independent epileptiform discharges. Okay to remain on lower dose of vimpat. Continue with EEG monitoring.   05/08/2025 No seizures noted on EEG, discontinued order. SLP consulted again.  05/09/2025 Low grade fever of 100.9 overnight. UA negative for infection. Repeat CXR improved. Continuing to work with SLP.  05/10/2025 No events.  05/11/2025 No significant events overnight. Neuro exam stable at this moment      Tasks:    Last day of zosyn today 5/11 for presumed pneumonia    Continue to monitor for improvement in swallowing. Patient absolutely doesn't want a peg tube. If continues to fail swallow after a couple weeks, family would like hospice/palliative care    If patient decompensates please have goals of care discussion        Estimated Discharge Date: Unknown    Discharge Disposition: Hospice/Medical Facility

## 2025-05-11 NOTE — NURSING
Nursing Transfer Note       Transfer To 9082    Transfer via bed    Transfer with 2L to O2, cardiac monitoring    Transported by RN     Medicines sent: Yes    Chart sent with patient: Yes    Belongings sent with patient: (specify belongings)    Notified: spouse, daughter    Bedside Neuro assessment performed: Yes    Does the patient have altered skin integrity? yes, there is a picture in the chart of the wound within the past 24 hours and wound care was relayed to the new nurse       Bedside Handoff given to: POLO Persaud     Upon arrival to floor: cardiac monitor applied, patient oriented to room, call bell in reach, and bed in lowest position

## 2025-05-11 NOTE — PLAN OF CARE
I assume care of this patient as step down from Neurocritical Care.  Per chart review and handoff from Dr. Abraham:     Patient is an 85M with a past medical history of aortic stenosis s/p TAVR, Atrial Fibrillation on Eliquis, UC, HTN, Ventriculoperitoneal shunt secondary to Normal pressure hydrocephalus that was complicated by bilateral subdural hematoma with evacuation in March 2025.  He presented initially with a 3 day history of worsening weakness, speech difficulty, and dragging of R leg.  Ct head was obtained and revealed bilateral SDH.  Takes Eliquis for A-fib at home which was reversed with PCC in ED.  He underwent evacuation on 4/25/2025 and was unarousable post-op day 1. Given elevated temp and Procal, he was started on broad spectrum Abx and given concern for seizures he was treated with higher dose than his normal Vimpat. A new Ct head was pbtained with a new small parietal hemorrhage.  MRSA nares negative so Vanc discontinued.  He was started on modafinil with some improvement in alertness, weaned to room air, and NG tube placed. On 5/05/2025, he underwent additional infectious workup given low grade fever and hypotension requiring pressors. Zosyn was initiated and upon discussion with family, they desired patient to be made DNR.  He was off pressors by 5/6/2025, Eliquis was resumed as deemed safe by NSGY.  Additional fever 5/9/2025 with improving CXR as patient had been on pulmonary toilet. Continuous EEG discontinued.  Of note, patient's family endorses no desire for PEG if patient unable to eventually pass swallow study.  NGT remains for nutrition at this point, and in event of continued failed SLP, they would prefer Hospice.  He is to be stepped down to Hospital Medicine with intentions of honoring patient's desires as expressed by family including which phases of care to avoid escalating to, as well as continued treatment of co-morbidities.  Of note, blood cultures have remained negative along with  urine cultures, and if infection had been present, most likely of pulmonary origin.        Saeed Cutler MD  [facility]    .

## 2025-05-11 NOTE — PLAN OF CARE
"[facility] Care Plan    POC reviewed with Ramesh Ricci at 0300. Patient is aphasic and cannot verbalize understanding. See below and flowsheets for full assessment and VS info.     - Q4 neuro checks. Delirium precautions.  - Pt is aphasic and cannot follow commands at this time.  - New 20 G PIV to left arm.  - TMAX 99.1 axillary.  - Voiding well via male purewick.  - Turned q2.  - TF at goal rate of 45 ccs/hr to R nare NGT. Q4 200 cc FWF      Is this a stroke patient? Yes - Handbook at bedside to review with patient and family. Pt is aphasic and cannot accept teaching at this time.    Neuro:  [place] Coma Scale  Best Eye Response: 3-->(E3) to speech  Best Motor Response: 5-->(M5) localizes pain  Best Verbal Response: 1-->(V1) none  [place] Coma Scale Score: 9  Assessment Qualifiers: patient not sedated/intubated, no eye obstruction present  Pupil PERRLA: other (see comments) (SHELLY)     24 hr Temp:  [98 °F (36.7 °C)-99.5 °F (37.5 °C)]     CV:   Rhythm: atrial rhythm  BP goals:   SBP < 160  MAP > 65    Resp:      Oxygen Concentration (%): 40    Plan: O2> 92%    GI/:     Diet/Nutrition Received: tube feeding  Last Bowel Movement: 05/10/25  Voiding Characteristics: external catheter    Intake/Output Summary (Last 24 hours) at 5/11/2025 0357  Last data filed at 5/11/2025 0305  Gross per 24 hour   Intake 2920.41 ml   Output 975 ml   Net 1945.41 ml     Unmeasured Output  Unmeasured Urine Occurrence: 0  Unmeasured Stool Occurrence: 1  Unmeasured Emesis Occurrence: 0  Pad Count: 2    Labs/Accuchecks:  Recent Labs   Lab 05/11/25 0222   WBC 15.86*   RBC 3.04*   HGB 8.8*   HCT 27.3*   *      Recent Labs   Lab 05/11/25 0222      K 3.7   CO2 24      BUN 60*   CREATININE 1.0   ALKPHOS 82   ALT 19   AST 34   BILITOT 0.3      Recent Labs   Lab 05/07/25  1214   PROTIME 11.7   INR 1.1   APTT 28.5    No results for input(s): "CPK", "CPKMB", "TROPONINI", "MB" in the last 168 hours.    Electrolytes: Electrolytes " replaced  Accuchecks: none    Gtts:      LDA/Wounds:    Gabino Risk Assessment  Sensory Perception: 3-->slightly limited  Moisture: 3-->occasionally moist  Activity: 1-->bedfast  Mobility: 2-->very limited  Nutrition: 3-->adequate  Friction and Shear: 2-->potential problem  Gabino Score: 14  Is your gabino score 12 or less? no      Pt is on specialty bed and mattress as well as has heel boots. Turned q2.

## 2025-05-11 NOTE — PROGRESS NOTES
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 His wife at bedside reports concern for acute deconditioning over the past three days involving worsening weakness, urinary frequency, speech changes, intermittent cessation in interaction with family, and dragging of his R leg. CT head showed bilateral subdural hygromas with bilateral acute versus subacute SDH. He takes eliquis for A fib at home which was reversed with PCC in the ED. During evaluation of the patient, he was initially interactive, following, commands, and oriented to person and place. He then had a brief staring spell where he no longer followed commands and displayed no usable speech. This then resolved after a few minutes and he returned to baseline. He takes Vimpat 100 bid at home but has not gotten his nightly dose. Per his wife, these spells occurred at home as well.    He is admitted to [facility] for hourly neuro-monitoring and a higher level of care.     Hospital Course: 04/25/2025: OR today for SDH evacuation   04/26/2025: NAEON. POD1 s/p SDH evac. Post-op CTH good decompression and expected post-op changes. Post-op episode of seizure-like activity, described as "LUE & LLE shaking" lasting approximately 1 minute that resolved without intervention. Patient unarousable post-operatively and this AM. NGT placed. Repeat CTH obtained, stable. Procal, EEG ordered and pending.   04/27/2025:  POD2 s/p SDH evacuation. Procal elevated and patient febrile with low grade temp. Pan-cultured and started on broad spec antibiotics. " Aggressive pulm toileting started yesterday. Overnight with Tmax 100.9°F. Cx with NGTD. Respiratory panel sent. US extremities ordered and pending to rule out DVT. Start lovenox pending neurosurgery recs.   04/28/2025: Continue aggressive pulmonary toileting. EEG with periodic complexes with some epileptiform morphology, optimized lacosamide to 200mg BID.   04/29/2025 EEG negative for seizures, small improvement after increasing vimpat yesterday. Discontinued EEG. CTH with new small R parietal IPH with mild surrounding edema. BUN 67, started enteral water flushes 250 q4h. MRSA swab negative, discontinued vanc. CXR with slight improvement from yesterday. Continue aggressive pulm toilet.   04/30/2025 CTH overnight with stable R IPH. POD5 and pt still not waking up. Relax to q2h neuro checks. BUN 72, increased enteral water to 300 q4h and given additional 300 ml x 2 today.   05/01/2025 exam remains stable. Started modafinil 100mg. Continue pulm toilet spaced to q6h and added IPV q6h.   05/02/2025 Tolerating Modafinil with mild improvement in level of alertness, increase to 200mg. Decreased O2 demands.   05/03/2025 Alertness slightly improving. Continue to monitor. Respiratory status improved, no longer requiring supplemental O2.  05/04/2025 Alertness slightly improved. On room air. NGT in place. Continue to monitor.  05/05/2025 Infectious workup started due to hypotension requiring levophed, fever overnight of 100.8, and worsening leukocytosis. Blood cultures, repeat urinalysis, CXR. Stated on zosyn. Switched to DNR.  05/06/2025 Vimpat dose decreased, cEEG. Leukocytosis resolved. Continue zosyn, pending Bcx. UA negative for infection. Off pressors.  05/07/2025 Per NSGY, okay to resume eliquis. Family confirmed that they do not want to pursue with the PEG tube. EEG with moderate diffuse background slowing and bilateral, independent epileptiform discharges. Okay to remain on lower dose of vimpat. Continue with EEG  monitoring.   05/08/2025 No seizures noted on EEG, discontinued order. SLP consulted again.  05/09/2025 Low grade fever of 100.9 overnight. UA negative for infection. Repeat CXR improved. Continuing to work with SLP.  05/10/2025 No events.  05/11/2025 No significant events overnight. Neuro exam stable at this moment    Interval History:  See hospital course  Objective:     Vitals:  Temp: 98.5 °F (36.9 °C)  Pulse: 92  Rhythm: normal sinus rhythm  BP: (!) 115/59  MAP (mmHg): 84  Resp: (!) 25  SpO2: 95 %    Temp  Min: 98 °F (36.7 °C)  Max: 99.1 °F (37.3 °C)  Pulse  Min: 80  Max: 105  BP  Min: 86/58  Max: 164/78  MAP (mmHg)  Min: 67  Max: 109  Resp  Min: 10  Max: 38  SpO2  Min: 89 %  Max: 96 %    05/10 0701 - 05/11 0700  In: 3185.5   Out: 1400 [Urine:1400]   Unmeasured Output  Unmeasured Urine Occurrence: 0  Unmeasured Stool Occurrence: 1  Unmeasured Emesis Occurrence: 0  Pad Count: 2        Physical Exam  Constitutional:       General: He is not in acute distress.     Appearance: He is ill-appearing.      Interventions: Nasal cannula in place.   HENT:      Head: Normocephalic.      Comments: NGT in place  Eyes:      General: No scleral icterus.        Right eye: No discharge.         Left eye: No discharge.      Conjunctiva/sclera: Conjunctivae normal.      Pupils: Pupils are equal, round, and reactive to light.   Cardiovascular:      Rate and Rhythm: Normal rate.   Pulmonary:      Effort: Pulmonary effort is normal. No respiratory distress.   Musculoskeletal:         General: No deformity or signs of injury.   Skin:     General: Skin is warm and dry.   Psychiatric:      Comments: Unable to assess              Medications:  Continuous Scheduledalbuterol-ipratropium, 3 mL, Q6H  apixaban, 5 mg, BID  lacosamide, 100 mg, Q12H  levothyroxine, 125 mcg, Before breakfast  metoprolol tartrate, 12.5 mg, Q8H  modafiniL, 200 mg, Daily  piperacillin-tazobactam (Zosyn) IV (PEDS and ADULTS) (extended infusion is not appropriate), 4.5  g, Q8H  pravastatin, 20 mg, QHS  psyllium husk, 1 packet, BID  silodosin, 4 mg, Daily    PRNacetaminophen, 650 mg, Q6H PRN  bisacodyL, 10 mg, Daily PRN  hydrALAZINE, 10 mg, Q4H PRN  labetalol, 5 mg, Q4H PRN  magnesium oxide, 800 mg, PRN  magnesium oxide, 800 mg, PRN  potassium bicarbonate, 35 mEq, PRN  potassium bicarbonate, 50 mEq, PRN  potassium bicarbonate, 60 mEq, PRN  potassium, sodium phosphates, 2 packet, PRN  potassium, sodium phosphates, 2 packet, PRN  potassium, sodium phosphates, 2 packet, PRN  sodium chloride 0.9%, 10 mL, PRN      Today I personally reviewed pertinent medications, lines/drains/airways, imaging, laboratory results, notably:    Diet  Diet NPO  Diet NPO        Assessment/Plan:     Neuro  * Subdural hemorrhage  85M with a medical history significant for atrial fibrillation on Eliquis, UC, HTN, and recent VPS placement (2/6/25) for NPH complicated by bilateral SDH s/p bilateral craniotomy for SD hygroma resection with VPS tie off (3/24/25) who was admitted to [facility] on 4/23/25 for ongoing management of SDH reexpansion, now s/p repeat bilateral craniotomy for evacuation on 4/25/25.      POD13. Mental status seems to be improving though still difficult to arouse, continue Modafinil trial at 200mg daily.      EEG with moderate diffuse background slowing and bilateral, independent epileptiform discharges. No evidence of seizures. No changes on EEG after >24 hours. EEG discontinued 5/8.  Continue reduced dose of Lacoasmide to 100mg BID.      CTH with small right parietal ICH, repeat CTH at 6 hours stable.     SBP<160.      Aggressive pulmonary toileting and airway maintenance.     TF +  q4h    PT/OT/SLP     Continue home statin, Silodosin for urinary retention and levothyroxine.     Dr. Abraham spoke extensively with the family regarding stepping down to the floor, Today they are agreable to trial at stepping down out of the ICU.      Subdural hematoma  See primary problem    Seizure  disorder  Continuous EEG without evidence of seizures. Continue Vimpat 100mg BID.    Acute encephalopathy  See primary problem      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   Tie off on 3/25 2/2 bilateral hygromas that were evacuated  Removed in OR on 4/25     Pulmonary  Right lower lobe pneumonia  CXR on 5/7 with patchy infiltrate to the right lower lung possibility representing pneumonia.    - Continue zosyn for 7 day course. Last day 5/11  - Repeat CXR on 5/9 shows improvement.    Cardiac/Vascular  PAF (paroxysmal atrial fibrillation)  Resumed lopressor. BP stable.  Resuming Eliqius 5mg BID.   Currently rate controlled    Left ventricular diastolic dysfunction, NYHA class 1  Echo reviewed    Essential hypertension  SBP < 160  PRN labetalol, hydralazine    Renal/  Urinary retention  C/w home silodosin 4    Endocrine  Acquired hypothyroidism  C/w home synthroid 125mcg     GI  UC (ulcerative colitis)  Holding home colazal in acute setting    Palliative Care  ACP (advance care planning)  Advance Care Planning    Date: 05/05/2025    Code Status  In light of the patients advanced and life limiting illness,I engaged the patient and family in a voluntary conversation about the patient's preferences for care  at the very end of life. The patient wishes to have a natural, peaceful death.  Along those lines, the family does not wish to have CPR or other invasive treatments performed when his heart and/or breathing stops. I communicated to the patient and family that a DNR order would be placed in his medical record to reflect this preference.    A total of 15 min was spent on advance care planning, goals of care discussion, emotional support, formulating and communicating prognosis and exploring burden/benefit of various approaches of treatment. This discussion occurred on a fully voluntary basis with the verbal consent of the patient and/or family.               The patient is being Prophylaxed for:  Venous  Thromboembolism with: Chemical  Stress Ulcer with: Not Applicable   Ventilator Pneumonia with: not applicable    Activity Orders            Elevate HOB Elevate HOB 30-45 degrees during feeding unless contraindicated starting at 04/27 0844    Diet NPO: NPO starting at 04/25 0001    Turn patient starting at 04/23 2000    Elevate HOB starting at 04/23 1948          DNR    Fahad Narayanan,   Neurocritical Care  [facility] - Neuro Critical Care

## 2025-05-11 NOTE — PLAN OF CARE
"[facility] Care Plan  POC reviewed with Ramesh Ricci and family at 1400. Family verbalized understanding. Questions and concerns addressed. No acute events today. Pt progressing toward goals. Will continue to monitor. See below and flowsheets for full assessment and VS info.     - Bath completed, linens changed  - TF continued at 45/h  - new pressure injury noted in R nare where pt NGT has been pressing on his nose. Photos and LDA added to chart  - 500mL LR bolus given   - FWF increased to 200mL q4     Is this a stroke patient? yes    Neuro:  [place] Coma Scale  Best Eye Response: 3-->(E3) to speech  Best Motor Response: 5-->(M5) localizes pain  Best Verbal Response: 1-->(V1) none  [place] Coma Scale Score: 9  Assessment Qualifiers: patient not sedated/intubated  Pupil PERRLA: yes     24 hr Temp:  [98 °F (36.7 °C)-99.5 °F (37.5 °C)]     CV:   Rhythm: normal sinus rhythm  BP goals:   SBP < 160  MAP > 65    Resp:      Oxygen Concentration (%): 40    Plan: N/A    GI/:     Diet/Nutrition Received: tube feeding  Last Bowel Movement: 05/10/25  Voiding Characteristics: external catheter    Intake/Output Summary (Last 24 hours) at 5/10/2025 1943  Last data filed at 5/10/2025 1501  Gross per 24 hour   Intake 2112.56 ml   Output 1425 ml   Net 687.56 ml     Unmeasured Output  Unmeasured Urine Occurrence: 0  Unmeasured Stool Occurrence: 1  Unmeasured Emesis Occurrence: 0  Pad Count: 2    Labs/Accuchecks:  Recent Labs   Lab 05/10/25  0213   WBC 16.63*   RBC 2.86*   HGB 8.2*   HCT 26.2*   *      Recent Labs   Lab 05/10/25  0213      K 4.0   CO2 24      BUN 63*   CREATININE 1.0   ALKPHOS 100   ALT 20   AST 39   BILITOT 0.3      Recent Labs   Lab 05/07/25  1214   PROTIME 11.7   INR 1.1   APTT 28.5    No results for input(s): "CPK", "CPKMB", "TROPONINI", "MB" in the last 168 hours.    Electrolytes: N/A - electrolytes WDL  Accuchecks: none    Gtts:      LDA/Wounds:    Gabino Risk Assessment  Sensory Perception: " 3-->slightly limited  Moisture: 3-->occasionally moist  Activity: 1-->bedfast  Mobility: 2-->very limited  Nutrition: 3-->adequate  Friction and Shear: 2-->potential problem  Gabino Score: 14    Is your gabino score 12 or less? no            Restraints:        WCTM

## 2025-05-12 NOTE — PT/OT/SLP PROGRESS
"Physical Therapy Treatment    Patient Name:  Ramesh Ricci   MRN:  [id number]    Recommendations:     Discharge Recommendations: Moderate Intensity Therapy  Discharge Equipment Recommendations: hospital bed  Barriers to discharge: current level of assistance required     Assessment:     Ramesh Ricci is a 85 y.o. male admitted with a medical diagnosis of Subdural hemorrhage.  He presents with the following impairments/functional limitations: weakness, impaired endurance, impaired self care skills, impaired functional mobility, pain, decreased safety awareness, decreased lower extremity function, decreased upper extremity function, impaired cognition, decreased ROM, impaired cardiopulmonary response to activity, impaired joint extensibility Pt tolerated treatment session poorly today. Upon initial attempt difficulty arousing pt, yet with multiple attempts pt able to open eyes, poor tracking noted (L gaze). Pt with no command following throughout session. Pt HR varying from 120's-130's during supine rest, therefore EOB sitting and further functional mobility held on this date. Patient remains appropriate for continued skilled services within the acute environment and goals remain appropriate.   .    Rehab Prognosis: Fair; patient would benefit from acute skilled PT services to address these deficits and reach maximum level of function.    Recent Surgery: Procedure(s) (LRB):  CRANIOTOMY, FOR SUBDURAL HEMATOMA EVACUATION, bilateral. shunt removal (Bilateral)  REMOVAL, SHUNT, VENTRICULOPERITONEAL 17 Days Post-Op    Plan:     During this hospitalization, patient to be seen 4 x/week to address the identified rehab impairments via therapeutic exercises, therapeutic activities, neuromuscular re-education and progress toward the following goals:    Plan of Care Expires:  05/16/25    Subjective     Chief Complaint: Wincing with movement of R shoulder   Patient/Family Comments/goals: "you can try, but he kept falling " "asleep when i'd talk to him."  Pain/Comfort:  Pain Rating 1:  (none verbalized, Wincing at R shoulder)  Location - Side 1: Right  Location - Orientation 1: generalized  Location 1: shoulder  Pain Addressed 1: Reposition, Distraction  Pain Rating Post-Intervention 1:  (not rated)      Objective:     Communicated with Rn prior to session.  Patient found supine with telemetry, pulse ox (continuous), NG tube, larry catheter, pressure relief boots upon PT entry to room.     General Precautions: Standard, aphasia, aspiration, fall, NPO  Orthopedic Precautions: N/A  Braces: N/A  Respiratory Status: Room air     Functional Mobility:  Bed Mobility:     Rolling Left:  total assistance and of 2 persons  Rolling Right: total assistance and of 2 persons    PTA performed 10 repetitions of supine PROM to B LE consisting of: Hip flexion, knee flexion, Hip ADD/ABD, Ankle plantar and dorsiflexion.       AM-PAC 6 CLICK MOBILITY  Turning over in bed (including adjusting bedclothes, sheets and blankets)?: 1  Sitting down on and standing up from a chair with arms (e.g., wheelchair, bedside commode, etc.): 1  Moving from lying on back to sitting on the side of the bed?: 1  Moving to and from a bed to a chair (including a wheelchair)?: 1  Need to walk in hospital room?: 1  Climbing 3-5 steps with a railing?: 1  Basic Mobility Total Score: 6       Treatment & Education:  Therapist provided instruction and educated for safety during transfers and gait training. As well as proper body mechanics, energy conservation, and fall prevention strategies during tasks listed above, and the effects of prolonged immobility and the importance of performing EOB/OOB activity and exercises to promote healing and reduce recovery time.   Writing PTA also eduction pt's family member how to perform B LE PROM, while maintaining appropriate body mechanics for safety and injury prevention.        Patient left supine with all lines intact, call button in reach, and " Rn notified..    GOALS:   Multidisciplinary Problems       Physical Therapy Goals          Problem: Physical Therapy    Goal Priority Disciplines Outcome Interventions   Physical Therapy Goal     PT, PT/OT Progressing    Description: Goals to be completed by: 5/16/25    Pt will perform sup<>sit transfers w/ moderate assistance  Pt will have sufficient dynamic balance to sit EOB while performing ADLs/therex w/ minimum assistance  Pt will be able to stand up from EOB w/ moderate assistance using LRAD  Bed to chair transfer w/ maxA using LRAD  Pt will be independent w/ HEP therex on BLE w/ good form and ROM                        DME Justifications:  Ramesh Yo requires a hospital bed due to him requiring positioning of the body in ways not feasible with an ordinary bed due to limited ability and cannot independently make changes in body position without the use of the bed.The positioning of the body cannot be sufficiently resolved by the use of pillows and wedges.    Time Tracking:     PT Received On: 05/12/25  PT Start Time: 1319     PT Stop Time: 1358  PT Total Time (min): 39 min     Billable Minutes: Therapeutic Activity 25 and Therapeutic Exercise 14    Treatment Type: Treatment  PT/PTA: PTA     Number of PTA visits since last PT visit: 1 05/12/2025

## 2025-05-12 NOTE — ASSESSMENT & PLAN NOTE
-  history of normal pressure hydrocephalus that was complicated by bilateral subdural hematoma with evacuation in March 2025  - presented this current admission with a 3 day history of worsening weakness, speech difficulty, and dragging of R leg  -Ct head was obtained and revealed bilateral SDH; Takes Eliquis for A-fib at home which was reversed with PCC in ED  - underwent evacuation on 4/25/2025 and was unarousable post-op day 1  - new Ct head was obtained with a new small parietal hemorrhage  - continue blood pressure control to SBP <160  - swallow studies to continue to evaluate for oral intake; continued Neurochecks and assessment for improvement

## 2025-05-12 NOTE — ASSESSMENT & PLAN NOTE
- patient with leukocytosis and concern for infection but no foci of infection with exception of potential pneumonia  - Ucx and blood cx neg  - leukocytosis improving on Zosyn so will continue for now

## 2025-05-12 NOTE — PT/OT/SLP PROGRESS
Speech Language Pathology Treatment    Patient Name:  Ramesh Ricci   MRN:  [id number]  Admitting Diagnosis: Subdural hemorrhage    Recommendations:                 General Recommendations:  Dysphagia therapy, Speech/language therapy, and Cognitive-linguistic therapy  Diet recommendations:  NPO, Liquid Diet Level: NPO   Aspiration Precautions: Continue alternate means of nutrition, Frequent oral care, and Strict aspiration precautions   General Precautions: Standard, aspiration, fall  Communication strategies:  provide increased time to answer and go to room if call light pushed    Assessment:     Ramesh Ricci is a 85 y.o. male with an SLP diagnosis of Aphasia and Dysphagia.      Subjective     Pt awake and alert upon arrival with family at bedside. Nursing cleared for session.     Pain/Comfort:  Pain Rating 1: 0/10    Respiratory Status: Room air    Objective:     Has the patient been evaluated by SLP for swallowing?   Yes  Keep patient NPO? No   Current Respiratory Status:        Pt seen for ongoing swallow and speech therapy. Pt awake and alert. HOB elevated. Pt noted to have open mouth breathing posture.  Pt with no vocalizations this date. Pt following functional feeding commands with max cues but was unable to follow structured commands. Pt did not respond to y/n questions. Pt with dried secretions coating lips, lingual surface, and teeth. SLP provided oral care via swabs and oral suction. Pt accepting trials of ice chips x2, thin liquids via tsp x4 and cup x4, puree via 1/2 tsp x3. Pt accepting independently ~50% and required cues in other opportunities. Anterior loss of thins noted. Pt did not attempt to masticate ice chips. Pt with slow oral transit and delayed initiation of swallow. Pt did not initiation swallow with ices chips. Pt required max cues to initiate swallow for ~50% of trials. Pt with increased congested breathing and intermittent weak throat clears. Pt unable to cough when cues. No  further trials 2/2 pt appearing fatigued and risk of aspiration. Continue to recommend npo with alternate means of nutrition. SLP provided education regarding role of Slp, aspiration precautions, risk of aspiration, signs of aspiration, recommendations, npo, oral care, and SLP POC. Pt's family expressed understanding, SLP will continue to follow.     Goals:   Multidisciplinary Problems       SLP Goals          Problem: SLP    Goal Priority Disciplines Outcome   SLP Goal     SLP Progressing   Description: Speech Language Pathology Goals  Goals expected to be met by 5/23  1. Pt will participate in ongoing assessment of swallow.   2. Pt will answer simple y/n questions with 60% accy with max cues.   3. Pt will follow simple commands with 50% accy with max cues.   4. Pt will verbalize in response to any stim x5 given max cues.                               Plan:     Patient to be seen:  4 x/week   Plan of Care expires:  06/07/25  Plan of Care reviewed with:  patient, family   SLP Follow-Up:  Yes       Discharge recommendations:  Moderate Intensity Therapy   Barriers to Discharge:  Level of Skilled Assistance Needed      Time Tracking:     SLP Treatment Date:   05/12/25  Speech Start Time:  1521  Speech Stop Time:  1545     Speech Total Time (min):  24 min    Billable Minutes: Speech Therapy Individual 8, Treatment Swallowing Dysfunction 8, and Self Care/Home Management Training 8    05/12/2025

## 2025-05-12 NOTE — CARE UPDATE
"RAPID RESPONSE NURSE CHART REVIEW        Chart Reviewed: 05/12/2025, 6:53 AM    MRN: [id number]  Bed: 80/8009 A    Dx: Subdural hemorrhage    Ramesh Ricci has a past medical history of Allergies, Anemia, Anticoagulant long-term use, Anxiety, Atrophic kidney, BPH (benign prostatic hyperplasia), Congenital absence of right kidney, DDD (degenerative disc disease), cervical, Ex-smoker, Exudative age-related macular degeneration, right eye, with active choroidal neovascularization, GERD (gastroesophageal reflux disease), Glaucoma, HTN (hypertension), Hypothyroid, Internal carotid artery stenosis, Iron deficiency anemia due to chronic blood loss, Left ventricular diastolic dysfunction, NYHA class 1, Low serum testosterone level, Normal cardiac stress test, NPH (normal pressure hydrocephalus), Osteopenia, PAF (paroxysmal atrial fibrillation), Prostate CA, S/P TAVR (transcatheter aortic valve replacement), Seizure disorder, Shingles, Skin disease, Stage 3a chronic kidney disease, Stroke, Thrombocytopenia, TIA (transient ischemic attack), and UC (ulcerative colitis).    Last VS: BP (!) 102/56   Pulse 103   Temp 98 °F (36.7 °C) (Axillary)   Resp (!) 31   Ht 5' 10.98" (1.803 m)   Wt 68 kg (149 lb 14.6 oz)   SpO2 99%   BMI 20.92 kg/m²     24H Vital Sign Range:  Temp:  [97.5 °F (36.4 °C)-99.1 °F (37.3 °C)]   Pulse:  []   Resp:  [16-38]   BP: ()/(47-82)   SpO2:  [92 %-100 %]     Level of Consciousness (AVPU): responds to voice    Recent Labs     05/10/25  0213 05/11/25  0222 05/12/25  0226   WBC 16.63* 15.86* 13.99*   HGB 8.2* 8.8* 8.8*   HCT 26.2* 27.3* 28.2*   * 550* 560*       Recent Labs     05/10/25  0213 05/11/25  0222 05/11/25 2249 05/12/25 0226    139 140 141   K 4.0 3.7 3.9 3.9    103 104 104   CO2 24 24 24 24   BUN 63* 60* 56* 57*   CREATININE 1.0 1.0 1.1 1.1   * 126* 129* 123*   PHOS 3.9 3.6  --  4.0   MG 2.0 2.1 2.1 2.2        OXYGEN:  Flow (L/min) (Oxygen Therapy): " 2  Oxygen Concentration (%): 40       MEWS score: 4    Rounding completed at 09:08 AM.    Rounding completed w/Charge POLO Stone.No acute concerns verbalized at this time. VSS. WBC improving. Instructed to call [phone] for further concerns or assistance.    Amelia Gauthier RN

## 2025-05-12 NOTE — CHAPLAIN
Patient: Ramesh Ricci  MRN: [id number]  : 1940  Age: 85 y.o.  Legal sex: male   Hospital Length of Stay: 19 days  Code Status: DNR   Attending Provider: Saeed Cutler MD  Principal Problem: Subdural hemorrhage  Patient's Jainism: Buddhist  Length of my visit: 15 min + 5 min  Purpose of visit:   SC consult      responded to SC consult. Patient non-verbal; 2 FM at bedside, tearfully requesting for sacrament of anointing plus apostolic pardon.  provided rosary, made referral to father Miguel Ángel, and notified family Father Miguel Ángel is on his way.         Rev. Kenton Mancia, [id number]  board certified , amira ([place]+)     support is available and on-site . Please call the on-call  for any emergent spiritual care needs, [phone].

## 2025-05-12 NOTE — ASSESSMENT & PLAN NOTE
- family endorses that patient would not desire PEG if it becomes necessary  - pending swallow evaluations

## 2025-05-12 NOTE — PROGRESS NOTES
[facility] - Telemetry [facility]  Progress Note    Patient Name: Ramesh Ricci  MRN: [id number]  Patient Class: IP- Inpatient   Admission Date: 4/23/2025  Length of Stay: 19 days  Attending Physician: Saeed Cutler MD  Primary Care Provider: Nakul Mandujano MD        Subjective     Principal Problem:Subdural hemorrhage        HPI:  Patient is an 85M with a past medical history of aortic stenosis s/p TAVR, Atrial Fibrillation on Eliquis, UC, HTN, Ventriculoperitoneal shunt secondary to Normal pressure hydrocephalus that was complicated by bilateral subdural hematoma with evacuation in March 2025.  He presented initially with a 3 day history of worsening weakness, speech difficulty, and dragging of R leg.  Ct head was obtained and revealed bilateral SDH.  Takes Eliquis for A-fib at home which was reversed with PCC in ED.  He underwent evacuation on 4/25/2025 and was unarousable post-op day 1. Given elevated temp and Procal, he was started on broad spectrum Abx and given concern for seizures he was treated with higher dose than his normal Vimpat. A new Ct head was pbtained with a new small parietal hemorrhage.  MRSA nares negative so Vanc discontinued.  He was started on modafinil with some improvement in alertness, weaned to room air, and NG tube placed. On 5/05/2025, he underwent additional infectious workup given low grade fever and hypotension requiring pressors. Zosyn was initiated and upon discussion with family, they desired patient to be made DNR.  He was off pressors by 5/6/2025, Eliquis was resumed as deemed safe by NSGY.  Additional fever 5/9/2025 with improving CXR as patient had been on pulmonary toilet. Continuous EEG discontinued.  Of note, patient's family endorses no desire for PEG if patient unable to eventually pass swallow study.  NGT remains for nutrition at this point, and in event of continued failed SLP, they would prefer Hospice.  He is to be stepped down to Hospital  Medicine with intentions of honoring patient's desires as expressed by family including which phases of care to avoid escalating to, as well as continued treatment of co-morbidities.  Of note, blood cultures have remained negative along with urine cultures, and if infection had been present, most likely of pulmonary origin.     Overview/Hospital Course:  No notes on file    Interval History: Patient with known atrial fibrillation but with rapid ventricular response overnight. Some EKG changes, and additionally obtaining and trending troponin as patient unable to endorse pain.  White blood cell count trending down.  No additional events.    Review of Systems   Reason unable to perform ROS: Unable given patient's mentation.     Objective:     Vital Signs (Most Recent):  Temp: 99.4 °F (37.4 °C) (05/12/25 1135)  Pulse: 96 (05/12/25 1135)  Resp: (!) 21 (05/12/25 1135)  BP: (!) 129/59 (05/12/25 1135)  SpO2: 100 % (05/12/25 1135) Vital Signs (24h Range):  Temp:  [97.5 °F (36.4 °C)-99.4 °F (37.4 °C)] 99.4 °F (37.4 °C)  Pulse:  [] 96  Resp:  [14-33] 21  SpO2:  [95 %-100 %] 100 %  BP: (102-148)/(56-72) 129/59     Weight: 68 kg (149 lb 14.6 oz)  Body mass index is 20.92 kg/m².    Intake/Output Summary (Last 24 hours) at 5/12/2025 1337  Last data filed at 5/12/2025 0930  Gross per 24 hour   Intake 1009.78 ml   Output 500 ml   Net 509.78 ml         Physical Exam  HENT:      Head: Normocephalic.      Comments: Incisions to scalp s/p NSGY procedure     Nose:      Comments: NG tube in place with feeds running  Eyes:      Pupils: Pupils are equal, round, and reactive to light.   Cardiovascular:      Rate and Rhythm: Normal rate. Rhythm irregular.   Pulmonary:      Breath sounds: Normal breath sounds.      Comments: Tachypnea  Musculoskeletal:      Right lower leg: No edema.      Left lower leg: No edema.   Neurological:      Comments: Withdraws from touch in both lower extremities; contracture in LUE and does not withdraw that  from pain; does not arouse to name or prompts               Significant Labs: All pertinent labs within the past 24 hours have been reviewed.  CBC:   Recent Labs   Lab 05/11/25 0222 05/12/25 0226   WBC 15.86* 13.99*   HGB 8.8* 8.8*   HCT 27.3* 28.2*   * 560*     CMP:   Recent Labs   Lab 05/11/25 0222 05/11/25 2249 05/12/25 0226    140 141   K 3.7 3.9 3.9    104 104   CO2 24 24 24   * 129* 123*   BUN 60* 56* 57*   CREATININE 1.0 1.1 1.1   CALCIUM 8.8 8.9 9.0   PROT 6.4  --  6.6   ALBUMIN 1.7*  --  1.7*   BILITOT 0.3  --  0.3   ALKPHOS 82  --  84   AST 34  --  35   ALT 19  --  20   ANIONGAP 12 12 13       Significant Imaging: I have reviewed all pertinent imaging results/findings within the past 24 hours.      Assessment & Plan  Subdural hemorrhage  -  history of normal pressure hydrocephalus that was complicated by bilateral subdural hematoma with evacuation in March 2025  - presented this current admission with a 3 day history of worsening weakness, speech difficulty, and dragging of R leg  -Ct head was obtained and revealed bilateral SDH; Takes Eliquis for A-fib at home which was reversed with PCC in ED  - underwent evacuation on 4/25/2025 and was unarousable post-op day 1  - new Ct head was obtained with a new small parietal hemorrhage  - continue blood pressure control to SBP <160  - swallow studies to continue to evaluate for oral intake; continued Neurochecks and assessment for improvement  UC (ulcerative colitis)  - last GI appointment in 1/2025  - planned to place on balsalazide at that visit  - no active issues and history noted    Acquired hypothyroidism  - T4 1.4 six months ago  - continue Synthroid 125 mcg    Essential hypertension  Patient's blood pressure range in the last 24 hours was: BP  Min: 102/56  Max: 148/72.The patient's inpatient anti-hypertensive regimen is listed below:  Current Antihypertensives  hydrALAZINE injection 10 mg, Every 4 hours PRN,  Intravenous  labetalol 20 mg/4 mL (5 mg/mL) IV syring, Every 4 hours PRN, Intravenous  metoprolol tartrate (LOPRESSOR) tablet 25 mg, Every 8 hours, Per NG tube  metoprolol injection 5 mg, Every 5 min PRN, Intravenous    Plan  - continue metoprolol  - hydral and labetalol on for goal SBP <160  Left ventricular diastolic dysfunction, NYHA class 1  - last Echo 4/2025:    Left Ventricle: The left ventricle is normal in size. Ventricular mass is normal. Normal wall thickness. There is normal diastolic function.    Right Ventricle: The right ventricle is normal in size. Wall thickness is normal. Systolic function is normal.    Aortic Valve: There is a transcatheter valve replacement in the aortic position.    Mitral Valve: There is severe mitral annular calcification. There is moderate stenosis. The mean pressure gradient across the mitral valve is 4 mmHg at a heart rate of 63 bpm; MVA 1.4. There is mild regurgitation.    IVC/SVC: Normal venous pressure at 3 mmHg.    - continue metoprolol    PAF (paroxysmal atrial fibrillation)  Patient has persistent (7 days or more) atrial fibrillation. Patient is currently in atrial fibrillation. [id number] Score: 3. The patients heart rate in the last 24 hours is as follows:  Pulse  Min: 74  Max: 116     Antiarrhythmics  metoprolol tartrate (LOPRESSOR) tablet 25 mg, Every 8 hours, Per NG tube  metoprolol injection 5 mg, Every 5 min PRN, Intravenous    Anticoagulants  apixaban tablet 5 mg, 2 times daily, Per NG tube    Plan  - Replete lytes with a goal of K>4, Mg >2  - Patient is anticoagulated, see medications listed above.  - Patient with brief lack of control overnight; repeat EKG and trop pending    Coronary artery disease involving native coronary artery of native heart without angina pectoris  Patient with known CAD s/p , which is  Will continue  and monitor for S/Sx of angina/ACS. Continue to monitor on telemetry.   ACP (advance care planning)  - family endorses that patient  would not desire PEG if it becomes necessary  - pending swallow evaluations    S/P ventriculoperitoneal shunt  - due to NPH in past  - hx noted  Urinary retention  - larry in place    Seizure disorder  - patient with seizure activity during this admission  - Vimpat titrated to effect; no longer on cEEG  - continue Vimpat every 12 hpurs    Right lower lobe pneumonia  - patient with leukocytosis and concern for infection but no foci of infection with exception of potential pneumonia  - Ucx and blood cx neg  - leukocytosis improving on Zosyn so will continue for now    VTE Risk Mitigation (From admission, onward)           Ordered     apixaban tablet 5 mg  2 times daily         05/07/25 1611     Reason for No Pharmacological VTE Prophylaxis  Once        Question:  Reasons:  Answer:  Active Bleeding    04/23/25 1950     IP VTE HIGH RISK PATIENT  Once         04/23/25 1950     Place sequential compression device  Until discontinued         04/23/25 1950                    Discharge Planning   LIDIA: 5/16/2025     Code Status: DNR   Medical Readiness for Discharge Date:   Discharge Plan A: Skilled Nursing Facility   Discharge Delays: None known at this time            Please place Justification for DME        Saeed Cutler MD  Department of Hospital Medicine   [facility] - Telemetry Stepdown

## 2025-05-12 NOTE — PLAN OF CARE
Problem: Stroke, Intracerebral Hemorrhage  Goal: Optimal Coping  Outcome: Progressing  Goal: Effective Bowel Elimination  Outcome: Progressing  Goal: Optimal Cerebral Tissue Perfusion  Outcome: Progressing  Goal: Optimal Cognitive Function  Outcome: Progressing     POC reviewed with pt and family at bedside. No acute changes. NG to right nare with tube feeds and water bolus infusing per orders. BG monitored. Wedge in use for turns. Safety checks performed.

## 2025-05-12 NOTE — HPI
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  He presented initially with a 3 day history of worsening weakness, speech difficulty, and dragging of R leg.  Ct head was obtained and revealed bilateral SDH.  Takes Eliquis for A-fib at home which was reversed with PCC in ED.  He underwent evacuation on 4/25/2025 and was unarousable post-op day 1. Given elevated temp and Procal, he was started on broad spectrum Abx and given concern for seizures he was treated with higher dose than his normal Vimpat. A new Ct head was pbtained with a new small parietal hemorrhage.  MRSA nares negative so Vanc discontinued.  He was started on modafinil with some improvement in alertness, weaned to room air, and NG tube placed. On 5/05/2025, he underwent additional infectious workup given low grade fever and hypotension requiring pressors. Zosyn was initiated and upon discussion with family, they desired patient to be made DNR.  He was off pressors by 5/6/2025, Eliquis was resumed as deemed safe by NSGY.  Additional fever 5/9/2025 with improving CXR as patient had been on pulmonary toilet. Continuous EEG discontinued.  Of note, patient's family endorses no desire for PEG if patient unable to eventually pass swallow study.  NGT remains for nutrition at this point, and in event of continued failed SLP, they would prefer Hospice.  He is to be stepped down to Hospital Medicine with intentions of honoring patient's desires as expressed by family including which phases of care to avoid escalating to, as well as continued treatment of co-morbidities.  Of note, blood cultures have remained negative along with urine cultures, and if infection had been present, most likely of pulmonary origin.

## 2025-05-12 NOTE — ASSESSMENT & PLAN NOTE
Patient has persistent (7 days or more) atrial fibrillation. Patient is currently in atrial fibrillation. [id number] Score: 3. The patients heart rate in the last 24 hours is as follows:  Pulse  Min: 74  Max: 116     Antiarrhythmics  metoprolol tartrate (LOPRESSOR) tablet 25 mg, Every 8 hours, Per NG tube  metoprolol injection 5 mg, Every 5 min PRN, Intravenous    Anticoagulants  apixaban tablet 5 mg, 2 times daily, Per NG tube    Plan  - Replete lytes with a goal of K>4, Mg >2  - Patient is anticoagulated, see medications listed above.  - Patient with brief lack of control overnight; repeat EKG and trop pending

## 2025-05-12 NOTE — ASSESSMENT & PLAN NOTE
- last Echo 4/2025:    Left Ventricle: The left ventricle is normal in size. Ventricular mass is normal. Normal wall thickness. There is normal diastolic function.    Right Ventricle: The right ventricle is normal in size. Wall thickness is normal. Systolic function is normal.    Aortic Valve: There is a transcatheter valve replacement in the aortic position.    Mitral Valve: There is severe mitral annular calcification. There is moderate stenosis. The mean pressure gradient across the mitral valve is 4 mmHg at a heart rate of 63 bpm; MVA 1.4. There is mild regurgitation.    IVC/SVC: Normal venous pressure at 3 mmHg.    - continue metoprolol

## 2025-05-12 NOTE — ASSESSMENT & PLAN NOTE
Patient's blood pressure range in the last 24 hours was: BP  Min: 102/56  Max: 148/72.The patient's inpatient anti-hypertensive regimen is listed below:  Current Antihypertensives  hydrALAZINE injection 10 mg, Every 4 hours PRN, Intravenous  labetalol 20 mg/4 mL (5 mg/mL) IV syring, Every 4 hours PRN, Intravenous  metoprolol tartrate (LOPRESSOR) tablet 25 mg, Every 8 hours, Per NG tube  metoprolol injection 5 mg, Every 5 min PRN, Intravenous    Plan  - continue metoprolol  - hydral and labetalol on for goal SBP <160

## 2025-05-12 NOTE — PLAN OF CARE
Prn Metoprolol added for heart rate greater than 130. A-fib/nsr on bedside monitor. EKG done. Vss. NG tube intact at 45 mls/hr with 200 water flushes    Problem: Stroke, Intracerebral Hemorrhage  Goal: Effective Bowel Elimination  Outcome: Progressing  Goal: Effective Urinary Elimination  Outcome: Progressing     Problem: Stroke, Intracerebral Hemorrhage  Goal: Optimal Coping  Outcome: Ongoing  Goal: Optimal Cerebral Tissue Perfusion  Outcome: Ongoing  Goal: Optimal Cognitive Function  Outcome: Ongoing  Goal: Effective Communication Skills  Outcome: Ongoing  Goal: Optimal Functional Ability  Outcome: Ongoing  Goal: Optimal Nutrition Intake  Outcome: Ongoing  Goal: Optimal Pain Control and Function  Outcome: Ongoing  Goal: Effective Oxygenation and Ventilation  Outcome: Ongoing  Goal: Improved Sensorimotor Function  Outcome: Ongoing  Goal: Safe and Effective Swallow  Outcome: Ongoing     Problem: Skin Injury Risk Increased  Goal: Skin Health and Integrity  Outcome: Ongoing     Problem: Adult Inpatient Plan of Care  Goal: Plan of Care Review  Outcome: Ongoing  Goal: Patient-Specific Goal (Individualized)  Outcome: Ongoing  Goal: Absence of Hospital-Acquired Illness or Injury  Outcome: Ongoing  Goal: Optimal Comfort and Wellbeing  Outcome: Ongoing  Goal: Readiness for Transition of Care  Outcome: Ongoing     Problem: Acute Kidney Injury/Impairment  Goal: Improved Oral Intake  Outcome: Ongoing  Goal: Effective Renal Function  Outcome: Ongoing     Problem: Wound  Goal: Optimal Coping  Outcome: Ongoing  Goal: Optimal Functional Ability  Outcome: Ongoing  Goal: Absence of Infection Signs and Symptoms  Outcome: Ongoing  Goal: Improved Oral Intake  Outcome: Ongoing  Goal: Optimal Pain Control and Function  Outcome: Ongoing  Goal: Skin Health and Integrity  Outcome: Ongoing  Goal: Optimal Wound Healing  Outcome: Ongoing     Problem: Fall Injury Risk  Goal: Absence of Fall and Fall-Related Injury  Outcome: Ongoing     Problem:  Delirium  Goal: Optimal Coping  Outcome: Ongoing  Goal: Improved Behavioral Control  Outcome: Ongoing  Goal: Improved Attention and Thought Clarity  Outcome: Ongoing  Goal: Improved Sleep  Outcome: Ongoing     Problem: Pneumonia  Goal: Fluid Balance  Outcome: Ongoing  Goal: Resolution of Infection Signs and Symptoms  Outcome: Ongoing  Goal: Effective Oxygenation and Ventilation  Outcome: Ongoing

## 2025-05-12 NOTE — ASSESSMENT & PLAN NOTE
Patient with known CAD s/p , which is  Will continue  and monitor for S/Sx of angina/ACS. Continue to monitor on telemetry.

## 2025-05-12 NOTE — SUBJECTIVE & OBJECTIVE
Interval History: Patient with known atrial fibrillation but with rapid ventricular response overnight. Some EKG changes, and additionally obtaining and trending troponin as patient unable to endorse pain.  White blood cell count trending down.  No additional events.    Review of Systems   Reason unable to perform ROS: Unable given patient's mentation.     Objective:     Vital Signs (Most Recent):  Temp: 99.4 °F (37.4 °C) (05/12/25 1135)  Pulse: 96 (05/12/25 1135)  Resp: (!) 21 (05/12/25 1135)  BP: (!) 129/59 (05/12/25 1135)  SpO2: 100 % (05/12/25 1135) Vital Signs (24h Range):  Temp:  [97.5 °F (36.4 °C)-99.4 °F (37.4 °C)] 99.4 °F (37.4 °C)  Pulse:  [] 96  Resp:  [14-33] 21  SpO2:  [95 %-100 %] 100 %  BP: (102-148)/(56-72) 129/59     Weight: 68 kg (149 lb 14.6 oz)  Body mass index is 20.92 kg/m².    Intake/Output Summary (Last 24 hours) at 5/12/2025 1337  Last data filed at 5/12/2025 0930  Gross per 24 hour   Intake 1009.78 ml   Output 500 ml   Net 509.78 ml         Physical Exam  HENT:      Head: Normocephalic.      Comments: Incisions to scalp s/p NSGY procedure     Nose:      Comments: NG tube in place with feeds running  Eyes:      Pupils: Pupils are equal, round, and reactive to light.   Cardiovascular:      Rate and Rhythm: Normal rate. Rhythm irregular.   Pulmonary:      Breath sounds: Normal breath sounds.      Comments: Tachypnea  Musculoskeletal:      Right lower leg: No edema.      Left lower leg: No edema.   Neurological:      Comments: Withdraws from touch in both lower extremities; contracture in LUE and does not withdraw that from pain; does not arouse to name or prompts               Significant Labs: All pertinent labs within the past 24 hours have been reviewed.  CBC:   Recent Labs   Lab 05/11/25  0222 05/12/25  0226   WBC 15.86* 13.99*   HGB 8.8* 8.8*   HCT 27.3* 28.2*   * 560*     CMP:   Recent Labs   Lab 05/11/25 0222 05/11/25  2249 05/12/25 0226    140 141   K 3.7 3.9 3.9     104 104   CO2 24 24 24   * 129* 123*   BUN 60* 56* 57*   CREATININE 1.0 1.1 1.1   CALCIUM 8.8 8.9 9.0   PROT 6.4  --  6.6   ALBUMIN 1.7*  --  1.7*   BILITOT 0.3  --  0.3   ALKPHOS 82  --  84   AST 34  --  35   ALT 19  --  20   ANIONGAP 12 12 13       Significant Imaging: I have reviewed all pertinent imaging results/findings within the past 24 hours.

## 2025-05-12 NOTE — ASSESSMENT & PLAN NOTE
- last GI appointment in 1/2025  - planned to place on balsalazide at that visit  - no active issues and history noted

## 2025-05-12 NOTE — ASSESSMENT & PLAN NOTE
- patient with seizure activity during this admission  - Vimpat titrated to effect; no longer on cEEG  - continue Vimpat every 12 hpurs

## 2025-05-13 NOTE — PT/OT/SLP PROGRESS
"Speech Language Pathology Treatment    Patient Name:  Ramesh Ricci   MRN:  [id number]  Admitting Diagnosis: Subdural hemorrhage    Recommendations:                General Recommendations:  Dysphagia therapy, Speech/language therapy, and Cognitive-linguistic therapy  Diet recommendations:  NPO, Liquid Diet Level: NPO   Aspiration Precautions: Continue alternate means of nutrition, Frequent oral care, and Strict aspiration precautions   General Precautions: Standard, aspiration, fall  Communication strategies:  provide increased time to answer and go to room if call light pushed    Assessment:     Ramesh Ricci is a 85 y.o. male with an SLP diagnosis of Aphasia and Dysphagia.  He presents with increased lethargy and continued high aspiration risk.     Subjective     "When is he going to get better."     Pain/Comfort:  Pain Rating 1:  (NAD, no response to pain questions)  Pain Rating Post-Intervention 1:  (no change)    Respiratory Status: Nasal cannula    Objective:     Has the patient been evaluated by SLP for swallowing?   Yes  Keep patient NPO? Yes     Pt seen bedside on 2nd attempt with spouse and daughter present.  They report pt has been lethargic with increased HR over the last day.  Pt did rouse with mod cues and sustained alertness given consistent stim.  HR ranged from , RR 38-42, SpO2 100%.  Oral care provided 2/2 dry oral mucosa with dried secretions noted on palate.  Pt tolerate well.  Nectar thick liquids presented via tsp x3 with anterior loss from R on majority of trials.  Delayed swallow initiation x1/3 trials.  Absent pharyngeal swallow on 2/3.  Additional trials held 2/2 though aspiration risk.  No overt s/s aspiration.  Pt did not verbalize to allow for assessment of vocal quality.  No response to y/n question or commands elicited.  Education provided re: cont npo, aspiration risk, s/s aspiration, and POC.  All questions addressed within SLP scope.  Family with no additional " questions.  Pt resting comfortably upon SLP exit.      Goals:   Multidisciplinary Problems       SLP Goals          Problem: SLP    Goal Priority Disciplines Outcome   SLP Goal     SLP Progressing   Description: Speech Language Pathology Goals  Goals expected to be met by 5/23  1. Pt will participate in ongoing assessment of swallow.   2. Pt will answer simple y/n questions with 60% accy with max cues.   3. Pt will follow simple commands with 50% accy with max cues.   4. Pt will verbalize in response to any stim x5 given max cues.                               Plan:     Patient to be seen:  4 x/week   Plan of Care expires:  06/07/25  Plan of Care reviewed with:  patient, daughter, spouse   SLP Follow-Up:  Yes       Discharge recommendations:  Moderate Intensity Therapy   Barriers to Discharge:  Level of Skilled Assistance Needed      Time Tracking:     SLP Treatment Date:   05/13/25  Speech Start Time:  1308  Speech Stop Time:  1326     Speech Total Time (min):  18 min    Billable Minutes: Treatment Swallowing Dysfunction 10 and Self Care/Home Management Training 8    05/13/2025

## 2025-05-13 NOTE — PROGRESS NOTES
[facility] - Tele[facility] Medicine  Progress Note    Patient Name: Ramesh Ricci  MRN: [id number]  Patient Class: IP- Inpatient   Admission Date: 4/23/2025  Length of Stay: 20 days  Attending Physician: Saeed Cutler MD  Primary Care Provider: Nakul Mandujano MD        Subjective     Principal Problem:Subdural hemorrhage        HPI:  Patient is an 85M with a past medical history of aortic stenosis s/p TAVR, Atrial Fibrillation on Eliquis, UC, HTN, Ventriculoperitoneal shunt secondary to Normal pressure hydrocephalus that was complicated by bilateral subdural hematoma with evacuation in March 2025.  He presented initially with a 3 day history of worsening weakness, speech difficulty, and dragging of R leg.  Ct head was obtained and revealed bilateral SDH.  Takes Eliquis for A-fib at home which was reversed with PCC in ED.  He underwent evacuation on 4/25/2025 and was unarousable post-op day 1. Given elevated temp and Procal, he was started on broad spectrum Abx and given concern for seizures he was treated with higher dose than his normal Vimpat. A new Ct head was pbtained with a new small parietal hemorrhage.  MRSA nares negative so Vanc discontinued.  He was started on modafinil with some improvement in alertness, weaned to room air, and NG tube placed. On 5/05/2025, he underwent additional infectious workup given low grade fever and hypotension requiring pressors. Zosyn was initiated and upon discussion with family, they desired patient to be made DNR.  He was off pressors by 5/6/2025, Eliquis was resumed as deemed safe by NSGY.  Additional fever 5/9/2025 with improving CXR as patient had been on pulmonary toilet. Continuous EEG discontinued.  Of note, patient's family endorses no desire for PEG if patient unable to eventually pass swallow study.  NGT remains for nutrition at this point, and in event of continued failed SLP, they would prefer Hospice.  He is to be stepped down to Hospital  Medicine with intentions of honoring patient's desires as expressed by family including which phases of care to avoid escalating to, as well as continued treatment of co-morbidities.  Of note, blood cultures have remained negative along with urine cultures, and if infection had been present, most likely of pulmonary origin.     Overview/Hospital Course:  Ongoing swallow studies daily. Patient spiked fever on 5/12 and remains on Zosyn.    Interval History: Patient with fever to 101F overnight.  Monitoring fever curve and remains on Zosyn.  Failed swallowed study yesterday.  No additional issues at this time.    Review of Systems   Reason unable to perform ROS: Unable to obtain given mentation.     Objective:     Vital Signs (Most Recent):  Temp: 97.9 °F (36.6 °C) (05/13/25 0748)  Pulse: (!) 115 (05/13/25 1120)  Resp: (!) 32 (05/13/25 0748)  BP: (!) 131/57 (05/13/25 0748)  SpO2: 99 % (05/13/25 1120) Vital Signs (24h Range):  Temp:  [96.7 °F (35.9 °C)-101 °F (38.3 °C)] 97.9 °F (36.6 °C)  Pulse:  [] 115  Resp:  [16-39] 32  SpO2:  [96 %-100 %] 99 %  BP: (121-151)/(57-82) 131/57     Weight: 68 kg (149 lb 14.6 oz)  Body mass index is 20.92 kg/m².    Intake/Output Summary (Last 24 hours) at 5/13/2025 1211  Last data filed at 5/13/2025 0529  Gross per 24 hour   Intake 1250 ml   Output 300 ml   Net 950 ml         Physical Exam  Constitutional:       Appearance: He is not toxic-appearing or diaphoretic.   HENT:      Head:      Comments: Incisions to scalp s/p NSGY procedure     Nose:      Comments: NG tube in place  Cardiovascular:      Rate and Rhythm: Normal rate. Rhythm irregular.   Pulmonary:      Comments: Tachypnea  Musculoskeletal:      Right lower leg: No edema.      Left lower leg: No edema.   Neurological:      Comments: Working with physical therapy; nonverbal; maintains eye contact               Significant Labs: All pertinent labs within the past 24 hours have been reviewed.  CBC:   Recent Labs   Lab  05/12/25 0226 05/13/25 0435   WBC 13.99* 12.50   HGB 8.8* 8.5*   HCT 28.2* 26.6*   * 566*     CMP:   Recent Labs   Lab 05/11/25 2249 05/12/25 0226 05/13/25 0435    141 139   K 3.9 3.9 4.1    104 104   CO2 24 24 24   * 123* 120*   BUN 56* 57* 53*   CREATININE 1.1 1.1 1.0   CALCIUM 8.9 9.0 8.8   PROT  --  6.6 6.4   ALBUMIN  --  1.7* 1.7*   BILITOT  --  0.3 0.2   ALKPHOS  --  84 94   AST  --  35 40   ALT  --  20 23   ANIONGAP 12 13 11       Significant Imaging: I have reviewed all pertinent imaging results/findings within the past 24 hours.      Assessment & Plan  Subdural hemorrhage  -  history of normal pressure hydrocephalus that was complicated by bilateral subdural hematoma with evacuation in March 2025  - presented this current admission with a 3 day history of worsening weakness, speech difficulty, and dragging of R leg  -Ct head was obtained and revealed bilateral SDH; Takes Eliquis for A-fib at home which was reversed with PCC in ED  - underwent evacuation on 4/25/2025 and was unarousable post-op day 1  - new Ct head was obtained with a new small parietal hemorrhage  - continue blood pressure control to SBP <160  - swallow studies to continue to evaluate for oral intake; continued Neurochecks and assessment for improvement  UC (ulcerative colitis)  - last GI appointment in 1/2025  - planned to place on balsalazide at that visit  - no active issues and history noted    Acquired hypothyroidism  - T4 1.4 six months ago  - continue Synthroid 125 mcg    Essential hypertension  Patient's blood pressure range in the last 24 hours was: BP  Min: 121/67  Max: 151/81.The patient's inpatient anti-hypertensive regimen is listed below:  Current Antihypertensives  hydrALAZINE injection 10 mg, Every 4 hours PRN, Intravenous  labetalol 20 mg/4 mL (5 mg/mL) IV syring, Every 4 hours PRN, Intravenous  metoprolol tartrate (LOPRESSOR) tablet 25 mg, Every 8 hours, Per NG tube  metoprolol injection 5  mg, Every 5 min PRN, Intravenous    Plan  - continue metoprolol  - hydral and labetalol on for goal SBP <160  Left ventricular diastolic dysfunction, NYHA class 1  - last Echo 4/2025:    Left Ventricle: The left ventricle is normal in size. Ventricular mass is normal. Normal wall thickness. There is normal diastolic function.    Right Ventricle: The right ventricle is normal in size. Wall thickness is normal. Systolic function is normal.    Aortic Valve: There is a transcatheter valve replacement in the aortic position.    Mitral Valve: There is severe mitral annular calcification. There is moderate stenosis. The mean pressure gradient across the mitral valve is 4 mmHg at a heart rate of 63 bpm; MVA 1.4. There is mild regurgitation.    IVC/SVC: Normal venous pressure at 3 mmHg.    - continue metoprolol    PAF (paroxysmal atrial fibrillation)  Patient has persistent (7 days or more) atrial fibrillation. Patient is currently in atrial fibrillation. [id number] Score: 3. The patients heart rate in the last 24 hours is as follows:  Pulse  Min: 93  Max: 141     Antiarrhythmics  metoprolol tartrate (LOPRESSOR) tablet 25 mg, Every 8 hours, Per NG tube  metoprolol injection 5 mg, Every 5 min PRN, Intravenous    Anticoagulants  apixaban tablet 5 mg, 2 times daily, Per NG tube    Plan  - Replete lytes with a goal of K>4, Mg >2  - Patient is anticoagulated, see medications listed above.  - Patient with brief lack of control overnight; repeat EKG and trop pending    Coronary artery disease involving native coronary artery of native heart without angina pectoris  Patient with known CAD s/p , which is  Will continue  and monitor for S/Sx of angina/ACS. Continue to monitor on telemetry.   ACP (advance care planning)  - family endorses that patient would not desire PEG if it becomes necessary  - pending swallow evaluations    S/P ventriculoperitoneal shunt  - due to NPH in past  - hx noted  Acute encephalopathy      Seizure  disorder  - patient with seizure activity during this admission  - Vimpat titrated to effect; no longer on cEEG  - continue Vimpat every 12 hpurs    Right lower lobe pneumonia  - patient with leukocytosis and concern for infection but no foci of infection with exception of potential pneumonia  - Ucx and blood cx neg  - leukocytosis improving on Zosyn so will continue for now    VTE Risk Mitigation (From admission, onward)           Ordered     apixaban tablet 5 mg  2 times daily         05/07/25 1611     Reason for No Pharmacological VTE Prophylaxis  Once        Question:  Reasons:  Answer:  Active Bleeding    04/23/25 1950     IP VTE HIGH RISK PATIENT  Once         04/23/25 1950     Place sequential compression device  Until discontinued         04/23/25 1950                    Discharge Planning   LIDIA: 5/16/2025     Code Status: DNR   Medical Readiness for Discharge Date:   Discharge Plan A: Skilled Nursing Facility   Discharge Delays: None known at this time          Saeed Cutler MD  Department of Hospital Medicine   [facility] - Telemetry Stepdown

## 2025-05-13 NOTE — PLAN OF CARE
Problem: Stroke, Intracerebral Hemorrhage  Goal: Optimal Coping  Outcome: Progressing  Goal: Effective Bowel Elimination  Outcome: Progressing  Goal: Optimal Cerebral Tissue Perfusion  Outcome: Progressing  Goal: Optimal Cognitive Function  Outcome: Progressing  Goal: Effective Communication Skills  Outcome: Progressing  Goal: Optimal Functional Ability  Outcome: Progressing  Goal: Optimal Nutrition Intake  Outcome: Progressing  Goal: Optimal Pain Control and Function  Outcome: Progressing  Goal: Effective Oxygenation and Ventilation  Outcome: Progressing  Goal: Improved Sensorimotor Function  Outcome: Progressing  Goal: Safe and Effective Swallow  Outcome: Progressing  Goal: Effective Urinary Elimination  Outcome: Progressing     Problem: Skin Injury Risk Increased  Goal: Skin Health and Integrity  Outcome: Progressing     Problem: Adult Inpatient Plan of Care  Goal: Plan of Care Review  Outcome: Progressing  Goal: Patient-Specific Goal (Individualized)  Outcome: Progressing  Goal: Absence of Hospital-Acquired Illness or Injury  Outcome: Progressing  Goal: Optimal Comfort and Wellbeing  Outcome: Progressing  Goal: Readiness for Transition of Care  Outcome: Progressing     Problem: Acute Kidney Injury/Impairment  Goal: Fluid and Electrolyte Balance  Outcome: Progressing  Goal: Improved Oral Intake  Outcome: Progressing  Goal: Effective Renal Function  Outcome: Progressing     Problem: Wound  Goal: Optimal Coping  Outcome: Progressing  Goal: Optimal Functional Ability  Outcome: Progressing  Goal: Absence of Infection Signs and Symptoms  Outcome: Progressing  Goal: Improved Oral Intake  Outcome: Progressing  Goal: Optimal Pain Control and Function  Outcome: Progressing  Goal: Skin Health and Integrity  Outcome: Progressing  Goal: Optimal Wound Healing  Outcome: Progressing     Problem: Fall Injury Risk  Goal: Absence of Fall and Fall-Related Injury  Outcome: Progressing     Problem: Delirium  Goal: Optimal  Coping  Outcome: Progressing  Goal: Improved Behavioral Control  Outcome: Progressing  Goal: Improved Attention and Thought Clarity  Outcome: Progressing  Goal: Improved Sleep  Outcome: Progressing     Problem: Pneumonia  Goal: Fluid Balance  Outcome: Progressing  Goal: Resolution of Infection Signs and Symptoms  Outcome: Progressing  Goal: Effective Oxygenation and Ventilation  Outcome: Progressing    Patient kept monitored, no ss of distress within the shift. Safety maintained with bed wheels locked, side rails up, call light and frequently needed items kept within reach. Perineal care rendered. Still on continuous feeding via NGT; kept intact, aspiration precautions maintained. Noted 1 spike of fever at 101°F; PRN tylenol given per MAR, ELISABETH Jimenez MD notified. Needs attended. Meds given per MAR. Rest promoted. Will continue POC.

## 2025-05-13 NOTE — PLAN OF CARE
Problem: Occupational Therapy  Goal: Occupational Therapy Goal  Description: Goals to be met by: 6/24/25     Patient will increase functional independence with ADLs by performing:    UE Dressing with Maximum Assistance.  LE Dressing with Maximum Assistance.  Grooming while EOB with Maximum Assistance.  Toileting from bedside commode with Maximum Assistance for hygiene and clothing management.   Sitting at edge of bed x15 minutes with Maximum Assistance.  Rolling to Bilateral with Maximum Assistance.   Supine to sit with Maximum Assistance.  Stand pivot transfers with Maximum Assistance.    Outcome: Progressing     Goals remain appropriate.

## 2025-05-13 NOTE — PLAN OF CARE
Problem: Stroke, Intracerebral Hemorrhage  Goal: Optimal Coping  Outcome: Not Progressing  Goal: Effective Bowel Elimination  Outcome: Not Progressing  Goal: Optimal Cerebral Tissue Perfusion  Outcome: Not Progressing  Goal: Optimal Cognitive Function  Outcome: Not Progressing  Goal: Effective Communication Skills  Outcome: Not Progressing  Goal: Optimal Functional Ability  Outcome: Not Progressing  Goal: Optimal Nutrition Intake  Outcome: Not Progressing  Goal: Optimal Pain Control and Function  Outcome: Not Progressing  Goal: Effective Oxygenation and Ventilation  Outcome: Not Progressing  Goal: Improved Sensorimotor Function  Outcome: Not Progressing  Goal: Safe and Effective Swallow  Outcome: Not Progressing  Goal: Effective Urinary Elimination  Outcome: Not Progressing     Problem: Adult Inpatient Plan of Care  Goal: Plan of Care Review  Outcome: Not Progressing  Goal: Patient-Specific Goal (Individualized)  Outcome: Not Progressing  Goal: Absence of Hospital-Acquired Illness or Injury  Outcome: Not Progressing  Goal: Optimal Comfort and Wellbeing  Outcome: Not Progressing  Goal: Readiness for Transition of Care  Outcome: Not Progressing     Problem: Skin Injury Risk Increased  Goal: Skin Health and Integrity  Outcome: Not Progressing   Palliative consult. Partial DNR/chemical code.

## 2025-05-13 NOTE — ASSESSMENT & PLAN NOTE
Patient's blood pressure range in the last 24 hours was: BP  Min: 121/67  Max: 151/81.The patient's inpatient anti-hypertensive regimen is listed below:  Current Antihypertensives  hydrALAZINE injection 10 mg, Every 4 hours PRN, Intravenous  labetalol 20 mg/4 mL (5 mg/mL) IV syring, Every 4 hours PRN, Intravenous  metoprolol tartrate (LOPRESSOR) tablet 25 mg, Every 8 hours, Per NG tube  metoprolol injection 5 mg, Every 5 min PRN, Intravenous    Plan  - continue metoprolol  - hydral and labetalol on for goal SBP <160

## 2025-05-13 NOTE — ASSESSMENT & PLAN NOTE
Patient has persistent (7 days or more) atrial fibrillation. Patient is currently in atrial fibrillation. [id number] Score: 3. The patients heart rate in the last 24 hours is as follows:  Pulse  Min: 93  Max: 141     Antiarrhythmics  metoprolol tartrate (LOPRESSOR) tablet 25 mg, Every 8 hours, Per NG tube  metoprolol injection 5 mg, Every 5 min PRN, Intravenous    Anticoagulants  apixaban tablet 5 mg, 2 times daily, Per NG tube    Plan  - Replete lytes with a goal of K>4, Mg >2  - Patient is anticoagulated, see medications listed above.  - Patient with brief lack of control overnight; repeat EKG and trop pending

## 2025-05-13 NOTE — PT/OT/SLP PROGRESS
Occupational Therapy   Treatment    Name: Ramesh Ricci  MRN: [id number]  Admitting Diagnosis:  Subdural hemorrhage  18 Days Post-Op    Recommendations:     Discharge Recommendations: Moderate Intensity Therapy  Discharge Equipment Recommendations:  wheelchair, lift device, hospital bed  Barriers to discharge:   (increased (A) required)    Assessment:     Raemsh Ricci is a 85 y.o. male with a medical diagnosis of Subdural hemorrhage.  He presents with fair tolerance for EOB activities. Pt with improved alertness at EOB compared to in supine. Performance deficits affecting function are weakness, impaired endurance, impaired self care skills, impaired functional mobility, impaired cognition, decreased upper extremity function, decreased lower extremity function, decreased safety awareness, pain, impaired cardiopulmonary response to activity, decreased ROM.     Rehab Prognosis:  Good; patient would benefit from acute skilled OT services to address these deficits and reach maximum level of function.       Plan:     Patient to be seen 3 x/week to address the above listed problems via therapeutic activities, therapeutic exercises, neuromuscular re-education, cognitive retraining, wheelchair management/training, self-care/home management  Plan of Care Expires: 06/24/25  Plan of Care Reviewed with: patient, spouse, daughter    Subjective     Chief Complaint: Per family report: R shoulder pain  Patient/Family Comments/goals: Increased patient arousal   Pain/Comfort:  Pain Rating 1:  (did not rate, indicated via facial expression)  Location - Side 1: Right  Pain Addressed 1: Reposition, Distraction    Objective:     Communicated with: RN prior to session.  Patient found supine with pulse ox (continuous), oxygen, Condom Catheter, pressure relief boots, telemetry, NG tube (daughter present) upon OT entry to room.    General Precautions: Standard, NPO, aspiration, fall, seizure (DNR)    Orthopedic  Precautions:N/A  Braces: N/A  Respiratory Status: Nasal cannula, flow 1 L/min     Occupational Performance:     Bed Mobility:    Patient completed Rolling/Turning to Left with  total assistance  Patient completed Rolling/Turning to Right with total assistance  Patient completed Scooting/Bridging with total assistance to scoot forward seated at EOB and to scoot up HOB in supine.   Patient completed Supine to Sit with total assistance  Patient completed Sit to Supine with total assistance     Activities of Daily Living:  Upper Body Dressing: total assistance to don gown around front supine in bed.   Toileting: total assistance for hygiene and clothing management supine in bed.       [facility] 6 Click ADL: 6    Treatment & Education:  Seated at EOB, patient required total A for postural control due to significant posterior lean. Patient with eyes open while seated EOB. Patient with no following of one step commands noted, no visual tracking noted at this date. Pt demonstrated pain response with  movement of R arm.     Therapist administered LUE PROM exercises in 3 planes, 10 reps each.     Provided family/caregiver education regarding therapy recommendations due to confusion. Patient's family with questions regarding why case management is recommending SNF. Provided education within OT scope of practice regarding discharge recommendations and mobility needs.     Pt's family had no further questions & when asked whether there were any concerns pt's family reported none.    Patient left supine with all lines intact, call button in reach, RN notified, and family present    GOALS:   Multidisciplinary Problems       Occupational Therapy Goals          Problem: Occupational Therapy    Goal Priority Disciplines Outcome Interventions   Occupational Therapy Goal     OT, PT/OT Progressing    Description: Goals to be met by: 6/24/25     Patient will increase functional independence with ADLs by performing:    UE Dressing with Maximum  Assistance.  LE Dressing with Maximum Assistance.  Grooming while EOB with Maximum Assistance.  Toileting from bedside commode with Maximum Assistance for hygiene and clothing management.   Sitting at edge of bed x15 minutes with Maximum Assistance.  Rolling to Bilateral with Maximum Assistance.   Supine to sit with Maximum Assistance.  Stand pivot transfers with Maximum Assistance.                         DME Justifications:  Ramesh Yo requires a hospital bed due to him requiring positioning of the body in ways not feasible with an ordinary bed due to limited ability and cannot independently make changes in body position without the use of the bed.The positioning of the body cannot be sufficiently resolved by the use of pillows and wedges.    Time Tracking:     OT Date of Treatment: 05/13/25  OT Start Time: 1113  OT Stop Time: 1150  OT Total Time (min): 37 min    Billable Minutes:Therapeutic Activity 17  Neuromuscular Re-education 20               5/13/2025

## 2025-05-13 NOTE — PLAN OF CARE
Had thorough discussion with patient's wife and daughter.  Given repeated poor performance on swallow test and firmly opposed to PEG placement per what they deem to be patient's desire, family is requesting hospice.  Given patient's poor state, inpatient hospice was discussed and they are interested.  Consult placed to Palliative Care.  As of now, family would like to continue care being provided.        Saeed Cutler MD  [facility]

## 2025-05-13 NOTE — SUBJECTIVE & OBJECTIVE
Interval History: Patient with fever to 101F overnight.  Monitoring fever curve and remains on Zosyn.  Failed swallowed study yesterday.  No additional issues at this time.    Review of Systems   Reason unable to perform ROS: Unable to obtain given mentation.     Objective:     Vital Signs (Most Recent):  Temp: 97.9 °F (36.6 °C) (05/13/25 0748)  Pulse: (!) 115 (05/13/25 1120)  Resp: (!) 32 (05/13/25 0748)  BP: (!) 131/57 (05/13/25 0748)  SpO2: 99 % (05/13/25 1120) Vital Signs (24h Range):  Temp:  [96.7 °F (35.9 °C)-101 °F (38.3 °C)] 97.9 °F (36.6 °C)  Pulse:  [] 115  Resp:  [16-39] 32  SpO2:  [96 %-100 %] 99 %  BP: (121-151)/(57-82) 131/57     Weight: 68 kg (149 lb 14.6 oz)  Body mass index is 20.92 kg/m².    Intake/Output Summary (Last 24 hours) at 5/13/2025 1211  Last data filed at 5/13/2025 0529  Gross per 24 hour   Intake 1250 ml   Output 300 ml   Net 950 ml         Physical Exam  Constitutional:       Appearance: He is not toxic-appearing or diaphoretic.   HENT:      Head:      Comments: Incisions to scalp s/p NSGY procedure     Nose:      Comments: NG tube in place  Cardiovascular:      Rate and Rhythm: Normal rate. Rhythm irregular.   Pulmonary:      Comments: Tachypnea  Musculoskeletal:      Right lower leg: No edema.      Left lower leg: No edema.   Neurological:      Comments: Working with physical therapy; nonverbal; maintains eye contact               Significant Labs: All pertinent labs within the past 24 hours have been reviewed.  CBC:   Recent Labs   Lab 05/12/25 0226 05/13/25  0435   WBC 13.99* 12.50   HGB 8.8* 8.5*   HCT 28.2* 26.6*   * 566*     CMP:   Recent Labs   Lab 05/11/25  2249 05/12/25 0226 05/13/25  0435    141 139   K 3.9 3.9 4.1    104 104   CO2 24 24 24   * 123* 120*   BUN 56* 57* 53*   CREATININE 1.1 1.1 1.0   CALCIUM 8.9 9.0 8.8   PROT  --  6.6 6.4   ALBUMIN  --  1.7* 1.7*   BILITOT  --  0.3 0.2   ALKPHOS  --  84 94   AST  --  35 40   ALT  --  20 23    ANIONGAP 12 13 11       Significant Imaging: I have reviewed all pertinent imaging results/findings within the past 24 hours.

## 2025-05-14 PROBLEM — R53.1 WEAKNESS: Status: ACTIVE | Noted: 2025-01-01

## 2025-05-14 PROBLEM — E44.1 MALNUTRITION OF MILD DEGREE: Status: ACTIVE | Noted: 2025-01-01

## 2025-05-14 PROBLEM — Z51.5 PALLIATIVE CARE ENCOUNTER: Status: ACTIVE | Noted: 2025-01-01

## 2025-05-14 PROBLEM — Z51.5 COMFORT MEASURES ONLY STATUS: Status: ACTIVE | Noted: 2025-01-01

## 2025-05-14 NOTE — CONSULTS
Palliative Medicine  Consult Note       Patient Name: Ramesh Ricci   MRN: [id number]   Admission Date: 4/23/2025   Hospital Length of Stay: 21   Attending Provider: Saeed Cutler MD   Consulting Provider: NITA Garnica  Primary Care Physician: Nakul Mandujano MD   Principal Problem: Subdural hemorrhage     Patient information was obtained from spouse/SO, relative(s), past medical records, ER records, and primary team.        Inpatient consult to Palliative Care  Consult performed by: Noelle Gabriel, CNS  Consult ordered by: Saeed Cutler MD             Assessment/Plan:      Palliative Care Encounter:  Impression:   Mr. Ramesh Ricci Sr. Is an 84 y/o gentleman with hx of  shunt d/t NPH, b/l SDH with evac (3/2025), Afib (eliquis), TAVR, UC, HTN. He presented back to ED 4/23 with weakness and was found with SDH and evac was done same 4/25. His hospitalization pt has been with progressive weakness and repeated failure of swallow study.     Palliative care consulted for GOC, as family shares pt would not want PEG, per communication with Dr. Cutler.        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 He presented initially with a 3 day history of worsening weakness, speech difficulty, and dragging of R leg. Ct head was obtained and revealed bilateral SDH. Takes Eliquis  for A-fib at home which was reversed with PCC in ED. He underwent evacuation on 4/25/2025 and was unarousable post-op day 1. Given elevated temp and Procal, he was started on broad spectrum Abx and given concern for seizures he was treated with higher dose than his normal Vimpat. A new Ct head was pbtained with a new small parietal hemorrhage. MRSA nares negative so Vanc discontinued. He was started on modafinil with some improvement in alertness, weaned to room air, and NG tube placed. On 5/05/2025, he underwent additional infectious workup given low grade fever and hypotension requiring pressors. Zosyn was initiated and upon discussion with family, they desired patient to be made DNR. He was off pressors by 5/6/2025, Eliquis was resumed as deemed safe by NSGY. Additional fever 5/9/2025 with improving CXR as patient had been on pulmonary toilet. Continuous EEG discontinued. Of note, patient's family endorses no desire for PEG if patient unable to eventually pass swallow study. NGT remains for nutrition at this point, and in event of continued failed SLP, they would prefer Hospice. He is to be stepped down to Hospital Medicine with intentions of honoring patient's desires as expressed by family including which phases of care to avoid escalating to, as well as continued treatment of co-morbidities. Of note, blood cultures have remained negative along with urine cultures, and if infection had been present, most likely of pulmonary origin.      Procedure(s) (LRB):  CRANIOTOMY, FOR SUBDURAL HEMATOMA EVACUATION, bilateral. shunt removal (Bilateral)  REMOVAL, SHUNT, VENTRICULOPERITONEAL      Hospital Course: Underwent swallow studies daily without improvement. Patient spiked fever on 5/12 and remained on Zosyn. Had thorough discussion with patient's wife and daughter. Given repeated poor performance on swallow test and firmly opposed to PEG placement per what they deem to be patient's desire, family requested hospice.  Given patient's poor state, inpatient hospice was discussed. Consult placed to Palliative Care and after that evaluation and discussion, decision made to transfer patient to inpatient hospice.     Subdural hematoma  -  history of normal pressure hydrocephalus that was complicated by bilateral subdural hematoma with evacuation in March 2025  -Ct head was obtained and revealed bilateral SDH; Takes Eliquis for A-fib at home which was reversed with PCC in ED  - underwent evacuation on 4/25/2025 and was unarousable post-op day 1  - new Ct head was obtained with a new small parietal hemorrhage  -managed per primary team and specialty services.      Left ventricular diastolic dysfunction, NYHA class 1  - last Echo 4/2025:     Seizure disorder  - patient with seizure activity during this admission  - Vimpat titrated to effect; no longer on cEEG  - Vimpat every 12 hpurs   -managed per primary team and specialty services.            Advance Care Planning   Advance Directives:   Living Will: No    Do Not Resuscitate Status: Yes      Decision Making:  Family answered questions  Goals of Care: The family endorses that what is most important right now is to focus on quality of life, even if it means sacrificing a little time and comfort and QOL     Accordingly, we have decided that the best plan to meet the patient's goals includes pivot to comfort-focused care.     5/14/25:  -Visited with pt this afternoon at bs. He is non-responsive and unable to participate in conversation. He appears comfortable.   -At bs were Mone (wife) and [place] (daughter). Pt also has son who was not present, family shares that son is agreeable to discussion with pal med.   -Family shares that pt has always been a busy and independent gentleman, retired from drafting.   -They share they knew his wishes that he would not want a PEG to be kept alive artificially.   -I Discussed meaning of comfort focused care including, comfort through end of life, symptom  "management, and focus on QOL by eliminating labs, transfusions, invasive procedures, IV antibiotics, and medication that are not aimed at providing comfort or symptom management.    -We discussed utility of opioids, along with other medications, to treat symptoms and assist with goal of alleviation of suffering at EOL. We discussed that these medications are NOT meant to hasten death.   -Discussed that pt's must meet certain criteria of symptom management to qualify for inpatient hospice.   -Discussed that pt is currently without symptom burden to meet qualification for inpatient hospice. Discussed ability to transfer pt to palliative unit with comfort measures at this time. Explained that if pt remains stable, without increased symptom burden, and does NOT meet inpatient hospice criteria after a few days, efforts will be focused on discharging pt back to NH with hospice.    -Pt's wife and daughter are understanding and agreeable to transition to HPU. Notified Dr. Cutler and bs RN.     Symptom Management:    -Debility: pt bed bound since this hospitalization and without any meaningful ability to participate in pt/ot.     -Anorexia:Pt with continued fail of swallow study. Currently with NGt and TF      Recommendations and follow up plan:  -Most important goals at this time: Comfort focused care   -Code status: DNR  -Disposition: HPU       The above recommendations communicated directly to primary team on 5/14/25.     Thank you for your consult. I will sign off. Please contact us if you have any additional questions.       Subjective:     Chief Complaint:   Chief Complaint   Patient presents with    Mustiple complaints     Declining in past few days, has VPshunt, leaning to r more since yest and slow speech         HPI:   Per chart review: " Patient is an 85M with a past medical history of aortic stenosis s/p TAVR, Atrial Fibrillation on Eliquis, UC, HTN, Ventriculoperitoneal shunt secondary to Normal pressure " "hydrocephalus that was complicated by bilateral subdural hematoma with evacuation in March 2025. He presented initially with a 3 day history of worsening weakness, speech difficulty, and dragging of R leg. Ct head was obtained and revealed bilateral SDH. Takes Eliquis for A-fib at home which was reversed with PCC in ED. He underwent evacuation on 4/25/2025 and was unarousable post-op day 1. Given elevated temp and Procal, he was started on broad spectrum Abx and given concern for seizures he was treated with higher dose than his normal Vimpat. A new Ct head was pbtained with a new small parietal hemorrhage. MRSA nares negative so Vanc discontinued. He was started on modafinil with some improvement in alertness, weaned to room air, and NG tube placed. On 5/05/2025, he underwent additional infectious workup given low grade fever and hypotension requiring pressors. Zosyn was initiated and upon discussion with family, they desired patient to be made DNR. He was off pressors by 5/6/2025, Eliquis was resumed as deemed safe by NSGY. Additional fever 5/9/2025 with improving CXR as patient had been on pulmonary toilet. Continuous EEG discontinued. Of note, patient's family endorses no desire for PEG if patient unable to eventually pass swallow study. NGT remains for nutrition at this point, and in event of continued failed SLP, they would prefer Hospice. He is to be stepped down to Hospital Medicine with intentions of honoring patient's desires as expressed by family including which phases of care to avoid escalating to, as well as continued treatment of co-morbidities. Of note, blood cultures have remained negative along with urine cultures, and if infection had been present, most likely of pulmonary origin.      Procedure(s) (LRB):  CRANIOTOMY, FOR SUBDURAL HEMATOMA EVACUATION, bilateral. shunt removal (Bilateral)  REMOVAL, SHUNT, VENTRICULOPERITONEAL"      Hospital Course:  Per chart review: "Underwent swallow studies " "daily without improvement. Patient spiked fever on 5/12 and remained on Zosyn. Had thorough discussion with patient's wife and daughter. Given repeated poor performance on swallow test and firmly opposed to PEG placement per what they deem to be patient's desire, family requested hospice. Given patient's poor state, inpatient hospice was discussed. Consult placed to Palliative Care and after that evaluation and discussion, decision made to transfer patient to inpatient hospice."    Review of Symptoms      Symptom Assessment (ESAS 0-10 Scale)  Unable to complete assessment due to Acuity of condition         Pain Assessment in Advanced Demential Scale (PAINAD)   Breathing - Independent of vocalization:  0  Negative vocalization:  0  Facial expression:  0  Body language:  0  Consolability:  0  Total:  0    Performance Status:  20    Living Arrangements:  Lives with spouse    Psychosocial/Cultural:   See Palliative Psychosocial Note: Yes  Pt lives with his wife. He has 2 children.   **Primary  to Follow**  Palliative Care  Consult: No    Spiritual:  F - Keila and Belief:  Mandaeism. Has received anointing of the sick.           ROS:  Review of Systems   Unable to perform ROS: Acuity of condition         Past Medical History:   Diagnosis Date    Allergies 01/31/2025    Anemia     Anticoagulant long-term use     Anxiety     Atrophic kidney 11/16/2012    BPH (benign prostatic hyperplasia) 11/16/2012    Congenital absence of right kidney 07/05/2017    DDD (degenerative disc disease), cervical 07/05/2017    x-ray 7/17 - Severe    Ex-smoker 12/20/2018    Exudative age-related macular degeneration, right eye, with active choroidal neovascularization 02/15/2024    GERD (gastroesophageal reflux disease) 11/16/2012    Glaucoma 11/16/2012    HTN (hypertension) 11/16/2012    Hypothyroid 11/16/2012    Internal carotid artery stenosis 11/16/2012    Iron deficiency anemia due to chronic blood loss 10/29/2021 "    Left ventricular diastolic dysfunction, NYHA class 1 04/16/2015    4/15    Low serum testosterone level 11/16/2012    Normal cardiac stress test 11/16/2012    NPH (normal pressure hydrocephalus) 02/03/2025    Osteopenia 11/16/2012    PAF (paroxysmal atrial fibrillation) 12/20/2018    Prostate CA 01/15/2013    XRT 12/12  Dr. 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    Seizure disorder 4/24/2025    Shingles 11/16/2012    Skin disease 2020    Seems to have started after Covid vaccine    Stage 3a chronic kidney disease 10/06/2014    Stroke 2017    tia   no residual    Thrombocytopenia 01/31/2025    TIA (transient ischemic attack) 11/16/2012    UC (ulcerative colitis) 11/16/2012     Past Surgical History:   Procedure Laterality Date    ADENOIDECTOMY      COLONOSCOPY N/A 03/25/2022    Procedure: COLONOSCOPY;  Surgeon: Ashley Nguyen MD;  Location: [facility];  Service: Endoscopy;  Laterality: N/A;    CRANIOTOMY FOR EVACUATION OF SUBDURAL HEMATOMA Bilateral 4/25/2025    Procedure: CRANIOTOMY, FOR SUBDURAL HEMATOMA EVACUATION, bilateral. shunt removal;  Surgeon: Jairo Blankenship MD;  Location: [facility] OR 2ND FLR;  Service: Neurosurgery;  Laterality: Bilateral;  ariadna segal. cranial plating kit. drill available. hemovac drains. to follow other Ware case    ESOPHAGOGASTRODUODENOSCOPY N/A 03/25/2022    Procedure: EGD (ESOPHAGOGASTRODUODENOSCOPY);  Surgeon: Ashley Nguyen MD;  Location: [facility];  Service: Endoscopy;  Laterality: N/A;  added on per Dr. Nguyen    ESOPHAGOGASTRODUODENOSCOPY N/A 6/7/2024    Procedure: EGD (ESOPHAGOGASTRODUODENOSCOPY);  Surgeon: Audie Snell MD;  Location: [facility] (4TH FLR);  Service: Endoscopy;  Laterality: N/A;  5/21 R/s,sent updated instr via portal.pt informed to hold eliquis for 2 days.AC  Ref by: ,  approved to hold Eliquis (apixaban) for 2 days per Dr. Conklin-see media file  5/9/24-GT  5/31-pre call complete-tb    EVACUATION OF  SUBDURAL HEMATOMA Bilateral 3/24/2025    Procedure: EVACUATION, HEMATOMA, SUBDURAL  AND SHUNT TIE OFF;  Surgeon: Jairo Blankenship MD;  Location: [facility] OR [address] [place] [address];  Service: Neurosurgery;  Laterality: Bilateral;  bilateral bobby holes for subdural hygroma and tying off of  shunt at clavicle. to follow other ware cases    EYE SURGERY Right 11/2020    cataract    HERNIA REPAIR      LEFT HEART CATHETERIZATION Right 10/29/2021    Procedure: CATHETERIZATION, HEART, LEFT AND RIGHT  - LV DARNELL POSSIBLE;  Surgeon: Beau Conklin MD;  Location: [facility] CATH LAB;  Service: Cardiology;  Laterality: Right;    LUMBAR PUNCTURE N/A 1/13/2025    Procedure: Lumbar Puncture;  Surgeon: Jairo Blankenship MD;  Location: [facility] OR [address] [place] [address];  Service: Neurosurgery;  Laterality: N/A;    REMOVAL OF VENTRICULOPERITONEAL SHUNT  4/25/2025    Procedure: REMOVAL, SHUNT, VENTRICULOPERITONEAL;  Surgeon: Jairo Blankenship MD;  Location: [facility] OR [address] [place] [address];  Service: Neurosurgery;;    RIGHT HEART CATHETERIZATION Right 10/29/2021    Procedure: INSERTION, CATHETER, RIGHT HEART;  Surgeon: Beau Conklin MD;  Location: [facility] CATH LAB;  Service: Cardiology;  Laterality: Right;    SKIN BIOPSY  2020    TONSILLECTOMY      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    Procedure: INSERTION, SHUNT, VENTRICULOPERITONEAL;  Surgeon: Jairo Blankenship MD;  Location: [facility] OR [address] [place] [address];  Service: Neurosurgery;  Laterality: Right;    VENTRICULOPERITONEAL SHUNT  2/6/2025    Procedure: INSERTION, SHUNT, VENTRICULOPERITONEAL;  Surgeon: Shar Elizabeth MD;  Location: [facility] OR [address] [place] [address];  Service: General;;     Family History   Problem Relation Name Age of Onset    Hypertension Mother      Alzheimer's disease Mother      Heart attack Father      Diabetes Brother      Hypertension Brother      Cancer Brother      Heart disease Brother  56        MI    Colon cancer Neg Hx      Esophageal cancer Neg Hx           Review of patient's allergies indicates:   Allergen Reactions     Benazepril Other (See Comments)     Cough       Medications:  Current Medications[1]         Objective:      Physical Exam:  Vitals: Temp: 98.1 °F (36.7 °C) (05/14/25 1120)  Pulse: 105 (05/14/25 1124)  Resp: 18 (05/14/25 0915)  BP: (!) 101/58 (05/14/25 1120)  SpO2: 98 % (05/14/25 1120)    Physical Exam  Constitutional:       Appearance: He is ill-appearing.   Neurological:      Mental Status: He is lethargic.                 Labs:   Creatinine   Date Value Ref Range Status   05/14/2025 0.8 0.5 - 1.4 mg/dL Final      Hemoglobin   Date Value Ref Range Status   03/21/2025 14.7 14.0 - 18.0 g/dL Final     HGB   Date Value Ref Range Status   05/14/2025 8.5 (L) 14.0 - 18.0 gm/dL Final      Albumin   Date Value Ref Range Status   05/14/2025 1.7 (L) 3.5 - 5.2 g/dL Final   05/13/2025 1.7 (L) 3.5 - 5.2 g/dL Final   05/12/2025 1.7 (L) 3.5 - 5.2 g/dL Final   03/21/2025 3.9 3.5 - 5.2 g/dL Final   02/09/2025 3.7 3.5 - 5.2 g/dL Final   10/18/2024 3.7 3.5 - 5.2 g/dL Final          Imaging: Encounter Form Printed    Encounter form printed on           CT Head Without Contrast  Status: Final result     [employer] Results Release    [employer] Status: Active (w/ proxy users)  Results Release     PACS Images for [employer] Viewer     Show images for CT Head Without Contrast   Contains abnormal data CT Head Without Contrast  Order: [id number]   Status: Final result       Next appt: 08/04/2025 at 10:30 AM in Gastroenterology (Audie Snell MD)    Test Result Released: Yes (seen)    0 Result Notes  Details    Reading Physician Reading Date Result Priority   Sebastien Tian MD  [phone]  4/29/2025 STAT     Narrative & Impression  EXAMINATION:  CT HEAD WITHOUT CONTRAST     CLINICAL HISTORY:  Subdural hemorrhage;     TECHNIQUE:  Low dose axial CT images obtained throughout the head without the use of intravenous contrast.  Axial, sagittal and coronal reconstructions were performed.     COMPARISON:  04/26/2025      FINDINGS:  Postsurgical change from recent bilateral subdural hematoma evacuation.  Small residual heterogeneous collections over both cerebral hemispheres, which appear similar to prior exam.  No evidence of new or ongoing hemorrhage within the collections.  No new intracranial mass effect or midline shift.     No new extra-axial blood or fluid collections elsewhere.     There is a new focus of intraparenchymal hemorrhage in the right parietal lobe.  This measures on the order of 1.3 cm maximal transverse dimension.  Mild surrounding edema.  No significant mass effect.  No acute parenchymal hemorrhage elsewhere.     Mild chronic small vessel ischemic change with remote left occipital infarct.  No new major vascular distribution infarct.     Prior ventricular shunt has been removed.  Ventricles are mildly prominent size, but unchanged.  Third ventricle diameter again measuring up to 0.8 cm.  Pattern favoring cerebral volume loss rather overt hydrocephalus.     Scattered vascular calcification about the skull base.     Craniotomies in reasonable position.  No new no displaced calvarial fracture.     Multiple extracranial electrodes in place.     The mastoid air cells and visualized paranasal sinuses are essentially clear.     COMMUNICATION:  Findings were immediately relayed upon identification to the ordering provider (Felix) via the epic secure chat system at approximately 11:54.     Impression:     New right parietal intraparenchymal hematoma with mild surrounding edema.     Exam otherwise grossly unchanged from recent study of 04/26/2025 as further described above.     This report was flagged in Epic as abnormal.        Electronically signed by:Sebastien Tian MD  Date:                                            04/29/2025  Time:                                           11:58        Exam Ended: 04/29/25 11:41 CDT Last Resulted: 04/29/25 11:58 CDT       Order Details        View Encounter        Lab and  Collection Details        Routing        Result History     View All Conversations on this Encounter          Additional 30 min time spent on a voluntary advance care planning and /or goals of care discussion, providing emotional support, formulating, and communicating prognosis and exploring burden/benefit of various approaches of treatment.       Thank you for the opportunity to care for this patient and family.       Noelle Gabriel, CNS         [1]   Current Facility-Administered Medications:     bisacodyL suppository 10 mg, 10 mg, Rectal, Daily PRN, Yuridia Kaplan PA-C    glycopyrrolate injection 0.4 mg, 0.4 mg, Intravenous, Q4H PRN, Bianka Avery MD    haloperidol lactate injection 2 mg, 2 mg, Intravenous, Q6H PRN, Bianka Avery MD    hydrALAZINE injection 10 mg, 10 mg, Intravenous, Q4H PRN, Yuridia Kaplan PA-C, 10 mg at 05/10/25 0808    labetalol 20 mg/4 mL (5 mg/mL) IV syring, 5 mg, Intravenous, Q4H PRN, Fahad Narayanan, DO, 5 mg at 05/09/25 0301    LORazepam injection 1 mg, 1 mg, Intravenous, Q15 Min PRN, Bianka Avery MD    morphine injection 2 mg, 2 mg, Intravenous, Q30 Min PRN, Bianka Avery MD    ondansetron injection 4 mg, 4 mg, Intravenous, Q6H PRN, Bianka Avery MD    sodium chloride 0.9% flush 10 mL, 10 mL, Intravenous, PRN, Yuridia Kaplan PA-C

## 2025-05-14 NOTE — PT/OT/SLP PROGRESS
Speech Language Pathology Discharge      Ramesh Ricci  MRN: [id number]    Patient not seen today secondary to MD with pt upon initial attempt. Upon SLP return, RN held pt 2/2 somnolence and ongoing GOC discussion. This afternoon orders discontinued by MD. No further ST warranted at this time.

## 2025-05-14 NOTE — PLAN OF CARE
Recommendations  -Continue administration of TF Impact Peptide 1.5 @ 45 mL/hr x 22 hours (levothyroxine administration) to provide 990 mL total volume, 1485 kcal, 139 g CHO, 93 g protein, 0 g fiber, 764 mL free water, 99% DRI         --120 mL flush q4 hrs to provide additional 720 mL free water (Total 1484 mL)     --Nursing: please continue to document rate and formula on flowsheet    --RD to monitor weight, rate, tolerance      Goals:   1. Meet 75% nutritional needs met with diet/EN/PN during admission    2. Maintain dry weight during admission  Nutrition Goal Status: ongoing

## 2025-05-14 NOTE — PLAN OF CARE
Problem: Stroke, Intracerebral Hemorrhage  Goal: Optimal Coping  Outcome: Progressing  Goal: Effective Bowel Elimination  Outcome: Progressing  Goal: Optimal Cerebral Tissue Perfusion  Outcome: Progressing  Goal: Optimal Cognitive Function  Outcome: Progressing  Goal: Effective Communication Skills  Outcome: Progressing  Goal: Optimal Functional Ability  Outcome: Progressing  Goal: Optimal Nutrition Intake  Outcome: Progressing  Goal: Optimal Pain Control and Function  Outcome: Progressing  Goal: Effective Oxygenation and Ventilation  Outcome: Progressing  Goal: Improved Sensorimotor Function  Outcome: Progressing  Goal: Safe and Effective Swallow  Outcome: Progressing  Goal: Effective Urinary Elimination  Outcome: Progressing     Problem: Skin Injury Risk Increased  Goal: Skin Health and Integrity  Outcome: Progressing     Problem: Adult Inpatient Plan of Care  Goal: Plan of Care Review  Outcome: Progressing  Goal: Patient-Specific Goal (Individualized)  Outcome: Progressing  Goal: Absence of Hospital-Acquired Illness or Injury  Outcome: Progressing  Goal: Optimal Comfort and Wellbeing  Outcome: Progressing  Goal: Readiness for Transition of Care  Outcome: Progressing     Problem: Acute Kidney Injury/Impairment  Goal: Fluid and Electrolyte Balance  Outcome: Progressing  Goal: Improved Oral Intake  Outcome: Progressing  Goal: Effective Renal Function  Outcome: Progressing     Problem: Wound  Goal: Optimal Coping  Outcome: Progressing  Goal: Optimal Functional Ability  Outcome: Progressing  Goal: Absence of Infection Signs and Symptoms  Outcome: Progressing  Goal: Improved Oral Intake  Outcome: Progressing  Goal: Optimal Pain Control and Function  Outcome: Progressing  Goal: Skin Health and Integrity  Outcome: Progressing  Goal: Optimal Wound Healing  Outcome: Progressing     Problem: Fall Injury Risk  Goal: Absence of Fall and Fall-Related Injury  Outcome: Progressing     Problem: Delirium  Goal: Optimal  Coping  Outcome: Progressing  Goal: Improved Behavioral Control  Outcome: Progressing  Goal: Improved Attention and Thought Clarity  Outcome: Progressing  Goal: Improved Sleep  Outcome: Progressing     Problem: Pneumonia  Goal: Fluid Balance  Outcome: Progressing  Goal: Resolution of Infection Signs and Symptoms  Outcome: Progressing  Goal: Effective Oxygenation and Ventilation  Outcome: Progressing     Problem: Coping Ineffective  Goal: Effective Coping  Outcome: Progressing

## 2025-05-14 NOTE — PROGRESS NOTES
[facility] - Telemetry Stepdown  Adult Nutrition  Consult Note    SUMMARY     Recommendations  -Continue administration of TF Impact Peptide 1.5 @ 45 mL/hr x 22 hours (levothyroxine administration) to provide 990 mL total volume, 1485 kcal, 139 g CHO, 93 g protein, 0 g fiber, 764 mL free water, 99% DRI         --120 mL flush q4 hrs to provide additional 720 mL free water (Total 1484 mL)     --Nursing: please continue to document rate and formula on flowsheet    --RD to monitor weight, rate, tolerance      Goals:   1. Meet 75% nutritional needs met with diet/EN/PN during admission    2. Maintain dry weight during admission  Nutrition Goal Status: ongoing  Communication of RD Recs: other (comment) (POC)    Nutrition Discharge Planning     Nutrition Discharge Planning: Too early to determine, pending clinical course, General healthy diet    Assessment and Plan    No new Assessment & Plan notes have been filed under this hospital service since the last note was generated.  Service: Nutrition       Reason for Assessment    Reason For Assessment: RD follow-up  Diagnosis:  (Patient assessed for RD follow up. Remains NPO with TF of Impact Peptide 1.5 with a goal rate of 45mL/hr. TF currently running at goal. Patient tolerating well. Meeting ENN. No new weight since 4/27. Wound noted. RD team following.)    General Information Comments: Consult received for TF recs.  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  Patient is currently NPO with Impact Peptide @45 mL/hr ordered- not currently running.  Patient is s/p SDH evac 4/26.  NGT placed today.  Prior to NPO status, patient with 50% intake of regular diet.  +BM 4/26.  No GI s/s noted.  Weight stable x 3 months. 8 lb weight loss x 12 months (5%- non-significant).    Nutrition/Diet History    Spiritual, Cultural  "Beliefs, Restoration Practices, Values that Affect Care: no  Food Allergies: NKFA  Factors Affecting Nutritional Intake: NPO    Anthropometrics    Height: 5' 10.98" (180.3 cm)  Height (inches): 70.98 in  Height Method: Measured  Weight: 68 kg (149 lb 14.6 oz)  Weight (lb): 149.91 lb  Weight Method: Bed Scale  Ideal Body Weight (IBW), Male: 171.88 lb  % Ideal Body Weight, Male (lb): 87.22 %  BMI (Calculated): 20.9  BMI Grade: less than 23 (older than 65 years) - underweight       Lab/Procedures/Meds    Pertinent Labs Reviewed: reviewed  Pertinent Labs Comments: 5/14/25: H/H 8.5/28.1L, BUN 47H, Gluc 130H, Ca 8.5L, AST 49H  Pertinent Medications Reviewed: reviewed  Pertinent Medications Comments: Levothyroxine, Metoprolol tartrate, Modafinil, psyllium husk    Estimated/Assessed Needs    Weight Used For Calorie Calculations: 68 kg (149 lb 14.6 oz)  Energy Calorie Requirements (kcal): 3506-0036 kcal (25-30 kcal/kg)  Energy Need Method: Kcal/kg  Protein Requirements:  g/day (1.2-2.0 g/kg)  Weight Used For Protein Calculations: 68 kg (149 lb 14.6 oz)     Estimated Fluid Requirement Method: RDA Method  RDA Method (mL): 1700  CHO Requirement: 213 g      Nutrition Prescription Ordered    Current Diet Order: NPO  Current Nutrition Support Formula Ordered: Impact Peptide 1.5  Current Nutrition Support Rate Ordered: 45 (ml)  Current Nutrition Support Frequency Ordered: x 24 hours    Evaluation of Received Nutrient/Fluid Intake    Enteral Calories (kcal): 1620  Enteral Protein (gm): 102  Enteral (Free Water) Fluid (mL): 834  % Kcal Needs: 95  % Protein Needs: 100  Energy Calories Required: meeting needs  Protein Required: meeting needs  Fluid Required:  (Per MD)  Comments: LBM 4/24  Tolerance: other (see comments) (TF not initiated)  % Intake of Estimated Energy Needs: 75 - 100 %  % Meal Intake: NPO    PES Statement  Inadequate enteral nutrition infusion related to  (Infusion volume not reached) as evidenced by  (Inadequate " EN volume compared to estimated requirements)  Status: Resolved    Nutrition Risk    Level of Risk/Frequency of Follow-up: high       Monitor and Evaluation    Monitor and Evaluation: Energy intake, Food and beverage intake, Protein intake, Carbohydrate intake, Enteral and parenteral nutrition administration, Diet order, Glucose/endocrine profile, Gastrointestinal profile, Electrolyte and renal panel, Inflammatory profile, Lipid profile       Nutrition Follow-Up    RD Follow-up?: Yes

## 2025-05-14 NOTE — NURSING
17:00 pt arrived to unit via [facility] bed, per transport X2, and placed in room 387.  Pt opens eyes, does not track. Bilateral left gaze observed.  Non-verbal Condom cath intact and draining clear yellow urine to gravity drainage.   18:25 [phone] Miguel with [facility].  HOB not working.  Bed #WEH 1696.   18:35 Brian,  with [facility] called. States ETA is 21:00.  19:00 condom cath on floor.  Perineal area cleaned.  Condom cath replaced. Pt jennifer well. See flow sheet.

## 2025-05-14 NOTE — DISCHARGE SUMMARY
[facility] - Telemetry [facility] Medicine  Discharge Summary      Patient Name: Ramesh Ricci  MRN: [id number]  Admission Date: 4/23/2025  Hospital Length of Stay: 21 days  Discharge Date and Time: 05/14/2025 1:39 PM  Attending Physician: Saeed Cutler MD   Discharging Provider: Saeed Cutler MD  Discharge Provider Team: [facility]  Primary Care Provider: Nakul Mandujano MD        HPI: Patient is an 85M with a past medical history of aortic stenosis s/p TAVR, Atrial Fibrillation on Eliquis, UC, HTN, Ventriculoperitoneal shunt secondary to Normal pressure hydrocephalus that was complicated by bilateral subdural hematoma with evacuation in March 2025. He presented initially with a 3 day history of worsening weakness, speech difficulty, and dragging of R leg. Ct head was obtained and revealed bilateral SDH. Takes Eliquis for A-fib at home which was reversed with PCC in ED. He underwent evacuation on 4/25/2025 and was unarousable post-op day 1. Given elevated temp and Procal, he was started on broad spectrum Abx and given concern for seizures he was treated with higher dose than his normal Vimpat. A new Ct head was pbtained with a new small parietal hemorrhage. MRSA nares negative so Vanc discontinued. He was started on modafinil with some improvement in alertness, weaned to room air, and NG tube placed. On 5/05/2025, he underwent additional infectious workup given low grade fever and hypotension requiring pressors. Zosyn was initiated and upon discussion with family, they desired patient to be made DNR. He was off pressors by 5/6/2025, Eliquis was resumed as deemed safe by NSGY. Additional fever 5/9/2025 with improving CXR as patient had been on pulmonary toilet. Continuous EEG discontinued. Of note, patient's family endorses no desire for PEG if patient unable to eventually pass swallow study. NGT remains for nutrition at this point, and in event of continued failed SLP, they would prefer  Hospice. He is to be stepped down to Hospital Medicine with intentions of honoring patient's desires as expressed by family including which phases of care to avoid escalating to, as well as continued treatment of co-morbidities. Of note, blood cultures have remained negative along with urine cultures, and if infection had been present, most likely of pulmonary origin.     Procedure(s) (LRB):  CRANIOTOMY, FOR SUBDURAL HEMATOMA EVACUATION, bilateral. shunt removal (Bilateral)  REMOVAL, SHUNT, VENTRICULOPERITONEAL      Hospital Course: Underwent swallow studies daily without improvement. Patient spiked fever on 5/12 and remained on Zosyn. Had thorough discussion with patient's wife and daughter. Given repeated poor performance on swallow test and firmly opposed to PEG placement per what they deem to be patient's desire, family requested hospice. Given patient's poor state, inpatient hospice was discussed. Consult placed to Palliative Care and after that evaluation and discussion, decision made to transfer patient to inpatient hospice.         TREATMENT PER CONDITION    Subdural hemorrhage  -  history of normal pressure hydrocephalus that was complicated by bilateral subdural hematoma with evacuation in March 2025  - presented this current admission with a 3 day history of worsening weakness, speech difficulty, and dragging of R leg  -Ct head was obtained and revealed bilateral SDH; Takes Eliquis for A-fib at home which was reversed with PCC in ED  - underwent evacuation on 4/25/2025 and was unarousable post-op day 1  - new Ct head was obtained with a new small parietal hemorrhage  - continued blood pressure control to SBP <160  - after multiple failed swallow assessments and opposition to PEG, family opted for inpatient hospice    UC (ulcerative colitis)  - last GI appointment in 1/2025  - planned to place on balsalazide at that visit  - no active issues and history noted     Acquired hypothyroidism  - T4 1.4 six  months ago  - continued Synthroid 125 mcg     Essential hypertension  - continued metoprolol  - hydral and labetalol on for goal SBP <160    Left ventricular diastolic dysfunction, NYHA class 1  - last Echo 4/2025:    Left Ventricle: The left ventricle is normal in size. Ventricular mass is normal. Normal wall thickness. There is normal diastolic function.    Right Ventricle: The right ventricle is normal in size. Wall thickness is normal. Systolic function is normal.    Aortic Valve: There is a transcatheter valve replacement in the aortic position.    Mitral Valve: There is severe mitral annular calcification. There is moderate stenosis. The mean pressure gradient across the mitral valve is 4 mmHg at a heart rate of 63 bpm; MVA 1.4. There is mild regurgitation.    IVC/SVC: Normal venous pressure at 3 mmHg.     - continued metoprolol     PAF (paroxysmal atrial fibrillation)  Patient has persistent (7 days or more) atrial fibrillation. Patient is currently in atrial fibrillation. [id number] Score: 3. The patients heart rate in the last 24 hours is as follows:  Pulse  Min: 74  Max: 116      Antiarrhythmics  metoprolol tartrate (LOPRESSOR) tablet 25 mg, Every 8 hours, Per NG tube  metoprolol injection 5 mg, Every 5 min PRN, Intravenous     Anticoagulants  apixaban tablet 5 mg, 2 times daily, Per NG tube        S/P ventriculoperitoneal shunt  - due to NPH in past  - hx noted  Urinary retention  - larry in place     Seizure disorder  - patient with seizure activity during this admission  - Vimpat titrated to effect; no longer on cEEG  - Vimpat every 12 hpurs     Right lower lobe pneumonia  - patient with leukocytosis and concern for infection but no foci of infection with exception of potential pneumonia  - Ucx and blood cx neg  - leukocytosis improved on Zosyn            DISCHARGE PHYSICAL EXAM  Physical Exam  Constitutional:       Appearance: He is not toxic-appearing or diaphoretic.   HENT:      Head:      Comments:  Incisions to scalp s/p NSGY procedure     Nose:      Comments: NG tube in place  Cardiovascular:      Rate and Rhythm: Normal rate. Rhythm irregular.   Pulmonary:      Comments: Tachypnea  Musculoskeletal:      Right lower leg: No edema.      Left lower leg: No edema.   Neurological:      Comments: nonverbal; withdraws from pain in lower extremities        Consults:   Consults (From admission, onward)          Status Ordering Provider     Inpatient consult to Palliative Care  Once        Provider:  Bianka Avery MD    Acknowledged PATRICK BONILLA     Inpatient consult to Spiritual Care  Once        Provider:  (Not yet assigned)    Completed PATRICK BONILLA     Inpatient consult to Registered Dietitian/Nutritionist  Once        Provider:  (Not yet assigned)    Completed KIMMIE GRANDE     Inpatient consult to Physical Medicine Rehab  Once        Provider:  (Not yet assigned)    Completed [facility]     Inpatient consult to Registered Dietitian/Nutritionist  Once        Provider:  (Not yet assigned)    Completed [facility]     IP consult to case management/social work  Once        Provider:  (Not yet assigned)    Acknowledged [facility]     Inpatient consult to Neurosurgery  Once        Provider:  (Not yet assigned)    Completed MORAIMA UGALDE            Final Active Diagnoses:    Diagnosis Date Noted POA    PRINCIPAL PROBLEM:  Subdural hemorrhage [I62.00] 03/24/2025 Yes    Right lower lobe pneumonia [J18.9] 05/07/2025 No    Seizure disorder [G40.909] 04/24/2025 Yes    Acute encephalopathy [G93.40] 04/23/2025 Yes    S/P ventriculoperitoneal shunt [Z98.2] 02/06/2025 Not Applicable    ACP (advance care planning) [Z71.89] 03/25/2022 Not Applicable    Coronary artery disease involving native coronary artery of native heart without angina pectoris [I25.10] 01/31/2022 Yes     Chronic    PAF (paroxysmal atrial fibrillation) [I48.0] 12/20/2018 Yes     Chronic    Left ventricular diastolic  dysfunction, NYHA class 1 [I51.89] 04/16/2015 Yes     Chronic    Essential hypertension [I10] 11/16/2012 Yes     Chronic    UC (ulcerative colitis) [K51.90] 11/16/2012 Yes     Chronic    Acquired hypothyroidism [E03.9] 11/16/2012 Yes     Chronic      Problems Resolved During this Admission:      Discharged Condition: poor    Disposition: Hospice/Medical Facility    Follow Up:    Patient Instructions:   No discharge procedures on file.  Medications:  Reconciled Home Medications:      Medication List        ASK your doctor about these medications      acetaminophen 500 MG tablet  Commonly known as: TYLENOL  Take 2 tablets (1,000 mg total) by mouth every 8 (eight) hours.     amLODIPine 2.5 MG tablet  Commonly known as: NORVASC  Take 2.5 mg by mouth nightly.     ascorbic acid (vitamin C) 500 MG tablet  Commonly known as: VITAMIN C  Take 500 mg by mouth once daily.     balsalazide 750 mg capsule  Commonly known as: COLAZAL  Take 5 tablets in the morning and 4 tablets in the evening.     ELIQUIS 2.5 mg Tab  Generic drug: apixaban  Take 2.5 mg by mouth 2 (two) times daily.     ferrous sulfate 325 mg (65 mg iron) Tab tablet  Commonly known as: FEOSOL  1 tablet Orally Once a day     fluocinolone acetonide oiL 0.01 % Drop  Place into both ears.     gabapentin 100 MG capsule  Commonly known as: NEURONTIN  Take 100 mg by mouth 3 (three) times daily.     GAVISCON ORAL  Take by mouth as needed. Acid reflux     ICAPS AREDS2 ORAL  Wait to take this until your doctor or other care provider tells you to start again.  Take 1 capsule by mouth once daily.     lacosamide 100 mg Tab  Commonly known as: VIMPAT  Take 1 tablet (100 mg total) by mouth every 12 (twelve) hours.     levocetirizine 5 MG tablet  Commonly known as: XYZAL  Take 5 mg by mouth every morning.     levothyroxine 125 MCG tablet  Commonly known as: SYNTHROID  TAKE 1 TABLET BY MOUTH EVERY DAY IN THE MORNING     memantine 10 MG Tab  Commonly known as: NAMENDA  Take 10 mg by  mouth 2 (two) times daily.     metoprolol succinate 25 MG 24 hr tablet  Commonly known as: TOPROL-XL  Take 0.5 tablets (12.5 mg total) by mouth once daily.     oxyCODONE 10 mg Tab immediate release tablet  Commonly known as: ROXICODONE  Take 1 tablet (10 mg total) by mouth every 6 (six) hours as needed for Pain.     pantoprazole 40 MG tablet  Commonly known as: PROTONIX  TAKE 1 TABLET BY MOUTH DAILY AS  NEEDED     pravastatin 20 MG tablet  Commonly known as: PRAVACHOL  Take 1 tablet (20 mg total) by mouth every evening.     silodosin 4 mg Cap capsule  Commonly known as: RAPAFLO  Take 1 capsule (4 mg total) by mouth once daily.     STOOL SOFTENER ORAL  Take by mouth.     triamcinolone acetonide 0.1% 0.1 % cream  Commonly known as: KENALOG  Apply topically 2 (two) times daily. APPLY  CREAM TOPICALLY TO AFFECTED AREA TWICE DAILY as needed                Pending Diagnostic Studies:       None          Indwelling Lines/Drains at time of discharge:   Lines/Drains/Airways       Drain  Duration                  NG/OG Tube 04/26/25 0819 14 Fr. Right nostril 18 days    Male External Urinary Catheter 05/09/25 1000 5 days                    Time spent on the discharge of patient: 35 minutes         Saeed Cutler MD  Department of Hospital Medicine  [facility] - Telemetry Stepdown

## 2025-05-14 NOTE — CARE UPDATE
Unit [facility] Care Support Interaction      I have reviewed the chart of Ramesh Ricci who is hospitalized for Subdural hemorrhage. The patient is currently located in the following unit: [facility]         I have sprovided the following support:     Skin - Integrity assessment, waffle mattress and/or specialty bed ordered, and wound care consulted, heel boots ordered, nurse to take picture of pts wounds       Glenny Garcia NP  Unit Based [facility]

## 2025-05-14 NOTE — PLAN OF CARE
[facility] - Telemetry Stepdown  Discharge Reassessment    Primary Care Provider: Nakul Mandujano MD    Expected Discharge Date: 5/16/2025    Patient not medically ready to discharge per MD. Patient to transition to comfort care and inpatient hospice in GIP bed.    Discharge Plan A and Plan B have been determined by review of patient's clinical status, future medical and therapeutic needs, and coverage/benefits for post-acute care in coordination with multidisciplinary team members.      Reassessment (most recent)       Discharge Reassessment - 05/14/25 1002          Discharge Reassessment    Assessment Type Discharge Planning Reassessment     Did the patient's condition or plan change since previous assessment? Yes     Discharge Plan discussed with: Spouse/sig other     Name(s) and Number(s) Mone Ricci  (Spouse) [phone]     Communicated LIDIA with patient/caregiver Date not available/Unable to determine     Discharge Plan A Inpatient Hospice     Discharge Plan B Inpatient Hospice     DME Needed Upon Discharge  none     Transition of Care Barriers None     Why the patient remains in the hospital Requires continued medical care        Post-Acute Status    Post-Acute Authorization Hospice     Hospice Status Pending medical clearance/testing     Discharge Delays None known at this time                   Komal Mullins RN     [phone]

## 2025-05-14 NOTE — CARE UPDATE
"RAPID RESPONSE NURSE CHART REVIEW        Chart Reviewed: 05/14/2025, 6:58 AM    MRN: [id number]  Bed: 8050/8020 A    Dx: Subdural hemorrhage    Ramesh Ricci has a past medical history of Allergies, Anemia, Anticoagulant long-term use, Anxiety, Atrophic kidney, BPH (benign prostatic hyperplasia), Congenital absence of right kidney, DDD (degenerative disc disease), cervical, Ex-smoker, Exudative age-related macular degeneration, right eye, with active choroidal neovascularization, GERD (gastroesophageal reflux disease), Glaucoma, HTN (hypertension), Hypothyroid, Internal carotid artery stenosis, Iron deficiency anemia due to chronic blood loss, Left ventricular diastolic dysfunction, NYHA class 1, Low serum testosterone level, Normal cardiac stress test, NPH (normal pressure hydrocephalus), Osteopenia, PAF (paroxysmal atrial fibrillation), Prostate CA, S/P TAVR (transcatheter aortic valve replacement), Seizure disorder, Shingles, Skin disease, Stage 3a chronic kidney disease, Stroke, Thrombocytopenia, TIA (transient ischemic attack), and UC (ulcerative colitis).    Last VS: BP (!) 141/65   Pulse 102   Temp 99 °F (37.2 °C)   Resp (!) 35   Ht 5' 10.98" (1.803 m)   Wt 68 kg (149 lb 14.6 oz)   SpO2 97%   BMI 20.92 kg/m²     24H Vital Sign Range:  Temp:  [97.9 °F (36.6 °C)-99.4 °F (37.4 °C)]   Pulse:  []   Resp:  [16-42]   BP: (111-141)/(57-68)   SpO2:  [94 %-99 %]     Level of Consciousness (AVPU): responds to voice    Recent Labs     05/12/25 0226 05/13/25  0435   WBC 13.99* 12.50   HGB 8.8* 8.5*   HCT 28.2* 26.6*   * 566*       Recent Labs     05/11/25  2249 05/12/25 0226 05/13/25  0435    141 139   K 3.9 3.9 4.1    104 104   CO2 24 24 24   BUN 56* 57* 53*   CREATININE 1.1 1.1 1.0   * 123* 120*   PHOS  --  4.0 4.1   MG 2.1 2.2 2.1            OXYGEN:  Flow (L/min) (Oxygen Therapy): 2  Oxygen Concentration (%): 40       MEWS score: 4    Rounding completed at 0905.    Rounding " completed with charge nurse Bertha for elevated MEWS  reports patient transitioning to hospice. No acute concerns verbalized at this time. Instructed to call [phone] for further concerns or assistance.    Neal Boyd RN

## 2025-05-15 NOTE — HPI
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 He was admitted on April 23, 2025 with a 3 day history of worsening weakness, speech difficulty, and dragging of R leg. Ct head was obtained and revealed bilateral SDH. Takes Eliquis for A-fib at home which was reversed with PCC in ED. He underwent evacuation on 4/25/2025 and was unarousable post-op day 1. Given elevated temp and Procal, he was started on broad spectrum Abx and given concern for seizures he was treated with higher dose than his normal Vimpat. A new Ct head was pbtained with a new small parietal hemorrhage. MRSA nares negative so Vanc discontinued. He was started on modafinil with some improvement in alertness, weaned to room air, and NG tube placed. On 5/05/2025, he underwent additional infectious workup given low grade fever and hypotension requiring pressors. Zosyn was initiated and upon discussion with family, they desired patient to be made DNR. He was off pressors by 5/6/2025, Eliquis was resumed as deemed safe by NSGY. Additional fever 5/9/2025 with improving CXR as patient had been on pulmonary toilet. Continuous EEG discontinued. Of note, patient's family endorses no desire for PEG if patient unable to eventually pass swallow study. NGT remains for nutrition at this point, and in event of continued failed SLP, they would prefer Hospice. He is to be stepped down to Hospital Medicine with intentions of honoring patient's desires as expressed by family including which phases of care to avoid escalating to, as well as continued treatment of co-morbidities. Of note, blood cultures have remained negative along with urine cultures, and if infection had been present, most likely of pulmonary origin. He underwent  swallow studies daily without improvement. Patient spiked fever on 5/12 and remained on Zosyn. Had thorough discussion with patient's wife and daughter. Given repeated poor performance on swallow test and firmly opposed to PEG placement per what they deem to be patient's desire, family requested hospice. Given patient's poor state, inpatient hospice was discussed. Consult placed to Palliative Care and after that evaluation and discussion, decision made to transfer patient to inpatient hospice.

## 2025-05-15 NOTE — PLAN OF CARE
Problem: Fall Injury Risk  Goal: Absence of Fall and Fall-Related Injury  Outcome: Progressing     Problem: Coping Ineffective  Goal: Effective Coping  5/15/2025 1835 by Mylene Camilo RN  Outcome: Progressing  5/15/2025 1834 by Mylene Camilo RN  Outcome: Progressing     Problem: Palliative Care  Goal: Enhanced Quality of Life  5/15/2025 1835 by Mylene Camilo RN  Outcome: Progressing  5/15/2025 1834 by Mylene Camilo RN  Outcome: Progressing

## 2025-05-15 NOTE — ASSESSMENT & PLAN NOTE
Ramesh Ricci Sr is an 85-year-old man with a past medical history of aortic stenosis s/p TAVR, atrial fibrillation on apixaban, UC, HTN, Ventriculoperitoneal shunt secondary to Normal pressure hydrocephalus that was complicated by bilateral subdural hematoma with evacuation in March 2025. He was admitted on April 23, 2025 for bilateral SDH, s/p anticoagulation reversal with PCC. S/p evacuation on 4/25/25, treated aggressively for possible non-convulsive seizures, sepsis and medical optimization, however mental status remained seriously affected, most notably affecting PO status due to dysphagia and persistent encephalopathy. Despite aggressive management, he was not able to restore his independent ability to swallow. Family voiced his wishes to avoid artificial nutrition/PEG placement and transitioned to comfort care as of 5/14/25. He was transferred to the inpatient hospice unit on 5/14/25 for aggressive symptom management at the end of life.    See prior palliative care consult note on 5/14/25 for complete goals of care discussion.    Interval update 05/15/2025 - minimally responsive, family holding lozano at bedside    Terminal diagnosis: Bilateral subdural hematoma  Estimated onset since 4/23/25  Tobacco use: 25 pack year history, quit 1972    Need for inpatient care: Active dying process as evidenced by loss of consciousness, cool extremities, and irregular breathing pattern. Requiring IV opioids and benzodiazepines for pain behaviors, respiratory distress, and agitation. Likely prognosis of hours to short days    Dispo: anticipate end-of-life care on the unit, if patient unexpectedly stabilizes, will approach family regarding care at home or alternative in-facility/jail placement with hospice support    Symptom Assessment  - Pain/ pain behaviors: controlled  - Dyspnea/tachypnea: controlled  - Agitation: controlled  - Mentation: minimally responsive    Symptom Oriented Management:    Tachypnea/Dyspnea:    - Morphine 2 mg IV q30min PRN pain behavior (groaning, grimacing, guarding), labored breathing  - Lorazepam 1 mg IV q15min PRN anxiety, labored breathing refractory to opioids  - Fan at bedside  - Avoid artificial hydration  - Treat fevers symptomatically with PRN APAP, NSAID's, and if refractory to these measures, dexamethasone     Pain Behaviors:   - Opioids as above  - Turn only as tolerated     Agitation/Anxiety/Encephalopathy:   - Treat for pain/labored breathing as above  - Lorazepam 1 mg IV q15min PRN anxiety/agitation, labored breathing refractory to opioids, seizures  - Haloperidol 2 mg IV q4h PRN agitation, nausea/vomiting refractory to ondansetron    Profuse Oropharyngeal Secretions:  - Turn head side to side to help shift secretions out of airway, allow to pool to cheeks and out of mouth  - Gentle, frequent oral care - encourage family at bedside to participate  - Yankauer suction at bedside  - Glycopyrrolate 0.4 mg IV q4h PRN profuse oropharyngeal secretions    Nausea/Vomiting/Retching:  - Ondansetron 4 mg IV q6h PRN nausea/vomiting    Nutrition / Hydration  - No IV fluids and artificial nutrition  - Liberalize diet in order to permit comfort feedings as desired and tolerated  - Simplified medication regimen by eliminating those medications that provide little to no benefit at end-of-life    Bowel Regimen:  - Bisacodyl 10 mg suppository daily PRN no bowel movement in 5 days  - Will not prioritize at end of life    Fever  - Apply cold compresses  - Fan at bedside  - Acetaminophen 650 mg suppository q4h PRN fever  - If turning triggers severe pain, avoid suppository administration and trial ketorolac 15 mg IV q4h PRN fever  - If febrile despite above measures, trial dexamethasone 2 mg IV q6h PRN refractory fevers

## 2025-05-15 NOTE — CHAPLAIN
"Palliative Care        Patient: Ramesh Ricci  MRN: [id number]  : 1940  Age: 85 y.o.  Hospital Length of Stay: 22  Code Status: Code Status Discussion Note  Attending Provider: Bianka Avery MD  Principal Problem: Subdural hemorrhage     What prompted this initial visit?:   Visited family of pt in [facility] as she was waiting outside pt's room- 10  min     Who was present during my visit:   Visited with daughter, [place], as she was waiting outside pt's room while nurse tended to pt  I did not meet/visit pt this visit, only daughter.      Feelings (Emotions/Fears/Experiences/Coping):   Daughter was appropriately emotional, but "at peace with my father's decision." She feels he's comfortable; provided listening ear as she shares her close relationship with him over the year, even though her parents  when she was 1 yo. She shared that last Friday pt was very engaging and lucid and "that was their last daddy/daughter date."  Daughter welcomed a hug; thanked her for sharing such a tender story.     Keila (Beliefs/Meaning/Philosophy/Distress):   Pt and family are [employer] and Fr. Miguel Ángel has visited a couple times and she said they are good and at peace spiritually.     Future (Spiritual Care Plan of Action):   Told daughter I'd come back and visit shortly as I was in route to another patient at the time we [facility]. Lord, in your mercy.     Carlo,       Rev. Tanya Zapata MDiv, [facility]  Board Certified Palliative Care   Palliative Medicine Department, 9th Floor [facility] [place]    Office: [phone] ([phone])  ** If you call and I don't answer, please leave a voicemail because vmail is forwarded to me    Email: [email or web address]  Work hours: [id number] [phone]  ** Evening, overnight and weekends, please dial [phone] which is carried by an in-house Spiritual Care  at all hours.     The care I provide is shaped by the Code of Ethics of the " [employer]

## 2025-05-15 NOTE — SUBJECTIVE & OBJECTIVE
Interval History: Daughter Nancy at bedside, holding lozano.    Medications:  Continuous Infusions:  Scheduled Meds:  PRN Meds:  Current Facility-Administered Medications:     bisacodyL, 10 mg, Rectal, Daily PRN    glycopyrrolate, 0.4 mg, Intravenous, Q4H PRN    haloperidol lactate, 2 mg, Intravenous, Q6H PRN    hydrALAZINE, 10 mg, Intravenous, Q4H PRN    labetalol, 5 mg, Intravenous, Q4H PRN    lorazepam, 1 mg, Intravenous, Q15 Min PRN    morphine, 2 mg, Intravenous, Q30 Min PRN    ondansetron, 4 mg, Intravenous, Q6H PRN    sodium chloride 0.9%, 10 mL, Intravenous, PRN    Objective:     Vital Signs (Most Recent):  Temp: 98.2 °F (36.8 °C) (05/15/25 0749)  Pulse: (!) 119 (05/15/25 0749)  Resp: (!) 32 (05/15/25 0910)  BP: 127/70 (05/15/25 0749)  SpO2:  (was unable to obain a SP02 nurse ricki notified) (05/15/25 0749) Vital Signs (24h Range):  Temp:  [97 °F (36.1 °C)-100.8 °F (38.2 °C)] 98.2 °F (36.8 °C)  Pulse:  [110-119] 119  Resp:  [26-36] 32  SpO2:  [91 %-92 %] 92 %  BP: (127-136)/(54-70) 127/70     Weight: 68 kg (149 lb 14.6 oz)  Body mass index is 20.92 kg/m².       Physical Exam  Vitals and nursing note reviewed.   Constitutional:       Appearance: He is ill-appearing.   HENT:      Head: Normocephalic.      Nose: Nose normal.      Mouth/Throat:      Mouth: Mucous membranes are dry.   Cardiovascular:      Rate and Rhythm: Normal rate.   Pulmonary:      Effort: Respiratory distress present.   Abdominal:      Palpations: Abdomen is soft.   Skin:     Coloration: Skin is pale.      Findings: Bruising present.   Neurological:      Mental Status: He is disoriented.            Review of Symptoms      Symptom Assessment (ESAS 0-10 Scale)  Unable to complete assessment due to Patient unresponsive         Pain Assessment in Advanced Demential Scale (PAINAD)   Breathing - Independent of vocalization:  1  Negative vocalization:  0  Facial expression:  1  Body language:  0  Consolability:  0  Total:  2    Living Arrangements:   "Lives with spouse    Psychosocial/Cultural:   See Palliative Psychosocial Note: No   to current wife for last >60 years, has two adult children. Devoted to daughter Nancy from his first marriage. Loved to fix things and was always enjoyed staying busy  **Primary  to Follow**  Palliative Care  Consult: No    Spiritual:  F - Keila and Belief:  Jehovah's witness  I - Importance:  Important  C - Community:  N/A  A - Address in Care:  Father Miguel Ángel visited;  following        Advance Care Planning   Advance Directives:   Living Will: No    [place]POST: No    Do Not Resuscitate Status: Yes    Medical Power of : No      Decision Making:  Family answered questions and Patient unable to communicate due to disease severity/cognitive impairment  Goals of Care: What is most important right now is to focus on quality of life, even if it means sacrificing a little time, comfort and QOL . Accordingly, we have decided that the best plan to meet the patient's goals includes pivot to comfort-focused care.         Significant Labs: CBC:   Recent Labs   Lab 05/14/25  0652   WBC 14.70*   HGB 8.5*   HCT 28.1*   *     CMP:   Recent Labs   Lab 05/14/25  0652      K 4.2      CO2 24   *   BUN 47*   CREATININE 0.8   CALCIUM 8.5*   PROT 6.5   ALBUMIN 1.7*   BILITOT 0.2   ALKPHOS 93   AST 49*   ALT 24   ANIONGAP 9     CBC:   Recent Labs   Lab 05/14/25  0652   WBC 14.70*   HGB 8.5*   HCT 28.1*   MCV 92   *     BMP:  No results for input(s): "GLU", "NA", "K", "CL", "CO2", "BUN", "CREATININE", "CALCIUM", "MG" in the last 24 hours.  LFT:  Lab Results   Component Value Date    AST 49 (H) 05/14/2025    ALKPHOS 93 05/14/2025    BILITOT 0.2 05/14/2025     Albumin:   Albumin   Date Value Ref Range Status   05/14/2025 1.7 (L) 3.5 - 5.2 g/dL Final   03/21/2025 3.9 3.5 - 5.2 g/dL Final     Protein:   Protein Total   Date Value Ref Range Status   05/14/2025 6.5 6.0 - 8.4 gm/dL Final     Total " Protein   Date Value Ref Range Status   03/21/2025 7.6 6.0 - 8.4 g/dL Final     Lactic acid:   Lab Results   Component Value Date    LACTATE 1.2 05/09/2025    LACTATE 0.8 05/05/2025       Significant Imaging: I have reviewed all pertinent imaging results/findings within the past 24 hours.

## 2025-05-15 NOTE — PROGRESS NOTES
[facility]  Palliative Medicine  Progress Note    Patient Name: Ramesh Ricci  MRN: [id number]  Admission Date: 4/23/2025  Hospital Length of Stay: 22 days  Code Status: DNR   Attending Provider: Bianka Avery MD  Consulting Provider: Bianka Avery MD  Primary Care Physician: Nakul Mandujano MD  Principal Problem:Subdural hemorrhage    Patient information was obtained from relative(s) and primary team.      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 He was admitted on April 23, 2025 for bilateral SDH, s/p anticoagulation reversal with PCC. S/p evacuation on 4/25/25, treated aggressively for possible non-convulsive seizures, sepsis and medical optimization, however mental status remained seriously affected, most notably affecting PO status due to dysphagia and persistent encephalopathy. Despite aggressive management, he was not able to restore his independent ability to swallow. Family voiced his wishes to avoid artificial nutrition/PEG placement and transitioned to comfort care as of 5/14/25. He was transferred to the inpatient hospice unit on 5/14/25 for aggressive symptom management at the end of life.    See prior palliative care consult note on 5/14/25 for complete goals of care discussion.    Interval update 05/15/2025 - minimally responsive, family holding lozano at bedside    Terminal diagnosis: Bilateral subdural hematoma  Estimated onset since 4/23/25  Tobacco use: 25 pack year history, quit 1972    Need for inpatient care: Active dying process as evidenced by loss of consciousness, cool extremities, and irregular breathing pattern. Requiring IV opioids and  benzodiazepines for pain behaviors, respiratory distress, and agitation. Likely prognosis of hours to short days    Dispo: anticipate end-of-life care on the unit, if patient unexpectedly stabilizes, will approach family regarding care at home or alternative in-facility/FCI placement with hospice support    Symptom Assessment  - Pain/ pain behaviors: controlled  - Dyspnea/tachypnea: controlled  - Agitation: controlled  - Mentation: minimally responsive    Symptom Oriented Management:    Tachypnea/Dyspnea:   - Morphine 2 mg IV q30min PRN pain behavior (groaning, grimacing, guarding), labored breathing  - Lorazepam 1 mg IV q15min PRN anxiety, labored breathing refractory to opioids  - Fan at bedside  - Avoid artificial hydration  - Treat fevers symptomatically with PRN APAP, NSAID's, and if refractory to these measures, dexamethasone     Pain Behaviors:   - Opioids as above  - Turn only as tolerated     Agitation/Anxiety/Encephalopathy:   - Treat for pain/labored breathing as above  - Lorazepam 1 mg IV q15min PRN anxiety/agitation, labored breathing refractory to opioids, seizures  - Haloperidol 2 mg IV q4h PRN agitation, nausea/vomiting refractory to ondansetron    Profuse Oropharyngeal Secretions:  - Turn head side to side to help shift secretions out of airway, allow to pool to cheeks and out of mouth  - Gentle, frequent oral care - encourage family at bedside to participate  - Yankauer suction at bedside  - Glycopyrrolate 0.4 mg IV q4h PRN profuse oropharyngeal secretions    Nausea/Vomiting/Retching:  - Ondansetron 4 mg IV q6h PRN nausea/vomiting    Nutrition / Hydration  - No IV fluids and artificial nutrition  - Liberalize diet in order to permit comfort feedings as desired and tolerated  - Simplified medication regimen by eliminating those medications that provide little to no benefit at end-of-life    Bowel Regimen:  - Bisacodyl 10 mg suppository daily PRN no bowel movement in 5 days  - Will not  prioritize at end of life    Fever  - Apply cold compresses  - Fan at bedside  - Acetaminophen 650 mg suppository q4h PRN fever  - If turning triggers severe pain, avoid suppository administration and trial ketorolac 15 mg IV q4h PRN fever  - If febrile despite above measures, trial dexamethasone 2 mg IV q6h PRN refractory fevers        I will follow-up with patient. Please contact us if you have any additional questions.    Subjective:     Chief Complaint:   Chief Complaint   Patient presents with    Mustiple complaints     Declining in past few days, has VPshunt, leaning to r more since yest and slow speech       HPI:   Ramesh Ricci Sr is an 85-year-old man with a past medical history of aortic stenosis s/p TAVR, Atrial Fibrillation on Eliquis, UC, HTN, Ventriculoperitoneal shunt secondary to Normal pressure hydrocephalus that was complicated by bilateral subdural hematoma with evacuation in March 2025. He was admitted on April 23, 2025 with a 3 day history of worsening weakness, speech difficulty, and dragging of R leg. Ct head was obtained and revealed bilateral SDH. Takes Eliquis for A-fib at home which was reversed with PCC in ED. He underwent evacuation on 4/25/2025 and was unarousable post-op day 1. Given elevated temp and Procal, he was started on broad spectrum Abx and given concern for seizures he was treated with higher dose than his normal Vimpat. A new Ct head was pbtained with a new small parietal hemorrhage. MRSA nares negative so Vanc discontinued. He was started on modafinil with some improvement in alertness, weaned to room air, and NG tube placed. On 5/05/2025, he underwent additional infectious workup given low grade fever and hypotension requiring pressors. Zosyn was initiated and upon discussion with family, they desired patient to be made DNR. He was off pressors by 5/6/2025, Eliquis was resumed as deemed safe by NSGY. Additional fever 5/9/2025 with improving CXR as patient had been on  pulmonary toilet. Continuous EEG discontinued. Of note, patient's family endorses no desire for PEG if patient unable to eventually pass swallow study. NGT remains for nutrition at this point, and in event of continued failed SLP, they would prefer Hospice. He is to be stepped down to Hospital Medicine with intentions of honoring patient's desires as expressed by family including which phases of care to avoid escalating to, as well as continued treatment of co-morbidities. Of note, blood cultures have remained negative along with urine cultures, and if infection had been present, most likely of pulmonary origin. He underwent swallow studies daily without improvement. Patient spiked fever on 5/12 and remained on Zosyn. Had thorough discussion with patient's wife and daughter. Given repeated poor performance on swallow test and firmly opposed to PEG placement per what they deem to be patient's desire, family requested hospice. Given patient's poor state, inpatient hospice was discussed. Consult placed to Palliative Care and after that evaluation and discussion, decision made to transfer patient to inpatient hospice.    Hospital Course:  No notes on file    Interval History: Daughter Nancy at bedside, holding lozano.    Medications:  Continuous Infusions:  Scheduled Meds:  PRN Meds:  Current Facility-Administered Medications:     bisacodyL, 10 mg, Rectal, Daily PRN    glycopyrrolate, 0.4 mg, Intravenous, Q4H PRN    haloperidol lactate, 2 mg, Intravenous, Q6H PRN    hydrALAZINE, 10 mg, Intravenous, Q4H PRN    labetalol, 5 mg, Intravenous, Q4H PRN    lorazepam, 1 mg, Intravenous, Q15 Min PRN    morphine, 2 mg, Intravenous, Q30 Min PRN    ondansetron, 4 mg, Intravenous, Q6H PRN    sodium chloride 0.9%, 10 mL, Intravenous, PRN    Objective:     Vital Signs (Most Recent):  Temp: 98.2 °F (36.8 °C) (05/15/25 0749)  Pulse: (!) 119 (05/15/25 0749)  Resp: (!) 32 (05/15/25 0910)  BP: 127/70 (05/15/25 0749)  SpO2:  (was unable to  santos vidal SP02 nurse ricki notified) (05/15/25 5955) Vital Signs (24h Range):  Temp:  [97 °F (36.1 °C)-100.8 °F (38.2 °C)] 98.2 °F (36.8 °C)  Pulse:  [110-119] 119  Resp:  [26-36] 32  SpO2:  [91 %-92 %] 92 %  BP: (127-136)/(54-70) 127/70     Weight: 68 kg (149 lb 14.6 oz)  Body mass index is 20.92 kg/m².       Physical Exam  Vitals and nursing note reviewed.   Constitutional:       Appearance: He is ill-appearing.   HENT:      Head: Normocephalic.      Nose: Nose normal.      Mouth/Throat:      Mouth: Mucous membranes are dry.   Cardiovascular:      Rate and Rhythm: Normal rate.   Pulmonary:      Effort: Respiratory distress present.   Abdominal:      Palpations: Abdomen is soft.   Skin:     Coloration: Skin is pale.      Findings: Bruising present.   Neurological:      Mental Status: He is disoriented.            Review of Symptoms      Symptom Assessment (ESAS 0-10 Scale)  Unable to complete assessment due to Patient unresponsive         Pain Assessment in Advanced Demential Scale (PAINAD)   Breathing - Independent of vocalization:  1  Negative vocalization:  0  Facial expression:  1  Body language:  0  Consolability:  0  Total:  2    Living Arrangements:  Lives with spouse    Psychosocial/Cultural:   See Palliative Psychosocial Note: No   to current wife for last >60 years, has two adult children. Devoted to daughter Nancy from his first marriage. Loved to fix things and was always enjoyed staying busy  **Primary  to Follow**  Palliative Care  Consult: No    Spiritual:  F - Keila and Belief:  Synagogue  I - Importance:  Important  C - Community:  N/A  A - Address in Care:  Father iMguel Ángel visited;  following        Advance Care Planning  Advance Directives:   Living Will: No    [place]POST: No    Do Not Resuscitate Status: Yes    Medical Power of : No      Decision Making:  Family answered questions and Patient unable to communicate due to disease severity/cognitive  "impairment  Goals of Care: What is most important right now is to focus on quality of life, even if it means sacrificing a little time, comfort and QOL . Accordingly, we have decided that the best plan to meet the patient's goals includes pivot to comfort-focused care.         Significant Labs: CBC:   Recent Labs   Lab 05/14/25  0652   WBC 14.70*   HGB 8.5*   HCT 28.1*   *     CMP:   Recent Labs   Lab 05/14/25  0652      K 4.2      CO2 24   *   BUN 47*   CREATININE 0.8   CALCIUM 8.5*   PROT 6.5   ALBUMIN 1.7*   BILITOT 0.2   ALKPHOS 93   AST 49*   ALT 24   ANIONGAP 9     CBC:   Recent Labs   Lab 05/14/25  0652   WBC 14.70*   HGB 8.5*   HCT 28.1*   MCV 92   *     BMP:  No results for input(s): "GLU", "NA", "K", "CL", "CO2", "BUN", "CREATININE", "CALCIUM", "MG" in the last 24 hours.  LFT:  Lab Results   Component Value Date    AST 49 (H) 05/14/2025    ALKPHOS 93 05/14/2025    BILITOT 0.2 05/14/2025     Albumin:   Albumin   Date Value Ref Range Status   05/14/2025 1.7 (L) 3.5 - 5.2 g/dL Final   03/21/2025 3.9 3.5 - 5.2 g/dL Final     Protein:   Protein Total   Date Value Ref Range Status   05/14/2025 6.5 6.0 - 8.4 gm/dL Final     Total Protein   Date Value Ref Range Status   03/21/2025 7.6 6.0 - 8.4 g/dL Final     Lactic acid:   Lab Results   Component Value Date    LACTATE 1.2 05/09/2025    LACTATE 0.8 05/05/2025       Significant Imaging: I have reviewed all pertinent imaging results/findings within the past 24 hours.    I spent a total of 50 minutes on the day of the visit. This includes face to face time in discussion of goals of care, symptom assessment, coordination of care and emotional support. This also includes non-face to face time preparing to see the patient (eg, review of tests/imaging), obtaining and/or reviewing separately obtained history, documenting clinical information in the electronic or other health record, independently interpreting results and communicating " results to the patient/family/caregiver, or care coordinator.    Bianka Avery MD  Palliative Medicine  [facility] - Hospice and Palliative Medicine

## 2025-05-15 NOTE — PLAN OF CARE
Problem: Adult Inpatient Plan of Care  Goal: Plan of Care Review  Outcome: Progressing  Flowsheets (Taken 5/15/2025 0532)  Plan of Care Reviewed With: patient  Goal: Patient-Specific Goal (Individualized)  Outcome: Progressing  Flowsheets (Taken 5/15/2025 0532)  Individualized Care Needs: total care with comfort care  Anxieties, Fears or Concerns: SHELLY  Patient/Family-Specific Goals (Include Timeframe): SHELLY  Goal: Absence of Hospital-Acquired Illness or Injury  Outcome: Progressing  Intervention: Identify and Manage Fall Risk  Flowsheets (Taken 5/15/2025 0532)  Safety Promotion/Fall Prevention:   assistive device/personal item within reach   bed alarm set   side rails raised x 3  Intervention: Prevent Skin Injury  Flowsheets (Taken 5/15/2025 0532)  Body Position: turned  Device Skin Pressure Protection:   absorbent pad utilized/changed   tubing/devices free from skin contact   skin-to-device areas padded  Intervention: Prevent and Manage VTE (Venous Thromboembolism) Risk  Flowsheets (Taken 5/15/2025 0532)  VTE Prevention/Management: ROM (passive) performed  Intervention: Prevent Infection  Flowsheets (Taken 5/15/2025 0532)  Infection Prevention:   environmental surveillance performed   equipment surfaces disinfected   hand hygiene promoted   personal protective equipment utilized   rest/sleep promoted   single patient room provided  Goal: Optimal Comfort and Wellbeing  Outcome: Progressing  Intervention: Monitor Pain and Promote Comfort  Flowsheets (Taken 5/15/2025 0532)  Pain Management Interventions:   medication offered   quiet environment facilitated   relaxation techniques promoted     Problem: Palliative Care  Goal: Enhanced Quality of Life  Outcome: Progressing  Intervention: Maximize Comfort  Flowsheets (Taken 5/15/2025 0532)  Pain Management Interventions:   medication offered   quiet environment facilitated   relaxation techniques promoted  Oral Care:   lip/mouth moisturizer applied   swabbed with sterile  water  Intervention: Optimize Function  Flowsheets (Taken 5/15/2025 8054)  Sleep/Rest Enhancement:   awakenings minimized   regular sleep/rest pattern promoted   relaxation techniques promoted   room darkened  Sensory Stimulation Regulation:   care clustered   lighting decreased   quiet environment promoted   tactile stimulation provided

## 2025-05-15 NOTE — NURSING
1845 received report on patient and rounded on him with day nurse, pt resting with eyes closed, HOB elevated, no family present, no s/s distress/ discomfort, condom cath to gravity,     1950 Pt continues to be unresponsive resp even and unlabored , no s/s distress/ discomfort noted    2105 Pt continues to be unresponsive to stimuli, no s/s distress/ discomfort, resp 24     2230 New size wise bed delivered, pt changed from other bed to new bed , tolerated well, remains unresponsive through process, no s/s distress/ discomfort, HOB remains elevated    2340 No changes remains unresponsive, resp even and unlabored, no s/s distress/discomfort    0025 no changes resting with eyes closed, resp even and unlabored, no s/s distress/discomfort    0130 resp 20 no s/s distress/ discomfort    0239 Pt remains without signs a of distress/discomfort    0348 no changes    0519 Pt resting with eyes closed, resp even and unlabored, aroused when turned, eyes open but not focusing moving left arm and raising it some and moaning and grimacing, PRN Morphine given at this time

## 2025-05-16 NOTE — SIGNIFICANT EVENT
Death Note    Informed of the patient's expected death on the inpatient palliative and hospice unit. The patient was seen and examined. After one minute auscultation, no spontaneous heartbeat or respiration noted. No withdrawal to stimulation. Patient was pronounced dead at May 16, 2026 at 16:44.     Cause of death: Bilateral subdural hematoma  Estimated onset since 4/23/25  Tobacco use: 25 pack year history, quit 1972    Son at bedside during death pronouncement. Condolences expressed. He will drive home to his mother and tell her the news in person. Will have  call Mrs. Ricci closer to 5:45 PM for next steps.    Please send LEEREFUGIO to: [email or web address]

## 2025-05-16 NOTE — ASSESSMENT & PLAN NOTE
Ramesh Ricci Sr is an 85-year-old man with a past medical history of aortic stenosis s/p TAVR, atrial fibrillation on apixaban, UC, HTN, Ventriculoperitoneal shunt secondary to Normal pressure hydrocephalus that was complicated by bilateral subdural hematoma with evacuation in March 2025. He was admitted on April 23, 2025 for bilateral SDH, s/p anticoagulation reversal with PCC. S/p evacuation on 4/25/25, treated aggressively for possible non-convulsive seizures, sepsis and medical optimization, however mental status remained seriously affected, most notably affecting PO status due to dysphagia and persistent encephalopathy. Despite aggressive management, he was not able to restore his independent ability to swallow. Family voiced his wishes to avoid artificial nutrition/PEG placement and transitioned to comfort care as of 5/14/25. He was transferred to the inpatient hospice unit on 5/14/25 for aggressive symptom management at the end of life.    See prior palliative care consult note on 5/14/25 for complete goals of care discussion.    Interval update 05/16/2025 - minimally responsive, brow furrowing. Wife confirms that at baseline he doesn't furrow his brow. Will try pain medicine to treat likely pain behavior. Wife, daughter and son holding lozano at bedside    Terminal diagnosis: Bilateral subdural hematoma  Estimated onset since 4/23/25  Tobacco use: 25 pack year history, quit 1972    Need for inpatient care: Active dying process as evidenced by loss of consciousness, cool extremities, and irregular breathing pattern. Requiring IV opioids and benzodiazepines for pain behaviors, respiratory distress, and agitation. Likely prognosis of hours to short days    Dispo: anticipate end-of-life care on the unit, if patient unexpectedly stabilizes, will approach family regarding care at home or alternative in-facility/residential placement with hospice support    Symptom Assessment  - Pain/ pain behaviors: controlled  -  Dyspnea/tachypnea: controlled  - Agitation: controlled  - Mentation: minimally responsive    Symptom Oriented Management:    Tachypnea/Dyspnea:   - Morphine 2 mg IV q30min PRN pain behavior (groaning, grimacing, guarding), labored breathing  - Lorazepam 1 mg IV q15min PRN anxiety, labored breathing refractory to opioids  - Fan at bedside  - Avoid artificial hydration  - Treat fevers symptomatically with PRN APAP, NSAID's, and if refractory to these measures, dexamethasone     Pain Behaviors:   - Opioids as above  - Turn only as tolerated     Agitation/Anxiety/Encephalopathy:   - Treat for pain/labored breathing as above  - Lorazepam 1 mg IV q15min PRN anxiety/agitation, labored breathing refractory to opioids, seizures  - Haloperidol 2 mg IV q4h PRN agitation, nausea/vomiting refractory to ondansetron    Profuse Oropharyngeal Secretions:  - Turn head side to side to help shift secretions out of airway, allow to pool to cheeks and out of mouth  - Gentle, frequent oral care - encourage family at bedside to participate  - Yankauer suction at bedside  - Glycopyrrolate 0.4 mg IV q4h PRN profuse oropharyngeal secretions    Nausea/Vomiting/Retching:  - Ondansetron 4 mg IV q6h PRN nausea/vomiting    Nutrition / Hydration  - No IV fluids and artificial nutrition  - Liberalize diet in order to permit comfort feedings as desired and tolerated  - Simplified medication regimen by eliminating those medications that provide little to no benefit at end-of-life    Bowel Regimen:  - Bisacodyl 10 mg suppository daily PRN no bowel movement in 5 days  - Will not prioritize at end of life    Fever  - Apply cold compresses  - Fan at bedside  - Acetaminophen 650 mg suppository q4h PRN fever  - If turning triggers severe pain, avoid suppository administration and trial ketorolac 15 mg IV q4h PRN fever  - If febrile despite above measures, trial dexamethasone 2 mg IV q6h PRN refractory fevers

## 2025-05-16 NOTE — NURSING
1855 Received report, rounded on the patient with day shift nurse, no family present, pt resting with eyes closed, no response to verbal or tactile stimuli, Molina to gravity, HOB elevated , no s/s distress/ discomfort    2005 Pt continues to be resting with eyes closed, Cheyne ash pattern of breathing but no distress or discomfort noted    2140 Pt with eyes open but not responding or following commands, but does move eyes some unsure if looking at staff as when went to other side he did not follow with eyes , will move left arm some to stimuli but does not exactly withdraw from pain , no s/s distress/discomfort    2250 Pt resting with eyes closed, no s/s distress / discomfort    0023 no changes , no s/s distress / discomfort    0224 remains resting with eyes closed, no s/s distress/ discomfort    0356 No cheyne-ash pattern, resp are even and unlabored but increased to 30 with huffing breaths, eyes open, PRN Morphine given to relax breathing and give more comfort,     0424 Back to breathing cheyne ash pattern, no s/s distress/ discomfort, eyes closed, resp 24     0545 Pt continues with Cheyne- ash resp 26-28, Turned and repositioned and he grimaced and moaned with turning, PRN Morphine given, eyes remain closed

## 2025-05-16 NOTE — DISCHARGE SUMMARY
[facility]  Palliative Medicine  Death Discharge Summary      Patient Name: Ramesh Ricci  MRN: [id number]  Admission Date: 4/23/2025  Hospital Length of Stay: 23 days  Discharge Date and Time: No discharge date for patient encounter.  Attending Physician: Bianka Avery MD   Discharging Provider: Bianka Avery MD  Primary Care Provider: Nakul Mandujano MD    HPI:   Ramesh Ricci Sr was an 85-year-old man with a past medical history of aortic stenosis s/p TAVR, atrial fibrillation on apixaban, UC, HTN, Ventriculoperitoneal shunt secondary to Normal pressure hydrocephalus that was complicated by bilateral subdural hematoma with evacuation in March 2025. He was admitted on April 23, 2025 for bilateral SDH, s/p anticoagulation reversal with PCC. S/p evacuation on 4/25/25, treated aggressively for possible non-convulsive seizures, sepsis and medical optimization, however mental status remained seriously affected, most notably affecting PO status due to dysphagia and persistent encephalopathy. Despite aggressive management, he was not able to restore his independent ability to swallow. Family voiced his wishes to avoid artificial nutrition/PEG placement and transitioned to comfort care as of 5/14/25. He was transferred to the inpatient hospice unit on 5/14/25 for aggressive symptom management at the end of life.    Following admission to the hospice unit, patient's symptoms were managed with proportionate opioids and benzodiazepines to ensure comfort during end of life care. Family and patient were supported by our interdisciplinary team of doctors, nurses, social workers, and other team members. Patient peacefully passed away on May 16, 2025 at 16:44.    Terminal diagnosis: Bilateral subdural hematoma  Estimated onset since 4/23/25  Tobacco use: 25 pack year history, quit 1972    Goals of Care Treatment Preferences:  Code Status: DNR          What is most important right now is  to focus on quality of life, even if it means sacrificing a little time, comfort and QOL .  Accordingly, we have decided that the best plan to meet the patient's goals includes pivot to comfort-focused care.      Consults:   Consults (From admission, onward)          Status Ordering Provider     Inpatient consult to Palliative Care  Once        Provider:  Bianka Avery MD    Completed PATRICK BONILLA     Inpatient consult to Spiritual Care  Once        Provider:  (Not yet assigned)    Completed PATRICK BONILLA     Inpatient consult to Registered Dietitian/Nutritionist  Once        Provider:  (Not yet assigned)    Completed KIMMIE GRANDE     Inpatient consult to Physical Medicine Rehab  Once        Provider:  (Not yet assigned)    Completed [facility]     Inpatient consult to Registered Dietitian/Nutritionist  Once        Provider:  (Not yet assigned)    Completed [facility]     IP consult to case management/social work  Once        Provider:  (Not yet assigned)    Acknowledged [facility]     Inpatient consult to Neurosurgery  Once        Provider:  (Not yet assigned)    Completed MORAIMA UGALDE            Palliative Care  Palliative care encounter  Ramesh Ricci Sr was an 85-year-old man with a past medical history of aortic stenosis s/p TAVR, atrial fibrillation on apixaban, UC, HTN, Ventriculoperitoneal shunt secondary to Normal pressure hydrocephalus that was complicated by bilateral subdural hematoma with evacuation in March 2025. He was admitted on April 23, 2025 for bilateral SDH, s/p anticoagulation reversal with PCC. S/p evacuation on 4/25/25, treated aggressively for possible non-convulsive seizures, sepsis and medical optimization, however mental status remained seriously affected, most notably affecting PO status due to dysphagia and persistent encephalopathy. Despite aggressive management, he was not able to restore his independent ability to swallow. Family voiced his  wishes to avoid artificial nutrition/PEG placement and transitioned to comfort care as of 25. He was transferred to the inpatient hospice unit on 25 for aggressive symptom management at the end of life.    Following admission to the hospice unit, patient's symptoms were managed with proportionate opioids and benzodiazepines to ensure comfort during end of life care. Family and patient were supported by our interdisciplinary team of doctors, nurses, social workers, and other team members. Patient peacefully passed away on May 16, 2025 at 16:44.    Terminal diagnosis: Bilateral subdural hematoma  Estimated onset since 25  Tobacco use: 25 pack year history, quit       Final Active Diagnoses:    Diagnosis Date Noted POA    PRINCIPAL PROBLEM:  Subdural hemorrhage [I62.00] 2025 Yes    Comfort measures only status [Z51.5] 2025 Not Applicable    Malnutrition of mild degree [E44.1] 2025 Unknown    Weakness [R53.1] 2025 Unknown    Palliative care encounter [Z51.5] 2025 Not Applicable    Right lower lobe pneumonia [J18.9] 2025 No    Seizure disorder [G40.909] 2025 Yes    Acute encephalopathy [G93.40] 2025 Yes    S/P ventriculoperitoneal shunt [Z98.2] 2025 Not Applicable    ACP (advance care planning) [Z71.89] 2022 Not Applicable    Coronary artery disease involving native coronary artery of native heart without angina pectoris [I25.10] 2022 Yes     Chronic    PAF (paroxysmal atrial fibrillation) [I48.0] 2018 Yes     Chronic    Left ventricular diastolic dysfunction, NYHA class 1 [I51.89] 2015 Yes     Chronic    Essential hypertension [I10] 2012 Yes     Chronic    UC (ulcerative colitis) [K51.90] 2012 Yes     Chronic    Acquired hypothyroidism [E03.9] 2012 Yes     Chronic      Problems Resolved During this Admission:       Discharged Condition:     Disposition:     Follow Up:    Patient Instructions:    No discharge procedures on file.    Significant Diagnostic Studies: N/A    Pending Diagnostic Studies:       None           Medications:  None    Indwelling Lines/Drains at time of discharge:   Lines/Drains/Airways       Drain  Duration                  Urethral Catheter 05/15/25 1000 Non-latex 16 Fr. 1 day                    Time spent on the discharge of patient: 30 minutes  Patient was seen and examined on the date of discharge and determined to be suitable for discharge.    Bianka Avery MD  Department of Palliative Medicine  [facility] - [facility]

## 2025-05-16 NOTE — PLAN OF CARE
[facility]  Discharge Reassessment    Primary Care Provider: Nakul Mandujano MD    Expected Discharge Date:     Reassessment (most recent)       Discharge Reassessment - 05/16/25 1450          Discharge Reassessment    Assessment Type Discharge Planning Reassessment     Did the patient's condition or plan change since previous assessment? Yes     Communicated LIDIA with patient/caregiver Date not available/Unable to determine     Discharge Plan A Inpatient Hospice     Discharge Plan B Other     Transition of Care Barriers None     Why the patient remains in the hospital Requires continued medical care                   Per Dr Avery, pt is not meeting criteria for GIP but is likely to pass in the nexts few hours to short days.  Discharge Plan A and Plan B have been determined by review of patient's clinical status, future medical and therapeutic needs, and coverage/benefits for post-acute care in coordination with multidisciplinary team members.  Will continue to follow.      Lia Mondragon LMSW  [facility]  [id number]

## 2025-05-16 NOTE — ASSESSMENT & PLAN NOTE
Ramesh Ricci Sr was an 85-year-old man with a past medical history of aortic stenosis s/p TAVR, atrial fibrillation on apixaban, UC, HTN, Ventriculoperitoneal shunt secondary to Normal pressure hydrocephalus that was complicated by bilateral subdural hematoma with evacuation in March 2025. He was admitted on April 23, 2025 for bilateral SDH, s/p anticoagulation reversal with PCC. S/p evacuation on 4/25/25, treated aggressively for possible non-convulsive seizures, sepsis and medical optimization, however mental status remained seriously affected, most notably affecting PO status due to dysphagia and persistent encephalopathy. Despite aggressive management, he was not able to restore his independent ability to swallow. Family voiced his wishes to avoid artificial nutrition/PEG placement and transitioned to comfort care as of 5/14/25. He was transferred to the inpatient hospice unit on 5/14/25 for aggressive symptom management at the end of life.    Following admission to the hospice unit, patient's symptoms were managed with proportionate opioids and benzodiazepines to ensure comfort during end of life care. Family and patient were supported by our interdisciplinary team of doctors, nurses, social workers, and other team members. Patient peacefully passed away on May 16, 2025 at 16:44.    Terminal diagnosis: Bilateral subdural hematoma  Estimated onset since 4/23/25  Tobacco use: 25 pack year history, quit 1972

## 2025-05-16 NOTE — SUBJECTIVE & OBJECTIVE
Interval History: Wife, daughter Nancy and son Ramesh Jr at bedside holding lozano. Brow furrowing. Wife clarifies that Mr. Ricci does not usually furrow his brows while asleep.    Medications:  Continuous Infusions:  Scheduled Meds:  PRN Meds:  Current Facility-Administered Medications:     bisacodyL, 10 mg, Rectal, Daily PRN    glycopyrrolate, 0.4 mg, Intravenous, Q4H PRN    haloperidol lactate, 2 mg, Intravenous, Q6H PRN    hydrALAZINE, 10 mg, Intravenous, Q4H PRN    labetalol, 5 mg, Intravenous, Q4H PRN    lorazepam, 1 mg, Intravenous, Q15 Min PRN    morphine, 2 mg, Intravenous, Q30 Min PRN    ondansetron, 4 mg, Intravenous, Q6H PRN    sodium chloride 0.9%, 10 mL, Intravenous, PRN    Objective:     Vital Signs (Most Recent):  Temp: 98.2 °F (36.8 °C) (05/16/25 0713)  Pulse: (!) 136 (05/16/25 0713)  Resp: 12 (05/16/25 0953)  BP: (!) 98/59 (05/16/25 0713)  SpO2: (!) 94 % (05/16/25 0713) Vital Signs (24h Range):  Temp:  [98.2 °F (36.8 °C)-99.3 °F (37.4 °C)] 98.2 °F (36.8 °C)  Pulse:  [120-136] 136  Resp:  [12-30] 12  SpO2:  [94 %-98 %] 94 %  BP: ()/(59-72) 98/59     Weight: 68 kg (149 lb 14.6 oz)  Body mass index is 20.92 kg/m².       Physical Exam  Vitals and nursing note reviewed.   Constitutional:       Appearance: He is ill-appearing.   HENT:      Head: Normocephalic.      Nose: Nose normal.      Mouth/Throat:      Mouth: Mucous membranes are dry.   Cardiovascular:      Rate and Rhythm: Normal rate.   Pulmonary:      Effort: Respiratory distress present.   Abdominal:      Palpations: Abdomen is soft.   Skin:     Coloration: Skin is pale.      Findings: Bruising present.   Neurological:      Mental Status: He is disoriented.            Review of Symptoms      Symptom Assessment (ESAS 0-10 Scale)  Pain:  0  Dyspnea:  0  Anxiety:  0  Nausea:  0  Depression:  0  Anorexia:  0  Fatigue:  0  Insomnia:  0  Restlessness:  0  Agitation:  0  Unable to complete assessment due to Patient unresponsive         Pain  "Assessment in Advanced Demential Scale (PAINAD)   Breathing - Independent of vocalization:  1  Negative vocalization:  0  Facial expression:  1  Body language:  0  Consolability:  0  Total:  2    Living Arrangements:  Lives with spouse    Psychosocial/Cultural:   See Palliative Psychosocial Note: No   to current wife for last >60 years, has two adult children. Devoted to daughter Nancy from his first marriage. Loved to fix things and was always enjoyed staying busy  **Primary  to Follow**  Palliative Care  Consult: No    Spiritual:  F - Keila and Belief:  Druze  I - Importance:  Important  C - Community:  N/A  A - Address in Care:  Father Miguel Ángel visited;  following        Advance Care Planning   Advance Directives:   Living Will: No    [place]: No    Do Not Resuscitate Status: Yes    Medical Power of : No      Decision Making:  Family answered questions and Patient unable to communicate due to disease severity/cognitive impairment  Goals of Care: What is most important right now is to focus on quality of life, even if it means sacrificing a little time, comfort and QOL . Accordingly, we have decided that the best plan to meet the patient's goals includes pivot to comfort-focused care.         Significant Labs: CBC:   No results for input(s): "WBC", "HGB", "HCT", "PLT" in the last 48 hours.    CMP:   No results for input(s): "NA", "K", "CL", "CO2", "GLU", "BUN", "CREATININE", "CALCIUM", "PROT", "ALBUMIN", "BILITOT", "ALKPHOS", "AST", "ALT", "ANIONGAP", "EGFRNONAA" in the last 48 hours.    Invalid input(s): "ESTGFAFRICA"    CBC:   Recent Labs   Lab 05/14/25  0652   WBC 14.70*   HGB 8.5*   HCT 28.1*   MCV 92   *     BMP:  No results for input(s): "GLU", "NA", "K", "CL", "CO2", "BUN", "CREATININE", "CALCIUM", "MG" in the last 24 hours.  LFT:  Lab Results   Component Value Date    AST 49 (H) 05/14/2025    ALKPHOS 93 05/14/2025    BILITOT 0.2 05/14/2025     Albumin: "   Albumin   Date Value Ref Range Status   05/14/2025 1.7 (L) 3.5 - 5.2 g/dL Final   03/21/2025 3.9 3.5 - 5.2 g/dL Final     Protein:   Protein Total   Date Value Ref Range Status   05/14/2025 6.5 6.0 - 8.4 gm/dL Final     Total Protein   Date Value Ref Range Status   03/21/2025 7.6 6.0 - 8.4 g/dL Final     Lactic acid:   Lab Results   Component Value Date    LACTATE 1.2 05/09/2025    LACTATE 0.8 05/05/2025       Significant Imaging: I have reviewed all pertinent imaging results/findings within the past 24 hours.

## 2025-05-16 NOTE — PLAN OF CARE
Problem: Palliative Care  Goal: Enhanced Quality of Life  Outcome: Progressing  Intervention: Maximize Comfort  Flowsheets (Taken 5/16/2025 0441)  Pain Management Interventions:   medication offered   quiet environment facilitated   relaxation techniques promoted  Oral Care:   lip/mouth moisturizer applied   swabbed with sterile water  Intervention: Optimize Function  Flowsheets (Taken 5/16/2025 0441)  Sleep/Rest Enhancement:   awakenings minimized   regular sleep/rest pattern promoted   relaxation techniques promoted   room darkened  Sensory Stimulation Regulation:   care clustered   quiet environment promoted   tactile stimulation minimized   visual stimulation minimized  Intervention: Optimize Psychosocial Wellbeing  Flowsheets (Taken 5/16/2025 0441)  Supportive Measures: relaxation techniques promoted

## 2025-05-16 NOTE — NURSING
Wife at bedside.  Discussed with same; current level of comfort, indications of discomfort and distress, and interventions available for symptom management.

## 2025-05-16 NOTE — PROGRESS NOTES
[facility]  Palliative Medicine  Progress Note    Patient Name: Ramesh Ricci  MRN: [id number]  Admission Date: 4/23/2025  Hospital Length of Stay: 23 days  Code Status: DNR   Attending Provider: Bianka Avery MD  Consulting Provider: Bianka Avery MD  Primary Care Physician: Nakul Mandujano MD  Principal Problem:Subdural hemorrhage    Patient information was obtained from spouse/SO, relative(s), and primary team.      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 He was admitted on April 23, 2025 for bilateral SDH, s/p anticoagulation reversal with PCC. S/p evacuation on 4/25/25, treated aggressively for possible non-convulsive seizures, sepsis and medical optimization, however mental status remained seriously affected, most notably affecting PO status due to dysphagia and persistent encephalopathy. Despite aggressive management, he was not able to restore his independent ability to swallow. Family voiced his wishes to avoid artificial nutrition/PEG placement and transitioned to comfort care as of 5/14/25. He was transferred to the inpatient hospice unit on 5/14/25 for aggressive symptom management at the end of life.    See prior palliative care consult note on 5/14/25 for complete goals of care discussion.    Interval update 05/16/2025 - minimally responsive, brow furrowing. Wife confirms that at baseline he doesn't furrow his brow. Will try pain medicine to treat likely pain behavior. Wife, daughter and son holding lozano at bedside    Terminal diagnosis: Bilateral subdural hematoma  Estimated onset since 4/23/25  Tobacco use: 25 pack year history, quit 1972    Need  for inpatient care: Active dying process as evidenced by loss of consciousness, cool extremities, and irregular breathing pattern. Requiring IV opioids and benzodiazepines for pain behaviors, respiratory distress, and agitation. Likely prognosis of hours to short days    Dispo: anticipate end-of-life care on the unit, if patient unexpectedly stabilizes, will approach family regarding care at home or alternative in-facility/prison placement with hospice support    Symptom Assessment  - Pain/ pain behaviors: controlled  - Dyspnea/tachypnea: controlled  - Agitation: controlled  - Mentation: minimally responsive    Symptom Oriented Management:    Tachypnea/Dyspnea:   - Morphine 2 mg IV q30min PRN pain behavior (groaning, grimacing, guarding), labored breathing  - Lorazepam 1 mg IV q15min PRN anxiety, labored breathing refractory to opioids  - Fan at bedside  - Avoid artificial hydration  - Treat fevers symptomatically with PRN APAP, NSAID's, and if refractory to these measures, dexamethasone     Pain Behaviors:   - Opioids as above  - Turn only as tolerated     Agitation/Anxiety/Encephalopathy:   - Treat for pain/labored breathing as above  - Lorazepam 1 mg IV q15min PRN anxiety/agitation, labored breathing refractory to opioids, seizures  - Haloperidol 2 mg IV q4h PRN agitation, nausea/vomiting refractory to ondansetron    Profuse Oropharyngeal Secretions:  - Turn head side to side to help shift secretions out of airway, allow to pool to cheeks and out of mouth  - Gentle, frequent oral care - encourage family at bedside to participate  - Yankauer suction at bedside  - Glycopyrrolate 0.4 mg IV q4h PRN profuse oropharyngeal secretions    Nausea/Vomiting/Retching:  - Ondansetron 4 mg IV q6h PRN nausea/vomiting    Nutrition / Hydration  - No IV fluids and artificial nutrition  - Liberalize diet in order to permit comfort feedings as desired and tolerated  - Simplified medication regimen by eliminating those medications  that provide little to no benefit at end-of-life    Bowel Regimen:  - Bisacodyl 10 mg suppository daily PRN no bowel movement in 5 days  - Will not prioritize at end of life    Fever  - Apply cold compresses  - Fan at bedside  - Acetaminophen 650 mg suppository q4h PRN fever  - If turning triggers severe pain, avoid suppository administration and trial ketorolac 15 mg IV q4h PRN fever  - If febrile despite above measures, trial dexamethasone 2 mg IV q6h PRN refractory fevers        I will follow-up with patient. Please contact us if you have any additional questions.    Subjective:     Chief Complaint:   Chief Complaint   Patient presents with    Mustiple complaints     Declining in past few days, has VPshunt, leaning to r more since yest and slow speech       HPI:   Ramesh Ricci Sr is an 85-year-old man with a past medical history of aortic stenosis s/p TAVR, Atrial Fibrillation on Eliquis, UC, HTN, Ventriculoperitoneal shunt secondary to Normal pressure hydrocephalus that was complicated by bilateral subdural hematoma with evacuation in March 2025. He was admitted on April 23, 2025 with a 3 day history of worsening weakness, speech difficulty, and dragging of R leg. Ct head was obtained and revealed bilateral SDH. Takes Eliquis for A-fib at home which was reversed with PCC in ED. He underwent evacuation on 4/25/2025 and was unarousable post-op day 1. Given elevated temp and Procal, he was started on broad spectrum Abx and given concern for seizures he was treated with higher dose than his normal Vimpat. A new Ct head was pbtained with a new small parietal hemorrhage. MRSA nares negative so Vanc discontinued. He was started on modafinil with some improvement in alertness, weaned to room air, and NG tube placed. On 5/05/2025, he underwent additional infectious workup given low grade fever and hypotension requiring pressors. Zosyn was initiated and upon discussion with family, they desired patient to be made DNR.  He was off pressors by 5/6/2025, Eliquis was resumed as deemed safe by NSGY. Additional fever 5/9/2025 with improving CXR as patient had been on pulmonary toilet. Continuous EEG discontinued. Of note, patient's family endorses no desire for PEG if patient unable to eventually pass swallow study. NGT remains for nutrition at this point, and in event of continued failed SLP, they would prefer Hospice. He is to be stepped down to Hospital Medicine with intentions of honoring patient's desires as expressed by family including which phases of care to avoid escalating to, as well as continued treatment of co-morbidities. Of note, blood cultures have remained negative along with urine cultures, and if infection had been present, most likely of pulmonary origin. He underwent swallow studies daily without improvement. Patient spiked fever on 5/12 and remained on Zosyn. Had thorough discussion with patient's wife and daughter. Given repeated poor performance on swallow test and firmly opposed to PEG placement per what they deem to be patient's desire, family requested hospice. Given patient's poor state, inpatient hospice was discussed. Consult placed to Palliative Care and after that evaluation and discussion, decision made to transfer patient to inpatient hospice.    Hospital Course:  No notes on file    Interval History: Wife, daughter Nancy and son Ramesh Hartman at bedside holding lozano. Brow furrowing. Wife clarifies that Mr. Ricci does not usually furrow his brows while asleep.    Medications:  Continuous Infusions:  Scheduled Meds:  PRN Meds:  Current Facility-Administered Medications:     bisacodyL, 10 mg, Rectal, Daily PRN    glycopyrrolate, 0.4 mg, Intravenous, Q4H PRN    haloperidol lactate, 2 mg, Intravenous, Q6H PRN    hydrALAZINE, 10 mg, Intravenous, Q4H PRN    labetalol, 5 mg, Intravenous, Q4H PRN    lorazepam, 1 mg, Intravenous, Q15 Min PRN    morphine, 2 mg, Intravenous, Q30 Min PRN    ondansetron, 4 mg,  Intravenous, Q6H PRN    sodium chloride 0.9%, 10 mL, Intravenous, PRN    Objective:     Vital Signs (Most Recent):  Temp: 98.2 °F (36.8 °C) (05/16/25 0713)  Pulse: (!) 136 (05/16/25 0713)  Resp: 12 (05/16/25 0953)  BP: (!) 98/59 (05/16/25 0713)  SpO2: (!) 94 % (05/16/25 0713) Vital Signs (24h Range):  Temp:  [98.2 °F (36.8 °C)-99.3 °F (37.4 °C)] 98.2 °F (36.8 °C)  Pulse:  [120-136] 136  Resp:  [12-30] 12  SpO2:  [94 %-98 %] 94 %  BP: ()/(59-72) 98/59     Weight: 68 kg (149 lb 14.6 oz)  Body mass index is 20.92 kg/m².       Physical Exam  Vitals and nursing note reviewed.   Constitutional:       Appearance: He is ill-appearing.   HENT:      Head: Normocephalic.      Nose: Nose normal.      Mouth/Throat:      Mouth: Mucous membranes are dry.   Cardiovascular:      Rate and Rhythm: Normal rate.   Pulmonary:      Effort: Respiratory distress present.   Abdominal:      Palpations: Abdomen is soft.   Skin:     Coloration: Skin is pale.      Findings: Bruising present.   Neurological:      Mental Status: He is disoriented.            Review of Symptoms      Symptom Assessment (ESAS 0-10 Scale)  Pain:  0  Dyspnea:  0  Anxiety:  0  Nausea:  0  Depression:  0  Anorexia:  0  Fatigue:  0  Insomnia:  0  Restlessness:  0  Agitation:  0  Unable to complete assessment due to Patient unresponsive         Pain Assessment in Advanced Demential Scale (PAINAD)   Breathing - Independent of vocalization:  1  Negative vocalization:  0  Facial expression:  1  Body language:  0  Consolability:  0  Total:  2    Living Arrangements:  Lives with spouse    Psychosocial/Cultural:   See Palliative Psychosocial Note: No   to current wife for last >60 years, has two adult children. Devoted to daughter Nancy from his first marriage. Loved to fix things and was always enjoyed staying busy  **Primary  to Follow**  Palliative Care  Consult: No    Spiritual:  F - Keila and Belief:  Sabianist  I - Importance:   "Important  C - Community:  N/A  A - Address in Care:  Father Miguel Ángel visited;  following        Advance Care Planning  Advance Directives:   Living Will: No    [place]POST: No    Do Not Resuscitate Status: Yes    Medical Power of : No      Decision Making:  Family answered questions and Patient unable to communicate due to disease severity/cognitive impairment  Goals of Care: What is most important right now is to focus on quality of life, even if it means sacrificing a little time, comfort and QOL . Accordingly, we have decided that the best plan to meet the patient's goals includes pivot to comfort-focused care.         Significant Labs: CBC:   No results for input(s): "WBC", "HGB", "HCT", "PLT" in the last 48 hours.    CMP:   No results for input(s): "NA", "K", "CL", "CO2", "GLU", "BUN", "CREATININE", "CALCIUM", "PROT", "ALBUMIN", "BILITOT", "ALKPHOS", "AST", "ALT", "ANIONGAP", "EGFRNONAA" in the last 48 hours.    Invalid input(s): "ESTGFAFRICA"    CBC:   Recent Labs   Lab 05/14/25  0652   WBC 14.70*   HGB 8.5*   HCT 28.1*   MCV 92   *     BMP:  No results for input(s): "GLU", "NA", "K", "CL", "CO2", "BUN", "CREATININE", "CALCIUM", "MG" in the last 24 hours.  LFT:  Lab Results   Component Value Date    AST 49 (H) 05/14/2025    ALKPHOS 93 05/14/2025    BILITOT 0.2 05/14/2025     Albumin:   Albumin   Date Value Ref Range Status   05/14/2025 1.7 (L) 3.5 - 5.2 g/dL Final   03/21/2025 3.9 3.5 - 5.2 g/dL Final     Protein:   Protein Total   Date Value Ref Range Status   05/14/2025 6.5 6.0 - 8.4 gm/dL Final     Total Protein   Date Value Ref Range Status   03/21/2025 7.6 6.0 - 8.4 g/dL Final     Lactic acid:   Lab Results   Component Value Date    LACTATE 1.2 05/09/2025    LACTATE 0.8 05/05/2025       Significant Imaging: I have reviewed all pertinent imaging results/findings within the past 24 hours.    I spent a total of 50 minutes on the day of the visit. This includes face to face time in discussion of " goals of care, symptom assessment, coordination of care and emotional support. This also includes non-face to face time preparing to see the patient (eg, review of tests/imaging), obtaining and/or reviewing separately obtained history, documenting clinical information in the electronic or other health record, independently interpreting results and communicating results to the patient/family/caregiver, or care coordinator.     Bianka Avery MD  Palliative Medicine  [facility]

## 2025-05-19 NOTE — PLAN OF CARE
[facility]  Discharge Final Note    Primary Care Provider: Nakul Mandujano MD    Expected Discharge Date: 2025    Final Discharge Note (most recent)       Final Note - 25 0848          Final Note    Assessment Type Final Discharge Note     Anticipated Discharge Disposition  in Medical Facility on Hospice                 Lia Mondragon LMSW  [facility]  [id number]

## 2025-07-10 ENCOUNTER — EXTERNAL HOME HEALTH (OUTPATIENT)
Dept: HOME HEALTH SERVICES | Facility: HOSPITAL | Age: 85
End: 2025-07-10
Payer: MEDICARE

## (undated) DEVICE — PINS SKULL ADULT MAYFIELD
Type: IMPLANTABLE DEVICE | Site: CRANIAL | Status: NON-FUNCTIONAL
Removed: 2025-03-24

## (undated) DEVICE — PENCIL ROCKER SWITCH 10FT CORD

## (undated) DEVICE — ELECTRODE REM PLYHSV RETURN 9

## (undated) DEVICE — SUT VICRYL PLUS 3-0 SH 18IN

## (undated) DEVICE — BIT PERFORATOR LARGE

## (undated) DEVICE — BLADE CLIPPER NEURO / MDL 4411

## (undated) DEVICE — BLADE SURG CARBON STEEL SZ11

## (undated) DEVICE — SUT 4/0 18IN NUROLON BLK B

## (undated) DEVICE — CATH DXTERITY PIGSTR 110CM 6FR

## (undated) DEVICE — PAD CURAD NONADH 3X4IN

## (undated) DEVICE — TRAY NEURO OMC

## (undated) DEVICE — DIFFUSER

## (undated) DEVICE — BURR ACORN 7.5MM

## (undated) DEVICE — HEMOSTAT SURGICEL 4X8IN

## (undated) DEVICE — STOPCOCK 3 WAY MED PRESSURE

## (undated) DEVICE — SUT VICRYL PLUS 3-0 18IN

## (undated) DEVICE — CARTRIDGE OIL

## (undated) DEVICE — GUIDEWIRE LAUREATE 035IN 260CM

## (undated) DEVICE — ADHESIVE DERMABOND ADVANCED

## (undated) DEVICE — SYR 10CC LUER LOCK

## (undated) DEVICE — TRAY SKIN SCRUB WET PREMIUM

## (undated) DEVICE — DURAPREP SURG SCRUB 26ML

## (undated) DEVICE — SEE MEDLINE ITEM 156930

## (undated) DEVICE — STAPLER SKIN PROXIMATE WIDE

## (undated) DEVICE — RUBBERBAND STERILE 3X1/8IN

## (undated) DEVICE — SUT SILK 2-0 SH 18IN BLACK

## (undated) DEVICE — SUT CTD VICRYL BR CR/SH VIL

## (undated) DEVICE — DRAPE T THYROID STERILE

## (undated) DEVICE — CATH DXTERITY AL20 100CM 6FR

## (undated) DEVICE — TROCAR ENDOPATH XCEL 5MM 7.5CM

## (undated) DEVICE — MARKER SKIN RULER STERILE

## (undated) DEVICE — COTTON BALLS 1/2IN

## (undated) DEVICE — SYR 10ML LIDOCAINE

## (undated) DEVICE — DRESSING SURGICAL 1X1

## (undated) DEVICE — ROUTER TAPERED 2.3MM

## (undated) DEVICE — OMNIPAQUE 350MG 150ML VIAL

## (undated) DEVICE — SYR CNTRL MALE LL 10ML

## (undated) DEVICE — KIT EVACUATOR 3-SPRING 1/8 DRN

## (undated) DEVICE — DRAPE OPMI STERILE

## (undated) DEVICE — CLIPPER BLADE MOD 4406 (CAREF)

## (undated) DEVICE — CORD BIPOLAR 12 FOOT

## (undated) DEVICE — CONTAINER SPECIMEN OR STER 4OZ

## (undated) DEVICE — MARKERS SPHERZ PASSIVE

## (undated) DEVICE — TUBE FRAZIER 5MM 2FT SOFT TIP

## (undated) DEVICE — APPLICATOR CHLORAPREP CLR 10.5

## (undated) DEVICE — SUT GUT PL. 4-0 27 FS-2

## (undated) DEVICE — DRESSING TEGADERM 2 3/8 X 2.75

## (undated) DEVICE — NDL HYPO REG 25G X 1 1/2

## (undated) DEVICE — CLOSURE SKIN STERI STRIP 1/2X4

## (undated) DEVICE — DRAPE EENT SPLIT STERILE

## (undated) DEVICE — GOWN SMART IMP BREATHABLE XXLG

## (undated) DEVICE — TAPE MEDIPORE 4IN X 2YDS

## (undated) DEVICE — TUBING INSUFFLATION HEATED

## (undated) DEVICE — ELECTRODE BLADE INSULATED 1 IN

## (undated) DEVICE — SOL 9P NACL IRR PIC IL

## (undated) DEVICE — GAUZE SPONGE 4'X4 12 PLY

## (undated) DEVICE — MANIFOLD PERCEPTOR MP 3P RH ON

## (undated) DEVICE — PINS SKULL ADULT MAYFIELD
Type: IMPLANTABLE DEVICE | Site: CRANIAL | Status: NON-FUNCTIONAL
Removed: 2025-04-25

## (undated) DEVICE — SPONGE NEURO 1/4X1/4

## (undated) DEVICE — KIT ANTIFOG W/SPONG & FLUID

## (undated) DEVICE — TOWEL OR DISP STRL BLUE 4/PK

## (undated) DEVICE — CATH PASSER DISPOSABLE

## (undated) DEVICE — STYLET AXIEM 23CM SINGLE COIL

## (undated) DEVICE — TRAY LUMBAR PUNCTURE ADULT

## (undated) DEVICE — DRESSING SURGICAL 1X3

## (undated) DEVICE — SUT MCRYL PLUS 4-0 PS2 27IN

## (undated) DEVICE — SPONGE SURGIFOAM 100 8.5X12X10

## (undated) DEVICE — SPRAY MASTISOL

## (undated) DEVICE — APPLICATOR CHLORAPREP ORN 26ML

## (undated) DEVICE — TROCAR ENDOPATH XCEL 15MM 10CM

## (undated) DEVICE — STRIP MEDI WND CLSR 1/4X3IN

## (undated) DEVICE — CATH SWAN GANZ STND 7FR

## (undated) DEVICE — ELECTRODE BLD EXT INSUL 1

## (undated) DEVICE — BENZOIN TINCTURE CAPSULET

## (undated) DEVICE — BAG DRAINAGE W/SPIKE

## (undated) DEVICE — DRAPE INCISE IOBAN 2 23X17IN

## (undated) DEVICE — SUT VICRYL 0 27 CT-2

## (undated) DEVICE — DRESSING SURGICAL 1/2X1/2

## (undated) DEVICE — DRAPE SURGICAL STERI IRRG PCH

## (undated) DEVICE — COVER PROXIMA MAYO STAND

## (undated) DEVICE — SEE MEDLINE ITEM 146296

## (undated) DEVICE — ELECTRODE MEGADYNE RETURN DUAL

## (undated) DEVICE — GUIDEWIRE 145CM .035

## (undated) DEVICE — GAUZE SPONGE 4X4 12PLY

## (undated) DEVICE — SUT MONOCRYL 4-0 PS-1 UND

## (undated) DEVICE — SUT SILK BLK BR. 2 2-60

## (undated) DEVICE — SUT VICRYL PLUS 0 CT1 18IN

## (undated) DEVICE — INTRODUCER CATH 7F 11CML

## (undated) DEVICE — PASSER CATH SHORT 55 CM

## (undated) DEVICE — TAPE SURG MEDIPORE 6X72IN

## (undated) DEVICE — DRESSING TRANS 4X4 TEGADERM

## (undated) DEVICE — GLOVE BIOGEL SKINSENSE PI 8.0

## (undated) DEVICE — DRAIN PENROSE XRAY 18 X 1/4 ST

## (undated) DEVICE — CATH DXTERITY JR40 100CM 6FR

## (undated) DEVICE — TRACKER PATIENT NON INVASIVE

## (undated) DEVICE — SUT VICRYL 3-0 27 SH

## (undated) DEVICE — SUT 2-0 12-18IN SILK

## (undated) DEVICE — POINTER AXIEM CRANIAL TRACER

## (undated) DEVICE — CATH DXTERITY JL40 100CM 6FR

## (undated) DEVICE — SUT VICRYL 4-0 27 PS-1 27IN

## (undated) DEVICE — TRAY CORONARY CUSTOM BAPTIST